# Patient Record
Sex: FEMALE | Race: WHITE | Employment: OTHER
[De-identification: names, ages, dates, MRNs, and addresses within clinical notes are randomized per-mention and may not be internally consistent; named-entity substitution may affect disease eponyms.]

---

## 2017-01-31 RX ORDER — GABAPENTIN 600 MG/1
600 TABLET ORAL DAILY
Qty: 90 TABLET | Refills: 1 | Status: SHIPPED | OUTPATIENT
Start: 2017-01-31 | End: 2017-07-10 | Stop reason: SDUPTHER

## 2017-01-31 RX ORDER — POTASSIUM CITRATE 10 MEQ/1
10 TABLET, EXTENDED RELEASE ORAL 2 TIMES DAILY
Qty: 180 TABLET | Refills: 1 | Status: SHIPPED | OUTPATIENT
Start: 2017-01-31 | End: 2017-10-02 | Stop reason: SDUPTHER

## 2017-01-31 RX ORDER — VALSARTAN AND HYDROCHLOROTHIAZIDE 160; 12.5 MG/1; MG/1
1 TABLET, FILM COATED ORAL 2 TIMES DAILY
Qty: 180 TABLET | Refills: 1 | Status: SHIPPED | OUTPATIENT
Start: 2017-01-31 | End: 2017-07-10 | Stop reason: SDUPTHER

## 2017-02-17 ENCOUNTER — OFFICE VISIT (OUTPATIENT)
Dept: FAMILY MEDICINE CLINIC | Facility: CLINIC | Age: 72
End: 2017-02-17

## 2017-02-17 ENCOUNTER — HOSPITAL ENCOUNTER (OUTPATIENT)
Age: 72
Setting detail: SPECIMEN
Discharge: HOME | End: 2017-02-17
Payer: MEDICARE

## 2017-02-17 VITALS
HEART RATE: 88 BPM | BODY MASS INDEX: 43.9 KG/M2 | DIASTOLIC BLOOD PRESSURE: 84 MMHG | OXYGEN SATURATION: 98 % | SYSTOLIC BLOOD PRESSURE: 130 MMHG | WEIGHT: 272 LBS

## 2017-02-17 DIAGNOSIS — K57.32 DIVERTICULITIS OF LARGE INTESTINE WITHOUT PERFORATION OR ABSCESS WITHOUT BLEEDING: ICD-10-CM

## 2017-02-17 DIAGNOSIS — R10.32 LEFT LOWER QUADRANT PAIN: ICD-10-CM

## 2017-02-17 DIAGNOSIS — K57.32 DIVERTICULITIS OF LARGE INTESTINE WITHOUT PERFORATION OR ABSCESS WITHOUT BLEEDING: Primary | ICD-10-CM

## 2017-02-17 LAB
ABSOLUTE EOS #: 0.3 K/UL (ref 0–0.4)
ABSOLUTE LYMPH #: 2 K/UL (ref 1–4.8)
ABSOLUTE MONO #: 0.5 K/UL (ref 0.1–1.2)
ALBUMIN SERPL-MCNC: 3.8 G/DL (ref 3.5–5.2)
ALBUMIN/GLOBULIN RATIO: 1.2 (ref 1–2.5)
ALP BLD-CCNC: 61 U/L (ref 35–104)
ALT SERPL-CCNC: 15 U/L (ref 5–33)
ANION GAP SERPL CALCULATED.3IONS-SCNC: 17 MMOL/L (ref 9–17)
AST SERPL-CCNC: 15 U/L
BASOPHILS # BLD: 0 % (ref 0–2)
BASOPHILS ABSOLUTE: 0 K/UL (ref 0–0.2)
BILIRUB SERPL-MCNC: 0.25 MG/DL (ref 0.3–1.2)
BILIRUBIN, POC: NORMAL
BLOOD URINE, POC: NORMAL
BUN BLDV-MCNC: 18 MG/DL (ref 8–23)
BUN/CREAT BLD: ABNORMAL (ref 9–20)
CALCIUM SERPL-MCNC: 9.6 MG/DL (ref 8.6–10.4)
CHLORIDE BLD-SCNC: 105 MMOL/L (ref 98–107)
CLARITY, POC: CLEAR
CO2: 22 MMOL/L (ref 20–31)
COLOR, POC: YELLOW
CREAT SERPL-MCNC: 1.71 MG/DL (ref 0.5–0.9)
DIFFERENTIAL TYPE: ABNORMAL
EOSINOPHILS RELATIVE PERCENT: 4 % (ref 1–4)
GFR AFRICAN AMERICAN: 36 ML/MIN
GFR NON-AFRICAN AMERICAN: 29 ML/MIN
GFR SERPL CREATININE-BSD FRML MDRD: ABNORMAL ML/MIN/{1.73_M2}
GFR SERPL CREATININE-BSD FRML MDRD: ABNORMAL ML/MIN/{1.73_M2}
GLUCOSE BLD-MCNC: 135 MG/DL (ref 70–99)
GLUCOSE URINE, POC: NORMAL
HCT VFR BLD CALC: 30.8 % (ref 36–46)
HEMOGLOBIN: 10.4 G/DL (ref 12–16)
KETONES, POC: NORMAL
LEUKOCYTE EST, POC: NORMAL
LYMPHOCYTES # BLD: 24 % (ref 24–44)
MCH RBC QN AUTO: 29 PG (ref 26–34)
MCHC RBC AUTO-ENTMCNC: 33.6 G/DL (ref 31–37)
MCV RBC AUTO: 86.1 FL (ref 80–100)
MONOCYTES # BLD: 6 % (ref 2–11)
NITRITE, POC: NORMAL
PDW BLD-RTO: 14.9 % (ref 12.5–15.4)
PH, POC: 6
PLATELET # BLD: 278 K/UL (ref 140–450)
PLATELET ESTIMATE: ABNORMAL
PMV BLD AUTO: 8.4 FL (ref 6–12)
POTASSIUM SERPL-SCNC: 4.5 MMOL/L (ref 3.7–5.3)
PROTEIN, POC: NORMAL
RBC # BLD: 3.58 M/UL (ref 4–5.2)
RBC # BLD: ABNORMAL 10*6/UL
SEG NEUTROPHILS: 66 % (ref 36–66)
SEGMENTED NEUTROPHILS ABSOLUTE COUNT: 5.3 K/UL (ref 1.8–7.7)
SODIUM BLD-SCNC: 144 MMOL/L (ref 135–144)
SPECIFIC GRAVITY, POC: 1.01
TOTAL PROTEIN: 7 G/DL (ref 6.4–8.3)
UROBILINOGEN, POC: 0.2
WBC # BLD: 8.1 K/UL (ref 3.5–11)
WBC # BLD: ABNORMAL 10*3/UL

## 2017-02-17 PROCEDURE — 1090F PRES/ABSN URINE INCON ASSESS: CPT | Performed by: FAMILY MEDICINE

## 2017-02-17 PROCEDURE — 1123F ACP DISCUSS/DSCN MKR DOCD: CPT | Performed by: FAMILY MEDICINE

## 2017-02-17 PROCEDURE — 3014F SCREEN MAMMO DOC REV: CPT | Performed by: FAMILY MEDICINE

## 2017-02-17 PROCEDURE — 99214 OFFICE O/P EST MOD 30 MIN: CPT | Performed by: FAMILY MEDICINE

## 2017-02-17 PROCEDURE — 3017F COLORECTAL CA SCREEN DOC REV: CPT | Performed by: FAMILY MEDICINE

## 2017-02-17 PROCEDURE — 1036F TOBACCO NON-USER: CPT | Performed by: FAMILY MEDICINE

## 2017-02-17 PROCEDURE — 4040F PNEUMOC VAC/ADMIN/RCVD: CPT | Performed by: FAMILY MEDICINE

## 2017-02-17 PROCEDURE — G8400 PT W/DXA NO RESULTS DOC: HCPCS | Performed by: FAMILY MEDICINE

## 2017-02-17 PROCEDURE — 81003 URINALYSIS AUTO W/O SCOPE: CPT | Performed by: FAMILY MEDICINE

## 2017-02-17 PROCEDURE — G8417 CALC BMI ABV UP PARAM F/U: HCPCS | Performed by: FAMILY MEDICINE

## 2017-02-17 PROCEDURE — G8484 FLU IMMUNIZE NO ADMIN: HCPCS | Performed by: FAMILY MEDICINE

## 2017-02-17 PROCEDURE — G8427 DOCREV CUR MEDS BY ELIG CLIN: HCPCS | Performed by: FAMILY MEDICINE

## 2017-02-17 RX ORDER — METRONIDAZOLE 500 MG/1
500 TABLET ORAL 3 TIMES DAILY
Qty: 30 TABLET | Refills: 0 | Status: SHIPPED | OUTPATIENT
Start: 2017-02-17 | End: 2017-02-27

## 2017-02-17 RX ORDER — CIPROFLOXACIN 500 MG/1
500 TABLET, FILM COATED ORAL 2 TIMES DAILY
Qty: 20 TABLET | Refills: 0 | Status: SHIPPED | OUTPATIENT
Start: 2017-02-17 | End: 2017-04-13 | Stop reason: ALTCHOICE

## 2017-02-20 DIAGNOSIS — F32.A DEPRESSION, UNSPECIFIED DEPRESSION TYPE: ICD-10-CM

## 2017-02-24 RX ORDER — FLUCONAZOLE 150 MG/1
150 TABLET ORAL ONCE
Qty: 1 TABLET | Refills: 1 | Status: SHIPPED | OUTPATIENT
Start: 2017-02-24 | End: 2017-02-24

## 2017-02-27 ENCOUNTER — HOSPITAL ENCOUNTER (OUTPATIENT)
Dept: CT IMAGING | Age: 72
Discharge: HOME | End: 2017-02-27
Payer: MEDICARE

## 2017-02-27 DIAGNOSIS — K57.32 DIVERTICULITIS OF LARGE INTESTINE WITHOUT PERFORATION OR ABSCESS WITHOUT BLEEDING: ICD-10-CM

## 2017-02-27 DIAGNOSIS — N28.89 RENAL MASS: Primary | ICD-10-CM

## 2017-02-27 PROCEDURE — 74176 CT ABD & PELVIS W/O CONTRAST: CPT | Performed by: RADIOLOGY

## 2017-02-27 PROCEDURE — 74176 CT ABD & PELVIS W/O CONTRAST: CPT

## 2017-03-13 ENCOUNTER — HOSPITAL ENCOUNTER (OUTPATIENT)
Dept: CT IMAGING | Age: 72
Discharge: HOME | End: 2017-03-13
Payer: MEDICARE

## 2017-03-13 ENCOUNTER — HOSPITAL ENCOUNTER (OUTPATIENT)
Dept: ULTRASOUND IMAGING | Age: 72
Discharge: HOME | End: 2017-03-13
Payer: MEDICARE

## 2017-03-13 DIAGNOSIS — N28.89 RENAL MASS: ICD-10-CM

## 2017-03-13 LAB
CREAT SERPL-MCNC: 1.87 MG/DL (ref 0.5–0.9)
GFR AFRICAN AMERICAN: 32 ML/MIN
GFR NON-AFRICAN AMERICAN: 27 ML/MIN
GFR SERPL CREATININE-BSD FRML MDRD: ABNORMAL ML/MIN/{1.73_M2}
GFR SERPL CREATININE-BSD FRML MDRD: ABNORMAL ML/MIN/{1.73_M2}

## 2017-03-13 PROCEDURE — 76775 US EXAM ABDO BACK WALL LIM: CPT

## 2017-03-13 PROCEDURE — 82565 ASSAY OF CREATININE: CPT

## 2017-03-15 RX ORDER — ATORVASTATIN CALCIUM 40 MG/1
TABLET, FILM COATED ORAL
Qty: 30 TABLET | Refills: 4 | Status: SHIPPED | OUTPATIENT
Start: 2017-03-15 | End: 2017-10-12 | Stop reason: SDUPTHER

## 2017-04-13 ENCOUNTER — OFFICE VISIT (OUTPATIENT)
Dept: PULMONOLOGY | Age: 72
End: 2017-04-13
Payer: MEDICARE

## 2017-04-13 VITALS
HEART RATE: 72 BPM | TEMPERATURE: 97.4 F | BODY MASS INDEX: 43 KG/M2 | OXYGEN SATURATION: 96 % | DIASTOLIC BLOOD PRESSURE: 76 MMHG | WEIGHT: 274 LBS | RESPIRATION RATE: 20 BRPM | SYSTOLIC BLOOD PRESSURE: 141 MMHG | HEIGHT: 67 IN

## 2017-04-13 DIAGNOSIS — N18.30 CHRONIC KIDNEY DISEASE (CKD), STAGE III (MODERATE) (HCC): ICD-10-CM

## 2017-04-13 DIAGNOSIS — E66.01 MORBID OBESITY DUE TO EXCESS CALORIES (HCC): ICD-10-CM

## 2017-04-13 DIAGNOSIS — M79.7 FIBROMYALGIA: ICD-10-CM

## 2017-04-13 DIAGNOSIS — G47.33 OSA ON CPAP: Primary | ICD-10-CM

## 2017-04-13 DIAGNOSIS — Z99.89 OSA ON CPAP: Primary | ICD-10-CM

## 2017-04-13 PROCEDURE — G8427 DOCREV CUR MEDS BY ELIG CLIN: HCPCS | Performed by: INTERNAL MEDICINE

## 2017-04-13 PROCEDURE — 3017F COLORECTAL CA SCREEN DOC REV: CPT | Performed by: INTERNAL MEDICINE

## 2017-04-13 PROCEDURE — 3014F SCREEN MAMMO DOC REV: CPT | Performed by: INTERNAL MEDICINE

## 2017-04-13 PROCEDURE — 4040F PNEUMOC VAC/ADMIN/RCVD: CPT | Performed by: INTERNAL MEDICINE

## 2017-04-13 PROCEDURE — 1036F TOBACCO NON-USER: CPT | Performed by: INTERNAL MEDICINE

## 2017-04-13 PROCEDURE — G8400 PT W/DXA NO RESULTS DOC: HCPCS | Performed by: INTERNAL MEDICINE

## 2017-04-13 PROCEDURE — 1090F PRES/ABSN URINE INCON ASSESS: CPT | Performed by: INTERNAL MEDICINE

## 2017-04-13 PROCEDURE — G8417 CALC BMI ABV UP PARAM F/U: HCPCS | Performed by: INTERNAL MEDICINE

## 2017-04-13 PROCEDURE — 99214 OFFICE O/P EST MOD 30 MIN: CPT | Performed by: INTERNAL MEDICINE

## 2017-04-13 PROCEDURE — 1123F ACP DISCUSS/DSCN MKR DOCD: CPT | Performed by: INTERNAL MEDICINE

## 2017-04-18 ENCOUNTER — OFFICE VISIT (OUTPATIENT)
Dept: FAMILY MEDICINE CLINIC | Age: 72
End: 2017-04-18
Payer: MEDICARE

## 2017-04-18 VITALS
HEART RATE: 80 BPM | WEIGHT: 275 LBS | BODY MASS INDEX: 43.07 KG/M2 | SYSTOLIC BLOOD PRESSURE: 142 MMHG | DIASTOLIC BLOOD PRESSURE: 82 MMHG

## 2017-04-18 DIAGNOSIS — I10 ESSENTIAL HYPERTENSION: ICD-10-CM

## 2017-04-18 DIAGNOSIS — M79.7 FIBROMYALGIA: ICD-10-CM

## 2017-04-18 DIAGNOSIS — K57.32 DIVERTICULITIS OF LARGE INTESTINE WITHOUT PERFORATION OR ABSCESS WITHOUT BLEEDING: Primary | ICD-10-CM

## 2017-04-18 DIAGNOSIS — N20.0 KIDNEY STONE: ICD-10-CM

## 2017-04-18 LAB
BILIRUBIN, POC: ABNORMAL
BLOOD URINE, POC: ABNORMAL
CLARITY, POC: CLEAR
COLOR, POC: ABNORMAL
GLUCOSE URINE, POC: ABNORMAL
KETONES, POC: ABNORMAL
LEUKOCYTE EST, POC: ABNORMAL
NITRITE, POC: ABNORMAL
PH, POC: 7.5
PROTEIN, POC: ABNORMAL
SPECIFIC GRAVITY, POC: 1.01
UROBILINOGEN, POC: 0.2

## 2017-04-18 PROCEDURE — 99213 OFFICE O/P EST LOW 20 MIN: CPT | Performed by: FAMILY MEDICINE

## 2017-04-18 PROCEDURE — G8417 CALC BMI ABV UP PARAM F/U: HCPCS | Performed by: FAMILY MEDICINE

## 2017-04-18 PROCEDURE — 3014F SCREEN MAMMO DOC REV: CPT | Performed by: FAMILY MEDICINE

## 2017-04-18 PROCEDURE — 4040F PNEUMOC VAC/ADMIN/RCVD: CPT | Performed by: FAMILY MEDICINE

## 2017-04-18 PROCEDURE — 3017F COLORECTAL CA SCREEN DOC REV: CPT | Performed by: FAMILY MEDICINE

## 2017-04-18 PROCEDURE — 81003 URINALYSIS AUTO W/O SCOPE: CPT | Performed by: FAMILY MEDICINE

## 2017-04-18 PROCEDURE — 1036F TOBACCO NON-USER: CPT | Performed by: FAMILY MEDICINE

## 2017-04-18 PROCEDURE — G8400 PT W/DXA NO RESULTS DOC: HCPCS | Performed by: FAMILY MEDICINE

## 2017-04-18 PROCEDURE — G8427 DOCREV CUR MEDS BY ELIG CLIN: HCPCS | Performed by: FAMILY MEDICINE

## 2017-04-18 PROCEDURE — 1090F PRES/ABSN URINE INCON ASSESS: CPT | Performed by: FAMILY MEDICINE

## 2017-04-18 PROCEDURE — 1123F ACP DISCUSS/DSCN MKR DOCD: CPT | Performed by: FAMILY MEDICINE

## 2017-04-18 RX ORDER — HYDROCODONE BITARTRATE AND ACETAMINOPHEN 5; 325 MG/1; MG/1
1 TABLET ORAL EVERY 6 HOURS PRN
Qty: 40 TABLET | Refills: 0 | Status: SHIPPED | OUTPATIENT
Start: 2017-04-18 | End: 2017-08-23 | Stop reason: SDUPTHER

## 2017-04-18 RX ORDER — MOXIFLOXACIN HYDROCHLORIDE 400 MG/1
400 TABLET ORAL DAILY
Qty: 10 TABLET | Refills: 0 | Status: SHIPPED | OUTPATIENT
Start: 2017-04-18 | End: 2017-04-28

## 2017-04-18 ASSESSMENT — ENCOUNTER SYMPTOMS
COUGH: 0
ABDOMINAL PAIN: 1
CONSTIPATION: 0
DIARRHEA: 0
VOMITING: 0
NAUSEA: 0
BACK PAIN: 1
SHORTNESS OF BREATH: 0
BLOOD IN STOOL: 0
SORE THROAT: 0
SINUS PRESSURE: 0
RECTAL PAIN: 0
ABDOMINAL DISTENTION: 1

## 2017-04-18 ASSESSMENT — PATIENT HEALTH QUESTIONNAIRE - PHQ9
1. LITTLE INTEREST OR PLEASURE IN DOING THINGS: 2
SUM OF ALL RESPONSES TO PHQ QUESTIONS 1-9: 5
SUM OF ALL RESPONSES TO PHQ9 QUESTIONS 1 & 2: 5
2. FEELING DOWN, DEPRESSED OR HOPELESS: 3

## 2017-05-01 RX ORDER — PIOGLITAZONE HCL AND METFORMIN HCL 500; 15 MG/1; MG/1
1 TABLET ORAL 2 TIMES DAILY WITH MEALS
Qty: 180 TABLET | Refills: 2 | Status: SHIPPED | OUTPATIENT
Start: 2017-05-01 | End: 2018-01-08 | Stop reason: SDUPTHER

## 2017-05-01 RX ORDER — PANTOPRAZOLE SODIUM 40 MG/1
40 TABLET, DELAYED RELEASE ORAL DAILY
Qty: 90 TABLET | Refills: 2 | Status: SHIPPED | OUTPATIENT
Start: 2017-05-01 | End: 2018-01-08 | Stop reason: SDUPTHER

## 2017-05-24 ENCOUNTER — OFFICE VISIT (OUTPATIENT)
Dept: FAMILY MEDICINE CLINIC | Age: 72
End: 2017-05-24
Payer: MEDICARE

## 2017-05-24 VITALS
HEART RATE: 67 BPM | SYSTOLIC BLOOD PRESSURE: 130 MMHG | DIASTOLIC BLOOD PRESSURE: 74 MMHG | WEIGHT: 275 LBS | BODY MASS INDEX: 43.07 KG/M2

## 2017-05-24 DIAGNOSIS — F32.A DEPRESSION, UNSPECIFIED DEPRESSION TYPE: ICD-10-CM

## 2017-05-24 DIAGNOSIS — K52.9 COLITIS: Primary | ICD-10-CM

## 2017-05-24 PROCEDURE — G8427 DOCREV CUR MEDS BY ELIG CLIN: HCPCS | Performed by: FAMILY MEDICINE

## 2017-05-24 PROCEDURE — 4040F PNEUMOC VAC/ADMIN/RCVD: CPT | Performed by: FAMILY MEDICINE

## 2017-05-24 PROCEDURE — 1036F TOBACCO NON-USER: CPT | Performed by: FAMILY MEDICINE

## 2017-05-24 PROCEDURE — 3017F COLORECTAL CA SCREEN DOC REV: CPT | Performed by: FAMILY MEDICINE

## 2017-05-24 PROCEDURE — 1090F PRES/ABSN URINE INCON ASSESS: CPT | Performed by: FAMILY MEDICINE

## 2017-05-24 PROCEDURE — G8400 PT W/DXA NO RESULTS DOC: HCPCS | Performed by: FAMILY MEDICINE

## 2017-05-24 PROCEDURE — 3014F SCREEN MAMMO DOC REV: CPT | Performed by: FAMILY MEDICINE

## 2017-05-24 PROCEDURE — G8417 CALC BMI ABV UP PARAM F/U: HCPCS | Performed by: FAMILY MEDICINE

## 2017-05-24 PROCEDURE — 99213 OFFICE O/P EST LOW 20 MIN: CPT | Performed by: FAMILY MEDICINE

## 2017-05-24 PROCEDURE — 1123F ACP DISCUSS/DSCN MKR DOCD: CPT | Performed by: FAMILY MEDICINE

## 2017-05-24 RX ORDER — CIPROFLOXACIN 500 MG/1
500 TABLET, FILM COATED ORAL 2 TIMES DAILY
Qty: 20 TABLET | Refills: 0 | Status: SHIPPED | OUTPATIENT
Start: 2017-05-24 | End: 2017-06-03

## 2017-05-24 ASSESSMENT — ENCOUNTER SYMPTOMS
DIARRHEA: 1
BACK PAIN: 1
ABDOMINAL PAIN: 1
ABDOMINAL DISTENTION: 1
NAUSEA: 0
CONSTIPATION: 0
SORE THROAT: 0
SINUS PRESSURE: 0
VOMITING: 0
COUGH: 0
SHORTNESS OF BREATH: 0

## 2017-05-30 RX ORDER — ZOLPIDEM TARTRATE 10 MG/1
TABLET ORAL
Qty: 90 TABLET | Refills: 0 | Status: SHIPPED | OUTPATIENT
Start: 2017-05-30 | End: 2017-08-23 | Stop reason: SDUPTHER

## 2017-06-22 RX ORDER — CYCLOBENZAPRINE HCL 10 MG
TABLET ORAL
Qty: 30 TABLET | Refills: 4 | Status: SHIPPED | OUTPATIENT
Start: 2017-06-22 | End: 2018-07-27 | Stop reason: SDUPTHER

## 2017-07-10 RX ORDER — VALSARTAN AND HYDROCHLOROTHIAZIDE 160; 12.5 MG/1; MG/1
1 TABLET, FILM COATED ORAL 2 TIMES DAILY
Qty: 180 TABLET | Refills: 3 | Status: SHIPPED | OUTPATIENT
Start: 2017-07-10 | End: 2018-10-09 | Stop reason: ALTCHOICE

## 2017-07-10 RX ORDER — GABAPENTIN 600 MG/1
600 TABLET ORAL DAILY
Qty: 90 TABLET | Refills: 3 | Status: SHIPPED | OUTPATIENT
Start: 2017-07-10 | End: 2018-11-14 | Stop reason: SDUPTHER

## 2017-09-05 ENCOUNTER — OFFICE VISIT (OUTPATIENT)
Dept: FAMILY MEDICINE CLINIC | Age: 72
End: 2017-09-05
Payer: MEDICARE

## 2017-09-05 VITALS
BODY MASS INDEX: 42.76 KG/M2 | WEIGHT: 273 LBS | DIASTOLIC BLOOD PRESSURE: 80 MMHG | SYSTOLIC BLOOD PRESSURE: 130 MMHG | HEART RATE: 63 BPM

## 2017-09-05 DIAGNOSIS — N18.30 TYPE 2 DIABETES MELLITUS WITH STAGE 3 CHRONIC KIDNEY DISEASE, WITHOUT LONG-TERM CURRENT USE OF INSULIN (HCC): ICD-10-CM

## 2017-09-05 DIAGNOSIS — I10 ESSENTIAL HYPERTENSION: ICD-10-CM

## 2017-09-05 DIAGNOSIS — E11.22 TYPE 2 DIABETES MELLITUS WITH STAGE 3 CHRONIC KIDNEY DISEASE, WITHOUT LONG-TERM CURRENT USE OF INSULIN (HCC): ICD-10-CM

## 2017-09-05 DIAGNOSIS — N18.30 CRI (CHRONIC RENAL INSUFFICIENCY), STAGE 3 (MODERATE) (HCC): ICD-10-CM

## 2017-09-05 DIAGNOSIS — Z23 IMMUNIZATION DUE: ICD-10-CM

## 2017-09-05 DIAGNOSIS — E78.5 DYSLIPIDEMIA: ICD-10-CM

## 2017-09-05 DIAGNOSIS — M54.6 RIGHT-SIDED THORACIC BACK PAIN, UNSPECIFIED CHRONICITY: Primary | ICD-10-CM

## 2017-09-05 DIAGNOSIS — F32.A DEPRESSION, UNSPECIFIED DEPRESSION TYPE: ICD-10-CM

## 2017-09-05 PROCEDURE — 1090F PRES/ABSN URINE INCON ASSESS: CPT | Performed by: FAMILY MEDICINE

## 2017-09-05 PROCEDURE — 1036F TOBACCO NON-USER: CPT | Performed by: FAMILY MEDICINE

## 2017-09-05 PROCEDURE — 99213 OFFICE O/P EST LOW 20 MIN: CPT | Performed by: FAMILY MEDICINE

## 2017-09-05 PROCEDURE — 4040F PNEUMOC VAC/ADMIN/RCVD: CPT | Performed by: FAMILY MEDICINE

## 2017-09-05 PROCEDURE — 3014F SCREEN MAMMO DOC REV: CPT | Performed by: FAMILY MEDICINE

## 2017-09-05 PROCEDURE — G8400 PT W/DXA NO RESULTS DOC: HCPCS | Performed by: FAMILY MEDICINE

## 2017-09-05 PROCEDURE — 90662 IIV NO PRSV INCREASED AG IM: CPT | Performed by: FAMILY MEDICINE

## 2017-09-05 PROCEDURE — 3046F HEMOGLOBIN A1C LEVEL >9.0%: CPT | Performed by: FAMILY MEDICINE

## 2017-09-05 PROCEDURE — G8417 CALC BMI ABV UP PARAM F/U: HCPCS | Performed by: FAMILY MEDICINE

## 2017-09-05 PROCEDURE — 3017F COLORECTAL CA SCREEN DOC REV: CPT | Performed by: FAMILY MEDICINE

## 2017-09-05 PROCEDURE — 1123F ACP DISCUSS/DSCN MKR DOCD: CPT | Performed by: FAMILY MEDICINE

## 2017-09-05 PROCEDURE — G0008 ADMIN INFLUENZA VIRUS VAC: HCPCS | Performed by: FAMILY MEDICINE

## 2017-09-05 PROCEDURE — G8427 DOCREV CUR MEDS BY ELIG CLIN: HCPCS | Performed by: FAMILY MEDICINE

## 2017-09-05 RX ORDER — SERTRALINE HYDROCHLORIDE 100 MG/1
100 TABLET, FILM COATED ORAL DAILY
Qty: 90 TABLET | Refills: 3 | Status: SHIPPED | OUTPATIENT
Start: 2017-09-05 | End: 2018-12-29

## 2017-09-05 ASSESSMENT — ENCOUNTER SYMPTOMS
VOMITING: 0
NAUSEA: 0
COUGH: 0
BACK PAIN: 1
SHORTNESS OF BREATH: 0
SINUS PRESSURE: 0
SORE THROAT: 0
DIARRHEA: 0

## 2017-09-07 ENCOUNTER — HOSPITAL ENCOUNTER (OUTPATIENT)
Age: 72
Setting detail: SPECIMEN
Discharge: HOME | End: 2017-09-07
Payer: MEDICARE

## 2017-09-07 DIAGNOSIS — I10 ESSENTIAL HYPERTENSION: ICD-10-CM

## 2017-09-07 DIAGNOSIS — N18.30 TYPE 2 DIABETES MELLITUS WITH STAGE 3 CHRONIC KIDNEY DISEASE, WITHOUT LONG-TERM CURRENT USE OF INSULIN (HCC): ICD-10-CM

## 2017-09-07 DIAGNOSIS — N18.30 CRI (CHRONIC RENAL INSUFFICIENCY), STAGE 3 (MODERATE) (HCC): ICD-10-CM

## 2017-09-07 DIAGNOSIS — E11.22 TYPE 2 DIABETES MELLITUS WITH STAGE 3 CHRONIC KIDNEY DISEASE, WITHOUT LONG-TERM CURRENT USE OF INSULIN (HCC): ICD-10-CM

## 2017-09-07 DIAGNOSIS — E78.5 DYSLIPIDEMIA: ICD-10-CM

## 2017-09-07 LAB
ABSOLUTE EOS #: 0.4 K/UL (ref 0–0.4)
ABSOLUTE LYMPH #: 2.6 K/UL (ref 1–4.8)
ABSOLUTE MONO #: 0.6 K/UL (ref 0.1–1.2)
ALBUMIN SERPL-MCNC: 3.8 G/DL (ref 3.5–5.2)
ALBUMIN/GLOBULIN RATIO: 1.3 (ref 1–2.5)
ALP BLD-CCNC: 60 U/L (ref 35–104)
ALT SERPL-CCNC: 11 U/L (ref 5–33)
ANION GAP SERPL CALCULATED.3IONS-SCNC: 16 MMOL/L (ref 9–17)
AST SERPL-CCNC: 12 U/L
BASOPHILS # BLD: 1 %
BASOPHILS ABSOLUTE: 0.1 K/UL (ref 0–0.2)
BILIRUB SERPL-MCNC: 0.26 MG/DL (ref 0.3–1.2)
BUN BLDV-MCNC: 28 MG/DL (ref 8–23)
BUN/CREAT BLD: ABNORMAL (ref 9–20)
CALCIUM SERPL-MCNC: 9 MG/DL (ref 8.6–10.4)
CHLORIDE BLD-SCNC: 102 MMOL/L (ref 98–107)
CHOLESTEROL/HDL RATIO: 6.3
CHOLESTEROL: 183 MG/DL
CO2: 23 MMOL/L (ref 20–31)
CREAT SERPL-MCNC: 1.82 MG/DL (ref 0.5–0.9)
DIFFERENTIAL TYPE: ABNORMAL
EOSINOPHILS RELATIVE PERCENT: 5 %
ESTIMATED AVERAGE GLUCOSE: 117 MG/DL
GFR AFRICAN AMERICAN: 33 ML/MIN
GFR NON-AFRICAN AMERICAN: 27 ML/MIN
GFR SERPL CREATININE-BSD FRML MDRD: ABNORMAL ML/MIN/{1.73_M2}
GFR SERPL CREATININE-BSD FRML MDRD: ABNORMAL ML/MIN/{1.73_M2}
GLUCOSE BLD-MCNC: 120 MG/DL (ref 70–99)
HBA1C MFR BLD: 5.7 % (ref 4–6)
HCT VFR BLD CALC: 30.4 % (ref 36–46)
HDLC SERPL-MCNC: 29 MG/DL
HEMOGLOBIN: 9.7 G/DL (ref 12–16)
LDL CHOLESTEROL: 89 MG/DL (ref 0–130)
LYMPHOCYTES # BLD: 34 %
MCH RBC QN AUTO: 28.9 PG (ref 26–34)
MCHC RBC AUTO-ENTMCNC: 32 G/DL (ref 31–37)
MCV RBC AUTO: 90.3 FL (ref 80–100)
MONOCYTES # BLD: 8 %
PDW BLD-RTO: 16.6 % (ref 12.5–15.4)
PLATELET # BLD: 319 K/UL (ref 140–450)
PLATELET ESTIMATE: ABNORMAL
PMV BLD AUTO: 8.3 FL (ref 6–12)
POTASSIUM SERPL-SCNC: 4.6 MMOL/L (ref 3.7–5.3)
RBC # BLD: 3.36 M/UL (ref 4–5.2)
RBC # BLD: ABNORMAL 10*6/UL
SEDIMENTATION RATE, ERYTHROCYTE: 84 MM (ref 0–20)
SEG NEUTROPHILS: 52 %
SEGMENTED NEUTROPHILS ABSOLUTE COUNT: 4 K/UL (ref 1.8–7.7)
SODIUM BLD-SCNC: 141 MMOL/L (ref 135–144)
TOTAL PROTEIN: 6.7 G/DL (ref 6.4–8.3)
TRIGL SERPL-MCNC: 327 MG/DL
VLDLC SERPL CALC-MCNC: ABNORMAL MG/DL (ref 1–30)
WBC # BLD: 7.7 K/UL (ref 3.5–11)
WBC # BLD: ABNORMAL 10*3/UL

## 2017-10-02 RX ORDER — POTASSIUM CITRATE 10 MEQ/1
10 TABLET, EXTENDED RELEASE ORAL 2 TIMES DAILY
Qty: 180 TABLET | Refills: 3 | Status: SHIPPED | OUTPATIENT
Start: 2017-10-02 | End: 2018-11-12 | Stop reason: SDUPTHER

## 2017-10-02 NOTE — TELEPHONE ENCOUNTER
From: Mamadou Limon  To: Naima Mccord MD  Sent: 10/1/2017 5:32 PM EDT  Subject: Medication Renewal Request    Original authorizing provider: MD Baltazar Russ. Zaid Nguyen would like a refill of the following medications:  potassium citrate (UROCIT-K) 10 MEQ (1080 MG) extended release tablet Niama Mccord MD]    Preferred pharmacy: [address] [place] [address] , [address] [facility] [phone] - F [phone]    Comment:   I thought that I had ordered this last week. I haven't received anything so I thought I would try again.
regurgitation - Mild to moderate     Chronic kidney disease (CKD), stage III (moderate)     Gout     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 disease (HCC)     Essential hypertension     Osteopenia

## 2017-10-04 ENCOUNTER — HOSPITAL ENCOUNTER (OUTPATIENT)
Age: 72
Setting detail: SPECIMEN
Discharge: HOME | End: 2017-10-04
Payer: MEDICARE

## 2017-10-04 DIAGNOSIS — E11.22 TYPE 2 DIABETES MELLITUS WITH STAGE 3 CHRONIC KIDNEY DISEASE, UNSPECIFIED LONG TERM INSULIN USE STATUS: ICD-10-CM

## 2017-10-04 DIAGNOSIS — N18.3 TYPE 2 DIABETES MELLITUS WITH STAGE 3 CHRONIC KIDNEY DISEASE, UNSPECIFIED LONG TERM INSULIN USE STATUS: ICD-10-CM

## 2017-10-04 LAB
ABSOLUTE EOS #: 0.2 K/UL (ref 0–0.4)
ABSOLUTE LYMPH #: 2.6 K/UL (ref 1–4.8)
ABSOLUTE MONO #: 0.6 K/UL (ref 0.1–1.2)
ANION GAP SERPL CALCULATED.3IONS-SCNC: 16 MMOL/L (ref 9–17)
BASOPHILS # BLD: 1 %
BASOPHILS ABSOLUTE: 0.1 K/UL (ref 0–0.2)
BUN BLDV-MCNC: 30 MG/DL (ref 8–23)
BUN/CREAT BLD: ABNORMAL (ref 9–20)
CALCIUM SERPL-MCNC: 9.2 MG/DL (ref 8.6–10.4)
CHLORIDE BLD-SCNC: 105 MMOL/L (ref 98–107)
CO2: 21 MMOL/L (ref 20–31)
CREAT SERPL-MCNC: 1.65 MG/DL (ref 0.5–0.9)
CREATININE URINE: 74.3 MG/DL (ref 28–217)
DIFFERENTIAL TYPE: ABNORMAL
EOSINOPHILS RELATIVE PERCENT: 3 %
GFR AFRICAN AMERICAN: 37 ML/MIN
GFR NON-AFRICAN AMERICAN: 31 ML/MIN
GFR SERPL CREATININE-BSD FRML MDRD: ABNORMAL ML/MIN/{1.73_M2}
GFR SERPL CREATININE-BSD FRML MDRD: ABNORMAL ML/MIN/{1.73_M2}
GLUCOSE BLD-MCNC: 119 MG/DL (ref 70–99)
HCT VFR BLD CALC: 27.2 % (ref 36–46)
HEMOGLOBIN: 8.9 G/DL (ref 12–16)
LYMPHOCYTES # BLD: 31 %
MAGNESIUM: 2 MG/DL (ref 1.6–2.6)
MCH RBC QN AUTO: 29.2 PG (ref 26–34)
MCHC RBC AUTO-ENTMCNC: 32.7 G/DL (ref 31–37)
MCV RBC AUTO: 89.3 FL (ref 80–100)
MONOCYTES # BLD: 8 %
PDW BLD-RTO: 15.3 % (ref 12.5–15.4)
PHOSPHORUS: 3.5 MG/DL (ref 2.6–4.5)
PLATELET # BLD: 298 K/UL (ref 140–450)
PLATELET ESTIMATE: ABNORMAL
PMV BLD AUTO: 8 FL (ref 6–12)
POTASSIUM SERPL-SCNC: 4.5 MMOL/L (ref 3.7–5.3)
PTH INTACT: 137.2 PG/ML (ref 15–65)
RBC # BLD: 3.04 M/UL (ref 4–5.2)
RBC # BLD: ABNORMAL 10*6/UL
SEG NEUTROPHILS: 57 %
SEGMENTED NEUTROPHILS ABSOLUTE COUNT: 4.7 K/UL (ref 1.8–7.7)
SODIUM BLD-SCNC: 142 MMOL/L (ref 135–144)
TOTAL PROTEIN, URINE: 5 MG/DL
VITAMIN D 25-HYDROXY: 30.7 NG/ML (ref 30–100)
WBC # BLD: 8.2 K/UL (ref 3.5–11)
WBC # BLD: ABNORMAL 10*3/UL

## 2017-10-11 ENCOUNTER — OFFICE VISIT (OUTPATIENT)
Dept: OBGYN CLINIC | Age: 72
End: 2017-10-11
Payer: MEDICARE

## 2017-10-11 VITALS
SYSTOLIC BLOOD PRESSURE: 132 MMHG | HEIGHT: 67 IN | DIASTOLIC BLOOD PRESSURE: 60 MMHG | WEIGHT: 270.4 LBS | BODY MASS INDEX: 42.44 KG/M2

## 2017-10-11 DIAGNOSIS — F43.21 SITUATIONAL DEPRESSION: ICD-10-CM

## 2017-10-11 DIAGNOSIS — R10.32 LEFT LOWER QUADRANT PAIN: Primary | ICD-10-CM

## 2017-10-11 PROCEDURE — 99213 OFFICE O/P EST LOW 20 MIN: CPT | Performed by: OBSTETRICS & GYNECOLOGY

## 2017-10-11 RX ORDER — DIPHENHYDRAMINE HYDROCHLORIDE 25 MG/1
TABLET ORAL
COMMUNITY
End: 2018-12-29

## 2017-10-11 NOTE — PROGRESS NOTES
Patient is a 71 y.o. [id number]  seen with total face to face time of 15 minutes. More than 50% of this visit was on counseling and education regarding her   1. Left lower quadrant pain  US Non OB Transvaginal    and her options. She was also counseled on her preventative health maintenance recommendations and follow-up. Blood pressure 132/60, height 5' 7\" (1.702 m), weight 270 lb 6.4 oz (122.7 kg), not currently breastfeeding.   Recent Results (from the past 8736 hour(s))   CBC Auto Differential    Collection Time: 10/21/16  9:58 AM   Result Value Ref Range    WBC 8.8 3.5 - 11.0 k/uL    RBC 3.40 (L) 4.0 - 5.2 m/uL    Hemoglobin 9.7 (L) 12.0 - 16.0 g/dL    Hematocrit 29.4 (L) 36 - 46 %    MCV 86.6 80 - 100 fL    MCH 28.6 26 - 34 pg    MCHC 33.1 31 - 37 g/dL    RDW 16.8 (H) 12.5 - 15.4 %    Platelets 215 839 - 998 k/uL    MPV 8.2 6.0 - 12.0 fL    Differential Type NOT REPORTED     Seg Neutrophils 54 36 - 66 %    Lymphocytes 33 24 - 44 %    Monocytes 8 2 - 11 %    Eosinophils % 4 1 - 4 %    Basophils 1 0 - 2 %    Segs Absolute 4.80 1.8 - 7.7 k/uL    Absolute Lymph # 2.90 1.0 - 4.8 k/uL    Absolute Mono # 0.70 0.1 - 1.2 k/uL    Absolute Eos # 0.30 0.0 - 0.4 k/uL    Basophils # 0.10 0.0 - 0.2 k/uL    WBC Morphology NOT REPORTED     RBC Morphology ANISOCYTOSIS PRESENT     Platelet Estimate NOT REPORTED    Comprehensive Metabolic Panel    Collection Time: 10/21/16  9:58 AM   Result Value Ref Range    Glucose 148 (H) 70 - 99 mg/dL    BUN 27 (H) 8 - 23 mg/dL    CREATININE 1.79 (H) 0.50 - 0.90 mg/dL    Bun/Cre Ratio NOT REPORTED 9 - 20    Calcium 9.4 8.6 - 10.4 mg/dL    Sodium 142 135 - 144 mmol/L    Potassium 4.4 3.7 - 5.3 mmol/L    Chloride 104 98 - 107 mmol/L    CO2 23 20 - 31 mmol/L    Anion Gap 15 9 - 17 mmol/L    Alkaline Phosphatase 59 35 - 104 U/L    ALT 13 5 - 33 U/L    AST 15 <32 U/L    Total Bilirubin 0.29 (L) 0.3 - 1.2 mg/dL    Total Protein 6.7 6.4 - 8.3 g/dL    Alb 3.7 3.5 - 5.2 g/dL    Albumin/Globulin Ratio 1.2 1.0 - 2.5    GFR Non-African American 28 (L) >60 mL/min    GFR  34 (L) >60 mL/min    GFR Comment          GFR Staging NOT REPORTED    Lipid Panel    Collection Time: 10/21/16  9:58 AM   Result Value Ref Range    Cholesterol 235 (H) <200 mg/dL    HDL 35 (L) >40 mg/dL    LDL Cholesterol      0 - 130 mg/dL    Chol/HDL Ratio 6.7 (H) <5    Triglycerides 451 (H) <150 mg/dL    VLDL NOT REPORTED 1 - 30 mg/dL   Hemoglobin A1C    Collection Time: 10/21/16  9:58 AM   Result Value Ref Range    Hemoglobin A1C 6.1 (H) 4.0 - 6.0 %    Estimated Avg Glucose 128 mg/dL   LDL Cholesterol, Direct    Collection Time: 10/21/16  9:58 AM   Result Value Ref Range    LDL Direct 123 (H) <100 mg/dL   Vitamin D 25 Hydroxy    Collection Time: 11/01/16 10:27 AM   Result Value Ref Range    Vit D, 25-Hydroxy 33.3 30.0 - 100.0 ng/mL   CBC Auto Differential    Collection Time: 11/01/16 10:27 AM   Result Value Ref Range    WBC 9.1 3.5 - 11.0 k/uL    RBC 3.26 (L) 4.0 - 5.2 m/uL    Hemoglobin 9.6 (L) 12.0 - 16.0 g/dL    Hematocrit 28.2 (L) 36 - 46 %    MCV 86.4 80 - 100 fL    MCH 29.3 26 - 34 pg    MCHC 33.9 31 - 37 g/dL    RDW 17.4 (H) 12.5 - 15.4 %    Platelets 250 183 - 235 k/uL    MPV 8.1 6.0 - 12.0 fL    Differential Type NOT REPORTED     Seg Neutrophils 55 36 - 66 %    Lymphocytes 31 24 - 44 %    Monocytes 8 2 - 11 %    Eosinophils % 5 (H) 1 - 4 %    Basophils 1 0 - 2 %    Segs Absolute 5.00 1.8 - 7.7 k/uL    Absolute Lymph # 2.80 1.0 - 4.8 k/uL    Absolute Mono # 0.70 0.1 - 1.2 k/uL    Absolute Eos # 0.50 (H) 0.0 - 0.4 k/uL    Basophils # 0.10 0.0 - 0.2 k/uL    WBC Morphology NOT REPORTED     RBC Morphology ANISOCYTOSIS PRESENT     Platelet Estimate NOT REPORTED    Magnesium    Collection Time: 11/01/16 10:27 AM   Result Value Ref Range    Magnesium 2.1 1.6 - 2.6 mg/dL   Basic Metabolic Panel    Collection Time: 11/01/16 10:27 AM   Result Value Ref Range    Glucose 133 (H) 70 - 99 mg/dL    BUN 27 (H) 8 - 23 mg/dL    CREATININE 1.95 (H) 0.50 - 0.90 mg/dL    Bun/Cre Ratio NOT REPORTED 9 - 20    Calcium 9.5 8.6 - 10.4 mg/dL    Sodium 139 135 - 144 mmol/L    Potassium 4.1 3.7 - 5.3 mmol/L    Chloride 102 98 - 107 mmol/L    CO2 22 20 - 31 mmol/L    Anion Gap 15 9 - 17 mmol/L    GFR Non-African American 25 (L) >60 mL/min    GFR  31 (L) >60 mL/min    GFR Comment          GFR Staging NOT REPORTED    Phosphorus    Collection Time: 11/01/16 10:27 AM   Result Value Ref Range    Phosphorus 3.8 2.6 - 4.5 mg/dL   PTH, Intact    Collection Time: 11/01/16 10:27 AM   Result Value Ref Range    Pth Intact 87.88 (H) 15.0 - 65.0 pg/mL   POCT Urinalysis No Micro (Auto)    Collection Time: 02/17/17 12:00 AM   Result Value Ref Range    Color, UA yellow     Clarity, UA clear     Glucose, UA POC neg. Bilirubin, UA neg. Ketones, UA neg. Spec Grav, UA 1.010     Blood, UA POC neg. pH, UA 6.0     Protein, UA POC neg. Urobilinogen, UA 0.2     Leukocytes, UA neg. Nitrite, UA neg.     CBC Auto Differential    Collection Time: 02/17/17  4:28 PM   Result Value Ref Range    WBC 8.1 3.5 - 11.0 k/uL    RBC 3.58 (L) 4.0 - 5.2 m/uL    Hemoglobin 10.4 (L) 12.0 - 16.0 g/dL    Hematocrit 30.8 (L) 36 - 46 %    MCV 86.1 80 - 100 fL    MCH 29.0 26 - 34 pg    MCHC 33.6 31 - 37 g/dL    RDW 14.9 12.5 - 15.4 %    Platelets 954 542 - 025 k/uL    MPV 8.4 6.0 - 12.0 fL    Differential Type NOT REPORTED     Seg Neutrophils 66 36 - 66 %    Lymphocytes 24 24 - 44 %    Monocytes 6 2 - 11 %    Eosinophils % 4 1 - 4 %    Basophils 0 0 - 2 %    Segs Absolute 5.30 1.8 - 7.7 k/uL    Absolute Lymph # 2.00 1.0 - 4.8 k/uL    Absolute Mono # 0.50 0.1 - 1.2 k/uL    Absolute Eos # 0.30 0.0 - 0.4 k/uL    Basophils # 0.00 0.0 - 0.2 k/uL    WBC Morphology NOT REPORTED     RBC Morphology NOT REPORTED     Platelet Estimate NOT REPORTED    Comprehensive Metabolic Panel    Collection Time: 02/17/17  4:28 PM   Result Value Ref Range    Glucose 135 (H) 70 - 99 mg/dL    BUN 18 34 pg    MCHC 32.7 31 - 37 g/dL    RDW 15.3 12.5 - 15.4 %    Platelets 319 386 - 981 k/uL    MPV 8.0 6.0 - 12.0 fL    Differential Type NOT REPORTED     Seg Neutrophils 57 %    Lymphocytes 31 %    Monocytes 8 %    Eosinophils % 3 %    Basophils 1 %    Segs Absolute 4.70 1.8 - 7.7 k/uL    Absolute Lymph # 2.60 1.0 - 4.8 k/uL    Absolute Mono # 0.60 0.1 - 1.2 k/uL    Absolute Eos # 0.20 0.0 - 0.4 k/uL    Basophils # 0.10 0.0 - 0.2 k/uL    WBC Morphology NOT REPORTED     RBC Morphology NOT REPORTED     Platelet Estimate NOT REPORTED    Magnesium    Collection Time: 10/04/17  9:50 AM   Result Value Ref Range    Magnesium 2.0 1.6 - 2.6 mg/dL   Basic Metabolic Panel    Collection Time: 10/04/17  9:50 AM   Result Value Ref Range    Glucose 119 (H) 70 - 99 mg/dL    BUN 30 (H) 8 - 23 mg/dL    CREATININE 1.65 (H) 0.50 - 0.90 mg/dL    Bun/Cre Ratio NOT REPORTED 9 - 20    Calcium 9.2 8.6 - 10.4 mg/dL    Sodium 142 135 - 144 mmol/L    Potassium 4.5 3.7 - 5.3 mmol/L    Chloride 105 98 - 107 mmol/L    CO2 21 20 - 31 mmol/L    Anion Gap 16 9 - 17 mmol/L    GFR Non-African American 31 (L) >60 mL/min    GFR  37 (L) >60 mL/min    GFR Comment          GFR Staging NOT REPORTED    Phosphorus    Collection Time: 10/04/17  9:50 AM   Result Value Ref Range    Phosphorus 3.5 2.6 - 4.5 mg/dL   Creatinine, Random Urine    Collection Time: 10/04/17  9:50 AM   Result Value Ref Range    Creatinine, Ur 74.3 28.0 - 217.0 mg/dL   Protein, urine, random    Collection Time: 10/04/17  9:50 AM   Result Value Ref Range    Total Protein, Urine 5 mg/dL       The patient has had left lower quadrant abdominal pain and comes here to be sure to set a GYN origin. However, the pain goes across to the right side and she also has pain in the right upper quadrant. It is crampy when it hits and lasts for several days at a time, but happens about every 3-1/2 months.   Her PCP believes that she has diverticulosis and the urologist does not believe either that is a GYN origin. Based on her description of the pain, I agree, but I suggest that it would rule out ovarian problems to do an ultrasound and she is in favor  Pelvic exam is without masses    IMP:   Encounter Diagnosis   Name Primary?     Left lower quadrant pain Yes         PLAN: Pelvic U/S to see ovaries, but unless it is abnormal, I would suggest F/U with GI

## 2017-10-12 RX ORDER — ATORVASTATIN CALCIUM 40 MG/1
TABLET, FILM COATED ORAL
Qty: 90 TABLET | Refills: 3 | Status: SHIPPED | OUTPATIENT
Start: 2017-10-12 | End: 2018-11-24 | Stop reason: SDUPTHER

## 2017-10-12 NOTE — TELEPHONE ENCOUNTER
Health Maintenance   Topic Date Due    Hepatitis C screen  1945    Diabetic foot exam  02/02/1956    DTaP/Tdap/Td vaccine (1 - Tdap) 02/02/1965    Diabetic retinal exam  09/28/2016    Breast cancer screen  12/07/2017    Diabetic hemoglobin A1C test  09/07/2018    Lipid screen  09/07/2018    Colon cancer screen colonoscopy  10/21/2023    Zostavax vaccine  Addressed    DEXA (modify frequency per FRAX score)  Completed    Flu vaccine  Completed    Pneumococcal low/med risk  Completed       Hemoglobin A1C (%)   Date Value   09/07/2017 5.7   10/21/2016 6.1 (H)   05/11/2016 6.2 (H)             ( goal A1C is < 7)   No results found for: LABMICR  LDL Cholesterol (mg/dL)   Date Value   09/07/2017 89       (goal LDL is <100)   AST (U/L)   Date Value   09/07/2017 12     ALT (U/L)   Date Value   09/07/2017 11     BUN (mg/dL)   Date Value   10/04/2017 30 (H)     BP Readings from Last 3 Encounters:   10/11/17 132/60   10/10/17 130/80   09/05/17 130/80          (goal 120/80)    All Future Testing planned in [facility]  Lab Frequency Next Occurrence   Vitamin D 25 Hydroxy Once 09/10/2018   PTH, Intact Once 09/10/2018   CBC Auto Differential Once 09/10/2018   Magnesium Once 09/10/2018   Phosphorus Once 53/54/4617   Basic Metabolic Panel Once 39/76/8486   Creatinine, Random Urine Once 09/10/2018   Protein, urine, random Once 09/10/2018   US Non OB Transvaginal Once 10/11/2017       Next Visit Date:  Future Appointments  Date Time Provider Mateo Peñai   10/19/2017 2:00 PM SCHEDULE, Giulia Graham ULTRASOUND [facility]   4/19/2018 2:30 PM Erin Jerry MD Resp Spec [facility]   10/9/2018 11:10 AM Eunice Aguirre MD [place]  None            Patient Active Problem List:     HTN (hypertension)     Dyslipidemia     DIONY on CPAP     Fibromyalgia     CRI (chronic renal insufficiency)     OA (osteoarthritis)     DM (diabetes mellitus) ([address]     Kidney stone     Depression     Seasonal allergies

## 2017-11-17 ENCOUNTER — TELEPHONE (OUTPATIENT)
Dept: PULMONOLOGY | Age: 72
End: 2017-11-17

## 2017-11-17 DIAGNOSIS — G47.33 OSA ON CPAP: Primary | ICD-10-CM

## 2017-11-17 DIAGNOSIS — Z99.89 OSA ON CPAP: Primary | ICD-10-CM

## 2017-11-27 RX ORDER — ALPRAZOLAM 0.5 MG/1
TABLET ORAL
Qty: 60 TABLET | Refills: 3 | Status: SHIPPED | OUTPATIENT
Start: 2017-11-27 | End: 2018-07-30 | Stop reason: SDUPTHER

## 2017-11-27 NOTE — TELEPHONE ENCOUNTER
Last refill 4-9-2017 needs [address] Maintenance   Topic Date Due    Hepatitis C screen  1945    Diabetic foot exam  02/02/1956    DTaP/Tdap/Td vaccine (1 - Tdap) 02/02/1965    Diabetic retinal exam  09/28/2016    Breast cancer screen  12/07/2017    Diabetic hemoglobin A1C test  09/07/2018    Lipid screen  09/07/2018    Colon cancer screen colonoscopy  10/21/2023    Zostavax vaccine  Addressed    DEXA (modify frequency per FRAX score)  Completed    Flu vaccine  Completed    Pneumococcal low/med risk  Completed       Hemoglobin A1C (%)   Date Value   09/07/2017 5.7   10/21/2016 6.1 (H)   05/11/2016 6.2 (H)             ( goal A1C is < 7)   No results found for: LABMICR  LDL Cholesterol (mg/dL)   Date Value   09/07/2017 89       (goal LDL is <100)   AST (U/L)   Date Value   09/07/2017 12     ALT (U/L)   Date Value   09/07/2017 11     BUN (mg/dL)   Date Value   10/04/2017 30 (H)     BP Readings from Last 3 Encounters:   10/11/17 132/60   10/10/17 130/80   09/05/17 130/80          (goal 120/80)    All Future Testing planned in [facility]  Lab Frequency Next Occurrence   Vitamin D 25 Hydroxy Once 09/10/2018   PTH, Intact Once 09/10/2018   CBC Auto Differential Once 09/10/2018   Magnesium Once 09/10/2018   Phosphorus Once 06/60/1787   Basic Metabolic Panel Once 16/17/5421   Creatinine, Random Urine Once 09/10/2018   Protein, urine, random Once 09/10/2018   US Non OB Transvaginal Once 11/26/2017       Next Visit Date:  Future Appointments  Date Time Provider Mateo Peñai   4/19/2018 2:30 PM Richar Ordonez MD Resp Spec [facility]   10/9/2018 11:10 AM Chong Romberg, MD Florencio Givens None            Patient Active Problem List:     HTN (hypertension)     Dyslipidemia     DIONY on CPAP     Fibromyalgia     CRI (chronic renal insufficiency)     OA (osteoarthritis)     DM (diabetes mellitus) ([address]     Kidney stone     Depression     Seasonal allergies     Diverticulosis     Erythropenia     Low hemoglobin

## 2017-12-08 RX ORDER — AZELASTINE 1 MG/ML
SPRAY, METERED NASAL
Qty: 1 BOTTLE | Refills: 11 | Status: SHIPPED | OUTPATIENT
Start: 2017-12-08 | End: 2018-01-08 | Stop reason: SDUPTHER

## 2018-01-08 RX ORDER — AZELASTINE 1 MG/ML
SPRAY, METERED NASAL
Qty: 1 BOTTLE | Refills: 11 | Status: SHIPPED | OUTPATIENT
Start: 2018-01-08 | End: 2019-01-07 | Stop reason: SDUPTHER

## 2018-01-09 RX ORDER — PIOGLITAZONE HCL AND METFORMIN HCL 500; 15 MG/1; MG/1
1 TABLET ORAL 2 TIMES DAILY WITH MEALS
Qty: 180 TABLET | Refills: 2 | Status: ON HOLD | OUTPATIENT
Start: 2018-01-09 | End: 2018-12-28 | Stop reason: HOSPADM

## 2018-01-09 RX ORDER — PANTOPRAZOLE SODIUM 40 MG/1
40 TABLET, DELAYED RELEASE ORAL DAILY
Qty: 90 TABLET | Refills: 2 | Status: SHIPPED | OUTPATIENT
Start: 2018-01-09 | End: 2018-09-07 | Stop reason: SDUPTHER

## 2018-01-31 RX ORDER — ZOLPIDEM TARTRATE 10 MG/1
TABLET ORAL
Qty: 90 TABLET | Refills: 1 | Status: SHIPPED | OUTPATIENT
Start: 2018-01-31 | End: 2018-07-23 | Stop reason: SDUPTHER

## 2018-01-31 NOTE — TELEPHONE ENCOUNTER
Last refill 11- needs [address] Maintenance   Topic Date Due    Hepatitis C screen  1945    Diabetic foot exam  02/02/1956    DTaP/Tdap/Td vaccine (1 - Tdap) 02/02/1965    Diabetic retinal exam  09/28/2016    Breast cancer screen  12/07/2017    A1C test (Diabetic or Prediabetic)  09/07/2018    Lipid screen  09/07/2018    Potassium monitoring  10/04/2018    Creatinine monitoring  10/04/2018    Colon cancer screen colonoscopy  10/21/2023    Zostavax vaccine  Addressed    DEXA (modify frequency per FRAX score)  Completed    Flu vaccine  Completed    Pneumococcal low/med risk  Completed       Hemoglobin A1C (%)   Date Value   09/07/2017 5.7   10/21/2016 6.1 (H)   05/11/2016 6.2 (H)             ( goal A1C is < 7)   No results found for: LABMICR  LDL Cholesterol (mg/dL)   Date Value   09/07/2017 89       (goal LDL is <100)   AST (U/L)   Date Value   09/07/2017 12     ALT (U/L)   Date Value   09/07/2017 11     BUN (mg/dL)   Date Value   10/04/2017 30 (H)     BP Readings from Last 3 Encounters:   10/11/17 132/60   10/10/17 130/80   09/05/17 130/80          (goal 120/80)    All Future Testing planned in [facility]  Lab Frequency Next Occurrence   Vitamin D 25 Hydroxy Once 09/10/2018   PTH, Intact Once 09/10/2018   CBC Auto Differential Once 09/10/2018   Magnesium Once 09/10/2018   Phosphorus Once 17/85/9230   Basic Metabolic Panel Once 07/64/7705   Creatinine, Random Urine Once 09/10/2018   Protein, urine, random Once 09/10/2018   US Non OB Transvaginal Once 01/11/2018       Next Visit Date:  Future Appointments  Date Time Provider [facility]   4/19/2018 2:30 PM Mechelle Tijerina MD Resp Spec [address] [facility]   10/9/2018 11:10 AM MD Ilana Tovar None            Patient Active Problem List:     HTN (hypertension)     Dyslipidemia     DIONY on CPAP     Fibromyalgia     CRI (chronic renal insufficiency)     OA (osteoarthritis)     DM (diabetes mellitus) ([address]     Kidney stone

## 2018-03-19 DIAGNOSIS — M79.7 FIBROMYALGIA: ICD-10-CM

## 2018-03-19 NOTE — TELEPHONE ENCOUNTER
From: Madan Wright  Sent: 3/19/2018 2:11 AM EDT  Subject: Medication Renewal Request    Bambimaddie Randi. Jess Mathewso would like a refill of the following medications:     HYDROcodone-acetaminophen ([address] [facility] [address]) 5-325 MG per tablet Denisse Pereira MD]    Preferred pharmacy: [address], [address] [facility] [phone] - F [phone]    Comment:   I have not had any vicodin in a couple of months. My fibromyalgia is really acting up and it is getting very hard for me to walk up a few steps because of my knees and legs.

## 2018-03-20 RX ORDER — HYDROCODONE BITARTRATE AND ACETAMINOPHEN 5; 325 MG/1; MG/1
1 TABLET ORAL EVERY 6 HOURS PRN
Qty: 40 TABLET | Refills: 0 | Status: SHIPPED | OUTPATIENT
Start: 2018-03-20 | End: 2018-05-23 | Stop reason: SDUPTHER

## 2018-04-19 ENCOUNTER — OFFICE VISIT (OUTPATIENT)
Dept: PULMONOLOGY | Age: 73
End: 2018-04-19
Payer: MEDICARE

## 2018-04-19 VITALS
DIASTOLIC BLOOD PRESSURE: 78 MMHG | HEIGHT: 66 IN | SYSTOLIC BLOOD PRESSURE: 145 MMHG | BODY MASS INDEX: 44.03 KG/M2 | WEIGHT: 274 LBS | RESPIRATION RATE: 14 BRPM | OXYGEN SATURATION: 96 % | HEART RATE: 65 BPM

## 2018-04-19 DIAGNOSIS — E66.01 MORBID OBESITY DUE TO EXCESS CALORIES (HCC): ICD-10-CM

## 2018-04-19 DIAGNOSIS — Z99.89 OSA ON CPAP: Primary | ICD-10-CM

## 2018-04-19 DIAGNOSIS — G47.33 OSA ON CPAP: Primary | ICD-10-CM

## 2018-04-19 DIAGNOSIS — M79.7 FIBROMYALGIA: ICD-10-CM

## 2018-04-19 PROCEDURE — 1123F ACP DISCUSS/DSCN MKR DOCD: CPT | Performed by: INTERNAL MEDICINE

## 2018-04-19 PROCEDURE — G8417 CALC BMI ABV UP PARAM F/U: HCPCS | Performed by: INTERNAL MEDICINE

## 2018-04-19 PROCEDURE — G8400 PT W/DXA NO RESULTS DOC: HCPCS | Performed by: INTERNAL MEDICINE

## 2018-04-19 PROCEDURE — G8427 DOCREV CUR MEDS BY ELIG CLIN: HCPCS | Performed by: INTERNAL MEDICINE

## 2018-04-19 PROCEDURE — 1090F PRES/ABSN URINE INCON ASSESS: CPT | Performed by: INTERNAL MEDICINE

## 2018-04-19 PROCEDURE — 3017F COLORECTAL CA SCREEN DOC REV: CPT | Performed by: INTERNAL MEDICINE

## 2018-04-19 PROCEDURE — 99214 OFFICE O/P EST MOD 30 MIN: CPT | Performed by: INTERNAL MEDICINE

## 2018-04-19 PROCEDURE — 1036F TOBACCO NON-USER: CPT | Performed by: INTERNAL MEDICINE

## 2018-04-19 PROCEDURE — 4040F PNEUMOC VAC/ADMIN/RCVD: CPT | Performed by: INTERNAL MEDICINE

## 2018-04-19 PROCEDURE — 3014F SCREEN MAMMO DOC REV: CPT | Performed by: INTERNAL MEDICINE

## 2018-05-23 DIAGNOSIS — M79.7 FIBROMYALGIA: ICD-10-CM

## 2018-05-23 RX ORDER — HYDROCODONE BITARTRATE AND ACETAMINOPHEN 5; 325 MG/1; MG/1
1 TABLET ORAL EVERY 6 HOURS PRN
Qty: 40 TABLET | Refills: 0 | Status: SHIPPED | OUTPATIENT
Start: 2018-05-23 | End: 2018-08-14 | Stop reason: SDUPTHER

## 2018-05-28 ENCOUNTER — HOSPITAL ENCOUNTER (OUTPATIENT)
Dept: SLEEP CENTER | Age: 73
Discharge: HOME | End: 2018-05-28
Payer: MEDICARE

## 2018-05-29 ENCOUNTER — HOSPITAL ENCOUNTER (OUTPATIENT)
Dept: SLEEP CENTER | Age: 73
Discharge: HOME | End: 2018-05-31
Payer: MEDICARE

## 2018-05-29 DIAGNOSIS — G47.33 OSA ON CPAP: ICD-10-CM

## 2018-05-29 DIAGNOSIS — Z99.89 OSA ON CPAP: ICD-10-CM

## 2018-05-29 PROCEDURE — 95811 POLYSOM 6/>YRS CPAP 4/> PARM: CPT

## 2018-05-29 ASSESSMENT — SLEEP AND FATIGUE QUESTIONNAIRES
HOW LIKELY ARE YOU TO NOD OFF OR FALL ASLEEP WHILE LYING DOWN TO REST IN THE AFTERNOON WHEN CIRCUMSTANCES PERMIT: 3
HOW LIKELY ARE YOU TO NOD OFF OR FALL ASLEEP IN A CAR, WHILE STOPPED FOR A FEW MINUTES IN TRAFFIC: 0
HOW LIKELY ARE YOU TO NOD OFF OR FALL ASLEEP WHILE SITTING INACTIVE IN A PUBLIC PLACE: 0
HOW LIKELY ARE YOU TO NOD OFF OR FALL ASLEEP WHILE SITTING AND READING: 0
HOW LIKELY ARE YOU TO NOD OFF OR FALL ASLEEP WHILE SITTING AND TALKING TO SOMEONE: 0
ESS TOTAL SCORE: 5
HOW LIKELY ARE YOU TO NOD OFF OR FALL ASLEEP WHILE SITTING QUIETLY AFTER LUNCH WITHOUT ALCOHOL: 0
HOW LIKELY ARE YOU TO NOD OFF OR FALL ASLEEP WHILE WATCHING TV: 1
HOW LIKELY ARE YOU TO NOD OFF OR FALL ASLEEP WHEN YOU ARE A PASSENGER IN A CAR FOR AN HOUR WITHOUT A BREAK: 1

## 2018-05-30 VITALS — BODY MASS INDEX: 44.03 KG/M2 | RESPIRATION RATE: 16 BRPM | WEIGHT: 274 LBS | HEART RATE: 64 BPM | HEIGHT: 66 IN

## 2018-06-26 LAB — STATUS: NORMAL

## 2018-07-19 ENCOUNTER — OFFICE VISIT (OUTPATIENT)
Dept: PULMONOLOGY | Age: 73
End: 2018-07-19
Payer: MEDICARE

## 2018-07-19 VITALS
RESPIRATION RATE: 12 BRPM | OXYGEN SATURATION: 97 % | DIASTOLIC BLOOD PRESSURE: 84 MMHG | HEIGHT: 66 IN | WEIGHT: 272 LBS | SYSTOLIC BLOOD PRESSURE: 147 MMHG | BODY MASS INDEX: 43.71 KG/M2 | HEART RATE: 64 BPM | TEMPERATURE: 97.2 F

## 2018-07-19 DIAGNOSIS — E66.01 MORBID OBESITY DUE TO EXCESS CALORIES (HCC): ICD-10-CM

## 2018-07-19 DIAGNOSIS — G47.33 OSA ON CPAP: Primary | ICD-10-CM

## 2018-07-19 DIAGNOSIS — Z99.89 OSA ON CPAP: Primary | ICD-10-CM

## 2018-07-19 DIAGNOSIS — M79.7 FIBROMYALGIA: ICD-10-CM

## 2018-07-19 PROCEDURE — 1090F PRES/ABSN URINE INCON ASSESS: CPT | Performed by: INTERNAL MEDICINE

## 2018-07-19 PROCEDURE — 99214 OFFICE O/P EST MOD 30 MIN: CPT | Performed by: INTERNAL MEDICINE

## 2018-07-19 PROCEDURE — 1036F TOBACCO NON-USER: CPT | Performed by: INTERNAL MEDICINE

## 2018-07-19 PROCEDURE — G8427 DOCREV CUR MEDS BY ELIG CLIN: HCPCS | Performed by: INTERNAL MEDICINE

## 2018-07-19 PROCEDURE — G8400 PT W/DXA NO RESULTS DOC: HCPCS | Performed by: INTERNAL MEDICINE

## 2018-07-19 PROCEDURE — 3017F COLORECTAL CA SCREEN DOC REV: CPT | Performed by: INTERNAL MEDICINE

## 2018-07-19 PROCEDURE — G8417 CALC BMI ABV UP PARAM F/U: HCPCS | Performed by: INTERNAL MEDICINE

## 2018-07-19 PROCEDURE — 4040F PNEUMOC VAC/ADMIN/RCVD: CPT | Performed by: INTERNAL MEDICINE

## 2018-07-19 PROCEDURE — 1101F PT FALLS ASSESS-DOCD LE1/YR: CPT | Performed by: INTERNAL MEDICINE

## 2018-07-19 PROCEDURE — 1123F ACP DISCUSS/DSCN MKR DOCD: CPT | Performed by: INTERNAL MEDICINE

## 2018-07-20 NOTE — PROGRESS NOTES
PULMONARY OP  PROGRESS NOTE      Patient:  Tammie Gould  YOB: 1945    MRN: [id number]     Acct:        Pt seen and Chart reviewed. Ms. Tammie Gould is here in followup for    Diagnosis Orders   1. DIONY on CPAP     2. Morbid obesity due to excess calories ([address]     3. Fibromyalgia         Patient has been doing well since last visit. Pt has not been hospitalized or been to er since last visit. Has been using meds as recommended. Has been compliant with treatment for sleep apnea. She had a BiPAP titration study. The pressure has been changed to 13/8 cm of water from 18/5 cm some water. Patient has been using the BiPAP machine in the new settings. Her cat had been obese and because of caring for the Cat, her sleep is fragmented. This is causing her to have some daytime sleepiness and fatigue and tiredness. This is also causing her compliance to be less than optimal for usage of more than 4 hours  Denied any symptoms related to fibromyalgia. No orthopnea or PND. No chest pain or pressure. No epistaxis, sore throat.     Subjective:   Review of Systems -   General ROS: Completed and except as mentioned above were negative   Psychological ROS:  Completed and except as mentioned above were negative  Ophthalmic ROS:  Completed and except as mentioned above were negative  ENT ROS:  Completed and except as mentioned above were negative  Allergy and Immunology ROS:  Completed and except as mentioned above were negative  Hematological and Lymphatic ROS:  Completed and except as mentioned above were negative  Endocrine ROS: Completed and except as mentioned above were negative  Breast ROS:  Completed and except as mentioned above were negative  Respiratory ROS:  Completed and except as mentioned above were negative  Cardiovascular ROS:  Completed and except as mentioned above were negative  Gastrointestinal ROS: Completed and except as mentioned above were negative  Genito-Urinary ROS:  Completed and except as mentioned above were negative  Musculoskeletal ROS:  Completed and except as mentioned above were negative  Neurological ROS:  Completed and except as mentioned above were negative  Dermatological ROS:  Completed and except as mentioned above were negative      Allergies:   Allergies   Allergen Reactions    Cephalexin Other (See Comments)     Tongue cracks and peels    Erythromycin Other (See Comments)     Epigastric pain and bloating    Ketorolac Tromethamine Other (See Comments)     Severe stomach cramps    Pcn [Penicillins]      Unknown reaction    Percocet [Oxycodone-Acetaminophen] Itching    Percocet [Oxycodone-Acetaminophen] Itching and Other (See Comments)     Esophagus hurt    Sulfa Antibiotics     Ultracet [Tramadol-Acetaminophen] Itching    Ultram [Tramadol] Itching       Medications:    Current Outpatient Prescriptions:     pantoprazole (PROTONIX) 40 MG tablet, Take 1 tablet by mouth daily, Disp: 90 tablet, Rfl: 2    pioglitazone-metformin (ACTOPLUS MET)  MG per tablet, Take 1 tablet by mouth 2 times daily (with meals), Disp: 180 tablet, Rfl: 2    azelastine (ASTELIN) 0.1 % nasal spray, PLACE 1 SPRAY IN EACH NOSTRIL ONCE DAILY AS DIRECTED, Disp: 1 Bottle, Rfl: 11    ALPRAZolam (XANAX) 0.5 MG tablet, TAKE 1 TABLET BY MOUTH 3 TIMES DAILY AS NEEDED, Disp: 60 tablet, Rfl: 3    atorvastatin (LIPITOR) 40 MG tablet, TAKE 1 TABLET BY MOUTH ONE TIME A DAY, Disp: 90 tablet, Rfl: 3    Biotin (BIOTIN 5000) 5 MG CAPS, Take by mouth, Disp: , Rfl:     vitamin D (CHOLECALCIFEROL) 1000 UNIT TABS tablet, Take 1,000 Units by mouth daily, Disp: , Rfl:     potassium citrate (UROCIT-K) 10 MEQ (1080 MG) extended release tablet, Take 1 tablet by mouth 2 times daily, Disp: 180 tablet, Rfl: 3    sertraline (ZOLOFT) 100 MG tablet, Take 1 tablet by mouth daily, Disp: 90 tablet, Rfl: 3    valsartan-hydrochlorothiazide (DIOVAN-HCT) 160-12.5 MG per tablet, Take 1 tablet by mouth 2 times daily, Disp: 180 tablet, Rfl: 3    metoprolol tartrate (LOPRESSOR) 25 MG tablet, Take 1 tablet by mouth 2 times daily, Disp: 180 tablet, Rfl: 3    gabapentin (NEURONTIN) 600 MG tablet, Take 1 tablet by mouth daily, Disp: 90 tablet, Rfl: 3    cyclobenzaprine (FLEXERIL) 10 MG tablet, TAKE 1 TABLET BY MOUTH EVERY 8 HOURS AS NEEDED FOR MUSCLE SPASMS, Disp: 30 tablet, Rfl: 4    COLCRYS 0.6 MG tablet, as needed , Disp: , Rfl:     Magnesium Oxide 400 MG CAPS, Take by mouth nightly , Disp: , Rfl:     VOLTAREN 1 % GEL, as needed Pt uses on bilateral knees, Disp: , Rfl:       Objective:    Physical Exam:  Vitals: BP (!) 147/84 (Site: Left Arm)   Pulse 64   Temp 97.2 °F (36.2 °C)   Resp 12   Ht 5' 6\" (1.676 m)   Wt 272 lb (123.4 kg)   SpO2 97% Comment: Room air at rest  BMI 43.90 kg/m²   Last 3 weights: Wt Readings from Last 3 Encounters:   07/19/18 272 lb (123.4 kg)   05/29/18 274 lb (124.3 kg)   04/19/18 274 lb (124.3 kg)     Body mass index is 43.9 kg/m². Physical Examination:   Constitutional: Appears well, no distress, morbidly obese  EENT: PERRLA,  sclera clear, anicteric, oropharynx clear, no lesions, neck supple with midline trachea. Neck: Supple, symmetrical, trachea midline, no adenopathy, no goiter, no jvd skin normal  Respiratory: clear to auscultation, no wheezes or rales and unlabored breathing. No intercostal tenderness  Cardiovascular: regular rate and rhythm, normal S1, S2, no murmur noted  Abdomen: soft, nontender, nondistended, no masses or organomegaly  Extremities:  no pedal edema, no clubbing or cyanosis    Labs:  Compliance data was reviewed and it could improve. As mentioned previously, this is partly related to her pets            Assessment:     Diagnosis Orders   1. DIONY on CPAP     2. Morbid obesity due to excess calories ([address]     3.  Fibromyalgia          PLAN:    Reviewed BiPAP titration study  Refills were provided -none  Educated and clarified the medication use. Recommend flu vaccination in the fall annually. Recommendations given regarding pneumococcal vaccinations. Patient is up-to-date with vaccinations from pulmonary perspective. Maintain an active lifestyle. Questions  Patient had were answered to her satisfaction. BiPAP 13/8 cm of water to be used for at least 4 hours every night. Use humidifier if needed. Weight loss was recommended and discussed. Recommended following good sleep hygiene instructions. Explained importance of compliance with treatment of sleep apnea. As informed her that Medicare may take the machine back if her compliance is less than optimal  Pt is not to drive if sleepy  We'll see the patient back in April 2019  Thank you for having us involved in the care of your patient. Please call us if you have any questions or concerns.       Luis Guillory MD             7/20/2018, 5:08 PM

## 2018-07-23 DIAGNOSIS — G47.00 INSOMNIA, UNSPECIFIED TYPE: Primary | ICD-10-CM

## 2018-07-23 RX ORDER — ZOLPIDEM TARTRATE 10 MG/1
TABLET ORAL
Qty: 90 TABLET | Refills: 1 | Status: SHIPPED | OUTPATIENT
Start: 2018-07-23 | End: 2019-01-14 | Stop reason: SDUPTHER

## 2018-07-23 NOTE — TELEPHONE ENCOUNTER
Needs OARRS   Health Maintenance   Topic Date Due    Hepatitis C screen  1945    Diabetic foot exam  02/02/1956    DTaP/Tdap/Td vaccine (1 - Tdap) 02/02/1965    Shingles Vaccine (1 of 2 - 2 Dose Series) 09/27/2011    Diabetic retinal exam  09/28/2016    Breast cancer screen  12/07/2017    Flu vaccine (1) 09/01/2018    A1C test (Diabetic or Prediabetic)  09/07/2018    Lipid screen  09/07/2018    Potassium monitoring  10/04/2018    Creatinine monitoring  10/04/2018    Colon cancer screen colonoscopy  10/21/2023    DEXA (modify frequency per FRAX score)  Completed    Pneumococcal low/med risk  Completed       Hemoglobin A1C (%)   Date Value   09/07/2017 5.7   10/21/2016 6.1 (H)   05/11/2016 6.2 (H)             ( goal A1C is < 7)   No results found for: LABMICR  LDL Cholesterol (mg/dL)   Date Value   09/07/2017 89       (goal LDL is <100)   AST (U/L)   Date Value   09/07/2017 12     ALT (U/L)   Date Value   09/07/2017 11     BUN (mg/dL)   Date Value   10/04/2017 30 (H)     BP Readings from Last 3 Encounters:   07/19/18 (!) 147/84   04/19/18 (!) 145/78   10/11/17 132/60          (goal 120/80)    All Future Testing planned in [facility]  Lab Frequency Next Occurrence   Vitamin D 25 Hydroxy Once 09/10/2018   PTH, Intact Once 09/10/2018   CBC Auto Differential Once 09/10/2018   Magnesium Once 09/10/2018   Phosphorus Once 79/36/3183   Basic Metabolic Panel Once 31/31/4692   Creatinine, Random Urine Once 09/10/2018   Protein, urine, random Once 09/10/2018   US Non OB Transvaginal Once 01/11/2018       Next Visit Date:  Future Appointments  Date Time Provider Mateo Weaver   10/9/2018 11:10 AM Ramila Bird MD [facility] None   4/25/2019 2:00 PM Thania Mcnair MD Resp Spec [facility]            Patient Active Problem List:     HTN (hypertension)     Dyslipidemia     DIONY on CPAP     Fibromyalgia     CRI (chronic renal insufficiency)     OA (osteoarthritis)     DM (diabetes mellitus) ([facility] [address]     Kidney

## 2018-07-27 ENCOUNTER — OFFICE VISIT (OUTPATIENT)
Dept: FAMILY MEDICINE CLINIC | Age: 73
End: 2018-07-27
Payer: MEDICARE

## 2018-07-27 VITALS
WEIGHT: 272 LBS | HEART RATE: 67 BPM | SYSTOLIC BLOOD PRESSURE: 130 MMHG | DIASTOLIC BLOOD PRESSURE: 70 MMHG | BODY MASS INDEX: 43.9 KG/M2

## 2018-07-27 DIAGNOSIS — E11.22 TYPE 2 DIABETES MELLITUS WITH STAGE 3 CHRONIC KIDNEY DISEASE, WITHOUT LONG-TERM CURRENT USE OF INSULIN (HCC): ICD-10-CM

## 2018-07-27 DIAGNOSIS — E11.22 TYPE 2 DIABETES MELLITUS WITH STAGE 3 CHRONIC KIDNEY DISEASE, UNSPECIFIED LONG TERM INSULIN USE STATUS: Primary | ICD-10-CM

## 2018-07-27 DIAGNOSIS — I10 ESSENTIAL HYPERTENSION: ICD-10-CM

## 2018-07-27 DIAGNOSIS — N18.30 CHRONIC KIDNEY DISEASE (CKD), STAGE III (MODERATE) (HCC): ICD-10-CM

## 2018-07-27 DIAGNOSIS — N18.30 TYPE 2 DIABETES MELLITUS WITH STAGE 3 CHRONIC KIDNEY DISEASE, WITHOUT LONG-TERM CURRENT USE OF INSULIN (HCC): ICD-10-CM

## 2018-07-27 DIAGNOSIS — F32.A DEPRESSION, UNSPECIFIED DEPRESSION TYPE: ICD-10-CM

## 2018-07-27 DIAGNOSIS — E78.5 DYSLIPIDEMIA: ICD-10-CM

## 2018-07-27 DIAGNOSIS — N18.3 TYPE 2 DIABETES MELLITUS WITH STAGE 3 CHRONIC KIDNEY DISEASE, UNSPECIFIED LONG TERM INSULIN USE STATUS: Primary | ICD-10-CM

## 2018-07-27 DIAGNOSIS — E66.01 MORBID OBESITY DUE TO EXCESS CALORIES (HCC): ICD-10-CM

## 2018-07-27 PROCEDURE — 1123F ACP DISCUSS/DSCN MKR DOCD: CPT | Performed by: FAMILY MEDICINE

## 2018-07-27 PROCEDURE — 4040F PNEUMOC VAC/ADMIN/RCVD: CPT | Performed by: FAMILY MEDICINE

## 2018-07-27 PROCEDURE — G8400 PT W/DXA NO RESULTS DOC: HCPCS | Performed by: FAMILY MEDICINE

## 2018-07-27 PROCEDURE — 99213 OFFICE O/P EST LOW 20 MIN: CPT | Performed by: FAMILY MEDICINE

## 2018-07-27 PROCEDURE — 1036F TOBACCO NON-USER: CPT | Performed by: FAMILY MEDICINE

## 2018-07-27 PROCEDURE — 3046F HEMOGLOBIN A1C LEVEL >9.0%: CPT | Performed by: FAMILY MEDICINE

## 2018-07-27 PROCEDURE — G8427 DOCREV CUR MEDS BY ELIG CLIN: HCPCS | Performed by: FAMILY MEDICINE

## 2018-07-27 PROCEDURE — G8417 CALC BMI ABV UP PARAM F/U: HCPCS | Performed by: FAMILY MEDICINE

## 2018-07-27 PROCEDURE — 1101F PT FALLS ASSESS-DOCD LE1/YR: CPT | Performed by: FAMILY MEDICINE

## 2018-07-27 PROCEDURE — 1090F PRES/ABSN URINE INCON ASSESS: CPT | Performed by: FAMILY MEDICINE

## 2018-07-27 PROCEDURE — 3017F COLORECTAL CA SCREEN DOC REV: CPT | Performed by: FAMILY MEDICINE

## 2018-07-27 PROCEDURE — 2022F DILAT RTA XM EVC RTNOPTHY: CPT | Performed by: FAMILY MEDICINE

## 2018-07-27 RX ORDER — VENLAFAXINE HYDROCHLORIDE 75 MG/1
75 CAPSULE, EXTENDED RELEASE ORAL DAILY
Qty: 30 CAPSULE | Refills: 3 | Status: SHIPPED | OUTPATIENT
Start: 2018-07-27 | End: 2018-10-01 | Stop reason: SDUPTHER

## 2018-07-27 RX ORDER — COLCHICINE 0.6 MG/1
0.6 TABLET, FILM COATED ORAL 2 TIMES DAILY PRN
Qty: 30 TABLET | Refills: 2 | Status: SHIPPED | OUTPATIENT
Start: 2018-07-27 | End: 2018-08-13 | Stop reason: SDUPTHER

## 2018-07-27 RX ORDER — CYCLOBENZAPRINE HCL 10 MG
TABLET ORAL
Qty: 30 TABLET | Refills: 4 | Status: SHIPPED | OUTPATIENT
Start: 2018-07-27 | End: 2018-07-30 | Stop reason: SDUPTHER

## 2018-07-27 RX ORDER — VENLAFAXINE HYDROCHLORIDE 37.5 MG/1
37.5 CAPSULE, EXTENDED RELEASE ORAL DAILY
Qty: 7 CAPSULE | Refills: 0 | Status: SHIPPED | OUTPATIENT
Start: 2018-07-27 | End: 2018-12-29

## 2018-07-27 ASSESSMENT — ENCOUNTER SYMPTOMS
SINUS PRESSURE: 0
SORE THROAT: 0
VOMITING: 0
DIARRHEA: 0
SHORTNESS OF BREATH: 1
BACK PAIN: 0
NAUSEA: 0
COUGH: 1

## 2018-07-27 NOTE — PROGRESS NOTES
Marcia Barry is a 68 y.o. female who presents today for her medical conditions/complaints as noted below. Marcia Barry is c/o of Foot Swelling and Health Maintenance ([facility])  Routine follow up several issues. She is depressed. She is also having increase in swelling of her ankles and feet. HPI:     Visit Information    Have you changed or started any medications since your last visit including any over-the-counter medicines, vitamins, or herbal medicines? no   Are you having any side effects from any of your medications? -  no  Have you stopped taking any of your medications? Is so, why? -  no    Have you seen any other physician or provider since your last visit? No  Have you had any other diagnostic tests since your last visit? No  Have you been seen in the emergency room and/or had an admission to a hospital since we last saw you? No  Have you had your routine dental cleaning in the past 6 months? no    Have you activated your [employer] account? If not, what are your barriers?  Yes     Patient Care Team:  Floyd Agustin MD as PCP - General  Floyd Agustin MD as PCP - [facility] Attributed Provider  Huang Motley MD as Consulting Physician  Jaimee Nelson MD as Consulting Physician (Rheumatology)  Alondra Bustillo MD as Consulting Physician (Pulmonology)  Ifeoma Chau MD as Surgeon (Orthopedic Surgery)  Sally Santillan MD (Endocrinology)  Brock Fox as Consulting Physician (Podiatry)    Medical History Review  Past Medical, Family, and Social History reviewed and  contribute to the patient presenting condition    Health Maintenance   Topic Date Due    Hepatitis C screen  1945    Diabetic foot exam  02/02/1956    DTaP/Tdap/Td vaccine (1 - Tdap) 02/02/1965    Shingles Vaccine (1 of 2 - 2 Dose Series) 09/27/2011    Diabetic retinal exam  09/28/2016    Breast cancer screen  12/07/2017    Flu vaccine (1) 09/01/2018    A1C test (Diabetic or Prediabetic)  09/07/2018  Lipid screen  09/07/2018    Potassium monitoring  10/04/2018    Creatinine monitoring  10/04/2018    Colon cancer screen colonoscopy  10/21/2023    DEXA (modify frequency per FRAX score)  Completed    Pneumococcal low/med risk  Completed       Past Medical History:   Diagnosis Date    Abnormal stress test     Arthritis     Depression     Diverticulosis     DM [facility]di[facility] mellitus) ([facility] [address]     Erythropenia     Fibromyalgia     HTN (hypertension)     Hyperlipidemia     Kidney stone     Osteopenia 03/03/14    Seasonal allergies     Sleep apnea     uses bipap prn      Past Surgical History:   Procedure Laterality Date    ARM SURGERY  2011    right arm broken    CARDIAC CATHETERIZATION  [id number] dr Jaylin Christensen  5-16-16    COLONOSCOPY  2003    COLONOSCOPY  2007    TOTAL KNEE ARTHROPLASTY Bilateral     left may 2013 and right july 2013    TUBAL LIGATION       Family History   Problem Relation Age of Onset    Diabetes Mother     Heart Disease Mother     Osteoporosis Mother     Kidney Disease Father     Prostate Cancer Brother      Social History   Substance Use Topics    Smoking status: Never Smoker    Smokeless tobacco: Never Used      Comment: quit 1960's    Alcohol use No      Current Outpatient Prescriptions   Medication Sig Dispense Refill    Alendronate Sodium (FOSAMAX PO) Take by mouth      COLCRYS 0.6 MG tablet Take 1 tablet by mouth 2 times daily as needed for Pain 30 tablet 2    cyclobenzaprine (FLEXERIL) 10 MG tablet TAKE 1 TABLET BY MOUTH EVERY 8 HOURS AS NEEDED FOR MUSCLE SPASMS 30 tablet 4    venlafaxine (EFFEXOR XR) 37.5 MG extended release capsule Take 1 capsule by mouth daily 7 capsule 0    venlafaxine (EFFEXOR XR) 75 MG extended release capsule Take 1 capsule by mouth daily 30 capsule 3    zolpidem (AMBIEN) 10 MG tablet TAKE 1 TABLET BY MOUTH NIGHTLY AS NEEDED FOR SLEEP 90 tablet 1    pantoprazole (PROTONIX) 40 MG tablet Take 1 tablet by mouth daily 90 tablet 2    pioglitazone-metformin (ACTOPLUS MET)  MG per tablet Take 1 tablet by mouth 2 times daily (with meals) 180 tablet 2    azelastine (ASTELIN) 0.1 % nasal spray PLACE 1 SPRAY IN EACH NOSTRIL ONCE DAILY AS DIRECTED 1 Bottle 11    ALPRAZolam (XANAX) 0.5 MG tablet TAKE 1 TABLET BY MOUTH 3 TIMES DAILY AS NEEDED 60 tablet 3    atorvastatin (LIPITOR) 40 MG tablet TAKE 1 TABLET BY MOUTH ONE TIME A DAY 90 tablet 3    Biotin (BIOTIN 5000) 5 MG CAPS Take by mouth      potassium citrate (UROCIT-K) 10 MEQ (1080 MG) extended release tablet Take 1 tablet by mouth 2 times daily 180 tablet 3    sertraline (ZOLOFT) 100 MG tablet Take 1 tablet by mouth daily 90 tablet 3    valsartan-hydrochlorothiazide (DIOVAN-HCT) 160-12.5 MG per tablet Take 1 tablet by mouth 2 times daily 180 tablet 3    metoprolol tartrate (LOPRESSOR) 25 MG tablet Take 1 tablet by mouth 2 times daily 180 tablet 3    gabapentin (NEURONTIN) 600 MG tablet Take 1 tablet by mouth daily 90 tablet 3    Magnesium Oxide 400 MG CAPS Take by mouth nightly       VOLTAREN 1 % GEL as needed Pt uses on bilateral knees       No current facility-administered medications for this visit. Allergies   Allergen Reactions    Cephalexin Other (See Comments)     Tongue cracks and peels    Erythromycin Other (See Comments)     Epigastric pain and bloating    Ketorolac Tromethamine Other (See Comments)     Severe stomach cramps    Pcn [Penicillins]      Unknown reaction    Percocet [Oxycodone-Acetaminophen] Itching and Other (See Comments)     Esophagus hurt    Sulfa Antibiotics     Ultracet [Tramadol-Acetaminophen] Itching         Subjective:   Review of Systems   Constitutional: Positive for fatigue. Negative for chills, diaphoresis and fever. HENT: Negative for congestion, sinus pressure and sore throat. Eyes: Negative for visual disturbance. Respiratory: Positive for cough and shortness of breath.     Cardiovascular: Positive

## 2018-07-30 DIAGNOSIS — F41.9 ANXIETY: Primary | ICD-10-CM

## 2018-07-30 RX ORDER — CYCLOBENZAPRINE HCL 10 MG
TABLET ORAL
Qty: 30 TABLET | Refills: 4 | Status: SHIPPED | OUTPATIENT
Start: 2018-07-30 | End: 2019-08-30 | Stop reason: SDUPTHER

## 2018-07-30 RX ORDER — ALPRAZOLAM 0.5 MG/1
0.5 TABLET ORAL 3 TIMES DAILY PRN
Qty: 60 TABLET | Refills: 3 | Status: SHIPPED | OUTPATIENT
Start: 2018-07-30 | End: 2019-10-14 | Stop reason: SDUPTHER

## 2018-07-30 NOTE — TELEPHONE ENCOUNTER
From: Ricki Dorantes  Sent: 7/28/2018 6:16 AM EDT  Subject: Medication Renewal Request    Adri John.  Sophia Lopez would like a refill of the following medications:     ALPRAZolam (XANAX) 0.5 MG tablet [Naveen Quesada MD]     cyclobenzaprine (FLEXERIL) 10 MG tablet Jose Enrique Flores MD]    Preferred pharmacy: [address] [phone] [facility] [phone]
Depression     Seasonal allergies     Diverticulosis     Erythropenia     Low hemoglobin     Mitral regurgitation - Mild to moderate     Chronic kidney disease (CKD), stage III (moderate)     Gout     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 disease ([address]     Essential hypertension     Osteopenia     Morbid obesity due to excess calories ([address]

## 2018-08-13 RX ORDER — COLCHICINE 0.6 MG/1
0.6 TABLET, FILM COATED ORAL 2 TIMES DAILY PRN
Qty: 30 TABLET | Refills: 2 | Status: ON HOLD | OUTPATIENT
Start: 2018-08-13 | End: 2018-12-28 | Stop reason: HOSPADM

## 2018-08-14 DIAGNOSIS — M79.7 FIBROMYALGIA: ICD-10-CM

## 2018-08-14 RX ORDER — HYDROCODONE BITARTRATE AND ACETAMINOPHEN 5; 325 MG/1; MG/1
1 TABLET ORAL EVERY 6 HOURS PRN
Qty: 40 TABLET | Refills: 0 | Status: SHIPPED | OUTPATIENT
Start: 2018-08-14 | End: 2018-11-06 | Stop reason: SDUPTHER

## 2018-09-07 RX ORDER — PANTOPRAZOLE SODIUM 40 MG/1
40 TABLET, DELAYED RELEASE ORAL DAILY
Qty: 90 TABLET | Refills: 3 | Status: SHIPPED | OUTPATIENT
Start: 2018-09-07 | End: 2018-11-12 | Stop reason: SDUPTHER

## 2018-09-07 NOTE — TELEPHONE ENCOUNTER
Health Maintenance   Topic Date Due    Hepatitis C screen  1945    Diabetic foot exam  02/02/1956    DTaP/Tdap/Td vaccine (1 - Tdap) 02/02/1965    Shingles Vaccine (1 of 2 - 2 Dose Series) 09/27/2011    Diabetic retinal exam  09/28/2016    Breast cancer screen  12/07/2017    Flu vaccine (1) 09/01/2018    A1C test (Diabetic or Prediabetic)  09/07/2018    Lipid screen  09/07/2018    Potassium monitoring  10/04/2018    Creatinine monitoring  10/04/2018    Colon cancer screen colonoscopy  10/21/2023    DEXA (modify frequency per FRAX score)  Completed    Pneumococcal low/med risk  Completed       Hemoglobin A1C (%)   Date Value   09/07/2017 5.7   10/21/2016 6.1 (H)   05/11/2016 6.2 (H)             ( goal A1C is < 7)   No results found for: LABMICR  LDL Cholesterol (mg/dL)   Date Value   09/07/2017 89       (goal LDL is <100)   AST (U/L)   Date Value   09/07/2017 12     ALT (U/L)   Date Value   09/07/2017 11     BUN (mg/dL)   Date Value   10/04/2017 30 (H)     BP Readings from Last 3 Encounters:   07/27/18 130/70   07/19/18 (!) 147/84   04/19/18 (!) 145/78          (goal 120/80)    All Future Testing planned in [facility]  Lab Frequency Next Occurrence   Vitamin D 25 Hydroxy Once 09/10/2018   PTH, Intact Once 09/10/2018   CBC Auto Differential Once 09/10/2018   Magnesium Once 09/10/2018   Phosphorus Once 71/27/7850   Basic Metabolic Panel Once 24/52/2326   Creatinine, Random Urine Once 09/10/2018   Protein, urine, random Once 09/10/2018   US Non OB Transvaginal Once 01/11/2018       Next Visit Date:  Future Appointments  Date Time Provider Mateo Weaver   10/9/2018 11:10 AM Luis Antonio Cooper MD [facility] [place] None   4/25/2019 2:00 PM De[facility] De La [facility]            Patient Active Problem List:     HTN (hypertension)     Dyslipidemia     DIONY on CPAP     Fibromyalgia     CRI (chronic renal insufficiency)     OA (osteoarthritis)     DM (diabetes mellitus) ([facility] [address]     Kidney stone

## 2018-09-18 ENCOUNTER — HOSPITAL ENCOUNTER (OUTPATIENT)
Age: 73
Setting detail: SPECIMEN
Discharge: HOME | End: 2018-09-18
Payer: MEDICARE

## 2018-09-18 DIAGNOSIS — E11.22 TYPE 2 DIABETES MELLITUS WITH STAGE 3 CHRONIC KIDNEY DISEASE (HCC): ICD-10-CM

## 2018-09-18 DIAGNOSIS — N18.30 TYPE 2 DIABETES MELLITUS WITH STAGE 3 CHRONIC KIDNEY DISEASE (HCC): ICD-10-CM

## 2018-09-18 LAB
ABSOLUTE EOS #: 0.21 K/UL (ref 0–0.44)
ABSOLUTE IMMATURE GRANULOCYTE: 0.03 K/UL (ref 0–0.3)
ABSOLUTE LYMPH #: 2.4 K/UL (ref 1.1–3.7)
ABSOLUTE MONO #: 0.56 K/UL (ref 0.1–1.2)
ALT SERPL-CCNC: 14 U/L (ref 5–33)
ANION GAP SERPL CALCULATED.3IONS-SCNC: 14 MMOL/L (ref 9–17)
ANION GAP SERPL CALCULATED.3IONS-SCNC: 14 MMOL/L (ref 9–17)
AST SERPL-CCNC: 15 U/L
BASOPHILS # BLD: 1 % (ref 0–2)
BASOPHILS ABSOLUTE: 0.05 K/UL (ref 0–0.2)
BUN BLDV-MCNC: 34 MG/DL (ref 8–23)
BUN BLDV-MCNC: 34 MG/DL (ref 8–23)
BUN/CREAT BLD: ABNORMAL (ref 9–20)
BUN/CREAT BLD: ABNORMAL (ref 9–20)
CALCIUM SERPL-MCNC: 9.4 MG/DL (ref 8.6–10.4)
CALCIUM SERPL-MCNC: 9.4 MG/DL (ref 8.6–10.4)
CHLORIDE BLD-SCNC: 101 MMOL/L (ref 98–107)
CHLORIDE BLD-SCNC: 101 MMOL/L (ref 98–107)
CHOLESTEROL/HDL RATIO: 4.6
CHOLESTEROL: 161 MG/DL
CO2: 27 MMOL/L (ref 20–31)
CO2: 27 MMOL/L (ref 20–31)
CREAT SERPL-MCNC: 2.59 MG/DL (ref 0.5–0.9)
CREAT SERPL-MCNC: 2.59 MG/DL (ref 0.5–0.9)
DIFFERENTIAL TYPE: ABNORMAL
EOSINOPHILS RELATIVE PERCENT: 3 % (ref 1–4)
GFR AFRICAN AMERICAN: 22 ML/MIN
GFR AFRICAN AMERICAN: 22 ML/MIN
GFR NON-AFRICAN AMERICAN: 18 ML/MIN
GFR NON-AFRICAN AMERICAN: 18 ML/MIN
GFR SERPL CREATININE-BSD FRML MDRD: ABNORMAL ML/MIN/{1.73_M2}
GLUCOSE BLD-MCNC: 123 MG/DL (ref 70–99)
GLUCOSE BLD-MCNC: 123 MG/DL (ref 70–99)
HCT VFR BLD CALC: 30.7 % (ref 36.3–47.1)
HDLC SERPL-MCNC: 35 MG/DL
HEMOGLOBIN: 9.1 G/DL (ref 11.9–15.1)
IMMATURE GRANULOCYTES: 0 %
LDL CHOLESTEROL: 75 MG/DL (ref 0–130)
LYMPHOCYTES # BLD: 32 % (ref 24–43)
MAGNESIUM: 1.9 MG/DL (ref 1.6–2.6)
MCH RBC QN AUTO: 29.2 PG (ref 25.2–33.5)
MCHC RBC AUTO-ENTMCNC: 29.6 G/DL (ref 28.4–34.8)
MCV RBC AUTO: 98.4 FL (ref 82.6–102.9)
MONOCYTES # BLD: 7 % (ref 3–12)
NRBC AUTOMATED: 0 PER 100 WBC
PDW BLD-RTO: 14.8 % (ref 11.8–14.4)
PHOSPHORUS: 3.3 MG/DL (ref 2.6–4.5)
PLATELET # BLD: 276 K/UL (ref 138–453)
PLATELET ESTIMATE: ABNORMAL
PMV BLD AUTO: 10.2 FL (ref 8.1–13.5)
POTASSIUM SERPL-SCNC: 4.5 MMOL/L (ref 3.7–5.3)
POTASSIUM SERPL-SCNC: 4.5 MMOL/L (ref 3.7–5.3)
PTH INTACT: 201.2 PG/ML (ref 15–65)
RBC # BLD: 3.12 M/UL (ref 3.95–5.11)
RBC # BLD: ABNORMAL 10*6/UL
SEG NEUTROPHILS: 57 % (ref 36–65)
SEGMENTED NEUTROPHILS ABSOLUTE COUNT: 4.35 K/UL (ref 1.5–8.1)
SODIUM BLD-SCNC: 142 MMOL/L (ref 135–144)
SODIUM BLD-SCNC: 142 MMOL/L (ref 135–144)
T3 FREE: 2.43 PG/ML (ref 2.02–4.43)
THYROXINE, FREE: 1.07 NG/DL (ref 0.93–1.7)
TOTAL CK: 63 U/L (ref 26–192)
TRIGL SERPL-MCNC: 256 MG/DL
TSH SERPL DL<=0.05 MIU/L-ACNC: 1.65 MIU/L (ref 0.3–5)
VLDLC SERPL CALC-MCNC: ABNORMAL MG/DL (ref 1–30)
WBC # BLD: 7.6 K/UL (ref 3.5–11.3)
WBC # BLD: ABNORMAL 10*3/UL

## 2018-09-19 LAB — VITAMIN D 25-HYDROXY: 35.8 NG/ML (ref 30–100)

## 2018-10-01 DIAGNOSIS — F32.A DEPRESSION, UNSPECIFIED DEPRESSION TYPE: ICD-10-CM

## 2018-10-01 RX ORDER — VENLAFAXINE HYDROCHLORIDE 75 MG/1
75 CAPSULE, EXTENDED RELEASE ORAL DAILY
Qty: 90 CAPSULE | Refills: 3 | Status: SHIPPED | OUTPATIENT
Start: 2018-10-01 | End: 2019-01-03 | Stop reason: SDUPTHER

## 2018-10-01 NOTE — TELEPHONE ENCOUNTER
kidney disease (CKD), stage III (moderate) (HCC)     Gout     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 disease ([address]     Essential hypertension     Osteopenia     Morbid obesity due to excess calories ([address]     Anxiety

## 2018-10-02 ENCOUNTER — HOSPITAL ENCOUNTER (OUTPATIENT)
Age: 73
Setting detail: SPECIMEN
Discharge: HOME | End: 2018-10-02
Payer: MEDICARE

## 2018-10-02 DIAGNOSIS — N18.30 TYPE 2 DIABETES MELLITUS WITH STAGE 3 CHRONIC KIDNEY DISEASE (HCC): ICD-10-CM

## 2018-10-02 DIAGNOSIS — N17.9 AKI (ACUTE KIDNEY INJURY) (HCC): ICD-10-CM

## 2018-10-02 DIAGNOSIS — E11.22 TYPE 2 DIABETES MELLITUS WITH STAGE 3 CHRONIC KIDNEY DISEASE (HCC): ICD-10-CM

## 2018-10-02 LAB
ANION GAP SERPL CALCULATED.3IONS-SCNC: 17 MMOL/L (ref 9–17)
BUN BLDV-MCNC: 37 MG/DL (ref 8–23)
BUN/CREAT BLD: ABNORMAL (ref 9–20)
CALCIUM SERPL-MCNC: 9.4 MG/DL (ref 8.6–10.4)
CHLORIDE BLD-SCNC: 100 MMOL/L (ref 98–107)
CO2: 24 MMOL/L (ref 20–31)
CREAT SERPL-MCNC: 2.44 MG/DL (ref 0.5–0.9)
CREATININE URINE: 83.8 MG/DL (ref 28–217)
GFR AFRICAN AMERICAN: 24 ML/MIN
GFR NON-AFRICAN AMERICAN: 19 ML/MIN
GFR SERPL CREATININE-BSD FRML MDRD: ABNORMAL ML/MIN/{1.73_M2}
GFR SERPL CREATININE-BSD FRML MDRD: ABNORMAL ML/MIN/{1.73_M2}
GLUCOSE BLD-MCNC: 125 MG/DL (ref 70–99)
POTASSIUM SERPL-SCNC: 4.2 MMOL/L (ref 3.7–5.3)
SODIUM BLD-SCNC: 141 MMOL/L (ref 135–144)
TOTAL PROTEIN, URINE: 6 MG/DL

## 2018-11-06 DIAGNOSIS — M79.7 FIBROMYALGIA: ICD-10-CM

## 2018-11-06 RX ORDER — HYDROCODONE BITARTRATE AND ACETAMINOPHEN 5; 325 MG/1; MG/1
1 TABLET ORAL EVERY 6 HOURS PRN
Qty: 40 TABLET | Refills: 0 | Status: SHIPPED | OUTPATIENT
Start: 2018-11-06 | End: 2018-12-06

## 2018-11-12 RX ORDER — PANTOPRAZOLE SODIUM 40 MG/1
40 TABLET, DELAYED RELEASE ORAL DAILY
Qty: 90 TABLET | Refills: 3 | Status: SHIPPED | OUTPATIENT
Start: 2018-11-12 | End: 2019-12-05 | Stop reason: SDUPTHER

## 2018-11-12 RX ORDER — POTASSIUM CITRATE 10 MEQ/1
10 TABLET, EXTENDED RELEASE ORAL 2 TIMES DAILY
Qty: 180 TABLET | Refills: 3 | Status: SHIPPED | OUTPATIENT
Start: 2018-11-12 | End: 2019-01-03 | Stop reason: SDUPTHER

## 2018-11-12 NOTE — TELEPHONE ENCOUNTER
From: Marylin Lewis  Sent: 11/12/2018 10:29 AM EST  Subject: Medication Renewal Request    Hilaria Colon.  Kavon Annie would like a refill of the following medications:     potassium citrate (UROCIT-K) 10 MEQ (1080 MG) extended release tablet [Naveen Quesada MD]     pantoprazole (PROTONIX) 40 MG tablet Andrea Doll MD]    Preferred pharmacy: [address] [place] [address], [address] [facility] [phone] - F [phone]        Medication renewals requested in this message routed separately:     metoprolol tartrate (LOPRESSOR) 25 MG tablet Hill Mahajan MD]     gabapentin (NEURONTIN) 600 MG tablet Hill Mahajan MD]

## 2018-11-12 NOTE — TELEPHONE ENCOUNTER
Health Maintenance   Topic Date Due    Hepatitis C screen  1945    Diabetic foot exam  02/02/1956    DTaP/Tdap/Td vaccine (1 - Tdap) 02/02/1965    Shingles Vaccine (1 of 2 - 2 Dose Series) 09/27/2011    Diabetic retinal exam  09/28/2016    Breast cancer screen  12/07/2017    A1C test (Diabetic or Prediabetic)  09/07/2018    Lipid screen  09/18/2019    Potassium monitoring  10/02/2019    Creatinine monitoring  10/02/2019    Colon cancer screen colonoscopy  10/21/2023    DEXA (modify frequency per FRAX score)  Completed    Flu vaccine  Completed    Pneumococcal low/med risk  Completed       Hemoglobin A1C (%)   Date Value   09/07/2017 5.7   10/21/2016 6.1 (H)   05/11/2016 6.2 (H)             ( goal A1C is < 7)   No results found for: LABMICR  LDL Cholesterol (mg/dL)   Date Value   09/18/2018 75       (goal LDL is <100)   AST (U/L)   Date Value   09/18/2018 15     ALT (U/L)   Date Value   09/18/2018 14     BUN (mg/dL)   Date Value   10/02/2018 37 (H)     BP Readings from Last 3 Encounters:   10/09/18 112/60   07/27/18 130/70   07/19/18 (!) 147/84          (goal 120/80)    All Future Testing planned in [facility]  Lab Frequency Next Occurrence   Vitamin D 25 Hydroxy Once 01/09/2019   PTH, Intact Once 01/09/2019   CBC Auto Differential Once 01/09/2019   Magnesium Once 74/64/0202   Basic Metabolic Panel Once 19/04/6311   Phosphorus Once 01/09/2019   Creatinine, Random Urine Once 01/09/2019   Protein, urine, random Once 01/09/2019       Next Visit Date:  Future Appointments  Date Time Provider Mateo Weaver   1/8/2019 11:10 AM Golden Guerrero MD [facility] None   4/25/2019 2:00 PM Deepthi Gonzalez MD Resp Spec Nancy Fuse            Patient Active Problem List:     HTN (hypertension)     Dyslipidemia     DIONY on CPAP     Fibromyalgia     CRI (chronic renal insufficiency)     OA (osteoarthritis)     DM (diabetes mellitus) ([facility] [address]     Kidney stone     Depression     Seasonal allergies

## 2018-11-14 RX ORDER — GABAPENTIN 600 MG/1
600 TABLET ORAL DAILY
Qty: 90 TABLET | Refills: 3 | Status: SHIPPED | OUTPATIENT
Start: 2018-11-14 | End: 2019-12-18 | Stop reason: SDUPTHER

## 2018-11-14 NOTE — TELEPHONE ENCOUNTER
Depression     Seasonal allergies     Diverticulosis     Erythropenia     Low hemoglobin     Mitral regurgitation - Mild to moderate     Chronic kidney disease (CKD), stage III (moderate) (HCC)     Gout     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 disease ([address]     Essential hypertension     Osteopenia     Morbid obesity due to excess calories ([address]     Anxiety

## 2018-11-26 RX ORDER — ATORVASTATIN CALCIUM 40 MG/1
TABLET, FILM COATED ORAL
Qty: 90 TABLET | Refills: 2 | Status: SHIPPED | OUTPATIENT
Start: 2018-11-26 | End: 2019-09-22 | Stop reason: SDUPTHER

## 2018-11-26 NOTE — TELEPHONE ENCOUNTER
allergies     Diverticulosis     Erythropenia     Low hemoglobin     Mitral regurgitation - Mild to moderate     Chronic kidney disease (CKD), stage III (moderate) (HCC)     Gout     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 disease ([address]     Essential hypertension     Osteopenia     Morbid obesity due to excess calories ([address]     Anxiety

## 2018-12-18 ENCOUNTER — HOSPITAL ENCOUNTER (OUTPATIENT)
Age: 73
Setting detail: SPECIMEN
Discharge: HOME | End: 2018-12-18
Payer: MEDICARE

## 2018-12-18 DIAGNOSIS — N18.3 TYPE 2 DIABETES MELLITUS WITH STAGE 3 CHRONIC KIDNEY DISEASE, UNSPECIFIED WHETHER LONG TERM INSULIN USE: ICD-10-CM

## 2018-12-18 DIAGNOSIS — E11.22 TYPE 2 DIABETES MELLITUS WITH STAGE 3 CHRONIC KIDNEY DISEASE, UNSPECIFIED WHETHER LONG TERM INSULIN USE: ICD-10-CM

## 2018-12-18 DIAGNOSIS — N17.9 AKI (ACUTE KIDNEY INJURY) (HCC): ICD-10-CM

## 2018-12-18 LAB
ABSOLUTE EOS #: 0.43 K/UL (ref 0–0.44)
ABSOLUTE IMMATURE GRANULOCYTE: <0.03 K/UL (ref 0–0.3)
ABSOLUTE LYMPH #: 2.51 K/UL (ref 1.1–3.7)
ABSOLUTE MONO #: 0.84 K/UL (ref 0.1–1.2)
ANION GAP SERPL CALCULATED.3IONS-SCNC: 14 MMOL/L (ref 9–17)
BASOPHILS # BLD: 1 % (ref 0–2)
BASOPHILS ABSOLUTE: 0.06 K/UL (ref 0–0.2)
BUN BLDV-MCNC: 27 MG/DL (ref 8–23)
BUN/CREAT BLD: ABNORMAL (ref 9–20)
CALCIUM SERPL-MCNC: 9.3 MG/DL (ref 8.6–10.4)
CHLORIDE BLD-SCNC: 104 MMOL/L (ref 98–107)
CO2: 26 MMOL/L (ref 20–31)
CREAT SERPL-MCNC: 2.12 MG/DL (ref 0.5–0.9)
CREATININE URINE: 76.6 MG/DL (ref 28–217)
DIFFERENTIAL TYPE: ABNORMAL
EOSINOPHILS RELATIVE PERCENT: 5 % (ref 1–4)
GFR AFRICAN AMERICAN: 28 ML/MIN
GFR NON-AFRICAN AMERICAN: 23 ML/MIN
GFR SERPL CREATININE-BSD FRML MDRD: ABNORMAL ML/MIN/{1.73_M2}
GFR SERPL CREATININE-BSD FRML MDRD: ABNORMAL ML/MIN/{1.73_M2}
GLUCOSE BLD-MCNC: 121 MG/DL (ref 70–99)
HCT VFR BLD CALC: 32.7 % (ref 36.3–47.1)
HEMOGLOBIN: 9.8 G/DL (ref 11.9–15.1)
IMMATURE GRANULOCYTES: 0 %
LYMPHOCYTES # BLD: 31 % (ref 24–43)
MAGNESIUM: 1.5 MG/DL (ref 1.6–2.6)
MCH RBC QN AUTO: 29 PG (ref 25.2–33.5)
MCHC RBC AUTO-ENTMCNC: 30 G/DL (ref 28.4–34.8)
MCV RBC AUTO: 96.7 FL (ref 82.6–102.9)
MONOCYTES # BLD: 10 % (ref 3–12)
NRBC AUTOMATED: 0 PER 100 WBC
PDW BLD-RTO: 13.8 % (ref 11.8–14.4)
PHOSPHORUS: 4.2 MG/DL (ref 2.6–4.5)
PLATELET # BLD: 264 K/UL (ref 138–453)
PLATELET ESTIMATE: ABNORMAL
PMV BLD AUTO: 9.9 FL (ref 8.1–13.5)
POTASSIUM SERPL-SCNC: 4.9 MMOL/L (ref 3.7–5.3)
PTH INTACT: 169.5 PG/ML (ref 15–65)
RBC # BLD: 3.38 M/UL (ref 3.95–5.11)
RBC # BLD: ABNORMAL 10*6/UL
SEG NEUTROPHILS: 53 % (ref 36–65)
SEGMENTED NEUTROPHILS ABSOLUTE COUNT: 4.18 K/UL (ref 1.5–8.1)
SODIUM BLD-SCNC: 144 MMOL/L (ref 135–144)
TOTAL PROTEIN, URINE: 7 MG/DL
VITAMIN D 25-HYDROXY: 35.8 NG/ML (ref 30–100)
WBC # BLD: 8 K/UL (ref 3.5–11.3)
WBC # BLD: ABNORMAL 10*3/UL

## 2018-12-27 ENCOUNTER — APPOINTMENT (OUTPATIENT)
Dept: GENERAL RADIOLOGY | Age: 73
DRG: 684 | End: 2018-12-27
Payer: MEDICARE

## 2018-12-27 ENCOUNTER — HOSPITAL ENCOUNTER (INPATIENT)
Age: 73
LOS: 1 days | Discharge: HOME | DRG: 684 | End: 2018-12-28
Attending: EMERGENCY MEDICINE | Admitting: INTERNAL MEDICINE
Payer: MEDICARE

## 2018-12-27 DIAGNOSIS — R65.10 SIRS (SYSTEMIC INFLAMMATORY RESPONSE SYNDROME) (HCC): Primary | ICD-10-CM

## 2018-12-27 DIAGNOSIS — R11.2 NON-INTRACTABLE VOMITING WITH NAUSEA, UNSPECIFIED VOMITING TYPE: ICD-10-CM

## 2018-12-27 PROBLEM — H65.02 ACUTE SEROUS OTITIS MEDIA OF LEFT EAR: Status: ACTIVE | Noted: 2018-12-27

## 2018-12-27 PROBLEM — R50.9 FEBRILE ILLNESS: Status: ACTIVE | Noted: 2018-12-27

## 2018-12-27 PROBLEM — N17.9 AKI (ACUTE KIDNEY INJURY) (HCC): Status: ACTIVE | Noted: 2018-12-27

## 2018-12-27 LAB
ABSOLUTE EOS #: 0.1 K/UL (ref 0–0.44)
ABSOLUTE IMMATURE GRANULOCYTE: 0.05 K/UL (ref 0–0.3)
ABSOLUTE LYMPH #: 1.31 K/UL (ref 1.1–3.7)
ABSOLUTE MONO #: 1.21 K/UL (ref 0.1–1.2)
ALBUMIN SERPL-MCNC: 3.5 G/DL (ref 3.5–5.2)
ALBUMIN/GLOBULIN RATIO: 0.9 (ref 1–2.5)
ALP BLD-CCNC: 68 U/L (ref 35–104)
ALT SERPL-CCNC: 9 U/L (ref 5–33)
ANION GAP SERPL CALCULATED.3IONS-SCNC: 22 MMOL/L (ref 9–17)
AST SERPL-CCNC: 12 U/L
BASOPHILS # BLD: 0 % (ref 0–2)
BASOPHILS ABSOLUTE: 0.04 K/UL (ref 0–0.2)
BILIRUB SERPL-MCNC: 0.51 MG/DL (ref 0.3–1.2)
BILIRUBIN URINE: NEGATIVE
BUN BLDV-MCNC: 32 MG/DL (ref 8–23)
BUN/CREAT BLD: ABNORMAL (ref 9–20)
CALCIUM SERPL-MCNC: 9.6 MG/DL (ref 8.6–10.4)
CHLORIDE BLD-SCNC: 102 MMOL/L (ref 98–107)
CO2: 19 MMOL/L (ref 20–31)
COLOR: YELLOW
COMMENT UA: NORMAL
CREAT SERPL-MCNC: 2.56 MG/DL (ref 0.5–0.9)
DIFFERENTIAL TYPE: ABNORMAL
DIRECT EXAM: NORMAL
EOSINOPHILS RELATIVE PERCENT: 1 % (ref 1–4)
GFR AFRICAN AMERICAN: 22 ML/MIN
GFR NON-AFRICAN AMERICAN: 18 ML/MIN
GFR SERPL CREATININE-BSD FRML MDRD: ABNORMAL ML/MIN/{1.73_M2}
GFR SERPL CREATININE-BSD FRML MDRD: ABNORMAL ML/MIN/{1.73_M2}
GLUCOSE BLD-MCNC: 271 MG/DL (ref 70–99)
GLUCOSE URINE: NEGATIVE
HCT VFR BLD CALC: 28.8 % (ref 36.3–47.1)
HEMOGLOBIN: 9.3 G/DL (ref 11.9–15.1)
IMMATURE GRANULOCYTES: 0 %
INR BLD: 1
KETONES, URINE: NEGATIVE
LACTIC ACID, WHOLE BLOOD: 1.7 MMOL/L (ref 0.7–2.1)
LACTIC ACID: NORMAL MMOL/L
LEUKOCYTE ESTERASE, URINE: NEGATIVE
LIPASE: 24 U/L (ref 13–60)
LYMPHOCYTES # BLD: 10 % (ref 24–43)
Lab: NORMAL
MCH RBC QN AUTO: 29.7 PG (ref 25.2–33.5)
MCHC RBC AUTO-ENTMCNC: 32.3 G/DL (ref 28.4–34.8)
MCV RBC AUTO: 92 FL (ref 82.6–102.9)
MONOCYTES # BLD: 9 % (ref 3–12)
NITRITE, URINE: NEGATIVE
NRBC AUTOMATED: 0 PER 100 WBC
PARTIAL THROMBOPLASTIN TIME: 27 SEC (ref 20.5–30.5)
PDW BLD-RTO: 13.6 % (ref 11.8–14.4)
PH UA: 5 (ref 5–8)
PLATELET # BLD: 283 K/UL (ref 138–453)
PLATELET ESTIMATE: ABNORMAL
PMV BLD AUTO: 10 FL (ref 8.1–13.5)
POTASSIUM SERPL-SCNC: 3.8 MMOL/L (ref 3.7–5.3)
PROTEIN UA: NEGATIVE
PROTHROMBIN TIME: 10.8 SEC (ref 9–12)
RBC # BLD: 3.13 M/UL (ref 3.95–5.11)
RBC # BLD: ABNORMAL 10*6/UL
SEG NEUTROPHILS: 79 % (ref 36–65)
SEGMENTED NEUTROPHILS ABSOLUTE COUNT: 10.16 K/UL (ref 1.5–8.1)
SODIUM BLD-SCNC: 143 MMOL/L (ref 135–144)
SPECIFIC GRAVITY UA: 1.01 (ref 1–1.03)
SPECIMEN DESCRIPTION: NORMAL
STATUS: NORMAL
TOTAL PROTEIN: 7.5 G/DL (ref 6.4–8.3)
TROPONIN INTERP: ABNORMAL
TROPONIN INTERP: ABNORMAL
TROPONIN T: ABNORMAL NG/ML
TROPONIN T: ABNORMAL NG/ML
TROPONIN, HIGH SENSITIVITY: 24 NG/L (ref 0–14)
TROPONIN, HIGH SENSITIVITY: 27 NG/L (ref 0–14)
TURBIDITY: CLEAR
URINE HGB: NEGATIVE
UROBILINOGEN, URINE: NORMAL
WBC # BLD: 12.9 K/UL (ref 3.5–11.3)
WBC # BLD: ABNORMAL 10*3/UL

## 2018-12-27 PROCEDURE — 71046 X-RAY EXAM CHEST 2 VIEWS: CPT

## 2018-12-27 PROCEDURE — 87804 INFLUENZA ASSAY W/OPTIC: CPT

## 2018-12-27 PROCEDURE — 1200000000 HC SEMI PRIVATE

## 2018-12-27 PROCEDURE — 99222 1ST HOSP IP/OBS MODERATE 55: CPT | Performed by: INTERNAL MEDICINE

## 2018-12-27 PROCEDURE — 93005 ELECTROCARDIOGRAM TRACING: CPT

## 2018-12-27 PROCEDURE — 81003 URINALYSIS AUTO W/O SCOPE: CPT

## 2018-12-27 PROCEDURE — 99285 EMERGENCY DEPT VISIT HI MDM: CPT

## 2018-12-27 PROCEDURE — 2580000003 HC RX 258: Performed by: INTERNAL MEDICINE

## 2018-12-27 PROCEDURE — 85025 COMPLETE CBC W/AUTO DIFF WBC: CPT

## 2018-12-27 PROCEDURE — 80053 COMPREHEN METABOLIC PANEL: CPT

## 2018-12-27 PROCEDURE — 84484 ASSAY OF TROPONIN QUANT: CPT

## 2018-12-27 PROCEDURE — 85610 PROTHROMBIN TIME: CPT

## 2018-12-27 PROCEDURE — 83690 ASSAY OF LIPASE: CPT

## 2018-12-27 PROCEDURE — 87086 URINE CULTURE/COLONY COUNT: CPT

## 2018-12-27 PROCEDURE — 87040 BLOOD CULTURE FOR BACTERIA: CPT

## 2018-12-27 PROCEDURE — 83605 ASSAY OF LACTIC ACID: CPT

## 2018-12-27 PROCEDURE — 2580000003 HC RX 258: Performed by: EMERGENCY MEDICINE

## 2018-12-27 PROCEDURE — 85730 THROMBOPLASTIN TIME PARTIAL: CPT

## 2018-12-27 RX ORDER — ONDANSETRON 2 MG/ML
4 INJECTION INTRAMUSCULAR; INTRAVENOUS EVERY 6 HOURS PRN
Status: DISCONTINUED | OUTPATIENT
Start: 2018-12-27 | End: 2018-12-28 | Stop reason: HOSPADM

## 2018-12-27 RX ORDER — ATORVASTATIN CALCIUM 40 MG/1
40 TABLET, FILM COATED ORAL DAILY
Status: DISCONTINUED | OUTPATIENT
Start: 2018-12-28 | End: 2018-12-28 | Stop reason: HOSPADM

## 2018-12-27 RX ORDER — SODIUM CHLORIDE 0.9 % (FLUSH) 0.9 %
10 SYRINGE (ML) INJECTION EVERY 12 HOURS SCHEDULED
Status: DISCONTINUED | OUTPATIENT
Start: 2018-12-27 | End: 2018-12-28 | Stop reason: HOSPADM

## 2018-12-27 RX ORDER — VENLAFAXINE HYDROCHLORIDE 37.5 MG/1
37.5 CAPSULE, EXTENDED RELEASE ORAL DAILY
Status: DISCONTINUED | OUTPATIENT
Start: 2018-12-28 | End: 2018-12-27

## 2018-12-27 RX ORDER — SODIUM CHLORIDE 0.9 % (FLUSH) 0.9 %
10 SYRINGE (ML) INJECTION PRN
Status: DISCONTINUED | OUTPATIENT
Start: 2018-12-27 | End: 2018-12-28 | Stop reason: HOSPADM

## 2018-12-27 RX ORDER — CYCLOBENZAPRINE HCL 10 MG
10 TABLET ORAL 3 TIMES DAILY PRN
Status: DISCONTINUED | OUTPATIENT
Start: 2018-12-27 | End: 2018-12-28 | Stop reason: HOSPADM

## 2018-12-27 RX ORDER — 0.9 % SODIUM CHLORIDE 0.9 %
30 INTRAVENOUS SOLUTION INTRAVENOUS ONCE
Status: DISCONTINUED | OUTPATIENT
Start: 2018-12-27 | End: 2018-12-27

## 2018-12-27 RX ORDER — PANTOPRAZOLE SODIUM 40 MG/1
40 TABLET, DELAYED RELEASE ORAL DAILY
Status: DISCONTINUED | OUTPATIENT
Start: 2018-12-28 | End: 2018-12-28 | Stop reason: HOSPADM

## 2018-12-27 RX ORDER — LEVOFLOXACIN 500 MG/1
500 TABLET, FILM COATED ORAL ONCE
Status: COMPLETED | OUTPATIENT
Start: 2018-12-27 | End: 2018-12-28

## 2018-12-27 RX ORDER — ZOLPIDEM TARTRATE 10 MG/1
10 TABLET ORAL NIGHTLY PRN
COMMUNITY
End: 2019-12-19

## 2018-12-27 RX ORDER — 0.9 % SODIUM CHLORIDE 0.9 %
1000 INTRAVENOUS SOLUTION INTRAVENOUS ONCE
Status: COMPLETED | OUTPATIENT
Start: 2018-12-27 | End: 2018-12-27

## 2018-12-27 RX ORDER — ZOLPIDEM TARTRATE 5 MG/1
10 TABLET ORAL NIGHTLY PRN
Status: DISCONTINUED | OUTPATIENT
Start: 2018-12-27 | End: 2018-12-28 | Stop reason: HOSPADM

## 2018-12-27 RX ORDER — SODIUM CHLORIDE 9 MG/ML
INJECTION, SOLUTION INTRAVENOUS CONTINUOUS
Status: DISCONTINUED | OUTPATIENT
Start: 2018-12-27 | End: 2018-12-28 | Stop reason: HOSPADM

## 2018-12-27 RX ORDER — ZOLPIDEM TARTRATE 5 MG/1
10 TABLET ORAL NIGHTLY PRN
Status: DISCONTINUED | OUTPATIENT
Start: 2018-12-28 | End: 2018-12-27

## 2018-12-27 RX ORDER — VENLAFAXINE HYDROCHLORIDE 75 MG/1
75 CAPSULE, EXTENDED RELEASE ORAL DAILY
Status: DISCONTINUED | OUTPATIENT
Start: 2018-12-28 | End: 2018-12-28 | Stop reason: HOSPADM

## 2018-12-27 RX ORDER — AZELASTINE 1 MG/ML
1 SPRAY, METERED NASAL 2 TIMES DAILY
Status: DISCONTINUED | OUTPATIENT
Start: 2018-12-27 | End: 2018-12-28 | Stop reason: HOSPADM

## 2018-12-27 RX ADMIN — SODIUM CHLORIDE: 9 INJECTION, SOLUTION INTRAVENOUS at 23:16

## 2018-12-27 RX ADMIN — SODIUM CHLORIDE 1000 ML: 9 INJECTION, SOLUTION INTRAVENOUS at 15:46

## 2018-12-27 ASSESSMENT — ENCOUNTER SYMPTOMS
RHINORRHEA: 0
SHORTNESS OF BREATH: 0
DIARRHEA: 0
COUGH: 0
ABDOMINAL PAIN: 0
EYE PAIN: 0
CONSTIPATION: 0
NAUSEA: 1
VOMITING: 1

## 2018-12-28 VITALS
TEMPERATURE: 97.2 F | OXYGEN SATURATION: 100 % | RESPIRATION RATE: 20 BRPM | BODY MASS INDEX: 42.02 KG/M2 | WEIGHT: 261.5 LBS | DIASTOLIC BLOOD PRESSURE: 52 MMHG | HEIGHT: 66 IN | SYSTOLIC BLOOD PRESSURE: 116 MMHG | HEART RATE: 74 BPM

## 2018-12-28 LAB
ANION GAP SERPL CALCULATED.3IONS-SCNC: 13 MMOL/L (ref 9–17)
BUN BLDV-MCNC: 32 MG/DL (ref 8–23)
BUN/CREAT BLD: ABNORMAL (ref 9–20)
CALCIUM SERPL-MCNC: 8.9 MG/DL (ref 8.6–10.4)
CHLORIDE BLD-SCNC: 103 MMOL/L (ref 98–107)
CO2: 21 MMOL/L (ref 20–31)
CREAT SERPL-MCNC: 2.35 MG/DL (ref 0.5–0.9)
EKG ATRIAL RATE: 82 BPM
EKG ATRIAL RATE: 96 BPM
EKG P AXIS: 43 DEGREES
EKG P AXIS: 87 DEGREES
EKG P-R INTERVAL: 138 MS
EKG P-R INTERVAL: 176 MS
EKG Q-T INTERVAL: 366 MS
EKG Q-T INTERVAL: 374 MS
EKG QRS DURATION: 88 MS
EKG QRS DURATION: 90 MS
EKG QTC CALCULATION (BAZETT): 436 MS
EKG QTC CALCULATION (BAZETT): 462 MS
EKG R AXIS: 48 DEGREES
EKG T AXIS: 26 DEGREES
EKG T AXIS: 88 DEGREES
EKG VENTRICULAR RATE: 82 BPM
EKG VENTRICULAR RATE: 96 BPM
GFR AFRICAN AMERICAN: 25 ML/MIN
GFR NON-AFRICAN AMERICAN: 20 ML/MIN
GFR SERPL CREATININE-BSD FRML MDRD: ABNORMAL ML/MIN/{1.73_M2}
GFR SERPL CREATININE-BSD FRML MDRD: ABNORMAL ML/MIN/{1.73_M2}
GLUCOSE BLD-MCNC: 140 MG/DL (ref 70–99)
HCT VFR BLD CALC: 25 % (ref 36.3–47.1)
HEMOGLOBIN: 7.8 G/DL (ref 11.9–15.1)
MAGNESIUM: 1.7 MG/DL (ref 1.6–2.6)
MCH RBC QN AUTO: 29.3 PG (ref 25.2–33.5)
MCHC RBC AUTO-ENTMCNC: 31.2 G/DL (ref 28.4–34.8)
MCV RBC AUTO: 94 FL (ref 82.6–102.9)
NRBC AUTOMATED: 0 PER 100 WBC
PDW BLD-RTO: 13.6 % (ref 11.8–14.4)
PLATELET # BLD: 233 K/UL (ref 138–453)
PMV BLD AUTO: 9.7 FL (ref 8.1–13.5)
POTASSIUM SERPL-SCNC: 3.5 MMOL/L (ref 3.7–5.3)
RBC # BLD: 2.66 M/UL (ref 3.95–5.11)
SODIUM BLD-SCNC: 137 MMOL/L (ref 135–144)
WBC # BLD: 9.6 K/UL (ref 3.5–11.3)

## 2018-12-28 PROCEDURE — 99232 SBSQ HOSP IP/OBS MODERATE 35: CPT | Performed by: INTERNAL MEDICINE

## 2018-12-28 PROCEDURE — 6360000002 HC RX W HCPCS: Performed by: INTERNAL MEDICINE

## 2018-12-28 PROCEDURE — 6370000000 HC RX 637 (ALT 250 FOR IP): Performed by: INTERNAL MEDICINE

## 2018-12-28 PROCEDURE — 6370000000 HC RX 637 (ALT 250 FOR IP): Performed by: NURSE PRACTITIONER

## 2018-12-28 PROCEDURE — 83735 ASSAY OF MAGNESIUM: CPT

## 2018-12-28 PROCEDURE — 36415 COLL VENOUS BLD VENIPUNCTURE: CPT

## 2018-12-28 PROCEDURE — 80048 BASIC METABOLIC PNL TOTAL CA: CPT

## 2018-12-28 PROCEDURE — 85027 COMPLETE CBC AUTOMATED: CPT

## 2018-12-28 RX ORDER — FUROSEMIDE 40 MG/1
40 TABLET ORAL DAILY
Qty: 60 TABLET | Refills: 3 | Status: SHIPPED | OUTPATIENT
Start: 2018-12-28 | End: 2019-01-07

## 2018-12-28 RX ORDER — LEVOFLOXACIN 250 MG/1
250 TABLET ORAL DAILY
Qty: 5 TABLET | Refills: 0 | Status: SHIPPED | OUTPATIENT
Start: 2018-12-28 | End: 2019-01-02

## 2018-12-28 RX ADMIN — METOPROLOL TARTRATE 25 MG: 25 TABLET, FILM COATED ORAL at 00:22

## 2018-12-28 RX ADMIN — VENLAFAXINE HYDROCHLORIDE 75 MG: 75 CAPSULE, EXTENDED RELEASE ORAL at 09:46

## 2018-12-28 RX ADMIN — METOPROLOL TARTRATE 25 MG: 25 TABLET, FILM COATED ORAL at 09:47

## 2018-12-28 RX ADMIN — PANTOPRAZOLE SODIUM 40 MG: 40 TABLET, DELAYED RELEASE ORAL at 09:46

## 2018-12-28 RX ADMIN — ZOLPIDEM TARTRATE 10 MG: 5 TABLET ORAL at 00:22

## 2018-12-28 RX ADMIN — LEVOFLOXACIN 500 MG: 500 TABLET, FILM COATED ORAL at 00:22

## 2018-12-28 RX ADMIN — ENOXAPARIN SODIUM 30 MG: 30 INJECTION SUBCUTANEOUS at 09:46

## 2018-12-29 ENCOUNTER — CARE COORDINATION (OUTPATIENT)
Dept: CASE MANAGEMENT | Age: 73
End: 2018-12-29

## 2018-12-29 DIAGNOSIS — N17.9 AKI (ACUTE KIDNEY INJURY) (HCC): ICD-10-CM

## 2018-12-29 DIAGNOSIS — R50.9 FEBRILE ILLNESS: Primary | ICD-10-CM

## 2018-12-29 LAB
CULTURE: NORMAL
Lab: NORMAL
SPECIMEN DESCRIPTION: NORMAL
STATUS: NORMAL

## 2018-12-29 PROCEDURE — 1111F DSCHRG MED/CURRENT MED MERGE: CPT | Performed by: FAMILY MEDICINE

## 2019-01-02 LAB
CULTURE: NORMAL
CULTURE: NORMAL
Lab: NORMAL
Lab: NORMAL
SPECIMEN DESCRIPTION: NORMAL
SPECIMEN DESCRIPTION: NORMAL
STATUS: NORMAL
STATUS: NORMAL

## 2019-01-03 ENCOUNTER — CARE COORDINATION (OUTPATIENT)
Dept: CASE MANAGEMENT | Age: 74
End: 2019-01-03

## 2019-01-03 DIAGNOSIS — F32.A DEPRESSION, UNSPECIFIED DEPRESSION TYPE: ICD-10-CM

## 2019-01-03 RX ORDER — POTASSIUM CITRATE 10 MEQ/1
10 TABLET, EXTENDED RELEASE ORAL 2 TIMES DAILY
Qty: 180 TABLET | Refills: 3 | Status: SHIPPED | OUTPATIENT
Start: 2019-01-03 | End: 2019-01-08 | Stop reason: ALTCHOICE

## 2019-01-03 RX ORDER — VENLAFAXINE HYDROCHLORIDE 75 MG/1
75 CAPSULE, EXTENDED RELEASE ORAL DAILY
Qty: 90 CAPSULE | Refills: 3 | Status: SHIPPED | OUTPATIENT
Start: 2019-01-03 | End: 2019-03-20 | Stop reason: DRUGHIGH

## 2019-01-07 ENCOUNTER — HOSPITAL ENCOUNTER (OUTPATIENT)
Age: 74
Setting detail: SPECIMEN
Discharge: HOME | End: 2019-01-07
Payer: MEDICARE

## 2019-01-07 ENCOUNTER — OFFICE VISIT (OUTPATIENT)
Dept: FAMILY MEDICINE CLINIC | Age: 74
End: 2019-01-07
Payer: MEDICARE

## 2019-01-07 VITALS
BODY MASS INDEX: 41.48 KG/M2 | DIASTOLIC BLOOD PRESSURE: 70 MMHG | SYSTOLIC BLOOD PRESSURE: 140 MMHG | WEIGHT: 257 LBS | HEART RATE: 80 BPM

## 2019-01-07 DIAGNOSIS — I10 ESSENTIAL HYPERTENSION: ICD-10-CM

## 2019-01-07 DIAGNOSIS — N17.9 AKI (ACUTE KIDNEY INJURY) (HCC): ICD-10-CM

## 2019-01-07 DIAGNOSIS — N18.30 TYPE 2 DIABETES MELLITUS WITH STAGE 3 CHRONIC KIDNEY DISEASE, WITHOUT LONG-TERM CURRENT USE OF INSULIN (HCC): ICD-10-CM

## 2019-01-07 DIAGNOSIS — E66.01 MORBID OBESITY WITH BMI OF 40.0-44.9, ADULT (HCC): ICD-10-CM

## 2019-01-07 DIAGNOSIS — E11.22 TYPE 2 DIABETES MELLITUS WITH STAGE 3 CHRONIC KIDNEY DISEASE, WITHOUT LONG-TERM CURRENT USE OF INSULIN (HCC): ICD-10-CM

## 2019-01-07 DIAGNOSIS — E11.22 TYPE 2 DIABETES MELLITUS WITH STAGE 4 CHRONIC KIDNEY DISEASE, UNSPECIFIED WHETHER LONG TERM INSULIN USE (HCC): ICD-10-CM

## 2019-01-07 DIAGNOSIS — E11.22 TYPE 2 DIABETES MELLITUS WITH STAGE 3 CHRONIC KIDNEY DISEASE, UNSPECIFIED WHETHER LONG TERM INSULIN USE: ICD-10-CM

## 2019-01-07 DIAGNOSIS — E66.9 OBESITY WITHOUT SERIOUS COMORBIDITY, UNSPECIFIED CLASSIFICATION, UNSPECIFIED OBESITY TYPE: ICD-10-CM

## 2019-01-07 DIAGNOSIS — N17.9 AKI (ACUTE KIDNEY INJURY) (HCC): Primary | ICD-10-CM

## 2019-01-07 DIAGNOSIS — N18.4 TYPE 2 DIABETES MELLITUS WITH STAGE 4 CHRONIC KIDNEY DISEASE, UNSPECIFIED WHETHER LONG TERM INSULIN USE (HCC): ICD-10-CM

## 2019-01-07 DIAGNOSIS — F32.A DEPRESSION, UNSPECIFIED DEPRESSION TYPE: ICD-10-CM

## 2019-01-07 DIAGNOSIS — N18.4 CHRONIC KIDNEY DISEASE, STAGE IV (SEVERE) (HCC): ICD-10-CM

## 2019-01-07 DIAGNOSIS — N18.3 TYPE 2 DIABETES MELLITUS WITH STAGE 3 CHRONIC KIDNEY DISEASE, UNSPECIFIED WHETHER LONG TERM INSULIN USE: ICD-10-CM

## 2019-01-07 LAB
ABSOLUTE EOS #: 0.5 K/UL (ref 0–0.44)
ABSOLUTE IMMATURE GRANULOCYTE: 0.19 K/UL (ref 0–0.3)
ABSOLUTE LYMPH #: 2.34 K/UL (ref 1.1–3.7)
ABSOLUTE MONO #: 0.68 K/UL (ref 0.1–1.2)
ALBUMIN SERPL-MCNC: 3.3 G/DL (ref 3.5–5.2)
ALBUMIN/GLOBULIN RATIO: 0.9 (ref 1–2.5)
ALP BLD-CCNC: 62 U/L (ref 35–104)
ALT SERPL-CCNC: 9 U/L (ref 5–33)
ANION GAP SERPL CALCULATED.3IONS-SCNC: 13 MMOL/L (ref 9–17)
AST SERPL-CCNC: 12 U/L
BASOPHILS # BLD: 1 % (ref 0–2)
BASOPHILS ABSOLUTE: 0.08 K/UL (ref 0–0.2)
BILIRUB SERPL-MCNC: 0.22 MG/DL (ref 0.3–1.2)
BUN BLDV-MCNC: 41 MG/DL (ref 8–23)
BUN/CREAT BLD: ABNORMAL (ref 9–20)
CALCIUM SERPL-MCNC: 9.5 MG/DL (ref 8.6–10.4)
CHLORIDE BLD-SCNC: 102 MMOL/L (ref 98–107)
CO2: 22 MMOL/L (ref 20–31)
CREAT SERPL-MCNC: 2.41 MG/DL (ref 0.5–0.9)
DIFFERENTIAL TYPE: ABNORMAL
EOSINOPHILS RELATIVE PERCENT: 5 % (ref 1–4)
ESTIMATED AVERAGE GLUCOSE: 123 MG/DL
GFR AFRICAN AMERICAN: 24 ML/MIN
GFR NON-AFRICAN AMERICAN: 20 ML/MIN
GFR SERPL CREATININE-BSD FRML MDRD: ABNORMAL ML/MIN/{1.73_M2}
GFR SERPL CREATININE-BSD FRML MDRD: ABNORMAL ML/MIN/{1.73_M2}
GLUCOSE BLD-MCNC: 118 MG/DL (ref 70–99)
HBA1C MFR BLD: 5.9 % (ref 4–6)
HCT VFR BLD CALC: 33.7 % (ref 36.3–47.1)
HEMOGLOBIN: 10 G/DL (ref 11.9–15.1)
IMMATURE GRANULOCYTES: 2 %
LYMPHOCYTES # BLD: 22 % (ref 24–43)
MAGNESIUM: 2 MG/DL (ref 1.6–2.6)
MCH RBC QN AUTO: 28.7 PG (ref 25.2–33.5)
MCHC RBC AUTO-ENTMCNC: 29.7 G/DL (ref 28.4–34.8)
MCV RBC AUTO: 96.8 FL (ref 82.6–102.9)
MONOCYTES # BLD: 6 % (ref 3–12)
NRBC AUTOMATED: 0 PER 100 WBC
PDW BLD-RTO: 14.2 % (ref 11.8–14.4)
PLATELET # BLD: 520 K/UL (ref 138–453)
PLATELET ESTIMATE: ABNORMAL
PMV BLD AUTO: 9.3 FL (ref 8.1–13.5)
POTASSIUM SERPL-SCNC: 5.9 MMOL/L (ref 3.7–5.3)
RBC # BLD: 3.48 M/UL (ref 3.95–5.11)
RBC # BLD: ABNORMAL 10*6/UL
SEG NEUTROPHILS: 64 % (ref 36–65)
SEGMENTED NEUTROPHILS ABSOLUTE COUNT: 6.85 K/UL (ref 1.5–8.1)
SODIUM BLD-SCNC: 137 MMOL/L (ref 135–144)
TOTAL PROTEIN: 7 G/DL (ref 6.4–8.3)
WBC # BLD: 10.6 K/UL (ref 3.5–11.3)
WBC # BLD: ABNORMAL 10*3/UL

## 2019-01-07 PROCEDURE — 99495 TRANSJ CARE MGMT MOD F2F 14D: CPT | Performed by: FAMILY MEDICINE

## 2019-01-07 PROCEDURE — 1111F DSCHRG MED/CURRENT MED MERGE: CPT | Performed by: FAMILY MEDICINE

## 2019-01-07 RX ORDER — AZELASTINE 1 MG/ML
SPRAY, METERED NASAL
Qty: 3 BOTTLE | Refills: 1 | Status: SHIPPED | OUTPATIENT
Start: 2019-01-07 | End: 2020-01-09

## 2019-01-07 RX ORDER — LOSARTAN POTASSIUM 25 MG/1
25 TABLET ORAL DAILY
COMMUNITY
End: 2019-03-04 | Stop reason: SDUPTHER

## 2019-01-07 RX ORDER — HYDROCHLOROTHIAZIDE 25 MG/1
25 TABLET ORAL DAILY
COMMUNITY
End: 2020-01-09

## 2019-01-09 ENCOUNTER — CARE COORDINATION (OUTPATIENT)
Dept: CASE MANAGEMENT | Age: 74
End: 2019-01-09

## 2019-01-14 ENCOUNTER — HOSPITAL ENCOUNTER (OUTPATIENT)
Age: 74
Setting detail: SPECIMEN
Discharge: HOME | End: 2019-01-14
Payer: MEDICARE

## 2019-01-14 DIAGNOSIS — G47.00 INSOMNIA, UNSPECIFIED TYPE: ICD-10-CM

## 2019-01-14 LAB
ANION GAP SERPL CALCULATED.3IONS-SCNC: 16 MMOL/L (ref 9–17)
BUN BLDV-MCNC: 33 MG/DL (ref 8–23)
BUN/CREAT BLD: ABNORMAL (ref 9–20)
CALCIUM SERPL-MCNC: 9.4 MG/DL (ref 8.6–10.4)
CHLORIDE BLD-SCNC: 100 MMOL/L (ref 98–107)
CO2: 22 MMOL/L (ref 20–31)
CREAT SERPL-MCNC: 2.39 MG/DL (ref 0.5–0.9)
GFR AFRICAN AMERICAN: 24 ML/MIN
GFR NON-AFRICAN AMERICAN: 20 ML/MIN
GFR SERPL CREATININE-BSD FRML MDRD: ABNORMAL ML/MIN/{1.73_M2}
GFR SERPL CREATININE-BSD FRML MDRD: ABNORMAL ML/MIN/{1.73_M2}
GLUCOSE BLD-MCNC: 119 MG/DL (ref 70–99)
POTASSIUM SERPL-SCNC: 4.3 MMOL/L (ref 3.7–5.3)
SODIUM BLD-SCNC: 138 MMOL/L (ref 135–144)

## 2019-01-14 RX ORDER — ZOLPIDEM TARTRATE 10 MG/1
TABLET ORAL
Qty: 90 TABLET | Refills: 1 | Status: SHIPPED | OUTPATIENT
Start: 2019-01-14 | End: 2019-07-02 | Stop reason: SDUPTHER

## 2019-01-22 RX ORDER — PIOGLITAZONE HCL AND METFORMIN HCL 500; 15 MG/1; MG/1
1 TABLET ORAL 2 TIMES DAILY WITH MEALS
Qty: 180 TABLET | Refills: 3 | Status: SHIPPED | OUTPATIENT
Start: 2019-01-22 | End: 2019-12-26

## 2019-02-11 ENCOUNTER — TELEPHONE (OUTPATIENT)
Dept: PULMONOLOGY | Age: 74
End: 2019-02-11

## 2019-02-11 DIAGNOSIS — Z99.89 OSA ON CPAP: Primary | ICD-10-CM

## 2019-02-11 DIAGNOSIS — G47.33 OSA ON CPAP: Primary | ICD-10-CM

## 2019-02-19 ENCOUNTER — TELEPHONE (OUTPATIENT)
Dept: PULMONOLOGY | Age: 74
End: 2019-02-19

## 2019-02-19 DIAGNOSIS — G47.33 OSA (OBSTRUCTIVE SLEEP APNEA): Primary | ICD-10-CM

## 2019-03-20 ENCOUNTER — OFFICE VISIT (OUTPATIENT)
Dept: FAMILY MEDICINE CLINIC | Age: 74
End: 2019-03-20
Payer: MEDICARE

## 2019-03-20 VITALS
HEART RATE: 75 BPM | SYSTOLIC BLOOD PRESSURE: 138 MMHG | WEIGHT: 262 LBS | OXYGEN SATURATION: 96 % | BODY MASS INDEX: 42.29 KG/M2 | DIASTOLIC BLOOD PRESSURE: 80 MMHG

## 2019-03-20 DIAGNOSIS — Z12.39 SCREENING FOR BREAST CANCER: ICD-10-CM

## 2019-03-20 DIAGNOSIS — N18.4 TYPE 2 DIABETES MELLITUS WITH STAGE 4 CHRONIC KIDNEY DISEASE, UNSPECIFIED WHETHER LONG TERM INSULIN USE (HCC): Primary | ICD-10-CM

## 2019-03-20 DIAGNOSIS — G47.33 OSA ON CPAP: ICD-10-CM

## 2019-03-20 DIAGNOSIS — Z99.89 OSA ON CPAP: ICD-10-CM

## 2019-03-20 DIAGNOSIS — E11.22 TYPE 2 DIABETES MELLITUS WITH STAGE 4 CHRONIC KIDNEY DISEASE, UNSPECIFIED WHETHER LONG TERM INSULIN USE (HCC): Primary | ICD-10-CM

## 2019-03-20 DIAGNOSIS — N18.30 CHRONIC RENAL IMPAIRMENT, STAGE 3 (MODERATE) (HCC): ICD-10-CM

## 2019-03-20 DIAGNOSIS — E78.5 DYSLIPIDEMIA: ICD-10-CM

## 2019-03-20 DIAGNOSIS — I10 ESSENTIAL HYPERTENSION: ICD-10-CM

## 2019-03-20 DIAGNOSIS — F32.A DEPRESSION, UNSPECIFIED DEPRESSION TYPE: ICD-10-CM

## 2019-03-20 PROCEDURE — 1036F TOBACCO NON-USER: CPT | Performed by: FAMILY MEDICINE

## 2019-03-20 PROCEDURE — G8400 PT W/DXA NO RESULTS DOC: HCPCS | Performed by: FAMILY MEDICINE

## 2019-03-20 PROCEDURE — G8417 CALC BMI ABV UP PARAM F/U: HCPCS | Performed by: FAMILY MEDICINE

## 2019-03-20 PROCEDURE — 3017F COLORECTAL CA SCREEN DOC REV: CPT | Performed by: FAMILY MEDICINE

## 2019-03-20 PROCEDURE — 3044F HG A1C LEVEL LT 7.0%: CPT | Performed by: FAMILY MEDICINE

## 2019-03-20 PROCEDURE — G8427 DOCREV CUR MEDS BY ELIG CLIN: HCPCS | Performed by: FAMILY MEDICINE

## 2019-03-20 PROCEDURE — 99214 OFFICE O/P EST MOD 30 MIN: CPT | Performed by: FAMILY MEDICINE

## 2019-03-20 PROCEDURE — 2022F DILAT RTA XM EVC RTNOPTHY: CPT | Performed by: FAMILY MEDICINE

## 2019-03-20 PROCEDURE — G8482 FLU IMMUNIZE ORDER/ADMIN: HCPCS | Performed by: FAMILY MEDICINE

## 2019-03-20 PROCEDURE — 1090F PRES/ABSN URINE INCON ASSESS: CPT | Performed by: FAMILY MEDICINE

## 2019-03-20 PROCEDURE — 1101F PT FALLS ASSESS-DOCD LE1/YR: CPT | Performed by: FAMILY MEDICINE

## 2019-03-20 PROCEDURE — 1123F ACP DISCUSS/DSCN MKR DOCD: CPT | Performed by: FAMILY MEDICINE

## 2019-03-20 PROCEDURE — 4040F PNEUMOC VAC/ADMIN/RCVD: CPT | Performed by: FAMILY MEDICINE

## 2019-03-20 RX ORDER — VENLAFAXINE HYDROCHLORIDE 150 MG/1
150 CAPSULE, EXTENDED RELEASE ORAL DAILY
Qty: 90 CAPSULE | Refills: 3 | Status: SHIPPED | OUTPATIENT
Start: 2019-03-20 | End: 2020-02-20

## 2019-03-20 ASSESSMENT — ENCOUNTER SYMPTOMS
NAUSEA: 0
VOMITING: 0
BACK PAIN: 1
SHORTNESS OF BREATH: 0
SORE THROAT: 0
SINUS PRESSURE: 0
COUGH: 0
DIARRHEA: 0

## 2019-03-26 ENCOUNTER — HOSPITAL ENCOUNTER (OUTPATIENT)
Age: 74
Setting detail: SPECIMEN
Discharge: HOME | End: 2019-03-26
Payer: MEDICARE

## 2019-03-26 DIAGNOSIS — N18.4 TYPE 2 DIABETES MELLITUS WITH STAGE 4 CHRONIC KIDNEY DISEASE, UNSPECIFIED WHETHER LONG TERM INSULIN USE (HCC): ICD-10-CM

## 2019-03-26 DIAGNOSIS — N17.9 AKI (ACUTE KIDNEY INJURY) (HCC): ICD-10-CM

## 2019-03-26 DIAGNOSIS — E11.22 TYPE 2 DIABETES MELLITUS WITH STAGE 4 CHRONIC KIDNEY DISEASE, UNSPECIFIED WHETHER LONG TERM INSULIN USE (HCC): ICD-10-CM

## 2019-03-26 LAB
ABSOLUTE EOS #: 0.29 K/UL (ref 0–0.44)
ABSOLUTE IMMATURE GRANULOCYTE: <0.03 K/UL (ref 0–0.3)
ABSOLUTE LYMPH #: 2.76 K/UL (ref 1.1–3.7)
ABSOLUTE MONO #: 0.7 K/UL (ref 0.1–1.2)
ANION GAP SERPL CALCULATED.3IONS-SCNC: 12 MMOL/L (ref 9–17)
BASOPHILS # BLD: 1 % (ref 0–2)
BASOPHILS ABSOLUTE: 0.05 K/UL (ref 0–0.2)
BUN BLDV-MCNC: 31 MG/DL (ref 8–23)
BUN/CREAT BLD: ABNORMAL (ref 9–20)
CALCIUM SERPL-MCNC: 9.3 MG/DL (ref 8.6–10.4)
CHLORIDE BLD-SCNC: 104 MMOL/L (ref 98–107)
CO2: 26 MMOL/L (ref 20–31)
CREAT SERPL-MCNC: 1.94 MG/DL (ref 0.5–0.9)
CREATININE URINE: 78.9 MG/DL (ref 28–217)
DIFFERENTIAL TYPE: ABNORMAL
EOSINOPHILS RELATIVE PERCENT: 4 % (ref 1–4)
GFR AFRICAN AMERICAN: 31 ML/MIN
GFR NON-AFRICAN AMERICAN: 25 ML/MIN
GFR SERPL CREATININE-BSD FRML MDRD: ABNORMAL ML/MIN/{1.73_M2}
GFR SERPL CREATININE-BSD FRML MDRD: ABNORMAL ML/MIN/{1.73_M2}
GLUCOSE BLD-MCNC: 53 MG/DL (ref 70–99)
HCT VFR BLD CALC: 31.2 % (ref 36.3–47.1)
HEMOGLOBIN: 9.2 G/DL (ref 11.9–15.1)
IMMATURE GRANULOCYTES: 0 %
LYMPHOCYTES # BLD: 35 % (ref 24–43)
MAGNESIUM: 2.1 MG/DL (ref 1.6–2.6)
MCH RBC QN AUTO: 29 PG (ref 25.2–33.5)
MCHC RBC AUTO-ENTMCNC: 29.5 G/DL (ref 28.4–34.8)
MCV RBC AUTO: 98.4 FL (ref 82.6–102.9)
MONOCYTES # BLD: 9 % (ref 3–12)
NRBC AUTOMATED: 0 PER 100 WBC
PDW BLD-RTO: 14.6 % (ref 11.8–14.4)
PHOSPHORUS: 3.8 MG/DL (ref 2.6–4.5)
PLATELET # BLD: 295 K/UL (ref 138–453)
PLATELET ESTIMATE: ABNORMAL
PMV BLD AUTO: 10.5 FL (ref 8.1–13.5)
POTASSIUM SERPL-SCNC: 4.7 MMOL/L (ref 3.7–5.3)
PTH INTACT: 186.1 PG/ML (ref 15–65)
RBC # BLD: 3.17 M/UL (ref 3.95–5.11)
RBC # BLD: ABNORMAL 10*6/UL
SEG NEUTROPHILS: 51 % (ref 36–65)
SEGMENTED NEUTROPHILS ABSOLUTE COUNT: 4.13 K/UL (ref 1.5–8.1)
SODIUM BLD-SCNC: 142 MMOL/L (ref 135–144)
TOTAL PROTEIN, URINE: 7 MG/DL
VITAMIN D 25-HYDROXY: 30.3 NG/ML (ref 30–100)
WBC # BLD: 8 K/UL (ref 3.5–11.3)
WBC # BLD: ABNORMAL 10*3/UL

## 2019-03-27 ENCOUNTER — HOSPITAL ENCOUNTER (OUTPATIENT)
Dept: MAMMOGRAPHY | Age: 74
Discharge: HOME | End: 2019-03-29
Payer: MEDICARE

## 2019-03-27 DIAGNOSIS — Z12.39 SCREENING FOR BREAST CANCER: ICD-10-CM

## 2019-03-27 PROCEDURE — 77063 BREAST TOMOSYNTHESIS BI: CPT

## 2019-04-25 ENCOUNTER — OFFICE VISIT (OUTPATIENT)
Dept: PULMONOLOGY | Age: 74
End: 2019-04-25
Payer: MEDICARE

## 2019-04-25 VITALS
WEIGHT: 260 LBS | SYSTOLIC BLOOD PRESSURE: 140 MMHG | RESPIRATION RATE: 16 BRPM | HEIGHT: 66 IN | OXYGEN SATURATION: 99 % | BODY MASS INDEX: 41.78 KG/M2 | DIASTOLIC BLOOD PRESSURE: 80 MMHG | HEART RATE: 68 BPM

## 2019-04-25 DIAGNOSIS — G47.33 OSA (OBSTRUCTIVE SLEEP APNEA): Primary | ICD-10-CM

## 2019-04-25 DIAGNOSIS — E66.01 MORBID OBESITY DUE TO EXCESS CALORIES (HCC): ICD-10-CM

## 2019-04-25 DIAGNOSIS — M79.7 FIBROMYALGIA: ICD-10-CM

## 2019-04-25 PROCEDURE — G8417 CALC BMI ABV UP PARAM F/U: HCPCS | Performed by: INTERNAL MEDICINE

## 2019-04-25 PROCEDURE — 1090F PRES/ABSN URINE INCON ASSESS: CPT | Performed by: INTERNAL MEDICINE

## 2019-04-25 PROCEDURE — 99213 OFFICE O/P EST LOW 20 MIN: CPT | Performed by: INTERNAL MEDICINE

## 2019-04-25 PROCEDURE — G8427 DOCREV CUR MEDS BY ELIG CLIN: HCPCS | Performed by: INTERNAL MEDICINE

## 2019-04-25 PROCEDURE — 3017F COLORECTAL CA SCREEN DOC REV: CPT | Performed by: INTERNAL MEDICINE

## 2019-04-25 PROCEDURE — 1123F ACP DISCUSS/DSCN MKR DOCD: CPT | Performed by: INTERNAL MEDICINE

## 2019-04-25 PROCEDURE — 4040F PNEUMOC VAC/ADMIN/RCVD: CPT | Performed by: INTERNAL MEDICINE

## 2019-04-25 PROCEDURE — G8400 PT W/DXA NO RESULTS DOC: HCPCS | Performed by: INTERNAL MEDICINE

## 2019-04-25 PROCEDURE — 1036F TOBACCO NON-USER: CPT | Performed by: INTERNAL MEDICINE

## 2019-04-27 NOTE — PROGRESS NOTES
PULMONARY OP  PROGRESS NOTE      Patient:  Terry Torres  YOB: 1945    MRN: [id number]     Acct:        Pt seen and Chart reviewed. Ms. Terry Torres is here in followup for    Diagnosis Orders   1. DIONY (obstructive sleep apnea)     2. Morbid obesity due to excess calories ([facility] [address]     3. Fibromyalgia         Pt has not been hospitalized or been to er since last visit. Has been using meds as recommended. Has been compliant with treatment for sleep apnea. She had a BiPAP titration study The pressure has been changed to 13/8 cm of water from 18/5 cm some water. Patient has been using the BiPAP machine in the new settings. Her compliance is decreased by her cat interfering with her sleep and fibromyalgia  Her cat does bother her during sleep. This is causing her to have some daytime sleepiness and fatigue and tiredness. For fibromyalgia, also has been bothering her. No orthopnea or PND. No chest pain or pressure. No epistaxis, sore throat. No motor vehicle accidents.   She does have nasal discharge and uses antihistamine with improvement in symptomatology    Subjective:   Review of Systems -   General ROS: Completed and except as mentioned above were negative   Psychological ROS:  Completed and except as mentioned above were negative  Ophthalmic ROS:  Completed and except as mentioned above were negative  ENT ROS:  Completed and except as mentioned above were negative  Allergy and Immunology ROS:  Completed and except as mentioned above were negative  Hematological and Lymphatic ROS:  Completed and except as mentioned above were negative  Endocrine ROS: Completed and except as mentioned above were negative  Breast ROS:  Completed and except as mentioned above were negative  Respiratory ROS:  Completed and except as mentioned above were negative  Cardiovascular ROS:  Completed and except as mentioned above were negative  Gastrointestinal ROS: Completed and except as mentioned above were negative  Genito-Urinary ROS:  Completed and except as mentioned above were negative  Musculoskeletal ROS:  Completed and except as mentioned above were negative  Neurological ROS:  Completed and except as mentioned above were negative  Dermatological ROS:  Completed and except as mentioned above were negative      Allergies: Allergies   Allergen Reactions    Cephalexin Other (See Comments)     Tongue cracks and peels    Erythromycin Other (See Comments)     Epigastric pain and bloating    Ketorolac Tromethamine Other (See Comments)     Severe stomach cramps    Pcn [Penicillins]      Unknown reaction    Percocet [Oxycodone-Acetaminophen] Itching and Other (See Comments)     Esophagus hurt    Sulfa Antibiotics     Ultracet [Tramadol-Acetaminophen] Itching       Medications:    Current Outpatient Medications:     venlafaxine (EFFEXOR XR) 150 MG extended release capsule, Take 1 capsule by mouth daily, Disp: 90 capsule, Rfl: 3    losartan (COZAAR) 25 MG tablet, Take 1 tablet by mouth daily, Disp: 90 tablet, Rfl: 3    pioglitazone-metformin (ACTOPLUS MET)  MG per tablet, Take 1 tablet by mouth 2 times daily (with meals), Disp: 180 tablet, Rfl: 3    hydrochlorothiazide (HYDRODIURIL) 25 MG tablet, Take 25 mg by mouth daily, Disp: , Rfl:     azelastine (ASTELIN) 0.1 % nasal spray, PLACE 1 SPRAY IN EACH NOSTRIL ONCE DAILY AS DIRECTED, Disp: 3 Bottle, Rfl: 1    zolpidem (AMBIEN) 10 MG tablet, Take 10 mg by mouth nightly as needed for Sleep. ., Disp: , Rfl:     atorvastatin (LIPITOR) 40 MG tablet, TAKE 1 TABLET BY MOUTH ONE TIME A DAY , Disp: 90 tablet, Rfl: 2    metoprolol tartrate (LOPRESSOR) 25 MG tablet, Take 1 tablet by mouth 2 times daily, Disp: 180 tablet, Rfl: 3    pantoprazole (PROTONIX) 40 MG tablet, Take 1 tablet by mouth daily, Disp: 90 tablet, Rfl: 3    cyclobenzaprine (FLEXERIL) 10 MG tablet, TAKE 1 TABLET BY MOUTH EVERY 8 HOURS AS NEEDED FOR MUSCLE SPASMS, Disp: 30 tablet, Rfl: 4    Magnesium Oxide 400 MG CAPS, Take by mouth 2 times daily , Disp: , Rfl:     gabapentin (NEURONTIN) 600 MG tablet, Take 1 tablet by mouth daily for 90 days. ., Disp: 90 tablet, Rfl: 3      Objective:    Physical Exam:  Vitals: BP (!) 140/80 (Site: Right Upper Arm)   Pulse 68   Resp 16   Ht 5' 6\" (1.676 m)   Wt 260 lb (117.9 kg)   SpO2 99% Comment: Room air at rest  BMI 41.97 kg/m²   Last 3 weights: Wt Readings from Last 3 Encounters:   04/25/19 260 lb (117.9 kg)   04/09/19 266 lb (120.7 kg)   03/20/19 262 lb (118.8 kg)     Body mass index is 41.97 kg/m². Physical Examination:   Constitutional: Appears well, no distress, morbidly obese  EENT: PERRLA,  sclera clear, anicteric, oropharynx clear, no lesions, neck supple with midline trachea. Neck: Supple, symmetrical, trachea midline, no adenopathy, no goiter, no jvd skin normal  Respiratory: clear to auscultation, no wheezes or rales and unlabored breathing. No intercostal tenderness  Cardiovascular: regular rate and rhythm, normal S1, S2, no murmur noted  Abdomen: soft, nontender, nondistended, no masses or organomegaly  Extremities:  no pedal edema, no clubbing or cyanosis    Labs:  Compliance data was reviewed and it could improve. As mentioned previously, this is partly related to her pets            Assessment:     Diagnosis Orders   1. DIONY (obstructive sleep apnea)     2. Morbid obesity due to excess calories ([address]     3. Fibromyalgia          PLAN:    Refills were provided -none  Educated and clarified the medication use. Recommend flu vaccination in the fall annually. Recommendations given regarding pneumococcal vaccinations. Patient is up-to-date with vaccinations from pulmonary perspective. Maintain an active lifestyle. Questions  Patient had were answered to her satisfaction. BiPAP 13/8 cm of water to be used for at least 4 hours every night.   Use humidifier if needed. Weight loss was recommended and discussed. Recommended following good sleep hygiene instructions. Explained importance of compliance with treatment of sleep apnea. I informed her that Medicare may take the machine back if her compliance is less than optimal  Pt is not to drive if sleepy  We'll see the patient back in 1 year. She will call us if she needs to be seen sooner  Thank you for having us involved in the care of your patient. Please call us if you have any questions or concerns.       Yayo Corey MD             4/27/2019, 6:45 AM

## 2019-05-31 ENCOUNTER — OFFICE VISIT (OUTPATIENT)
Dept: FAMILY MEDICINE CLINIC | Age: 74
End: 2019-05-31
Payer: MEDICARE

## 2019-05-31 VITALS
RESPIRATION RATE: 16 BRPM | BODY MASS INDEX: 41.98 KG/M2 | OXYGEN SATURATION: 96 % | TEMPERATURE: 98.3 F | SYSTOLIC BLOOD PRESSURE: 138 MMHG | HEIGHT: 66 IN | DIASTOLIC BLOOD PRESSURE: 90 MMHG | HEART RATE: 83 BPM | WEIGHT: 261.2 LBS

## 2019-05-31 DIAGNOSIS — N18.30 CHRONIC KIDNEY DISEASE (CKD), STAGE III (MODERATE) (HCC): ICD-10-CM

## 2019-05-31 DIAGNOSIS — J06.9 VIRAL URI: Primary | ICD-10-CM

## 2019-05-31 PROCEDURE — 3017F COLORECTAL CA SCREEN DOC REV: CPT | Performed by: NURSE PRACTITIONER

## 2019-05-31 PROCEDURE — G8427 DOCREV CUR MEDS BY ELIG CLIN: HCPCS | Performed by: NURSE PRACTITIONER

## 2019-05-31 PROCEDURE — 99213 OFFICE O/P EST LOW 20 MIN: CPT | Performed by: NURSE PRACTITIONER

## 2019-05-31 PROCEDURE — 1036F TOBACCO NON-USER: CPT | Performed by: NURSE PRACTITIONER

## 2019-05-31 PROCEDURE — G8400 PT W/DXA NO RESULTS DOC: HCPCS | Performed by: NURSE PRACTITIONER

## 2019-05-31 PROCEDURE — 1123F ACP DISCUSS/DSCN MKR DOCD: CPT | Performed by: NURSE PRACTITIONER

## 2019-05-31 PROCEDURE — 1090F PRES/ABSN URINE INCON ASSESS: CPT | Performed by: NURSE PRACTITIONER

## 2019-05-31 PROCEDURE — 4040F PNEUMOC VAC/ADMIN/RCVD: CPT | Performed by: NURSE PRACTITIONER

## 2019-05-31 PROCEDURE — G8417 CALC BMI ABV UP PARAM F/U: HCPCS | Performed by: NURSE PRACTITIONER

## 2019-05-31 ASSESSMENT — ENCOUNTER SYMPTOMS
SORE THROAT: 0
COUGH: 1
GASTROINTESTINAL NEGATIVE: 1
CHOKING: 0
NAUSEA: 0
STRIDOR: 0
ALLERGIC/IMMUNOLOGIC NEGATIVE: 1
DIARRHEA: 0
EYES NEGATIVE: 1
ABDOMINAL PAIN: 0
CHEST TIGHTNESS: 1
SINUS PAIN: 0
SHORTNESS OF BREATH: 0
VOMITING: 0
FACIAL SWELLING: 0
RHINORRHEA: 1
COLOR CHANGE: 0
WHEEZING: 0

## 2019-05-31 NOTE — PROGRESS NOTES
Visit Information    Have you changed or started any medications since your last visit including any over-the-counter medicines, vitamins, or herbal medicines? no   Are you having any side effects from any of your medications? -  no  Have you stopped taking any of your medications? Is so, why? -  no    Have you seen any other physician or provider since your last visit? No  Have you had any other diagnostic tests since your last visit? No  Have you been seen in the emergency room and/or had an admission to a hospital since we last saw you? No  Have you had your routine dental cleaning in the past 6 months? no    Have you activated your [employer] account? If not, what are your barriers?  Yes     Patient Care Team:  Krista Mohan MD as PCP - Cally Lopez MD as PCP - [facility]  Kym Muller MD as Consulting Physician  Gina Rosales MD as Consulting Physician (Rheumatology)  Adriana Alba MD as Consulting Physician (Pulmonology)  Carol Cardoso MD as Surgeon (Orthopedic Surgery)  Benjamin Mckinley MD (Endocrinology)  Farrah Rich as Consulting Physician (Podiatry)    Medical History Review  Past Medical, Family, and Social History reviewed and does contribute to the patient presenting condition    Health Maintenance   Topic Date Due    Hepatitis C screen  1945    Hepatitis B Vaccine (1 of 3 - Risk 3-dose series) 02/02/1965    DTaP/Tdap/Td vaccine (1 - Tdap) 02/02/1965    Shingles Vaccine (1 of 2) 02/02/1996    Diabetic retinal exam  09/28/2016    Lipid screen  09/18/2019    A1C test (Diabetic or Prediabetic)  01/07/2020    Diabetic foot exam  03/20/2020    Potassium monitoring  03/26/2020    Creatinine monitoring  03/26/2020    Breast cancer screen  03/27/2020    Colon cancer screen colonoscopy  10/21/2023    DEXA (modify frequency per FRAX score)  Completed    Flu vaccine  Completed    Pneumococcal 65+ years Vaccine  Completed    HPV vaccine DIRECTED 3 Bottle 1    zolpidem (AMBIEN) 10 MG tablet Take 10 mg by mouth nightly as needed for Sleep. Samara Juárez atorvastatin (LIPITOR) 40 MG tablet TAKE 1 TABLET BY MOUTH ONE TIME A DAY  90 tablet 2    metoprolol tartrate (LOPRESSOR) 25 MG tablet Take 1 tablet by mouth 2 times daily 180 tablet 3    pantoprazole (PROTONIX) 40 MG tablet Take 1 tablet by mouth daily 90 tablet 3    cyclobenzaprine (FLEXERIL) 10 MG tablet TAKE 1 TABLET BY MOUTH EVERY 8 HOURS AS NEEDED FOR MUSCLE SPASMS 30 tablet 4    Magnesium Oxide 400 MG CAPS Take by mouth 2 times daily       gabapentin (NEURONTIN) 600 MG tablet Take 1 tablet by mouth daily for 90 days. . 90 tablet 3     No current facility-administered medications for this visit. Allergies   Allergen Reactions    Cephalexin Other (See Comments)     Tongue cracks and peels    Erythromycin Other (See Comments)     Epigastric pain and bloating    Ketorolac Tromethamine Other (See Comments)     Severe stomach cramps    Pcn [Penicillins]      Unknown reaction    Percocet [Oxycodone-Acetaminophen] Itching and Other (See Comments)     Esophagus hurt    Sulfa Antibiotics     Ultracet [Tramadol-Acetaminophen] Itching         HPI:     URI    This is a new problem. The current episode started in the past 7 days (started Weds (2 days ago)). The problem has been unchanged. There has been no fever. Associated symptoms include congestion (clear discharge), coughing (dry), headaches, joint pain (chronic fibromyalgia), rhinorrhea and sneezing. Pertinent negatives include no abdominal pain, diarrhea, dysuria, ear pain, nausea, plugged ear sensation, rash, sinus pain, sore throat, vomiting or wheezing. Treatments tried: Tussin DM , NyQuil.  is sick as well     Health Maintenance:      Subjective:     Review of Systems   Constitutional: Positive for appetite change (dec.). Negative for chills and fever. HENT: Positive for congestion (clear discharge), rhinorrhea and sneezing. Negative for ear pain, facial swelling, postnasal drip, sinus pain and sore throat. Eyes: Negative. Respiratory: Positive for cough (dry) and chest tightness. Negative for choking, shortness of breath, wheezing and stridor. Cardiovascular: Negative. Gastrointestinal: Negative. Negative for abdominal pain, diarrhea, nausea and vomiting. Endocrine: Negative. Genitourinary: Negative. Negative for dysuria. Musculoskeletal: Positive for joint pain (chronic fibromyalgia). Skin: Negative. Negative for color change, pallor, rash and wound. Allergic/Immunologic: Negative. Neurological: Positive for headaches. Hematological: Negative. Objective:     Vitals:    05/31/19 1559   BP: (!) 138/90   Pulse: 83   Resp: 16   Temp: 98.3 °F (36.8 °C)   SpO2: 96%       Body mass index is 42.16 kg/m². Physical Exam   Constitutional: She is oriented to person, place, and time. She appears well-developed and well-nourished. Non-toxic appearance. She does not have a sickly appearance. No distress. HENT:   Head: Normocephalic and atraumatic. Right Ear: External ear and ear canal normal.   Left Ear: External ear and ear canal normal.   Nose: Nose normal.   Mouth/Throat: Oropharynx is clear and moist. No uvula swelling. No oropharyngeal exudate, posterior oropharyngeal edema, posterior oropharyngeal erythema or tonsillar abscesses. No tonsillar exudate. Cerumen bilaterally    Eyes: Pupils are equal, round, and reactive to light. Conjunctivae are normal. Right eye exhibits no discharge. Left eye exhibits no discharge. No scleral icterus. Neck: Normal range of motion. Neck supple. No tracheal deviation present. Cardiovascular: Normal rate, regular rhythm, normal heart sounds and intact distal pulses. Exam reveals no gallop and no friction rub. No murmur heard. Pulmonary/Chest: Effort normal and breath sounds normal. No accessory muscle usage or stridor. No tachypnea. No respiratory distress. She has no decreased breath sounds. She has no wheezes. She has no rhonchi. She has no rales. Abdominal: Soft. Musculoskeletal: Normal range of motion. She exhibits no edema, tenderness or deformity. Lymphadenopathy:     She has cervical adenopathy. Neurological: She is alert and oriented to person, place, and time. She has normal strength. She displays no atrophy and no tremor. She displays no seizure activity. Gait normal. GCS eye subscore is 4. GCS verbal subscore is 5. GCS motor subscore is 6. Skin: Skin is warm, dry and intact. No rash noted. She is not diaphoretic. No erythema. No pallor. Psychiatric: She has a normal mood and affect. Her speech is normal and behavior is normal. Judgment and thought content normal. Cognition and memory are normal.         Assessment:         1. Viral URI    2. Chronic kidney disease (CKD), stage III (moderate) ([facility])        Plan:     1. Viral URI    SX treatment  We discussed renal dosing of OTC medications  Tylenol for pain, warm fluids, cough suppressants  Call if worsened or additional questions/concerns     2. Chronic kidney disease (CKD), stage III (moderate) ([facility])    Discussed home medications  Avoid all NSAID's   Advised to clarify DM2 meds w/ PCP due to renal function     Discussed use, benefit, and side effects of prescribed medications. All patient questions answered. Pt voiced understanding. Reviewed health maintenance. Instructed to continue current medications, diet and exercise. Patient agreedwith treatment plan. Follow up as directed.      Electronically signed by BARBIE Thomas CNP on 5/31/2019

## 2019-05-31 NOTE — PATIENT INSTRUCTIONS
Patient Education        Viral Respiratory Infection: Care Instructions  Your Care Instructions    Viruses are very small organisms. They grow in number after they enter your body. There are many types that cause different illnesses, such as colds and the mumps. The symptoms of a viral respiratory infection often start quickly. They include a fever, sore throat, and runny nose. You may also just not feel well. Or you may not want to eat much. Most viral respiratory infections are not serious. They usually get better with time and self-care. Antibiotics are not used to treat a viral infection. That's because antibiotics will not help cure a viral illness. In some cases, antiviral medicine can help your body fight a serious viral infection. Follow-up care is a key part of your treatment and safety. Be sure to make and go to all appointments, and call your doctor if you are having problems. It's also a good idea to know your test results and keep a list of the medicines you take. How can you care for yourself at home? · Rest as much as possible until you feel better. · Be safe with medicines. Take your medicine exactly as prescribed. Call your doctor if you think you are having a problem with your medicine. You will get more details on the specific medicine your doctor prescribes. · Take an over-the-counter pain medicine, such as acetaminophen (Tylenol), ibuprofen (Advil, Motrin), or naproxen (Aleve), as needed for pain and fever. Read and follow all instructions on the label. Do not give aspirin to anyone younger than 20. It has been linked to Reye syndrome, a serious illness. · Drink plenty of fluids, enough so that your urine is light yellow or clear like water. Hot fluids, such as tea or soup, may help relieve congestion in your nose and throat. If you have kidney, heart, or liver disease and have to limit fluids, talk with your doctor before you increase the amount of fluids you drink.   · Try to clear mucus from your lungs by breathing deeply and coughing. · Gargle with warm salt water once an hour. This can help reduce swelling and throat pain. Use 1 teaspoon of salt mixed in 1 cup of warm water. · Do not smoke or allow others to smoke around you. If you need help quitting, talk to your doctor about stop-smoking programs and medicines. These can increase your chances of quitting for good. To avoid spreading the virus  · Cough or sneeze into a tissue. Then throw the tissue away. · If you don't have a tissue, use your hand to cover your cough or sneeze. Then clean your hand. You can also cough into your sleeve. · Wash your hands often. Use soap and warm water. Wash for 15 to 20 seconds each time. · If you don't have soap and water near you, you can clean your hands with alcohol wipes or gel. When should you call for help? Call your doctor now or seek immediate medical care if:    · You have a new or higher fever.     · Your fever lasts more than 48 hours.     · You have trouble breathing.     · You have a fever with a stiff neck or a severe headache.     · You are sensitive to light.     · You feel very sleepy or confused.    Watch closely for changes in your health, and be sure to contact your doctor if:    · You do not get better as expected. Where can you learn more? Go to https://[employer]. org and sign in to your [employer] account. Enter Y139 in the [facility] box to learn more about \"Viral Respiratory Infection: Care Instructions. \"     If you do not have an account, please click on the \"Sign Up Now\" link. Current as of: September 5, 2018  Content Version: 12.0  © 8996-4578 [employer]. Care instructions adapted under license by [facility] ([facility]). If you have questions about a medical condition or this instruction, always ask your healthcare professional. [address] any warranty or liability for your use of this information.

## 2019-07-02 DIAGNOSIS — G47.00 INSOMNIA, UNSPECIFIED TYPE: ICD-10-CM

## 2019-07-02 RX ORDER — ZOLPIDEM TARTRATE 10 MG/1
TABLET ORAL
Qty: 90 TABLET | Refills: 1 | Status: SHIPPED | OUTPATIENT
Start: 2019-07-02 | End: 2019-08-29

## 2019-07-03 ENCOUNTER — OFFICE VISIT (OUTPATIENT)
Dept: FAMILY MEDICINE CLINIC | Age: 74
End: 2019-07-03
Payer: MEDICARE

## 2019-07-03 ENCOUNTER — HOSPITAL ENCOUNTER (OUTPATIENT)
Age: 74
Setting detail: SPECIMEN
Discharge: HOME | End: 2019-07-03
Payer: MEDICARE

## 2019-07-03 VITALS
HEART RATE: 68 BPM | BODY MASS INDEX: 42.74 KG/M2 | OXYGEN SATURATION: 95 % | SYSTOLIC BLOOD PRESSURE: 136 MMHG | WEIGHT: 264.8 LBS | DIASTOLIC BLOOD PRESSURE: 82 MMHG

## 2019-07-03 DIAGNOSIS — M25.572 ARTHRALGIA OF LEFT FOOT: ICD-10-CM

## 2019-07-03 DIAGNOSIS — M25.572 ARTHRALGIA OF LEFT FOOT: Primary | ICD-10-CM

## 2019-07-03 DIAGNOSIS — E66.01 MORBID OBESITY WITH BMI OF 40.0-44.9, ADULT (HCC): ICD-10-CM

## 2019-07-03 DIAGNOSIS — M10.9 ACUTE GOUT INVOLVING TOE OF LEFT FOOT, UNSPECIFIED CAUSE: ICD-10-CM

## 2019-07-03 LAB
ABSOLUTE EOS #: 0.26 K/UL (ref 0–0.44)
ABSOLUTE IMMATURE GRANULOCYTE: <0.03 K/UL (ref 0–0.3)
ABSOLUTE LYMPH #: 2.5 K/UL (ref 1.1–3.7)
ABSOLUTE MONO #: 1.06 K/UL (ref 0.1–1.2)
BASOPHILS # BLD: 1 % (ref 0–2)
BASOPHILS ABSOLUTE: 0.05 K/UL (ref 0–0.2)
DIFFERENTIAL TYPE: ABNORMAL
EOSINOPHILS RELATIVE PERCENT: 3 % (ref 1–4)
HCT VFR BLD CALC: 33.9 % (ref 36.3–47.1)
HEMOGLOBIN: 9.7 G/DL (ref 11.9–15.1)
IMMATURE GRANULOCYTES: 0 %
LYMPHOCYTES # BLD: 24 % (ref 24–43)
MCH RBC QN AUTO: 28.6 PG (ref 25.2–33.5)
MCHC RBC AUTO-ENTMCNC: 28.6 G/DL (ref 28.4–34.8)
MCV RBC AUTO: 100 FL (ref 82.6–102.9)
MONOCYTES # BLD: 10 % (ref 3–12)
NRBC AUTOMATED: 0 PER 100 WBC
PDW BLD-RTO: 14.8 % (ref 11.8–14.4)
PLATELET # BLD: 324 K/UL (ref 138–453)
PLATELET ESTIMATE: ABNORMAL
PMV BLD AUTO: 9.9 FL (ref 8.1–13.5)
RBC # BLD: 3.39 M/UL (ref 3.95–5.11)
RBC # BLD: ABNORMAL 10*6/UL
SEDIMENTATION RATE, ERYTHROCYTE: 98 MM (ref 0–20)
SEG NEUTROPHILS: 62 % (ref 36–65)
SEGMENTED NEUTROPHILS ABSOLUTE COUNT: 6.47 K/UL (ref 1.5–8.1)
URIC ACID: 7.9 MG/DL (ref 2.4–5.7)
WBC # BLD: 10.4 K/UL (ref 3.5–11.3)
WBC # BLD: ABNORMAL 10*3/UL

## 2019-07-03 PROCEDURE — G8427 DOCREV CUR MEDS BY ELIG CLIN: HCPCS | Performed by: FAMILY MEDICINE

## 2019-07-03 PROCEDURE — 3017F COLORECTAL CA SCREEN DOC REV: CPT | Performed by: FAMILY MEDICINE

## 2019-07-03 PROCEDURE — G8400 PT W/DXA NO RESULTS DOC: HCPCS | Performed by: FAMILY MEDICINE

## 2019-07-03 PROCEDURE — 1123F ACP DISCUSS/DSCN MKR DOCD: CPT | Performed by: FAMILY MEDICINE

## 2019-07-03 PROCEDURE — 4040F PNEUMOC VAC/ADMIN/RCVD: CPT | Performed by: FAMILY MEDICINE

## 2019-07-03 PROCEDURE — G8417 CALC BMI ABV UP PARAM F/U: HCPCS | Performed by: FAMILY MEDICINE

## 2019-07-03 PROCEDURE — 1036F TOBACCO NON-USER: CPT | Performed by: FAMILY MEDICINE

## 2019-07-03 PROCEDURE — 99213 OFFICE O/P EST LOW 20 MIN: CPT | Performed by: FAMILY MEDICINE

## 2019-07-03 PROCEDURE — 1090F PRES/ABSN URINE INCON ASSESS: CPT | Performed by: FAMILY MEDICINE

## 2019-07-03 RX ORDER — PREDNISONE 20 MG/1
TABLET ORAL
Qty: 20 TABLET | Refills: 0 | Status: SHIPPED | OUTPATIENT
Start: 2019-07-03 | End: 2019-08-30

## 2019-07-03 RX ORDER — HYDROCODONE BITARTRATE AND ACETAMINOPHEN 5; 325 MG/1; MG/1
1 TABLET ORAL EVERY 4 HOURS PRN
Qty: 42 TABLET | Refills: 0 | Status: SHIPPED | OUTPATIENT
Start: 2019-07-03 | End: 2019-07-10

## 2019-07-03 ASSESSMENT — ENCOUNTER SYMPTOMS
SINUS PRESSURE: 0
COUGH: 0
VOMITING: 0
DIARRHEA: 0
NAUSEA: 0
SHORTNESS OF BREATH: 0
SORE THROAT: 0
BACK PAIN: 0

## 2019-07-15 ENCOUNTER — HOSPITAL ENCOUNTER (OUTPATIENT)
Age: 74
Setting detail: SPECIMEN
Discharge: HOME | End: 2019-07-15
Payer: MEDICARE

## 2019-07-15 DIAGNOSIS — E11.22 TYPE 2 DIABETES MELLITUS WITH STAGE 3 CHRONIC KIDNEY DISEASE, UNSPECIFIED WHETHER LONG TERM INSULIN USE: ICD-10-CM

## 2019-07-15 DIAGNOSIS — N18.3 TYPE 2 DIABETES MELLITUS WITH STAGE 3 CHRONIC KIDNEY DISEASE, UNSPECIFIED WHETHER LONG TERM INSULIN USE: ICD-10-CM

## 2019-07-15 LAB
ABSOLUTE EOS #: 0.09 K/UL (ref 0–0.44)
ABSOLUTE IMMATURE GRANULOCYTE: 0.09 K/UL (ref 0–0.3)
ABSOLUTE LYMPH #: 3.09 K/UL (ref 1.1–3.7)
ABSOLUTE MONO #: 0.79 K/UL (ref 0.1–1.2)
ANION GAP SERPL CALCULATED.3IONS-SCNC: 14 MMOL/L (ref 9–17)
BASOPHILS # BLD: 0 % (ref 0–2)
BASOPHILS ABSOLUTE: 0.04 K/UL (ref 0–0.2)
BUN BLDV-MCNC: 33 MG/DL (ref 8–23)
BUN/CREAT BLD: ABNORMAL (ref 9–20)
CALCIUM SERPL-MCNC: 9.1 MG/DL (ref 8.6–10.4)
CHLORIDE BLD-SCNC: 104 MMOL/L (ref 98–107)
CO2: 22 MMOL/L (ref 20–31)
CREAT SERPL-MCNC: 1.82 MG/DL (ref 0.5–0.9)
CREATININE URINE: 38.1 MG/DL (ref 28–217)
DIFFERENTIAL TYPE: ABNORMAL
EOSINOPHILS RELATIVE PERCENT: 1 % (ref 1–4)
GFR AFRICAN AMERICAN: 33 ML/MIN
GFR NON-AFRICAN AMERICAN: 27 ML/MIN
GFR SERPL CREATININE-BSD FRML MDRD: ABNORMAL ML/MIN/{1.73_M2}
GFR SERPL CREATININE-BSD FRML MDRD: ABNORMAL ML/MIN/{1.73_M2}
GLUCOSE BLD-MCNC: 106 MG/DL (ref 70–99)
HCT VFR BLD CALC: 31.8 % (ref 36.3–47.1)
HEMOGLOBIN: 9.2 G/DL (ref 11.9–15.1)
IMMATURE GRANULOCYTES: 1 %
LYMPHOCYTES # BLD: 28 % (ref 24–43)
MAGNESIUM: 2.4 MG/DL (ref 1.6–2.6)
MCH RBC QN AUTO: 29 PG (ref 25.2–33.5)
MCHC RBC AUTO-ENTMCNC: 28.9 G/DL (ref 28.4–34.8)
MCV RBC AUTO: 100.3 FL (ref 82.6–102.9)
MONOCYTES # BLD: 7 % (ref 3–12)
NRBC AUTOMATED: 0 PER 100 WBC
PDW BLD-RTO: 15.1 % (ref 11.8–14.4)
PHOSPHORUS: 3.3 MG/DL (ref 2.6–4.5)
PLATELET # BLD: 310 K/UL (ref 138–453)
PLATELET ESTIMATE: ABNORMAL
PMV BLD AUTO: 9.8 FL (ref 8.1–13.5)
POTASSIUM SERPL-SCNC: 4.9 MMOL/L (ref 3.7–5.3)
PTH INTACT: 252.7 PG/ML (ref 15–65)
RBC # BLD: 3.17 M/UL (ref 3.95–5.11)
RBC # BLD: ABNORMAL 10*6/UL
SEG NEUTROPHILS: 63 % (ref 36–65)
SEGMENTED NEUTROPHILS ABSOLUTE COUNT: 7.1 K/UL (ref 1.5–8.1)
SODIUM BLD-SCNC: 140 MMOL/L (ref 135–144)
TOTAL PROTEIN, URINE: 7 MG/DL
VITAMIN D 25-HYDROXY: 22.8 NG/ML (ref 30–100)
WBC # BLD: 11.2 K/UL (ref 3.5–11.3)
WBC # BLD: ABNORMAL 10*3/UL

## 2019-07-17 DIAGNOSIS — M10.9 GOUT, UNSPECIFIED CAUSE, UNSPECIFIED CHRONICITY, UNSPECIFIED SITE: Primary | ICD-10-CM

## 2019-07-17 RX ORDER — ALLOPURINOL 100 MG/1
100 TABLET ORAL DAILY
Qty: 30 TABLET | Refills: 5 | Status: SHIPPED | OUTPATIENT
Start: 2019-07-17 | End: 2019-10-21 | Stop reason: ALTCHOICE

## 2019-07-24 ENCOUNTER — PATIENT MESSAGE (OUTPATIENT)
Dept: FAMILY MEDICINE CLINIC | Age: 74
End: 2019-07-24

## 2019-07-26 RX ORDER — PREDNISONE 20 MG/1
TABLET ORAL
Qty: 20 TABLET | Refills: 0 | Status: SHIPPED | OUTPATIENT
Start: 2019-07-26 | End: 2019-08-30

## 2019-08-30 ENCOUNTER — OFFICE VISIT (OUTPATIENT)
Dept: FAMILY MEDICINE CLINIC | Age: 74
End: 2019-08-30
Payer: MEDICARE

## 2019-08-30 VITALS
OXYGEN SATURATION: 96 % | DIASTOLIC BLOOD PRESSURE: 86 MMHG | RESPIRATION RATE: 16 BRPM | TEMPERATURE: 98.5 F | SYSTOLIC BLOOD PRESSURE: 138 MMHG | BODY MASS INDEX: 43.35 KG/M2 | WEIGHT: 268.6 LBS | HEART RATE: 75 BPM

## 2019-08-30 DIAGNOSIS — Z23 IMMUNIZATION DUE: ICD-10-CM

## 2019-08-30 DIAGNOSIS — M54.50 ACUTE BILATERAL LOW BACK PAIN WITHOUT SCIATICA: Primary | ICD-10-CM

## 2019-08-30 PROCEDURE — 1123F ACP DISCUSS/DSCN MKR DOCD: CPT | Performed by: NURSE PRACTITIONER

## 2019-08-30 PROCEDURE — 4040F PNEUMOC VAC/ADMIN/RCVD: CPT | Performed by: NURSE PRACTITIONER

## 2019-08-30 PROCEDURE — 90653 IIV ADJUVANT VACCINE IM: CPT | Performed by: NURSE PRACTITIONER

## 2019-08-30 PROCEDURE — G0008 ADMIN INFLUENZA VIRUS VAC: HCPCS | Performed by: NURSE PRACTITIONER

## 2019-08-30 PROCEDURE — G8417 CALC BMI ABV UP PARAM F/U: HCPCS | Performed by: NURSE PRACTITIONER

## 2019-08-30 PROCEDURE — 1090F PRES/ABSN URINE INCON ASSESS: CPT | Performed by: NURSE PRACTITIONER

## 2019-08-30 PROCEDURE — 1036F TOBACCO NON-USER: CPT | Performed by: NURSE PRACTITIONER

## 2019-08-30 PROCEDURE — 99213 OFFICE O/P EST LOW 20 MIN: CPT | Performed by: NURSE PRACTITIONER

## 2019-08-30 PROCEDURE — G8427 DOCREV CUR MEDS BY ELIG CLIN: HCPCS | Performed by: NURSE PRACTITIONER

## 2019-08-30 PROCEDURE — G8510 SCR DEP NEG, NO PLAN REQD: HCPCS | Performed by: NURSE PRACTITIONER

## 2019-08-30 PROCEDURE — 3017F COLORECTAL CA SCREEN DOC REV: CPT | Performed by: NURSE PRACTITIONER

## 2019-08-30 PROCEDURE — G8400 PT W/DXA NO RESULTS DOC: HCPCS | Performed by: NURSE PRACTITIONER

## 2019-08-30 RX ORDER — GABAPENTIN 600 MG/1
TABLET ORAL
COMMUNITY
End: 2019-08-30

## 2019-08-30 RX ORDER — AZELASTINE HYDROCHLORIDE, FLUTICASONE PROPIONATE 137; 50 UG/1; UG/1
SPRAY, METERED NASAL
COMMUNITY
End: 2020-01-09

## 2019-08-30 RX ORDER — CYCLOBENZAPRINE HCL 10 MG
TABLET ORAL
Qty: 30 TABLET | Refills: 0 | Status: SHIPPED | OUTPATIENT
Start: 2019-08-30 | End: 2019-10-11 | Stop reason: SDUPTHER

## 2019-08-30 RX ORDER — HYDROCODONE BITARTRATE AND ACETAMINOPHEN 5; 325 MG/1; MG/1
1 TABLET ORAL EVERY 6 HOURS PRN
Qty: 28 TABLET | Refills: 0 | Status: SHIPPED | OUTPATIENT
Start: 2019-08-30 | End: 2019-09-06

## 2019-08-30 ASSESSMENT — ENCOUNTER SYMPTOMS
ALLERGIC/IMMUNOLOGIC NEGATIVE: 1
BACK PAIN: 1
NAUSEA: 0
EYES NEGATIVE: 1
BOWEL INCONTINENCE: 0
GASTROINTESTINAL NEGATIVE: 1
COLOR CHANGE: 0
ABDOMINAL PAIN: 0
VOMITING: 0
RESPIRATORY NEGATIVE: 1
DIARRHEA: 0

## 2019-08-30 NOTE — PROGRESS NOTES
tablet Take 3 tabs daily for 3 days, then 2 a day for 3 days, then 1 a day for 3 days, then 1/2 a day for 3 days. 20 tablet 0    venlafaxine (EFFEXOR XR) 150 MG extended release capsule Take 1 capsule by mouth daily 90 capsule 3    losartan (COZAAR) 25 MG tablet Take 1 tablet by mouth daily 90 tablet 3    pioglitazone-metformin (ACTOPLUS MET)  MG per tablet Take 1 tablet by mouth 2 times daily (with meals) 180 tablet 3    hydrochlorothiazide (HYDRODIURIL) 25 MG tablet Take 25 mg by mouth daily      azelastine (ASTELIN) 0.1 % nasal spray PLACE 1 SPRAY IN EACH NOSTRIL ONCE DAILY AS DIRECTED 3 Bottle 1    zolpidem (AMBIEN) 10 MG tablet Take 10 mg by mouth nightly as needed for Sleep. [place] atorvastatin (LIPITOR) 40 MG tablet TAKE 1 TABLET BY MOUTH ONE TIME A DAY  90 tablet 2    metoprolol tartrate (LOPRESSOR) 25 MG tablet Take 1 tablet by mouth 2 times daily 180 tablet 3    gabapentin (NEURONTIN) 600 MG tablet Take 1 tablet by mouth daily for 90 days. . 90 tablet 3    pantoprazole (PROTONIX) 40 MG tablet Take 1 tablet by mouth daily 90 tablet 3    cyclobenzaprine (FLEXERIL) 10 MG tablet TAKE 1 TABLET BY MOUTH EVERY 8 HOURS AS NEEDED FOR MUSCLE SPASMS 30 tablet 4    Magnesium Oxide 400 MG CAPS Take by mouth 2 times daily        No current facility-administered medications for this visit. Allergies   Allergen Reactions    Cephalexin Other (See Comments)     Tongue cracks and peels    Erythromycin Other (See Comments)     Epigastric pain and bloating    Ketorolac Tromethamine Other (See Comments)     Severe stomach cramps    Pcn [Penicillins]      Unknown reaction    Percocet [Oxycodone-Acetaminophen] Itching and Other (See Comments)     Esophagus hurt    Sulfa Antibiotics     Ultracet [Tramadol-Acetaminophen] Itching         HPI:     Back Pain   This is a new problem. The current episode started in the past 7 days (last Sunday, no trauma or obvious injury). The problem occurs constantly.  The Pupils are equal, round, and reactive to light. Conjunctivae are normal. Right eye exhibits no discharge. Left eye exhibits no discharge. No scleral icterus. Neck: Normal range of motion. Neck supple. No tracheal deviation present. Cardiovascular: Normal rate, regular rhythm, normal heart sounds and intact distal pulses. Exam reveals no gallop and no friction rub. No murmur heard. Pulmonary/Chest: Effort normal and breath sounds normal. No accessory muscle usage or stridor. No tachypnea. No respiratory distress. She has no decreased breath sounds. She has no wheezes. She has no rhonchi. She has no rales. Abdominal: Soft. Musculoskeletal: She exhibits no edema or deformity. Lumbar back: She exhibits decreased range of motion, tenderness (R paraspinal mild tenderness), pain and spasm. She exhibits no bony tenderness, no swelling, no edema, no deformity and no laceration. Back:    Exam limited due to patient's inability to get on examination table due to pain    Neurological: She is alert and oriented to person, place, and time. She has normal strength. She displays no seizure activity. Gait (antalgic gait noted due to pain) abnormal. GCS eye subscore is 4. GCS verbal subscore is 5. GCS motor subscore is 6. Strength equal bilateral lower extremities    Skin: Skin is warm, dry and intact. No rash noted. She is not diaphoretic. No erythema. No pallor. Psychiatric: She has a normal mood and affect. Her speech is normal and behavior is normal. Judgment and thought content normal. Cognition and memory are normal.         Assessment:         1. Acute bilateral low back pain without sciatica    2. Immunization due        Plan:     1. Acute bilateral low back pain without sciatica    - HYDROcodone-acetaminophen (NORCO) 5-325 MG per tablet; Take 1 tablet by mouth every 6 hours as needed for Pain for up to 7 days. Intended supply: 7 days.  Take lowest dose possible to manage pain  Dispense: 28 tablet;

## 2019-09-23 RX ORDER — ATORVASTATIN CALCIUM 40 MG/1
TABLET, FILM COATED ORAL
Qty: 90 TABLET | Refills: 1 | Status: SHIPPED | OUTPATIENT
Start: 2019-09-23 | End: 2020-04-06

## 2019-10-11 DIAGNOSIS — M54.50 ACUTE BILATERAL LOW BACK PAIN WITHOUT SCIATICA: ICD-10-CM

## 2019-10-14 DIAGNOSIS — F41.9 ANXIETY: ICD-10-CM

## 2019-10-14 RX ORDER — CYCLOBENZAPRINE HCL 10 MG
TABLET ORAL
Qty: 30 TABLET | Refills: 0 | Status: SHIPPED | OUTPATIENT
Start: 2019-10-14 | End: 2020-02-04 | Stop reason: SDUPTHER

## 2019-10-14 RX ORDER — ALPRAZOLAM 0.5 MG/1
0.5 TABLET ORAL 3 TIMES DAILY PRN
Qty: 60 TABLET | Refills: 3 | Status: SHIPPED | OUTPATIENT
Start: 2019-10-14 | End: 2020-01-09

## 2019-12-05 RX ORDER — PANTOPRAZOLE SODIUM 40 MG/1
TABLET, DELAYED RELEASE ORAL
Qty: 90 TABLET | Refills: 4 | Status: SHIPPED | OUTPATIENT
Start: 2019-12-05 | End: 2021-03-03 | Stop reason: SDUPTHER

## 2019-12-19 DIAGNOSIS — G47.00 INSOMNIA, UNSPECIFIED TYPE: Primary | ICD-10-CM

## 2019-12-19 RX ORDER — ZOLPIDEM TARTRATE 10 MG/1
TABLET ORAL
Qty: 90 TABLET | Refills: 1 | Status: SHIPPED | OUTPATIENT
Start: 2019-12-19 | End: 2020-05-29

## 2019-12-19 RX ORDER — GABAPENTIN 600 MG/1
600 TABLET ORAL DAILY
Qty: 90 TABLET | Refills: 3 | Status: SHIPPED | OUTPATIENT
Start: 2019-12-19 | End: 2020-04-12 | Stop reason: SDUPTHER

## 2020-01-09 ENCOUNTER — OFFICE VISIT (OUTPATIENT)
Dept: FAMILY MEDICINE CLINIC | Age: 75
End: 2020-01-09
Payer: MEDICARE

## 2020-01-09 VITALS
HEART RATE: 62 BPM | SYSTOLIC BLOOD PRESSURE: 134 MMHG | BODY MASS INDEX: 41 KG/M2 | OXYGEN SATURATION: 100 % | WEIGHT: 254 LBS | TEMPERATURE: 97.2 F | DIASTOLIC BLOOD PRESSURE: 84 MMHG

## 2020-01-09 PROCEDURE — 4040F PNEUMOC VAC/ADMIN/RCVD: CPT | Performed by: NURSE PRACTITIONER

## 2020-01-09 PROCEDURE — 3017F COLORECTAL CA SCREEN DOC REV: CPT | Performed by: NURSE PRACTITIONER

## 2020-01-09 PROCEDURE — 3046F HEMOGLOBIN A1C LEVEL >9.0%: CPT | Performed by: NURSE PRACTITIONER

## 2020-01-09 PROCEDURE — G8417 CALC BMI ABV UP PARAM F/U: HCPCS | Performed by: NURSE PRACTITIONER

## 2020-01-09 PROCEDURE — 1123F ACP DISCUSS/DSCN MKR DOCD: CPT | Performed by: NURSE PRACTITIONER

## 2020-01-09 PROCEDURE — G8482 FLU IMMUNIZE ORDER/ADMIN: HCPCS | Performed by: NURSE PRACTITIONER

## 2020-01-09 PROCEDURE — 2022F DILAT RTA XM EVC RTNOPTHY: CPT | Performed by: NURSE PRACTITIONER

## 2020-01-09 PROCEDURE — G8427 DOCREV CUR MEDS BY ELIG CLIN: HCPCS | Performed by: NURSE PRACTITIONER

## 2020-01-09 PROCEDURE — 99214 OFFICE O/P EST MOD 30 MIN: CPT | Performed by: NURSE PRACTITIONER

## 2020-01-09 PROCEDURE — G8400 PT W/DXA NO RESULTS DOC: HCPCS | Performed by: NURSE PRACTITIONER

## 2020-01-09 PROCEDURE — 1090F PRES/ABSN URINE INCON ASSESS: CPT | Performed by: NURSE PRACTITIONER

## 2020-01-09 PROCEDURE — 1036F TOBACCO NON-USER: CPT | Performed by: NURSE PRACTITIONER

## 2020-01-09 RX ORDER — NYSTATIN 100000 U/G
CREAM TOPICAL
Qty: 1 TUBE | Refills: 0 | Status: SHIPPED | OUTPATIENT
Start: 2020-01-09 | End: 2020-04-14 | Stop reason: ALTCHOICE

## 2020-01-09 RX ORDER — MELATONIN
2000 DAILY
Qty: 180 TABLET | Refills: 1 | Status: SHIPPED | OUTPATIENT
Start: 2020-01-09

## 2020-01-09 NOTE — PROGRESS NOTES
 Smoking status: Former Smoker     Packs/day: 0.25     Years: 1.00     Pack years: 0.25     Last attempt to quit: 1960     Years since quittin.0    Smokeless tobacco: Never Used    Tobacco comment: quit    Substance Use Topics    Alcohol use: No      Allergies   Allergen Reactions    Cephalexin Other (See Comments)     Tongue cracks and peels    Erythromycin Other (See Comments)     Epigastric pain and bloating    Ketorolac Tromethamine Other (See Comments)     Severe stomach cramps    Pcn [Penicillins]      Unknown reaction    Percocet [Oxycodone-Acetaminophen] Itching and Other (See Comments)     Esophagus hurt    Sulfa Antibiotics     Ultracet [Tramadol-Acetaminophen] Itching       Subjective:      Review of Systems   Constitutional: Negative for activity change, appetite change, chills, fatigue and fever. HENT: Negative for congestion, ear pain, rhinorrhea and sinus pressure. Eyes: Negative for discharge and visual disturbance. Respiratory: Negative for cough, chest tightness and shortness of breath. Cardiovascular: Negative for chest pain, palpitations and leg swelling. Gastrointestinal: Negative for abdominal pain, blood in stool, constipation and diarrhea. Endocrine: Negative for cold intolerance and heat intolerance. Genitourinary: Negative for difficulty urinating and hematuria. Musculoskeletal: Negative for arthralgias and myalgias. Skin: Negative for rash. Neurological: Negative for dizziness, light-headedness and headaches. Psychiatric/Behavioral: Negative for dysphoric mood and self-injury. Other pertinent ROS in HPI  Objective:     /84 (Site: Left Upper Arm, Position: Sitting, Cuff Size: Large Adult)   Pulse 62   Temp 97.2 °F (36.2 °C) (Oral)   Wt 254 lb (115.2 kg)   SpO2 100%   BMI 41.00 kg/m²    Physical Exam  Constitutional:       General: She is not in acute distress. Appearance: She is well-developed. She is not diaphoretic. HENT:      Head: Normocephalic and atraumatic. Right Ear: External ear normal.      Left Ear: External ear normal.      Nose: Nose normal.   Eyes:      Conjunctiva/sclera: Conjunctivae normal.      Pupils: Pupils are equal, round, and reactive to light. Neck:      Musculoskeletal: Full passive range of motion without pain and normal range of motion. Thyroid: No thyroid mass. Trachea: Trachea normal.   Cardiovascular:      Rate and Rhythm: Normal rate and regular rhythm. Heart sounds: Normal heart sounds, S1 normal and S2 normal. Heart sounds not distant. No friction rub. Pulmonary:      Effort: Pulmonary effort is normal. No accessory muscle usage or respiratory distress. Breath sounds: Normal breath sounds. Abdominal:      General: Bowel sounds are normal. There is no distension. Palpations: Abdomen is soft. There is no mass. Musculoskeletal: Normal range of motion. Comments: Pain free ROM     Lymphadenopathy:      Cervical: No cervical adenopathy. Skin:     General: Skin is warm and dry. Findings: No rash. Neurological:      Mental Status: She is oriented to person, place, and time. Comments: Gait is normal.   Psychiatric:         Behavior: Behavior normal.         Thought Content: Thought content normal.         Judgment: Judgment normal.       Assessment/PLAN   1. Type 2 diabetes mellitus with stage 3 chronic kidney disease, without long-term current use of insulin (HCC)  stable will recheck a1c  Denies refills     - Hemoglobin A1C; Future    2. Screening for hyperlipidemia    - Lipid, Fasting; Future    3. Need for Td vaccine      4. Fatigue, unspecified type  Will start vitamin d  Discussed appropriate dose.   - TSH; Future    5. At high risk for falls  Safety tips discussed. 6. Morbid obesity due to excess calories ([address]      7.  Chronic kidney disease (CKD), stage III (moderate) ([address]  Continue f/u with nephrology      RTO if symptoms worsen or fail to improve  Pt agreeable with plan      Patient given educational materials - see patientinstructions. Discussed use, benefit, and side effects of prescribed medications. All patient questions answered. Pt voiced understanding. Reviewed health maintenance. Instructed to continue current medications, diet andexercise. 1.  Asya received counseling on the following healthy behaviors: nutrition, exercise and medication adherence  2. Patient given educationalmaterials when available - see patient instructions when applicable  3. Discussed use, benefit, and side effects of prescribed medications. Barriersto medication compliance addressed. All patient questions answered. Pt voiced understanding. 4.  Reviewed prior labs and health maintenance when applicable. 5.  Continue current medications, diet and exercise. CompletedRefills   Requested Prescriptions     Signed Prescriptions Disp Refills    Cholecalciferol (VITAMIN D3) 25 MCG (1000 UT) TABS 180 tablet 1     Sig: Take 2 tablets by mouth daily    nystatin (MYCOSTATIN) [id number] UNIT/GM cream 1 Tube 0     Sig: Apply topically 2 times daily. Electronically signed by Bib Tam CNP on 1/19/2020 at 11:47 AM                 On the basis of positive falls risk screening, assessment and plan is as follows: home safety tips provided.

## 2020-01-19 ASSESSMENT — ENCOUNTER SYMPTOMS
EYE DISCHARGE: 0
SINUS PRESSURE: 0
RHINORRHEA: 0
BLOOD IN STOOL: 0
DIARRHEA: 0
COUGH: 0
SHORTNESS OF BREATH: 0
CONSTIPATION: 0
ABDOMINAL PAIN: 0
CHEST TIGHTNESS: 0

## 2020-01-27 RX ORDER — ALLOPURINOL 100 MG/1
100 TABLET ORAL DAILY
Qty: 30 TABLET | Refills: 5 | Status: SHIPPED | OUTPATIENT
Start: 2020-01-27 | End: 2020-10-14

## 2020-01-30 ENCOUNTER — OFFICE VISIT (OUTPATIENT)
Dept: OBGYN CLINIC | Age: 75
End: 2020-01-30
Payer: MEDICARE

## 2020-01-30 VITALS
WEIGHT: 269 LBS | SYSTOLIC BLOOD PRESSURE: 140 MMHG | HEIGHT: 66 IN | DIASTOLIC BLOOD PRESSURE: 78 MMHG | BODY MASS INDEX: 43.23 KG/M2

## 2020-01-30 PROCEDURE — G0101 CA SCREEN;PELVIC/BREAST EXAM: HCPCS | Performed by: OBSTETRICS & GYNECOLOGY

## 2020-01-30 PROCEDURE — G8482 FLU IMMUNIZE ORDER/ADMIN: HCPCS | Performed by: OBSTETRICS & GYNECOLOGY

## 2020-01-30 RX ORDER — CALCITRIOL 0.25 UG/1
CAPSULE, LIQUID FILLED ORAL
COMMUNITY
Start: 2020-01-12 | End: 2020-04-14 | Stop reason: SDUPTHER

## 2020-01-30 ASSESSMENT — ENCOUNTER SYMPTOMS
SHORTNESS OF BREATH: 0
BACK PAIN: 0
ABDOMINAL DISTENTION: 0
DIARRHEA: 0
ABDOMINAL PAIN: 0
CONSTIPATION: 0
COUGH: 0

## 2020-01-30 NOTE — PROGRESS NOTES
Subjective:      Patient ID: Gala Carter is a 69 y.o. female. HPI   Gala Carter is a 69 y.o. [id number], here for her annual exam.  The patient was seen and examined. The patients past medical, surgical, social and family history were reviewed. Current medications and allergies were reviewed, and documented in the chart. She has no GYN complaints, but ever since she has had her knee surgery she does have trouble with her balance, and she reported a couple falls. Fortunately, her  and son live with her, but they are not always around either. Menopausal history: No bleeding, no hot flashes    Blood pressure (!) 140/78, height 5' 6\" (1.676 m), weight 269 lb (122 kg), not currently breastfeeding.   Wt Readings from Last 3 Encounters:   01/30/20 269 lb (122 kg)   01/09/20 254 lb (115.2 kg)   10/21/19 252 lb (114.3 kg)     Recent Results (from the past 8736 hour(s))   Protein, urine, random    Collection Time: 03/26/19  3:53 PM   Result Value Ref Range    Total Protein, Urine 7 mg/dL   Creatinine, Random Urine    Collection Time: 03/26/19  3:53 PM   Result Value Ref Range    Creatinine, Ur 78.9 28.0 - 217.0 mg/dL   Phosphorus    Collection Time: 03/26/19  3:54 PM   Result Value Ref Range    Phosphorus 3.8 2.6 - 4.5 mg/dL   Magnesium    Collection Time: 03/26/19  3:54 PM   Result Value Ref Range    Magnesium 2.1 1.6 - 2.6 mg/dL   Basic Metabolic Panel    Collection Time: 03/26/19  3:54 PM   Result Value Ref Range    Glucose 53 (L) 70 - 99 mg/dL    BUN 31 (H) 8 - 23 mg/dL    CREATININE 1.94 (H) 0.50 - 0.90 mg/dL    Bun/Cre Ratio NOT REPORTED 9 - 20    Calcium 9.3 8.6 - 10.4 mg/dL    Sodium 142 135 - 144 mmol/L    Potassium 4.7 3.7 - 5.3 mmol/L    Chloride 104 98 - 107 mmol/L    CO2 26 20 - 31 mmol/L    Anion Gap 12 9 - 17 mmol/L    GFR Non-African American 25 (L) >60 mL/min    GFR  31 (L) >60 mL/min    GFR Comment          GFR Staging NOT REPORTED    CBC Auto Differential    Collection Time: 03/26/19  3:54 PM   Result Value Ref Range    WBC 8.0 3.5 - 11.3 k/uL    RBC 3.17 (L) 3.95 - 5.11 m/uL    Hemoglobin 9.2 (L) 11.9 - 15.1 g/dL    Hematocrit 31.2 (L) 36.3 - 47.1 %    MCV 98.4 82.6 - 102.9 fL    MCH 29.0 25.2 - 33.5 pg    MCHC 29.5 28.4 - 34.8 g/dL    RDW 14.6 (H) 11.8 - 14.4 %    Platelets 384 005 - 270 k/uL    MPV 10.5 8.1 - 13.5 fL    NRBC Automated 0.0 0.0 per 100 WBC    Differential Type NOT REPORTED     Seg Neutrophils 51 36 - 65 %    Lymphocytes 35 24 - 43 %    Monocytes 9 3 - 12 %    Eosinophils % 4 1 - 4 %    Basophils 1 0 - 2 %    Immature Granulocytes 0 0 %    Segs Absolute 4.13 1.50 - 8.10 k/uL    Absolute Lymph # 2.76 1.10 - 3.70 k/uL    Absolute Mono # 0.70 0.10 - 1.20 k/uL    Absolute Eos # 0.29 0.00 - 0.44 k/uL    Basophils Absolute 0.05 0.00 - 0.20 k/uL    Absolute Immature Granulocyte <0.03 0.00 - 0.30 k/uL    WBC Morphology NOT REPORTED     RBC Morphology ANISOCYTOSIS PRESENT     Platelet Estimate NOT REPORTED    PTH, Intact    Collection Time: 03/26/19  3:54 PM   Result Value Ref Range    Pth Intact 186.1 (H) 15.0 - 65.0 pg/mL   Vitamin D 25 Hydroxy    Collection Time: 03/26/19  3:54 PM   Result Value Ref Range    Vit D, 25-Hydroxy 30.3 30.0 - 100.0 ng/mL   Uric Acid    Collection Time: 07/03/19  2:50 PM   Result Value Ref Range    Uric Acid 7.9 (H) 2.4 - 5.7 mg/dL   Sedimentation rate, automated    Collection Time: 07/03/19  2:50 PM   Result Value Ref Range    Sed Rate 98 (H) 0 - 20 mm   CBC Auto Differential    Collection Time: 07/03/19  2:50 PM   Result Value Ref Range    WBC 10.4 3.5 - 11.3 k/uL    RBC 3.39 (L) 3.95 - 5.11 m/uL    Hemoglobin 9.7 (L) 11.9 - 15.1 g/dL    Hematocrit 33.9 (L) 36.3 - 47.1 %    .0 82.6 - 102.9 fL    MCH 28.6 25.2 - 33.5 pg    MCHC 28.6 28.4 - 34.8 g/dL    RDW 14.8 (H) 11.8 - 14.4 %    Platelets 201 832 - 763 k/uL    MPV 9.9 8.1 - 13.5 fL    NRBC Automated 0.0 0.0 per 100 WBC    Differential Type NOT REPORTED     Seg Neutrophils 62 36 - 65 %    Lymphocytes 24 24 - 43 %    Monocytes 10 3 - 12 %    Eosinophils % 3 1 - 4 %    Basophils 1 0 - 2 %    Immature Granulocytes 0 0 %    Segs Absolute 6.47 1.50 - 8.10 k/uL    Absolute Lymph # 2.50 1.10 - 3.70 k/uL    Absolute Mono # 1.06 0.10 - 1.20 k/uL    Absolute Eos # 0.26 0.00 - 0.44 k/uL    Basophils Absolute 0.05 0.00 - 0.20 k/uL    Absolute Immature Granulocyte <0.03 0.00 - 0.30 k/uL    WBC Morphology NOT REPORTED     RBC Morphology ANISOCYTOSIS PRESENT     Platelet Estimate NOT REPORTED    Phosphorus    Collection Time: 07/15/19  9:45 AM   Result Value Ref Range    Phosphorus 3.3 2.6 - 4.5 mg/dL   Magnesium    Collection Time: 07/15/19  9:45 AM   Result Value Ref Range    Magnesium 2.4 1.6 - 2.6 mg/dL   Basic Metabolic Panel    Collection Time: 07/15/19  9:45 AM   Result Value Ref Range    Glucose 106 (H) 70 - 99 mg/dL    BUN 33 (H) 8 - 23 mg/dL    CREATININE 1.82 (H) 0.50 - 0.90 mg/dL    Bun/Cre Ratio NOT REPORTED 9 - 20    Calcium 9.1 8.6 - 10.4 mg/dL    Sodium 140 135 - 144 mmol/L    Potassium 4.9 3.7 - 5.3 mmol/L    Chloride 104 98 - 107 mmol/L    CO2 22 20 - 31 mmol/L    Anion Gap 14 9 - 17 mmol/L    GFR Non-African American 27 (L) >60 mL/min    GFR  33 (L) >60 mL/min    GFR Comment          GFR Staging NOT REPORTED    CBC Auto Differential    Collection Time: 07/15/19  9:45 AM   Result Value Ref Range    WBC 11.2 3.5 - 11.3 k/uL    RBC 3.17 (L) 3.95 - 5.11 m/uL    Hemoglobin 9.2 (L) 11.9 - 15.1 g/dL    Hematocrit 31.8 (L) 36.3 - 47.1 %    .3 82.6 - 102.9 fL    MCH 29.0 25.2 - 33.5 pg    MCHC 28.9 28.4 - 34.8 g/dL    RDW 15.1 (H) 11.8 - 14.4 %    Platelets 876 614 - 005 k/uL    MPV 9.8 8.1 - 13.5 fL    NRBC Automated 0.0 0.0 per 100 WBC    Differential Type NOT REPORTED     Seg Neutrophils 63 36 - 65 %    Lymphocytes 28 24 - 43 %    Monocytes 7 3 - 12 %    Eosinophils % 1 1 - 4 %    Basophils 0 0 - 2 %    Immature Granulocytes 1 (H) 0 %    Segs Absolute 7.10 1.50 - 8.10 ALLERGIES:  Cephalexin; Erythromycin; Ketorolac tromethamine; Pcn [penicillins]; Percocet [oxycodone-acetaminophen]; Sulfa antibiotics; and Ultracet [tramadol-acetaminophen]      Review of Systems   Constitutional: Negative for appetite change and fatigue. HENT: Negative for congestion and hearing loss. Eyes: Negative for visual disturbance. Respiratory: Negative for cough and shortness of breath. Cardiovascular: Negative for chest pain and palpitations. Gastrointestinal: Negative for abdominal distention, abdominal pain, constipation and diarrhea. Genitourinary: Negative for flank pain, frequency, menstrual problem, pelvic pain and vaginal discharge. Musculoskeletal: Negative for back pain. Neurological: Negative for syncope and headaches. Psychiatric/Behavioral: Negative for behavioral problems. Objective:   Physical Exam  Constitutional:       Appearance: She is well-developed. Eyes:      Pupils: Pupils are equal, round, and reactive to light. Neck:      Thyroid: No thyromegaly. Trachea: No tracheal deviation. Cardiovascular:      Rate and Rhythm: Normal rate and regular rhythm. Heart sounds: Normal heart sounds. Pulmonary:      Effort: Pulmonary effort is normal. No respiratory distress. Breath sounds: Normal breath sounds. Chest:      Breasts: Breasts are symmetrical.         Right: No inverted nipple. Left: No inverted nipple, mass, nipple discharge, skin change or tenderness. Abdominal:      General: Bowel sounds are normal. There is no distension. Palpations: Abdomen is soft. There is no mass. Tenderness: There is no abdominal tenderness. Hernia: There is no hernia in the right inguinal area or left inguinal area. Genitourinary:     Labia:         Right: No rash or lesion. Left: No rash or lesion. Vagina: No vaginal discharge or tenderness. Cervix: No cervical motion tenderness, discharge or friability.

## 2020-02-04 ENCOUNTER — OFFICE VISIT (OUTPATIENT)
Dept: FAMILY MEDICINE CLINIC | Age: 75
End: 2020-02-04
Payer: MEDICARE

## 2020-02-04 VITALS
DIASTOLIC BLOOD PRESSURE: 84 MMHG | BODY MASS INDEX: 42.61 KG/M2 | HEART RATE: 63 BPM | OXYGEN SATURATION: 98 % | SYSTOLIC BLOOD PRESSURE: 138 MMHG | WEIGHT: 264 LBS

## 2020-02-04 PROCEDURE — 4040F PNEUMOC VAC/ADMIN/RCVD: CPT | Performed by: FAMILY MEDICINE

## 2020-02-04 PROCEDURE — G8427 DOCREV CUR MEDS BY ELIG CLIN: HCPCS | Performed by: FAMILY MEDICINE

## 2020-02-04 PROCEDURE — G8482 FLU IMMUNIZE ORDER/ADMIN: HCPCS | Performed by: FAMILY MEDICINE

## 2020-02-04 PROCEDURE — 1123F ACP DISCUSS/DSCN MKR DOCD: CPT | Performed by: FAMILY MEDICINE

## 2020-02-04 PROCEDURE — G8417 CALC BMI ABV UP PARAM F/U: HCPCS | Performed by: FAMILY MEDICINE

## 2020-02-04 PROCEDURE — 1090F PRES/ABSN URINE INCON ASSESS: CPT | Performed by: FAMILY MEDICINE

## 2020-02-04 PROCEDURE — 99213 OFFICE O/P EST LOW 20 MIN: CPT | Performed by: FAMILY MEDICINE

## 2020-02-04 PROCEDURE — G8400 PT W/DXA NO RESULTS DOC: HCPCS | Performed by: FAMILY MEDICINE

## 2020-02-04 PROCEDURE — 1036F TOBACCO NON-USER: CPT | Performed by: FAMILY MEDICINE

## 2020-02-04 PROCEDURE — 3017F COLORECTAL CA SCREEN DOC REV: CPT | Performed by: FAMILY MEDICINE

## 2020-02-04 RX ORDER — CYCLOBENZAPRINE HCL 10 MG
TABLET ORAL
Qty: 30 TABLET | Refills: 1 | Status: SHIPPED | OUTPATIENT
Start: 2020-02-04 | End: 2020-06-16 | Stop reason: SDUPTHER

## 2020-02-04 RX ORDER — PIOGLITAZONE HCL AND METFORMIN HCL 500; 15 MG/1; MG/1
TABLET ORAL
Qty: 180 TABLET | Refills: 3 | Status: SHIPPED | OUTPATIENT
Start: 2020-02-04 | End: 2021-03-22 | Stop reason: SDUPTHER

## 2020-02-04 ASSESSMENT — ENCOUNTER SYMPTOMS
BACK PAIN: 1
NAUSEA: 0
DIARRHEA: 0
SHORTNESS OF BREATH: 0
VOMITING: 0
SORE THROAT: 0
SINUS PRESSURE: 0
COUGH: 0

## 2020-03-03 ENCOUNTER — HOSPITAL ENCOUNTER (OUTPATIENT)
Age: 75
Setting detail: SPECIMEN
Discharge: HOME | End: 2020-03-03
Payer: MEDICARE

## 2020-03-03 LAB
ALT SERPL-CCNC: 9 U/L (ref 5–33)
AST SERPL-CCNC: 15 U/L
CHOLESTEROL/HDL RATIO: 5.1
CHOLESTEROL: 183 MG/DL
HDLC SERPL-MCNC: 36 MG/DL
LDL CHOLESTEROL: 96 MG/DL (ref 0–130)
TRIGL SERPL-MCNC: 256 MG/DL
VLDLC SERPL CALC-MCNC: ABNORMAL MG/DL (ref 1–30)

## 2020-04-13 RX ORDER — GABAPENTIN 600 MG/1
600 TABLET ORAL DAILY
Qty: 90 TABLET | Refills: 3 | Status: SHIPPED | OUTPATIENT
Start: 2020-04-13 | End: 2020-06-21 | Stop reason: SDUPTHER

## 2020-05-21 RX ORDER — VENLAFAXINE HYDROCHLORIDE 150 MG/1
CAPSULE, EXTENDED RELEASE ORAL
Qty: 90 CAPSULE | Refills: 3 | Status: SHIPPED | OUTPATIENT
Start: 2020-05-21 | End: 2021-05-18

## 2020-05-21 NOTE — TELEPHONE ENCOUNTER
Next Visit Date:  Future Appointments   Date Time Provider Mateo Weaver   7/15/2020 10:45 AM Malika Hawkins MD Resp Spec [facility]   10/13/2020  1:30 PM Justen Mena MD [facility] None       Health Maintenance   Topic Date Due    Hepatitis C screen  1945    DTaP/Tdap/Td vaccine (1 - Tdap) 02/02/1965    Diabetic retinal exam  09/28/2016    Annual Wellness Visit (AWV)  05/29/2019    A1C test (Diabetic or Prediabetic)  01/07/2020    Diabetic foot exam  03/20/2020    Shingles Vaccine (1 of 2) 01/09/2021 (Originally 2/2/1996)    Potassium monitoring  07/15/2020    Creatinine monitoring  07/15/2020    Lipid screen  03/03/2021    Breast cancer screen  03/27/2021    Colon cancer screen colonoscopy  10/21/2023    DEXA (modify frequency per FRAX score)  Completed    Flu vaccine  Completed    Pneumococcal 65+ yrs at Risk Vaccine  Completed    Hepatitis A vaccine  Aged Out    Hib vaccine  Aged Out    Meningococcal (ACWY) vaccine  Aged Out       Hemoglobin A1C (%)   Date Value   01/07/2019 5.9   09/07/2017 5.7   10/21/2016 6.1 (H)             ( goal A1C is < 7)   No results found for: LABMICR  LDL Cholesterol (mg/dL)   Date Value   03/03/2020 96   09/18/2018 75       (goal LDL is <100)   AST (U/L)   Date Value   03/03/2020 15     ALT (U/L)   Date Value   03/03/2020 9     BUN (mg/dL)   Date Value   07/15/2019 33 (H)     BP Readings from Last 3 Encounters:   02/04/20 138/84   01/30/20 (!) 140/78   01/09/20 134/84          (goal 120/80)    All Future Testing planned in [facility]  Lab Frequency Next Occurrence   Uric Acid Once 91/74/6667   Basic Metabolic Panel Once 74/82/3678   Vitamin D 25 Hydroxy Once 01/19/2020   PTH, Intact Once 01/19/2020   CBC Auto Differential Once 01/19/2020   Magnesium Once 37/40/2479   Basic Metabolic Panel Once 17/98/5753   Phosphorus Once 01/19/2020   Creatinine, Random Urine Once 01/19/2020   Protein, urine, random Once 01/19/2020   RIZWANA DIGITAL SCREEN W OR WO

## 2020-05-25 ENCOUNTER — HOSPITAL ENCOUNTER (EMERGENCY)
Age: 75
Discharge: HOME | End: 2020-05-25
Attending: EMERGENCY MEDICINE
Payer: MEDICARE

## 2020-05-25 VITALS
BODY MASS INDEX: 41.78 KG/M2 | HEIGHT: 66 IN | RESPIRATION RATE: 20 BRPM | DIASTOLIC BLOOD PRESSURE: 85 MMHG | WEIGHT: 260 LBS | OXYGEN SATURATION: 96 % | TEMPERATURE: 98.5 F | HEART RATE: 88 BPM | SYSTOLIC BLOOD PRESSURE: 148 MMHG

## 2020-05-25 LAB
ABSOLUTE EOS #: 0.27 K/UL (ref 0–0.44)
ABSOLUTE IMMATURE GRANULOCYTE: 0.04 K/UL (ref 0–0.3)
ABSOLUTE LYMPH #: 2.06 K/UL (ref 1.1–3.7)
ABSOLUTE MONO #: 0.77 K/UL (ref 0.1–1.2)
ANION GAP SERPL CALCULATED.3IONS-SCNC: 16 MMOL/L (ref 9–17)
BASOPHILS # BLD: 1 % (ref 0–2)
BASOPHILS ABSOLUTE: 0.07 K/UL (ref 0–0.2)
BILIRUBIN URINE: NEGATIVE
BUN BLDV-MCNC: 26 MG/DL (ref 8–23)
BUN/CREAT BLD: 12 (ref 9–20)
CALCIUM SERPL-MCNC: 9.9 MG/DL (ref 8.6–10.4)
CHLORIDE BLD-SCNC: 104 MMOL/L (ref 98–107)
CO2: 20 MMOL/L (ref 20–31)
COLOR: YELLOW
COMMENT UA: NORMAL
CREAT SERPL-MCNC: 2.11 MG/DL (ref 0.5–0.9)
DIFFERENTIAL TYPE: ABNORMAL
EOSINOPHILS RELATIVE PERCENT: 3 % (ref 1–4)
GFR AFRICAN AMERICAN: 28 ML/MIN
GFR NON-AFRICAN AMERICAN: 23 ML/MIN
GFR SERPL CREATININE-BSD FRML MDRD: ABNORMAL ML/MIN/{1.73_M2}
GFR SERPL CREATININE-BSD FRML MDRD: ABNORMAL ML/MIN/{1.73_M2}
GLUCOSE BLD-MCNC: 156 MG/DL (ref 70–99)
GLUCOSE URINE: NEGATIVE
HCT VFR BLD CALC: 30.8 % (ref 36.3–47.1)
HEMOGLOBIN: 9.5 G/DL (ref 11.9–15.1)
IMMATURE GRANULOCYTES: 0 %
KETONES, URINE: NEGATIVE
LEUKOCYTE ESTERASE, URINE: NEGATIVE
LYMPHOCYTES # BLD: 22 % (ref 24–43)
MCH RBC QN AUTO: 30.4 PG (ref 25.2–33.5)
MCHC RBC AUTO-ENTMCNC: 30.8 G/DL (ref 28.4–34.8)
MCV RBC AUTO: 98.7 FL (ref 82.6–102.9)
MONOCYTES # BLD: 8 % (ref 3–12)
NITRITE, URINE: NEGATIVE
NRBC AUTOMATED: 0 PER 100 WBC
PDW BLD-RTO: 14.7 % (ref 11.8–14.4)
PH UA: 5.5 (ref 5–8)
PLATELET # BLD: 322 K/UL (ref 138–453)
PLATELET ESTIMATE: ABNORMAL
PMV BLD AUTO: 9.1 FL (ref 8.1–13.5)
POTASSIUM SERPL-SCNC: 4 MMOL/L (ref 3.7–5.3)
PROTEIN UA: NEGATIVE
RBC # BLD: 3.12 M/UL (ref 3.95–5.11)
RBC # BLD: ABNORMAL 10*6/UL
SEG NEUTROPHILS: 66 % (ref 36–65)
SEGMENTED NEUTROPHILS ABSOLUTE COUNT: 6.15 K/UL (ref 1.5–8.1)
SODIUM BLD-SCNC: 140 MMOL/L (ref 135–144)
SPECIFIC GRAVITY UA: 1.01 (ref 1–1.03)
TURBIDITY: CLEAR
URINE HGB: NEGATIVE
UROBILINOGEN, URINE: NORMAL
WBC # BLD: 9.4 K/UL (ref 3.5–11.3)
WBC # BLD: ABNORMAL 10*3/UL

## 2020-05-25 PROCEDURE — 6370000000 HC RX 637 (ALT 250 FOR IP): Performed by: NURSE PRACTITIONER

## 2020-05-25 PROCEDURE — 36415 COLL VENOUS BLD VENIPUNCTURE: CPT

## 2020-05-25 PROCEDURE — 99283 EMERGENCY DEPT VISIT LOW MDM: CPT

## 2020-05-25 PROCEDURE — 85025 COMPLETE CBC W/AUTO DIFF WBC: CPT

## 2020-05-25 PROCEDURE — 96372 THER/PROPH/DIAG INJ SC/IM: CPT

## 2020-05-25 PROCEDURE — 80048 BASIC METABOLIC PNL TOTAL CA: CPT

## 2020-05-25 PROCEDURE — 6360000002 HC RX W HCPCS: Performed by: NURSE PRACTITIONER

## 2020-05-25 PROCEDURE — 81003 URINALYSIS AUTO W/O SCOPE: CPT

## 2020-05-25 RX ORDER — HYDROCODONE BITARTRATE AND ACETAMINOPHEN 5; 325 MG/1; MG/1
1 TABLET ORAL EVERY 8 HOURS PRN
Qty: 20 TABLET | Refills: 0 | Status: SHIPPED | OUTPATIENT
Start: 2020-05-25 | End: 2020-06-01

## 2020-05-25 RX ORDER — ORPHENADRINE CITRATE 30 MG/ML
60 INJECTION INTRAMUSCULAR; INTRAVENOUS ONCE
Status: COMPLETED | OUTPATIENT
Start: 2020-05-25 | End: 2020-05-25

## 2020-05-25 RX ORDER — HYDROCODONE BITARTRATE AND ACETAMINOPHEN 5; 325 MG/1; MG/1
1 TABLET ORAL ONCE
Status: COMPLETED | OUTPATIENT
Start: 2020-05-25 | End: 2020-05-25

## 2020-05-25 RX ADMIN — HYDROCODONE BITARTRATE AND ACETAMINOPHEN 1 TABLET: 5; 325 TABLET ORAL at 10:58

## 2020-05-25 RX ADMIN — ORPHENADRINE CITRATE 60 MG: 30 INJECTION INTRAMUSCULAR; INTRAVENOUS at 10:58

## 2020-05-25 ASSESSMENT — PAIN SCALES - GENERAL
PAINLEVEL_OUTOF10: 6
PAINLEVEL_OUTOF10: 6

## 2020-05-25 ASSESSMENT — ENCOUNTER SYMPTOMS
DIARRHEA: 0
ABDOMINAL PAIN: 0
SHORTNESS OF BREATH: 0
COLOR CHANGE: 0
NAUSEA: 0
COUGH: 0
VOMITING: 0
BACK PAIN: 1

## 2020-05-25 NOTE — ED PROVIDER NOTES
[address] [place] [address] ED  EMERGENCY DEPARTMENT ENCOUNTER   ATTENDING ATTESTATION     Pt Name: Danii Gruber  MRN: [id number]  Hemagfjessica 1945  Date of evaluation: 5/25/20       Danii Gruber is a 77 y.o. female who presents with Back Pain (R side)      MDM:     76-year-old female presents with complaints of right-sided low back pain that began yesterday. No trauma falls or injury, her laboratory studies are unremarkable, patient does have a history of kidney stones but states this feels much different. Her urinalysis was unremarkable. Plan is pain control and outpatient follow-up. Vitals:   Vitals:    05/25/20 0949   BP: (!) 148/85   Pulse: 88   Resp: 20   Temp: 98.5 °F (36.9 °C)   TempSrc: Oral   SpO2: 96%   Weight: 260 lb (117.9 kg)   Height: 5' 6\" (1.676 m)         I personally evaluated and examined the patient in conjunction with the Midlevel provider and agree with the assessment, treatment plan, and disposition of the patient as recorded by the midlevel. I performed a history and physical examination of the patient and discussed management with the midlevel. I reviewed the midlevels note and agree with the documented findings and plan of care. Any areas of disagreement are noted on the chart. I was personally present for the key portions of any procedures. I have documented in the chart those procedures where I was not present during the key portions. I have personally reviewed all images and agree with the midlevel's interpretation. I have reviewed the emergency nurses triage note. I agree with the chief complaint, past medical history, past surgical history, allergies, medications, social and family history as documented unless otherwise noted.     Lokesh Vivas MD  Attending Emergency  Physician                  Shima Ruano MD  05/25/20 0586

## 2020-05-25 NOTE — ED PROVIDER NOTES
2 tablets by mouth daily    MAGNESIUM OXIDE 400 MG CAPS    Take by mouth 2 times daily     METOPROLOL TARTRATE (LOPRESSOR) 25 MG TABLET    TAKE 1 TABLET TWICE A DAY    PANTOPRAZOLE (PROTONIX) 40 MG TABLET    TAKE 1 TABLET DAILY    PIOGLITAZONE-METFORMIN (ACTOPLUS MET)  MG PER TABLET    TAKE 1 TABLET TWICE A DAY WITH MEALS    POTASSIUM CITRATE (UROCIT-K) 10 MEQ (1080 MG) EXTENDED RELEASE TABLET    Take 10 mEq by mouth daily     VENLAFAXINE (EFFEXOR XR) 150 MG EXTENDED RELEASE CAPSULE    TAKE 1 CAPSULE DAILY    ZOLPIDEM (AMBIEN) 10 MG TABLET    TAKE 1 TABLET BY MOUTH NIGHTLY AS NEEDED FOR SLEEP       PAST MEDICAL HISTORY         Diagnosis Date    Abnormal stress test     Arthritis     Depression     Diverticulosis     DM (diabetes mellitus) (HCC)     Erythropenia     Fibromyalgia     HTN (hypertension)     Hyperlipidemia     Kidney stone     Morbid obesity due to excess calories (HCC)     DIONY on CPAP     Osteopenia 14    Seasonal allergies     Sleep apnea     uses bipap prn       SURGICAL HISTORY           Procedure Laterality Date    ARM SURGERY      right arm broken    CARDIAC CATHETERIZATION  [id number] dr [place] Duty  16    COLONOSCOPY      COLONOSCOPY      TOTAL KNEE ARTHROPLASTY Bilateral     left may 2013 and right 2013    TUBAL LIGATION           FAMILY HISTORY           Problem Relation Age of Onset    Diabetes Mother     Heart Disease Mother     Osteoporosis Mother     Kidney Disease Father     Prostate Cancer Brother      Family Status   Relation Name Status    Mother      Father      Brother  [address]      reports that she quit smoking about 60 years ago. She has a 0.25 pack-year smoking history. She has never used smokeless tobacco. She reports that she does not drink alcohol or use drugs.     REVIEW OF SYSTEMS    (2-9 systems for level 4, 10 or more for level 5)     Review of Systems Constitutional: Negative for chills, diaphoresis, fatigue and fever. Respiratory: Negative for cough and shortness of breath. Cardiovascular: Negative for chest pain. Gastrointestinal: Negative for abdominal pain, diarrhea, nausea and vomiting. Genitourinary: Negative for dysuria, hematuria and urgency. Musculoskeletal: Positive for back pain. Negative for arthralgias, gait problem, myalgias and neck pain. Skin: Negative for color change, rash and wound. Neurological: Negative for weakness and numbness. Except as noted above the remainder of the review of systems was reviewed and negative. PHYSICAL EXAM    (up to 7 for level 4, 8 or more for level 5)     ED Triage Vitals [05/25/20 0949]   BP Temp Temp Source Pulse Resp SpO2 Height Weight   (!) 148/85 98.5 °F (36.9 °C) Oral 88 20 96 % 5' 6\" (1.676 m) 260 lb (117.9 kg)     Physical Exam  Vitals signs reviewed. Constitutional:       General: She is not in acute distress. Appearance: She is well-developed. She is not diaphoretic. Eyes:      General: No scleral icterus. Conjunctiva/sclera: Conjunctivae normal.   Cardiovascular:      Rate and Rhythm: Normal rate. Pulmonary:      Effort: Pulmonary effort is normal. No respiratory distress. Breath sounds: No wheezing. Musculoskeletal:      Thoracic back: She exhibits no tenderness, no bony tenderness and no pain. Lumbar back: She exhibits tenderness (right supraspinal), pain and spasm. She exhibits no bony tenderness, no swelling and no deformity. Skin:     General: Skin is warm and dry. Capillary Refill: Capillary refill takes less than 2 seconds. Findings: No rash. Neurological:      Mental Status: She is alert and oriented to person, place, and time. Motor: Motor function is intact. Coordination: Coordination is intact. Gait: Gait is intact.    Psychiatric:         Behavior: Behavior normal.         DIAGNOSTIC RESULTS     LABS:  Labs Reviewed CBC WITH AUTO DIFFERENTIAL - Abnormal; Notable for the following components:       Result Value    RBC 3.12 (*)     Hemoglobin 9.5 (*)     Hematocrit 30.8 (*)     RDW 14.7 (*)     Seg Neutrophils 66 (*)     Lymphocytes 22 (*)     All other components within normal limits   BASIC METABOLIC PANEL - Abnormal; Notable for the following components:    Glucose 156 (*)     BUN 26 (*)     CREATININE 2.11 (*)     GFR Non- 23 (*)     GFR  28 (*)     All other components within normal limits   URINALYSIS       All other labs were within normal range or not returned as of this dictation. EMERGENCY DEPARTMENT COURSE and DIFFERENTIAL DIAGNOSIS/MDM:   Vitals:    Vitals:    05/25/20 0949   BP: (!) 148/85   Pulse: 88   Resp: 20   Temp: 98.5 °F (36.9 °C)   TempSrc: Oral   SpO2: 96%   Weight: 260 lb (117.9 kg)   Height: 5' 6\" (1.676 m)         MEDICATIONS GIVEN IN THE ED:  Medications   orphenadrine (NORFLEX) injection 60 mg (60 mg Intramuscular Given 5/25/20 1058)   HYDROcodone-acetaminophen (NORCO) 5-325 MG per tablet 1 tablet (1 tablet Oral Given 5/25/20 1058)       CLINICAL DECISION MAKING:  The patient presented alert with a nontoxic appearance and was seen in conjunction with Dr. Fernando Carlisle. Laboratory studies were unremarkable. She requested to be discharged as soon as possible. Her pain is likely musculoskeletal. OARRS was reviewed. Prescription was written for norco. The patient was advised to not drink alcohol, drive, or operate heavy machinery while taking the medication. Follow up with pcp, return to ED if condition worsens. FINAL IMPRESSION      1.  Acute right-sided low back pain without sciatica            Problem List  Patient Active Problem List   Diagnosis Code    HTN (hypertension) I10    Dyslipidemia E78.5    DIONY on CPAP G47.33, Z99.89    Fibromyalgia M79.7    CRI (chronic renal insufficiency) N18.9    OA (osteoarthritis) M19.90    DM [facility]diabetes mellitus) ([facility] E11.9

## 2020-05-29 RX ORDER — ZOLPIDEM TARTRATE 10 MG/1
TABLET ORAL
Qty: 90 TABLET | Refills: 1 | Status: SHIPPED | OUTPATIENT
Start: 2020-05-29 | End: 2020-11-25

## 2020-06-02 ENCOUNTER — NURSE TRIAGE (OUTPATIENT)
Dept: OTHER | Facility: CLINIC | Age: 75
End: 2020-06-02

## 2020-06-03 ENCOUNTER — OFFICE VISIT (OUTPATIENT)
Dept: FAMILY MEDICINE CLINIC | Age: 75
End: 2020-06-03
Payer: MEDICARE

## 2020-06-03 VITALS
WEIGHT: 269.2 LBS | OXYGEN SATURATION: 95 % | TEMPERATURE: 97.2 F | BODY MASS INDEX: 43.26 KG/M2 | HEART RATE: 73 BPM | HEIGHT: 66 IN | SYSTOLIC BLOOD PRESSURE: 120 MMHG | DIASTOLIC BLOOD PRESSURE: 60 MMHG

## 2020-06-03 PROCEDURE — G8417 CALC BMI ABV UP PARAM F/U: HCPCS | Performed by: FAMILY MEDICINE

## 2020-06-03 PROCEDURE — 3017F COLORECTAL CA SCREEN DOC REV: CPT | Performed by: FAMILY MEDICINE

## 2020-06-03 PROCEDURE — 1123F ACP DISCUSS/DSCN MKR DOCD: CPT | Performed by: FAMILY MEDICINE

## 2020-06-03 PROCEDURE — 3046F HEMOGLOBIN A1C LEVEL >9.0%: CPT | Performed by: FAMILY MEDICINE

## 2020-06-03 PROCEDURE — G8427 DOCREV CUR MEDS BY ELIG CLIN: HCPCS | Performed by: FAMILY MEDICINE

## 2020-06-03 PROCEDURE — 2022F DILAT RTA XM EVC RTNOPTHY: CPT | Performed by: FAMILY MEDICINE

## 2020-06-03 PROCEDURE — 99214 OFFICE O/P EST MOD 30 MIN: CPT | Performed by: FAMILY MEDICINE

## 2020-06-03 PROCEDURE — 1090F PRES/ABSN URINE INCON ASSESS: CPT | Performed by: FAMILY MEDICINE

## 2020-06-03 PROCEDURE — G8400 PT W/DXA NO RESULTS DOC: HCPCS | Performed by: FAMILY MEDICINE

## 2020-06-03 PROCEDURE — 4040F PNEUMOC VAC/ADMIN/RCVD: CPT | Performed by: FAMILY MEDICINE

## 2020-06-03 PROCEDURE — 1036F TOBACCO NON-USER: CPT | Performed by: FAMILY MEDICINE

## 2020-06-03 RX ORDER — METHYLPREDNISOLONE ACETATE 80 MG/ML
80 INJECTION, SUSPENSION INTRA-ARTICULAR; INTRALESIONAL; INTRAMUSCULAR; SOFT TISSUE ONCE
Status: DISCONTINUED | OUTPATIENT
Start: 2020-06-03 | End: 2020-12-03

## 2020-06-03 SDOH — ECONOMIC STABILITY: TRANSPORTATION INSECURITY
IN THE PAST 12 MONTHS, HAS LACK OF TRANSPORTATION KEPT YOU FROM MEETINGS, WORK, OR FROM GETTING THINGS NEEDED FOR DAILY LIVING?: NO

## 2020-06-03 SDOH — ECONOMIC STABILITY: FOOD INSECURITY: WITHIN THE PAST 12 MONTHS, YOU WORRIED THAT YOUR FOOD WOULD RUN OUT BEFORE YOU GOT MONEY TO BUY MORE.: NEVER TRUE

## 2020-06-03 SDOH — ECONOMIC STABILITY: TRANSPORTATION INSECURITY
IN THE PAST 12 MONTHS, HAS THE LACK OF TRANSPORTATION KEPT YOU FROM MEDICAL APPOINTMENTS OR FROM GETTING MEDICATIONS?: NO

## 2020-06-03 SDOH — ECONOMIC STABILITY: FOOD INSECURITY: WITHIN THE PAST 12 MONTHS, THE FOOD YOU BOUGHT JUST DIDN'T LAST AND YOU DIDN'T HAVE MONEY TO GET MORE.: NEVER TRUE

## 2020-06-03 ASSESSMENT — ENCOUNTER SYMPTOMS
DIARRHEA: 0
SORE THROAT: 0
NAUSEA: 0
BACK PAIN: 0
SINUS PRESSURE: 0
VOMITING: 0
COUGH: 0
SHORTNESS OF BREATH: 0

## 2020-06-03 NOTE — PROGRESS NOTES
Shannon Vargas is a 77 y.o. female who presents todayfor her medical conditions/complaints as noted below. Shannon Vargas is here today c/oED Follow-up (fatigue) and Back Pain      :     HPI    Follow up on ER visit extreme fatigue and right hip pain and back pain. Dyspnea with exertion. No hx of trauma. Her Rx for pain not filled at this time. She is having a lot of marrital issues with [place] which is making her depressed despite her effexor use.       Past Medical History:   Diagnosis Date    Abnormal stress test     Arthritis     Depression     Diverticulosis     DM (diabetes mellitus) ([address]     Erythropenia     Fibromyalgia     HTN (hypertension)     Hyperlipidemia     Kidney stone     Morbid obesity due to excess calories ([address]     DIONY on CPAP     Osteopenia 14    Seasonal allergies     Sleep apnea     uses bipap prn      Past Surgical History:   Procedure Laterality Date    ARM SURGERY  2011    right arm broken    CARDIAC CATHETERIZATION  [id number] dr Haywood Oz  5-16-16    COLONOSCOPY      COLONOSCOPY      TOTAL KNEE ARTHROPLASTY Bilateral     left may 2013 and right 2013    TUBAL LIGATION       Family History   Problem Relation Age of Onset    Diabetes Mother     Heart Disease Mother     Osteoporosis Mother     Kidney Disease Father     Prostate Cancer Brother      Social History     Tobacco Use    Smoking status: Former Smoker     Packs/day: 0.25     Years: 1.00     Pack years: 0.25     Last attempt to quit: 1960     Years since quittin.4    Smokeless tobacco: Never Used    Tobacco comment: quit    Substance Use Topics    Alcohol use: No      Current Outpatient Medications   Medication Sig Dispense Refill    zolpidem (AMBIEN) 10 MG tablet TAKE 1 TABLET BY MOUTH NIGHTLY AS NEEDED FOR SLEEP 90 tablet 1    venlafaxine (EFFEXOR XR) 150 MG extended release capsule TAKE 1 CAPSULE DAILY 90 capsule 3    losartan Subjective:   Review of Systems   Constitutional: Negative for chills, diaphoresis and fever. HENT: Negative for congestion, sinus pressure and sore throat. Eyes: Negative for visual disturbance. Respiratory: Negative for cough and shortness of breath. Cardiovascular: Negative for chest pain and leg swelling. Gastrointestinal: Negative for diarrhea, nausea and vomiting. Genitourinary: Negative for dysuria, menstrual problem, urgency and vaginal discharge. Musculoskeletal: Positive for arthralgias and gait problem. Negative for back pain and myalgias. Skin: Negative for rash. Neurological: Negative for weakness, numbness and headaches. Psychiatric/Behavioral: Negative for sleep disturbance.       :   /60   Pulse 73   Temp 97.2 °F (36.2 °C)   Ht 5' 6\" (1.676 m)   Wt 269 lb 3.2 oz (122.1 kg)   SpO2 95%   BMI 43.45 kg/m²     Physical Exam  Constitutional:       General: She is not in acute distress. Appearance: She is well-developed. She is obese. She is not ill-appearing, toxic-appearing or diaphoretic. HENT:      Head: Normocephalic and atraumatic. Mouth/Throat:      Pharynx: No oropharyngeal exudate. Eyes:      General: No scleral icterus. Neck:      Musculoskeletal: Neck supple. Vascular: No carotid bruit. Cardiovascular:      Heart sounds: No murmur. No friction rub. No gallop. Pulmonary:      Effort: No respiratory distress. Breath sounds: No wheezing or rales. Chest:      Chest wall: No tenderness. Musculoskeletal:         General: Tenderness (over left trochanter bursae injected 80 mg depo medrol and 1 cc of 1% lidocaine.) present. Right lower leg: Edema present. Left lower leg: Edema present. Lymphadenopathy:      Cervical: No cervical adenopathy. Skin:     Coloration: Skin is not jaundiced. Findings: No rash. Neurological:      Mental Status: She is alert and oriented to person, place, and time.       Cranial Nerves: No cranial nerve deficit. Gait: Gait abnormal.   Psychiatric:         Behavior: Behavior normal.         Thought Content: Thought content normal.         Judgment: Judgment normal.         Assessment:       Diagnosis Orders   1. Trochanteric bursitis of left hip     2. Essential hypertension     3. Type 2 diabetes mellitus with stage 3 chronic kidney disease, without long-term current use of insulin ([facility]     4. Chronic kidney disease (CKD), stage III (moderate) ([facility])     5. Anemia, unspecified type  SHIN - Tad Torres MD, Hematology/Oncology, [place]   6. Depression, unspecified depression type     7. Fatigue, unspecified type           Plan:      Return in about 3 months (around 9/3/2020), or if symptoms worsen or fail to improve. Orders Placed This Encounter   Procedures   Domenica Glass MD, Hematology/Oncology, [place]     Referral Priority:   Routine     Referral Type:   Eval and Treat     Referral Reason:   Specialty Services Required     Referred to Provider:   Iesha Acuña MD     Requested Specialty:   Hematology and Oncology     Number of Visits Requested:   1     Orders Placed This Encounter   Medications    methylPREDNISolone acetate (DEPO-MEDROL) injection 80 mg     We will start with HO consult to [facility] clinic (previously followed by Dr Felicity Mike) to see if errythropoietin injections may be helpful. No change in antidepressant for now. Counciling offered and discussed. Her BP seems under good control so no changes in medications. Continue to monitor. See me in three months but may need sooner follow up depending on how things progress at home. I am hopeful this shot will allow her to be more physically active and if her hgb can be benefitted overall she will feel better and become more physically able to burn calories and improve mood.

## 2020-06-16 RX ORDER — CYCLOBENZAPRINE HCL 10 MG
TABLET ORAL
Qty: 30 TABLET | Refills: 1 | Status: SHIPPED | OUTPATIENT
Start: 2020-06-16 | End: 2021-04-13 | Stop reason: ALTCHOICE

## 2020-06-21 ENCOUNTER — PATIENT MESSAGE (OUTPATIENT)
Dept: FAMILY MEDICINE CLINIC | Age: 75
End: 2020-06-21

## 2020-06-22 ENCOUNTER — TELEPHONE (OUTPATIENT)
Dept: FAMILY MEDICINE CLINIC | Age: 75
End: 2020-06-22

## 2020-06-22 RX ORDER — POTASSIUM CITRATE 10 MEQ/1
10 TABLET, EXTENDED RELEASE ORAL DAILY
Qty: 90 TABLET | Refills: 3 | Status: SHIPPED | OUTPATIENT
Start: 2020-06-22 | End: 2020-06-22 | Stop reason: SDUPTHER

## 2020-06-22 RX ORDER — GABAPENTIN 600 MG/1
600 TABLET ORAL DAILY
Qty: 90 TABLET | Refills: 3 | Status: SHIPPED | OUTPATIENT
Start: 2020-06-22 | End: 2021-06-01 | Stop reason: SDUPTHER

## 2020-06-22 RX ORDER — POTASSIUM CITRATE 10 MEQ/1
10 TABLET, EXTENDED RELEASE ORAL DAILY
Qty: 90 TABLET | Refills: 3 | Status: SHIPPED | OUTPATIENT
Start: 2020-06-22 | End: 2022-02-16 | Stop reason: SDUPTHER

## 2020-06-22 NOTE — TELEPHONE ENCOUNTER
\"}    Next Visit Date:  Future Appointments   Date Time Provider Mateo Weaver   7/15/2020 10:45 AM Deniz Alvarado MD [facility]   9/3/2020  2:00 PM MD MAGDIEL Farah [facility]   10/13/2020  1:30 PM Justen Howard MD [facility] None       Health Maintenance   Topic Date Due    Hepatitis C screen  1945    DTaP/Tdap/Td vaccine (1 - Tdap) 02/02/1965    Diabetic retinal exam  09/28/2016    Annual Wellness Visit (AWV)  05/29/2019    A1C test (Diabetic or Prediabetic)  01/07/2020    Diabetic foot exam  03/20/2020    Shingles Vaccine (1 of 2) 01/09/2021 (Originally 2/2/1996)    Lipid screen  03/03/2021    Breast cancer screen  03/27/2021    Potassium monitoring  05/25/2021    Creatinine monitoring  05/25/2021    Colon cancer screen colonoscopy  10/21/2023    DEXA (modify frequency per FRAX score)  Completed    Flu vaccine  Completed    Pneumococcal 65+ yrs at Risk Vaccine  Completed    Hepatitis A vaccine  Aged Out    Hib vaccine  Aged Out    Meningococcal (ACWY) vaccine  Aged Out       Hemoglobin A1C (%)   Date Value   01/07/2019 5.9   09/07/2017 5.7   10/21/2016 6.1 (H)             ( goal A1C is < 7)   No results found for: LABMICR  LDL Cholesterol (mg/dL)   Date Value   03/03/2020 96   09/18/2018 75       (goal LDL is <100)   AST (U/L)   Date Value   03/03/2020 15     ALT (U/L)   Date Value   03/03/2020 9     BUN (mg/dL)   Date Value   05/25/2020 26 (H)     BP Readings from Last 3 Encounters:   06/03/20 120/60   05/25/20 (!) 148/85   02/04/20 138/84          (goal 120/80)    All Future Testing planned in [facility]  Lab Frequency Next Occurrence   Uric Acid Once 06/19/1613   Basic Metabolic Panel Once 07/08/9710   Vitamin D 25 Hydroxy Once 01/19/2020   PTH, Intact Once 01/19/2020   CBC Auto Differential Once 01/19/2020   Magnesium Once 59/77/0253   Basic Metabolic Panel Once 05/65/8861   Phosphorus Once 01/19/2020   Creatinine, Random Urine Once 01/19/2020   Protein, urine, random Once 01/19/2020   RIZWANA DIGITAL SCREEN W OR WO CAD BILATERAL Once 32/10/0436   Basic Metabolic Panel Once 50/52/6595   Magnesium Once 07/14/2020   CBC Auto Differential Once 07/14/2020   PTH, Intact Once 07/14/2020   Vitamin D 25 Hydroxy Once 07/14/2020   Phosphorus Once 07/14/2020   Creatinine, Random Urine Once 07/14/2020   Protein, urine, random Once 07/14/2020               Patient Active Problem List:     HTN (hypertension)     Dyslipidemia     DIONY on CPAP     Fibromyalgia     CRI (chronic renal insufficiency)     OA (osteoarthritis)     DM (diabetes mellitus) ([address]     Kidney stone     Depression     Seasonal allergies     Diverticulosis     Erythropenia     Low hemoglobin     Mitral regurgitation - Mild to moderate     Chronic kidney disease (CKD), stage III (moderate) (HCC)     Gout     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 disease ([address]     Essential hypertension     Osteopenia     Morbid obesity due to excess calories ([address]     Anxiety     JOSE ARMANDO (acute kidney injury) ([address]     Febrile illness     Acute serous otitis media of left ear

## 2020-06-22 NOTE — TELEPHONE ENCOUNTER
Jose Turner was contacted to set up an annual wellness visit    Spoke with: Patient    Patient educated on purpose of AWV    Patient was not agreeable to schedule AWV    Reason for declining AWV Appointment: Other - pt states that she is not interested at this time in scheduling awv     Gina Ziegler

## 2020-06-22 NOTE — TELEPHONE ENCOUNTER
urine, random Once 01/19/2020   RIZWANA DIGITAL SCREEN W OR WO CAD BILATERAL Once 11/30/8559   Basic Metabolic Panel Once 45/63/6979   Magnesium Once 07/14/2020   CBC Auto Differential Once 07/14/2020   PTH, Intact Once 07/14/2020   Vitamin D 25 Hydroxy Once 07/14/2020   Phosphorus Once 07/14/2020   Creatinine, Random Urine Once 07/14/2020   Protein, urine, random Once 07/14/2020               Patient Active Problem List:     HTN (hypertension)     Dyslipidemia     DIONY on CPAP     Fibromyalgia     CRI (chronic renal insufficiency)     OA (osteoarthritis)     DM (diabetes mellitus) ([address]     Kidney stone     Depression     Seasonal allergies     Diverticulosis     Erythropenia     Low hemoglobin     Mitral regurgitation - Mild to moderate     Chronic kidney disease (CKD), stage III (moderate) (HCC)     Gout     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 disease ([address]     Essential hypertension     Osteopenia     Morbid obesity due to excess calories ([address]     Anxiety     JOSE ARMANDO (acute kidney injury) ([address]     Febrile illness     Acute serous otitis media of left ear

## 2020-09-08 ENCOUNTER — PATIENT MESSAGE (OUTPATIENT)
Dept: PULMONOLOGY | Age: 75
End: 2020-09-08

## 2020-09-08 NOTE — TELEPHONE ENCOUNTER
From: Collin Cooks  To: Ho Santillan MD  Sent: 9/8/2020 5:10 AM EDT  Subject: Non-Urgent Medical Question    Dr. Moshe Nelson,   I need a refill on my Astelin Nasal Spray that I use at night to   clear up my congestion. Can you please okay a refill for 3   through [facility]???? Thank You.

## 2020-09-09 ENCOUNTER — TELEPHONE (OUTPATIENT)
Dept: PULMONOLOGY | Age: 75
End: 2020-09-09

## 2020-09-09 NOTE — TELEPHONE ENCOUNTER
RECEIVED A NOTICE THAT AZELASTIN-FLUTIC IS NOT ON FORMULARY BUT I AM PROCESSING FOR A PA BECAUSE I DO NOT KNOW THE ALTERATIVE. THIS IS BEING PROCESSED WITH [facility].

## 2020-09-10 NOTE — TELEPHONE ENCOUNTER
Insurance had denied the script for Azelastine-Fluticasone 137-50 mcg spray/pump. They are stating that Fluticasone Propionate and Azelastine HCL are on formulary for this patient's insurance plan unless you want to dictate a letter of appeal as to why the Azelastine- Fluticasone is medical necessary. Please place a new script if you do not want to do the letter. Thank you.

## 2020-09-11 RX ORDER — AZELASTINE 1 MG/ML
1 SPRAY, METERED NASAL 2 TIMES DAILY
Qty: 1 BOTTLE | Refills: 5 | Status: SHIPPED | OUTPATIENT
Start: 2020-09-11 | End: 2021-10-13 | Stop reason: SDUPTHER

## 2020-09-11 RX ORDER — FLUTICASONE PROPIONATE 50 MCG
2 SPRAY, SUSPENSION (ML) NASAL DAILY
Qty: 1 BOTTLE | Refills: 5 | Status: SHIPPED | OUTPATIENT
Start: 2020-09-11 | End: 2020-10-13 | Stop reason: ALTCHOICE

## 2020-10-07 ENCOUNTER — HOSPITAL ENCOUNTER (OUTPATIENT)
Age: 75
Setting detail: SPECIMEN
Discharge: HOME | End: 2020-10-07
Payer: MEDICARE

## 2020-10-07 LAB
ABSOLUTE EOS #: 0.41 K/UL (ref 0–0.44)
ABSOLUTE IMMATURE GRANULOCYTE: 0.03 K/UL (ref 0–0.3)
ABSOLUTE LYMPH #: 2.51 K/UL (ref 1.1–3.7)
ABSOLUTE MONO #: 0.58 K/UL (ref 0.1–1.2)
ANION GAP SERPL CALCULATED.3IONS-SCNC: 17 MMOL/L (ref 9–17)
BASOPHILS # BLD: 1 % (ref 0–2)
BASOPHILS ABSOLUTE: 0.05 K/UL (ref 0–0.2)
BUN BLDV-MCNC: 36 MG/DL (ref 8–23)
BUN/CREAT BLD: ABNORMAL (ref 9–20)
CALCIUM SERPL-MCNC: 9.4 MG/DL (ref 8.6–10.4)
CHLORIDE BLD-SCNC: 105 MMOL/L (ref 98–107)
CO2: 20 MMOL/L (ref 20–31)
CREAT SERPL-MCNC: 2.75 MG/DL (ref 0.5–0.9)
CREATININE URINE: 73.2 MG/DL (ref 28–217)
DIFFERENTIAL TYPE: ABNORMAL
EOSINOPHILS RELATIVE PERCENT: 5 % (ref 1–4)
GFR AFRICAN AMERICAN: 20 ML/MIN
GFR NON-AFRICAN AMERICAN: 17 ML/MIN
GFR SERPL CREATININE-BSD FRML MDRD: ABNORMAL ML/MIN/{1.73_M2}
GFR SERPL CREATININE-BSD FRML MDRD: ABNORMAL ML/MIN/{1.73_M2}
GLUCOSE BLD-MCNC: 145 MG/DL (ref 70–99)
HCT VFR BLD CALC: 30.1 % (ref 36.3–47.1)
HEMOGLOBIN: 8.7 G/DL (ref 11.9–15.1)
IMMATURE GRANULOCYTES: 0 %
LYMPHOCYTES # BLD: 31 % (ref 24–43)
MAGNESIUM: 1.8 MG/DL (ref 1.6–2.6)
MCH RBC QN AUTO: 30.2 PG (ref 25.2–33.5)
MCHC RBC AUTO-ENTMCNC: 28.9 G/DL (ref 28.4–34.8)
MCV RBC AUTO: 104.5 FL (ref 82.6–102.9)
MONOCYTES # BLD: 7 % (ref 3–12)
NRBC AUTOMATED: 0 PER 100 WBC
PDW BLD-RTO: 14.2 % (ref 11.8–14.4)
PHOSPHORUS: 3.6 MG/DL (ref 2.6–4.5)
PLATELET # BLD: 315 K/UL (ref 138–453)
PLATELET ESTIMATE: ABNORMAL
PMV BLD AUTO: 9.8 FL (ref 8.1–13.5)
POTASSIUM SERPL-SCNC: 4.5 MMOL/L (ref 3.7–5.3)
PTH INTACT: 141.8 PG/ML (ref 15–65)
RBC # BLD: 2.88 M/UL (ref 3.95–5.11)
RBC # BLD: ABNORMAL 10*6/UL
SEG NEUTROPHILS: 56 % (ref 36–65)
SEGMENTED NEUTROPHILS ABSOLUTE COUNT: 4.56 K/UL (ref 1.5–8.1)
SODIUM BLD-SCNC: 142 MMOL/L (ref 135–144)
TOTAL PROTEIN, URINE: 11 MG/DL
VITAMIN D 25-HYDROXY: 35.1 NG/ML (ref 30–100)
WBC # BLD: 8.1 K/UL (ref 3.5–11.3)
WBC # BLD: ABNORMAL 10*3/UL

## 2020-10-14 RX ORDER — ALLOPURINOL 100 MG/1
100 TABLET ORAL DAILY
Qty: 30 TABLET | Refills: 5 | Status: SHIPPED | OUTPATIENT
Start: 2020-10-14 | End: 2022-02-16 | Stop reason: SDUPTHER

## 2020-10-14 NOTE — TELEPHONE ENCOUNTER
Montserrat Armstrong is calling to request a refill on the following medication(s):    Medication Request:  Requested Prescriptions     Pending Prescriptions Disp Refills    allopurinol (ZYLOPRIM) 100 MG tablet [Pharmacy Med Name: ALLOPURINOL 100 MG  100MG TABS] 30 tablet 5     Sig: TAKE 1 TABLET BY MOUTH DAILY       Last Visit Date (If Applicable):  1/2/6236    Next Visit Date:    Visit date not found

## 2020-11-28 ENCOUNTER — HOSPITAL ENCOUNTER (OUTPATIENT)
Dept: MAMMOGRAPHY | Age: 75
Discharge: HOME | End: 2020-11-30
Payer: MEDICARE

## 2020-11-28 PROCEDURE — 77063 BREAST TOMOSYNTHESIS BI: CPT

## 2020-11-30 ENCOUNTER — TELEPHONE (OUTPATIENT)
Dept: FAMILY MEDICINE CLINIC | Age: 75
End: 2020-11-30

## 2020-11-30 NOTE — TELEPHONE ENCOUNTER
Patient has NTP appointment scheduled with you 01/28/2020. Patient has been taking Ambien to sleep but she is almost out and would like to know what to take until her appointment.  Please see previous med refill encounter (11/25/2020)

## 2020-12-03 ENCOUNTER — OFFICE VISIT (OUTPATIENT)
Dept: FAMILY MEDICINE CLINIC | Age: 75
End: 2020-12-03
Payer: MEDICARE

## 2020-12-03 VITALS
BODY MASS INDEX: 42.29 KG/M2 | DIASTOLIC BLOOD PRESSURE: 84 MMHG | SYSTOLIC BLOOD PRESSURE: 132 MMHG | HEART RATE: 73 BPM | WEIGHT: 262 LBS | TEMPERATURE: 96.9 F | OXYGEN SATURATION: 98 %

## 2020-12-03 PROCEDURE — G8400 PT W/DXA NO RESULTS DOC: HCPCS | Performed by: FAMILY MEDICINE

## 2020-12-03 PROCEDURE — G8482 FLU IMMUNIZE ORDER/ADMIN: HCPCS | Performed by: FAMILY MEDICINE

## 2020-12-03 PROCEDURE — G8417 CALC BMI ABV UP PARAM F/U: HCPCS | Performed by: FAMILY MEDICINE

## 2020-12-03 PROCEDURE — 2022F DILAT RTA XM EVC RTNOPTHY: CPT | Performed by: FAMILY MEDICINE

## 2020-12-03 PROCEDURE — 3046F HEMOGLOBIN A1C LEVEL >9.0%: CPT | Performed by: FAMILY MEDICINE

## 2020-12-03 PROCEDURE — 1090F PRES/ABSN URINE INCON ASSESS: CPT | Performed by: FAMILY MEDICINE

## 2020-12-03 PROCEDURE — 4040F PNEUMOC VAC/ADMIN/RCVD: CPT | Performed by: FAMILY MEDICINE

## 2020-12-03 PROCEDURE — 3017F COLORECTAL CA SCREEN DOC REV: CPT | Performed by: FAMILY MEDICINE

## 2020-12-03 PROCEDURE — 1123F ACP DISCUSS/DSCN MKR DOCD: CPT | Performed by: FAMILY MEDICINE

## 2020-12-03 PROCEDURE — G8427 DOCREV CUR MEDS BY ELIG CLIN: HCPCS | Performed by: FAMILY MEDICINE

## 2020-12-03 PROCEDURE — 99214 OFFICE O/P EST MOD 30 MIN: CPT | Performed by: FAMILY MEDICINE

## 2020-12-03 PROCEDURE — 1036F TOBACCO NON-USER: CPT | Performed by: FAMILY MEDICINE

## 2020-12-03 RX ORDER — ZOLPIDEM TARTRATE 5 MG/1
5 TABLET ORAL NIGHTLY PRN
Qty: 90 TABLET | Refills: 0 | Status: SHIPPED | OUTPATIENT
Start: 2020-12-03 | End: 2021-03-03

## 2020-12-03 ASSESSMENT — PATIENT HEALTH QUESTIONNAIRE - PHQ9
SUM OF ALL RESPONSES TO PHQ9 QUESTIONS 1 & 2: 2
1. LITTLE INTEREST OR PLEASURE IN DOING THINGS: 1
SUM OF ALL RESPONSES TO PHQ QUESTIONS 1-9: 2
SUM OF ALL RESPONSES TO PHQ QUESTIONS 1-9: 2
2. FEELING DOWN, DEPRESSED OR HOPELESS: 1
SUM OF ALL RESPONSES TO PHQ QUESTIONS 1-9: 2

## 2020-12-03 NOTE — PATIENT INSTRUCTIONS
active, even a little bit every day. Making small changes over time can add up to a lot. Make a plan for change. Many people have found that naming their reasons for change and staying focused on their plan can make a big difference. Work with your doctor to create a plan that is right for you. · Ask yourself: Jaci Bernal are my personal, most powerful reasons for wanting this change? What will my life look like when I've made the change? \"  · Set your long-term goal. Make it specific, such as \"I will lose x pounds. \"  · Break your long-term goal into smaller, short-term goals. Make these small steps specific and within your reach, things you know you can do. These steps are what keep you going from day to day. Talk with your doctor about other weight-loss options. If you have a BMI in a certain range and have not been able to lose weight with diet and exercise, medicine or surgery may be an option for you. Before your doctor will prescribe medicines or surgery, he or she will probably want you to be more active and follow your healthy eating plan for a period of time. These habits are key lifelong changes for managing your weight, with or without other medical treatment. And these changes can help you avoid weight-related health problems. How can you stay on your plan for change? Be ready. Choose to start during a time when there are few events that might trigger slip-ups, like holidays, social events, and high-stress periods. Decide on your first few steps. Most people have more success when they make small changes, one step at a time. For example, you might switch a daily candy bar to a piece of fruit, walk 10 minutes more, or add more vegetables to a meal.  Line up your support people. Make sure you're not going to be alone as you make this change. Connect with people who understand how important it is to you. Ask family members and friends for help in keeping with your plan.  And think about who could make it harder for you, and how to handle them. Try tracking. People who keep track of what they eat, feel, and do are better at losing weight. Try writing down things like:  · What and how much you eat. · How you feel before and after each meal.  · Details about each meal (like eating out or at home, eating alone, or with friends or family). · What you do to be active. Look and plan. As you track, look for patterns that you may want to change. Take note of:  · When you eat and whether you skip meals. · How often you eat out. · How many fruits and vegetables you eat. · When you eat beyond feeling full. · When and why you eat for reasons other than being hungry. When you stray from your plan, don't get upset. Figure out what made you slip up and how you can fix it. Can you take medicines or have surgery to lose weight? If you have a BMI in a certain range and have not been able to lose weight with diet and exercise, medicine or surgery may be an option for you. If you have a BMI of at least 30.0 (or a BMI of at least 27.0 and another health problem related to your weight), ask your doctor about weight-loss medicines. They work by making you feel less hungry, making you feel full more quickly, or changing how you digest fat. Medicines are used along with diet changes and more physical activity to help you make lasting changes. If you have a BMI of 40.0 or more (or a BMI of 35.0 or more and another health problem related to your weight), your doctor may talk with you about surgery. Weight-loss surgery has risks, and you will need to work with your doctor to compare the risk of having obesity with the risks of surgery. With any option you choose, you will still need to eat a healthy diet and get regular exercise. Follow-up care is a key part of your treatment and safety. Be sure to make and go to all appointments, and call your doctor if you are having problems.  It's also a good idea to know your test results and keep a list of the medicines you take. Where can you learn more? Go to [email or web address] and sign in to your [employer] account. Enter N111 in the [employer] box to learn more about \"Learning About Obesity. \"     If you do not have an account, please click on the \"Sign Up Now\" link. Current as of: December 11, 2019               Content Version: 12.6  © 8869-8160 [employer]. Care instructions adapted under license by [facility] ([facility]). If you have questions about a medical condition or this instruction, always ask your healthcare professional. [address] any warranty or liability for your use of this information.

## 2020-12-03 NOTE — PROGRESS NOTES
Progress Note    Rashi Teran is a 77 y.o.  female who presents today alone for evaluation of   Chief Complaint   Patient presents with    [facility]           HPI:   Patient is here to [facility] care today. Patient PMH DM type 2, CKD III, gout, HTN, dyslipidemia, obesity, primary insomnia, DIONY on CPAP, and CAD. Patient [address] [facility], right arm surgery, b/l TKA, and tubal ligation. Patient fhx mom DM/HD/osteoporosis, dad CKD, and brother prostate cancer. Patient is a former smoker. Patient denies regular EtOH consumption. Patient denies illicits. Patient denies SIG E CAPS. Patient denies SI/HI. Patient denies anxiety. Patient denies specific diet. Patient denies regular aerobic activity. Patient last mammogram 11/2020 and had abnormality in left breast. She follows with GYN. US was ordered by GYN. Patient last colonoscopy 2013 and was recommended to have a repeat in 10 yrs. Patient follows with nephrology, urology, oncology/hematology, GYN, and cardiology regularly. Patient last HbA1c 1/2019 5.9. Patient does not check her FBG. Patient denies polyuria/polyphagia/polydipsia. Patient denies exertional calf cramping. Patient is due for ANTWON. Patient denies n/t in her feet. Patient will see hematology this afternoon for EYAD and iron infusion. Patient needs a refill of her [place] today. PHQ-9 Total Score: 2 (12/3/2020 11:39 AM)      Health Maintenance Due   Topic Date Due    Hepatitis C screen  1945    DTaP/Tdap/Td vaccine (1 - Tdap) 02/02/1965    Diabetic retinal exam  09/28/2016    Annual Wellness Visit (AWV)  05/29/2019    A1C test (Diabetic or Prediabetic)  01/07/2020    Diabetic foot exam  03/20/2020        Current Medications:     Current Outpatient Medications   Medication Sig Dispense Refill    zolpidem (AMBIEN) 5 MG tablet Take 1 tablet by mouth nightly as needed for Sleep for up to 90 days.  90 tablet 0    allopurinol (ZYLOPRIM) 100 MG tablet TAKE 1 TABLET BY MOUTH DAILY 30 tablet 5  losartan (COZAAR) 25 MG tablet Take 1 tablet by mouth daily 90 tablet 3    calcitRIOL (ROCALTROL) 0.25 MCG capsule Take 1 capsule by mouth daily 30 capsule 3    hydroCHLOROthiazide (HYDRODIURIL) 25 MG tablet Take 1 tablet by mouth daily 90 tablet 3    azelastine (ASTELIN) 0.1 % nasal spray 1 spray by Nasal route 2 times daily Use in each nostril as directed 1 Bottle 5    potassium citrate (UROCIT-K) 10 MEQ (1080 MG) extended release tablet Take 1 tablet by mouth daily 90 tablet 3    cyclobenzaprine (FLEXERIL) 10 MG tablet One pill three times daily as needed for low back pain. 30 tablet 1    venlafaxine (EFFEXOR XR) 150 MG extended release capsule TAKE 1 CAPSULE DAILY 90 capsule 3    atorvastatin (LIPITOR) 40 MG tablet TAKE 1 TABLET BY MOUTH ONE TIME A DAY  90 tablet 3    pioglitazone-metformin (ACTOPLUS MET)  MG per tablet TAKE 1 TABLET TWICE A DAY WITH MEALS 180 tablet 3    Cholecalciferol (VITAMIN D3) 25 MCG (1000 UT) TABS Take 2 tablets by mouth daily 180 tablet 1    pantoprazole (PROTONIX) 40 MG tablet TAKE 1 TABLET DAILY 90 tablet 4    metoprolol tartrate (LOPRESSOR) 25 MG tablet TAKE 1 TABLET TWICE A  tablet 4    HYDROcodone-acetaminophen (NORCO) 5-325 MG per tablet Take 1 tablet by mouth every 6 hours as needed for Pain.  Magnesium Oxide 400 MG CAPS Take by mouth 2 times daily       gabapentin (NEURONTIN) 600 MG tablet Take 1 tablet by mouth daily for 90 days. 90 tablet 3     No current facility-administered medications for this visit. Allergies:      Allergies   Allergen Reactions    Cephalexin Other (See Comments)     Tongue cracks and peels    Erythromycin Other (See Comments)     Epigastric pain and bloating    Ketorolac Tromethamine Other (See Comments)     Severe stomach cramps    Pcn [Penicillins]      Unknown reaction    Percocet [Oxycodone-Acetaminophen] Itching and Other (See Comments)     Esophagus hurt    Sulfa Antibiotics     Ultracet [Tramadol-Acetaminophen] Itching        Medical History:     Past Medical History:   Diagnosis Date    Abnormal stress test     Anemia     Arthritis     Depression     Diverticulosis     DM (diabetes mellitus) (HCC)     Erythropenia     Fibromyalgia     HTN (hypertension)     Hyperlipidemia     Kidney stone     Morbid obesity due to excess calories (HCC)     DIONY on CPAP     Osteopenia 14    Seasonal allergies     Sleep apnea     uses bipap prn       Past Surgical History:   Procedure Laterality Date    ARM SURGERY      right arm broken    CARDIAC CATHETERIZATION  [id number] dr Nati Connell  16    COLONOSCOPY      COLONOSCOPY      TOTAL KNEE ARTHROPLASTY Bilateral     left may 2013 and right 2013    TUBAL LIGATION         Family History   Problem Relation Age of Onset    Diabetes Mother     Heart Disease Mother     Osteoporosis Mother     Kidney Disease Father     Prostate Cancer Brother         Social History:     Social History     Socioeconomic History    Marital status:      Spouse name: Not on file    Number of children: Not on file    Years of education: Not on file    Highest education level: Not on file   Occupational History    Not on file   Social Needs    Financial resource strain: Not on file    Food insecurity     Worry: Never true     Inability: Never true    Transportation needs     Medical: No     Non-medical: No   Tobacco Use    Smoking status: Former Smoker     Packs/day: 0.25     Years: 1.00     Pack years: 0.25     Last attempt to quit: 1960     Years since quittin.9    Smokeless tobacco: Never Used    Tobacco comment: quit    Substance and Sexual Activity    Alcohol use: No    Drug use: No    Sexual activity: Never   Lifestyle    Physical activity     Days per week: Not on file     Minutes per session: Not on file    Stress: Not on file   Relationships    Social connections Talks on phone: Not on file     Gets together: Not on file     Attends [employer] service: Not on file     Active member of club or organization: Not on file     Attends meetings of clubs or organizations: Not on file     Relationship status: Not on file    Intimate partner violence     Fear of current or ex partner: Not on file     Emotionally abused: Not on file     Physically abused: Not on file     Forced sexual activity: Not on file   Other Topics Concern    Not on file   Social History Narrative    Not on file        ROS:     Constitutional: No fevers, chills, fatigue. ENT: No nasal congestion or sore throat  Respiratory: No difficulty in breathing or cough. Cardiovascular: No chest pain, palpitations or shortness of breath  Gastrointestinal: No abdominal pain or change in bowel movements. Genitourinary: No change in urinary frequency or dysuria. Skin: No rashes or skin lesions. Neurological: No weakness. No headaches. Last Filed Vitals:  /84   Pulse 73   Temp 96.9 °F (36.1 °C) (Temporal)   Wt 262 lb (118.8 kg)   SpO2 98%   BMI 42.29 kg/m²      Physical Examination:     GENERAL APPEARANCE: in no acute distress, well developed, well nourished. HEAD: normocephalic, atraumatic. EYES: extraocular movement intact (EOMI), pupils equal, round, reactive to light and accommodation. EARS: normal, tympanic membrane intact, clear, auditory canal clear. NOSE: nares patent, no erythema, sinuses nontender bilaterally, no rhinorrhea. ORAL CAVITY: mucosa moist, no lesions. THROAT: clear, no mass, no exudate. NECK/THYROID: neck supple, full range of motion, no thyromegaly. HEART: no murmurs, regular rate and rhythm, S1, S2 normal.   LUNGS: clear to auscultation bilaterally, no wheezes, rales, rhonchi.    ABDOMEN: normal, bowel sounds present, soft, nontender, nondistended, no rebound guarding or rigidity    Recent Labs/ In Office Testing/ Radiograph review:     Hospital Outpatient Visit on 10/07/2020   Component Date Value Ref Range Status    Total Protein, Urine 10/07/2020 11  mg/dL Final    No normal range established.  Creatinine, Ur 10/07/2020 73.2  28.0 - 217.0 mg/dL Final    Phosphorus 10/07/2020 3.6  2.6 - 4.5 mg/dL Final    Glucose 10/07/2020 145* 70 - 99 mg/dL Final    BUN 10/07/2020 36* 8 - 23 mg/dL Final    CREATININE 10/07/2020 2.75* 0.50 - 0.90 mg/dL Final    Bun/Cre Ratio 10/07/2020 NOT REPORTED  9 - 20 Final    Calcium 10/07/2020 9.4  8.6 - 10.4 mg/dL Final    Sodium 10/07/2020 142  135 - 144 mmol/L Final    Potassium 10/07/2020 4.5  3.7 - 5.3 mmol/L Final    Chloride 10/07/2020 105  98 - 107 mmol/L Final    CO2 10/07/2020 20  20 - 31 mmol/L Final    Anion Gap 10/07/2020 17  9 - 17 mmol/L Final    GFR Non- 10/07/2020 17* >60 mL/min Final    GFR  10/07/2020 20* >60 mL/min Final    GFR Comment 10/07/2020        Final    Comment: Average GFR for 79or more years old:   76 mL/min/1.73sq m  Chronic Kidney Disease:   <60 mL/min/1.73sq m  Kidney failure:   <15 mL/min/1.73sq m              eGFR calculated using average adult body mass.  Additional eGFR calculator available at:        [email or web address]            GFR Staging 10/07/2020 NOT REPORTED   Final    Magnesium 10/07/2020 1.8  1.6 - 2.6 mg/dL Final    WBC 10/07/2020 8.1  3.5 - 11.3 k/uL Final    RBC 10/07/2020 2.88* 3.95 - 5.11 m/uL Final    Hemoglobin 10/07/2020 8.7* 11.9 - 15.1 g/dL Final    Hematocrit 10/07/2020 30.1* 36.3 - 47.1 % Final    MCV 10/07/2020 104.5* 82.6 - 102.9 fL Final    MCH 10/07/2020 30.2  25.2 - 33.5 pg Final    MCHC 10/07/2020 28.9  28.4 - 34.8 g/dL Final    RDW 10/07/2020 14.2  11.8 - 14.4 % Final    Platelets 90/70/8482 315  138 - 453 k/uL Final    MPV 10/07/2020 9.8  8.1 - 13.5 fL Final    NRBC Automated 10/07/2020 0.0  0.0 per 100 WBC Final    Differential Type 10/07/2020 NOT REPORTED   Final    Seg Neutrophils 10/07/2020 56  36 - 65 % Final    Lymphocytes 10/07/2020 31  24 - 43 % Final    Monocytes 10/07/2020 7  3 - 12 % Final    Eosinophils % 10/07/2020 5* 1 - 4 % Final    Basophils 10/07/2020 1  0 - 2 % Final    Immature Granulocytes 10/07/2020 0  0 % Final    Segs Absolute 10/07/2020 4.56  1.50 - 8.10 k/uL Final    Absolute Lymph # 10/07/2020 2.51  1.10 - 3.70 k/uL Final    Absolute Mono # 10/07/2020 0.58  0.10 - 1.20 k/uL Final    Absolute Eos # 10/07/2020 0.41  0.00 - 0.44 k/uL Final    Basophils Absolute 10/07/2020 0.05  0.00 - 0.20 k/uL Final    Absolute Immature Granulocyte 10/07/2020 0.03  0.00 - 0.30 k/uL Final    WBC Morphology 10/07/2020 NOT REPORTED   Final    RBC Morphology 10/07/2020 MACROCYTOSIS PRESENT   Final    Platelet Estimate 61/67/1224 NOT REPORTED   Final    Pth Intact 10/07/2020 141.8* 15.0 - 65.0 pg/mL Final    Comment: SAMPLES FROM PATIENTS ROUTINELY RECEIVING HIGH DOSE BIOTIN THERAPY MAY SHOW FALSELY   DEPRESSED RESULTS. ADDITIONAL INFORMATION MAY BE REQUIRED FOR DIAGNOSIS.  Vit D, 25-Hydroxy 10/07/2020 35.1  30.0 - 100.0 ng/mL Final    Comment:    Reference Range:  Vitamin D status         Range   Deficiency              <20 ng/mL   Mild Deficiency       20-30 ng/mL   Sufficiency           ng/mL   Toxicity               >100 ng/mL         No results found for this visit on 12/03/20. Assessment/Plan:     Kiah Tello was seen today for establish care. Diagnoses and all orders for this visit:    Establishing care with new doctor, encounter for    BMI 40.0-44.9, adult ([facility] [address]    Dyslipidemia  -     ALT; Future  -     AST; Future  -     CBC Auto Differential; Future  -     Lipid Panel; Future  -     TSH with Reflex; Future    HTN, goal below 130/80  -     Magnesium; Future  -     Renal Function Panel;  Future    Type 2 diabetes mellitus with hyperglycemia, without long-term current use of insulin (HCC)  -     Hemoglobin A1C; Future  -     Microalbumin, Ur; Future    Idiopathic chronic gout of multiple sites without tophus  -     Uric Acid; Future    Multiphasic screening  -     Hepatitis C Antibody; Future  -     HIV Screen; Future    Primary insomnia  -     Urine Drug Screen; Future  -     zolpidem (AMBIEN) 5 MG tablet; Take 1 tablet by mouth nightly as needed for Sleep for up to 90 days. Dietary counseling  -      BMI ABOVE NORMAL F/U    Follow up on labs. Encouraged well balanced diet. Encouraged 150 mins of aerobic activity weekly. CSA signed. OARRS reviewed. UDS ordered. Encouraged regular follow up with her specialists. Discussed that Ambien is on the [facility] list and the risk of continued use at the 10 mg dose. She agrees. We reduced her to 5 mg qhs. All questions answered and addressed to patient satisfaction. Patient understands and agrees to the plan. The patient was evaluated and treated today based on the osteopathic principle that each person is a unit of body, mind, and spirit, the body is capable of self-regulation, self-healing, and health maintenance and that structure and function are reciprocally interrelated. Follow-up:   Return for keep scheduled appt. Alyssa Vanessa D.O.    BMI was elevated today, and weight loss plan recommended is : conventional weight loss.

## 2020-12-07 ENCOUNTER — HOSPITAL ENCOUNTER (OUTPATIENT)
Dept: ULTRASOUND IMAGING | Age: 75
Discharge: HOME | End: 2020-12-09
Payer: MEDICARE

## 2020-12-07 PROCEDURE — 76642 ULTRASOUND BREAST LIMITED: CPT

## 2021-01-11 ENCOUNTER — HOSPITAL ENCOUNTER (OUTPATIENT)
Age: 76
Setting detail: SPECIMEN
Discharge: HOME | End: 2021-01-11
Payer: MEDICARE

## 2021-01-11 DIAGNOSIS — Z13.89 MULTIPHASIC SCREENING: ICD-10-CM

## 2021-01-11 DIAGNOSIS — M1A.09X0 IDIOPATHIC CHRONIC GOUT OF MULTIPLE SITES WITHOUT TOPHUS: ICD-10-CM

## 2021-01-11 DIAGNOSIS — E78.5 DYSLIPIDEMIA: ICD-10-CM

## 2021-01-11 DIAGNOSIS — E11.22 TYPE 2 DIABETES MELLITUS WITH STAGE 3 CHRONIC KIDNEY DISEASE (HCC): ICD-10-CM

## 2021-01-11 DIAGNOSIS — N18.30 TYPE 2 DIABETES MELLITUS WITH STAGE 3 CHRONIC KIDNEY DISEASE (HCC): ICD-10-CM

## 2021-01-11 DIAGNOSIS — F51.01 PRIMARY INSOMNIA: ICD-10-CM

## 2021-01-11 DIAGNOSIS — E11.65 TYPE 2 DIABETES MELLITUS WITH HYPERGLYCEMIA, WITHOUT LONG-TERM CURRENT USE OF INSULIN (HCC): ICD-10-CM

## 2021-01-11 DIAGNOSIS — I10 HTN, GOAL BELOW 130/80: ICD-10-CM

## 2021-01-11 LAB
ABSOLUTE EOS #: 0.3 K/UL (ref 0–0.44)
ABSOLUTE EOS #: 0.3 K/UL (ref 0–0.44)
ABSOLUTE IMMATURE GRANULOCYTE: 0.05 K/UL (ref 0–0.3)
ABSOLUTE IMMATURE GRANULOCYTE: 0.05 K/UL (ref 0–0.3)
ABSOLUTE LYMPH #: 2.94 K/UL (ref 1.1–3.7)
ABSOLUTE LYMPH #: 2.94 K/UL (ref 1.1–3.7)
ABSOLUTE MONO #: 0.82 K/UL (ref 0.1–1.2)
ABSOLUTE MONO #: 0.82 K/UL (ref 0.1–1.2)
AMPHETAMINE SCREEN URINE: NEGATIVE
ANION GAP SERPL CALCULATED.3IONS-SCNC: 19 MMOL/L (ref 9–17)
BARBITURATE SCREEN URINE: NEGATIVE
BASOPHILS # BLD: 1 % (ref 0–2)
BASOPHILS # BLD: 1 % (ref 0–2)
BASOPHILS ABSOLUTE: 0.08 K/UL (ref 0–0.2)
BASOPHILS ABSOLUTE: 0.08 K/UL (ref 0–0.2)
BENZODIAZEPINE SCREEN, URINE: NEGATIVE
BUN BLDV-MCNC: 28 MG/DL (ref 8–23)
BUN/CREAT BLD: ABNORMAL (ref 9–20)
BUPRENORPHINE URINE: NORMAL
CALCIUM SERPL-MCNC: 9.8 MG/DL (ref 8.6–10.4)
CANNABINOID SCREEN URINE: NEGATIVE
CHLORIDE BLD-SCNC: 104 MMOL/L (ref 98–107)
CHOLESTEROL/HDL RATIO: 4.8
CHOLESTEROL: 178 MG/DL
CO2: 21 MMOL/L (ref 20–31)
COCAINE METABOLITE, URINE: NEGATIVE
CREAT SERPL-MCNC: 2.88 MG/DL (ref 0.5–0.9)
CREATININE URINE: 30.4 MG/DL (ref 28–217)
CREATININE URINE: 30.8 MG/DL (ref 28–217)
DIFFERENTIAL TYPE: ABNORMAL
DIFFERENTIAL TYPE: ABNORMAL
EOSINOPHILS RELATIVE PERCENT: 3 % (ref 1–4)
EOSINOPHILS RELATIVE PERCENT: 3 % (ref 1–4)
ESTIMATED AVERAGE GLUCOSE: 117 MG/DL
GFR AFRICAN AMERICAN: 19 ML/MIN
GFR NON-AFRICAN AMERICAN: 16 ML/MIN
GFR SERPL CREATININE-BSD FRML MDRD: ABNORMAL ML/MIN/{1.73_M2}
GFR SERPL CREATININE-BSD FRML MDRD: ABNORMAL ML/MIN/{1.73_M2}
GLUCOSE BLD-MCNC: 142 MG/DL (ref 70–99)
HBA1C MFR BLD: 5.7 % (ref 4–6)
HCT VFR BLD CALC: 31.8 % (ref 36.3–47.1)
HCT VFR BLD CALC: 31.8 % (ref 36.3–47.1)
HDLC SERPL-MCNC: 37 MG/DL
HEMOGLOBIN: 9.5 G/DL (ref 11.9–15.1)
HEMOGLOBIN: 9.5 G/DL (ref 11.9–15.1)
HEPATITIS C ANTIBODY: NONREACTIVE
HIV AG/AB: NONREACTIVE
IMMATURE GRANULOCYTES: 1 %
IMMATURE GRANULOCYTES: 1 %
LDL CHOLESTEROL: 85 MG/DL (ref 0–130)
LYMPHOCYTES # BLD: 32 % (ref 24–43)
LYMPHOCYTES # BLD: 32 % (ref 24–43)
MAGNESIUM: 2.4 MG/DL (ref 1.6–2.6)
MAGNESIUM: 2.4 MG/DL (ref 1.6–2.6)
MCH RBC QN AUTO: 30.4 PG (ref 25.2–33.5)
MCH RBC QN AUTO: 30.4 PG (ref 25.2–33.5)
MCHC RBC AUTO-ENTMCNC: 29.9 G/DL (ref 28.4–34.8)
MCHC RBC AUTO-ENTMCNC: 29.9 G/DL (ref 28.4–34.8)
MCV RBC AUTO: 101.6 FL (ref 82.6–102.9)
MCV RBC AUTO: 101.6 FL (ref 82.6–102.9)
MDMA URINE: NORMAL
METHADONE SCREEN, URINE: NEGATIVE
METHAMPHETAMINE, URINE: NORMAL
MICROALBUMIN/CREAT 24H UR: <12 MG/L
MICROALBUMIN/CREAT UR-RTO: NORMAL MCG/MG CREAT
MONOCYTES # BLD: 9 % (ref 3–12)
MONOCYTES # BLD: 9 % (ref 3–12)
NRBC AUTOMATED: 0 PER 100 WBC
NRBC AUTOMATED: 0 PER 100 WBC
OPIATES, URINE: NEGATIVE
OXYCODONE SCREEN URINE: NEGATIVE
PDW BLD-RTO: 15.5 % (ref 11.8–14.4)
PDW BLD-RTO: 15.5 % (ref 11.8–14.4)
PHENCYCLIDINE, URINE: NEGATIVE
PHOSPHORUS: 2.8 MG/DL (ref 2.6–4.5)
PLATELET # BLD: 436 K/UL (ref 138–453)
PLATELET # BLD: 436 K/UL (ref 138–453)
PLATELET ESTIMATE: ABNORMAL
PLATELET ESTIMATE: ABNORMAL
PMV BLD AUTO: 9.5 FL (ref 8.1–13.5)
PMV BLD AUTO: 9.5 FL (ref 8.1–13.5)
POTASSIUM SERPL-SCNC: 4.3 MMOL/L (ref 3.7–5.3)
PROPOXYPHENE, URINE: NORMAL
PTH INTACT: 214.8 PG/ML (ref 15–65)
RBC # BLD: 3.13 M/UL (ref 3.95–5.11)
RBC # BLD: 3.13 M/UL (ref 3.95–5.11)
RBC # BLD: ABNORMAL 10*6/UL
RBC # BLD: ABNORMAL 10*6/UL
SEG NEUTROPHILS: 54 % (ref 36–65)
SEG NEUTROPHILS: 54 % (ref 36–65)
SEGMENTED NEUTROPHILS ABSOLUTE COUNT: 5.05 K/UL (ref 1.5–8.1)
SEGMENTED NEUTROPHILS ABSOLUTE COUNT: 5.05 K/UL (ref 1.5–8.1)
SODIUM BLD-SCNC: 144 MMOL/L (ref 135–144)
TEST INFORMATION: NORMAL
TOTAL PROTEIN, URINE: 8 MG/DL
TRICYCLIC ANTIDEPRESSANTS, UR: NORMAL
TRIGL SERPL-MCNC: 281 MG/DL
TSH SERPL DL<=0.05 MIU/L-ACNC: 2.14 MIU/L (ref 0.3–5)
URIC ACID: 7.3 MG/DL (ref 2.4–5.7)
VITAMIN D 25-HYDROXY: 47.8 NG/ML (ref 30–100)
VLDLC SERPL CALC-MCNC: ABNORMAL MG/DL (ref 1–30)
WBC # BLD: 9.2 K/UL (ref 3.5–11.3)
WBC # BLD: 9.2 K/UL (ref 3.5–11.3)
WBC # BLD: ABNORMAL 10*3/UL
WBC # BLD: ABNORMAL 10*3/UL

## 2021-01-12 LAB
ALBUMIN SERPL-MCNC: 3.7 G/DL (ref 3.5–5.2)
ALT SERPL-CCNC: 9 U/L (ref 5–33)
ANION GAP SERPL CALCULATED.3IONS-SCNC: 19 MMOL/L (ref 9–17)
AST SERPL-CCNC: 13 U/L
BUN BLDV-MCNC: 28 MG/DL (ref 8–23)
BUN/CREAT BLD: ABNORMAL (ref 9–20)
CALCIUM SERPL-MCNC: 9.9 MG/DL (ref 8.6–10.4)
CHLORIDE BLD-SCNC: 103 MMOL/L (ref 98–107)
CO2: 21 MMOL/L (ref 20–31)
CREAT SERPL-MCNC: 2.77 MG/DL (ref 0.5–0.9)
GFR AFRICAN AMERICAN: 20 ML/MIN
GFR NON-AFRICAN AMERICAN: 17 ML/MIN
GFR SERPL CREATININE-BSD FRML MDRD: ABNORMAL ML/MIN/{1.73_M2}
GFR SERPL CREATININE-BSD FRML MDRD: ABNORMAL ML/MIN/{1.73_M2}
GLUCOSE BLD-MCNC: 141 MG/DL (ref 70–99)
PHOSPHORUS: 2.9 MG/DL (ref 2.6–4.5)
POTASSIUM SERPL-SCNC: 4.1 MMOL/L (ref 3.7–5.3)
SODIUM BLD-SCNC: 143 MMOL/L (ref 135–144)

## 2021-02-12 ENCOUNTER — PATIENT MESSAGE (OUTPATIENT)
Dept: PULMONOLOGY | Age: 76
End: 2021-02-12

## 2021-02-12 NOTE — TELEPHONE ENCOUNTER
Upon further investigation, pt hasn't been seen in over a year. Last seen 4/2019   Also received request for 2 placards for her . Denied the request for him and advised him to call office to schedule an appt. Gabe Lyn will also need seen in order to supply her with any orders.

## 2021-03-02 ENCOUNTER — OFFICE VISIT (OUTPATIENT)
Dept: FAMILY MEDICINE CLINIC | Age: 76
End: 2021-03-02
Payer: MEDICARE

## 2021-03-02 VITALS
HEART RATE: 63 BPM | BODY MASS INDEX: 42.4 KG/M2 | WEIGHT: 263.8 LBS | DIASTOLIC BLOOD PRESSURE: 88 MMHG | SYSTOLIC BLOOD PRESSURE: 128 MMHG | TEMPERATURE: 97 F | OXYGEN SATURATION: 97 % | HEIGHT: 66 IN

## 2021-03-02 DIAGNOSIS — R10.9 FLANK PAIN: ICD-10-CM

## 2021-03-02 DIAGNOSIS — M54.50 CHRONIC BILATERAL LOW BACK PAIN WITHOUT SCIATICA: Primary | ICD-10-CM

## 2021-03-02 DIAGNOSIS — G89.29 CHRONIC BILATERAL LOW BACK PAIN WITHOUT SCIATICA: Primary | ICD-10-CM

## 2021-03-02 PROCEDURE — 1090F PRES/ABSN URINE INCON ASSESS: CPT | Performed by: FAMILY MEDICINE

## 2021-03-02 PROCEDURE — 4040F PNEUMOC VAC/ADMIN/RCVD: CPT | Performed by: FAMILY MEDICINE

## 2021-03-02 PROCEDURE — G8400 PT W/DXA NO RESULTS DOC: HCPCS | Performed by: FAMILY MEDICINE

## 2021-03-02 PROCEDURE — G8482 FLU IMMUNIZE ORDER/ADMIN: HCPCS | Performed by: FAMILY MEDICINE

## 2021-03-02 PROCEDURE — G8417 CALC BMI ABV UP PARAM F/U: HCPCS | Performed by: FAMILY MEDICINE

## 2021-03-02 PROCEDURE — 96372 THER/PROPH/DIAG INJ SC/IM: CPT | Performed by: FAMILY MEDICINE

## 2021-03-02 PROCEDURE — 1123F ACP DISCUSS/DSCN MKR DOCD: CPT | Performed by: FAMILY MEDICINE

## 2021-03-02 PROCEDURE — 1036F TOBACCO NON-USER: CPT | Performed by: FAMILY MEDICINE

## 2021-03-02 PROCEDURE — 3017F COLORECTAL CA SCREEN DOC REV: CPT | Performed by: FAMILY MEDICINE

## 2021-03-02 PROCEDURE — G8427 DOCREV CUR MEDS BY ELIG CLIN: HCPCS | Performed by: FAMILY MEDICINE

## 2021-03-02 PROCEDURE — 99214 OFFICE O/P EST MOD 30 MIN: CPT | Performed by: FAMILY MEDICINE

## 2021-03-02 RX ORDER — CYCLOBENZAPRINE HCL 10 MG
10 TABLET ORAL 3 TIMES DAILY PRN
Qty: 30 TABLET | Refills: 0 | Status: SHIPPED | OUTPATIENT
Start: 2021-03-02 | End: 2021-03-12

## 2021-03-02 RX ORDER — PREDNISONE 20 MG/1
20 TABLET ORAL 2 TIMES DAILY
Qty: 10 TABLET | Refills: 0 | Status: SHIPPED | OUTPATIENT
Start: 2021-03-02 | End: 2021-03-07

## 2021-03-02 RX ORDER — METHYLPREDNISOLONE ACETATE 80 MG/ML
40 INJECTION, SUSPENSION INTRA-ARTICULAR; INTRALESIONAL; INTRAMUSCULAR; SOFT TISSUE ONCE
Status: COMPLETED | OUTPATIENT
Start: 2021-03-02 | End: 2021-03-02

## 2021-03-02 RX ADMIN — METHYLPREDNISOLONE ACETATE 40 MG: 80 INJECTION, SUSPENSION INTRA-ARTICULAR; INTRALESIONAL; INTRAMUSCULAR; SOFT TISSUE at 16:33

## 2021-03-02 NOTE — PROGRESS NOTES
Progress Note    Harjinder Cannon is a 77 y.o.  female who presents today alone for evaluation of   Chief Complaint   Patient presents with    Lower Back Pain     sxs: 5 weeks           HPI:   Patient is here for same day visit today. Patient reports LBP for the past 5 weeks. Patient states she awoke from sleep one morning and had severe LBP. Patient denies n/t/weakness. Patient locates the pain to her lumbar/sacral region. Patient states it does radiate bilaterally. Patient denies fall or injury. Patient denies saddle anesthesia, urinary retention, or stool incontinence. Patient denies rash. Health Maintenance Due   Topic Date Due    COVID-19 Vaccine (1 of 2) Never done    DTaP/Tdap/Td vaccine (1 - Tdap) Never done    Shingles Vaccine (1 of 2) Never done    Diabetic retinal exam  09/28/2016    Annual Wellness Visit (AWV)  Never done    Diabetic foot exam  03/20/2020        Current Medications:     Current Outpatient Medications   Medication Sig Dispense Refill    predniSONE (DELTASONE) 20 MG tablet Take 1 tablet by mouth 2 times daily for 5 days 10 tablet 0    cyclobenzaprine (FLEXERIL) 10 MG tablet Take 1 tablet by mouth 3 times daily as needed for Muscle spasms 30 tablet 0    calcitRIOL (ROCALTROL) 0.25 MCG capsule Take 2 capsules by mouth daily 30 capsule 3    zolpidem (AMBIEN) 5 MG tablet Take 1 tablet by mouth nightly as needed for Sleep for up to 90 days. 90 tablet 0    allopurinol (ZYLOPRIM) 100 MG tablet TAKE 1 TABLET BY MOUTH DAILY 30 tablet 5    losartan (COZAAR) 25 MG tablet Take 1 tablet by mouth daily 90 tablet 3    hydroCHLOROthiazide (HYDRODIURIL) 25 MG tablet Take 1 tablet by mouth daily 90 tablet 3    azelastine (ASTELIN) 0.1 % nasal spray 1 spray by Nasal route 2 times daily Use in each nostril as directed 1 Bottle 5    gabapentin (NEURONTIN) 600 MG tablet Take 1 tablet by mouth daily for 90 days.  90 tablet 3    potassium citrate (UROCIT-K) 10 MEQ (1080 MG) extended release tablet Take 1 tablet by mouth daily 90 tablet 3    cyclobenzaprine (FLEXERIL) 10 MG tablet One pill three times daily as needed for low back pain. 30 tablet 1    venlafaxine (EFFEXOR XR) 150 MG extended release capsule TAKE 1 CAPSULE DAILY 90 capsule 3    atorvastatin (LIPITOR) 40 MG tablet TAKE 1 TABLET BY MOUTH ONE TIME A DAY  90 tablet 3    pioglitazone-metformin (ACTOPLUS MET)  MG per tablet TAKE 1 TABLET TWICE A DAY WITH MEALS 180 tablet 3    Cholecalciferol (VITAMIN D3) 25 MCG (1000 UT) TABS Take 2 tablets by mouth daily 180 tablet 1    pantoprazole (PROTONIX) 40 MG tablet TAKE 1 TABLET DAILY 90 tablet 4    metoprolol tartrate (LOPRESSOR) 25 MG tablet TAKE 1 TABLET TWICE A  tablet 4    Magnesium Oxide 400 MG CAPS Take by mouth 2 times daily       HYDROcodone-acetaminophen (NORCO) 5-325 MG per tablet Take 1 tablet by mouth every 6 hours as needed for Pain. Current Facility-Administered Medications   Medication Dose Route Frequency Provider Last Rate Last Admin    methylPREDNISolone acetate (DEPO-MEDROL) injection 40 mg  40 mg Intramuscular Once Michelle Saavedra DO           Allergies:      Allergies   Allergen Reactions    Cephalexin Other (See Comments)     Tongue cracks and peels    Erythromycin Other (See Comments)     Epigastric pain and bloating    Ketorolac Tromethamine Other (See Comments)     Severe stomach cramps    Pcn [Penicillins]      Unknown reaction    Percocet [Oxycodone-Acetaminophen] Itching and Other (See Comments)     Esophagus hurt    Sulfa Antibiotics     Ultracet [Tramadol-Acetaminophen] Itching        Medical History:     Past Medical History:   Diagnosis Date    Abnormal stress test     Anemia     Arthritis     Depression     Diverticulosis     DM (diabetes mellitus) (HCC)     Erythropenia     Fibromyalgia     HTN (hypertension)     Hyperlipidemia     Kidney stone     Morbid obesity due to excess calories (HCC)     DIONY on CPAP     Radiograph review:     Hospital Outpatient Visit on 01/11/2021   Component Date Value Ref Range Status    ALT 01/11/2021 9  5 - 33 U/L Final    AST 01/11/2021 13  <32 U/L Final    WBC 01/11/2021 9.2  3.5 - 11.3 k/uL Final    RBC 01/11/2021 3.13* 3.95 - 5.11 m/uL Final    Hemoglobin 01/11/2021 9.5* 11.9 - 15.1 g/dL Final    Hematocrit 01/11/2021 31.8* 36.3 - 47.1 % Final    MCV 01/11/2021 101.6  82.6 - 102.9 fL Final    MCH 01/11/2021 30.4  25.2 - 33.5 pg Final    MCHC 01/11/2021 29.9  28.4 - 34.8 g/dL Final    RDW 01/11/2021 15.5* 11.8 - 14.4 % Final    Platelets 18/09/9656 436  138 - 453 k/uL Final    MPV 01/11/2021 9.5  8.1 - 13.5 fL Final    NRBC Automated 01/11/2021 0.0  0.0 per 100 WBC Final    Differential Type 01/11/2021 NOT REPORTED   Final    Seg Neutrophils 01/11/2021 54  36 - 65 % Final    Lymphocytes 01/11/2021 32  24 - 43 % Final    Monocytes 01/11/2021 9  3 - 12 % Final    Eosinophils % 01/11/2021 3  1 - 4 % Final    Basophils 01/11/2021 1  0 - 2 % Final    Immature Granulocytes 01/11/2021 1* 0 % Final    Segs Absolute 01/11/2021 5.05  1.50 - 8.10 k/uL Final    Absolute Lymph # 01/11/2021 2.94  1.10 - 3.70 k/uL Final    Absolute Mono # 01/11/2021 0.82  0.10 - 1.20 k/uL Final    Absolute Eos # 01/11/2021 0.30  0.00 - 0.44 k/uL Final    Basophils Absolute 01/11/2021 0.08  0.00 - 0.20 k/uL Final    Absolute Immature Granulocyte 01/11/2021 0.05  0.00 - 0.30 k/uL Final    WBC Morphology 01/11/2021 NOT REPORTED   Final    RBC Morphology 01/11/2021 ANISOCYTOSIS PRESENT   Final    Platelet Estimate 98/50/0966 NOT REPORTED   Final    Hemoglobin A1C 01/11/2021 5.7  4.0 - 6.0 % Final    Estimated Avg Glucose 01/11/2021 117  mg/dL Final    Comment: The ADA and AACC recommend providing the estimated average glucose result to permit better   patient understanding of their HBA1c result.       Hepatitis C Ab 01/11/2021 NONREACTIVE  NONREACTIVE Final    Comment:       The hepatitis C procedure used in our laboratory is a Chemiluminescent test specific for   three recombinant HCV antigens. A negative anti-HCV result indicates that the antibodies to   hepatitis C virus are not present at this time. Individuals with reactive anti-HCV should be considered infected and infectious until proven   otherwise. Confirmation of all equivocal or reactive results is recommended by ordering   HCV RNA by PCR.  HIV Ag/Ab 01/11/2021 NONREACTIVE  NONREACTIVE Final    Comment: No laboratory evidence of HIV infection. If acute HIV infection is suspected, consider   testing for HIV-1 RNA.  Cholesterol 01/11/2021 178  <200 mg/dL Final    Comment:    Cholesterol Guidelines:      <200  Desirable   200-240  Borderline      >240  Undesirable         HDL 01/11/2021 37* >40 mg/dL Final    Comment:    HDL Guidelines:    <40     Undesirable   40-59    Borderline    >59     Desirable         LDL Cholesterol 01/11/2021 85  0 - 130 mg/dL Final    Comment:    LDL Guidelines:     <100    Desirable   100-129   Near to/above Desirable   130-159   Borderline      >159   Undesirable     Direct (measured) LDL and calculated LDL are not interchangeable tests.  Chol/HDL Ratio 01/11/2021 4.8  <5 Final            Triglycerides 01/11/2021 281* <150 mg/dL Final    Comment:    Triglyceride Guidelines:     <150   Desirable   150-199  Borderline   200-499  High     >499   Very high   Based on AHA Guidelines for fasting triglyceride, October 2012.  VLDL 01/11/2021 NOT REPORTED* 1 - 30 mg/dL Final    Magnesium 01/11/2021 2.4  1.6 - 2.6 mg/dL Final    Microalb, Ur 01/11/2021 <12  <21 mg/L Final    Creatinine, Ur 01/11/2021 30.8  28.0 - 217.0 mg/dL Final    Microalb/Crt.  Ratio 01/11/2021 CANNOT BE CALCULATED  <25 mcg/mg creat Final    Glucose 01/11/2021 141* 70 - 99 mg/dL Final    BUN 01/11/2021 28* 8 - 23 mg/dL Final    CREATININE 01/11/2021 2.77* 0.50 - 0.90 mg/dL Final    Bun/Cre Ratio 01/11/2021 NOT NOT REPORTED  NEGATIVE Final    Cannabinoid Scrn, Ur 01/11/2021 NEGATIVE  NEGATIVE Final    Comment:       (Positive cutoff 50 ng/mL)                  Oxycodone Screen, Ur 01/11/2021 NEGATIVE  NEGATIVE Final    Comment:       (Positive cutoff 100 ng/mL)                  Methamphetamine, Urine 01/11/2021 NOT REPORTED  NEGATIVE Final    Tricyclic Antidepressants, Urine 01/11/2021 NOT REPORTED  NEGATIVE Final    MDMA, Urine 01/11/2021 NOT REPORTED  NEGATIVE Final    Buprenorphine Urine 01/11/2021 NOT REPORTED  NEGATIVE Final    Test Information 01/11/2021 Assay provides medical screening only. The absence of expected drug(s) and/or metabolite(s) may indicate diluted or adulterated urine, limitations of testing or timing of collection. Final    Comment: Testing for legal purposes should be confirmed by another method. To request confirmation   of test result, please call the lab within 7 days of sample submission. No results found for this visit on 03/02/21. Assessment/Plan:     Дмитрий Juárez was seen today for lower back pain. Diagnoses and all orders for this visit:    Chronic bilateral low back pain without sciatica  -     methylPREDNISolone acetate (DEPO-MEDROL) injection 40 mg  -     predniSONE (DELTASONE) 20 MG tablet; Take 1 tablet by mouth 2 times daily for 5 days  -     cyclobenzaprine (FLEXERIL) 10 MG tablet; Take 1 tablet by mouth 3 times daily as needed for Muscle spasms  -     XR HIP BILATERAL W AP PELVIS (2 VIEWS); Future  -     XR LUMBAR SPINE (2-3 VIEWS); Future    Flank pain  -     Urinalysis with Microscopic; Future  -     Culture, Urine; Future    Follow up on labs and imaging. Rx as above. RTC precautions provided. All questions answered and addressed to patient satisfaction. Patient understands and agrees to the plan.      The patient was evaluated and treated today based on the osteopathic principle that each person is a unit of body, mind, and spirit, the body is capable

## 2021-03-22 RX ORDER — PIOGLITAZONE HCL AND METFORMIN HCL 500; 15 MG/1; MG/1
TABLET ORAL
Qty: 180 TABLET | Refills: 1 | Status: SHIPPED | OUTPATIENT
Start: 2021-03-22 | End: 2021-11-16

## 2021-03-25 ENCOUNTER — TELEPHONE (OUTPATIENT)
Dept: FAMILY MEDICINE CLINIC | Age: 76
End: 2021-03-25

## 2021-04-05 ENCOUNTER — HOSPITAL ENCOUNTER (OUTPATIENT)
Age: 76
Setting detail: SPECIMEN
Discharge: HOME | End: 2021-04-05
Payer: MEDICARE

## 2021-04-05 DIAGNOSIS — E11.22 TYPE 2 DIABETES MELLITUS WITH STAGE 4 CHRONIC KIDNEY DISEASE, UNSPECIFIED WHETHER LONG TERM INSULIN USE (HCC): ICD-10-CM

## 2021-04-05 DIAGNOSIS — N18.4 TYPE 2 DIABETES MELLITUS WITH STAGE 4 CHRONIC KIDNEY DISEASE, UNSPECIFIED WHETHER LONG TERM INSULIN USE (HCC): ICD-10-CM

## 2021-04-05 DIAGNOSIS — N18.4 CKD (CHRONIC KIDNEY DISEASE), STAGE IV (HCC): ICD-10-CM

## 2021-04-05 LAB
ABSOLUTE EOS #: 0.3 K/UL (ref 0–0.44)
ABSOLUTE IMMATURE GRANULOCYTE: <0.03 K/UL (ref 0–0.3)
ABSOLUTE LYMPH #: 2.72 K/UL (ref 1.1–3.7)
ABSOLUTE MONO #: 0.67 K/UL (ref 0.1–1.2)
ANION GAP SERPL CALCULATED.3IONS-SCNC: 13 MMOL/L (ref 9–17)
BASOPHILS # BLD: 1 % (ref 0–2)
BASOPHILS ABSOLUTE: 0.05 K/UL (ref 0–0.2)
BUN BLDV-MCNC: 38 MG/DL (ref 8–23)
BUN/CREAT BLD: ABNORMAL (ref 9–20)
CALCIUM SERPL-MCNC: 9.8 MG/DL (ref 8.6–10.4)
CHLORIDE BLD-SCNC: 102 MMOL/L (ref 98–107)
CO2: 24 MMOL/L (ref 20–31)
CREAT SERPL-MCNC: 2.59 MG/DL (ref 0.5–0.9)
DIFFERENTIAL TYPE: ABNORMAL
EOSINOPHILS RELATIVE PERCENT: 3 % (ref 1–4)
GFR AFRICAN AMERICAN: 22 ML/MIN
GFR NON-AFRICAN AMERICAN: 18 ML/MIN
GFR SERPL CREATININE-BSD FRML MDRD: ABNORMAL ML/MIN/{1.73_M2}
GFR SERPL CREATININE-BSD FRML MDRD: ABNORMAL ML/MIN/{1.73_M2}
GLUCOSE BLD-MCNC: 104 MG/DL (ref 70–99)
HCT VFR BLD CALC: 33.6 % (ref 36.3–47.1)
HEMOGLOBIN: 9.8 G/DL (ref 11.9–15.1)
IMMATURE GRANULOCYTES: 0 %
LYMPHOCYTES # BLD: 31 % (ref 24–43)
MAGNESIUM: 2 MG/DL (ref 1.6–2.6)
MCH RBC QN AUTO: 30.3 PG (ref 25.2–33.5)
MCHC RBC AUTO-ENTMCNC: 29.2 G/DL (ref 28.4–34.8)
MCV RBC AUTO: 104 FL (ref 82.6–102.9)
MONOCYTES # BLD: 8 % (ref 3–12)
NRBC AUTOMATED: 0 PER 100 WBC
PDW BLD-RTO: 14.1 % (ref 11.8–14.4)
PHOSPHORUS: 3.8 MG/DL (ref 2.6–4.5)
PLATELET # BLD: 291 K/UL (ref 138–453)
PLATELET ESTIMATE: ABNORMAL
PMV BLD AUTO: 9.9 FL (ref 8.1–13.5)
POTASSIUM SERPL-SCNC: 4.2 MMOL/L (ref 3.7–5.3)
PTH INTACT: 146.8 PG/ML (ref 15–65)
RBC # BLD: 3.23 M/UL (ref 3.95–5.11)
RBC # BLD: ABNORMAL 10*6/UL
SEG NEUTROPHILS: 57 % (ref 36–65)
SEGMENTED NEUTROPHILS ABSOLUTE COUNT: 4.99 K/UL (ref 1.5–8.1)
SODIUM BLD-SCNC: 139 MMOL/L (ref 135–144)
VITAMIN D 25-HYDROXY: 57.5 NG/ML (ref 30–100)
WBC # BLD: 8.8 K/UL (ref 3.5–11.3)
WBC # BLD: ABNORMAL 10*3/UL

## 2021-05-18 RX ORDER — VENLAFAXINE HYDROCHLORIDE 150 MG/1
CAPSULE, EXTENDED RELEASE ORAL
Qty: 90 CAPSULE | Refills: 1 | Status: SHIPPED | OUTPATIENT
Start: 2021-05-18 | End: 2021-07-01

## 2021-05-18 NOTE — TELEPHONE ENCOUNTER
Leslye Jefferson is calling to request a refill on the following medication(s):    Medication Request:  Requested Prescriptions     Pending Prescriptions Disp Refills    venlafaxine (EFFEXOR XR) 150 MG extended release capsule [Pharmacy Med Name: VENLAFAXINE HCL ER CAPS 150MG] 90 capsule 3     Sig: TAKE 1 CAPSULE DAILY       Last Visit Date (If Applicable):  Visit date not found    Next Visit Date:    Visit date not found

## 2021-06-01 ENCOUNTER — PATIENT MESSAGE (OUTPATIENT)
Dept: FAMILY MEDICINE CLINIC | Age: 76
End: 2021-06-01

## 2021-06-01 DIAGNOSIS — E11.65 TYPE 2 DIABETES MELLITUS WITH HYPERGLYCEMIA, WITHOUT LONG-TERM CURRENT USE OF INSULIN (HCC): Primary | ICD-10-CM

## 2021-06-01 RX ORDER — GABAPENTIN 600 MG/1
600 TABLET ORAL DAILY
Qty: 90 TABLET | Refills: 1 | Status: ON HOLD | OUTPATIENT
Start: 2021-06-01 | End: 2022-04-07 | Stop reason: HOSPADM

## 2021-06-01 NOTE — TELEPHONE ENCOUNTER
From: Elza Najera  To: Erwin Duenas DO  Sent: 6/1/2021 12:29 PM EDT  Subject: Prescription Question    I NEED A REFILL ON MY GABEPENTIN 800 MG. I TRIED FILLING IT ON THE PRESCRIPTION   PAGE BUT IT SAYS IT CAN'T BE FILLED AT THIS TIME. I TAKE THIS AT NIGHT FOR MY LEGS   AND PAIN AT NIGHT. THIS SHOULD E FILLED THROUGH [facility] FOR A 90 DAY   REFILLL. THANK YOU SO MUCH.

## 2021-07-01 ENCOUNTER — OFFICE VISIT (OUTPATIENT)
Dept: FAMILY MEDICINE CLINIC | Age: 76
End: 2021-07-01
Payer: MEDICARE

## 2021-07-01 VITALS
DIASTOLIC BLOOD PRESSURE: 80 MMHG | HEIGHT: 66 IN | BODY MASS INDEX: 42.27 KG/M2 | HEART RATE: 89 BPM | TEMPERATURE: 97 F | WEIGHT: 263 LBS | SYSTOLIC BLOOD PRESSURE: 136 MMHG | OXYGEN SATURATION: 97 %

## 2021-07-01 DIAGNOSIS — R26.89 POOR BALANCE: ICD-10-CM

## 2021-07-01 DIAGNOSIS — D53.9 MACROCYTIC ANEMIA: ICD-10-CM

## 2021-07-01 DIAGNOSIS — I10 HTN, GOAL BELOW 130/80: ICD-10-CM

## 2021-07-01 DIAGNOSIS — M1A.09X0 IDIOPATHIC CHRONIC GOUT OF MULTIPLE SITES WITHOUT TOPHUS: ICD-10-CM

## 2021-07-01 DIAGNOSIS — Z00.00 ROUTINE GENERAL MEDICAL EXAMINATION AT A HEALTH CARE FACILITY: Primary | ICD-10-CM

## 2021-07-01 DIAGNOSIS — Z71.3 DIETARY COUNSELING: ICD-10-CM

## 2021-07-01 DIAGNOSIS — E11.65 TYPE 2 DIABETES MELLITUS WITH HYPERGLYCEMIA, WITHOUT LONG-TERM CURRENT USE OF INSULIN (HCC): ICD-10-CM

## 2021-07-01 DIAGNOSIS — R53.81 PHYSICAL DEBILITY: ICD-10-CM

## 2021-07-01 DIAGNOSIS — Z71.82 EXERCISE COUNSELING: ICD-10-CM

## 2021-07-01 DIAGNOSIS — E78.5 DYSLIPIDEMIA: ICD-10-CM

## 2021-07-01 PROCEDURE — 4040F PNEUMOC VAC/ADMIN/RCVD: CPT | Performed by: FAMILY MEDICINE

## 2021-07-01 PROCEDURE — 3017F COLORECTAL CA SCREEN DOC REV: CPT | Performed by: FAMILY MEDICINE

## 2021-07-01 PROCEDURE — G8417 CALC BMI ABV UP PARAM F/U: HCPCS | Performed by: FAMILY MEDICINE

## 2021-07-01 PROCEDURE — 1123F ACP DISCUSS/DSCN MKR DOCD: CPT | Performed by: FAMILY MEDICINE

## 2021-07-01 PROCEDURE — G0438 PPPS, INITIAL VISIT: HCPCS | Performed by: FAMILY MEDICINE

## 2021-07-01 PROCEDURE — 3044F HG A1C LEVEL LT 7.0%: CPT | Performed by: FAMILY MEDICINE

## 2021-07-01 SDOH — ECONOMIC STABILITY: FOOD INSECURITY: WITHIN THE PAST 12 MONTHS, YOU WORRIED THAT YOUR FOOD WOULD RUN OUT BEFORE YOU GOT MONEY TO BUY MORE.: NEVER TRUE

## 2021-07-01 SDOH — ECONOMIC STABILITY: FOOD INSECURITY: WITHIN THE PAST 12 MONTHS, THE FOOD YOU BOUGHT JUST DIDN'T LAST AND YOU DIDN'T HAVE MONEY TO GET MORE.: NEVER TRUE

## 2021-07-01 ASSESSMENT — LIFESTYLE VARIABLES: HOW OFTEN DO YOU HAVE A DRINK CONTAINING ALCOHOL: 0

## 2021-07-01 ASSESSMENT — SOCIAL DETERMINANTS OF HEALTH (SDOH): HOW HARD IS IT FOR YOU TO PAY FOR THE VERY BASICS LIKE FOOD, HOUSING, MEDICAL CARE, AND HEATING?: NOT HARD AT ALL

## 2021-07-01 ASSESSMENT — PATIENT HEALTH QUESTIONNAIRE - PHQ9
2. FEELING DOWN, DEPRESSED OR HOPELESS: 2
SUM OF ALL RESPONSES TO PHQ QUESTIONS 1-9: 2
1. LITTLE INTEREST OR PLEASURE IN DOING THINGS: 0
SUM OF ALL RESPONSES TO PHQ9 QUESTIONS 1 & 2: 2

## 2021-07-01 NOTE — PROGRESS NOTES
Medicare Annual Wellness Visit  Name: Christa Barrera Date: 2021   MRN: [id number] Sex: Female   Age: 77 y.o. Ethnicity: Non-/Non    : 1945 Race: Celestina Toledo is here for Medicare AWV    Screenings for behavioral, psychosocial and functional/safety risks, and cognitive dysfunction are all negative except as indicated below. These results, as well as other patient data from the [address] form, are documented in Flowsheets linked to this Encounter. Patient is here for AMV today. Patient PMH DM type 2, CKD III, gout, HTN, dyslipidemia, obesity, primary insomnia, DIONY on CPAP, and CAD. Patient [address] [facility], right arm surgery, b/l TKA, and tubal ligation. Patient fhx mom DM/HD/osteoporosis, dad CKD, and brother prostate cancer. Patient is a former smoker. Patient denies regular EtOH consumption. Patient denies illicits. Patient denies SIG E CAPS. Patient denies SI/HI. Patient denies anxiety. Patient denies specific diet. Patient denies regular aerobic activity. Patient last mammogram 2020 and had abnormality in left breast. She follows with GYN. US was ordered by GYN. Patient last colonoscopy  and was recommended to have a repeat in 10 yrs. Patient follows with nephrology, urology, oncology/hematology, GYN, and cardiology regularly. Patient last HbA1c 2021 5.7. Patient does not check her FBG. Patient denies polyuria/polyphagia/polydipsia. Patient denies exertional calf cramping. Patient is due for ANTWON. Patient denies n/t in her feet. PHQ-9 Total Score: 2 (2021  3:14 PM)    Patient would like to complete physical therapy for gait and balance training.      Allergies   Allergen Reactions    Cephalexin Other (See Comments)     Tongue cracks and peels    Erythromycin Other (See Comments)     Epigastric pain and bloating    Ketorolac Tromethamine Other (See Comments)     Severe stomach cramps    Pcn [Penicillins]      Unknown reaction    Percocet allergies     Sleep apnea     uses bipap prn       Past Surgical History:   Procedure Laterality Date    ARM SURGERY  2011    right arm broken    CARDIAC CATHETERIZATION  [id number] dr Lupillo Easley  5-16-16    COLONOSCOPY  2003    COLONOSCOPY  2007    TOTAL KNEE ARTHROPLASTY Bilateral     left may 2013 and right july 2013    TUBAL LIGATION           Family History   Problem Relation Age of Onset    Diabetes Mother     Heart Disease Mother     Osteoporosis Mother     Kidney Disease Father     Prostate Cancer Brother        CareTeam (Including outside providers/suppliers regularly involved in providing care):   Patient Care Team:  Nory Slaughter DO as PCP - General (Family Medicine)  Nory Slaughter DO as PCP - [facility]  Mandi Victoria MD as Consulting Physician  Vlad Guerrero MD as Consulting Physician (Rheumatology)  Jacques Canchola MD as Consulting Physician (Pulmonology)  Lachelle Hernandez MD as Surgeon (Orthopedic Surgery)  Luan Chin MD (Endocrinology)  [place] as Consulting Physician (Podiatry)  Darby Lange MD as Consulting Physician (Urology)  Jhonny Cheatham MD as Consulting Physician (Nephrology)    Wt Readings from Last 3 Encounters:   07/01/21 263 lb (119.3 kg)   04/13/21 254 lb (115.2 kg)   03/02/21 263 lb 12.8 oz (119.7 kg)     Vitals:    07/01/21 1517 07/01/21 1526   BP: (!) 170/84 136/80   Pulse: 89    Temp: 97 °F (36.1 °C)    TempSrc: Temporal    SpO2: 97%    Weight: 263 lb (119.3 kg)    Height: 5' 6\" (1.676 m)      Body mass index is 42.45 kg/m². Based upon direct observation of the patient, evaluation of cognition reveals recent and remote memory intact. ROS:    Constitutional: No fevers, chills, fatigue. ENT: No nasal congestion or sore throat  Respiratory: No difficulty in breathing or cough.    Cardiovascular: No chest pain, palpitations or shortness of breath  Gastrointestinal: No abdominal pain or change in bowel movements. Genitourinary: No change in urinary frequency or dysuria. Skin: No rashes or skin lesions. Neurological: No weakness. No headaches. MSK: +poor balance    PE:    GENERAL APPEARANCE: in no acute distress, well developed, well nourished. HEAD: normocephalic, atraumatic. EYES: extraocular movement intact (EOMI), pupils equal, round, reactive to light and accommodation. EARS: normal, tympanic membrane intact, clear, auditory canal clear. NOSE: nares patent, no erythema, sinuses nontender bilaterally, no rhinorrhea. ORAL CAVITY: mucosa moist, no lesions. THROAT: clear, no mass, no exudate. NECK/THYROID: neck supple, full range of motion, no thyromegaly. HEART: no murmurs, regular rate and rhythm, S1, S2 normal.   LUNGS: clear to auscultation bilaterally, no wheezes, rales, rhonchi. ABDOMEN: normal, bowel sounds present, soft, nontender, nondistended, no rebound guarding or rigidity        Patient's complete Health Risk Assessment and screening values have been reviewed and are found in Flowsheets. The following problems were reviewed today and where indicated follow up appointments were made and/or referrals ordered. Positive Risk Factor Screenings with Interventions:            General Health and ACP:  General  In general, how would you say your health is?: Fair  In the past 7 days, have you experienced any of the following?  New or Increased Pain, New or Increased Fatigue, Loneliness, Social Isolation, Stress or Anger?: (!) New or Increased Fatigue  Do you get the social and emotional support that you need?: (!) No  Do you have a Living Will?: Yes  Advance Directives     Power of  Living Will ACP-Advance Directive ACP-Power of     Not on File Not on File Not on File Not on File      General Health Risk Interventions:  · No Living Will: patient admits to having living will    Health Habits/Nutrition:  Health Habits/Nutrition  Do you exercise for at least 20 minutes 2-3 times per week?: (!) No  Have you lost any weight without trying in the past 3 months?: No  Do you eat only one meal per day?: (!) Yes (Sometimes one meal, sometime not at all)  Have you seen the dentist within the past year?: Yes  Body mass index: (!) 42.44  Health Habits/Nutrition Interventions:  · Inadequate physical activity:  patient is not ready to increase his/her physical activity level at this time      ADL:  ADLs  In the past 7 days, did you need help from others to perform any of the following everyday activities? Eating, dressing, grooming, bathing, toileting, or walking/balance?: None  In the past 7 days, did you need help from others to take care of any of the following? Laundry, housekeeping, banking/finances, shopping, telephone use, food preparation, transportation, or taking medications?: [employer]    Daughter completes patients laundry.     Personalized Preventive Plan   Current Health Maintenance Status  Immunization History   Administered Date(s) Administered    COVID-19, Pfizer, PF, 30mcg/0.3mL 04/01/2021, 04/27/2021    Influenza 10/04/2012, 09/24/2013    Influenza Nasal 08/30/2011    Influenza Virus Vaccine 10/15/2014, 10/14/2015    Influenza, High Dose (Fluzone 65 yrs and older) 09/05/2017, 10/02/2018, 09/29/2020    Influenza, Lydia Finner, IM, (6 mo and older Fluzone, Flulaval, Fluarix and 3 yrs and older Afluria) 09/20/2016    Influenza, Triv, inactivated, subunit, adjuvanted, IM (Fluad 65 yrs and older) 08/30/2019    Pneumococcal Conjugate 13-valent ([id number]) 10/14/2015    Pneumococcal Conjugate 7-valent (Prevnar7) 08/30/2011    Pneumococcal Polysaccharide ([id number]) 08/30/2011        Health Maintenance   Topic Date Due    DTaP/Tdap/Td vaccine (1 - Tdap) Never done    Shingles Vaccine (1 of 2) Never done    Diabetic retinal exam  09/28/2016    Annual Wellness Visit (AWV)  Never done    Diabetic foot exam  03/20/2020    Flu vaccine (1) 09/01/2021    A1C test (Diabetic or Prediabetic)  01/11/2022    Lipid screen  01/11/2022    Potassium monitoring  04/05/2022    Creatinine monitoring  04/05/2022    Colon cancer screen colonoscopy  10/21/2023    DEXA (modify frequency per FRAX score)  Completed    Pneumococcal 65+ yrs at Risk Vaccine  Completed    COVID-19 Vaccine  Completed    Hepatitis C screen  Completed    Hepatitis A vaccine  Aged Out    Hib vaccine  Aged Out    Meningococcal (ACWY) vaccine  Aged Out     Recommendations for Viraloid Due: see orders and patient instructions/AVS.  . Recommended screening schedule for the next 5-10 years is provided to the patient in written form: see Patient Instructions/AVS.    Terry Mcclelland was seen today for medicare awv. Diagnoses and all orders for this visit:    Routine general medical examination at a health care facility    BMI 40.0-44.9, adult [facility])    Exercise counseling    Dietary counseling  -      BMI ABOVE NORMAL F/U    Type 2 diabetes mellitus with hyperglycemia, without long-term current use of insulin (HCC)  -     Hemoglobin A1C; Future  -     Microalbumin, Ur; Future    Dyslipidemia  -     ALT; Future  -     AST; Future  -     Lipid Panel; Future    HTN, goal below 130/80  -     Microalbumin, Ur; Future    Idiopathic chronic gout of multiple sites without tophus  -     Uric Acid; Future    Macrocytic anemia  -     Ferritin; Future  -     Iron and TIBC; Future  -     Transferrin; Future  -     Vitamin B12 & Folate; Future    Physical debility  -     [facility]    Poor balance  -     [facility]             Follow up on labs. Encouraged well balanced diet. Encouraged 150 mins of aerobic activity weekly. Encouraged regular follow up with her specialists. Referral for PT per patient request.      BMI was elevated today, and weight loss plan recommended is : conventional weight loss.     Kaushik Close, DO     Return in about 6 months (around 1/1/2022) for dm/htn/chol; 20 min appt.

## 2021-07-01 NOTE — PATIENT INSTRUCTIONS
Personalized Preventive Plan for Aurelio Moya - 7/1/2021  Medicare offers a range of preventive health benefits. Some of the tests and screenings are paid in full while other may be subject to a deductible, co-insurance, and/or copay. Some of these benefits include a comprehensive review of your medical history including lifestyle, illnesses that may run in your family, and various assessments and screenings as appropriate. After reviewing your medical record and screening and assessments performed today your provider may have ordered immunizations, labs, imaging, and/or referrals for you. A list of these orders (if applicable) as well as your Preventive Care list are included within your After Visit Summary for your review. Other Preventive Recommendations:    · A preventive eye exam performed by an eye specialist is recommended every 1-2 years to screen for glaucoma; cataracts, macular degeneration, and other eye disorders. · A preventive dental visit is recommended every 6 months. · Try to get at least 150 minutes of exercise per week or 10,000 steps per day on a pedometer . · Order or download the FREE \"Exercise & Physical Activity: Your Everyday Guide\" from [employer]. Call 9-873.987.5840 or search [employer] online. · You need 6762-8401 mg of calcium and 7194-9315 IU of vitamin D per day. It is possible to meet your calcium requirement with diet alone, but a vitamin D supplement is usually necessary to meet this goal.  · When exposed to the sun, use a sunscreen that protects against both UVA and UVB radiation with an SPF of 30 or greater. Reapply every 2 to 3 hours or after sweating, drying off with a towel, or swimming. · Always wear a seat belt when traveling in a car. Always wear a helmet when riding a bicycle or motorcycle. Learning About Obesity  What is obesity? Obesity means having an unhealthy amount of body fat.  This puts your health in danger. It can lead to other health problems, such as type 2 diabetes and high blood pressure. How do you know if your weight is in the obesity range? To know if your weight is in the obesity range, your doctor looks at your body mass index (BMI) and waist size. BMI is a number that is calculated from your weight and your height. To figure out your BMI for yourself, you can use an online tool, such as http://www.KaraokeSmart.co.Booster.ly/ on the [employer]. If your BMI is 30.0 or higher, it falls within the obesity range. Keep in mind that BMI and waist size are only guides. They are not tools to determine your ideal body weight. What causes obesity? When you take in more calories than you burn off, you gain weight. How you eat, how active you are, and other things affect how your body uses calories and whether you gain weight. If you have family members who have too much body fat, you may have inherited a tendency to gain weight. And your family also helps form your eating and lifestyle habits, which can lead to obesity. Also, our busy lives make it harder to plan and cook healthy meals. For many of us, it's easier to reach for prepared foods, go out to eat, or go to the drive-through. But these foods are often high in saturated fat and calories. Portions are often too large. What can you do to reach a healthy weight? Focus on health, not diets. Diets are hard to stay on and don't work in the long run. It is very hard to stay with a diet that includes lots of big changes in your eating habits. Instead of a diet, focus on lifestyle changes that will improve your health and achieve the right balance of energy and calories. To lose weight, you need to burn more calories than you take in. You can do it by eating healthy foods in reasonable amounts and becoming more active, even a little bit every day.  Making small changes over time can add up to a lot. Make a plan for change. Many people have found that naming their reasons for change and staying focused on their plan can make a big difference. Work with your doctor to create a plan that is right for you. Ask yourself: Himanshu Becerril are my personal, most powerful reasons for wanting this change? What will my life look like when I've made the change? \"  Set your long-term goal. Make it specific, such as \"I will lose x pounds. \"  Break your long-term goal into smaller, short-term goals. Make these small steps specific and within your reach, things you know you can do. These steps are what keep you going from day to day. Talk with your doctor about other weight-loss options. If you have a BMI in a certain range and have not been able to lose weight with diet and exercise, medicine or surgery may be an option for you. Before your doctor will prescribe medicines or surgery, he or she will probably want you to be more active and follow your healthy eating plan for a period of time. These habits are key lifelong changes for managing your weight, with or without other medical treatment. And these changes can help you avoid weight-related health problems. How can you stay on your plan for change? Be ready. Choose to start during a time when there are few events like holidays, social events, and high-stress periods. These events might trigger slip-ups. Decide on your first few steps. Most people have more success when they make small changes, one step at a time. For example, you might switch a daily candy bar to a piece of fruit, walk 10 minutes more, or add more vegetables to a meal.  Line up your support people. Make sure you're not going to be alone as you make this change. Connect with people who understand how important it is to you. Ask family members and friends for help in keeping with your plan. And think about who could make it harder for you, and how to handle them. Try tracking.  People who keep track of what they eat, feel, and do are better at losing weight. Try writing down things like:  What and how much you eat. How you feel before and after each meal.  Details about each meal (like eating out or at home, eating alone, or with friends or family). What you do to be active. Look and plan. As you track, look for patterns that you may want to change. Take note of: When you eat and whether you skip meals. How often you eat out. How many fruits and vegetables you eat. When you eat beyond feeling full. When and why you eat for reasons other than being hungry. When you stray from your plan, don't get upset. Figure out what made you slip up and how you can fix it. Can you take medicines or have surgery to lose weight? If you have a BMI in a certain range and have not been able to lose weight with diet and exercise, medicine or surgery may be an option for you. If you have a BMI of at least 30.0 (or a BMI of at least 27.0 and another health problem related to your weight), ask your doctor about weight-loss medicines. They work by making you feel less hungry, making you feel full more quickly, or changing how you digest fat. Medicines are used along with diet changes and more physical activity to help you make lasting changes. If you have a BMI of 40.0 or more (or a BMI of 35.0 or more and another health problem related to your weight), your doctor may talk with you about surgery. Weight-loss surgery has risks, and you will need to work with your doctor to compare the risk of having obesity with the risks of surgery. With any option you choose, you will still need to eat a healthy diet and get regular exercise. Follow-up care is a key part of your treatment and safety. Be sure to make and go to all appointments, and call your doctor if you are having problems. It's also a good idea to know your test results and keep a list of the medicines you take. Where can you learn more?   Go to

## 2021-07-05 NOTE — PROGRESS NOTES
All Soto is a 77 y.o. female who presents todayfor her medical conditions/complaints as noted below. All Soto is here today c/oFall (patient fallen twice this week )      :     HPI    Has fallen three times in past six weeks in her home doing simple household tasks. Once she falls she is not able to get up from the floor. Past Medical History:   Diagnosis Date    Abnormal stress test     Arthritis     Depression     Diverticulosis     DM (diabetes mellitus) ([address]     Erythropenia     Fibromyalgia     HTN (hypertension)     Hyperlipidemia     Kidney stone     Morbid obesity due to excess calories ([address]     DIONY on CPAP     Osteopenia 14    Seasonal allergies     Sleep apnea     uses bipap prn      Past Surgical History:   Procedure Laterality Date    ARM SURGERY      right arm broken    CARDIAC CATHETERIZATION  [id number] dr Fern Tom  16    COLONOSCOPY  2003    COLONOSCOPY  2007    TOTAL KNEE ARTHROPLASTY Bilateral     left may 2013 and right 2013    TUBAL LIGATION       Family History   Problem Relation Age of Onset    Diabetes Mother     Heart Disease Mother     Osteoporosis Mother     Kidney Disease Father     Prostate Cancer Brother      Social History     Tobacco Use    Smoking status: Former Smoker     Packs/day: 0.25     Years: 1.00     Pack years: 0.25     Last attempt to quit: 1960     Years since quittin.1    Smokeless tobacco: Never Used    Tobacco comment: quit    Substance Use Topics    Alcohol use: No      Current Outpatient Medications   Medication Sig Dispense Refill    pioglitazone-metformin (ACTOPLUS MET)  MG per tablet TAKE 1 TABLET TWICE A DAY WITH MEALS 180 tablet 3    cyclobenzaprine (FLEXERIL) 10 MG tablet One pill three times daily as needed for low back pain.  30 tablet 1    calcitRIOL (ROCALTROL) 0.25 MCG capsule       allopurinol (ZYLOPRIM) 100 MG tablet TAKE 1 TABLET BY MOUTH DAILY 30 tablet 5    Cholecalciferol (VITAMIN D3) 25 MCG (1000 UT) TABS Take 2 tablets by mouth daily 180 tablet 1    nystatin (MYCOSTATIN) 533163 UNIT/GM cream Apply topically 2 times daily. 1 Tube 0    gabapentin (NEURONTIN) 600 MG tablet Take 1 tablet by mouth daily for 90 days. 90 tablet 3    zolpidem (AMBIEN) 10 MG tablet TAKE 1 TABLET BY MOUTH NIGHTLY AS NEEDED FOR SLEEP 90 tablet 1    pantoprazole (PROTONIX) 40 MG tablet TAKE 1 TABLET DAILY 90 tablet 4    metoprolol tartrate (LOPRESSOR) 25 MG tablet TAKE 1 TABLET TWICE A  tablet 4    hydrochlorothiazide (HYDRODIURIL) 25 MG tablet TAKE 1 TABLET BY MOUTH DAILY 90 tablet 3    potassium citrate (UROCIT-K) 10 MEQ (1080 MG) extended release tablet Take 10 mEq by mouth daily       HYDROcodone-acetaminophen (NORCO) 5-325 MG per tablet Take 1 tablet by mouth every 6 hours as needed for Pain.  atorvastatin (LIPITOR) 40 MG tablet TAKE 1 TABLET BY MOUTH ONE TIME A DAY  90 tablet 1    venlafaxine (EFFEXOR XR) 150 MG extended release capsule Take 1 capsule by mouth daily 90 capsule 3    losartan (COZAAR) 25 MG tablet Take 1 tablet by mouth daily (Patient taking differently: Take 50 mg by mouth daily ) 90 tablet 3    Magnesium Oxide 400 MG CAPS Take by mouth 2 times daily        No current facility-administered medications for this visit. Allergies   Allergen Reactions    Cephalexin Other (See Comments)     Tongue cracks and peels    Erythromycin Other (See Comments)     Epigastric pain and bloating    Ketorolac Tromethamine Other (See Comments)     Severe stomach cramps    Pcn [Penicillins]      Unknown reaction    Percocet [Oxycodone-Acetaminophen] Itching and Other (See Comments)     Esophagus hurt    Sulfa Antibiotics     Ultracet [Tramadol-Acetaminophen] Itching         Subjective:   Review of Systems   Constitutional: Negative for chills, diaphoresis and fever.    HENT: Negative for congestion, sinus pressure and sore throat. Eyes: Negative for visual disturbance. Respiratory: Negative for cough and shortness of breath. Cardiovascular: Negative for chest pain and leg swelling. Gastrointestinal: Negative for diarrhea, nausea and vomiting. Genitourinary: Negative for dysuria, menstrual problem, urgency and vaginal discharge. Musculoskeletal: Positive for arthralgias, back pain, gait problem and myalgias. Skin: Negative for rash. Neurological: Positive for weakness. Negative for numbness and headaches. Psychiatric/Behavioral: Negative for sleep disturbance.       :   /84   Pulse 63   Wt 264 lb (119.7 kg)   SpO2 98%   BMI 42.61 kg/m²     Physical Exam  Constitutional:       General: She is not in acute distress. Appearance: She is well-developed. She is not diaphoretic. HENT:      Head: Normocephalic and atraumatic. Mouth/Throat:      Pharynx: No oropharyngeal exudate. Eyes:      General: No scleral icterus. Neck:      Musculoskeletal: Neck supple. Vascular: No carotid bruit. Cardiovascular:      Heart sounds: No murmur. No friction rub. No gallop. Pulmonary:      Effort: No respiratory distress. Breath sounds: No wheezing or rales. Chest:      Chest wall: No tenderness. Musculoskeletal:      Right lower leg: No edema. Left lower leg: No edema. Lymphadenopathy:      Cervical: No cervical adenopathy. Skin:     Findings: No rash. Neurological:      Mental Status: She is alert and oriented to person, place, and time. Cranial Nerves: No cranial nerve deficit. Psychiatric:         Behavior: Behavior normal.         Thought Content: Thought content normal.         Judgment: Judgment normal.         Assessment:       Diagnosis Orders   1. At high risk for injury related to fall  External Referral To Physical Therapy   2.  Acute bilateral low back pain without sciatica  cyclobenzaprine (FLEXERIL) 10 MG tablet         Plan:      Return if symptoms worsen or fail to improve. Orders Placed This Encounter   Procedures    External Referral To Physical Therapy     Referral Priority:   Routine     Referral Type:   Eval and Treat     Referral Reason:   Specialty Services Required     Requested Specialty:   Physical Therapy     Number of Visits Requested:   1     Orders Placed This Encounter   Medications    pioglitazone-metformin (ACTOPLUS MET)  MG per tablet     Sig: TAKE 1 TABLET TWICE A DAY WITH MEALS     Dispense:  180 tablet     Refill:  3    cyclobenzaprine (FLEXERIL) 10 MG tablet     Sig: One pill three times daily as needed for low back pain. Dispense:  30 tablet     Refill:  1     Handicap letter of support. Pain management  PT BAM to hopefully lessen her risk of falling. Discharged

## 2021-07-12 ENCOUNTER — HOSPITAL ENCOUNTER (OUTPATIENT)
Dept: PHYSICAL THERAPY | Age: 76
Setting detail: THERAPIES SERIES
Discharge: HOME | End: 2021-07-12
Payer: MEDICARE

## 2021-07-12 PROCEDURE — 97162 PT EVAL MOD COMPLEX 30 MIN: CPT

## 2021-07-12 PROCEDURE — 97530 THERAPEUTIC ACTIVITIES: CPT

## 2021-07-12 NOTE — CONSULTS
leg up. Able to sleep on right arm, just sometimes cannot lift it up all the way. Putting rollers in her hair is very difficult. Right side weaker than the left. Sleeping in bed - going to bathroom often and her cats keep her up; has sleep apnea but mask does not fit (knows she needs to make an appt). HPI: (7/1/21) May 2013 left TKA; July 2013 right TKA. PT after TKA on left caused stitch for patella to come loose- had to have surgery to open her up to fix it. Right humerus fx Aug 2011- fell off porch. Had to have plate placed. PMHx: [] Unremarkable [x] Diabetes [x] HTN  [] Pacemaker   [] MI/Heart Problems [] Cancer [x] Arthritis [] Other:              [x] Refer to full medical chart  In EPIC     Comorbidities:   [x] Obesity [] Dialysis  [] N/A   [] Asthma/COPD [] Dementia [] Other:   [] Stroke [] Sleep apnea [] Other:   [] Vascular disease [] Rheumatic disease [] Other:     Tests: [] X-Ray: [] MRI:  [] Other: n/a    Medications: [x] Refer to full medical record [] None [] Other:  Allergies:      [x] Refer to full medical record [] None [] Other:    Function:  Hand Dominance  [x] Right  [] Left  Patient lives with:    In what type of home []  One story   [] Two story   [] Split level - bed/bath main level   Number of stairs to enter Back door, 3 steps + 1 landing - hold on to cane and house   With handrail on the []  Right to enter   [] Left to enter   Odilon Paz has a []  Tub only  [] Tub/shower combo   [] Walk in shower    []  Grab bars   Washing machine is on []  Main level   [] Second level   [x] Basement - daughter does   Employer    Job Status []  Normal duty   [] Light duty   [] Off due to condition    [x]  Retired   [] Not employed   [] Disability  [] Other:  []  Return to work:    Work activities/duties      ADL/IADL [x] Previously independent with all [] Currently independent with all Who currently assists the patient with task    [] Previously independent with all except: [x] Currently independent with all except:    Bathing  [] Assist [] Assist    Dress/grooming [] Assist [] Assist    Transfer/mobility [] Assist [] Assist    Feeding [] Assist [] Assist    Toileting [] Assist [] Assist    Driving [] Assist [] Assist    Housekeeping [] Assist [x] Assist    Grocery shop/meal prep [] Assist [] Assist      Gait Prior level of function Current level of function    [x] Independent  [] Assist [x] Independent  [] Assist   Device: [] Independent [] Independent    [x] Straight Cane [] Quad cane [x] Straight Cane [] Quad cane    [] Standard walker [] Rolling walker   [] 4 wheeled walker [] Standard walker [] Rolling walker   [] 4 wheeled walker    [] Wheelchair [] Wheelchair     Pain:  [x] Yes  [] No Location: Right arm, bilateral legs   Pain Rating: (0-10 scale) dull ache in right arm; right leg dull ache; left leg dull ache/10  Pain altered Tx:  [] Yes  [x] No  Action:    Symptoms:  [] Improving [] Worsening [x] Same  Better:  [] AM    [] PM    [] Sit    [] Rise/Sit    []Stand    [] Walk    [] Lying    [x] Other: rest  Worse: [] AM    [] PM    [] Sit    [] Rise/Sit    []Stand    [] Walk    [] Lying    [] Bend                      [] Valsalva    [x] Other: standing at times  Sleep: [] OK    [x] Disturbed    Objective:      ROM  ° A/P STRENGTH    Left Right Left Right   Shoulder Flex sit/supine 132/50 110/144 4- 4-   Abd sit/supine 114/144 80/148 4- 4-   IR 90 78     ER 90 90     Hip Flex 105 112 4- 4-   Ext Lack 8 0 4- 4-   Abd   4- 4-   Knee Flex   4- 4-   Ext   4- 4-   Ankle DF 5 9 4- 4-   PF       Quad lag right SLR, left quad lag and unable to lift as high as right  Hamstring flexibility right lack 28, left lack 20  OBSERVATION No Deficit Deficit Not Tested Comments   Posture       Forward Head [x] [] []    Rounded Shoulders [] [x] []    Kyphosis [x] [] []    Lordosis [x] [] []    Lateral Shift [] [] [x]    Scoliosis [] [] [x]    Iliac Crest [x] [] []    PSIS [x] [] []    ASIS [x] [] []    Genu Valgus [x] [] []    Genu Varus [x] [] []    Genu Recurvatum [x] [] []    Pronation [x] [] []    Supination [x] [] []    Leg Length Discrp [x] [] []    Slumped Sitting [] [x] []    Palpation [] [x] [] Right ASIS tender   Sensation [x] [] []    Edema [] [x] [] Bilateral feet- reports she uses salt in her diet; states edema comes and goes   Neurological [x] [] []      Functional Test: OPTIMAL Score: 22% functionally impaired     Comments:    Assessment:  Patient would benefit from skilled physical therapy services in order to: improve left knee extension, improve balance, gait, decrease risk of falls, improve strength overall, improve right shoulder strength, improve UE function. Problems:    [x] ? Pain:  [x] ? ROM:  [x] ? Strength:  [x] ? Function:  [x] Other:  TUG 17.53 seconds. Gait: shuffle- cane in right hand. STG: (to be met in 9 treatments)  1. ? soreness: patient to report overall soreness in legs to improve  2. ? ROM: Seated right shoulder ROM improve to: 125 degrees flexion and 95 degrees abduction   3. ? Strength: Gross right UE strength improve to 4/5; gross B LE strength improve to 4/5  4. ? Function: Patient to report no falls x 4 weeks or greater  5. Patient to be independent with home exercise program as demonstrated by performance with correct form without cues. 6. Demonstrate Knowledge of fall prevention  LTG: (to be met in 18 treatments)  1. Left knee extension improve to 0 degrees  2. Right hamstring flexibility improve to lacking 20 degrees or less  3. Right shoulder IR improve to 85 degrees or greater  4. Left ankle DF improve to 10 degrees actively  5. OPTIMAL score improve to 40% functionally impaired or less  6. Patient to report improved ability to  items with right arm  7. Patient to report improved ability to walk through grocery stores without need for motorized cart  8. Patient able to get left leg in to [facility] without having to use arms to lift leg. Patient goals:  \"To be able to walk and climb stairs and lift with my right arm\"    Rehab Potential:  [x] Good  [x] Fair  [] Poor   Suggested Professional Referral:  [x] No  [] Yes:  Barriers to Goal Achievement:  [x] No  [] Yes:  Domestic Concerns:  [x] No  [] Yes:    Pt. Education:  [x] Plans/Goals, Risks/Benefits discussed  [] Home exercise program  Method of Education: [x] Verbal  [] Demo  [] Written -- re: POC  Comprehension of Education:  [x] Verbalizes understanding. [] Demonstrates understanding. [] Needs Review. [] Demonstrates/verbalizes understanding of HEP/Ed previously given. Treatment Plan:  [x] Therapeutic Exercise   56268  [] Iontophoresis: 4 mg/mL Dexamethasone Sodium Phosphate  mAmin  83169   [x] Therapeutic Activity  19687 [x] Vasopneumatic cold with compression  14978    [x] Gait Training   91759 [] Ultrasound   28447   [x] Neuromuscular Re-education  58782 [x] Electrical Stimulation Unattended  50611   [x] Manual Therapy  95552 [] Electrical Stimulation Attended  96296   [x] Instruction in HEP  [] Lumbar/Cervical Traction  48312   [] Aquatic Therapy   74476 [x] Cold/hotpack    [] Massage   95387      [] Dry Needling, 1 or 2 muscles  54729   [] Biofeedback, first 15 minutes   09803  [] Biofeedback, additional 15 minutes   74099 [] Dry Needling, 3 or more muscles  95425     []  Medication allergies reviewed for use of    Dexamethasone Sodium Phosphate 4mg/ml     with iontophoresis treatments. Pt is not allergic. Frequency:  3 x/week for 18 visits    Todays Treatment:  Modalities:   Precautions:   Exercises:  Exercise Reps/ Time Weight/ Level Comments                                 Other: no HEP given due to extensive evaluation of multiple body parts. Generally deconditioned. Discussed lacking left knee extension effecting gait; generally weak causing impaired gait and balance; tender throughout right UE and lacking full strength (has better motion than strength); risk of continued falls.     Specific Instructions for next treatment: SCIFIT, balance exercise; but will need to start with mat exercises to increase overall strength in R UE and B LE. Evaluation Complexity:  History (Personal factors, comorbidities) [] 0 [] 1-2 [x] 3+   Exam (limitations, restrictions) [] 1-2 [] 3 [x] 4+   Clinical presentation (progression) [] Stable [x] Evolving  [] Unstable   Decision Making [] Low [x] Moderate [] High    [] Low Complexity [x] Moderate Complexity [] High Complexity       Treatment Charges: Mins Units   [x] Evaluation       []  Low       [x]  Moderate       []  High 40 1   []  Modalities     []  Ther Exercise     []  Manual Therapy     [x]  Ther Activities 10 1   []  Aquatics     []  Vasocompression     []  Other       TOTAL TREATMENT TIME: 50      Time in:1510     Time out:1600    Electronically signed by: Blayne Marquez PT        Physician Signature:________________________________Date:__________________  By signing above or cosigning this note, I have reviewed this plan of care and certify a need for medically necessary rehabilitation services.      *PLEASE SIGN ABOVE AND FAX BACK ALL PAGES*

## 2021-07-13 NOTE — FLOWSHEET NOTE
[facility] Fall Risk Assessment    Patient Name:  Britney Wilson  : 1945        Risk Factor Scale  Score   History of Falls [x] Yes  [] No 25  0 25   Secondary Diagnosis [] Yes  [x] No 15  0 0   Ambulatory Aid [] Furniture  [x] Crutches/cane/walker  [] None/bedrest/wheelchair/nurse 65  82  6 05   IV/Heparin Lock [] Yes  [x] No 20  0 0   Gait/Transferring [] Impaired  [x] Weak  [] Normal/bedrest/immobile 20  10  0 10   Mental Status [] Forgets limitations  [x] Oriented to own ability 15  0 0      Total: 50     Based on the Assessment score: check the appropriate box.     []  No intervention needed   Low =   Score of 0-24    []  Use standard prevention interventions Moderate =  Score of 24-44   [] Give patient handout and discuss fall prevention strategies   [] Establish goal of education for patient/family RE: fall prevention strategies    [x]  Use high risk prevention interventions High = Score of 45 and higher   [x] Give patient handout and discuss fall prevention strategies   [x] Establish goal of education for patient/family Re: fall prevention strategies   [x] Discuss lifeline / other resources    Electronically signed by:   Martina Bates PT  Date: 2021

## 2021-07-15 ENCOUNTER — HOSPITAL ENCOUNTER (OUTPATIENT)
Dept: PHYSICAL THERAPY | Age: 76
Setting detail: THERAPIES SERIES
Discharge: HOME | End: 2021-07-15
Payer: MEDICARE

## 2021-07-15 NOTE — FLOWSHEET NOTE
[x] [facility]) [facility] &  Therapy  [address]    [phone]  F: [phone]   [] [address]  [facility] [address]   [address]  P: [phone]  F: [phone]  [] [place]  [address]  P: [phone]  F: [phone] [] [address]  P: [phone]  F: [phone]  [] [address]  [address] [facility]   [address]   [place]: [phone]  F: [phone]   [] [place] [address] [facility] [address]  [place]: [phone]   F: [phone]     Physical Therapy Cancel/No Show note    Date: 7/15/2021  Patient: Montserrat Armstrong  : 1945  MRN: [id number]    Cancels/No Shows to date:     For today's appointment patient:    [x]  Cancelled    [] Rescheduled appointment    [] No-show     Reason given by patient:    []  Patient ill    []  Conflicting appointment    [] No transportation      [] Conflict with work    [] No reason given     [] Weather related    [] [id number]    [x] Other:      Comments:  Unexpected company coming into town, next appointment confirmed.        [x] Next appointment was confirmed    Electronically signed by: Geovany Crump PTA

## 2021-07-19 ENCOUNTER — HOSPITAL ENCOUNTER (OUTPATIENT)
Dept: PHYSICAL THERAPY | Age: 76
Setting detail: THERAPIES SERIES
Discharge: HOME | End: 2021-07-19
Payer: MEDICARE

## 2021-07-19 NOTE — FLOWSHEET NOTE
[x] [address]  [address]    [phone]  F: [phone]   [] [address] [facility] 36   [address]  P: [phone]  F: [phone]  [] [address]  [address] [facility]  P: [phone]  F: [phone] [] [address]: [phone]  F: [phone]  [] [address]  [facility]   [address]   [place]: [phone]  F: [phone]   [] [facility] & Therapy  [address] [facility] [address]  [place]: [phone]   F: [phone]     Physical Therapy Cancel/No Show note    Date: 2021  Patient: Scarlet Marr  : 1945  MRN: [id number]    Cancels/No Shows to date:     For today's appointment patient:    [x]  Cancelled    [] Rescheduled appointment    [] No-show     Reason given by patient:    []  Patient ill    []  Conflicting appointment    [] No transportation      [] Conflict with work    [] No reason given     [] Weather related    [] COVID-19    [x] Other:      Comments:  Pt called to cancel, stating she was unable to move. Appt rescheduled for 21.        [x] Next appointment was confirmed    Electronically signed by: Anny Mays PTA

## 2021-07-21 ENCOUNTER — HOSPITAL ENCOUNTER (OUTPATIENT)
Dept: PHYSICAL THERAPY | Age: 76
Setting detail: THERAPIES SERIES
Discharge: HOME | End: 2021-07-21
Payer: MEDICARE

## 2021-07-21 NOTE — FLOWSHEET NOTE
[x] [address]  [address]    [phone]  F: [phone]   [] [address] [facility] 36   [address]  P: [phone]  F: [phone]  [] [address]  [address] [facility]  P: [phone]  F: [phone] [] [address]: [phone]  F: [phone]  [] [address]  [facility]   [address]   [place]: [phone]  F: [phone]   [] [address]  [place]: [phone]   F: [phone]     Physical Therapy Cancel/No Show note    Date: 2021  Patient: Angela Moyer  : 1945  MRN: [id number]    Cancels/No Shows to date: 3/0    For today's appointment patient:    [x]  Cancelled    [] Rescheduled appointment    [] No-show     Reason given by patient:    []  Patient ill    []  Conflicting appointment    [] No transportation      [] Conflict with work    [] No reason given     [] Weather related    [] COVID-19    [x] Other:      Comments:  Pt called to cancel, stating she was unable to move. Still noting she is very weak. Is going to call PCP to get a COVID test done.         [x] Next appointment was confirmed 8/3/21    Electronically signed by: Stacey Rod PTA

## 2021-08-03 ENCOUNTER — HOSPITAL ENCOUNTER (OUTPATIENT)
Dept: PHYSICAL THERAPY | Age: 76
Setting detail: THERAPIES SERIES
Discharge: HOME | End: 2021-08-03
Payer: MEDICARE

## 2021-08-03 PROCEDURE — 97110 THERAPEUTIC EXERCISES: CPT

## 2021-08-03 NOTE — FLOWSHEET NOTE
[x] [facility]) [facility]  [address]  [phone]  F: [phone] [] [address]  [place] [address]   [address]  P: [phone]  F: [phone] [] [place]  [address] [facility] [address]  P: [phone]  F: [phone] [] [address]  P: [phone]  F: [phone] [] [address]  [facility]   [address]   [place]: [phone]  F: [phone]      Physical Therapy Daily Treatment Note    Date:  8/3/2021  Patient Name:  Roxanne Guzman    :  1945  MRN: [id number]  Physician: Dr. Misti Keller,                            Insurance: Medicare ([address] [place] [address]); BCBS (60 visit limit, calendar year, no copay, combined, no preauth)  Medical Diagnosis: Physicial debility, poor balance                          Rehab Codes: M 25.561, M 25.562, M 62.81, R 26.2, Z 91.81, M 79.601  Onset Date: 21                                    Next 's appt: 6 months  Visit# / total visits: ; Progress note for Medicare patient due at visit 10     Cancels/No Shows: 3/0    Subjective:    Pain:  [x] Yes  [] No Location: right arm, leg   Pain Rating: (0-10 scale) 7/10  Pain altered Tx:  [x] No  [] Yes  Action:  Comments: Reports right shoulder pain is worse today. Cancelled last 3 visits - educated on attendance policy. Suspect increased right shoulder pain due to more time laying around due to not feeling well.     Objective:  Modalities:   Precautions:  Exercises:  Exercise Reps/ Time Weight/ Level Comments   SCIFIT 5 min L 1.0          Stand      Heel raise 10 x     Hip abd 10 x ea     Hip ext 10 x ea     Hamstring curl 10 x ea     March 10 x ea     Squat 10 x      Calf stretch 1 x 30 sec    Step up and over 10 x ea 2\"                Seated Hamstring curl 10 x ea Lime    Hip abd 10 x Lime    Biceps curl 10 x 1 lb    Rows 10 x Lime    Retro shoulder rolls 10 x AROM                Supine      PROM 5 min  Right shoulder, all planes   Cane flexion 10 x 1 lb    Cane ER 10 x 1 lb    Cane chest press 10 x 1 lb    SAQ 10 x ea AROM    Bridge 10 x                                               Other:      Treatment Charges: Mins Units   []  Modalities     [x]  Ther Exercise 50 3   []  Manual Therapy     []  Ther Activities     []  Aquatics     []  Vasocompression     []  Other     Total Treatment time 50        Assessment: [x] Progressing toward goals. Fatigued and c/o right hip pain during and after Rx. No c/o right shoulder pain with PROM or exercises, but did report right shoulder pain with standing exs (holding on to bars with right arm). Asked for pain medication - educated we do not provide here in therapy. Overall did well. Continue to progress balance and strength. [] No change. [] Other:  [x] Patient would continue to benefit from skilled physical therapy services in order to: improve left knee extension, improve balance, gait, decrease risk of falls, improve strength overall, improve right shoulder strength, improve UE function. STG: (to be met in 9 treatments)  1. ? soreness: patient to report overall soreness in legs to improve  2. ? ROM: Seated right shoulder ROM improve to: 125 degrees flexion and 95 degrees abduction   3. ? Strength: Gross right UE strength improve to 4/5; gross B LE strength improve to 4/5  4. ? Function: Patient to report no falls x 4 weeks or greater  5. Patient to be independent with home exercise program as demonstrated by performance with correct form without cues. 6. Demonstrate Knowledge of fall prevention  LTG: (to be met in 18 treatments)  1. Left knee extension improve to 0 degrees  2. Right hamstring flexibility improve to lacking 20 degrees or less  3.  Right shoulder IR improve to 85 degrees or greater  4. Left ankle DF improve to 10 degrees actively  5. OPTIMAL score improve to 40% functionally impaired or less  6. Patient to report improved ability to  items with right arm  7. Patient to report improved ability to walk through grocery stores without need for motorized cart  8. Patient able to get left leg in to [facility] without having to use arms to lift leg.     Patient goals: \"To be able to walk and climb stairs and lift with my right arm\"    Pt. Education:  [x] Yes  [] No  [] Reviewed Prior HEP/Ed  Method of Education: [x] Verbal  [x] Demo  [] Written  Comprehension of Education:  [x] Verbalizes understanding. [] Demonstrates understanding. [x] Needs review. [] Demonstrates/verbalizes HEP/Ed previously given. Plan: [x] Continue current frequency toward long and short term goals. [x] Specific Instructions for subsequent treatments: balance exercise; but will need to start with mat exercises to increase overall strength in R UE and B LE.   Please issue fall prevention interventions.         Time In:1500            Time Out: 1600    Electronically signed by:  Nancy Bernstein, PT

## 2021-08-05 ENCOUNTER — HOSPITAL ENCOUNTER (OUTPATIENT)
Dept: PHYSICAL THERAPY | Age: 76
Setting detail: THERAPIES SERIES
Discharge: HOME | End: 2021-08-05
Payer: MEDICARE

## 2021-08-05 PROCEDURE — 97110 THERAPEUTIC EXERCISES: CPT

## 2021-08-05 NOTE — FLOWSHEET NOTE
[x] [address]  - [facility] &  Therapy  [address]  [phone]  F: [phone] [] [address]  [employer] [employer]   [address]  P: [phone]  F: [phone] [] [place]  [address] [facility] [address]  P: [phone]  F: [phone] [] [address]  P: [phone]  F: [phone] [] [address]  [facility]   [address]   [place]: [phone]  F: [phone]      Physical Therapy Daily Treatment Note    Date:  2021  Patient Name:  Becca Lorenzo    :  1945  MRN: [id number]  Physician: Dr. Carrion Ar, DO                           Insurance: Medicare ([address] [place] [address]); BCBS (60 visit limit, calendar year, no copay, combined, no preauth)  Medical Diagnosis: Physicial debility, poor balance                          Rehab Codes: M 25.561, M 25.562, M 62.81, R 26.2, Z 91.81, M 79.601  Onset Date: 21                                    Next 's appt: 6 months  Visit# / total visits: 3/18; Progress note for Medicare patient due at visit 10     Cancels/No Shows: 3/0    Subjective:    Pain:  [x] Yes  [] No Location: right arm, leg   Pain Rating: (0-10 scale) 0/10  Pain altered Tx:  [x] No  [] Yes  Action:  Comments: Reports pain in low back as she pulled a muscle on mat last session of PT. Pain for the rest of the day. Arrives with no complaints of pain.      Objective:  Modalities:   Precautions:  Exercises:  Exercise Reps/ Time Weight/ Level Comments   SCIFIT 5 min L 1.0          Stand      Heel raise  10 x     Hip abd  10 x ea     Hip ext   10 x ea     Hamstring curl   10 x ea     March   10 x ea     Squat   10 x      Calf stretch 1 x 30 sec    Step up and over 10 x ea 2\"    Tandem stance 1x 30 sec added   SLS 1x 30 sec added         Seated Hamstring curl    10 x ea Lime    Hip abd    10 x Lime    Biceps curl    10 x 1 lb    Rows   10 x Lime    Retro shoulder rolls 10 x AROM    LAQs 10x  added         Supine      PROM 5 min  Right shoulder, all planes   Cane flexion    10 x 1 lb    Cane ER    10 x 1 lb    Cane chest press   10 x 1 lb    SAQ 10 x ea AROM    Bridge 10 x                                               Other:      Treatment Charges: Mins Units   []  Modalities     [x]  Ther Exercise 49 3   []  Manual Therapy     []  Ther Activities     []  Aquatics     []  Vasocompression     []  Other     Total Treatment time 49 mins        Assessment: [x] Progressing toward goals. Added balance exercises SLS and tandem stance to increase balance and stability. Also added LAQs to increase quad strength. Reviewed exercises previously given to patient as they are still new to patient. Fatigue noted with standing exercises, no rest breaks needed. Also noted right shoulder pain holding onto bars for standing exercises. [] No change. [] Other:  [x] Patient would continue to benefit from skilled physical therapy services in order to: improve left knee extension, improve balance, gait, decrease risk of falls, improve strength overall, improve right shoulder strength, improve UE function. STG: (to be met in 9 treatments)  1. ? soreness: patient to report overall soreness in legs to improve  2. ? ROM: Seated right shoulder ROM improve to: 125 degrees flexion and 95 degrees abduction   3. ? Strength: Gross right UE strength improve to 4/5; gross B LE strength improve to 4/5  4. ? Function: Patient to report no falls x 4 weeks or greater  5. Patient to be independent with home exercise program as demonstrated by performance with correct form without cues. 6. Demonstrate Knowledge of fall prevention  LTG: (to be met in 18 treatments)  1. Left knee extension improve to 0 degrees  2.   Right hamstring flexibility improve to lacking 20 degrees or less  3. Right shoulder IR improve to 85 degrees or greater  4. Left ankle DF improve to 10 degrees actively  5. OPTIMAL score improve to 40% functionally impaired or less  6. Patient to report improved ability to  items with right arm  7. Patient to report improved ability to walk through grocery stores without need for motorized cart  8. Patient able to get left leg in to [facility] without having to use arms to lift leg.     Patient goals: \"To be able to walk and climb stairs and lift with my right arm\"    Pt. Education:  [x] Yes  [] No  [] Reviewed Prior HEP/Ed  Method of Education: [x] Verbal  [x] Demo  [] Written  Comprehension of Education:  [x] Verbalizes understanding. [] Demonstrates understanding. [x] Needs review. [] Demonstrates/verbalizes HEP/Ed previously given. Plan: [x] Continue current frequency toward long and short term goals. [x] Specific Instructions for subsequent treatments: balance exercise; but will need to start with mat exercises to increase overall strength in R UE and B LE.   Please issue fall prevention interventions.      Time In: 1436             Time Out: 1530    Electronically signed by:  Ning Dai PTA

## 2021-08-11 ENCOUNTER — HOSPITAL ENCOUNTER (OUTPATIENT)
Dept: PHYSICAL THERAPY | Age: 76
Setting detail: THERAPIES SERIES
Discharge: HOME | End: 2021-08-11
Payer: MEDICARE

## 2021-08-11 PROCEDURE — 97110 THERAPEUTIC EXERCISES: CPT

## 2021-08-11 NOTE — FLOWSHEET NOTE
[x] [facility]) [facility]  [facility]  [address]  [phone]  F: [phone] [] [address]  [place] [address]   [address]  P: [phone]  F: [phone] [] [place]  [address]  P: [phone]  F: [phone] [] [address]  P: [phone]  F: [phone] [] [address]  [facility]   [address]   [place]: [phone]  F: [phone]      Physical Therapy Daily Treatment Note    Date:  2021  Patient Name:  Alfred Verdugo    :  1945  MRN: [id number]  Physician: Dr. Bobo Fernandes,                            Insurance: Medicare ([address] [place] [address]); BCBS (60 visit limit, calendar year, no copay, combined, no preauth)  Medical Diagnosis: Physicial debility, poor balance                          Rehab Codes: M 25.561, M 25.562, M 62.81, R 26.2, Z 91.81, M 79.601  Onset Date: 21                                    Next 's appt: 6 months  Visit# / total visits: ; Progress note for Medicare patient due at visit 10     Cancels/No Shows: 3/0    Subjective:    Pain:  [x] Yes  [] No Location: right arm, leg   Pain Rating: (0-10 scale) 0/10  Pain altered Tx:  [x] No  [] Yes  Action:  Comments: Patient arrives noting \"just the usual pain in her knees\". Did not rate numerically but notes nothing new to report.      Objective:  Modalities:   Precautions:  Exercises:  Exercise Reps/ Time Weight/ Level Comments   SCIFIT 5 min L 1.0          Stand      Heel raise  10 x     Hip abd  10 x ea     Hip ext   10 x ea     Hamstring curl   10 x ea     March   10 x ea     Squat   10 x      Calf stretch 1 x 30 sec    Step up and over 10 x ea 2\"    Tandem stance 1x 30 sec    SLS 1x 30 sec          Seated      Hamstring curl    10 x ea Lime    Hip abd    10 x Lime    Biceps curl    15 x 1 lb    Rows   15 x Lime    Retro shoulder rolls 15 x AROM    LAQs 10x           Supine      PROM 5 min  Right shoulder, all planes   Cane flexion    15 x 1 lb    Cane ER    10 x 1 lb    Cane chest press   10 x 1 lb    SAQ 15 x ea AROM    Bridge 10 x                                               Other:      Treatment Charges: Mins Units   []  Modalities     [x]  Ther Exercise 55 4   []  Manual Therapy     []  Ther Activities     []  Aquatics     []  Vasocompression     []  Other     Total Treatment time 55 mins 4       Assessment: [x] Progressing toward goals. Rep progressions made this date for various exercises with overall good tolerance. Patient noted some fatigue throughout, but required no rest breaks for entirety of treatment. Reviewed newly introduced exercises this date with fair recall. Patient notes overall fatigue and exhaustion at conclusion of treatment, but no exacerbation of or onset of pain/symptoms. [] No change. [] Other:  [x] Patient would continue to benefit from skilled physical therapy services in order to: improve left knee extension, improve balance, gait, decrease risk of falls, improve strength overall, improve right shoulder strength, improve UE function. STG: (to be met in 9 treatments)  1. ? soreness: patient to report overall soreness in legs to improve  2. ? ROM: Seated right shoulder ROM improve to: 125 degrees flexion and 95 degrees abduction   3. ? Strength: Gross right UE strength improve to 4/5; gross B LE strength improve to 4/5  4. ? Function: Patient to report no falls x 4 weeks or greater  5. Patient to be independent with home exercise program as demonstrated by performance with correct form without cues. 6. Demonstrate Knowledge of fall prevention  LTG: (to be met in 18 treatments)  1. Left knee extension improve to 0 degrees  2. Right hamstring flexibility improve to lacking 20 degrees or less  3.  Right shoulder IR improve to 85 degrees or greater  4. Left ankle DF improve to 10 degrees actively  5. OPTIMAL score improve to 40% functionally impaired or less  6. Patient to report improved ability to  items with right arm  7. Patient to report improved ability to walk through grocery stores without need for motorized cart  8. Patient able to get left leg in to [facility] without having to use arms to lift leg.     Patient goals: \"To be able to walk and climb stairs and lift with my right arm\"    Pt. Education:  [x] Yes  [] No  [] Reviewed Prior HEP/Ed  Method of Education: [x] Verbal  [x] Demo  [] Written  Comprehension of Education:  [x] Verbalizes understanding. [] Demonstrates understanding. [x] Needs review. [] Demonstrates/verbalizes HEP/Ed previously given. Plan: [x] Continue current frequency toward long and short term goals. [x] Specific Instructions for subsequent treatments: balance exercise; but will need to start with mat exercises to increase overall strength in R UE and B LE.   Please issue fall prevention interventions.      Time In: 300 pm             Time Out: 400 pm    Electronically signed by:  Armand Denson PTA

## 2021-08-13 ENCOUNTER — HOSPITAL ENCOUNTER (OUTPATIENT)
Dept: PHYSICAL THERAPY | Age: 76
Setting detail: THERAPIES SERIES
Discharge: HOME | End: 2021-08-13
Payer: MEDICARE

## 2021-08-13 NOTE — FLOWSHEET NOTE
[x] [address]  [address]    [phone]  F: [phone]   [] [address] [facility] 36   [address]  P: [phone]  F: [phone]  [] [address]  [address] [facility]  P: [phone]  F: [phone] [] [address]: [phone]  F: [phone]  [] [address] [facility]   [address]   [place]: [phone]  F: [phone]   [] [facility]) [facility] & Therapy  [address] [facility] [address]  [place]: [phone]   F: [phone]     Physical Therapy Cancel/No Show note    Date: 2021  Patient: Poli Coley  : 1945  MRN: [id number]    Cancels/No Shows to date:     For today's appointment patient:    [x]  Cancelled    [] Rescheduled appointment    [] No-show     Reason given by patient:    []  Patient ill    []  Conflicting appointment    [] No transportation      [] Conflict with work    [] No reason given     [] Weather related    [] COVID-19    [x] Other:      Comments:  Pt called stating she fell this morning and is in too much pain for therapy today.         [x] Next appointment was confirmed     Electronically signed by: Queenie Garcia PTA

## 2021-08-17 ENCOUNTER — HOSPITAL ENCOUNTER (OUTPATIENT)
Dept: PHYSICAL THERAPY | Age: 76
Setting detail: THERAPIES SERIES
Discharge: HOME | End: 2021-08-17
Payer: MEDICARE

## 2021-08-17 PROCEDURE — 97110 THERAPEUTIC EXERCISES: CPT

## 2021-08-17 NOTE — FLOWSHEET NOTE
[x] [facility]) [facility] TWELVESTEP [facility] &  Therapy  [address]  [phone]  F: [phone] [] [address]  [facility]   [address]  P: [phone]  F: [phone] [] [place]  [address]  P: [phone]  F: [phone] [] [address]  P: [phone]  F: [phone] [] [address]  [facility]   [address]   [place]: [phone]  F: [phone]      Physical Therapy Daily Treatment Note    Date:  2021  Patient Name:  Shoshana Santiago    :  1945  MRN: [id number]  Physician: Dr. Wendy Moreno,                            Insurance: Medicare ([address] [place] [address]); BCBS (60 visit limit, calendar year, no copay, combined, no preauth)  Medical Diagnosis: Physicial debility, poor balance                          Rehab Codes: M 25.561, M 25.562, M 62.81, R 26.2, Z 91.81, M 79.601  Onset Date: 21                                    Next 's appt: 6 months  Visit# / total visits: ; Progress note for Medicare patient due at visit 10     Cancels/No Shows: 4/0    Subjective:    Pain:  [x] Yes  [] No Location: right arm, leg   Pain Rating: (0-10 scale) 0/10  Pain altered Tx:  [x] No  [] Yes  Action:  Comments: Patient has no complaints of pain. Notes she is sore and fatigued after PT sessions for 1.5 days. Also tripped on her threshold and fell forward as her was carrying 2 cases of pop.       Objective:  Modalities:   Precautions:  Exercises:  Exercise Reps/ Time Weight/ Level Comments   SCIFIT 5 min L 1.0          Stand      Heel raise    10 x     Hip abd    10 x ea     Hip ext     10 x ea     Hip flex    10x ea     Hamstring curl        March     10 x ea     Squat        Calf stretch  30 sec    Step up and over  2\"    Tandem stance 30 sec    SLS  30 sec          Seated      Hamstring curl    10 x ea Lime    Hip abd     12 x Lime    Biceps curl     15 x 1 lb    Rows   15 x Lime    Retro shoulder rolls   15 x AROM    LAQs    10x           Supine      PROM 5 min  Right shoulder, all planes   Cane flexion    15 x 1 lb    Cane ER    10 x 1 lb    Cane chest press    10 x 1 lb    SAQ 15 x ea AROM    Bridge 10 x           Other:      Treatment Charges: Mins Units   []  Modalities     [x]  Ther Exercise 38 3   []  Manual Therapy     []  Ther Activities     []  Aquatics     []  Vasocompression     []  Other     Total Treatment time 38 mins        Assessment: [x] Progressing toward goals. Completed exercises per patient tolerance this date. Patient fatigued after 5 standing exercises and needed rest break seated. Patient deferred remaining standing exercises. Completed seated and supine exercises with verbal cues for exercise techniques and progressions with fair carryover and tolerance. Patient fatigues quickly. Fall prevention given and explained this date. [] No change. [] Other:  [x] Patient would continue to benefit from skilled physical therapy services in order to: improve left knee extension, improve balance, gait, decrease risk of falls, improve strength overall, improve right shoulder strength, improve UE function. STG: (to be met in 9 treatments)  1. ? soreness: patient to report overall soreness in legs to improve  2. ? ROM: Seated right shoulder ROM improve to: 125 degrees flexion and 95 degrees abduction   3. ? Strength: Gross right UE strength improve to 4/5; gross B LE strength improve to 4/5  4. ? Function: Patient to report no falls x 4 weeks or greater  5. Patient to be independent with home exercise program as demonstrated by performance with correct form without cues. 6. Demonstrate Knowledge of fall prevention--MET, given 8/17/21  LTG: (to be met in 18 treatments)  1. Left knee extension improve to 0 degrees  2.   Right hamstring flexibility improve to lacking 20 degrees or less  3. Right shoulder IR improve to 85 degrees or greater  4. Left ankle DF improve to 10 degrees actively  5. OPTIMAL score improve to 40% functionally impaired or less  6. Patient to report improved ability to  items with right arm  7. Patient to report improved ability to walk through grocery stores without need for motorized cart  8. Patient able to get left leg in to [employer] without having to use arms to lift leg.     Patient goals: \"To be able to walk and climb stairs and lift with my right arm\"    Pt. Education:  [x] Yes  [] No  [] Reviewed Prior HEP/Ed  Method of Education: [x] Verbal  [x] Demo  [x] Written--fall prevention  Comprehension of Education:  [x] Verbalizes understanding. [] Demonstrates understanding. [x] Needs review. [] Demonstrates/verbalizes HEP/Ed previously given. Plan: [x] Continue current frequency toward long and short term goals.     [x] Specific Instructions for subsequent treatments: balance exercise; but will need to start with mat exercises to increase overall strength in R UE and B LE.       Time In: 2:53 pm             Time Out: 3:36 pm    Electronically signed by:  Bety Bey PTA

## 2021-08-18 ENCOUNTER — HOSPITAL ENCOUNTER (OUTPATIENT)
Dept: PHYSICAL THERAPY | Age: 76
Setting detail: THERAPIES SERIES
Discharge: HOME | End: 2021-08-18
Payer: MEDICARE

## 2021-08-20 ENCOUNTER — HOSPITAL ENCOUNTER (OUTPATIENT)
Dept: PHYSICAL THERAPY | Age: 76
Setting detail: THERAPIES SERIES
Discharge: HOME | End: 2021-08-20
Payer: MEDICARE

## 2021-08-20 NOTE — FLOWSHEET NOTE
[x] [facility] [facility]  [address]    [phone]  F: [phone]   [] [address]  [address]   [address]  P: [phone]  F: [phone]  [] [place]  [address]  P: [phone]  F: [phone] [] [address]  P: [phone]  F: [phone]  [] [address]  [facility]   [address]   [place]: [phone]  F: [phone]   [] [address] [facility] [address]  [place]: [phone]   F: [phone]     Physical Therapy Cancel/No Show note    Date: 2021  Patient: Roxanne Guzman  : 1945  MRN: [id number]    Cancels/No Shows to date:     For today's appointment patient:    [x]  Cancelled    [] Rescheduled appointment    [] No-show     Reason given by patient:    []  Patient ill    []  Conflicting appointment    [] No transportation      [] Conflict with work    [] No reason given     [] Weather related    [] COVID-19    [x] Other:      Comments: Patient called cancelling all appts at this time stating she feels that she needs to be done. Patient verbalized she will get a new order if she feels like she needs to come back. Will proceed with discharge at this time.         [] Next appointment was confirmed     Electronically signed by: Ashley Huerta PTA

## 2021-08-23 NOTE — DISCHARGE SUMMARY
[x] [facility]) - [facility] TWELVESTEP [facility] &  Therapy  [address]  [phone]  F: [phone] [] [address]  [facility]   [address]  P: [phone]  F: [phone] [] [address] &  Therapy  [address]  P: [phone]  F: [phone] [] [address]  P: [phone]  F: [phone] [] [address]  [facility]   [address]   [place]: [phone]  F: [phone]      Physical Therapy Discharge Note    Date: 2021      Patient: Becca Lorenzo  : 1945  MRN: [id number]    Physician: Dr. Carrion Ar, KG                           Insurance: Medicare (BMN); BCBS (60 visit limit, calendar year, no copay, combined, no preauth)  Medical Diagnosis: Physicial debility, poor balance                          Rehab Codes: M 25.561, M 25.562, M 62.81, R 26.2, Z 91.81, M 79.601  Onset Date: 21                                    Next 's appt: 6 months  Visit# / total visits: ; Progress note for Medicare patient due at visit 10                                    Cancels/No Shows: 5  Date of initial visit: 21                Date of final visit: 21    Subjective:    Pain:  [x]? Yes  []? No   Location: right arm, leg             Pain Rating: (0-10 scale) 0/10  Pain altered Tx:  [x]? No  []? Yes  Action:  Comments: Patient has no complaints of pain. Notes she is sore and fatigued after PT sessions for 1.5 days. Also tripped on her threshold and fell forward as her was carrying 2 cases of pop.       Objective:  Refer to initial eval.    Assessment:  STG: (to be met in 9 treatments)  1. ? soreness: patient to report overall soreness in legs to improve  2. ? ROM: Seated right shoulder ROM improve to: 125 degrees flexion and 95 degrees abduction   3. ? Strength: Gross right UE strength improve to 4/5; gross B LE strength improve to 4/5  4. ? Function: Patient to report no falls x 4 weeks or greater  5. Patient to be independent with home exercise program as demonstrated by performance with correct form without cues. 6. Demonstrate Knowledge of fall prevention--MET, given 8/17/21  LTG: (to be met in 18 treatments)  1. Left knee extension improve to 0 degrees  2.  Right hamstring flexibility improve to lacking 20 degrees or less  3. Right shoulder IR improve to 85 degrees or greater  4. Left ankle DF improve to 10 degrees actively  5. OPTIMAL score improve to 40% functionally impaired or less  6. Patient to report improved ability to  items with right arm  7. Patient to report improved ability to walk through grocery stores without need for motorized cart  8. Patient able to get left leg in to [facility] without having to use arms to lift leg.     Patient goals: \"To be able to walk and climb stairs and lift with my right arm\"      Treatment to Date:  [x] Therapeutic Exercise    [] Modalities:  [] Therapeutic Activity    [] Ultrasound  [] Electrical Stimulation  [] Gait Training     [] Massage       [] Lumbar/Cervical Traction  [] Neuromuscular Re-education [] Cold/hotpack [] Iontophoresis: 4 mg/mL  [x] Instruction in Home Exercise Program                     Dexamethasone Sodium  [] Manual Therapy             Phosphate 40-80 mAmin  [] Aquatic Therapy                   [] Vasocompression/    [] Other:             Game Ready    Discharge Status:     [] Pt recovered from conditions. Treatment goals were met. [] Pt received maximum benefit. No further therapy indicated at this time. [] Pt to continue exercise/home instructions independently. [] Therapy interrupted due to:    [x] Pt has 2 or more no shows/cancels, is discontinued per our policy. [] Pt has completed prescribed number of treatment sessions.     [x] Other: Patient called cancelling all appts at this time stating she feels that she needs to be done. Patient verbalized she will get a new order if she feels like she needs to come back. Will proceed with discharge at this time. Electronically signed by Yvette Duenas PT on 8/22/2021 at 10:30 PM      If you have any questions or concerns, please don't hesitate to call.   Thank you for your referral.

## 2021-10-12 ENCOUNTER — PATIENT MESSAGE (OUTPATIENT)
Dept: FAMILY MEDICINE CLINIC | Age: 76
End: 2021-10-12

## 2021-10-13 RX ORDER — AZELASTINE 1 MG/ML
1 SPRAY, METERED NASAL 2 TIMES DAILY
Qty: 1 EACH | Refills: 5 | Status: SHIPPED | OUTPATIENT
Start: 2021-10-13

## 2021-10-13 NOTE — TELEPHONE ENCOUNTER
From: Lisa Yee  To: Maryann Buchanan DO  Sent: 10/12/2021 7:59 PM EDT  Subject: Prescription Question    I WOULD LIKE TO GET MY METROPOLOL 25 MG. PRESCRIPTION REFILLED THROUGH [facility]. I USUALLY GET A   90 DAY REFILL. ALSO I NEED MY ASTELIN NASAL SPRAY REFILLED ALSO A 3 MONTH SUPPLY. IT CAN BE THROUGH [facility]  OR [facility].

## 2021-11-12 RX ORDER — VENLAFAXINE HYDROCHLORIDE 150 MG/1
CAPSULE, EXTENDED RELEASE ORAL
Qty: 90 CAPSULE | Refills: 3 | OUTPATIENT
Start: 2021-11-12

## 2021-11-12 NOTE — TELEPHONE ENCOUNTER
Torin Martinez is calling to request a refill on the following medication(s):    Medication Request:  Requested Prescriptions     Pending Prescriptions Disp Refills    venlafaxine (EFFEXOR XR) 150 MG extended release capsule [Pharmacy Med Name: VENLAFAXINE HCL ER CAPS 150MG] 90 capsule 3     Sig: TAKE 1 CAPSULE DAILY       Last Visit Date (If Applicable):  Visit date not found    Next Visit Date:    Visit date not found              Patient has a new patient appointment with another PCP on 11/16/21

## 2021-11-16 ENCOUNTER — OFFICE VISIT (OUTPATIENT)
Dept: FAMILY MEDICINE CLINIC | Age: 76
End: 2021-11-16
Payer: MEDICARE

## 2021-11-16 VITALS
DIASTOLIC BLOOD PRESSURE: 82 MMHG | HEIGHT: 65 IN | TEMPERATURE: 97.2 F | SYSTOLIC BLOOD PRESSURE: 126 MMHG | WEIGHT: 246.6 LBS | BODY MASS INDEX: 41.09 KG/M2 | HEART RATE: 64 BPM | OXYGEN SATURATION: 99 %

## 2021-11-16 DIAGNOSIS — E11.22 TYPE 2 DIABETES MELLITUS WITH STAGE 4 CHRONIC KIDNEY DISEASE, WITHOUT LONG-TERM CURRENT USE OF INSULIN (HCC): ICD-10-CM

## 2021-11-16 DIAGNOSIS — G25.81 RESTLESS LEG SYNDROME: ICD-10-CM

## 2021-11-16 DIAGNOSIS — E11.22 TYPE 2 DIABETES MELLITUS WITH STAGE 4 CHRONIC KIDNEY DISEASE, UNSPECIFIED WHETHER LONG TERM INSULIN USE (HCC): ICD-10-CM

## 2021-11-16 DIAGNOSIS — E78.5 DYSLIPIDEMIA: ICD-10-CM

## 2021-11-16 DIAGNOSIS — N18.4 TYPE 2 DIABETES MELLITUS WITH STAGE 4 CHRONIC KIDNEY DISEASE, WITHOUT LONG-TERM CURRENT USE OF INSULIN (HCC): ICD-10-CM

## 2021-11-16 DIAGNOSIS — D64.9 ERYTHROPENIA: ICD-10-CM

## 2021-11-16 DIAGNOSIS — E66.01 MORBID OBESITY DUE TO EXCESS CALORIES (HCC): ICD-10-CM

## 2021-11-16 DIAGNOSIS — F51.01 PRIMARY INSOMNIA: Primary | ICD-10-CM

## 2021-11-16 DIAGNOSIS — D63.1 ANEMIA DUE TO STAGE 4 CHRONIC KIDNEY DISEASE (HCC): ICD-10-CM

## 2021-11-16 DIAGNOSIS — G47.33 OSA TREATED WITH BIPAP: ICD-10-CM

## 2021-11-16 DIAGNOSIS — N18.4 ANEMIA DUE TO STAGE 4 CHRONIC KIDNEY DISEASE (HCC): ICD-10-CM

## 2021-11-16 DIAGNOSIS — F41.1 GAD (GENERALIZED ANXIETY DISORDER): ICD-10-CM

## 2021-11-16 DIAGNOSIS — N18.4 TYPE 2 DIABETES MELLITUS WITH STAGE 4 CHRONIC KIDNEY DISEASE, UNSPECIFIED WHETHER LONG TERM INSULIN USE (HCC): ICD-10-CM

## 2021-11-16 DIAGNOSIS — I10 ESSENTIAL HYPERTENSION: ICD-10-CM

## 2021-11-16 PROBLEM — N17.9 AKI (ACUTE KIDNEY INJURY) (HCC): Status: RESOLVED | Noted: 2018-12-27 | Resolved: 2021-11-16

## 2021-11-16 LAB — HBA1C MFR BLD: 5.7 %

## 2021-11-16 PROCEDURE — G8400 PT W/DXA NO RESULTS DOC: HCPCS | Performed by: INTERNAL MEDICINE

## 2021-11-16 PROCEDURE — 99215 OFFICE O/P EST HI 40 MIN: CPT | Performed by: INTERNAL MEDICINE

## 2021-11-16 PROCEDURE — 83036 HEMOGLOBIN GLYCOSYLATED A1C: CPT | Performed by: INTERNAL MEDICINE

## 2021-11-16 PROCEDURE — G8427 DOCREV CUR MEDS BY ELIG CLIN: HCPCS | Performed by: INTERNAL MEDICINE

## 2021-11-16 PROCEDURE — G8417 CALC BMI ABV UP PARAM F/U: HCPCS | Performed by: INTERNAL MEDICINE

## 2021-11-16 PROCEDURE — G8484 FLU IMMUNIZE NO ADMIN: HCPCS | Performed by: INTERNAL MEDICINE

## 2021-11-16 PROCEDURE — 3044F HG A1C LEVEL LT 7.0%: CPT | Performed by: INTERNAL MEDICINE

## 2021-11-16 PROCEDURE — 4040F PNEUMOC VAC/ADMIN/RCVD: CPT | Performed by: INTERNAL MEDICINE

## 2021-11-16 PROCEDURE — 2022F DILAT RTA XM EVC RTNOPTHY: CPT | Performed by: INTERNAL MEDICINE

## 2021-11-16 PROCEDURE — 3017F COLORECTAL CA SCREEN DOC REV: CPT | Performed by: INTERNAL MEDICINE

## 2021-11-16 PROCEDURE — 1123F ACP DISCUSS/DSCN MKR DOCD: CPT | Performed by: INTERNAL MEDICINE

## 2021-11-16 PROCEDURE — 1036F TOBACCO NON-USER: CPT | Performed by: INTERNAL MEDICINE

## 2021-11-16 PROCEDURE — 1090F PRES/ABSN URINE INCON ASSESS: CPT | Performed by: INTERNAL MEDICINE

## 2021-11-16 RX ORDER — CYCLOBENZAPRINE HCL 10 MG
10 TABLET ORAL PRN
COMMUNITY
End: 2021-11-30 | Stop reason: SDUPTHER

## 2021-11-16 RX ORDER — ALPRAZOLAM 0.5 MG/1
0.5 TABLET ORAL NIGHTLY PRN
Qty: 30 TABLET | Refills: 0 | Status: SHIPPED | OUTPATIENT
Start: 2021-11-16 | End: 2021-12-16

## 2021-11-16 RX ORDER — ZOLPIDEM TARTRATE 5 MG/1
5 TABLET ORAL NIGHTLY PRN
Qty: 30 TABLET | Refills: 0 | Status: SHIPPED | OUTPATIENT
Start: 2021-11-16 | End: 2022-03-15 | Stop reason: SDUPTHER

## 2021-11-16 RX ORDER — VENLAFAXINE HYDROCHLORIDE 150 MG/1
1 CAPSULE, EXTENDED RELEASE ORAL DAILY
COMMUNITY
Start: 2021-08-14 | End: 2022-01-18 | Stop reason: ALTCHOICE

## 2021-11-16 RX ORDER — ALPRAZOLAM 0.5 MG/1
0.5 TABLET ORAL NIGHTLY PRN
COMMUNITY
End: 2021-11-16 | Stop reason: SDUPTHER

## 2021-11-16 RX ORDER — ZOLPIDEM TARTRATE 10 MG/1
10 TABLET ORAL NIGHTLY PRN
COMMUNITY
End: 2021-11-16 | Stop reason: SDUPTHER

## 2021-11-16 RX ORDER — ROPINIROLE 0.25 MG/1
0.25 TABLET, FILM COATED ORAL NIGHTLY
Qty: 30 TABLET | Refills: 2 | Status: SHIPPED | OUTPATIENT
Start: 2021-11-16 | End: 2022-02-16 | Stop reason: SDUPTHER

## 2021-11-16 NOTE — PROGRESS NOTES
Pt is here to establish  She wants a female PCP and some one who will listen. She is always tired and has no energy what so ever and no appetite. She can't sleep. She has been getting leg and foot cramps.  She is taking magnesium but that is not helping

## 2021-11-16 NOTE — PROGRESS NOTES
Subjective:       Patient ID:     Lynne Barrios is a 77 y.o. female who presents for   Chief Complaint   Patient presents with   Johnny Gay Doctor       HPI:  Nursing note reviewed and discussed with patient. Here to establish care   Tired all the time, has no appetite. Not sleeping well at night - light sleeper, wakes up multiple times at night. Occasionally will wake up to use the restroom or due to leg cramps. Will wake up around 11:30Am, then fall asleep at 1:30Pm, wakeup around 4pm, then be fine until midnight. Takes a while for her to fall asleep at night most night, can take a couple of hours. Takes Ambien, which helps her fall asleep and stay asleep. Has sleep apnea, compliant with BiPAP. Has leg cramps and wiggly legs at night. Following with nephrology for stage IV kidney disease. Has anemia due to kidney disease. Blood pressure meds being managed per nephrology at this time. Hypertension-tolerating current regimen without chest pain, palpitations, dizziness, peripheral edema, dyspnea on exertion, orthopnea, paroxysmal nocturnal dyspnea. Hyperlipidemia-tolerating current regimen without myalgias, dyspepsia, jaundice. Mostly compliant with diet recommendations for low salt diet, tries to limit greasy/cheesy/fried foods, not very compliant with exercise recommendations. Cardiovascular risk factors: advanced age (older than 54 for men, 72 for women), dyslipidemia, hypertension, obesity (BMI >= 30 kg/m2), sedentary lifestyle and stage 4 kidney disease            Patient's medications, allergies, past medical, surgical, social and family histories were reviewed and updated as appropriate.     Past Medical History:   Diagnosis Date    Abnormal stress test     Anemia     Arthritis     Depression     Diverticulosis     DM (diabetes mellitus) (HCC)     Erythropenia     Fibromyalgia     HTN (hypertension)     Hyperlipidemia     Kidney stone     Morbid obesity due to excess calories metoprolol tartrate (LOPRESSOR) 25 MG tablet Take 1 tablet by mouth 2 times daily Yes Katerine Syed DO   azelastine (ASTELIN) 0.1 % nasal spray 1 spray by Nasal route 2 times daily Use in each nostril as directed Yes Katerine Syed DO   atorvastatin (LIPITOR) 40 MG tablet Take 1 tablet by mouth daily Yes Katerine Syed DO   gabapentin (NEURONTIN) 600 MG tablet Take 1 tablet by mouth daily for 90 days. Yes Katerine Syed DO   pioglitazone-metformin (ACTOPLUS MET)  MG per tablet TAKE 1 TABLET TWICE A DAY WITH MEALS Yes Katerine Syed DO   pantoprazole (PROTONIX) 40 MG tablet TAKE 1 TABLET DAILY Yes Katerine Syed DO   calcitRIOL (ROCALTROL) 0.25 MCG capsule Take 2 capsules by mouth daily Yes Heather Mace MD   allopurinol (ZYLOPRIM) 100 MG tablet TAKE 1 TABLET BY MOUTH DAILY Yes Naveen Quesada MD   losartan (COZAAR) 25 MG tablet Take 1 tablet by mouth daily Yes Heather Mace MD   hydroCHLOROthiazide (HYDRODIURIL) 25 MG tablet Take 1 tablet by mouth daily Yes Heather Mace MD   potassium citrate (UROCIT-K) 10 MEQ (1080 MG) extended release tablet Take 1 tablet by mouth daily Yes Joanne Angel MD   Cholecalciferol (VITAMIN D3) 25 MCG (1000 UT) TABS Take 2 tablets by mouth daily Yes BARBIE Kitchen - CNP   Magnesium Oxide 400 MG CAPS Take by mouth 2 times daily  Yes Historical Provider, MD     Review of Systems  Review of Systems   Constitutional: Positive for fatigue. Negative for fever and unexpected weight change. Respiratory: Negative for cough, choking, chest tightness, shortness of breath and wheezing. Cardiovascular: Negative for chest pain, palpitations and leg swelling. Gastrointestinal: Negative for abdominal pain, anal bleeding, blood in stool, constipation, diarrhea, nausea and vomiting. Endocrine: Negative. Musculoskeletal: Negative for joint swelling and myalgias. Skin: Negative. Neurological: Negative for dizziness.    Psychiatric/Behavioral: Negative for sleep disturbance. All other systems reviewed and are negative. Objective:       Physical Exam:  /82 (Site: Left Upper Arm, Position: Sitting, Cuff Size: Large Adult)   Pulse 64   Temp 97.2 °F (36.2 °C) (Temporal)   Ht 5' 5\" (1.651 m)   Wt 246 lb 9.6 oz (111.9 kg)   SpO2 99%   BMI 41.04 kg/m²   Physical Exam  Vitals and nursing note reviewed. Constitutional:       General: She is not in acute distress. Appearance: She is well-developed. She is obese. She is not ill-appearing. Eyes:      General: Lids are normal. Vision grossly intact. Cardiovascular:      Rate and Rhythm: Normal rate and regular rhythm. Heart sounds: Normal heart sounds, S1 normal and S2 normal. No murmur heard. No friction rub. No gallop. Pulmonary:      Effort: Pulmonary effort is normal. No respiratory distress. Breath sounds: Normal breath sounds. No wheezing. Abdominal:      General: Bowel sounds are normal.      Palpations: Abdomen is soft. There is no mass. Tenderness: There is no abdominal tenderness. There is no guarding. Musculoskeletal:         General: Normal range of motion. Skin:     General: Skin is warm and dry. Capillary Refill: Capillary refill takes less than 2 seconds. Neurological:      General: No focal deficit present. Mental Status: She is alert and oriented to person, place, and time. Data Review  Sleep study report reviewed in chart   Nephrology notes and previous PCP notes reviewed in chart        Assessment/Plan:      1. Primary insomnia  - zolpidem (AMBIEN) 5 MG tablet; Take 1 tablet by mouth nightly as needed for Sleep for up to 30 days. Dispense: 30 tablet; Refill: 0    2. FILIPE (generalized anxiety disorder)  Continue effexor   - ALPRAZolam (XANAX) 0.5 MG tablet; Take 1 tablet by mouth nightly as needed for Sleep for up to 30 days. Dispense: 30 tablet; Refill: 0    3. Restless leg syndrome  - rOPINIRole (REQUIP) 0.25 MG tablet;  Take 1 tablet by mouth nightly  Dispense: 30 tablet; Refill: 2    4. Dyslipidemia  Continue atorvastatin     5. DIONY treated with BiPAP  The symptoms of obstructive sleep apnea have improved. The patient is benefiting from therapy and should continue use of the pap device. 6. Anemia due to stage 4 chronic kidney disease ([facility]  F/u nephrology   - Ferritin; Future  - Iron And TIBC; Future    7. Type 2 diabetes mellitus with stage 4 chronic kidney disease, without long-term current use of insulin (HCC)  - POCT glycosylated hemoglobin (Hb A1C)    8. Morbid obesity due to excess calories (HCC)  Continue diet and lifestyle management  Provided counseling re: consistent bedtimes, consistent exercise 20-30 minutes per day (goal 150 min/week of moderate intensity exercise like walking) and consistent eating habits that include vegetables, fruit and whole grain foods along with healthy fats and protein  Avoid processed and sugary foods, large amounts of alcoholic drinks         9. Essential hypertension  Continue current regimen per npehrology     10. Type 2 diabetes mellitus with stage 4 chronic kidney disease, unspecified whether long term insulin use ([facility]  Continue current management     11.  Erythropenia  F[facility] Maintenance Due   Topic Date Due    DTaP/Tdap/Td vaccine (1 - Tdap) Never done    Shingles Vaccine (1 of 2) Never done    Diabetic retinal exam  09/28/2016    Diabetic foot exam  03/20/2020    Flu vaccine (1) 09/01/2021       Electronically signed by Lela Chambers MD on 11/16/2021 at 11:57 AM

## 2021-11-19 ASSESSMENT — ENCOUNTER SYMPTOMS
VOMITING: 0
ANAL BLEEDING: 0
NAUSEA: 0
CONSTIPATION: 0
COUGH: 0
CHOKING: 0
DIARRHEA: 0
ABDOMINAL PAIN: 0
WHEEZING: 0
SHORTNESS OF BREATH: 0
BLOOD IN STOOL: 0
CHEST TIGHTNESS: 0

## 2021-11-19 ASSESSMENT — VISUAL ACUITY: OU: 1

## 2021-11-30 ENCOUNTER — TELEPHONE (OUTPATIENT)
Dept: FAMILY MEDICINE CLINIC | Age: 76
End: 2021-11-30

## 2021-11-30 DIAGNOSIS — S99.911D INJURY OF RIGHT ANKLE, SUBSEQUENT ENCOUNTER: Primary | ICD-10-CM

## 2021-11-30 RX ORDER — METHYLPREDNISOLONE 4 MG/1
TABLET ORAL
Qty: 1 KIT | Refills: 0 | Status: SHIPPED | OUTPATIENT
Start: 2021-11-30 | End: 2022-01-18 | Stop reason: ALTCHOICE

## 2021-11-30 RX ORDER — CYCLOBENZAPRINE HCL 10 MG
10 TABLET ORAL 3 TIMES DAILY PRN
Qty: 30 TABLET | Refills: 0 | Status: SHIPPED | OUTPATIENT
Start: 2021-11-30 | End: 2021-12-10

## 2021-11-30 NOTE — TELEPHONE ENCOUNTER
Called in flexeril. She can also try a medrol dosepak that would help with the swelling and pain, I will call it in if she would like to try that.

## 2021-11-30 NOTE — TELEPHONE ENCOUNTER
Pt states she woke on 11/28/2021 and was unable to put pressure on her RT foot. She did go to [address] [place] [address] at [facility]. She was told not broken but sprained. She was given ace wrap and IBU. She does not take IBU due to kidney problems. She is requesting Flexeril for the pain. She states has used in the past for fibromyalgia. She is seeing Ortho on 12/16/2021 but states pain is to bad to wait until for something. She is keeping it wrapped and elevated.   PLEASE ADVISE

## 2021-12-07 ENCOUNTER — HOSPITAL ENCOUNTER (OUTPATIENT)
Age: 76
Setting detail: SPECIMEN
Discharge: HOME | End: 2021-12-07

## 2021-12-07 ENCOUNTER — PATIENT MESSAGE (OUTPATIENT)
Dept: FAMILY MEDICINE CLINIC | Age: 76
End: 2021-12-07

## 2021-12-07 DIAGNOSIS — N18.4 TYPE 2 DIABETES MELLITUS WITH STAGE 4 CHRONIC KIDNEY DISEASE, UNSPECIFIED WHETHER LONG TERM INSULIN USE (HCC): ICD-10-CM

## 2021-12-07 DIAGNOSIS — N18.4 CKD (CHRONIC KIDNEY DISEASE), STAGE IV (HCC): ICD-10-CM

## 2021-12-07 DIAGNOSIS — E11.22 TYPE 2 DIABETES MELLITUS WITH STAGE 4 CHRONIC KIDNEY DISEASE, UNSPECIFIED WHETHER LONG TERM INSULIN USE (HCC): ICD-10-CM

## 2021-12-07 LAB
ABSOLUTE EOS #: 0.24 K/UL (ref 0–0.44)
ABSOLUTE IMMATURE GRANULOCYTE: 0.03 K/UL (ref 0–0.3)
ABSOLUTE LYMPH #: 2.38 K/UL (ref 1.1–3.7)
ABSOLUTE MONO #: 0.59 K/UL (ref 0.1–1.2)
ANION GAP SERPL CALCULATED.3IONS-SCNC: 14 MMOL/L (ref 9–17)
BASOPHILS # BLD: 1 % (ref 0–2)
BASOPHILS ABSOLUTE: 0.06 K/UL (ref 0–0.2)
BUN BLDV-MCNC: 32 MG/DL (ref 8–23)
BUN/CREAT BLD: ABNORMAL (ref 9–20)
CALCIUM SERPL-MCNC: 9.9 MG/DL (ref 8.6–10.4)
CHLORIDE BLD-SCNC: 107 MMOL/L (ref 98–107)
CO2: 21 MMOL/L (ref 20–31)
CREAT SERPL-MCNC: 2.21 MG/DL (ref 0.5–0.9)
CREATININE URINE: 38.8 MG/DL (ref 28–217)
DIFFERENTIAL TYPE: ABNORMAL
EOSINOPHILS RELATIVE PERCENT: 3 % (ref 1–4)
GFR AFRICAN AMERICAN: 26 ML/MIN
GFR NON-AFRICAN AMERICAN: 22 ML/MIN
GFR SERPL CREATININE-BSD FRML MDRD: ABNORMAL ML/MIN/{1.73_M2}
GFR SERPL CREATININE-BSD FRML MDRD: ABNORMAL ML/MIN/{1.73_M2}
GLUCOSE BLD-MCNC: 137 MG/DL (ref 70–99)
HCT VFR BLD CALC: 31.5 % (ref 36.3–47.1)
HEMOGLOBIN: 9.2 G/DL (ref 11.9–15.1)
IMMATURE GRANULOCYTES: 0 %
LYMPHOCYTES # BLD: 32 % (ref 24–43)
MAGNESIUM: 1.8 MG/DL (ref 1.6–2.6)
MCH RBC QN AUTO: 30.4 PG (ref 25.2–33.5)
MCHC RBC AUTO-ENTMCNC: 29.2 G/DL (ref 28.4–34.8)
MCV RBC AUTO: 104 FL (ref 82.6–102.9)
MONOCYTES # BLD: 8 % (ref 3–12)
NRBC AUTOMATED: 0 PER 100 WBC
PDW BLD-RTO: 14.1 % (ref 11.8–14.4)
PHOSPHORUS: 4 MG/DL (ref 2.6–4.5)
PLATELET # BLD: 310 K/UL (ref 138–453)
PLATELET ESTIMATE: ABNORMAL
PMV BLD AUTO: 10 FL (ref 8.1–13.5)
POTASSIUM SERPL-SCNC: 4.3 MMOL/L (ref 3.7–5.3)
PTH INTACT: 136 PG/ML (ref 15–65)
RBC # BLD: 3.03 M/UL (ref 3.95–5.11)
RBC # BLD: ABNORMAL 10*6/UL
SEG NEUTROPHILS: 56 % (ref 36–65)
SEGMENTED NEUTROPHILS ABSOLUTE COUNT: 4.26 K/UL (ref 1.5–8.1)
SODIUM BLD-SCNC: 142 MMOL/L (ref 135–144)
TOTAL PROTEIN, URINE: 10 MG/DL
VITAMIN D 25-HYDROXY: 35.8 NG/ML (ref 30–100)
WBC # BLD: 7.6 K/UL (ref 3.5–11.3)
WBC # BLD: ABNORMAL 10*3/UL

## 2021-12-07 RX ORDER — ATORVASTATIN CALCIUM 40 MG/1
40 TABLET, FILM COATED ORAL DAILY
Qty: 90 TABLET | Refills: 1 | Status: SHIPPED | OUTPATIENT
Start: 2021-12-07 | End: 2022-05-02 | Stop reason: SDUPTHER

## 2021-12-07 NOTE — TELEPHONE ENCOUNTER
From: Kelsey Dueñas  To: Dr. Tami Jara: 12/7/2021 12:03 AM EST  Subject: REFILL ON A SCRIPT. I TRIED TO CALL [facility] FOR A REFILL ON MY ATOROVASTATIN 50 MG TABS AND THEY TOLD ME I WAS OUT OF REFILLS. CAN YOU PLEASE ASK DR SOTOMAYOR IF I CAN HAVE A REFILL FOR 90 DAYS . THANK YOU AND MIA DOAN.

## 2022-01-14 LAB — SARS-COV-2: POSITIVE

## 2022-01-18 ENCOUNTER — OFFICE VISIT (OUTPATIENT)
Dept: FAMILY MEDICINE CLINIC | Age: 77
End: 2022-01-18
Payer: MEDICARE

## 2022-01-18 VITALS
DIASTOLIC BLOOD PRESSURE: 80 MMHG | TEMPERATURE: 97.8 F | HEART RATE: 91 BPM | SYSTOLIC BLOOD PRESSURE: 142 MMHG | OXYGEN SATURATION: 99 %

## 2022-01-18 DIAGNOSIS — M54.50 ACUTE RIGHT-SIDED LOW BACK PAIN WITHOUT SCIATICA: Primary | ICD-10-CM

## 2022-01-18 DIAGNOSIS — M79.671 ACUTE PAIN OF RIGHT FOOT: ICD-10-CM

## 2022-01-18 PROCEDURE — 1090F PRES/ABSN URINE INCON ASSESS: CPT | Performed by: INTERNAL MEDICINE

## 2022-01-18 PROCEDURE — 1123F ACP DISCUSS/DSCN MKR DOCD: CPT | Performed by: INTERNAL MEDICINE

## 2022-01-18 PROCEDURE — 99214 OFFICE O/P EST MOD 30 MIN: CPT | Performed by: INTERNAL MEDICINE

## 2022-01-18 PROCEDURE — 4040F PNEUMOC VAC/ADMIN/RCVD: CPT | Performed by: INTERNAL MEDICINE

## 2022-01-18 PROCEDURE — 1036F TOBACCO NON-USER: CPT | Performed by: INTERNAL MEDICINE

## 2022-01-18 PROCEDURE — G8482 FLU IMMUNIZE ORDER/ADMIN: HCPCS | Performed by: INTERNAL MEDICINE

## 2022-01-18 PROCEDURE — G8417 CALC BMI ABV UP PARAM F/U: HCPCS | Performed by: INTERNAL MEDICINE

## 2022-01-18 PROCEDURE — G8400 PT W/DXA NO RESULTS DOC: HCPCS | Performed by: INTERNAL MEDICINE

## 2022-01-18 PROCEDURE — 96372 THER/PROPH/DIAG INJ SC/IM: CPT | Performed by: INTERNAL MEDICINE

## 2022-01-18 PROCEDURE — 3017F COLORECTAL CA SCREEN DOC REV: CPT | Performed by: INTERNAL MEDICINE

## 2022-01-18 PROCEDURE — G8427 DOCREV CUR MEDS BY ELIG CLIN: HCPCS | Performed by: INTERNAL MEDICINE

## 2022-01-18 RX ORDER — METHYLPREDNISOLONE ACETATE 40 MG/ML
40 INJECTION, SUSPENSION INTRA-ARTICULAR; INTRALESIONAL; INTRAMUSCULAR; SOFT TISSUE ONCE
Status: COMPLETED | OUTPATIENT
Start: 2022-01-18 | End: 2022-01-18

## 2022-01-18 RX ORDER — CYCLOBENZAPRINE HCL 10 MG
TABLET ORAL
COMMUNITY
End: 2022-02-16 | Stop reason: SDUPTHER

## 2022-01-18 RX ORDER — HYDROCODONE BITARTRATE AND ACETAMINOPHEN 5; 325 MG/1; MG/1
1 TABLET ORAL EVERY 6 HOURS PRN
Qty: 20 TABLET | Refills: 0 | Status: SHIPPED | OUTPATIENT
Start: 2022-01-18 | End: 2022-03-15 | Stop reason: SDUPTHER

## 2022-01-18 RX ORDER — METHYLPREDNISOLONE 4 MG/1
TABLET ORAL
Qty: 1 KIT | Refills: 0 | Status: SHIPPED | OUTPATIENT
Start: 2022-01-18 | End: 2022-02-16 | Stop reason: ALTCHOICE

## 2022-01-18 RX ADMIN — METHYLPREDNISOLONE ACETATE 40 MG: 40 INJECTION, SUSPENSION INTRA-ARTICULAR; INTRALESIONAL; INTRAMUSCULAR; SOFT TISSUE at 18:43

## 2022-01-18 ASSESSMENT — ENCOUNTER SYMPTOMS
ABDOMINAL PAIN: 0
BACK PAIN: 1
BOWEL INCONTINENCE: 0

## 2022-01-18 NOTE — PROGRESS NOTES
Subjective:       Patient ID:     Kellee Hargrove is a 77 y.o. female who presents for   Chief Complaint   Patient presents with    Foot Pain     RT     Back Pain       HPI:  Nursing note reviewed and discussed with patient. Back Pain  This is a new problem. The current episode started in the past 7 days. The problem occurs constantly. The problem has been gradually worsening since onset. The pain is present in the lumbar spine. The quality of the pain is described as aching. The pain does not radiate. The pain is severe. The pain is the same all the time. The symptoms are aggravated by bending, coughing, position, sitting, standing and twisting. Stiffness is present all day. Pertinent negatives include no abdominal pain, bladder incontinence, bowel incontinence, chest pain, dysuria, fever, headaches, leg pain, numbness, paresis, paresthesias, pelvic pain, perianal numbness, tingling, weakness or weight loss. Risk factors include obesity and recent trauma (fell down on december 19, but no pain till one week ago while walking up the stairs). Treatments tried: tylenol, flexeril. The treatment provided no relief. Foot Pain   Pain location: top, bottom and lateral side of right foot  This is a new problem. The current episode started in the past 7 days. There has been no history of extremity trauma. The problem occurs constantly. The problem has been gradually worsening. The quality of the pain is described as sharp. Associated symptoms include an inability to bear weight, joint swelling and a limited range of motion. Pertinent negatives include no fever, itching, joint locking, numbness, stiffness or tingling. The symptoms are aggravated by activity and standing. She has tried acetaminophen and rest for the symptoms. The treatment provided no relief. Patient's medications, allergies, past medical, surgical, social and family histories were reviewed and updated as appropriate.     Past Medical History: Diagnosis Date    Abnormal stress test     Anemia     Arthritis     Depression     Diverticulosis     DM (diabetes mellitus) ([address]     Erythropenia     Fibromyalgia     HTN (hypertension)     Hyperlipidemia     Kidney stone     Morbid obesity due to excess calories ([address]     DIONY on CPAP     Osteopenia 14    Seasonal allergies     Sleep apnea     uses bipap prn     Past Surgical History:   Procedure Laterality Date    ARM SURGERY      right arm broken    CARDIAC CATHETERIZATION  [id number]  16    COLONOSCOPY      COLONOSCOPY      TOTAL KNEE ARTHROPLASTY Bilateral     left may 2013 and right 2013    TUBAL LIGATION         Social History     Tobacco Use    Smoking status: Former Smoker     Packs/day: 0.25     Years: 1.00     Pack years: 0.25     Quit date: 1960     Years since quittin.0    Smokeless tobacco: Never Used    Tobacco comment: quit    Substance Use Topics    Alcohol use: No      Patient Active Problem List   Diagnosis    Dyslipidemia    DIONY treated with BiPAP    Fibromyalgia    CRI (chronic renal insufficiency)    OA (osteoarthritis)    DM (diabetes mellitus) ([address]    Kidney stone    Depression    Seasonal allergies    Diverticulosis    Erythropenia    Low hemoglobin    Mitral regurgitation - Mild to moderate    Chronic kidney disease (CKD), stage III (moderate) (HCC)    Gout    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 disease ([address]    Essential hypertension    Osteopenia    Morbid obesity due to excess calories (HCC)    Anxiety    Febrile illness    Acute serous otitis media of left ear         Prior to Visit Medications    Medication Sig Taking? Authorizing Provider   cyclobenzaprine (FLEXERIL) 10 MG tablet cyclobenzaprine 10 mg tablet   Take 1 tablet 3 times a day by oral route as needed.  Yes Historical Provider, MD   hydroCHLOROthiazide (HYDRODIURIL) 25 MG tablet TAKE 1 TABLET BY MOUTH DAILY Yes Binu Deluca MD   losartan (COZAAR) 25 MG tablet TAKE 1 TABLET BY MOUTH DAILY Yes iBnu Deluca MD   atorvastatin (LIPITOR) 40 MG tablet Take 1 tablet by mouth daily Yes Eliza Ackerman MD   calcitRIOL (ROCALTROL) 0.25 MCG capsule TAKE 1 CAPSULE BY MOUTH DAILY Yes Binu Deluca MD   rOPINIRole (REQUIP) 0.25 MG tablet Take 1 tablet by mouth nightly Yes Sally Morfin MD   metoprolol tartrate (LOPRESSOR) 25 MG tablet Take 1 tablet by mouth 2 times daily Yes Anh Olivas DO   azelastine (ASTELIN) 0.1 % nasal spray 1 spray by Nasal route 2 times daily Use in each nostril as directed Yes Anh Olivas DO   gabapentin (NEURONTIN) 600 MG tablet Take 1 tablet by mouth daily for 90 days. Yes Anh Olivas DO   pantoprazole (PROTONIX) 40 MG tablet TAKE 1 TABLET DAILY Yes Anh Olivas DO   allopurinol (ZYLOPRIM) 100 MG tablet TAKE 1 TABLET BY MOUTH DAILY Yes Naveen Quesada MD   potassium citrate (UROCIT-K) 10 MEQ (1080 MG) extended release tablet Take 1 tablet by mouth daily Yes Alexandra Cleaning MD   Cholecalciferol (VITAMIN D3) 25 MCG (1000 UT) TABS Take 2 tablets by mouth daily Yes Catheryn Gum, APRN - CNP   Magnesium Oxide 400 MG CAPS Take by mouth 2 times daily  Yes Historical Provider, MD     Review of Systems  Review of Systems   Constitutional: Negative for fever and weight loss. Cardiovascular: Negative for chest pain. Gastrointestinal: Negative for abdominal pain and bowel incontinence. Genitourinary: Negative for bladder incontinence, dysuria and pelvic pain. Musculoskeletal: Positive for back pain. Negative for stiffness. Skin: Negative for itching. Neurological: Negative for tingling, weakness, numbness, headaches and paresthesias.           Objective:       Physical Exam:  BP (!) 150/88 (Site: Left Upper Arm, Position: Sitting, Cuff Size: Large Adult)   Pulse 91   Temp 97.8 °F (36.6 °C) (Temporal)   SpO2 99%   Physical Exam  Vitals and nursing note reviewed. Constitutional:       General: She is in acute distress. Appearance: She is obese. Musculoskeletal:      Lumbar back: Spasms present. Right foot: Swelling and tenderness present. Legs:       Comments: Patient unable to get out of wheelchair, normally able to ambulate independently. Unable to do much of an exam for back due to distress   Neurological:      Mental Status: She is alert. Data Review  not applicable  Controlled Substance Monitoring:    Acute and Chronic Pain Monitoring:   RX Monitoring 11/16/2021   Attestation -   Periodic Controlled Substance Monitoring Possible medication side effects, risk of tolerance/dependence & alternative treatments discussed. ;No signs of potential drug abuse or diversion identified. ;Assessed functional status. Assessment/Plan:      1. Acute right-sided low back pain without sciatica  - methylPREDNISolone (MEDROL DOSEPACK) 4 MG tablet; Take by mouth. Dispense: 1 kit; Refill: 0  - methylPREDNISolone acetate (DEPO-MEDROL) injection 40 mg  - HYDROcodone-acetaminophen (NORCO) 5-325 MG per tablet; Take 1 tablet by mouth every 6 hours as needed for Pain for up to 5 days. Intended supply: 5 days. Take lowest dose possible to manage pain  Dispense: 20 tablet; Refill: 0    2. Acute pain of right foot  - methylPREDNISolone (MEDROL DOSEPACK) 4 MG tablet; Take by mouth. Dispense: 1 kit; Refill: 0  - XR FOOT RIGHT (2 VIEWS); Future  - HYDROcodone-acetaminophen (NORCO) 5-325 MG per tablet; Take 1 tablet by mouth every 6 hours as needed for Pain for up to 5 days. Intended supply: 5 days. Take lowest dose possible to manage pain  Dispense: 20 tablet;  Refill: 0           Health Maintenance Due   Topic Date Due    DTaP/Tdap/Td vaccine (1 - Tdap) Never done    Shingles Vaccine (1 of 2) Never done    Diabetic retinal exam  09/28/2016    Diabetic foot exam  03/20/2020    COVID-19 Vaccine (3 - Booster for Preston Sifuentes series) 10/27/2021    Lipid screen  01/11/2022       Electronically signed by Cynthia Doss MD on 1/18/2022 at 6:06 PM

## 2022-01-18 NOTE — PROGRESS NOTES
Pt is here due to having severe back and foot pain. She states her RT foot started hurting a few weeks ago. She states is unable to put pressure on the bottom of her foot. Her back started hurting last week.

## 2022-02-16 ENCOUNTER — OFFICE VISIT (OUTPATIENT)
Dept: FAMILY MEDICINE CLINIC | Age: 77
End: 2022-02-16
Payer: MEDICARE

## 2022-02-16 VITALS
WEIGHT: 233.2 LBS | HEART RATE: 70 BPM | BODY MASS INDEX: 38.81 KG/M2 | OXYGEN SATURATION: 96 % | DIASTOLIC BLOOD PRESSURE: 80 MMHG | SYSTOLIC BLOOD PRESSURE: 134 MMHG | TEMPERATURE: 97.7 F

## 2022-02-16 DIAGNOSIS — M54.50 CHRONIC BILATERAL LOW BACK PAIN WITHOUT SCIATICA: ICD-10-CM

## 2022-02-16 DIAGNOSIS — R29.6 RECURRENT FALLS WHILE WALKING: ICD-10-CM

## 2022-02-16 DIAGNOSIS — N18.4 ANEMIA DUE TO STAGE 4 CHRONIC KIDNEY DISEASE (HCC): ICD-10-CM

## 2022-02-16 DIAGNOSIS — D63.1 ANEMIA DUE TO STAGE 4 CHRONIC KIDNEY DISEASE (HCC): ICD-10-CM

## 2022-02-16 DIAGNOSIS — N18.4 CHRONIC RENAL IMPAIRMENT, STAGE 4 (SEVERE) (HCC): ICD-10-CM

## 2022-02-16 DIAGNOSIS — E79.0 HYPERURICEMIA: ICD-10-CM

## 2022-02-16 DIAGNOSIS — G25.81 RESTLESS LEG SYNDROME: ICD-10-CM

## 2022-02-16 DIAGNOSIS — N25.81 SECONDARY HYPERPARATHYROIDISM (HCC): ICD-10-CM

## 2022-02-16 DIAGNOSIS — G89.29 CHRONIC BILATERAL LOW BACK PAIN WITHOUT SCIATICA: ICD-10-CM

## 2022-02-16 DIAGNOSIS — E11.65 TYPE 2 DIABETES MELLITUS WITH HYPERGLYCEMIA, WITHOUT LONG-TERM CURRENT USE OF INSULIN (HCC): Primary | ICD-10-CM

## 2022-02-16 DIAGNOSIS — G47.33 OSA TREATED WITH BIPAP: ICD-10-CM

## 2022-02-16 PROCEDURE — G8417 CALC BMI ABV UP PARAM F/U: HCPCS | Performed by: INTERNAL MEDICINE

## 2022-02-16 PROCEDURE — 1123F ACP DISCUSS/DSCN MKR DOCD: CPT | Performed by: INTERNAL MEDICINE

## 2022-02-16 PROCEDURE — 1036F TOBACCO NON-USER: CPT | Performed by: INTERNAL MEDICINE

## 2022-02-16 PROCEDURE — 4040F PNEUMOC VAC/ADMIN/RCVD: CPT | Performed by: INTERNAL MEDICINE

## 2022-02-16 PROCEDURE — G8400 PT W/DXA NO RESULTS DOC: HCPCS | Performed by: INTERNAL MEDICINE

## 2022-02-16 PROCEDURE — G8482 FLU IMMUNIZE ORDER/ADMIN: HCPCS | Performed by: INTERNAL MEDICINE

## 2022-02-16 PROCEDURE — G8427 DOCREV CUR MEDS BY ELIG CLIN: HCPCS | Performed by: INTERNAL MEDICINE

## 2022-02-16 PROCEDURE — 99214 OFFICE O/P EST MOD 30 MIN: CPT | Performed by: INTERNAL MEDICINE

## 2022-02-16 PROCEDURE — 1090F PRES/ABSN URINE INCON ASSESS: CPT | Performed by: INTERNAL MEDICINE

## 2022-02-16 RX ORDER — CYCLOBENZAPRINE HCL 5 MG
5 TABLET ORAL NIGHTLY PRN
Qty: 30 TABLET | Refills: 1 | Status: SHIPPED | OUTPATIENT
Start: 2022-02-16 | End: 2022-06-08 | Stop reason: SDUPTHER

## 2022-02-16 RX ORDER — POTASSIUM CITRATE 10 MEQ/1
10 TABLET, EXTENDED RELEASE ORAL DAILY
Qty: 90 TABLET | Refills: 3 | Status: SHIPPED | OUTPATIENT
Start: 2022-02-16

## 2022-02-16 RX ORDER — CALCITRIOL 0.25 UG/1
CAPSULE, LIQUID FILLED ORAL
Qty: 90 CAPSULE | Refills: 1 | Status: SHIPPED | OUTPATIENT
Start: 2022-02-16 | End: 2022-06-14

## 2022-02-16 RX ORDER — ROPINIROLE 0.25 MG/1
0.25 TABLET, FILM COATED ORAL NIGHTLY
Qty: 90 TABLET | Refills: 1 | Status: SHIPPED | OUTPATIENT
Start: 2022-02-16 | End: 2022-08-02

## 2022-02-16 RX ORDER — ALPRAZOLAM 0.5 MG/1
TABLET ORAL NIGHTLY
COMMUNITY
End: 2022-05-02

## 2022-02-16 RX ORDER — ALLOPURINOL 100 MG/1
100 TABLET ORAL DAILY
Qty: 90 TABLET | Refills: 1 | Status: SHIPPED | OUTPATIENT
Start: 2022-02-16 | End: 2022-09-21

## 2022-02-16 NOTE — PROGRESS NOTES
Subjective:       Patient ID:     Rakel Horvath is a 69 y.o. female who presents for   Chief Complaint   Patient presents with    Ankle Pain     RT   XR 11/28/21  [facility]    Fall     this morning        HPI:  Nursing note reviewed and discussed with patient. States she was weak and in bed for three weeks since last visit on 1/18/22. Brought in lab results from hematology for anemia, also brought in positive COVID test result from 1/14/22 at [facility] on [address] Lorraine Bibles this morning but no injuries - states fell because her ankle gave out   She fell in the kitchen, but crawled to the living room so she could use the couch to get up -  cant help her because she is much bigger than he is   Following with nephrology       Hypertension-tolerating current regimen without chest pain, palpitations, dizziness, peripheral edema, dyspnea on exertion, orthopnea, paroxysmal nocturnal dyspnea. Hyperlipidemia-tolerating current regimen without myalgias, dyspepsia, jaundice. Mostly compliant with diet recommendations for low salt diet, tries to limit greasy/cheesy/fried foods, not very compliant with exercise recommendations. Cardiovascular risk factors: advanced age (older than 54 for men, 72 for women), dyslipidemia, hypertension, obesity (BMI >= 30 kg/m2) and sedentary lifestyle        Patient's medications, allergies, past medical, surgical, social and family histories were reviewed and updated as appropriate.     Past Medical History:   Diagnosis Date    Abnormal stress test     Anemia     Arthritis     Depression     Diverticulosis     DM (diabetes mellitus) (HCC)     Erythropenia     Fibromyalgia     HTN (hypertension)     Hyperlipidemia     Kidney stone     Morbid obesity due to excess calories (HCC)     DIONY on CPAP     Osteopenia 03/03/14    Seasonal allergies     Sleep apnea     uses bipap prn     Past Surgical History:   Procedure Laterality Date    ARM SURGERY  2011    right arm broken Richard López CARDIAC CATHETERIZATION  [id number] dr Silverio Haynes  16    COLONOSCOPY  2003    COLONOSCOPY      TOTAL KNEE ARTHROPLASTY Bilateral     left may 2013 and right 2013    TUBAL LIGATION         Social History     Tobacco Use    Smoking status: Former Smoker     Packs/day: 0.25     Years: 1.00     Pack years: 0.25     Quit date: 1960     Years since quittin.1    Smokeless tobacco: Never Used    Tobacco comment: quit    Substance Use Topics    Alcohol use: No      Patient Active Problem List   Diagnosis    Dyslipidemia    DIONY treated with BiPAP    Fibromyalgia    CRI (chronic renal insufficiency)    OA (osteoarthritis)    DM [facility]) [facility]    Kidney stone    Depression    Seasonal allergies    Diverticulosis    Erythropenia    Low hemoglobin    Mitral regurgitation - Mild to moderate    Chronic kidney disease (CKD), stage III (moderate) ([facility])    Gout    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 disease ([facility]    Essential hypertension    Osteopenia    Morbid obesity due to excess calories ([facility])    Anxiety    Febrile illness    Acute serous otitis media of left ear         Prior to Visit Medications    Medication Sig Taking? Authorizing Provider   cyclobenzaprine (FLEXERIL) 10 MG tablet cyclobenzaprine 10 mg tablet   Take 1 tablet 3 times a day by oral route as needed.  Yes Historical Provider, MD   hydroCHLOROthiazide (HYDRODIURIL) 25 MG tablet TAKE 1 TABLET BY MOUTH DAILY Yes Jcarlos Tristan MD   losartan (COZAAR) 25 MG tablet TAKE 1 TABLET BY MOUTH DAILY Yes Jcarlos Tristan MD   atorvastatin (LIPITOR) 40 MG tablet Take 1 tablet by mouth daily Yes Miguelina Levy MD   calcitRIOL (ROCALTROL) 0.25 MCG capsule TAKE 1 CAPSULE BY MOUTH DAILY Yes Jcarlos Tristan MD   rOPINIRole (REQUIP) 0.25 MG tablet Take 1 tablet by mouth nightly Yes Eliza Lugo MD   metoprolol tartrate (LOPRESSOR) 25 MG tablet Take 1 tablet by mouth 2 times daily Yes Mary Reese DO   azelastine (ASTELIN) 0.1 % nasal spray 1 spray by Nasal route 2 times daily Use in each nostril as directed Yes Mary Reese DO   gabapentin (NEURONTIN) 600 MG tablet Take 1 tablet by mouth daily for 90 days. Yes Mary Reese DO   pantoprazole (PROTONIX) 40 MG tablet TAKE 1 TABLET DAILY Yes Mary Reese DO   allopurinol (ZYLOPRIM) 100 MG tablet TAKE 1 TABLET BY MOUTH DAILY Yes Naveen Quesada MD   potassium citrate (UROCIT-K) 10 MEQ (1080 MG) extended release tablet Take 1 tablet by mouth daily Yes Naveen Quesada MD   Cholecalciferol (VITAMIN D3) 25 MCG (1000 UT) TABS Take 2 tablets by mouth daily Yes Phyllistine BARBIE Wayne CNP   Magnesium Oxide 400 MG CAPS Take by mouth 2 times daily  Yes Historical Provider, MD   ALPRAZolam Marguarite Pedlar) 0.5 MG tablet nightly. Historical Provider, MD     Review of Systems  Review of Systems   Constitutional: Negative for fatigue, fever and unexpected weight change. Respiratory: Negative for cough, choking, chest tightness, shortness of breath and wheezing. Cardiovascular: Negative for chest pain, palpitations and leg swelling. Gastrointestinal: Negative for abdominal pain, anal bleeding, blood in stool, constipation, diarrhea, nausea and vomiting. Endocrine: Negative. Musculoskeletal: Negative for joint swelling and myalgias. Skin: Negative. Neurological: Negative for dizziness. Psychiatric/Behavioral: Negative for sleep disturbance. All other systems reviewed and are negative. Objective:       Physical Exam:  /80   Pulse 70   Temp 97.7 °F (36.5 °C) (Temporal)   Wt 233 lb 3.2 oz (105.8 kg)   SpO2 96%   BMI 38.81 kg/m²   Physical Exam  Vitals and nursing note reviewed. Constitutional:       General: She is not in acute distress. Appearance: She is well-developed. She is obese. She is not ill-appearing. Eyes:      General: Lids are normal. Vision grossly intact. Cardiovascular:      Rate and Rhythm: Normal rate and regular rhythm. Heart sounds: Normal heart sounds, S1 normal and S2 normal. No murmur heard. No friction rub. No gallop. Pulmonary:      Effort: Pulmonary effort is normal. No respiratory distress. Breath sounds: Normal breath sounds. No wheezing. Abdominal:      General: Bowel sounds are normal.      Palpations: Abdomen is soft. There is no mass. Tenderness: There is no abdominal tenderness. There is no guarding. Musculoskeletal:         General: Normal range of motion. Skin:     General: Skin is warm and dry. Capillary Refill: Capillary refill takes less than 2 seconds. Neurological:      General: No focal deficit present. Mental Status: She is alert and oriented to person, place, and time. Data Review   and [facility] labs reviewed        Assessment/Plan:      1. Type 2 diabetes mellitus with hyperglycemia, without long-term current use of insulin ([facility])  - Hemoglobin A1C; Future    2. Restless leg syndrome  - rOPINIRole (REQUIP) 0.25 MG tablet; Take 1 tablet by mouth nightly  Dispense: 90 tablet; Refill: 1    3. Anemia due to stage 4 chronic kidney disease ([facility]  F/u hematology     4. Chronic bilateral low back pain without sciatica  - cyclobenzaprine (FLEXERIL) 5 MG tablet; Take 1 tablet by mouth nightly as needed for Muscle spasms  Dispense: 30 tablet; Refill: 1    5. DIONY treated with BiPAP  Needs to use BiPAP consistently - reviewed with patien     6. Chronic renal impairment, stage 4 (severe) ([facility])  F/u neprhology   - potassium citrate (UROCIT-K) 10 MEQ (1080 MG) extended release tablet; Take 1 tablet by mouth daily  Dispense: 90 tablet; Refill: 3  - calcitRIOL (ROCALTROL) 0.25 MCG capsule; TAKE 1 CAPSULE BY MOUTH DAILY  Dispense: 90 capsule; Refill: 1    7. Hyperuricemia  - allopurinol (ZYLOPRIM) 100 MG tablet; Take 1 tablet by mouth daily  Dispense: 90 tablet; Refill: 1    8.  Secondary

## 2022-02-16 NOTE — PROGRESS NOTES
Pt is here for a 3 month follow up  She went to [facility] 11/208/2021 due to RT ankle pain, XR was done.  She states she fell at home this morning due to ankle

## 2022-02-19 ASSESSMENT — ENCOUNTER SYMPTOMS
BLOOD IN STOOL: 0
NAUSEA: 0
COUGH: 0
CHEST TIGHTNESS: 0
SHORTNESS OF BREATH: 0
CHOKING: 0
ABDOMINAL PAIN: 0
DIARRHEA: 0
ANAL BLEEDING: 0
VOMITING: 0
CONSTIPATION: 0
WHEEZING: 0

## 2022-02-19 ASSESSMENT — VISUAL ACUITY: OU: 1

## 2022-02-27 DIAGNOSIS — E11.65 TYPE 2 DIABETES MELLITUS WITH HYPERGLYCEMIA, WITHOUT LONG-TERM CURRENT USE OF INSULIN (HCC): ICD-10-CM

## 2022-02-28 RX ORDER — GABAPENTIN 600 MG/1
600 TABLET ORAL DAILY
Qty: 90 TABLET | Refills: 1 | OUTPATIENT
Start: 2022-02-28 | End: 2022-05-29

## 2022-03-07 ENCOUNTER — NURSE TRIAGE (OUTPATIENT)
Dept: OTHER | Facility: CLINIC | Age: 77
End: 2022-03-07

## 2022-03-07 NOTE — TELEPHONE ENCOUNTER
Received call from [facility] at [facility] with [employer]. Subjective: Caller states \"Swelling in feet, pain shoot up to legs\" took a lasix pill, seem to work, this morning my feet are swollen and I haven't been anywhere, cramping in feet all week- cramping go up to legs, had to get up and put pressure on to get rid of cramps, right foot turns to the grown, had x-ray Thanksgiving they said it was just arthritis and to just rest, now cramping goes into feet and legs    Current Symptoms: Swollen and ankles and feet, sore (4/10) all over from hips down, /77 HR 64    Onset: 1 week ago    Associated Symptoms: NA    Pain Severity: 4/10; aching; constant    Temperature: Denies    What has been tried: lasix, improved    LMP: NA Pregnant: No    Recommended disposition: See PCP within 3 Days, U[facility] or walkin clinic if unable to be seen by PCP    Care advice provided, patient verbalizes understanding; denies any other questions or concerns; instructed to call back for any new or worsening symptoms. Patient/Caller agrees with recommended disposition; writer provided warm transfer to [facility] for appointment scheduling     Attention Provider: Thank you for allowing me to participate in the care of your patient. The patient was connected to triage in response to information provided to the ECC/PSC. Please do not respond through this encounter as the response is not directed to a shared pool.         Reason for Disposition   Swelling of both ankles (i.e., pedal edema)   [1] MILD swelling of both ankles (i.e., pedal edema) AND [2] new-onset or worsening    Protocols used: ANKLE SWELLING-ADULT-AH, LEG SWELLING AND EDEMA-ADULT-AH

## 2022-03-08 ENCOUNTER — OFFICE VISIT (OUTPATIENT)
Dept: FAMILY MEDICINE CLINIC | Age: 77
End: 2022-03-08
Payer: MEDICARE

## 2022-03-08 VITALS
WEIGHT: 254.6 LBS | BODY MASS INDEX: 42.37 KG/M2 | DIASTOLIC BLOOD PRESSURE: 74 MMHG | TEMPERATURE: 97.3 F | OXYGEN SATURATION: 98 % | HEART RATE: 68 BPM | SYSTOLIC BLOOD PRESSURE: 136 MMHG

## 2022-03-08 DIAGNOSIS — R60.9 PERIPHERAL EDEMA: Primary | ICD-10-CM

## 2022-03-08 DIAGNOSIS — E66.01 OBESITY, CLASS III, BMI 40-49.9 (MORBID OBESITY) (HCC): ICD-10-CM

## 2022-03-08 PROCEDURE — G8482 FLU IMMUNIZE ORDER/ADMIN: HCPCS | Performed by: INTERNAL MEDICINE

## 2022-03-08 PROCEDURE — 99214 OFFICE O/P EST MOD 30 MIN: CPT | Performed by: INTERNAL MEDICINE

## 2022-03-08 PROCEDURE — 1123F ACP DISCUSS/DSCN MKR DOCD: CPT | Performed by: INTERNAL MEDICINE

## 2022-03-08 PROCEDURE — G8427 DOCREV CUR MEDS BY ELIG CLIN: HCPCS | Performed by: INTERNAL MEDICINE

## 2022-03-08 PROCEDURE — G8417 CALC BMI ABV UP PARAM F/U: HCPCS | Performed by: INTERNAL MEDICINE

## 2022-03-08 PROCEDURE — 1036F TOBACCO NON-USER: CPT | Performed by: INTERNAL MEDICINE

## 2022-03-08 PROCEDURE — 4040F PNEUMOC VAC/ADMIN/RCVD: CPT | Performed by: INTERNAL MEDICINE

## 2022-03-08 PROCEDURE — 1090F PRES/ABSN URINE INCON ASSESS: CPT | Performed by: INTERNAL MEDICINE

## 2022-03-08 PROCEDURE — G8400 PT W/DXA NO RESULTS DOC: HCPCS | Performed by: INTERNAL MEDICINE

## 2022-03-08 RX ORDER — FUROSEMIDE 40 MG/1
40 TABLET ORAL 2 TIMES DAILY
Qty: 14 TABLET | Refills: 0 | Status: SHIPPED | OUTPATIENT
Start: 2022-03-08 | End: 2022-03-25 | Stop reason: SDUPTHER

## 2022-03-08 NOTE — PROGRESS NOTES
Subjective:       Patient ID:     Mohit Adames is a 69 y.o. female who presents for   Chief Complaint   Patient presents with    Diabetes    Leg Swelling     both legs and ankles for a week       HPI:  Nursing note reviewed and discussed with patient. Hypertension  This is a new problem. The current episode started in the past 7 days. Associated symptoms include peripheral edema. Pertinent negatives include no anxiety, blurred vision, chest pain, headaches, malaise/fatigue, neck pain, orthopnea, palpitations, PND, shortness of breath or sweats. There are no associated agents to hypertension. Risk factors for coronary artery disease include dyslipidemia, obesity, post-menopausal state and diabetes mellitus. her legs have been swollen for the past week, getting worse. She has not tried elevating them however she tried taking her 's Lasix once a day for the past 3 days and it has not helped at all. She denies orthopnea, paroxysmal nocturnal dyspnea. Patient's medications, allergies, past medical, surgical, social and family histories were reviewed and updated as appropriate.     Past Medical History:   Diagnosis Date    Abnormal stress test     Anemia     Arthritis     Depression     Diverticulosis     DM (diabetes mellitus) ([address]     Erythropenia     Fibromyalgia     HTN (hypertension)     Hyperlipidemia     Kidney stone     Morbid obesity due to excess calories ([address]     DIONY on CPAP     Osteopenia 03/03/14    Seasonal allergies     Sleep apnea     uses bipap prn     Past Surgical History:   Procedure Laterality Date    ARM SURGERY  2011    right arm broken    CARDIAC CATHETERIZATION  [id number] dr Mortimer Plough  5-16-16    COLONOSCOPY  2003    COLONOSCOPY  2007    TOTAL KNEE ARTHROPLASTY Bilateral     left may 2013 and right july 2013    TUBAL LIGATION         Social History     Tobacco Use    Smoking status: Former Smoker     Packs/day: 0.25 Years: 1.00     Pack years: 0.25     Quit date: 1960     Years since quittin.2    Smokeless tobacco: Never Used    Tobacco comment: quit    Substance Use Topics    Alcohol use: No      Patient Active Problem List   Diagnosis    Dyslipidemia    DIONY treated with BiPAP    Fibromyalgia    CRI (chronic renal insufficiency)    OA (osteoarthritis)    DM [facility]di[facility] mellitus) [facility]    Kidney stone    Depression    Seasonal allergies    Diverticulosis    Erythropenia    Low hemoglobin    Mitral regurgitation - Mild to moderate    Chronic kidney disease (CKD), stage III (moderate) ([facility])    Gout    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 disease ([facility]    Essential hypertension    Osteopenia    Morbid obesity due to excess calories ([facility])    Anxiety    Febrile illness    Acute serous otitis media of left ear    Secondary hyperparathyroidism ([facility]         Prior to Visit Medications    Medication Sig Taking? Authorizing Provider   ALPRAZolam Catherine Ray) 0.5 MG tablet nightly.  Yes Historical Provider, MD   potassium citrate (UROCIT-K) 10 MEQ (1080 MG) extended release tablet Take 1 tablet by mouth daily Yes Ravindra Ibrahim MD   allopurinol (ZYLOPRIM) 100 MG tablet Take 1 tablet by mouth daily Yes Eliza Aguirre MD   rOPINIRole (REQUIP) 0.25 MG tablet Take 1 tablet by mouth nightly Yes Ravindra Ibrahim MD   calcitRIOL (ROCALTROL) 0.25 MCG capsule TAKE 1 CAPSULE BY MOUTH DAILY Yes Eliza Aguirre MD   cyclobenzaprine (FLEXERIL) 5 MG tablet Take 1 tablet by mouth nightly as needed for Muscle spasms Yes Eliza Aguirre MD   hydroCHLOROthiazide (HYDRODIURIL) 25 MG tablet TAKE 1 TABLET BY MOUTH DAILY Yes Vj Agarwal MD   losartan (COZAAR) 25 MG tablet TAKE 1 TABLET BY MOUTH DAILY Yes Vj Agarwal MD   atorvastatin (LIPITOR) 40 MG tablet Take 1 tablet by mouth daily Yes Eliza Aguirre MD   metoprolol tartrate (LOPRESSOR) 25 MG tablet Take 1 tablet by mouth 2 times daily sounds: Normal heart sounds, S1 normal and S2 normal. No murmur heard. No friction rub. No gallop. Pulmonary:      Effort: Pulmonary effort is normal. No respiratory distress. Breath sounds: Normal breath sounds. No wheezing. Abdominal:      General: Bowel sounds are normal.      Palpations: Abdomen is soft. There is no mass. Tenderness: There is no abdominal tenderness. There is no guarding. Musculoskeletal:         General: Normal range of motion. Skin:     General: Skin is warm and dry. Capillary Refill: Capillary refill takes less than 2 seconds. Neurological:      General: No focal deficit present. Mental Status: She is alert and oriented to person, place, and time. Data Review         Assessment/Plan:      1. Peripheral edema  - Basic Metabolic Panel; Future  - CBC with Auto Differential; Future  - furosemide (LASIX) 40 MG tablet; Take 1 tablet by mouth 2 times daily for 7 days  Dispense: 14 tablet; Refill: 0  Advised patient not to take her 's medication and call here if she has problems or she can contact her nephrologist if the leg swelling recurs. Elevation advised. Patient to come in tomorrow morning for labs prior to starting the Lasix. 2. Obesity, Class III, BMI 40-49.9 (morbid obesity) (HCC)  Large amount of weight gain since last visit, likely due to fluid retention. Will reassess once diuresis has been achieved.            Health Maintenance Due   Topic Date Due    Lipid screen  01/11/2022       Electronically signed by Malu Sánchez MD on 3/8/2022 at 6:10 PM

## 2022-03-08 NOTE — PROGRESS NOTES
Pt is here due to both legs and ankles for a week. She did take some of her husbands Laxis 20 mg for the past 3 days.

## 2022-03-09 ENCOUNTER — HOSPITAL ENCOUNTER (OUTPATIENT)
Age: 77
Setting detail: SPECIMEN
Discharge: HOME | End: 2022-03-09

## 2022-03-09 DIAGNOSIS — E11.65 TYPE 2 DIABETES MELLITUS WITH HYPERGLYCEMIA, WITHOUT LONG-TERM CURRENT USE OF INSULIN (HCC): ICD-10-CM

## 2022-03-09 DIAGNOSIS — R60.9 PERIPHERAL EDEMA: ICD-10-CM

## 2022-03-09 LAB
ABSOLUTE EOS #: 0.34 K/UL (ref 0–0.44)
ABSOLUTE IMMATURE GRANULOCYTE: 0.04 K/UL (ref 0–0.3)
ABSOLUTE LYMPH #: 2.43 K/UL (ref 1.1–3.7)
ABSOLUTE MONO #: 0.5 K/UL (ref 0.1–1.2)
ANION GAP SERPL CALCULATED.3IONS-SCNC: 15 MMOL/L (ref 9–17)
BASOPHILS # BLD: 1 % (ref 0–2)
BASOPHILS ABSOLUTE: 0.06 K/UL (ref 0–0.2)
BUN BLDV-MCNC: 45 MG/DL (ref 8–23)
CALCIUM SERPL-MCNC: 9.3 MG/DL (ref 8.6–10.4)
CHLORIDE BLD-SCNC: 106 MMOL/L (ref 98–107)
CO2: 23 MMOL/L (ref 20–31)
CREAT SERPL-MCNC: 2.68 MG/DL (ref 0.5–0.9)
EOSINOPHILS RELATIVE PERCENT: 5 % (ref 1–4)
ESTIMATED AVERAGE GLUCOSE: 111 MG/DL
GFR AFRICAN AMERICAN: 21 ML/MIN
GFR NON-AFRICAN AMERICAN: 17 ML/MIN
GFR SERPL CREATININE-BSD FRML MDRD: ABNORMAL ML/MIN/{1.73_M2}
GLUCOSE BLD-MCNC: 96 MG/DL (ref 70–99)
HBA1C MFR BLD: 5.5 % (ref 4–6)
HCT VFR BLD CALC: 30.4 % (ref 36.3–47.1)
HEMOGLOBIN: 8.8 G/DL (ref 11.9–15.1)
IMMATURE GRANULOCYTES: 1 %
LYMPHOCYTES # BLD: 33 % (ref 24–43)
MCH RBC QN AUTO: 29.8 PG (ref 25.2–33.5)
MCHC RBC AUTO-ENTMCNC: 28.9 G/DL (ref 28.4–34.8)
MCV RBC AUTO: 103.1 FL (ref 82.6–102.9)
MONOCYTES # BLD: 7 % (ref 3–12)
NRBC AUTOMATED: 0 PER 100 WBC
PDW BLD-RTO: 16 % (ref 11.8–14.4)
PLATELET # BLD: 335 K/UL (ref 138–453)
PMV BLD AUTO: 9.6 FL (ref 8.1–13.5)
POTASSIUM SERPL-SCNC: 4.6 MMOL/L (ref 3.7–5.3)
RBC # BLD: 2.95 M/UL (ref 3.95–5.11)
RBC # BLD: ABNORMAL 10*6/UL
SEG NEUTROPHILS: 53 % (ref 36–65)
SEGMENTED NEUTROPHILS ABSOLUTE COUNT: 4.04 K/UL (ref 1.5–8.1)
SODIUM BLD-SCNC: 144 MMOL/L (ref 135–144)
WBC # BLD: 7.4 K/UL (ref 3.5–11.3)

## 2022-03-13 ASSESSMENT — ENCOUNTER SYMPTOMS
ANAL BLEEDING: 0
VOMITING: 0
CHEST TIGHTNESS: 0
DIARRHEA: 0
BLURRED VISION: 0
WHEEZING: 0
BLOOD IN STOOL: 0
ABDOMINAL PAIN: 0
SHORTNESS OF BREATH: 0
NAUSEA: 0
CHOKING: 0
CONSTIPATION: 0
ORTHOPNEA: 0
COUGH: 0

## 2022-03-13 ASSESSMENT — VISUAL ACUITY: OU: 1

## 2022-03-15 ENCOUNTER — OFFICE VISIT (OUTPATIENT)
Dept: FAMILY MEDICINE CLINIC | Age: 77
End: 2022-03-15
Payer: MEDICARE

## 2022-03-15 VITALS
SYSTOLIC BLOOD PRESSURE: 138 MMHG | HEART RATE: 66 BPM | BODY MASS INDEX: 41.07 KG/M2 | TEMPERATURE: 97.6 F | WEIGHT: 246.8 LBS | DIASTOLIC BLOOD PRESSURE: 84 MMHG | OXYGEN SATURATION: 100 %

## 2022-03-15 DIAGNOSIS — G89.29 CHRONIC BILATERAL LOW BACK PAIN WITHOUT SCIATICA: ICD-10-CM

## 2022-03-15 DIAGNOSIS — R60.9 PERIPHERAL EDEMA: Primary | ICD-10-CM

## 2022-03-15 DIAGNOSIS — I10 ESSENTIAL HYPERTENSION: ICD-10-CM

## 2022-03-15 DIAGNOSIS — M54.50 CHRONIC BILATERAL LOW BACK PAIN WITHOUT SCIATICA: ICD-10-CM

## 2022-03-15 DIAGNOSIS — F51.01 PRIMARY INSOMNIA: ICD-10-CM

## 2022-03-15 DIAGNOSIS — N18.4 CHRONIC RENAL IMPAIRMENT, STAGE 4 (SEVERE) (HCC): ICD-10-CM

## 2022-03-15 PROCEDURE — 1090F PRES/ABSN URINE INCON ASSESS: CPT | Performed by: INTERNAL MEDICINE

## 2022-03-15 PROCEDURE — G8400 PT W/DXA NO RESULTS DOC: HCPCS | Performed by: INTERNAL MEDICINE

## 2022-03-15 PROCEDURE — G8482 FLU IMMUNIZE ORDER/ADMIN: HCPCS | Performed by: INTERNAL MEDICINE

## 2022-03-15 PROCEDURE — 1036F TOBACCO NON-USER: CPT | Performed by: INTERNAL MEDICINE

## 2022-03-15 PROCEDURE — 4040F PNEUMOC VAC/ADMIN/RCVD: CPT | Performed by: INTERNAL MEDICINE

## 2022-03-15 PROCEDURE — 99214 OFFICE O/P EST MOD 30 MIN: CPT | Performed by: INTERNAL MEDICINE

## 2022-03-15 PROCEDURE — 1123F ACP DISCUSS/DSCN MKR DOCD: CPT | Performed by: INTERNAL MEDICINE

## 2022-03-15 PROCEDURE — G8417 CALC BMI ABV UP PARAM F/U: HCPCS | Performed by: INTERNAL MEDICINE

## 2022-03-15 PROCEDURE — G8427 DOCREV CUR MEDS BY ELIG CLIN: HCPCS | Performed by: INTERNAL MEDICINE

## 2022-03-15 RX ORDER — HYDROCODONE BITARTRATE AND ACETAMINOPHEN 5; 325 MG/1; MG/1
1 TABLET ORAL EVERY 6 HOURS PRN
Qty: 20 TABLET | Refills: 0 | Status: SHIPPED | OUTPATIENT
Start: 2022-03-15 | End: 2022-03-20

## 2022-03-15 RX ORDER — ZOLPIDEM TARTRATE 5 MG/1
5 TABLET ORAL NIGHTLY PRN
Qty: 30 TABLET | Refills: 0 | Status: SHIPPED | OUTPATIENT
Start: 2022-03-15 | End: 2022-04-14

## 2022-03-15 NOTE — PROGRESS NOTES
Pt is here due to having all over body pains.  She states she is having cramps in her toes, legs which radiates to groin sometimes

## 2022-03-15 NOTE — PROGRESS NOTES
Subjective:       Patient ID:     Darren Villareal is a 69 y.o. female who presents for   Chief Complaint   Patient presents with    Generalized Body Aches    Swelling     F/U   ankles       HPI:  Nursing note reviewed and discussed with patient. For follow-up of leg swelling and ankle edema. She completed her Lasix, and has lost 9 pounds of her legs are not as swollen anymore. Feels good overall. Has not called nephrology with this. Denies chest pain, palpitations, shortness of breath with exertion, orthopnea, paroxysmal nocturnal dyspnea. Denies any additional salt intake, denies any changes to her medications. She is using Ambien or Xanax when she needs to sleep-not sure how she picks 1 over the other. States he uses either when she cannot sleep. Reviewed risks of addiction, respiratory depression-especially if taken together. Patient chooses to stay on Ambien, will discontinue Xanax. Continues to have generalized body pains. Not a candidate for NSAIDs due to kidney function, has not been a candidate for opioid therapy due to chronic benzo use. She is agreeable to stopping the Xanax if she can get something for pain as long as she can keep her Ambien to help her sleep. Risks and benefits of opioid use discussed including but not limited to constipation, somnolence, nausea, tolerance, etc.        Patient's medications, allergies, past medical, surgical, social and family histories were reviewed and updated as appropriate.     Past Medical History:   Diagnosis Date    Abnormal stress test     Anemia     Arthritis     Depression     Diverticulosis     DM (diabetes mellitus) (HCC)     Erythropenia     Fibromyalgia     HTN (hypertension)     Hyperlipidemia     Kidney stone     Morbid obesity due to excess calories (HCC)     DIONY on CPAP     Osteopenia 03/03/14    Seasonal allergies     Sleep apnea     uses bipap prn     Past Surgical History:   Procedure Laterality Date    ARM SURGERY     right arm broken    CARDIAC CATHETERIZATION  [id number] dr Marta Rios  -    COLONOSCOPY      COLONOSCOPY  2007    TOTAL KNEE ARTHROPLASTY Bilateral     left may 2013 and right 2013    TUBAL LIGATION         Social History     Tobacco Use    Smoking status: Former Smoker     Packs/day: 0.25     Years: 1.00     Pack years: 0.25     Quit date: 1960     Years since quittin.2    Smokeless tobacco: Never Used    Tobacco comment: quit    Substance Use Topics    Alcohol use: No      Patient Active Problem List   Diagnosis    Dyslipidemia    DIONY treated with BiPAP    Fibromyalgia    CRI (chronic renal insufficiency)    OA (osteoarthritis)    DM [facility] mellitus[facility]    Kidney stone    Depression    Seasonal allergies    Diverticulosis    Erythropenia    Low hemoglobin    Mitral regurgitation - Mild to moderate    Chronic kidney disease (CKD), stage III (moderate) ([facility]    Gout    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 disease ([facility]    Essential hypertension    Osteopenia    Morbid obesity due to excess calories ([facility])    Anxiety    Febrile illness    Acute serous otitis media of left ear    Secondary hyperparathyroidism ([facility]         Prior to Visit Medications    Medication Sig Taking? Authorizing Provider   furosemide (LASIX) 40 MG tablet Take 1 tablet by mouth 2 times daily for 7 days Yes Eliza Carroll MD   ALPRAZolam Javi Paredes) 0.5 MG tablet nightly.  Yes Historical Provider, MD   potassium citrate (UROCIT-K) 10 MEQ (1080 MG) extended release tablet Take 1 tablet by mouth daily Yes Eliza Carroll MD   allopurinol (ZYLOPRIM) 100 MG tablet Take 1 tablet by mouth daily Yes Eliza Carroll MD   rOPINIRole (REQUIP) 0.25 MG tablet Take 1 tablet by mouth nightly Yes Kiah Peraza MD   calcitRIOL (ROCALTROL) 0.25 MCG capsule TAKE 1 CAPSULE BY MOUTH DAILY Yes Kiah Peraza MD   cyclobenzaprine (FLEXERIL) 5 MG tablet Take 1 tablet by mouth nightly as needed for Muscle spasms Yes Eliza Lovell MD   hydroCHLOROthiazide (HYDRODIURIL) 25 MG tablet TAKE 1 TABLET BY MOUTH DAILY Yes Marilee Pierce MD   losartan (COZAAR) 25 MG tablet TAKE 1 TABLET BY MOUTH DAILY Yes Marilee Pierce MD   atorvastatin (LIPITOR) 40 MG tablet Take 1 tablet by mouth daily Yes Eliza Lovell MD   metoprolol tartrate (LOPRESSOR) 25 MG tablet Take 1 tablet by mouth 2 times daily Yes Phillip Sumner, DO   azelastine (ASTELIN) 0.1 % nasal spray 1 spray by Nasal route 2 times daily Use in each nostril as directed Yes [place] Sumner, DO   gabapentin (NEURONTIN) 600 MG tablet Take 1 tablet by mouth daily for 90 days. Yes Phillip Sumner, DO   pantoprazole (PROTONIX) 40 MG tablet TAKE 1 TABLET DAILY Yes Phillip Sumner, DO   Cholecalciferol (VITAMIN D3) 25 MCG (1000 UT) TABS Take 2 tablets by mouth daily Yes BARBIE Maciel CNP   Magnesium Oxide 400 MG CAPS Take by mouth 2 times daily  Yes Historical Provider, MD     Review of Systems  Review of Systems   Constitutional: Negative for fatigue, fever and unexpected weight change. Respiratory: Negative for cough, choking, chest tightness, shortness of breath and wheezing. Cardiovascular: Negative for chest pain, palpitations and leg swelling. Gastrointestinal: Negative for abdominal pain, anal bleeding, blood in stool, constipation, diarrhea, nausea and vomiting. Endocrine: Negative. Musculoskeletal: Negative for joint swelling and myalgias. Skin: Negative. Neurological: Negative for dizziness. Psychiatric/Behavioral: Negative for sleep disturbance. All other systems reviewed and are negative.          Objective:       Physical Exam:  /84   Pulse 66   Temp 97.6 °F (36.4 °C) (Temporal)   Wt 246 lb 12.8 oz (111.9 kg)   SpO2 100%   BMI 41.07 kg/m²   Wt Readings from Last 3 Encounters:   03/15/22 246 lb 12.8 oz (111.9 kg)   03/08/22 254 lb 9.6 oz (115.5 kg) 22 233 lb 3.2 oz (105.8 kg)         Physical Exam  Vitals and nursing note reviewed. Constitutional:       General: She is not in acute distress. Appearance: She is well-developed. She is not ill-appearing. Eyes:      General: Lids are normal. Vision grossly intact. Cardiovascular:      Rate and Rhythm: Normal rate and regular rhythm. Heart sounds: Normal heart sounds, S1 normal and S2 normal. No murmur heard. No friction rub. No gallop. Pulmonary:      Effort: Pulmonary effort is normal. No respiratory distress. Breath sounds: Normal breath sounds. No wheezing. Abdominal:      General: Bowel sounds are normal.      Palpations: Abdomen is soft. There is no mass. Tenderness: There is no abdominal tenderness. There is no guarding. Musculoskeletal:         General: Normal range of motion. Right lower le+ Pitting Edema present. Left lower le+ Pitting Edema present. Skin:     General: Skin is warm and dry. Capillary Refill: Capillary refill takes less than 2 seconds. Neurological:      General: No focal deficit present. Mental Status: She is alert and oriented to person, place, and time.          Data Review  Hospital Outpatient Visit on 2022   Component Date Value    WBC 2022 7.4     RBC 2022 2.95*    Hemoglobin 2022 8.8*    Hematocrit 2022 30.4*    MCV 2022 103.1*    MCH 2022 29.8     MCHC 2022 28.9     RDW 2022 16.0*    Platelets  335     MPV 2022 9.6     NRBC Automated 2022 0.0     Seg Neutrophils 2022 53     Lymphocytes 2022 33     Monocytes 2022 7     Eosinophils % 2022 5*    Basophils 2022 1     Immature Granulocytes 2022 1*    Segs Absolute 2022 4.04     Absolute Lymph # 2022 2.43     Absolute Mono # 2022 0.50     Absolute Eos # 2022 0.34     Basophils Absolute 2022 0.06     Absolute Immature Granul* 03/09/2022 0.04     RBC Morphology 03/09/2022 ANISOCYTOSIS PRESENT     Glucose 03/09/2022 96     BUN 03/09/2022 45*    CREATININE 03/09/2022 2.68*    Calcium 03/09/2022 9.3     Sodium 03/09/2022 144     Potassium 03/09/2022 4.6     Chloride 03/09/2022 106     CO2 03/09/2022 23     Anion Gap 03/09/2022 15     GFR Non- 03/09/2022 17*    GFR  03/09/2022 21*    GFR Comment 03/09/2022          Hemoglobin A1C 03/09/2022 5.5     Estimated Avg Glucose 03/09/2022 111    Orders Only on 01/20/2022   Component Date Value    SARS-CoV-2 01/14/2022 positive      Lab Results   Component Value Date    CHOL 178 01/11/2021    TRIG 281 01/11/2021    HDL 37 01/11/2021    LDLDIRECT 123 10/21/2016          Assessment/Plan:       1. Peripheral edema  - [place] 11    2. Primary insomnia  - zolpidem (AMBIEN) 5 MG tablet; Take 1 tablet by mouth nightly as needed for Sleep for up to 30 days. Dispense: 30 tablet; Refill: 0  D/c Xanax - med agreement updated today     3. Chronic bilateral low back pain without sciatica  D/c xanax   - HYDROcodone-acetaminophen (NORCO) 5-325 MG per tablet; Take 1 tablet by mouth every 6 hours as needed for Pain for up to 5 days. Intended supply: 5 days. Take lowest dose possible to manage pain  Dispense: 20 tablet; Refill: 0    4. Chronic renal impairment, stage 4 (severe) (HCC)  - HYDROcodone-acetaminophen (NORCO) 5-325 MG per tablet; Take 1 tablet by mouth every 6 hours as needed for Pain for up to 5 days. Intended supply: 5 days. Take lowest dose possible to manage pain  Dispense: 20 tablet; Refill: 0    5. Essential hypertension  Continue current regimen    Controlled Substance Monitoring:    Acute and Chronic Pain Monitoring:   RX Monitoring 3/15/2022   Attestation -   Periodic Controlled Substance Monitoring No signs of potential drug abuse or diversion identified. ;Possible medication side effects, risk of

## 2022-03-25 ENCOUNTER — TELEPHONE (OUTPATIENT)
Dept: FAMILY MEDICINE CLINIC | Age: 77
End: 2022-03-25

## 2022-03-25 DIAGNOSIS — R60.9 PERIPHERAL EDEMA: ICD-10-CM

## 2022-03-25 RX ORDER — FUROSEMIDE 40 MG/1
40 TABLET ORAL 2 TIMES DAILY
Qty: 14 TABLET | Refills: 0 | Status: ON HOLD
Start: 2022-03-25 | End: 2022-04-07 | Stop reason: HOSPADM

## 2022-03-25 NOTE — TELEPHONE ENCOUNTER
Pt called stating she was in the office last week for her feet swelling, pt stated her weight is up to 260 she was at 237, both feet are very swollen can barely move her toes, she does have an appt with the lymphedema clinic on 03/28/2022 to have her legs wrapped, pt was prescribed Lasix, but she is out of it     BP is 147/81    Pt states she is not in pain

## 2022-03-25 NOTE — TELEPHONE ENCOUNTER
Lasix refill called in - she needs to talk to her nephrologist about this reoccuring fluid retention.

## 2022-03-27 ASSESSMENT — ENCOUNTER SYMPTOMS
COUGH: 0
VOMITING: 0
ANAL BLEEDING: 0
CONSTIPATION: 0
NAUSEA: 0
CHEST TIGHTNESS: 0
DIARRHEA: 0
BLOOD IN STOOL: 0
SHORTNESS OF BREATH: 0
CHOKING: 0
WHEEZING: 0
ABDOMINAL PAIN: 0

## 2022-03-27 ASSESSMENT — VISUAL ACUITY: OU: 1

## 2022-03-28 ENCOUNTER — HOSPITAL ENCOUNTER (OUTPATIENT)
Dept: OCCUPATIONAL THERAPY | Age: 77
Setting detail: THERAPIES SERIES
Discharge: HOME | End: 2022-03-28
Payer: MEDICARE

## 2022-03-28 PROCEDURE — 97535 SELF CARE MNGMENT TRAINING: CPT

## 2022-03-28 PROCEDURE — 97166 OT EVAL MOD COMPLEX 45 MIN: CPT

## 2022-03-28 NOTE — CONSULTS
TREATMENT LOCATION:   [x] [address]   [facility]: [phone]  F: [phone] [] [address]: [phone]  F: [phone]      Lymphedema Services - Initial Evaluation for Lower Extremity    Date:  3/28/2022  Patient: Kellee Hargrove  : 1945             MRN: [id number]  Referring Physician: Konstantin Bullard MD       Phone: [phone]  Fax: [phone]  Insurance: Carolyn Scottannetta ([id number])/ Medicare (ID: [id number]) -  PT/OT/ST, BMN, no co-pay  Medical Diagnosis: R60.9 - peripheral edema  Rehab Codes: I89.0,    Onset Date: 3/15/22  Visit# / total visits:  scheduled; Progress note for Medicare patient due at visit 10   ( Certification Dates: 3/28/2022 - 22)    Allergies:  [] None       [] Latex       [x] Adhesive tape       [x] Medications    [] Other  Medications: See charted information in Epic  Past Medical History: See charted information in Epic     Restrictions: None  Fall Risk:   [] No    [x] Yes   If yes, intervention: pt utilizes cane. Writer assists with transfers. Overview: Patient is a 69year old female referred to the Lymphedema Clinic with a diagnosis of bilateral Lower Extremity edema. PHYSICIAN NOTES from referring MD 3/28/22: For follow-up of leg swelling and ankle edema. She completed her Lasix, and has lost 9 pounds of her legs are not as swollen anymore. Feels good overall. Has not called nephrology with this. Denies chest pain, palpitations, shortness of breath with exertion, orthopnea, paroxysmal nocturnal dyspnea. Denies any additional salt intake, denies any changes to her medications. SUBJECTIVE:  Pt reports that legs have been swollen on and off for the past couple of months. Pt had COVID in January. Pt has had the pain in her legs for the same amount of time. Prior to this pt reports no issues with swelling.     Dr. Sarah Mao is nephrologist at [facility]. V's 6 months ago. She called them this AM to make Dr. Myla Samayoa aware that PCP put her on 40mg 2xdaily of Lasix. Cramping both legs at time which is relieved by pickle juice. Hx of Complete Decongestive Therapy:[]Yes - Date:        [x]No   Rate of onset:   [x] Slow   [] Rapid     [] Acute   Progression: [] Improving   [x]  Worsening  [] Consistent   Conservative Treatments Utilized in the Past:  [] None  [] Compression Garments   [] Bandaging  [] Elevation   [] Exercise  []Self MLD  [] Other/comments:      Current Lymphedmea Treatments:   [x] None  [] Compression Garments   [] Bandaging  [] Elevation  [] Exercise   []Self MLD  [] Other/comments:          Aggravating factors:  [x] None   [] Activity  [] Stress  [] Sleep Position [] Travel [] Hot Temperatures [] Diet   []  Other/comments:    Relieving factors:   [x] None [] Elevation  [] Activity  [] Compression  [] Rest  [] Cold Temperatures [] Diet   []  Other/comments:    Comments: Elevation does not help.         Pain:  [x] YES    [] NO   Location: BLE     Pain Rating: ( 0-10 scale) : 11/10  Comments:     History of Cancer: []Yes [x]No     Comorbidities:   [x] Obesity [] Dialysis  [x] Other: L TKA May 2013   [] Asthma/COPD [] Dementia [x] Other: R TKA July 2013, L TKA revision July 2013   [] Stroke [x] Sleep apnea [] Other:   [] Vascular disease [] Rheumatic disease [] Other:     Absolute Lymphedema Contraindications - treatement [] NONE     [] CHF (only if patient is un-medicated or edema is solely d/t cardiac failure)    [] DVT- Acute, No MLD to limb       [x] Advanced Renal Disease - Need Physician Clearance   [] Acute Skin Infection ( e.g. Cellulitis, Ersipelas)   [] Thyroid condition (caution with MLD to neck)   [] Cardiac Arrhythmia   [] Hypersensitive Carotid Sinus    Absolute Contraindications Regarding the Deep Abdomen: [x] NONE    [] Pregnancy    [] Abdominal Aortic Aneurism - Current or history of    [] Inflammatory Disease of bowel or intestines   [] Severe Arteriosclerosis    [] Intra-abdominal scar formations following surgery   [] Recent abdominal surgery   [] Pelvic DVT- Current or history of   [] Presence of clot prevention devices ( e.g. - [place] Filter)     Relative Lymphedema Contraindications [] NONE      [] Malignant Lymphedema - Edema is being caused by active cancer. MLD and CDT considered palliative during active cancer treatments. Physician approval required. [x] Age 61+    [] Limb paralysis     Home/Work Environment  Patient lives with: Spouse who may be willing to assist with care   In what type of home [x]  One story   [] Two story   [] Split level  [] Apartment   Number of stairs to enter 3   With handrail on the []  Right to enter   [] Left to enter   Bathroom has a []  Tub only  [] Tub/shower combo   [x] Walk in shower    []  Grab bars   Washing machine is on []  Main level   [] Second level   [x] Basement - daughter   Employer Retired, n/a   Job Status []  Normal duty   [] Light duty   [] Off due to condition    [x]  Retired   [] Not employed   [] Disability  [] Other:  []  Return to work:    Work activities/duties Did office type work, 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[facility] [employer]        ADL/IADL Previous level of function Current level of function Who currently assists the patient with task   Bathing  [x] Independent  [] Assist [x] Independent  [] Assist    Dress/grooming [x] Independent  [] Assist [x] Independent  [] Assist    Transfer/mobility [x] Independent  [] Assist [x] Independent  [] Assist    Feeding [x] Independent  [] Assist [x] Independent  [] Assist    Toileting [x] Independent  [] Assist [x] Independent  [] Assist    Driving [x] Independent  [] Assist [x] Independent  [] Assist    Housekeeping [x] Independent  [] Assist [] Independent  [x] Assist Daughter assists   Grocery shop/meal prep [x] Independent  [] Assist [x] Independent  [] Assist      Gait Prior level of function Current level of function    [x] Independent  [] Assist [x] Independent  [] Assist   Device: [] Independent [x] Independent    [] Straight Cane [] Quad cane [x] Straight Cane [] Quad cane    [] Standard walker [] Rolling walker   [] 4 wheeled walker [] Standard walker [] Rolling walker   [] 4 wheeled walker    [] Wheelchair [] Wheelchair         OBJECTIVE    Physical Status:   Range of motion: Deficits [] Yes [x] No       Comment:   Strength:         Deficits [x] Yes [] No        Comment: generalized weakness, heaviness in legs    Presentation of Affected Areas  Location: bilateral Lower Extremity below knees   Description: Hyperpigmentation  Pitting 1+/minimal  Stemmer Sign positive  Firm/Fibrotic   full/fatty just below knees   Scars B TKA   Wounds None   Sensation   hypersensitive/painful   Additional Comments:        Circumferential Measurements  Measurements taken from nail base of D2 digit. Measurements (cm) Right Left   Dorsum  11  24.5 25.0   Inframalleolar      17 34.3 35.5   Supramalleolar    20 27.2 28.7   Lower Calf 24 30.4 31.7   Mid Calf 30 39.7 40.3   Upper Calf 37 50.2 50.9   Knee 43 57.6 58.9   10 cm above knee     Thigh-widest     Hip-widest     Initial Total 3/28/2022 :  263.9 271.0            ASSESSMENT: Patient would benefit from skilled occupational therapy services in order to: Decrease edema that has accumulated in the extremity, decrease risk of infection,  increase ease and independence with ADL & functional mobility, educate on long term management of condition, and improve overall quality of life. Pt is provided with a folder which included educational hand out on the basics of lymphedema, program details and what to expect for further review at home. Writer educates on an impaired lymphatic system vs. a healthy lymphatic system and how it relates to swelling and skin integrity given her current presenation. Pt is also educated, in more detail, on the purpose of lymphedema treatments and a POC that would be of benefit to them.  This may include:    [x]Bandaging   [x]Self-Bandaging/Caregiver Training   [x]MLD    [x]Self-MLD   [x] Therapeutic Exercise    [x] Education/Instruction in home management program  [x] Education and trial of a Vasopneumatic Pump    [x] Education and transition to long term management devices such as velcro compression garments and/or daytime compression sleeve/stocking(s)   [x]Discharge to Home Program     Pt is supplied and educated on use of size G tubigrips for BLE below the knees. She is instructed to wear in during the daytime only. Pt is encouraged to find larger shoes to accommodate bandages & if she is able to bring spouse to assist to be instructed on bandaging technique it would be beneficial to her progress with therapy. She is educated on long term goal of treatment being independence with self-management once swelling has been reduced. Response to treatment: Pt verbalizes and is agreeable to the instructions/ POC established at today's evaluation. PLAN FOR NEXT VISIT: Initiate CDT, edu bandaging precautions, caregiver training as able      Lymphedema Stage per ISL Guidelines    [] 0: Subclinical with no evidence on physical exam  [] 1:  Early onset, swelling/heaviness, pitting edema, subsides with limb elevation  [x] 2:  More advanced, fibrosis resulting in non-pitting edema, does not respond to elevation, thickening of the tissues  [] 3: Elephantiasis, pitting absent, huge limbs with dry/scaly, papillomatosis, hyperkeratosis, fluid may be leaking with recurrent cellulitis. Acanthosis (deep body folds). Elevation &   diuretics ineffective. Therapy Goals  STG - To be addressed within 5 visits    Pt will demonstrate compliance of maintaining lymphedema precautions to reduce the risks of infection and further exacerbations. Pt will demonstrate independence with decongestive exercise program in order to expedite fluid rerouting.     Caregiver will demo good understanding of bandaging technique and theory in order to continue progression between visits. LTG To be adressed within 10 visits     Pt will demonstrate competence with SELF MLD (Manual lymphatic drainage) in order to reroute lymphatic pathways for decreased swelling. Pt/Caregiver will demonstrate independence with donning/doffing and wearing schedule for compression garments/ devices to maintain decreased size upon discharge. Pt will demonstrate compliance with skin care routine for improved overall skin integrity and decreased risk of wounds and infection. Pt to compliant with CDT in order to reduce edema in the BLE by 15+ cm per limb. Patient's Goal: decrease pain and swelling    Response to Education Provided:  [x] Verbalized understanding   [] Demonstrates/verbalizes HEP/Education previously given      [] Needs continued review            [] No understanding   Learner(s): [x] Patient  [] Spouse [] Family  [] Other:   Method(s): [x] Verbal [] Demonstration [] Handouts [] Exercise booklet   Family available to assist with home program if needed: [] Yes [] No    FREQUENCY: Pt will be seen 2 x/ week for 10 additional visits and will follow up as appropriate. Focus is to remain on short and long term goals listed above.      Rehabilitation Potential/Commitment: [x] Good [] Fair     [] Poor    Functional Outcome Measure(s):  Lymphedema Life Impact Scale (LLIS) Score: 63%     Clearance needed:  [x]Yes    []No     Physicians/specialties giving clearance: faxed to Dr. Morena Aguirre (nephrology)    Referral needed to: [] Physical Therapy   [] Speech Therapy                 Treatment Charges   Minutes   Units   Evaluation                                                             $95.15 / $75.40            Low   (86390)            Moderate   (08155) 40 1          High   (97559)     Manual Therapy (73722):                                      $26.92 / $21.34     Therapeutic activities (95422):                             $37.46/ $26.79 Therapeutic Exercise (53849)                              $29.21/$ 22.84     Self care/home mgmt (43785)                              $32.39 / $24.43 45 3   Vasopneumatic Device (47994)                           $9.21     Total Treatment Time    85 4     Medicare Tracking - $2150 cap Totals   Today 168.44   Previous     Grand Total 168.44       Time In:  1600  Time Out: 1725      Electronically signed by Shravan Karimi OT on 3/28/2022 at 4:06 PM      Physician Signature: _________________________        Date: _______________  By signing above or cosigning this note, I have reviewed this plan of care and certify a need to continue medically necessary rehabilitation services.       *PLEASE SIGN ABOVE AND FAX BACK .[phone] WITH ALL PAGES FOR CONSENT TO CONTINUE LYMPHEDEMA TREATMENT*

## 2022-03-31 ENCOUNTER — HOSPITAL ENCOUNTER (INPATIENT)
Age: 77
LOS: 6 days | Discharge: HOME HEALTH | DRG: 309 | End: 2022-04-07
Attending: EMERGENCY MEDICINE | Admitting: STUDENT IN AN ORGANIZED HEALTH CARE EDUCATION/TRAINING PROGRAM
Payer: MEDICARE

## 2022-03-31 ENCOUNTER — APPOINTMENT (OUTPATIENT)
Dept: GENERAL RADIOLOGY | Age: 77
DRG: 309 | End: 2022-03-31
Payer: MEDICARE

## 2022-03-31 ENCOUNTER — NURSE TRIAGE (OUTPATIENT)
Dept: OTHER | Age: 77
End: 2022-03-31

## 2022-03-31 ENCOUNTER — TELEPHONE (OUTPATIENT)
Dept: FAMILY MEDICINE CLINIC | Age: 77
End: 2022-03-31

## 2022-03-31 DIAGNOSIS — E11.65 TYPE 2 DIABETES MELLITUS WITH HYPERGLYCEMIA, WITHOUT LONG-TERM CURRENT USE OF INSULIN (HCC): ICD-10-CM

## 2022-03-31 DIAGNOSIS — N18.9 ACUTE KIDNEY INJURY SUPERIMPOSED ON CKD (HCC): ICD-10-CM

## 2022-03-31 DIAGNOSIS — N17.9 AKI (ACUTE KIDNEY INJURY) (HCC): ICD-10-CM

## 2022-03-31 DIAGNOSIS — I89.0 LYMPHEDEMA: ICD-10-CM

## 2022-03-31 DIAGNOSIS — L03.116 CELLULITIS OF LEFT LOWER EXTREMITY: Primary | ICD-10-CM

## 2022-03-31 DIAGNOSIS — L03.115 CELLULITIS OF RIGHT LOWER EXTREMITY: ICD-10-CM

## 2022-03-31 DIAGNOSIS — N17.9 ACUTE KIDNEY INJURY SUPERIMPOSED ON CKD (HCC): ICD-10-CM

## 2022-03-31 LAB
-: NORMAL
ABSOLUTE EOS #: 0.19 K/UL (ref 0–0.44)
ABSOLUTE IMMATURE GRANULOCYTE: 0.05 K/UL (ref 0–0.3)
ABSOLUTE LYMPH #: 1.45 K/UL (ref 1.1–3.7)
ABSOLUTE MONO #: 1.08 K/UL (ref 0.1–1.2)
ALBUMIN SERPL-MCNC: 3.9 G/DL (ref 3.5–5.2)
ALBUMIN/GLOBULIN RATIO: 1.2 (ref 1–2.5)
ALP BLD-CCNC: 69 U/L (ref 35–104)
ALT SERPL-CCNC: 7 U/L (ref 5–33)
ANION GAP SERPL CALCULATED.3IONS-SCNC: 14 MMOL/L (ref 9–17)
AST SERPL-CCNC: 10 U/L
BASOPHILS # BLD: 0 % (ref 0–2)
BASOPHILS ABSOLUTE: 0.03 K/UL (ref 0–0.2)
BILIRUB SERPL-MCNC: 0.42 MG/DL (ref 0.3–1.2)
BILIRUBIN URINE: NEGATIVE
BUN BLDV-MCNC: 43 MG/DL (ref 8–23)
CALCIUM SERPL-MCNC: 9.6 MG/DL (ref 8.6–10.4)
CASTS UA: NORMAL /LPF (ref 0–8)
CHLORIDE BLD-SCNC: 100 MMOL/L (ref 98–107)
CO2: 23 MMOL/L (ref 20–31)
COLOR: YELLOW
CREAT SERPL-MCNC: 3.15 MG/DL (ref 0.5–0.9)
EOSINOPHILS RELATIVE PERCENT: 2 % (ref 1–4)
EPITHELIAL CELLS UA: NORMAL /HPF (ref 0–5)
GFR AFRICAN AMERICAN: 17 ML/MIN
GFR NON-AFRICAN AMERICAN: 14 ML/MIN
GFR SERPL CREATININE-BSD FRML MDRD: ABNORMAL ML/MIN/{1.73_M2}
GLUCOSE BLD-MCNC: 120 MG/DL (ref 70–99)
GLUCOSE URINE: NEGATIVE
HCT VFR BLD CALC: 27.1 % (ref 36.3–47.1)
HEMOGLOBIN: 8.2 G/DL (ref 11.9–15.1)
IMMATURE GRANULOCYTES: 1 %
KETONES, URINE: NEGATIVE
LEUKOCYTE ESTERASE, URINE: NEGATIVE
LYMPHOCYTES # BLD: 14 % (ref 24–43)
MAGNESIUM: 1.5 MG/DL (ref 1.6–2.6)
MCH RBC QN AUTO: 29.5 PG (ref 25.2–33.5)
MCHC RBC AUTO-ENTMCNC: 30.3 G/DL (ref 28.4–34.8)
MCV RBC AUTO: 97.5 FL (ref 82.6–102.9)
MONOCYTES # BLD: 11 % (ref 3–12)
NITRITE, URINE: NEGATIVE
NRBC AUTOMATED: 0 PER 100 WBC
PDW BLD-RTO: 15.3 % (ref 11.8–14.4)
PH UA: 5.5 (ref 5–8)
PLATELET # BLD: 317 K/UL (ref 138–453)
PMV BLD AUTO: 9.8 FL (ref 8.1–13.5)
POTASSIUM SERPL-SCNC: 3.3 MMOL/L (ref 3.7–5.3)
PRO-BNP: 4927 PG/ML
PROTEIN UA: NEGATIVE
RBC # BLD: 2.78 M/UL (ref 3.95–5.11)
RBC # BLD: ABNORMAL 10*6/UL
RBC UA: NORMAL /HPF (ref 0–4)
SEG NEUTROPHILS: 72 % (ref 36–65)
SEGMENTED NEUTROPHILS ABSOLUTE COUNT: 7.53 K/UL (ref 1.5–8.1)
SODIUM BLD-SCNC: 137 MMOL/L (ref 135–144)
SPECIFIC GRAVITY UA: 1.01 (ref 1–1.03)
TOTAL PROTEIN: 7.1 G/DL (ref 6.4–8.3)
TROPONIN, HIGH SENSITIVITY: 31 NG/L (ref 0–14)
TURBIDITY: CLEAR
URINE HGB: NEGATIVE
UROBILINOGEN, URINE: NORMAL
WBC # BLD: 10.3 K/UL (ref 3.5–11.3)
WBC UA: NORMAL /HPF (ref 0–5)

## 2022-03-31 PROCEDURE — 81001 URINALYSIS AUTO W/SCOPE: CPT

## 2022-03-31 PROCEDURE — 80053 COMPREHEN METABOLIC PANEL: CPT

## 2022-03-31 PROCEDURE — 93005 ELECTROCARDIOGRAM TRACING: CPT | Performed by: INTERNAL MEDICINE

## 2022-03-31 PROCEDURE — 83880 ASSAY OF NATRIURETIC PEPTIDE: CPT

## 2022-03-31 PROCEDURE — 96372 THER/PROPH/DIAG INJ SC/IM: CPT

## 2022-03-31 PROCEDURE — 99284 EMERGENCY DEPT VISIT MOD MDM: CPT

## 2022-03-31 PROCEDURE — 85025 COMPLETE CBC W/AUTO DIFF WBC: CPT

## 2022-03-31 PROCEDURE — 84484 ASSAY OF TROPONIN QUANT: CPT

## 2022-03-31 PROCEDURE — 71045 X-RAY EXAM CHEST 1 VIEW: CPT

## 2022-03-31 PROCEDURE — 83735 ASSAY OF MAGNESIUM: CPT

## 2022-03-31 RX ORDER — ORPHENADRINE CITRATE 30 MG/ML
60 INJECTION INTRAMUSCULAR; INTRAVENOUS ONCE
Status: COMPLETED | OUTPATIENT
Start: 2022-04-01 | End: 2022-04-01

## 2022-03-31 ASSESSMENT — PAIN SCALES - GENERAL: PAINLEVEL_OUTOF10: 10

## 2022-03-31 NOTE — TELEPHONE ENCOUNTER
Reason for Disposition   [1] Red area or streak [2] large (> 2 in. or 5 cm)    Answer Assessment - Initial Assessment Questions  1. ONSET: \"When did the swelling start? \" (e.g., minutes, hours, days)      Five days ago  2. LOCATION: \"What part of the leg is swollen? \"  \"Are both legs swollen or just one leg? \"      Bilat feet toe area  3. SEVERITY: \"How bad is the swelling? \" (e.g., localized; mild, moderate, severe)   - Localized - small area of swelling localized to one leg   - MILD pedal edema - swelling limited to foot and ankle, pitting edema < 1/4 inch (6 mm) deep, rest and elevation eliminate most or all swelling   - MODERATE edema - swelling of lower leg to knee, pitting edema > 1/4 inch (6 mm) deep, rest and elevation only partially reduce swelling   - SEVERE edema - swelling extends above knee, facial or hand swelling present       Swelling at top of feet. Was told she has lymphedema   4. REDNESS: \"Does the swelling look red or infected? \"      Red and swollen skin tight and shinny   5. PAIN: \"Is the swelling painful to touch? \" If Yes, ask: \"How painful is it? \"   (Scale 1-10; mild, moderate or severe)      Pain 10. Can not walk because of pain   6. FEVER: \"Do you have a fever? \" If Yes, ask: \"What is it, how was it measured, and when did it start? \"       Denies  7. CAUSE: \"What do you think is causing the leg swelling?\"       8. MEDICAL HISTORY: \"Do you have a history of heart failure, kidney disease, liver failure, or cancer? \"     Kidney disease  9. RECURRENT SYMPTOM: \"Have you had leg swelling before? \" If Yes, ask: \"When was the last time? \" \"What happened that time? \"     Yes. Lasix was ordered . 2nd round of Lasix at 80 mg  10. OTHER SYMPTOMS: \"Do you have any other symptoms? \" (e.g., chest pain, difficulty breathing)        Denies  11. PREGNANCY: \"Is there any chance you are pregnant? \" \"When was your last menstrual period? \"    Protocols used: LEG SWELLING AND EDEMA-ADULT-

## 2022-03-31 NOTE — TELEPHONE ENCOUNTER
Patient contacted office to inform that she went to Lymphedema clinic and contacted her nephrologist. Patient stated her legs feel like they are on fire (knee down to toes).  She stated the pain medication prescribed is not helping      [facility]

## 2022-04-01 PROBLEM — I50.9 NEW ONSET OF CONGESTIVE HEART FAILURE (HCC): Status: ACTIVE | Noted: 2022-04-01

## 2022-04-01 PROBLEM — K21.9 GERD (GASTROESOPHAGEAL REFLUX DISEASE): Status: ACTIVE | Noted: 2022-04-01

## 2022-04-01 PROBLEM — N18.9 ACUTE KIDNEY INJURY SUPERIMPOSED ON CKD (HCC): Status: ACTIVE | Noted: 2022-04-01

## 2022-04-01 PROBLEM — I89.0 LYMPHEDEMA: Status: ACTIVE | Noted: 2022-04-01

## 2022-04-01 PROBLEM — I48.91 ATRIAL FIBRILLATION (HCC): Status: ACTIVE | Noted: 2022-04-01

## 2022-04-01 PROBLEM — G25.81 RESTLESS LEG SYNDROME: Status: ACTIVE | Noted: 2022-04-01

## 2022-04-01 PROBLEM — N17.9 ACUTE KIDNEY INJURY SUPERIMPOSED ON CKD (HCC): Status: ACTIVE | Noted: 2022-04-01

## 2022-04-01 LAB
-: ABNORMAL
ABSOLUTE EOS #: 0.09 K/UL (ref 0–0.44)
ABSOLUTE IMMATURE GRANULOCYTE: 0.04 K/UL (ref 0–0.3)
ABSOLUTE LYMPH #: 1.46 K/UL (ref 1.1–3.7)
ABSOLUTE MONO #: 1.38 K/UL (ref 0.1–1.2)
ALBUMIN SERPL-MCNC: 3.7 G/DL (ref 3.5–5.2)
ALBUMIN SERPL-MCNC: 3.8 G/DL (ref 3.5–5.2)
ALBUMIN/GLOBULIN RATIO: 1.2 (ref 1–2.5)
ALP BLD-CCNC: 68 U/L (ref 35–104)
ALT SERPL-CCNC: 7 U/L (ref 5–33)
ANION GAP SERPL CALCULATED.3IONS-SCNC: 15 MMOL/L (ref 9–17)
ANION GAP SERPL CALCULATED.3IONS-SCNC: 15 MMOL/L (ref 9–17)
AST SERPL-CCNC: 11 U/L
BASOPHILS # BLD: 1 % (ref 0–2)
BASOPHILS ABSOLUTE: 0.05 K/UL (ref 0–0.2)
BILIRUB SERPL-MCNC: 0.46 MG/DL (ref 0.3–1.2)
BILIRUBIN URINE: NEGATIVE
BUN BLDV-MCNC: 40 MG/DL (ref 8–23)
BUN BLDV-MCNC: 40 MG/DL (ref 8–23)
CALCIUM SERPL-MCNC: 9.6 MG/DL (ref 8.6–10.4)
CALCIUM SERPL-MCNC: 9.8 MG/DL (ref 8.6–10.4)
CASTS UA: ABNORMAL /LPF (ref 0–8)
CHLORIDE BLD-SCNC: 100 MMOL/L (ref 98–107)
CHLORIDE BLD-SCNC: 102 MMOL/L (ref 98–107)
CO2: 21 MMOL/L (ref 20–31)
CO2: 24 MMOL/L (ref 20–31)
COLOR: YELLOW
COMPLEMENT C3: 166 MG/DL (ref 90–180)
COMPLEMENT C3: 170 MG/DL (ref 90–180)
COMPLEMENT C4: 46 MG/DL (ref 10–40)
COMPLEMENT C4: 47 MG/DL (ref 10–40)
CREAT SERPL-MCNC: 2.88 MG/DL (ref 0.5–0.9)
CREAT SERPL-MCNC: 2.98 MG/DL (ref 0.5–0.9)
EOSINOPHILS RELATIVE PERCENT: 1 % (ref 1–4)
EPITHELIAL CELLS UA: ABNORMAL /HPF (ref 0–5)
GFR AFRICAN AMERICAN: 19 ML/MIN
GFR AFRICAN AMERICAN: 19 ML/MIN
GFR NON-AFRICAN AMERICAN: 15 ML/MIN
GFR NON-AFRICAN AMERICAN: 16 ML/MIN
GFR SERPL CREATININE-BSD FRML MDRD: ABNORMAL ML/MIN/{1.73_M2}
GFR SERPL CREATININE-BSD FRML MDRD: ABNORMAL ML/MIN/{1.73_M2}
GLUCOSE BLD-MCNC: 134 MG/DL (ref 70–99)
GLUCOSE BLD-MCNC: 149 MG/DL (ref 70–99)
GLUCOSE URINE: NEGATIVE
HCT VFR BLD CALC: 26.8 % (ref 36.3–47.1)
HEMOGLOBIN: 8.2 G/DL (ref 11.9–15.1)
IMMATURE GRANULOCYTES: 0 %
INR BLD: 1.1
KETONES, URINE: NEGATIVE
LEUKOCYTE ESTERASE, URINE: NEGATIVE
LYMPHOCYTES # BLD: 13 % (ref 24–43)
MAGNESIUM: 1.5 MG/DL (ref 1.6–2.6)
MAGNESIUM: 1.6 MG/DL (ref 1.6–2.6)
MCH RBC QN AUTO: 29.9 PG (ref 25.2–33.5)
MCHC RBC AUTO-ENTMCNC: 30.6 G/DL (ref 28.4–34.8)
MCV RBC AUTO: 97.8 FL (ref 82.6–102.9)
MONOCYTES # BLD: 13 % (ref 3–12)
NITRITE, URINE: NEGATIVE
NRBC AUTOMATED: 0 PER 100 WBC
PARTIAL THROMBOPLASTIN TIME: 18.6 SEC (ref 20.5–30.5)
PARTIAL THROMBOPLASTIN TIME: 26.2 SEC (ref 20.5–30.5)
PARTIAL THROMBOPLASTIN TIME: 26.4 SEC (ref 20.5–30.5)
PDW BLD-RTO: 15.5 % (ref 11.8–14.4)
PH UA: 6.5 (ref 5–8)
PHOSPHORUS: 3.4 MG/DL (ref 2.6–4.5)
PLATELET # BLD: 322 K/UL (ref 138–453)
PMV BLD AUTO: 9.5 FL (ref 8.1–13.5)
POTASSIUM SERPL-SCNC: 3.6 MMOL/L (ref 3.7–5.3)
POTASSIUM SERPL-SCNC: 3.7 MMOL/L (ref 3.7–5.3)
PROTEIN UA: ABNORMAL
PROTHROMBIN TIME: 11.5 SEC (ref 9.1–12.3)
RBC # BLD: 2.74 M/UL (ref 3.95–5.11)
RBC # BLD: ABNORMAL 10*6/UL
RBC UA: ABNORMAL /HPF (ref 0–4)
SEG NEUTROPHILS: 72 % (ref 36–65)
SEGMENTED NEUTROPHILS ABSOLUTE COUNT: 8.02 K/UL (ref 1.5–8.1)
SODIUM BLD-SCNC: 138 MMOL/L (ref 135–144)
SODIUM BLD-SCNC: 139 MMOL/L (ref 135–144)
SPECIFIC GRAVITY UA: 1.01 (ref 1–1.03)
TOTAL PROTEIN: 7.1 G/DL (ref 6.4–8.3)
TROPONIN, HIGH SENSITIVITY: 29 NG/L (ref 0–14)
TSH SERPL DL<=0.05 MIU/L-ACNC: 1.6 UIU/ML (ref 0.3–5)
TURBIDITY: CLEAR
URINE HGB: NEGATIVE
UROBILINOGEN, URINE: NORMAL
WBC # BLD: 11 K/UL (ref 3.5–11.3)
WBC UA: ABNORMAL /HPF (ref 0–5)

## 2022-04-01 PROCEDURE — 93005 ELECTROCARDIOGRAM TRACING: CPT | Performed by: NURSE PRACTITIONER

## 2022-04-01 PROCEDURE — 84443 ASSAY THYROID STIM HORMONE: CPT

## 2022-04-01 PROCEDURE — 6360000002 HC RX W HCPCS: Performed by: INTERNAL MEDICINE

## 2022-04-01 PROCEDURE — 6360000002 HC RX W HCPCS: Performed by: STUDENT IN AN ORGANIZED HEALTH CARE EDUCATION/TRAINING PROGRAM

## 2022-04-01 PROCEDURE — 2580000003 HC RX 258: Performed by: NURSE PRACTITIONER

## 2022-04-01 PROCEDURE — 83735 ASSAY OF MAGNESIUM: CPT

## 2022-04-01 PROCEDURE — 76937 US GUIDE VASCULAR ACCESS: CPT

## 2022-04-01 PROCEDURE — 36415 COLL VENOUS BLD VENIPUNCTURE: CPT

## 2022-04-01 PROCEDURE — 6370000000 HC RX 637 (ALT 250 FOR IP): Performed by: INTERNAL MEDICINE

## 2022-04-01 PROCEDURE — 2580000003 HC RX 258: Performed by: STUDENT IN AN ORGANIZED HEALTH CARE EDUCATION/TRAINING PROGRAM

## 2022-04-01 PROCEDURE — 99222 1ST HOSP IP/OBS MODERATE 55: CPT | Performed by: INTERNAL MEDICINE

## 2022-04-01 PROCEDURE — 93005 ELECTROCARDIOGRAM TRACING: CPT | Performed by: STUDENT IN AN ORGANIZED HEALTH CARE EDUCATION/TRAINING PROGRAM

## 2022-04-01 PROCEDURE — 6360000002 HC RX W HCPCS: Performed by: NURSE PRACTITIONER

## 2022-04-01 PROCEDURE — 86160 COMPLEMENT ANTIGEN: CPT

## 2022-04-01 PROCEDURE — 85610 PROTHROMBIN TIME: CPT

## 2022-04-01 PROCEDURE — 81001 URINALYSIS AUTO W/SCOPE: CPT

## 2022-04-01 PROCEDURE — 2500000003 HC RX 250 WO HCPCS: Performed by: STUDENT IN AN ORGANIZED HEALTH CARE EDUCATION/TRAINING PROGRAM

## 2022-04-01 PROCEDURE — 99223 1ST HOSP IP/OBS HIGH 75: CPT | Performed by: INTERNAL MEDICINE

## 2022-04-01 PROCEDURE — 80069 RENAL FUNCTION PANEL: CPT

## 2022-04-01 PROCEDURE — 2500000003 HC RX 250 WO HCPCS: Performed by: NURSE PRACTITIONER

## 2022-04-01 PROCEDURE — 93005 ELECTROCARDIOGRAM TRACING: CPT | Performed by: INTERNAL MEDICINE

## 2022-04-01 PROCEDURE — 80053 COMPREHEN METABOLIC PANEL: CPT

## 2022-04-01 PROCEDURE — 2580000003 HC RX 258: Performed by: INTERNAL MEDICINE

## 2022-04-01 PROCEDURE — 2060000000 HC ICU INTERMEDIATE R&B

## 2022-04-01 PROCEDURE — 6370000000 HC RX 637 (ALT 250 FOR IP): Performed by: NURSE PRACTITIONER

## 2022-04-01 PROCEDURE — 85025 COMPLETE CBC W/AUTO DIFF WBC: CPT

## 2022-04-01 PROCEDURE — 85730 THROMBOPLASTIN TIME PARTIAL: CPT

## 2022-04-01 RX ORDER — SODIUM CHLORIDE 9 MG/ML
INJECTION, SOLUTION INTRAVENOUS PRN
Status: DISCONTINUED | OUTPATIENT
Start: 2022-04-01 | End: 2022-04-07 | Stop reason: HOSPADM

## 2022-04-01 RX ORDER — HEPARIN SODIUM 1000 [USP'U]/ML
4000 INJECTION, SOLUTION INTRAVENOUS; SUBCUTANEOUS PRN
Status: DISCONTINUED | OUTPATIENT
Start: 2022-04-01 | End: 2022-04-03

## 2022-04-01 RX ORDER — ATORVASTATIN CALCIUM 80 MG/1
40 TABLET, FILM COATED ORAL DAILY
Status: DISCONTINUED | OUTPATIENT
Start: 2022-04-01 | End: 2022-04-07 | Stop reason: HOSPADM

## 2022-04-01 RX ORDER — METOPROLOL TARTRATE 50 MG/1
50 TABLET, FILM COATED ORAL 2 TIMES DAILY
Status: DISCONTINUED | OUTPATIENT
Start: 2022-04-01 | End: 2022-04-05

## 2022-04-01 RX ORDER — POTASSIUM CHLORIDE 7.45 MG/ML
10 INJECTION INTRAVENOUS PRN
Status: DISCONTINUED | OUTPATIENT
Start: 2022-04-01 | End: 2022-04-07 | Stop reason: HOSPADM

## 2022-04-01 RX ORDER — METOPROLOL TARTRATE 50 MG/1
25 TABLET, FILM COATED ORAL 2 TIMES DAILY
Status: DISCONTINUED | OUTPATIENT
Start: 2022-04-01 | End: 2022-04-01

## 2022-04-01 RX ORDER — MAGNESIUM SULFATE 1 G/100ML
1000 INJECTION INTRAVENOUS PRN
Status: DISCONTINUED | OUTPATIENT
Start: 2022-04-01 | End: 2022-04-07 | Stop reason: HOSPADM

## 2022-04-01 RX ORDER — ACETAMINOPHEN 650 MG/1
650 SUPPOSITORY RECTAL EVERY 6 HOURS PRN
Status: DISCONTINUED | OUTPATIENT
Start: 2022-04-01 | End: 2022-04-07 | Stop reason: HOSPADM

## 2022-04-01 RX ORDER — FUROSEMIDE 10 MG/ML
60 INJECTION INTRAMUSCULAR; INTRAVENOUS 2 TIMES DAILY
Status: DISCONTINUED | OUTPATIENT
Start: 2022-04-01 | End: 2022-04-02

## 2022-04-01 RX ORDER — HYDROCHLOROTHIAZIDE 25 MG/1
25 TABLET ORAL DAILY
Status: DISCONTINUED | OUTPATIENT
Start: 2022-04-01 | End: 2022-04-01

## 2022-04-01 RX ORDER — PANTOPRAZOLE SODIUM 40 MG/1
40 TABLET, DELAYED RELEASE ORAL DAILY
Status: DISCONTINUED | OUTPATIENT
Start: 2022-04-01 | End: 2022-04-07 | Stop reason: HOSPADM

## 2022-04-01 RX ORDER — ROPINIROLE 0.25 MG/1
0.25 TABLET, FILM COATED ORAL NIGHTLY
Status: DISCONTINUED | OUTPATIENT
Start: 2022-04-01 | End: 2022-04-07 | Stop reason: HOSPADM

## 2022-04-01 RX ORDER — ALPRAZOLAM 0.5 MG/1
0.5 TABLET ORAL NIGHTLY PRN
Status: DISCONTINUED | OUTPATIENT
Start: 2022-04-01 | End: 2022-04-07 | Stop reason: HOSPADM

## 2022-04-01 RX ORDER — FENTANYL CITRATE 50 UG/ML
25 INJECTION, SOLUTION INTRAMUSCULAR; INTRAVENOUS ONCE
Status: COMPLETED | OUTPATIENT
Start: 2022-04-01 | End: 2022-04-01

## 2022-04-01 RX ORDER — HEPARIN SODIUM 1000 [USP'U]/ML
4000 INJECTION, SOLUTION INTRAVENOUS; SUBCUTANEOUS ONCE
Status: COMPLETED | OUTPATIENT
Start: 2022-04-01 | End: 2022-04-01

## 2022-04-01 RX ORDER — MORPHINE SULFATE 2 MG/ML
2 INJECTION, SOLUTION INTRAMUSCULAR; INTRAVENOUS EVERY 4 HOURS PRN
Status: DISCONTINUED | OUTPATIENT
Start: 2022-04-01 | End: 2022-04-07 | Stop reason: HOSPADM

## 2022-04-01 RX ORDER — FUROSEMIDE 10 MG/ML
40 INJECTION INTRAMUSCULAR; INTRAVENOUS ONCE
Status: COMPLETED | OUTPATIENT
Start: 2022-04-01 | End: 2022-04-01

## 2022-04-01 RX ORDER — METOPROLOL TARTRATE 5 MG/5ML
2.5 INJECTION INTRAVENOUS ONCE
Status: COMPLETED | OUTPATIENT
Start: 2022-04-01 | End: 2022-04-01

## 2022-04-01 RX ORDER — SODIUM CHLORIDE 0.9 % (FLUSH) 0.9 %
5-40 SYRINGE (ML) INJECTION EVERY 12 HOURS SCHEDULED
Status: DISCONTINUED | OUTPATIENT
Start: 2022-04-01 | End: 2022-04-07 | Stop reason: HOSPADM

## 2022-04-01 RX ORDER — OXYCODONE HYDROCHLORIDE 5 MG/1
5 TABLET ORAL EVERY 4 HOURS PRN
Status: DISCONTINUED | OUTPATIENT
Start: 2022-04-01 | End: 2022-04-04

## 2022-04-01 RX ORDER — LANOLIN ALCOHOL/MO/W.PET/CERES
325 CREAM (GRAM) TOPICAL
Status: DISCONTINUED | OUTPATIENT
Start: 2022-04-02 | End: 2022-04-07 | Stop reason: HOSPADM

## 2022-04-01 RX ORDER — ACETAMINOPHEN 325 MG/1
650 TABLET ORAL EVERY 6 HOURS PRN
Status: DISCONTINUED | OUTPATIENT
Start: 2022-04-01 | End: 2022-04-07 | Stop reason: HOSPADM

## 2022-04-01 RX ORDER — MAGNESIUM SULFATE IN WATER 40 MG/ML
2000 INJECTION, SOLUTION INTRAVENOUS ONCE
Status: COMPLETED | OUTPATIENT
Start: 2022-04-01 | End: 2022-04-01

## 2022-04-01 RX ORDER — ONDANSETRON 4 MG/1
4 TABLET, ORALLY DISINTEGRATING ORAL EVERY 8 HOURS PRN
Status: DISCONTINUED | OUTPATIENT
Start: 2022-04-01 | End: 2022-04-07 | Stop reason: HOSPADM

## 2022-04-01 RX ORDER — HEPARIN SODIUM 1000 [USP'U]/ML
2000 INJECTION, SOLUTION INTRAVENOUS; SUBCUTANEOUS PRN
Status: DISCONTINUED | OUTPATIENT
Start: 2022-04-01 | End: 2022-04-03

## 2022-04-01 RX ORDER — METOPROLOL TARTRATE 5 MG/5ML
5 INJECTION INTRAVENOUS
Status: DISPENSED | OUTPATIENT
Start: 2022-04-01 | End: 2022-04-01

## 2022-04-01 RX ORDER — SODIUM CHLORIDE 0.9 % (FLUSH) 0.9 %
5-40 SYRINGE (ML) INJECTION PRN
Status: DISCONTINUED | OUTPATIENT
Start: 2022-04-01 | End: 2022-04-07 | Stop reason: HOSPADM

## 2022-04-01 RX ORDER — ALLOPURINOL 100 MG/1
100 TABLET ORAL DAILY
Status: DISCONTINUED | OUTPATIENT
Start: 2022-04-01 | End: 2022-04-07 | Stop reason: HOSPADM

## 2022-04-01 RX ORDER — POTASSIUM CHLORIDE 20 MEQ/1
40 TABLET, EXTENDED RELEASE ORAL PRN
Status: DISCONTINUED | OUTPATIENT
Start: 2022-04-01 | End: 2022-04-07 | Stop reason: HOSPADM

## 2022-04-01 RX ORDER — ONDANSETRON 2 MG/ML
4 INJECTION INTRAMUSCULAR; INTRAVENOUS EVERY 6 HOURS PRN
Status: DISCONTINUED | OUTPATIENT
Start: 2022-04-01 | End: 2022-04-07 | Stop reason: HOSPADM

## 2022-04-01 RX ADMIN — MORPHINE SULFATE 2 MG: 2 INJECTION, SOLUTION INTRAMUSCULAR; INTRAVENOUS at 18:03

## 2022-04-01 RX ADMIN — ROPINIROLE HYDROCHLORIDE 0.25 MG: 0.25 TABLET, FILM COATED ORAL at 20:45

## 2022-04-01 RX ADMIN — OXYCODONE 5 MG: 5 TABLET ORAL at 15:09

## 2022-04-01 RX ADMIN — AMIODARONE HYDROCHLORIDE 0.5 MG/MIN: 50 INJECTION, SOLUTION INTRAVENOUS at 23:07

## 2022-04-01 RX ADMIN — ENOXAPARIN SODIUM 30 MG: 100 INJECTION SUBCUTANEOUS at 09:29

## 2022-04-01 RX ADMIN — FUROSEMIDE 40 MG: 10 INJECTION, SOLUTION INTRAMUSCULAR; INTRAVENOUS at 04:15

## 2022-04-01 RX ADMIN — PANTOPRAZOLE SODIUM 40 MG: 40 TABLET, DELAYED RELEASE ORAL at 11:11

## 2022-04-01 RX ADMIN — SODIUM CHLORIDE, PRESERVATIVE FREE 10 ML: 5 INJECTION INTRAVENOUS at 20:45

## 2022-04-01 RX ADMIN — Medication 8.4 UNITS/KG/HR: at 12:00

## 2022-04-01 RX ADMIN — METOPROLOL TARTRATE 2.5 MG: 1 INJECTION, SOLUTION INTRAVENOUS at 06:37

## 2022-04-01 RX ADMIN — MAGNESIUM SULFATE 2000 MG: 2 INJECTION INTRAVENOUS at 11:25

## 2022-04-01 RX ADMIN — METOPROLOL TARTRATE 25 MG: 50 TABLET, FILM COATED ORAL at 09:30

## 2022-04-01 RX ADMIN — HEPARIN SODIUM 4000 UNITS: 1000 INJECTION INTRAVENOUS; SUBCUTANEOUS at 23:19

## 2022-04-01 RX ADMIN — MORPHINE SULFATE 2 MG: 2 INJECTION, SOLUTION INTRAMUSCULAR; INTRAVENOUS at 02:19

## 2022-04-01 RX ADMIN — HEPARIN SODIUM 4000 UNITS: 1000 INJECTION, SOLUTION INTRAVENOUS; SUBCUTANEOUS at 11:59

## 2022-04-01 RX ADMIN — METOPROLOL TARTRATE 50 MG: 50 TABLET, FILM COATED ORAL at 11:11

## 2022-04-01 RX ADMIN — FENTANYL CITRATE 25 MCG: 50 INJECTION, SOLUTION INTRAMUSCULAR; INTRAVENOUS at 06:02

## 2022-04-01 RX ADMIN — FUROSEMIDE 60 MG: 10 INJECTION, SOLUTION INTRAMUSCULAR; INTRAVENOUS at 13:30

## 2022-04-01 RX ADMIN — SODIUM CHLORIDE, PRESERVATIVE FREE 10 ML: 5 INJECTION INTRAVENOUS at 09:30

## 2022-04-01 RX ADMIN — ATORVASTATIN CALCIUM 40 MG: 80 TABLET, FILM COATED ORAL at 20:45

## 2022-04-01 RX ADMIN — ALLOPURINOL 100 MG: 100 TABLET ORAL at 09:30

## 2022-04-01 RX ADMIN — OXYCODONE 5 MG: 5 TABLET ORAL at 11:11

## 2022-04-01 RX ADMIN — AMIODARONE HYDROCHLORIDE 1 MG/MIN: 50 INJECTION, SOLUTION INTRAVENOUS at 15:47

## 2022-04-01 RX ADMIN — DOXYCYCLINE 100 MG: 100 INJECTION, POWDER, LYOPHILIZED, FOR SOLUTION INTRAVENOUS at 23:14

## 2022-04-01 RX ADMIN — ORPHENADRINE CITRATE 60 MG: 30 INJECTION INTRAMUSCULAR; INTRAVENOUS at 00:05

## 2022-04-01 RX ADMIN — FUROSEMIDE 60 MG: 10 INJECTION, SOLUTION INTRAMUSCULAR; INTRAVENOUS at 20:45

## 2022-04-01 RX ADMIN — DOXYCYCLINE 100 MG: 100 INJECTION, POWDER, LYOPHILIZED, FOR SOLUTION INTRAVENOUS at 13:27

## 2022-04-01 RX ADMIN — AMIODARONE HYDROCHLORIDE 150 MG: 50 INJECTION, SOLUTION INTRAVENOUS at 15:35

## 2022-04-01 RX ADMIN — METOPROLOL TARTRATE 50 MG: 50 TABLET, FILM COATED ORAL at 20:45

## 2022-04-01 ASSESSMENT — PAIN DESCRIPTION - ORIENTATION
ORIENTATION: RIGHT;LEFT
ORIENTATION: RIGHT
ORIENTATION: RIGHT;LEFT
ORIENTATION: RIGHT
ORIENTATION: RIGHT

## 2022-04-01 ASSESSMENT — PAIN DESCRIPTION - PROGRESSION
CLINICAL_PROGRESSION: GRADUALLY IMPROVING
CLINICAL_PROGRESSION: GRADUALLY IMPROVING
CLINICAL_PROGRESSION: NOT CHANGED
CLINICAL_PROGRESSION: NOT CHANGED
CLINICAL_PROGRESSION: GRADUALLY IMPROVING
CLINICAL_PROGRESSION: GRADUALLY IMPROVING
CLINICAL_PROGRESSION: NOT CHANGED
CLINICAL_PROGRESSION: NOT CHANGED
CLINICAL_PROGRESSION: GRADUALLY IMPROVING

## 2022-04-01 ASSESSMENT — PAIN SCALES - GENERAL
PAINLEVEL_OUTOF10: 10
PAINLEVEL_OUTOF10: 10
PAINLEVEL_OUTOF10: 8
PAINLEVEL_OUTOF10: 5
PAINLEVEL_OUTOF10: 8
PAINLEVEL_OUTOF10: 10
PAINLEVEL_OUTOF10: 10
PAINLEVEL_OUTOF10: 0
PAINLEVEL_OUTOF10: 10
PAINLEVEL_OUTOF10: 8
PAINLEVEL_OUTOF10: 7
PAINLEVEL_OUTOF10: 7
PAINLEVEL_OUTOF10: 10
PAINLEVEL_OUTOF10: 10

## 2022-04-01 ASSESSMENT — PAIN DESCRIPTION - LOCATION
LOCATION: LEG
LOCATION: BACK
LOCATION: LEG

## 2022-04-01 ASSESSMENT — PAIN DESCRIPTION - PAIN TYPE
TYPE: ACUTE PAIN
TYPE: ACUTE PAIN
TYPE: CHRONIC PAIN
TYPE: CHRONIC PAIN
TYPE: ACUTE PAIN

## 2022-04-01 ASSESSMENT — PAIN DESCRIPTION - DESCRIPTORS
DESCRIPTORS: BURNING;CONSTANT
DESCRIPTORS: BURNING
DESCRIPTORS: BURNING;CONSTANT
DESCRIPTORS: BURNING;CONSTANT
DESCRIPTORS: BURNING

## 2022-04-01 ASSESSMENT — PAIN DESCRIPTION - ONSET
ONSET: ON-GOING

## 2022-04-01 ASSESSMENT — PAIN DESCRIPTION - FREQUENCY
FREQUENCY: CONTINUOUS

## 2022-04-01 ASSESSMENT — ENCOUNTER SYMPTOMS
COUGH: 0
ABDOMINAL PAIN: 0
NAUSEA: 0
SHORTNESS OF BREATH: 0
VOMITING: 0

## 2022-04-01 NOTE — CONSULTS
Renal Consult Note    Patient :  Alfred Verdugo; 69 y.o. MRN# [id number]  Location:  2015/2015-01  Attending:  Xiao Lloyd DO  Admit Date:  3/31/2022   Hospital Day: 0    Reason for Consult:     Asked by Dr Xiao Lloyd DO to see for Acute Kidney Injury/Elevated Creatinine. History Obtained From:     patient, electronic medical record    History of Present Illness:     Alfred Verdugo; 69 y.o. female with past medical history of chronic bilateral lower extremity edema, DIONY, nonobstructive CAD mild to moderate MR and lymphedema presented to the hospital with a chief complaint of bilateral lower extremity swelling which has been progressively worsening and she has been having difficulty with ambulation. As per the patient she has been compliant with all her medications, no recent medication changes were made. In the hospital she is being treated for possible cellulitis with IV antibiotics. Cardiology has also been consulted for A. fib with RVR. On IV heparin infusion and IV amiodarone for the same. Nephrology has been consulted for acute on chronic volume overload. She was given IV Lasix 60 mg twice daily. 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 Ernesto Espinal outpatient. Her baseline creatinine is 2.2-2.6    On my evaluation patient was sitting comfortably on the side of the bed, she is complaining of significant discomfort in the distal lower foot close to her toes which is also erythematous according to her. .  She does have 3+ pitting edema in her feet. She has been taking Lasix 40 mg daily which seemed to have stopped working so she had to come to the ER. Heart rate previously recorded during her visit to Dr. Darryl Martin office 111 kg, weight on presentation this time was 118 kg. Previous proteinuria has been estimated to be around 0.3 on UPC.   Last ultrasound scan in 2017 showed right kidney to be 10.3 left to be 11.7 indicating renal asymmetry. She denies any excessive fluid intake, no excessive salt intake. No shortness of breath orthopnea. Chest x-ray on presentation did not show any acute vascular congestion or parenchymal disease indicative of fluid overload. No recent 2D echo is available, last 2D echo in 9250 showed diastolic dysfunction and moderate mitral regurgitation. No history of recent contrast exposure, No h/o prolonged NSAIDs use in the past, No h/o nephrolithiasis, No recent skin rashes or arthralgias, No hematuria or pyuria noticed in the recent past. Doesn't report any reduction in the urine output recently. Non report of any obstructive urinary symptoms (urgency, frequency, weak stream, straining while urination). No h/o recurrent UTIs in the past.    Past History/Allergies? Social History:     Past Medical History:   Diagnosis Date    Abnormal stress test     Anemia     Arthritis     Depression     Diverticulosis     [facility]     Erythropenia     Fibromyalgia     HTN (hypertension)     Hyperlipidemia     Kidney stone     Morbid obesity due to excess calories ([facility])     DIONY on CPAP     Osteopenia 03/03/14    Seasonal allergies     Sleep apnea     uses bipap prn       Allergies   Allergen Reactions    Adhesive Tape     Cephalexin Other (See Comments)     Tongue cracks and peels    Erythromycin Other (See Comments)     Epigastric pain and bloating    Ketorolac Tromethamine Other (See Comments)     Severe stomach cramps    Pcn [Penicillins]      Unknown reaction    Percocet [Oxycodone-Acetaminophen] Itching and Other (See Comments)     Esophagus hurt    Sulfa Antibiotics     Ultracet [Tramadol-Acetaminophen] Itching       Social History     Socioeconomic History    Marital status:      Spouse name: Not on file    Number of children: Not on file    Years of education: Not on file    Highest education level: Not on file   Occupational History    Not on file   Tobacco Use  Smoking status: Former Smoker     Packs/day: 0.25     Years: 1.00     Pack years: 0.25     Quit date: 1960     Years since quittin.2    Smokeless tobacco: Never Used    Tobacco comment: quit    Vaping Use    Vaping Use: Never used   Substance and Sexual Activity    Alcohol use: No    Drug use: No    Sexual activity: Never   Other Topics Concern    Not on file   Social History Narrative    Not on file     Social Determinants of Health     Financial Resource Strain: Low Risk     Difficulty of Paying Living Expenses: Not hard at all   Food Insecurity: No Food Insecurity    Worried About Running Out of Food in the Last Year: Never true    [address] in the Last Year: Never true   Transportation Needs:     Lack of Transportation (Medical): Not on file    Lack of Transportation (Non-Medical):  Not on file   Physical Activity:     Days of Exercise per Week: Not on file    Minutes of Exercise per Session: Not on file   Stress:     Feeling of Stress : Not on file   Social Connections:     Frequency of Communication with Friends and Family: Not on file    Frequency of Social Gatherings with Friends and Family: Not on file    Attends Latter day Services: Not on file    Active Member of [address] [place] [address] [employer] or Organizations: Not on file    Attends Club or Organization Meetings: Not on file    Marital Status: Not on file   Intimate Partner Violence:     Fear of Current or Ex-Partner: Not on file    Emotionally Abused: Not on file    Physically Abused: Not on file    Sexually Abused: Not on file   Housing Stability:     Unable to Pay for Housing in the Last Year: Not on file    Number of [place] in the Last Year: Not on file    Unstable Housing in the Last Year: Not on file       Family History:        Family History   Problem Relation Age of Onset    Diabetes Mother     Heart Disease Mother     Osteoporosis Mother     Kidney Disease Father     Prostate Cancer Brother Outpatient Medications:     Medications Prior to Admission: furosemide (LASIX) 40 MG tablet, Take 1 tablet by mouth 2 times daily for 7 days  zolpidem (AMBIEN) 5 MG tablet, Take 1 tablet by mouth nightly as needed for Sleep for up to 30 days. ALPRAZolam (XANAX) 0.5 MG tablet, nightly. potassium citrate (UROCIT-K) 10 MEQ (1080 MG) extended release tablet, Take 1 tablet by mouth daily  allopurinol (ZYLOPRIM) 100 MG tablet, Take 1 tablet by mouth daily  rOPINIRole (REQUIP) 0.25 MG tablet, Take 1 tablet by mouth nightly  calcitRIOL (ROCALTROL) 0.25 MCG capsule, TAKE 1 CAPSULE BY MOUTH DAILY  cyclobenzaprine (FLEXERIL) 5 MG tablet, Take 1 tablet by mouth nightly as needed for Muscle spasms  hydroCHLOROthiazide (HYDRODIURIL) 25 MG tablet, TAKE 1 TABLET BY MOUTH DAILY  losartan (COZAAR) 25 MG tablet, TAKE 1 TABLET BY MOUTH DAILY  atorvastatin (LIPITOR) 40 MG tablet, Take 1 tablet by mouth daily  metoprolol tartrate (LOPRESSOR) 25 MG tablet, Take 1 tablet by mouth 2 times daily  azelastine (ASTELIN) 0.1 % nasal spray, 1 spray by Nasal route 2 times daily Use in each nostril as directed  gabapentin (NEURONTIN) 600 MG tablet, Take 1 tablet by mouth daily for 90 days.   pantoprazole (PROTONIX) 40 MG tablet, TAKE 1 TABLET DAILY  Cholecalciferol (VITAMIN D3) 25 MCG (1000 UT) TABS, Take 2 tablets by mouth daily  Magnesium Oxide 400 MG CAPS, Take by mouth 2 times daily     Current Medications:       Scheduled Meds:    allopurinol  100 mg Oral Daily    atorvastatin  40 mg Oral Daily    pantoprazole  40 mg Oral Daily    sodium chloride flush  5-40 mL IntraVENous 2 times per day    furosemide  60 mg IntraVENous BID    metoprolol tartrate  50 mg Oral BID    rOPINIRole  0.25 mg Oral Nightly    [START ON 4/2/2022] ferrous sulfate  325 mg Oral Daily with breakfast    amiodarone  150 mg IntraVENous Once    doxycycline (VIBRAMYCIN) IV  100 mg IntraVENous Q12H     Continuous Infusions:    sodium chloride      heparin (PORCINE) Infusion 8.4 Units/kg/hr (22 1200)    amiodarone      Followed by   Jaimes amiodarone       PRN Meds:  sodium chloride flush, sodium chloride, ondansetron **OR** ondansetron, acetaminophen **OR** acetaminophen, morphine, heparin (porcine), heparin (porcine), oxyCODONE, ALPRAZolam    Review of Systems:       No fever chills  No cough phlegm hemoptysis  No abdominal pain nausea vomiting  No dysuria or hematuria  Significant weight gain of 7 to 8 kg since last visit in December  No weakness in arms or legs or falls  Significant lower extremity edema with redness and pain distally  No chest pain or orthopnea    Input/Output:       I/O last 3 completed shifts:  In: -   Out: 200 [Urine:200]    Patient Vitals for the past 96 hrs (Last 3 readings):   Weight   22 2122 262 lb (118.8 kg)        Vital Signs:     Temperature:  Temp: 100.1 °F (37.8 °C)  TMax:   Temp (24hrs), Av °F (37.2 °C), Min:98.2 °F (36.8 °C), Max:100.1 °F (37.8 °C)    Respirations:  Resp: 16  Pulse:   Pulse: 72  BP:    BP: (!) 124/58  BP Range: Systolic (01RKP), PAUL:078 , Min:124 , XEJ:111       Diastolic (61REZ), IRW:76, Min:58, Max:106      Physical Examination:       Physical Exam  HENT:      Mouth/Throat:      Mouth: Mucous membranes are moist.   Eyes:      Pupils: Pupils are equal, round, and reactive to light. Cardiovascular:      Rate and Rhythm: Tachycardia present. Pulses: Normal pulses. Pulmonary:      Effort: Pulmonary effort is normal. No respiratory distress. Breath sounds: No wheezing. Abdominal:      Palpations: Abdomen is soft. Musculoskeletal:         General: Swelling and tenderness present. Cervical back: Normal range of motion. Comments: Bilateral lower extremity   Skin:     General: Skin is warm. Neurological:      General: No focal deficit present. Mental Status: She is alert.      Bilateral 2-3+ lower extremity edema    Labs:       Recent Labs     22  2148 22  1102 WBC 10.3 11.0   RBC 2.78* 2.74*   HGB 8.2* 8.2*   HCT 27.1* 26.8*   MCV 97.5 97.8   MCH 29.5 29.9   MCHC 30.3 30.6   RDW 15.3* 15.5*    322   MPV 9.8 9.5      BMP:   Recent Labs     03/31/22 2148 04/01/22  0511 04/01/22  1102    138 139   K 3.3* 3.7 3.6*    102 100   CO2 23 21 24   BUN 43* 40* 40*   CREATININE 3.15* 2.98* 2.88*   GLUCOSE 120* 134* 149*   CALCIUM 9.6 9.6 9.8      Phosphorus:     Recent Labs     04/01/22  1102   PHOS 3.4     Magnesium:    Recent Labs     03/31/22 2148 04/01/22  0511 04/01/22  1102   MG 1.5* 1.6 1.5*     Albumin:    Recent Labs     03/31/22 2148 04/01/22  0511 04/01/22  1102   LABALBU 3.9 3.8 3.7     BNP:    No results found for: BNP  FAHEEM:    No results found for: FAHEEM  SPEP:     Lab Results   Component Value Date    PROT 7.1 04/01/2022     UPEP:   No results found for: LABPE  C3:   No results found for: C3  C4:   No results found for: C4  MPO ANCA:   No results found for: MPO  PR3 ANCA:   No results found for: PR3  Anti-GBM:   No results found for: GBMABIGG  Hep BsAg:        No results found for: HEPBSAG  Hep C AB:          Lab Results   Component Value Date    HEPCAB NONREACTIVE 01/11/2021       Urinalysis/Chemistries:      Lab Results   Component Value Date    NITRU NEGATIVE 04/01/2022    COLORU Yellow 04/01/2022    PHUR 6.5 04/01/2022    WBCUA 0 TO 2 04/01/2022    RBCUA 0 TO 2 04/01/2022    CLARITYU clear 04/18/2017    SPECGRAV 1.010 04/01/2022    LEUKOCYTESUR NEGATIVE 04/01/2022    UROBILINOGEN Normal 04/01/2022    BILIRUBINUR NEGATIVE 04/01/2022    BILIRUBINUR neg 04/18/2017    BLOODU neg 04/18/2017    GLUCOSEU NEGATIVE 04/01/2022    KETUA NEGATIVE 04/01/2022     Urine Sodium:   No results found for: ALIRIO  Urine Potassium:  No results found for: KUR  Urine Chloride:  No results found for: CLUR  Urine Osmolarity: No results found for: OSMOU  Urine Protein:   No results found for: TPU  Urine Creatinine:     Lab Results   Component Value Date    LABCREA 38.8 12/07/2021     UPC:     Urine Eosinophils:  No components found for: UEOS    Radiology:       XR CHEST PORTABLE    Result Date: 3/31/2022  No acute cardiopulmonary process        Assessment:       1. Chronic kidney disease stage IV secondary to diabetic nephrosclerosis baseline creatinine 2.2-2.5 slight elevation up to 2.8 within margin of error. 2. Fluid overload with significant fluid retention lower extremity edema and weight gain of about 7 kg in the setting of diastolic dysfunction and mitral regurgitation although chest x-ray unclear predominantly right heart failure. She does have significant sleep apnea and pulmonary hypertension. Previous evaluations  3. Lower extremity cellulitis in the distal areas of lower foot close to her toes with area of erythema redness and tenderness currently on antibiotics   4. Obstructive sleep apnea compliant with CPAP according to her  5. 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 Lymphedema aggravated by fluid retention and right heart failure, has been going to lymphedema clinic  7. Anemia of chronic disease  8. Secondary hyperparathyroidism on Rocaltrol    Plan:      Continue Lasix 60 mg IV twice a day May need vascular the dose to 3 times a day.  Diuril 500 mg additional IV dose   Fluid restriction of 1.5 L a day    urine protein and creatinine to quantify the proteinuria. Lashay Liner Hold Cozaar for now, add Aldactone 25 mg daily   Avoid nephrotoxic agents, Strict measurements of Input and Output, daily measurement of patient weight.  keep systolic pressure more than 110    Nutrition    Please ensure that patient is on a renal diet/TF.  Avoid nephrotoxic drugs/contrast exposure. Thank you for the consultation. I will discuss the care of this patient with the attending physician. Please do not hesitate to contact us for any further questions/concerns. We will continue to follow this patient along with you.      Ratna Hernández, MD  PGY-3 Internal Medicine Resident  [facility]  4/1/2022 2:36 PM    Known history of chronic kidney disease stage IV baseline creatinine 2.2-2.5 secondary to diabetic nephrosclerosis proteinuria 0.3 g renal asymmetry on previous ultrasound scan, follows up with Dr. Leonardo Godfrey in the office last seen by him in December 2021. Also has known history of obstructive sleep apnea has been on CPAP. Has chronic lower extremity edema from lymphedema and has been to the lymphedema clinic although no Lymphapress therapy has been used. She presents to the hospital with worsening lower extremity edema inability to ambulate tenderness and pain at the distal areas of her lower foot and inability to get any help from the lymphedema clinic. On presentation to the hospital she was found to be in A. fib with rapid medical response and been placed on amiodarone has been seen by cardiology. Additionally she did placed on IV antibiotics for treatment of cellulitis. Her weight when she last saw Dr. Leonardo Godfrey 111 kg this morning weight was up 118 kg weight gain of 7 kg. Hemoglobin was 8.2 as well. Clinically other than lower extremity edema cardiovascular system and lungs were clear. Chest x-ray unremarkable  Impression #1 CKD stage IV from diabetic nephrosclerosis  2. Fluid overload from diuretic resistance progression of underlying kidney disease and noncompliance  3. Type 2 diabetes with nephropathy  4. Obstructive sleep apnea  5. Anemia of chronic disease  Plan  1. Agree with diuresis 60 mg IV Lasix twice daily   #2 1 dose Diuril 500 mg IV  3. Fluid restriction 1.5 days a day  4. Keep systolic pressure more than 110  5. Hold Cozaar  6. Keep output more than intake  7. Avoid nephrotoxins  8.   We will follow

## 2022-04-01 NOTE — CONSULTS
Attending Physician Statement:    I have discussed the care of  Tessa Hope , including pertinent history and exam findings, with the Cardiology fellow/resident. I have seen and examined the patient and the key elements of all parts of the encounter have been performed by me. I agree with the assessment, plan and orders as documented by the fellow/resident, after I modified exam findings and plan of treatments, and the final version is my approved version of the assessment. Additional Comments: Patient presented with bilateral leg swelling. Has chronic lymphedema as well. She is being treated for cellulitis. Cardiology is consulted for afib with RVR. On IV heparin. Will need NOAC upon discharge. Start IV amiodarone bous and drip. Increase metoprolol 50mg po BID. Roxana Lnyn MD           [facility] Cardiology    Consult / H&P               Today's Date: 4/1/2022  Patient Name: Tessa Hope  Date of admission: 3/31/2022  9:20 PM  Patient's age: 69 y.o., 1945  Admission Dx: JOSE ARMANDO (acute kidney injury) ([facility] [address] [N17.9]  Cellulitis of right lower extremity [L03.115]  Cellulitis of left lower extremity [L03.116]  Acute kidney injury superimposed on CKD ([facility] [address] [N17.9, N18.9]    Reason for Consult:  Cardiac evaluation    Requesting Physician: Dina Mckeon DO    CHIEF COMPLAINT: Bilateral foot pain    History Obtained From:  patient, electronic medical record    HISTORY OF PRESENT ILLNESS:      The patient is a 69 y.o.  female who is admitted to the hospital for cellulitis. Past medical history of obstructive sleep apnea, nonobstructive CAD, hypertension, dyslipidemia, mild to moderate mitral regurgitation and lymphedema. Patient said she came to the hospital she was not able to walk and her leg are swollen. She denies any history of rhythm issues. She denies any nausea, vomiting, chest pain, shortness of breath, weight loss, diarrhea, constipation.     While she was here in the hospital it was found to be elevated and on EKG she was found to be in A. fib with RVR. Past Medical History:   has a past medical history of Abnormal stress test, Anemia, Arthritis, Depression, Diverticulosis, DM (diabetes mellitus) ([address] Erythropenia, Fibromyalgia, HTN (hypertension), Hyperlipidemia, Kidney stone, Morbid obesity due to excess calories ([address] DIONY on CPAP, Osteopenia, Seasonal allergies, and Sleep apnea. Past Surgical History:   has a past surgical history that includes Colonoscopy (2003); Colonoscopy (2007); Tubal ligation; Arm Surgery (2011); Total knee arthroplasty (Bilateral); Cardiac catheterization ([id number] dr Foreign Valero); and Cardiac catheterization (5-16-16). Home Medications:    Prior to Admission medications    Medication Sig Start Date End Date Taking? Authorizing Provider   furosemide (LASIX) 40 MG tablet Take 1 tablet by mouth 2 times daily for 7 days 3/25/22 4/1/22  Eliza Rivas MD   zolpidem (AMBIEN) 5 MG tablet Take 1 tablet by mouth nightly as needed for Sleep for up to 30 days. 3/15/22 4/14/22  Eliza Rivas MD   ALPRAZolam Jennet Gram) 0.5 MG tablet nightly.     Historical Provider, MD   potassium citrate (UROCIT-K) 10 MEQ (1080 MG) extended release tablet Take 1 tablet by mouth daily 2/16/22   Eliza Rivas MD   allopurinol (ZYLOPRIM) 100 MG tablet Take 1 tablet by mouth daily 2/16/22   Eliza Rivas MD   rOPINIRole (REQUIP) 0.25 MG tablet Take 1 tablet by mouth nightly 2/16/22   Eliza Rivas MD   calcitRIOL (ROCALTROL) 0.25 MCG capsule TAKE 1 CAPSULE BY MOUTH DAILY 2/16/22   Eliza Rivas MD   cyclobenzaprine (FLEXERIL) 5 MG tablet Take 1 tablet by mouth nightly as needed for Muscle spasms 2/16/22   Eliza Rivas MD   hydroCHLOROthiazide (HYDRODIURIL) 25 MG tablet TAKE 1 TABLET BY MOUTH DAILY 12/21/21   Erwin Dupree MD   losartan (COZAAR) 25 MG tablet TAKE 1 TABLET BY MOUTH DAILY 12/10/21   Justen Altman MD   atorvastatin (LIPITOR) 40 MG tablet Take 1 tablet by mouth daily 12/7/21   Eliza Rivas MD   metoprolol tartrate (LOPRESSOR) 25 MG tablet Take 1 tablet by mouth 2 times daily 10/13/21   Bobo Fernandes DO   azelastine (ASTELIN) 0.1 % nasal spray 1 spray by Nasal route 2 times daily Use in each nostril as directed 10/13/21   Bobo Fernandes DO   gabapentin (NEURONTIN) 600 MG tablet Take 1 tablet by mouth daily for 90 days.  6/1/21 3/15/22  Bobo Fernandes DO   pantoprazole (PROTONIX) 40 MG tablet TAKE 1 TABLET DAILY 3/3/21   Bobo Fernandes DO   Cholecalciferol (VITAMIN D3) 25 MCG (1000 UT) TABS Take 2 tablets by mouth daily 1/9/20   BARBIE Huynh - CNP   Magnesium Oxide 400 MG CAPS Take by mouth 2 times daily     Historical Provider, MD      Current Facility-Administered Medications: allopurinol (ZYLOPRIM) tablet 100 mg, 100 mg, Oral, Daily  atorvastatin (LIPITOR) tablet 40 mg, 40 mg, Oral, Daily  pantoprazole (PROTONIX) tablet 40 mg, 40 mg, Oral, Daily  sodium chloride flush 0.9 % injection 5-40 mL, 5-40 mL, IntraVENous, 2 times per day  sodium chloride flush 0.9 % injection 5-40 mL, 5-40 mL, IntraVENous, PRN  0.9 % sodium chloride infusion, , IntraVENous, PRN  ondansetron (ZOFRAN-ODT) disintegrating tablet 4 mg, 4 mg, Oral, Q8H PRN **OR** ondansetron (ZOFRAN) injection 4 mg, 4 mg, IntraVENous, Q6H PRN  acetaminophen (TYLENOL) tablet 650 mg, 650 mg, Oral, Q6H PRN **OR** acetaminophen (TYLENOL) suppository 650 mg, 650 mg, Rectal, Q6H PRN  morphine injection 2 mg, 2 mg, IntraVENous, Q4H PRN  furosemide (LASIX) injection 60 mg, 60 mg, IntraVENous, BID  heparin (porcine) injection 4,000 Units, 4,000 Units, IntraVENous, Once  heparin (porcine) injection 4,000 Units, 4,000 Units, IntraVENous, PRN  heparin (porcine) injection 2,000 Units, 2,000 Units, IntraVENous, PRN  heparin 25,000 units in 0.9% sodium chloride 250 mL infusion, 5-30 Units/kg/hr, IntraVENous, Continuous  metoprolol (LOPRESSOR) injection 5 mg, 5 mg, IntraVENous, Q5 Min  metoprolol tartrate (LOPRESSOR) tablet 50 mg, 50 mg, Oral, BID  oxyCODONE (ROXICODONE) immediate release tablet 5 mg, 5 mg, Oral, Q4H PRN  magnesium sulfate 2000 mg in 50 mL IVPB premix, 2,000 mg, IntraVENous, Once  doxycycline (VIBRAMYCIN) 100 mg in dextrose 5 % 100 mL IVPB, 100 mg, IntraVENous, Q12H    Allergies:  Adhesive tape, Cephalexin, Erythromycin, Ketorolac tromethamine, Pcn [penicillins], Percocet [oxycodone-acetaminophen], Sulfa antibiotics, and Ultracet [tramadol-acetaminophen]    Social History:   reports that she quit smoking about 62 years ago. She has a 0.25 pack-year smoking history. She has never used smokeless tobacco. She reports that she does not drink alcohol and does not use drugs. Family History: family history includes Diabetes in her mother; Heart Disease in her mother; Kidney Disease in her father; Osteoporosis in her mother; Prostate Cancer in her brother. No h/o sudden cardiac death. No for premature CAD    REVIEW OF SYSTEMS:    · Constitutional: there has been no unanticipated weight loss. There's been No change in energy level, No change in activity level. · Eyes: No visual changes or diplopia. No scleral icterus. · ENT: No Headaches  · Cardiovascular: Mild nonobstructive CAD  · Respiratory: No previous pulmonary problems, No cough  · Gastrointestinal: No abdominal pain. No change in bowel or bladder habits. · Genitourinary: No dysuria, trouble voiding, or hematuria. · Musculoskeletal:  No gait disturbance, No weakness or joint complaints. Bilateral leg pain  · Integumentary: No rash or pruritis. · Neurological: No headache, diplopia, change in muscle strength, numbness or tingling. No change in gait, balance, coordination, mood, affect, memory, mentation, behavior. · Psychiatric: No anxiety, or depression. · Endocrine: No temperature intolerance. No excessive thirst, fluid intake, or urination. No tremor.   · Hematologic/Lymphatic: No abnormal bruising or bleeding, blood clots or swollen lymph nodes. · Allergic/Immunologic: No nasal congestion or hives. PHYSICAL EXAM:      BP (!) 149/88   Pulse 138   Temp 99 °F (37.2 °C) (Oral)   Resp 17   Ht 5' 5\" (1.651 m)   Wt 262 lb (118.8 kg)   SpO2 98%   BMI 43.60 kg/m²    Constitutional and General Appearance: alert, cooperative, no distress and appears stated age  HEENT: PERRL, no cervical lymphadenopathy. No masses palpable. Normal oral mucosa  Respiratory:  · Normal excursion and expansion without use of accessory muscles  · Resp Auscultation: Good respiratory effort. No for increased work of breathing. On auscultation: clear to auscultation bilaterally  Cardiovascular:  · The apical impulse is not displaced  · Heart tones are crisp and normal.  IRRegular  · Jugular venous pulsation Normal  · The carotid upstroke is normal in amplitude and contour without delay or bruit  · Peripheral pulses are symmetrical and full   Abdomen:   · No masses or tenderness  · Bowel sounds present  Extremities:  ·  No Cyanosis or Clubbing  ·  Lower extremity edema: No  ·  Skin: Warm and dry  Neurological:  · Alert and oriented. · Moves all extremities well  · No abnormalities of mood, affect, memory, mentation, or behavior are noted      Labs:     CBC:   Recent Labs     03/31/22 2148   WBC 10.3   HGB 8.2*   HCT 27.1*        BMP:   Recent Labs     03/31/22 2148 04/01/22  0511    138   K 3.3* 3.7   CO2 23 21   BUN 43* 40*   CREATININE 3.15* 2.98*   LABGLOM 14* 15*   GLUCOSE 120* 134*     BNP: No results for input(s): BNP in the last 72 hours. PT/INR:   Recent Labs     04/01/22  0511   PROTIME 11.5   INR 1.1     APTT:No results for input(s): APTT in the last 72 hours. CARDIAC ENZYMES:No results for input(s): CKTOTAL, CKMB, CKMBINDEX, TROPONINI in the last 72 hours.   FASTING LIPID PANEL:  Lab Results   Component Value Date    HDL 37 01/11/2021    LDLDIRECT 123 10/21/2016    TRIG 281 01/11/2021     LIVER PROFILE:  Recent Labs     03/31/22  2148 04/01/22  0511   AST 10 11   ALT 7 7   LABALBU 3.9 3.8       IMPRESSION:    1. Patient Active Problem List   Diagnosis    Dyslipidemia    DIONY treated with BiPAP    Fibromyalgia    CRI (chronic renal insufficiency)    OA (osteoarthritis)    DM (diabetes mellitus) ([address]    Kidney stone    Depression    Seasonal allergies    Diverticulosis    Erythropenia    Low hemoglobin    Mitral regurgitation - Mild to moderate    Chronic kidney disease (CKD), stage III (moderate) [facility]    Gout    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 disease ([address]    Essential hypertension    Osteopenia    Morbid obesity due to excess calories ([facility])    Anxiety    Febrile illness    Acute serous otitis media of left ear    Secondary hyperparathyroidism ([address]    Acute kidney injury superimposed on CKD ([address]    Atrial fibrillation ([address]       RECOMMENDATIONS:  1. New onset A. fib with RVR. Likely from hypERtension and DIONY as she is noncompliant with CPAP. Will need to evaluate structure and function of heart with echocardiogram.  Get TSH. At home she takes 25 mg metoprolol, dose increased by primary to 50 mg. Will give amnio bolus. We will add Cardizem as well. Telemetry. Continue heparin for now and NOAC on discharge likely Eliquis twice daily 5 mg. Hemodynamically stable. 2. Magnesium. Replace magnesium. Keep magnesium above 2 and potassium above 4. [id number] Score: 5  Disclaimer: Risk Score calculation is dependent on accuracy of patient problem list and past encounter diagnosis. Will discuss with rounding attending  for final recommendations.     Celia Lambert MD  Cardiology Service  Internal Medicine Residency Program, PGY-3  [facility]

## 2022-04-01 NOTE — ED NOTES
The following labs labeled with pt sticker and tubed to lab:     [x] Blue     [x] Lavender   [] on ice  [x] Green/yellow  [] Green/black [] on ice  [] Yellow  [] Red  [] Pink      [] COVID-19 swab    [] Rapid  [] PCR  [] Flu swab  [] Peds Viral Panel     [] Urine Sample  [] Pelvic Cultures  [] Blood Cultures            Kiesha Shore RN  03/31/22 3747

## 2022-04-01 NOTE — ED NOTES
Helped pt on and off the bedpan, repositioned her in bed, she is comfortable, call bell in reach     [place], RN  03/31/22 3610

## 2022-04-01 NOTE — TELEPHONE ENCOUNTER
Pt currently admitted to hospital for management of cellulitis which was the likely cause of her burning pain

## 2022-04-01 NOTE — ED NOTES
Pt has lymphedema that has gotten worse over the past three days, denies SOB, denies chest pain, bilateral legs are red, tight and swollen      Carie Mcburney, RN  03/31/22 2127

## 2022-04-01 NOTE — ED NOTES
Updated pt on plan of care, helped her reposition, all questions answered     Deana Merchant, ARLEN  04/01/22 0008

## 2022-04-01 NOTE — ED NOTES
Report given to [facility], pt going to 3B, all questions answered     Jacquelyn Cabello RN  04/01/22 0107

## 2022-04-01 NOTE — PLAN OF CARE
Problem: Falls - Risk of:  Goal: Will remain free from falls  Description: Will remain free from falls  Outcome: Ongoing  Goal: Absence of physical injury  Description: Absence of physical injury  Outcome: Ongoing     Problem: Nutritional:  Goal: Ability to tolerate tube feedings without aspirating will improve  Description: Ability to tolerate tube feedings without aspirating will improve  Outcome: Ongoing  Goal: Consumption of food in small portions  Description: Consumption of food in small portions  Outcome: Ongoing  Goal: Consumption of liquid of appropriate consistency  Description: Consumption of liquid of appropriate consistency  Outcome: Ongoing     Problem: Respiratory:  Goal: Ability to maintain a clear airway will improve  Description: Ability to maintain a clear airway will improve  Outcome: Ongoing  Goal: Will remain free from infection  Description: Will remain free from infection  Outcome: Ongoing  Goal: Absence of aspiration  Description: Absence of aspiration  Outcome: Ongoing     Problem: Safety:  Goal: Ability to chew and swallow food without choking will improve  Description: Ability to chew and swallow food without choking will improve  Outcome: Ongoing  Goal: Ability to demonstrate good, daily oral hygiene techniques will improve  Description: Ability to demonstrate good, daily oral hygiene techniques will improve  Outcome: Ongoing  Goal: Maintenance of upright position during and after feeding  Description: Maintenance of upright position during and after feeding  Outcome: Ongoing     Problem: Pain:  Goal: Pain level will decrease  Description: Pain level will decrease  Outcome: Ongoing  Goal: Control of acute pain  Description: Control of acute pain  Outcome: Ongoing  Goal: Control of chronic pain  Description: Control of chronic pain  Outcome: Ongoing     Problem: Skin Integrity:  Goal: Will show no infection signs and symptoms  Description: Will show no infection signs and symptoms  Outcome: Ongoing  Goal: Absence of new skin breakdown  Description: Absence of new skin breakdown  Outcome: Ongoing

## 2022-04-01 NOTE — CARE COORDINATION
Case Management Initial Discharge Plan  Montserrat Armstrong,             Met with:patient to discuss discharge plans. Information verified: address, contacts, phone number, , insurance Yes  Insurance Provider: Medicare and Denny Malik    Emergency Contact/Next of David Bettencourt name & number: Stephen Salas [phone], Thom Singh [phone]  Who are involved in patient's support system?  and dtr    PCP: Josh Owen MD  Date of last visit: 2 weeks ago      Discharge Planning    Living Arrangements:  Spouse/Significant Other     Home has 1 stories  6 stairs to climb to get into front door, 0stairs to climb to reach second floor  Location of bedroom/bathroom in home main level    Patient able to perform ADL's:Independent    Current Services (outpatient & in home) none  DME equipment: sleep apnea machine, walker  DME provider:      Is patient receiving oral anticoagulation therapy? No    If indicated:   Physician managing anticoagulation treatment:    Where does patient obtain lab work for ATC treatment? Does patient have any issues/concerns obtaining medications? No  If yes, what are patient's concerns? Is there a preferred Pharmacy after hours or on weekends? If yes, which pharmacy? Potential Assistance Needed:  N/A    Patient agreeable to home care: Yes  Freedom of choice provided:  yes    Prior SNF/Rehab Placement and Facility:    Agreeable to SNF/Rehab: No  Brady of choice provided: n/a     Evaluation: no    Expected Discharge date:  22    Patient expects to be discharged to: If home: is the family and/or caregiver wiling & able to provide support at home? yes  Who will be providing this support?   and dtr     Follow Up Appointment: Best Day/ Time: Monday AM    Transportation provider: has a ride  Transportation arrangements needed for discharge: No     Readmission Risk              Risk of Unplanned Readmission:  19             Does patient have a readmission risk score greater than 14?: Yes  If yes, follow-up appointment must be made within 7 days of discharge.      Goals of Care:       Educated patient on transitional options, provided freedom of choice and are agreeable with plan      Discharge Plan: would like home care, would like [facility], has a ride home          Electronically signed by Syeda Scherer RN on 4/1/22 at 5:21 PM EDT

## 2022-04-01 NOTE — H&P
[facility]  Office: [address], DO, Jas [place], DO, Katia Balint, DO, Lars Light Blood, DO, Jani Douglas MD, Marsha Brito MD, Igor Miller MD, Peggy Bhakta MD, Apolonia Garcia MD, Neda Kyle MD, Lindsay Baker MD, Lamonte Cooks, DO, Madisyn Meyer, DO, Destiny Torres MD,  Bertin Gamez, DO, Moshe Harris MD, Aysha Mishra MD, Boston Lora MD, Jeremy Dakins, DO, Dede Carlson MD, Sofy Pat MD, Marcos Dugan [facility], [facility], CNP, Albaro Santos, CNP, Adrien Perez, CNS, Dana Baker, CNP, Rigo Lind, CNP, Natalie Ramirez, CNP, Kings Allen, CNP, Shayy Izquierdo, CNP, Myesha An PA-C, Filiberto Healy, [facility], Angélica Santiago, [facility], Sami Degroot, CNP, Julia Amor, CNP, Mellissa Cole, [facility]    HISTORY AND PHYSICAL EXAMINATION            Date:   4/1/2022  Patient name:  Jose D Berrios  Date of admission:  3/31/2022  9:20 PM  MRN:   [id number]  Account:  [de-identified]  YOB: 1945  PCP:    Thomas Worrell MD  Room:   [address] [place] [address]  Code Status:    Full Code    Chief Complaint:     Chief Complaint   Patient presents with    Leg Swelling       History Obtained From:     patient, electronic medical record    History of Present Illness:     Jose D Berrios is a 69 y.o. female who presents to the hospital with complaints of worsening fatigue, leg pain and swelling for the last 2 weeks. Patient says she feels like she is \" filling up with fluid\". She has also lost her appetite and worsening weakness. Prior to symptom onset, patient says that she was able to complete her ADLs including shopping/walking however since that time has been very restricted in movement. She is complaining of significant bilateral leg pain and swelling worse than her baseline.   She was previously diagnosed with lymphedema and was evaluated lymphedema clinic but says that her swelling is much worse today than it is normally. She denies any shortness of breath or difficulty breathing. She follows up with Dr. [facility] for CKD and  (cardiology). She denies any history of MI or active chest pain. On admission pt noted to be tachycardic, ECG did show atrial fibrillation. Pt denies any previous history of afib. Denies any muscle weakness. She is on lasix at home which she says did help with her swelling but has not had great response to the 40 of lasix. Past Medical History:     Past Medical History:   Diagnosis Date    Abnormal stress test     Anemia     Arthritis     Depression     Diverticulosis     DM (diabetes mellitus) ([address]     Erythropenia     Fibromyalgia     HTN (hypertension)     Hyperlipidemia     Kidney stone     Morbid obesity due to excess calories [facility]     DIONY on CPAP     Osteopenia 03/03/14    Seasonal allergies     Sleep apnea     uses bipap prn        Past Surgical History:     Past Surgical History:   Procedure Laterality Date    ARM SURGERY  2011    right arm broken    CARDIAC CATHETERIZATION  [id number] dr Elizabeth Jimenez  5-16-16    COLONOSCOPY  2003    COLONOSCOPY  2007    TOTAL KNEE ARTHROPLASTY Bilateral     left may 2013 and right july 2013    TUBAL LIGATION          Medications Prior to Admission:     Prior to Admission medications    Medication Sig Start Date End Date Taking? Authorizing Provider   furosemide (LASIX) 40 MG tablet Take 1 tablet by mouth 2 times daily for 7 days 3/25/22 4/1/22  Eliza López MD   zolpidem (AMBIEN) 5 MG tablet Take 1 tablet by mouth nightly as needed for Sleep for up to 30 days. 3/15/22 4/14/22  Eliza López MD   ALPRAZolam Aldon Nones) 0.5 MG tablet nightly.     Historical Provider, MD   potassium citrate (UROCIT-K) 10 MEQ (1080 MG) extended release tablet Take 1 tablet by mouth daily 2/16/22   Eliza López MD   allopurinol (ZYLOPRIM) 100 MG tablet Take 1 tablet by mouth daily 2/16/22 Eliza Shaw MD   rOPINIRole (REQUIP) 0.25 MG tablet Take 1 tablet by mouth nightly 2/16/22   Eliza Shaw MD   calcitRIOL (ROCALTROL) 0.25 MCG capsule TAKE 1 CAPSULE BY MOUTH DAILY 2/16/22   Eliza Shaw MD   cyclobenzaprine (FLEXERIL) 5 MG tablet Take 1 tablet by mouth nightly as needed for Muscle spasms 2/16/22   Eliza Shaw MD   hydroCHLOROthiazide (HYDRODIURIL) 25 MG tablet TAKE 1 TABLET BY MOUTH DAILY 12/21/21   Charles Cantu MD   losartan (COZAAR) 25 MG tablet TAKE 1 TABLET BY MOUTH DAILY 12/10/21   Charles Cantu MD   atorvastatin (LIPITOR) 40 MG tablet Take 1 tablet by mouth daily 12/7/21   Eliza Shaw MD   metoprolol tartrate (LOPRESSOR) 25 MG tablet Take 1 tablet by mouth 2 times daily 10/13/21   Renate Eller DO   azelastine (ASTELIN) 0.1 % nasal spray 1 spray by Nasal route 2 times daily Use in each nostril as directed 10/13/21   Renate Eller DO   gabapentin (NEURONTIN) 600 MG tablet Take 1 tablet by mouth daily for 90 days. 6/1/21 3/15/22  Renate Eller DO   pantoprazole (PROTONIX) 40 MG tablet TAKE 1 TABLET DAILY 3/3/21   Renate Eller DO   Cholecalciferol (VITAMIN D3) 25 MCG (1000 UT) TABS Take 2 tablets by mouth daily 1/9/20   Donivan Kandi, APRN - CNP   Magnesium Oxide 400 MG CAPS Take by mouth 2 times daily     Historical Provider, MD        Allergies:     Adhesive tape, Cephalexin, Erythromycin, Ketorolac tromethamine, Pcn [penicillins], Percocet [oxycodone-acetaminophen], Sulfa antibiotics, and Ultracet [tramadol-acetaminophen]    Social History:     Tobacco:    reports that she quit smoking about 62 years ago. She has a 0.25 pack-year smoking history. She has never used smokeless tobacco.  Alcohol:      reports no history of alcohol use. Drug Use:  reports no history of drug use.     Family History:     Family History   Problem Relation Age of Onset    Diabetes Mother     Heart Disease Mother     Osteoporosis Mother     Kidney Disease Father  Prostate Cancer Brother        Review of Systems:     Positive and Negative as described in HPI. CONSTITUTIONAL:  negative for fevers, chills, sweats, admits to  fatigu and, weight gain  HEENT:  negative for vision, hearing changes, runny nose, throat pain  RESPIRATORY:  negative for shortness of breath, cough, congestion, wheezing  CARDIOVASCULAR:  negative for chest pain, admits to  palpitations  GASTROINTESTINAL:  negative for nausea, vomiting, diarrhea, constipation, change in bowel habits, abdominal pain   GENITOURINARY:  negative for difficulty of urination, burning with urination, frequency  Admits to decreased urine volume   INTEGUMENT:  negative for rash, skin lesions, easy bruising   HEMATOLOGIC/LYMPHATIC: admits to leg  swelling/edema   ALLERGIC/IMMUNOLOGIC:  negative for urticaria , itching  ENDOCRINE:  negative increase in drinking, increase in urination, hot or cold intolerance  MUSCULOSKELETAL:  negative joint pains, muscle aches, swelling of joints admits to pain in legs bilaterally  NEUROLOGICAL:  negative for headaches, dizziness, lightheadedness, numbness, pain, tingling extremities  BEHAVIOR/PSYCH:  negative for depression, anxiety    Physical Exam:   BP (!) 149/88   Pulse 138   Temp 99 °F (37.2 °C) (Oral)   Resp 17   Ht 5' 5\" (1.651 m)   Wt 262 lb (118.8 kg)   SpO2 98%   BMI 43.60 kg/m²   Temp (24hrs), Av.7 °F (37.1 °C), Min:98.2 °F (36.8 °C), Max:99 °F (37.2 °C)    No results for input(s): POCGLU in the last 72 hours. Intake/Output Summary (Last 24 hours) at 2022 1055  Last data filed at 2022 0754  Gross per 24 hour   Intake --   Output 750 ml   Net -750 ml       General Appearance: alert, acutely ill appearing, and in  acute distress.  Obese body habitus  Mental status: oriented to person, place, and time  Head: normocephalic, atraumatic  Eye: no icterus, redness, pupils equal and reactive, extraocular eye movements intact, conjunctiva clear  Ear: normal external ear, no discharge, hearing intact  Nose: no drainage noted  Mouth: mucous membranes moist  Neck: supple, no carotid bruits, thyroid not palpable  Lungs: Bilateral equal air entry, diminished at the bases bilaterally, no wheezing, rales or rhonchi, normal effort  Cardiovascular: tachycardic rate, irregularly irrregular rhythm,+2 lower extremity edema  no murmur, gallop, rub   Abdomen: Soft, nontender, nondistended, normal bowel sounds, no hepatomegaly or splenomegaly  Neurologic: There are no new focal motor or sensory deficits, normal muscle tone and bulk, no abnormal sensation, normal speech, cranial nerves II through XII grossly intact  Skin: No gross lesions, rashes, bruising or bleeding on exposed skin area there is some redness bilateral feet.   Extremities: peripheral pulses palpable, +2 pedal edema no calf pain with palpation  Psych: normal affect    Investigations:      Laboratory Testing:  Recent Results (from the past 24 hour(s))   Brain Natriuretic Peptide    Collection Time: 03/31/22  9:48 PM   Result Value Ref Range    Pro-BNP 4,927 (H) <300 pg/mL   Troponin    Collection Time: 03/31/22  9:48 PM   Result Value Ref Range    Troponin, High Sensitivity 31 (H) 0 - 14 ng/L   CBC with Auto Differential    Collection Time: 03/31/22  9:48 PM   Result Value Ref Range    WBC 10.3 3.5 - 11.3 k/uL    RBC 2.78 (L) 3.95 - 5.11 m/uL    Hemoglobin 8.2 (L) 11.9 - 15.1 g/dL    Hematocrit 27.1 (L) 36.3 - 47.1 %    MCV 97.5 82.6 - 102.9 fL    MCH 29.5 25.2 - 33.5 pg    MCHC 30.3 28.4 - 34.8 g/dL    RDW 15.3 (H) 11.8 - 14.4 %    Platelets 009 927 - 231 k/uL    MPV 9.8 8.1 - 13.5 fL    NRBC Automated 0.0 0.0 per 100 WBC    Seg Neutrophils 72 (H) 36 - 65 %    Lymphocytes 14 (L) 24 - 43 %    Monocytes 11 3 - 12 %    Eosinophils % 2 1 - 4 %    Basophils 0 0 - 2 %    Immature Granulocytes 1 (H) 0 %    Segs Absolute 7.53 1.50 - 8.10 k/uL    Absolute Lymph # 1.45 1.10 - 3.70 k/uL    Absolute Mono # 1.08 0.10 - 1.20 k/uL    Absolute Yellow    Turbidity UA Clear Clear    Glucose, Ur NEGATIVE NEGATIVE    Bilirubin Urine NEGATIVE NEGATIVE    Ketones, Urine NEGATIVE NEGATIVE    Specific Gravity, UA 1.010 1.005 - 1.030    Urine Hgb NEGATIVE NEGATIVE    pH, UA 6.5 5.0 - 8.0    Protein, UA TRACE (A) NEGATIVE    Urobilinogen, Urine Normal Normal    Nitrite, Urine NEGATIVE NEGATIVE    Leukocyte Esterase, Urine NEGATIVE NEGATIVE    -          WBC, UA 0 TO 2 0 - 5 /HPF    RBC, UA 0 TO 2 0 - 4 /HPF    Casts UA  0 - 8 /LPF     0 TO 2 HYALINE Reference range defined for non-centrifuged specimen.     Epithelial Cells UA 2 TO 5 0 - 5 /HPF   Comprehensive Metabolic Panel w/ Reflex to MG    Collection Time: 04/01/22  5:11 AM   Result Value Ref Range    Glucose 134 (H) 70 - 99 mg/dL    BUN 40 (H) 8 - 23 mg/dL    CREATININE 2.98 (H) 0.50 - 0.90 mg/dL    Calcium 9.6 8.6 - 10.4 mg/dL    Sodium 138 135 - 144 mmol/L    Potassium 3.7 3.7 - 5.3 mmol/L    Chloride 102 98 - 107 mmol/L    CO2 21 20 - 31 mmol/L    Anion Gap 15 9 - 17 mmol/L    Alkaline Phosphatase 68 35 - 104 U/L    ALT 7 5 - 33 U/L    AST 11 <32 U/L    Total Bilirubin 0.46 0.3 - 1.2 mg/dL    Total Protein 7.1 6.4 - 8.3 g/dL    Albumin 3.8 3.5 - 5.2 g/dL    Albumin/Globulin Ratio 1.2 1.0 - 2.5    GFR Non-African American 15 (L) >60 mL/min    GFR  19 (L) >60 mL/min    GFR Comment         Magnesium    Collection Time: 04/01/22  5:11 AM   Result Value Ref Range    Magnesium 1.6 1.6 - 2.6 mg/dL   Protime-INR    Collection Time: 04/01/22  5:11 AM   Result Value Ref Range    Protime 11.5 9.1 - 12.3 sec    INR 1.1    EKG 12 Lead    Collection Time: 04/01/22  6:12 AM   Result Value Ref Range    Ventricular Rate 93 BPM    Atrial Rate 93 BPM    P-R Interval 148 ms    QRS Duration 92 ms    Q-T Interval 368 ms    QTc Calculation (Bazett) 457 ms    P Axis 60 degrees    R Axis 51 degrees    T Axis 42 degrees   EKG 12 Lead    Collection Time: 04/01/22  6:15 AM   Result Value Ref Range    Ventricular Rate 89 BPM    Atrial Rate 89 BPM    P-R Interval 152 ms    QRS Duration 96 ms    Q-T Interval 386 ms    QTc Calculation (Bazett) 469 ms    P Axis 59 degrees    R Axis 44 degrees    T Axis 45 degrees   EKG 12 Lead    Collection Time: 04/01/22 10:03 AM   Result Value Ref Range    Ventricular Rate 123 BPM    Atrial Rate 174 BPM    QRS Duration 84 ms    Q-T Interval 322 ms    QTc Calculation (Bazett) 460 ms    R Axis 39 degrees    T Axis 17 degrees       Imaging/Diagnostics:  XR CHEST PORTABLE    Result Date: 3/31/2022  No acute cardiopulmonary process       Assessment :      Hospital Problems           Last Modified POA    * (Principal) New onset of congestive heart failure ([address] 4/1/2022 Yes    Acute kidney injury superimposed on CKD ([address] 4/1/2022 Yes    New onset a-fib ([address] with Rapid ventricular response 4/1/2022 Yes    Lymphedema 4/1/2022 Yes    DIONY treated with BiPAP 4/1/2022 Yes    Normocytic normochromic anemia 4/1/2022 Yes    Essential hypertension 4/1/2022 Yes    Morbid obesity due to excess calories ([address] 4/1/2022 Yes    GERD (gastroesophageal reflux disease) 4/1/2022 Yes    Restless leg syndrome 4/1/2022 Yes          Plan:     Patient status inpatient in the Progressive Unit/Step down    New onset atrial fibrillation with RVR: Denies any previous history of afib. Presents with palpitations, confirmed on ECG. Check TSH. Will start Heparin gtt, IV lopressor. Cardiology consulted, if not improved may need cardioversion. Acute/chronic Volume overload 2/2 new onset CHF+/-CKD. Will Add lasix 60 mg BID, daily weights, strict I/O. Keep magnesium >2 potassium >4. Check echocardiogram for further evaluation. Pt has known MR/TR. JOSE ARMANDO on CKD : aseline Scr ~2.6. Was elevated on admission. Concern for cardiorenal worsened by ACE use. Avoid Nsaids. Monitor Scr. If not improved or worsened may need RRT but at this time she is stable.    Chronic lymphedema- I dont think this pt has bilateral cellulitis: continue leg compression, elevation. Monitor off antibiotics. Primary hypertension: restart selective home medications  GERD: PPI  Anemia 2/2 b12 deficiency, EYAD and CKD: On Retacrit, iron supplementation. Nephrology following. Monitor Hgb, transfuse Prn   Restless leg syndrome: Check iron studies  Morbid obesity BMI 43: recommend weight loss and lifestyle modification  PTOT  Labs, imaging, ECG reviewed  Discussed with nephrology/cardiology  Records care everywhere reviewed and summarized above      Consultations:   IP CONSULT TO HOSPITALIST  IP CONSULT TO NEPHROLOGY  IP CONSULT TO CARDIOLOGY     Patient is admitted as inpatient status because of co-morbidities listed above, severity of signs and symptoms as outlined, requirement for current medical therapies and most importantly because of direct risk to patient if care not provided in a hospital setting. Expected length of stay > 48 hours.     Lakshmi Gilbert DO  4/1/2022  10:55 AM    Copy sent to Dr. Yung Hartmann MD

## 2022-04-01 NOTE — ED PROVIDER NOTES
[facility]   Emergency Department  Emergency Medicine Attending Sign-out     Care of Becca Lorenzo was assumed from previous attending Dr. Asuncion Persaud and is being seen for Leg Swelling  . The patient's initial evaluation and plan have been discussed with the prior provider who initially evaluated the patient. Attestation  I was available and discussed any additional care issues that arose and coordinated the management plans with the resident(s) caring for the patient during my duty period. Any areas of disagreement with resident's documentation of care or procedures are noted on the chart. I was personally present for the key portions of any/all procedures, during my duty period. I have documented in the chart those procedures where I was not present during the key portions. BRIEF PATIENT SUMMARY/MDM COURSE PER INITIAL PROVIDER:   RECENT VITALS:     Temp: 98.8 °F (37.1 °C),  Pulse: 93, Resp: 18, BP: (!) 142/106, SpO2: 97 %    This patient is a 69 y.o. Female with lymphedema of the lower extremities.     DIAGNOSTICS/MEDICATIONS:     MEDICATIONS GIVEN:  ED Medication Orders (From admission, onward)    None          LABS    Labs Reviewed   BRAIN NATRIURETIC PEPTIDE - Abnormal; Notable for the following components:       Result Value    Pro-BNP 4,927 (*)     All other components within normal limits   TROPONIN - Abnormal; Notable for the following components:    Troponin, High Sensitivity 31 (*)     All other components within normal limits   CBC WITH AUTO DIFFERENTIAL - Abnormal; Notable for the following components:    RBC 2.78 (*)     Hemoglobin 8.2 (*)     Hematocrit 27.1 (*)     RDW 15.3 (*)     Seg Neutrophils 72 (*)     Lymphocytes 14 (*)     Immature Granulocytes 1 (*)     All other components within normal limits   COMPREHENSIVE METABOLIC PANEL - Abnormal; Notable for the following components:    Glucose 120 (*)     BUN 43 (*)     CREATININE 3.15 (*)     Potassium 3.3 (*)     GFR Non- 14 (*)     GFR  17 (*)     All other components within normal limits   MAGNESIUM - Abnormal; Notable for the following components:    Magnesium 1.5 (*)     All other components within normal limits   URINALYSIS WITH MICROSCOPIC       RADIOLOGY  XR CHEST PORTABLE    Result Date: 3/31/2022  EXAMINATION: ONE XRAY VIEW OF THE CHEST 3/31/2022 10:19 pm COMPARISON: 12/27/2018 HISTORY: ORDERING SYSTEM PROVIDED HISTORY: worsening swelling with mild jvd TECHNOLOGIST PROVIDED HISTORY: worsening swelling with mild jvd Reason for Exam: upr,leg swelling FINDINGS: The cardiomediastinal silhouette is mildly prominent in size and contour. The lungs are clear. No pleural effusion or pneumothorax is present. No acute cardiopulmonary process       OUTSTANDING TASKS / ADDITIONAL COMMENTS   1.  Admission    Con Rai MD  Emergency Medicine Attending  Yessica Jordan MD  03/31/22 4585

## 2022-04-01 NOTE — PROGRESS NOTES
[facility]  Progress Note                   Date:   4/1/2022  Patient name: Clovis Halsted  Date of admission:  3/31/2022  9:20 PM  MRN:   [id number]  YOB: 1945  PCP: Mamadou Alicia MD    Reason for Admission: JOSE ARMANDO (acute kidney injury) ([facility] [N17.9]  Cellulitis of right lower extremity [L03.115]  Cellulitis of left lower extremity [L03.116]  Acute kidney injury superimposed on CKD ([facility] [N17.9, N18.9]    Subjective:       Clinical Changes /Abnormalities: Seen & examined in room after discussing with Rn. Converted to SR overnight with frequent PACs, asymptomatic. Denies any CP or SOB. Continues to have significant LE edema. Labs, vitals, & tele reiviewed. Review of Systems    Medications:   Scheduled Meds:   allopurinol  100 mg Oral Daily    atorvastatin  40 mg Oral Daily    pantoprazole  40 mg Oral Daily    sodium chloride flush  5-40 mL IntraVENous 2 times per day    furosemide  60 mg IntraVENous BID    metoprolol tartrate  50 mg Oral BID    rOPINIRole  0.25 mg Oral Nightly    [START ON 4/2/2022] ferrous sulfate  325 mg Oral Daily with breakfast    amiodarone  150 mg IntraVENous Once    doxycycline (VIBRAMYCIN) IV  100 mg IntraVENous Q12H     Continuous Infusions:   sodium chloride      heparin (PORCINE) Infusion 8.4 Units/kg/hr (04/01/22 1200)    amiodarone      Followed by    amiodarone       CBC:   Recent Labs     03/31/22 2148 04/01/22  1102   WBC 10.3 11.0   HGB 8.2* 8.2*    322     BMP:    Recent Labs     03/31/22 2148 04/01/22  0511 04/01/22  1102    138 139   K 3.3* 3.7 3.6*    102 100   CO2 23 21 24   BUN 43* 40* 40*   CREATININE 3.15* 2.98* 2.88*   GLUCOSE 120* 134* 149*     Hepatic:  Recent Labs     03/31/22 2148 04/01/22  0511   AST 10 11   ALT 7 7   BILITOT 0.42 0.46   ALKPHOS 69 68     Troponin:   Recent Labs     03/31/22 2148 03/31/22  2347   TROPHS 31* 29*     BNP: No results for input(s): BNP in the last 72 hours.   Lipids: No results for input(s): CHOL, HDL in the last 72 hours. Invalid input(s): LDLCALCU  INR:   Recent Labs     04/01/22  0511   INR 1.1       Objective:   Vitals: BP (!) 124/58   Pulse 72   Temp 100.1 °F (37.8 °C) (Oral)   Resp 16   Ht 5' 5\" (1.651 m)   Wt 262 lb (118.8 kg)   SpO2 97%   BMI 43.60 kg/m²   General appearance: alert and cooperative with exam  HEENT: Head: Normocephalic, no lesions, without obvious abnormality. Neck:no JVD, trachea midline, no adenopathy  Lungs: Clear to auscultation throughout. No distress on RA  Heart: Regular rate and rhythm, s1/s2 auscultated, + murmurs, SR with PACs frequently  Abdomen: soft, non-tender, bowel sounds active, morbidly obese  Extremities: signtificant +3 generalized edema  Neurologic: not done    CATH 5/16/16: Nonobstructive CAD. EF 55%.      STRESS 4/22/16: Ischemia in the anterior area, size is moderate, severity is moderate, artifacts cannot be ruled out. Infarct in the anterior area, size is small, artifacts cannot be ruled out. EF 61%.      ECHO 5/30/14: EF 55%, reduced LV diastolic compliance, mild-moderate MR. Assessment / Acute Cardiac Problems:   1. New onset Afib RVR  2. CHF exacerbation, diastolic  3. Nonobstructive CAD on cath 2016  4. Morbid obesity with BMI >40  5. HTN  6. HLP  7. DIONY on BiPAP  8.  Acute on chronic CKD3    Patient Active Problem List:     Dyslipidemia     DIONY treated with BiPAP     Fibromyalgia     CRI (chronic renal insufficiency)     OA (osteoarthritis)     DM (diabetes mellitus) ([address]     Kidney stone     Depression     Seasonal allergies     Diverticulosis     Erythropenia     Normocytic normochromic anemia     Mitral regurgitation - Mild to moderate     CKD (chronic kidney disease) stage 4, GFR 15-29 ml/min ([facility])     Gout     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 disease ([address]     Essential hypertension     Osteopenia     Morbid obesity due to excess calories ([facility])     Anxiety     Febrile illness Acute serous otitis media of left ear     Secondary hyperparathyroidism ([address]     Acute kidney injury superimposed on CKD ([address]     New onset a-fib ([address] with Rapid ventricular response     New onset of congestive heart failure (HCC)     Lymphedema     GERD (gastroesophageal reflux disease)     Restless leg syndrome      Plan of Treatment:   1. Stable. Converted to SR with frequent PACs. Will switch Amio gtt to oral. Continue BB & Heparin gtt  2. Echo pending  3. Significant generalized edema but no rales on exam. Continue IV Lasix. LE compression stockings. Also has lymphedema. Await echo to assess LVEF and any valvular disease  4. Further ischemic work-up pending echo findings  5. Keep K >4 and Mg >2   6. Creat remains elevated - management per nephrology.  Appreciate assistance with volume management    Electronically signed by BARBIE Galindo CNP on 4/1/2022 at 2:59 PM  [id number] [address]  [phone]

## 2022-04-01 NOTE — ED NOTES
The following labs labeled with pt sticker and tubed to lab:     [] Blue     [] Lavender   [] on ice  [] Green/yellow  [] Green/black [] on ice  [] Yellow  [] Red  [] Pink      [] COVID-19 swab    [] Rapid  [] PCR  [] Flu swab  [] Peds Viral Panel     [x] Urine Sample  [] Pelvic Cultures  [] Blood Cultures            Martha Colon RN  03/31/22 7006

## 2022-04-01 NOTE — ED PROVIDER NOTES
[facility] ED  Emergency Department Encounter  Emergency Medicine Resident     Pt Name: Jose D Berrios  MRN: [id number]  Hemagfjessica 1945  Date of evaluation: 3/31/22  PCP:  Thomas Worrell MD    [address]       Chief Complaint   Patient presents with    Leg Swelling       HISTORY OFPRESENT ILLNESS  (Location/Symptom, Timing/Onset, Context/Setting, Quality, Duration, Modifying Factors,Severity.)      Jose D Berrios is a 69 y. o.yo female who is a history of lymphedema, who presents with worsening lymphedema. Patient reports that he did call her PCP, who recommended for her to be evaluated. Patient states that she is noticing bilateral lower extremity pain, redness and worsening swelling. She denies any new numbness or tingling down her legs. Patient denies any complaints of chest pain or shortness of breath. She did state that she is on Lasix, has been compliant with her medication. Patient denies any nausea, vomiting, fever, chills, abdominal pain. PAST MEDICAL / SURGICAL / SOCIAL / FAMILY HISTORY      has a past medical history of Abnormal stress test, Anemia, Arthritis, Depression, Diverticulosis, DM (diabetes mellitus) ([address] Erythropenia, Fibromyalgia, HTN (hypertension), Hyperlipidemia, Kidney stone, Morbid obesity due to excess calories ([address] DIONY on CPAP, Osteopenia, Seasonal allergies, and Sleep apnea. has a past surgical history that includes Colonoscopy (2003); Colonoscopy (2007); Tubal ligation; Arm Surgery (2011); Total knee arthroplasty (Bilateral); Cardiac catheterization ([id number] [id number]); and Cardiac catheterization (5-16-16).      Social History     Socioeconomic History    Marital status:      Spouse name: Not on file    Number of children: Not on file    Years of education: Not on file    Highest education level: Not on file   Occupational History    Not on file   Tobacco Use    Smoking status: Former Smoker     Packs/day: 0.25 Years: 1.00     Pack years: 0.25     Quit date: 1960     Years since quittin.2    Smokeless tobacco: Never Used    Tobacco comment: quit    Vaping Use    Vaping Use: Never used   Substance and Sexual Activity    Alcohol use: No    Drug use: No    Sexual activity: Never   Other Topics Concern    Not on file   Social History Narrative    Not on file     Social Determinants of Health     Financial Resource Strain: Low Risk     Difficulty of Paying Living Expenses: Not hard at all   Food Insecurity: No Food Insecurity    Worried About Running Out of Food in the Last Year: Never true    Altaf of Food in the Last Year: Never true   Transportation Needs:     Lack of Transportation (Medical): Not on file    Lack of Transportation (Non-Medical):  Not on file   Physical Activity:     Days of Exercise per Week: Not on file    Minutes of Exercise per Session: Not on file   Stress:     Feeling of Stress : Not on file   Social Connections:     Frequency of Communication with Friends and Family: Not on file    Frequency of Social Gatherings with Friends and Family: Not on file    Attends Christian Services: Not on file    Active Member of [address] [place] [address] [employer] or Organizations: Not on file    Attends Club or Organization Meetings: Not on file    Marital Status: Not on file   Intimate Partner Violence:     Fear of Current or Ex-Partner: Not on file    Emotionally Abused: Not on file    Physically Abused: Not on file    Sexually Abused: Not on file   Housing Stability:     Unable to Pay for Housing in the Last Year: Not on file    Number of [place] in the Last Year: Not on file    Unstable Housing in the Last Year: Not on file       Family History   Problem Relation Age of Onset    Diabetes Mother     Heart Disease Mother     Osteoporosis Mother     Kidney Disease Father     Prostate Cancer Brother         Allergies:  Adhesive tape, Cephalexin, Erythromycin, Ketorolac tromethamine, Pcn [penicillins], Percocet [oxycodone-acetaminophen], Sulfa antibiotics, and Ultracet [tramadol-acetaminophen]    Home Medications:  Prior to Admission medications    Medication Sig Start Date End Date Taking? Authorizing Provider   furosemide (LASIX) 40 MG tablet Take 1 tablet by mouth 2 times daily for 7 days 3/25/22 4/1/22  Eliza Birch MD   zolpidem (AMBIEN) 5 MG tablet Take 1 tablet by mouth nightly as needed for Sleep for up to 30 days. 3/15/22 4/14/22  Eliza Birch MD   ALPRAZolam Trenia Fury) 0.5 MG tablet nightly. Historical Provider, MD   potassium citrate (UROCIT-K) 10 MEQ (1080 MG) extended release tablet Take 1 tablet by mouth daily 2/16/22   Eliza Birch MD   allopurinol (ZYLOPRIM) 100 MG tablet Take 1 tablet by mouth daily 2/16/22   Eliza Birch MD   rOPINIRole (REQUIP) 0.25 MG tablet Take 1 tablet by mouth nightly 2/16/22   Eliza Birch MD   calcitRIOL (ROCALTROL) 0.25 MCG capsule TAKE 1 CAPSULE BY MOUTH DAILY 2/16/22   Eliza Birch MD   cyclobenzaprine (FLEXERIL) 5 MG tablet Take 1 tablet by mouth nightly as needed for Muscle spasms 2/16/22   Eliza Birch MD   hydroCHLOROthiazide (HYDRODIURIL) 25 MG tablet TAKE 1 TABLET BY MOUTH DAILY 12/21/21   Nga Mitchell MD   losartan (COZAAR) 25 MG tablet TAKE 1 TABLET BY MOUTH DAILY 12/10/21   Nga Mitchell MD   atorvastatin (LIPITOR) 40 MG tablet Take 1 tablet by mouth daily 12/7/21   Eliza Birch MD   metoprolol tartrate (LOPRESSOR) 25 MG tablet Take 1 tablet by mouth 2 times daily 10/13/21   Danika Gaming DO   azelastine (ASTELIN) 0.1 % nasal spray 1 spray by Nasal route 2 times daily Use in each nostril as directed 10/13/21   Danika Gaming DO   gabapentin (NEURONTIN) 600 MG tablet Take 1 tablet by mouth daily for 90 days.  6/1/21 3/15/22  Danika Gaming DO   pantoprazole (PROTONIX) 40 MG tablet TAKE 1 TABLET DAILY 3/3/21   Danika Gaming DO   Cholecalciferol (VITAMIN D3) 25 MCG (1000 UT) TABS Take 2 tablets by mouth daily 1/9/20   Lupe Hernandez, APRN - CNP   Magnesium Oxide 400 MG CAPS Take by mouth 2 times daily     Historical Provider, MD       REVIEW OFSYSTEMS    (2-9 systems for level 4, 10 or more for level 5)      Review of Systems   Constitutional: Negative for fatigue and fever. Respiratory: Negative for cough and shortness of breath. Cardiovascular: Positive for leg swelling. Gastrointestinal: Negative for abdominal pain, nausea and vomiting. Skin: Negative for rash and wound. Psychiatric/Behavioral: Negative for behavioral problems and confusion. PHYSICAL EXAM   (up to 7 for level 4, 8 or more forlevel 5)      INITIAL VITALS:   ED Triage Vitals [03/31/22 2122]   BP Temp Temp src Pulse Resp SpO2 Height Weight   (!) 142/106 98.8 °F (37.1 °C) -- 93 18 97 % 5' 5\" (1.651 m) 262 lb (118.8 kg)       Physical Exam  Constitutional:       Appearance: Normal appearance. She is obese. HENT:      Head: Normocephalic and atraumatic. Eyes:      Extraocular Movements: Extraocular movements intact. Pupils: Pupils are equal, round, and reactive to light. Cardiovascular:      Rate and Rhythm: Normal rate. Pulmonary:      Effort: Pulmonary effort is normal. No respiratory distress. Abdominal:      General: There is no distension. Tenderness: There is no abdominal tenderness. Musculoskeletal:      Cervical back: No rigidity or tenderness. Right lower leg: Edema present. Left lower leg: Edema present. Comments: Presence of bilateral lower extremity 3+ pitting edema, bilateral foot with erythema, warmth that is concerning for cellulitis. Right foot erythema worse than left foot   Skin:     Capillary Refill: Capillary refill takes less than 2 seconds. Findings: Erythema present. Neurological:      General: No focal deficit present. Mental Status: She is alert.    Psychiatric:         Mood and Affect: Mood normal.         Behavior: Behavior normal. DIFFERENTIAL  DIAGNOSIS     PLAN (LABS / IMAGING / EKG):  Orders Placed This Encounter   Procedures    XR CHEST PORTABLE    Brain Natriuretic Peptide    Troponin    CBC with Auto Differential    Comprehensive Metabolic Panel    Magnesium    Urinalysis with Microscopic    Troponin    Comprehensive Metabolic Panel w/ Reflex to MG    Magnesium    Protime-INR    Urinalysis with microscopic    ADULT DIET;  Regular; 4 carb choices (60 gm/meal); 1500 ml    Vital signs per unit routine    Notify physician    Up with assistance    Daily weights    Intake and output    Telemetry monitoring - continuous duration    Full Code    Inpatient consult to Hospitalist    Initiate Oxygen Therapy Protocol    Pulse Oximetry Spot Check    EKG 12 Lead    ADMIT TO INPATIENT       MEDICATIONS ORDERED:  Orders Placed This Encounter   Medications    doxycycline (VIBRAMYCIN) 100 mg in dextrose 5 % 100 mL IVPB     Order Specific Question:   Antimicrobial Indications     Answer:   Skin and Soft Tissue Infection    orphenadrine (NORFLEX) injection 60 mg    allopurinol (ZYLOPRIM) tablet 100 mg    atorvastatin (LIPITOR) tablet 40 mg    hydroCHLOROthiazide (HYDRODIURIL) tablet 25 mg    metoprolol tartrate (LOPRESSOR) tablet 25 mg    pantoprazole (PROTONIX) tablet 40 mg    sodium chloride flush 0.9 % injection 5-40 mL    sodium chloride flush 0.9 % injection 5-40 mL    0.9 % sodium chloride infusion    enoxaparin (LOVENOX) injection 30 mg    OR Linked Order Group     ondansetron (ZOFRAN-ODT) disintegrating tablet 4 mg     ondansetron (ZOFRAN) injection 4 mg    OR Linked Order Group     acetaminophen (TYLENOL) tablet 650 mg     acetaminophen (TYLENOL) suppository 650 mg    furosemide (LASIX) injection 40 mg    DISCONTD: doxycycline (VIBRAMYCIN) 100 mg in dextrose 5 % 100 mL IVPB     Order Specific Question:   Antimicrobial Indications     Answer:   Skin and Soft Tissue Infection    morphine injection 2 mg         Initial MDM/Plan: 69 y.o. female who presents with worsening bilateral lower extremity swelling in addition to bilateral lower foot pain. Patient no acute distress, afebrile  He does have 3+ pitting edema over her lower extremities, with redness, warmth over her bilateral foot that is concerning for cellulitis. Will get BNP, troponin, EKG, electrolytes to assess for worsening kidney function. On chart review, it was seen that patient kidney function is progressively worsening, most recent creatinine of 2.68. Patient does follow with Dr. Pedrito Saavedra nephrologist.  Patient is currently not on dialysis.   Given cellulitis, will likely start patient on doxycycline  Plan for admission    DIAGNOSTIC RESULTS / EMERGENCYDEPARTMENT COURSE / MDM     LABS:  Labs Reviewed   BRAIN NATRIURETIC PEPTIDE - Abnormal; Notable for the following components:       Result Value    Pro-BNP 4,927 (*)     All other components within normal limits   TROPONIN - Abnormal; Notable for the following components:    Troponin, High Sensitivity 31 (*)     All other components within normal limits   CBC WITH AUTO DIFFERENTIAL - Abnormal; Notable for the following components:    RBC 2.78 (*)     Hemoglobin 8.2 (*)     Hematocrit 27.1 (*)     RDW 15.3 (*)     Seg Neutrophils 72 (*)     Lymphocytes 14 (*)     Immature Granulocytes 1 (*)     All other components within normal limits   COMPREHENSIVE METABOLIC PANEL - Abnormal; Notable for the following components:    Glucose 120 (*)     BUN 43 (*)     CREATININE 3.15 (*)     Potassium 3.3 (*)     GFR Non- 14 (*)     GFR  17 (*)     All other components within normal limits   MAGNESIUM - Abnormal; Notable for the following components:    Magnesium 1.5 (*)     All other components within normal limits   TROPONIN - Abnormal; Notable for the following components:    Troponin, High Sensitivity 29 (*)     All other components within normal limits   URINALYSIS WITH MICROSCOPIC   COMPREHENSIVE METABOLIC PANEL W/ REFLEX TO MG FOR LOW K   MAGNESIUM   PROTIME-INR   URINALYSIS WITH MICROSCOPIC         RADIOLOGY:  XR CHEST PORTABLE    Result Date: 3/31/2022  EXAMINATION: ONE XRAY VIEW OF THE CHEST 3/31/2022 10:19 pm COMPARISON: 12/27/2018 HISTORY: ORDERING SYSTEM PROVIDED HISTORY: worsening swelling with mild jvd TECHNOLOGIST PROVIDED HISTORY: worsening swelling with mild jvd Reason for Exam: upr,leg swelling FINDINGS: The cardiomediastinal silhouette is mildly prominent in size and contour. The lungs are clear. No pleural effusion or pneumothorax is present. No acute cardiopulmonary process         EKG      All EKG's are interpreted by the Emergency Department Physicianwho either signs or Co-signs this chart in the absence of a cardiologist.    EMERGENCY DEPARTMENT COURSE:  ED Course as of 04/01/22 0245   Thu Mar 31, 2022   2359 Patient labs did show worsening kidney function of creatinine of 3.15, plan of 2.5,.  Magnesium he of 1.5, hypokalemia possibly due to Lasix usage. In worsening kidney functions, will not give any Lasix although patient BNP is in the 4000's (will discuss with admitting team). Started on doxycycline for cellulitis of lower extremities. Plan for [facility] admission [AN]      ED Course User Index  [AN] Soila Saab MD          PROCEDURES:  None    CONSULTS:  IP CONSULT TO HOSPITALIST    CRITICAL CARE:      FINAL IMPRESSION      1. Cellulitis of left lower extremity    2. Cellulitis of right lower extremity    3. JOSE ARMANDO (acute kidney injury) ([facility]          DISPOSITION / PLAN     DISPOSITION Admitted 04/01/2022 12:41:42 AM      PATIENT REFERRED TO:  No follow-up provider specified.     DISCHARGE MEDICATIONS:  Current Discharge Medication List          Soila Saab MD  Emergency Medicine Resident    (Please note that portions of this note were completed with a voice recognition program.Efforts were made to edit the dictations but occasionally words are mis-transcribed.)        Johanna Foster MD  Resident  04/01/22 2294

## 2022-04-01 NOTE — ED PROVIDER NOTES
[facility]  Emergency Department  Faculty Attestation     I performed a history and physical examination of the patient and discussed management with the resident. I reviewed the residents note and agree with the documented findings and plan of care. Any areas of disagreement are noted on the chart. I was personally present for the key portions of any procedures. I have documented in the chart those procedures where I was not present during the key portions. I have reviewed the emergency nurses triage note. I agree with the chief complaint, past medical history, past surgical history, allergies, medications, social and family history as documented unless otherwise noted below. For Physician Assistant/ Nurse Practitioner cases/documentation I have personally evaluated this patient and have completed at least one if not all key elements of the E/M (history, physical exam, and MDM). Additional findings are as noted. Primary Care Physician:  Alexandra Brand MD    Screenings:  [unfilled]    CHIEF COMPLAINT       Chief Complaint   Patient presents with    Leg Swelling       RECENT VITALS:   Temp: 98.8 °F (37.1 °C),  Pulse: 93, Resp: 18, BP: (!) 142/106    LABS:  Labs Reviewed - No data to display    Radiology  No orders to display       CRITICAL CARE: There was a high probability of clinically significant/life threatening deterioration in this patient's condition which required my urgent intervention. Total critical care time was none minutes. This excludes any time for separately reportable procedures. EKG:      Attending Physician Additional  Notes    Patient has right greater than left redness and discomfort/pain in the top of both feet from worsening of her chronic lymphedema. She is on Lasix 40 mg twice daily. She had blood work recently documenting her CKD. No difficulty breathing. No chest pain shortness of breath abdominal pain.   She took Vicodin for the pain several hours ago but none recently since she was coming here. No fevers chills or sweats. No trauma. No history of blood clots. On exam she is in moderate distress (14/10, hypertensive, afebrile, no respiratory distress. Lungs are clear. No obvious JVD. Abdomen is benign. There is bilateral symmetrical pitting edema. Both feet have erythema warmth and tenderness over the top of the feet. No true lymphangitis. Pulses are diminished. Impression is peripheral edema, CKD, cellulitis, unlikely CHF or DVT. Pain is laboratory studies, analgesics, antibiotics. Nevaeh Black.  Lissett Quiroga MD, [facility]  Attending Emergency  Physician               Jocelyn Libman, MD  03/31/22 2304

## 2022-04-01 NOTE — PROGRESS NOTES
Pt. To floor, pulled over into bed, Pt. NSR on monitor with apb's. CS as charted. Pt. C/o pain to Rt. Foot.

## 2022-04-01 NOTE — CARE COORDINATION
Transitional planning    Jarod Taveras from [facility] called and they can accept patient. If she is discharged over the weekend, they will not be able to see her. Should be able to see her Monday if needed.

## 2022-04-01 NOTE — PROGRESS NOTES
[facility]  Documentation Note                Admission Dx: JOSE ARMANDO (acute kidney [facility]) ([facility] [N17.9]  Cellulitis of right lower extremity [L03.115]  Cellulitis of left lower extremity [L03.116]  Acute kidney injury superimposed on CKD ([facility] [N17.9, N18.9]    Past Medical History:   has a past medical history of Abnormal stress test, Anemia, Arthritis, Depression, Diverticulosis, DM (diabetes mellitus) ([facility] Erythropenia, Fibromyalgia, HTN (hypertension), Hyperlipidemia, Kidney stone, Morbid obesity due to excess calories ([facility] DIONY on CPAP, Osteopenia, Seasonal allergies, and Sleep apnea. Previous Testing:     CATH 5/16/16: Nonobstructive CAD. EF 55%. STRESS 4/22/16: Ischemia in the anterior area, size is moderate, severity is moderate, artifacts cannot be ruled out. Infarct in the anterior area, size is small, artifacts cannot be ruled out. EF 61%. ECHO 5/30/14: EF 55%, reduced LV diastolic compliance, mild-moderate MR. Previous office/hospital visit:   Dr. Emmanuel Mcclendon 8/10/18:   1. Borderline diabetes. 2. Hypertension. 3. Dyslipidemia. 4. Mild to moderate mitral regurgitation and mild tricuspid regurgitation by echo in May 2013, with reduced diastolic dysfunction. 5. Mild peripheral vascular disease in the lower extremities confirmed by Doppler study in May 2013. 6. Heart cath May 2016 showed minimal CAd, normal EF. 7. Post knee surgery a few weeks back. Plan --   1. Volume overload, most likely due to venous insufficiency. ** Start Lasix 40 QD  ** Blood test to check renal function. 2. Fatigue and tired:  ** Check thyroid profile    3. Obesity: Advised better diet and weight loss.     Micheline Jackson [facility]

## 2022-04-02 PROBLEM — R65.10 SIRS (SYSTEMIC INFLAMMATORY RESPONSE SYNDROME) (HCC): Status: ACTIVE | Noted: 2022-04-02

## 2022-04-02 LAB
ABSOLUTE EOS #: 0 K/UL (ref 0–0.4)
ABSOLUTE IMMATURE GRANULOCYTE: 0.16 K/UL (ref 0–0.3)
ABSOLUTE LYMPH #: 2.61 K/UL (ref 1–4.8)
ABSOLUTE MONO #: 2.28 K/UL (ref 0.1–0.8)
ABSOLUTE RETIC #: 0.05 M/UL (ref 0.03–0.08)
ALBUMIN SERPL-MCNC: 3.5 G/DL (ref 3.5–5.2)
ANION GAP SERPL CALCULATED.3IONS-SCNC: 18 MMOL/L (ref 9–17)
BASOPHILS # BLD: 0 % (ref 0–2)
BASOPHILS ABSOLUTE: 0 K/UL (ref 0–0.2)
BUN BLDV-MCNC: 43 MG/DL (ref 8–23)
C-REACTIVE PROTEIN: 224 MG/L (ref 0–5)
CALCIUM SERPL-MCNC: 9.4 MG/DL (ref 8.6–10.4)
CHLORIDE BLD-SCNC: 96 MMOL/L (ref 98–107)
CO2: 21 MMOL/L (ref 20–31)
CREAT SERPL-MCNC: 3.14 MG/DL (ref 0.5–0.9)
CREATININE URINE: 55.3 MG/DL (ref 28–217)
EKG ATRIAL RATE: 174 BPM
EKG Q-T INTERVAL: 322 MS
EKG QRS DURATION: 84 MS
EKG QTC CALCULATION (BAZETT): 460 MS
EKG R AXIS: 39 DEGREES
EKG T AXIS: 17 DEGREES
EKG VENTRICULAR RATE: 123 BPM
EOSINOPHILS RELATIVE PERCENT: 0 % (ref 1–4)
FERRITIN: 470 NG/ML (ref 13–150)
GFR AFRICAN AMERICAN: 17 ML/MIN
GFR NON-AFRICAN AMERICAN: 14 ML/MIN
GFR SERPL CREATININE-BSD FRML MDRD: ABNORMAL ML/MIN/{1.73_M2}
GLUCOSE BLD-MCNC: 134 MG/DL (ref 70–99)
HCT VFR BLD CALC: 24.9 % (ref 36.3–47.1)
HEMOGLOBIN: 7.6 G/DL (ref 11.9–15.1)
IMMATURE GRANULOCYTES: 1 %
IMMATURE RETIC FRACT: 34 % (ref 2.7–18.3)
IRON SATURATION: 5 % (ref 20–55)
IRON: 10 UG/DL (ref 37–145)
LV EF: 55 %
LVEF MODALITY: NORMAL
LYMPHOCYTES # BLD: 16 % (ref 24–44)
MAGNESIUM: 1.7 MG/DL (ref 1.6–2.6)
MCH RBC QN AUTO: 29.8 PG (ref 25.2–33.5)
MCHC RBC AUTO-ENTMCNC: 30.5 G/DL (ref 28.4–34.8)
MCV RBC AUTO: 97.6 FL (ref 82.6–102.9)
MONOCYTES # BLD: 14 % (ref 1–7)
MORPHOLOGY: ABNORMAL
NRBC AUTOMATED: 0 PER 100 WBC
PARTIAL THROMBOPLASTIN TIME: 32.8 SEC (ref 20.5–30.5)
PARTIAL THROMBOPLASTIN TIME: 40.1 SEC (ref 20.5–30.5)
PDW BLD-RTO: 15.5 % (ref 11.8–14.4)
PHOSPHORUS: 3.7 MG/DL (ref 2.6–4.5)
PHOSPHORUS: 4 MG/DL (ref 2.6–4.5)
PLATELET # BLD: ABNORMAL K/UL (ref 138–453)
PLATELET, FLUORESCENCE: NORMAL K/UL (ref 138–453)
POTASSIUM SERPL-SCNC: 3.3 MMOL/L (ref 3.7–5.3)
PROCALCITONIN: 0.91 NG/ML
RBC # BLD: 2.55 M/UL (ref 3.95–5.11)
REASON FOR REJECTION: NORMAL
RETIC %: 1.9 % (ref 0.5–1.9)
RETIC HEMOGLOBIN: 29.7 PG (ref 28.2–35.7)
SEG NEUTROPHILS: 69 % (ref 36–66)
SEGMENTED NEUTROPHILS ABSOLUTE COUNT: 11.25 K/UL (ref 1.8–7.7)
SODIUM BLD-SCNC: 135 MMOL/L (ref 135–144)
TOTAL IRON BINDING CAPACITY: 199 UG/DL (ref 250–450)
TOTAL PROTEIN, URINE: 24 MG/DL
UNSATURATED IRON BINDING CAPACITY: 189 UG/DL (ref 112–347)
URINE TOTAL PROTEIN CREATININE RATIO: 0.43 (ref 0–0.2)
WBC # BLD: 16.3 K/UL (ref 3.5–11.3)
ZZ NTE CLEAN UP: ORDERED TEST: NORMAL
ZZ NTE WITH NAME CLEAN UP: SPECIMEN SOURCE: NORMAL

## 2022-04-02 PROCEDURE — 86335 IMMUNFIX E-PHORSIS/URINE/CSF: CPT

## 2022-04-02 PROCEDURE — 93010 ELECTROCARDIOGRAM REPORT: CPT | Performed by: INTERNAL MEDICINE

## 2022-04-02 PROCEDURE — 6360000002 HC RX W HCPCS: Performed by: NURSE PRACTITIONER

## 2022-04-02 PROCEDURE — 99232 SBSQ HOSP IP/OBS MODERATE 35: CPT | Performed by: INTERNAL MEDICINE

## 2022-04-02 PROCEDURE — 87040 BLOOD CULTURE FOR BACTERIA: CPT

## 2022-04-02 PROCEDURE — 97162 PT EVAL MOD COMPLEX 30 MIN: CPT

## 2022-04-02 PROCEDURE — 83550 IRON BINDING TEST: CPT

## 2022-04-02 PROCEDURE — 93306 TTE W/DOPPLER COMPLETE: CPT

## 2022-04-02 PROCEDURE — 83540 ASSAY OF IRON: CPT

## 2022-04-02 PROCEDURE — 83735 ASSAY OF MAGNESIUM: CPT

## 2022-04-02 PROCEDURE — 36415 COLL VENOUS BLD VENIPUNCTURE: CPT

## 2022-04-02 PROCEDURE — 86140 C-REACTIVE PROTEIN: CPT

## 2022-04-02 PROCEDURE — 80069 RENAL FUNCTION PANEL: CPT

## 2022-04-02 PROCEDURE — 6370000000 HC RX 637 (ALT 250 FOR IP): Performed by: INTERNAL MEDICINE

## 2022-04-02 PROCEDURE — 84100 ASSAY OF PHOSPHORUS: CPT

## 2022-04-02 PROCEDURE — 2500000003 HC RX 250 WO HCPCS: Performed by: STUDENT IN AN ORGANIZED HEALTH CARE EDUCATION/TRAINING PROGRAM

## 2022-04-02 PROCEDURE — 85730 THROMBOPLASTIN TIME PARTIAL: CPT

## 2022-04-02 PROCEDURE — 2500000003 HC RX 250 WO HCPCS: Performed by: INTERNAL MEDICINE

## 2022-04-02 PROCEDURE — 82570 ASSAY OF URINE CREATININE: CPT

## 2022-04-02 PROCEDURE — 6370000000 HC RX 637 (ALT 250 FOR IP): Performed by: NURSE PRACTITIONER

## 2022-04-02 PROCEDURE — 84145 PROCALCITONIN (PCT): CPT

## 2022-04-02 PROCEDURE — 85055 RETICULATED PLATELET ASSAY: CPT

## 2022-04-02 PROCEDURE — 2580000003 HC RX 258: Performed by: STUDENT IN AN ORGANIZED HEALTH CARE EDUCATION/TRAINING PROGRAM

## 2022-04-02 PROCEDURE — 84166 PROTEIN E-PHORESIS/URINE/CSF: CPT

## 2022-04-02 PROCEDURE — 6360000002 HC RX W HCPCS: Performed by: INTERNAL MEDICINE

## 2022-04-02 PROCEDURE — 2580000003 HC RX 258: Performed by: INTERNAL MEDICINE

## 2022-04-02 PROCEDURE — 85025 COMPLETE CBC W/AUTO DIFF WBC: CPT

## 2022-04-02 PROCEDURE — 84156 ASSAY OF PROTEIN URINE: CPT

## 2022-04-02 PROCEDURE — 97530 THERAPEUTIC ACTIVITIES: CPT

## 2022-04-02 PROCEDURE — 2060000000 HC ICU INTERMEDIATE R&B

## 2022-04-02 PROCEDURE — 85045 AUTOMATED RETICULOCYTE COUNT: CPT

## 2022-04-02 PROCEDURE — 82728 ASSAY OF FERRITIN: CPT

## 2022-04-02 PROCEDURE — 2580000003 HC RX 258: Performed by: NURSE PRACTITIONER

## 2022-04-02 RX ORDER — CLINDAMYCIN PHOSPHATE 300 MG/50ML
300 INJECTION INTRAVENOUS EVERY 8 HOURS
Status: DISCONTINUED | OUTPATIENT
Start: 2022-04-02 | End: 2022-04-02

## 2022-04-02 RX ORDER — FUROSEMIDE 10 MG/ML
40 INJECTION INTRAMUSCULAR; INTRAVENOUS 2 TIMES DAILY
Status: DISCONTINUED | OUTPATIENT
Start: 2022-04-02 | End: 2022-04-03

## 2022-04-02 RX ORDER — AMIODARONE HYDROCHLORIDE 200 MG/1
200 TABLET ORAL DAILY
Status: DISCONTINUED | OUTPATIENT
Start: 2022-04-02 | End: 2022-04-07 | Stop reason: HOSPADM

## 2022-04-02 RX ORDER — MAGNESIUM SULFATE IN WATER 40 MG/ML
2000 INJECTION, SOLUTION INTRAVENOUS ONCE
Status: COMPLETED | OUTPATIENT
Start: 2022-04-02 | End: 2022-04-02

## 2022-04-02 RX ORDER — CYCLOBENZAPRINE HCL 10 MG
10 TABLET ORAL 3 TIMES DAILY PRN
Status: DISCONTINUED | OUTPATIENT
Start: 2022-04-02 | End: 2022-04-07 | Stop reason: HOSPADM

## 2022-04-02 RX ADMIN — ALLOPURINOL 100 MG: 100 TABLET ORAL at 12:19

## 2022-04-02 RX ADMIN — FUROSEMIDE 40 MG: 10 INJECTION, SOLUTION INTRAMUSCULAR; INTRAVENOUS at 17:37

## 2022-04-02 RX ADMIN — ACETAMINOPHEN 650 MG: 325 TABLET ORAL at 03:47

## 2022-04-02 RX ADMIN — PANTOPRAZOLE SODIUM 40 MG: 40 TABLET, DELAYED RELEASE ORAL at 09:19

## 2022-04-02 RX ADMIN — METOPROLOL TARTRATE 50 MG: 50 TABLET, FILM COATED ORAL at 20:08

## 2022-04-02 RX ADMIN — OXYCODONE 5 MG: 5 TABLET ORAL at 16:23

## 2022-04-02 RX ADMIN — FERROUS SULFATE TAB EC 325 MG (65 MG FE EQUIVALENT) 325 MG: 325 (65 FE) TABLET DELAYED RESPONSE at 09:19

## 2022-04-02 RX ADMIN — Medication 12.4 UNITS/KG/HR: at 07:39

## 2022-04-02 RX ADMIN — CYCLOBENZAPRINE 10 MG: 10 TABLET, FILM COATED ORAL at 16:23

## 2022-04-02 RX ADMIN — DOXYCYCLINE 100 MG: 100 INJECTION, POWDER, LYOPHILIZED, FOR SOLUTION INTRAVENOUS at 12:21

## 2022-04-02 RX ADMIN — AMIODARONE HYDROCHLORIDE 200 MG: 200 TABLET ORAL at 10:28

## 2022-04-02 RX ADMIN — AMIODARONE HYDROCHLORIDE 0.5 MG/MIN: 50 INJECTION, SOLUTION INTRAVENOUS at 03:46

## 2022-04-02 RX ADMIN — CEFEPIME HYDROCHLORIDE 2000 MG: 2 INJECTION, POWDER, FOR SOLUTION INTRAVENOUS at 16:30

## 2022-04-02 RX ADMIN — ATORVASTATIN CALCIUM 40 MG: 80 TABLET, FILM COATED ORAL at 20:08

## 2022-04-02 RX ADMIN — OXYCODONE 5 MG: 5 TABLET ORAL at 03:22

## 2022-04-02 RX ADMIN — HEPARIN SODIUM 2000 UNITS: 1000 INJECTION, SOLUTION INTRAVENOUS; SUBCUTANEOUS at 12:22

## 2022-04-02 RX ADMIN — SODIUM CHLORIDE, PRESERVATIVE FREE 10 ML: 5 INJECTION INTRAVENOUS at 20:08

## 2022-04-02 RX ADMIN — FUROSEMIDE 60 MG: 10 INJECTION, SOLUTION INTRAMUSCULAR; INTRAVENOUS at 09:19

## 2022-04-02 RX ADMIN — METOPROLOL TARTRATE 50 MG: 50 TABLET, FILM COATED ORAL at 09:19

## 2022-04-02 RX ADMIN — ROPINIROLE HYDROCHLORIDE 0.25 MG: 0.25 TABLET, FILM COATED ORAL at 21:12

## 2022-04-02 RX ADMIN — HEPARIN SODIUM 2000 UNITS: 1000 INJECTION, SOLUTION INTRAVENOUS; SUBCUTANEOUS at 20:12

## 2022-04-02 RX ADMIN — OXYCODONE 5 MG: 5 TABLET ORAL at 10:31

## 2022-04-02 RX ADMIN — MAGNESIUM SULFATE 2000 MG: 2 INJECTION INTRAVENOUS at 09:22

## 2022-04-02 RX ADMIN — POTASSIUM CHLORIDE 40 MEQ: 20 TABLET, EXTENDED RELEASE ORAL at 13:32

## 2022-04-02 ASSESSMENT — PAIN SCALES - GENERAL
PAINLEVEL_OUTOF10: 10
PAINLEVEL_OUTOF10: 8
PAINLEVEL_OUTOF10: 8
PAINLEVEL_OUTOF10: 10

## 2022-04-02 ASSESSMENT — PAIN DESCRIPTION - PROGRESSION

## 2022-04-02 NOTE — PROGRESS NOTES
Pharmacy Note     Renal Dose Adjustment    Deana Humphreis is a 69 y.o. female. Pharmacist assessment of renally cleared medications. Recent Labs     04/01/22  1102 04/02/22  0650   BUN 40* 43*       Recent Labs     04/01/22  1102 04/02/22  0650   CREATININE 2.88* 3.14*       Estimated Creatinine Clearance: 20 mL/min (A) (based on SCr of 3.14 mg/dL (H)). Estimated CrCl using Ideal Body Weight: 13 mL/min (based on IBW 57 kg)    Height:   Ht Readings from Last 1 Encounters:   03/31/22 5' 5\" (1.651 m)     Weight:  Wt Readings from Last 1 Encounters:   03/31/22 262 lb (118.8 kg)       The following medication dose has been adjusted based upon renal function per P&T Guidelines:             Cefepime dose reduced to 1 g every 12 hours following initial dose of 2 g. Will continue to monitor renal function and adjust as necessary. Please call pharmacy with any questions.     Thank you,  Harjinder Dela Cruz, PharmD  4/2/2022 3:37 PM

## 2022-04-02 NOTE — PROGRESS NOTES
Physical Therapy    Facility/Department: [facility] CAR 2  Initial Assessment    NAME: Darylene Morris  : 1945  MRN: [id number]    Date of Service: 2022    Discharge Recommendations:  Continue to assess pending progress,Subacute/Skilled Nursing Danial Sher would benefit from continued therapy after discharge      Assessment   Assessment: Pt is 68 y/o female with general weakness and fatigue. Pt requires mod A of 2 for functional mobility. Unable to transfer sit to stand due to pain and weakness in bilateral LE's. Will benefit from PT. Treatment Diagnosis: LE pain, difficulty walking  Prognosis: Fair  Decision Making: Medium Complexity  Patient Education: PT eval and POC  REQUIRES PT FOLLOW UP: Yes  Activity Tolerance  Activity Tolerance: Patient limited by fatigue;Patient limited by pain       Patient Diagnosis(es): The primary encounter diagnosis was Cellulitis of left lower extremity. Diagnoses of Cellulitis of right lower extremity and JOSE ARMANDO (acute kidney injury) ([address] were also pertinent to this visit. has a past medical history of Abnormal stress test, Anemia, Arthritis, Depression, Diverticulosis, DM (diabetes mellitus) ([address] Erythropenia, Fibromyalgia, HTN (hypertension), Hyperlipidemia, Kidney stone, Morbid obesity due to excess calories ([address] DIONY on CPAP, Osteopenia, Seasonal allergies, and Sleep apnea. has a past surgical history that includes Colonoscopy (); Colonoscopy (); Tubal ligation; Arm Surgery (); Total knee arthroplasty (Bilateral); Cardiac catheterization ([id number] dr Db Funes); and Cardiac catheterization (16).     Restrictions  Restrictions/Precautions  Restrictions/Precautions: General Precautions,Fall Risk     Vision/Hearing  Vision: Within Functional Limits  Hearing: Within functional limits       Subjective  General  Chart Reviewed: Yes  Response To Previous Treatment: Not applicable  Family / Caregiver Present: No  Follows Commands: Within Functional Limits  Subjective  Subjective: Agrees to PT eval; legs are feeling better. Orientation  Orientation  Overall Orientation Status: Within Functional Limits     Social/Functional History  Social/Functional History  Lives With: Spouse  Type of Home: House  Home Layout: One level  Home Access: Stairs to enter without rails  Entrance Stairs - Number of Steps: 6  Home Equipment: Rolling walker  ADL Assistance: Independent  Homemaking Assistance: Independent  Ambulation Assistance: Independent  Transfer Assistance: Independent     Cognition   Cognition  Overall Cognitive Status: WFL    Objective  AROM RLE (degrees)  RLE AROM: WFL  AROM LLE (degrees)  LLE AROM : WFL     Strength RLE  Comment: grossly 2/5  Strength LLE  Comment: grossly 2/5        Bed mobility  Supine to Sit: Moderate assistance;2 Person assistance  Sit to Supine: Moderate assistance;2 Person assistance  Scooting: Moderate assistance;2 Person assistance     Transfers  Comment: Pt was unable stand despite mod to max A +2     Ambulation  Ambulation?: No     Balance  Sitting - Static: Good  Sitting - Dynamic: Good;Fair  Standing - Static:  (unable to stand)  Comments: -        Plan   Plan  Times per week: 5-6 x week  Times per day: Daily  Current Treatment Recommendations: Strengthening,Neuromuscular Re-education,Home Exercise Program,Safety Education & Training,Balance Training,Endurance Training,Patient/Caregiver Education & Training,Functional Mobility Training,Transfer Training,Gait Training,Stair training  Safety Devices  Type of devices:  All fall risk precautions in place           AM-PAC Score  AM-PAC Inpatient Mobility without Stair Climbing Raw Score : 7 (04/02/22 1337)  AM-PAC Inpatient without Stair Climbing T-Scale Score : 28.66 (04/02/22 1337)  Mobility Inpatient CMS 0-100% Score: 86.29 (04/02/22 1337)  Mobility Inpatient without Stair CMS G-Code Modifier : CM (04/02/22 1337)       Goals  Short term goals  Time Frame for Short term goals: 14 days  Short term goal 1: Pt to perform bed mob with min A. Short term goal 2: Pt to tolerate 20-30 mins ther ex/act for improved strength and endurance. Short term goal 3: Pt to transfer sit to stand with mod A +1.   Short term goal 4: Pt to ambulate 10ft with RW and no LOB>  Patient Goals   Patient goals : home following discharge       Therapy Time   Individual Concurrent Group Co-treatment   Time In 1100         Time Out 1139         Minutes [address], PT , DPT, CMPT

## 2022-04-02 NOTE — PROGRESS NOTES
Nephrology Progress Note    Patient:  Tsering Jimenes; 69 y.o. MRN# [id number]  Location:    Attending:  Monica Fleming DO  Admit Date:  3/31/2022   Hospital Day: 1    Subjective   Patient initially admitted on  for CHF/new onset A. fib with RVR. We were consulted for acute on chronic volume overload/CKD stage III. Patient follows with Dr. Dereck Chatman as an outpatient with a baseline CRT between 2.2 -2.6, admitted at 3.15    Patient seen and evaluated in room sitting in bed complaining of left foot pain that is making a conversation unbearable at this time  Vital signs stable overnight  UOP documented at 2.7 L over the last 24-hour with a -2.5 L fluid deficit since admission on 60 mg Lasix IV twice daily  CRT increasing slightly today, 3.14 (2.88), her baseline creatinine is close to 2.6 mg/dL. Past 24 hours patient has decent urine output and total so far she is 3.1 L net negative. There is no recent echocardiogram available.   Echocardiogram was ordered but waiting for the results    Objective   VS: BP (!) 106/50   Pulse 72   Temp 98.4 °F (36.9 °C) (Oral)   Resp 21   Ht 5' 5\" (1.651 m)   Wt 262 lb (118.8 kg)   SpO2 97%   BMI 43.60 kg/m²   MAXIMUM TEMPERATURE OVER 24 HRS:  Temp (24hrs), Av.3 °F (37.4 °C), Min:98.3 °F (36.8 °C), Max:100.8 °F (38.2 °C)    24 HR BLOOD PRESSURE RANGE:  Systolic (44WNT), QWV:427 , Min:98 , OQN:995   ; Diastolic (71ISO), VSW:96, Min:47, Max:88    24 HR INTAKE/OUTPUT:    Intake/Output Summary (Last 24 hours) at 2022 0853  Last data filed at 2022 0600  Gross per 24 hour   Intake 350 ml   Output 2175 ml   Net -1825 ml     WEIGHT:   Patient Vitals for the past 96 hrs (Last 3 readings):   Weight   22 2122 262 lb (118.8 kg)       Current Medications    Scheduled Meds:    amiodarone  200 mg Oral Daily    magnesium sulfate  2,000 mg IntraVENous Once    clindamycin (CLEOCIN) IV  300 mg IntraVENous Q8H    allopurinol  100 mg Oral Daily    atorvastatin 40 mg Oral Daily    pantoprazole  40 mg Oral Daily    sodium chloride flush  5-40 mL IntraVENous 2 times per day    furosemide  60 mg IntraVENous BID    metoprolol tartrate  50 mg Oral BID    rOPINIRole  0.25 mg Oral Nightly    ferrous sulfate  325 mg Oral Daily with breakfast    doxycycline (VIBRAMYCIN) IV  100 mg IntraVENous Q12H     Continuous Infusions:    sodium chloride      heparin (PORCINE) Infusion 12.4 Units/kg/hr (04/02/22 0739)       Physical Examination     General:  Alert and oriented x3, getting tired after 2 or 3 sentences. Chest:   Bilateral air entry and clear  Cardiac:  S1 S2 RR, no murmurs, gallops or rubs, JVP not raised. Abdomen: Obese, soft, non-tender, non distended, BS audible. SKIN:  No rashes, good skin turgor. Extremities:  Edema was present involving lower extremities up to upper thigh  neuro:  AAO x 3, No FND.      Labs      Recent Labs     03/31/22  2148 04/01/22  1102 04/02/22  0650   WBC 10.3 11.0 16.3*   RBC 2.78* 2.74* 2.55*   HGB 8.2* 8.2* 7.6*   HCT 27.1* 26.8* 24.9*   MCV 97.5 97.8 97.6   MCH 29.5 29.9 29.8   MCHC 30.3 30.6 30.5   RDW 15.3* 15.5* 15.5*    322 See Reflexed IPF Result   MPV 9.8 9.5  --       BMP:   Recent Labs     04/01/22  0511 04/01/22  1102 04/02/22  0650    139 135   K 3.7 3.6* 3.3*    100 96*   CO2 21 24 21   BUN 40* 40* 43*   CREATININE 2.98* 2.88* 3.14*   GLUCOSE 134* 149* 134*   CALCIUM 9.6 9.8 9.4      Phosphorus:     Recent Labs     04/01/22  1102 04/02/22  0650   PHOS 3.4 4.0     Magnesium:    Recent Labs     04/01/22  0511 04/01/22  1102 04/02/22  0650   MG 1.6 1.5* 1.7     Albumin:    Recent Labs     04/01/22  0511 04/01/22  1102 04/02/22  0650   LABALBU 3.8 3.7 3.5     PTH:     Lab Results   Component Value Date    IPTH 136.0 12/07/2021         Urinalysis/Chemistries      Lab Results   Component Value Date    NITRU NEGATIVE 04/01/2022    COLORU Yellow 04/01/2022    PHUR 6.5 04/01/2022    WBCUA 0 TO 2 04/01/2022    RBCUA 0 TO 2 04/01/2022    CLARITYU clear 04/18/2017    SPECGRAV 1.010 04/01/2022    LEUKOCYTESUR NEGATIVE 04/01/2022    UROBILINOGEN Normal 04/01/2022    BILIRUBINUR NEGATIVE 04/01/2022    BILIRUBINUR neg 04/18/2017    BLOODU neg 04/18/2017    GLUCOSEU NEGATIVE 04/01/2022    [address] NEGATIVE 04/01/2022       Radiology    XR CHEST PORTABLE    Result Date: 3/31/2022  No acute cardiopulmonary process        Assessment      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 Darren Fountain as an outpatient with a baseline CRT between 2.2 - 2.6. Her creatinine is now up to 3.1 mg/dL. I have a concern patient probably have cardiorenal syndrome with the involvement of right ventricle. We will follow her echocardiogram.  History of proteinuria estimated to be around 0.3 on UPC previously   ACD  Secondary hyperparathyroidism  CHF exacerbation-previous dry weight has been documented at 111, weight on admission 118 kg. We will continue diuresis at this point  New onset A. fib with RVR  Morbid obesity with DIONY  Chronic lymphedema  Lower extremity cellulitis on current antibiotic therapy       Plan   Decrease Lasix to 40 mg twice a day fluid restriction at 1500 mL daily  BMP in a.m. Aim for systolic blood pressure greater than or equal to 110  Continue to hold Cozaar  Renal dosing of antibiotic  We will continue to follow    Nutrition   Renal Diet/TF      Thank you. Please call with any questions. BARBIE Vo - NP     [facility]. Attending Physician Statement  I have discussed the care of Kali Chávez, including pertinent history and exam findings with the NP I have reviewed the key elements of all parts of the encounter with the np. Note was updated and recorded changes were made I have seen and examined the patient with the np. I agree with the assessment and plan and status of the problem list as documented.   Addiitionally I recommend we will follow echocardiogram results.     Karla Yusuf MD

## 2022-04-02 NOTE — PROGRESS NOTES
[facility]  Office: [address], DO, Ginna Jarvisus, DO, Damián Merrill, DO, Ramsey Catching Blood, DO, Clementine Muñoz MD, Bruce Shore MD, Ronald Frost MD, Matt Patel MD, Johnnie Montanez MD, Jayson Vazquez MD, Gissel Suero MD, Alexander Avalos, DO, Zoe Velázquez, DO, Zuhair Lamas MD,  Roxane Douglass, DO, Trevon Soto MD, Kristopher Owusu MD, Carmen Cueto MD, Gayla Lujan DO, Claudia Freed MD, Lelo Awan MD, Magi Mas, [facility], [facility], CNP, Migdalia Garcia, CNP, Ja Reveles, CNS, Logan Buckner, CNP, Natalie Lynn, CNP, Familia Hughes, CNP, Neeraj Barragan, CNP, Catheryn Leventhal, PA-C, Ying Reed [facility], Sam Cordero, CNP, Sherri Johnson, CNP, Harrison Ruvalcaba, [facility], Shanice Duenas, CNP, [facility], [facility], [facility], [address] [place] [address]    Progress Note    4/2/2022    2:02 PM    Name:   Deana Humphries  MRN:     [id number]     Acct:      [de-identified]   Room:   2015/2015-01   Day:  1  Admit Date:  3/31/2022  9:20 PM    PCP:   Kelly Morgan MD  Code Status:  Full Code    Subjective:     C/C:   Chief Complaint   Patient presents with    Leg Swelling     Interval History Status: unchanged. Patient seen and examined this morning. Writhing in pain secondary to leg cramps. Reports some shortness of breath, but is not hypoxic. Denies fever chills, but did have a temperature of 100.8 °F overnight. No issues with urination such as burning or frequency. No nausea or vomiting. Vital signs are currently stable. Heart rate is controlled. Over 3 L of urine output since admission. Brief History:     Deana Humphries is a 69 y.o. female who presents to the hospital with complaints of worsening fatigue, leg pain and swelling for the last 2 weeks. Patient says she feels like she is \" filling up with fluid\". She has also lost her appetite and worsening weakness.   Prior to symptom onset, patient says that she was able to complete her ADLs including shopping/walking however since that time has been very restricted in movement. She is complaining of significant bilateral leg pain and swelling worse than her baseline. She was previously diagnosed with lymphedema and was evaluated lymphedema clinic but says that her swelling is much worse today than it is normally. She denies any shortness of breath or difficulty breathing. She follows up with Dr. Abram Adan [facility] for CKD and  (cardiology). She denies any history of MI or active chest pain. On admission pt noted to be tachycardic, ECG did show atrial fibrillation. Pt denies any previous history of afib. Denies any muscle weakness. She is on lasix at home which she says did help with her swelling but has not had great response to the 40 of lasix. Review of Systems:     Constitutional: negative for chills, fevers, sweats  Respiratory: negative for cough, dyspnea on exertion, shortness of breath, wheezing  Cardiovascular:  negative for chest pain, chest pressure/discomfort, lower extremity edema, palpitations  Gastrointestinal:  negative for abdominal pain, constipation, diarrhea, nausea, vomiting  Neurological:  negative for dizziness, headache  MSK: Reports leg cramping    Medications: Allergies:     Allergies   Allergen Reactions    Adhesive Tape     Cephalexin Other (See Comments)     Tongue cracks and peels    Erythromycin Other (See Comments)     Epigastric pain and bloating    Ketorolac Tromethamine Other (See Comments)     Severe stomach cramps    Pcn [Penicillins]      Unknown reaction    Percocet [Oxycodone-Acetaminophen] Itching and Other (See Comments)     Esophagus hurt    Sulfa Antibiotics     Ultracet [Tramadol-Acetaminophen] Itching       Current Meds:   Scheduled Meds:    amiodarone  200 mg Oral Daily    furosemide  40 mg IntraVENous BID    allopurinol  100 mg Oral Daily    atorvastatin  40 mg Oral Daily    pantoprazole 40 mg Oral Daily    sodium chloride flush  5-40 mL IntraVENous 2 times per day    metoprolol tartrate  50 mg Oral BID    rOPINIRole  0.25 mg Oral Nightly    ferrous sulfate  325 mg Oral Daily with breakfast    doxycycline (VIBRAMYCIN) IV  100 mg IntraVENous Q12H     Continuous Infusions:    sodium chloride      heparin (PORCINE) Infusion 14.4 Units/kg/hr (22 1226)     PRN Meds: cyclobenzaprine, sodium chloride flush, sodium chloride, ondansetron **OR** ondansetron, acetaminophen **OR** acetaminophen, morphine, heparin (porcine), heparin (porcine), oxyCODONE, ALPRAZolam, magnesium sulfate, potassium chloride **OR** potassium alternative oral replacement **OR** potassium chloride    Data:     Past Medical History:   has a past medical history of Abnormal stress test, Anemia, Arthritis, Depression, Diverticulosis, DM (diabetes mellitus) ([facility] Erythropenia, Fibromyalgia, HTN (hypertension), Hyperlipidemia, Kidney stone, Morbid obesity due to excess calories ([facility] DIONY on CPAP, Osteopenia, Seasonal allergies, and Sleep apnea. Social History:   reports that she quit smoking about 62 years ago. She has a 0.25 pack-year smoking history. She has never used smokeless tobacco. She reports that she does not drink alcohol and does not use drugs. Family History:   Family History   Problem Relation Age of Onset    Diabetes Mother     Heart Disease Mother     Osteoporosis Mother     Kidney Disease Father     Prostate Cancer Brother        Vitals:  BP (!) 120/52   Pulse 72   Temp 97.7 °F (36.5 °C) (Oral)   Resp 21   Ht 5' 5\" (1.651 m)   Wt 262 lb (118.8 kg)   SpO2 98%   BMI 43.60 kg/m²   Temp (24hrs), Av.9 °F (37.2 °C), Min:97.7 °F (36.5 °C), Max:100.8 °F (38.2 °C)    No results for input(s): POCGLU in the last 72 hours. I/O (24Hr):     Intake/Output Summary (Last 24 hours) at 2022 1402  Last data filed at 2022 1233  Gross per 24 hour   Intake 750 ml   Output 2600 ml   Net -1850 ml Physical Examination:        General appearance:  alert, cooperative and no distress, obese  female, elderly  Mental Status:  oriented to person, place and time and normal affect  Lungs:  clear to auscultation bilaterally, no wheezing, normal effort  Heart:  regular rate and rhythm, no murmur  Abdomen:  soft, nontender, nondistended, normal bowel sounds, protuberant  Extremities:  3+ edema in the bilateral lower extremities, no evidence of redness or tenderness to palpation  Skin:  no gross lesions, rashes, induration    Assessment:        Hospital Problems           Last Modified POA    * (Principal) New onset of congestive heart failure ([address] 4/1/2022 Yes    Acute kidney injury superimposed on CKD ([address] 4/2/2022 Yes    New onset a-fib ([address] with Rapid ventricular response 4/2/2022 Yes    SIRS (systemic inflammatory response syndrome) ([address] 4/2/2022 No    Normocytic normochromic anemia 4/2/2022 Yes    Essential hypertension 4/2/2022 Yes    Morbid obesity due to excess calories ([address] 4/2/2022 Yes    Lymphedema 4/1/2022 Yes    GERD (gastroesophageal reflux disease) 4/2/2022 Yes    DIONY treated with BiPAP 4/2/2022 Yes    Restless leg syndrome 4/2/2022 Yes          Plan:        New onset atrial fibrillation with RVR: Denies any previous history of afib. Continue oral amiodarone and lopressor. Continue heparin infusion. Cardiology following. Transition to Eliquis when able. Acute/chronic Volume overload 2/2 new onset CHF+/-CKD. Continue lasix 60 mg and oral fluid restriction. Daily weights. Strict I/O. Keep magnesium >2 potassium >4. Echocardiogram performed but not read. Await read. Pt has known MR/TR. JOSE ARMANDO on CKD : Baseline Scr ~2.6. Was elevated on admission. Up to 3.14 today. Nephrology following. May need RRT  Chronic lymphedema - needs compression stockings and referral to lymphedema clinic. Leukocytosis and fever overnight with elevated procal: Legs not concerning for cellulitis.  Checking blood cultures. Change clincamycin to cefepime alone. Primary hypertension: currently normotensive   GERD: PPI  Anemia 2/2 b12 deficiency, EYAD and CKD: On Retacrit, iron supplementation. Nephrology following. Monitor Hgb, transfuse Prn. Check iron studies.   Restless leg syndrome: Check iron studies, continue requip  Morbid obesity BMI 43: recommend weight loss and lifestyle modification  PT/OT  Check d-dimer      BROCK AC DO  4/2/2022  2:02 PM

## 2022-04-02 NOTE — PLAN OF CARE
Problem: Falls - Risk of:  Goal: Will remain free from falls  Description: Will remain free from falls  Outcome: Ongoing  Goal: Absence of physical injury  Description: Absence of physical injury  Outcome: Ongoing     Problem: Nutritional:  Goal: Ability to tolerate tube feedings without aspirating will improve  Description: Ability to tolerate tube feedings without aspirating will improve  Outcome: Ongoing  Goal: Consumption of food in small portions  Description: Consumption of food in small portions  Outcome: Ongoing  Goal: Consumption of liquid of appropriate consistency  Description: Consumption of liquid of appropriate consistency  Outcome: Ongoing     Problem: Respiratory:  Goal: Ability to maintain a clear airway will improve  Description: Ability to maintain a clear airway will improve  Outcome: Ongoing  Goal: Will remain free from infection  Description: Will remain free from infection  Outcome: Ongoing  Goal: Absence of aspiration  Description: Absence of aspiration  Outcome: Ongoing     Problem: Safety:  Goal: Ability to chew and swallow food without choking will improve  Description: Ability to chew and swallow food without choking will improve  Outcome: Ongoing  Goal: Ability to demonstrate good, daily oral hygiene techniques will improve  Description: Ability to demonstrate good, daily oral hygiene techniques will improve  Outcome: Ongoing  Goal: Maintenance of upright position during and after feeding  Description: Maintenance of upright position during and after feeding  Outcome: Ongoing     Problem: Pain:  Goal: Pain level will decrease  Description: Pain level will decrease  Outcome: Ongoing  Goal: Control of acute pain  Description: Control of acute pain  Outcome: Ongoing  Goal: Control of chronic pain  Description: Control of chronic pain  Outcome: Ongoing     Problem: Skin Integrity:  Goal: Will show no infection signs and symptoms  Description: Will show no infection signs and symptoms  Outcome: Ongoing  Goal: Absence of new skin breakdown  Description: Absence of new skin breakdown  Outcome: Ongoing     Problem: Musculor/Skeletal Functional Status  Goal: Highest potential functional level  Outcome: Ongoing  Goal: Absence of falls  Outcome: Ongoing

## 2022-04-03 ENCOUNTER — APPOINTMENT (OUTPATIENT)
Dept: GENERAL RADIOLOGY | Age: 77
DRG: 309 | End: 2022-04-03
Payer: MEDICARE

## 2022-04-03 LAB
ABSOLUTE EOS #: 0 K/UL (ref 0–0.4)
ABSOLUTE IMMATURE GRANULOCYTE: 0.17 K/UL (ref 0–0.3)
ABSOLUTE LYMPH #: 2.04 K/UL (ref 1–4.8)
ABSOLUTE MONO #: 2.21 K/UL (ref 0.1–0.8)
ANION GAP SERPL CALCULATED.3IONS-SCNC: 13 MMOL/L (ref 9–17)
BASOPHILS # BLD: 1 % (ref 0–2)
BASOPHILS ABSOLUTE: 0.17 K/UL (ref 0–0.2)
BUN BLDV-MCNC: 48 MG/DL (ref 8–23)
CALCIUM SERPL-MCNC: 8.9 MG/DL (ref 8.6–10.4)
CHLORIDE BLD-SCNC: 94 MMOL/L (ref 98–107)
CO2: 22 MMOL/L (ref 20–31)
CREAT SERPL-MCNC: 3.24 MG/DL (ref 0.5–0.9)
CULTURE: NORMAL
D-DIMER QUANTITATIVE: 1.19 MG/L FEU
EKG ATRIAL RATE: 88 BPM
EKG ATRIAL RATE: 89 BPM
EKG ATRIAL RATE: 93 BPM
EKG P AXIS: 59 DEGREES
EKG P AXIS: 60 DEGREES
EKG P AXIS: 92 DEGREES
EKG P-R INTERVAL: 148 MS
EKG P-R INTERVAL: 152 MS
EKG P-R INTERVAL: 152 MS
EKG Q-T INTERVAL: 368 MS
EKG Q-T INTERVAL: 386 MS
EKG Q-T INTERVAL: 400 MS
EKG QRS DURATION: 90 MS
EKG QRS DURATION: 92 MS
EKG QRS DURATION: 96 MS
EKG QTC CALCULATION (BAZETT): 457 MS
EKG QTC CALCULATION (BAZETT): 469 MS
EKG QTC CALCULATION (BAZETT): 484 MS
EKG R AXIS: 44 DEGREES
EKG R AXIS: 51 DEGREES
EKG R AXIS: 70 DEGREES
EKG T AXIS: 42 DEGREES
EKG T AXIS: 45 DEGREES
EKG T AXIS: 71 DEGREES
EKG VENTRICULAR RATE: 88 BPM
EKG VENTRICULAR RATE: 89 BPM
EKG VENTRICULAR RATE: 93 BPM
EOSINOPHILS RELATIVE PERCENT: 0 % (ref 1–4)
GFR AFRICAN AMERICAN: 17 ML/MIN
GFR NON-AFRICAN AMERICAN: 14 ML/MIN
GFR SERPL CREATININE-BSD FRML MDRD: ABNORMAL ML/MIN/{1.73_M2}
GLUCOSE BLD-MCNC: 141 MG/DL (ref 70–99)
HCT VFR BLD CALC: 23.5 % (ref 36.3–47.1)
HEMOGLOBIN: 7.5 G/DL (ref 11.9–15.1)
IMMATURE GRANULOCYTES: 1 %
LYMPHOCYTES # BLD: 12 % (ref 24–44)
MCH RBC QN AUTO: 30.1 PG (ref 25.2–33.5)
MCHC RBC AUTO-ENTMCNC: 31.9 G/DL (ref 28.4–34.8)
MCV RBC AUTO: 94.4 FL (ref 82.6–102.9)
MONOCYTES # BLD: 13 % (ref 1–7)
MORPHOLOGY: ABNORMAL
NRBC AUTOMATED: 0 PER 100 WBC
PARTIAL THROMBOPLASTIN TIME: 53.6 SEC (ref 20.5–30.5)
PARTIAL THROMBOPLASTIN TIME: 69.4 SEC (ref 20.5–30.5)
PDW BLD-RTO: 15.4 % (ref 11.8–14.4)
PLATELET # BLD: 345 K/UL (ref 138–453)
PMV BLD AUTO: 9.6 FL (ref 8.1–13.5)
POTASSIUM SERPL-SCNC: 3.8 MMOL/L (ref 3.7–5.3)
RBC # BLD: 2.49 M/UL (ref 3.95–5.11)
SEG NEUTROPHILS: 73 % (ref 36–66)
SEGMENTED NEUTROPHILS ABSOLUTE COUNT: 12.41 K/UL (ref 1.8–7.7)
SODIUM BLD-SCNC: 129 MMOL/L (ref 135–144)
SPECIMEN DESCRIPTION: NORMAL
WBC # BLD: 17 K/UL (ref 3.5–11.3)

## 2022-04-03 PROCEDURE — 85730 THROMBOPLASTIN TIME PARTIAL: CPT

## 2022-04-03 PROCEDURE — 93010 ELECTROCARDIOGRAM REPORT: CPT | Performed by: INTERNAL MEDICINE

## 2022-04-03 PROCEDURE — 6360000002 HC RX W HCPCS: Performed by: INTERNAL MEDICINE

## 2022-04-03 PROCEDURE — 36415 COLL VENOUS BLD VENIPUNCTURE: CPT

## 2022-04-03 PROCEDURE — 2580000003 HC RX 258: Performed by: INTERNAL MEDICINE

## 2022-04-03 PROCEDURE — 2580000003 HC RX 258: Performed by: NURSE PRACTITIONER

## 2022-04-03 PROCEDURE — 85379 FIBRIN DEGRADATION QUANT: CPT

## 2022-04-03 PROCEDURE — 73630 X-RAY EXAM OF FOOT: CPT

## 2022-04-03 PROCEDURE — 6360000002 HC RX W HCPCS: Performed by: NURSE PRACTITIONER

## 2022-04-03 PROCEDURE — 2060000000 HC ICU INTERMEDIATE R&B

## 2022-04-03 PROCEDURE — 80048 BASIC METABOLIC PNL TOTAL CA: CPT

## 2022-04-03 PROCEDURE — 99233 SBSQ HOSP IP/OBS HIGH 50: CPT | Performed by: INTERNAL MEDICINE

## 2022-04-03 PROCEDURE — 6370000000 HC RX 637 (ALT 250 FOR IP): Performed by: INTERNAL MEDICINE

## 2022-04-03 PROCEDURE — 2500000003 HC RX 250 WO HCPCS: Performed by: INTERNAL MEDICINE

## 2022-04-03 PROCEDURE — 6370000000 HC RX 637 (ALT 250 FOR IP): Performed by: NURSE PRACTITIONER

## 2022-04-03 PROCEDURE — 99232 SBSQ HOSP IP/OBS MODERATE 35: CPT | Performed by: INTERNAL MEDICINE

## 2022-04-03 PROCEDURE — 85025 COMPLETE CBC W/AUTO DIFF WBC: CPT

## 2022-04-03 PROCEDURE — 94761 N-INVAS EAR/PLS OXIMETRY MLT: CPT

## 2022-04-03 RX ORDER — PREDNISONE 20 MG/1
40 TABLET ORAL DAILY
Status: COMPLETED | OUTPATIENT
Start: 2022-04-03 | End: 2022-04-07

## 2022-04-03 RX ORDER — FUROSEMIDE 10 MG/ML
40 INJECTION INTRAMUSCULAR; INTRAVENOUS DAILY
Status: DISCONTINUED | OUTPATIENT
Start: 2022-04-04 | End: 2022-04-06

## 2022-04-03 RX ORDER — GABAPENTIN 100 MG/1
100 CAPSULE ORAL DAILY
Status: DISCONTINUED | OUTPATIENT
Start: 2022-04-03 | End: 2022-04-07 | Stop reason: HOSPADM

## 2022-04-03 RX ADMIN — PREDNISONE 40 MG: 20 TABLET ORAL at 16:52

## 2022-04-03 RX ADMIN — Medication 16.4 UNITS/KG/HR: at 00:13

## 2022-04-03 RX ADMIN — METOPROLOL TARTRATE 50 MG: 50 TABLET, FILM COATED ORAL at 07:48

## 2022-04-03 RX ADMIN — FUROSEMIDE 40 MG: 10 INJECTION, SOLUTION INTRAMUSCULAR; INTRAVENOUS at 07:48

## 2022-04-03 RX ADMIN — APIXABAN 5 MG: 5 TABLET, FILM COATED ORAL at 16:51

## 2022-04-03 RX ADMIN — CEFEPIME HYDROCHLORIDE 1000 MG: 1 INJECTION, POWDER, FOR SOLUTION INTRAMUSCULAR; INTRAVENOUS at 06:22

## 2022-04-03 RX ADMIN — GABAPENTIN 100 MG: 100 CAPSULE ORAL at 16:51

## 2022-04-03 RX ADMIN — SODIUM CHLORIDE, PRESERVATIVE FREE 10 ML: 5 INJECTION INTRAVENOUS at 07:49

## 2022-04-03 RX ADMIN — FERROUS SULFATE TAB EC 325 MG (65 MG FE EQUIVALENT) 325 MG: 325 (65 FE) TABLET DELAYED RESPONSE at 07:48

## 2022-04-03 RX ADMIN — OXYCODONE 5 MG: 5 TABLET ORAL at 16:51

## 2022-04-03 RX ADMIN — PANTOPRAZOLE SODIUM 40 MG: 40 TABLET, DELAYED RELEASE ORAL at 07:48

## 2022-04-03 RX ADMIN — AMIODARONE HYDROCHLORIDE 200 MG: 200 TABLET ORAL at 07:48

## 2022-04-03 RX ADMIN — ATORVASTATIN CALCIUM 40 MG: 80 TABLET, FILM COATED ORAL at 20:28

## 2022-04-03 RX ADMIN — ROPINIROLE HYDROCHLORIDE 0.25 MG: 0.25 TABLET, FILM COATED ORAL at 20:27

## 2022-04-03 RX ADMIN — METOPROLOL TARTRATE 50 MG: 50 TABLET, FILM COATED ORAL at 20:27

## 2022-04-03 RX ADMIN — CEFEPIME HYDROCHLORIDE 1000 MG: 1 INJECTION, POWDER, FOR SOLUTION INTRAMUSCULAR; INTRAVENOUS at 18:42

## 2022-04-03 RX ADMIN — SODIUM CHLORIDE, PRESERVATIVE FREE 10 ML: 5 INJECTION INTRAVENOUS at 20:28

## 2022-04-03 RX ADMIN — ALLOPURINOL 100 MG: 100 TABLET ORAL at 07:48

## 2022-04-03 ASSESSMENT — PAIN DESCRIPTION - PROGRESSION
CLINICAL_PROGRESSION: GRADUALLY IMPROVING

## 2022-04-03 ASSESSMENT — PAIN DESCRIPTION - PAIN TYPE: TYPE: CHRONIC PAIN

## 2022-04-03 ASSESSMENT — PAIN SCALES - GENERAL
PAINLEVEL_OUTOF10: 9
PAINLEVEL_OUTOF10: 4
PAINLEVEL_OUTOF10: 6

## 2022-04-03 NOTE — PROGRESS NOTES
[facility]  Office: [address], DO, Dede Javon, DO, Josr Merida, DO, Mami Peralta, DO, Kavon Pereira MD, Navya Man MD, Leonela Rodriguez MD, Romy Floyd MD, Rakel Weller MD, Martha Shreshta MD, Anjali Melchor MD, Monroe Naqvi, DO, Natasha Fortune, DO, Steve Pitts MD,  Rossi Serra DO, Lalita Sotomayor MD, Naren Love MD, Veronica Luna MD, Adrianne Cortés DO, Joli Angelucci, MD, Eren Banks MD, Yasir Harper, [facility], [facility], CNP, Isaias Jules, CNP, Francois Price, [facility], Girish Corona, CNP, Deana Ocasio, CNP, Jcaqueline Bellamy, CNP, Riana Lara, CNP, Rosie Junior PA-C, Gisel Isabel, [facility], Shira Perdomo, CNP, Pradeep Spring, CNP, Rocío Nails, [facility], Tra Beeer, CNP, Cristian Proper, CNP, Karlie Serum, [address] [place] [address]    Progress Note    4/3/2022    2:55 PM    Name:   Clovis Halsted  MRN:     [id number]     Acct:      [de-identified]   Room:   2015/2015-01   Day:  2  Admit Date:  3/31/2022  9:20 PM    PCP:   Mamadou Alicia MD  Code Status:  Full Code    Subjective:     C/C:   Chief Complaint   Patient presents with    Leg Swelling     Interval History Status: unchanged. Patient seen and examined this afternoon. Today, the patient has multiple complaints. She is very frustrated. She reports that her right foot is hurting her. She is tired of frequent lab draws. She believes that she is being mistreated. No further fevers. On room air. Vitals are otherwise stable. 2L of urine output. Brief History:     Clovis Halsted is a 69 y.o. female who presents to the hospital with complaints of worsening fatigue, leg pain and swelling for the last 2 weeks. Patient says she feels like she is \" filling up with fluid\". She has also lost her appetite and worsening weakness.   Prior to symptom onset, patient says that she was able to complete her ADLs including shopping/walking however since that time has been very restricted in movement. She is complaining of significant bilateral leg pain and swelling worse than her baseline. She was previously diagnosed with lymphedema and was evaluated lymphedema clinic but says that her swelling is much worse today than it is normally. She denies any shortness of breath or difficulty breathing. She follows up with Dr. Husam Romero [facility]ph[facility] for CKD and  (cardiology). She denies any history of MI or active chest pain. On admission pt noted to be tachycardic, ECG did show atrial fibrillation. Pt denies any previous history of afib. Denies any muscle weakness. She is on lasix at home which she says did help with her swelling but has not had great response to the 40 of lasix. Review of Systems:     Constitutional: negative for chills, fevers, sweats  Respiratory: negative for cough, dyspnea on exertion, shortness of breath, wheezing  Cardiovascular:  negative for chest pain, chest pressure/discomfort, lower extremity edema, palpitations  Gastrointestinal:  negative for abdominal pain, constipation, diarrhea, nausea, vomiting  Neurological:  negative for dizziness, headache  MSK: Reports right-sided foot pain    Medications: Allergies:     Allergies   Allergen Reactions    Adhesive Tape     Cephalexin Other (See Comments)     Tongue cracks and peels    Erythromycin Other (See Comments)     Epigastric pain and bloating    Ketorolac Tromethamine Other (See Comments)     Severe stomach cramps    Pcn [Penicillins]      Unknown reaction    Percocet [Oxycodone-Acetaminophen] Itching and Other (See Comments)     Esophagus hurt    Sulfa Antibiotics     Ultracet [Tramadol-Acetaminophen] Itching       Current Meds:   Scheduled Meds:    [START ON 4/4/2022] furosemide  40 mg IntraVENous Daily    amiodarone  200 mg Oral Daily    cefepime  1,000 mg IntraVENous Q12H    allopurinol  100 mg Oral Daily    atorvastatin  40 mg Oral Daily    pantoprazole  40 mg Oral Daily    sodium chloride flush  5-40 mL IntraVENous 2 times per day    metoprolol tartrate  50 mg Oral BID    rOPINIRole  0.25 mg Oral Nightly    ferrous sulfate  325 mg Oral Daily with breakfast     Continuous Infusions:    sodium chloride      heparin (PORCINE) Infusion 16.4 Units/kg/hr (22 0013)     PRN Meds: cyclobenzaprine, sodium chloride flush, sodium chloride, ondansetron **OR** ondansetron, acetaminophen **OR** acetaminophen, morphine, heparin (porcine), heparin (porcine), oxyCODONE, ALPRAZolam, magnesium sulfate, potassium chloride **OR** potassium alternative oral replacement **OR** potassium chloride    Data:     Past Medical History:   has a past medical history of Abnormal stress test, Anemia, Arthritis, Depression, Diverticulosis, DM (diabetes mellitus) ([facility] Erythropenia, Fibromyalgia, HTN (hypertension), Hyperlipidemia, Kidney stone, Morbid obesity due to excess calories ([facility] DIONY on CPAP, Osteopenia, Seasonal allergies, and Sleep apnea. Social History:   reports that she quit smoking about 62 years ago. She has a 0.25 pack-year smoking history. She has never used smokeless tobacco. She reports that she does not drink alcohol and does not use drugs. Family History:   Family History   Problem Relation Age of Onset    Diabetes Mother     Heart Disease Mother     Osteoporosis Mother     Kidney Disease Father     Prostate Cancer Brother        Vitals:  BP (!) 127/59   Pulse 72   Temp 98.8 °F (37.1 °C) (Oral)   Resp 21   Ht 5' 5\" (1.651 m)   Wt 262 lb (118.8 kg)   SpO2 97%   BMI 43.60 kg/m²   Temp (24hrs), Av.2 °F (37.3 °C), Min:98.8 °F (37.1 °C), Max:99.9 °F (37.7 °C)    No results for input(s): POCGLU in the last 72 hours. I/O (24Hr):     Intake/Output Summary (Last 24 hours) at 4/3/2022 1455  Last data filed at 4/3/2022 0754  Gross per 24 hour   Intake 560 ml   Output 1100 ml   Net -540 ml       Labs:  Hematology:  Recent Labs     22  3586 68  --   --    BILITOT 0.42  --  0.46  --   --     < > = values in this interval not displayed. ABG:No results found for: POCPH, PHART, PH, POCPCO2, KTP9HZH, PCO2, POCPO2, PO2ART, PO2, POCHCO3, XXV7EQF, HCO3, NBEA, PBEA, BEART, BE, THGBART, THB, GWZ8PWW, SJNP5MOW, G5JUCWTS, O2SAT, FIO2  Lab Results   Component Value Date/Time    SPECIAL 5 ML RIGHT [facility] [place] 04/02/2022 09:09 AM     Lab Results   Component Value Date/Time    CULTURE NO GROWTH 1 DAY 04/02/2022 09:09 AM       Radiology:  XR CHEST PORTABLE    Result Date: 3/31/2022  No acute cardiopulmonary process       Physical Examination:        General appearance:  alert, cooperative and no distress, obese  female, elderly, sitting up in bed  Mental Status:  oriented to person, place and time and normal affect  Lungs:  clear to auscultation bilaterally, no wheezing, normal effort  Heart:  regular rate and rhythm, no murmur  Abdomen:  soft, nontender, nondistended, normal bowel sounds, protuberant  Extremities:  3+ edema in the bilateral lower extremities, wrapped with ACE wraps bilaterally, no evidence of redness or tenderness to palpation  Skin:  no gross lesions, rashes, induration    Assessment:        Hospital Problems           Last Modified POA    * (Principal) New onset of congestive heart failure ([address] 4/1/2022 Yes    Acute kidney injury superimposed on CKD ([address] 4/2/2022 Yes    New onset a-fib ([address] with Rapid ventricular response 4/2/2022 Yes    SIRS (systemic inflammatory response syndrome) ([address] 4/2/2022 No    Normocytic normochromic anemia 4/2/2022 Yes    Essential hypertension 4/2/2022 Yes    Morbid obesity due to excess calories ([address] 4/2/2022 Yes    Lymphedema 4/1/2022 Yes    GERD (gastroesophageal reflux disease) 4/2/2022 Yes    DIONY treated with BiPAP 4/2/2022 Yes    Restless leg syndrome 4/2/2022 Yes          Plan:        New onset atrial fibrillation with RVR: Denies any previous history of afib. Continue oral amiodarone and lopressor. Start eliquis to avoid freq lab draws. Cardiology following. Acute/chronic Volume overload 2/2 new onset CHF+/-CKD. Discussed with nephro. Decrease lasix dose today to 40 mg daily. Creatinine continues to uptrend. Echocardiogram reviewed. Preserved EF. Prior MR/TR appears to have improved. JOSE ARMANDO on CKD : Baseline Scr ~2.6. Was elevated on admission. Up to 3.24 today. Nephrology following. Decreasing diuretic dose. Acute right foot pain: may be an element of gout due to diuresis. Restart gabapentin. Check radiograph of right foot. Patient deferring podiatry eval.  D-dimer elevated: Unable to CT imaging to r/o PE. Being placed on eliquis for a-fib. Should cover her in the event of PE. No further workup at present. Chronic lymphedema - needs compression stockings. ACE bandages in place. Recently established care with lymphedema clinic at [facility] [address]. Leukocytosis and fever overnight with elevated procal: Legs not concerning for cellulitis. Blood cultures with no growth. Continue cefepime alone. Primary hypertension: currently normotensive   GERD: PPI  Anemia 2/2 b12 deficiency, EYAD and CKD: On Retacrit, iron supplementation. Nephrology following. Monitor Hgb, transfuse Prn. Needs iron replacement and age-related cancer screenings  Restless leg syndrome: Continue requip  History of gout: check right foot radiograph. Start prednisone x 5 days for treatment. Continue allopurinol. Morbid obesity BMI 43: recommend weight loss and lifestyle modification  Restart gabapentin at lower dose - discussed with nephro  PT/OT    Patient is frustrated with her care. She believes that she is not getting better. Lashing out at staff and myself. 30 minutes were spent discussing treatment with patient at present. Plan will be for improvement of renal function, analgesia for right foot pain, remainder of cardiology workup/afib treatment, and then d/c.       33 Chioma Cardenas DO  4/3/2022  2:55 PM

## 2022-04-03 NOTE — PROGRESS NOTES
Nephrology Progress Note    Patient:  Shoshana Santiago; 69 y.o. MRN# [id number]  Location:    Attending:  Joleen Spann DO  Admit Date:  3/31/2022   Hospital Day: 2    Subjective   Patient initially admitted on  for CHF/new onset A. fib with RVR. We were consulted for acute on chronic volume overload/CKD stage III. Patient follows with Dr. Leonardo Godfrey as an outpatient with a baseline CRT between 2.2 -2.6, admitted at 3.15    Patient evaluated in room continues to endorse bilateral lower extremity pain pain by description her pain is neuropathic pain. Patient used to be on gabapentin at home which was not restarted during this hospitalization.   Vital signs stable overnight, hypotensive this morning  T-max overnight 100.8  UOP documented at 2 L over the last 24 hours with a -3.8 L fluid deficit since admission, diuretics changed from 60 mg of Lasix twice daily to 40 mg of Lasix twice daily  CRT showing small increase, currently at 3.24 (3.14)  Waiting on echo read    Objective   VS: BP (!) 155/64   Pulse 70   Temp 98.9 °F (37.2 °C) (Axillary)   Resp 21   Ht 5' 5\" (1.651 m)   Wt 262 lb (118.8 kg)   SpO2 96%   BMI 43.60 kg/m²   MAXIMUM TEMPERATURE OVER 24 HRS:  Temp (24hrs), Av.9 °F (37.2 °C), Min:97.7 °F (36.5 °C), Max:99.9 °F (37.7 °C)    24 HR BLOOD PRESSURE RANGE:  Systolic (36EEN), OGM:650 , Min:120 , EGM:473   ; Diastolic (61XIY), XQQ:76, Min:52, Max:81    24 HR INTAKE/OUTPUT:    Intake/Output Summary (Last 24 hours) at 4/3/2022 0853  Last data filed at 4/3/2022 0754  Gross per 24 hour   Intake 960 ml   Output 2050 ml   Net -1090 ml     WEIGHT:   Patient Vitals for the past 96 hrs (Last 3 readings):   Weight   22 2122 262 lb (118.8 kg)       Current Medications    Scheduled Meds:    amiodarone  200 mg Oral Daily    furosemide  40 mg IntraVENous BID    cefepime  1,000 mg IntraVENous Q12H    allopurinol  100 mg Oral Daily    atorvastatin  40 mg Oral Daily    pantoprazole  40 mg Oral Daily    sodium chloride flush  5-40 mL IntraVENous 2 times per day    metoprolol tartrate  50 mg Oral BID    rOPINIRole  0.25 mg Oral Nightly    ferrous sulfate  325 mg Oral Daily with breakfast     Continuous Infusions:    sodium chloride      heparin (PORCINE) Infusion 16.4 Units/kg/hr (04/03/22 0013)       Physical Examination     General:  AAO x 3, speaking in full sentences, no accessory muscle use. Chest:   Bilateral vesicular breath sounds, no rales or wheezes. Cardiac:  S1 S2 RR, no murmurs, gallops or rubs, JVP not raised. Abdomen: Obese, Soft, non-tender, non distended, BS audible. SKIN:  No rashes, good skin turgor  Extremities:  Generalized lymphedema in all extremities, pulses present and palpable, no clubbing, No cyanosis  Neuro:  AAO x 3, No FND. Labs      Recent Labs     03/31/22  2148 03/31/22  2148 04/01/22  1102 04/02/22  0650 04/03/22  0111   WBC 10.3   < > 11.0 16.3* 17.0*   RBC 2.78*   < > 2.74* 2.55* 2.49*   HGB 8.2*   < > 8.2* 7.6* 7.5*   HCT 27.1*   < > 26.8* 24.9* 23.5*   MCV 97.5   < > 97.8 97.6 94.4   MCH 29.5   < > 29.9 29.8 30.1   MCHC 30.3   < > 30.6 30.5 31.9   RDW 15.3*   < > 15.5* 15.5* 15.4*      < > 322 See Reflexed IPF Result 345   MPV 9.8  --  9.5  --  9.6    < > = values in this interval not displayed.       BMP:   Recent Labs     04/01/22  1102 04/02/22  0650 04/03/22  0111    135 129*   K 3.6* 3.3* 3.8    96* 94*   CO2 24 21 22   BUN 40* 43* 48*   CREATININE 2.88* 3.14* 3.24*   GLUCOSE 149* 134* 141*   CALCIUM 9.8 9.4 8.9      Phosphorus:     Recent Labs     04/01/22  1102 04/02/22  0650 04/02/22  1850   PHOS 3.4 4.0 3.7     Magnesium:    Recent Labs     04/01/22  0511 04/01/22  1102 04/02/22  0650   MG 1.6 1.5* 1.7     Albumin:    Recent Labs     04/01/22  0511 04/01/22  1102 04/02/22  0650   LABALBU 3.8 3.7 3.5     PTH:     Lab Results   Component Value Date    IPTH 136.0 12/07/2021         Urinalysis/Chemistries      Lab Results Component Value Date    NITRU NEGATIVE 04/01/2022    COLORU Yellow 04/01/2022    PHUR 6.5 04/01/2022    WBCUA 0 TO 2 04/01/2022    RBCUA 0 TO 2 04/01/2022    CLARITYU clear 04/18/2017    SPECGRAV 1.010 04/01/2022    LEUKOCYTESUR NEGATIVE 04/01/2022    UROBILINOGEN Normal 04/01/2022    BILIRUBINUR NEGATIVE 04/01/2022    BILIRUBINUR neg 04/18/2017    BLOODU neg 04/18/2017    GLUCOSEU NEGATIVE 04/01/2022    [address] NEGATIVE 04/01/2022       Radiology    XR CHEST PORTABLE    Result Date: 3/31/2022  No acute cardiopulmonary process        Assessment    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 Hannah Davis as an outpatient with a baseline CRT between 2.2 - 2.6. Echocardiogram was reviewed. 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 Patient was experiencing more in lower extremity edema than congestive cardiac failure. New onset A. fib with RVR, patient is on heparin drip  Morbid obesity with DIONY  Chronic lymphedema  Lower extremity cellulitis on current antibiotic therapy-> transition to monotherapy cefepime      Plan   Hold Lasix for next 24 hours fluid restriction 1500 mL  BMP and CBC in a.m. BMP in a.m. Aim to keep systolic blood pressure greater than 110  Continue to hold Cozaar  We will continue to follow    Nutrition   Renal Diet/TF      Thank you. Please call with any questions. BARBIE Becker - NP   Attending Physician Statement  I have discussed the care of [facility], including pertinent history and exam findings with the NP I have reviewed the key elements of all parts of the encounter with the np.   Note was updated and recorded changes were made I have seen and examined the patient with the np. I agree with the assessment and plan and status of the problem list as documented. Velia Jordan MD    [facility].

## 2022-04-03 NOTE — CARE COORDINATION
Met with pt to discuss transitional planning. She is returning home with home care. Referral previously sent to [facility]. Spoke to [place]. They are able to accept.  Pt denies other needs

## 2022-04-03 NOTE — PLAN OF CARE
Problem: Falls - Risk of:  Goal: Will remain free from falls  Description: Will remain free from falls  4/3/2022 0448 by Kelly López RN  Outcome: Ongoing  4/2/2022 1649 by Sulaiman Hobbs RN  Outcome: Ongoing  Goal: Absence of physical injury  Description: Absence of physical injury  4/3/2022 0448 by Kelly López RN  Outcome: Ongoing  4/2/2022 1649 by Sulaiman Hobbs RN  Outcome: Ongoing     Problem: Nutritional:  Goal: Ability to tolerate tube feedings without aspirating will improve  Description: Ability to tolerate tube feedings without aspirating will improve  4/3/2022 0448 by Kelly López RN  Outcome: Ongoing  4/2/2022 1649 by Sulaiman Hobbs RN  Outcome: Ongoing  Goal: Consumption of food in small portions  Description: Consumption of food in small portions  4/3/2022 0448 by Kelly López RN  Outcome: Ongoing  4/2/2022 1649 by Sulaiman Hobbs RN  Outcome: Ongoing  Goal: Consumption of liquid of appropriate consistency  Description: Consumption of liquid of appropriate consistency  4/3/2022 0448 by Kelly López RN  Outcome: Ongoing  4/2/2022 1649 by Sulaiman Hobbs RN  Outcome: Ongoing     Problem: Respiratory:  Goal: Ability to maintain a clear airway will improve  Description: Ability to maintain a clear airway will improve  4/3/2022 0448 by Kelly López RN  Outcome: Ongoing  4/2/2022 1649 by Sulaiman Hobbs RN  Outcome: Ongoing  Goal: Will remain free from infection  Description: Will remain free from infection  4/3/2022 0448 by Kelly López RN  Outcome: Ongoing  4/2/2022 1649 by Sulaiman Hobbs RN  Outcome: Ongoing  Goal: Absence of aspiration  Description: Absence of aspiration  4/3/2022 0448 by Kelly López RN  Outcome: Ongoing  4/2/2022 1649 by Sulaiman Hobbs RN  Outcome: Ongoing     Problem: Safety:  Goal: Ability to chew and swallow food without choking will improve  Description: Ability to chew and swallow food without choking will improve  4/3/2022 0448 by Kelly López RN  Outcome: Ongoing  4/2/2022 1649 by Dena Oliveros RN  Outcome: Ongoing  Goal: Ability to demonstrate good, daily oral hygiene techniques will improve  Description: Ability to demonstrate good, daily oral hygiene techniques will improve  4/3/2022 0448 by Ankur Abreu RN  Outcome: Ongoing  4/2/2022 1649 by Dena Oliveros RN  Outcome: Ongoing  Goal: Maintenance of upright position during and after feeding  Description: Maintenance of upright position during and after feeding  4/3/2022 0448 by Ankur Abreu RN  Outcome: Ongoing  4/2/2022 1649 by Dena Oliveros RN  Outcome: Ongoing     Problem: Pain:  Goal: Pain level will decrease  Description: Pain level will decrease  4/3/2022 0448 by Ankur Abreu RN  Outcome: Ongoing  4/2/2022 1649 by Dena Oliveros RN  Outcome: Ongoing  Goal: Control of acute pain  Description: Control of acute pain  4/3/2022 0448 by Ankur Abreu RN  Outcome: Ongoing  4/2/2022 1649 by Dena Oliveros RN  Outcome: Ongoing  Goal: Control of chronic pain  Description: Control of chronic pain  4/3/2022 0448 by Ankur Abreu RN  Outcome: Ongoing  4/2/2022 1649 by Dena Oliveros RN  Outcome: Ongoing     Problem: Skin Integrity:  Goal: Will show no infection signs and symptoms  Description: Will show no infection signs and symptoms  4/3/2022 0448 by Ankur Abreu RN  Outcome: Ongoing  4/2/2022 1649 by Dena Oliveros RN  Outcome: Ongoing  Goal: Absence of new skin breakdown  Description: Absence of new skin breakdown  4/3/2022 0448 by Ankur Abreu RN  Outcome: Ongoing  4/2/2022 1649 by Dena Oliveros RN  Outcome: Ongoing     Problem: Musculor/Skeletal Functional Status  Goal: Highest potential functional level  4/3/2022 0448 by Ankur Abreu RN  Outcome: Ongoing  4/2/2022 1649 by Dena Oliveros RN  Outcome: Ongoing  Goal: Absence of falls  4/3/2022 0448 by Ankur Abreu RN  Outcome: Ongoing  4/2/2022 1649 by Dena Oliveros RN  Outcome: Ongoing

## 2022-04-03 NOTE — DISCHARGE INSTR - COC
Continuity of Care Form    Patient Name: Malcom Lao   :  1945  MRN:  [id number]    Admit date:  3/31/2022  Discharge date:  ***    Code Status Order: Full Code   Advance Directives:      Admitting Physician:  Mack Solitario DO  PCP: Kady Allen MD    Discharging Nurse: [facility] Unit/Room#:   Discharging Unit Phone Number: ***    Emergency Contact:   Extended Emergency Contact Information  Primary Emergency Contact: Mac Sharp  Address: [place] [address], [address]  Home Phone: [phone]  Relation: Spouse  Secondary Emergency Contact: Natalia Sharp  Address: [address] [place] [address] Road           [facility], [address] [place] [address] Phone: [phone]  Work Phone: [phone]  Mobile Phone: [phone]  Relation: Child    Past Surgical History:  Past Surgical History:   Procedure Laterality Date    ARM SURGERY      right arm broken    CARDIAC CATHETERIZATION  [address] Tre[address]ño  17    COLONOSCOPY  2003    COLONOSCOPY      TOTAL KNEE ARTHROPLASTY Bilateral     left may 2013 and right 2013    TUBAL LIGATION         Immunization History:   Immunization History   Administered Date(s) Administered    COVID-19, Pfizer Purple top, DILUTE for use, 12+ yrs, 30mcg/0.3mL dose 2021, 2021    Influenza 10/04/2012, 2013    Influenza Nasal 2011    Influenza Virus Vaccine 10/15/2014, 10/14/2015    Influenza, High Dose (Fluzone 65 yrs and older) 2017, 10/02/2018, 2020, 10/18/2021    Influenza, Majestic Light, IM, (6 mo and older Fluzone, Flulaval, Fluarix and 3 yrs and older Afluria) 2016    Influenza, Triv, inactivated, subunit, adjuvanted, IM (Fluad 65 yrs and older) 2019    Pneumococcal Conjugate 13-valent ([id number]) 10/14/2015    Pneumococcal Conjugate 7-valent (Prevnar7) 2011    Pneumococcal Polysaccharide ([id number]) 2011, 10/28/2020       Active Problems:  Patient Active Problem List   Diagnosis Code    Dyslipidemia E78.5    DIONY treated with BiPAP G47.33    Fibromyalgia M79.7    CRI (chronic renal insufficiency) N18.9    OA (osteoarthritis) M19.90    DM (diabetes mellitus) ([facility] E11.9    Kidney stone N20.0    Depression F32. A    Seasonal allergies J30.2    Diverticulosis K57.90    Erythropenia D64.9    Normocytic normochromic anemia D64.9    Mitral regurgitation - Mild to moderate I34.0    CKD (chronic kidney disease) stage 4, GFR 15-29 ml/min ([facility]) N18.4    Gout M10.9    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 disease ([facility]) E11.22    Essential hypertension I10    Osteopenia M85.80    Morbid obesity due to excess calories ([facility]) E66.01    Anxiety F41.9    Febrile illness R50.9    Acute serous otitis media of left ear H65.02    Secondary hyperparathyroidism ([facility]) N25.81    Acute kidney injury superimposed on CKD ([facility] N17.9, N18.9    New onset a-fib ([facility] with Rapid ventricular response I48.91    New onset of congestive heart failure ([facility]) I50.9    Lymphedema I89.0    GERD (gastroesophageal reflux disease) K21.9    Restless leg syndrome G25.81    SIRS (systemic inflammatory response syndrome) ([facility]) R65.10       Isolation/Infection:   Isolation            No Isolation          Patient Infection Status       None to display            Nurse Assessment:  Last Vital Signs: BP (!) 127/59   Pulse 72   Temp 98.8 °F (37.1 °C) (Oral)   Resp 21   Ht 5' 5\" (1.651 m)   Wt 262 lb (118.8 kg)   SpO2 97%   BMI 43.60 kg/m²     Last documented pain score (0-10 scale): Pain Level: 6  Last Weight:   Wt Readings from Last 1 Encounters:   03/31/22 262 lb (118.8 kg)     Mental Status:  oriented, alert, coherent, and able to concentrate and follow conversation    IV Access:  - None    Nursing Mobility/ADLs:  Walking   Independent  Transfer  Independent  Bathing  Independent  Dressing  Independent  [address] Independent  Med Delivery   whole    Wound Care Documentation and Therapy:        Elimination:  Continence: Bowel: Yes  Bladder: Yes  Urinary Catheter: None   Colostomy/Ileostomy/Ileal Conduit: No       Date of Last BM: 4/7/2022    Intake/Output Summary (Last 24 hours) at 4/3/2022 1535  Last data filed at 4/3/2022 0754  Gross per 24 hour   Intake 560 ml   Output 1100 ml   Net -540 ml     I/O last 3 completed shifts: In: 760 [P.O.:760]  Out: 3700 [Urine:3700]    Safety Concerns:     None    Impairments/Disabilities:      Vision    Nutrition Therapy:  Current Nutrition Therapy:   - Oral Diet:  Carb Control 3 carbs/meal (1500kcals/day)    Routes of Feeding: Oral  Liquids: No Restrictions  Daily Fluid Restriction: no  Last Modified Barium Swallow with Video (Video Swallowing Test): not done    Treatments at the Time of Hospital Discharge:   Respiratory Treatments: N/A  Oxygen Therapy:  is not on home oxygen therapy.   Ventilator:    - No ventilator support    Rehab Therapies: Physical Therapy  Weight Bearing Status/Restrictions: No weight bearing restrictions  Other Medical Equipment (for information only, NOT a DME order):  cane and walker  Other Treatments: ***    Patient's personal belongings (please select all that are sent with patient):  Kali    RN SIGNATURE:  Electronically signed by Miguel Franco RN on 4/7/22 at 2:31 PM EDT    CASE MANAGEMENT/SOCIAL WORK SECTION    Inpatient Status Date: ***    Readmission Risk Assessment Score:  Readmission Risk              Risk of Unplanned Readmission:  19           Discharging to Facility/ Agency   Name: [address] [place] [address]  Address:   [place] [address] [address] [place] [address]         Phone: [phone]        Phone:  Fax:    Dialysis Facility (if applicable)   Name:  Address:  Dialysis Schedule:  Phone:  Fax:    / signature: Electronically signed by Milye Johnson RN on 4/7/22 at 2:28 PM EDT    PHYSICIAN SECTION    Prognosis: Good    Condition at Discharge: Stable    Rehab Potential (if transferring to Rehab): Fair    Recommended Labs or Other Treatments After Discharge: PT with wheeled walker, OT and home health aide. Patient would benefit from lymphedema clinic. Please coordinate with your primary care provider    Physician Certification: I certify the above information and transfer of Clovis Halsted  is necessary for the continuing treatment of the diagnosis listed and that she requires Home Care for less 30 days.      Update Admission H&P: Changes in H&P as follows - see discharge summary    PHYSICIAN SIGNATURE:  Electronically signed by Steve Pitts MD on 4/7/22 at 2:27 PM EDT

## 2022-04-03 NOTE — PROGRESS NOTES
04/01/22  0511   INR 1.1       Objective:   Vitals: BP (!) 127/59   Pulse 72   Temp 98.8 °F (37.1 °C) (Oral)   Resp 21   Ht 5' 5\" (1.651 m)   Wt 262 lb (118.8 kg)   SpO2 97%   BMI 43.60 kg/m²   General appearance: alert and cooperative with exam  HEENT: Head: Normocephalic, no lesions, without obvious abnormality. Neck:no JVD, trachea midline, no adenopathy  Lungs: Clear to auscultation throughout. No distress on RA  Heart: Regular rate and rhythm, s1/s2 auscultated, + murmurs, SR with PACs frequently  Abdomen: soft, non-tender, bowel sounds active, morbidly obese  Extremities: signtificant +3 generalized edema  Neurologic: not done    CATH 5/16/16: Nonobstructive CAD. EF 55%.      STRESS 4/22/16: Ischemia in the anterior area, size is moderate, severity is moderate, artifacts cannot be ruled out. Infarct in the anterior area, size is small, artifacts cannot be ruled out. EF 61%.      ECHO 5/30/14: EF 55%, reduced LV diastolic compliance, mild-moderate MR. Echo 4/2/22  Summary  Normal LV size and wall thickness. No obvious wall motion abnormality seen. Normal LV systolic function with LVEF >55%. Normal RV size and function. RV systolic pressure 28 mmHg  LA appears moderately dilated and RA appears mildly dilated. No significant valvular stenosis or regurgitation noted. Normal aortic root dimension. No significant pericardial effusion noted. No obvious intra-cardiac mass or shunt noted. IVC appears dilated and impaired inspiratory variation indicating elevated  RA filling pressure. Assessment / Acute Cardiac Problems:   1. New onset Afib RVR  2. CHF exacerbation, diastolic  3. Nonobstructive CAD on cath 2016  4. Morbid obesity with BMI >40  5. HTN  6. HLP  7. DIONY on BiPAP  8.  Acute on chronic CKD3    Patient Active Problem List:     Dyslipidemia     DIONY treated with BiPAP     Fibromyalgia     CRI (chronic renal insufficiency)     OA (osteoarthritis)     DM (diabetes mellitus) ([facility] Kidney stone     Depression     Seasonal allergies     Diverticulosis     Erythropenia     Normocytic normochromic anemia     Mitral regurgitation - Mild to moderate     CKD (chronic kidney disease) stage 4, GFR 15-29 ml/min ([facility])     Gout     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 disease (HCC)     Essential hypertension     Osteopenia     Morbid obesity due to excess calories ([facility])     Anxiety     Febrile illness     Acute serous otitis media of left ear     Secondary hyperparathyroidism ([address]     Acute kidney injury superimposed on CKD ([address]     New onset a-fib ([address] with Rapid ventricular response     New onset of congestive heart failure ([facility])     Lymphedema     GERD (gastroesophageal reflux disease)     Restless leg syndrome      Plan of Treatment:   1. Stable. Converted to SR with frequent PACs. Continue PO BB, Amio. 2. Echo reviewed. No plans for further ischemic work-up at this time. Heparin gtt already switched to Eliquis, agree to continue this  3. Diuresis per nephrology recommendations. Appreciate assistance. On hold d/t hyponatremia. Continue to hold Cozaar  4. LE compression stockings. Also has lymphedema.    5. Keep K >4 and Mg >2     Electronically signed by BARBIE Carlos - CNP on 4/3/2022 at 3:45 PM  [address]  [phone]

## 2022-04-04 ENCOUNTER — TELEPHONE (OUTPATIENT)
Dept: OCCUPATIONAL THERAPY | Age: 77
End: 2022-04-04

## 2022-04-04 ENCOUNTER — APPOINTMENT (OUTPATIENT)
Dept: CT IMAGING | Age: 77
DRG: 309 | End: 2022-04-04
Payer: MEDICARE

## 2022-04-04 LAB
ABSOLUTE EOS #: 0.12 K/UL (ref 0–0.4)
ABSOLUTE IMMATURE GRANULOCYTE: 0 K/UL (ref 0–0.3)
ABSOLUTE LYMPH #: 0.85 K/UL (ref 1–4.8)
ABSOLUTE MONO #: 0.24 K/UL (ref 0.1–0.8)
ANION GAP SERPL CALCULATED.3IONS-SCNC: 14 MMOL/L (ref 9–17)
BASOPHILS # BLD: 0 % (ref 0–2)
BASOPHILS ABSOLUTE: 0 K/UL (ref 0–0.2)
BUN BLDV-MCNC: 55 MG/DL (ref 8–23)
CALCIUM SERPL-MCNC: 9.2 MG/DL (ref 8.6–10.4)
CHLORIDE BLD-SCNC: 98 MMOL/L (ref 98–107)
CO2: 21 MMOL/L (ref 20–31)
CREAT SERPL-MCNC: 3.13 MG/DL (ref 0.5–0.9)
EOSINOPHILS RELATIVE PERCENT: 1 % (ref 1–4)
GFR AFRICAN AMERICAN: 18 ML/MIN
GFR NON-AFRICAN AMERICAN: 14 ML/MIN
GFR SERPL CREATININE-BSD FRML MDRD: ABNORMAL ML/MIN/{1.73_M2}
GLUCOSE BLD-MCNC: 162 MG/DL (ref 70–99)
HCT VFR BLD CALC: 25.3 % (ref 36.3–47.1)
HEMOGLOBIN: 7.7 G/DL (ref 11.9–15.1)
IMMATURE GRANULOCYTES: 0 %
LYMPHOCYTES # BLD: 7 % (ref 24–44)
MAGNESIUM: 2.5 MG/DL (ref 1.6–2.6)
MCH RBC QN AUTO: 30 PG (ref 25.2–33.5)
MCHC RBC AUTO-ENTMCNC: 30.4 G/DL (ref 28.4–34.8)
MCV RBC AUTO: 98.4 FL (ref 82.6–102.9)
MONOCYTES # BLD: 2 % (ref 1–7)
MORPHOLOGY: ABNORMAL
NRBC AUTOMATED: 0 PER 100 WBC
PATHOLOGIST REVIEW: NORMAL
PDW BLD-RTO: 15.3 % (ref 11.8–14.4)
PLATELET # BLD: 345 K/UL (ref 138–453)
PMV BLD AUTO: 10.7 FL (ref 8.1–13.5)
POTASSIUM SERPL-SCNC: 4.4 MMOL/L (ref 3.7–5.3)
RBC # BLD: 2.57 M/UL (ref 3.95–5.11)
SEG NEUTROPHILS: 90 % (ref 36–66)
SEGMENTED NEUTROPHILS ABSOLUTE COUNT: 10.89 K/UL (ref 1.8–7.7)
SODIUM BLD-SCNC: 133 MMOL/L (ref 135–144)
SURGICAL PATHOLOGY REPORT: NORMAL
WBC # BLD: 12.1 K/UL (ref 3.5–11.3)

## 2022-04-04 PROCEDURE — 6370000000 HC RX 637 (ALT 250 FOR IP): Performed by: INTERNAL MEDICINE

## 2022-04-04 PROCEDURE — 36415 COLL VENOUS BLD VENIPUNCTURE: CPT

## 2022-04-04 PROCEDURE — 94660 CPAP INITIATION&MGMT: CPT

## 2022-04-04 PROCEDURE — 2500000003 HC RX 250 WO HCPCS: Performed by: INTERNAL MEDICINE

## 2022-04-04 PROCEDURE — 6360000002 HC RX W HCPCS: Performed by: INTERNAL MEDICINE

## 2022-04-04 PROCEDURE — 85025 COMPLETE CBC W/AUTO DIFF WBC: CPT

## 2022-04-04 PROCEDURE — 6370000000 HC RX 637 (ALT 250 FOR IP): Performed by: NURSE PRACTITIONER

## 2022-04-04 PROCEDURE — 2580000003 HC RX 258: Performed by: NURSE PRACTITIONER

## 2022-04-04 PROCEDURE — 97110 THERAPEUTIC EXERCISES: CPT

## 2022-04-04 PROCEDURE — 99232 SBSQ HOSP IP/OBS MODERATE 35: CPT | Performed by: INTERNAL MEDICINE

## 2022-04-04 PROCEDURE — 99233 SBSQ HOSP IP/OBS HIGH 50: CPT | Performed by: INTERNAL MEDICINE

## 2022-04-04 PROCEDURE — 83735 ASSAY OF MAGNESIUM: CPT

## 2022-04-04 PROCEDURE — 97530 THERAPEUTIC ACTIVITIES: CPT

## 2022-04-04 PROCEDURE — 2060000000 HC ICU INTERMEDIATE R&B

## 2022-04-04 PROCEDURE — 80048 BASIC METABOLIC PNL TOTAL CA: CPT

## 2022-04-04 PROCEDURE — 73700 CT LOWER EXTREMITY W/O DYE: CPT

## 2022-04-04 PROCEDURE — 2580000003 HC RX 258: Performed by: INTERNAL MEDICINE

## 2022-04-04 RX ORDER — BUTALBITAL, ACETAMINOPHEN AND CAFFEINE 50; 325; 40 MG/1; MG/1; MG/1
1 TABLET ORAL EVERY 4 HOURS PRN
Status: DISCONTINUED | OUTPATIENT
Start: 2022-04-04 | End: 2022-04-07 | Stop reason: HOSPADM

## 2022-04-04 RX ORDER — HYDROCODONE BITARTRATE AND ACETAMINOPHEN 5; 325 MG/1; MG/1
2 TABLET ORAL EVERY 4 HOURS PRN
Status: DISCONTINUED | OUTPATIENT
Start: 2022-04-04 | End: 2022-04-07 | Stop reason: HOSPADM

## 2022-04-04 RX ORDER — TORSEMIDE 20 MG/1
10 TABLET ORAL DAILY
Status: DISCONTINUED | OUTPATIENT
Start: 2022-04-04 | End: 2022-04-06

## 2022-04-04 RX ORDER — HYDROCODONE BITARTRATE AND ACETAMINOPHEN 5; 325 MG/1; MG/1
1 TABLET ORAL EVERY 4 HOURS PRN
Status: DISCONTINUED | OUTPATIENT
Start: 2022-04-04 | End: 2022-04-07 | Stop reason: HOSPADM

## 2022-04-04 RX ORDER — DIPHENHYDRAMINE HYDROCHLORIDE 50 MG/ML
25 INJECTION INTRAMUSCULAR; INTRAVENOUS EVERY 6 HOURS PRN
Status: DISCONTINUED | OUTPATIENT
Start: 2022-04-04 | End: 2022-04-05

## 2022-04-04 RX ADMIN — CEFEPIME HYDROCHLORIDE 1000 MG: 1 INJECTION, POWDER, FOR SOLUTION INTRAMUSCULAR; INTRAVENOUS at 20:22

## 2022-04-04 RX ADMIN — DIPHENHYDRAMINE HYDROCHLORIDE 25 MG: 50 INJECTION, SOLUTION INTRAMUSCULAR; INTRAVENOUS at 20:23

## 2022-04-04 RX ADMIN — ATORVASTATIN CALCIUM 40 MG: 80 TABLET, FILM COATED ORAL at 20:22

## 2022-04-04 RX ADMIN — PANTOPRAZOLE SODIUM 40 MG: 40 TABLET, DELAYED RELEASE ORAL at 08:08

## 2022-04-04 RX ADMIN — AMIODARONE HYDROCHLORIDE 200 MG: 200 TABLET ORAL at 08:08

## 2022-04-04 RX ADMIN — SODIUM CHLORIDE, PRESERVATIVE FREE 10 ML: 5 INJECTION INTRAVENOUS at 20:23

## 2022-04-04 RX ADMIN — METOPROLOL TARTRATE 50 MG: 50 TABLET, FILM COATED ORAL at 20:22

## 2022-04-04 RX ADMIN — SODIUM CHLORIDE, PRESERVATIVE FREE 10 ML: 5 INJECTION INTRAVENOUS at 08:08

## 2022-04-04 RX ADMIN — PREDNISONE 40 MG: 20 TABLET ORAL at 08:08

## 2022-04-04 RX ADMIN — APIXABAN 5 MG: 5 TABLET, FILM COATED ORAL at 08:08

## 2022-04-04 RX ADMIN — DIPHENHYDRAMINE HYDROCHLORIDE 25 MG: 50 INJECTION, SOLUTION INTRAMUSCULAR; INTRAVENOUS at 13:51

## 2022-04-04 RX ADMIN — HYDROCODONE BITARTRATE AND ACETAMINOPHEN 2 TABLET: 5; 325 TABLET ORAL at 15:30

## 2022-04-04 RX ADMIN — ALLOPURINOL 100 MG: 100 TABLET ORAL at 08:08

## 2022-04-04 RX ADMIN — ROPINIROLE HYDROCHLORIDE 0.25 MG: 0.25 TABLET, FILM COATED ORAL at 20:22

## 2022-04-04 RX ADMIN — GABAPENTIN 100 MG: 100 CAPSULE ORAL at 08:08

## 2022-04-04 RX ADMIN — METOPROLOL TARTRATE 50 MG: 50 TABLET, FILM COATED ORAL at 08:08

## 2022-04-04 RX ADMIN — CEFEPIME HYDROCHLORIDE 1000 MG: 1 INJECTION, POWDER, FOR SOLUTION INTRAMUSCULAR; INTRAVENOUS at 08:05

## 2022-04-04 RX ADMIN — CYCLOBENZAPRINE 10 MG: 10 TABLET, FILM COATED ORAL at 14:02

## 2022-04-04 RX ADMIN — APIXABAN 5 MG: 5 TABLET, FILM COATED ORAL at 20:22

## 2022-04-04 RX ADMIN — BUTALBITAL, ACETAMINOPHEN AND CAFFEINE 1 TABLET: 50; 325; 40 TABLET ORAL at 10:19

## 2022-04-04 RX ADMIN — FERROUS SULFATE TAB EC 325 MG (65 MG FE EQUIVALENT) 325 MG: 325 (65 FE) TABLET DELAYED RESPONSE at 08:08

## 2022-04-04 RX ADMIN — TORSEMIDE 10 MG: 20 TABLET ORAL at 15:42

## 2022-04-04 RX ADMIN — HYDROCODONE BITARTRATE AND ACETAMINOPHEN 1 TABLET: 5; 325 TABLET ORAL at 23:20

## 2022-04-04 ASSESSMENT — PAIN DESCRIPTION - FREQUENCY: FREQUENCY: CONTINUOUS

## 2022-04-04 ASSESSMENT — PAIN DESCRIPTION - PROGRESSION
CLINICAL_PROGRESSION: GRADUALLY IMPROVING
CLINICAL_PROGRESSION: NOT CHANGED
CLINICAL_PROGRESSION: GRADUALLY IMPROVING

## 2022-04-04 ASSESSMENT — PAIN SCALES - GENERAL
PAINLEVEL_OUTOF10: 4
PAINLEVEL_OUTOF10: 3
PAINLEVEL_OUTOF10: 5
PAINLEVEL_OUTOF10: 5
PAINLEVEL_OUTOF10: 8

## 2022-04-04 ASSESSMENT — PAIN DESCRIPTION - PAIN TYPE
TYPE: CHRONIC PAIN
TYPE: CHRONIC PAIN

## 2022-04-04 ASSESSMENT — PAIN DESCRIPTION - ORIENTATION
ORIENTATION: RIGHT;LEFT
ORIENTATION: RIGHT;LEFT

## 2022-04-04 ASSESSMENT — PAIN - FUNCTIONAL ASSESSMENT
PAIN_FUNCTIONAL_ASSESSMENT: PREVENTS OR INTERFERES SOME ACTIVE ACTIVITIES AND ADLS
PAIN_FUNCTIONAL_ASSESSMENT: PREVENTS OR INTERFERES SOME ACTIVE ACTIVITIES AND ADLS

## 2022-04-04 ASSESSMENT — PAIN DESCRIPTION - DESCRIPTORS
DESCRIPTORS: SORE
DESCRIPTORS: CONSTANT

## 2022-04-04 ASSESSMENT — PAIN DESCRIPTION - LOCATION
LOCATION: FOOT
LOCATION: ARM

## 2022-04-04 ASSESSMENT — PAIN DESCRIPTION - ONSET
ONSET: PROGRESSIVE
ONSET: ON-GOING

## 2022-04-04 NOTE — PROGRESS NOTES
[facility]  Progress Note                   Date:   4/4/2022  Patient name: Ciro Steven  Date of admission:  3/31/2022  9:20 PM  MRN:   [id number]  YOB: 1945  PCP: Keri Amezcua MD    Reason for Admission: JOSE ARMANDO (acute kidney injury) ([facility] [N17.9]  Cellulitis of right lower extremity [L03.115]  Cellulitis of left lower extremity [L03.116]  Acute kidney injury superimposed on CKD ([facility] [N17.9, N18.9]    Subjective:       Clinical Changes /Abnormalities:   Patient seen and examined sitting up in chair in room with daughter present. Denies chest pain and SOB. A fib converted to SR. SR on tele HR 67 with PACs noted. Review of Systems    Medications:   Scheduled Meds:   [Held by provider] furosemide  40 mg IntraVENous Daily    apixaban  5 mg Oral BID    predniSONE  40 mg Oral Daily    gabapentin  100 mg Oral Daily    amiodarone  200 mg Oral Daily    cefepime  1,000 mg IntraVENous Q12H    allopurinol  100 mg Oral Daily    atorvastatin  40 mg Oral Daily    pantoprazole  40 mg Oral Daily    sodium chloride flush  5-40 mL IntraVENous 2 times per day    metoprolol tartrate  50 mg Oral BID    rOPINIRole  0.25 mg Oral Nightly    ferrous sulfate  325 mg Oral Daily with breakfast     Continuous Infusions:   sodium chloride       CBC:   Recent Labs     04/02/22  0650 04/03/22  0111 04/04/22  0535   WBC 16.3* 17.0* 12.1*   HGB 7.6* 7.5* 7.7*   PLT See Reflexed IPF Result [id number]     BMP:    Recent Labs     04/02/22  0650 04/03/22  0111 04/04/22  0535    129* 133*   K 3.3* 3.8 4.4   CL 96* 94* 98   CO2 21 22 21   BUN 43* 48* 55*   CREATININE 3.14* 3.24* 3.13*   GLUCOSE 134* 141* 162*     Hepatic:  No results for input(s): AST, ALT, ALB, BILITOT, ALKPHOS in the last 72 hours. Troponin:   No results for input(s): TROPHS in the last 72 hours. BNP: No results for input(s): BNP in the last 72 hours.   Lipids: No results for input(s): CHOL, HDL in the last 72 hours.    Invalid input(s): LDLCALCU  INR:   No results for input(s): INR in the last 72 hours. DIAGNOSTIC DATA  ECHO  4/2/2022  Normal LV size and wall thickness. No obvious wall motion abnormality seen. Normal LV systolic function with LVEF >55%. Normal RV size and function. RV systolic pressure 28 mmHg  LA appears moderately dilated and RA appears mildly dilated. No significant valvular stenosis or regurgitation noted. Normal aortic root dimension. No significant pericardial effusion noted. No obvious intra-cardiac mass or shunt noted. IVC appears dilated and impaired inspiratory variation indicating elevated  RA filling pressure. CATH 5/16/16: Nonobstructive CAD. EF 55%.      STRESS 4/22/16: Ischemia in the anterior area, size is moderate, severity is moderate, artifacts cannot be ruled out. Infarct in the anterior area, size is small, artifacts cannot be ruled out. EF 61%.      ECHO 5/30/14: EF 55%, reduced LV diastolic compliance, mild-moderate MR. Echo 4/2/22  Summary  Normal LV size and wall thickness. No obvious wall motion abnormality seen. Normal LV systolic function with LVEF >55%. Normal RV size and function. RV systolic pressure 28 mmHg  LA appears moderately dilated and RA appears mildly dilated. No significant valvular stenosis or regurgitation noted. Normal aortic root dimension. No significant pericardial effusion noted. No obvious intra-cardiac mass or shunt noted. IVC appears dilated and impaired inspiratory variation indicating elevated  RA filling pressure. Objective:   Vitals: /86   Pulse 67   Temp 97.7 °F (36.5 °C) (Oral)   Resp 22   Ht 5' 5\" (1.651 m)   Wt 262 lb (118.8 kg)   SpO2 96%   BMI 43.60 kg/m²   General appearance: alert and cooperative with exam  HEENT: Head: Normocephalic, no lesions, without obvious abnormality. Neck:no JVD, trachea midline, no adenopathy  Lungs: Clear to auscultation throughout.  No distress on RA  Heart: Regular rate and rhythm, s1/s2 auscultated, + murmurs, SR with PACs frequently  Abdomen: soft, non-tender, bowel sounds active, morbidly obese  Extremities: signtificant +3 generalized edema chronic Lymph Edema   Neurologic: not done      Assessment / Acute Cardiac Problems:   1. New onset Afib RVR  2. CHF exacerbation, diastolic  3. Nonobstructive CAD on cath 2016  4. Morbid obesity with BMI >40  5. HTN  6. HLP  7. DIONY on BiPAP  8. Acute on chronic CKD3    Patient Active Problem List:     Dyslipidemia     DIONY treated with BiPAP     Fibromyalgia     CRI (chronic renal insufficiency)     OA (osteoarthritis)     DM (diabetes mellitus) ([address]     Kidney stone     Depression     Seasonal allergies     Diverticulosis     Erythropenia     Normocytic normochromic anemia     Mitral regurgitation - Mild to moderate     CKD (chronic kidney disease) stage 4, GFR 15-29 ml/min ([facility])     Gout     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 disease ([address]     Essential hypertension     Osteopenia     Morbid obesity due to excess calories ([facility])     Anxiety     Febrile illness     Acute serous otitis media of left ear     Secondary hyperparathyroidism ([address]     Acute kidney injury superimposed on CKD ([address]     New onset a-fib ([address] with Rapid ventricular response     New onset of congestive heart failure (HCC)     Lymphedema     GERD (gastroesophageal reflux disease)     Restless leg syndrome      Plan of Treatment:   1. A FIB:  Converted to SR with PACs. Continue PO BB, Amiodarone, Eliquis  2. CHF:  Diuresis per nephrology recommendations. Appreciate assistance. On hold d/t hyponatremia. Continue to hold Cozaar  3. LE compression stockings. Also has lymphedema. Continue to follow up with Lymph edema clinic   4. Keep K >4 and Mg >2  K 4.4 today. Will order [address] lab. 5. No further in patient work up planned. Follow up with [facility] after discharge as scheduled.       Electronically signed by BARBIE Tijerina NP on 4/4/2022 at 11:38 [id number] [facility].  [phone]

## 2022-04-04 NOTE — PROGRESS NOTES
[facility]  Office: [address], DO, Janeen Story, DO, Viridiana Ascencio, DO, Alesia Dyson Blood, DO, Erum Johnson MD, Lenard Hickman MD, Delores Call MD, Faina Marsh MD, Arleen Aleman MD, Abdoulaye Pacheco MD, Bishop Fuentes MD, Laurel Darling, DO, Maribell Garsia, DO, Silverio Pryor MD,  Ruby Meyers, DO, Lev Ceballos MD, oH Mcmillan MD, Tay Strange MD, Sharmila Conley, DO, Layla Torres MD, Annie Sargent MD, Jake Paiz, [facility], [facility], [facility], Chetan Villegas, CNP, Cait Begum, CNS, Diamante Fonseca, CNP, Barbara Mendez, CNP, Avinash Collier, CNP, Austyn Serrano, CNP, ROYER PowellC, Ras Ramirez, DNP, Brunilda Montelongo, CNP, Davin Gonzalez, CNP, Niki Newton, [facility], Jerson Kern, CNP, Lina Zurita, CNP, Kayy Paz, CNP         [facility] [address]    Progress Note    4/4/2022    11:54 AM    Name:   Caleb Lopez  MRN:     [id number]     Acct:      [de-identified]   Room:   2015/2015-01   Day:  3  Admit Date:  3/31/2022  9:20 PM    PCP:   Aren Amor MD  Code Status:  Full Code    Subjective:     C/C:   Chief Complaint   Patient presents with    Leg Swelling     Interval History Status: somewhat improved    Patient seen and examined this morning. Patient continues to have multiple complaints about the care that she is receiving. She reports that overnight, there is a significant delay and nursing availability to give her a bath. States that she was itching all night secondary to Roxicodone use. Still complaining of right foot pain. States that she cannot walk. She is grateful that she is no longer getting labs drawn every 6 hours. Remains on room air. Brief History:     Caleb Lopez is a 69 y.o. female who presents to the hospital with complaints of worsening fatigue, leg pain and swelling for the last 2 weeks. Patient says she feels like she is \" filling up with fluid\".   She has also lost her appetite and worsening weakness. Prior to symptom onset, patient says that she was able to complete her ADLs including shopping/walking however since that time has been very restricted in movement. She is complaining of significant bilateral leg pain and swelling worse than her baseline. She was previously diagnosed with lymphedema and was evaluated lymphedema clinic but says that her swelling is much worse today than it is normally. She denies any shortness of breath or difficulty breathing. She follows up with Dr. Zabrina Lopes Nephrology for CKD and  (cardiology). She denies any history of MI or active chest pain. On admission pt noted to be tachycardic, ECG did show atrial fibrillation. Pt denies any previous history of afib. Denies any muscle weakness. She is on lasix at home which she says did help with her swelling but has not had great response to the 40 of lasix.    -Initially here with JUANI. liam with RVR which is new  -Worsening creatinine on CKD stage IV  -Amiodarone/Eliquis  -Checking CT of the foot to rule out abscess    Review of Systems:     Constitutional: negative for chills, fevers, sweats  Respiratory: negative for cough, dyspnea on exertion, shortness of breath, wheezing  Cardiovascular:  negative for chest pain, chest pressure/discomfort, lower extremity edema, palpitations  Gastrointestinal:  negative for abdominal pain, constipation, diarrhea, nausea, vomiting  Neurological: reports headache today; negative for dizziness  MSK: Reports right-sided foot pain    Medications: Allergies:     Allergies   Allergen Reactions    Adhesive Tape     Cephalexin Other (See Comments)     Tongue cracks and peels    Erythromycin Other (See Comments)     Epigastric pain and bloating    Ketorolac Tromethamine Other (See Comments)     Severe stomach cramps    Pcn [Penicillins]      Unknown reaction    Percocet [Oxycodone-Acetaminophen] Itching and Other (See Comments)     Esophagus hurt    Sulfa Antibiotics     Ultracet [Tramadol-Acetaminophen] Itching       Current Meds:   Scheduled Meds:    [Held by provider] furosemide  40 mg IntraVENous Daily    apixaban  5 mg Oral BID    predniSONE  40 mg Oral Daily    gabapentin  100 mg Oral Daily    amiodarone  200 mg Oral Daily    cefepime  1,000 mg IntraVENous Q12H    allopurinol  100 mg Oral Daily    atorvastatin  40 mg Oral Daily    pantoprazole  40 mg Oral Daily    sodium chloride flush  5-40 mL IntraVENous 2 times per day    metoprolol tartrate  50 mg Oral BID    rOPINIRole  0.25 mg Oral Nightly    ferrous sulfate  325 mg Oral Daily with breakfast     Continuous Infusions:    sodium chloride       PRN Meds: HYDROcodone 5 mg - acetaminophen **OR** HYDROcodone 5 mg - acetaminophen, butalbital-acetaminophen-caffeine, diphenhydrAMINE, cyclobenzaprine, sodium chloride flush, sodium chloride, ondansetron **OR** ondansetron, acetaminophen **OR** acetaminophen, morphine, ALPRAZolam, magnesium sulfate, potassium chloride **OR** potassium alternative oral replacement **OR** potassium chloride    Data:     Past Medical History:   has a past medical history of Abnormal stress test, Anemia, Arthritis, Depression, Diverticulosis, DM (diabetes mellitus) ([address] Erythropenia, Fibromyalgia, HTN (hypertension), Hyperlipidemia, Kidney stone, Morbid obesity due to excess calories ([address] DIONY on CPAP, Osteopenia, Seasonal allergies, and Sleep apnea. Social History:   reports that she quit smoking about 62 years ago. She has a 0.25 pack-year smoking history. She has never used smokeless tobacco. She reports that she does not drink alcohol and does not use drugs.      Family History:   Family History   Problem Relation Age of Onset    Diabetes Mother     Heart Disease Mother     Osteoporosis Mother     Kidney Disease Father     Prostate Cancer Brother        Vitals:  /86   Pulse 67   Temp 97.7 °F (36.5 °C) (Oral)   Resp 22   Ht 5' 5\" (1.651 m)   Wt 262 lb (118.8 kg)   SpO2 96%   BMI 43.60 kg/m²   Temp (24hrs), Av.4 °F (36.9 °C), Min:97.7 °F (36.5 °C), Max:99.5 °F (37.5 °C)    No results for input(s): POCGLU in the last 72 hours. I/O (24Hr): Intake/Output Summary (Last 24 hours) at 2022 1154  Last data filed at 2022 0126  Gross per 24 hour   Intake 800 ml   Output 1200 ml   Net -400 ml       Labs:  Hematology:  Recent Labs     22  0650 22  18522  01122  0535   WBC 16.3*  --  17.0* 12.1*   RBC 2.55*  --  2.49* 2.57*   HGB 7.6*  --  7.5* 7.7*   HCT 24.9*  --  23.5* 25.3*   MCV 97.6  --  94.4 98.4   MCH 29.8  --  30.1 30.0   MCHC 30.5  --  31.9 30.4   RDW 15.5*  --  15.4* 15.3*   PLT See Reflexed IPF Result  --  345 345   MPV  --   --  9.6 10.7   CRP  --  224.0*  --   --    DDIMER  --   --  1.19  --      Chemistry:  Recent Labs     22  0650 22  18522  01122  0535     --  129* 133*   K 3.3*  --  3.8 4.4   CL 96*  --  94* 98   CO2   --  22 21   GLUCOSE 134*  --  141* 162*   BUN 43*  --  48* 55*   CREATININE 3.14*  --  3.24* 3.13*   MG 1.7  --   --   --    ANIONGAP 18*  --  13 14   LABGLOM 14*  --  14* 14*   GFRAA 17*  --  17* 18*   CALCIUM 9.4  --  8.9 9.2   PHOS 4.0 3.7  --   --      Recent Labs     22  0650   LABALBU 3.5     ABG:No results found for: POCPH, PHART, PH, POCPCO2, RJW7TEZ, PCO2, POCPO2, PO2ART, PO2, POCHCO3, AWK8AYY, HCO3, NBEA, PBEA, BEART, BE, THGBART, THB, FSJ8STL, AWAO0REL, Z1VMNEGB, O2SAT, FIO2  Lab Results   Component Value Date/Time    [facility]CIAL [facility] 2022 09:09 AM     Lab Results   Component Value Date/Time    CULTURE Unable to perform testing: No specimen received.  2022 10:46 PM       Radiology:  XR CHEST PORTABLE    Result Date: 3/31/2022  No acute cardiopulmonary process       Physical Examination:        General appearance:  alert, cooperative and no distress, obese  female, elderly, sitting up in bed  Mental Status:  oriented to person, place and time and normal affect  Lungs:  clear to auscultation bilaterally, no wheezing, normal effort  Heart:  regular rate and rhythm, no murmur  Abdomen:  soft, nontender, nondistended, normal bowel sounds, protuberant  Extremities:  1+ edema in the bilateral lower extremities, bilaterally. Skin: There is an area of fluctuance noted on the dorsal aspect of the right foot approximately 4 cm x 3 cm in diameter    Assessment:        Hospital Problems           Last Modified POA    * (Principal) New onset of congestive heart failure ([address] 4/1/2022 Yes    Acute kidney injury superimposed on CKD ([address] 4/2/2022 Yes    New onset a-fib ([address] with Rapid ventricular response 4/2/2022 Yes    SIRS (systemic inflammatory response syndrome) ([address] 4/2/2022 No    Normocytic normochromic anemia 4/2/2022 Yes    Essential hypertension 4/2/2022 Yes    Morbid obesity due to excess calories ([address] 4/2/2022 Yes    Lymphedema 4/1/2022 Yes    GERD (gastroesophageal reflux disease) 4/2/2022 Yes    DIONY treated with BiPAP 4/2/2022 Yes    Restless leg syndrome 4/2/2022 Yes          Plan:        New onset atrial fibrillation with RVR: Continue oral amiodarone and lopressor. Eliquis for [facility]. Cardiology following. Acute/chronic Volume overload 2/2 new onset CHF+/-CKD - lasix on hold today for 24 hours with a 1500 cc fluid restriction. Creatinine has plateaued, it appears. Echocardiogram reviewed. Preserved EF. Prior MR/TR appears to have improved. JOSE ARMANDO on CKD : Baseline Scr ~2.6. Was elevated on admission. Up to 3.13 today. Nephrology following. Holding diuretic today  Acute right foot pain: may be an element of gout due to diuresis. Continue gabapentin. Roxicodone changed to norco at patients request. Benadryl for pruritis. Radiograph of the right foot reviewed. Due to fluctuance, check CT of the right foot to r/o abscess. Pt deferring podiatry eval at present. D-dimer elevated: Unable to CT imaging to r/o PE. Continue eliquis. No further workup at present. Chronic lymphedema - needs compression stockings. ACE bandages in place. Recently established care with lymphedema clinic at [phone]. Leukocytosis and fever overnight with elevated procal: More prominent area of fluctuance on the dorsal aspect of the right foot. Checking CT today. Continue cefepime, will likely transition to clinda once CT is done. Primary hypertension: currently normotensive   GERD: PPI  Anemia 2/2 b12 deficiency, EYAD and CKD: On Retacrit, iron supplementation. Nephrology following. Monitor Hgb, transfuse Prn. Needs iron replacement and age-related cancer screenings  Restless leg syndrome: Continue requip  History of gout: Right foot radiograph reviewed Checking CT of the right foot today. Prednisone x 4 more days for treatment. Continue allopurinol.    Morbid obesity BMI 43: recommend weight loss and lifestyle modification  PT/OT    BROCK AC DO  4/4/2022  11:54 AM

## 2022-04-04 NOTE — PLAN OF CARE
Problem: RESPIRATORY  Intervention: Respiratory assessment  Note: PATIENT REFUSES TO WEAR BIPAP     [x] Risks and benefits explained to patient   [x] Patient refuses to wear Bipap stating take it off, I can't stand that thing  [x] Patient verbalizes understanding of information presented.

## 2022-04-04 NOTE — TELEPHONE ENCOUNTER
[x] [facility] ([facility]) - [facility] &  Therapy  [address]  [phone]  F: [phone] [] [address]   [address]  P: [phone]  F: [phone] [] [place]  [address] [place] [address]  P: [phone]  F: [phone] [] [address]  [facility]   [address]   [place]: [phone]  F: [phone]           [facility]   1945   [id number]    4/4/2022    Pt calls to cx all upcoming appointments as she went into afib this past weekend & wants to figure out what is going on with that. She is currently hospitalized. Pt will need nephrology clearance before she begins therapy.      Electronically signed by Keila Mata OT on 4/4/2022 at 4:12 PM

## 2022-04-04 NOTE — PROGRESS NOTES
Physical Therapy  Facility/Department: [facility] CAR 2  Daily Treatment Note  NAME: Britney Wilson  : 1945  MRN: [id number]    Date of Service: 2022    Discharge Recommendations:  Patient would benefit from continued therapy after discharge   PT Equipment Recommendations  Equipment Needed: No  Other: CTA, pt reports she owns rollator, currently requires significant physical assistance for mobility    Assessment   Body structures, Functions, Activity limitations: Decreased functional mobility ; Decreased balance;Decreased strength;Decreased endurance  Assessment: Pt able to completed bed mobility with CGA, required ModA for STS with use of maddy stedy. Pt declined attempt to ambulate secondary to B LE \"weakness\" and c/o pain in B feet with WBing. Pt motivated to improve and cooperative t/o treatment but limited by high anxiety re: mobility. Would benefit from continued PT to address B LE weakness and increase independence to progress toward PLOF  Prognosis: Fair  PT Education: Goals; General Safety;PT Role;Functional Mobility Training;Home Exercise Program;Transfer Training  REQUIRES PT FOLLOW UP: Yes  Activity Tolerance  Activity Tolerance: Patient limited by fatigue;Patient limited by endurance; Other (pt very anxious to attempt mobility requiring max encouragment and education t/o)     Patient Diagnosis(es): The primary encounter diagnosis was Cellulitis of left lower extremity. Diagnoses of Cellulitis of right lower extremity and JOSE ARMANDO (acute kidney injury) ([address] were also pertinent to this visit. has a past medical history of Abnormal stress test, Anemia, Arthritis, Depression, Diverticulosis, DM (diabetes mellitus) ([address] Erythropenia, Fibromyalgia, HTN (hypertension), Hyperlipidemia, Kidney stone, Morbid obesity due to excess calories ([address] DIONY on CPAP, Osteopenia, Seasonal allergies, and Sleep apnea. has a past surgical history that includes Colonoscopy (); Colonoscopy (); Tubal ligation;  Arm Surgery (2011); Total knee arthroplasty (Bilateral); Cardiac catheterization ([id number] dr Daniella Santana); and Cardiac catheterization (5-16-16). Restrictions  Restrictions/Precautions  Restrictions/Precautions: General Precautions,Fall Risk  Subjective   General  Response To Previous Treatment: Patient with no complaints from previous session. Family / Caregiver Present: No  Subjective  Subjective: Pt and RN agreeable to PT. Pt alert in bed upon arrival, pleasant and cooperative t/o. Expresses anxiety re: transfer to recliner but willing to attempt. General Comment  Comments: Pt left in recliner with call light within reach, alarm activated  Pain Screening  Patient Currently in Pain: Yes  Pain Assessment  Pain Assessment: 0-10  Pain Level: 5  Pain Type: Chronic pain  Pain Location: Foot  Pain Orientation: Right;Left  Pain Descriptors: Sore  Pain Onset: On-going  Clinical Progression: Not changed  Functional Pain Assessment: Prevents or interferes some active activities and ADLs  Non-Pharmaceutical Pain Intervention(s): Ambulation/Increased Activity; Distraction;Elevation;Repositioned; Therapeutic presence  Response to Pain Intervention: Patient Satisfied  Vital Signs  Patient Currently in Pain: Yes       Orientation  Orientation  Overall Orientation Status: Within Functional Limits  Cognition      Objective   Bed mobility  Supine to Sit: Contact guard assistance  Sit to Supine:  (pt left seated in recliner)  Scooting: Minimal assistance  Comment: HOB elevated, increased time to complete but pt able to get to EOB without assistance, high reliance on bedrails noted  Transfers  Sit to Stand: Moderate Assistance (in [address])  Stand to sit: Minimal Assistance  Bed to Chair: Dependent/Total (with maddy hernandez)  Comment: pt with high anxiety t/o attempt to stand.  required max encouragement and very increased time to complete  Ambulation  Ambulation?: No (pt declined attempt to amb secondary to B LE \"weakness\" and pain in B feet)     Balance  Posture: Good  Sitting - Static: Good  Sitting - Dynamic: Good  Standing - Static: Fair  Standing - Dynamic: Fair  Comments: standing balance assessed in maddy sabinady  Exercises  Straight Leg Raise: 10x bilat supine  Quad Sets: 10x bilat supine  Gluteal Sets: 10x bilat supine  Hip Flexion: 10x bilat seated in recliner  Hip Abduction: 10x bilat seated in recliner  Knee Long Arc Quad: 10x bilat seated in recliner  Ankle Pumps: 10x bilat supine  Core Strengthening: ~11 mins EOB prior to mobility with close SBA  Pt fatigued quickly requiring multiple rest breaks to complete ther exs     AM-PAC Score  AM-PAC Inpatient Mobility Raw Score : 12 (04/04/22 1232)  AM-PAC Inpatient T-Scale Score : 35.33 (04/04/22 1232)  Mobility Inpatient CMS 0-100% Score: 68.66 (04/04/22 1232)  Mobility Inpatient CMS G-Code Modifier : CL (04/04/22 1232)    Goals  Short term goals  Time Frame for Short term goals: 14 days  Short term goal 1: Pt to perform bed mob with min A. Short term goal 2: Pt to tolerate 20-30 mins ther ex/act for improved strength and endurance. Short term goal 3: Pt to transfer sit to stand with mod A +1. Short term goal 4: Pt to ambulate 10ft with RW and no LOB>  Patient Goals   Patient goals : home following discharge    Plan    Plan  Times per week: 5-6 x week  Times per day: Daily  Current Treatment Recommendations: Strengthening,Neuromuscular Re-education,Home Exercise Program,Safety Education & Training,Balance Training,Endurance Training,Patient/Caregiver Education & Training,Functional Mobility Training,Transfer Training,Gait Training,Stair training  Safety Devices  Type of devices:  All fall risk precautions in place,Call light within reach,Chair alarm in place,Gait belt,Left in chair,Nurse notified  Restraints  Initially in place: No     Therapy Time   Individual Concurrent Group Co-treatment   Time In 1026         Time Out 1104         Minutes 38         Timed Code Treatment Minutes: 38 Minutes       Pascale Sinclair, [place]

## 2022-04-04 NOTE — PROGRESS NOTES
Nephrology Progress Note    Patient:  Tsering Jimenes; 69 y.o. MRN# [id number]  Location:    Attending:  Monica Flmeing DO  Admit Date:  3/31/2022   Hospital Day: 3    Subjective   Patient initially admitted on  for CHF/new onset A. fib with RVR. We were consulted for acute on chronic volume overload/CKD stage III. Patient follows with Dr. Dereck Chatman as an outpatient with a baseline CRT between 2.2 -2.6, admitted at 3.15    Patient continues to complain of  bilateral lower extremity pain. Vitals stable. In normal sinus rhythm  Creatinine slightly trending down 3.24-> 3.14, BUN 55   Lasix was held yesterday.     Heparin drip was switched to Eliquis yesterday    Intake/Output Summary (Last 24 hours) at 2022 0904  Last data filed at 2022 0126  Gross per 24 hour   Intake 800 ml   Output 1200 ml   Net -400 ml       Objective   VS: BP (!) 154/56   Pulse 64   Temp 97.9 °F (36.6 °C) (Oral)   Resp 14   Ht 5' 5\" (1.651 m)   Wt 262 lb (118.8 kg)   SpO2 97%   BMI 43.60 kg/m²   MAXIMUM TEMPERATURE OVER 24 HRS:  Temp (24hrs), Av.6 °F (37 °C), Min:97.9 °F (36.6 °C), Max:99.5 °F (37.5 °C)    24 HR BLOOD PRESSURE RANGE:  Systolic (71ILM), KZE:079 , Min:118 , BON:171   ; Diastolic (09IKN), BIP:05, Min:50, Max:67    24 HR INTAKE/OUTPUT:      Intake/Output Summary (Last 24 hours) at 2022 0900  Last data filed at 2022 0126  Gross per 24 hour   Intake 800 ml   Output 1200 ml   Net -400 ml     WEIGHT:   Patient Vitals for the past 96 hrs (Last 3 readings):   Weight   22 2122 262 lb (118.8 kg)       Current Medications    Scheduled Meds:    [Held by provider] furosemide  40 mg IntraVENous Daily    apixaban  5 mg Oral BID    predniSONE  40 mg Oral Daily    gabapentin  100 mg Oral Daily    amiodarone  200 mg Oral Daily    cefepime  1,000 mg IntraVENous Q12H    allopurinol  100 mg Oral Daily    atorvastatin  40 mg Oral Daily    pantoprazole  40 mg Oral Daily    sodium chloride flush 5-40 mL IntraVENous 2 times per day    metoprolol tartrate  50 mg Oral BID    rOPINIRole  0.25 mg Oral Nightly    ferrous sulfate  325 mg Oral Daily with breakfast     Continuous Infusions:    sodium chloride         Physical Examination     General:  AAO x 3, speaking in full sentences, no accessory muscle use. Chest:   Bilateral vesicular breath sounds, no rales or wheezes. Cardiac:  S1 S2 RR, no murmurs, gallops or rubs, JVP not raised. Abdomen: Obese, Soft, non-tender, non distended, BS audible. SKIN:  No rashes, good skin turgor  Extremities:  Generalized lymphedema in all extremities, pulses present and palpable, no clubbing, No cyanosis  Neuro:  AAO x 3, No FND. Labs      Recent Labs     04/01/22  1102 04/01/22  1102 04/02/22  0650 04/03/22  0111 04/04/22  0535   WBC 11.0   < > 16.3* 17.0* 12.1*   RBC 2.74*   < > 2.55* 2.49* 2.57*   HGB 8.2*   < > 7.6* 7.5* 7.7*   HCT 26.8*   < > 24.9* 23.5* 25.3*   MCV 97.8   < > 97.6 94.4 98.4   MCH 29.9   < > 29.8 30.1 30.0   MCHC 30.6   < > 30.5 31.9 30.4   RDW 15.5*   < > 15.5* 15.4* 15.3*      < > See Reflexed IPF Result 345 345   MPV 9.5  --   --  9.6 10.7    < > = values in this interval not displayed.       BMP:   Recent Labs     04/02/22  0650 04/03/22  0111 04/04/22  0535    129* 133*   K 3.3* 3.8 4.4   CL 96* 94* 98   CO2 21 22 21   BUN 43* 48* 55*   CREATININE 3.14* 3.24* 3.13*   GLUCOSE 134* 141* 162*   CALCIUM 9.4 8.9 9.2      Phosphorus:     Recent Labs     04/01/22  1102 04/02/22  0650 04/02/22  1850   PHOS 3.4 4.0 3.7     Magnesium:    Recent Labs     04/01/22  1102 04/02/22  0650   MG 1.5* 1.7     Albumin:    Recent Labs     04/01/22  1102 04/02/22  0650   LABALBU 3.7 3.5     PTH:     Lab Results   Component Value Date    IPTH 136.0 12/07/2021         Urinalysis/Chemistries      Lab Results   Component Value Date    NITRU NEGATIVE 04/01/2022    COLORU Yellow 04/01/2022    PHUR 6.5 04/01/2022    WBCUA 0 TO 2 04/01/2022    RBCUA 0 TO 2 04/01/2022    CLARITYU clear 04/18/2017    SPECGRAV 1.010 04/01/2022    LEUKOCYTESUR NEGATIVE 04/01/2022    UROBILINOGEN Normal 04/01/2022    BILIRUBINUR NEGATIVE 04/01/2022    BILIRUBINUR neg 04/18/2017    BLOODU neg 04/18/2017    GLUCOSEU NEGATIVE 04/01/2022    [address] NEGATIVE 04/01/2022       Radiology    XR CHEST PORTABLE    Result Date: 3/31/2022  No acute cardiopulmonary process        Assessment    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 Viral Kiran as an outpatient with a baseline CRT between 2.2 - 2.6. Echocardiogram was reviewed. 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 Patient was experiencing more in lower extremity edema than congestive cardiac failure. New onset A. fib with RVR, resolved. Patient is off heparin drip and on Eliquis  Morbid obesity with DIONY  Chronic lymphedema  Lower extremity cellulitis on current antibiotic therapy-> transition to monotherapy cefepime  Iron deficiency anemia, on iron tablets      Plan   PO Torsemide  Aim to keep systolic blood pressure greater than 110  Continue to hold Cozaar  We will continue to follow    Nutrition   Renal Diet/TF      Thank you. Please call with any questions. Micky Schneider MD  PGY-3 Internal Medicine Resident  [address] [place] [address]  4/4/2022 9:03 AM    Attending Physician Statement  I have discussed the care of Lori Puente, including pertinent history and exam findings,  with the Fellow/Residentt. I have reviewed the key elements of all parts of the encounter with the Fellow/ Resident. I agree with the assessment, plan and orders as documented by the resident.     Fernanda Berrios MD MD, MRCP Mercedez Rios, FACP 4/4/2022 1:47 PM    Nephrology [address] [place] [address] Drive

## 2022-04-04 NOTE — PLAN OF CARE
Problem: Falls - Risk of:  Goal: Will remain free from falls  Description: Will remain free from falls  Outcome: Ongoing  Goal: Absence of physical injury  Description: Absence of physical injury  Outcome: Ongoing     Problem: Nutritional:  Goal: Ability to tolerate tube feedings without aspirating will improve  Description: Ability to tolerate tube feedings without aspirating will improve  Outcome: Ongoing  Goal: Consumption of food in small portions  Description: Consumption of food in small portions  Outcome: Ongoing     Problem: Safety:  Goal: Ability to chew and swallow food without choking will improve  Description: Ability to chew and swallow food without choking will improve  Outcome: Ongoing  Goal: Ability to demonstrate good, daily oral hygiene techniques will improve  Description: Ability to demonstrate good, daily oral hygiene techniques will improve  Outcome: Ongoing  Goal: Maintenance of upright position during and after feeding  Description: Maintenance of upright position during and after feeding  Outcome: Ongoing     Problem: Safety:  Goal: Ability to demonstrate good, daily oral hygiene techniques will improve  Description: Ability to demonstrate good, daily oral hygiene techniques will improve  Outcome: Ongoing

## 2022-04-05 LAB
ABSOLUTE EOS #: <0.03 K/UL (ref 0–0.44)
ABSOLUTE IMMATURE GRANULOCYTE: 0.06 K/UL (ref 0–0.3)
ABSOLUTE LYMPH #: 1.07 K/UL (ref 1.1–3.7)
ABSOLUTE MONO #: 1.06 K/UL (ref 0.1–1.2)
ANION GAP SERPL CALCULATED.3IONS-SCNC: 16 MMOL/L (ref 9–17)
BASOPHILS # BLD: 0 % (ref 0–2)
BASOPHILS ABSOLUTE: <0.03 K/UL (ref 0–0.2)
BUN BLDV-MCNC: 73 MG/DL (ref 8–23)
CALCIUM SERPL-MCNC: 9.2 MG/DL (ref 8.6–10.4)
CHLORIDE BLD-SCNC: 98 MMOL/L (ref 98–107)
CO2: 22 MMOL/L (ref 20–31)
CREAT SERPL-MCNC: 3.51 MG/DL (ref 0.5–0.9)
EOSINOPHILS RELATIVE PERCENT: 0 % (ref 1–4)
GFR AFRICAN AMERICAN: 15 ML/MIN
GFR NON-AFRICAN AMERICAN: 13 ML/MIN
GFR SERPL CREATININE-BSD FRML MDRD: ABNORMAL ML/MIN/{1.73_M2}
GLUCOSE BLD-MCNC: 144 MG/DL (ref 70–99)
HCT VFR BLD CALC: 24.2 % (ref 36.3–47.1)
HEMOGLOBIN: 7.4 G/DL (ref 11.9–15.1)
IMMATURE GRANULOCYTES: 0 %
LYMPHOCYTES # BLD: 8 % (ref 24–43)
MAGNESIUM: 2.5 MG/DL (ref 1.6–2.6)
MCH RBC QN AUTO: 29.4 PG (ref 25.2–33.5)
MCHC RBC AUTO-ENTMCNC: 30.6 G/DL (ref 28.4–34.8)
MCV RBC AUTO: 96 FL (ref 82.6–102.9)
MONOCYTES # BLD: 8 % (ref 3–12)
NRBC AUTOMATED: 0 PER 100 WBC
PDW BLD-RTO: 14.8 % (ref 11.8–14.4)
PLATELET # BLD: 406 K/UL (ref 138–453)
PMV BLD AUTO: 10.2 FL (ref 8.1–13.5)
POTASSIUM SERPL-SCNC: 4.1 MMOL/L (ref 3.7–5.3)
RBC # BLD: 2.52 M/UL (ref 3.95–5.11)
RBC # BLD: ABNORMAL 10*6/UL
SEG NEUTROPHILS: 84 % (ref 36–65)
SEGMENTED NEUTROPHILS ABSOLUTE COUNT: 11.39 K/UL (ref 1.5–8.1)
SODIUM BLD-SCNC: 136 MMOL/L (ref 135–144)
WBC # BLD: 13.6 K/UL (ref 3.5–11.3)

## 2022-04-05 PROCEDURE — 97166 OT EVAL MOD COMPLEX 45 MIN: CPT

## 2022-04-05 PROCEDURE — 6370000000 HC RX 637 (ALT 250 FOR IP): Performed by: NURSE PRACTITIONER

## 2022-04-05 PROCEDURE — 83735 ASSAY OF MAGNESIUM: CPT

## 2022-04-05 PROCEDURE — 85025 COMPLETE CBC W/AUTO DIFF WBC: CPT

## 2022-04-05 PROCEDURE — 99232 SBSQ HOSP IP/OBS MODERATE 35: CPT | Performed by: INTERNAL MEDICINE

## 2022-04-05 PROCEDURE — 2580000003 HC RX 258: Performed by: NURSE PRACTITIONER

## 2022-04-05 PROCEDURE — 6370000000 HC RX 637 (ALT 250 FOR IP): Performed by: INTERNAL MEDICINE

## 2022-04-05 PROCEDURE — 36415 COLL VENOUS BLD VENIPUNCTURE: CPT

## 2022-04-05 PROCEDURE — APPSS45 APP SPLIT SHARED TIME 31-45 MINUTES: Performed by: NURSE PRACTITIONER

## 2022-04-05 PROCEDURE — 6360000002 HC RX W HCPCS: Performed by: INTERNAL MEDICINE

## 2022-04-05 PROCEDURE — 80048 BASIC METABOLIC PNL TOTAL CA: CPT

## 2022-04-05 PROCEDURE — 97116 GAIT TRAINING THERAPY: CPT

## 2022-04-05 PROCEDURE — 2580000003 HC RX 258: Performed by: INTERNAL MEDICINE

## 2022-04-05 PROCEDURE — 99232 SBSQ HOSP IP/OBS MODERATE 35: CPT | Performed by: STUDENT IN AN ORGANIZED HEALTH CARE EDUCATION/TRAINING PROGRAM

## 2022-04-05 PROCEDURE — 2060000000 HC ICU INTERMEDIATE R&B

## 2022-04-05 PROCEDURE — 97535 SELF CARE MNGMENT TRAINING: CPT

## 2022-04-05 RX ORDER — CLINDAMYCIN HYDROCHLORIDE 150 MG/1
150 CAPSULE ORAL 3 TIMES DAILY
Status: DISCONTINUED | OUTPATIENT
Start: 2022-04-05 | End: 2022-04-07 | Stop reason: HOSPADM

## 2022-04-05 RX ORDER — ZOLPIDEM TARTRATE 5 MG/1
5 TABLET ORAL NIGHTLY PRN
Status: DISCONTINUED | OUTPATIENT
Start: 2022-04-05 | End: 2022-04-07 | Stop reason: HOSPADM

## 2022-04-05 RX ORDER — HYDROXYZINE HYDROCHLORIDE 10 MG/1
10 TABLET, FILM COATED ORAL 3 TIMES DAILY PRN
Status: DISCONTINUED | OUTPATIENT
Start: 2022-04-05 | End: 2022-04-07 | Stop reason: HOSPADM

## 2022-04-05 RX ADMIN — ZOLPIDEM TARTRATE 5 MG: 5 TABLET ORAL at 22:05

## 2022-04-05 RX ADMIN — HYDROCODONE BITARTRATE AND ACETAMINOPHEN 1 TABLET: 5; 325 TABLET ORAL at 20:59

## 2022-04-05 RX ADMIN — TORSEMIDE 10 MG: 20 TABLET ORAL at 07:55

## 2022-04-05 RX ADMIN — HYDROXYZINE HYDROCHLORIDE 10 MG: 10 TABLET ORAL at 16:14

## 2022-04-05 RX ADMIN — HYDROCODONE BITARTRATE AND ACETAMINOPHEN 1 TABLET: 5; 325 TABLET ORAL at 09:50

## 2022-04-05 RX ADMIN — ALLOPURINOL 100 MG: 100 TABLET ORAL at 07:55

## 2022-04-05 RX ADMIN — APIXABAN 5 MG: 5 TABLET, FILM COATED ORAL at 20:44

## 2022-04-05 RX ADMIN — SODIUM CHLORIDE, PRESERVATIVE FREE 10 ML: 5 INJECTION INTRAVENOUS at 20:44

## 2022-04-05 RX ADMIN — ROPINIROLE HYDROCHLORIDE 0.25 MG: 0.25 TABLET, FILM COATED ORAL at 20:45

## 2022-04-05 RX ADMIN — CLINDAMYCIN HYDROCHLORIDE 150 MG: 150 CAPSULE ORAL at 16:14

## 2022-04-05 RX ADMIN — CLINDAMYCIN HYDROCHLORIDE 150 MG: 150 CAPSULE ORAL at 20:44

## 2022-04-05 RX ADMIN — AMIODARONE HYDROCHLORIDE 200 MG: 200 TABLET ORAL at 07:55

## 2022-04-05 RX ADMIN — METOPROLOL TARTRATE 25 MG: 50 TABLET, FILM COATED ORAL at 20:44

## 2022-04-05 RX ADMIN — PANTOPRAZOLE SODIUM 40 MG: 40 TABLET, DELAYED RELEASE ORAL at 07:55

## 2022-04-05 RX ADMIN — GABAPENTIN 100 MG: 100 CAPSULE ORAL at 07:55

## 2022-04-05 RX ADMIN — CYCLOBENZAPRINE 10 MG: 10 TABLET, FILM COATED ORAL at 09:50

## 2022-04-05 RX ADMIN — DIPHENHYDRAMINE HYDROCHLORIDE 25 MG: 50 INJECTION, SOLUTION INTRAMUSCULAR; INTRAVENOUS at 09:50

## 2022-04-05 RX ADMIN — SODIUM CHLORIDE, PRESERVATIVE FREE 10 ML: 5 INJECTION INTRAVENOUS at 07:56

## 2022-04-05 RX ADMIN — ATORVASTATIN CALCIUM 40 MG: 80 TABLET, FILM COATED ORAL at 20:44

## 2022-04-05 RX ADMIN — CEFEPIME HYDROCHLORIDE 1000 MG: 1 INJECTION, POWDER, FOR SOLUTION INTRAMUSCULAR; INTRAVENOUS at 07:52

## 2022-04-05 RX ADMIN — METOPROLOL TARTRATE 50 MG: 50 TABLET, FILM COATED ORAL at 07:55

## 2022-04-05 RX ADMIN — APIXABAN 5 MG: 5 TABLET, FILM COATED ORAL at 07:55

## 2022-04-05 RX ADMIN — PREDNISONE 40 MG: 20 TABLET ORAL at 07:55

## 2022-04-05 ASSESSMENT — PAIN DESCRIPTION - LOCATION
LOCATION: FOOT
LOCATION: FOOT

## 2022-04-05 ASSESSMENT — PAIN DESCRIPTION - ORIENTATION
ORIENTATION: RIGHT;LEFT
ORIENTATION: RIGHT;LEFT

## 2022-04-05 ASSESSMENT — PAIN DESCRIPTION - ONSET: ONSET: ON-GOING

## 2022-04-05 ASSESSMENT — PAIN SCALES - GENERAL
PAINLEVEL_OUTOF10: 6
PAINLEVEL_OUTOF10: 4
PAINLEVEL_OUTOF10: 4
PAINLEVEL_OUTOF10: 3

## 2022-04-05 ASSESSMENT — PAIN DESCRIPTION - DESCRIPTORS
DESCRIPTORS: ACHING;SORE
DESCRIPTORS: SORE

## 2022-04-05 ASSESSMENT — PAIN DESCRIPTION - PAIN TYPE
TYPE: CHRONIC PAIN
TYPE: CHRONIC PAIN

## 2022-04-05 ASSESSMENT — PAIN DESCRIPTION - FREQUENCY: FREQUENCY: CONTINUOUS

## 2022-04-05 ASSESSMENT — PAIN DESCRIPTION - PROGRESSION: CLINICAL_PROGRESSION: GRADUALLY IMPROVING

## 2022-04-05 NOTE — PROGRESS NOTES
[facility]  Office: [address], DO, Jazmine Galindoland, DO, Jack Beatty, DO, Irma Springfield Blood, DO, Tanvi Brantley MD, Shirley Owen MD, Mack Shi MD, Marcos Mccormick MD, Terri Tucker MD, Sam Rodriguez MD, Kendell Smith MD, Arsalan Moyer, DO, Tyrell Judge, DO, Rivera Wagner MD,  Saintclair Lank, DO, Nohemy Lake MD, Tank Shaw MD, Yue Camacho MD, Jose Angel Montes, DO, Jenny Rosa MD, Evgeny Shanks MD, Daryle Springer, [facility], [facility] Deep, CNP, Timothy Andino, CNP, Susanna Hunt, CNP, Eduar Tellez, CNP, Jake Napoles, CNP, Michele Awan, CNP, ROYER BillingsC, Trent Lamas, DNP, Reena Amaro, CNP, Indiana Solares, CNP, Melissa Tavares, CNP, Renzo Mann, CNS, Sd Ndiaye, DNP, Bryant Odonnell, CNP, Ronnie Escamilla, [facility], Harjinder Valle, [address] [place] [address]    Progress Note    4/5/2022    9:04 AM    Name:   [facility]  MRN:     [id number]     Laylalyside:      [de-identified]   Room:   2015/2015-01  IP Day:  4  Admit Date:  3/31/2022  9:20 PM    PCP:   Bess Chavarria MD  Code Status:  Full Code    Subjective:     C/C:   Chief Complaint   Patient presents with    Leg Swelling     Interval History Status: improved. Patient seen and evaluated in room, legs dangling off the bedside getting ready to get up and try to use the bathroom. States bilateral lower extremity pain is present but improving states limited mobility secondary to pain not necessarily structure or function states muscle strength is there but she feels very fatigued today. Renal function worse today    Brief History: This is a 16-year-old female who presents to the emergency department for evaluation of worsening bilateral lower extremity edema. Initially thought to be CHF exacerbation, ruled out. patient was found to be in A. fib with RVR which is new for her. Transitioned from heparin drip to Eliquis with good results and po amio. acetaminophen **OR** HYDROcodone 5 mg - acetaminophen, butalbital-acetaminophen-caffeine, diphenhydrAMINE, cyclobenzaprine, sodium chloride flush, sodium chloride, ondansetron **OR** ondansetron, acetaminophen **OR** acetaminophen, morphine, ALPRAZolam, magnesium sulfate, potassium chloride **OR** potassium alternative oral replacement **OR** potassium chloride    Data:     Past Medical History:   has a past medical history of Abnormal stress test, Anemia, Arthritis, Depression, Diverticulosis, DM (diabetes mellitus) ([facility] Erythropenia, Fibromyalgia, HTN (hypertension), Hyperlipidemia, Kidney stone, Morbid obesity due to excess calories ([facility] [facility] DIONY on CPAP, Osteopenia, Seasonal allergies, and Sleep apnea. Social History:   reports that she quit smoking about 62 years ago. She has a 0.25 pack-year smoking history. She has never used smokeless tobacco. She reports that she does not drink alcohol and does not use drugs. Family History:   Family History   Problem Relation Age of Onset    Diabetes Mother     Heart Disease Mother     Osteoporosis Mother     Kidney Disease Father     Prostate Cancer Brother        Vitals:  /61   Pulse 68   Temp 97.5 °F (36.4 °C) (Oral)   Resp 19   Ht 5' 5\" (1.651 m)   Wt 251 lb 8.7 oz (114.1 kg)   SpO2 96%   BMI 41.86 kg/m²   Temp (24hrs), Av.7 °F (36.5 °C), Min:97.5 °F (36.4 °C), Max:97.9 °F (36.6 °C)    No results for input(s): POCGLU in the last 72 hours. I/O (24Hr):     Intake/Output Summary (Last 24 hours) at 2022 0904  Last data filed at 2022 2349  Gross per 24 hour   Intake --   Output 4000 ml   Net -4000 ml       Labs:  Hematology:  Recent Labs     22  1850 22  0111 22  0535 22  0546   WBC  --  17.0* 12.1* 13.6*   RBC  --  2.49* 2.57* 2.52*   HGB  --  7.5* 7.7* 7.4*   HCT  --  23.5* 25.3* 24.2*   MCV  --  94.4 98.4 96.0   MCH  --  30.1 30.0 29.4   MCHC  --  31.9 30.4 30.6   RDW  --  15.4* 15.3* 14.8*   PLT  --  345 345 406   MPV  --  9.6 10.7 10.2   .0*  --   --   --    DDIMER  --  1.19  --   --      Chemistry:  Recent Labs     04/02/22  1850 04/03/22  0111 04/04/22  0535 04/05/22  0546   NA  --  129* 133* 136   K  --  3.8 4.4 4.1   CL  --  94* 98 98   CO2  --  22 21 22   GLUCOSE  --  141* 162* 144*   BUN  --  48* 55* 73*   CREATININE  --  3.24* 3.13* 3.51*   MG  --   --  2.5 2.5   ANIONGAP  --  13 14 16   LABGLOM  --  14* 14* 13*   GFRAA  --  17* 18* 15*   CALCIUM  --  8.9 9.2 9.2   PHOS 3.7  --   --   --      Lab Results   Component Value Date/Time    SPECIAL 5 ML RIGHT [facility] 04/02/2022 09:09 AM     Lab Results   Component Value Date/Time    CULTURE Unable to perform testing: No specimen received. 04/02/2022 10:46 PM       Radiology:  XR FOOT RIGHT (MIN 3 VIEWS)    Result Date: 4/3/2022  Osteopenia degenerative changes and significant soft tissue swelling are noted without acute fracture. RECOMMENDATION: Follow-up studies as clinically indicated. XR CHEST PORTABLE    Result Date: 3/31/2022  No acute cardiopulmonary process     CT FOOT RIGHT WO CONTRAST    Result Date: 4/4/2022  1. Subcutaneous fat stranding of the ankle and foot compatible with bland edema or cellulitis. No abscess or soft tissue gas. 2. No evidence of osteomyelitis or other acute osseous abnormality. Physical Examination:        General appearance:  alert, cantankerous in no acute distress  Mental Status:  oriented to person, place and time and normal affect  Lungs:  clear to auscultation bilaterally, normal effort  Heart:  regular rate and rhythm, no murmur  Abdomen:  soft, nontender, nondistended, normal bowel sounds, no masses, hepatomegaly, splenomegaly  Extremities: Bilateral upper and lower extremity lymphedema, without redness, tenderness in the calves.   Right   Skin:  no gross lesions, rashes, induration    Assessment:        Hospital Problems           Last Modified POA    * (Principal) New onset of congestive heart failure ([facility] [address] 4/1/2022 Yes    SIRS (systemic inflammatory response syndrome) ([facility] [address] 4/2/2022 No    Lymphedema 4/1/2022 Yes    DIONY treated with BiPAP 4/2/2022 Yes    Normocytic normochromic anemia 4/2/2022 Yes    Essential hypertension 4/2/2022 Yes    Morbid obesity due to excess calories ([facility] [address] 4/2/2022 Yes    [facility] superimposed on CKD [facility] 4/2/2022 Yes    New onset a-fib ([facility] with Rapid ventricular response 4/2/2022 Yes    GERD (gastroesophageal reflux disease) 4/2/2022 Yes    Restless leg syndrome 4/2/2022 Yes          Plan:        New onset A. fib with RVR:   - Heart rate stable. - Patient continues on p.o. amio and Lopressor, heparin transitioned to Eliquis. - Left atrial dimension 4.4 cm.    - Metoprolol decreased from 50 mg twice daily to 25 mg twice daily given bradycardia. - Cardiology signed off    Right-sided heart failure:   - Diuretics on hold, fluid restriction continues. - Echo reviewed showing an ejection fraction of 55% with RVSP 28 mmHg. Chronic lymphedema/bilateral lower extremity pain/RLS/possible gouty flare:   - Encouraged to follow-up at lymphedema clinic.    - Multimodal pain management including gabapentin, Requip.    - Uric acid within normal limits. - Patient working with physical and occupational therapy today, use bariatric rolling walker and ambulated 5 feet the first time 12 feet the second time  - pt declined podiatry consult    JOSE ARMANDO on CKD stage IIIb:   - Worsening, nephrology following.    - Appreciate recommendations. - Diuretics currently on hold    Right lower extremity cellulitis:   - Questionable on admission.    - Patient initiated on cefepime, Discontinue. PO clinda    - CT reviewed negative for osteo or abscess. - Continue steroids  - BCX2 NGTD    Anxiety: Continue Xanax.     Iron deficiency anemia:   - Continue ferrous sulfate.   - hemoglobin low, but still greater than 7    Fasting hyperglycemia:   - Secondary to steroid admin.    - Hemoglobin A1c from 3/9/2022 at 5.5    Morbid obesity with underlying DIONY:   - Continue CPAP nightly. - Lifestyle modification and    GI/DVT prophylaxis Protonix, Eliquis    Dispo: Likely will need skilled nursing facility once renal function improves      On this date 04/05/22 I have spent 24 mins  in direct patient care, reviewing notes, test results and diagnosis and plan of care discussions with the patient, as well as coordination of care.   Plan of care made with MD Neeraj Gann APRN - NP  4/5/2022  9:04 AM

## 2022-04-05 NOTE — PROGRESS NOTES
Nephrology Progress Note    Patient:  Becca Lorenzo; 69 y.o. MRN# [id number]  Location:    Attending:  Elmer Gross MD  Admit Date:  3/31/2022   Hospital Day: 4    Subjective   Patient initially admitted on  for CHF/new onset A. fib with RVR. We were consulted for acute on chronic volume overload/CKD stage III. Patient follows with Dr. Lyly Dave as an outpatient with a baseline CRT between 2.2 -2.6, admitted at 3.15    Vitals stable. In normal sinus rhythm. Creatinine slightly trending up 3.13-> 3.51. Was started on Demadex yesterday. Also received dose today  No acute issues as per RN overnight  Getting IV antibiotics for possible cellulitis.     Intake/Output Summary (Last 24 hours) at 2022 0942  Last data filed at 2022 2349  Gross per 24 hour   Intake --   Output 4000 ml   Net -4000 ml       Objective   VS: /61   Pulse 68   Temp 97.5 °F (36.4 °C) (Oral)   Resp 19   Ht 5' 5\" (1.651 m)   Wt 251 lb 8.7 oz (114.1 kg)   SpO2 96%   BMI 41.86 kg/m²   MAXIMUM TEMPERATURE OVER 24 HRS:  Temp (24hrs), Av.7 °F (36.5 °C), Min:97.5 °F (36.4 °C), Max:97.9 °F (36.6 °C)    24 HR BLOOD PRESSURE RANGE:  Systolic (51FDD), SAF:325 , Min:105 , UKU:253   ; Diastolic (76SOD), THR:78, Min:53, Max:86    24 HR INTAKE/OUTPUT:      Intake/Output Summary (Last 24 hours) at 2022 0624  Last data filed at 2022 2349  Gross per 24 hour   Intake --   Output 4000 ml   Net -4000 ml     WEIGHT:   Patient Vitals for the past 96 hrs (Last 3 readings):   Weight   22 0551 251 lb 8.7 oz (114.1 kg)       Current Medications    Scheduled Meds:    [Held by provider] torsemide  10 mg Oral Daily    [Held by provider] furosemide  40 mg IntraVENous Daily    apixaban  5 mg Oral BID    predniSONE  40 mg Oral Daily    gabapentin  100 mg Oral Daily    amiodarone  200 mg Oral Daily    cefepime  1,000 mg IntraVENous Q12H    allopurinol  100 mg Oral Daily    atorvastatin  40 mg Oral Daily    pantoprazole  40 mg Oral Daily    sodium chloride flush  5-40 mL IntraVENous 2 times per day    metoprolol tartrate  50 mg Oral BID    rOPINIRole  0.25 mg Oral Nightly    ferrous sulfate  325 mg Oral Daily with breakfast     Continuous Infusions:    sodium chloride         Physical Examination     General:  AAO x 3, speaking in full sentences, no accessory muscle use. Chest:   Bilateral vesicular breath sounds, no rales or wheezes. Cardiac:  S1 S2 RR, no murmurs, gallops or rubs, JVP not raised. Abdomen: Obese, Soft, non-tender, non distended, BS audible. SKIN:  No rashes, good skin turgor  Extremities:  Generalized lymphedema in all extremities, pulses present and palpable, no clubbing, No cyanosis  Neuro:  AAO x 3, No FND. Labs      Recent Labs     04/03/22  0111 04/04/22  0535 04/05/22  0546   WBC 17.0* 12.1* 13.6*   RBC 2.49* 2.57* 2.52*   HGB 7.5* 7.7* 7.4*   HCT 23.5* 25.3* 24.2*   MCV 94.4 98.4 96.0   MCH 30.1 30.0 29.4   MCHC 31.9 30.4 30.6   RDW 15.4* 15.3* 14.8*    345 406   MPV 9.6 10.7 10.2      BMP:   Recent Labs     04/03/22  0111 04/04/22  0535 04/05/22  0546   * 133* 136   K 3.8 4.4 4.1   CL 94* 98 98   CO2 22 21 22   BUN 48* 55* 73*   CREATININE 3.24* 3.13* 3.51*   GLUCOSE 141* 162* 144*   CALCIUM 8.9 9.2 9.2      Phosphorus:     Recent Labs     04/02/22  1850   PHOS 3.7     Magnesium:    Recent Labs     04/04/22  0535 04/05/22  0546   MG 2.5 2.5     Albumin:    No results for input(s): LABALBU in the last 72 hours.   PTH:     Lab Results   Component Value Date    IPTH 136.0 12/07/2021         Urinalysis/Chemistries      Lab Results   Component Value Date    NITRU NEGATIVE 04/01/2022    COLORU Yellow 04/01/2022    PHUR 6.5 04/01/2022    WBCUA 0 TO 2 04/01/2022    RBCUA 0 TO 2 04/01/2022    CLARITYU clear 04/18/2017    SPECGRAV 1.010 04/01/2022    LEUKOCYTESUR NEGATIVE 04/01/2022    UROBILINOGEN Normal 04/01/2022    BILIRUBINUR NEGATIVE 04/01/2022    BILIRUBINUR neg 04/18/2017    BLOODU neg 04/18/2017    GLUCOSEU NEGATIVE 04/01/2022    [address] NEGATIVE 04/01/2022       Radiology    XR CHEST PORTABLE    Result Date: 3/31/2022  No acute cardiopulmonary process        Assessment    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 Kunal Miramontes as an outpatient with a baseline CRT between 2.2 - 2.6. Echocardiogram was reviewed. 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 Patient was experiencing more in lower extremity edema than congestive cardiac failure. New onset A. fib with RVR, resolved. Patient is off heparin drip and on Eliquis  Morbid obesity with DIONY  Chronic lymphedema  Lower extremity cellulitis on current antibiotic therapy-> transition to monotherapy cefepime  Iron deficiency anemia, on iron tablets      Plan   We can hold p.o. torsemide  repeat BMP in AM.  Received 2 doses of torsemide yesterday and today  Aim to keep systolic blood pressure greater than 110  Continue to hold Cozaar  We will continue to follow    Nutrition   Renal Diet/TF  Thank you. Please call with any questions. Ernesto Narvaez MD  PGY-3 Internal Medicine Resident  [address] [place] [address]  4/5/2022 9:42 AM    Attending Physician Statement  I have discussed the care of Harle Moritz, including pertinent history and exam findings,  with the Fellow/Residentt. I have reviewed the key elements of all parts of the encounter with the Fellow/ Resident. I agree with the assessment, plan and orders as documented by the resident.     Rodney yLnn MD MD, MRCP Jessica Easley, FACP   4/5/2022 2:15 PM    Nephrology [address] [place] [address] Drive

## 2022-04-05 NOTE — PROGRESS NOTES
Physical Therapy  Facility/Department: [facility] CAR 2  Daily Treatment Note  NAME: Isi Sexton  : 1945  MRN: [id number]    Date of Service: 2022    Discharge Recommendations:  Patient would benefit from continued therapy after discharge   PT Equipment Recommendations  Equipment Needed: No  Other: CTA, pt reports she owns rollator, currently requires significant physical assistance    Assessment   Body structures, Functions, Activity limitations: Decreased functional mobility ; Decreased balance;Decreased strength;Decreased endurance  Assessment: Pt  required Whit x2  for STS with use of bariatric RW. Pt ambulated 5ft x1 then additional 12ft x1 with Whit x2 and close chair follow. Pt fatigued quickly with amb and c/o increased pain in lateral R foot when WBing. Pt motivated to improve and cooperative t/o treatment but limited by anxiety re: mobility. Would benefit from continued PT to address B LE weakness and increase independence to progress toward PLOF  Treatment Diagnosis: LE pain, difficulty walking  Prognosis: Fair  PT Education: Goals; General Safety;PT Role;Functional Mobility Training;Home Exercise Program;Transfer Training  REQUIRES PT FOLLOW UP: Yes  Activity Tolerance  Activity Tolerance: Patient limited by fatigue;Patient limited by endurance     Patient Diagnosis(es): The primary encounter diagnosis was Cellulitis of left lower extremity. Diagnoses of Cellulitis of right lower extremity and JOSE ARMANDO (acute kidney injury) ([address] were also pertinent to this visit. has a past medical history of Abnormal stress test, Anemia, Arthritis, Depression, Diverticulosis, DM (diabetes mellitus) ([address] Erythropenia, Fibromyalgia, HTN (hypertension), Hyperlipidemia, Kidney stone, Morbid obesity due to excess calories ([address] DIONY on CPAP, Osteopenia, Seasonal allergies, and Sleep apnea. has a past surgical history that includes Colonoscopy (); Colonoscopy (); Tubal ligation; Arm Surgery ();  Total knee arthroplasty (Bilateral); Cardiac catheterization ([id number] dr Houston Garcia); and Cardiac catheterization (5-16-16). Restrictions  Restrictions/Precautions  Restrictions/Precautions: Fall Risk  Required Braces or Orthoses?: No  Position Activity Restriction  Other position/activity restrictions: Up with assistance  Subjective   General  Response To Previous Treatment: Patient with no complaints from previous session. Family / Caregiver Present: Yes (OT present for Eval)  Subjective  Subjective: Pt and Rn agreeable to PT. Pt seated in recliner with OT present upon arrival, pleasant and cooperative t/o treatment. Pt remains anxious re: mobility but much improved from previous date this writer  General Comment  Comments: Pt left in recliner with call light within reach, alarm activated  Pain Screening  Patient Currently in Pain: Yes  Pain Assessment  Pain Assessment: 0-10  Pain Level: 4  Pain Type: Chronic pain  Pain Location: Foot  Pain Orientation: Right;Left (R>L)  Pain Descriptors: Sore  Pain Frequency: Continuous  Pain Onset: On-going  Clinical Progression: Gradually improving  Functional Pain Assessment: Prevents or interferes some active activities and ADLs  Non-Pharmaceutical Pain Intervention(s): Ambulation/Increased Activity; Distraction;Repositioned; Therapeutic presence  Response to Pain Intervention: Patient Satisfied  Vital Signs  Patient Currently in Pain: Yes       Orientation  Orientation  Overall Orientation Status: Within Functional Limits  Cognition      Objective   Bed mobility  Comment: not assessed, pt in reclienr upon arrivl and left in chair after amb  Transfers  Sit to Stand: Minimal Assistance;2 Person Assistance  Stand to sit: Minimal Assistance;2 Person Assistance  Comment: STS transfer complete x2 trials with bariatric RW, pt had stood multiple times prior to PTA arrival in [facility] with OT  Ambulation  Ambulation?: Yes  Ambulation 1  Surface: level tile  Device: Rolling Walker  Other Apparatus: Wheelchair follow  Assistance: Minimal assistance;2 Person assistance  Quality of Gait: waddling gait, decreased foot clearance bilat  Gait Deviations: Slow Julianne; Shuffles; Increased MERE; Decreased step length  Distance: 5ft x1, 12ft x1  Comments: apparent effort noted, pt grossly steady with bariatric RW but only able to walk shortdistance before needing to sit down  Stairs/Curb  Stairs?: No     Balance  Posture: Good  Sitting - Static: Good  Sitting - Dynamic: Good  Standing - Static: Fair  Standing - Dynamic: Fair  Comments: standing balance assessed with bariatric  Exercises  Comments: ther exs deferred this date per pt request to allow her to bath with RN and aide     AM-PAC Score  AM-PAC Inpatient Mobility Raw Score : 15 (04/05/22 1312)  AM-PAC Inpatient T-Scale Score : 39.45 (04/05/22 1312)  Mobility Inpatient CMS 0-100% Score: 57.7 (04/05/22 1312)  Mobility Inpatient CMS G-Code Modifier : CK (04/05/22 1312)    Goals  Short term goals  Time Frame for Short term goals: 14 days  Short term goal 1: Pt to perform bed mob with min A. Short term goal 2: Pt to tolerate 20-30 mins ther ex/act for improved strength and endurance. Short term goal 3: Pt to transfer sit to stand with mod A +1. Short term goal 4: Pt to ambulate 10ft with RW and no LOB>  Patient Goals   Patient goals : home following discharge    Plan    Plan  Times per week: 5-6 x week  Times per day: Daily  Current Treatment Recommendations: Strengthening,Neuromuscular Re-education,Home Exercise Program,Safety Education & Training,Balance Training,Endurance Training,Patient/Caregiver Education & Training,Functional Mobility Training,Transfer Training,Gait Training,Stair training  Safety Devices  Type of devices:  All fall risk precautions in place,Call light within reach,Chair alarm in place,Gait belt,Left in chair,Nurse notified  Restraints  Initially in place: No     Therapy Time   Individual Concurrent Group Co-treatment   Time In 1012 Time Out 1030         Minutes 18         Timed Code Treatment Minutes: [address] [place] [address]

## 2022-04-05 NOTE — PROGRESS NOTES
Occupational Therapy   Occupational Therapy Initial Assessment  Date: 2022   Patient Name: Surendra Villegas  MRN: [id number]     : 1945    Date of Service: 2022       Chief Complaint   Patient presents with    Leg Swelling       Discharge Recommendations:  Patient would benefit from continued therapy after discharge      Assessment   Performance deficits / Impairments: Decreased functional mobility ; Decreased ADL status; Decreased safe awareness;Decreased high-level IADLs;Decreased balance;Decreased endurance  Assessment: pt currently limited in performing ADL tasks and functional transfers/functional mobility due to above noted deficits, most significantly decreased activity tolerance. Pt to benefit from continued therapy services while hospitalized and at discharge to maximize pt's safety and independence in performing functional tasks. Pt to require 24hr assistance, including physical assistance, and continued skilled therapy services at discharge. Prognosis: Good  Decision Making: Medium Complexity  OT Education: OT Role;Plan of Care;ADL Adaptive Strategies;Transfer Training;Precautions; Equipment (Activity Promotion, Safety with Transfers/Use of RW/Use of Lucie Mungo, Bed Mobility Techniques, Pursed Lip Breathing Techniques; good return)  REQUIRES OT FOLLOW UP: Yes  Activity Tolerance  Activity Tolerance: Patient limited by fatigue;Patient limited by pain  Safety Devices  Safety Devices in place: Yes  Type of devices: Call light within reach;Nurse notified; Left in chair  Restraints  Initially in place: No           Patient Diagnosis(es): The primary encounter diagnosis was Cellulitis of left lower extremity. Diagnoses of Cellulitis of right lower extremity and JOSE ARMANDO (acute kidney injury) ([facility] [address] were also pertinent to this visit.      has a past medical history of Abnormal stress test, Anemia, Arthritis, Depression, Diverticulosis, DM (diabetes mellitus) ([address] Erythropenia, Fibromyalgia, HTN (hypertension), Hyperlipidemia, Kidney stone, Morbid obesity due to excess calories ([address] DIONY on CPAP, Osteopenia, Seasonal allergies, and Sleep apnea. has a past surgical history that includes Colonoscopy (2003); Colonoscopy (2007); Tubal ligation; Arm Surgery (2011); Total knee arthroplasty (Bilateral); Cardiac catheterization ([id number] dr Jonas Zheng); and Cardiac catheterization (5-16-16). Restrictions  Restrictions/Precautions  Restrictions/Precautions: Fall Risk  Required Braces or Orthoses?: No  Position Activity Restriction  Other position/activity restrictions: Up with assistance    Subjective   General  Patient assessed for rehabilitation services?: Yes  Family / Caregiver Present: No  General Comment  Comments: RN ok'd for therapy this AM. Pt agreeable to participate in session and pleasant/cooperative throughout.   Patient Currently in Pain: Yes (7/10 pain to the bottom of bilateral feet)    Social/Functional History  Social/Functional History  Lives With: Spouse  Type of Home: House  Home Layout: One level  Home Access: Stairs to enter without rails  Entrance Stairs - Number of Steps: 3  Bathroom Shower/Tub: Walk-in shower  Bathroom Toilet: Handicap height  Bathroom Equipment:  (shower bench)  Bathroom Accessibility: Accessible  Home Equipment: Cane,Rolling walker,4 wheeled walker  Receives Help From: Family (Pt reports supportive  and children)  ADL Assistance: Independent  Homemaking Assistance: Independent  Homemaking Responsibilities: Yes  Meal Prep Responsibility: Primary (pt reports only very simple meal prep tasks, pt states mostly ordering meals to be delivered or going out to eat)  Laundry Responsibility: Primary  Cleaning Responsibility: Primary (children assist PRN)  Shopping Responsibility: Primary  Health Care Management: Primary  Ambulation Assistance: Independent (pt reports use of cane or RW within the home for household distances, pt states use of cane when ascending/descending the stairs, pt reports use of 4WW for community distances)  Transfer Assistance: Independent  Active : Yes  Mode of Transportation: Car  Occupation: Retired  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, , pharmacy tech  Leisure & Hobbies: Likes to go for drives in the car and go to [employer]       Objective   Vision: Impaired  Vision Exceptions: Wears glasses for reading  Hearing: Within functional limits    Orientation  Overall Orientation Status: Within Functional Limits        Balance  Sitting Balance: Stand by assistance (seated EOB ~12 minutes, seated on toilet ~5 minutes, seated at edge of recliner chair ~20 minutes)  Standing Balance: Minimal assistance (x2)  Standing Balance  Time: ~2-4 minute bouts, ~6 bouts total  Activity: static standing in maddy stedy during brief mngt and pericare/bottom hygiene; static standing and functional mobility with use of RW  Comment: CGA in maddy stedy; min A x2 using RW. Increased time/effort to perform as pt with slow pace and report of fatigue following minimal exertion, prolonged seated rest breaks required between bouts of standing/mobility. Pt required VCs for breathing techniques throughout. Pt mildly unsteady however no true LOB.     Toilet Transfers  Toilet - Technique:  (maddy stedy)  Equipment Used: Standard toilet (using maddy stedy bar for support)  Toilet Transfer: Contact guard assistance     ADL  Feeding: Independent  Grooming: Increased time to complete;Contact guard assistance  UE Bathing: Increased time to complete;Minimal assistance  LE Bathing: Increased time to complete;Minimal assistance  UE Dressing: Increased time to complete;Minimal assistance  LE Dressing: Increased time to complete;Minimal assistance (seated EOB to don socks)  Toileting: Increased time to complete;Minimal assistance (for brief mngt, pericare/bottom hygiene, and toilet transfer; use of grab bars/maddy stedy, increased time/effort to perform, VCs for sequencing/safety awareness)        Bed mobility  Supine to Sit: Contact guard assistance (HOB raised ~15* and use of bed rail)  Sit to Supine:  (pt retired up to chair at end of session)  Scooting: Contact guard assistance  Comment: Increased time/effort to perform     Transfers  Sit to stand: Minimal assistance;2 Person assistance  Stand to sit: Minimal assistance;2 Person assistance  Transfer Comments: Min VCs for proper hand placement/sequencing/safety awareness with use of maddy stedy and use of RW. Pt performed 3x sit <> stand transfers at [facility] with use of maddy stedy from EOB, toilet, and chair; pt dependently transferred from various surfaces using maddy stedy. Pt performed 2x sit <> stand transfers with min A x2 using RW. Pt required increased time/effort to perform all functional transfers this date. Pt particular regarding equipment setup and techniques to perform.         Cognition  Overall Cognitive Status: Exceptions  Attention Span: Attends with cues to redirect  Safety Judgement: Decreased awareness of need for assistance  Problem Solving: Assistance required to generate solutions;Assistance required to correct errors made;Decreased awareness of errors  Insights: Decreased awareness of deficits  Initiation: Requires cues for some  Sequencing: Requires cues for some        LUE AROM (degrees)  LUE AROM : WFL  Left Hand AROM (degrees)  Left Hand AROM: WFL  RUE AROM (degrees)  RUE AROM : WFL  Right Hand AROM (degrees)  Right Hand AROM: WFL  LUE Strength  Gross LUE Strength: WFL  RUE Strength  Gross RUE Strength: WFL           Plan   Plan  Times per week: 3-5x/wk  Current Treatment Recommendations: Balance Training,Functional Mobility Training,Endurance Training,Safety Education & Training,Self-Care / ADL,Equipment Evaluation, Education, & procurement,Patient/Caregiver Education & Training,Home Management Training      AM-PAC Score   AM-PAC Inpatient Daily Activity Raw Score: 16 (04/05/22 1559)  AM-PAC Inpatient ADL T-Scale Score : 35.96 (04/05/22 1559)  ADL Inpatient CMS 0-100% Score: 53.32 (04/05/22 1559)  ADL Inpatient CMS G-Code Modifier : CK (04/05/22 1559)    Goals  Short term goals  Time Frame for Short term goals: Pt will, by discharge:  Short term goal 1: Perform ADL tasks with mod IND using AE/DME PRN  Short term goal 2: Peform functional transfers/functional mobility with mod IND using least restrictive AD  Short term goal 3:  Independently demo good safety awareness during engagement in all ADLs and functional transfers/functional mobility  Short term goal 4: Demo 8+ minutes standing tolerance with use of least restrictive AD for increased participation in ADL/IADL tasks       Therapy Time   Individual Concurrent Group Co-treatment   Time In 0938         Time Out 1036         Minutes 58         Timed Code Treatment Minutes: 3 Route LEANNA Squires/L

## 2022-04-05 NOTE — PLAN OF CARE
Problem: Falls - Risk of:  Goal: Will remain free from falls  Description: Will remain free from falls  Outcome: Ongoing     Problem: Nutritional:  Goal: Consumption of liquid of appropriate consistency  Description: Consumption of liquid of appropriate consistency  Outcome: Ongoing     Problem: Respiratory:  Goal: Will remain free from infection  Description: Will remain free from infection  Outcome: Ongoing     Problem: Pain:  Goal: Control of acute pain  Description: Control of acute pain  Outcome: Ongoing     Problem: Skin Integrity:  Goal: Absence of new skin breakdown  Description: Absence of new skin breakdown  Outcome: Ongoing     Problem: Musculor/Skeletal Functional Status  Goal: Highest potential functional level  Outcome: Ongoing

## 2022-04-05 NOTE — PROGRESS NOTES
Pt informed writer of Ambien use at home for insomnia, Ambien noted on home medication list, NP made aware.

## 2022-04-06 LAB
ABSOLUTE EOS #: <0.03 K/UL (ref 0–0.44)
ABSOLUTE IMMATURE GRANULOCYTE: 0.13 K/UL (ref 0–0.3)
ABSOLUTE LYMPH #: 1.46 K/UL (ref 1.1–3.7)
ABSOLUTE MONO #: 1.07 K/UL (ref 0.1–1.2)
ANION GAP SERPL CALCULATED.3IONS-SCNC: 15 MMOL/L (ref 9–17)
ANION GAP SERPL CALCULATED.3IONS-SCNC: 17 MMOL/L (ref 9–17)
BASOPHILS # BLD: 0 % (ref 0–2)
BASOPHILS ABSOLUTE: <0.03 K/UL (ref 0–0.2)
BUN BLDV-MCNC: 85 MG/DL (ref 8–23)
BUN BLDV-MCNC: 88 MG/DL (ref 8–23)
CALCIUM SERPL-MCNC: 9.2 MG/DL (ref 8.6–10.4)
CALCIUM SERPL-MCNC: 9.3 MG/DL (ref 8.6–10.4)
CHLORIDE BLD-SCNC: 103 MMOL/L (ref 98–107)
CHLORIDE BLD-SCNC: 98 MMOL/L (ref 98–107)
CO2: 19 MMOL/L (ref 20–31)
CO2: 21 MMOL/L (ref 20–31)
CREAT SERPL-MCNC: 3.74 MG/DL (ref 0.5–0.9)
CREAT SERPL-MCNC: 3.93 MG/DL (ref 0.5–0.9)
EOSINOPHILS RELATIVE PERCENT: 0 % (ref 1–4)
GFR AFRICAN AMERICAN: 13 ML/MIN
GFR AFRICAN AMERICAN: 14 ML/MIN
GFR NON-AFRICAN AMERICAN: 11 ML/MIN
GFR NON-AFRICAN AMERICAN: 12 ML/MIN
GFR SERPL CREATININE-BSD FRML MDRD: ABNORMAL ML/MIN/{1.73_M2}
GFR SERPL CREATININE-BSD FRML MDRD: ABNORMAL ML/MIN/{1.73_M2}
GLUCOSE BLD-MCNC: 156 MG/DL (ref 70–99)
GLUCOSE BLD-MCNC: 280 MG/DL (ref 70–99)
HCT VFR BLD CALC: 23.9 % (ref 36.3–47.1)
HEMOGLOBIN: 7.2 G/DL (ref 11.9–15.1)
IMMATURE GRANULOCYTES: 1 %
LYMPHOCYTES # BLD: 13 % (ref 24–43)
MAGNESIUM: 2.5 MG/DL (ref 1.6–2.6)
MCH RBC QN AUTO: 29.1 PG (ref 25.2–33.5)
MCHC RBC AUTO-ENTMCNC: 30.1 G/DL (ref 28.4–34.8)
MCV RBC AUTO: 96.8 FL (ref 82.6–102.9)
MONOCYTES # BLD: 9 % (ref 3–12)
NRBC AUTOMATED: 0 PER 100 WBC
P E INTERPRETATION, U: NORMAL
PATHOLOGIST: NORMAL
PDW BLD-RTO: 14.8 % (ref 11.8–14.4)
PLATELET # BLD: 394 K/UL (ref 138–453)
PMV BLD AUTO: 10.3 FL (ref 8.1–13.5)
POTASSIUM SERPL-SCNC: 4.3 MMOL/L (ref 3.7–5.3)
POTASSIUM SERPL-SCNC: 4.6 MMOL/L (ref 3.7–5.3)
RBC # BLD: 2.47 M/UL (ref 3.95–5.11)
RBC # BLD: ABNORMAL 10*6/UL
SEG NEUTROPHILS: 77 % (ref 36–65)
SEGMENTED NEUTROPHILS ABSOLUTE COUNT: 8.92 K/UL (ref 1.5–8.1)
SODIUM BLD-SCNC: 134 MMOL/L (ref 135–144)
SODIUM BLD-SCNC: 139 MMOL/L (ref 135–144)
SPECIMEN TYPE: NORMAL
URINE IFX INTERP: NORMAL
URINE IFX SPECIMEN: NORMAL
URINE TOTAL PROTEIN: 24 MG/DL
URINE TOTAL PROTEIN: 24 MG/DL
WBC # BLD: 11.6 K/UL (ref 3.5–11.3)

## 2022-04-06 PROCEDURE — 99232 SBSQ HOSP IP/OBS MODERATE 35: CPT | Performed by: INTERNAL MEDICINE

## 2022-04-06 PROCEDURE — 2580000003 HC RX 258: Performed by: STUDENT IN AN ORGANIZED HEALTH CARE EDUCATION/TRAINING PROGRAM

## 2022-04-06 PROCEDURE — 83735 ASSAY OF MAGNESIUM: CPT

## 2022-04-06 PROCEDURE — 6370000000 HC RX 637 (ALT 250 FOR IP): Performed by: INTERNAL MEDICINE

## 2022-04-06 PROCEDURE — 85025 COMPLETE CBC W/AUTO DIFF WBC: CPT

## 2022-04-06 PROCEDURE — 36415 COLL VENOUS BLD VENIPUNCTURE: CPT

## 2022-04-06 PROCEDURE — 6370000000 HC RX 637 (ALT 250 FOR IP): Performed by: NURSE PRACTITIONER

## 2022-04-06 PROCEDURE — 97116 GAIT TRAINING THERAPY: CPT

## 2022-04-06 PROCEDURE — 2060000000 HC ICU INTERMEDIATE R&B

## 2022-04-06 PROCEDURE — 2580000003 HC RX 258: Performed by: NURSE PRACTITIONER

## 2022-04-06 PROCEDURE — 99232 SBSQ HOSP IP/OBS MODERATE 35: CPT | Performed by: NURSE PRACTITIONER

## 2022-04-06 PROCEDURE — 80048 BASIC METABOLIC PNL TOTAL CA: CPT

## 2022-04-06 PROCEDURE — 97110 THERAPEUTIC EXERCISES: CPT

## 2022-04-06 PROCEDURE — 2580000003 HC RX 258: Performed by: INTERNAL MEDICINE

## 2022-04-06 RX ORDER — SENNA PLUS 8.6 MG/1
1 TABLET ORAL NIGHTLY
Status: DISCONTINUED | OUTPATIENT
Start: 2022-04-06 | End: 2022-04-07 | Stop reason: HOSPADM

## 2022-04-06 RX ORDER — SODIUM CHLORIDE 9 MG/ML
INJECTION, SOLUTION INTRAVENOUS CONTINUOUS
Status: ACTIVE | OUTPATIENT
Start: 2022-04-06 | End: 2022-04-06

## 2022-04-06 RX ADMIN — GABAPENTIN 100 MG: 100 CAPSULE ORAL at 08:33

## 2022-04-06 RX ADMIN — ZOLPIDEM TARTRATE 5 MG: 5 TABLET ORAL at 22:30

## 2022-04-06 RX ADMIN — SODIUM CHLORIDE: 9 INJECTION, SOLUTION INTRAVENOUS at 08:48

## 2022-04-06 RX ADMIN — METOPROLOL TARTRATE 12.5 MG: 25 TABLET ORAL at 21:04

## 2022-04-06 RX ADMIN — APIXABAN 5 MG: 5 TABLET, FILM COATED ORAL at 08:31

## 2022-04-06 RX ADMIN — SODIUM CHLORIDE: 9 INJECTION, SOLUTION INTRAVENOUS at 15:27

## 2022-04-06 RX ADMIN — APIXABAN 5 MG: 5 TABLET, FILM COATED ORAL at 21:04

## 2022-04-06 RX ADMIN — CLINDAMYCIN HYDROCHLORIDE 150 MG: 150 CAPSULE ORAL at 15:33

## 2022-04-06 RX ADMIN — ATORVASTATIN CALCIUM 40 MG: 80 TABLET, FILM COATED ORAL at 21:04

## 2022-04-06 RX ADMIN — ROPINIROLE HYDROCHLORIDE 0.25 MG: 0.25 TABLET, FILM COATED ORAL at 21:04

## 2022-04-06 RX ADMIN — PANTOPRAZOLE SODIUM 40 MG: 40 TABLET, DELAYED RELEASE ORAL at 08:32

## 2022-04-06 RX ADMIN — HYDROCODONE BITARTRATE AND ACETAMINOPHEN 1 TABLET: 5; 325 TABLET ORAL at 11:23

## 2022-04-06 RX ADMIN — SODIUM CHLORIDE, PRESERVATIVE FREE 10 ML: 5 INJECTION INTRAVENOUS at 08:34

## 2022-04-06 RX ADMIN — ALLOPURINOL 100 MG: 100 TABLET ORAL at 08:31

## 2022-04-06 RX ADMIN — SENNOSIDES 8.6 MG: 8.6 TABLET, COATED ORAL at 15:33

## 2022-04-06 RX ADMIN — CYCLOBENZAPRINE 10 MG: 10 TABLET, FILM COATED ORAL at 15:33

## 2022-04-06 RX ADMIN — CLINDAMYCIN HYDROCHLORIDE 150 MG: 150 CAPSULE ORAL at 08:31

## 2022-04-06 RX ADMIN — PREDNISONE 40 MG: 20 TABLET ORAL at 08:33

## 2022-04-06 RX ADMIN — FERROUS SULFATE TAB EC 325 MG (65 MG FE EQUIVALENT) 325 MG: 325 (65 FE) TABLET DELAYED RESPONSE at 08:33

## 2022-04-06 RX ADMIN — CLINDAMYCIN HYDROCHLORIDE 150 MG: 150 CAPSULE ORAL at 21:04

## 2022-04-06 RX ADMIN — SODIUM CHLORIDE, PRESERVATIVE FREE 10 ML: 5 INJECTION INTRAVENOUS at 21:03

## 2022-04-06 ASSESSMENT — PAIN SCALES - GENERAL
PAINLEVEL_OUTOF10: 0
PAINLEVEL_OUTOF10: 4

## 2022-04-06 NOTE — PROGRESS NOTES
[facility]  Office: [address], DO, Lizeth Storey, DO, Chris Morales, DO, Alex Peralta, DO, Carrie Muñiz MD, Lloyd Hyde MD, Kiesha Olivas MD, Andrew Villanueva MD, Jerome Fleming MD, Radha Mcgrath MD, Yajaira Garcia MD, Thad Pritchard, DO, Artemio Hale, DO, Jimmy Sifuentes MD,  Lisa Mcduffie, DO, Jhon Rodriguez MD, Jaguar Adams MD, Magi Lujan MD, Harpreet Viveros DO, Lisa Resendiz MD, Faviola Petit MD, Fernanda Del Rio, [facility], [facility] Robineelam, CNP, Sirena Valdivia, CNP, Muna Martell, CNP, Brittany Pagan, CNP, Yohan Berkowitz, [facility], Diego Conley, CNP, Marine Oliveira PARobertoC, Veronica Interiano, DNP, Ras Farmer, CNP, Jacklyn Frankel, CNP, Subha Valdes, [facility], Nohemy Jensen, [facility], Hugo Phoenix, [facility], Hunter Almanzar, CNP, Mouna West, CNP, Preston Reis, [facility]    Progress Note    4/6/2022    8:29 AM    Name:   Kali Chávez  MRN:     [id number]     Acct:      [de-identified]   Room:   2015/2015-01  IP Day:  5  Admit Date:  3/31/2022  9:20 PM    PCP:   Lee Judge MD  Code Status:  Full Code    Subjective:     C/C:   Chief Complaint   Patient presents with    Leg Swelling     Interval History Status: improved. Patient evaluated in room, diuretics continue to be on hold. Renal function worsening slightly today, trial initiation of IV fluids started, follow-up BMP ordered  Heart rate continues to be low, adjusted beta-blocker yesterday, will continue to adjust    Brief History: This is a 52-year-old female who presents to the emergency department for evaluation of worsening bilateral lower extremity edema. Initially thought to be CHF exacerbation, ruled out. patient was found to be in A. fib with RVR which is new for her. Transitioned from heparin drip to Eliquis with good results and po amio.   As for the bilateral lower extremity swelling and pain, multiple images were completed, negative for acute process. Follow-up CT of her right foot resulting today showed no osteo or abscess. Course of stay complicated by acute kidney injury on chronic kidney disease. Review of Systems:     Constitutional:  negative for chills, fevers, sweats, + fatigue, + improving bilateral lower extremity  Respiratory:  negative for cough, dyspnea on exertion, shortness of breath, wheezing  Cardiovascular:  negative for chest pain, chest pressure/discomfort, + BL/UE extremity chronic lymphedema, no palpitations  Gastrointestinal:  negative for abdominal pain, constipation, diarrhea, nausea, vomiting  Neurological:  negative for dizziness, headache    Medications: Allergies:     Allergies   Allergen Reactions    Adhesive Tape     Cephalexin Other (See Comments)     Tongue cracks and peels    Erythromycin Other (See Comments)     Epigastric pain and bloating    Ketorolac Tromethamine Other (See Comments)     Severe stomach cramps    Pcn [Penicillins]      Unknown reaction    Percocet [Oxycodone-Acetaminophen] Itching and Other (See Comments)     Esophagus hurt    Sulfa Antibiotics     Ultracet [Tramadol-Acetaminophen] Itching       Current Meds:   Scheduled Meds:    metoprolol tartrate  25 mg Oral BID    clindamycin  150 mg Oral TID    apixaban  5 mg Oral BID    predniSONE  40 mg Oral Daily    gabapentin  100 mg Oral Daily    amiodarone  200 mg Oral Daily    allopurinol  100 mg Oral Daily    atorvastatin  40 mg Oral Daily    pantoprazole  40 mg Oral Daily    sodium chloride flush  5-40 mL IntraVENous 2 times per day    rOPINIRole  0.25 mg Oral Nightly    ferrous sulfate  325 mg Oral Daily with breakfast     Continuous Infusions:    sodium chloride      sodium chloride       PRN Meds: hydrOXYzine, zolpidem, HYDROcodone 5 mg - acetaminophen **OR** HYDROcodone 5 mg - acetaminophen, butalbital-acetaminophen-caffeine, cyclobenzaprine, sodium chloride flush, sodium chloride, ondansetron **OR** ondansetron, acetaminophen **OR** acetaminophen, morphine, ALPRAZolam, magnesium sulfate, potassium chloride **OR** potassium alternative oral replacement **OR** potassium chloride    Data:     Past Medical History:   has a past medical history of Abnormal stress test, Anemia, Arthritis, Depression, Diverticulosis, DM (diabetes mellitus) ([facility] [address] Erythropenia, Fibromyalgia, HTN (hypertension), Hyperlipidemia, Kidney stone, Morbid obesity due to excess calories ([facility] [address] DIONY on CPAP, Osteopenia, Seasonal allergies, and Sleep apnea. Social History:   reports that she quit smoking about 62 years ago. She has a 0.25 pack-year smoking history. She has never used smokeless tobacco. She reports that she does not drink alcohol and does not use drugs. Family History:   Family History   Problem Relation Age of Onset    Diabetes Mother     Heart Disease Mother     Osteoporosis Mother     Kidney Disease Father     Prostate Cancer Brother        Vitals:  /62   Pulse 53   Temp 97.9 °F (36.6 °C) (Oral)   Resp 23   Ht 5' 5\" (1.651 m)   Wt 254 lb 10.1 oz (115.5 kg)   SpO2 96%   BMI 42.37 kg/m²   Temp (24hrs), Av.7 °F (36.5 °C), Min:97.6 °F (36.4 °C), Max:97.9 °F (36.6 °C)    No results for input(s): POCGLU in the last 72 hours. I/O (24Hr):     Intake/Output Summary (Last 24 hours) at 2022 0829  Last data filed at 2022 2045  Gross per 24 hour   Intake 845 ml   Output --   Net 845 ml       Labs:  Hematology:  Recent Labs     22  0535 22  0546 22  0605   WBC 12.1* 13.6* 11.6*   RBC 2.57* 2.52* 2.47*   HGB 7.7* 7.4* 7.2*   HCT 25.3* 24.2* 23.9*   MCV 98.4 96.0 96.8   MCH 30.0 29.4 29.1   MCHC 30.4 30.6 30.1   RDW 15.3* 14.8* 14.8*    406 394   MPV 10.7 10.2 10.3     Chemistry:  Recent Labs     22  0535 22  0546 22  0605   * 136 134*   K 4.4 4.1 4.3   CL 98 98 98   CO2 21 22 21   GLUCOSE 162* 144* 156*   BUN 55* 73* 85*   CREATININE 3.13* 3.51* 3.74*   MG 2.5 2.5 2.5 ANIONGAP 14 16 15   LABGLOM 14* 13* 12*   GFRAA 18* 15* 14*   CALCIUM 9.2 9.2 9.3     Lab Results   Component Value Date/Time    SPECIAL 5 ML RIGHT TAMIRTAR [facility] 04/02/2022 09:09 AM     Lab Results   Component Value Date/Time    CULTURE Unable to perform testing: No specimen received. 04/02/2022 10:46 PM       Radiology:  XR FOOT RIGHT (MIN 3 VIEWS)    Result Date: 4/3/2022  Osteopenia degenerative changes and significant soft tissue swelling are noted without acute fracture. RECOMMENDATION: Follow-up studies as clinically indicated. XR CHEST PORTABLE    Result Date: 3/31/2022  No acute cardiopulmonary process     CT FOOT RIGHT WO CONTRAST    Result Date: 4/4/2022  1. Subcutaneous fat stranding of the ankle and foot compatible with bland edema or cellulitis. No abscess or soft tissue gas. 2. No evidence of osteomyelitis or other acute osseous abnormality. Physical Examination:        General appearance:  Alert, in no acute distress  Mental Status:  oriented to person, place and time and normal affect  Lungs:  clear to auscultation bilaterally, normal effort  Heart: Bradycardic, no murmur  Abdomen:  soft, nontender, nondistended, normal bowel sounds, no masses, hepatomegaly, splenomegaly  Extremities: Bilateral upper and lower extremity lymphedema, without redness, tenderness in the calves.   Right   Skin:  no gross lesions, rashes, induration    Assessment:        Hospital Problems           Last Modified POA    * (Principal) New onset of congestive heart failure ([address] 4/1/2022 Yes    SIRS (systemic inflammatory response syndrome) ([address] 4/2/2022 No    Lymphedema 4/1/2022 Yes    DIONY treated with BiPAP 4/2/2022 Yes    Normocytic normochromic anemia 4/2/2022 Yes    Essential hypertension 4/2/2022 Yes    Morbid obesity due to excess calories ([address] 4/2/2022 Yes    Acute kidney injury superimposed on CKD ([address] 4/2/2022 Yes    New onset a-fib ([address] with Rapid ventricular response 4/2/2022 Yes    GERD (gastroesophageal reflux disease) 4/2/2022 Yes    Restless leg syndrome 4/2/2022 Yes    Cellulitis of left lower extremity 4/5/2022 Yes          Plan:        New onset A. fib with RVR:   - Heart rate stable. - Patient continues on p.o. amio and Lopressor, heparin transitioned to Eliquis. - Left atrial dimension 4.4 cm.    - Metoprolol decreased from 50 mg twice daily to 25 mg twice daily given bradycardia-> further changed to 12.5 mg twice daily with holding parameters  - Cardiology signed off 4/4    Right-sided heart failure:   - Diuretics on hold, fluid restriction continues. - Echo reviewed showing an ejection fraction of 55% with RVSP 28 mmHg. Chronic lymphedema/bilateral lower extremity pain/RLS/possible gouty flare:   - Encouraged to follow-up at lymphedema clinic.    - Multimodal pain management including gabapentin, Requip.    - Uric acid within normal limits. - Patient working with physical and occupational therapy today, use bariatric rolling walker and ambulated 5 feet the first time 12 feet the second time  - pt declined podiatry consult    JOSE ARMANDO on CKD stage IIIb:   - Worsening, nephrology following.    - Appreciate recommendations. - Diuretics currently on hold    Right lower extremity cellulitis:   - Questionable on admission.    - Patient initiated on cefepime, Discontinue. PO clinda    - CT reviewed negative for osteo or abscess. - Continue steroids  - BCX2 NGTD    Anxiety: Continue Xanax. Iron deficiency anemia:   - Continue ferrous sulfate.   - hemoglobin low, but still greater than 7    Fasting hyperglycemia:   - Secondary to steroid admin.    - Hemoglobin A1c from 3/9/2022 at 5.5    Morbid obesity with underlying DIONY:   - Continue CPAP nightly.     - Lifestyle modification and    GI/DVT prophylaxis Protonix, Eliquis    Dispo: Likely will need skilled nursing facility once renal function improves      On this date 04/06/22 I have spent 25 mins  in direct patient care, reviewing notes, test results and diagnosis and plan of care discussions with the patient, as well as coordination of care.   Plan of care made with MD Shruthi Lazaro, APRN - NP  4/6/2022  8:29 AM

## 2022-04-06 NOTE — PLAN OF CARE
Problem: Falls - Risk of:  Goal: Absence of physical injury  Description: Absence of physical injury  Outcome: Ongoing     Problem: Nutritional:  Goal: Consumption of liquid of appropriate consistency  Description: Consumption of liquid of appropriate consistency  Outcome: Ongoing     Problem: Pain:  Goal: Control of acute pain  Description: Control of acute pain  Outcome: Ongoing     Problem: Skin Integrity:  Goal: Absence of new skin breakdown  Description: Absence of new skin breakdown  Outcome: Ongoing

## 2022-04-06 NOTE — PROGRESS NOTES
PATIENT REFUSES TO WEAR BIPAP     [x] Risks and benefits explained to patient   [x] Patient refuses to wear Bipap stating \"No.\"  [x] Patient verbalizes understanding of information presented.

## 2022-04-06 NOTE — CARE COORDINATION
Met with pt to discuss transitional planning. She is returning home with [facility].  She denies needs

## 2022-04-06 NOTE — PROGRESS NOTES
Nephrology Progress Note    Patient:  Rashi Teran; 69 y.o. MRN# [id number]  Location:    Attending:  Dinora Hogan MD  Admit Date:  3/31/2022   Hospital Day: 5    Subjective   Patient initially admitted on  for CHF/new onset A. fib with RVR. We were consulted for acute on chronic volume overload/CKD stage III. Patient follows with Dr. Marylen Gambles as an ou with a baseline CRT between 2.2 -2.6, admitted at 3.15    Patient was seen and examined. No new issues reported today. Does not report any nausea vomiting diarrhea, no shortness of breath or chest pain reported. Vitals stable. In normal sinus rhythm. Creatinine slightly trending up 3.13-> 3.51->3.74. Continue to hold Demadex. Urine output documented as 1.4 in the last 24 hours. No acute issues as per RN overnight  Getting IV antibiotics for possible cellulitis.   Urine output has not been recorded, however as per RN she has been having a decent urine output  Will repeat BMP in the afternoon    Objective   VS: /81   Pulse 54   Temp 97.9 °F (36.6 °C) (Oral)   Resp 22   Ht 5' 5\" (1.651 m)   Wt 254 lb 10.1 oz (115.5 kg)   SpO2 100%   BMI 42.37 kg/m²   MAXIMUM TEMPERATURE OVER 24 HRS:  Temp (24hrs), Av.8 °F (36.6 °C), Min:97.6 °F (36.4 °C), Max:97.9 °F (36.6 °C)    24 HR BLOOD PRESSURE RANGE:  Systolic (66LTG), BGZ:929 , Min:106 , QUL:233   ; Diastolic (73QUG), NDZ:30, Min:53, Max:81    24 HR INTAKE/OUTPUT:      Intake/Output Summary (Last 24 hours) at 2022 1045  Last data filed at 2022 2045  Gross per 24 hour   Intake 845 ml   Output --   Net 845 ml     WEIGHT:   Patient Vitals for the past 96 hrs (Last 3 readings):   Weight   22 0530 254 lb 10.1 oz (115.5 kg)   22 0551 251 lb 8.7 oz (114.1 kg)       Current Medications    Scheduled Meds:    metoprolol tartrate  25 mg Oral BID    clindamycin  150 mg Oral TID    apixaban  5 mg Oral BID    predniSONE  40 mg Oral Daily    gabapentin  100 mg Oral Daily    amiodarone  200 mg Oral Daily    allopurinol  100 mg Oral Daily    atorvastatin  40 mg Oral Daily    pantoprazole  40 mg Oral Daily    sodium chloride flush  5-40 mL IntraVENous 2 times per day    rOPINIRole  0.25 mg Oral Nightly    ferrous sulfate  325 mg Oral Daily with breakfast     Continuous Infusions:    sodium chloride 100 mL/hr at 04/06/22 0848    sodium chloride         Physical Examination     General:  AAO x 3, speaking in full sentences, no accessory muscle use. Chest:   Bilateral vesicular breath sounds, no rales or wheezes. Cardiac:  S1 S2 RR, no murmurs, gallops or rubs, JVP not raised. Abdomen: Obese, Soft, non-tender, non distended, BS audible. SKIN:  No rashes, good skin turgor  Extremities:  Generalized lymphedema in all extremities, pulses present and palpable, no clubbing, No cyanosis  Neuro:  AAO x 3, No FND.      Labs      Recent Labs     04/04/22 0535 04/05/22  0546 04/06/22  0605   WBC 12.1* 13.6* 11.6*   RBC 2.57* 2.52* 2.47*   HGB 7.7* 7.4* 7.2*   HCT 25.3* 24.2* 23.9*   MCV 98.4 96.0 96.8   MCH 30.0 29.4 29.1   MCHC 30.4 30.6 30.1   RDW 15.3* 14.8* 14.8*    406 394   MPV 10.7 10.2 10.3      BMP:   Recent Labs     04/04/22  0535 04/05/22  0546 04/06/22  0605   * 136 134*   K 4.4 4.1 4.3   CL 98 98 98   CO2 21 22 21   BUN 55* 73* 85*   CREATININE 3.13* 3.51* 3.74*   GLUCOSE 162* 144* 156*   CALCIUM 9.2 9.2 9.3     Magnesium:    Recent Labs     04/04/22  0535 04/05/22  0546 04/06/22  0605   MG 2.5 2.5 2.5     PTH:     Lab Results   Component Value Date    IPTH 136.0 12/07/2021         Urinalysis/Chemistries      Lab Results   Component Value Date    NITRU NEGATIVE 04/01/2022    COLORU Yellow 04/01/2022    PHUR 6.5 04/01/2022    WBCUA 0 TO 2 04/01/2022    RBCUA 0 TO 2 04/01/2022    CLARITYU clear 04/18/2017    SPECGRAV 1.010 04/01/2022    LEUKOCYTESUR NEGATIVE 04/01/2022    UROBILINOGEN Normal 04/01/2022    BILIRUBINUR NEGATIVE 04/01/2022    BILIRUBINUR neg 04/18/2017 the Resident/CNP. Nikita Patel MD   Nephrology [address] [place] [address] Drive    This note is created with the assistance of a speech-recognition program. While intending to generate a document that actually reflects the content of the visit, no guarantees can be provided that every mistake has been identified and corrected by editing.

## 2022-04-06 NOTE — PROGRESS NOTES
Physical Therapy  Facility/Department: [facility] [place] CAR 2  Daily Treatment Note  NAME: Jose D Berrios  : 1945  MRN: [id number]    Date of Service: 2022    Discharge Recommendations:  Patient would benefit from continued therapy after discharge   PT Equipment Recommendations  Equipment Needed: No    Assessment   Body structures, Functions, Activity limitations: Decreased functional mobility ; Decreased balance;Decreased strength;Decreased endurance  Assessment: Pt required CGA for bed mobility and was able to stand and ambulate 15ft x2 with RW and CGA throughout. Pt steady throughout ambulation, limited by decreased endurance and would benefit from continued PT to address deficits and improve overall functional independence. Prognosis: Fair  PT Education: Goals; General Safety;PT Role;Functional Mobility Training;Home Exercise Program;Transfer Training  REQUIRES PT FOLLOW UP: Yes  Activity Tolerance  Activity Tolerance: Patient limited by fatigue;Patient limited by endurance     Patient Diagnosis(es): The primary encounter diagnosis was Cellulitis of left lower extremity. Diagnoses of Cellulitis of right lower extremity and JOSE ARMANDO (acute kidney injury) ([facility] [address] were also pertinent to this visit. has a past medical history of Abnormal stress test, Anemia, Arthritis, Depression, Diverticulosis, DM (diabetes mellitus) ([address] Erythropenia, Fibromyalgia, HTN (hypertension), Hyperlipidemia, Kidney stone, Morbid obesity due to excess calories ([address] DIONY on CPAP, Osteopenia, Seasonal allergies, and Sleep apnea. has a past surgical history that includes Colonoscopy (); Colonoscopy (); Tubal ligation; Arm Surgery (); Total knee arthroplasty (Bilateral); Cardiac catheterization ([id number] dr Amber Cavanaugh); and Cardiac catheterization (16).     Restrictions  Restrictions/Precautions  Restrictions/Precautions: Fall Risk  Required Braces or Orthoses?: No  Position Activity Restriction  Other position/activity restrictions: Up with assistance  Subjective   General  Chart Reviewed: Yes  Response To Previous Treatment: Patient with no complaints from previous session. Family / Caregiver Present: No  Subjective  Subjective: Pt and RN agreeable to PT this afternoon. Pt supine in bed upon arrival with no c/o pain. Pt pleasant and cooperative throughout session. General Comment  Comments: Pt returned to supine in bed at end of session with call light in reach. Pain Screening  Patient Currently in Pain: Denies  Vital Signs  Patient Currently in Pain: Denies       Orientation  Orientation  Overall Orientation Status: Within Functional Limits  Cognition   Cognition  Overall Cognitive Status: WFL  Objective   Bed mobility  Supine to Sit: Contact guard assistance  Sit to Supine: Contact guard assistance  Scooting: Contact guard assistance  Comment: HOb elevated throughout, utilized bed rails, increased time/effort. Transfers  Sit to Stand: Contact guard assistance  Stand to sit: Contact guard assistance  Comment: STS transfer performed x1 from bed in lowest position and x1 from toilet. bariatric RW used. Ambulation  Ambulation?: Yes  Ambulation 1  Surface: level tile  Device: Rolling Walker (bariatric)  Assistance: Contact guard assistance  Quality of Gait: waddling gait, decreased foot clearance bilat  Gait Deviations: Slow Julianne; Shuffles; Increased MERE; Decreased step length  Distance: 15ft x2  Comments: seated rest break taken on toilet. Stairs/Curb  Stairs?: No     Balance  Posture: Good  Sitting - Static: Good  Sitting - Dynamic: Good  Standing - Static: Fair  Standing - Dynamic: Fair  Comments: standing balance assessed with bariatric RW, pt stood ~8 minutes total throughout session with CGA.   Exercises  Heelslides: x5 BLE  Hip Abduction: x5 BLE  Ankle Pumps: x5 BLE     AM-PAC Score  AM-PAC Inpatient Mobility Raw Score : 20 (04/06/22 1615)  AM-PAC Inpatient T-Scale Score : 47.67 (04/06/22 1615)  Mobility Inpatient CMS 0-100% Score: 35.83 (04/06/22 1615)  Mobility Inpatient CMS G-Code Modifier : CJ (04/06/22 1615)          Goals  Short term goals  Time Frame for Short term goals: 14 days  Short term goal 1: Pt to perform bed mob with min A. Short term goal 2: Pt to tolerate 20-30 mins ther ex/act for improved strength and endurance. Short term goal 3: Pt to transfer sit to stand with mod A +1. Short term goal 4: Pt to ambulate 10ft with RW and no LOB>  Patient Goals   Patient goals : home following discharge    Plan    Plan  Times per week: 5-6 x week  Times per day: Daily  Current Treatment Recommendations: Strengthening,Neuromuscular Re-education,Home Exercise Program,Safety Education & Training,Balance Training,Endurance Training,Patient/Caregiver Education & Training,Functional Mobility Training,Transfer Training,Gait Training,Stair training  Safety Devices  Type of devices:  All fall risk precautions in place,Call light within reach,Chair alarm in place,Gait belt,Nurse notified,Left in bed  Restraints  Initially in place: No     Therapy Time   Individual Concurrent Group Co-treatment   Time In 1527         Time Out 1559         Minutes 32         Timed Code Treatment Minutes: 0163 [facility]

## 2022-04-07 VITALS
DIASTOLIC BLOOD PRESSURE: 73 MMHG | HEART RATE: 65 BPM | RESPIRATION RATE: 15 BRPM | TEMPERATURE: 97.7 F | OXYGEN SATURATION: 100 % | SYSTOLIC BLOOD PRESSURE: 161 MMHG | WEIGHT: 247.58 LBS | HEIGHT: 65 IN | BODY MASS INDEX: 41.25 KG/M2

## 2022-04-07 PROBLEM — R65.10 SIRS (SYSTEMIC INFLAMMATORY RESPONSE SYNDROME) (HCC): Status: RESOLVED | Noted: 2022-04-02 | Resolved: 2022-04-07

## 2022-04-07 LAB
ABSOLUTE EOS #: <0.03 K/UL (ref 0–0.44)
ABSOLUTE IMMATURE GRANULOCYTE: 0.22 K/UL (ref 0–0.3)
ABSOLUTE LYMPH #: 1.78 K/UL (ref 1.1–3.7)
ABSOLUTE MONO #: 1.48 K/UL (ref 0.1–1.2)
ANION GAP SERPL CALCULATED.3IONS-SCNC: 13 MMOL/L (ref 9–17)
ANION GAP SERPL CALCULATED.3IONS-SCNC: 13 MMOL/L (ref 9–17)
BASOPHILS # BLD: 0 % (ref 0–2)
BASOPHILS ABSOLUTE: <0.03 K/UL (ref 0–0.2)
BUN BLDV-MCNC: 86 MG/DL (ref 8–23)
BUN BLDV-MCNC: 86 MG/DL (ref 8–23)
CALCIUM SERPL-MCNC: 9.8 MG/DL (ref 8.6–10.4)
CALCIUM SERPL-MCNC: 9.8 MG/DL (ref 8.6–10.4)
CHLORIDE BLD-SCNC: 102 MMOL/L (ref 98–107)
CHLORIDE BLD-SCNC: 102 MMOL/L (ref 98–107)
CO2: 20 MMOL/L (ref 20–31)
CO2: 21 MMOL/L (ref 20–31)
CREAT SERPL-MCNC: 3.15 MG/DL (ref 0.5–0.9)
CREAT SERPL-MCNC: 3.43 MG/DL (ref 0.5–0.9)
CULTURE: NORMAL
EOSINOPHILS RELATIVE PERCENT: 0 % (ref 1–4)
GFR AFRICAN AMERICAN: 16 ML/MIN
GFR AFRICAN AMERICAN: 17 ML/MIN
GFR NON-AFRICAN AMERICAN: 13 ML/MIN
GFR NON-AFRICAN AMERICAN: 14 ML/MIN
GFR SERPL CREATININE-BSD FRML MDRD: ABNORMAL ML/MIN/{1.73_M2}
GFR SERPL CREATININE-BSD FRML MDRD: ABNORMAL ML/MIN/{1.73_M2}
GLUCOSE BLD-MCNC: 130 MG/DL (ref 70–99)
GLUCOSE BLD-MCNC: 228 MG/DL (ref 70–99)
HCT VFR BLD CALC: 24.5 % (ref 36.3–47.1)
HEMOGLOBIN: 7.4 G/DL (ref 11.9–15.1)
IMMATURE GRANULOCYTES: 2 %
LYMPHOCYTES # BLD: 14 % (ref 24–43)
Lab: NORMAL
MAGNESIUM: 2.3 MG/DL (ref 1.6–2.6)
MCH RBC QN AUTO: 29.2 PG (ref 25.2–33.5)
MCHC RBC AUTO-ENTMCNC: 30.2 G/DL (ref 28.4–34.8)
MCV RBC AUTO: 96.8 FL (ref 82.6–102.9)
MONOCYTES # BLD: 11 % (ref 3–12)
NRBC AUTOMATED: 0 PER 100 WBC
PDW BLD-RTO: 14.9 % (ref 11.8–14.4)
PLATELET # BLD: 404 K/UL (ref 138–453)
PMV BLD AUTO: 10 FL (ref 8.1–13.5)
POTASSIUM SERPL-SCNC: 4.4 MMOL/L (ref 3.7–5.3)
POTASSIUM SERPL-SCNC: 4.7 MMOL/L (ref 3.7–5.3)
RBC # BLD: 2.53 M/UL (ref 3.95–5.11)
RBC # BLD: ABNORMAL 10*6/UL
SEG NEUTROPHILS: 73 % (ref 36–65)
SEGMENTED NEUTROPHILS ABSOLUTE COUNT: 9.55 K/UL (ref 1.5–8.1)
SODIUM BLD-SCNC: 135 MMOL/L (ref 135–144)
SODIUM BLD-SCNC: 136 MMOL/L (ref 135–144)
SPECIMEN DESCRIPTION: NORMAL
WBC # BLD: 13.1 K/UL (ref 3.5–11.3)

## 2022-04-07 PROCEDURE — 99232 SBSQ HOSP IP/OBS MODERATE 35: CPT | Performed by: NURSE PRACTITIONER

## 2022-04-07 PROCEDURE — 2580000003 HC RX 258: Performed by: NURSE PRACTITIONER

## 2022-04-07 PROCEDURE — 6370000000 HC RX 637 (ALT 250 FOR IP): Performed by: NURSE PRACTITIONER

## 2022-04-07 PROCEDURE — 83735 ASSAY OF MAGNESIUM: CPT

## 2022-04-07 PROCEDURE — 99232 SBSQ HOSP IP/OBS MODERATE 35: CPT | Performed by: INTERNAL MEDICINE

## 2022-04-07 PROCEDURE — 85025 COMPLETE CBC W/AUTO DIFF WBC: CPT

## 2022-04-07 PROCEDURE — 6370000000 HC RX 637 (ALT 250 FOR IP): Performed by: INTERNAL MEDICINE

## 2022-04-07 PROCEDURE — 80048 BASIC METABOLIC PNL TOTAL CA: CPT

## 2022-04-07 PROCEDURE — 76937 US GUIDE VASCULAR ACCESS: CPT

## 2022-04-07 PROCEDURE — 36415 COLL VENOUS BLD VENIPUNCTURE: CPT

## 2022-04-07 PROCEDURE — 97110 THERAPEUTIC EXERCISES: CPT

## 2022-04-07 PROCEDURE — 97535 SELF CARE MNGMENT TRAINING: CPT

## 2022-04-07 PROCEDURE — 97116 GAIT TRAINING THERAPY: CPT

## 2022-04-07 PROCEDURE — 97530 THERAPEUTIC ACTIVITIES: CPT

## 2022-04-07 RX ORDER — FUROSEMIDE 20 MG/1
20 TABLET ORAL DAILY
Qty: 60 TABLET | Refills: 0 | Status: SHIPPED | OUTPATIENT
Start: 2022-04-09 | End: 2022-07-12 | Stop reason: DRUGHIGH

## 2022-04-07 RX ORDER — LANOLIN ALCOHOL/MO/W.PET/CERES
325 CREAM (GRAM) TOPICAL
Qty: 90 TABLET | Refills: 0 | Status: SHIPPED | OUTPATIENT
Start: 2022-04-08 | End: 2022-05-10 | Stop reason: SDUPTHER

## 2022-04-07 RX ORDER — HYDROCODONE BITARTRATE AND ACETAMINOPHEN 5; 325 MG/1; MG/1
1 TABLET ORAL EVERY 6 HOURS PRN
Qty: 12 TABLET | Refills: 0 | Status: SHIPPED | OUTPATIENT
Start: 2022-04-07 | End: 2022-04-14 | Stop reason: SDUPTHER

## 2022-04-07 RX ORDER — SODIUM CHLORIDE 9 MG/ML
INJECTION, SOLUTION INTRAVENOUS CONTINUOUS
Status: DISCONTINUED | OUTPATIENT
Start: 2022-04-07 | End: 2022-04-07 | Stop reason: HOSPADM

## 2022-04-07 RX ORDER — 0.9 % SODIUM CHLORIDE 0.9 %
250 INTRAVENOUS SOLUTION INTRAVENOUS ONCE
Status: COMPLETED | OUTPATIENT
Start: 2022-04-07 | End: 2022-04-07

## 2022-04-07 RX ORDER — GABAPENTIN 100 MG/1
100 CAPSULE ORAL DAILY
Qty: 30 CAPSULE | Refills: 0 | Status: SHIPPED | OUTPATIENT
Start: 2022-04-08 | End: 2022-04-14 | Stop reason: SDUPTHER

## 2022-04-07 RX ORDER — AMIODARONE HYDROCHLORIDE 200 MG/1
200 TABLET ORAL DAILY
Qty: 30 TABLET | Refills: 0 | Status: SHIPPED | OUTPATIENT
Start: 2022-04-08 | End: 2022-04-14 | Stop reason: SDUPTHER

## 2022-04-07 RX ADMIN — CYCLOBENZAPRINE 10 MG: 10 TABLET, FILM COATED ORAL at 07:56

## 2022-04-07 RX ADMIN — HYDROXYZINE HYDROCHLORIDE 10 MG: 10 TABLET ORAL at 01:00

## 2022-04-07 RX ADMIN — METOPROLOL TARTRATE 12.5 MG: 25 TABLET ORAL at 07:46

## 2022-04-07 RX ADMIN — CLINDAMYCIN HYDROCHLORIDE 150 MG: 150 CAPSULE ORAL at 07:47

## 2022-04-07 RX ADMIN — PANTOPRAZOLE SODIUM 40 MG: 40 TABLET, DELAYED RELEASE ORAL at 07:46

## 2022-04-07 RX ADMIN — APIXABAN 5 MG: 5 TABLET, FILM COATED ORAL at 08:45

## 2022-04-07 RX ADMIN — FERROUS SULFATE TAB EC 325 MG (65 MG FE EQUIVALENT) 325 MG: 325 (65 FE) TABLET DELAYED RESPONSE at 07:46

## 2022-04-07 RX ADMIN — SODIUM CHLORIDE 250 ML: 9 INJECTION, SOLUTION INTRAVENOUS at 11:26

## 2022-04-07 RX ADMIN — CLINDAMYCIN HYDROCHLORIDE 150 MG: 150 CAPSULE ORAL at 15:30

## 2022-04-07 RX ADMIN — SODIUM CHLORIDE, PRESERVATIVE FREE 10 ML: 5 INJECTION INTRAVENOUS at 07:52

## 2022-04-07 RX ADMIN — SODIUM CHLORIDE: 9 INJECTION, SOLUTION INTRAVENOUS at 08:47

## 2022-04-07 RX ADMIN — HYDROCODONE BITARTRATE AND ACETAMINOPHEN 2 TABLET: 5; 325 TABLET ORAL at 12:49

## 2022-04-07 RX ADMIN — GABAPENTIN 100 MG: 100 CAPSULE ORAL at 07:47

## 2022-04-07 RX ADMIN — PREDNISONE 40 MG: 20 TABLET ORAL at 07:47

## 2022-04-07 RX ADMIN — AMIODARONE HYDROCHLORIDE 200 MG: 200 TABLET ORAL at 07:46

## 2022-04-07 RX ADMIN — ALLOPURINOL 100 MG: 100 TABLET ORAL at 07:45

## 2022-04-07 ASSESSMENT — PAIN SCALES - GENERAL
PAINLEVEL_OUTOF10: 0
PAINLEVEL_OUTOF10: 0
PAINLEVEL_OUTOF10: 4

## 2022-04-07 ASSESSMENT — PAIN SCALES - WONG BAKER: WONGBAKER_NUMERICALRESPONSE: 4

## 2022-04-07 ASSESSMENT — PAIN DESCRIPTION - PAIN TYPE: TYPE: CHRONIC PAIN

## 2022-04-07 ASSESSMENT — PAIN DESCRIPTION - DESCRIPTORS: DESCRIPTORS: ACHING

## 2022-04-07 ASSESSMENT — PAIN DESCRIPTION - LOCATION: LOCATION: GENERALIZED

## 2022-04-07 NOTE — PROGRESS NOTES
[facility]  Office: [address], DO, Yanet Rodriguez, DO, Kobi Patee, DO, Yohana Rivero Blood, DO, Gary Prieto MD, Debbie Gavin MD, Brian Garcia MD, Keyur Parada MD, Frederick Munguia MD, Denise Mock MD, Richard Block MD, Gege Dykes, DO, Yadi Scarce, DO, Luc Romero MD,  Kelly Rota, DO, Mikayla Sorensen MD, Tarik Allen MD, Rob Blanc MD, Ricci Lundborg, DO, Cristina Smith MD, Mena Manley MD, Alvaro Man, CNP, [facility] Deep, CNP, Mary Lou Crowell, CNP, Janeen Valle, CNP, Diana Yeh, CNP, Mayra Golden, CNP, Tracie Flores, CNP, Yulia Goetz, PA-C, Neeraj Del Real, DNP, Zeke Pagan, CNP, Vern Herrera, CNP, Corinne Bradley, CNP, Elizabeth Echols, CNS, Natalie Kaminski, DNP, Catrachita Lam, CNP, Loree Yepez, CNP, Muna Sauceda, [address] [facility]    Progress Note    4/7/2022    8:17 AM    Name:   Angela Moyer  MRN:     [id number]     Acct:      [de-identified]   Room:   2015/2015-01  IP Day:  6  Admit Date:  3/31/2022  9:20 PM    PCP:   Yoselin Sethi MD  Code Status:  Full Code    Subjective:     C/C:   Chief Complaint   Patient presents with    Leg Swelling     Interval History Status: improved. Patient evaluated in room sitting in chair in no distress. Diuretics continue to be on hold. Patient's renal function responding well to IV fluids. Discussed with nephrology additional fluid bolus ordered for this morning. Patient states bilateral lower leg pain is present but improving, and she is able to ambulate    Brief History: This is a 50-year-old female who presents to the emergency department for evaluation of worsening bilateral lower extremity edema. Initially thought to be CHF exacerbation, ruled out. patient was found to be in A. fib with RVR which is new for her. Transitioned from heparin drip to Eliquis with good results and po amio.   As for the bilateral lower extremity swelling and pain, multiple images were completed, negative for acute process. Follow-up CT of her right foot resulting today showed no osteo or abscess. Course of stay complicated by acute kidney injury on chronic kidney disease. Review of Systems:     Constitutional:  negative for chills, fevers, sweats, + fatigue, + improving bilateral lower extremity pain  Respiratory:  negative for cough, dyspnea on exertion, shortness of breath, wheezing  Cardiovascular:  negative for chest pain, chest pressure/discomfort, + BL/UE extremity chronic lymphedema, no palpitations  Gastrointestinal:  negative for abdominal pain, constipation, diarrhea, nausea, vomiting  Neurological:  negative for dizziness, headache    Medications: Allergies:     Allergies   Allergen Reactions    Adhesive Tape     Cephalexin Other (See Comments)     Tongue cracks and peels    Erythromycin Other (See Comments)     Epigastric pain and bloating    Ketorolac Tromethamine Other (See Comments)     Severe stomach cramps    Pcn [Penicillins]      Unknown reaction    Percocet [Oxycodone-Acetaminophen] Itching and Other (See Comments)     Esophagus hurt    Sulfa Antibiotics     Ultracet [Tramadol-Acetaminophen] Itching       Current Meds:   Scheduled Meds:    senna  1 tablet Oral Nightly    metoprolol tartrate  12.5 mg Oral BID    clindamycin  150 mg Oral TID    apixaban  5 mg Oral BID    gabapentin  100 mg Oral Daily    amiodarone  200 mg Oral Daily    allopurinol  100 mg Oral Daily    atorvastatin  40 mg Oral Daily    pantoprazole  40 mg Oral Daily    sodium chloride flush  5-40 mL IntraVENous 2 times per day    rOPINIRole  0.25 mg Oral Nightly    ferrous sulfate  325 mg Oral Daily with breakfast     Continuous Infusions:    sodium chloride      sodium chloride       PRN Meds: hydrOXYzine, zolpidem, HYDROcodone 5 mg - acetaminophen **OR** HYDROcodone 5 mg - acetaminophen, butalbital-acetaminophen-caffeine, cyclobenzaprine, sodium chloride flush, sodium chloride, ondansetron **OR** ondansetron, acetaminophen **OR** acetaminophen, morphine, ALPRAZolam, magnesium sulfate, potassium chloride **OR** potassium alternative oral replacement **OR** potassium chloride    Data:     Past Medical History:   has a past medical history of Abnormal stress test, Anemia, Arthritis, Depression, Diverticulosis, DM (diabetes mellitus) ([facility] [address] Erythropenia, Fibromyalgia, HTN (hypertension), Hyperlipidemia, Kidney stone, Morbid obesity due to excess calories ([facility] [address] DIONY on CPAP, Osteopenia, Seasonal allergies, and Sleep apnea. Social History:   reports that she quit smoking about 62 years ago. She has a 0.25 pack-year smoking history. She has never used smokeless tobacco. She reports that she does not drink alcohol and does not use drugs. Family History:   Family History   Problem Relation Age of Onset    Diabetes Mother     Heart Disease Mother     Osteoporosis Mother     Kidney Disease Father     Prostate Cancer Brother        Vitals:  BP (!) 139/121   Pulse 69   Temp 97.6 °F (36.4 °C) (Oral)   Resp 17   Ht 5' 5\" (1.651 m)   Wt 247 lb 9.2 oz (112.3 kg)   SpO2 100%   BMI 41.20 kg/m²   Temp (24hrs), Av.9 °F (36.6 °C), Min:97.6 °F (36.4 °C), Max:98.3 °F (36.8 °C)    No results for input(s): POCGLU in the last 72 hours. I/O (24Hr):     Intake/Output Summary (Last 24 hours) at 2022 0817  Last data filed at 2022 2300  Gross per 24 hour   Intake --   Output 250 ml   Net -250 ml       Labs:  Hematology:  Recent Labs     22  0546 22  0605 22  0436   WBC 13.6* 11.6* 13.1*   RBC 2.52* 2.47* 2.53*   HGB 7.4* 7.2* 7.4*   HCT 24.2* 23.9* 24.5*   MCV 96.0 96.8 96.8   MCH 29.4 29.1 29.2   MCHC 30.6 30.1 30.2   RDW 14.8* 14.8* 14.9*    394 404   MPV 10.2 10.3 10.0     Chemistry:  Recent Labs     22  0546 22  0546 22  0605 22  1412 22  0436      < > 134* 139 136   K 4.1   < > 4.3 4.6 4.4   CL 98   < > 98 103 102   CO2 22   < > 21 19* 21   GLUCOSE 144*   < > 156* 280* 130*   BUN 73*   < > 85* 88* 86*   CREATININE 3.51*   < > 3.74* 3.93* 3.43*   MG 2.5  --  2.5  --  2.3   ANIONGAP 16   < > 15 17 13   LABGLOM 13*   < > 12* 11* 13*   GFRAA 15*   < > 14* 13* 16*   CALCIUM 9.2   < > 9.3 9.2 9.8    < > = values in this interval not displayed. Lab Results   Component Value Date/Time    [facility] 04/02/2022 09:09 AM       Radiology:  XR FOOT RIGHT (MIN 3 VIEWS)    Result Date: 4/3/2022  Osteopenia degenerative changes and significant soft tissue swelling are noted without acute fracture. RECOMMENDATION: Follow-up studies as clinically indicated. XR CHEST PORTABLE    Result Date: 3/31/2022  No acute cardiopulmonary process     CT FOOT RIGHT WO CONTRAST    Result Date: 4/4/2022  1. Subcutaneous fat stranding of the ankle and foot compatible with bland edema or cellulitis. No abscess or soft tissue gas. 2. No evidence of osteomyelitis or other acute osseous abnormality. Physical Examination:        General appearance:  Alert, in no acute distress  Mental Status:  oriented to person, place and time and normal affect  Lungs:  clear to auscultation bilaterally, normal effort  Heart: Bradycardic, no murmur  Abdomen:  soft, nontender, nondistended, normal bowel sounds, no masses, hepatomegaly, splenomegaly  Extremities: Bilateral upper and lower extremity lymphedema, without redness, tenderness in the calves.   Right   Skin:  no gross lesions, rashes, induration    Assessment:        Hospital Problems           Last Modified POA    * (Principal) New onset of congestive heart failure ([address] 4/1/2022 Yes    SIRS (systemic inflammatory response syndrome) ([address] 4/2/2022 No    Lymphedema 4/1/2022 Yes    DIONY treated with BiPAP 4/2/2022 Yes    Normocytic normochromic anemia 4/2/2022 Yes    Essential hypertension 4/2/2022 Yes    Morbid obesity due to excess calories ([address] 4/2/2022 home today if renal function continues to imorove, repeat BMP ordered for 1600 today    On this date 04/07/22 I have spent 25 mins  in direct patient care, reviewing notes, test results and diagnosis and plan of care discussions with the patient, as well as coordination of care.   Plan of care made with MD Romeo Thurston, BARBIE - NP  4/7/2022  8:17 AM

## 2022-04-07 NOTE — PROGRESS NOTES
Physical Therapy  Facility/Department: [facility] CAR 2  Daily Treatment Note  NAME: Rashi Teran  : 1945  MRN: [id number]    Date of Service: 2022    Discharge Recommendations:  Patient would benefit from continued therapy after discharge   PT Equipment Recommendations  Equipment Needed: No  Other: CTA, pt reports she owns rollator, currently requires significant physical assistance with stairs    Assessment   Body structures, Functions, Activity limitations: Decreased functional mobility ; Decreased balance;Decreased strength;Decreased endurance  Assessment: Pt  SBA for bed mobility, set up assist only and was able to stand and ambulate 15ft x2 and 5 ft x2 with RW and CGA/SBA  throughout. Pt steady throughout ambulation, retired to recliner chair  limited by decreased endurance and would benefit from continued PT to address deficits and improve overall functional independence. Treatment Diagnosis: LE pain, difficulty walking  Prognosis: Fair  PT Education: Goals; General Safety;PT Role;Functional Mobility Training;Home Exercise Program;Transfer Training  Patient Education: stair negotation  REQUIRES PT FOLLOW UP: Yes  Activity Tolerance  Activity Tolerance: Patient limited by fatigue;Patient limited by endurance  Activity Tolerance: no SOB     Patient Diagnosis(es): The primary encounter diagnosis was Cellulitis of left lower extremity. Diagnoses of Cellulitis of right lower extremity and JOSE ARMANDO (acute kidney injury) ([address] were also pertinent to this visit. has a past medical history of Abnormal stress test, Anemia, Arthritis, Depression, Diverticulosis, DM (diabetes mellitus) ([address] Erythropenia, Fibromyalgia, HTN (hypertension), Hyperlipidemia, Kidney stone, Morbid obesity due to excess calories ([address] DIONY on CPAP, Osteopenia, Seasonal allergies, and Sleep apnea. has a past surgical history that includes Colonoscopy (); Colonoscopy (); Tubal ligation; Arm Surgery ();  Total knee arthroplasty Contact guard assistance;Stand by assistance  Bed to Chair: Stand by assistance;Contact guard assistance  Comment: Bariatric walker utalized, good hand postioning cga/ than SBA  Ambulation  Ambulation?: Yes  More Ambulation?: No  Ambulation 1  Surface: level tile  Device: Rolling Walker (baraiatric)  Assistance: Contact guard assistance  Quality of Gait: waddling gait, decreased foot clearance bilat  Gait Deviations: Slow Julianne; Shuffles; Increased MERE; Decreased step length  Distance: 15ft x2, and 5 ft x2  Comments: Pt states she has to stand to get her grounding, DR malhotra, pt a,mbulated 15 ft x2 without any LOB  Stairs/Curb  Stairs?: Yes  Stairs  # Steps : 1  Stairs Height: 6\"  Rails: None     Balance  Posture: Good  Sitting - Static: Good  Sitting - Dynamic: Good  Standing - Static: Fair  Standing - Dynamic: Fair  Comments: standing balance assessed with bariatric RW, pt stood ~6 minutes total throughout session with CGA. Exercises  Core Strengthening: ~10 mins EOB prior to mobility with close SBA  Comments: Pt c/o of pain when seated , exercises not performed, therapist educated on, pt stated she was familiar  Other exercises  Other exercises?: No                        G-Code     OutComes Score                                                     AM-PAC Score             Goals  Short term goals  Time Frame for Short term goals: 14 days  Short term goal 1: Pt to perform bed mob with min A. Short term goal 2: Pt to tolerate 20-30 mins ther ex/act for improved strength and endurance. Short term goal 3: Pt to transfer sit to stand with mod A +1.   Short term goal 4: Pt to ambulate 10ft with RW and no LOB>  Patient Goals   Patient goals : home following discharge    Plan    Plan  Times per week: 5-6 x week  Times per day: Daily  Current Treatment Recommendations: Strengthening,Neuromuscular Re-education,Home Exercise Program,Safety Education & Training,Balance Training,Endurance Training,Patient/Caregiver Education & Training,Functional Mobility Training,Transfer Training,Gait Training,Stair training  Safety Devices  Type of devices:  All fall risk precautions in place,Call light within reach,Chair alarm in place,Gait belt,Nurse notified,Left in bed  Restraints  Initially in place: No     Therapy Time   Individual Concurrent Group Co-treatment   Time In 1010         Time Out 1055         Minutes 45         Timed Code Treatment Minutes: 65 Minutes   10:10 -10:55= 45  11:26-11:46= 20= 65 total mins    Alize Seymour, PTA

## 2022-04-07 NOTE — PROGRESS NOTES
Nephrology Progress Note    Patient:  Harle Moritz; 69 y.o. MRN# [id number]  Location:    Attending:  Adenike Gregory MD  Admit Date:  3/31/2022   Hospital Day: 6    Subjective   Patient initially admitted on  for CHF/new onset A. fib with RVR. We were consulted for acute on chronic volume overload/CKD stage III. Patient follows with Dr. Kunal Miramontes as an outptient with a baseline CRT between 2.2 -2.6, admitted at 3.15    No new issues overnight. Vitals stable, in normal sinus rhythm. Patient was started on IV NS infusion at 100 mL/h. Renal function slightly better than yesterday. Creatinine slightly trending up 3.13-> 3.51->3.74-> 3.93-> 3.43. Urine output is not being recorded, as per RN she has had good urine output.     Objective   VS: /65   Pulse 62   Temp 98.3 °F (36.8 °C) (Oral)   Resp 18   Ht 5' 5\" (1.651 m)   Wt 247 lb 9.2 oz (112.3 kg)   SpO2 100%   BMI 41.20 kg/m²   MAXIMUM TEMPERATURE OVER 24 HRS:  Temp (24hrs), Av °F (36.7 °C), Min:97.6 °F (36.4 °C), Max:98.3 °F (36.8 °C)    24 HR BLOOD PRESSURE RANGE:  Systolic (33ASS), DVI:328 , Min:120 , TIC:404   ; Diastolic (24OMG), OZM:43, Min:60, Max:121    24 HR INTAKE/OUTPUT:      Intake/Output Summary (Last 24 hours) at 2022 1114  Last data filed at 2022 2300  Gross per 24 hour   Intake --   Output 250 ml   Net -250 ml     WEIGHT:   Patient Vitals for the past 96 hrs (Last 3 readings):   Weight   22 1924 247 lb 9.2 oz (112.3 kg)   22 0530 254 lb 10.1 oz (115.5 kg)   22 0551 251 lb 8.7 oz (114.1 kg)       Current Medications    Scheduled Meds:    sodium chloride  250 mL IntraVENous Once    senna  1 tablet Oral Nightly    metoprolol tartrate  12.5 mg Oral BID    clindamycin  150 mg Oral TID    apixaban  5 mg Oral BID    gabapentin  100 mg Oral Daily    amiodarone  200 mg Oral Daily    allopurinol  100 mg Oral Daily    atorvastatin  40 mg Oral Daily    pantoprazole  40 mg Oral Daily    sodium chloride flush  5-40 mL IntraVENous 2 times per day    rOPINIRole  0.25 mg Oral Nightly    ferrous sulfate  325 mg Oral Daily with breakfast     Continuous Infusions:    sodium chloride 100 mL/hr at 04/07/22 0847    sodium chloride         Physical Examination     General:  AAO x 3, speaking in full sentences, no accessory muscle use. Chest:   Bilateral vesicular breath sounds, no rales or wheezes. Cardiac:  S1 S2 RR, no murmurs, gallops or rubs, JVP not raised. Abdomen: Obese, Soft, non-tender, non distended, BS audible. SKIN:  No rashes, good skin turgor  Extremities:  Generalized lymphedema in all extremities, pulses present and palpable, no clubbing, No cyanosis  Neuro:  AAO x 3, No FND.      Labs      Recent Labs     04/05/22  0546 04/06/22  0605 04/07/22  0436   WBC 13.6* 11.6* 13.1*   RBC 2.52* 2.47* 2.53*   HGB 7.4* 7.2* 7.4*   HCT 24.2* 23.9* 24.5*   MCV 96.0 96.8 96.8   MCH 29.4 29.1 29.2   MCHC 30.6 30.1 30.2   RDW 14.8* 14.8* 14.9*    394 404   MPV 10.2 10.3 10.0      BMP:   Recent Labs     04/06/22  0605 04/06/22  1412 04/07/22  0436   * 139 136   K 4.3 4.6 4.4   CL 98 103 102   CO2 21 19* 21   BUN 85* 88* 86*   CREATININE 3.74* 3.93* 3.43*   GLUCOSE 156* 280* 130*   CALCIUM 9.3 9.2 9.8     Magnesium:    Recent Labs     04/05/22  0546 04/06/22  0605 04/07/22  0436   MG 2.5 2.5 2.3     PTH:     Lab Results   Component Value Date    IPTH 136.0 12/07/2021         Urinalysis/Chemistries      Lab Results   Component Value Date    NITRU NEGATIVE 04/01/2022    COLORU Yellow 04/01/2022    PHUR 6.5 04/01/2022    WBCUA 0 TO 2 04/01/2022    RBCUA 0 TO 2 04/01/2022    CLARITYU clear 04/18/2017    SPECGRAV 1.010 04/01/2022    LEUKOCYTESUR NEGATIVE 04/01/2022    UROBILINOGEN Normal 04/01/2022    BILIRUBINUR NEGATIVE 04/01/2022    BILIRUBINUR neg 04/18/2017    BLOODU neg 04/18/2017    GLUCOSEU NEGATIVE 04/01/2022    KETUA NEGATIVE 04/01/2022       Radiology    XR CHEST PORTABLE    Result Date: 3/31/2022  No acute cardiopulmonary process        Assessment      Acute kidney injury nonoliguric secondary to prerenal injury from hypoperfusion related to dysrhythmia/cardiorenal syndrome type I  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 Darren Fountain as an outpatient with a baseline CRT between 2.2 - 2.6. Echocardiogram was reviewed. 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 Patient was experiencing more in lower extremity edema than congestive cardiac failure. New onset A. fib with RVR, resolved. Patient is off heparin drip and on Eliquis  Morbid obesity with DIONY  Chronic lymphedema  Lower extremity cellulitis on current antibiotic therapy-> transition to monotherapy clindamycin iron deficiency anemia, on iron tablets    Plan   Continue to hold torsemide. Continue IV NS infusion at 100 mL/h, renal function is slightly better this morning  Aim to keep systolic blood pressure greater than 110  Continue to hold Cozaar  On clindamycin for her cellulitis  BMP in AM.  6.   Will follow. Nutrition   Renal Diet/TF  Thank you. Please call with any questions.     Solitario Garcia MD  PGY-3 Internal Medicine Resident  [address] [place] [address]  4/7/2022 11:14 AM      See my note dictated on 4/7/2022    Coretta Natarajan

## 2022-04-07 NOTE — PROGRESS NOTES
Nephrology Progress Note    Patient:  Osman Linares; 69 y.o. MRN# [id number]  Location:    Attending:  Mendy Carvalho MD  Admit Date:  3/31/2022   Hospital Day: 6    Subjective   Patient initially admitted on  for CHF/new onset A. fib with RVR. We were consulted for acute on chronic volume overload/CKD stage III. Patient follows with Dr. Paula Harding as an ou with a baseline CRT between 2.2 -2.6, admitted at 3.15    Patient was seen and examined. Patient received close to a liter of fluid in last 24 hours. Her creatinine is now trending down. Her most recent creatinine is 3.4 mg/dL while her baseline creatinine is somewhere around 2.2 to 2.4 mg/dL. Patient was not on any diuretics at home. Although her echocardiogram which does not show any right heart dysfunction but clinically patient does have features of right heart compromise.   With physical therapy her mobility is increasing      Objective   VS: /65   Pulse 62   Temp 98.3 °F (36.8 °C) (Oral)   Resp 18   Ht 5' 5\" (1.651 m)   Wt 247 lb 9.2 oz (112.3 kg)   SpO2 100%   BMI 41.20 kg/m²   MAXIMUM TEMPERATURE OVER 24 HRS:  Temp (24hrs), Av °F (36.7 °C), Min:97.6 °F (36.4 °C), Max:98.3 °F (36.8 °C)    24 HR BLOOD PRESSURE RANGE:  Systolic (93ZQM), YDZ:077 , Min:120 , XVV:033   ; Diastolic (02SCY), MOC:33, Min:60, Max:121    24 HR INTAKE/OUTPUT:      Intake/Output Summary (Last 24 hours) at 2022 1128  Last data filed at 2022 2300  Gross per 24 hour   Intake --   Output 250 ml   Net -250 ml     WEIGHT:   Patient Vitals for the past 96 hrs (Last 3 readings):   Weight   22 1924 247 lb 9.2 oz (112.3 kg)   22 0530 254 lb 10.1 oz (115.5 kg)   22 0551 251 lb 8.7 oz (114.1 kg)       Current Medications    Scheduled Meds:    sodium chloride  250 mL IntraVENous Once    senna  1 tablet Oral Nightly    metoprolol tartrate  12.5 mg Oral BID    clindamycin  150 mg Oral TID    apixaban  5 mg Oral BID    gabapentin 100 mg Oral Daily    amiodarone  200 mg Oral Daily    allopurinol  100 mg Oral Daily    atorvastatin  40 mg Oral Daily    pantoprazole  40 mg Oral Daily    sodium chloride flush  5-40 mL IntraVENous 2 times per day    rOPINIRole  0.25 mg Oral Nightly    ferrous sulfate  325 mg Oral Daily with breakfast     Continuous Infusions:    sodium chloride 100 mL/hr at 04/07/22 0847    sodium chloride         Physical Examination     General:  Alert and awake x3  Chest:   Lateral air entry and clear. Cardiac:  1 and S2 audible no S3. Abdomen: Soft and nontender , non distended, BS audible. SKIN:  No rashes, good skin turgor  Extremities:  Generalized lymphedema in all extremities, pulses present and palpable, no clubbing, No cyanosis  Neuro:  AAO x 3, No FND.      Labs      Recent Labs     04/05/22  0546 04/06/22  0605 04/07/22  0436   WBC 13.6* 11.6* 13.1*   RBC 2.52* 2.47* 2.53*   HGB 7.4* 7.2* 7.4*   HCT 24.2* 23.9* 24.5*   MCV 96.0 96.8 96.8   MCH 29.4 29.1 29.2   MCHC 30.6 30.1 30.2   RDW 14.8* 14.8* 14.9*    394 404   MPV 10.2 10.3 10.0      BMP:   Recent Labs     04/06/22  0605 04/06/22  1412 04/07/22  0436   * 139 136   K 4.3 4.6 4.4   CL 98 103 102   CO2 21 19* 21   BUN 85* 88* 86*   CREATININE 3.74* 3.93* 3.43*   GLUCOSE 156* 280* 130*   CALCIUM 9.3 9.2 9.8     Magnesium:    Recent Labs     04/05/22  0546 04/06/22  0605 04/07/22  0436   MG 2.5 2.5 2.3     PTH:     Lab Results   Component Value Date    IPTH 136.0 12/07/2021         Urinalysis/Chemistries      Lab Results   Component Value Date    NITRU NEGATIVE 04/01/2022    COLORU Yellow 04/01/2022    PHUR 6.5 04/01/2022    WBCUA 0 TO 2 04/01/2022    RBCUA 0 TO 2 04/01/2022    CLARITYU clear 04/18/2017    SPECGRAV 1.010 04/01/2022    LEUKOCYTESUR NEGATIVE 04/01/2022    UROBILINOGEN Normal 04/01/2022    BILIRUBINUR NEGATIVE 04/01/2022    BILIRUBINUR neg 04/18/2017    BLOODU neg 04/18/2017    GLUCOSEU NEGATIVE 04/01/2022    KETUA NEGATIVE 04/01/2022       Radiology    XR CHEST PORTABLE    Result Date: 3/31/2022  No acute cardiopulmonary process        Assessment      Acute kidney injury due to prerenal azotemia. Patient responded well with fluid replacement  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 Nicole Martinez as an outpatient with a baseline CRT between 2.2 - 2.6. Echocardiogram was reviewed. 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 New onset A. fib with RVR, resolved. Patient is off heparin drip and on Eliquis  Morbid obesity with DIONY  Chronic lymphedema      Plan   Give her another bolus of 250 mL of normal saline and repeat if needed  2. Repeat her BMP. If her creatinine is 3.4 or less she can be potentially discharged  3. Discharge home on 20 mg of Lasix once a day starting from Saturday  4. BMP on on Monday 11 April. And fax results to our office  5. Follow-up with Dr. Amanda Hartman in 4 to 6 weeks next  Nutrition   Renal Diet/TF  Thank you. Please call with any questions. Jailyn Cheatham MD  Nephrology [address] [place] [address] Drive    This note is created with the assistance of a speech-recognition program. While intending to generate a document that actually reflects the content of the visit, no guarantees can be provided that every mistake has been identified and corrected by editing.

## 2022-04-07 NOTE — PLAN OF CARE
Nena Crain RN  Outcome: Ongoing  4/6/2022 1831 by Abelino Azevedo RN  Outcome: Ongoing  Goal: Ability to demonstrate good, daily oral hygiene techniques will improve  Description: Ability to demonstrate good, daily oral hygiene techniques will improve  4/7/2022 0403 by Nena Crain RN  Outcome: Ongoing  4/6/2022 1831 by Abelino Azevedo RN  Outcome: Ongoing  Goal: Maintenance of upright position during and after feeding  Description: Maintenance of upright position during and after feeding  4/7/2022 0403 by Nena Crain RN  Outcome: Ongoing  4/6/2022 1831 by Abelino Azevedo RN  Outcome: Ongoing     Problem: Pain:  Goal: Pain level will decrease  Description: Pain level will decrease  4/7/2022 0403 by Nena Crain RN  Outcome: Ongoing  4/6/2022 1831 by Abelino Azevedo RN  Outcome: Ongoing  Goal: Control of acute pain  Description: Control of acute pain  4/7/2022 0403 by Nena Crain RN  Outcome: Ongoing  4/6/2022 1831 by Abelino Azevedo RN  Outcome: Ongoing  Goal: Control of chronic pain  Description: Control of chronic pain  4/7/2022 0403 by Nena Crain RN  Outcome: Ongoing  4/6/2022 1831 by Abelino Azevedo RN  Outcome: Ongoing     Problem: Skin Integrity:  Goal: Will show no infection signs and symptoms  Description: Will show no infection signs and symptoms  4/7/2022 0403 by Nena Crain RN  Outcome: Ongoing  4/6/2022 1831 by Abelino Azevedo RN  Outcome: Ongoing  Goal: Absence of new skin breakdown  Description: Absence of new skin breakdown  4/7/2022 0403 by Nena Crain RN  Outcome: Ongoing  4/6/2022 1831 by Abelino Azevedo RN  Outcome: Ongoing     Problem: Musculor/Skeletal Functional Status  Goal: Highest potential functional level  4/7/2022 0403 by Nena Crain RN  Outcome: Ongoing  4/6/2022 1831 by Abelino Azevedo RN  Outcome: Ongoing  Goal: Absence of falls  4/7/2022 0403 by Nena Crain RN  Outcome: Ongoing  4/6/2022 1831 by Abelino Azevedo RN  Outcome: Ongoing

## 2022-04-07 NOTE — DISCHARGE SUMMARY
[facility]  Office: [address], DO, Karlie Copeland, DO, Muna Alfaro, DO, Nicolás Winchester Blood, DO, Aaron Baxter MD, Criselda Cottrell MD, Queen Carlos MD, Soren Calvillo MD, Tanika Zarco MD, Livier Machado MD, Walter Sahu MD, Rachna Stinson, DO, Skyler Mao, DO, Mike Fisher MD,  Faisal Almanzar DO, Basil Hernandez MD, Zeny Cobb MD, Mireya Whitfield MD, Wei Spann DO, Mary Decker MD, Karlo Brown MD, Carmelo Mcclain, [facility], [facility] Dina, CNP, Alyssafaraz Aguero, CNP, Phillip Angulo, CNP, Garrett Renee, CNP, Korey Pina, CNP, Derek Jackson, CNP, Beni Mahajan PA-C, Quintin Cleveland, [facility], Joleen Jones, [facility], Jessica Slater, CNP, Dilma Guillen, CNP, Judd Aly, CNS, Joselin Butterfield, [facility], Doreen Andino, CNP, Felicity Yeung, CNP, Corby Khan, [address]    Discharge Summary     Patient ID: Kiki Lara  :  1945   MRN: [id number]     ACCOUNT:  [de-identified]   Patient's PCP: Lucia Saunders MD  Admit Date: 3/31/2022   Discharge Date: 2022    Length of Stay: 6  Code Status:  Full Code  Admitting Physician: Mike Fisher MD  Discharge Physician: Quintin Cleveland, APRN - NP     Active Discharge Diagnoses:     Hospital Problem Lists:  Principal Problem:    New onset of congestive heart failure ([facility] [address]  Active Problems:    Lymphedema    DIONY treated with BiPAP    Normocytic normochromic anemia    Essential hypertension    Morbid obesity due to excess calories ([address]    Acute kidney injury superimposed on CKD ([address]    New onset a-fib ([address] with Rapid ventricular response    GERD (gastroesophageal reflux disease)    Restless leg syndrome  Resolved Problems:    SIRS (systemic inflammatory response syndrome) ([address]    Cellulitis of left lower extremity      Admission Condition:  fair     Discharged Condition: stable    Hospital Stay:     Hospital Course:   Kiki Lara is a 69 y.o. female who was admitted for the management of   New onset of congestive heart failure ([facility] [address] , presented to ER with Leg Swelling    This is a 81-year-old female who presents to the emergency department for evaluation of worsening bilateral lower extremity edema. Initially thought to be CHF exacerbation, ruled out. patient was found to be in A. fib with RVR which is new for her. Transitioned from heparin drip to Eliquis with good results and po amio. As for the bilateral lower extremity swelling and pain, multiple images were completed, negative for acute process. Follow-up CT of her right foot resulting today showed no osteo or abscess. Course of stay complicated by acute kidney injury on chronic kidney disease. Significant therapeutic interventions:       New onset A. fib with RVR:   - Heart rate stable. - Patient continues on p.o. amio and Lopressor, heparin transitioned to Eliquis. - Left atrial dimension 4.4 cm.    - Metoprolol decreased from 50 mg twice daily to 25 mg twice daily given bradycardia-> further changed to 12.5 mg twice daily with holding parameters  - Cardiology signed off 4/4     Right-sided heart failure:   - Diuretics on hold  - Echo reviewed showing an ejection fraction of 55% with RVSP 28 mmHg.     Chronic lymphedema/bilateral lower extremity pain/RLS/possible gouty flare:   - Encouraged to follow-up at lymphedema clinic.    - Multimodal pain management including gabapentin, Requip, norco   - Uric acid within normal limits. - Patient working with physical and occupational therapy today, use bariatric rolling walker and ambulated 5 feet the first time 12 feet the second time  - pt declined podiatry consult     JOSE ARMANDO on CKD stage IIIb:   - Improving, nephrology following.    - Appreciate recommendations. - Diuretics currently on hold  - additional IVF today      Right lower extremity cellulitis:   - Questionable on admission.    - Patient initiated on cefepime, Discontinue.  PO clinda continues   - CT reviewed negative for osteo or abscess. - Continue steroids  - BCX2 NGTD     Anxiety: Continue Xanax.     Iron deficiency anemia:   - Continue ferrous sulfate.   - hemoglobin low, but still greater than 7     Fasting hyperglycemia:   - Secondary to steroid admin.    - Hemoglobin A1c from 3/9/2022 at 5.5     Morbid obesity with underlying DIONY:   - Continue CPAP nightly. - Lifestyle modification and     GI/DVT prophylaxis Protonix, Eliquis     Dispo: home today if renal function continues to improve and crt is less than 3.4 on repeat BMP ordered for 1600 today.  Discussed with nephro    Significant Diagnostic Studies:   Labs / Micro:  CBC:   Lab Results   Component Value Date    WBC 13.1 04/07/2022    RBC 2.53 04/07/2022    RBC 3.32 03/02/2012    HGB 7.4 04/07/2022    HCT 24.5 04/07/2022    MCV 96.8 04/07/2022    MCH 29.2 04/07/2022    MCHC 30.2 04/07/2022    RDW 14.9 04/07/2022     04/07/2022     03/02/2012     BMP:    Lab Results   Component Value Date    GLUCOSE 130 04/07/2022    GLUCOSE 113 03/02/2012     04/07/2022    K 4.4 04/07/2022     04/07/2022    CO2 21 04/07/2022    ANIONGAP 13 04/07/2022    BUN 86 04/07/2022    CREATININE 3.43 04/07/2022    BUNCRER NOT REPORTED 12/07/2021    CALCIUM 9.8 04/07/2022    LABGLOM 13 04/07/2022    GFRAA 16 04/07/2022    GFR      04/07/2022     HFP:    Lab Results   Component Value Date    PROT 7.1 04/01/2022     CMP:    Lab Results   Component Value Date    GLUCOSE 130 04/07/2022    GLUCOSE 113 03/02/2012     04/07/2022    K 4.4 04/07/2022     04/07/2022    CO2 21 04/07/2022    BUN 86 04/07/2022    CREATININE 3.43 04/07/2022    ANIONGAP 13 04/07/2022    ALKPHOS 68 04/01/2022    ALT 7 04/01/2022    AST 11 04/01/2022    BILITOT 0.46 04/01/2022    LABALBU 3.5 04/02/2022    LABALBU 4.2 03/02/2012    ALBUMIN 1.2 04/01/2022    LABGLOM 13 04/07/2022    GFRAA 16 04/07/2022    GFR      04/07/2022    PROT 7.1 04/01/2022 CALCIUM 9.8 04/07/2022     PT/INR:    Lab Results   Component Value Date    PROTIME 11.5 04/01/2022    INR 1.1 04/01/2022     PTT:   Lab Results   Component Value Date    APTT 69.4 04/03/2022     FLP:    Lab Results   Component Value Date    CHOL 178 01/11/2021    TRIG 281 01/11/2021    HDL 37 01/11/2021     U/A:    Lab Results   Component Value Date    COLORU Yellow 04/01/2022    TURBIDITY Clear 04/01/2022    SPECGRAV 1.010 04/01/2022    HGBUR NEGATIVE 04/01/2022    PHUR 6.5 04/01/2022    PROTEINU TRACE 04/01/2022    GLUCOSEU NEGATIVE 04/01/2022    KETUA NEGATIVE 04/01/2022    BILIRUBINUR NEGATIVE 04/01/2022    BILIRUBINUR neg 04/18/2017    UROBILINOGEN Normal 04/01/2022    NITRU NEGATIVE 04/01/2022    LEUKOCYTESUR NEGATIVE 04/01/2022     TSH:    Lab Results   Component Value Date    TSH 1.60 04/01/2022        Radiology:  XR FOOT RIGHT (MIN 3 VIEWS)    Result Date: 4/3/2022  Osteopenia degenerative changes and significant soft tissue swelling are noted without acute fracture. RECOMMENDATION: Follow-up studies as clinically indicated. XR CHEST PORTABLE    Result Date: 3/31/2022  No acute cardiopulmonary process     CT FOOT RIGHT WO CONTRAST    Result Date: 4/4/2022  1. Subcutaneous fat stranding of the ankle and foot compatible with bland edema or cellulitis. No abscess or soft tissue gas. 2. No evidence of osteomyelitis or other acute osseous abnormality. Consultations:    Consults:     Final Specialist Recommendations/Findings:   IP CONSULT TO NEPHROLOGY  IP CONSULT TO CARDIOLOGY  IP CONSULT TO CASE MANAGEMENT  IP CONSULT TO IV TEAM  IP CONSULT TO IV TEAM      The patient was seen and examined on day of discharge and this discharge summary is in conjunction with any daily progress note from day of discharge. Discharge plan:     Disposition: Home    Physician Follow Up:      BARBIE Seaman - NP  [address] [place] [address]  [phone]    On 4/20/2022  at 1:30 pm [facility]  follow [facility] and CHF. MD Aren Pretty [address]  [facility] [address]  [address]  [phone]    On 5/27/2022  10:15 am Follow up [facility]. Niko Allen, [address] [place] [address] [address] [facility]     In 1 week  for follow up after hospitalization    MD [facility], [address]  [phone]    Call in 4 weeks  for follow up after hospitalization       Requiring Further Evaluation/Follow Up POST HOSPITALIZATION/Incidental Findings: You have multiple follow-ups including follow-up with cardiology, your primary care provider and nephrology    Diet: regular diet and cardiac diet    Activity: As tolerated    Instructions to Patient: Your Neurontin dose has been decreased significantly from 600 mg to 100 mg  Your hydrochlorothiazide has been  Your losartan has been  Your metoprolol dose has been decreased from 25 mg to 12.5 mg  You have been started on new medications including iron supplementation, Eliquis and amiodarone  Follow-up lab work on 4/8/2020  Prompt follow-up with your primary care provider within 1 week    Discharge Medications:      Medication List      START taking these medications    amiodarone 200 MG tablet  Commonly known as: CORDARONE  Take 1 tablet by mouth daily  Start taking on: April 8, 2022     apixaban 5 MG Tabs tablet  Commonly known as: ELIQUIS  Take 1 tablet by mouth 2 times daily     ferrous sulfate 325 (65 Fe) MG EC tablet  Commonly known as: FE TABS 325  Take 1 tablet by mouth daily (with breakfast)  Start taking on: April 8, 2022     gabapentin 100 MG capsule  Commonly known as: NEURONTIN  Take 1 capsule by mouth daily for 30 days. Start taking on: April 8, 2022  Replaces: gabapentin 600 MG tablet     HYDROcodone-acetaminophen 5-325 MG per tablet  Commonly known as: NORCO  Take 1 tablet by mouth every 6 hours as needed for Pain for up to 3 days.         CHANGE how you take these medications    furosemide 20 MG tablet  Commonly known as: Lasix  Take 1 tablet by mouth daily  Start taking on: April 9, 2022  What changed:   medication strength  how much to take  when to take this  These instructions start on April 9, 2022. If you are unsure what to do until then, ask your doctor or other care provider. metoprolol tartrate 25 MG tablet  Commonly known as: LOPRESSOR  Take 0.5 tablets by mouth 2 times daily  What changed: how much to take        CONTINUE taking these medications    allopurinol 100 MG tablet  Commonly known as: ZYLOPRIM  Take 1 tablet by mouth daily     ALPRAZolam 0.5 MG tablet  Commonly known as: XANAX     atorvastatin 40 MG tablet  Commonly known as: LIPITOR  Take 1 tablet by mouth daily     azelastine 0.1 % nasal spray  Commonly known as: ASTELIN  1 spray by Nasal route 2 times daily Use in each nostril as directed     calcitRIOL 0.25 MCG capsule  Commonly known as: ROCALTROL  TAKE 1 CAPSULE BY MOUTH DAILY     cyclobenzaprine 5 MG tablet  Commonly known as: FLEXERIL  Take 1 tablet by mouth nightly as needed for Muscle spasms     Magnesium Oxide 400 MG Caps     pantoprazole 40 MG tablet  Commonly known as: PROTONIX  TAKE 1 TABLET DAILY     potassium citrate 10 MEQ (1080 MG) extended release tablet  Commonly known as: UROCIT-K  Take 1 tablet by mouth daily     rOPINIRole 0.25 MG tablet  Commonly known as: Requip  Take 1 tablet by mouth nightly     vitamin D3 25 MCG (1000 UT) Tabs tablet  Commonly known as: CHOLECALCIFEROL  Take 2 tablets by mouth daily     zolpidem 5 MG tablet  Commonly known as: AMBIEN  Take 1 tablet by mouth nightly as needed for Sleep for up to 30 days.         STOP taking these medications    gabapentin 600 MG tablet  Commonly known as: NEURONTIN  Replaced by: gabapentin 100 MG capsule     hydroCHLOROthiazide 25 MG tablet  Commonly known as: HYDRODIURIL     losartan 25 MG tablet  Commonly known as: COZAAR           Where to Get Your Medications These medications were sent to [facility] [address] [facility], [address] [facility]  [address], [address] [address]    Phone: [phone]   amiodarone 200 MG tablet  apixaban 5 MG Tabs tablet  ferrous sulfate 325 (65 Fe) MG EC tablet  furosemide 20 MG tablet  gabapentin 100 MG capsule  HYDROcodone-acetaminophen 5-325 MG per tablet  metoprolol tartrate 25 MG tablet         Discharge Procedure Orders   Basic Metabolic Panel   Standing Status: Future Standing Exp. Date: 04/07/23   Order Comments: Send results to Dr. Viral Kiran and Yung Hartmann MD       Time Spent on discharge is  40 mins in patient examination, evaluation, counseling as well as medication reconciliation, prescriptions for required medications, discharge plan and follow up. Discussed with attending   Electronically signed by   BARBIE Trevizo NP  4/7/2022  2:05 PM      Thank you Dr. Yung Hartmann MD for the opportunity to be involved in this patient's care.

## 2022-04-07 NOTE — PROGRESS NOTES
CLINICAL PHARMACY NOTE: MEDS TO BEDS    Total # of Prescriptions Filled: 6   The following medications were delivered to the patient:  · Furosemide 20mg  · Iron 325mg  · Gabapentin 100mg  · Amiodarone 200mg  · Eliquis 5mg  · Norco 5-325mg    Additional Documentation: delivered to patient in room 2015 4/7 at 5:28pm. Co-pay paid with credit on Lat49.

## 2022-04-07 NOTE — PROGRESS NOTES
Occupational Therapy  Facility/Department: [facility] CAR 2  Daily Treatment Note  NAME: Roxanne Guzman  : 1945  MRN: [id number]    Date of Service: 2022    Discharge Recommendations: Pt. Would benefit from further skilled OT services to enhance functional outcomes. Patient would benefit from continued therapy after discharge  OT Equipment Recommendations  Equipment Needed: Yes  Other: Shower chair    Assessment   Performance deficits / Impairments: Decreased functional mobility ; Decreased ADL status; Decreased safe awareness;Decreased high-level IADLs;Decreased balance;Decreased endurance  Prognosis: Good  Decision Making: Medium Complexity  OT Education: OT Role;Plan of Care;ADL Adaptive Strategies;Transfer Training;Precautions; Equipment  REQUIRES OT FOLLOW UP: Yes  Activity Tolerance  Activity Tolerance: Patient Tolerated treatment well  Safety Devices  Type of devices: Call light within reach;Nurse notified; Left in chair;Chair alarm in place;Gait belt         Patient Diagnosis(es): The primary encounter diagnosis was Cellulitis of left lower extremity. Diagnoses of Cellulitis of right lower extremity, JOSE ARMANDO (acute kidney injury) ([facility] [address] Type 2 diabetes mellitus with hyperglycemia, without long-term current use of insulin ([facility] [address] Lymphedema, and Acute kidney injury superimposed on CKD [facility]) were also pertinent to this visit. has a past medical history of Abnormal stress test, Anemia, Arthritis, Depression, Diverticulosis, DM (diabetes mellitus) ([address] Erythropenia, Fibromyalgia, HTN (hypertension), Hyperlipidemia, Kidney stone, Morbid obesity due to excess calories ([address] DIONY on CPAP, Osteopenia, Seasonal allergies, and Sleep apnea. has a past surgical history that includes Colonoscopy (); Colonoscopy (); Tubal ligation; Arm Surgery (); Total knee arthroplasty (Bilateral);  Cardiac catheterization ([id number] dr Hossein Mercer); and Cardiac catheterization (5-16-16). Restrictions  Restrictions/Precautions  Restrictions/Precautions: Fall Risk  Required Braces or Orthoses?: No  Position Activity Restriction  Other position/activity restrictions: Up with assistance  Subjective   General  Patient assessed for rehabilitation services?: Yes  Family / Caregiver Present: No  General Comment  Comments: RN ok'd for therapy this PM. Pt agreeable to participate in session and pleasant/cooperative throughout. Vital Signs  Patient Currently in Pain: Denies   Orientation  Orientation  Overall Orientation Status: Within Functional Limits  Objective    ADL  Grooming: Increased time to complete;Stand by assistance (Standing at sink for grooming, SBA to ensure stability. Pt. using B elbow support on countertop for stability while engaged in activity, (wash B hands/brush teeth). )  Toileting: Increased time to complete;Stand by assistance (Toilet transfer-SBA + grab bar, BM hyg - (Sitting)-I, SBA for underpants management using support of RW.)        Balance  Sitting Balance: Independent (I, static/dynamic, toilet/EOB/recliner)  Standing Balance: Stand by assistance  Standing Balance  Time: ~10 minutes with 1 sitting rest break. Activity: Static/dynamic/mobility  Comment: RW, flexed at neck- per pt. this is baseline. SBA to ensure stability. Functional Mobility  Functional - Mobility Device: Rolling Walker  Activity: To/From therapy gym  Assist Level: Stand by assistance  Functional Mobility Comments: No LOB, cues to keep upright posture, pt. demo F carryover. Toilet Transfers  Toilet - Technique: Ambulating  Equipment Used: Standard toilet (grab bar)  Toilet Transfer: Stand by assistance  Bed mobility  Supine to Sit: Contact guard assistance  Sit to Supine: Contact guard assistance  Scooting: Contact guard assistance  Comment: HOB elevated, bed rail, increased time and effort.   Transfers  Sit to stand: Stand by assistance  Stand to sit: Stand by assistance  Transfer Comments: EOB->toilet->stand->stood at sink->Recliner chair. Cognition  Overall Cognitive Status: WFL  Attention Span: Attends with cues to redirect  Safety Judgement: Decreased awareness of need for assistance  Problem Solving: Assistance required to generate solutions;Assistance required to correct errors made;Decreased awareness of errors  Insights: Fully aware of deficits  Initiation: Does not require cues  Sequencing: Does not require cues  Cognition Comment: Pt. fixated on getting home ASAP, pt. stating, \"The steps are my only barrier\", once Im in I will be fine. \" Per. pt. \"My  and Son will help me get up the steps, like they usually do\". Plan   Plan  Times per week: 3-5x/wk  Current Treatment Recommendations: Balance Training,Functional Mobility Training,Endurance Training,Safety Education & Training,Self-Care / ADL,Equipment Evaluation, Education, & procurement,Patient/Caregiver Education & Training,Home Management Training                                   AM-PAC Score        AM-PAC Inpatient Daily Activity Raw Score: 21 (04/07/22 1523)  AM-PAC Inpatient ADL T-Scale Score : 44.27 (04/07/22 1523)  ADL Inpatient CMS 0-100% Score: 32.79 (04/07/22 1523)  ADL Inpatient CMS G-Code Modifier : Philipp Donis (04/07/22 1523)    Goals  Short term goals  Time Frame for Short term goals: Pt will, by discharge:  Short term goal 1: Perform ADL tasks with mod IND using AE/DME PRN  Short term goal 2: Peform functional transfers/functional mobility with mod IND using least restrictive AD  Short term goal 3:  Independently demo good safety awareness during engagement in all ADLs and functional transfers/functional mobility  Short term goal 4: Demo 8+ minutes standing tolerance with use of least restrictive AD for increased participation in ADL/IADL tasks       Therapy Time   Individual Concurrent Group Co-treatment   Time In 1440         Time Out 1518         Minutes 38 Timed Code Treatment Minutes: [address] [place], LE/L

## 2022-04-07 NOTE — CONSULTS
ENDURANCE CATHETER:  Ultrasound preassessment done. Target vessel is left lower cephalic vein, measures 3.01KC. Patient obese. Left FA Cephalic vein 5.72UW depth. 8cm catheter utilized for peripheral access. 20G  8cm ASTcatheter placed using sterile fashion. Visualization of vascular needle entry, 1 attempt using AST technique. Easy advancement of catheter to terminate below level of axilla, length 8cm with catalina at 0cm for a total of 0cm out. Easy aspiration of dark non-pulsating blood. Easily flushed with 10ml of 0.9 NS. CHG tegaderm dressing placed. Injection cap and swab cap applied. No bleeding or hematoma noted. E.B.L., none. Instructions given on insertion and care. Patient was able to verbalize understanding. Line ready for use. LOT NUMBER - [id number]   -  2023/10/31     - C.  [address] Team  Time out - 1110  Start time - 1115  End time -8596

## 2022-04-07 NOTE — PROGRESS NOTES
PATIENT REFUSES TO WEAR BIPAP     [x] Risks and benefits explained to patient   [x] Patient refuses to wear Bipap stating \"I can not sleep with it on. \"  [x] Patient verbalizes understanding of information presented.

## 2022-04-07 NOTE — CARE COORDINATION
Discharge 1 [facility] Case Management Department  Written by: Marisol Diaz RN    Patient Name: Tessa Hope  Attending Provider: Gavin Collier MD  Admit Date: 3/31/2022  9:20 PM  MRN: [id number]  Account: [de-identified]                     : 1945  Discharge Date: 22      Disposition: home with [facility]. Transportation to be provided by son & . Patient denies further needs. Call placed to [facility] with [address] @ [phone] to notify that patient is discharging tonight.      Marisol Diaz RN

## 2022-04-07 NOTE — PROGRESS NOTES
Congestive Heart Failure Education completed and charted. CHF booklet given. Patient was receptive to education. Discussed the  importance of medication compliance. Discussed the importance of a heart healthy diet. Discussed 2000 mg sodium-restricted daily diet. Patient instructed to limit fluid intake to  1.5 to 2 liters per day. Patient instructed to weigh self at the same time of each day each morning, reinforced teaching to monitor for 3-5 lb weight increase over 1-2 days notify physician if change noted. Signs and symptoms of CHF discussed with patient, such as feeling more tired than normal, feeling short of breath, coughing that increases when lying down, sudden weight gain, swelling of the feet, legs or belly. Patient verbalized understanding to notify physician office if these symptoms occur.     EF > 55%

## 2022-04-08 ENCOUNTER — CARE COORDINATION (OUTPATIENT)
Dept: CASE MANAGEMENT | Age: 77
End: 2022-04-08

## 2022-04-08 DIAGNOSIS — I89.0 LYMPHEDEMA: Primary | ICD-10-CM

## 2022-04-08 PROCEDURE — 1111F DSCHRG MED/CURRENT MED MERGE: CPT | Performed by: INTERNAL MEDICINE

## 2022-04-08 NOTE — CARE COORDINATION
Tammie 45 Transitions Initial Follow Up Call    Call within 2 business days of discharge: Yes    Patient: Kiki Lara Patient : 1945   MRN: [id number]  Reason for Admission: Leg swelling, A. Fib with RVR  Discharge Date: 22 RARS: Readmission Risk Score: 22 ( )      Last Discharge [facility]       Complaint Diagnosis Description Type Department Provider    3/31/22 Leg Swelling Cellulitis of left lower extremity . .. ED to Hosp-Admission (Discharged) (ADMITTED) VZ CAR 2 Mike Fisher MD;  Friendly A Ru. .. Spoke with: Patient    Facility: [facility]    Non-face-to-face services provided:  Scheduled appointment with PCP-   Obtained and reviewed discharge summary and/or continuity of care documents     Spoke with patient who is setting out her medications today. Discharge instructions reviewed and medications reviewed and reconciled. Patient is aware of med changes and has all medications at home. Today, she said she is just frustrated, trying to sort medications for both her and her . She has her follow up with Cardiology but is in [place] and she is going to move this to another location. She will see PCP next Thursday. She has a BiPAP at home that she does not use because she does not have a mask that fits properly. She has no upcoming appointments with her pulmonologist and we discussed if we can get this back on board it will probably help with some of the issues she has been dealing with such as the new A. Fib. She denies any chest pains, palpitations, shortness of breath today. She does have edema to bilateral LE but said this has improved since she was admitted and they are better, she has chronic lymphedema. She denies any needs or concerns at this time. TC to Sue Olivares, they plan on doing [facility] tomorrow. Transitions of Care Initial Call    Was this an external facility discharge?  No Discharge Facility: [facility]    Challenges to be reviewed by the provider Additional needs identified to be addressed with provider: No  none             Method of communication with provider : none      Advance Care Planning:   Does patient have an Advance Directive: not on file; education provided. Was this a readmission? No  Patient stated reason for admission: leg swelling worsening    Patients top risk factors for readmission: medical condition-A. [facility]    Care Transition Nurse (CTN) contacted the patient by telephone to perform post hospital discharge assessment. Verified name and  with patient as identifiers. Provided introduction to self, and explanation of the CTN role. CTN reviewed discharge instructions, medical action plan and red flags with patient who verbalized understanding. Patient given an opportunity to ask questions and does not have any further questions or concerns at this time. Were discharge instructions available to patient? Yes. Reviewed appropriate site of care based on symptoms and resources available to patient including: PCP  Specialist. The patient agrees to contact the PCP office for questions related to their healthcare. Medication reconciliation was performed with patient, who verbalizes understanding of administration of home medications. Advised obtaining a 90-day supply of all daily and as-needed medications. Covid Risk Education     Educated patient about risk for severe COVID-19 due to risk factors according to CDC guidelines. CTN reviewed discharge instructions, medical action plan and red flag symptoms with the patient who verbalized understanding. Discussed COVID vaccination status: Yes. Education provided on COVID-19 vaccination as appropriate. Discussed exposure protocols and quarantine with CDC Guidelines. Patient was given an opportunity to verbalize any questions and concerns and agrees to contact CTN or health care provider for questions related to their healthcare.     Reviewed and educated patient on any new and changed medications related to discharge diagnosis. Was patient discharged with a pulse oximeter? No       CTN provided contact information. Plan for follow-up call in 3-5 days based on severity of symptoms and risk factors.   Plan for next call: check about cardiology appointment being changed, patient needs to see pulmonary, check on edema          Care Transitions 24 Hour Call    Schedule Follow Up Appointment with PCP: Completed  Do you have any ongoing symptoms?: Yes  Patient-reported symptoms: Other (Comment: swelling to bilateral legs)  Do you have a copy of your discharge instructions?: Yes  Do you have all of your prescriptions and are they filled?: Yes  Have you been contacted by a [address]?: No  Have you scheduled your follow up appointment?: Yes  How are you going to get to your appointment?: Car - family or friend to transport  Were you discharged with any Home Care or Post Acute Services: Yes  Post Acute Services: Home Health (Comment: [facility])  Do you feel like you have everything you need to keep you well at home?: Yes  Care Transitions Interventions         Follow Up  Future Appointments   Date Time Provider Mateo Weaver   4/14/2022  3:30 PM Eliza Lee MD [facility]   6/7/2022  2:15 PM STA DIAG MAMMO RM 3 STAZ MAMMO STA Radiolog   6/14/2022  1:10 PM Shawn Dodd MD [facility] None   6/20/2022  4:00 PM Eliza Lee MD [facility] Margaret Maldonado   7/5/2022  3:30 PM Eliza Lee, [address] [place] [address] ,[address], RN

## 2022-04-09 ENCOUNTER — HOSPITAL ENCOUNTER (OUTPATIENT)
Age: 77
Setting detail: SPECIMEN
Discharge: HOME | End: 2022-04-09
Payer: MEDICARE

## 2022-04-09 LAB
ANION GAP SERPL CALCULATED.3IONS-SCNC: 10 MMOL/L (ref 9–17)
BUN BLDV-MCNC: 79 MG/DL (ref 8–23)
CALCIUM SERPL-MCNC: 10.1 MG/DL (ref 8.6–10.4)
CHLORIDE BLD-SCNC: 104 MMOL/L (ref 98–107)
CO2: 22 MMOL/L (ref 20–31)
CREAT SERPL-MCNC: 2.77 MG/DL (ref 0.5–0.9)
GFR AFRICAN AMERICAN: 20 ML/MIN
GFR NON-AFRICAN AMERICAN: 17 ML/MIN
GFR SERPL CREATININE-BSD FRML MDRD: ABNORMAL ML/MIN/{1.73_M2}
GLUCOSE BLD-MCNC: 111 MG/DL (ref 70–99)
POTASSIUM SERPL-SCNC: 4.6 MMOL/L (ref 3.7–5.3)
SODIUM BLD-SCNC: 136 MMOL/L (ref 135–144)

## 2022-04-09 PROCEDURE — 80048 BASIC METABOLIC PNL TOTAL CA: CPT

## 2022-04-09 PROCEDURE — 36415 COLL VENOUS BLD VENIPUNCTURE: CPT

## 2022-04-12 ENCOUNTER — CARE COORDINATION (OUTPATIENT)
Dept: CASE MANAGEMENT | Age: 77
End: 2022-04-12

## 2022-04-12 NOTE — PROGRESS NOTES
Physician Progress Note      Merry Britton  [facility] #:                  [id number]  :                       1945  ADMIT DATE:       3/31/2022 9:20 PM  [address] DATE:        2022 7:26 PM  RESPONDING  PROVIDER #:        Sana Mejia NP          QUERY TEXT:    Patient admitted with bilateral LE edema. Noted documentation of CHF and also   CHF ruled out. If possible, please document if you are evaluating and /or   treating any of the following: The medical record reflects the following:  Risk Factors: New A fib, CHF  Clinical Indicators: DC Summary states: New onset CHF, and: Initially thought   to be CHF exacerbation, ruled out. patient was found to be in A. fib with RVR   which is new for her and: Right sided heart failure. Per IM progress notes:   New onset A fib RVR. Acute on chronic volume overload 2/2 new onset CHF. Cardiology consult note: New onset A. fib with RVR. Likely from hypertension   and DIONY as she is noncompliant with CPAP. Cardiology progress notes: New onset   Afib RVR, CHF exacerbation, diastolic. Echo 22 showing Normal LV systolic   function with LVEF >55%. IM progress note  Treatment: IV Lasix 60 x 3 doses and 40 mg IV x3 doses    Thank-you,  Amy Castañeda RN, [email or web address]  Options provided:  -- A fib confirmed and CHF ruled out  -- CHF and A fib w/ RVR confirmed  -- Other - I will add my own diagnosis  -- Disagree - Not applicable / Not valid  -- Disagree - Clinically unable to determine / Unknown  -- Refer to Clinical Documentation Reviewer    PROVIDER RESPONSE TEXT:    After study, A fib confirmed and CHF ruled out.     Query created by: Bonnie John on 2022 6:53 AM      Electronically signed by:  Adithya Mejia NP 2022 7:01 AM

## 2022-04-12 NOTE — CARE COORDINATION
Tammie 45 Transitions Follow Up Call    2022    Patient: Darylene Morris  Patient : 1945   MRN: [id number]  Reason for Admission: Cellulitis of LLL, new onset CHF  Discharge Date: 22 RARS: Readmission Risk Score: 22 ( )         Spoke with: Patient    Spoke with patient. She states she is doing ok today other than having pain to the top of her feet today that causes some difficulty with walking. She has chronic lymphedema, states she unwrapped her legs today and has not rewrapped, nurse coming out today to see patient and she will re-wrap after nurse gets to see her legs. She has been consistent with doing daily weights, has had no weight gains. She has been keeping track of her blood pressures too, she states her blood pressures in the am have been ranging 160/70's in the am.  She took blood pressure while talking today after her medications and she was roughly about the same. HR has been in the mid 60's. She was advised that when she has her follow up with PCP, they will address if she needs more blood pressure medications. She denies any chest pains, shortness of breath but is fatigued. Reviewed CHF zone tool sheet and discussed what to monitor for. Denies any new needs or concerns. As afterthought, patient did mention that her dentures were lost while in the hospital.  She has the number of someone at the hospital that she called that was checking into this for her and is awaiting return call. Care Transitions Follow Up Call    Needs to be reviewed by the provider   Additional needs identified to be addressed with provider: No  none             Method of communication with provider : none      Care Transition Nurse (CTN) contacted the patient by telephone to follow up after admission on 3/31. Verified name and  with patient as identifiers. Addressed changes since last contact: none  Discussed follow-up appointments.  Is follow up appointment scheduled within 7 days of discharge? Yes. CTN reviewed discharge instructions, medical action plan and red flags with patient and discussed any barriers to care and/or understanding of plan of care after discharge. Discussed appropriate site of care based on symptoms and resources available to patient including: PCP  Specialist. The patient agrees to contact the PCP office for questions related to their healthcare. Patients top risk factors for readmission: medical condition-CHF  Interventions to address risk factors: Reviewed and followed up on pending diagnostic tests and treatments-Reviewed latest lab work with patient, BUN/Cr had some improvement      Non-[facility] follow up appointment(s): n/a    CTN provided contact information for future needs. Plan for follow-up call in 3-5 days based on severity of symptoms and risk factors. Plan for next call: Check swelling, blood pressures, review PCP visit. \  Care Transitions Subsequent and Final Call    Schedule Follow Up Appointment with PCP: Completed  Subsequent and Final Calls  Do you have any ongoing symptoms?: Yes  Onset of Patient-reported symptoms: In the past 7 days  Have your medications changed?: No  Do you have any questions related to your medications?: No  Do you currently have any active services?: No  Are you currently active with any services?: Home Health  Do you have any needs or concerns that I can assist you with?: No  Identified Barriers: Lack of Education  Care Transitions Interventions  Other Interventions:            Follow Up  Future Appointments   Date Time Provider Mateo Weaver   4/14/2022  3:30 PM [facility]   6/7/2022  2:15 PM STA DIAG MAMMO RM 3 STAZ MAMMO STA Radiolog   6/14/2022  1:10 PM Rito Mark MD [facility] None   6/20/2022  4:00 PM [facility]   7/5/2022  3:30 PM Eliza Celestin MD [facility] Razia Garland RN

## 2022-04-14 ENCOUNTER — OFFICE VISIT (OUTPATIENT)
Dept: FAMILY MEDICINE CLINIC | Age: 77
End: 2022-04-14
Payer: MEDICARE

## 2022-04-14 VITALS
OXYGEN SATURATION: 100 % | SYSTOLIC BLOOD PRESSURE: 140 MMHG | DIASTOLIC BLOOD PRESSURE: 80 MMHG | HEART RATE: 68 BPM | TEMPERATURE: 97.4 F

## 2022-04-14 DIAGNOSIS — G89.29 CHRONIC BILATERAL LOW BACK PAIN WITHOUT SCIATICA: Primary | ICD-10-CM

## 2022-04-14 DIAGNOSIS — I50.31 ACUTE DIASTOLIC CONGESTIVE HEART FAILURE (HCC): ICD-10-CM

## 2022-04-14 DIAGNOSIS — N25.81 SECONDARY HYPERPARATHYROIDISM (HCC): ICD-10-CM

## 2022-04-14 DIAGNOSIS — G25.81 RESTLESS LEG SYNDROME: ICD-10-CM

## 2022-04-14 DIAGNOSIS — N18.9 ACUTE KIDNEY INJURY SUPERIMPOSED ON CKD (HCC): ICD-10-CM

## 2022-04-14 DIAGNOSIS — N17.9 ACUTE KIDNEY INJURY SUPERIMPOSED ON CKD (HCC): ICD-10-CM

## 2022-04-14 DIAGNOSIS — I48.91 NEW ONSET A-FIB (HCC): ICD-10-CM

## 2022-04-14 DIAGNOSIS — I89.0 LYMPHEDEMA: ICD-10-CM

## 2022-04-14 DIAGNOSIS — I50.9 NEW ONSET OF CONGESTIVE HEART FAILURE (HCC): ICD-10-CM

## 2022-04-14 DIAGNOSIS — M54.50 CHRONIC BILATERAL LOW BACK PAIN WITHOUT SCIATICA: Primary | ICD-10-CM

## 2022-04-14 PROCEDURE — 1111F DSCHRG MED/CURRENT MED MERGE: CPT | Performed by: INTERNAL MEDICINE

## 2022-04-14 PROCEDURE — 99496 TRANSJ CARE MGMT HIGH F2F 7D: CPT | Performed by: INTERNAL MEDICINE

## 2022-04-14 RX ORDER — HYDROCODONE BITARTRATE AND ACETAMINOPHEN 5; 325 MG/1; MG/1
1 TABLET ORAL EVERY 6 HOURS PRN
Qty: 20 TABLET | Refills: 0 | Status: SHIPPED | OUTPATIENT
Start: 2022-04-14 | End: 2022-04-28 | Stop reason: SDUPTHER

## 2022-04-14 RX ORDER — AMIODARONE HYDROCHLORIDE 200 MG/1
200 TABLET ORAL DAILY
Qty: 90 TABLET | Refills: 0 | Status: SHIPPED | OUTPATIENT
Start: 2022-04-14 | End: 2022-05-10 | Stop reason: SDUPTHER

## 2022-04-14 RX ORDER — GABAPENTIN 100 MG/1
100 CAPSULE ORAL DAILY
Qty: 90 CAPSULE | Refills: 0 | Status: SHIPPED | OUTPATIENT
Start: 2022-04-14 | End: 2022-05-09 | Stop reason: DRUGHIGH

## 2022-04-14 ASSESSMENT — ENCOUNTER SYMPTOMS
CHOKING: 0
CHEST TIGHTNESS: 0
DIARRHEA: 0
NAUSEA: 0
ANAL BLEEDING: 0
COUGH: 0
VOMITING: 0
ABDOMINAL PAIN: 0
SHORTNESS OF BREATH: 1
CONSTIPATION: 0
BLOOD IN STOOL: 0
WHEEZING: 0

## 2022-04-14 ASSESSMENT — VISUAL ACUITY: OU: 1

## 2022-04-14 NOTE — PROGRESS NOTES
Pt is here today for a [facility] f/u   Pt went in the hospital on 04/01/2022 and was discharged on 04/07/2022   Pt was at [facility] V's for her feet being swollen, painful and red     Pt states that on her way to the hospital she went into a-fib  Pt was taken in by ambulance   Pt states that when she went in she could not walk the pain in her feet was so bad   Pt states while she was in the hospital she was given an antibiotic but was not sent home with one.   Pt states that the swelling has went but only a little   Pt states her feet up to mid shin is still very painful   Pt has tried keeping them elevated home when she can      Pt asked if she could a prescription for some Norco to help with the pain, pt has been taking OTC Tylenol but it does not help very much     Pt states the Ambien does not seem to help with sleeping

## 2022-04-14 NOTE — PROGRESS NOTES
Post-Discharge Transitional Care Follow Up      Aleida Robb   YOB: 1945    Date of Office Visit:  4/14/2022  Date of Hospital Admission: 3/31/22  Date of Hospital Discharge: 4/7/22  Readmission Risk Score (high >=14%. Medium >=10%):Readmission Risk Score: 22 ( )      Care management risk score Rising risk (score 2-5) and Complex Care (Scores >=6): 4     Non face to face  following discharge, date last encounter closed (first attempt may have been earlier): 4/8/2022  2:06 PM     Call initiated 2 business days of discharge: Yes     Chronic bilateral low back pain without sciatica  -     gabapentin (NEURONTIN) 100 MG capsule; Take 1 capsule by mouth daily for 90 days. , Disp-90 capsule, R-0Normal  Restless leg syndrome  -     gabapentin (NEURONTIN) 100 MG capsule; Take 1 capsule by mouth daily for 90 days. , Disp-90 capsule, R-0Normal  Secondary hyperparathyroidism ([address]  New onset of congestive heart failure (HCC)  -     metoprolol tartrate (LOPRESSOR) 25 MG tablet; Take 0.5 tablets by mouth 2 times daily, Disp-90 tablet, R-1Normal  -     amiodarone (CORDARONE) 200 MG tablet; Take 1 tablet by mouth daily, Disp-90 tablet, R-0Normal  -     TX DISCHARGE MEDS RECONCILED W/ CURRENT OUTPATIENT MED LIST  New onset a-fib (HCC) with Rapid ventricular response  -     apixaban (ELIQUIS) 5 MG TABS tablet; Take 1 tablet by mouth 2 times daily, Disp-180 tablet, R-1Normal  -     Basic Metabolic Panel; Future  -     TX DISCHARGE MEDS RECONCILED W/ CURRENT OUTPATIENT MED LIST  Acute kidney injury superimposed on CKD (HCC)  -     TX DISCHARGE MEDS RECONCILED W/ CURRENT OUTPATIENT MED LIST  Acute diastolic congestive heart failure [facility])  Lymphedema      Medical Decision Making: high complexity  No follow-ups on file.     On this date 4/14/2022 I have spent 20 minutes reviewing previous notes, test results and face to face with the patient discussing the diagnosis and importance of compliance with the treatment plan as well as documenting on the day of the visit. Subjective:   HPI    Inpatient course: Discharge summary reviewed- see chart. Interval history/Current status: Pt states that when she went in she could not walk the pain in her feet was so bad, treated for cellulitis. Pt states that the swelling has a little bit, but continues to be persistent.   She is not using compression stockings, she is using wraps but not sure how to wrap them since her feet still stay swollen-wrapping from ankles to knees  Pt states her feet up to mid shin is still very painful   Pt has tried keeping them elevated home when she can       Pt asked if she could a prescription for some Norco to help with the pain, pt has been taking OTC Tylenol but it does not help very much - she does get #20 pills a month      Pt states the Ambien does not seem to help with sleeping    Patient Active Problem List   Diagnosis    Dyslipidemia    DIONY treated with BiPAP    Fibromyalgia    CRI (chronic renal insufficiency)    OA (osteoarthritis)    DM (diabetes mellitus) ([address]    Kidney stone    Depression    Seasonal allergies    Diverticulosis    Erythropenia    Normocytic normochromic anemia    Mitral regurgitation - Mild to moderate    CKD (chronic kidney disease) stage 4, GFR 15-29 ml/min ([facility])    Gout    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 disease ([address]    Essential hypertension    Osteopenia    Morbid obesity due to excess calories ([address]    Anxiety    Febrile illness    Acute serous otitis media of left ear    Secondary hyperparathyroidism ([address]    Acute kidney injury superimposed on CKD ([address]    New onset a-fib ([address] with Rapid ventricular response    New onset of congestive heart failure ([address]    Lymphedema    GERD (gastroesophageal reflux disease)    Restless leg syndrome       Medications listed as ordered at the time of discharge from hospital     Medication List          Accurate as of April 14, 2022 3:44 PM. If you have any questions, ask your nurse or doctor. CONTINUE taking these medications    allopurinol 100 MG tablet  Commonly known as: ZYLOPRIM  Take 1 tablet by mouth daily     ALPRAZolam 0.5 MG tablet  Commonly known as: XANAX     amiodarone 200 MG tablet  Commonly known as: CORDARONE  Take 1 tablet by mouth daily     apixaban 5 MG Tabs tablet  Commonly known as: ELIQUIS  Take 1 tablet by mouth 2 times daily     atorvastatin 40 MG tablet  Commonly known as: LIPITOR  Take 1 tablet by mouth daily     azelastine 0.1 % nasal spray  Commonly known as: ASTELIN  1 spray by Nasal route 2 times daily Use in each nostril as directed     calcitRIOL 0.25 MCG capsule  Commonly known as: ROCALTROL  TAKE 1 CAPSULE BY MOUTH DAILY     cyclobenzaprine 5 MG tablet  Commonly known as: FLEXERIL  Take 1 tablet by mouth nightly as needed for Muscle spasms     ferrous sulfate 325 (65 Fe) MG EC tablet  Commonly known as: FE TABS 325  Take 1 tablet by mouth daily (with breakfast)     furosemide 20 MG tablet  Commonly known as: Lasix  Take 1 tablet by mouth daily     gabapentin 100 MG capsule  Commonly known as: NEURONTIN  Take 1 capsule by mouth daily for 30 days. Magnesium Oxide 400 MG Caps     metoprolol tartrate 25 MG tablet  Commonly known as: LOPRESSOR  Take 0.5 tablets by mouth 2 times daily     pantoprazole 40 MG tablet  Commonly known as: PROTONIX  TAKE 1 TABLET DAILY     potassium citrate 10 MEQ (1080 MG) extended release tablet  Commonly known as: UROCIT-K  Take 1 tablet by mouth daily     rOPINIRole 0.25 MG tablet  Commonly known as: Requip  Take 1 tablet by mouth nightly     vitamin D3 25 MCG (1000 UT) Tabs tablet  Commonly known as: CHOLECALCIFEROL  Take 2 tablets by mouth daily     zolpidem 5 MG tablet  Commonly known as: AMBIEN  Take 1 tablet by mouth nightly as needed for Sleep for up to 30 days.              Medications marked \"taking\" at this time  Outpatient Medications Marked as Taking for the 4/14/22 encounter (Office Visit) with Margarita Ventura MD   Medication Sig Dispense Refill    amiodarone (CORDARONE) 200 MG tablet Take 1 tablet by mouth daily 30 tablet 0    apixaban (ELIQUIS) 5 MG TABS tablet Take 1 tablet by mouth 2 times daily 60 tablet 0    gabapentin (NEURONTIN) 100 MG capsule Take 1 capsule by mouth daily for 30 days. 30 capsule 0    metoprolol tartrate (LOPRESSOR) 25 MG tablet Take 0.5 tablets by mouth 2 times daily 60 tablet 0    furosemide (LASIX) 20 MG tablet Take 1 tablet by mouth daily 60 tablet 0    zolpidem (AMBIEN) 5 MG tablet Take 1 tablet by mouth nightly as needed for Sleep for up to 30 days. 30 tablet 0    ALPRAZolam (XANAX) 0.5 MG tablet nightly.  potassium citrate (UROCIT-K) 10 MEQ (1080 MG) extended release tablet Take 1 tablet by mouth daily 90 tablet 3    allopurinol (ZYLOPRIM) 100 MG tablet Take 1 tablet by mouth daily 90 tablet 1    rOPINIRole (REQUIP) 0.25 MG tablet Take 1 tablet by mouth nightly 90 tablet 1    calcitRIOL (ROCALTROL) 0.25 MCG capsule TAKE 1 CAPSULE BY MOUTH DAILY 90 capsule 1    cyclobenzaprine (FLEXERIL) 5 MG tablet Take 1 tablet by mouth nightly as needed for Muscle spasms 30 tablet 1    atorvastatin (LIPITOR) 40 MG tablet Take 1 tablet by mouth daily 90 tablet 1    azelastine (ASTELIN) 0.1 % nasal spray 1 spray by Nasal route 2 times daily Use in each nostril as directed 1 each 5    pantoprazole (PROTONIX) 40 MG tablet TAKE 1 TABLET DAILY 90 tablet 1    Cholecalciferol (VITAMIN D3) 25 MCG (1000 UT) TABS Take 2 tablets by mouth daily 180 tablet 1    Magnesium Oxide 400 MG CAPS Take by mouth 2 times daily           Medications patient taking as of now reconciled against medications ordered at time of hospital discharge: Yes    Review of Systems   Constitutional: Positive for fatigue. Negative for fever and unexpected weight change. Respiratory: Positive for shortness of breath.  Negative for cough, choking, chest tightness and wheezing. Cardiovascular: Positive for leg swelling. Negative for chest pain and palpitations. Gastrointestinal: Negative for abdominal pain, anal bleeding, blood in stool, constipation, diarrhea, nausea and vomiting. Endocrine: Negative. Musculoskeletal: Negative for joint swelling and myalgias. Skin: Negative. Neurological: Negative for dizziness. Psychiatric/Behavioral: Negative for sleep disturbance. All other systems reviewed and are negative. Objective:    BP (!) 148/82 (Site: Left Upper Arm, Position: Sitting, Cuff Size: Large Adult)   Pulse 74   Temp 97.4 °F (36.3 °C)   LMP  (LMP Unknown)   SpO2 100%   Physical Exam  Vitals and nursing note reviewed. Constitutional:       General: She is not in acute distress. Appearance: She is well-developed. She is not ill-appearing. Eyes:      General: Lids are normal. Vision grossly intact. Cardiovascular:      Rate and Rhythm: Normal rate and regular rhythm. Heart sounds: Normal heart sounds, S1 normal and S2 normal. No murmur heard. No friction rub. No gallop. Pulmonary:      Effort: Pulmonary effort is normal. No respiratory distress. Breath sounds: Normal breath sounds. No wheezing. Abdominal:      General: Bowel sounds are normal.      Palpations: Abdomen is soft. There is no mass. Tenderness: There is no abdominal tenderness. There is no guarding. Musculoskeletal:         General: Normal range of motion. Right lower le+ Pitting Edema present. Left lower le+ Pitting Edema present. Skin:     General: Skin is warm and dry. Capillary Refill: Capillary refill takes less than 2 seconds. Neurological:      General: No focal deficit present. Mental Status: She is alert and oriented to person, place, and time. An electronic signature was used to authenticate this note.   --Oneida Ospina MD

## 2022-04-18 ENCOUNTER — CARE COORDINATION (OUTPATIENT)
Dept: CASE MANAGEMENT | Age: 77
End: 2022-04-18

## 2022-04-18 NOTE — CARE COORDINATION
Tammie 45 Transitions Follow Up Call    2022    Patient: Caleb Lopez  Patient : 1945   MRN: [id number]  Reason for Admission: CHF  Discharge Date: 22 RARS: Readmission Risk Score: 22 ( )         Spoke with: Patient    Spoke with patient who said she is feeling fair today. She has swelling to her bilateral LE, has chronic lymphedema, has pain to the balls of her feet and her heels and to the top of her toes. She keeps her legs wrapped and was was set up at the lymphedema clinic but they have her on hold until cleared by nephrology. Discussed with her the JOSE ARMANDO and follow up on that before lymphedema therapy can begin. She was seen by her PCP last week, lab work ordered, home care nurse will be out tomorrow to draw her blood. She states her legs are no longer red, just painful when she tries to walk. She states her weights have been the same every day and has had no weight gains. She lost her dentures while in the hospital.  She said she never heard back from the unit manager about this. TC to [facility], spoke with unit manager [facility] who said she was out several days last week but will be calling patient today or tomorrow. She states they did not find the dentures, patient had told her she placed them in an orange juice cup so more than likely were thrown away. She said per hospital policy, the patient was A&O and is responsible for her belongings. Patient denies any new needs or concerns. Home care nurse will be out tomorrow to draw her blood. She is waiting to hear back from nephrology office who she called today to see when she can start her lymphedema therapy. Care Transitions Follow Up Call    Needs to be reviewed by the provider   Additional needs identified to be addressed with provider: No  none             Method of communication with provider : none      Care Transition Nurse (CTN) contacted the patient by telephone to follow up after admission on 3/31.  Verified name and  with patient as identifiers. Addressed changes since last contact: none  Discussed follow-up appointments. If no appointment was previously scheduled, appointment scheduling offered: No.   Is follow up appointment scheduled within 7 days of discharge? No.    Advance Care Planning:   Does patient have an Advance Directive: not on file; education provided. CTN reviewed discharge instructions, medical action plan and red flags with patient and discussed any barriers to care and/or understanding of plan of care after discharge. Discussed appropriate site of care based on symptoms and resources available to patient including: PCP  Specialist. The patient agrees to contact the PCP office for questions related to their healthcare. Patients top risk factors for readmission: medical condition-CHF  Interventions to address risk factors: Obtained and reviewed discharge summary and/or continuity of care documents      Non-[facility] follow up appointment(s): n/a    CTN provided contact information for future needs. Plan for follow-up call in 3-5 days based on severity of symptoms and risk factors. Plan for next call: follow up on edema, any new breathing changes, weight changes            Care Transitions Subsequent and Final Call    Schedule Follow Up Appointment with PCP: Completed  Subsequent and Final Calls  Do you have any ongoing symptoms?: Yes  Onset of Patient-reported symptoms: In the past 7 days  Patient-reported symptoms: Other, Pain  Have your medications changed?: No  Do you have any questions related to your medications?: No  Do you currently have any active services?: Yes  Are you currently active with any services?: Home Health  Do you have any needs or concerns that I can assist you with?: No  Identified Barriers: Lack of Education  Care Transitions Interventions  Other Interventions:            Follow Up  Future Appointments   Date Time Provider Mateo Weaver   2022  2:15 PM SONIA PAINTER  3 DIMITRIS PAINTER STA Radiolog   6/14/2022  1:10 PM Paco Carrera MD [facility] None   6/20/2022  4:00 PM Eliza Gennaro Mayes MD [facility] FP [facility]   7/5/2022  3:30 PM Eliza Gennaro Mayes MD [facility] FP Abel Bloom RN

## 2022-04-19 ENCOUNTER — TELEPHONE (OUTPATIENT)
Dept: FAMILY MEDICINE CLINIC | Age: 77
End: 2022-04-19

## 2022-04-19 NOTE — TELEPHONE ENCOUNTER
Can they upload a picture of her feet to [facility]? Her feet did not look red or this tender when she was seen 4/14/22. Any fevers or other symptoms?   Increase the gabapentin to 100mg twice daily for now - if it works will send new rx to pharmacy

## 2022-04-19 NOTE — TELEPHONE ENCOUNTER
Lyubov Joiner from [facility] just left from seeing the pt,  Pt is still having the swelling in her feet     pts feet are swollen and red, pt is worried she may have cellulitis in both feet, from right behind her toes up to her ankles     Has so much pain that it hurts when the blanket touches the feet , Norco not touching the pain     Pt gabapentin was decreased from 600 to 100 MG     Pt started on REQUIP but has not noticed a change since starting the REQUIP     Pt told Lyubov Joiner that her order for the lymphedemia clinic was cancelled from her nephrologists and she is not sure why     Lyubov Joiner from [facility]     [phone]

## 2022-04-20 NOTE — TELEPHONE ENCOUNTER
Noted. She can also try topical lidocaine or Aspercreme with lidocaine to see if it helps with the burning and pain

## 2022-04-20 NOTE — TELEPHONE ENCOUNTER
I spoke with Laniey Euceda from [facility], they will go out and get pictures of Saya's legs and send through My Chart. I informed Luke and [address] that the Gabapentin 100 mg can be increased to 2 a day. [address] will call the office to let us know if it is helping with the pain.  She states pain was bad last night and didn't get any sleep

## 2022-04-21 ENCOUNTER — CARE COORDINATION (OUTPATIENT)
Dept: CASE MANAGEMENT | Age: 77
End: 2022-04-21

## 2022-04-21 NOTE — CARE COORDINATION
Tammie 45 Transitions Follow Up Call    2022    Patient: Osman Linares  Patient : 1945   MRN: [id number]  Reason for Admission: CHF  Discharge Date: 22 RARS: Readmission Risk Score: 22 ( )         Spoke with: Patient    Spoke with patient who states she is feeling a little better today, she said her pain to her feet has went down and is more tolerable. She claims to have no redness to her legs but said her nurse was out the other day and thought they looked more red. Nurse notified MD office and had her Gabapentin increased. Elsa Sands said the pain has really went down, much more tolerable but she feels weak and fatigued. She has had no further weight gains, legs are wrapped for compression with ACE wraps. She has been keeping them elevated throughout the day. She denies chest pains, shortness of breath, cough or other cardiac related symptoms. She has been trying to get back into the lymphedema clinic however she was told it was put on hold and they need clearance from her nephrologist.  She called Dr. Te Quintero office who told her he did not put that on hold and has nothing to do with her lymphedema. She is calling them back again tomorrow to see if this has been straightened out. I expressed if she has difficulty with getting back to the clinic or getting orders resumed to call me. She denies any other needs or concerns at this time. Care Transitions Follow Up Call    Needs to be reviewed by the provider   Additional needs identified to be addressed with provider: No  none             Method of communication with provider : none      Care Transition Nurse (CTN) contacted the patient by telephone to follow up after admission on 3/31. Verified name and  with patient as identifiers. Addressed changes since last contact: none  Discussed follow-up appointments.      CTN reviewed discharge instructions, medical action plan and red flags with patient and discussed any barriers to care and/or understanding of plan of care after discharge. Discussed appropriate site of care based on symptoms and resources available to patient including: PCP  Specialist. The patient agrees to contact the PCP office for questions related to their healthcare. Patients top risk factors for readmission: medical condition-CHF  Interventions to address risk factors: Obtained and reviewed discharge summary and/or continuity of care documents and Education of patient/family/caregiver/guardian to support self-management-Reviewed CHF Zone tool sheet again      Non-[facility] follow up appointment(s): n/a    CTN provided contact information for future needs. Plan for follow-up call in 5-7 days based on severity of symptoms and risk factors. Plan for next call: Check on swelling, fatigue          Care Transitions Subsequent and Final Call    Schedule Follow Up Appointment with PCP: Completed  Subsequent and Final Calls  Do you have any ongoing symptoms?: Yes  Onset of Patient-reported symptoms: In the past 7 days  Patient-reported symptoms: Pain, Other  Have your medications changed?: No  Do you have any questions related to your medications?: No  Do you currently have any active services?: Yes  Are you currently active with any services?: Home Health  Do you have any needs or concerns that I can assist you with?: No  Identified Barriers: Lack of Education  Care Transitions Interventions  Other Interventions:            Follow Up  Future Appointments   Date Time Provider Mateo Weaver   6/7/2022  2:15 PM STA DIAG MAMMO RM 3 STAZ MAMMO STA Radiolog   6/14/2022  1:10 PM Jhonny Cheatham MD [facility] None   6/20/2022  4:00 PM Eliza Lewis MD [facility] FP M[facility]   7/5/2022  3:30 PM Eliza Lewis MD [facility] FP Ignacia Perez RN

## 2022-04-25 ENCOUNTER — TELEPHONE (OUTPATIENT)
Dept: FAMILY MEDICINE CLINIC | Age: 77
End: 2022-04-25

## 2022-04-25 NOTE — TELEPHONE ENCOUNTER
Received call from Octavio carolina at [facility]. She wanted to make sure our office is aware that they are awaiting approval from nephrology to continue treatment. Octavio carolina has faxed form to their office a few times and has not received a response. I left a message for Dr. Ross Aguilera office to contact our office. We need to verify if they received form and if they faxed back. Please fax if not (form is in visit from 3/28/22).  They need this in order to continue treatment and insurance purposes

## 2022-04-25 NOTE — TELEPHONE ENCOUNTER
Spoke to Dr Gissell Estrada office. She is not sure if the form was received. Asked for fax number and re faxed form.

## 2022-04-27 ENCOUNTER — CARE COORDINATION (OUTPATIENT)
Dept: CASE MANAGEMENT | Age: 77
End: 2022-04-27

## 2022-04-27 DIAGNOSIS — I89.0 LYMPHEDEMA: ICD-10-CM

## 2022-04-27 LAB
BUN BLDV-MCNC: 27 MG/DL
CALCIUM SERPL-MCNC: 9.4 MG/DL
CHLORIDE BLD-SCNC: 108 MMOL/L
CO2: 24 MMOL/L
CREAT SERPL-MCNC: 2.8 MG/DL
GFR CALCULATED: 16
GLUCOSE BLD-MCNC: 128 MG/DL
POTASSIUM SERPL-SCNC: 4.5 MMOL/L
SODIUM BLD-SCNC: 143 MMOL/L

## 2022-04-27 NOTE — TELEPHONE ENCOUNTER
She is not due to get refill yet - she had 12 pills on 4/7 and 20 on 4/14 - she is also supposed to be taking gabapentin BID. Med agreement is for 20/month. How often is she taking the norco? I'm not opposed to one time early refill since she was in the hospital this month, but need to verify how much she is currently using.

## 2022-04-27 NOTE — TELEPHONE ENCOUNTER
Last visit: 04/14/2022  Last Med refill: 04/14/2022  Does patient have enough medication for 72 hours: No:     Pt states she is out, but can not get into the lymphedema clinic yet    Next Visit Date:  Future Appointments   Date Time Provider Mateo Weaver   6/7/2022  2:15 PM STA DIAG MAMMO RM 3 STAZ MAMMO STA Radiolog   6/14/2022  1:10 PM Kaye Maya MD [facility] None   6/20/2022  4:00 PM Eliza Brito MD [facility] FP [facility]OLPP   7/5/2022  3:30 PM Eliza Brito  [address] Maintenance   Topic Date Due    Lipids  01/11/2022    COVID-19 Vaccine (3 - Booster for Johnston Peter series) 02/16/2023 (Originally 9/27/2021)    DTaP/Tdap/Td vaccine (1 - Tdap) 03/08/2023 (Originally 2/2/1964)    Shingles Vaccine (1 of 2) 03/08/2023 (Originally 2/2/1996)    Depression Monitoring  07/01/2022    Annual Wellness Visit (AWV)  07/02/2022    TSH  04/01/2023    Potassium  04/09/2023    Creatinine  04/09/2023    DEXA (modify frequency per FRAX score)  Completed    Flu vaccine  Completed    Pneumococcal 65+ years Vaccine  Completed    Hepatitis C screen  Completed    Hepatitis A vaccine  Aged Out    Hib vaccine  Aged Out    Meningococcal (ACWY) vaccine  Aged Out       Hemoglobin A1C (%)   Date Value   03/09/2022 5.5   11/16/2021 5.7   01/11/2021 5.7             ( goal A1C is < 7)   Microalb/Crt.  Ratio (mcg/mg creat)   Date Value   01/11/2021 CANNOT BE CALCULATED     LDL Cholesterol (mg/dL)   Date Value   01/11/2021 85   03/03/2020 96       (goal LDL is <100)   AST (U/L)   Date Value   04/01/2022 11     ALT (U/L)   Date Value   04/01/2022 7     BUN (mg/dL)   Date Value   04/09/2022 79 (H)     BP Readings from Last 3 Encounters:   04/14/22 (!) 140/80   04/07/22 (!) 161/73   03/15/22 138/84          (goal 120/80)    All Future Testing planned in [facility]  Lab Frequency Next Occurrence   ALT Once 08/01/2021   AST Once 08/01/2021   Hemoglobin A1C Once 08/01/2021   Lipid Panel Once 08/01/2021 Microalbumin, Ur Once 08/01/2021   Uric Acid Once 08/01/2021   Ferritin Once 08/01/2021   Iron and TIBC Once 08/01/2021   Transferrin Once 08/01/2021   Vitamin B12 & Folate Once 08/01/2021   Ferritin Once 12/24/2021   Iron And TIBC Once 12/24/2021   Vitamin D 25 Hydroxy Once 05/14/2022   PTH, Intact Once 05/14/2022   CBC Auto Differential Once 05/14/2022   Magnesium Once 45/91/2124   Basic Metabolic Panel Once 96/90/1499   Phosphorus Once 05/14/2022   Creatinine, Random Urine Once 05/14/2022   Protein, urine, random Once 46/58/3657   Basic Metabolic Panel Once 86/44/9912   Basic Metabolic Panel Once 13/05/9567   Basic Metabolic Panel Once 40/47/5353               Patient Active Problem List:     Dyslipidemia     DIONY treated with BiPAP     Fibromyalgia     CRI (chronic renal insufficiency)     OA (osteoarthritis)     DM (diabetes mellitus) ([address]     Kidney stone     Depression     Seasonal allergies     Diverticulosis     Erythropenia     Normocytic normochromic anemia     Mitral regurgitation - Mild to moderate     CKD (chronic kidney disease) stage 4, GFR 15-29 ml/min ([facility])     Gout     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 disease ([address]     Essential hypertension     Osteopenia     Morbid obesity due to excess calories ([facility])     Anxiety     Febrile illness     Acute serous otitis media of left ear     Secondary hyperparathyroidism ([address]     Acute kidney injury superimposed on CKD ([address]     New onset a-fib ([address] with Rapid ventricular response     New onset of congestive heart failure (HCC)     Lymphedema     GERD (gastroesophageal reflux disease)     Restless leg syndrome     Acute diastolic congestive heart failure (HCC)     Chronic bilateral low back pain without sciatica

## 2022-04-27 NOTE — CARE COORDINATION
Tammie 45 Transitions Follow Up Call    2022    Patient: Aurelio Moya  Patient : 1945   MRN: [id number]  Reason for Admission: New onset CHF  Discharge Date: 22 RARS: Readmission Risk Score: 22 ( )         Spoke with: Patient    Spoke with patient who said she is doing \"fair\" today. She c/o pain to her abdominal muscles between her waist line and to just under her breasts today. She is unsure if she slept wrong but said she feels sore today. She also has pain to her feet and legs however this pain has improved some, today seems a little worse than other days. She said her legs look \"great\" with no new swelling, states she is able to see her feet. She also reports that she has been using her cane more in the house as opposed to her walker. She has been wrapping her legs daily with ace wraps and keeping them elevated, she is noticing a big difference in the amount of edema she has to her LE. She is still awaiting to hear from the lymphedema clinic. Per notes in chart they were still awaiting documentation from nephrology that she can do this. I did call over to the clinic to inquire but had to leave message as the girl that is working on this was with another patient. Patient denies any new needs. I explained if I can find out what the hold up is with the lymphedema clinic, I would see what we could do to assist this along. Patient denies any other needs or concerns. Weights have held steady with no weight gains this week. Denies chest pains, cough or shortness of breath today. Care Transitions Follow Up Call    Needs to be reviewed by the provider   Additional needs identified to be addressed with provider: No  none             Method of communication with provider : none      Care Transition Nurse (CTN) contacted the patient by telephone to follow up after admission on 3/31. Verified name and  with patient as identifiers.     Addressed changes since last contact: none  Discussed follow-up appointments. CTN reviewed discharge instructions, medical action plan and red flags with patient and discussed any barriers to care and/or understanding of plan of care after discharge. Discussed appropriate site of care based on symptoms and resources available to patient including: PCP  Specialist. The patient agrees to contact the PCP office for questions related to their healthcare. Patients top risk factors for readmission: medical condition-CHF  Interventions to address risk factors: Obtained and reviewed discharge summary and/or continuity of care documents      Non-[facility] follow up appointment(s): n/a    CTN provided contact information for future needs. Plan for follow-up call in 5-7 days based on severity of symptoms and risk factors. Plan for next call: Check on lymphedema clinic, check if any additional needs. Care Transitions Subsequent and Final Call    Schedule Follow Up Appointment with PCP: Completed  Subsequent and Final Calls  Do you have any ongoing symptoms?: Yes  Onset of Patient-reported symptoms: In the past 7 days  Patient-reported symptoms: Fatigue, Weakness  Have your medications changed?: No  Do you have any questions related to your medications?: No  Do you currently have any active services?: Yes  Are you currently active with any services?: Home Health  Do you have any needs or concerns that I can assist you with?: No  Identified Barriers: Lack of Education, Lack of Support  Care Transitions Interventions  Other Interventions:            Follow Up  Future Appointments   Date Time Provider Mateo Weaver   6/7/2022  2:15 PM STA DIAG MAMMO RM 3 STAZ MAMMO STA Radiolog   6/14/2022  1:10 PM Raz Younger MD [facility] None   6/20/2022  4:00 PM Eliza Martinez MD [facility] FP [facility]   7/5/2022  3:30 PM Eliza Martinez MD [facility] FP Lisa Peterson RN

## 2022-04-28 ENCOUNTER — CARE COORDINATION (OUTPATIENT)
Dept: CASE MANAGEMENT | Age: 77
End: 2022-04-28

## 2022-04-28 DIAGNOSIS — I48.91 NEW ONSET A-FIB (HCC): ICD-10-CM

## 2022-04-28 RX ORDER — HYDROCODONE BITARTRATE AND ACETAMINOPHEN 5; 325 MG/1; MG/1
1 TABLET ORAL EVERY 6 HOURS PRN
Qty: 20 TABLET | Refills: 0 | Status: SHIPPED | OUTPATIENT
Start: 2022-04-28 | End: 2022-06-30

## 2022-04-28 NOTE — TELEPHONE ENCOUNTER
Spoke with pt, she states that she usually does it every 8 hrs, but there has been some days that she needs it a little more often, but states that today there is some improvement, she feels as if she is walking a little better

## 2022-04-28 NOTE — CARE COORDINATION
Tammie 45 Transitions Follow Up Call    2022    Patient: Kiki Lara  Patient : 1945   MRN: [id number]  Reason for Admission: CHF  Discharge Date: 22 RARS: Readmission Risk Score: 25 ( )         Spoke with: Patient    Spoke with patient to let her know that the lymphedema clinic called and left message on my vm that they finally got clearance from nephrology for her to go to the clinic for therapy. She states she talked with Claudeen Chard and has it set up for . She reports she has no swelling today, said her feet and legs look good today. She keeps them wrapped during the day but says although the swelling has went down she has pain to her feet making in difficult for her to walk. She has been taking Vicodin with some relief but has been trying to use sparingly. She still c/o burning type pain to her feet. She is on Gabapentin at bedtime. She states she had asked her PCP for refills but per notes was too soon for a fill. We discussed her pain and how often she is taking medication. She said she feels she could take one now but is trying to hold off. We discussed her pain and management of it. She has taken the Vicodin at most once a day. I advised if she is not getting pain relief with the medication that she needs to discuss with PCP about her pain and better management of it. She denies any other needs or concerns at this time. Care Transitions Follow Up Call    Needs to be reviewed by the provider   Additional needs identified to be addressed with provider: No  none             Method of communication with provider : none      Care Transition Nurse (CTN) contacted the patient by telephone to follow up after admission on 3/31. Verified name and  with patient as identifiers. Addressed changes since last contact: none  Discussed follow-up appointments.      CTN reviewed discharge instructions, medical action plan and red flags with patient and discussed any barriers to care and/or understanding of plan of care after discharge. Discussed appropriate site of care based on symptoms and resources available to patient including: PCP  Specialist. The patient agrees to contact the PCP office for questions related to their healthcare. Patients top risk factors for readmission: medical condition-CHF  Interventions to address risk factors: Education of patient/family/caregiver/guardian to support self-management-Discussed lymphedema, causes, treatment, discussed pain management      Non-[facility] follow up appointment(s): n/a    CTN provided contact information for future needs. Plan for follow-up call in 5-7 days based on severity of symptoms and risk factors. Plan for next call: check on pain levels, edema, weight gains            Care Transitions Subsequent and Final Call    Schedule Follow Up Appointment with PCP: Completed  Subsequent and Final Calls  Do you have any ongoing symptoms?: Yes  Patient-reported symptoms: Fatigue, Weakness, Pain  Have your medications changed?: No  Do you have any questions related to your medications?: No  Do you currently have any active services?: No  Are you currently active with any services?: Home Health  Do you have any needs or concerns that I can assist you with?: No  Identified Barriers: Lack of Education  Care Transitions Interventions  Other Interventions:            Follow Up  Future Appointments   Date Time Provider Mateo Weaver   5/9/2022  3:30 PM Karli Daughters, OT [facility]   5/11/2022 10:00 AM Karli Daughters, OT [facility]   5/16/2022 11:30 AM Karli Daughters, OT [facility]   5/18/2022  3:00 PM Karli Daughters, OT [facility]   5/23/2022  3:30 PM Karli Daughters, OT [facility]   5/25/2022  3:30 PM Karli Daughters, OT [facility]   6/1/2022  3:30 PM Karli Daughters, OT [facility]   6/7/2022  2:15 PM STA DIAG MAMMO RM 3 STAZ MAMMO STA Radiolog   6/14/2022  1:10 PM Justen WADE

## 2022-05-02 ENCOUNTER — APPOINTMENT (OUTPATIENT)
Dept: OCCUPATIONAL THERAPY | Age: 77
End: 2022-05-02
Payer: MEDICARE

## 2022-05-02 DIAGNOSIS — F51.01 PRIMARY INSOMNIA: ICD-10-CM

## 2022-05-02 RX ORDER — ATORVASTATIN CALCIUM 40 MG/1
40 TABLET, FILM COATED ORAL DAILY
Qty: 90 TABLET | Refills: 1 | Status: SHIPPED | OUTPATIENT
Start: 2022-05-02

## 2022-05-02 RX ORDER — ZOLPIDEM TARTRATE 5 MG/1
5 TABLET ORAL NIGHTLY PRN
Qty: 30 TABLET | Refills: 1 | OUTPATIENT
Start: 2022-05-02 | End: 2022-06-01

## 2022-05-02 RX ORDER — ALPRAZOLAM 0.5 MG/1
TABLET ORAL
Qty: 30 TABLET | Refills: 1 | Status: SHIPPED | OUTPATIENT
Start: 2022-05-02 | End: 2022-08-09

## 2022-05-02 NOTE — TELEPHONE ENCOUNTER
Last visit: 04/14/2022  Last Med refill: 11/16/21 and 3/15/22  Does patient have enough medication for 72 hours: Yes and No    Next Visit Date:  Future Appointments   Date Time Provider Mateo Meg   5/9/2022  3:30 PM Colletta Levy, OT [facility]   5/11/2022 10:00 AM Colletta Levy, OT [facility]   5/16/2022 11:30 AM Colletta Levy, OT [facility]   5/18/2022  3:00 PM Colletta Levy, OT [facility]   5/23/2022  3:30 PM Colletta Levy, OT [facility]   5/25/2022  3:30 PM Colletta Levy, OT [facility]   6/1/2022  3:30 PM Colletta Levy, OT [facility]   6/7/2022  2:15 PM STA DIAG MAMMO RM 3 STAZ MAMMO [facility] Radiolog   6/14/2022  1:10 PM Conception MD Sarah [facility] None   6/20/2022  4:00 PM Eliza Luis MD [facility]   7/5/2022  3:30 PM Eliza Luis  [address] Maintenance   Topic Date Due    Lipids  01/11/2022    COVID-19 Vaccine (3 - Booster for [place] Peter series) 02/16/2023 (Originally 9/27/2021)    DTaP/Tdap/Td vaccine (1 - Tdap) 03/08/2023 (Originally 2/2/1965)    Shingles vaccine (1 of 2) 03/08/2023 (Originally 2/2/1996)    Depression Monitoring  07/01/2022    Annual Wellness Visit (AWV)  07/02/2022    TSH  04/01/2023    Potassium  04/27/2023    Creatinine  04/27/2023    DEXA (modify frequency per FRAX score)  Completed    Flu vaccine  Completed    Pneumococcal 65+ years Vaccine  Completed    Hepatitis C screen  Completed    Hepatitis A vaccine  Aged Out    Hib vaccine  Aged Out    Meningococcal (ACWY) vaccine  Aged Out       Hemoglobin A1C (%)   Date Value   03/09/2022 5.5   11/16/2021 5.7   01/11/2021 5.7             ( goal A1C is < 7)   Microalb/Crt.  Ratio (mcg/mg creat)   Date Value   01/11/2021 CANNOT BE CALCULATED     LDL Cholesterol (mg/dL)   Date Value   01/11/2021 85   03/03/2020 96       (goal LDL is <100)   AST (U/L)   Date Value   04/01/2022 11     ALT (U/L)   Date Value 04/01/2022 7     BUN (mg/dL)   Date Value   04/27/2022 27     BP Readings from Last 3 Encounters:   04/14/22 (!) 140/80   04/07/22 (!) 161/73   03/15/22 138/84          (goal 120/80)    All Future Testing planned in [facility]  Lab Frequency Next Occurrence   ALT Once 08/01/2021   AST Once 08/01/2021   Hemoglobin A1C Once 08/01/2021   Lipid Panel Once 08/01/2021   Microalbumin, Ur Once 08/01/2021   Uric Acid Once 08/01/2021   Ferritin Once 08/01/2021   Iron and TIBC Once 08/01/2021   Transferrin Once 08/01/2021   Vitamin B12 & Folate Once 08/01/2021   Ferritin Once 12/24/2021   Iron And TIBC Once 12/24/2021   Vitamin D 25 Hydroxy Once 05/14/2022   PTH, Intact Once 05/14/2022   CBC Auto Differential Once 05/14/2022   Magnesium Once 95/12/6643   Basic Metabolic Panel Once 85/07/3896   Phosphorus Once 05/14/2022   Creatinine, Random Urine Once 05/14/2022   Protein, urine, random Once 12/69/4465   Basic Metabolic Panel Once 27/38/0358   Basic Metabolic Panel Once 48/32/3868               Patient Active Problem List:     Dyslipidemia     DIONY treated with BiPAP     Fibromyalgia     CRI (chronic renal insufficiency)     OA (osteoarthritis)     DM (diabetes mellitus) ([address]     Kidney stone     Depression     Seasonal allergies     Diverticulosis     Erythropenia     Normocytic normochromic anemia     Mitral regurgitation - Mild to moderate     CKD (chronic kidney disease) stage 4, GFR 15-29 ml/min ([facility])     Gout     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 disease ([address]     Essential hypertension     Osteopenia     Morbid obesity due to excess calories ([facility])     Anxiety     Febrile illness     Acute serous otitis media of left ear     Secondary hyperparathyroidism ([address]     Acute kidney injury superimposed on CKD ([address]     New onset a-fib ([address] with Rapid ventricular response     New onset of congestive heart failure (HCC)     Lymphedema     GERD (gastroesophageal reflux disease)     Restless leg syndrome Acute diastolic congestive heart failure (HCC)     Chronic bilateral low back pain without sciatica

## 2022-05-04 ENCOUNTER — CARE COORDINATION (OUTPATIENT)
Dept: CASE MANAGEMENT | Age: 77
End: 2022-05-04

## 2022-05-04 ENCOUNTER — APPOINTMENT (OUTPATIENT)
Dept: OCCUPATIONAL THERAPY | Age: 77
End: 2022-05-04
Payer: MEDICARE

## 2022-05-04 NOTE — CARE COORDINATION
[facility] Transitions Follow Up Call    2022    Patient: Roxanne Guzman  Patient : 1945   MRN: [id number]  Reason for Admission: CHF  Discharge Date: 22 RARS: Readmission Risk Score: 22 ( )         Attempted to reach patient for subsequent transitional call. VM left to return call to [phone]. 1st attempt.       Follow Up  Future Appointments   Date Time Provider Mateo Weaver   2022  3:30 PM Emre Most, OT [facility]   2022 10:00 AM Haskell Most, OT [facility]   2022 11:30 AM Emre Most, OT [facility]   2022  3:00 PM Emre Most, OT [facility]   2022  3:30 PM Haskell Most, OT [facility]   2022  3:30 PM Emre Most, OT [facility]   2022  3:30 PM Emre Most, OT [facility]   2022  2:15 PM STA DIAG MAMMO RM 3 STAZ MAMMO STA Radiolog   2022  1:10 PM Alana Pritchard MD [facility] None   2022  4:00 PM Eliza Rockwell MD [facility]   2022  3:30 PM Eliza Rockwell MD [facility] FP Randi Wallace RN

## 2022-05-05 ENCOUNTER — CARE COORDINATION (OUTPATIENT)
Dept: CASE MANAGEMENT | Age: 77
End: 2022-05-05

## 2022-05-05 DIAGNOSIS — E11.65 TYPE 2 DIABETES MELLITUS WITH HYPERGLYCEMIA, WITHOUT LONG-TERM CURRENT USE OF INSULIN (HCC): ICD-10-CM

## 2022-05-05 NOTE — CARE COORDINATION
[facility] Transitions Follow Up Call    2022    Patient: Darylene Morris  Patient : 1945   MRN: [id number]  Reason for Admission: New onset CHF  Discharge Date: 22 RARS: Readmission Risk Score: 22 ( )         Spoke with: Patient    Spoke with patient who states she is feeling ok today. She denies any new shortness of breath or cough. Weight is down to 240 lbs. Home care nurse was out today, vitals were good. She has some edema to her left leg but has chronic issues with lymphedema. She will start going to the lymphedema clinic on Monday. She has been complaining of foot pain that has been worsening that makes it difficult to walk She used to take Neurontin 600 mg but now after this past discharge it was decreased to 200 mg. Ever since the change her pain to her feet has worsened. I had advised her to discuss this with her PCP. Reviewed CHF zone tool sheet, she has had no new symptoms of worsening CHF. She denies any other concerns or needs at this time. Care Transitions Follow Up Call    Needs to be reviewed by the provider   Additional needs identified to be addressed with provider: No  none             Method of communication with provider : none      Care Transition Nurse (CTN) contacted the patient by telephone to follow up after admission on 3/30. Verified name and  with patient as identifiers. Addressed changes since last contact: none  Discussed follow-up appointments. CTN reviewed discharge instructions, medical action plan and red flags with patient and discussed any barriers to care and/or understanding of plan of care after discharge. Discussed appropriate site of care based on symptoms and resources available to patient including: PCP  Specialist. The patient agrees to contact the PCP office for questions related to their healthcare.      Patients top risk factors for readmission: medical condition-CHF  Interventions to address risk factors: Education of patient/family/caregiver/guardian to support self-management-Review of CHF zone tool sheet      Non-[facility] follow up appointment(s): n/a    CTN provided contact information for future needs. No further follow-up call indicated based on severity of symptoms and risk factors. Plan for next call: Final outreach            Care Transitions Subsequent and Final Call    Schedule Follow Up Appointment with PCP: Completed  Subsequent and Final Calls  Do you have any ongoing symptoms?: Yes  Onset of Patient-reported symptoms: In the past 7 days  Patient-reported symptoms: Pain, Other  Have your medications changed?: No  Do you have any questions related to your medications?: No  Do you currently have any active services?: Yes  Are you currently active with any services?: Home Health  Do you have any needs or concerns that I can assist you with?: No  Identified Barriers: Lack of Education  Care Transitions Interventions  Other Interventions:            Follow Up  Future Appointments   Date Time Provider Mateo Weaver   5/9/2022  3:30 PM Colletta Romney, OT [facility]   5/11/2022 10:00 AM Colletta Romney, OT [facility]   5/16/2022 11:30 AM Colletta Romney, OT [facility]   5/18/2022  3:00 PM Colletta Romney, OT [facility]   5/23/2022  3:30 PM Colletta Romney, OT [facility]   5/25/2022  3:30 PM Colletta Romney, OT [facility]   6/1/2022  3:30 PM Colletta Romney, OT [facility]   6/7/2022  2:15 PM STA DIAG MAMMO RM 3 STAZ MAMMO STA Radiolog   6/14/2022  1:10 PM Sushil Smith MD [facility] None   6/20/2022  4:00 PM Eliza Luis MD [facility]   7/5/2022  3:30 PM Eliza Luis MD [facility] FP Maye Mon RN

## 2022-05-09 ENCOUNTER — HOSPITAL ENCOUNTER (OUTPATIENT)
Dept: OCCUPATIONAL THERAPY | Age: 77
Setting detail: THERAPIES SERIES
Discharge: HOME | End: 2022-05-09
Payer: MEDICARE

## 2022-05-09 ENCOUNTER — APPOINTMENT (OUTPATIENT)
Dept: OCCUPATIONAL THERAPY | Age: 77
End: 2022-05-09
Payer: MEDICARE

## 2022-05-09 PROCEDURE — 97140 MANUAL THERAPY 1/> REGIONS: CPT

## 2022-05-09 PROCEDURE — 97535 SELF CARE MNGMENT TRAINING: CPT

## 2022-05-09 RX ORDER — GABAPENTIN 600 MG/1
600 TABLET ORAL DAILY
Qty: 90 TABLET | Refills: 0 | Status: SHIPPED | OUTPATIENT
Start: 2022-05-09 | End: 2022-07-18

## 2022-05-09 NOTE — FLOWSHEET NOTE
TREATMENT LOCATION:   [] [address]   [facility] [address]: [phone]  F: [phone] [] [address]: [phone]  F: [phone]        Lymphedema Services - Treatment Note of the Lower Extremity    Date:  2022  Patient: Manuel Valdez  : 1945             MRN: [id number]  Referring Physician: Emeterio Serra MD       Phone: [phone]  Fax: [phone]  Insurance: [employer] ELIZABETHdak[employer]arichard [employer] ([id number])/ Medicare (ID: [id number]) -  PT/OT/ST, BMN, no co-pay  Medical Diagnosis: R60.9 - peripheral edema  Rehab Codes: I89.0,    Onset Date: 3/15/22  Visit# / total visits:  scheduled; Progress note for Medicare patient due at visit 10   ( Certification Dates: 3/28/2022 - 22)     Allergies:    [x]? Adhesive tape       [x]? Medications    Medical clearance received - see media entry from 22 for copy. Contraindications/Precautions:             [x]? Age 60+       [x]? Advanced Renal Disease - medical clearance received. Subjective: \"I don't want to be here. \"     Pain: [x] YES    [] NO     Location: BLE, L>R     Pain Rating: ( 0-10 scale) : 7/10  Comments:     Plan for next session:  Follow up bandaging, caregiver training to daughter      Objective:   [x] Measurement [x] Bandaging [] MLD [] Skin care   [x] Education [] Self-bandaging [] Self-MLD [] Wound Care   [] Exercise [] Caregiver training [] Kinesiotaping [] Other:    [] Nutrition [] Garment fitting/training [] Vasopneumatic Pump           Circumferential Measurements   Measurements taken from nail base of D2 digit.   Measurements (cm) Right Left   Dorsum  11  24.3 25.3   Inframalleolar      17 32.8 35.5   Supramalleolar    20 25.5 28.0   Lower Calf 24 26.7 30.8   Mid Calf 30 35.4 38.6   Upper Calf 37 45.8 46.6   Knee 43 50.3 53.4   10 cm above knee       Thigh-widest       Hip-widest       Current total: 22    Initial Total 3/28/2022 :  240.8    263.9 258.2    271.0                 Assessment:  [x] Progressing toward goals. Pt arrives with spouse. Measurements decreased since last visit. Pt has worn tubigrips daily per report. Pt to come to next visit with daughter who will learn bandaging technique. Pt becomes tearful during session due to medical bills and frustration with insurance. Therapeutic use of self to redirect. Edu provided to pt and caregiver on home compression protocol and precautions. Handout provided below. Home Compression Protocol During Treatment    Leave bandages on 24/7. BUT over the weekend, do not leave the bandages on until Monday. This is TOO long. If they are still snug & not slipping down, they can stay on until Saturday or Sunday at the latest. If bandages are sagging, they are not doing their job and you are at risk for skin irritation. When bandages are removed, feel free to wash up and shower. If someone is trained on bandaging technique they may rebandage you when bandages come off OR put on your Tubigrip stockings that were cut for you (daytime ONLY!)       Reasons to remove bandages:      They become soaked/soiled - wash them in the washer or hand wash then re-apply when dry      You are having worse than normal pain in your legS     Shortness of breath, chest heaviness, lightheadedness, feeling ill in general - if these symptoms do not resolve after removal seek medical attention immediately!!       Bring back all bandages for each visit! Call Earma Barges with any questions at [phone]. Skin care provided via washing and applying Eucerine lotion to the BLE below knee. Stockinette is applied over top of the legs as a protective barrier. Rosidal foam is placed on the legs from the lakes of the feet to the base of the knees. Comprilan short stretch bandage is then placed from the lakes of the feet to the base of the knees, with approx 50% pull.  Herringbone technique is used to progress up the legs and assist with venous return, and lymphatic flow production/ stimulating a suctioning effect. [] No change. [] Other:  [x] Patient would continue to benefit from skilled occupational therapy services in order to reduce swelling and focus on goals below. Therapy Goals:   STG - To be addressed within 5 visits     Pt will demonstrate compliance of maintaining lymphedema precautions to reduce the risks of infection and further exacerbations. Progressing 5/9/22     Pt will demonstrate independence with decongestive exercise program in order to expedite fluid rerouting.     Caregiver will demo good understanding of bandaging technique and theory in order to continue progression between visits.         LTG To be adressed within 10 visits      Pt will demonstrate competence with SELF MLD (Manual lymphatic drainage) in order to reroute lymphatic pathways for decreased swelling.     Pt/Caregiver will demonstrate independence with donning/doffing and wearing schedule for compression garments/ devices to maintain decreased size upon discharge.     Pt will demonstrate compliance with skin care routine for improved overall skin integrity and decreased risk of wounds and infection.       Pt to compliant with CDT in order to reduce edema in the BLE by 15+ cm per limb. Progressing 5/9/22       Pt. Education:  [x] Yes  [] No  [x] Reviewed Prior HEP/Ed  Method of Education: [x] Verbal  [x] Demo  [x] Written  Comprehension of Education:  [x] Verbalizes understanding. [x] Demonstrates understanding. [x] Needs review. [] No understanding  Knowledge of home program:  [x] Good [] Fair [] Poor [] With assist from family/caregiver        Plan:   [x] Continue current frequency toward long and short term goals.           Treatment Charges Minutes   Units   Re-evaluation (41230):$   64.01/$49.52                                           Manual Therapy (19828): $26.92 / $21.34 30 2   Therapeutic activities (98548):   $37.46/ $26.79     Therapeutic Exercise (18393):   $29.21/$ 22.84     Self care/home mgmt (26797):    $32.39 / $24.43 30 2   Vasopneumatic Device (57297):   $9.21     Total Treatment Time    60 4       Medicare Tracking - $2150 cap Totals   Today 99.5   Previous  168.44    Grand Total 267.94          Time In:  9368  Time Out: 1630      Electronically signed by Colletta Kingfisher, OT on 5/9/2022 at 3:31 PM

## 2022-05-10 ENCOUNTER — CARE COORDINATION (OUTPATIENT)
Dept: CARE COORDINATION | Age: 77
End: 2022-05-10

## 2022-05-10 DIAGNOSIS — I50.9 NEW ONSET OF CONGESTIVE HEART FAILURE (HCC): ICD-10-CM

## 2022-05-10 DIAGNOSIS — I48.91 NEW ONSET A-FIB (HCC): ICD-10-CM

## 2022-05-10 RX ORDER — LANOLIN ALCOHOL/MO/W.PET/CERES
325 CREAM (GRAM) TOPICAL
Qty: 90 TABLET | Refills: 0 | Status: SHIPPED | OUTPATIENT
Start: 2022-05-10

## 2022-05-10 RX ORDER — AMIODARONE HYDROCHLORIDE 200 MG/1
200 TABLET ORAL DAILY
Qty: 90 TABLET | Refills: 0 | Status: SHIPPED | OUTPATIENT
Start: 2022-05-10 | End: 2022-07-06 | Stop reason: SDUPTHER

## 2022-05-10 SDOH — HEALTH STABILITY: PHYSICAL HEALTH: ON AVERAGE, HOW MANY MINUTES DO YOU ENGAGE IN EXERCISE AT THIS LEVEL?: 20 MIN

## 2022-05-10 SDOH — ECONOMIC STABILITY: HOUSING INSECURITY: IN THE LAST 12 MONTHS, HOW MANY PLACES HAVE YOU LIVED?: 1

## 2022-05-10 SDOH — ECONOMIC STABILITY: HOUSING INSECURITY
IN THE LAST 12 MONTHS, WAS THERE A TIME WHEN YOU DID NOT HAVE A STEADY PLACE TO SLEEP OR SLEPT IN A SHELTER (INCLUDING NOW)?: NO

## 2022-05-10 SDOH — HEALTH STABILITY: PHYSICAL HEALTH: ON AVERAGE, HOW MANY DAYS PER WEEK DO YOU ENGAGE IN MODERATE TO STRENUOUS EXERCISE (LIKE A BRISK WALK)?: 2 DAYS

## 2022-05-10 SDOH — ECONOMIC STABILITY: INCOME INSECURITY: IN THE LAST 12 MONTHS, WAS THERE A TIME WHEN YOU WERE NOT ABLE TO PAY THE MORTGAGE OR RENT ON TIME?: NO

## 2022-05-10 ASSESSMENT — SOCIAL DETERMINANTS OF HEALTH (SDOH)
DO YOU BELONG TO ANY CLUBS OR ORGANIZATIONS SUCH AS CHURCH GROUPS UNIONS, FRATERNAL OR ATHLETIC GROUPS, OR SCHOOL GROUPS?: NO
IN A TYPICAL WEEK, HOW MANY TIMES DO YOU TALK ON THE PHONE WITH FAMILY, FRIENDS, OR NEIGHBORS?: THREE TIMES A WEEK
HOW OFTEN DO YOU GET TOGETHER WITH FRIENDS OR RELATIVES?: TWICE A WEEK
HOW OFTEN DO YOU ATTENT MEETINGS OF THE CLUB OR ORGANIZATION YOU BELONG TO?: NEVER
HOW OFTEN DO YOU ATTEND CHURCH OR RELIGIOUS SERVICES?: NEVER

## 2022-05-10 ASSESSMENT — LIFESTYLE VARIABLES
HOW OFTEN DO YOU HAVE A DRINK CONTAINING ALCOHOL: MONTHLY OR LESS
HOW MANY STANDARD DRINKS CONTAINING ALCOHOL DO YOU HAVE ON A TYPICAL DAY: 1 OR 2

## 2022-05-10 NOTE — CARE COORDINATION
Ambulatory Care Coordination Note  5/10/2022  CM Risk Score: 4  Charlson 10 Year Mortality Risk Score: 100%     ACC: Sugey Nair, RN    Summary Note: Enrolled in care management. Admission for cellulitis, LLE, new onset CHF, new atrial fib. Has [facility] nurse visits. Started lymphedema clinic visits this week, will go twice a week. A lot of pain in legs and feet, left worse than right. No open skin areas. Legs with wraps from clinic. Not able to get any shoes on yet, may be able to wear 's large tennis shoes. DM- last A1c 5.5, no longer on any meds, doesn't check blood sugars. CHF- has appt with Dr. Carmine Bro 6/1. Taking the Lasix, weighs herself daily and has been minimally fluctuating 240-243 lbs. Breathing is baseline for her. Was told she has atrial fib but she doesn't believe it. On Eliquis, amiodarone, metoprolol. No chest pain or palpitations, doesn't feel any different. DIONY- hasn't seen Dr. Kenny Mohan in a few years, doesn't wear bipap often, needs to have settings adjusted and needs a different kind of mask. Stated will call to schedule appt after lymphedema better and able to go places. Anemia- follows with Dr. Sheila Larson, gets regular lab draws, will get Retacrit injection if hgb <10. Unable to absorb iron but was ordered it in the hospital, she won't take it. CKD- follows with Dr. Husam Romero. Last creat 2.77. On Lasix 20 mg daily. OA- Dr. Navi Truong. History kidney stones, used to follow with Dr. Pamella Escoto. No issues lately. All medications reviewed, she knows them. No SDOH needs identified but she is worried about cost for wraps at lymphedema clinic, was informed her insurance won't cover them. She uses a walker, usually very active, feels down because can't go outside to do things since can't get shoes on. She drives. Her  and daughter help her when she needs things. CC Plan:   -Follow up next week to check on therapy, review ACP, continue education.   Diabetes Assessment    Meal Planning: Avoidance of concentrated sweets   How often do you test your blood sugar?: No Testing   Do you have barriers with adherence to non-pharmacologic self-management interventions? (Nutrition/Exercise/Self-Monitoring): Yes   Have you ever had to go to the ED for symptoms of low blood sugar?: No       Do you have hyperglycemia symptoms?: No   Do you have hypoglycemia symptoms?: No   Blood Sugar Monitoring Regimen: Not Testing      ,   Congestive Heart Failure Assessment    Are you currently restricting fluids?: No Restriction  Do you understand a low sodium diet?: Yes  Do you understand how to read food labels?: Yes  How many restaurant meals do you eat per week?: 0  Do you salt your food before tasting it?: No         Symptoms:  CHF associated leg swelling: Pos      Symptom course: stable  Patient-reported weight (lb): 240  Weight trend: stable      and   General Assessment    Do you have any symptoms that are causing concern?: Yes  Progression since Onset: Unchanged  Reported Symptoms: Pain (Comment: legs and feet bilat)               Ambulatory Care Coordination Assessment    Care Coordination Protocol  Referral from Primary Care Provider: No  Week 1 - Initial Assessment     Do you have all of your prescriptions and are they filled?: Yes (Comment: she manages herself)  Barriers to medication adherence: None  Are you able to afford your medications?: Yes  How often do you have trouble taking your medications the way you have been told to take them?: I always take them as prescribed. Do you have Home O2 Therapy?: No      Ability to seek help/take action for Emergent Urgent situations i.e. fire, crime, inclement weather or health crisis. : Independent  Ability to ambulate to restroom: Independent  Ability handle personal hygeine needs (bathing/dressing/grooming): Independent  Ability to manage Medications:  Independent  Ability to prepare Food Preparation: Independent  Ability to maintain home (clean home, laundry): Independent  Ability to drive and/or has transportation: Independent  Ability to do shopping: Independent  Ability to manage finances: Independent  Is patient able to live independently?: Yes     Current Housing: Private Residence        Per the Fall Risk Screening, did the patient have 2 or more falls or 1 fall with injury in the past year?: No     Frequent urination at night?: No  Do you use rails/bars?: Yes  Do you have a non-slip tub mat?: No     Are you experiencing loss of meaning?: No  Are you experiencing loss of hope and peace?: No     Thinking about your patient's physical health needs, are there any symptoms or problems (risk indicators) you are unsure about that require further investigation?: No identified areas of uncertainly or problems already being investigated   Are the patients physical health problems impacting on their mental well-being?: Mild impact on mental well-being e.g. \"\"feeling fed-up\"\", \"\"reduced enjoyment\"\"   Are there any problems with your patients lifestyle behaviors (alcohol, drugs, diet, exercise) that are impacting on physical or mental well-being?: Some mild concern of potential negative impact on well-being   Do you have any other concerns about your patients mental well-being?  How would you rate their severity and impact on the patient?: No identified areas of concern   How would you rate their home environment in terms of safety and stability (including domestic violence, insecure housing, neighbor harassment)?: Consistently safe, supportive, stable, no identified problems   How do daily activities impact on the patient's well-being? (include current or anticipated unemployment, work, caregiving, access to transportation or other): No identified problems or perceived positive benefits   How would you rate their social network (family, work, friends)?: Good participation with social networks   How would you rate their financial resources (including ability to afford all required medical care)?: Financially secure, some resource challenges   How wells does the patient now understand their health and well-being (symptoms, signs or risk factors) and what they need to do to manage their health?: Reasonable to good understanding and already engages in managing health or is willing to undertake better management   Do other services need to be involved to help this patient?: Other care/services in place and adequate   Suggested Interventions and [address]: Completed (Comment: Luke)   Medi Set or Pill Pack: Declined   Other Therapy Services: Completed   Zone Management Tools: In Process                  Prior to Admission medications    Medication Sig Start Date End Date Taking? Authorizing Provider   gabapentin (NEURONTIN) 600 MG tablet Take 1 tablet by mouth daily for 90 days. 5/9/22 8/7/22 Yes Eliza Luis MD   ALPRAZolam Macho Allenman) 0.5 MG tablet TAKE 1 TABLET BY MOUTH NIGHTLY AS NEEDED FOR SLEEP FOR UP TO 30 DAYS. 5/2/22 7/1/22 Yes Eliza Luis MD   atorvastatin (LIPITOR) 40 MG tablet Take 1 tablet by mouth daily 5/2/22  Yes Lola Hung MD   HYDROcodone-acetaminophen (NORCO) 5-325 MG per tablet Take 1 tablet by mouth every 6 hours as needed for Pain for up to 30 days.  4/28/22 5/28/22 Yes Eliza Luis MD   metoprolol tartrate (LOPRESSOR) 25 MG tablet Take 0.5 tablets by mouth 2 times daily 4/14/22  Yes Eliza Luis MD   apixaban (ELIQUIS) 5 MG TABS tablet Take 1 tablet by mouth 2 times daily 4/14/22  Yes Eliza Luis MD   amiodarone (CORDARONE) 200 MG tablet Take 1 tablet by mouth daily 4/14/22  Yes Eliza Luis MD   furosemide (LASIX) 20 MG tablet Take 1 tablet by mouth daily 4/9/22  Yes BARBIE Kwan NP   potassium citrate (UROCIT-K) 10 MEQ (1080 MG) extended release tablet Take 1 tablet by mouth daily 2/16/22  Yes Eliza Luis MD   allopurinol (ZYLOPRIM) 100 MG tablet Take 1 tablet by mouth daily 2/16/22  Yes Eliza Rivas MD   rOPINIRole (REQUIP) 0.25 MG tablet Take 1 tablet by mouth nightly 2/16/22  Yes Eliza Rivas MD   calcitRIOL (ROCALTROL) 0.25 MCG capsule TAKE 1 CAPSULE BY MOUTH DAILY 2/16/22  Yes Eliza Rivas MD   cyclobenzaprine (FLEXERIL) 5 MG tablet Take 1 tablet by mouth nightly as needed for Muscle spasms 2/16/22  Yes Eliza Rivas MD   pantoprazole (PROTONIX) 40 MG tablet TAKE 1 TABLET DAILY 3/3/21  Yes Bobo Fernandes DO   Cholecalciferol (VITAMIN D3) 25 MCG (1000 UT) TABS Take 2 tablets by mouth daily 1/9/20  Yes BARBIE Huynh CNP   Magnesium Oxide 400 MG CAPS Take by mouth 2 times daily Takes once daily at HS   Yes Historical Provider, MD   ferrous sulfate (FE TABS 325) 325 (65 Fe) MG EC tablet Take 1 tablet by mouth daily (with breakfast)  Patient not taking: Reported on 4/14/2022 4/8/22   BARBIE Martell NP   azelastine (ASTELIN) 0.1 % nasal spray 1 spray by Nasal route 2 times daily Use in each nostril as directed 10/13/21   Bobo Fernandes DO       Future Appointments   Date Time Provider Mateo Weaver   5/11/2022 10:00 AM Cher Buzzard, OT [facility]   5/16/2022 11:30 AM Cher Buzzard, OT [facility]   5/18/2022  3:00 PM Cher Buzzard, OT [facility]   5/23/2022  3:30 PM Cher Buzzard, OT [facility]   5/25/2022  3:30 PM Cher Buzzard, OT [facility]   6/1/2022  3:30 PM Cher Buzzard, OT [facility]   6/7/2022  2:15 PM ST[facility] DIAG MAMMO RM 3 ST[facility] MAMMO [facility]   6/14/2022  1:10 PM Justen Altman MD [facility] None   6/20/2022  4:00 PM Eliza Rivas MD [facility] [address] [facility]

## 2022-05-10 NOTE — PROGRESS NOTES
Pt called states these medications were given to her when she was discharged from the hospital, she does not know if she is still suppose to be taking them, but if she is she needs refills     Would like them sent to [facility] pharmacy

## 2022-05-10 NOTE — TELEPHONE ENCOUNTER
Pt called stated these medication were prescribed to her when she was discharged from the hospital, states she does not know if she is still suppose to be taking them, but if she is she needs refills sent to [facility]

## 2022-05-11 ENCOUNTER — APPOINTMENT (OUTPATIENT)
Dept: OCCUPATIONAL THERAPY | Age: 77
End: 2022-05-11
Payer: MEDICARE

## 2022-05-11 ENCOUNTER — HOSPITAL ENCOUNTER (OUTPATIENT)
Dept: OCCUPATIONAL THERAPY | Age: 77
Setting detail: THERAPIES SERIES
Discharge: HOME | End: 2022-05-11
Payer: MEDICARE

## 2022-05-11 ENCOUNTER — CARE COORDINATION (OUTPATIENT)
Dept: CARE COORDINATION | Age: 77
End: 2022-05-11

## 2022-05-11 PROCEDURE — 97140 MANUAL THERAPY 1/> REGIONS: CPT

## 2022-05-11 PROCEDURE — 97535 SELF CARE MNGMENT TRAINING: CPT

## 2022-05-11 NOTE — FLOWSHEET NOTE
TREATMENT LOCATION:   [x] [address]   [facility]: [phone]  F: [phone] [] [address]: [phone]  F: [phone]        Lymphedema Services - Treatment Note of the Lower Extremity    Date:  2022  Patient: Jose D Berrios  : 1945             MRN: [id number]  Referring Physician: Gus Wells MD       Phone: [phone]  Fax: [phone]  Insurance: Laverne Apgar ([id number])/ Medicare (ID: [id number]) -  PT/OT/ST, BMN, no co-pay  Medical Diagnosis: R60.9 - peripheral edema  Rehab Codes: I89.0,    Onset Date: 3/15/22  Visit# / total visits:  scheduled; Progress note for Medicare patient due at visit 10   ( Certification Dates: 3/28/2022 - 22)     Allergies:    [x]? Adhesive tape       [x]? Medications    Contraindications/Precautions:             [x]? Age 60+       [x]? Advanced Renal Disease - medical clearance received. Subjective: Pt states bandaging went well other than some mild tenderness to shins and some pain along medial L foot/ankle. Pain: [x] YES    [] NO     Location: BLE, L>R     Pain Rating: ( 0-10 scale) : 5/10  Comments:     Plan for next session:  Decongestive exercises, add gray foam to ankles PRN    Objective:   [x] Measurement [x] Bandaging [] MLD [] Skin care   [x] Education [] Self-bandaging [] Self-MLD [] Wound Care   [] Exercise [x] Caregiver training [] Kinesiotaping [] Other:    [] Nutrition [] Garment fitting/training [] Vasopneumatic Pump           Circumferential Measurements   Measurements taken from nail base of D2 digit.   Measurements (cm) Right Left   Dorsum  11  23.0 23.3   Inframalleolar      17 32.3 33.5   Supramalleolar    20 23.4 25.2   Lower Calf 24 24.4 26.3   Mid Calf 30 33.4 34.7   Upper Calf 37 42.8 44.6   Knee 43 48.4 52.6   10 cm above knee       Thigh-widest       Hip-widest       Current total: 22    Initial Total 3/28/2022 :  227.7    240.8    263.9 240.2    258.2    271.0                 Assessment:  [x] Progressing toward goals. Pt arrives with daughter & in much better spirits today. Bandages are donned from previous visit. Bandages are removed and measurements have decreased since last visit in response to treatment. Some redness over distal end of L foot into digits 1&2. Pt is instructed to continue monitoring this area and to follow up with MD if symptoms worsen due to concern for infection as she has hx of cellulitis. Caregiver training provided to daughter on bandaging theory & technique. Demonstration provided on bandaging technique. Utilizing samples available in the clinic, pt and daughter are educated on velcro long term management garments of which she will transition to from bandages. Pt decides that she would like [employer] Compreflex and Compreboot. Will measure and assist to order next week. Skin care provided via washing and applying Eucerine lotion to the BLE below knee. Stockinette is applied over top of the legs as a protective barrier. Rosidal foam is placed on the legs from the lakes of the feet to the base of the knees. Comprilan short stretch bandage is then placed from the lakes of the feet to the base of the knees, with approx 50% pull. Herringbone technique is used to progress up the legs and assist with venous return, and lymphatic flow production/ stimulating a suctioning effect. [] No change. [] Other:  [x] Patient would continue to benefit from skilled occupational therapy services in order to reduce swelling and focus on goals below. Therapy Goals:   STG - To be addressed within 5 visits     Pt will demonstrate compliance of maintaining lymphedema precautions to reduce the risks of infection and further exacerbations.  Progressing 5/9/22     Pt will demonstrate independence with decongestive exercise program in order to expedite fluid rerouting.     Caregiver will demo good understanding of bandaging technique and theory in order to continue progression between visits. progressing 5/11/22        LTG To be adressed within 10 visits      Pt will demonstrate competence with SELF MLD (Manual lymphatic drainage) in order to reroute lymphatic pathways for decreased swelling.     Pt/Caregiver will demonstrate independence with donning/doffing and wearing schedule for compression garments/ devices to maintain decreased size upon discharge. Progressing 5/11/22      Pt will demonstrate compliance with skin care routine for improved overall skin integrity and decreased risk of wounds and infection. Ongoing 5/11/22       Pt to compliant with CDT in order to reduce edema in the BLE by 15+ cm per limb. Progressing 5/11/22       Pt. Education:  [x] Yes  [] No  [x] Reviewed Prior HEP/Ed  Method of Education: [x] Verbal  [x] Demo  [x] Written  Comprehension of Education:  [x] Verbalizes understanding. [x] Demonstrates understanding. [x] Needs review. [] No understanding  Knowledge of home program:  [x] Good [] Fair [] Poor [] With assist from family/caregiver        Plan:   [x] Continue current frequency toward long and short term goals.           Treatment Charges Minutes   Units   Re-evaluation (41810):$   64.01/$49.52                                           Manual Therapy (36071):             $26.92 / $21.34 30 2   Therapeutic activities (17848):   $37.46/ $26.79     Therapeutic Exercise (94069):   $29.21/$ 22.84     Self care/home mgmt (86233):    $32.39 / $24.43 40 3   Vasopneumatic Device (20590):   $9.21     Total Treatment Time    70 5       Medicare Tracking - $2150 cap Totals   Today 123.93   Previous  267.94   Grand Total 391.87          Time In:  1000  Time Out: 1110      Electronically signed by Celio Guevara OT on 5/11/2022 at 10:01 AM

## 2022-05-16 ENCOUNTER — HOSPITAL ENCOUNTER (OUTPATIENT)
Dept: OCCUPATIONAL THERAPY | Age: 77
Setting detail: THERAPIES SERIES
Discharge: HOME | End: 2022-05-16
Payer: MEDICARE

## 2022-05-16 PROCEDURE — 97140 MANUAL THERAPY 1/> REGIONS: CPT

## 2022-05-16 PROCEDURE — 97535 SELF CARE MNGMENT TRAINING: CPT

## 2022-05-16 NOTE — FLOWSHEET NOTE
TREATMENT LOCATION:   [x] [address]   [facility]: [phone]  F: [phone] [] [address]: [phone]  F: [phone]        Lymphedema Services - Treatment Note of the Lower Extremity    Date:  2022  Patient: Gila Manning  : 1945             MRN: [id number]  Referring Physician: Karol Rea MD       Phone: [phone]  Fax: [phone]  Insurance: [email or web address] ([id number])/ Medicare (ID: [id number]) -  PT/OT/ST, BMN, no co-pay  Medical Diagnosis: R60.9 - peripheral edema  Rehab Codes: I89.0,    Onset Date: 3/15/22  Visit# / total visits: 3/9 scheduled; Progress note for Medicare patient due at visit 10   ( Certification Dates: 3/28/2022 - 22)     Allergies:    [x]? Adhesive tape       [x]? Medications    Contraindications/Precautions:             [x]? Age 60+       [x]? Advanced Renal Disease - medical clearance received. Subjective:    Pain: [x] YES    [] NO     Location: BLE, L>R     Pain Rating: ( 0-10 scale) : 5/10  Comments:     Plan for next session:  Decongestive exercises, risk reduction handout    Objective:   [x] Measurement [x] Bandaging [] MLD [] Skin care   [x] Education [] Self-bandaging [] Self-MLD [] Wound Care   [] Exercise [] Caregiver training [] Kinesiotaping [] Other:    [] Nutrition [] Garment fitting/training [] Vasopneumatic Pump           Circumferential Measurements   Measurements taken from nail base of D2 digit.   Measurements (cm) Right Left   Dorsum  11  23.5 23.0   Inframalleolar      17 31.8 33.0   Supramalleolar    20 24.1 23.6   Lower Calf 24 23.6 25.0   Mid Calf 30 31.7 33.5   Upper Calf 37 42.1 45.0   Knee 43 48.1 51.5   10 cm above knee       Thigh-widest       Hip-widest       Current total: 22    Initial Total 3/28/2022 :  227.9    227.7    240.8    263.9 234.6    240.2    258.2    271.0                 Assessment:  [x] Progressing toward goals. Pt arrives today with bandages donned from daughter application over the weekend with good technique noted. Bandages are removed and measurements are updated with continued reduction noted. Although, top of R foot remains firm. Edu to pt regarding addition of gray foam to soften area. L toes/distal foot no longer red. Only very slight pinkness remains. Pt reports she did not follow up with MD on this matter. Education provided to pt and spouse regarding compression options as she inquires if knee high stockings are an option. Pros/cons of stockings versus velcro alternative bandaging devices for long term management are provided to patient with pt ultimately staying with choice of Compreflex velcro garments for lower leg. At this time, she is opting not to utilize Compreboot for foot compression and will use compressive liners that come with Compreflex. Writer will continue to monitor and will advise whether not to add Compreboot. Adding Compreboot for better foot/ankle containment is best recommendation, but pt would like to save money and try just compressive liner at this time. Pt is measured and assisted to order via [facility]. Skin care provided via washing and applying Eucerine lotion to the BLE below knee. Stockinette is applied over top of the legs as a protective barrier. Rosidal foam is placed on the legs from the lakes of the feet to the base of the knees. Comprilan short stretch bandage is then placed from the lakes of the feet to the base of the knees, with approx 50% pull. Herringbone technique is used to progress up the legs and assist with venous return, and lymphatic flow production/ stimulating a suctioning effect. [] No change. [] Other:  [x] Patient would continue to benefit from skilled occupational therapy services in order to reduce swelling and focus on goals below. Therapy Goals:   STG - To be addressed within 5 visits     Pt will demonstrate compliance of maintaining lymphedema precautions to reduce the risks of infection and further exacerbations. Progressing 5/17/22     Pt will demonstrate independence with decongestive exercise program in order to expedite fluid rerouting.     Caregiver will demo good understanding of bandaging technique and theory in order to continue progression between visits. Met 5/17        LTG To be adressed within 10 visits      Pt will demonstrate competence with SELF MLD (Manual lymphatic drainage) in order to reroute lymphatic pathways for decreased swelling.     Pt/Caregiver will demonstrate independence with donning/doffing and wearing schedule for compression garments/ devices to maintain decreased size upon discharge. Progressing 5/17/22      Pt will demonstrate compliance with skin care routine for improved overall skin integrity and decreased risk of wounds and infection. Ongoing 5/17/22       Pt to compliant with CDT in order to reduce edema in the BLE by 15+ cm per limb. Progressing 5/17/22       Pt. Education:  [x] Yes  [] No  [x] Reviewed Prior HEP/Ed  Method of Education: [x] Verbal  [x] Demo  [x] Written  Comprehension of Education:  [x] Verbalizes understanding. [x] Demonstrates understanding. [x] Needs review. [] No understanding  Knowledge of home program:  [x] Good [] Fair [] Poor [] With assist from family/caregiver        Plan:   [x] Continue current frequency toward long and short term goals.           Treatment Charges Minutes   Units   Re-evaluation (21713):$   64.01/$49.52                                           Manual Therapy (76920):             $26.92 / $21.34 30 2   Therapeutic activities (28566):   $37.46/ $26.79     Therapeutic Exercise (72284):   $29.21/$ 22.84     Self care/home mgmt (08911):    $32.39 / $24.43 40 3   Vasopneumatic Device (75068):   $9.21     Total Treatment Time    70 5       Medicare Tracking - $2150 cap Totals   Today 123.93   Previous  391.87   Grand Total 515.8          Time In:  1115  Time Out: 8165      Electronically signed by Jenny Johnson OT on 5/16/2022 at 11:14 AM

## 2022-05-17 ENCOUNTER — TELEPHONE (OUTPATIENT)
Dept: FAMILY MEDICINE CLINIC | Age: 77
End: 2022-05-17

## 2022-05-17 DIAGNOSIS — M25.562 ACUTE PAIN OF LEFT KNEE: Primary | ICD-10-CM

## 2022-05-17 NOTE — TELEPHONE ENCOUNTER
Patient called stating she is having pain in her left knee since Sunday. States it is a stabbing pain. State she has trouble walking, can't lift leg. States she has been using ointment on her leg. Please advise.

## 2022-05-17 NOTE — TELEPHONE ENCOUNTER
Any injuries to the leg? Did anything happen before the pain started? When that she noticed the pain-does it occur only when she is walking or does it occur even when she is just sitting or laying in bed? What ointment is she putting on it?

## 2022-05-17 NOTE — TELEPHONE ENCOUNTER
Diclofenac gel called in, should help reduce inflammation in the knee. We will try steroids if the diclofenac does not help.

## 2022-05-17 NOTE — TELEPHONE ENCOUNTER
Patient states she has had no injuries and nothing happened before the pain started. States she did have surgery on this knee in 2013. States the pain is constant; hurts when walking, standing, laying down. States she has been using Horse liniment-states she gets this from a vet clinic, and has been using a hemp ointment with no relief.

## 2022-05-18 ENCOUNTER — CARE COORDINATION (OUTPATIENT)
Dept: CARE COORDINATION | Age: 77
End: 2022-05-18

## 2022-05-18 ENCOUNTER — HOSPITAL ENCOUNTER (OUTPATIENT)
Dept: OCCUPATIONAL THERAPY | Age: 77
Setting detail: THERAPIES SERIES
Discharge: HOME | End: 2022-05-18
Payer: MEDICARE

## 2022-05-18 PROCEDURE — 97535 SELF CARE MNGMENT TRAINING: CPT

## 2022-05-18 PROCEDURE — 97140 MANUAL THERAPY 1/> REGIONS: CPT

## 2022-05-18 NOTE — FLOWSHEET NOTE
TREATMENT LOCATION:   [x] [address]   [facility] [address]: [phone]  F: [phone] [] [address]: [phone]  F: [phone]        Lymphedema Services - Treatment Note of the Lower Extremity    Date:  2022  Patient: Tsering Jimenes  : 1945             MRN: [id number]  Referring Physician: Diane Riedel, MD       Phone: [phone]  Fax: [phone]  Insurance: payworks ([id number])/ Medicare (ID: [id number]) -  PT/OT/ST, BMN, no co-pay  Medical Diagnosis: R60.9 - peripheral edema  Rehab Codes: I89.0,    Onset Date: 3/15/22  Visit# / total visits:  scheduled; Progress note for Medicare patient due at visit 10   ( Certification Dates: 3/28/2022 - 22)     Allergies:    [x]? Adhesive tape       [x]? Medications    Contraindications/Precautions:   [x]? Age 60+   [x]? Advanced Renal Disease - medical clearance received. Subjective: Pt has questions about weekend compression plan. Pain: [x] YES    [] NO     Location: R shin     Pain Rating: ( 0-10 scale) : 4/10  Comments: also ongoing L knee pain of which pt has followed up with MD about    Plan for next session:  Self-MLD, risk reduction handout  Objective:   [x] Measurement [x] Bandaging [] MLD [] Skin care   [x] Education [] Self-bandaging [] Self-MLD [] Wound Care   [] Exercise [] Caregiver training [] Kinesiotaping [] Other:    [] Nutrition [] Garment fitting/training [] Vasopneumatic Pump           Circumferential Measurements   Measurements taken from nail base of D2 digit.   Measurements (cm) Right Left   Dorsum  11  22.5 22.8   Inframalleolar      17 31.7 33.3   Supramalleolar    20 23.2 23.5   Lower Calf 24 24.5 26.0   Mid Calf 30 32.3 35.4   Upper Calf 37 41.9 46.3   Knee 43 47.9 52.8   10 cm above knee       Thigh-widest       Hip-widest       Current total: 22    Initial Total 3/28/2022 : 224.0    227.9    227.7    240.8    263.9 239.7    234.6    240.2    258.2    271.0                  Assessment:  [x] Progressing toward goals. Pt arrives with bandages donned from last visit. Bandages removed and measurements are updated. Kristina Ye foam successful for R foot with reduction noted, but L foot up in size again. Swapped gray foam to L foot during bandaging today. Pt continues to report shin tenderness. Edu on addition of foam for for padding/comfort. Pt agreeable. Pt questions compression protocol for over the weekend. Writer strongly encouraging bandages be re-applied shortly after being taken off, but pt reports that her daughter will not be immediately available on Saturday when they are likely to come off, but Sunday instead. Writer instructs pt to wear tubigrip during the day following removal, no tubigrip to bed. Pt agreeable. Pt is educated on decongestive exercises to help drain fluid from the tissue. Patient demonstrates good understanding of exercises with demo provided. See below for more details/ hand out that was provided to patient for home follow through. Exercise Program Guidelines for Patients with Lymphedema    - Perform your exercises while wearing compression garments. - Try to exercise 2x daily for 5-10+ reps and increase the length of the exercise session SLOWLY over a comfortable period of time. - Perform each exercise in a slow controlled manner.   - It is recommended that you rest at least 15-20 minutes with your limb elevated after you complete your exercise. ** Do NOT perform any movements that induce pain. ** Is it important for these exercises to be performed in the order they are listed in order to best aid in decongestion of the limb(s). Neck Exercises:  1. Head Turn and Stretches: Turn head and look right; return to normal position; repeat to left side   2. Chin Up and Down: Bend chin down toward chest, then look up toward ceiling  3.  Ear to shoulder: Try to touch ear to shoulder, return to normal position, repeat to left side    Trunk Exercise:   1. Torso Twist: Turn shoulders as far as you can to the right, return to start, complete to the left  2. Bend forward at waist, return to starting position, bend slightly backwards. 3. Bend sideways to the Right, bend sideways to the left. Arm Exercises:   1. Shoulder Shrugs: Shrug both shoulders and inhale, Lower both shoulders and exhale. 2. Shoulder [place]: Rotate shoulders forward (like rowing a boat), then rotate shoulders backwards  3. Climb to ashlee: Hold arms above head, grasp imaginary ladder, alternate using both arms  4. Jan Mad: Lift both arms out to the side and over your head then lower. Leg Exercises:   1. Marches: Bring right knee to chest, then lower. Bring Left knee to chest, then lower. 2. Hip Abduction: Slide R leg out to the side - toes point to ceiling and knee straight, return to start. Then complete on left side. 3. Foot Flexion: Pump ankles up and down  4. Foot circles: Rotate foot making a [place] inward and then outward. Skin care provided via washing and applying Eucerine lotion to the BLE below knee. Stockinette is applied over top of the legs as a protective barrier. Gray foam placed on L foot today. Rosidal foam is placed on the legs from the lakes of the feet to the base of the knees. Comprilan short stretch bandage is then placed from the lakes of the feet to the base of the knees, with approx 50% pull. Herringbone technique is used to progress up the legs and assist with venous return, and lymphatic flow production/ stimulating a suctioning effect. [] No change. [] Other:  [x] Patient would continue to benefit from skilled occupational therapy services in order to reduce swelling and focus on goals below.                   Therapy Goals:   STG - To be addressed within 5 visits     Pt will demonstrate compliance of maintaining lymphedema precautions to reduce the risks of infection and further exacerbations. Progressing 5/16/22     Pt will demonstrate independence with decongestive exercise program in order to expedite fluid rerouting. progressing 5/18/22     Caregiver will demo good understanding of bandaging technique and theory in order to continue progression between visits. Met 5/17        LTG To be adressed within 10 visits      Pt will demonstrate competence with SELF MLD (Manual lymphatic drainage) in order to reroute lymphatic pathways for decreased swelling.     Pt/Caregiver will demonstrate independence with donning/doffing and wearing schedule for compression garments/ devices to maintain decreased size upon discharge. Progressing 5/18/22      Pt will demonstrate compliance with skin care routine for improved overall skin integrity and decreased risk of wounds and infection. Ongoing 5/17/22       Pt to compliant with CDT in order to reduce edema in the BLE by 15+ cm per limb. Progressing 5/18/22       Pt. Education:  [x] Yes  [] No  [x] Reviewed Prior HEP/Ed  Method of Education: [x] Verbal  [x] Demo  [x] Written  Comprehension of Education:  [x] Verbalizes understanding. [x] Demonstrates understanding. [x] Needs review. [] No understanding  Knowledge of home program:  [x] Good [] Fair [] Poor [] With assist from family/caregiver        Plan:   [x] Continue current frequency toward long and short term goals.           Treatment Charges Minutes   Units   Re-evaluation (10405):$   64.01/$49.52                                           Manual Therapy (88730):             $26.92 / $21.34 30 2   Therapeutic activities (94150):   $37.46/ $26.79     Therapeutic Exercise (01646):   $29.21/$ 22.84     Self care/home mgmt (72747):    $32.39 / $24.43 30 2   Vasopneumatic Device (21072):   $9.21     Total Treatment Time    60 4       Medicare Tracking - $2150 cap Totals   Today 99.50   Previous  515.80   Grand Total 615.30          Time In:  1500 Time Out: 1600      Electronically signed by Yosi Valladares OT on 5/18/2022 at 3:09 PM

## 2022-05-18 NOTE — CARE COORDINATION
Ambulatory Care Coordination Note  5/18/2022  CM Risk Score: 4  Charlson 10 Year Mortality Risk Score: 100%     ACC: Asim Gomez RN    Summary Note: She is having left knee pain, PCP ordered Voltaren gel, is waiting for pharmacy to deliver to her. Instructed her to call PCP office early next week with update on if helping or not as may also be prescribed an oral steroid for inflammation. She stated has some prednisone and a medrol dose pack at home, won't take unless instructed to by doctor. Hard for her to get out of chair and change positions due to pain. CHF- weight up 2 lbs today but doesn't thinks legs are more swollen than usual. She went to lymphedema clinic yesterday. Heart Failure Education outreach Date/Time: 5/18/2022 1:38 PM  ACM reviewed that a Health Healthy tips for the Summer packet has been sent to [employer]. ACM reviewed CHF zones, daily weights, the importance of low sodium diet and healthy tips packet with the patient. Instructed patient to call their PCP if they have a weight gain of 3 lbs in 2 days or 5 lbs in a week. Patient reminded that there is a physician on call 24 hours a day / 7 days a week should the patient have questions or concerns. The patient verbalized understanding. CC Plan:   -Follow up next week to check left knee pain and weight, continue CHF education review. Diabetes Assessment    Meal Planning: Avoidance of concentrated sweets   How often do you test your blood sugar?: No Testing   Do you have barriers with adherence to non-pharmacologic self-management interventions?  (Nutrition/Exercise/Self-Monitoring): Yes   Have you ever had to go to the ED for symptoms of low blood sugar?: No       No patient-reported symptoms      ,   Congestive Heart Failure Assessment    Are you currently restricting fluids?: No Restriction  Do you understand a low sodium diet?: Yes  Do you understand how to read food labels?: Yes  How many restaurant meals do you eat per week?: 0  Do you salt your food before tasting it?: No         Symptoms:  CHF associated leg swelling: Pos      Symptom course: stable  Patient-reported weight (lb): 245  Weight trend: fluctuating minimally  Salt intake watch compared to last visit: stable      and   General Assessment    Do you have any symptoms that are causing concern?: Yes  Reported Symptoms: Pain (Comment: left knee)                   Care Coordination Interventions    Referral from Primary Care Provider: No  Suggested Interventions and [address]: Completed (Comment: Luke)  Medi Set or Pill Pack: Declined  Other Therapy Services: Completed (Comment: Lymphedema clinic)  Zone Management Tools: In Process (Comment: CHF)         Goals Addressed                 This Visit's Progress     Conditions and Symptoms   On track     I will schedule office visits, as directed by my provider. I will keep my appointment or reschedule if I have to cancel. I will notify my provider of any barriers to my plan of care. I will follow my Zone Management tool to seek urgent or emergent care. I will notify my provider of any symptoms that indicate a worsening of my condition. CHF, lymphedema    Barriers: lack of education  Plan for overcoming my barriers: ACM calls, education, home health visits, lymphedema clinic  Confidence: 9/10  Anticipated Goal Completion Date: 8/10/22              Prior to Admission medications    Medication Sig Start Date End Date Taking?  Authorizing Provider   diclofenac sodium (VOLTAREN) 1 % GEL Apply 4 g topically 4 times daily 5/17/22   Eliza Louis MD   amiodarone (CORDARONE) 200 MG tablet Take 1 tablet by mouth daily 5/10/22   Eliza Louis MD   ferrous sulfate (FE TABS 325) 325 (65 Fe) MG EC tablet Take 1 tablet by mouth daily (with breakfast) 5/10/22   Eliza Louis MD   apixaban (ELIQUIS) 5 MG TABS tablet Take 1 tablet by mouth 2 times daily 5/10/22   Eliza Louis MD   gabapentin (NEURONTIN) 600 MG tablet Take 1 tablet by mouth daily for 90 days. 5/9/22 8/7/22  Eliza Brown MD   ALPRAZolam Madeleine Morgan) 0.5 MG tablet TAKE 1 TABLET BY MOUTH NIGHTLY AS NEEDED FOR SLEEP FOR UP TO 30 DAYS. 5/2/22 7/1/22  Eliza Brown MD   atorvastatin (LIPITOR) 40 MG tablet Take 1 tablet by mouth daily 5/2/22   Eliza Brown MD   HYDROcodone-acetaminophen (NORCO) 5-325 MG per tablet Take 1 tablet by mouth every 6 hours as needed for Pain for up to 30 days.  4/28/22 5/28/22  Eliza Brown MD   metoprolol tartrate (LOPRESSOR) 25 MG tablet Take 0.5 tablets by mouth 2 times daily 4/14/22   Eliza Brown MD   furosemide (LASIX) 20 MG tablet Take 1 tablet by mouth daily 4/9/22   BARBIE Johnson NP   potassium citrate (UROCIT-K) 10 MEQ (1080 MG) extended release tablet Take 1 tablet by mouth daily 2/16/22   Eliza Brown MD   allopurinol (ZYLOPRIM) 100 MG tablet Take 1 tablet by mouth daily 2/16/22   Eliza Brown MD   rOPINIRole (REQUIP) 0.25 MG tablet Take 1 tablet by mouth nightly 2/16/22   Eliza Brown MD   calcitRIOL (ROCALTROL) 0.25 MCG capsule TAKE 1 CAPSULE BY MOUTH DAILY 2/16/22   Eliza Brown MD   cyclobenzaprine (FLEXERIL) 5 MG tablet Take 1 tablet by mouth nightly as needed for Muscle spasms 2/16/22   Eliza Brown MD   azelastine (ASTELIN) 0.1 % nasal spray 1 spray by Nasal route 2 times daily Use in each nostril as directed 10/13/21   Christopher Rain, DO   pantoprazole (PROTONIX) 40 MG tablet TAKE 1 TABLET DAILY 3/3/21   Skylerven Briseyda, DO   Cholecalciferol (VITAMIN D3) 25 MCG (1000 UT) TABS Take 2 tablets by mouth daily 1/9/20   BARBIE Delgado - CNP   Magnesium Oxide 400 MG CAPS Take by mouth 2 times daily Takes [facility] daily at [facility]    Historical Provider, MD       Future Appointments   Date Time Provider Mateo Weaver   5/18/2022  3:00 PM SOLITARIO Downey OT [facility]   5/23/2022  3:30 PM SOLITARIO Downey OT [facility]   5/25/2022  3:30 PM Carlos Enrique Castelan Nallely Poole, OT [facility]   6/1/2022  3:30 PM Jenny Johnson, OT [facility]   6/7/2022  2:15 PM SONIA PAINTER RM 3 DIMITRIS PAINTER STA [facility]   6/14/2022  1:10 PM Marie Mckenzie MD [facility] [place] None   6/20/2022  4:00 PM Eliza Higgins MD [facility] ANTHONY Lopez

## 2022-05-23 ENCOUNTER — HOSPITAL ENCOUNTER (OUTPATIENT)
Dept: OCCUPATIONAL THERAPY | Age: 77
Setting detail: THERAPIES SERIES
Discharge: HOME | End: 2022-05-23
Payer: MEDICARE

## 2022-05-23 PROCEDURE — 97535 SELF CARE MNGMENT TRAINING: CPT

## 2022-05-23 PROCEDURE — 97140 MANUAL THERAPY 1/> REGIONS: CPT

## 2022-05-23 NOTE — FLOWSHEET NOTE
TREATMENT LOCATION:   [x] [address]   [facility] [place] [address]: [phone]  F: [phone] [] [address]: [phone]  F: [phone]        Lymphedema Services - Treatment Note of the Lower Extremity    Date:  2022  Patient: Isi Sexton  : 1945             MRN: [id number]  Referring Physician: Asuncion Gonzalez MD       Phone: [phone]  Fax: [phone]  Insurance: [employer] JOHNSONdak[employer]arichard [employer] ([id number])/ Medicare (ID: [id number]) -  PT/OT/ST, BMN, no co-pay  Medical Diagnosis: R60.9 - peripheral edema  Rehab Codes: I89.0,    Onset Date: 3/15/22  Visit# / total visits:  scheduled; Progress note for Medicare patient due at visit 10   ( Certification Dates: 3/28/2022 - 22)     Allergies:    [x]? Adhesive tape       [x]? Medications    Contraindications/Precautions:   [x]? Age 60+   [x]? Advanced Renal Disease - medical clearance received. Subjective: Pt reports that additional foam padding did not help shin tenderness much. States she has been completing decongestive exercises. Pain: [x] YES    [] NO     Location: R shin     Pain Rating: ( 0-10 scale) : 4/10  Comments: also ongoing L knee pain of which pt has followed up with MD about    Plan for next session:  Review/progress self-MLD, risk reduction handout     Objective:   [x] Measurement [x] Bandaging [] MLD [] Skin care   [x] Education [] Self-bandaging [x] Self-MLD [] Wound Care   [] Exercise [] Caregiver training [] Kinesiotaping [] Other:    [] Nutrition [] Garment fitting/training [] Vasopneumatic Pump           Circumferential Measurements   Measurements taken from nail base of D2 digit.   Measurements (cm) Right Left   Dorsum  11  23.0 23.6   Inframalleolar      17 31.7 33.0   Supramalleolar    20 23.1 24.0   Lower Calf 24 23.9 26.2   Mid Calf 30 31.9 33.4   Upper Calf 37 42.6 44.5   Knee 43 51.7 55.1   10 cm above knee       Thigh-widest     Hip-widest       Current total: 22    Initial Total 3/28/2022 :  227.9    224.0    227.9    227.7    240.8    263.9 239.8    239.7    234.6    240.2    258.2    271.0                  Assessment:  [x] Progressing toward goals. Pt arrives with bandages donned from daughter application over the weekend. Bandages slid down proximally with increase in swelling noted over proximal calf/shin. Pt is educated on self-MLD to be performed twice daily at home. Pt demo good understanding and teachback of technique. Self-MLD Education     What is MLD?: MLD (or Manual Lymph Drainage) engages healthy lymph nodes & vessels in the body to create a suctioning effect to draw fluid away from full/affected areas. Additionally, we are teaching your lymphatic system to reroute fluid to healthy lymph vessels, which then drain into the venous system. It is recommended that you complete your self-MLD 2x per day. You are welcome to complete more if you'd like. Skin on skin contact is best to achieve best stretch. It may be easiest for you to complete self-MLD while you are in bed or reclined. Therefore, you may want to complete in the AM after you wake up and in the PM before you go to bed. Tips:   Self-MLD should be completed SLOWLY. Please do not rush or MLD is not effective. Remember to stretch and let go to allow the skin to recoil. Pressure applied = the amount of pressure you should apply when stroking a s head. Stretches should be large enough to stretch the skin, but NOT slide over the skin. No rubbing or petting the skin. Self-MLD Pre-Treatment for Bilateral Lower Extremity Lymphedema      1. Deep breathing x5 - belly expands as you breathe in and deflates as you breathe out. Breathe in through the nose, out through the mouth.      2. Half [place] stretches at the right & left armpits x10.    3. Half [place] stretches at the right & left groin x10.    4. Stretches from left groin up to left armpit x5, with 3 extra stretches at [place], on your way up. Repeat x5 on your right side. Skin care provided via washing and applying Eucerine lotion to the BLE below knee. Stockinette is applied over top of the legs as a protective barrier. Gray foam placed on dorsum of bilateral feet today. Rosidal foam is placed on the legs from the lakes of the feet to the base of the knees. Comprilan short stretch bandage is then placed from the lakes of the feet to the base of the knees, with approx 50% pull. Herringbone technique is used to progress up the legs and assist with venous return, and lymphatic flow production/ stimulating a suctioning effect. Special technique used at top of calf to assist with staying. [] No change. [] Other:  [x] Patient would continue to benefit from skilled occupational therapy services in order to reduce swelling and focus on goals below. Therapy Goals:   STG - To be addressed within 5 visits     Pt will demonstrate compliance of maintaining lymphedema precautions to reduce the risks of infection and further exacerbations. Met 5/23/22     Pt will demonstrate independence with decongestive exercise program in order to expedite fluid rerouting. Met 5/23/22     Caregiver will demo good understanding of bandaging technique and theory in order to continue progression between visits. Met 5/17        LTG To be adressed within 10 visits      Pt will demonstrate competence with SELF MLD (Manual lymphatic drainage) in order to reroute lymphatic pathways for decreased swelling. progressing 5/23/22     Pt/Caregiver will demonstrate independence with donning/doffing and wearing schedule for compression garments/ devices to maintain decreased size upon discharge.  Progressing 5/18/22      Pt will demonstrate compliance with skin care routine for improved overall skin integrity and decreased risk of wounds and infection. Ongoing 5/23/22     Pt to compliant with CDT in order to reduce edema in the BLE by 15+ cm per limb. Progressing 5/23/22       Pt. Education:  [x] Yes  [] No  [x] Reviewed Prior HEP/Ed  Method of Education: [x] Verbal  [x] Demo  [x] Written  Comprehension of Education:  [x] Verbalizes understanding. [x] Demonstrates understanding. [x] Needs review. [] No understanding  Knowledge of home program:  [x] Good [] Fair [] Poor [x] With assist from family/caregiver        Plan:   [x] Continue current frequency toward long and short term goals.           Treatment Charges Minutes   Units   Re-evaluation (72838):$   64.01/$49.52                                           Manual Therapy (03837):             $26.92 / $21.34 30 2   Therapeutic activities (60135):   $37.46/ $26.79     Therapeutic Exercise (78169):   $29.21/$ 22.84     Self care/home mgmt (03824):    $32.39 / $24.43 30 2   Vasopneumatic Device (73714):   $9.21     Total Treatment Time    60 4       Medicare Tracking - $2150 cap Totals   Today 99.50   Previous  615.30   Grand Total 714.80          Time In:  1500 Time Out: 1600      Electronically signed by Celio Guevara OT on 5/23/2022 at 3:33 PM

## 2022-05-24 ENCOUNTER — TELEPHONE (OUTPATIENT)
Dept: FAMILY MEDICINE CLINIC | Age: 77
End: 2022-05-24

## 2022-05-24 NOTE — TELEPHONE ENCOUNTER
Meghan Disla from [facility] home health called to inform us  New to CHF   Weight was around 244, today she 255 pt is taking 20 MG of Lasix daily   No swelling   Does have lymphedema wraps on  No shortness of breath   Pt states to Meghan Disla that she feel okay,     /66   HR 70

## 2022-05-24 NOTE — TELEPHONE ENCOUNTER
Pt weighs herself every morning   Pt states no has suggested a CHF clinic to her   States she has only seen her 2 nurses that come to her house 1 is for PT and other is from [facility]   Has an appt tomorrow with the lymphedema clinic     Pt asked should she increase her Lasix to 40 mg?    Pt having a hard time sleeping at night

## 2022-05-25 ENCOUNTER — HOSPITAL ENCOUNTER (OUTPATIENT)
Dept: OCCUPATIONAL THERAPY | Age: 77
Setting detail: THERAPIES SERIES
Discharge: HOME | End: 2022-05-25
Payer: MEDICARE

## 2022-05-25 PROCEDURE — 97535 SELF CARE MNGMENT TRAINING: CPT

## 2022-05-25 NOTE — TELEPHONE ENCOUNTER
If agreeable, please place referral to CHF clinic for patient. Continue following with the lymphedema clinic. Do not increase the Lasix-because of concerns for her renal function at this time.

## 2022-05-26 ENCOUNTER — CARE COORDINATION (OUTPATIENT)
Dept: CARE COORDINATION | Age: 77
End: 2022-05-26

## 2022-05-26 NOTE — CARE COORDINATION
Left VM message asking patient to call me back at [phone] for care management follow up. Will call again next week.

## 2022-05-26 NOTE — TELEPHONE ENCOUNTER
Pt is seeing cardio on 06/01/2022 and will discuss about CHF clinic. She is seeing Nephro in a few weeks and will discuss lasix and things will then also.  She was advised not to increase Lasix and stated understanding

## 2022-05-31 ENCOUNTER — TELEPHONE (OUTPATIENT)
Dept: FAMILY MEDICINE CLINIC | Age: 77
End: 2022-05-31

## 2022-05-31 NOTE — TELEPHONE ENCOUNTER
Pt states was in the hospital from 40/1/22--40/7/22 due to A-fib and cellulitis. She states was told if she gains weight she is to notify PCP.  She states by scale she has gained 7-8 lbs  SHE STATES IS GOING TO LYMPHEDEMA  CLINIC AND HAS WRAPS    PLEASE ADVISE

## 2022-05-31 NOTE — TELEPHONE ENCOUNTER
Over what period has she noticed this weight gain? Over a week or two weeks? Will need labs and diuresis.

## 2022-06-01 ENCOUNTER — HOSPITAL ENCOUNTER (OUTPATIENT)
Dept: OCCUPATIONAL THERAPY | Age: 77
Setting detail: THERAPIES SERIES
Discharge: HOME | End: 2022-06-01

## 2022-06-01 NOTE — SIGNIFICANT EVENT
[x] [address] Occupational Therapy       [address]       Phone: [phone]       Fax: [phone] [] [facility] at [address] [place] [address]. [place], [place]       Phone: [phone]       Fax: [phone]          Occupational Therapy Cancel/No Show note    Date: 2022  Patient: Isi Sexton  : 1945  MRN: [id number]    Cancels/No Shows to date: 1    For today's appointment patient:    [x]  Cancelled    [] Rescheduled appointment    [] No-show     Reason given by patient:    []  Patient ill    []  Conflicting appointment    [] No transportation      [] Conflict with work    [] No reason given    [] Weather related    [] COVID-19    [x] Other:      Comments: Pt reports that she feels she does not need today's appointment as things are going \"as well as they can be\" with new wraps. Pt to be on 60 day treatment hold and will be d/c after 60 days if no needs reported. She may return after that time with new order.        [] Next appointment was confirmed       Electronically signed by: Leesa Vick OT

## 2022-06-03 ENCOUNTER — CARE COORDINATION (OUTPATIENT)
Dept: CARE COORDINATION | Age: 77
End: 2022-06-03

## 2022-06-03 NOTE — CARE COORDINATION
Ambulatory Care Coordination Note  6/3/2022  CM Risk Score: 4  Charlson 10 Year Mortality Risk Score: 100%     ACC: Eren Villegas RN    Summary Note: She is depressed, tearful. Pain lower legs and burning of feet not getting better, swelling the same, still hard to walk. Wearing the compression sleeves from lymphedema clinic, putting on herself, doesn't know why going to clinic since they're not doing anything else. Doesn't feel anything is getting better, has been 2 months since diagnosed. No open areas of skin. Weight is up, taking Lasix 20 mg twice a day for 3 days. Didn't take last night since she had to get 2 units blood transfused per hematologist and was given Lasix IV afterwards. Didn't weigh self this morning but weight 259 right now so still up. Is to get BMP next week, will get Monday when gets labs for nephrologist.  CC Plan:   -Had office schedule sooner appt to address pain, depression. Will follow up in a week. Diabetes Assessment    Meal Planning: Avoidance of concentrated sweets   How often do you test your blood sugar?: No Testing   Do you have barriers with adherence to non-pharmacologic self-management interventions?  (Nutrition/Exercise/Self-Monitoring): Yes   Have you ever had to go to the ED for symptoms of low blood sugar?: No       No patient-reported symptoms      ,   Congestive Heart Failure Assessment    Are you currently restricting fluids?: No Restriction  Do you understand a low sodium diet?: Yes  Do you understand how to read food labels?: Yes  How many restaurant meals do you eat per week?: 0  Do you salt your food before tasting it?: No         Symptoms:  CHF associated dyspnea on exertion: Pos, CHF associated leg swelling: Pos      Symptom course: stable  Patient-reported weight (lb): 259  Weight trend: increasing steadily  Salt intake watch compared to last visit: stable      and   General Assessment    Do you have any symptoms that are causing concern?: Yes  Progression since Onset: Unchanged  Reported Symptoms: Pain (Comment: pain lower legs, burning in feet)           Lab Results     None          Care Coordination Interventions    Referral from Primary Care Provider: No  Suggested Interventions and [address]: Completed (Comment: Luke)  Medi Set or Pill Pack: Declined  Other Therapy Services: Completed (Comment: Lymphedema clinic)  Zone Management Tools: In Process (Comment: CHF)         Goals Addressed                 This Visit's Progress     Conditions and Symptoms   On track     I will schedule office visits, as directed by my provider. I will keep my appointment or reschedule if I have to cancel. I will notify my provider of any barriers to my plan of care. I will follow my Zone Management tool to seek urgent or emergent care. I will notify my provider of any symptoms that indicate a worsening of my condition. CHF, lymphedema    Barriers: lack of education  Plan for overcoming my barriers: ACM calls, education, home health visits, lymphedema clinic  Confidence: 9/10  Anticipated Goal Completion Date: 8/10/22              Prior to Admission medications    Medication Sig Start Date End Date Taking? Authorizing Provider   diclofenac sodium (VOLTAREN) 1 % GEL Apply 4 g topically 4 times daily 5/17/22   Eliza Lozano MD   amiodarone (CORDARONE) 200 MG tablet Take 1 tablet by mouth daily 5/10/22   Eliza Lozano MD   ferrous sulfate (FE TABS 325) 325 (65 Fe) MG EC tablet Take 1 tablet by mouth daily (with breakfast) 5/10/22   Eliza Lozano MD   apixaban (ELIQUIS) 5 MG TABS tablet Take 1 tablet by mouth 2 times daily 5/10/22   Eliza Lozano MD   gabapentin (NEURONTIN) 600 MG tablet Take 1 tablet by mouth daily for 90 days.  5/9/22 8/7/22  Eliza Lozano MD   ALPRAZolam Nora Danielle) 0.5 MG tablet TAKE 1 TABLET BY MOUTH NIGHTLY AS NEEDED FOR SLEEP FOR UP TO 30 DAYS. 5/2/22 7/1/22  Eliza Lozano MD   atorvastatin (LIPITOR) 40 MG tablet Take 1 tablet by mouth daily 5/2/22   Eliza Dawn MD   metoprolol tartrate (LOPRESSOR) 25 MG tablet Take 0.5 tablets by mouth 2 times daily 4/14/22   Eliza Dawn MD   furosemide (LASIX) 20 MG tablet Take 1 tablet by mouth daily 4/9/22   Daisey Meigs, APRN - NP   potassium citrate (UROCIT-K) 10 MEQ (1080 MG) extended release tablet Take 1 tablet by mouth daily 2/16/22   Eliza Dawn MD   allopurinol (ZYLOPRIM) 100 MG tablet Take 1 tablet by mouth daily 2/16/22   Eliza Dawn MD   rOPINIRole (REQUIP) 0.25 MG tablet Take 1 tablet by mouth nightly 2/16/22   Eliza Dawn MD   calcitRIOL (ROCALTROL) 0.25 MCG capsule TAKE 1 CAPSULE BY MOUTH DAILY 2/16/22   Eliza Dawn MD   cyclobenzaprine (FLEXERIL) 5 MG tablet Take 1 tablet by mouth nightly as needed for Muscle spasms 2/16/22   Eliza Dawn MD   azelastine (ASTELIN) 0.1 % nasal spray 1 spray by Nasal route 2 times daily Use in each nostril as directed 10/13/21   Abigail Antonio, DO   pantoprazole (PROTONIX) 40 MG tablet TAKE 1 TABLET DAILY 3/3/21   Abigail Antonio, DO   Cholecalciferol (VITAMIN D3) 25 MCG (1000 UT) TABS Take 2 tablets by mouth daily 1/9/20   BARBIE Ram CNP   Magnesium Oxide 400 MG CAPS Take by mouth 2 times daily Takes [facility] daily at [facility]    Historical Provider, MD       Future Appointments   Date Time Provider Mateo Weaver   6/7/2022  2:15 PM STA DIAG MAMMO RM 3 STAZ MAMMO STA Radiolog   6/8/2022  3:30 PM Eliza Dawn MD [facility] FP [id number]   6/14/2022  1:10 PM Kaitlin Tran MD [facility] None   6/20/2022  4:00 PM Eliza Dawn MD [facility] ANTHONY Lee

## 2022-06-07 ENCOUNTER — HOSPITAL ENCOUNTER (OUTPATIENT)
Dept: MAMMOGRAPHY | Age: 77
Discharge: HOME | End: 2022-06-09
Payer: MEDICARE

## 2022-06-07 DIAGNOSIS — Z12.31 VISIT FOR SCREENING MAMMOGRAM: ICD-10-CM

## 2022-06-07 PROCEDURE — 77063 BREAST TOMOSYNTHESIS BI: CPT

## 2022-06-08 ENCOUNTER — OFFICE VISIT (OUTPATIENT)
Dept: FAMILY MEDICINE CLINIC | Age: 77
End: 2022-06-08
Payer: MEDICARE

## 2022-06-08 VITALS
OXYGEN SATURATION: 99 % | WEIGHT: 255.4 LBS | TEMPERATURE: 96.4 F | BODY MASS INDEX: 42.5 KG/M2 | DIASTOLIC BLOOD PRESSURE: 82 MMHG | HEART RATE: 61 BPM | SYSTOLIC BLOOD PRESSURE: 146 MMHG

## 2022-06-08 DIAGNOSIS — R25.2 LEG CRAMPS: ICD-10-CM

## 2022-06-08 DIAGNOSIS — I48.91 NEW ONSET A-FIB (HCC): Primary | ICD-10-CM

## 2022-06-08 DIAGNOSIS — D50.8 OTHER IRON DEFICIENCY ANEMIA: ICD-10-CM

## 2022-06-08 DIAGNOSIS — F51.01 PRIMARY INSOMNIA: ICD-10-CM

## 2022-06-08 DIAGNOSIS — G89.29 CHRONIC BILATERAL LOW BACK PAIN WITHOUT SCIATICA: ICD-10-CM

## 2022-06-08 DIAGNOSIS — M54.50 CHRONIC BILATERAL LOW BACK PAIN WITHOUT SCIATICA: ICD-10-CM

## 2022-06-08 PROCEDURE — G8417 CALC BMI ABV UP PARAM F/U: HCPCS | Performed by: INTERNAL MEDICINE

## 2022-06-08 PROCEDURE — 1123F ACP DISCUSS/DSCN MKR DOCD: CPT | Performed by: INTERNAL MEDICINE

## 2022-06-08 PROCEDURE — G8400 PT W/DXA NO RESULTS DOC: HCPCS | Performed by: INTERNAL MEDICINE

## 2022-06-08 PROCEDURE — 99214 OFFICE O/P EST MOD 30 MIN: CPT | Performed by: INTERNAL MEDICINE

## 2022-06-08 PROCEDURE — G8427 DOCREV CUR MEDS BY ELIG CLIN: HCPCS | Performed by: INTERNAL MEDICINE

## 2022-06-08 PROCEDURE — 1090F PRES/ABSN URINE INCON ASSESS: CPT | Performed by: INTERNAL MEDICINE

## 2022-06-08 PROCEDURE — 1036F TOBACCO NON-USER: CPT | Performed by: INTERNAL MEDICINE

## 2022-06-08 RX ORDER — CYCLOBENZAPRINE HCL 5 MG
5 TABLET ORAL NIGHTLY PRN
Qty: 30 TABLET | Refills: 1 | Status: SHIPPED
Start: 2022-06-08 | End: 2022-07-06 | Stop reason: SDUPTHER

## 2022-06-08 RX ORDER — CYCLOBENZAPRINE HCL 5 MG
5 TABLET ORAL NIGHTLY PRN
Qty: 30 TABLET | Refills: 1 | Status: SHIPPED | OUTPATIENT
Start: 2022-06-08 | End: 2022-10-03

## 2022-06-08 ASSESSMENT — PATIENT HEALTH QUESTIONNAIRE - PHQ9
6. FEELING BAD ABOUT YOURSELF - OR THAT YOU ARE A FAILURE OR HAVE LET YOURSELF OR YOUR FAMILY DOWN: 2
SUM OF ALL RESPONSES TO PHQ QUESTIONS 1-9: 19
4. FEELING TIRED OR HAVING LITTLE ENERGY: 3
SUM OF ALL RESPONSES TO PHQ QUESTIONS 1-9: 19
9. THOUGHTS THAT YOU WOULD BE BETTER OFF DEAD, OR OF HURTING YOURSELF: 0
SUM OF ALL RESPONSES TO PHQ9 QUESTIONS 1 & 2: 6
8. MOVING OR SPEAKING SO SLOWLY THAT OTHER PEOPLE COULD HAVE NOTICED. OR THE OPPOSITE, BEING SO FIGETY OR RESTLESS THAT YOU HAVE BEEN MOVING AROUND A LOT MORE THAN USUAL: 0
2. FEELING DOWN, DEPRESSED OR HOPELESS: 3
3. TROUBLE FALLING OR STAYING ASLEEP: 3
10. IF YOU CHECKED OFF ANY PROBLEMS, HOW DIFFICULT HAVE THESE PROBLEMS MADE IT FOR YOU TO DO YOUR WORK, TAKE CARE OF THINGS AT HOME, OR GET ALONG WITH OTHER PEOPLE: 0
7. TROUBLE CONCENTRATING ON THINGS, SUCH AS READING THE NEWSPAPER OR WATCHING TELEVISION: 2
1. LITTLE INTEREST OR PLEASURE IN DOING THINGS: 3
5. POOR APPETITE OR OVEREATING: 3

## 2022-06-08 NOTE — PROGRESS NOTES
Subjective:       Patient ID:     Osman Linares is a 69 y.o. female who presents for   Chief Complaint   Patient presents with    Chronic Pain     f/u    Depression     f/u    Follow-Up from Hospital       HPI:  Nursing note reviewed and discussed with patient. Had a blood transfusion 2U PRBCS on 22 per Dr Nat Sauer   Getting labs done tomorrow for nephrology - not sure if she has orders to repeat labs for their office   She is very unhappy with her care at [facility] - dentures were lost, she did not like the way she was treated, wanted a bath but couldn't get one. When her home health nurses were coming home BP was 150s/70s. Not checking her BP at home. Patient's medications, allergies, past medical, surgical, social and family histories were reviewed and updated as appropriate.     Past Medical History:   Diagnosis Date    Abnormal stress test     Anemia     Arthritis     Depression     Diverticulosis     DM (diabetes mellitus) ([address]     Erythropenia     Fibromyalgia     HTN (hypertension)     Hyperlipidemia     Kidney stone     Morbid obesity due to excess calories ([address]     DIONY on CPAP     Osteopenia 14    Seasonal allergies     Sleep apnea     uses bipap prn     Past Surgical History:   Procedure Laterality Date    ARM SURGERY      right arm broken    CARDIAC CATHETERIZATION  [id number] dr Evelina Patton  16    COLONOSCOPY      COLONOSCOPY      TOTAL KNEE ARTHROPLASTY Bilateral     left may 2013 and right 2013    TUBAL LIGATION         Social History     Tobacco Use    Smoking status: Former Smoker     Packs/day: 0.25     Years: 1.00     Pack years: 0.25     Quit date: 1960     Years since quittin.4    Smokeless tobacco: Never Used    Tobacco comment: quit    Substance Use Topics    Alcohol use: No      Patient Active Problem List   Diagnosis    Dyslipidemia    DIONY treated with BiPAP    Fibromyalgia  CRI (chronic renal insufficiency)    OA (osteoarthritis)    DM (diabetes mellitus) ([facility])    Kidney stone    Depression    Seasonal allergies    Diverticulosis    Erythropenia    Normocytic normochromic anemia    Mitral regurgitation - Mild to moderate    CKD (chronic kidney disease) stage 4, GFR 15-29 ml/min ([facility])    Gout    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 disease ([facility]    Essential hypertension    Osteopenia    Morbid obesity due to excess calories ([facility])    Anxiety    Febrile illness    Acute serous otitis media of left ear    Secondary hyperparathyroidism ([facility]    Acute kidney injury superimposed on CKD ([facility]    [facility]fib [facility] with Rapid ventricular response    New onset of congestive heart failure ([facility])    Lymphedema    GERD (gastroesophageal reflux disease)    Restless leg syndrome    Acute diastolic congestive heart failure ([facility])    Chronic bilateral low back pain without sciatica         Prior to Visit Medications    Medication Sig Taking? Authorizing Provider   diclofenac sodium (VOLTAREN) 1 % GEL Apply 4 g topically 4 times daily Yes Eliza Renteria MD   amiodarone (CORDARONE) 200 MG tablet Take 1 tablet by mouth daily Yes Danii Dyer MD   apixaban (ELIQUIS) 5 MG TABS tablet Take 1 tablet by mouth 2 times daily Yes Danii Dyer MD   gabapentin (NEURONTIN) 600 MG tablet Take 1 tablet by mouth daily for 90 days. Yes Eliza Renteria MD   ALPRAZolam (XANAX) 0.5 MG tablet TAKE 1 TABLET BY MOUTH NIGHTLY AS NEEDED FOR SLEEP FOR UP TO 30 DAYS.  Yes Eliza Renteria MD   atorvastatin (LIPITOR) 40 MG tablet Take 1 tablet by mouth daily Yes Eliza Renteria MD   metoprolol tartrate (LOPRESSOR) 25 MG tablet Take 0.5 tablets by mouth 2 times daily Yes Eliza Renteria MD   furosemide (LASIX) 20 MG tablet Take 1 tablet by mouth daily Yes BARBIE Bedoya NP   potassium citrate (UROCIT-K) 10 MEQ (1080 MG) extended release tablet Take 1 tablet by mouth daily Yes Eliza Giron MD   allopurinol (ZYLOPRIM) 100 MG tablet Take 1 tablet by mouth daily Yes Eliza Giron MD   rOPINIRole (REQUIP) 0.25 MG tablet Take 1 tablet by mouth nightly Yes Pascual Dutta MD   calcitRIOL (ROCALTROL) 0.25 MCG capsule TAKE 1 CAPSULE BY MOUTH DAILY Yes Eliza Giron MD   cyclobenzaprine (FLEXERIL) 5 MG tablet Take 1 tablet by mouth nightly as needed for Muscle spasms Yes Eliza Giron MD   azelastine (ASTELIN) 0.1 % nasal spray 1 spray by Nasal route 2 times daily Use in each nostril as directed Yes Lacretia Backer, DO   pantoprazole (PROTONIX) 40 MG tablet TAKE 1 TABLET DAILY Yes Lacretia Backer, DO   Cholecalciferol (VITAMIN D3) 25 MCG (1000 UT) TABS Take 2 tablets by mouth daily Yes Londell Mini, APRN - CNP   Magnesium Oxide 400 MG CAPS Take by mouth 2 times daily Takes once daily at [facility] Yes Historical Provider, MD   ferrous sulfate (FE TABS 325) 325 (65 Fe) MG EC tablet Take 1 tablet by mouth daily (with breakfast)  Patient not taking: Reported on 6/8/2022  Eliza Giron MD     Review of Systems  Review of Systems   Constitutional: Negative for fatigue, fever and unexpected weight change. Respiratory: Negative for cough, choking, chest tightness, shortness of breath and wheezing. Cardiovascular: Negative for chest pain, palpitations and leg swelling. Gastrointestinal: Negative for abdominal pain, anal bleeding, blood in stool, constipation, diarrhea, nausea and vomiting. Endocrine: Negative. Musculoskeletal: Negative for joint swelling and myalgias. Skin: Negative. Neurological: Negative for dizziness. Psychiatric/Behavioral: Negative for sleep disturbance. All other systems reviewed and are negative.          Objective:       Physical Exam:  BP (!) 170/84 (Site: Left Upper Arm, Position: Sitting, Cuff Size: Large Adult)   Pulse 61   Temp (!) 96.4 °F (35.8 °C)   Wt 255 lb 6.4 oz (115.8 kg)   LMP  (LMP Unknown)   SpO2 99% BMI 42.50 kg/m²   Wt Readings from Last 3 Encounters:   22 255 lb 6.4 oz (115.8 kg)   22 247 lb 9.2 oz (112.3 kg)   03/15/22 246 lb 12.8 oz (111.9 kg)         Physical Exam  Vitals and nursing note reviewed. Constitutional:       General: She is not in acute distress. Appearance: She is well-developed. She is obese. She is not ill-appearing. Eyes:      General: Lids are normal. Vision grossly intact. Cardiovascular:      Rate and Rhythm: Normal rate and regular rhythm. Heart sounds: Normal heart sounds, S1 normal and S2 normal. No murmur heard. No friction rub. No gallop. Pulmonary:      Effort: Pulmonary effort is normal. No respiratory distress. Breath sounds: Normal breath sounds. No wheezing. Abdominal:      General: Bowel sounds are normal.      Palpations: Abdomen is soft. There is no mass. Tenderness: There is no abdominal tenderness. There is no guarding. Musculoskeletal:         General: Normal range of motion. Right lower le+ Pitting Edema present. Left lower le+ Pitting Edema present. Skin:     General: Skin is warm and dry. Capillary Refill: Capillary refill takes less than 2 seconds. Neurological:      General: No focal deficit present. Mental Status: She is alert and oriented to person, place, and time.          Data Review  Orders Only on 2022   Component Date Value    Sodium 2022 143     Chloride 2022 108     Potassium 2022 4.5     BUN 2022 27     CREATININE 2022 2.80     Glucose 2022 128     CO2 2022 24     Calcium 2022 9.4     Gfr Calculated 2022 [facility] Outpatient Visit on 2022   Component Date Value    Glucose 2022 111*    BUN 2022 79*    CREATININE 2022 2.77*    Calcium 2022 10.1     Sodium 2022 136     Potassium 2022 4.6     Chloride 2022 104     CO2 2022 22     Anion Gap 2022 10     GFR Non- 04/09/2022 17*    GFR  04/09/2022 20*    GFR Comment 04/09/2022           Lab Results   Component Value Date    CHOL 178 01/11/2021    TRIG 281 01/11/2021    HDL 37 01/11/2021    LDLDIRECT 123 10/21/2016          Assessment/Plan:      1. Chronic bilateral low back pain without sciatica  - cyclobenzaprine (FLEXERIL) 5 MG tablet; Take 1 tablet by mouth nightly as needed for Muscle spasms  Dispense: 30 tablet; Refill: 1    2. New onset a-fib ([facility]  Rate controlled and anticoagulated     3. Other iron deficiency anemia  - CBC with Auto Differential; Future  - Iron and TIBC; Future  - Ferritin; Future  - Vitamin B12 & Folate; Future    4. Primary insomnia  Continue current management     5. Leg cramps  - cyclobenzaprine (FLEXERIL) 5 MG tablet; Take 1 tablet by mouth nightly as needed for Muscle spasms  Dispense: 30 tablet;  Refill: 1           Health Maintenance Due   Topic Date Due    Lipids  01/11/2022    Depression Monitoring  07/01/2022    Annual Wellness Visit (AWV)  07/02/2022       Electronically signed by Peggy Live MD on 6/8/2022 at 4:12 PM

## 2022-06-08 NOTE — PROGRESS NOTES
Pt is here today for a hospital f/u, pt was in [place]. V's on 04/01/2022 to 04/07/2022 for feet pain, both feet were swollen, pt could not walk, when pt was hooked to the machines in the back of the truck, she was told she was in A-fib, pt was diagnosed with cellulitis when in the hospital     Pt is having a hard time sleeping, states she has only 5 Xanax left     Pt has been going to the lymphedema clinic  Pt states she had to go for a blood transfusion on 06/02/2022, had to get 2 units, her hemoglobin was 7.9

## 2022-06-09 ENCOUNTER — HOSPITAL ENCOUNTER (OUTPATIENT)
Age: 77
Setting detail: SPECIMEN
Discharge: HOME | End: 2022-06-09

## 2022-06-09 DIAGNOSIS — N25.81 SECONDARY HYPERPARATHYROIDISM (HCC): ICD-10-CM

## 2022-06-09 DIAGNOSIS — G47.33 OSA ON CPAP: ICD-10-CM

## 2022-06-09 DIAGNOSIS — D50.8 OTHER IRON DEFICIENCY ANEMIA: ICD-10-CM

## 2022-06-09 DIAGNOSIS — N18.4 TYPE 2 DIABETES MELLITUS WITH STAGE 4 CHRONIC KIDNEY DISEASE, UNSPECIFIED WHETHER LONG TERM INSULIN USE (HCC): ICD-10-CM

## 2022-06-09 DIAGNOSIS — Z99.89 OSA ON CPAP: ICD-10-CM

## 2022-06-09 DIAGNOSIS — I10 ESSENTIAL HYPERTENSION: ICD-10-CM

## 2022-06-09 DIAGNOSIS — N18.32 STAGE 3B CHRONIC KIDNEY DISEASE (HCC): ICD-10-CM

## 2022-06-09 DIAGNOSIS — E11.22 TYPE 2 DIABETES MELLITUS WITH STAGE 4 CHRONIC KIDNEY DISEASE, UNSPECIFIED WHETHER LONG TERM INSULIN USE (HCC): ICD-10-CM

## 2022-06-09 LAB
ABSOLUTE EOS #: 0.39 K/UL (ref 0–0.44)
ABSOLUTE EOS #: 0.39 K/UL (ref 0–0.44)
ABSOLUTE IMMATURE GRANULOCYTE: 0.03 K/UL (ref 0–0.3)
ABSOLUTE IMMATURE GRANULOCYTE: 0.03 K/UL (ref 0–0.3)
ABSOLUTE LYMPH #: 1.96 K/UL (ref 1.1–3.7)
ABSOLUTE LYMPH #: 2.05 K/UL (ref 1.1–3.7)
ABSOLUTE MONO #: 0.68 K/UL (ref 0.1–1.2)
ABSOLUTE MONO #: 0.69 K/UL (ref 0.1–1.2)
ANION GAP SERPL CALCULATED.3IONS-SCNC: 22 MMOL/L (ref 9–17)
BASOPHILS # BLD: 1 % (ref 0–2)
BASOPHILS # BLD: 1 % (ref 0–2)
BASOPHILS ABSOLUTE: 0.07 K/UL (ref 0–0.2)
BASOPHILS ABSOLUTE: 0.09 K/UL (ref 0–0.2)
BUN BLDV-MCNC: 38 MG/DL (ref 8–23)
CALCIUM SERPL-MCNC: 10.1 MG/DL (ref 8.6–10.4)
CHLORIDE BLD-SCNC: 108 MMOL/L (ref 98–107)
CO2: 17 MMOL/L (ref 20–31)
CREAT SERPL-MCNC: 3.09 MG/DL (ref 0.5–0.9)
CREATININE URINE: 38.9 MG/DL (ref 28–217)
EOSINOPHILS RELATIVE PERCENT: 4 % (ref 1–4)
EOSINOPHILS RELATIVE PERCENT: 4 % (ref 1–4)
FERRITIN: 537 NG/ML (ref 13–150)
FOLATE: 6.5 NG/ML
GFR AFRICAN AMERICAN: 18 ML/MIN
GFR NON-AFRICAN AMERICAN: 15 ML/MIN
GFR SERPL CREATININE-BSD FRML MDRD: ABNORMAL ML/MIN/{1.73_M2}
GLUCOSE BLD-MCNC: 134 MG/DL (ref 70–99)
HCT VFR BLD CALC: 32.6 % (ref 36.3–47.1)
HCT VFR BLD CALC: 32.9 % (ref 36.3–47.1)
HEMOGLOBIN: 10 G/DL (ref 11.9–15.1)
HEMOGLOBIN: 10.1 G/DL (ref 11.9–15.1)
IMMATURE GRANULOCYTES: 0 %
IMMATURE GRANULOCYTES: 0 %
IRON SATURATION: 29 % (ref 20–55)
IRON: 79 UG/DL (ref 37–145)
LYMPHOCYTES # BLD: 21 % (ref 24–43)
LYMPHOCYTES # BLD: 22 % (ref 24–43)
MAGNESIUM: 1.8 MG/DL (ref 1.6–2.6)
MCH RBC QN AUTO: 29.4 PG (ref 25.2–33.5)
MCH RBC QN AUTO: 29.4 PG (ref 25.2–33.5)
MCHC RBC AUTO-ENTMCNC: 30.7 G/DL (ref 28.4–34.8)
MCHC RBC AUTO-ENTMCNC: 30.7 G/DL (ref 28.4–34.8)
MCV RBC AUTO: 95.6 FL (ref 82.6–102.9)
MCV RBC AUTO: 95.9 FL (ref 82.6–102.9)
MONOCYTES # BLD: 7 % (ref 3–12)
MONOCYTES # BLD: 7 % (ref 3–12)
NRBC AUTOMATED: 0 PER 100 WBC
NRBC AUTOMATED: 0 PER 100 WBC
PDW BLD-RTO: 15.7 % (ref 11.8–14.4)
PDW BLD-RTO: 15.8 % (ref 11.8–14.4)
PHOSPHORUS: 4.5 MG/DL (ref 2.6–4.5)
PLATELET # BLD: 293 K/UL (ref 138–453)
PLATELET # BLD: 294 K/UL (ref 138–453)
PMV BLD AUTO: 9.6 FL (ref 8.1–13.5)
PMV BLD AUTO: 9.7 FL (ref 8.1–13.5)
POTASSIUM SERPL-SCNC: 5 MMOL/L (ref 3.7–5.3)
PTH INTACT: 200 PG/ML (ref 15–65)
RBC # BLD: 3.4 M/UL (ref 3.95–5.11)
RBC # BLD: 3.44 M/UL (ref 3.95–5.11)
RBC # BLD: ABNORMAL 10*6/UL
RBC # BLD: ABNORMAL 10*6/UL
SEG NEUTROPHILS: 66 % (ref 36–65)
SEG NEUTROPHILS: 67 % (ref 36–65)
SEGMENTED NEUTROPHILS ABSOLUTE COUNT: 6.17 K/UL (ref 1.5–8.1)
SEGMENTED NEUTROPHILS ABSOLUTE COUNT: 6.2 K/UL (ref 1.5–8.1)
SODIUM BLD-SCNC: 147 MMOL/L (ref 135–144)
TOTAL IRON BINDING CAPACITY: 275 UG/DL (ref 250–450)
TOTAL PROTEIN, URINE: 16 MG/DL
UNSATURATED IRON BINDING CAPACITY: 196 UG/DL (ref 112–347)
VITAMIN B-12: 273 PG/ML (ref 232–1245)
VITAMIN D 25-HYDROXY: 37.2 NG/ML
WBC # BLD: 9.3 K/UL (ref 3.5–11.3)
WBC # BLD: 9.4 K/UL (ref 3.5–11.3)

## 2022-06-15 ENCOUNTER — CARE COORDINATION (OUTPATIENT)
Dept: CARE COORDINATION | Age: 77
End: 2022-06-15

## 2022-06-15 ENCOUNTER — TELEPHONE (OUTPATIENT)
Dept: FAMILY MEDICINE CLINIC | Age: 77
End: 2022-06-15

## 2022-06-15 DIAGNOSIS — D64.9 ANEMIA, UNSPECIFIED TYPE: Primary | ICD-10-CM

## 2022-06-15 NOTE — TELEPHONE ENCOUNTER
Pt states she did go to see Dr Paula Harding. She was told can go back to the Metoprolol Tartrate 25 mg twice a day. Patient states she has enough medication at this time and does not need a refill.

## 2022-06-15 NOTE — CARE COORDINATION
Left VM message asking patient to call me back at [phone] for care management update. Will call again next week.     Future Appointments   Date Time Provider Mateo Meg   9/28/2022  2:15 PM Eliza Garibay MD [facility]   10/11/2022  1:50 PM Melba Almanzar MD [facility] None

## 2022-06-16 ENCOUNTER — CARE COORDINATION (OUTPATIENT)
Dept: CARE COORDINATION | Age: 77
End: 2022-06-16

## 2022-06-16 NOTE — CARE COORDINATION
Ambulatory Care Coordination Note  6/16/2022  CM Risk Score: 4  Charlson 10 Year Mortality Risk Score: 100%     ACC: [place] Lr RN    Summary Note: She saw nephrology, metoprolol dose increased back to 25 mg twice a day. She has dyspnea but he admonished her for not wearing her CPAP. Hasn't seen pulmonology in a long time because she didn't want to do virtual visits, now they want to consider her as a new patient if she makes an appt. She will start wearing her CPAP again with mask that she has. She's not interested in repeating a sleep study at this time. Was referred to new hematologist. Has only seen Dr. Db Castillo once since 2018, goes there every week for blood draws then he orders injections or transfusions if she needs them but she has not seen him or any other practitioner there. Dr. Braulio Wells at [address] [place] [address] is her choice per friend recommendation. Last hgb 10 after receiving transfusion 2 units the prior week. She thinks may have long term COVID, memory is not the same, sometimes has trouble finding words and her spouse and children tell her that also. Not going to lymphedema clinic any more, wearing the compression garments ordered for her, nothing really new or improved in leg swelling or pain in legs. CC Plan:   -Follow up in a month to review treatments, appts. Diabetes Assessment    Meal Planning: Avoidance of concentrated sweets   How often do you test your blood sugar?: No Testing   Do you have barriers with adherence to non-pharmacologic self-management interventions?  (Nutrition/Exercise/Self-Monitoring): Yes   Have you ever had to go to the ED for symptoms of low blood sugar?: No       No patient-reported symptoms       and   Congestive Heart Failure Assessment    Are you currently restricting fluids?: No Restriction  Do you understand a low sodium diet?: Yes  Do you understand how to read food labels?: Yes  How many restaurant meals do you eat per week?: 0  Do you salt your food before tasting it?: No         Symptoms:  CHF associated dyspnea on exertion: Pos, CHF associated leg swelling: Pos      Symptom course: stable  Salt intake watch compared to last visit: stable             Lab Results     None          Care Coordination Interventions    Referral from Primary Care Provider: No  Suggested Interventions and [address] [place] [address] [address]wy: Completed (Comment: Luke)  Medi Set or Pill Pack: Declined  Other Therapy Services: Completed (Comment: Lymphedema clinic)  Zone Management Tools: In Process (Comment: CHF)         Goals Addressed                 This Visit's Progress     Conditions and Symptoms   On track     I will schedule office visits, as directed by my provider. I will keep my appointment or reschedule if I have to cancel. I will notify my provider of any barriers to my plan of care. I will follow my Zone Management tool to seek urgent or emergent care. I will notify my provider of any symptoms that indicate a worsening of my condition. CHF, lymphedema    Barriers: lack of education  Plan for overcoming my barriers: ACM calls, education, home health visits, lymphedema clinic  Confidence: 9/10  Anticipated Goal Completion Date: 8/10/22              Prior to Admission medications    Medication Sig Start Date End Date Taking?  Authorizing Provider   calcitRIOL (ROCALTROL) 0.5 MCG capsule TAKE 1 CAPSULE BY MOUTH DAILY 6/14/22   Paco Carrera MD   cyclobenzaprine (FLEXERIL) 5 MG tablet Take 1 tablet by mouth nightly as needed for Muscle spasms 6/8/22   Eliza Mayes MD   cyclobenzaprine (FLEXERIL) 5 MG tablet Take 1 tablet by mouth nightly as needed for Muscle spasms 6/8/22   Eliza Mayes MD   diclofenac sodium (VOLTAREN) 1 % GEL Apply 4 g topically 4 times daily  Patient not taking: Reported on 6/14/2022 5/17/22   Eliza Mayes MD   amiodarone (CORDARONE) 200 MG tablet Take 1 tablet by mouth daily 5/10/22   Eliza Mayes MD   ferrous sulfate (FE TABS 325) 325 (65 Fe) MG EC tablet Take 1 tablet by mouth daily (with breakfast)  Patient not taking: Reported on 6/8/2022 5/10/22   Eliza Melton MD   apixaban (ELIQUIS) 5 MG TABS tablet Take 1 tablet by mouth 2 times daily 5/10/22   Eliza Melton MD   gabapentin (NEURONTIN) 600 MG tablet Take 1 tablet by mouth daily for 90 days.  5/9/22 8/7/22  Eliza Melton MD   ALPRAZolam Rhoderick Mullet) 0.5 MG tablet TAKE 1 TABLET BY MOUTH NIGHTLY AS NEEDED FOR SLEEP FOR UP TO 30 DAYS. 5/2/22 7/1/22  Eliza Melton MD   atorvastatin (LIPITOR) 40 MG tablet Take 1 tablet by mouth daily 5/2/22   Eliza Melton MD   metoprolol tartrate (LOPRESSOR) 25 MG tablet Take 0.5 tablets by mouth 2 times daily 4/14/22   Eliza Melton MD   furosemide (LASIX) 20 MG tablet Take 1 tablet by mouth daily 4/9/22   BARBIE Mock NP   potassium citrate (UROCIT-K) 10 MEQ (1080 MG) extended release tablet Take 1 tablet by mouth daily 2/16/22   Eliza Melton MD   allopurinol (ZYLOPRIM) 100 MG tablet Take 1 tablet by mouth daily 2/16/22   Eliza Melton MD   rOPINIRole (REQUIP) 0.25 MG tablet Take 1 tablet by mouth nightly 2/16/22   Eliza Melton MD   azelastine (ASTELIN) 0.1 % nasal spray 1 spray by Nasal route 2 times daily Use in each nostril as directed 10/13/21   Gissel Netters, DO   pantoprazole (PROTONIX) 40 MG tablet TAKE 1 TABLET DAILY 3/3/21   Gissel Netters, DO   Cholecalciferol (VITAMIN D3) 25 MCG (1000 UT) TABS Take 2 tablets by mouth daily 1/9/20   BARBIE Sunshine CNP   Magnesium Oxide 400 MG CAPS Take by mouth 2 times daily Takes [facility] at [facility]    Historical Provider, MD       Future Appointments   Date Time Provider Mateo Weaver   9/28/2022  2:15 PM Eliza Melton, [address] [place] [address]   10/11/2022  1:50 PM Syeda Crowder MD [facility] None

## 2022-06-18 ASSESSMENT — ENCOUNTER SYMPTOMS
ANAL BLEEDING: 0
SHORTNESS OF BREATH: 0
WHEEZING: 0
DIARRHEA: 0
CONSTIPATION: 0
VOMITING: 0
CHOKING: 0
ABDOMINAL PAIN: 0
CHEST TIGHTNESS: 0
COUGH: 0
NAUSEA: 0
BLOOD IN STOOL: 0

## 2022-06-18 ASSESSMENT — VISUAL ACUITY: OU: 1

## 2022-06-27 ENCOUNTER — CARE COORDINATION (OUTPATIENT)
Dept: CARE COORDINATION | Age: 77
End: 2022-06-27

## 2022-06-27 NOTE — CARE COORDINATION
Heart Failure Education outreach Date/Time: 2022 2:11 PM    Ambulatory Care Manager (ACM) contacted the patient by telephone to perform Ambulatory Care Coordination. Verified name and  with patient as identifiers. Provided introduction to self, and explanation of the Ambulatory Care Manager's role. ACM reviewed that a Health Healthy tips for the Summer packet has been sent to [employer]. ACM reviewed CHF zones, daily weights, fluid restriction, the importance of low sodium diet and healthy tips packet with the patient. Instructed patient to call their PCP if they have a weight gain of 3 lbs in 2 days or 5 lbs in a week. Patient reminded that there is a physician on call 24 hours a day / 7 days a week should the patient have questions or concerns. The patient verbalized understanding.

## 2022-06-30 DIAGNOSIS — I89.0 LYMPHEDEMA: ICD-10-CM

## 2022-06-30 RX ORDER — HYDROCODONE BITARTRATE AND ACETAMINOPHEN 5; 325 MG/1; MG/1
1 TABLET ORAL EVERY 6 HOURS PRN
Qty: 20 TABLET | Refills: 0 | Status: SHIPPED | OUTPATIENT
Start: 2022-06-30 | End: 2022-09-16 | Stop reason: SDUPTHER

## 2022-06-30 NOTE — TELEPHONE ENCOUNTER
Last visit: 6/8/22  Last Med refill: 4/28/22  Does patient have enough medication for 72 hours: No:     Next Visit Date:  Future Appointments   Date Time Provider Mateo Peñai   9/28/2022  2:15 PM Eliza Dawn MD [facility]   10/11/2022  1:50 PM Justen Ayala MD [facility] None       Health Maintenance   Topic Date Due    Lipids  01/11/2022    Annual Wellness Visit (AWV)  07/02/2022    COVID-19 Vaccine (3 - Booster for Pfizer series) 02/16/2023 (Originally 9/27/2021)    DTaP/Tdap/Td vaccine (1 - Tdap) 03/08/2023 (Originally 2/2/1965)    Shingles vaccine (1 of 2) 03/08/2023 (Originally 2/2/1996)    Depression Monitoring  06/08/2023    DEXA (modify frequency per FRAX score)  Completed    Flu vaccine  Completed    Pneumococcal 65+ years Vaccine  Completed    Hepatitis C screen  Completed    Hepatitis A vaccine  Aged Out    Hib vaccine  Aged Out    Meningococcal (ACWY) vaccine  Aged Out       Hemoglobin A1C (%)   Date Value   03/09/2022 5.5   11/16/2021 5.7   01/11/2021 5.7             ( goal A1C is < 7)   Microalb/Crt.  Ratio (mcg/mg creat)   Date Value   01/11/2021 CANNOT BE CALCULATED     LDL Cholesterol (mg/dL)   Date Value   01/11/2021 85   03/03/2020 96       (goal LDL is <100)   AST (U/L)   Date Value   04/01/2022 11     ALT (U/L)   Date Value   04/01/2022 7     BUN (mg/dL)   Date Value   06/09/2022 38 (H)     BP Readings from Last 3 Encounters:   06/14/22 (!) 152/92   06/08/22 (!) 146/82   04/14/22 (!) 140/80          (goal 120/80)    All Future Testing planned in [facility]  Lab Frequency Next Occurrence   ALT Once 08/01/2021   AST Once 08/01/2021   Hemoglobin A1C Once 08/01/2021   Lipid Panel Once 08/01/2021   Microalbumin, Ur Once 08/01/2021   Uric Acid Once 08/01/2021   Ferritin Once 08/01/2021   Iron and TIBC Once 08/01/2021   Transferrin Once 08/01/2021   Vitamin B12 & Folate Once 08/01/2021   Ferritin Once 12/24/2021   Iron And TIBC Once 93/35/7546   Basic Metabolic Panel Once 04/08/2022   Vitamin D 25 Hydroxy Once 08/14/2022   Phosphorus Once 09/76/5936   Basic Metabolic Panel Once 26/92/3585   Magnesium Once 08/14/2022   CBC with Auto Differential Once 08/14/2022   PTH, Intact Once 08/14/2022   Creatinine, Random Urine Once 08/14/2022   Protein, urine, random Once 08/14/2022               Patient Active Problem List:     Dyslipidemia     DIONY treated with BiPAP     Fibromyalgia     CRI (chronic renal insufficiency)     OA (osteoarthritis)     DM (diabetes mellitus) ([address]     Kidney stone     Depression     Seasonal allergies     Diverticulosis     Erythropenia     Normocytic normochromic anemia     Mitral regurgitation - Mild to moderate     CKD (chronic kidney disease) stage 4, GFR 15-29 ml/min ([facility])     Gout     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 disease ([address]     Essential hypertension     Osteopenia     Morbid obesity due to excess calories ([facility])     Anxiety     Febrile illness     Acute serous otitis media of left ear     Secondary hyperparathyroidism ([address]     Acute kidney injury superimposed on CKD ([address]     New onset a-fib ([address] with Rapid ventricular response     New onset of congestive heart failure (HCC)     Lymphedema     GERD (gastroesophageal reflux disease)     Restless leg syndrome     Acute diastolic congestive heart failure (HCC)     Chronic bilateral low back pain without sciatica

## 2022-07-06 ENCOUNTER — CARE COORDINATION (OUTPATIENT)
Dept: CARE COORDINATION | Age: 77
End: 2022-07-06

## 2022-07-06 DIAGNOSIS — I50.9 NEW ONSET OF CONGESTIVE HEART FAILURE (HCC): ICD-10-CM

## 2022-07-06 RX ORDER — AMIODARONE HYDROCHLORIDE 200 MG/1
200 TABLET ORAL DAILY
Qty: 90 TABLET | Refills: 1 | Status: SHIPPED | OUTPATIENT
Start: 2022-07-06

## 2022-07-06 NOTE — TELEPHONE ENCOUNTER
Last visit: 06/08/2022  Last Med refill: not sure   Script was [facility] to [facility]  Does patient have enough medication for 72 hours: No:     Next Visit Date:  Future Appointments   Date Time Provider Mateo Weaver   9/28/2022  2:15 PM Eliza Olivares MD [facility]   10/11/2022  1:50 PM Justen Liang MD [facility] None       Health Maintenance   Topic Date Due    Lipids  01/11/2022    Annual Wellness Visit (AWV)  07/02/2022    COVID-19 Vaccine (3 - Booster for [place] Peter series) 02/16/2023 (Originally 9/27/2021)    DTaP/Tdap/Td vaccine (1 - Tdap) 03/08/2023 (Originally 2/2/1965)    Shingles vaccine (1 of 2) 03/08/2023 (Originally 2/2/1996)    Flu vaccine (1) 09/01/2022    Depression Monitoring  06/08/2023    DEXA (modify frequency per FRAX score)  Completed    Pneumococcal 65+ years Vaccine  Completed    Hepatitis C screen  Completed    Hepatitis A vaccine  Aged Out    Hib vaccine  Aged Out    Meningococcal (ACWY) vaccine  Aged Out       Hemoglobin A1C (%)   Date Value   03/09/2022 5.5   11/16/2021 5.7   01/11/2021 5.7             ( goal A1C is < 7)   Microalb/Crt.  Ratio (mcg/mg creat)   Date Value   01/11/2021 CANNOT BE CALCULATED     LDL Cholesterol (mg/dL)   Date Value   01/11/2021 85   03/03/2020 96       (goal LDL is <100)   AST (U/L)   Date Value   04/01/2022 11     ALT (U/L)   Date Value   04/01/2022 7     BUN (mg/dL)   Date Value   06/09/2022 38 (H)     BP Readings from Last 3 Encounters:   06/14/22 (!) 152/92   06/08/22 (!) 146/82   04/14/22 (!) 140/80          (goal 120/80)    All Future Testing planned in [facility]  Lab Frequency Next Occurrence   Ferritin Once 12/24/2021   Iron And TIBC Once 41/68/2718   Basic Metabolic Panel Once 13/76/0438   Vitamin D 25 Hydroxy Once 08/14/2022   Phosphorus Once 91/40/5975   Basic Metabolic Panel Once 76/60/4957   Magnesium Once 08/14/2022   CBC with Auto Differential Once 08/14/2022   PTH, Intact Once 08/14/2022 Creatinine, Random Urine Once 08/14/2022   Protein, urine, random Once 08/14/2022               Patient Active Problem List:     Dyslipidemia     DIONY treated with BiPAP     Fibromyalgia     CRI (chronic renal insufficiency)     OA (osteoarthritis)     DM (diabetes mellitus) ([address]     Kidney stone     Depression     Seasonal allergies     Diverticulosis     Erythropenia     Normocytic normochromic anemia     Mitral regurgitation - Mild to moderate     CKD (chronic kidney disease) stage 4, GFR 15-29 ml/min ([facility])     Gout     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 disease ([address]     Essential hypertension     Osteopenia     Morbid obesity due to excess calories ([facility])     Anxiety     Febrile illness     Acute serous otitis media of left ear     Secondary hyperparathyroidism ([address]     Acute kidney injury superimposed on CKD ([address]     New onset a-fib ([address] with Rapid ventricular response     New onset of congestive heart failure (HCC)     Lymphedema     GERD (gastroesophageal reflux disease)     Restless leg syndrome     Acute diastolic congestive heart failure (HCC)     Chronic bilateral low back pain without sciatica

## 2022-07-06 NOTE — CARE COORDINATION
Left VM message asking patient to call me back at [phone] for care management follow up. Will call again next week.     Future Appointments   Date Time Provider Mateo Weaver   9/28/2022  2:15 PM MD Jorg[facility] Rosas   10/11/2022  1:50 PM Lissa Greco MD [facility] None

## 2022-07-08 ENCOUNTER — CARE COORDINATION (OUTPATIENT)
Dept: CARE COORDINATION | Age: 77
End: 2022-07-08

## 2022-07-08 NOTE — CARE COORDINATION
Ambulatory Care Coordination Note  7/8/2022  CM Risk Score: 4  Charlson 10 Year Mortality Risk Score: 100%     ACC: Barbi Rosas RN    Summary Note: She called. Ankle swelling worse, her weight is up to 254. Can't bend ankles and are very sore, can't get shoes on. Complaintive that has had leg swelling since admission in March, has never gone down. She's getting more depressed about it. Reminded her probably because of decreased Lasix dose of 20 mg daily, was on 40 mg twice daily prior to March admission but decreased due to worsening creat. She saw nephrologist a few weeks ago. Creat higher at 3.09 from 2.8.  BP still high, 158/76 today, was 179/81 yesterday and 186/92 the previous day. Pulse ranging 50-62. CHF- She is fatigued, not able to do much but breathing is not more labored [facility], last visit 6/1/22, no other visits scheduled. Requested progress note be faxed to PCP office. She is taking metoprolol 25 mg twice daily per nephrology order. She hasn't been wearing compression sleeve to legs because very thick wool-like material that's too hot. Encouraged her to wear at least part of the day, she stated will try. She will also call lymphedema clinic to discuss with occupational therapist a different type of sleeve. CC Plan:   -Follow up next week to check weight, symptoms, review cardiology note.   Congestive Heart Failure Assessment    Are you currently restricting fluids?: No Restriction  Do you understand a low sodium diet?: Yes  Do you understand how to read food labels?: Yes  How many restaurant meals do you eat per week?: 0  Do you salt your food before tasting it?: No         Symptoms:  CHF associated leg swelling: Pos      Patient-reported weight (lb): 254  Weight trend: increasing steadily  Salt intake watch compared to last visit: stable      and   General Assessment    Do you have any symptoms that are causing concern?: Yes  Progression since Onset: Gradually Worsening  Reported Symptoms: Other (Comment: depression, leg swelling)           Lab Results     None          Care Coordination Interventions    Referral from Primary Care Provider: No  Suggested Interventions and [address]: Completed (Comment: Luke)  Medi Set or Pill Pack: Declined  Other Therapy Services: Completed (Comment: Lymphedema clinic)  Zone Management Tools: In Process (Comment: CHF)         Goals Addressed                 This Visit's Progress     Conditions and Symptoms   On track     I will schedule office visits, as directed by my provider. I will keep my appointment or reschedule if I have to cancel. I will notify my provider of any barriers to my plan of care. I will follow my Zone Management tool to seek urgent or emergent care. I will notify my provider of any symptoms that indicate a worsening of my condition. CHF, lymphedema    Barriers: lack of education  Plan for overcoming my barriers: ACM calls, education, home health visits, lymphedema clinic  Confidence: 9/10  Anticipated Goal Completion Date: 8/10/22              Prior to Admission medications    Medication Sig Start Date End Date Taking? Authorizing Provider   amiodarone (CORDARONE) 200 MG tablet Take 1 tablet by mouth daily 7/6/22   BARBIE Geller - NP   HYDROcodone-acetaminophen (NORCO) 5-325 MG per tablet TAKE 1 TABLET BY MOUTH EVERY 6 HOURS AS NEEDED FOR PAIN FOR UP TO 30 DAYS.  6/30/22 7/30/22  BARBIE Owens CNP   calcitRIOL (ROCALTROL) 0.5 MCG capsule TAKE 1 CAPSULE BY MOUTH DAILY 6/14/22   Shara Burroughs MD   cyclobenzaprine (FLEXERIL) 5 MG tablet Take 1 tablet by mouth nightly as needed for Muscle spasms 6/8/22   Eliza Velasco MD   diclofenac sodium (VOLTAREN) 1 % GEL Apply 4 g topically 4 times daily  Patient not taking: Reported on 6/14/2022 5/17/22   Eliza Velasco MD   ferrous sulfate (FE TABS 325) 325 (65 Fe) MG EC tablet Take 1 tablet by mouth daily (with breakfast)  Patient not taking: Reported on 6/8/2022 5/10/22   Eliza Bradley MD   apixaban (ELIQUIS) 5 MG TABS tablet Take 1 tablet by mouth 2 times daily 5/10/22   Eliza Bradley MD   gabapentin (NEURONTIN) 600 MG tablet Take 1 tablet by mouth daily for 90 days.  5/9/22 8/7/22  Eliza Bradley MD   atorvastatin (LIPITOR) 40 MG tablet Take 1 tablet by mouth daily 5/2/22   Eliza Bradley MD   metoprolol tartrate (LOPRESSOR) 25 MG tablet Take 0.5 tablets by mouth 2 times daily  Patient taking differently: Take 25 mg by mouth 2 times daily  4/14/22   Eliza Bradley MD   furosemide (LASIX) 20 MG tablet Take 1 tablet by mouth daily 4/9/22   BARBIE Alegria - NP   potassium citrate (UROCIT-K) 10 MEQ (1080 MG) extended release tablet Take 1 tablet by mouth daily 2/16/22   Eliza Bradley MD   allopurinol (ZYLOPRIM) 100 MG tablet Take 1 tablet by mouth daily 2/16/22   Eliza Bradley MD   rOPINIRole (REQUIP) 0.25 MG tablet Take 1 tablet by mouth nightly 2/16/22   Eliza Bradley MD   azelastine (ASTELIN) 0.1 % nasal spray 1 spray by Nasal route 2 times daily Use in each nostril as directed 10/13/21   Timbo Alyssia, DO   pantoprazole (PROTONIX) 40 MG tablet TAKE 1 TABLET DAILY 3/3/21   Timbo Alyssia, DO   Cholecalciferol (VITAMIN D3) 25 MCG (1000 UT) TABS Take 2 tablets by mouth daily 1/9/20   Unknown BARBIE uQinones - CNP   Magnesium Oxide 400 MG CAPS Take by mouth 2 times daily Takes once daily at [facility]    Historical Provider, MD       Future Appointments   Date Time Provider Mateo Weaver   9/28/2022  2:15 PM Eliza Bradley, [address] [place] [address]   10/11/2022  1:50 PM Omar Dow MD [facility] None

## 2022-07-14 ENCOUNTER — TELEPHONE (OUTPATIENT)
Dept: FAMILY MEDICINE CLINIC | Age: 77
End: 2022-07-14

## 2022-07-14 ENCOUNTER — CARE COORDINATION (OUTPATIENT)
Dept: CARE COORDINATION | Age: 77
End: 2022-07-14

## 2022-07-14 RX ORDER — ENOXAPARIN SODIUM 100 MG/ML
80 INJECTION SUBCUTANEOUS DAILY
Qty: 3 ML | Refills: 0 | Status: SHIPPED | OUTPATIENT
Start: 2022-07-24 | End: 2022-07-27

## 2022-07-14 NOTE — TELEPHONE ENCOUNTER
Spoke with patient and explained instructions for anticoagulation management. Patient verbalized understanding and had no further questions. Also sent patient Xhale message with instructions for reference.

## 2022-07-14 NOTE — TELEPHONE ENCOUNTER
Ca-op anticoagulation management  Procedure date: 7/26/22  Long term anticoagulant: Eliquis  Indication for anticoagulation: atrial fibrillation    Last dose of oral anticoagulant (Eliquis): 7/23/22  Start date for lovenox: 7/24/22 (1mg/kg BID or 100mg BID)  Hold lovenox on procedure date 7/26/22 prior to procedure, take after coming home  Resume oral anticoagulant on 7/27/22 if OK per dentist  Last date of lovenox 7/26/22      Lovenox prescription sent to pharmacy. Please notify patient.

## 2022-07-14 NOTE — TELEPHONE ENCOUNTER
Patient states she is having a dental extraction on 7/26 and they are needing to know medication management due to being on Eliquis.   Patient is seeing  [phone]

## 2022-07-17 DIAGNOSIS — E11.65 TYPE 2 DIABETES MELLITUS WITH HYPERGLYCEMIA, WITHOUT LONG-TERM CURRENT USE OF INSULIN (HCC): ICD-10-CM

## 2022-07-18 RX ORDER — GABAPENTIN 600 MG/1
600 TABLET ORAL DAILY
Qty: 90 TABLET | Refills: 0 | Status: SHIPPED | OUTPATIENT
Start: 2022-07-18 | End: 2022-10-16

## 2022-07-18 NOTE — TELEPHONE ENCOUNTER
Last visit: 6/8/22  Last Med refill: 5/9/22  Does patient have enough medication for 72 hours: Yes    Next Visit Date:  Future Appointments   Date Time Provider Mateo Peñai   9/28/2022  2:15 PM Eliza Dwyer MD [facility]   10/11/2022  1:50 PM Justen Amador MD [facility] None       Health Maintenance   Topic Date Due    Lipids  01/11/2022    Annual Wellness Visit (AWV)  07/02/2022    COVID-19 Vaccine (3 - Booster for Pfizer series) 02/16/2023 (Originally 9/27/2021)    DTaP/Tdap/Td vaccine (1 - Tdap) 03/08/2023 (Originally 2/2/1965)    Shingles vaccine (1 of 2) 03/08/2023 (Originally 2/2/1965)    Flu vaccine (1) 09/01/2022    Depression Monitoring  06/08/2023    DEXA (modify frequency per FRAX score)  Completed    Pneumococcal 65+ years Vaccine  Completed    Hepatitis C screen  Completed    Hepatitis A vaccine  Aged Out    Hib vaccine  Aged Out    Meningococcal (ACWY) vaccine  Aged Out       Hemoglobin A1C (%)   Date Value   03/09/2022 5.5   11/16/2021 5.7   01/11/2021 5.7             ( goal A1C is < 7)   Microalb/Crt.  Ratio (mcg/mg creat)   Date Value   01/11/2021 CANNOT BE CALCULATED     LDL Cholesterol (mg/dL)   Date Value   01/11/2021 85   03/03/2020 96       (goal LDL is <100)   AST (U/L)   Date Value   04/01/2022 11     ALT (U/L)   Date Value   04/01/2022 7     BUN (mg/dL)   Date Value   06/09/2022 38 (H)     BP Readings from Last 3 Encounters:   07/12/22 (!) 152/72   06/14/22 (!) 152/92   06/08/22 (!) 146/82          (goal 120/80)    All Future Testing planned in [facility]  Lab Frequency Next Occurrence   Ferritin Once 12/24/2021   Iron And TIBC Once 73/71/3603   Basic Metabolic Panel Once 83/96/4845   Vitamin D 25 Hydroxy Once 08/14/2022   Phosphorus Once 53/72/0266   Basic Metabolic Panel Once 62/78/4042   Magnesium Once 08/14/2022   CBC with Auto Differential Once 08/14/2022   PTH, Intact Once 08/14/2022   Creatinine, Random Urine Once 08/14/2022   Protein, urine, random Once 08/14/2022               Patient Active Problem List:     Dyslipidemia     DIONY treated with BiPAP     Fibromyalgia     CRI (chronic renal insufficiency)     OA (osteoarthritis)     DM (diabetes mellitus) ([address]     Kidney stone     Depression     Seasonal allergies     Diverticulosis     Erythropenia     Normocytic normochromic anemia     Mitral regurgitation - Mild to moderate     CKD (chronic kidney disease) stage 4, GFR 15-29 ml/min ([facility])     Gout     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 disease ([address]     Essential hypertension     Osteopenia     Morbid obesity due to excess calories ([facility])     Anxiety     Febrile illness     Acute serous otitis media of left ear     Secondary hyperparathyroidism ([address]     Acute kidney injury superimposed on CKD ([address]     New onset a-fib ([address] with Rapid ventricular response     New onset of congestive heart failure (HCC)     Lymphedema     GERD (gastroesophageal reflux disease)     Restless leg syndrome     Acute diastolic congestive heart failure ([facility])     Chronic bilateral low back pain without sciatica

## 2022-07-21 ENCOUNTER — CARE COORDINATION (OUTPATIENT)
Dept: CARE COORDINATION | Age: 77
End: 2022-07-21

## 2022-07-21 NOTE — CARE COORDINATION
Left VM message asking patient to call me back at [phone] to check on leg swelling, symptoms. Will attempt to contact again next week.     Future Appointments   Date Time Provider Mateo Weaver   9/28/2022  2:15 PM Eliza Montano MD [facility]   10/11/2022  1:50 PM Kylie Taylor MD [facility] None

## 2022-07-25 DIAGNOSIS — E11.65 TYPE 2 DIABETES MELLITUS WITH HYPERGLYCEMIA, WITHOUT LONG-TERM CURRENT USE OF INSULIN (HCC): ICD-10-CM

## 2022-07-25 RX ORDER — ZOLPIDEM TARTRATE 5 MG/1
TABLET ORAL
Qty: 30 TABLET | Refills: 0 | OUTPATIENT
Start: 2022-07-25

## 2022-07-25 RX ORDER — GABAPENTIN 600 MG/1
600 TABLET ORAL DAILY
Qty: 90 TABLET | Refills: 0 | OUTPATIENT
Start: 2022-07-25 | End: 2022-10-23

## 2022-07-29 ENCOUNTER — CARE COORDINATION (OUTPATIENT)
Dept: CARE COORDINATION | Age: 77
End: 2022-07-29

## 2022-07-29 NOTE — CARE COORDINATION
Declined  Other Therapy Services: Completed (Comment: Lymphedema clinic)  Zone Management Tools: In Process (Comment: CHF)          Goals Addressed    None         Prior to Admission medications    Medication Sig Start Date End Date Taking? Authorizing Provider   gabapentin (NEURONTIN) 600 MG tablet TAKE 1 TABLET BY MOUTH DAILY FOR 90 DAYS. 7/18/22 10/16/22  Eliza Olivares MD   zolpidem (AMBIEN) 5 MG tablet Take 5 mg by mouth nightly as needed for Sleep. Historical Provider, MD   furosemide (LASIX) 40 MG tablet Take 1 tablet by mouth daily 7/12/22   Shana Pineda MD   hydrALAZINE (APRESOLINE) 25 MG tablet Take 4 tablets by mouth 3 times daily 7/12/22   Shana Pineda MD   amiodarone (CORDARONE) 200 MG tablet Take 1 tablet by mouth daily 7/6/22   BARBIE Moseley NP   HYDROcodone-acetaminophen (NORCO) 5-325 MG per tablet TAKE 1 TABLET BY MOUTH EVERY 6 HOURS AS NEEDED FOR PAIN FOR UP TO 30 DAYS.  6/30/22 7/30/22  BARBIE Shaikh CNP   calcitRIOL (ROCALTROL) 0.5 MCG capsule TAKE 1 CAPSULE BY MOUTH DAILY 6/14/22   Shana Pineda MD   cyclobenzaprine (FLEXERIL) 5 MG tablet Take 1 tablet by mouth nightly as needed for Muscle spasms 6/8/22   Eliza Olivares MD   diclofenac sodium (VOLTAREN) 1 % GEL Apply 4 g topically 4 times daily  Patient not taking: Reported on 6/14/2022 5/17/22   Eliza Olivares MD   ferrous sulfate (FE TABS 325) 325 (65 Fe) MG EC tablet Take 1 tablet by mouth daily (with breakfast)  Patient not taking: Reported on 6/8/2022 5/10/22   Eliza Olivares MD   apixaban (ELIQUIS) 5 MG TABS tablet Take 1 tablet by mouth 2 times daily 5/10/22   Eliza Olivares MD   atorvastatin (LIPITOR) 40 MG tablet Take 1 tablet by mouth daily 5/2/22   Eliza Olivares MD   metoprolol tartrate (LOPRESSOR) 25 MG tablet Take 0.5 tablets by mouth 2 times daily  Patient taking differently: Take 25 mg by mouth 2 times daily  4/14/22   Eliza Olivares MD   potassium citrate (UROCIT-K) 10 MEQ (1080 MG) extended release tablet Take 1 tablet by mouth daily 2/16/22   Eliza Irizarry MD   allopurinol (ZYLOPRIM) 100 MG tablet Take 1 tablet by mouth daily 2/16/22   Eliza Irizarry MD   rOPINIRole (REQUIP) 0.25 MG tablet Take 1 tablet by mouth nightly 2/16/22   Eliza Irizarry MD   azelastine (ASTELIN) 0.1 % nasal spray 1 spray by Nasal route 2 times daily Use in each nostril as directed 10/13/21   Mahad Garay DO   pantoprazole (PROTONIX) 40 MG tablet TAKE 1 TABLET DAILY 3/3/21   Mahad Garay DO   Cholecalciferol (VITAMIN D3) 25 MCG (1000 UT) TABS Take 2 tablets by mouth daily 1/9/20   BARBIE Templeton - CNP   Magnesium Oxide 400 MG CAPS Take by mouth 2 times daily Takes [facility] daily at [facility]    Historical Provider, MD       Future Appointments   Date Time Provider Mateo Weaver   9/28/2022  2:15 PM Eliza Irizarry MD [facility]   10/11/2022  1:50 PM Rodri Torres MD [facility] [place] None

## 2022-08-01 DIAGNOSIS — G25.81 RESTLESS LEG SYNDROME: ICD-10-CM

## 2022-08-01 NOTE — TELEPHONE ENCOUNTER
08/14/2022               Patient Active Problem List:     Dyslipidemia     DIONY treated with BiPAP     Fibromyalgia     CRI (chronic renal insufficiency)     OA (osteoarthritis)     DM (diabetes mellitus) ([address]     Kidney stone     Depression     Seasonal allergies     Diverticulosis     Erythropenia     Normocytic normochromic anemia     Mitral regurgitation - Mild to moderate     CKD (chronic kidney disease) stage 4, GFR 15-29 ml/min ([facility])     Gout     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 disease ([address]     Essential hypertension     Osteopenia     Morbid obesity due to excess calories ([facility])     Anxiety     Febrile illness     Acute serous otitis media of left ear     Secondary hyperparathyroidism ([address]     Acute kidney injury superimposed on CKD ([address]     New onset a-fib ([address] with Rapid ventricular response     New onset of congestive heart failure (HCC)     Lymphedema     GERD (gastroesophageal reflux disease)     Restless leg syndrome     Acute diastolic congestive heart failure ([facility])     Chronic bilateral low back pain without sciatica

## 2022-08-02 RX ORDER — ROPINIROLE 0.25 MG/1
TABLET, FILM COATED ORAL
Qty: 90 TABLET | Refills: 1 | Status: SHIPPED | OUTPATIENT
Start: 2022-08-02

## 2022-08-03 ENCOUNTER — CLINICAL DOCUMENTATION (OUTPATIENT)
Dept: OCCUPATIONAL THERAPY | Age: 77
End: 2022-08-03

## 2022-08-03 NOTE — DISCHARGE SUMMARY
TREATMENT LOCATION:   [x] [facility] [address] [place] [address] [place]: [phone]  F: [phone] [] [address]: [phone]  F: [phone]     Lymphedema Therapy Discharge Note    Date: 8/3/2022      Patient: Nita Cordero  : 1945  MRN: [id number]    Referring Physician: Rona Cordero MD       Phone: [phone]               Fax: [phone]  Insurance: Arleen Palomares ([id number])/ Medicare (ID: [id number]) -  PT/OT/JOHNNY PADILLA, no co-pay  Medical Diagnosis: R60.9 - peripheral edema  Rehab Codes: I89.0,          Total visits attended: 7        Cancels/No shows: 1  Date of initial visit: 3/28/22                Date of final visit: 22      Subjective:  Refer to initial evaluation/ treatment notes. Objective:  Refer to initial evaluation/treatment notes. Assessment:  Refer to initial evaluation/ treatment notes. Treatment to Date:  [] Therapeutic Exercise           [x] Instruction in Home Program                       [x] Manual Therapy  [x] Self-Care/Home Management  [x] Vasopneumatic Pump             [] Other:    Discharge Status:   [] Treatment goals were met. [x] Pt received maximum benefit. No further therapy indicated at this time. [x] Pt to continue exercise/home instructions independently  [x] Pt has not called or returned to clinic in over 90 days with additional concerns. Comments:        Lymphedema Life Impact Scale:  [] [place] 63% functionally impaired  [x] D/C  - unable to collect                      Electronically signed by Michaell Epley, OT on 8/3/2022 at 12:49 PM    The patient is being discharged due to the status listed above. If patient would like to return to the clinic for additional therapy a new referral will be necessary. Thank you again for your referral and allowing us to assist in the care of this patient!       For any questions or concerns, please don't hesitate to call us at

## 2022-08-09 DIAGNOSIS — F51.01 PRIMARY INSOMNIA: ICD-10-CM

## 2022-08-09 RX ORDER — ALPRAZOLAM 0.5 MG/1
TABLET ORAL
Qty: 30 TABLET | Refills: 1 | Status: SHIPPED | OUTPATIENT
Start: 2022-08-09 | End: 2022-10-08

## 2022-08-09 NOTE — TELEPHONE ENCOUNTER
Last visit: 6/8/22  Last Med refill: 5/2/22  Does patient have enough medication for 72 hours: No:     Next Visit Date:  Future Appointments   Date Time Provider Mateo Meg   9/28/2022  2:15 PM Arti Phylliss Dancer, MD [id number]   10/11/2022  1:50 PM Justen Dykes MD [facility] None       Health Maintenance   Topic Date Due    Lipids  01/11/2022    Annual Wellness Visit (AWV)  07/02/2022    COVID-19 Vaccine (3 - Booster for Pfizer series) 02/16/2023 (Originally 9/27/2021)    DTaP/Tdap/Td vaccine (1 - Tdap) 03/08/2023 (Originally [birth year]2/1964)    Shingles vaccine (1 of 2) 03/08/2023 (Originally 2/2/1965)    Flu vaccine (1) 09/01/2022    Depression Monitoring  06/08/2023    DEXA (modify frequency per FRAX score)  Completed    Pneumococcal 65+ years Vaccine  Completed    Hepatitis C screen  Completed    Hepatitis A vaccine  Aged Out    Hib vaccine  Aged Out    Meningococcal (ACWY) vaccine  Aged Out       Hemoglobin A1C (%)   Date Value   03/09/2022 5.5   11/16/2021 5.7   01/11/2021 5.7             ( goal A1C is < 7)   Microalb/Crt.  Ratio (mcg/mg creat)   Date Value   01/11/2021 CANNOT BE CALCULATED     LDL Cholesterol (mg/dL)   Date Value   01/11/2021 85   03/03/2020 96       (goal LDL is <100)   AST (U/L)   Date Value   04/01/2022 11     ALT (U/L)   Date Value   04/01/2022 7     BUN (mg/dL)   Date Value   06/09/2022 38 (H)     BP Readings from Last 3 Encounters:   07/12/22 (!) 152/72   06/14/22 (!) 152/92   06/08/22 (!) 146/82          (goal 120/80)    All Future Testing planned in [facility]  Lab Frequency Next Occurrence   Ferritin Once 12/24/2021   Iron And TIBC Once 73/16/9307   Basic Metabolic Panel Once 48/99/2618   Vitamin D 25 Hydroxy Once 08/14/2022   Phosphorus Once 55/56/9627   Basic Metabolic Panel Once 52/83/0224   Magnesium Once 08/14/2022   CBC with Auto Differential Once 08/14/2022   PTH, Intact Once 08/14/2022   Creatinine, Random Urine Once 08/14/2022   Protein, urine, random Once 08/14/2022               Patient Active Problem List:     Dyslipidemia     DIONY treated with BiPAP     Fibromyalgia     CRI (chronic renal insufficiency)     OA (osteoarthritis)     DM (diabetes mellitus) ([address]     Kidney stone     Depression     Seasonal allergies     Diverticulosis     Erythropenia     Normocytic normochromic anemia     Mitral regurgitation - Mild to moderate     CKD (chronic kidney disease) stage 4, GFR 15-29 ml/min ([facility])     Gout     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 disease ([address]     Essential hypertension     Osteopenia     Morbid obesity due to excess calories ([facility])     Anxiety     Febrile illness     Acute serous otitis media of left ear     Secondary hyperparathyroidism ([address]     Acute kidney injury superimposed on CKD ([address]     New onset a-fib ([address] with Rapid ventricular response     New onset of congestive heart failure (HCC)     Lymphedema     GERD (gastroesophageal reflux disease)     Restless leg syndrome     Acute diastolic congestive heart failure ([facility])     Chronic bilateral low back pain without sciatica

## 2022-08-29 ENCOUNTER — CARE COORDINATION (OUTPATIENT)
Dept: CARE COORDINATION | Age: 77
End: 2022-08-29

## 2022-08-29 NOTE — CARE COORDINATION
Ambulatory Care Coordination Note  8/29/2022    ACC: Tanvi Canada RN    Summary Note: She is wearing her compression stockings beneath her sleeves. Swelling the same, doesn't seem to change. No blisters or open areas or signs of infection. CHF- weight stable at 253. Dyspnea with exertion, no worsening. Still with fatigue. She is wearing bipap sometimes. Did not make appt with pulmonologist  Dr. Nalini Shafer. Saw ortho for knee pain, no new treatments prescribed. Has new patient appt [facility] hematologist at [facility] 9/7 for anemia. Is eating ok since had some teeth extracted a month ago. Reviewed upcoming appts: PCP next month and nephrologist in Oct.  Denied any needs or any new symptoms. Graduated from care management, encouraged her to call me for future concerns.   Congestive Heart Failure Assessment    Are you currently restricting fluids?: No Restriction  Do you understand a low sodium diet?: Yes  Do you understand how to read food labels?: Yes  How many restaurant meals do you eat per week?: 0  Do you salt your food before tasting it?: No         Symptoms:  CHF associated dyspnea on exertion: Pos, CHF associated leg swelling: Pos      Symptom course: no change  Patient-reported weight (lb): 253  Weight trend: stable  Salt intake watch compared to last visit: stable      and   General Assessment    Do you have any symptoms that are causing concern?: Yes  Progression since Onset: Intermittent - Waxing/Waning  Reported Symptoms: Fatigue           Lab Results       None            Care Coordination Interventions    Referral from Primary Care Provider: No  Suggested Interventions and [address]: Completed (Comment: [place])  Medi Set or Pill Pack: Declined  Other Therapy Services: Completed (Comment: Lymphedema clinic)  Zone Management Tools: Completed (Comment: CHF)          Goals Addressed                   This Visit's Progress     Conditions and Symptoms   On track     I will schedule office visits, as directed by my provider. I will keep my appointment or reschedule if I have to cancel. I will notify my provider of any barriers to my plan of care. I will follow my Zone Management tool to seek urgent or emergent care. I will notify my provider of any symptoms that indicate a worsening of my condition. CHF, lymphedema    Barriers: lack of education  Plan for overcoming my barriers: ACM calls, education, home health visits, lymphedema clinic  Confidence: 9/10  Anticipated Goal Completion Date: 8/10/22                Prior to Admission medications    Medication Sig Start Date End Date Taking? Authorizing Provider   ALPRAZolam Refugio Ear) 0.5 MG tablet TAKE 1 TABLET BY MOUTH NIGHTLY AS NEEDED FOR SLEEP FOR UP TO 30 DAYS. 8/9/22 10/8/22  Eliza Butelr MD   rOPINIRole (REQUIP) 0.25 MG tablet TAKE 1 TABLET NIGHTLY 8/2/22   Eliza Butler MD   gabapentin (NEURONTIN) 600 MG tablet TAKE 1 TABLET BY MOUTH DAILY FOR 90 DAYS. 7/18/22 10/16/22  Eliza Butler MD   zolpidem (AMBIEN) 5 MG tablet Take 5 mg by mouth nightly as needed for Sleep.     Historical Provider, MD   furosemide (LASIX) 40 MG tablet Take 1 tablet by mouth daily 7/12/22   Antonella Torres MD   hydrALAZINE (APRESOLINE) 25 MG tablet Take 4 tablets by mouth 3 times daily 7/12/22   Antonella Torres MD   amiodarone (CORDARONE) 200 MG tablet Take 1 tablet by mouth daily 7/6/22   BARBIE Lazcano NP   calcitRIOL (ROCALTROL) 0.5 MCG capsule TAKE 1 CAPSULE BY MOUTH DAILY 6/14/22   Antonella Torres MD   cyclobenzaprine (FLEXERIL) 5 MG tablet Take 1 tablet by mouth nightly as needed for Muscle spasms 6/8/22   Eliza Butler MD   diclofenac sodium (VOLTAREN) 1 % GEL Apply 4 g topically 4 times daily  Patient not taking: Reported on 6/14/2022 5/17/22   Eliza Butler MD   ferrous sulfate (FE TABS 325) 325 (65 Fe) MG EC tablet Take 1 tablet by mouth daily (with breakfast)  Patient not taking: Reported on 6/8/2022 5/10/22   Eliza Caitlin Torres MD   apixaban (ELIQUIS) 5 MG TABS tablet Take 1 tablet by mouth 2 times daily 5/10/22   Eliza Torres MD   atorvastatin (LIPITOR) 40 MG tablet Take 1 tablet by mouth daily 5/2/22   Eliza Torres MD   metoprolol tartrate (LOPRESSOR) 25 MG tablet Take 0.5 tablets by mouth 2 times daily  Patient taking differently: Take 25 mg by mouth 2 times daily  4/14/22   Eliza Torres MD   potassium citrate (UROCIT-K) 10 MEQ (1080 MG) extended release tablet Take 1 tablet by mouth daily 2/16/22   Eliza Torres MD   allopurinol (ZYLOPRIM) 100 MG tablet Take 1 tablet by mouth daily 2/16/22   Eliza Torres MD   azelastine (ASTELIN) 0.1 % nasal spray 1 spray by Nasal route 2 times daily Use in each nostril as directed 10/13/21   Chip Case, DO   pantoprazole (PROTONIX) 40 MG tablet TAKE 1 TABLET DAILY 3/3/21   Chip Case, DO   Cholecalciferol (VITAMIN D3) 25 MCG (1000 UT) TABS Take 2 tablets by mouth daily 1/9/20   BARBIE Cody - CNP   Magnesium Oxide 400 MG CAPS Take by mouth 2 times daily Takes [facility] daily at [facility]    Historical Provider, MD       Future Appointments   Date Time Provider Mateo eWaver   9/28/2022  2:15 PM Eliza Torres, [address] [place] [address]   10/11/2022  1:50 PM Ana Doe MD [facility] None

## 2022-09-14 ENCOUNTER — OFFICE VISIT (OUTPATIENT)
Dept: FAMILY MEDICINE CLINIC | Age: 77
End: 2022-09-14
Payer: MEDICARE

## 2022-09-14 VITALS
RESPIRATION RATE: 20 BRPM | WEIGHT: 265 LBS | HEART RATE: 58 BPM | BODY MASS INDEX: 44.15 KG/M2 | DIASTOLIC BLOOD PRESSURE: 76 MMHG | OXYGEN SATURATION: 97 % | HEIGHT: 65 IN | SYSTOLIC BLOOD PRESSURE: 140 MMHG

## 2022-09-14 DIAGNOSIS — M25.552 CHRONIC PAIN OF BOTH HIPS: ICD-10-CM

## 2022-09-14 DIAGNOSIS — R20.2 PARESTHESIA OF BOTH LEGS: ICD-10-CM

## 2022-09-14 DIAGNOSIS — M25.551 CHRONIC PAIN OF BOTH HIPS: ICD-10-CM

## 2022-09-14 DIAGNOSIS — G89.29 CHRONIC PAIN OF BOTH HIPS: ICD-10-CM

## 2022-09-14 DIAGNOSIS — M54.50 CHRONIC BILATERAL LOW BACK PAIN WITHOUT SCIATICA: Primary | ICD-10-CM

## 2022-09-14 DIAGNOSIS — I89.0 LYMPHEDEMA: ICD-10-CM

## 2022-09-14 DIAGNOSIS — G89.29 CHRONIC BILATERAL LOW BACK PAIN WITHOUT SCIATICA: Primary | ICD-10-CM

## 2022-09-14 PROCEDURE — 99214 OFFICE O/P EST MOD 30 MIN: CPT | Performed by: NURSE PRACTITIONER

## 2022-09-14 PROCEDURE — 1036F TOBACCO NON-USER: CPT | Performed by: NURSE PRACTITIONER

## 2022-09-14 PROCEDURE — G8427 DOCREV CUR MEDS BY ELIG CLIN: HCPCS | Performed by: NURSE PRACTITIONER

## 2022-09-14 PROCEDURE — 1090F PRES/ABSN URINE INCON ASSESS: CPT | Performed by: NURSE PRACTITIONER

## 2022-09-14 PROCEDURE — G8400 PT W/DXA NO RESULTS DOC: HCPCS | Performed by: NURSE PRACTITIONER

## 2022-09-14 PROCEDURE — G8417 CALC BMI ABV UP PARAM F/U: HCPCS | Performed by: NURSE PRACTITIONER

## 2022-09-14 PROCEDURE — 1123F ACP DISCUSS/DSCN MKR DOCD: CPT | Performed by: NURSE PRACTITIONER

## 2022-09-14 SDOH — ECONOMIC STABILITY: FOOD INSECURITY: WITHIN THE PAST 12 MONTHS, YOU WORRIED THAT YOUR FOOD WOULD RUN OUT BEFORE YOU GOT MONEY TO BUY MORE.: NEVER TRUE

## 2022-09-14 SDOH — ECONOMIC STABILITY: FOOD INSECURITY: WITHIN THE PAST 12 MONTHS, THE FOOD YOU BOUGHT JUST DIDN'T LAST AND YOU DIDN'T HAVE MONEY TO GET MORE.: NEVER TRUE

## 2022-09-14 ASSESSMENT — SOCIAL DETERMINANTS OF HEALTH (SDOH): HOW HARD IS IT FOR YOU TO PAY FOR THE VERY BASICS LIKE FOOD, HOUSING, MEDICAL CARE, AND HEATING?: NOT HARD AT ALL

## 2022-09-14 NOTE — PROGRESS NOTES
Felicia Muniz (:  1945) is a 69 y.o. female,Established patient, here for evaluation of the following chief complaint(s):  Generalized Body Aches (Pt states \"the pain in my body\", been going on since April. Pt states it starts as a dull ache and goes all the way to high. ), Numbness (Bilateral toe numbness. /), and Health Maintenance (2022-AWV scheduled. /POA/Living will-has both-will bring in to next appointment. )         ASSESSMENT/PLAN:  1. Chronic bilateral low back pain without sciatica  Comments: Follow up pending results. Orders:  -     XR LUMBAR SPINE (2-3 VIEWS); Future  2. Chronic pain of both hips  Comments: Follow up pending results. Orders:  -     XR HIP BILATERAL W AP PELVIS (2 VIEWS); Future  3. Paresthesia of both legs  Comments: Follow up pending results. Orders:  -     EMG; Future  4. Lymphedema  Comments:  PDMP reviewed. Orders:  -     HYDROcodone-acetaminophen (NORCO) 5-325 MG per tablet; Take 1 tablet by mouth every 6 hours as needed for Pain for up to 30 days. , Disp-20 tablet, R-0Normal    Return for As scheduled 22. Subjective   SUBJECTIVE/OBJECTIVE:  Presents for acute visit with several concerns    Not wearing lymphedema stockings - both feet are swollen   Reports scale at home was 262lbs     Reports she wakes up eery morning feeling pretty good  Pain typically starts mid thighs on both legs and radiates just down to knee   Did follow up with ortho and she did bring trying THC gummies for pain relief   Chronic pain in lower back that radiates into right hip   Reports numbness in bilateral feet, worse in right foot on digits 3-4. Reports she noticed it after recent hospitalization for cellulitis. Requesting Rx for norco today - current medication contract for 20 tabs a month.    PDMP shows fill for 20 tabs on 22 for norco 7.5-325 prescribed by DDS   Interested in getting medical marijuana card     Did have xrays ordered of hips and lower back in 2019 through previous PCP - did not get completed     Left sided chest pain on Sunday, felt like a bruise when she touched it  Did not go to the ER   Declines further work up of this at this time    Denies any other problems/concerns at this time         Review of Systems   Constitutional:  Positive for activity change (due to pain). Respiratory:  Negative for chest tightness, shortness of breath and wheezing. Cardiovascular:  Positive for leg swelling. Negative for palpitations. Musculoskeletal:  Positive for back pain. Negative for neck stiffness. Objective   Physical Exam  Vitals and nursing note reviewed. Constitutional:       Appearance: Normal appearance. She is obese. HENT:      Head: Normocephalic. Right Ear: Hearing normal.      Left Ear: Hearing normal.      Nose: Nose normal.      Mouth/Throat:      Mouth: Mucous membranes are moist.      Pharynx: Oropharynx is clear. Eyes:      Extraocular Movements: Extraocular movements intact. Conjunctiva/sclera: Conjunctivae normal.      Pupils: Pupils are equal, round, and reactive to light. Cardiovascular:      Rate and Rhythm: Normal rate and regular rhythm. Pulses: Normal pulses. Heart sounds: Normal heart sounds. Pulmonary:      Effort: Pulmonary effort is normal.      Breath sounds: Normal breath sounds. Abdominal:      General: Abdomen is flat. Bowel sounds are normal.      Palpations: Abdomen is soft. Musculoskeletal:         General: Normal range of motion. Cervical back: Normal range of motion. Right lower leg: 3+ Edema present. Left lower leg: 3+ Edema present. Comments: Significant lymphedema noted to bilateral lower extremities    Gait slow, required assistance of wall to stand up from chair    Skin:     General: Skin is warm and dry. Capillary Refill: Capillary refill takes less than 2 seconds. Neurological:      General: No focal deficit present.       Mental Status: She is alert and oriented to person, place, and time. Mental status is at baseline. Psychiatric:         Mood and Affect: Mood normal.         Behavior: Behavior normal.         Thought Content: Thought content normal.         Judgment: Judgment normal.              Wt Readings from Last 3 Encounters:   09/14/22 265 lb (120.2 kg)   07/12/22 252 lb (114.3 kg)   06/14/22 250 lb (113.4 kg)     Temp Readings from Last 3 Encounters:   06/08/22 (!) 96.4 °F (35.8 °C)   04/14/22 97.4 °F (36.3 °C)   04/07/22 97.7 °F (36.5 °C) (Oral)     BP Readings from Last 3 Encounters:   09/14/22 (!) 140/76   07/12/22 (!) 152/72   06/14/22 (!) 152/92     Pulse Readings from Last 3 Encounters:   09/14/22 58   07/12/22 62   06/14/22 60           An electronic signature was used to authenticate this note.     --Boom Choudhary, BARBIE - NP

## 2022-09-16 ENCOUNTER — CARE COORDINATION (OUTPATIENT)
Dept: CARE COORDINATION | Age: 77
End: 2022-09-16

## 2022-09-16 ENCOUNTER — TELEPHONE (OUTPATIENT)
Dept: FAMILY MEDICINE CLINIC | Age: 77
End: 2022-09-16

## 2022-09-16 RX ORDER — HYDROCODONE BITARTRATE AND ACETAMINOPHEN 5; 325 MG/1; MG/1
1 TABLET ORAL EVERY 6 HOURS PRN
Qty: 20 TABLET | Refills: 0 | Status: SHIPPED | OUTPATIENT
Start: 2022-09-16 | End: 2022-10-16

## 2022-09-16 ASSESSMENT — ENCOUNTER SYMPTOMS
CHEST TIGHTNESS: 0
BACK PAIN: 1
SHORTNESS OF BREATH: 0
WHEEZING: 0

## 2022-09-16 NOTE — TELEPHONE ENCOUNTER
----- Message from BARBIE Moseley NP sent at 9/16/2022  9:01 AM EDT -----  I had time to finish reviewing her chart  Looks like med contract is for 20 norco, I will go ahead and send that in. Needs to schedule 3 month follow up for controlled meds.

## 2022-09-19 NOTE — CARE COORDINATION
Called to discuss [facility]. She is declining, stated doesn't want any calls will just call providers on her own or call writer if has any needs/concerns. Next appt with Dr. Josue Muller is October. Weight stable, was 261 today. Main concern still the pain in her legs, will get EMG. Sees podiatrist this week. Continues to wear the compression stockings beneath lymphedema sleeve.     Future Appointments   Date Time Provider Mateo Weaver   10/11/2022  1:50 PM Nilsa Delgado MD [facility] None   12/7/2022  3:30 PM [facility] [place] [id number]

## 2022-09-21 DIAGNOSIS — E79.0 HYPERURICEMIA: ICD-10-CM

## 2022-09-21 RX ORDER — ALLOPURINOL 100 MG/1
TABLET ORAL
Qty: 90 TABLET | Refills: 1 | Status: SHIPPED | OUTPATIENT
Start: 2022-09-21

## 2022-09-21 NOTE — TELEPHONE ENCOUNTER
XR LUMBAR SPINE (2-3 VIEWS) Once 09/14/2022   XR HIP BILATERAL W AP PELVIS (2 VIEWS) Once 09/14/2022   EMG Once 09/14/2022               Patient Active Problem List:     Dyslipidemia     DIONY treated with BiPAP     Fibromyalgia     CRI (chronic renal insufficiency)     OA (osteoarthritis)     DM (diabetes mellitus) ([address]     Kidney stone     Depression     Seasonal allergies     Diverticulosis     Erythropenia     Normocytic normochromic anemia     Mitral regurgitation - Mild to moderate     CKD (chronic kidney disease) stage 4, GFR 15-29 ml/min ([facility])     Gout     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 disease ([address]     Essential hypertension     Osteopenia     Morbid obesity due to excess calories ([facility])     Anxiety     Febrile illness     Acute serous otitis media of left ear     Secondary hyperparathyroidism ([address]     Acute kidney injury superimposed on CKD ([address]     New onset a-fib ([address] with Rapid ventricular response     New onset of congestive heart failure (HCC)     Lymphedema     GERD (gastroesophageal reflux disease)     Restless leg syndrome     Acute diastolic congestive heart failure (HCC)     Chronic bilateral low back pain without sciatica

## 2022-10-02 DIAGNOSIS — R25.2 LEG CRAMPS: ICD-10-CM

## 2022-10-03 RX ORDER — CYCLOBENZAPRINE HCL 5 MG
5 TABLET ORAL NIGHTLY PRN
Qty: 30 TABLET | Refills: 1 | Status: SHIPPED | OUTPATIENT
Start: 2022-10-03

## 2022-10-03 NOTE — TELEPHONE ENCOUNTER
Last visit: 9/14/22  Last Med refill: 6/8/22  Does patient have enough medication for 72 hours: No:     Next Visit Date:  Future Appointments   Date Time Provider Mateo Weaver   10/11/2022  1:50 PM Viv Sheldon MD [facility] None   12/7/2022  3:30 PM [facility] EMG  [facility]   12/7/2022  3:30 PM Haley Paredes MD [facility] Maintenance   Topic Date Due    Lipids  01/11/2022    Annual Wellness Visit (AWV)  07/02/2022    Flu vaccine (1) 08/01/2022    COVID-19 Vaccine (3 - Booster for Pfizer series) 02/16/2023 (Originally 9/27/2021)    DTaP/Tdap/Td vaccine (1 - Tdap) 03/08/2023 (Originally 2/2/1965)    Shingles vaccine (1 of 2) 03/08/2023 (Originally 2/2/1965)    Depression Monitoring  06/08/2023    DEXA (modify frequency per FRAX score)  Completed    Pneumococcal 65+ years Vaccine  Completed    Hepatitis C screen  Completed    Hepatitis A vaccine  Aged Out    Hib vaccine  Aged Out    Meningococcal (ACWY) vaccine  Aged Out       Hemoglobin A1C (%)   Date Value   03/09/2022 5.5   11/16/2021 5.7   01/11/2021 5.7             ( goal A1C is < 7)   Microalb/Crt.  Ratio (mcg/mg creat)   Date Value   01/11/2021 CANNOT BE CALCULATED     LDL Cholesterol (mg/dL)   Date Value   01/11/2021 85   03/03/2020 96       (goal LDL is <100)   AST (U/L)   Date Value   04/01/2022 11     ALT (U/L)   Date Value   04/01/2022 7     BUN (mg/dL)   Date Value   06/09/2022 38 (H)     BP Readings from Last 3 Encounters:   09/14/22 (!) 140/76   07/12/22 (!) 152/72   06/14/22 (!) 152/92          (goal 120/80)    All Future Testing planned in [facility]  Lab Frequency Next Occurrence   Ferritin Once 12/24/2021   Iron And TIBC Once 06/38/2749   Basic Metabolic Panel Once 37/84/0505   Vitamin D 25 Hydroxy Once 08/14/2022   Phosphorus Once 24/09/6211   Basic Metabolic Panel Once 37/93/1850   Magnesium Once 08/14/2022   CBC with Auto Differential Once 08/14/2022   PTH, Intact Once 08/14/2022   Creatinine, Random Urine Once 08/14/2022   Protein, urine, random Once 08/14/2022   XR LUMBAR SPINE (2-3 VIEWS) Once 09/14/2022   XR HIP BILATERAL W AP PELVIS (2 VIEWS) Once 09/14/2022   EMG Once 09/14/2022               Patient Active Problem List:     Dyslipidemia     DIONY treated with BiPAP     Fibromyalgia     CRI (chronic renal insufficiency)     OA (osteoarthritis)     DM (diabetes mellitus) ([address]     Kidney stone     Depression     Seasonal allergies     Diverticulosis     Erythropenia     Normocytic normochromic anemia     Mitral regurgitation - Mild to moderate     CKD (chronic kidney disease) stage 4, GFR 15-29 ml/min ([facility])     Gout     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 disease ([address]     Essential hypertension     Osteopenia     Morbid obesity due to excess calories ([facility])     Anxiety     Febrile illness     Acute serous otitis media of left ear     Secondary hyperparathyroidism ([address]     Acute kidney injury superimposed on CKD ([address]     New onset a-fib ([address] with Rapid ventricular response     New onset of congestive heart failure (HCC)     Lymphedema     GERD (gastroesophageal reflux disease)     Restless leg syndrome     Acute diastolic congestive heart failure (HCC)     Chronic bilateral low back pain without sciatica

## 2022-10-04 ENCOUNTER — HOSPITAL ENCOUNTER (OUTPATIENT)
Dept: GENERAL RADIOLOGY | Facility: CLINIC | Age: 77
Discharge: HOME | End: 2022-10-06
Payer: MEDICARE

## 2022-10-04 ENCOUNTER — HOSPITAL ENCOUNTER (OUTPATIENT)
Facility: CLINIC | Age: 77
Discharge: HOME | End: 2022-10-06
Payer: MEDICARE

## 2022-10-04 ENCOUNTER — HOSPITAL ENCOUNTER (OUTPATIENT)
Age: 77
Setting detail: SPECIMEN
Discharge: HOME | End: 2022-10-04

## 2022-10-04 DIAGNOSIS — E11.22 TYPE 2 DIABETES MELLITUS WITH STAGE 4 CHRONIC KIDNEY DISEASE, UNSPECIFIED WHETHER LONG TERM INSULIN USE (HCC): ICD-10-CM

## 2022-10-04 DIAGNOSIS — M25.552 CHRONIC PAIN OF BOTH HIPS: ICD-10-CM

## 2022-10-04 DIAGNOSIS — G89.29 CHRONIC BILATERAL LOW BACK PAIN WITHOUT SCIATICA: ICD-10-CM

## 2022-10-04 DIAGNOSIS — G89.29 CHRONIC PAIN OF BOTH HIPS: ICD-10-CM

## 2022-10-04 DIAGNOSIS — E78.5 DYSLIPIDEMIA: ICD-10-CM

## 2022-10-04 DIAGNOSIS — M25.551 CHRONIC PAIN OF BOTH HIPS: ICD-10-CM

## 2022-10-04 DIAGNOSIS — N18.4 TYPE 2 DIABETES MELLITUS WITH STAGE 4 CHRONIC KIDNEY DISEASE, UNSPECIFIED WHETHER LONG TERM INSULIN USE (HCC): ICD-10-CM

## 2022-10-04 DIAGNOSIS — I10 ESSENTIAL HYPERTENSION: ICD-10-CM

## 2022-10-04 DIAGNOSIS — M54.50 CHRONIC BILATERAL LOW BACK PAIN WITHOUT SCIATICA: ICD-10-CM

## 2022-10-04 DIAGNOSIS — N25.81 SECONDARY HYPERPARATHYROIDISM (HCC): ICD-10-CM

## 2022-10-04 LAB
ABSOLUTE EOS #: 0.38 K/UL (ref 0–0.44)
ABSOLUTE IMMATURE GRANULOCYTE: 0.08 K/UL (ref 0–0.3)
ABSOLUTE LYMPH #: 2.28 K/UL (ref 1.1–3.7)
ABSOLUTE MONO #: 0.76 K/UL (ref 0.1–1.2)
ANION GAP SERPL CALCULATED.3IONS-SCNC: 15 MMOL/L (ref 9–17)
BASOPHILS # BLD: 1 % (ref 0–2)
BASOPHILS ABSOLUTE: 0.07 K/UL (ref 0–0.2)
BUN BLDV-MCNC: 37 MG/DL (ref 8–23)
CALCIUM SERPL-MCNC: 10.1 MG/DL (ref 8.6–10.4)
CHLORIDE BLD-SCNC: 108 MMOL/L (ref 98–107)
CO2: 21 MMOL/L (ref 20–31)
CREAT SERPL-MCNC: 3.85 MG/DL (ref 0.5–0.9)
CREATININE URINE: 74.3 MG/DL (ref 28–217)
EOSINOPHILS RELATIVE PERCENT: 4 % (ref 1–4)
GFR SERPL CREATININE-BSD FRML MDRD: 12 ML/MIN/1.73M2
GLUCOSE BLD-MCNC: 135 MG/DL (ref 70–99)
HCT VFR BLD CALC: 32 % (ref 36.3–47.1)
HEMOGLOBIN: 9.6 G/DL (ref 11.9–15.1)
IMMATURE GRANULOCYTES: 1 %
LYMPHOCYTES # BLD: 21 % (ref 24–43)
MAGNESIUM: 1.9 MG/DL (ref 1.6–2.6)
MCH RBC QN AUTO: 29.8 PG (ref 25.2–33.5)
MCHC RBC AUTO-ENTMCNC: 30 G/DL (ref 28.4–34.8)
MCV RBC AUTO: 99.4 FL (ref 82.6–102.9)
MONOCYTES # BLD: 7 % (ref 3–12)
NRBC AUTOMATED: 0 PER 100 WBC
PDW BLD-RTO: 16.7 % (ref 11.8–14.4)
PHOSPHORUS: 4.1 MG/DL (ref 2.6–4.5)
PLATELET # BLD: 311 K/UL (ref 138–453)
PMV BLD AUTO: 9.9 FL (ref 8.1–13.5)
POTASSIUM SERPL-SCNC: 4.5 MMOL/L (ref 3.7–5.3)
PTH INTACT: 105.5 PG/ML (ref 14–72)
RBC # BLD: 3.22 M/UL (ref 3.95–5.11)
RBC # BLD: ABNORMAL 10*6/UL
SEG NEUTROPHILS: 66 % (ref 36–65)
SEGMENTED NEUTROPHILS ABSOLUTE COUNT: 7.3 K/UL (ref 1.5–8.1)
SODIUM BLD-SCNC: 144 MMOL/L (ref 135–144)
TOTAL PROTEIN, URINE: 47 MG/DL
VITAMIN D 25-HYDROXY: 42.1 NG/ML
WBC # BLD: 10.9 K/UL (ref 3.5–11.3)

## 2022-10-04 PROCEDURE — 72100 X-RAY EXAM L-S SPINE 2/3 VWS: CPT

## 2022-10-04 PROCEDURE — 73521 X-RAY EXAM HIPS BI 2 VIEWS: CPT

## 2022-10-06 ENCOUNTER — TELEPHONE (OUTPATIENT)
Dept: FAMILY MEDICINE CLINIC | Age: 77
End: 2022-10-06

## 2022-10-06 NOTE — TELEPHONE ENCOUNTER
Patient is requesting a script for Meloxicam due to having severe back and hip pain. She states she had imaging done and due to laying on back and hip she can barley walk because of the pain.  She has Flexeril but it is not helping      PATIENT IS REQUESTING A CALL BACK    [phone]

## 2022-10-06 NOTE — TELEPHONE ENCOUNTER
Meloxicam is not a good idea for her due to her kidney function. She can try the topical Voltaren gel or she can try a Medrol Dosepak instead.

## 2022-10-07 DIAGNOSIS — I70.0 CALCIFICATION OF ABDOMINAL AORTA (HCC): Primary | ICD-10-CM

## 2022-10-07 DIAGNOSIS — M54.50 LOW BACK PAIN, UNSPECIFIED BACK PAIN LATERALITY, UNSPECIFIED CHRONICITY, UNSPECIFIED WHETHER SCIATICA PRESENT: ICD-10-CM

## 2022-10-07 RX ORDER — METHYLPREDNISOLONE 4 MG/1
TABLET ORAL
Qty: 1 KIT | Refills: 0 | Status: SHIPPED | OUTPATIENT
Start: 2022-10-07

## 2022-10-07 NOTE — TELEPHONE ENCOUNTER
Patient states she would like to try the Medrol Dosepak/  States Voltaren does not work for her.     Pharmacy is [facility]

## 2022-10-11 DIAGNOSIS — I48.91 NEW ONSET A-FIB (HCC): ICD-10-CM

## 2022-10-11 RX ORDER — APIXABAN 5 MG/1
TABLET, FILM COATED ORAL
Qty: 180 TABLET | Refills: 1 | Status: SHIPPED | OUTPATIENT
Start: 2022-10-11

## 2022-10-11 NOTE — TELEPHONE ENCOUNTER
Last visit: 9/14/22  Last Med refill: 5/10/22  Does patient have enough medication for 72 hours: Yes    Next Visit Date:  Future Appointments   Date Time Provider Mateo Weaver   12/7/2022  3:30  [facility] [place] [address]   12/7/2022  3:30 PM Lynn Hernandez MD [facility] Maintenance   Topic Date Due    Lipids  01/11/2022    Annual Wellness Visit (AWV)  07/02/2022    Flu vaccine (1) 08/01/2022    COVID-19 Vaccine (3 - Booster for Pfizer series) 02/16/2023 (Originally 9/27/2021)    DTaP/Tdap/Td vaccine (1 - Tdap) 03/08/2023 (Originally 2/2/1964)    Shingles vaccine (1 of 2) 03/08/2023 (Originally [birth year]/2/1964)    Depression Monitoring  06/08/2023    DEXA (modify frequency per FRAX score)  Completed    Pneumococcal 65+ years Vaccine  Completed    Hepatitis C screen  Completed    Hepatitis A vaccine  Aged Out    Hib vaccine  Aged Out    Meningococcal (ACWY) vaccine  Aged Out       Hemoglobin A1C (%)   Date Value   03/09/2022 5.5   11/16/2021 5.7   01/11/2021 5.7             ( goal A1C is < 7)   Microalb/Crt.  Ratio (mcg/mg creat)   Date Value   01/11/2021 CANNOT BE CALCULATED     LDL Cholesterol (mg/dL)   Date Value   01/11/2021 85   03/03/2020 96       (goal LDL is <100)   AST (U/L)   Date Value   04/01/2022 11     ALT (U/L)   Date Value   04/01/2022 7     BUN (mg/dL)   Date Value   10/04/2022 37 (H)     BP Readings from Last 3 Encounters:   09/14/22 (!) 140/76   07/12/22 (!) 152/72   06/14/22 (!) 152/92          (goal 120/80)    All Future Testing planned in [facility]  Lab Frequency Next Occurrence   Ferritin Once 12/24/2021   Iron And TIBC Once 01/58/4918   Basic Metabolic Panel Once 07/39/7322   EMG Once 09/14/2022               Patient Active Problem List:     Dyslipidemia     DIONY treated with BiPAP     Fibromyalgia     CRI (chronic renal insufficiency)     OA (osteoarthritis)     DM (diabetes mellitus) ([facility]     Kidney stone     Depression     Seasonal allergies Diverticulosis     Erythropenia     Normocytic normochromic anemia     Mitral regurgitation - Mild to moderate     CKD (chronic kidney disease) stage 4, GFR 15-29 ml/min ([facility])     Gout     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 disease ([address]     Essential hypertension     Osteopenia     Morbid obesity due to excess calories ([facility])     Anxiety     Febrile illness     Acute serous otitis media of left ear     Secondary hyperparathyroidism ([address]     Acute kidney injury superimposed on CKD ([address]     New onset a-fib ([address] with Rapid ventricular response     New onset of congestive heart failure (HCC)     Lymphedema     GERD (gastroesophageal reflux disease)     Restless leg syndrome     Acute diastolic congestive heart failure ([facility])     Chronic bilateral low back pain without sciatica

## 2022-10-19 RX ORDER — PANTOPRAZOLE SODIUM 40 MG/1
TABLET, DELAYED RELEASE ORAL
Qty: 90 TABLET | Refills: 1 | Status: SHIPPED | OUTPATIENT
Start: 2022-10-19

## 2022-10-19 NOTE — TELEPHONE ENCOUNTER
Last visit: 9/14/22  Last Med refill: 3/3/21  Does patient have enough medication for 72 hours: No:     Next Visit Date:  Future Appointments   Date Time Provider Mateo Weaver   12/7/2022  3:30 PM [facility] [place] [id number]   12/7/2022  3:30 PM Papa Verma MD [facility] Maintenance   Topic Date Due    Lipids  01/11/2022    Annual Wellness Visit (AWV)  07/02/2022    Flu vaccine (1) 08/01/2022    COVID-19 Vaccine (3 - Booster for Pfizer series) 02/16/2023 (Originally 9/27/2021)    DTaP/Tdap/Td vaccine (1 - Tdap) 03/08/2023 (Originally 2/2/1964)    Shingles vaccine (1 of 2) 03/08/2023 (Originally 2/2/1964)    Depression Monitoring  06/08/2023    DEXA (modify frequency per FRAX score)  Completed    Pneumococcal 65+ years Vaccine  Completed    Hepatitis C screen  Completed    Hepatitis A vaccine  Aged Out    Hib vaccine  Aged Out    Meningococcal (ACWY) vaccine  Aged Out       Hemoglobin A1C (%)   Date Value   03/09/2022 5.5   11/16/2021 5.7   01/11/2021 5.7             ( goal A1C is < 7)   Microalb/Crt.  Ratio (mcg/mg creat)   Date Value   01/11/2021 CANNOT BE CALCULATED     LDL Cholesterol (mg/dL)   Date Value   01/11/2021 85   03/03/2020 96       (goal LDL is <100)   AST (U/L)   Date Value   04/01/2022 11     ALT (U/L)   Date Value   04/01/2022 7     BUN (mg/dL)   Date Value   10/04/2022 37 (H)     BP Readings from Last 3 Encounters:   09/14/22 (!) 140/76   07/12/22 (!) 152/72   06/14/22 (!) 152/92          (goal 120/80)    All Future Testing planned in [facility]  Lab Frequency Next Occurrence   Ferritin Once 12/24/2021   Iron And TIBC Once 04/56/8586   Basic Metabolic Panel Once 41/82/6661   EMG Once 09/14/2022               Patient Active Problem List:     Dyslipidemia     DIONY treated with BiPAP     Fibromyalgia     CRI (chronic renal insufficiency)     OA (osteoarthritis)     DM (diabetes mellitus) ([facility]     Kidney stone     Depression     Seasonal allergies Diverticulosis     Erythropenia     Normocytic normochromic anemia     Mitral regurgitation - Mild to moderate     CKD (chronic kidney disease) stage 4, GFR 15-29 ml/min ([facility])     Gout     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 disease ([address]     Essential hypertension     Osteopenia     Morbid obesity due to excess calories ([facility])     Anxiety     Febrile illness     Acute serous otitis media of left ear     Secondary hyperparathyroidism ([address]     Acute kidney injury superimposed on CKD ([address]     New onset a-fib ([address] with Rapid ventricular response     New onset of congestive heart failure (HCC)     Lymphedema     GERD (gastroesophageal reflux disease)     Restless leg syndrome     Acute diastolic congestive heart failure ([facility])     Chronic bilateral low back pain without sciatica

## 2022-10-31 DIAGNOSIS — I50.9 NEW ONSET OF CONGESTIVE HEART FAILURE (HCC): ICD-10-CM

## 2022-10-31 NOTE — TELEPHONE ENCOUNTER
Last visit: 09/14/2022  Last Med refill: 08/03/2022  Does patient have enough medication for 72 hours: No:     Next Visit Date:  Future Appointments   Date Time Provider Mateo Weaver   12/7/2022  3:30  [facility] [place] [id number]   12/7/2022  3:30 PM Breann Smith MD [facility]   12/8/2022  3:00 PM BARBIE Zamora - CHICHO Kelseykristal Ruiz [id number] [address] [place] Maintenance   Topic Date Due    Lipids  01/11/2022    Annual Wellness Visit (AWV)  07/02/2022    COVID-19 Vaccine (3 - Booster for [place] Peter series) 02/16/2023 (Originally 6/22/2021)    DTaP/Tdap/Td vaccine (1 - Tdap) 03/08/2023 (Originally 2/2/1964)    Shingles vaccine (1 of 2) 03/08/2023 (Originally 2/2/1964)    Depression Monitoring  06/08/2023    DEXA (modify frequency per FRAX score)  Completed    Flu vaccine  Completed    Pneumococcal 65+ years Vaccine  Completed    Hepatitis C screen  Completed    Hepatitis A vaccine  Aged Out    Hib vaccine  Aged Out    Meningococcal (ACWY) vaccine  Aged Out       Hemoglobin A1C (%)   Date Value   03/09/2022 5.5   11/16/2021 5.7   01/11/2021 5.7             ( goal A1C is < 7)   Microalb/Crt.  Ratio (mcg/mg creat)   Date Value   01/11/2021 CANNOT BE CALCULATED     LDL Cholesterol (mg/dL)   Date Value   01/11/2021 85   03/03/2020 96       (goal LDL is <100)   AST (U/L)   Date Value   04/01/2022 11     ALT (U/L)   Date Value   04/01/2022 7     BUN (mg/dL)   Date Value   10/04/2022 37 (H)     BP Readings from Last 3 Encounters:   09/14/22 (!) 140/76   07/12/22 (!) 152/72   06/14/22 (!) 152/92          (goal 120/80)    All Future Testing planned in [facility]  Lab Frequency Next Occurrence   Ferritin Once 12/24/2021   Iron And TIBC Once 01/00/6555   Basic Metabolic Panel Once 88/72/2321   EMG Once 09/14/2022               Patient Active Problem List:     Dyslipidemia     DIONY treated with BiPAP     Fibromyalgia     CRI (chronic renal insufficiency)     OA (osteoarthritis)     DM (diabetes mellitus) ([facility] Kidney stone     Depression     Seasonal allergies     Diverticulosis     Erythropenia     Normocytic normochromic anemia     Mitral regurgitation - Mild to moderate     CKD (chronic kidney disease) stage 4, GFR 15-29 ml/min ([facility])     Gout     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 disease ([address]     Essential hypertension     Osteopenia     Morbid obesity due to excess calories ([facility])     Anxiety     Febrile illness     Acute serous otitis media of left ear     Secondary hyperparathyroidism ([address]     Acute kidney injury superimposed on CKD ([address]     New onset a-fib ([address] with Rapid ventricular response     New onset of congestive heart failure ([facility])     Lymphedema     GERD (gastroesophageal reflux disease)     Restless leg syndrome     Acute diastolic congestive heart failure ([facility])     Chronic bilateral low back pain without sciatica

## 2022-11-05 DIAGNOSIS — E11.65 TYPE 2 DIABETES MELLITUS WITH HYPERGLYCEMIA, WITHOUT LONG-TERM CURRENT USE OF INSULIN (HCC): ICD-10-CM

## 2022-11-05 DIAGNOSIS — I50.9 NEW ONSET OF CONGESTIVE HEART FAILURE (HCC): ICD-10-CM

## 2022-11-07 RX ORDER — AMIODARONE HYDROCHLORIDE 200 MG/1
200 TABLET ORAL DAILY
Qty: 90 TABLET | Refills: 1 | OUTPATIENT
Start: 2022-11-07

## 2022-11-07 RX ORDER — GABAPENTIN 600 MG/1
600 TABLET ORAL DAILY
Qty: 90 TABLET | Refills: 0 | Status: SHIPPED | OUTPATIENT
Start: 2022-11-07 | End: 2022-11-13 | Stop reason: SDUPTHER

## 2022-11-07 RX ORDER — ATORVASTATIN CALCIUM 40 MG/1
40 TABLET, FILM COATED ORAL DAILY
Qty: 90 TABLET | Refills: 1 | Status: SHIPPED | OUTPATIENT
Start: 2022-11-07

## 2022-11-07 NOTE — TELEPHONE ENCOUNTER
Last visit: 9/14/22  Last Med refill: 7/18/22, 5/2/22  Does patient have enough medication for 72 hours: No    Next Visit Date:  Future Appointments   Date Time Provider Mateo Weaver   12/7/2022  3:30  [facility] [place] [id number]   12/7/2022  3:30 PM Jerry Camara MD [facility]   12/8/2022  3:00 PM BARBIE Barcenas - CHICHO Kenney [id number] [facility] Maintenance   Topic Date Due    Lipids  01/11/2022    Annual Wellness Visit (AWV)  07/02/2022    COVID-19 Vaccine (3 - Booster for Pfizer series) 02/16/2023 (Originally 6/22/2021)    DTaP/Tdap/Td vaccine (1 - Tdap) 03/08/2023 (Originally 2/2/1964)    Shingles vaccine (1 of 2) 03/08/2023 (Originally 2/2/1964)    Depression Monitoring  06/08/2023    DEXA (modify frequency per FRAX score)  Completed    Flu vaccine  Completed    Pneumococcal 65+ years Vaccine  Completed    Hepatitis C screen  Completed    Hepatitis A vaccine  Aged Out    Hib vaccine  Aged Out    Meningococcal (ACWY) vaccine  Aged Out       Hemoglobin A1C (%)   Date Value   03/09/2022 5.5   11/16/2021 5.7   01/11/2021 5.7             ( goal A1C is < 7)   Microalb/Crt.  Ratio (mcg/mg creat)   Date Value   01/11/2021 CANNOT BE CALCULATED     LDL Cholesterol (mg/dL)   Date Value   01/11/2021 85   03/03/2020 96       (goal LDL is <100)   AST (U/L)   Date Value   04/01/2022 11     ALT (U/L)   Date Value   04/01/2022 7     BUN (mg/dL)   Date Value   10/04/2022 37 (H)     BP Readings from Last 3 Encounters:   09/14/22 (!) 140/76   07/12/22 (!) 152/72   06/14/22 (!) 152/92          (goal 120/80)    All Future Testing planned in [facility]  Lab Frequency Next Occurrence   Ferritin Once 12/24/2021   Iron And TIBC Once 12/13/5202   Basic Metabolic Panel Once 16/14/7178   EMG Once 09/14/2022               Patient Active Problem List:     Dyslipidemia     DIONY treated with BiPAP     Fibromyalgia     CRI (chronic renal insufficiency)     OA (osteoarthritis)     DM (diabetes mellitus) ([facility] Kidney stone     Depression     Seasonal allergies     Diverticulosis     Erythropenia     Normocytic normochromic anemia     Mitral regurgitation - Mild to moderate     CKD (chronic kidney disease) stage 4, GFR 15-29 ml/min ([facility]     Gout     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 disease ([address]     Essential hypertension     Osteopenia     Morbid obesity due to excess calories ([facility])     Anxiety     Febrile illness     Acute serous otitis media of left ear     Secondary hyperparathyroidism ([address]     Acute kidney injury superimposed on CKD ([address]     New onset a-fib ([address] with Rapid ventricular response     New onset of congestive heart failure ([facility])     Lymphedema     GERD (gastroesophageal reflux disease)     Restless leg syndrome     Acute diastolic congestive heart failure ([facility])     Chronic bilateral low back pain without sciatica

## 2022-11-09 ENCOUNTER — OFFICE VISIT (OUTPATIENT)
Dept: FAMILY MEDICINE CLINIC | Age: 77
End: 2022-11-09
Payer: MEDICARE

## 2022-11-09 VITALS
WEIGHT: 271 LBS | HEART RATE: 39 BPM | TEMPERATURE: 97 F | OXYGEN SATURATION: 97 % | BODY MASS INDEX: 45.1 KG/M2 | DIASTOLIC BLOOD PRESSURE: 72 MMHG | SYSTOLIC BLOOD PRESSURE: 118 MMHG

## 2022-11-09 DIAGNOSIS — G47.33 OSA TREATED WITH BIPAP: ICD-10-CM

## 2022-11-09 DIAGNOSIS — G89.29 CHRONIC BILATERAL LOW BACK PAIN WITHOUT SCIATICA: ICD-10-CM

## 2022-11-09 DIAGNOSIS — F51.01 PRIMARY INSOMNIA: ICD-10-CM

## 2022-11-09 DIAGNOSIS — I89.0 LYMPHEDEMA: ICD-10-CM

## 2022-11-09 DIAGNOSIS — R00.1 BRADYCARDIA: ICD-10-CM

## 2022-11-09 DIAGNOSIS — N18.4 CKD (CHRONIC KIDNEY DISEASE) STAGE 4, GFR 15-29 ML/MIN (HCC): ICD-10-CM

## 2022-11-09 DIAGNOSIS — M54.50 ACUTE BILATERAL LOW BACK PAIN WITHOUT SCIATICA: ICD-10-CM

## 2022-11-09 DIAGNOSIS — E11.65 TYPE 2 DIABETES MELLITUS WITH HYPERGLYCEMIA, WITHOUT LONG-TERM CURRENT USE OF INSULIN (HCC): Primary | ICD-10-CM

## 2022-11-09 DIAGNOSIS — E78.5 DYSLIPIDEMIA: ICD-10-CM

## 2022-11-09 DIAGNOSIS — M54.50 CHRONIC BILATERAL LOW BACK PAIN WITHOUT SCIATICA: ICD-10-CM

## 2022-11-09 DIAGNOSIS — M62.81 GENERALIZED MUSCLE WEAKNESS: ICD-10-CM

## 2022-11-09 PROCEDURE — 1090F PRES/ABSN URINE INCON ASSESS: CPT | Performed by: INTERNAL MEDICINE

## 2022-11-09 PROCEDURE — 99214 OFFICE O/P EST MOD 30 MIN: CPT | Performed by: INTERNAL MEDICINE

## 2022-11-09 PROCEDURE — G8427 DOCREV CUR MEDS BY ELIG CLIN: HCPCS | Performed by: INTERNAL MEDICINE

## 2022-11-09 PROCEDURE — G8400 PT W/DXA NO RESULTS DOC: HCPCS | Performed by: INTERNAL MEDICINE

## 2022-11-09 PROCEDURE — G8417 CALC BMI ABV UP PARAM F/U: HCPCS | Performed by: INTERNAL MEDICINE

## 2022-11-09 PROCEDURE — 3078F DIAST BP <80 MM HG: CPT | Performed by: INTERNAL MEDICINE

## 2022-11-09 PROCEDURE — G8484 FLU IMMUNIZE NO ADMIN: HCPCS | Performed by: INTERNAL MEDICINE

## 2022-11-09 PROCEDURE — 1123F ACP DISCUSS/DSCN MKR DOCD: CPT | Performed by: INTERNAL MEDICINE

## 2022-11-09 PROCEDURE — 3044F HG A1C LEVEL LT 7.0%: CPT | Performed by: INTERNAL MEDICINE

## 2022-11-09 PROCEDURE — 1036F TOBACCO NON-USER: CPT | Performed by: INTERNAL MEDICINE

## 2022-11-09 PROCEDURE — 3074F SYST BP LT 130 MM HG: CPT | Performed by: INTERNAL MEDICINE

## 2022-11-09 RX ORDER — LANCETS 30 GAUGE
1 EACH MISCELLANEOUS DAILY
Qty: 100 EACH | Refills: 3 | Status: SHIPPED | OUTPATIENT
Start: 2022-11-09

## 2022-11-09 RX ORDER — GLUCOSAMINE HCL/CHONDROITIN SU 500-400 MG
CAPSULE ORAL
Qty: 50 STRIP | Refills: 6 | Status: SHIPPED | OUTPATIENT
Start: 2022-11-09

## 2022-11-09 RX ORDER — ZOLPIDEM TARTRATE 5 MG/1
5 TABLET ORAL NIGHTLY PRN
Qty: 30 TABLET | Refills: 1 | Status: SHIPPED | OUTPATIENT
Start: 2022-11-09 | End: 2023-01-08

## 2022-11-09 RX ORDER — TRAZODONE HYDROCHLORIDE 50 MG/1
TABLET ORAL
COMMUNITY
Start: 2022-10-20

## 2022-11-09 RX ORDER — COLCHICINE 0.6 MG/1
TABLET ORAL
COMMUNITY
Start: 2022-09-27

## 2022-11-09 RX ORDER — CHOLECALCIFEROL (VITAMIN D3) 25 MCG
TABLET,CHEWABLE ORAL
COMMUNITY
Start: 2022-09-06

## 2022-11-09 RX ORDER — GLUCOSAMINE HCL/CHONDROITIN SU 500-400 MG
1 CAPSULE ORAL DAILY
Qty: 100 EACH | Refills: 3 | Status: SHIPPED | OUTPATIENT
Start: 2022-11-09

## 2022-11-09 RX ORDER — METHYLPREDNISOLONE 4 MG/1
TABLET ORAL
Qty: 1 KIT | Refills: 0 | Status: SHIPPED | OUTPATIENT
Start: 2022-11-09

## 2022-11-09 RX ORDER — HYDROCODONE BITARTRATE AND ACETAMINOPHEN 5; 325 MG/1; MG/1
1 TABLET ORAL EVERY 6 HOURS PRN
Qty: 20 TABLET | Refills: 0 | Status: SHIPPED | OUTPATIENT
Start: 2022-11-09 | End: 2022-12-09

## 2022-11-09 ASSESSMENT — ENCOUNTER SYMPTOMS
CHOKING: 0
ABDOMINAL PAIN: 0
CHEST TIGHTNESS: 0
BOWEL INCONTINENCE: 0
WHEEZING: 0
VISUAL CHANGE: 0
BLOOD IN STOOL: 0
VOMITING: 0
NAUSEA: 0
ANAL BLEEDING: 0
COUGH: 0
DIARRHEA: 0
CONSTIPATION: 0
SHORTNESS OF BREATH: 0

## 2022-11-09 ASSESSMENT — VISUAL ACUITY: OU: 1

## 2022-11-09 NOTE — PROGRESS NOTES
Pt is here due to neck, back and leg pain. She states had a fall at home 10/28/2022  She states is gaining weight.  She is unable to move around a lot  She is  having SOB

## 2022-11-09 NOTE — PROGRESS NOTES
[facility]  [facility] [address]  [address] [facility] [address]  Dept: [phone]  Dept Fax: [phone]      Rony Lazcano is a 69 y.o. female who presents today for hermedical conditions/complaints as noted below. Rony Lazcano is c/o of Fall (10/28/2022) and Neck Pain        Assessment/Plan:     1. Type 2 diabetes mellitus with hyperglycemia, without long-term current use of insulin ([facility])  -     blood glucose monitor kit and supplies; use check blood sugar as directed, Disp-1 kit, R-0, Normal  -     blood glucose monitor strips; Check blood sugars daily as directed., Disp-50 strip, R-6, Normal  -     Lancets MISC; DAILY Starting Wed 11/9/2022, Disp-100 each, R-3, Normal  -     Alcohol Swabs 70 % PADS; DAILY Starting Wed 11/9/2022, Disp-100 each, R-3, Normal  2. DIONY treated with BiPAP  -     [facility] Order for BiPAP as OP  3. Dyslipidemia  -     Lipid Panel; Future  4. Acute bilateral low back pain without sciatica  -     methylPREDNISolone (MEDROL DOSEPACK) 4 MG tablet; Take by mouth., Disp-1 kit, R-0Normal  -     External Referral To Home Health  5. Chronic bilateral low back pain without sciatica  -     External Referral To Home Health  6. Generalized muscle weakness  -     External Referral To Home Health  7. CKD (chronic kidney disease) stage 4, GFR 15-29 ml/min ([facility])  8. Bradycardia  -     External Referral To Home Health  9. Lymphedema  Comments:  PDMP reviewed. Orders:  -     HYDROcodone-acetaminophen (NORCO) 5-325 MG per tablet; Take 1 tablet by mouth every 6 hours as needed for Pain for up to 30 days. , Disp-20 tablet, R-0Normal  10. Primary insomnia  -     zolpidem (AMBIEN) 5 MG tablet; Take 1 tablet by mouth nightly as needed for Sleep for up to 60 days. , Disp-30 tablet, R-1Normal          No follow-ups on file. HPI     Fall  The accident occurred More than 1 week ago. The fall occurred while standing. She landed on [facility]. There was no blood loss.  The point of impact was the buttocks. The pain is present in the buttocks. The pain is moderate. The symptoms are aggravated by standing and flexion. Pertinent negatives include no abdominal pain, bowel incontinence, fever, headaches, hearing loss, hematuria, loss of consciousness, nausea, numbness, tingling, visual change or vomiting. She has tried ice, rest and acetaminophen for the symptoms.      BP Readings from Last 3 Encounters:   22 118/72   22 (!) 140/76   22 (!) 152/72              Past Medical History:   Diagnosis Date    Abnormal stress test     Anemia     Arthritis     Depression     Diverticulosis     DM (diabetes mellitus) ([address]     Erythropenia     Fibromyalgia     HTN (hypertension)     Hyperlipidemia     Kidney stone     Morbid obesity due to excess calories (HCC)     DIONY on CPAP     Osteopenia 14    Seasonal allergies     Sleep apnea     uses bipap prn      Past Surgical History:   Procedure Laterality Date    ARM SURGERY      right arm broken    CARDIAC CATHETERIZATION  [id number] dr Cameron Patricia  17    COLONOSCOPY      COLONOSCOPY      TOTAL KNEE ARTHROPLASTY Bilateral     left may 2013 and right 2013    TUBAL LIGATION         Family History   Problem Relation Age of Onset    Diabetes Mother     Heart Disease Mother     Osteoporosis Mother     Kidney Disease Father     Prostate Cancer Brother        Social History     Tobacco Use    Smoking status: Former     Packs/day: 0.25     Years: 1.00     Pack years: 0.25     Types: Cigarettes     Quit date: 1960     Years since quittin.8    Smokeless tobacco: Never    Tobacco comments:     quit    Substance Use Topics    Alcohol use: No        Allergies   Allergen Reactions    Adhesive Tape     Cephalexin Other (See Comments)     Tongue cracks and peels    Erythromycin Other (See Comments)     Epigastric pain and bloating    Ketorolac Tromethamine Other (See Comments)     Severe stomach cramps    Pcn [Penicillins]      Unknown reaction    Percocet [Oxycodone-Acetaminophen] Itching and Other (See Comments)     Esophagus hurt    Sulfa Antibiotics     Ultracet [Tramadol-Acetaminophen] Itching     Prior to Visit Medications    Medication Sig Taking? Authorizing Provider   traZODone (DESYREL) 50 MG tablet  Yes Candice Bah MD   colchicine (COLCRYS) 0.6 MG tablet  Yes Matilda Burrows DPM   Cyanocobalamin (B-12) 1000 MCG LOZG DISSOLVE ONE tablet UNDER TONGUE ONCE DAILY Yes Oscar Toscano MD   atorvastatin (LIPITOR) 40 MG tablet Take 1 tablet by mouth daily Yes Tra Estrella MD   gabapentin (NEURONTIN) 600 MG tablet Take 1 tablet by mouth daily for 90 days. Yes Eliza Duenas MD   metoprolol tartrate (LOPRESSOR) 25 MG tablet TAKE ONE-HALF (1/2) TABLET TWICE A DAY Yes Eliza Duenas MD   pantoprazole (PROTONIX) 40 MG tablet TAKE 1 TABLET DAILY Yes Eliza Duenas MD   ELIQUIS 5 MG TABS tablet TAKE 1 TABLET TWICE A DAY Yes Eliza Duenas MD   cyclobenzaprine (FLEXERIL) 5 MG tablet TAKE 1 TABLET BY MOUTH NIGHTLY AS NEEDED FOR MUSCLE SPASMS Yes Eliza Duenas MD   allopurinol (ZYLOPRIM) 100 MG tablet TAKE 1 TABLET DAILY Yes BARBIE Ruiz CNP   furosemide (LASIX) 40 MG tablet Take 1 tablet by mouth daily Yes Fernando Yusuf MD   rOPINIRole (REQUIP) 0.25 MG tablet TAKE 1 TABLET NIGHTLY Yes Eliza Duenas MD   zolpidem (AMBIEN) 5 MG tablet Take 5 mg by mouth nightly as needed for Sleep.  Yes Historical Provider, MD   hydrALAZINE (APRESOLINE) 25 MG tablet Take 4 tablets by mouth 3 times daily Yes Fernando Yusuf MD   amiodarone (CORDARONE) 200 MG tablet Take 1 tablet by mouth daily Yes BARBIE Boucher NP   calcitRIOL (ROCALTROL) 0.5 MCG capsule TAKE 1 CAPSULE BY MOUTH DAILY Yes Fernando Yusuf MD   potassium citrate (UROCIT-K) 10 MEQ (1080 MG) extended release tablet Take 1 tablet by mouth daily Yes Eliza Duenas MD   azelastine (ASTELIN) 0.1 % nasal spray 1 spray by Nasal route 2 times daily Use in each nostril as directed Yes Kayley Rosales DO   Cholecalciferol (VITAMIN D3) 25 MCG (1000 UT) TABS Take 2 tablets by mouth daily Yes JimBARBIE Sorenson CNP   Magnesium Oxide 400 MG CAPS Take by mouth 2 times daily Takes once daily at HS Yes Historical Provider, MD   ferrous sulfate (FE TABS 325) 325 (65 Fe) MG EC tablet Take 1 tablet by mouth daily (with breakfast)  Patient not taking: Reported on 11/9/2022  Cris Fitzgerald MD       Review of Systems     Review of Systems   Constitutional:  Negative for fatigue, fever and unexpected weight change. Respiratory:  Negative for cough, choking, chest tightness, shortness of breath and wheezing. Cardiovascular:  Negative for chest pain, palpitations and leg swelling. Gastrointestinal:  Negative for abdominal pain, anal bleeding, blood in stool, bowel incontinence, constipation, diarrhea, nausea and vomiting. Endocrine: Negative. Genitourinary:  Negative for hematuria. Musculoskeletal:  Negative for joint swelling and myalgias. Skin: Negative. Neurological:  Negative for dizziness, tingling, loss of consciousness, numbness and headaches. Psychiatric/Behavioral:  Negative for sleep disturbance. All other systems reviewed and are negative. Objective     /72   Pulse (!) 39   Temp 97 °F (36.1 °C) (Temporal)   Wt 271 lb (122.9 kg)   LMP  (LMP Unknown)   SpO2 97%   BMI 45.10 kg/m²   Physical Exam  Vitals and nursing note reviewed. Constitutional:       General: She is not in acute distress. Appearance: She is well-developed. She is not ill-appearing. Eyes:      General: Lids are normal. Vision grossly intact. Cardiovascular:      Rate and Rhythm: Normal rate and regular rhythm. Heart sounds: Normal heart sounds, S1 normal and S2 normal. No murmur heard. No friction rub. No gallop. Pulmonary:      Effort: Pulmonary effort is normal. No respiratory distress. Breath sounds: Normal breath sounds. No wheezing. Abdominal:      General: Bowel sounds are normal.      Palpations: Abdomen is soft. There is no mass. Tenderness: There is no abdominal tenderness. There is no guarding. Musculoskeletal:         General: Normal range of motion. Skin:     General: Skin is warm and dry. Capillary Refill: Capillary refill takes less than 2 seconds. Neurological:      General: No focal deficit present. Mental Status: She is alert and oriented to person, place, and time. Data Review   Controlled Substance Monitoring:    Acute and Chronic Pain Monitoring:   RX Monitoring 11/9/2022   Attestation -   Periodic Controlled Substance Monitoring No signs of potential drug abuse or diversion identified. ;Possible medication side effects, risk of tolerance/dependence & alternative treatments discussed. ;Assessed functional status. Health Maintenance Due   Topic Date Due    Lipids  01/11/2022    Annual Wellness Visit (AWV)  07/02/2022           Patient given educational materials- see patient instructions. Discussed use, benefit, and side effects of prescribedmedications. All patient questions answered. Pt voiced understanding. Reviewedhealth maintenance. Instructed to continue current medications, diet and exercise. Patient agreed with treatment plan. Follow up as directed.      Electronically signedby Fela Nunez MD on 11/9/2022

## 2022-11-13 DIAGNOSIS — I50.9 NEW ONSET OF CONGESTIVE HEART FAILURE (HCC): ICD-10-CM

## 2022-11-13 DIAGNOSIS — G25.81 RESTLESS LEG SYNDROME: ICD-10-CM

## 2022-11-13 DIAGNOSIS — E11.65 TYPE 2 DIABETES MELLITUS WITH HYPERGLYCEMIA, WITHOUT LONG-TERM CURRENT USE OF INSULIN (HCC): ICD-10-CM

## 2022-11-14 RX ORDER — GABAPENTIN 600 MG/1
600 TABLET ORAL DAILY
Qty: 90 TABLET | Refills: 0 | Status: SHIPPED | OUTPATIENT
Start: 2022-11-14 | End: 2023-02-12

## 2022-11-14 RX ORDER — AMIODARONE HYDROCHLORIDE 200 MG/1
200 TABLET ORAL DAILY
Qty: 90 TABLET | Refills: 1 | OUTPATIENT
Start: 2022-11-14

## 2022-11-14 RX ORDER — ROPINIROLE 0.25 MG/1
0.25 TABLET, FILM COATED ORAL NIGHTLY
Qty: 90 TABLET | Refills: 1 | Status: SHIPPED | OUTPATIENT
Start: 2022-11-14

## 2022-11-14 NOTE — TELEPHONE ENCOUNTER
Chloe Reynolds is calling to request a refill on the following medication(s):    Medication Request:  Requested Prescriptions     Pending Prescriptions Disp Refills    rOPINIRole (REQUIP) 0.25 MG tablet 90 tablet 1     Sig: Take 1 tablet by mouth nightly    gabapentin (NEURONTIN) 600 MG tablet 90 tablet 0     Sig: Take 1 tablet by mouth daily for 90 days.        Last Visit Date (If Applicable):  05/4/1643    Next Visit Date:    Visit date not found

## 2022-12-05 ENCOUNTER — APPOINTMENT (OUTPATIENT)
Dept: GENERAL RADIOLOGY | Age: 77
DRG: 493 | End: 2022-12-05
Payer: MEDICARE

## 2022-12-05 ENCOUNTER — HOSPITAL ENCOUNTER (INPATIENT)
Age: 77
LOS: 7 days | Discharge: NURSING, REHAB OR LONG-TERM CARE | DRG: 493 | End: 2022-12-12
Attending: EMERGENCY MEDICINE | Admitting: SURGERY
Payer: MEDICARE

## 2022-12-05 ENCOUNTER — APPOINTMENT (OUTPATIENT)
Dept: CT IMAGING | Age: 77
DRG: 493 | End: 2022-12-05
Payer: MEDICARE

## 2022-12-05 DIAGNOSIS — S82.851C TYPE III OPEN TRIMALLEOLAR FRACTURE OF RIGHT ANKLE, INITIAL ENCOUNTER: Primary | ICD-10-CM
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
## 2022-12-05 DIAGNOSIS — F51.01 PRIMARY INSOMNIA: ICD-10-CM

## 2022-12-05 DIAGNOSIS — I89.0 LYMPHEDEMA: ICD-10-CM

## 2022-12-05 PROBLEM — S82.201S: Status: ACTIVE | Noted: 2022-12-05

## 2022-12-05 PROBLEM — S82.401S: Status: ACTIVE | Noted: 2022-12-05

## 2022-12-05 LAB
ABO/RH: NORMAL
ANION GAP SERPL CALCULATED.3IONS-SCNC: 12 MMOL/L (ref 9–17)
ANTIBODY SCREEN: NEGATIVE
ARM BAND NUMBER: NORMAL
BLOOD BANK SPECIMEN: ABNORMAL
BUN BLDV-MCNC: 60 MG/DL (ref 8–23)
CARBOXYHEMOGLOBIN: 3.1 % (ref 0–5)
CHLORIDE BLD-SCNC: 100 MMOL/L (ref 98–107)
CO2: 25 MMOL/L (ref 20–31)
CREAT SERPL-MCNC: 3.87 MG/DL (ref 0.5–0.9)
ETHANOL PERCENT: <0.01 %
ETHANOL: <10 MG/DL
EXPIRATION DATE: NORMAL
FIO2: ABNORMAL
GFR SERPL CREATININE-BSD FRML MDRD: 12 ML/MIN/1.73M2
GLUCOSE BLD-MCNC: 128 MG/DL (ref 70–99)
HCG QUALITATIVE: NEGATIVE
HCO3 VENOUS: 25.6 MMOL/L (ref 24–30)
HCT VFR BLD CALC: 35.5 % (ref 36.3–47.1)
HEMOGLOBIN: 10.6 G/DL (ref 11.9–15.1)
INR BLD: 1.1
MCH RBC QN AUTO: 29.9 PG (ref 25.2–33.5)
MCHC RBC AUTO-ENTMCNC: 29.9 G/DL (ref 28.4–34.8)
MCV RBC AUTO: 100.3 FL (ref 82.6–102.9)
NEGATIVE BASE EXCESS, VEN: 1.1 MMOL/L (ref 0–2)
NRBC AUTOMATED: 0 PER 100 WBC
O2 SAT, VEN: 83.1 % (ref 60–85)
PARTIAL THROMBOPLASTIN TIME: 23.6 SEC (ref 20.5–30.5)
PATIENT TEMP: 37
PCO2, VEN: 55.5 MM HG (ref 39–55)
PDW BLD-RTO: 15.7 % (ref 11.8–14.4)
PH VENOUS: 7.29 (ref 7.32–7.42)
PLATELET # BLD: 319 K/UL (ref 138–453)
PMV BLD AUTO: 9.9 FL (ref 8.1–13.5)
PO2, VEN: 53.5 MM HG (ref 30–50)
POTASSIUM SERPL-SCNC: 4.8 MMOL/L (ref 3.7–5.3)
PROTHROMBIN TIME: 11.3 SEC (ref 9.1–12.3)
RBC # BLD: 3.54 M/UL (ref 3.95–5.11)
SODIUM BLD-SCNC: 137 MMOL/L (ref 135–144)
VITAMIN D 25-HYDROXY: 44 NG/ML
WBC # BLD: 12.8 K/UL (ref 3.5–11.3)

## 2022-12-05 PROCEDURE — 72170 X-RAY EXAM OF PELVIS: CPT

## 2022-12-05 PROCEDURE — 86900 BLOOD TYPING SEROLOGIC ABO: CPT

## 2022-12-05 PROCEDURE — 76376 3D RENDER W/INTRP POSTPROCES: CPT

## 2022-12-05 PROCEDURE — 1200000000 HC SEMI PRIVATE

## 2022-12-05 PROCEDURE — 99285 EMERGENCY DEPT VISIT HI MDM: CPT

## 2022-12-05 PROCEDURE — 82805 BLOOD GASES W/O2 SATURATION: CPT

## 2022-12-05 PROCEDURE — 96374 THER/PROPH/DIAG INJ IV PUSH: CPT

## 2022-12-05 PROCEDURE — 86901 BLOOD TYPING SEROLOGIC RH(D): CPT

## 2022-12-05 PROCEDURE — 73590 X-RAY EXAM OF LOWER LEG: CPT

## 2022-12-05 PROCEDURE — 90715 TDAP VACCINE 7 YRS/> IM: CPT | Performed by: STUDENT IN AN ORGANIZED HEALTH CARE EDUCATION/TRAINING PROGRAM

## 2022-12-05 PROCEDURE — 6370000000 HC RX 637 (ALT 250 FOR IP)

## 2022-12-05 PROCEDURE — 80051 ELECTROLYTE PANEL: CPT

## 2022-12-05 PROCEDURE — 72192 CT PELVIS W/O DYE: CPT

## 2022-12-05 PROCEDURE — 82565 ASSAY OF CREATININE: CPT

## 2022-12-05 PROCEDURE — 74176 CT ABD & PELVIS W/O CONTRAST: CPT

## 2022-12-05 PROCEDURE — 85610 PROTHROMBIN TIME: CPT

## 2022-12-05 PROCEDURE — 6370000000 HC RX 637 (ALT 250 FOR IP): Performed by: STUDENT IN AN ORGANIZED HEALTH CARE EDUCATION/TRAINING PROGRAM

## 2022-12-05 PROCEDURE — 73562 X-RAY EXAM OF KNEE 3: CPT

## 2022-12-05 PROCEDURE — 84703 CHORIONIC GONADOTROPIN ASSAY: CPT

## 2022-12-05 PROCEDURE — 73600 X-RAY EXAM OF ANKLE: CPT

## 2022-12-05 PROCEDURE — 2580000003 HC RX 258

## 2022-12-05 PROCEDURE — 6360000002 HC RX W HCPCS: Performed by: STUDENT IN AN ORGANIZED HEALTH CARE EDUCATION/TRAINING PROGRAM

## 2022-12-05 PROCEDURE — 73610 X-RAY EXAM OF ANKLE: CPT

## 2022-12-05 PROCEDURE — 82306 VITAMIN D 25 HYDROXY: CPT

## 2022-12-05 PROCEDURE — 86850 RBC ANTIBODY SCREEN: CPT

## 2022-12-05 PROCEDURE — 73700 CT LOWER EXTREMITY W/O DYE: CPT

## 2022-12-05 PROCEDURE — 71045 X-RAY EXAM CHEST 1 VIEW: CPT

## 2022-12-05 PROCEDURE — 99221 1ST HOSP IP/OBS SF/LOW 40: CPT | Performed by: STUDENT IN AN ORGANIZED HEALTH CARE EDUCATION/TRAINING PROGRAM

## 2022-12-05 PROCEDURE — 2500000003 HC RX 250 WO HCPCS: Performed by: STUDENT IN AN ORGANIZED HEALTH CARE EDUCATION/TRAINING PROGRAM

## 2022-12-05 PROCEDURE — 93005 ELECTROCARDIOGRAM TRACING: CPT | Performed by: STUDENT IN AN ORGANIZED HEALTH CARE EDUCATION/TRAINING PROGRAM

## 2022-12-05 PROCEDURE — 85730 THROMBOPLASTIN TIME PARTIAL: CPT

## 2022-12-05 PROCEDURE — 90471 IMMUNIZATION ADMIN: CPT | Performed by: STUDENT IN AN ORGANIZED HEALTH CARE EDUCATION/TRAINING PROGRAM

## 2022-12-05 PROCEDURE — 84520 ASSAY OF UREA NITROGEN: CPT

## 2022-12-05 PROCEDURE — 85027 COMPLETE CBC AUTOMATED: CPT

## 2022-12-05 PROCEDURE — 82947 ASSAY GLUCOSE BLOOD QUANT: CPT

## 2022-12-05 PROCEDURE — 2580000003 HC RX 258: Performed by: STUDENT IN AN ORGANIZED HEALTH CARE EDUCATION/TRAINING PROGRAM

## 2022-12-05 PROCEDURE — G0480 DRUG TEST DEF 1-7 CLASSES: HCPCS

## 2022-12-05 RX ORDER — ROPINIROLE 0.25 MG/1
0.25 TABLET, FILM COATED ORAL NIGHTLY
Status: DISCONTINUED | OUTPATIENT
Start: 2022-12-05 | End: 2022-12-12 | Stop reason: HOSPADM

## 2022-12-05 RX ORDER — ONDANSETRON 2 MG/ML
4 INJECTION INTRAMUSCULAR; INTRAVENOUS EVERY 6 HOURS PRN
Status: DISCONTINUED | OUTPATIENT
Start: 2022-12-05 | End: 2022-12-12 | Stop reason: HOSPADM

## 2022-12-05 RX ORDER — ONDANSETRON 4 MG/1
4 TABLET, ORALLY DISINTEGRATING ORAL EVERY 8 HOURS PRN
Status: DISCONTINUED | OUTPATIENT
Start: 2022-12-05 | End: 2022-12-12 | Stop reason: HOSPADM

## 2022-12-05 RX ORDER — AMIODARONE HYDROCHLORIDE 200 MG/1
200 TABLET ORAL DAILY
Status: DISCONTINUED | OUTPATIENT
Start: 2022-12-05 | End: 2022-12-12 | Stop reason: HOSPADM

## 2022-12-05 RX ORDER — SODIUM CHLORIDE 0.9 % (FLUSH) 0.9 %
5-40 SYRINGE (ML) INJECTION PRN
Status: DISCONTINUED | OUTPATIENT
Start: 2022-12-05 | End: 2022-12-12 | Stop reason: HOSPADM

## 2022-12-05 RX ORDER — HYDRALAZINE HYDROCHLORIDE 50 MG/1
100 TABLET, FILM COATED ORAL 3 TIMES DAILY
Status: DISCONTINUED | OUTPATIENT
Start: 2022-12-05 | End: 2022-12-12 | Stop reason: HOSPADM

## 2022-12-05 RX ORDER — CLINDAMYCIN PHOSPHATE 900 MG/50ML
900 INJECTION INTRAVENOUS ONCE
Status: COMPLETED | OUTPATIENT
Start: 2022-12-05 | End: 2022-12-05

## 2022-12-05 RX ORDER — SODIUM CHLORIDE 9 MG/ML
1000 INJECTION, SOLUTION INTRAVENOUS CONTINUOUS
Status: ACTIVE | OUTPATIENT
Start: 2022-12-06 | End: 2022-12-06

## 2022-12-05 RX ORDER — PANTOPRAZOLE SODIUM 40 MG/1
40 TABLET, DELAYED RELEASE ORAL
Status: DISCONTINUED | OUTPATIENT
Start: 2022-12-06 | End: 2022-12-12 | Stop reason: HOSPADM

## 2022-12-05 RX ORDER — METHOCARBAMOL 750 MG/1
750 TABLET, FILM COATED ORAL EVERY 8 HOURS
Status: DISCONTINUED | OUTPATIENT
Start: 2022-12-05 | End: 2022-12-12 | Stop reason: HOSPADM

## 2022-12-05 RX ORDER — FENTANYL CITRATE 50 UG/ML
100 INJECTION, SOLUTION INTRAMUSCULAR; INTRAVENOUS ONCE
Status: COMPLETED | OUTPATIENT
Start: 2022-12-05 | End: 2022-12-05

## 2022-12-05 RX ORDER — MORPHINE SULFATE 4 MG/ML
4 INJECTION, SOLUTION INTRAMUSCULAR; INTRAVENOUS ONCE
Status: COMPLETED | OUTPATIENT
Start: 2022-12-05 | End: 2022-12-05

## 2022-12-05 RX ORDER — FUROSEMIDE 40 MG/1
40 TABLET ORAL DAILY
Status: DISCONTINUED | OUTPATIENT
Start: 2022-12-05 | End: 2022-12-07

## 2022-12-05 RX ORDER — CYCLOBENZAPRINE HCL 10 MG
5 TABLET ORAL NIGHTLY PRN
Status: DISCONTINUED | OUTPATIENT
Start: 2022-12-05 | End: 2022-12-05

## 2022-12-05 RX ORDER — CLINDAMYCIN PHOSPHATE 900 MG/50ML
900 INJECTION INTRAVENOUS
Status: DISCONTINUED | OUTPATIENT
Start: 2022-12-06 | End: 2022-12-06

## 2022-12-05 RX ORDER — POLYETHYLENE GLYCOL 3350 17 G/17G
17 POWDER, FOR SOLUTION ORAL DAILY
Status: DISCONTINUED | OUTPATIENT
Start: 2022-12-05 | End: 2022-12-12 | Stop reason: HOSPADM

## 2022-12-05 RX ORDER — SODIUM CHLORIDE 0.9 % (FLUSH) 0.9 %
5-40 SYRINGE (ML) INJECTION EVERY 12 HOURS SCHEDULED
Status: DISCONTINUED | OUTPATIENT
Start: 2022-12-05 | End: 2022-12-12 | Stop reason: HOSPADM

## 2022-12-05 RX ORDER — HYDROCODONE BITARTRATE AND ACETAMINOPHEN 5; 325 MG/1; MG/1
1 TABLET ORAL EVERY 6 HOURS PRN
Status: DISCONTINUED | OUTPATIENT
Start: 2022-12-05 | End: 2022-12-09

## 2022-12-05 RX ORDER — LIDOCAINE HYDROCHLORIDE 10 MG/ML
20 INJECTION, SOLUTION INFILTRATION; PERINEURAL ONCE
Status: COMPLETED | OUTPATIENT
Start: 2022-12-05 | End: 2022-12-06

## 2022-12-05 RX ORDER — SENNA PLUS 8.6 MG/1
1 TABLET ORAL DAILY PRN
Status: DISCONTINUED | OUTPATIENT
Start: 2022-12-05 | End: 2022-12-12 | Stop reason: HOSPADM

## 2022-12-05 RX ORDER — MIDAZOLAM HYDROCHLORIDE 2 MG/2ML
2 INJECTION, SOLUTION INTRAMUSCULAR; INTRAVENOUS ONCE
Status: COMPLETED | OUTPATIENT
Start: 2022-12-05 | End: 2022-12-05

## 2022-12-05 RX ORDER — GABAPENTIN 600 MG/1
600 TABLET ORAL DAILY
Status: DISCONTINUED | OUTPATIENT
Start: 2022-12-05 | End: 2022-12-12 | Stop reason: HOSPADM

## 2022-12-05 RX ORDER — SODIUM CHLORIDE 9 MG/ML
INJECTION, SOLUTION INTRAVENOUS PRN
Status: DISCONTINUED | OUTPATIENT
Start: 2022-12-05 | End: 2022-12-12 | Stop reason: HOSPADM

## 2022-12-05 RX ORDER — FENTANYL CITRATE 50 UG/ML
50 INJECTION, SOLUTION INTRAMUSCULAR; INTRAVENOUS ONCE
Status: COMPLETED | OUTPATIENT
Start: 2022-12-05 | End: 2022-12-05

## 2022-12-05 RX ORDER — ATORVASTATIN CALCIUM 40 MG/1
40 TABLET, FILM COATED ORAL DAILY
Status: DISCONTINUED | OUTPATIENT
Start: 2022-12-05 | End: 2022-12-12 | Stop reason: HOSPADM

## 2022-12-05 RX ADMIN — SENNOSIDES 8.6 MG: 8.6 TABLET, COATED ORAL at 21:24

## 2022-12-05 RX ADMIN — HYDRALAZINE HYDROCHLORIDE 100 MG: 50 TABLET, FILM COATED ORAL at 21:24

## 2022-12-05 RX ADMIN — METOPROLOL TARTRATE 25 MG: 25 TABLET ORAL at 23:52

## 2022-12-05 RX ADMIN — FENTANYL CITRATE 50 MCG: 50 INJECTION, SOLUTION INTRAMUSCULAR; INTRAVENOUS at 20:45

## 2022-12-05 RX ADMIN — GENTAMICIN SULFATE 163.6 MG: 40 INJECTION, SOLUTION INTRAMUSCULAR; INTRAVENOUS at 20:06

## 2022-12-05 RX ADMIN — HYDROCODONE BITARTRATE AND ACETAMINOPHEN 1 TABLET: 5; 325 TABLET ORAL at 21:53

## 2022-12-05 RX ADMIN — ROPINIROLE HYDROCHLORIDE 0.25 MG: 0.25 TABLET, FILM COATED ORAL at 21:19

## 2022-12-05 RX ADMIN — MORPHINE SULFATE 4 MG: 4 INJECTION INTRAVENOUS at 18:49

## 2022-12-05 RX ADMIN — SODIUM CHLORIDE, PRESERVATIVE FREE 10 ML: 5 INJECTION INTRAVENOUS at 23:53

## 2022-12-05 RX ADMIN — CLINDAMYCIN PHOSPHATE 900 MG: 900 INJECTION, SOLUTION INTRAVENOUS at 21:18

## 2022-12-05 RX ADMIN — MIDAZOLAM HYDROCHLORIDE 2 MG: 1 INJECTION, SOLUTION INTRAMUSCULAR; INTRAVENOUS at 20:18

## 2022-12-05 RX ADMIN — METHOCARBAMOL TABLETS 750 MG: 750 TABLET, COATED ORAL at 21:24

## 2022-12-05 RX ADMIN — TETANUS TOXOID, REDUCED DIPHTHERIA TOXOID AND ACELLULAR PERTUSSIS VACCINE, ADSORBED 0.5 ML: 5; 2.5; 8; 8; 2.5 SUSPENSION INTRAMUSCULAR at 18:50

## 2022-12-05 RX ADMIN — FENTANYL CITRATE 100 MCG: 50 INJECTION, SOLUTION INTRAMUSCULAR; INTRAVENOUS at 20:18

## 2022-12-05 RX ADMIN — SODIUM CHLORIDE 1000 ML: 9 INJECTION, SOLUTION INTRAVENOUS at 23:50

## 2022-12-05 RX ADMIN — GABAPENTIN 600 MG: 600 TABLET ORAL at 21:19

## 2022-12-05 ASSESSMENT — ENCOUNTER SYMPTOMS
PHOTOPHOBIA: 0
SINUS PAIN: 0
ABDOMINAL PAIN: 0
NAUSEA: 0
VOMITING: 0
CHEST TIGHTNESS: 0
BACK PAIN: 1
WHEEZING: 0
SHORTNESS OF BREATH: 0
SINUS PRESSURE: 0

## 2022-12-05 ASSESSMENT — PAIN DESCRIPTION - LOCATION
LOCATION: ANKLE

## 2022-12-05 ASSESSMENT — PAIN SCALES - GENERAL
PAINLEVEL_OUTOF10: 9
PAINLEVEL_OUTOF10: 8
PAINLEVEL_OUTOF10: 7
PAINLEVEL_OUTOF10: 9
PAINLEVEL_OUTOF10: 9
PAINLEVEL_OUTOF10: 7
PAINLEVEL_OUTOF10: 8
PAINLEVEL_OUTOF10: 10
PAINLEVEL_OUTOF10: 7
PAINLEVEL_OUTOF10: 7

## 2022-12-05 ASSESSMENT — PAIN DESCRIPTION - DESCRIPTORS
DESCRIPTORS: ACHING
DESCRIPTORS: ACHING;THROBBING;STABBING
DESCRIPTORS: ACHING;SPASM;THROBBING
DESCRIPTORS: ACHING;SPASM;THROBBING
DESCRIPTORS: THROBBING;STABBING
DESCRIPTORS: SQUEEZING;ACHING

## 2022-12-05 ASSESSMENT — PAIN DESCRIPTION - ORIENTATION
ORIENTATION: RIGHT

## 2022-12-05 ASSESSMENT — PAIN - FUNCTIONAL ASSESSMENT
PAIN_FUNCTIONAL_ASSESSMENT: INTOLERABLE, UNABLE TO DO ANY ACTIVE OR PASSIVE ACTIVITIES
PAIN_FUNCTIONAL_ASSESSMENT: ACTIVITIES ARE NOT PREVENTED
PAIN_FUNCTIONAL_ASSESSMENT: PREVENTS OR INTERFERES WITH ALL ACTIVE AND SOME PASSIVE ACTIVITIES
PAIN_FUNCTIONAL_ASSESSMENT: PREVENTS OR INTERFERES WITH ALL ACTIVE AND SOME PASSIVE ACTIVITIES
PAIN_FUNCTIONAL_ASSESSMENT: 0-10
PAIN_FUNCTIONAL_ASSESSMENT: PREVENTS OR INTERFERES WITH ALL ACTIVE AND SOME PASSIVE ACTIVITIES
PAIN_FUNCTIONAL_ASSESSMENT: NONE - DENIES PAIN

## 2022-12-05 ASSESSMENT — PAIN DESCRIPTION - PAIN TYPE
TYPE: ACUTE PAIN

## 2022-12-05 ASSESSMENT — PAIN DESCRIPTION - FREQUENCY: FREQUENCY: INTERMITTENT

## 2022-12-05 NOTE — ED PROVIDER NOTES
[facility] ED     Emergency Department     Faculty Attestation    I performed a history and physical examination of the patient and discussed management with the resident. I reviewed the residents note and agree with the documented findings and plan of care. Any areas of disagreement are noted on the chart. I was personally present for the key portions of any procedures. I have documented in the chart those procedures where I was not present during the key portions. I have reviewed the emergency nurses triage note. I agree with the chief complaint, past medical history, past surgical history, allergies, medications, social and family history as documented unless otherwise noted below. For Physician Assistant/ Nurse Practitioner cases/documentation I have personally evaluated this patient and have completed at least one if not all key elements of the E/M (history, physical exam, and MDM). Additional findings are as noted. Patient with history of A. fib for which she takes Eliquis presents with what appears to be an open right ankle fracture after a fall. She says she fell off a curb. She denies hitting her head or having any loss of consciousness. She denies any other pain. On exam, patient is lying in the bed and appears uncomfortable. There is a deformity of the right ankle with bleeding from the medial ankle. Distal sensation is intact. Lungs are clear to auscultation bilaterally. There is no tenderness palpation of the chest or the abdomen. There is no cervical midline tenderness. We will get x-rays of the right lower extremity. We will consult trauma surgery and orthopedic surgery.       Amparo Grider MD  Attending Emergency  Physician            Consuelo Ayon MD  12/05/22 [id number]

## 2022-12-05 NOTE — ED TRIAGE NOTES
Pt was brought in by LS 1, Pt was at [facility], and stepped wrong getting of of her car.  Pt usually uses a rolling walker   Denies LOC, or hitting her head   LS 1 gave her 50 mcg of Fentanyl @ 1747  Vitals reported /130, Hr 60, 98 % RA  Reported to be on Eliquis BID   18 g PIV access in placed, Pt A/O x 4 talking in full sentences, neuro intact PPP, and able to feel on right ankle

## 2022-12-05 NOTE — ED NOTES
The following labs were labeled with appropriate pt sticker and tubed to lab:     [x] Blue     [x] Lavender   [] on ice  [x] Green/yellow  [x] Green/black [x] on ice  [] Scherrie Flattery  [] on ice  [] Yellow  [] Red  [x] Type/ Screen  [] ABG  [] VBG    [] COVID-19 swab    [] Rapid  [] PCR  [] Flu swab  [] Peds Viral Panel     [] Urine Sample  [] Fecal Sample  [] Pelvic Cultures  [] Blood Cultures  [] X 2  [] STREP Cultures         Vanessa Roman RN  12/05/22 [id number]

## 2022-12-06 ENCOUNTER — APPOINTMENT (OUTPATIENT)
Dept: GENERAL RADIOLOGY | Age: 77
DRG: 493 | End: 2022-12-06
Payer: MEDICARE

## 2022-12-06 ENCOUNTER — ANESTHESIA EVENT (OUTPATIENT)
Dept: OPERATING ROOM | Age: 77
End: 2022-12-06
Payer: MEDICARE

## 2022-12-06 ENCOUNTER — ANESTHESIA (OUTPATIENT)
Dept: OPERATING ROOM | Age: 77
End: 2022-12-06
Payer: MEDICARE

## 2022-12-06 LAB
ABSOLUTE EOS #: 0 K/UL (ref 0–0.4)
ABSOLUTE IMMATURE GRANULOCYTE: 0 K/UL (ref 0–0.3)
ABSOLUTE LYMPH #: 0.46 K/UL (ref 1–4.8)
ABSOLUTE MONO #: 0.31 K/UL (ref 0.1–0.8)
ALBUMIN SERPL-MCNC: 3.6 G/DL (ref 3.5–5.2)
ANION GAP SERPL CALCULATED.3IONS-SCNC: 12 MMOL/L (ref 9–17)
ANION GAP SERPL CALCULATED.3IONS-SCNC: 12 MMOL/L (ref 9–17)
BASOPHILS # BLD: 0 % (ref 0–2)
BASOPHILS ABSOLUTE: 0 K/UL (ref 0–0.2)
BUN BLDV-MCNC: 57 MG/DL (ref 8–23)
BUN BLDV-MCNC: 57 MG/DL (ref 8–23)
CALCIUM SERPL-MCNC: 8.6 MG/DL (ref 8.6–10.4)
CALCIUM SERPL-MCNC: 9.3 MG/DL (ref 8.6–10.4)
CHLORIDE BLD-SCNC: 102 MMOL/L (ref 98–107)
CHLORIDE BLD-SCNC: 104 MMOL/L (ref 98–107)
CO2: 18 MMOL/L (ref 20–31)
CO2: 22 MMOL/L (ref 20–31)
CREAT SERPL-MCNC: 3.52 MG/DL (ref 0.5–0.9)
CREAT SERPL-MCNC: 3.57 MG/DL (ref 0.5–0.9)
CREATININE URINE: 55.1 MG/DL (ref 28–217)
EKG ATRIAL RATE: 66 BPM
EKG P AXIS: 101 DEGREES
EKG P-R INTERVAL: 182 MS
EKG Q-T INTERVAL: 422 MS
EKG QRS DURATION: 98 MS
EKG QTC CALCULATION (BAZETT): 442 MS
EKG R AXIS: 14 DEGREES
EKG T AXIS: 49 DEGREES
EKG VENTRICULAR RATE: 66 BPM
EOSINOPHILS RELATIVE PERCENT: 0 % (ref 1–4)
ESTIMATED AVERAGE GLUCOSE: 120 MG/DL
FOLATE: 5.4 NG/ML
GFR SERPL CREATININE-BSD FRML MDRD: 13 ML/MIN/1.73M2
GFR SERPL CREATININE-BSD FRML MDRD: 13 ML/MIN/1.73M2
GLUCOSE BLD-MCNC: 113 MG/DL (ref 65–105)
GLUCOSE BLD-MCNC: 116 MG/DL (ref 65–105)
GLUCOSE BLD-MCNC: 129 MG/DL (ref 70–99)
GLUCOSE BLD-MCNC: 202 MG/DL (ref 65–105)
GLUCOSE BLD-MCNC: 228 MG/DL (ref 65–105)
GLUCOSE BLD-MCNC: 239 MG/DL (ref 70–99)
HBA1C MFR BLD: 5.8 % (ref 4–6)
HCT VFR BLD CALC: 30.2 % (ref 36.3–47.1)
HCT VFR BLD CALC: 31.9 % (ref 36.3–47.1)
HEMOGLOBIN: 9.3 G/DL (ref 11.9–15.1)
HEMOGLOBIN: 9.4 G/DL (ref 11.9–15.1)
IMMATURE GRANULOCYTES: 0 %
LYMPHOCYTES # BLD: 3 % (ref 24–44)
MCH RBC QN AUTO: 30.2 PG (ref 25.2–33.5)
MCH RBC QN AUTO: 31 PG (ref 25.2–33.5)
MCHC RBC AUTO-ENTMCNC: 29.5 G/DL (ref 28.4–34.8)
MCHC RBC AUTO-ENTMCNC: 30.8 G/DL (ref 28.4–34.8)
MCV RBC AUTO: 100.7 FL (ref 82.6–102.9)
MCV RBC AUTO: 102.6 FL (ref 82.6–102.9)
MONOCYTES # BLD: 2 % (ref 1–7)
MORPHOLOGY: ABNORMAL
NRBC AUTOMATED: 0 PER 100 WBC
NRBC AUTOMATED: 0 PER 100 WBC
PDW BLD-RTO: 16 % (ref 11.8–14.4)
PDW BLD-RTO: 16.4 % (ref 11.8–14.4)
PLATELET # BLD: 366 K/UL (ref 138–453)
PLATELET # BLD: 370 K/UL (ref 138–453)
PMV BLD AUTO: 10.7 FL (ref 8.1–13.5)
PMV BLD AUTO: 10.8 FL (ref 8.1–13.5)
POTASSIUM SERPL-SCNC: 5.5 MMOL/L (ref 3.7–5.3)
POTASSIUM SERPL-SCNC: 5.7 MMOL/L (ref 3.7–5.3)
POTASSIUM SERPL-SCNC: 5.9 MMOL/L (ref 3.7–5.3)
RBC # BLD: 3 M/UL (ref 3.95–5.11)
RBC # BLD: 3.11 M/UL (ref 3.95–5.11)
SEG NEUTROPHILS: 95 % (ref 36–66)
SEGMENTED NEUTROPHILS ABSOLUTE COUNT: 14.63 K/UL (ref 1.8–7.7)
SODIUM BLD-SCNC: 132 MMOL/L (ref 135–144)
SODIUM BLD-SCNC: 138 MMOL/L (ref 135–144)
THYROXINE, FREE: 1.26 NG/DL (ref 0.93–1.7)
TSH SERPL DL<=0.05 MIU/L-ACNC: 5.06 UIU/ML (ref 0.3–5)
UREA NITROGEN, UR: 483 MG/DL
VITAMIN B-12: 592 PG/ML (ref 232–1245)
WBC # BLD: 13.3 K/UL (ref 3.5–11.3)
WBC # BLD: 15.4 K/UL (ref 3.5–11.3)

## 2022-12-06 PROCEDURE — C1776 JOINT DEVICE (IMPLANTABLE): HCPCS | Performed by: STUDENT IN AN ORGANIZED HEALTH CARE EDUCATION/TRAINING PROGRAM

## 2022-12-06 PROCEDURE — 3700000001 HC ADD 15 MINUTES (ANESTHESIA): Performed by: STUDENT IN AN ORGANIZED HEALTH CARE EDUCATION/TRAINING PROGRAM

## 2022-12-06 PROCEDURE — 6360000002 HC RX W HCPCS

## 2022-12-06 PROCEDURE — 6370000000 HC RX 637 (ALT 250 FOR IP)

## 2022-12-06 PROCEDURE — 27823 TREATMENT OF ANKLE FRACTURE: CPT | Performed by: STUDENT IN AN ORGANIZED HEALTH CARE EDUCATION/TRAINING PROGRAM

## 2022-12-06 PROCEDURE — 82607 VITAMIN B-12: CPT

## 2022-12-06 PROCEDURE — 2500000003 HC RX 250 WO HCPCS

## 2022-12-06 PROCEDURE — 2500000003 HC RX 250 WO HCPCS: Performed by: STUDENT IN AN ORGANIZED HEALTH CARE EDUCATION/TRAINING PROGRAM

## 2022-12-06 PROCEDURE — 36415 COLL VENOUS BLD VENIPUNCTURE: CPT

## 2022-12-06 PROCEDURE — 80048 BASIC METABOLIC PNL TOTAL CA: CPT

## 2022-12-06 PROCEDURE — 6370000000 HC RX 637 (ALT 250 FOR IP): Performed by: STUDENT IN AN ORGANIZED HEALTH CARE EDUCATION/TRAINING PROGRAM

## 2022-12-06 PROCEDURE — 3700000000 HC ANESTHESIA ATTENDED CARE: Performed by: STUDENT IN AN ORGANIZED HEALTH CARE EDUCATION/TRAINING PROGRAM

## 2022-12-06 PROCEDURE — 13122 CMPLX RPR S/A/L ADDL 5 CM/>: CPT | Performed by: STUDENT IN AN ORGANIZED HEALTH CARE EDUCATION/TRAINING PROGRAM

## 2022-12-06 PROCEDURE — 82570 ASSAY OF URINE CREATININE: CPT

## 2022-12-06 PROCEDURE — 11012 DEB SKIN BONE AT FX SITE: CPT | Performed by: STUDENT IN AN ORGANIZED HEALTH CARE EDUCATION/TRAINING PROGRAM

## 2022-12-06 PROCEDURE — 85025 COMPLETE CBC W/AUTO DIFF WBC: CPT

## 2022-12-06 PROCEDURE — 2500000003 HC RX 250 WO HCPCS: Performed by: NURSE ANESTHETIST, CERTIFIED REGISTERED

## 2022-12-06 PROCEDURE — 3209999900 FLUORO FOR SURGICAL PROCEDURES

## 2022-12-06 PROCEDURE — 99222 1ST HOSP IP/OBS MODERATE 55: CPT | Performed by: INTERNAL MEDICINE

## 2022-12-06 PROCEDURE — 2580000003 HC RX 258: Performed by: STUDENT IN AN ORGANIZED HEALTH CARE EDUCATION/TRAINING PROGRAM

## 2022-12-06 PROCEDURE — 6360000002 HC RX W HCPCS: Performed by: ANESTHESIOLOGY

## 2022-12-06 PROCEDURE — 7100000000 HC PACU RECOVERY - FIRST 15 MIN: Performed by: STUDENT IN AN ORGANIZED HEALTH CARE EDUCATION/TRAINING PROGRAM

## 2022-12-06 PROCEDURE — 3600000014 HC SURGERY LEVEL 4 ADDTL 15MIN: Performed by: STUDENT IN AN ORGANIZED HEALTH CARE EDUCATION/TRAINING PROGRAM

## 2022-12-06 PROCEDURE — 6360000002 HC RX W HCPCS: Performed by: NURSE ANESTHETIST, CERTIFIED REGISTERED

## 2022-12-06 PROCEDURE — 73610 X-RAY EXAM OF ANKLE: CPT

## 2022-12-06 PROCEDURE — 1200000000 HC SEMI PRIVATE

## 2022-12-06 PROCEDURE — 28445 OPTX TALUS FRACTURE: CPT | Performed by: STUDENT IN AN ORGANIZED HEALTH CARE EDUCATION/TRAINING PROGRAM

## 2022-12-06 PROCEDURE — 84439 ASSAY OF FREE THYROXINE: CPT

## 2022-12-06 PROCEDURE — 77071 MNL APPL STRS JT RADIOGRAPHY: CPT | Performed by: STUDENT IN AN ORGANIZED HEALTH CARE EDUCATION/TRAINING PROGRAM

## 2022-12-06 PROCEDURE — 84443 ASSAY THYROID STIM HORMONE: CPT

## 2022-12-06 PROCEDURE — 2580000003 HC RX 258

## 2022-12-06 PROCEDURE — 2580000003 HC RX 258: Performed by: NURSE ANESTHETIST, CERTIFIED REGISTERED

## 2022-12-06 PROCEDURE — 93010 ELECTROCARDIOGRAM REPORT: CPT | Performed by: INTERNAL MEDICINE

## 2022-12-06 PROCEDURE — 82040 ASSAY OF SERUM ALBUMIN: CPT

## 2022-12-06 PROCEDURE — 13121 CMPLX RPR S/A/L 2.6-7.5 CM: CPT | Performed by: STUDENT IN AN ORGANIZED HEALTH CARE EDUCATION/TRAINING PROGRAM

## 2022-12-06 PROCEDURE — 3600000004 HC SURGERY LEVEL 4 BASE: Performed by: STUDENT IN AN ORGANIZED HEALTH CARE EDUCATION/TRAINING PROGRAM

## 2022-12-06 PROCEDURE — 7100000001 HC PACU RECOVERY - ADDTL 15 MIN: Performed by: STUDENT IN AN ORGANIZED HEALTH CARE EDUCATION/TRAINING PROGRAM

## 2022-12-06 PROCEDURE — 93308 TTE F-UP OR LMTD: CPT

## 2022-12-06 PROCEDURE — 85027 COMPLETE CBC AUTOMATED: CPT

## 2022-12-06 PROCEDURE — 84132 ASSAY OF SERUM POTASSIUM: CPT

## 2022-12-06 PROCEDURE — 0QSJ04Z REPOSITION RIGHT FIBULA WITH INTERNAL FIXATION DEVICE, OPEN APPROACH: ICD-10-PCS | Performed by: STUDENT IN AN ORGANIZED HEALTH CARE EDUCATION/TRAINING PROGRAM

## 2022-12-06 PROCEDURE — 84540 ASSAY OF URINE/UREA-N: CPT

## 2022-12-06 PROCEDURE — 83036 HEMOGLOBIN GLYCOSYLATED A1C: CPT

## 2022-12-06 PROCEDURE — 94761 N-INVAS EAR/PLS OXIMETRY MLT: CPT

## 2022-12-06 PROCEDURE — 0QSG04Z REPOSITION RIGHT TIBIA WITH INTERNAL FIXATION DEVICE, OPEN APPROACH: ICD-10-PCS | Performed by: STUDENT IN AN ORGANIZED HEALTH CARE EDUCATION/TRAINING PROGRAM

## 2022-12-06 PROCEDURE — 82746 ASSAY OF FOLIC ACID SERUM: CPT

## 2022-12-06 PROCEDURE — 6360000002 HC RX W HCPCS: Performed by: STUDENT IN AN ORGANIZED HEALTH CARE EDUCATION/TRAINING PROGRAM

## 2022-12-06 PROCEDURE — 2709999900 HC NON-CHARGEABLE SUPPLY: Performed by: STUDENT IN AN ORGANIZED HEALTH CARE EDUCATION/TRAINING PROGRAM

## 2022-12-06 PROCEDURE — 2720000010 HC SURG SUPPLY STERILE: Performed by: STUDENT IN AN ORGANIZED HEALTH CARE EDUCATION/TRAINING PROGRAM

## 2022-12-06 PROCEDURE — C1713 ANCHOR/SCREW BN/BN,TIS/BN: HCPCS | Performed by: STUDENT IN AN ORGANIZED HEALTH CARE EDUCATION/TRAINING PROGRAM

## 2022-12-06 PROCEDURE — 82947 ASSAY GLUCOSE BLOOD QUANT: CPT

## 2022-12-06 DEVICE — EVOS 3.5MM X 10MM CORTEX SCREW SELF-TAPPING
Type: IMPLANTABLE DEVICE | Site: ANKLE | Status: FUNCTIONAL
Brand: EVOS

## 2022-12-06 DEVICE — EVOS 2.7MM/3.5MM POSTEROLATERAL                                    DISTAL FIBULA PLATE 7 HOLE RIGHT 115MM
Type: IMPLANTABLE DEVICE | Site: ANKLE | Status: FUNCTIONAL
Brand: EVOS

## 2022-12-06 DEVICE — EVOS 2.7MM X 30MM LOCKING SCREW T8 SELF-TAPPING
Type: IMPLANTABLE DEVICE | Site: ANKLE | Status: FUNCTIONAL
Brand: EVOS

## 2022-12-06 DEVICE — EVOS 2.7MM X 28MM CORTEX SCREW T8 SELF-TAPPING
Type: IMPLANTABLE DEVICE | Site: ANKLE | Status: FUNCTIONAL
Brand: EVOS

## 2022-12-06 DEVICE — 1.6MM DRILL TIP WIRE 150MM
Type: IMPLANTABLE DEVICE | Site: ANKLE | Status: FUNCTIONAL
Brand: VLP FOOT

## 2022-12-06 DEVICE — EVOS 2.7MM X 10MM LOCKING SCREW T8 SELF-TAPPING
Type: IMPLANTABLE DEVICE | Site: ANKLE | Status: FUNCTIONAL
Brand: EVOS

## 2022-12-06 DEVICE — EVOS 2.7MM X 20MM LOCKING SCREW T8 SELF-TAPPING
Type: IMPLANTABLE DEVICE | Site: ANKLE | Status: FUNCTIONAL
Brand: EVOS

## 2022-12-06 DEVICE — EVOS 2.7MM X 14MM LOCKING SCREW T8 SELF-TAPPING
Type: IMPLANTABLE DEVICE | Site: ANKLE | Status: FUNCTIONAL
Brand: EVOS

## 2022-12-06 DEVICE — EVOS 3.5MM X 34MM LOCKING SCREW SELF-TAPPING
Type: IMPLANTABLE DEVICE | Site: ANKLE | Status: FUNCTIONAL
Brand: EVOS

## 2022-12-06 DEVICE — EVOS 2.7MM STRAIGHT TINE PLATE 7 HOLE
Type: IMPLANTABLE DEVICE | Site: ANKLE | Status: FUNCTIONAL
Brand: EVOS

## 2022-12-06 DEVICE — EVOS 3.5MM X 36MM LOCKING SCREW SELF-TAPPING
Type: IMPLANTABLE DEVICE | Site: ANKLE | Status: FUNCTIONAL
Brand: EVOS

## 2022-12-06 DEVICE — EVOS 2.7MM X 7MM LOCKING SCREW T8 SELF-TAPPING
Type: IMPLANTABLE DEVICE | Site: ANKLE | Status: FUNCTIONAL
Brand: EVOS

## 2022-12-06 DEVICE — EVOS 4.7MM X 26MM OSTEOPENIA SCREW                                    FULLY THREADED
Type: IMPLANTABLE DEVICE | Site: ANKLE | Status: FUNCTIONAL
Brand: EVOS

## 2022-12-06 DEVICE — EVOS 2.7MM X 12MM LOCKING SCREW T8 SELF-TAPPING
Type: IMPLANTABLE DEVICE | Site: ANKLE | Status: FUNCTIONAL
Brand: EVOS

## 2022-12-06 DEVICE — EVOS 2.7MM X 22MM LOCKING SCREW T8 SELF-TAPPING
Type: IMPLANTABLE DEVICE | Site: ANKLE | Status: FUNCTIONAL
Brand: EVOS

## 2022-12-06 DEVICE — EVOS 3.5MM X 26MM CORTEX SCREW SELF-TAPPING
Type: IMPLANTABLE DEVICE | Site: ANKLE | Status: FUNCTIONAL
Brand: EVOS

## 2022-12-06 DEVICE — EVOS 3.5MM PARTIAL ARTICULAR                                    POSTERIOR DISTAL TIBIA PLATE 3 HOLE                                    RIGHT 63MM
Type: IMPLANTABLE DEVICE | Site: ANKLE | Status: FUNCTIONAL
Brand: EVOS

## 2022-12-06 DEVICE — EVOS 2.7MM X 18MM LOCKING SCREW T8 SELF-TAPPING
Type: IMPLANTABLE DEVICE | Site: ANKLE | Status: FUNCTIONAL
Brand: EVOS

## 2022-12-06 DEVICE — EVOS 3.5MM X 10MM LOCKING SCREW SELF-TAPPING
Type: IMPLANTABLE DEVICE | Site: ANKLE | Status: FUNCTIONAL
Brand: EVOS

## 2022-12-06 DEVICE — EVOS 2.7MM X 60MM LOCKING SCREW T8 SELF-TAPPING
Type: IMPLANTABLE DEVICE | Site: ANKLE | Status: FUNCTIONAL
Brand: EVOS

## 2022-12-06 DEVICE — EVOS 2.7MM X 34MM CORTEX SCREW T8 SELF-TAPPING
Type: IMPLANTABLE DEVICE | Site: ANKLE | Status: FUNCTIONAL
Brand: EVOS

## 2022-12-06 DEVICE — EVOS 3.5MM X 24MM LOCKING SCREW SELF-TAPPING
Type: IMPLANTABLE DEVICE | Site: ANKLE | Status: FUNCTIONAL
Brand: EVOS

## 2022-12-06 RX ORDER — TRANEXAMIC ACID 10 MG/ML
INJECTION, SOLUTION INTRAVENOUS PRN
Status: DISCONTINUED | OUTPATIENT
Start: 2022-12-06 | End: 2022-12-06 | Stop reason: SDUPTHER

## 2022-12-06 RX ORDER — MIDAZOLAM HYDROCHLORIDE 2 MG/2ML
0.5 INJECTION, SOLUTION INTRAMUSCULAR; INTRAVENOUS 4 TIMES DAILY PRN
Status: DISCONTINUED | OUTPATIENT
Start: 2022-12-06 | End: 2022-12-07

## 2022-12-06 RX ORDER — LIDOCAINE HYDROCHLORIDE 10 MG/ML
INJECTION, SOLUTION EPIDURAL; INFILTRATION; INTRACAUDAL; PERINEURAL PRN
Status: DISCONTINUED | OUTPATIENT
Start: 2022-12-06 | End: 2022-12-06 | Stop reason: SDUPTHER

## 2022-12-06 RX ORDER — TOBRAMYCIN 1.2 G/30ML
INJECTION, POWDER, LYOPHILIZED, FOR SOLUTION INTRAVENOUS PRN
Status: DISCONTINUED | OUTPATIENT
Start: 2022-12-06 | End: 2022-12-06 | Stop reason: ALTCHOICE

## 2022-12-06 RX ORDER — PHENYLEPHRINE HCL IN 0.9% NACL 1 MG/10 ML
SYRINGE (ML) INTRAVENOUS PRN
Status: DISCONTINUED | OUTPATIENT
Start: 2022-12-06 | End: 2022-12-06 | Stop reason: SDUPTHER

## 2022-12-06 RX ORDER — VANCOMYCIN HYDROCHLORIDE 1 G/20ML
INJECTION, POWDER, LYOPHILIZED, FOR SOLUTION INTRAVENOUS PRN
Status: DISCONTINUED | OUTPATIENT
Start: 2022-12-06 | End: 2022-12-06 | Stop reason: ALTCHOICE

## 2022-12-06 RX ORDER — SODIUM CHLORIDE 0.9 % (FLUSH) 0.9 %
5-40 SYRINGE (ML) INJECTION PRN
Status: DISCONTINUED | OUTPATIENT
Start: 2022-12-06 | End: 2022-12-06 | Stop reason: HOSPADM

## 2022-12-06 RX ORDER — ROCURONIUM BROMIDE 10 MG/ML
INJECTION, SOLUTION INTRAVENOUS PRN
Status: DISCONTINUED | OUTPATIENT
Start: 2022-12-06 | End: 2022-12-06 | Stop reason: SDUPTHER

## 2022-12-06 RX ORDER — SODIUM CHLORIDE, SODIUM LACTATE, POTASSIUM CHLORIDE, CALCIUM CHLORIDE 600; 310; 30; 20 MG/100ML; MG/100ML; MG/100ML; MG/100ML
INJECTION, SOLUTION INTRAVENOUS CONTINUOUS PRN
Status: DISCONTINUED | OUTPATIENT
Start: 2022-12-06 | End: 2022-12-06 | Stop reason: SDUPTHER

## 2022-12-06 RX ORDER — EPHEDRINE SULFATE/0.9% NACL/PF 50 MG/5 ML
SYRINGE (ML) INTRAVENOUS PRN
Status: DISCONTINUED | OUTPATIENT
Start: 2022-12-06 | End: 2022-12-06 | Stop reason: SDUPTHER

## 2022-12-06 RX ORDER — ONDANSETRON 2 MG/ML
4 INJECTION INTRAMUSCULAR; INTRAVENOUS
Status: DISCONTINUED | OUTPATIENT
Start: 2022-12-06 | End: 2022-12-06 | Stop reason: HOSPADM

## 2022-12-06 RX ORDER — PROPOFOL 10 MG/ML
INJECTION, EMULSION INTRAVENOUS PRN
Status: DISCONTINUED | OUTPATIENT
Start: 2022-12-06 | End: 2022-12-06 | Stop reason: SDUPTHER

## 2022-12-06 RX ORDER — ONDANSETRON 2 MG/ML
INJECTION INTRAMUSCULAR; INTRAVENOUS PRN
Status: DISCONTINUED | OUTPATIENT
Start: 2022-12-06 | End: 2022-12-06 | Stop reason: SDUPTHER

## 2022-12-06 RX ORDER — DEXAMETHASONE SODIUM PHOSPHATE 10 MG/ML
INJECTION INTRAMUSCULAR; INTRAVENOUS PRN
Status: DISCONTINUED | OUTPATIENT
Start: 2022-12-06 | End: 2022-12-06 | Stop reason: SDUPTHER

## 2022-12-06 RX ORDER — CLINDAMYCIN PHOSPHATE 900 MG/50ML
900 INJECTION INTRAVENOUS EVERY 8 HOURS
Status: DISPENSED | OUTPATIENT
Start: 2022-12-06 | End: 2022-12-07

## 2022-12-06 RX ORDER — HYDRALAZINE HYDROCHLORIDE 20 MG/ML
10 INJECTION INTRAMUSCULAR; INTRAVENOUS ONCE
Status: COMPLETED | OUTPATIENT
Start: 2022-12-06 | End: 2022-12-06

## 2022-12-06 RX ORDER — FENTANYL CITRATE 50 UG/ML
INJECTION, SOLUTION INTRAMUSCULAR; INTRAVENOUS PRN
Status: DISCONTINUED | OUTPATIENT
Start: 2022-12-06 | End: 2022-12-06 | Stop reason: SDUPTHER

## 2022-12-06 RX ORDER — CEFAZOLIN SODIUM 1 G/3ML
INJECTION, POWDER, FOR SOLUTION INTRAMUSCULAR; INTRAVENOUS PRN
Status: DISCONTINUED | OUTPATIENT
Start: 2022-12-06 | End: 2022-12-06 | Stop reason: SDUPTHER

## 2022-12-06 RX ORDER — DEXTROSE MONOHYDRATE 100 MG/ML
INJECTION, SOLUTION INTRAVENOUS CONTINUOUS PRN
Status: DISCONTINUED | OUTPATIENT
Start: 2022-12-06 | End: 2022-12-07

## 2022-12-06 RX ORDER — MAGNESIUM HYDROXIDE 1200 MG/15ML
LIQUID ORAL CONTINUOUS PRN
Status: COMPLETED | OUTPATIENT
Start: 2022-12-06 | End: 2022-12-06

## 2022-12-06 RX ORDER — FENTANYL CITRATE 50 UG/ML
25 INJECTION, SOLUTION INTRAMUSCULAR; INTRAVENOUS ONCE
Status: COMPLETED | OUTPATIENT
Start: 2022-12-06 | End: 2022-12-06

## 2022-12-06 RX ORDER — LABETALOL HYDROCHLORIDE 5 MG/ML
10 INJECTION, SOLUTION INTRAVENOUS ONCE
Status: DISCONTINUED | OUTPATIENT
Start: 2022-12-06 | End: 2022-12-06

## 2022-12-06 RX ORDER — SODIUM CHLORIDE 0.9 % (FLUSH) 0.9 %
5-40 SYRINGE (ML) INJECTION EVERY 12 HOURS SCHEDULED
Status: DISCONTINUED | OUTPATIENT
Start: 2022-12-06 | End: 2022-12-06 | Stop reason: HOSPADM

## 2022-12-06 RX ORDER — HYDRALAZINE HYDROCHLORIDE 20 MG/ML
10 INJECTION INTRAMUSCULAR; INTRAVENOUS
Status: DISCONTINUED | OUTPATIENT
Start: 2022-12-06 | End: 2022-12-06 | Stop reason: HOSPADM

## 2022-12-06 RX ORDER — SODIUM CHLORIDE 9 MG/ML
INJECTION, SOLUTION INTRAVENOUS PRN
Status: DISCONTINUED | OUTPATIENT
Start: 2022-12-06 | End: 2022-12-06 | Stop reason: HOSPADM

## 2022-12-06 RX ADMIN — DESMOPRESSIN ACETATE 40 MG: 0.2 TABLET ORAL at 10:05

## 2022-12-06 RX ADMIN — Medication 200 MCG: at 15:14

## 2022-12-06 RX ADMIN — INSULIN HUMAN 10 UNITS: 100 INJECTION, SOLUTION PARENTERAL at 14:12

## 2022-12-06 RX ADMIN — METHOCARBAMOL TABLETS 750 MG: 750 TABLET, COATED ORAL at 21:57

## 2022-12-06 RX ADMIN — HYDROMORPHONE HYDROCHLORIDE 0.5 MG: 1 INJECTION, SOLUTION INTRAMUSCULAR; INTRAVENOUS; SUBCUTANEOUS at 19:58

## 2022-12-06 RX ADMIN — FENTANYL CITRATE 25 MCG: 50 INJECTION, SOLUTION INTRAMUSCULAR; INTRAVENOUS at 21:25

## 2022-12-06 RX ADMIN — Medication 200 MCG: at 18:54

## 2022-12-06 RX ADMIN — Medication 10 MG: at 17:17

## 2022-12-06 RX ADMIN — HYDROCODONE BITARTRATE AND ACETAMINOPHEN 1 TABLET: 5; 325 TABLET ORAL at 04:08

## 2022-12-06 RX ADMIN — FENTANYL CITRATE 25 MCG: 50 INJECTION, SOLUTION INTRAMUSCULAR; INTRAVENOUS at 17:23

## 2022-12-06 RX ADMIN — HYDRALAZINE HYDROCHLORIDE 10 MG: 20 INJECTION INTRAMUSCULAR; INTRAVENOUS at 20:00

## 2022-12-06 RX ADMIN — GABAPENTIN 600 MG: 600 TABLET ORAL at 10:05

## 2022-12-06 RX ADMIN — PROPOFOL 120 MG: 10 INJECTION, EMULSION INTRAVENOUS at 15:26

## 2022-12-06 RX ADMIN — Medication 100 MCG: at 18:35

## 2022-12-06 RX ADMIN — MIDAZOLAM 2 MG: 1 INJECTION INTRAMUSCULAR; INTRAVENOUS at 15:11

## 2022-12-06 RX ADMIN — ROCURONIUM BROMIDE 10 MG: 10 INJECTION, SOLUTION INTRAVENOUS at 16:50

## 2022-12-06 RX ADMIN — FENTANYL CITRATE 50 MCG: 50 INJECTION, SOLUTION INTRAMUSCULAR; INTRAVENOUS at 17:50

## 2022-12-06 RX ADMIN — ROCURONIUM BROMIDE 50 MG: 10 INJECTION, SOLUTION INTRAVENOUS at 15:26

## 2022-12-06 RX ADMIN — FENTANYL CITRATE 50 MCG: 50 INJECTION, SOLUTION INTRAMUSCULAR; INTRAVENOUS at 15:26

## 2022-12-06 RX ADMIN — SODIUM CHLORIDE, SODIUM LACTATE, POTASSIUM CHLORIDE, CALCIUM CHLORIDE: 600; 310; 30; 20 INJECTION, SOLUTION INTRAVENOUS at 15:39

## 2022-12-06 RX ADMIN — LIDOCAINE HYDROCHLORIDE 50 MG: 10 INJECTION, SOLUTION EPIDURAL; INFILTRATION; INTRACAUDAL; PERINEURAL at 15:26

## 2022-12-06 RX ADMIN — METOPROLOL TARTRATE 25 MG: 25 TABLET ORAL at 20:48

## 2022-12-06 RX ADMIN — SODIUM CHLORIDE, POTASSIUM CHLORIDE, SODIUM LACTATE AND CALCIUM CHLORIDE: 600; 310; 30; 20 INJECTION, SOLUTION INTRAVENOUS at 18:30

## 2022-12-06 RX ADMIN — SUGAMMADEX 200 MG: 100 INJECTION, SOLUTION INTRAVENOUS at 19:28

## 2022-12-06 RX ADMIN — Medication 200 MCG: at 18:44

## 2022-12-06 RX ADMIN — Medication 5 MG: at 16:30

## 2022-12-06 RX ADMIN — ROCURONIUM BROMIDE 10 MG: 10 INJECTION, SOLUTION INTRAVENOUS at 17:57

## 2022-12-06 RX ADMIN — SODIUM CHLORIDE: 9 INJECTION, SOLUTION INTRAVENOUS at 20:57

## 2022-12-06 RX ADMIN — HYDRALAZINE HYDROCHLORIDE 10 MG: 20 INJECTION INTRAMUSCULAR; INTRAVENOUS at 04:07

## 2022-12-06 RX ADMIN — DEXTROSE MONOHYDRATE 250 ML: 100 INJECTION, SOLUTION INTRAVENOUS at 14:01

## 2022-12-06 RX ADMIN — FENTANYL CITRATE 25 MCG: 50 INJECTION, SOLUTION INTRAMUSCULAR; INTRAVENOUS at 15:44

## 2022-12-06 RX ADMIN — FENTANYL CITRATE 25 MCG: 50 INJECTION, SOLUTION INTRAMUSCULAR; INTRAVENOUS at 17:38

## 2022-12-06 RX ADMIN — HYDROCODONE BITARTRATE AND ACETAMINOPHEN 1 TABLET: 5; 325 TABLET ORAL at 09:58

## 2022-12-06 RX ADMIN — CEFAZOLIN 2 G: 1 INJECTION, POWDER, FOR SOLUTION INTRAMUSCULAR; INTRAVENOUS at 15:40

## 2022-12-06 RX ADMIN — Medication 10 MG: at 18:29

## 2022-12-06 RX ADMIN — ONDANSETRON 4 MG: 2 INJECTION INTRAMUSCULAR; INTRAVENOUS at 19:03

## 2022-12-06 RX ADMIN — PANTOPRAZOLE SODIUM 40 MG: 40 TABLET, DELAYED RELEASE ORAL at 06:48

## 2022-12-06 RX ADMIN — HYDROMORPHONE HYDROCHLORIDE 0.5 MG: 1 INJECTION, SOLUTION INTRAMUSCULAR; INTRAVENOUS; SUBCUTANEOUS at 20:08

## 2022-12-06 RX ADMIN — HYDRALAZINE HYDROCHLORIDE 100 MG: 50 TABLET, FILM COATED ORAL at 20:48

## 2022-12-06 RX ADMIN — FENTANYL CITRATE 25 MCG: 50 INJECTION, SOLUTION INTRAMUSCULAR; INTRAVENOUS at 16:11

## 2022-12-06 RX ADMIN — Medication 200 MCG: at 18:41

## 2022-12-06 RX ADMIN — ROPINIROLE HYDROCHLORIDE 0.25 MG: 0.25 TABLET, FILM COATED ORAL at 20:48

## 2022-12-06 RX ADMIN — DEXAMETHASONE SODIUM PHOSPHATE 10 MG: 10 INJECTION INTRAMUSCULAR; INTRAVENOUS at 15:32

## 2022-12-06 RX ADMIN — HYDRALAZINE HYDROCHLORIDE 100 MG: 50 TABLET, FILM COATED ORAL at 10:05

## 2022-12-06 RX ADMIN — Medication 100 MCG: at 18:37

## 2022-12-06 RX ADMIN — METHOCARBAMOL TABLETS 750 MG: 750 TABLET, COATED ORAL at 04:08

## 2022-12-06 RX ADMIN — CLINDAMYCIN PHOSPHATE 900 MG: 900 INJECTION, SOLUTION INTRAVENOUS at 21:01

## 2022-12-06 RX ADMIN — AMIODARONE HYDROCHLORIDE 200 MG: 200 TABLET ORAL at 10:05

## 2022-12-06 RX ADMIN — FUROSEMIDE 40 MG: 40 TABLET ORAL at 10:05

## 2022-12-06 RX ADMIN — LIDOCAINE HYDROCHLORIDE 20 ML: 10 INJECTION, SOLUTION INFILTRATION; PERINEURAL at 00:00

## 2022-12-06 RX ADMIN — Medication 10 MG: at 16:34

## 2022-12-06 RX ADMIN — Medication 200 MCG: at 18:39

## 2022-12-06 RX ADMIN — TRANEXAMIC ACID 1 G: 10 INJECTION, SOLUTION INTRAVENOUS at 18:59

## 2022-12-06 RX ADMIN — SODIUM CHLORIDE, POTASSIUM CHLORIDE, SODIUM LACTATE AND CALCIUM CHLORIDE: 600; 310; 30; 20 INJECTION, SOLUTION INTRAVENOUS at 15:17

## 2022-12-06 RX ADMIN — Medication 5 MG: at 16:27

## 2022-12-06 RX ADMIN — Medication 10 MG: at 18:23

## 2022-12-06 ASSESSMENT — PAIN DESCRIPTION - LOCATION
LOCATION: ANKLE
LOCATION: ANKLE

## 2022-12-06 ASSESSMENT — ENCOUNTER SYMPTOMS
SORE THROAT: 0
RHINORRHEA: 0
COUGH: 0
ABDOMINAL PAIN: 0
DIARRHEA: 0
SHORTNESS OF BREATH: 0
VOMITING: 0
NAUSEA: 0

## 2022-12-06 ASSESSMENT — PAIN SCALES - GENERAL
PAINLEVEL_OUTOF10: 0
PAINLEVEL_OUTOF10: 7
PAINLEVEL_OUTOF10: 7
PAINLEVEL_OUTOF10: 10
PAINLEVEL_OUTOF10: 9
PAINLEVEL_OUTOF10: 10

## 2022-12-06 ASSESSMENT — PAIN DESCRIPTION - DESCRIPTORS
DESCRIPTORS: SHARP;SHOOTING
DESCRIPTORS: BURNING

## 2022-12-06 ASSESSMENT — PAIN DESCRIPTION - ORIENTATION
ORIENTATION: RIGHT
ORIENTATION: RIGHT

## 2022-12-06 ASSESSMENT — PAIN DESCRIPTION - FREQUENCY: FREQUENCY: INTERMITTENT

## 2022-12-06 ASSESSMENT — PATIENT HEALTH QUESTIONNAIRE - PHQ9: SUM OF ALL RESPONSES TO PHQ QUESTIONS 1-9: 2

## 2022-12-06 ASSESSMENT — PAIN DESCRIPTION - PAIN TYPE
TYPE: SURGICAL PAIN
TYPE: SURGICAL PAIN

## 2022-12-06 ASSESSMENT — PAIN - FUNCTIONAL ASSESSMENT
PAIN_FUNCTIONAL_ASSESSMENT: ACTIVITIES ARE NOT PREVENTED
PAIN_FUNCTIONAL_ASSESSMENT: 0-10

## 2022-12-06 ASSESSMENT — LIFESTYLE VARIABLES: SMOKING_STATUS: 0

## 2022-12-06 NOTE — PROGRESS NOTES
Dr. Merlin Sorrow would like potassium checked post-op as it was 5.5 on floor and pt was just given insulin and dextrose. Order entered.

## 2022-12-06 NOTE — PROGRESS NOTES
Patient BP at midnight 220/90, I just rechecked again and she is 188/83. Notified resident via perfect serve. Will continue to monitor.

## 2022-12-06 NOTE — CONSULTS
Attestation signed by      Attending Physician Statement:    I have discussed the care of  Brian Jin , including pertinent history and exam findings, with the Cardiology fellow/resident. I have seen and examined the patient and the key elements of all parts of the encounter have been performed by me. I agree with the assessment, plan and orders as documented by the fellow/resident, after I modified exam findings and plan of treatments, and the final version is my approved version of the assessment. Additional Comments:   Preop risk for foot surgery  Fall - tripped on curb with tibial fracture, no syncope  PAF- in NSR, resume AC when okay with surgery  Non ob CAD on cath in 2016  - needs stat limited echo- if low risk, can proceed with surgery at moderate risk. Balbir Chavez DO, [facility], [address], [address], [place] [address] [facility]  [employer]  [phone]     [facility]    Consult / H&P               Today's Date: 12/6/2022  Patient Name: Brian Jin  Date of admission: 12/5/2022  6:06 PM  Patient's age: 69 y.o., 1945  Admission Dx: Open fracture of right tibia and fibula, sequela [S82.201S, S82.401S]    Reason for Consult:  Cardiac evaluation    Requesting Physician: Starr Leon MD    CHIEF COMPLAINT:  Fall    History Obtained From:  patient, electronic medical record    HISTORY OF PRESENT ILLNESS:      The patient is a 69 y.o.  female who is admitted to the hospital for fall and sustained right ankle fracture. Orthopedics was consulted and plan for OR today. The nature of the fall was mechanical as she tripped on the sidewalk. Cardiology was consulted for cardiac clearance. She has PMH of Paroxysmal atrial fibrillation, on eliquis for AC, HTN, HLD, DIONY, type II DM and CKD. She reports she was physically active prior to the fall and was able to ambulate and perform activities of daily living without any chest pain or shortness of breath. She is able to achieve > 4 METS without difficulty. RCRI 1      Past Medical History:   has a past medical history of Abnormal stress test, Anemia, Arthritis, Depression, Diverticulosis, DM (diabetes mellitus) ([facility] [address] Erythropenia, Fibromyalgia, HTN (hypertension), Hyperlipidemia, Kidney stone, Morbid obesity due to excess calories ([facility] [address] DIONY on CPAP, Osteopenia, Seasonal allergies, and Sleep apnea. Past Surgical History:   has a past surgical history that includes Colonoscopy (2003); Colonoscopy (2007); Tubal ligation; Arm Surgery (2011); Total knee arthroplasty (Bilateral); Cardiac catheterization ([id number] dr Aleida Linares); and Cardiac catheterization (5-16-16). Home Medications:    Prior to Admission medications    Medication Sig Start Date End Date Taking? Authorizing Provider   rOPINIRole (REQUIP) 0.25 MG tablet Take 1 tablet by mouth nightly 11/14/22   Eliza Cheatham MD   gabapentin (NEURONTIN) 600 MG tablet Take 1 tablet by mouth daily for 90 days. 11/14/22 2/12/23  Eliza Cheatham MD   traZODone (DESYREL) 50 MG tablet  10/20/22   Shon Gomes MD   colchicine (COLCRYS) 0.6 MG tablet  9/27/22   Magda Chand DPM   Cyanocobalamin (B-12) 1000 MCG LOZG DISSOLVE ONE tablet UNDER TONGUE ONCE DAILY 9/6/22   Rita Aldrich MD   methylPREDNISolone (MEDROL DOSEPACK) 4 MG tablet Take by mouth. 11/9/22   Eliza Cheatham MD   blood glucose monitor kit and supplies use check blood sugar as directed 11/9/22   Eliza Cheatham MD   blood glucose monitor strips Check blood sugars daily as directed. 11/9/22   Eliza Cheatham MD   Lancets MISC 1 each by Does not apply route daily 11/9/22   Eliza Cheatham MD   Alcohol Swabs 70 % PADS 1 each by Does not apply route daily 11/9/22   Eliza Cheatham MD   HYDROcodone-acetaminophen (NORCO) 5-325 MG per tablet Take 1 tablet by mouth every 6 hours as needed for Pain for up to 30 days.  11/9/22 12/9/22  Eliza Cheatham MD   zolpidem (AMBIEN) 5 MG tablet Take 1 tablet by mouth nightly as needed for Sleep for up to 60 days. 11/9/22 1/8/23  Eliza Horn MD   atorvastatin (LIPITOR) 40 MG tablet Take 1 tablet by mouth daily 11/7/22   Eliza Horn MD   pantoprazole (PROTONIX) 40 MG tablet TAKE 1 TABLET DAILY 10/19/22   Eliza Horn MD   ELIQUIS 5 MG TABS tablet TAKE 1 TABLET TWICE A DAY 10/11/22   Eliza Horn MD   cyclobenzaprine (FLEXERIL) 5 MG tablet TAKE 1 TABLET BY MOUTH NIGHTLY AS NEEDED FOR MUSCLE SPASMS 10/3/22   Eliza Horn MD   allopurinol (ZYLOPRIM) 100 MG tablet TAKE 1 TABLET DAILY 9/21/22   BARBIE Barrera CNP   furosemide (LASIX) 40 MG tablet Take 1 tablet by mouth daily 9/21/22   Zhou Sharma MD   zolpidem (AMBIEN) 5 MG tablet Take 5 mg by mouth nightly as needed for Sleep.     Historical Provider, MD   hydrALAZINE (APRESOLINE) 25 MG tablet Take 4 tablets by mouth 3 times daily 7/12/22   Zhou Sharma MD   amiodarone (CORDARONE) 200 MG tablet Take 1 tablet by mouth daily 7/6/22   BARBIE Mcclendon NP   calcitRIOL (ROCALTROL) 0.5 MCG capsule TAKE 1 CAPSULE BY MOUTH DAILY 6/14/22   Zhou Sharma MD   ferrous sulfate (FE TABS 325) 325 (65 Fe) MG EC tablet Take 1 tablet by mouth daily (with breakfast)  Patient not taking: Reported on 11/9/2022 5/10/22   Eliza Horn MD   potassium citrate (UROCIT-K) 10 MEQ (1080 MG) extended release tablet Take 1 tablet by mouth daily 2/16/22   Eliza Horn MD   azelastine (ASTELIN) 0.1 % nasal spray 1 spray by Nasal route 2 times daily Use in each nostril as directed 10/13/21   [place], DO   Cholecalciferol (VITAMIN D3) 25 MCG (1000 UT) TABS Take 2 tablets by mouth daily 1/9/20   Dewitt [place], APRN - CNP   Magnesium Oxide 400 MG CAPS Take by mouth 2 times daily Takes once daily at [facility]    Historical Provider, MD      Current Facility-Administered Medications: clindamycin (CLEOCIN) 900 mg in dextrose 5 % 50 mL IVPB, 900 mg, IntraVENous, On Call to OR  sodium chloride flush 0.9 % injection 5-40 mL, 5-40 mL, IntraVENous, 2 times per day  sodium chloride flush 0.9 % injection 5-40 mL, 5-40 mL, IntraVENous, PRN  0.9 % sodium chloride infusion, , IntraVENous, PRN  ondansetron (ZOFRAN-ODT) disintegrating tablet 4 mg, 4 mg, Oral, Q8H PRN **OR** ondansetron (ZOFRAN) injection 4 mg, 4 mg, IntraVENous, Q6H PRN  polyethylene glycol (GLYCOLAX) packet 17 g, 17 g, Oral, Daily  senna (SENOKOT) tablet 8.6 mg, 1 tablet, Oral, Daily PRN  amiodarone (CORDARONE) tablet 200 mg, 200 mg, Oral, Daily  atorvastatin (LIPITOR) tablet 40 mg, 40 mg, Oral, Daily  furosemide (LASIX) tablet 40 mg, 40 mg, Oral, Daily  gabapentin (NEURONTIN) tablet 600 mg, 600 mg, Oral, Daily  hydrALAZINE (APRESOLINE) tablet 100 mg, 100 mg, Oral, TID  HYDROcodone-acetaminophen (NORCO) 5-325 MG per tablet 1 tablet, 1 tablet, Oral, Q6H PRN  pantoprazole (PROTONIX) tablet 40 mg, 40 mg, Oral, QAM AC  rOPINIRole (REQUIP) tablet 0.25 mg, 0.25 mg, Oral, Nightly  methocarbamol (ROBAXIN) tablet 750 mg, 750 mg, Oral, Q8H  0.9 % sodium chloride infusion, 1,000 mL, IntraVENous, Continuous  metoprolol tartrate (LOPRESSOR) tablet 25 mg, 25 mg, Oral, BID    Allergies:  Adhesive tape, Cephalexin, Erythromycin, Ketorolac tromethamine, Pcn [penicillins], Percocet [oxycodone-acetaminophen], Sulfa antibiotics, and Ultracet [tramadol-acetaminophen]    Social History:   reports that she quit smoking about 62 years ago. Her smoking use included cigarettes. She has a 0.25 pack-year smoking history. She has never used smokeless tobacco. She reports that she does not drink alcohol and does not use drugs. Family History: family history includes Diabetes in her mother; Heart Disease in her mother; Kidney Disease in her father; Osteoporosis in her mother; Prostate Cancer in her brother. No h/o sudden cardiac death. No for premature CAD    REVIEW OF SYSTEMS:    Constitutional: there has been no unanticipated weight loss.  There's been No change in energy level, No change in activity level. Eyes: No visual changes or diplopia. No scleral icterus. ENT: No Headaches  Cardiovascular: No cardiac history  Respiratory: No previous pulmonary problems, No cough  Gastrointestinal: No abdominal pain. No change in bowel or bladder habits. Genitourinary: No dysuria, trouble voiding, or hematuria. Musculoskeletal:  No gait disturbance, No weakness or joint complaints. Integumentary: No rash or pruritis. Neurological: No headache, diplopia, change in muscle strength, numbness or tingling. No change in gait, balance, coordination, mood, affect, memory, mentation, behavior. Psychiatric: No anxiety, or depression. Endocrine: No temperature intolerance. No excessive thirst, fluid intake, or urination. No tremor. Hematologic/Lymphatic: No abnormal bruising or bleeding, blood clots or swollen lymph nodes. Allergic/Immunologic: No nasal congestion or hives. PHYSICAL EXAM:      BP (!) 175/94   Pulse 56   Temp 97.4 °F (36.3 °C) (Oral)   Resp 16   Ht 5' 5\" (1.651 m)   Wt 262 lb (118.8 kg)   LMP  (LMP Unknown)   SpO2 100%   BMI 43.60 kg/m²    Constitutional and General Appearance: alert, cooperative, no distress and appears stated age  HEENT: PERRL, no cervical lymphadenopathy. No masses palpable. Normal oral mucosa  Respiratory:  Normal excursion and expansion without use of accessory muscles  Resp Auscultation: Good respiratory effort. No for increased work of breathing. On auscultation: clear to auscultation bilaterally  Cardiovascular:   The apical impulse is not displaced  Heart tones are crisp and normal. regular S1 and S2.  Jugular venous pulsation Normal  The carotid upstroke is normal in amplitude and contour without delay or bruit  Peripheral pulses are symmetrical and full   Abdomen:   No masses or tenderness  Bowel sounds present  Extremities:   No Cyanosis or Clubbing   Lower extremity edema: No   Skin: Warm and dry  Neurological:  Alert and oriented. Moves all extremities well  No abnormalities of mood, affect, memory, mentation, or behavior are noted    DATA:    Diagnostics:    EKG: normal EKG, normal sinus rhythm, unchanged from previous tracings    ECHO: 4/2/2022  Summary  Normal LV size and wall thickness. No obvious wall motion abnormality seen. Normal LV systolic function with LVEF >55%. Normal RV size and function. RV systolic pressure 28 mmHg  LA appears moderately dilated and RA appears mildly dilated. No significant valvular stenosis or regurgitation noted. Normal aortic root dimension. No significant pericardial effusion noted. No obvious intra-cardiac mass or shunt noted. IVC appears dilated and impaired inspiratory variation indicating elevated  RA filling pressure. .     Stress Test: not obtained. Cardiac Angiography: 05/16/2016   Conclusions      Peripheral Procedure Description   Normal bilateral renals   Procedure Summary      Minimal non obstructive CAD   Nornal LV function      Recommendations      Medical Treatments       Labs:     CBC:   Recent Labs     12/05/22 1840   WBC 12.8*   HGB 10.6*   HCT 35.5*        BMP:   Recent Labs     12/05/22 1840      K 4.8   CO2 25   BUN 60*   CREATININE 3.87*   LABGLOM 12*   GLUCOSE 128*     BNP: No results for input(s): BNP in the last 72 hours. PT/INR:   Recent Labs     12/05/22 1840   PROTIME 11.3   INR 1.1     APTT:  Recent Labs     12/05/22 1840   APTT 23.6     CARDIAC ENZYMES:No results for input(s): CKTOTAL, CKMB, CKMBINDEX, TROPONINI in the last 72 hours. FASTING LIPID PANEL:  Lab Results   Component Value Date/Time    HDL 37 01/11/2021 11:10 AM    LDLDIRECT 123 10/21/2016 09:58 AM    TRIG 281 01/11/2021 11:10 AM     LIVER PROFILE:No results for input(s): AST, ALT, LABALBU in the last 72 hours.     IMPRESSION:    Patient Active Problem List   Diagnosis    Dyslipidemia    DIONY treated with BiPAP    Fibromyalgia    CRI (chronic renal insufficiency)    OA (osteoarthritis)    DM (diabetes mellitus) ([address]    Kidney stone    Depression    Seasonal allergies    Diverticulosis    Erythropenia    Normocytic normochromic anemia    Mitral regurgitation - Mild to moderate    CKD (chronic kidney disease) stage 4, GFR 15-29 ml/min ([facility])    Gout    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 disease ([address]    Essential hypertension    Osteopenia    Morbid obesity due to excess calories ([facility])    Anxiety    Febrile illness    Acute serous otitis media of left ear    Secondary hyperparathyroidism ([address]    Acute kidney injury superimposed on CKD ([address]    New onset a-fib ([address] with Rapid ventricular response    New onset of congestive heart failure (HCC)    Lymphedema    GERD (gastroesophageal reflux disease)    Restless leg syndrome    Acute diastolic congestive heart failure (HCC)    Chronic bilateral low back pain without sciatica    Acute bilateral low back pain without sciatica    Generalized muscle weakness    Bradycardia    Open fracture of right tibia and fibula, sequela     Paroxysmal atrial fibrillation - currently in NSR, rate controlled  HTN  HLD  Non obstructive cardiac cath in 2016   Pre-op clearance for right ankle fracture - RCRI 1  Mechanical fall   Type II DM  DIONY    RECOMMENDATIONS:  The patient remains in NSR, rate controlled, can be continued on home medications and AC can be resumed when okay per primary. Obtain limited stat 2 D ECHO, if the EF is normal, the patient can proceed for the surgery as a  moderate risk from Cardiology standpoint. Replace electrolytes to keep K>4 and Mg>2. We will continue to follow.      Jesus Cortes MD  Fellow, [address]

## 2022-12-06 NOTE — ED NOTES
Trauma surgery resident at bedside  Daughter in law at bed side brief update given      Doug Dickey RN  12/05/22 2016

## 2022-12-06 NOTE — PROGRESS NOTES
[facility] CARE DEPARTMENT - [address]     Emergency/Trauma Note    PATIENT NAME: Brianna Mcgrath    Shift date: 12/5/22  Shift day: Monday   Shift # 2    Room # 28/28   Name: Brianna Mcgrath            Age: 69 y.o. Gender: female          Religious: [address] [employer]: [employer]    Trauma/Incident type: Adult Trauma Consult  Admit Date & Time: 12/5/2022  6:06 PM  TRAUMA NAME: N/A    ADVANCE DIRECTIVES IN CHART? No    NAME OF DECISION MAKER: N/A    RELATIONSHIP OF DECISION MAKER TO PATIENT: N/A    PATIENT/EVENT DESCRIPTION:  Brianna Mcgrath is a 69 y.o. female who arrived at [facility].  received perfect serve for trauma consult. Pt to be admitted to 28/28. SPIRITUAL ASSESSMENT-INTERVENTION-OUTCOME:  Writer introduced self as . Patient appeared receptive to  and engaged in conversation about the days event's and her health.  was a ministry of presence to patient allowing space for feelings/emotions. Patient had visitor present who she was with when injury happened. Visitor was present providing love and support. Patient stated she is of [address] and her home parish would be [address] [place] [address]. Patient is fine with [facility] visit and [place] presence in room. PATIENT BELONGINGS:   writing did not handle patient belongings    ANY BELONGINGS OF SIGNIFICANT VALUE NOTED:  N/A    REGISTRATION STAFF NOTIFIED? Yes      WHAT IS YOUR SPIRITUAL CARE PLAN FOR THIS PATIENT?:   Chaplains will remain available to offer spiritual and emotional support as needed.     Electronically signed by Montez Bustillo on 12/5/2022 at 7:50 PM.  [facility]  [phone]

## 2022-12-06 NOTE — CONSULTS
Attestation signed by      Attending Physician Statement:    I have discussed the care of  Erika Sims , including pertinent history and exam findings, with the Cardiology fellow/resident. I have seen and examined the patient and the key elements of all parts of the encounter have been performed by me. I agree with the assessment, plan and orders as documented by the fellow/resident, after I modified exam findings and plan of treatments, and the final version is my approved version of the assessment. Additional Comments: [facility]    Consult / H&P               Today's Date: 12/6/2022  Patient Name: Erika Sims  Date of admission: 12/5/2022  6:06 PM  Patient's age: 69 y.o., 1945  Admission Dx: Open fracture of right tibia and fibula, sequela [S82.201S, S82.401S]    Reason for Consult:  Cardiac evaluation    Requesting Physician: Aly Gomez MD    CHIEF COMPLAINT:  broken right ankle    History Obtained From:  patient    HISTORY OF PRESENT ILLNESS:      The patient is a 69 y.o.  female who is admitted to the hospital for fracture of right ankle. Patient has significant cardiac history of CHF, hypertension, and atrial fibrillation. Reports SOB when climbing stairs -- states this is baseline for her. Denies chest pain, palpitations, dizziness, dyspnea, visual changes, numbness, tingling, and muscle weakness. Patient admits to being anxious regarding the scheduled procedure. Past Medical History:   has a past medical history of Abnormal stress test, Anemia, Arthritis, Depression, Diverticulosis, DM (diabetes mellitus) ([address] Erythropenia, Fibromyalgia, HTN (hypertension), Hyperlipidemia, Kidney stone, Morbid obesity due to excess calories ([address] DIONY on CPAP, Osteopenia, Seasonal allergies, and Sleep apnea. Past Surgical History:   has a past surgical history that includes Colonoscopy (2003); Colonoscopy (2007);  Tubal ligation; Arm Surgery (2011); Total knee arthroplasty (Bilateral); Cardiac catheterization ([id number] dr Faisal Adames); and Cardiac catheterization (5-16-16). Home Medications:    Prior to Admission medications    Medication Sig Start Date End Date Taking? Authorizing Provider   rOPINIRole (REQUIP) 0.25 MG tablet Take 1 tablet by mouth nightly 11/14/22   Eliza Omer MD   gabapentin (NEURONTIN) 600 MG tablet Take 1 tablet by mouth daily for 90 days. 11/14/22 2/12/23  Eliza Omer MD   traZODone (DESYREL) 50 MG tablet  10/20/22   Joey Ospina MD   colchicine (COLCRYS) 0.6 MG tablet  9/27/22   Renee De La Paz DPM   Cyanocobalamin (B-12) 1000 MCG LOZG DISSOLVE ONE tablet UNDER TONGUE ONCE DAILY 9/6/22   Daniel Ball MD   methylPREDNISolone (MEDROL DOSEPACK) 4 MG tablet Take by mouth. 11/9/22   Eliza Omer MD   blood glucose monitor kit and supplies use check blood sugar as directed 11/9/22   Eliza Omer MD   blood glucose monitor strips Check blood sugars daily as directed. 11/9/22   Eliza Omer MD   Lancets MISC 1 each by Does not apply route daily 11/9/22   Eliza Omer MD   Alcohol Swabs 70 % PADS 1 each by Does not apply route daily 11/9/22   Eliza Omer MD   HYDROcodone-acetaminophen (NORCO) 5-325 MG per tablet Take 1 tablet by mouth every 6 hours as needed for Pain for up to 30 days. 11/9/22 12/9/22  Eliza Omer MD   zolpidem (AMBIEN) 5 MG tablet Take 1 tablet by mouth nightly as needed for Sleep for up to 60 days.  11/9/22 1/8/23  Eliza Omer MD   atorvastatin (LIPITOR) 40 MG tablet Take 1 tablet by mouth daily 11/7/22   Eliza Omer MD   pantoprazole (PROTONIX) 40 MG tablet TAKE 1 TABLET DAILY 10/19/22   Eliza Omer MD   ELIQUIS 5 MG TABS tablet TAKE 1 TABLET TWICE A DAY 10/11/22   Eliza Omer MD   cyclobenzaprine (FLEXERIL) 5 MG tablet TAKE 1 TABLET BY MOUTH NIGHTLY AS NEEDED FOR MUSCLE SPASMS 10/3/22   Eliza Omer MD   allopurinol (ZYLOPRIM) 100 MG tablet TAKE 1 TABLET DAILY 9/21/22   BARBIE Kelly CNP   furosemide (LASIX) 40 MG tablet Take 1 tablet by mouth daily 9/21/22   Sienna Sims MD   zolpidem (AMBIEN) 5 MG tablet Take 5 mg by mouth nightly as needed for Sleep.     Historical Provider, MD   hydrALAZINE (APRESOLINE) 25 MG tablet Take 4 tablets by mouth 3 times daily 7/12/22   Sienna Sims MD   amiodarone (CORDARONE) 200 MG tablet Take 1 tablet by mouth daily 7/6/22   BARBIE Rahman NP   calcitRIOL (ROCALTROL) 0.5 MCG capsule TAKE 1 CAPSULE BY MOUTH DAILY 6/14/22   Sienna Sims MD   ferrous sulfate (FE TABS 325) 325 (65 Fe) MG EC tablet Take 1 tablet by mouth daily (with breakfast)  Patient not taking: Reported on 11/9/2022 5/10/22   Eliza Dunham MD   potassium citrate (UROCIT-K) 10 MEQ (1080 MG) extended release tablet Take 1 tablet by mouth daily 2/16/22   Eliza Dunham MD   azelastine (ASTELIN) 0.1 % nasal spray 1 spray by Nasal route 2 times daily Use in each nostril as directed 10/13/21   Mary Almendarez DO   Cholecalciferol (VITAMIN D3) 25 MCG (1000 UT) TABS Take 2 tablets by mouth daily 1/9/20   BARBIE Kelly CNP   Magnesium Oxide 400 MG CAPS Take by mouth 2 times daily Takes once daily at [facility]    Historical Provider, MD      Current Facility-Administered Medications: clindamycin (CLEOCIN) 900 mg in dextrose 5 % 50 mL IVPB, 900 mg, IntraVENous, On Call to OR  sodium chloride flush 0.9 % injection 5-40 mL, 5-40 mL, IntraVENous, 2 times per day  sodium chloride flush 0.9 % injection 5-40 mL, 5-40 mL, IntraVENous, PRN  0.9 % sodium chloride infusion, , IntraVENous, PRN  ondansetron (ZOFRAN-ODT) disintegrating tablet 4 mg, 4 mg, Oral, Q8H PRN **OR** ondansetron (ZOFRAN) injection 4 mg, 4 mg, IntraVENous, Q6H PRN  polyethylene glycol (GLYCOLAX) packet 17 g, 17 g, Oral, Daily  senna (SENOKOT) tablet 8.6 mg, 1 tablet, Oral, Daily PRN  amiodarone (CORDARONE) tablet 200 mg, 200 mg, Oral, Daily  atorvastatin (LIPITOR) tablet 40 mg, 40 mg, Oral, Daily  furosemide (LASIX) tablet 40 mg, 40 mg, Oral, Daily  gabapentin (NEURONTIN) tablet 600 mg, 600 mg, Oral, Daily  hydrALAZINE (APRESOLINE) tablet 100 mg, 100 mg, Oral, TID  HYDROcodone-acetaminophen (NORCO) 5-325 MG per tablet 1 tablet, 1 tablet, Oral, Q6H PRN  pantoprazole (PROTONIX) tablet 40 mg, 40 mg, Oral, QAM AC  rOPINIRole (REQUIP) tablet 0.25 mg, 0.25 mg, Oral, Nightly  methocarbamol (ROBAXIN) tablet 750 mg, 750 mg, Oral, Q8H  0.9 % sodium chloride infusion, 1,000 mL, IntraVENous, Continuous  metoprolol tartrate (LOPRESSOR) tablet 25 mg, 25 mg, Oral, BID    Allergies:  Adhesive tape, Cephalexin, Erythromycin, Ketorolac tromethamine, Pcn [penicillins], Percocet [oxycodone-acetaminophen], Sulfa antibiotics, and Ultracet [tramadol-acetaminophen]    Social History:   reports that she quit smoking about 62 years ago. Her smoking use included cigarettes. She has a 0.25 pack-year smoking history. She has never used smokeless tobacco. She reports that she does not drink alcohol and does not use drugs. Family History: family history includes Diabetes in her mother; Heart Disease in her mother; Kidney Disease in her father; Osteoporosis in her mother; Prostate Cancer in her brother. No h/o sudden cardiac death. No for premature CAD    REVIEW OF SYSTEMS:    Constitutional: there has been no unanticipated weight loss. There's been No change in activity level. Reports lack of energy. Eyes: No visual changes or diplopia. No scleral icterus. ENT: No Headaches  Cardiovascular: No chest pain, dyspnea, palpitations  Respiratory: No previous pulmonary problems, No cough; Reports SOB walking up stairs -- states that is baseline  Gastrointestinal: No abdominal pain. No change in bowel or bladder habits. Genitourinary: No dysuria, trouble voiding, or hematuria. Musculoskeletal:  Reports fracture of right ankle.  Denies other joint pain or muscle weakness. Integumentary: No rash or pruritis. Neurological: No headache, diplopia, change in muscle strength, numbness or tingling. No change in gait, balance, coordination, mood, affect, memory, mentation, behavior. Psychiatric: Reports depression. No anxiety. Endocrine: No temperature intolerance. No excessive thirst, fluid intake, or urination. No tremor. Hematologic/Lymphatic: No abnormal bruising or bleeding, blood clots or swollen lymph nodes. Allergic/Immunologic: No nasal congestion or hives. PHYSICAL EXAM:      BP (!) 175/94   Pulse 56   Temp 97.4 °F (36.3 °C) (Oral)   Resp 16   Ht 5' 5\" (1.651 m)   Wt 262 lb (118.8 kg)   LMP  (LMP Unknown)   SpO2 100%   BMI 43.60 kg/m²    Constitutional and General Appearance: alert, cooperative, no distress and appears stated age  HEENT: PERRL, no cervical lymphadenopathy. No masses palpable. Normal oral mucosa  Respiratory:  Normal excursion and expansion without use of accessory muscles  Resp Auscultation: Good respiratory effort. No for increased work of breathing. On auscultation: clear to auscultation bilaterally  Cardiovascular: The apical impulse is not displaced  Heart tones are crisp and normal. regular S1 and S2.  Jugular venous pulsation Normal  The carotid upstroke is normal in amplitude and contour without delay or bruit  Peripheral pulses are symmetrical and full   Abdomen:   No masses or tenderness  Bowel sounds present  Extremities:   No Cyanosis or Clubbing   Lower extremity edema: No   Skin: Warm and dry  Neurological:  Alert and oriented. Moves all extremities well  No abnormalities of mood, affect, memory, mentation, or behavior are noted    DATA:    Diagnostics:    EKG: normal sinus rhythm with sinus arrhythmia, nonspecific ST and T waves changes, unchanged from previous tracings. ECHO: ordered, but not yet obtained. Ejection fraction: {NUMBERS BY 5:29477}%  Stress Test: {obtained/reviewed:84337}.   Cardiac Angiography: {obtained/reviewed:81465}. Labs:     CBC:   Recent Labs     12/05/22 1840   WBC 12.8*   HGB 10.6*   HCT 35.5*        BMP:   Recent Labs     12/05/22 1840      K 4.8   CO2 25   BUN 60*   CREATININE 3.87*   LABGLOM 12*   GLUCOSE 128*     BNP: No results for input(s): BNP in the last 72 hours. PT/INR:   Recent Labs     12/05/22 1840   PROTIME 11.3   INR 1.1     APTT:  Recent Labs     12/05/22 1840   APTT 23.6     CARDIAC ENZYMES:No results for input(s): CKTOTAL, CKMB, CKMBINDEX, TROPONINI in the last 72 hours. FASTING LIPID PANEL:  Lab Results   Component Value Date/Time    HDL 37 01/11/2021 11:10 AM    LDLDIRECT 123 10/21/2016 09:58 AM    TRIG 281 01/11/2021 11:10 AM     LIVER PROFILE:No results for input(s): AST, ALT, LABALBU in the last 72 hours. RADIOLOGY:   CT ABDOMEN PELVIS WO CONTRAST:     Result Date: 12/6/2022  No fractures noted. Evidence of mild to moderate multilevel degenerative disc disease and facet osteoarthritis in the lumbar spine and lumbosacral junction. Remainder of CT impression unremarkable. XR PELVIS (1-2 VIEWS):    Result Date: 12/5/2022  There may be a nondisplaced fracture of the right superior and inferior pubic rami. There is mild degenerative change of the SI joints and hip joints. The surrounding soft tissues are unremarkable. Potential nondisplaced fracture of the right superior and inferior pubic rami    XR KNEE RIGHT (3 VIEWS)    Result Date: 12/5/2022  Total knee arthroplasty without complication. No acute osseous or soft tissue abnormality. XR TIBIA FIBULA RIGHT (2 VIEWS)    Result Date: 12/5/2022  There is a comminuted distal tibial fracture involving the medial malleolus and posterior malleolus. There is a comminuted fracture of the distal fibula. There is diffuse soft tissue swelling. There is a knee arthroplasty without complication. Trimalleolar fracture with diffuse soft tissue swelling.      XR ANKLE RIGHT (MIN 3 VIEWS)    Result Date: 12/5/2022  There is a comminuted fracture of the distal fibula. There is a comminuted fracture of the medial malleolus. There is a comminuted fracture of the posterior malleolus. There is lateral displacement of the ankle mortise. There is diffuse soft tissue swelling. Trimalleolar fracture with associated soft tissue swelling. CT PELVIS WO CONRTAST    Result Date: 12/6/2022  No evidence of fracture in pelvic bones and bilateral hip joints. No evidence of fracture in the superior pubic rami or inferior ischiopubic rami of both sides. No fracture in bilateral acetabulum or in visualized bilateral proximal femurs. Within the pelvic cavity there is no evidence of hemorrhage or abnormal fluid collection or acute process. Mild to moderate sigmoid diverticulosis, without evidence of diverticulitis. CT ANKLE RIGHT WO CONTRAST  Prominent oblique fracture in coronal orientation with large gap at the fracture involving the posterior portion of distal end of right tibia with distal intra-articular extension of the fracture. Comminuted transverse fracture through the base of the medial malleolus of the tibia. Grossly comminuted oblique fracture in the distal shaft of right fibula. Moderate posterior subluxation of the talus bone due to distal posterior tibial fracture. Some small intra-articular fracture fragments in the anterior portion of the tibiotalar and talofibular joint. The distal tibiofibular syndesmosis is grossly intact. No evidence of fracture in the right calcaneus bone, talus bone and distal tarsal bones. Subtalar joint is intact. Evidence of soft tissue emphysema in the lateral portion of subtalar joint. The calcaneal tendon appears to be grossly intact. On the long tendons at the medial, lateral and anterior aspect of right ankle joint are grossly intact. XR CHEST PORTABLE    Result Date: 12/5/2022  There is chronic pulmonary change.   There is no acute consolidation or effusion. There is no pneumothorax. The heart is enlarged. The upper abdomen unremarkable. The extrathoracic soft tissues are unremarkable. Cardiomegaly without acute pulmonary process. IMPRESSION:    Patient Active Problem List   Diagnosis    Dyslipidemia    DIONY treated with BiPAP    Fibromyalgia    CRI (chronic renal insufficiency)    OA (osteoarthritis)    DM (diabetes mellitus) ([address]    Kidney stone    Depression    Seasonal allergies    Diverticulosis    Erythropenia    Normocytic normochromic anemia    Mitral regurgitation - Mild to moderate    CKD (chronic kidney disease) stage 4, GFR 15-29 ml/min ([facility])    Gout    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 disease ([address]    Essential hypertension    Osteopenia    Morbid obesity due to excess calories ([facility])    Anxiety    Febrile illness    Acute serous otitis media of left ear    Secondary hyperparathyroidism ([address]    Acute kidney injury superimposed on CKD ([address]    New onset a-fib ([address] with Rapid ventricular response    New onset of congestive heart failure (HCC)    Lymphedema    GERD (gastroesophageal reflux disease)    Restless leg syndrome    Acute diastolic congestive heart failure (HCC)    Chronic bilateral low back pain without sciatica    Acute bilateral low back pain without sciatica    Generalized muscle weakness    Bradycardia    Open fracture of right tibia and fibula, sequela       RECOMMENDATIONS:  Obtain and review Echocardiogram.   Continue management of blood pressure. Follow-up with cardiology OP. Plan for ORIF of right ankle trimalleolar open fracture dislocation pending Echo results. Nonweightbearing right lower extremity,  Hold DVT prophylaxis until postop day 1 if medically able. Pain control per primary  NPO   Contact cardiology with questions. Discussed with {gen discussed with:[id number]} and Nurse.     Electronically signed by Danuta Le on 12/6/2022 at 7:18 AM    [facility]      [phone]

## 2022-12-06 NOTE — PROGRESS NOTES
PROGRESS NOTE          PATIENT NAME: Karla Blanc Dr RECORD NO. [id number]  DATE: 12/6/2022  SURGEON: Irwin Fonseca   PRIMARY CARE PHYSICIAN: JAJA Garcia MD    HD: # 1    ASSESSMENT    Patient Active Problem List   Diagnosis    Dyslipidemia    DIONY treated with BiPAP    Fibromyalgia    CRI (chronic renal insufficiency)    OA (osteoarthritis)    DM (diabetes mellitus) ([address]    Kidney stone    Depression    Seasonal allergies    Diverticulosis    Erythropenia    Normocytic normochromic anemia    Mitral regurgitation - Mild to moderate    CKD (chronic kidney disease) stage 4, GFR 15-29 ml/min (HCC)    Gout    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 disease ([address]    Essential hypertension    Osteopenia    Morbid obesity due to excess calories (HCC)    Anxiety    Febrile illness    Acute serous otitis media of left ear    Secondary hyperparathyroidism ([address]    Acute kidney injury superimposed on CKD ([address]    New onset a-fib ([address] with Rapid ventricular response    New onset of congestive heart failure (HCC)    Lymphedema    GERD (gastroesophageal reflux disease)    Restless leg syndrome    Acute diastolic congestive heart failure (HCC)    Chronic bilateral low back pain without sciatica    Acute bilateral low back pain without sciatica    Generalized muscle weakness    Bradycardia    Open fracture of right tibia and fibula, sequela       MEDICAL DECISION MAKING AND PLAN    Right Open Trimalleolar Fracture Dislocation  - Plan for OR today with Orthopedic Surgery for ORIF R Ankle  - Finish post operative Abx  - PT/OT evaluation  - Ice and elevation for pain and swelling    Chief Complaint: \"I have right ankle pain\"    Celia Rodriguez is seen and evaluated at bedside with no currently complaints. She states her pain is well controlled to her right lower extremity. Overnight patient had BP elevated into the 891'S systolic. She was asymptomatic at that time.  She was given Hydralazine 10 mg. Currently systolic pressures in 068'S. Plan for OR today with Ortho for Right ankle ORIF. She denies HA, vision changes, nausea or vomiting, numbness, weakness, chest pain, SOB, or dysuria. OBJECTIVE  VITALS: Temp: Temp: 97.4 °F (36.3 °C)Temp  Av.6 °F (36.4 °C)  Min: 97.4 °F (36.3 °C)  Max: 98 °F (07.9 °C) BP Systolic (22QAQ), YNB:308 , Min:97 , HSA:174   Diastolic (05TZW), SHV:700, Min:78, Max:158   Pulse Pulse  Av.5  Min: 56  Max: 68 Resp Resp  Av.9  Min: 8  Max: 18 Pulse ox SpO2  Av.6 %  Min: 95 %  Max: 100 %  GENERAL: alert, no distress  NEURO: GCS 15  HEENT: Normocephalic, atraumatic  : deferred  LUNGS: Regular rate without accessory muscle use. HEART: normal rate  ABDOMEN: soft, non-tender, non-distended, and no guarding or peritoneal signs present  EXTREMITY:   RLE: Short leg splint intact to the right lower extremity. Ice bags covering the elevated lower extremity. Bags removed for exam. Patient able to actively move exposed digits. SILT to exposed digits. Toes are warm with BCR. I/O last 3 completed shifts: In: 1300 [I.V.:1200; IV Piggyback:100]  Out: 600 [Urine:600]    Drain/tube output: In: 1300 [I.V.:1200]  Out: 600 [Urine:600]    LAB:  CBC:   Recent Labs     22  1840   WBC 12.8*   HGB 10.6*   HCT 35.5*   .3        BMP:   Recent Labs     22  1840      K 4.8      CO2 25   BUN 60*   CREATININE 3.87*   GLUCOSE 128*     COAGS:   Recent Labs     220   APTT 23.6   INR 1.1         Lara Muniz MD  22, 7:32 AM        Attending Note    Likely ORIF this afternoon. ECHO being read. I have reviewed the above TECSS note(s) and I either performed the key elements of the medical history and physical exam or was present with the resident when the key elements of the medical history and physical exam were performed.  I have discussed the findings, established the care plan and recommendations with Resident, GILBERT RN, bedside nurse.     Jessica Richard MD  12/6/2022  11:58 AM

## 2022-12-06 NOTE — CARE COORDINATION
SBIRT completed with pt  Pt admitted after fall    Pt with negative alcohol/drug screenings. Pt does reports recent feelings of depression. Stated she is not on any meds but has told her PCP and she stated she did not want to prescribe her anything. She denies any changes in her eating/sleeping/concentrating. She denies any SI. No prior counseling. Pt declined any counseling resources. Pt encouraged to cont to talk with her PCP about how she is feeling. Alcohol Screening and Brief Intervention        Recent Labs     12/05/22  1840   ALC <10       Alcohol Pre-screening     (WOMEN ONLY) How many times in the past year have you had 4 or more drinks in a day?: None    Alcohol Screening Audit       Drug Pre-Screening   How many times in the past year have you used a recreational drug or used a prescription medication for nonmedical reasons?: None    Drug Screening DAST       Mood Pre-Screening (PHQ-2)  During the past two weeks, have you been bothered by little interest or pleasure in doing things?: Yes  During the past two weeks, have you been bothered by feeling down, depressed, or hopeless?: Yes    Mood Pre-Screening (PHQ-9)  Total Score for PHQ-9: 2      I have interviewed Shabana Wright, [phone] regarding  Her alcohol consumption/drug use and risk for excessive use. Screenings were negative. Patient  N/A intervention at this time.    Deferred []    Completed on: 12/6/2022   RANJIT Marie

## 2022-12-06 NOTE — ED NOTES
Report to [facility] with her verbal understanding of the patients needs and concerns   All Personal belongings were taken by daughter      Elisa Lloyd RN  12/05/22 [id number]

## 2022-12-06 NOTE — CONSULTS
Nephrology Consult Note    Reason for Consult: CKD stage III, worsening creatinine. requesting Physician:  Dr. Danton Bence    Chief Complaint: Fall, right ankle fracture    History Obtained From:  patient, electronic medical record    History of Present Illness: This is a 69 y.o. female who presented to the hospital for evaluation of open fracture of the right ankle, possible pubic rami fracture. Presented to the ED with mechanical fall from standing height leading to trauma of the right ankle. It was associated with severe pain on weightbearing and bleeding from the ankle. Denied hitting her head or loss of consciousness. Denied numbness, tingling or weakness. Nephrology is consulted for worsening of creatinine in the setting of CKD stage III. Patient is going to the OR for surgery. PMH of CKD stage III secondary to diabetic nephrosclerosis with baseline creatinine 2.7-3.3 lately, paroxysmal atrial fibrillation, hypertension, hyperlipidemia, nonobstructive CAD, T2DM, DIONY. Patient takes Lasix 40 once daily for her lower extremity edema. Patient has history of CKD stage III likely secondary to underlying diabetic nephrosclerosis. She is diabetic for 12+ years. No retinopathy. Over the past 2-year there is progression of renal disease. Creatinine now is running around 2.8-3.3 range. Previously it was running around 2.2-2.5 range about 2yrs ago or so. She does have a history of nephrolithiasis. Pt denies any hx of heavy or prolonged NSAID use . No history of dysuria or frequency. Pt denies any hx of recent UTI , incontinence or nocturia or recurrent nephrolithiasis. No unusual skin rashes . No hx of recurrent UTI , incontinence or recurrent nephrolithiasis. Medication review shows use of ace/arb. Patient is currently AAO x4, not in apparent distress, with stable vitals. Chest exam shows bilateral equal air entry, no added sounds.   Lab work-up showed sodium 138, potassium 4.5, chloride 104, BUN 57, creatinine 3.57, bicarb 22, anion gap 12, WBC 13.3, Hb 9.3.   EF 50-55      Past Medical History:        Diagnosis Date    Abnormal stress test     Anemia     Arthritis     Depression     Diverticulosis     DM (diabetes mellitus) ([facility] [address]     Erythropenia     Fibromyalgia     HTN (hypertension)     Hyperlipidemia     Kidney stone     Morbid obesity due to excess calories (HCC)     DIONY on CPAP     Osteopenia 03/03/14    Seasonal allergies     Sleep apnea     uses bipap prn       Past Surgical History:        Procedure Laterality Date    ARM SURGERY  2011    right arm broken    CARDIAC CATHETERIZATION  [id number] dr Radha Panchal  5-16-16    COLONOSCOPY  2003    COLONOSCOPY  2007    TOTAL KNEE ARTHROPLASTY Bilateral     left may 2013 and right july 2013    TUBAL LIGATION         Current Medications:    clindamycin (CLEOCIN) 900 mg in dextrose 5 % 50 mL IVPB, On Call to OR  sodium chloride flush 0.9 % injection 5-40 mL, 2 times per day  sodium chloride flush 0.9 % injection 5-40 mL, PRN  0.9 % sodium chloride infusion, PRN  ondansetron (ZOFRAN-ODT) disintegrating tablet 4 mg, Q8H PRN   Or  ondansetron (ZOFRAN) injection 4 mg, Q6H PRN  polyethylene glycol (GLYCOLAX) packet 17 g, Daily  senna (SENOKOT) tablet 8.6 mg, Daily PRN  amiodarone (CORDARONE) tablet 200 mg, Daily  atorvastatin (LIPITOR) tablet 40 mg, Daily  furosemide (LASIX) tablet 40 mg, Daily  gabapentin (NEURONTIN) tablet 600 mg, Daily  hydrALAZINE (APRESOLINE) tablet 100 mg, TID  HYDROcodone-acetaminophen (NORCO) 5-325 MG per tablet 1 tablet, Q6H PRN  pantoprazole (PROTONIX) tablet 40 mg, QAM AC  rOPINIRole (REQUIP) tablet 0.25 mg, Nightly  methocarbamol (ROBAXIN) tablet 750 mg, Q8H  metoprolol tartrate (LOPRESSOR) tablet 25 mg, BID        Allergies:  Adhesive tape, Cephalexin, Erythromycin, Ketorolac tromethamine, Pcn [penicillins], Percocet [oxycodone-acetaminophen], Sulfa antibiotics, and Ultracet [tramadol-acetaminophen]    Social History:   Social History     Socioeconomic History    Marital status:      Spouse name: Not on file    Number of children: Not on file    Years of education: Not on file    Highest education level: Not on file   Occupational History    Not on file   Tobacco Use    Smoking status: Former     Packs/day: 0.25     Years: 1.00     Pack years: 0.25     Types: Cigarettes     Quit date: 1960     Years since quittin.9    Smokeless tobacco: Never    Tobacco comments:     quit    Vaping Use    Vaping Use: Never used   Substance and Sexual Activity    Alcohol use: No    Drug use: No    Sexual activity: Never   Other Topics Concern    Not on file   Social History Narrative    Not on file     Social Determinants of Health     Financial Resource Strain: Low Risk     Difficulty of Paying Living Expenses: Not hard at all   Food Insecurity: No Food Insecurity    Worried About [address] in the Last Year: Never true    [facility] in the Last Year: Never true   Transportation Needs: No Transportation Needs    Lack of Transportation (Medical): No    Lack of Transportation (Non-Medical): No   Physical Activity: Insufficiently Active    Days of Exercise per Week: 2 days    Minutes of Exercise per Session: 20 min   Stress: Stress Concern Present    Feeling of Stress : To some extent   Social Connections: Moderately Isolated    Frequency of Communication with Friends and Family:  Three times a week    Frequency of Social Gatherings with Friends and Family: Twice a week    Attends [employer] Services: Never    Active Member of Clubs or Organizations: No    Attends Club or Organization Meetings: Never    Marital Status:    Intimate Partner Violence: Not on file   Housing Stability: [address] to Pay for Housing in the Last Year: No    Number of [place] in the Last Year: 1    Unstable Housing in the Last Year: No       Family History:   Family History Problem Relation Age of Onset    Diabetes Mother     Heart Disease Mother     Osteoporosis Mother     Kidney Disease Father     Prostate Cancer Brother        Review of Systems:    Constitutional: No fever, no chills, no lethargy, no weakness. HEENT:  No headache, otalgia, itchy eyes, nasal discharge or sore throat. Cardiac:  No chest pain, dyspnea, orthopnea or PND. Chest:              No cough, phlegm or wheezing. Abdomen:  No abdominal pain, nausea or vomiting. Neuro:  No focal weakness, abnormal movements orseizure like activity. Skin:   No rashes, no itching. :   No hematuria, no pyuria, no dysuria, no flank pain. Objective:  CURRENT TEMPERATURE:  Temp: 98 °F (36.7 °C)  MAXIMUM TEMPERATURE OVER 24HRS:  Temp (24hrs), Av.7 °F (36.5 °C), Min:97.4 °F (36.3 °C), Max:98 °F (36.7 °C)    CURRENT RESPIRATORY RATE:  Resp: 19  CURRENT PULSE:  Heart Rate: 66  CURRENT BLOOD PRESSURE:  BP: (!) 167/71  24HR BLOOD PRESSURE RANGE:  Systolic (29KHM), OTQ:044 , Min:97 , QUYEN:822   ; Diastolic (18OYW), EIN:605, Min:71, Max:158    24HR INTAKE/OUTPUT:    Intake/Output Summary (Last 24 hours) at 2022 1319  Last data filed at 2022 8170  Gross per 24 hour   Intake 1300 ml   Output 600 ml   Net 700 ml     Patient Vitals for the past 96 hrs (Last 3 readings):   Weight   22 1815 262 lb (118.8 kg)     Physical Exam:  General appearance:Awake, alert, in no acute distress  Skin: warm and dry, no rash or erythema  Eyes: conjunctivae normal and sclera anicteric  ENT: :no thrush no pharyngeal congestion    Neck: no carotid bruit ,no  JVD,no carotid Lymphadenopathy, noThyromegaly Pulmonary: no wheezing or rhonchi. No rales heard. Cardiovascular: Normal S1 & S2,  No S3 or  S4, no Pericardial Rub no Murmur   Abdomen: soft nontender, bowel sounds present, no organomegaly,  no ascites  Extremities:Right ankle fracture, dressing applied.  No pedal edema noted on left    Labs:   CBC:  Recent Labs     22  1840 12/06/22  1205   WBC 12.8* 13.3*   RBC 3.54* 3.00*   HGB 10.6* 9.3*   HCT 35.5* 30.2*   .3 100.7   MCH 29.9 31.0   MCHC 29.9 30.8   RDW 15.7* 16.0*    370   MPV 9.9 10.8      BMP:   Recent Labs     12/05/22  1840 12/06/22  1205    138   K 4.8 5.5*    104   CO2 25 22   BUN 60* 57*   CREATININE 3.87* 3.57*   GLUCOSE 128* 129*   CALCIUM  --  9.3        Phosphorus:  No results for input(s): PHOS in the last 72 hours. Magnesium: No results for input(s): MG in the last 72 hours. Albumin: No results for input(s): LABALBU in the last 72 hours. IRON:    Lab Results   Component Value Date/Time    IRON 79 06/09/2022 11:35 AM     Iron Saturation:  No components found for: PERCENTFE  TIBC:    Lab Results   Component Value Date/Time    TIBC 275 06/09/2022 11:35 AM     FERRITIN:    Lab Results   Component Value Date/Time    FERRITIN 537 06/09/2022 11:35 AM     SPEP:   Lab Results   Component Value Date/Time    PROT 7.1 04/01/2022 05:11 AM    PATH ELECTRONICALLY SIGNED. Chadd Lehman M.D. 04/02/2022 04:51 PM     UPEP:   Lab Results   Component Value Date/Time    TPU 24 04/02/2022 04:51 PM    TPU 24 04/02/2022 04:51 PM        C3:   Lab Results   Component Value Date    C3 166 04/01/2022     C4:   Lab Results   Component Value Date    C4 46 (H) 04/01/2022     MPO ANCA:  No results found for: MPO .   PR3 ANCA:  No results found for: PR3  Urine Sodium:  No results found for: ALIRIO   Urine Creatinine:    Lab Results   Component Value Date/Time    LABCREA 74.3 10/04/2022 03:33 PM     Urine Eosinophils: No results found for: UREO  Urine Protein:    Lab Results   Component Value Date/Time    TPU 24 04/02/2022 04:51 PM    TPU 24 04/02/2022 04:51 PM     Urine osm : No results found for: OSMOU  Urinalysis:  U/A:   Lab Results   Component Value Date/Time    NITRU NEGATIVE 04/01/2022 05:03 AM    COLORU Yellow 04/01/2022 05:03 AM    PHUR 6.5 04/01/2022 05:03 AM    WBCUA 0 TO 2 04/01/2022 05:03 AM    RBCUA 0 TO 2 04/01/2022 05:03 AM    CLARITYU clear 04/18/2017 10:21 AM    SPECGRAV 1.010 04/01/2022 05:03 AM    LEUKOCYTESUR NEGATIVE 04/01/2022 05:03 AM    UROBILINOGEN Normal 04/01/2022 05:03 AM    BILIRUBINUR NEGATIVE 04/01/2022 05:03 AM    BILIRUBINUR neg 04/18/2017 10:21 AM    BLOODU neg 04/18/2017 10:21 AM    GLUCOSEU NEGATIVE 04/01/2022 05:03 AM    KETUA NEGATIVE 04/01/2022 05:03 AM         Radiology:  Reviewed as available. Assessment:  CKD:Stage III disease secondary to diabetic nephrosclerosis. Creatinine now is running around 2.8-3.3 range. Previously it was running around 2.2-2.5 range about 2yrs ago . Hypertension  Secondary Hyperparathyroidism   Diabetes Mellitus :  Paroxysmal atrial fibrillation on Eliquis at home  Nonobstructive CAD  Hyperlipidemia  Open fracture , right ankle  Hypertensive urgency  Hyperkalemia  Leukocytosis  Anemia    Plan:  Okay to proceed with surgery from renal standpoint  Strict Input and Output, Daily weight  Low Potassium, Low phosphorus and low salt diet. Avoid nephrotoxic drugs  We will follow      Thank you for the consultation. Please do not hesitate to call with questions. This note is created with the assistance of a speech-recognition program. While intending to generate a document that actually reflects the content of the visit, no guarantees can be provided that every mistake has been identified and corrected by editing    Jesus Jacobsen MD.  Internal Medicine PGY-2,  Nephrology services,  [facility],   [facility]  12/6/2022, 2:02 PM    Attending Physician Statement  I have discussed the care of Jani Pyle, including pertinent history and exam findings with the resident/fellow. I have reviewed the key elements of all parts of the encounter with the resident/fellow. I have seen and examined the patient with the resident/fellow. I agree with the assessment and plan and status of the problem list as documented.   Addiitionally I recommend patient is stable for surgery today. The patient had a creatinine of 3.87 yesterday prior to admission and creatinine is down to 3.57 today. Outpatient creatinine has trended up recently to the range of 2.8-3.3. The patient sees my partner, Dr. Destinee Jara, in the office. They have discussed dialysis but no formal dialysis education yet undertaken. No dialysis access in place. The patient will need some dialysis education. The patient is at risk for needing dialysis on this admission. Hopefully, the patient's renal function electrolytes and acid-base status can remain in a stable range postoperatively. Patient did receive intravenous normal saline overnight which appeared to help stabilize the serum creatinine. Follow lab data.     Murray Richmond MD   Nephrology Attending Physician  [facility]  12/6/2022

## 2022-12-06 NOTE — PROGRESS NOTES
Patients /98, I took twice and second time 202/91. Notified resident via perfect serve. Will continue to monitor.

## 2022-12-06 NOTE — DISCHARGE INSTRUCTIONS
Discharge Instructions for Trauma         What to do after you leave the hospital:  General questions or concerns may be called to the trauma nurse line at [phone] and please leave a message. Trauma is a life-threatening condition. Your doctor will want to closely monitor you. Be sure to go to all of your appointments. Please continue to use your Incentive Spirometer as directed. You can practice 10 deep breaths/hour while awake. Using the [address] will promote the health of your lungs by taking slow, deep breaths in. It is also important in preventing pneumonia or a pneumothorax from developing. Orthopedic Instructions:  -Weight bearing status: non-weight bearing right leg  -Keep dressing clean and dry. Maintain splint until follow up, do not get wet, do not remove. -If splint were to fall off or become saturated, do not attempt to put back on or dry out. Return to ED immediately for reapplication.  -Physical Therapy for strengthening and gait training. Occupational therapy for activities of daily living.  -Ice (20 minutes on and off 1 hour) and elevate (above heart) as needed for swelling/pain  -Drink plenty of fluids.  -Call the office or come to Emergency Room if signs of infection appear (hot, swollen, red, draining pus, fever)  -Take medications as prescribed.  -Wean off narcotics (percocet/norco) as soon as possible. Do not take tylenol if still taking narcotics. -No alcoholic beverages or driving/operating machinery while on narcotics  -Follow up with Dr. Eva Brantley 14 days from surgery. Call [phone]  to schedule.

## 2022-12-06 NOTE — ANESTHESIA PRE PROCEDURE
Department of Anesthesiology  Preprocedure Note       Name:  Merrell Dubin   Age:  69 y.o.  :  1945                                          MRN:  [id number]         Date:  2022      Surgeon: Tk Adorno):  Mike Infante DO    Procedure:     Department of Anesthesiology  Pre-Anesthesia Evaluation/Consultation         Name:  Merrell Dubin                                         Age:  69 y.o.   MRN:  [id number]             Medications  Current Facility-Administered Medications   Medication Dose Route Frequency Provider Last Rate Last Admin    clindamycin (CLEOCIN) 900 mg in dextrose 5 % 50 mL IVPB  900 mg IntraVENous On Call to [address], DO        sodium chloride flush 0.9 % injection 5-40 mL  5-40 mL IntraVENous 2 times per day Vanessa Achilles, DO   10 mL at 22    sodium chloride flush 0.9 % injection 5-40 mL  5-40 mL IntraVENous PRN Vanessa Achilles, DO        0.9 % sodium chloride infusion   IntraVENous PRN Vanessa Achilles, DO        ondansetron (ZOFRAN-ODT) disintegrating tablet 4 mg  4 mg Oral Q8H PRN Vanessa Achilles, DO        Or    ondansetron (ZOFRAN) injection 4 mg  4 mg IntraVENous Q6H PRN Vanessa Achilles, DO        polyethylene glycol (GLYCOLAX) packet 17 g  17 g Oral Daily Vanessa Achilles, DO        senna (SENOKOT) tablet 8.6 mg  1 tablet Oral Daily PRN Vanessa Achilles, DO   8.6 mg at 224    amiodarone (CORDARONE) tablet 200 mg  200 mg Oral Daily Vanessa Achilles, DO   200 mg at 22 1005    atorvastatin (LIPITOR) tablet 40 mg  40 mg Oral Daily Vanessa Achilles, DO   40 mg at 22 1005    furosemide (LASIX) tablet 40 mg  40 mg Oral Daily Vanessa Achilles, DO   40 mg at 22 1005    gabapentin (NEURONTIN) tablet 600 mg  600 mg Oral Daily Vanessa Achilles, DO   600 mg at 22 1005    hydrALAZINE (APRESOLINE) tablet 100 mg  100 mg Oral TID Vanessa Achilles, DO   100 mg at 22 1005    HYDROcodone-acetaminophen (NORCO) 5-325 MG per tablet 1 tablet  1 tablet Oral Q6H PRN Fritzi Floras, DO   1 tablet at 12/06/22 0958    pantoprazole (PROTONIX) tablet 40 mg  40 mg Oral QAM AC Fritzi Floras, DO   40 mg at 12/06/22 5806    rOPINIRole (REQUIP) tablet 0.25 mg  0.25 mg Oral Nightly Fritzi Floras, DO   0.25 mg at 12/05/22 2119    methocarbamol (ROBAXIN) tablet 750 mg  750 mg Oral Q8H Ed Selby, DO   750 mg at 12/06/22 0408    metoprolol tartrate (LOPRESSOR) tablet 25 mg  25 mg Oral BID Fritzi Floras, DO   25 mg at 12/05/22 7394       Allergies   Allergen Reactions    Adhesive Tape     Cephalexin Other (See Comments)     Tongue cracks and peels    Erythromycin Other (See Comments)     Epigastric pain and bloating    Ketorolac Tromethamine Other (See Comments)     Severe stomach cramps    Pcn [Penicillins]      Unknown reaction    Percocet [Oxycodone-Acetaminophen] Itching and Other (See Comments)     Esophagus hurt    Sulfa Antibiotics     Ultracet [Tramadol-Acetaminophen] Itching     Patient Active Problem List   Diagnosis    Dyslipidemia    DIONY treated with BiPAP    Fibromyalgia    CRI (chronic renal insufficiency)    OA (osteoarthritis)    [facility]    Kidney stone    Depression    Seasonal allergies    Diverticulosis    Erythropenia    Normocytic normochromic anemia    Mitral regurgitation - Mild to moderate    CKD (chronic kidney disease) stage 4, GFR 15-29 ml/min ([facility])    Gout    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 disease ([address]    Essential hypertension    Osteopenia    Morbid obesity due to excess calories ([facility])    Anxiety    Febrile illness    Acute serous otitis media of left ear    Secondary hyperparathyroidism ([address]    Acute kidney injury superimposed on CKD ([address]    New onset a-fib ([address] with Rapid ventricular response    New onset of congestive heart failure ([facility])    Lymphedema    GERD (gastroesophageal reflux disease)    Restless leg syndrome    Acute diastolic congestive [facility] failure [facility]    Chronic bilateral low back pain without sciatica    Acute bilateral low back pain without sciatica    Generalized muscle weakness    Bradycardia    Open fracture of right tibia and fibula, sequela     Past Medical History:   Diagnosis Date    Abnormal stress test     Anemia     Arthritis     Depression     Diverticulosis     DM [facility]diabetes mellitus) [facility]     Erythropenia     Fibromyalgia     HTN (hypertension)     Hyperlipidemia     Kidney stone     Morbid obesity due to excess calories ([facility]     DIONY on CPAP     Osteopenia 14    Seasonal allergies     Sleep apnea     uses bipap prn     Past Surgical History:   Procedure Laterality Date    ARM SURGERY  2011    right arm broken    CARDIAC CATHETERIZATION  [id number]  16    COLONOSCOPY  2003    COLONOSCOPY  2007    TOTAL KNEE ARTHROPLASTY Bilateral     left may 2013 and right 2013    TUBAL LIGATION       Social History     Tobacco Use    Smoking status: Former     Packs/day: 0.25     Years: 1.00     Pack years: 0.25     Types: Cigarettes     Quit date: 1960     Years since quittin.9    Smokeless tobacco: Never    Tobacco comments:     quit    Vaping Use    Vaping Use: Never used   Substance Use Topics    Alcohol use: No    Drug use: No         Vital Signs (Current)   Vitals:    22 0715   BP: (!) 150/123   Pulse: 59   Resp: 20   Temp: 98 °F (36.7 °C)   SpO2: 98%     Vital Signs Statistics (for past 48 hrs)     Temp  Av.7 °F (36.5 °C)  Min: 97.4 °F (36.3 °C)   Min taken time: 22  Max: 98 °F (36.7 °C)   Max taken time: 22 0715  Pulse  Av.4  Min: 64   Min taken time: 22  Max: 68   Max taken time: 22 1915  Resp  Av.2  Min: 8   Min taken time: 22  Max: 20   Max taken time: 22 0715  BP  Min: 97/82   Min taken time: 22 1930  Max: 224/143   Max taken time: 22  MAP (mmHg)  Avg: 112.9  Min: 46   Min taken time: 22  Max: 167   Max taken time: 22  SpO2  Av.6 %  Min: 95 %   Min taken time: 22  Max: 100 %   Max taken time: 22  BP Readings from Last 3 Encounters:   22 (!) 150/123   11/15/22 120/70   22 118/72       BMI  Body mass index is 43.6 kg/m². CBC   Lab Results   Component Value Date/Time    WBC 12.8 2022 06:40 PM    RBC 3.54 2022 06:40 PM    RBC 3.32 2012 11:15 AM    HGB 10.6 2022 06:40 PM    HCT 35.5 2022 06:40 PM    .3 2022 06:40 PM    RDW 15.7 2022 06:40 PM     2022 06:40 PM     2012 11:15 AM       CMP    Lab Results   Component Value Date/Time     2022 06:40 PM    K 4.8 2022 06:40 PM     2022 06:40 PM    CO2 25 2022 06:40 PM    BUN 60 2022 06:40 PM    CREATININE 3.87 2022 06:40 PM    GFRAA 18 2022 11:35 AM    LABGLOM 12 2022 06:40 PM    GLUCOSE 128 2022 06:40 PM    GLUCOSE 113 2012 11:15 AM    PROT 7.1 2022 05:11 AM    CALCIUM 10.1 10/04/2022 03:33 PM    BILITOT 0.46 2022 05:11 AM    ALKPHOS 68 2022 05:11 AM    AST 11 2022 05:11 AM    ALT 7 2022 05:11 AM       BMP    Lab Results   Component Value Date/Time     2022 06:40 PM    K 4.8 2022 06:40 PM     2022 06:40 PM    CO2 25 2022 06:40 PM    BUN 60 2022 06:40 PM    CREATININE 3.87 2022 06:40 PM    CALCIUM 10.1 10/04/2022 03:33 PM    GFRAA 18 2022 11:35 AM    LABGLOM 12 2022 06:40 PM    GLUCOSE 128 2022 06:40 PM    GLUCOSE 113 2012 11:15 AM       POC Testing  No results for input(s): POCGLU, POCNA, POCK, POCCL, POCBUN, POCHEMO, POCHCT in the last 72 hours.     [facility]    Lab Results   Component Value Date/Time    PROTIME 11.3 2022 06:40 PM    INR 1.1 2022 06:40 PM    APTT 23.6 2022 06:40 PM       HCG (If Applicable) No results found for: PREGTESTUR, PREGSERUM, HCG, HCGQUANT     ABGs No results found for: PHART, PO2ART, DEM1SNP, QOT4GSH, BEART, D1CMFUTB     Type & Screen (If Applicable)  No results found for: LABABO, [address]    Radiology (If Applicable)    Cardiac Testing (If Applicable) EF 16%YSUZ MR    EKG (If Applicable) sinus arrthymia          Medications prior to admission:   Prior to Admission medications    Medication Sig Start Date End Date Taking? Authorizing Provider   rOPINIRole (REQUIP) 0.25 MG tablet Take 1 tablet by mouth nightly 11/14/22   Eliza Sheikh MD   gabapentin (NEURONTIN) 600 MG tablet Take 1 tablet by mouth daily for 90 days. 11/14/22 2/12/23  Eliza Sheikh MD   traZODone (DESYREL) 50 MG tablet  10/20/22   Nazario Bryan MD   colchicine (COLCRYS) 0.6 MG tablet  9/27/22   Clayton Toney DPM   Cyanocobalamin (B-12) 1000 MCG LOZG DISSOLVE ONE tablet UNDER TONGUE ONCE DAILY 9/6/22   Jordon Warren MD   methylPREDNISolone (MEDROL DOSEPACK) 4 MG tablet Take by mouth. 11/9/22   Eliza Sheikh MD   blood glucose monitor kit and supplies use check blood sugar as directed 11/9/22   Eliza Sheikh MD   blood glucose monitor strips Check blood sugars daily as directed. 11/9/22   Eliza Sheikh MD   Lancets MISC 1 each by Does not apply route daily 11/9/22   Eliza Sheikh MD   Alcohol Swabs 70 % PADS 1 each by Does not apply route daily 11/9/22   Eliza Sheikh MD   HYDROcodone-acetaminophen (NORCO) 5-325 MG per tablet Take 1 tablet by mouth every 6 hours as needed for Pain for up to 30 days. 11/9/22 12/9/22  Eliza Sheikh MD   zolpidem (AMBIEN) 5 MG tablet Take 1 tablet by mouth nightly as needed for Sleep for up to 60 days.  11/9/22 1/8/23  Eliza Sheikh MD   atorvastatin (LIPITOR) 40 MG tablet Take 1 tablet by mouth daily 11/7/22   Eliza Sheikh MD   pantoprazole (PROTONIX) 40 MG tablet TAKE 1 TABLET DAILY 10/19/22   Eliza Sheikh MD   ELIQUIS 5 MG TABS tablet TAKE 1 TABLET TWICE A DAY 10/11/22   Eliza Doyle MD   cyclobenzaprine (FLEXERIL) 5 MG tablet TAKE 1 TABLET BY MOUTH NIGHTLY AS NEEDED FOR MUSCLE SPASMS 10/3/22   Eliza Doyle MD   allopurinol (ZYLOPRIM) 100 MG tablet TAKE 1 TABLET DAILY 9/21/22   BARBIE Orellana CNP   furosemide (LASIX) 40 MG tablet Take 1 tablet by mouth daily 9/21/22   Bucky Opitz, MD   zolpidem (AMBIEN) 5 MG tablet Take 5 mg by mouth nightly as needed for Sleep.     Historical Provider, MD   hydrALAZINE (APRESOLINE) 25 MG tablet Take 4 tablets by mouth 3 times daily 7/12/22   Bucky Opitz, MD   amiodarone (CORDARONE) 200 MG tablet Take 1 tablet by mouth daily 7/6/22   Chirinos BARBIE Flores - NP   calcitRIOL (ROCALTROL) 0.5 MCG capsule TAKE 1 CAPSULE BY MOUTH DAILY 6/14/22   Bucky Opitz, MD   ferrous sulfate (FE TABS 325) 325 (65 Fe) MG EC tablet Take 1 tablet by mouth daily (with breakfast)  Patient not taking: Reported on 11/9/2022 5/10/22   Eliza Doyle MD   potassium citrate (UROCIT-K) 10 MEQ (1080 MG) extended release tablet Take 1 tablet by mouth daily 2/16/22   Eliza Doyle MD   azelastine (ASTELIN) 0.1 % nasal spray 1 spray by Nasal route 2 times daily Use in each nostril as directed 10/13/21   Daly Escobedo DO   Cholecalciferol (VITAMIN D3) 25 MCG (1000 UT) TABS Take 2 tablets by mouth daily 1/9/20   BARBIE Orellana CNP   Magnesium Oxide 400 MG CAPS Take by mouth 2 times daily Takes once daily at [facility]    Historical Provider, MD       Current medications:    Current Facility-Administered Medications   Medication Dose Route Frequency Provider Last Rate Last Admin    clindamycin (CLEOCIN) 900 mg in dextrose 5 % 50 mL IVPB  900 mg IntraVENous On Call to [address], DO        sodium chloride flush 0.9 % injection 5-40 mL  5-40 mL IntraVENous 2 times per day Veola Jaylen, DO   10 mL at 12/05/22 5512    sodium chloride flush 0.9 % injection 5-40 mL  5-40 mL IntraVENous PRN stomach cramps    Pcn [Penicillins]      Unknown reaction    Percocet [Oxycodone-Acetaminophen] Itching and Other (See Comments)     Esophagus hurt    Sulfa Antibiotics     Ultracet [Tramadol-Acetaminophen] Itching       Problem List:    Patient Active Problem List   Diagnosis Code    Dyslipidemia E78.5    DIONY treated with BiPAP G47.33    Fibromyalgia M79.7    CRI (chronic renal insufficiency) N18.9    OA (osteoarthritis) M19.90    DM (diabetes mellitus) ([facility]) E11.9    Kidney stone N20.0    Depression F32. A    Seasonal allergies J30.2    Diverticulosis K57.90    Erythropenia D64.9    Normocytic normochromic anemia D64.9    Mitral regurgitation - Mild to moderate I34.0    CKD (chronic kidney disease) stage 4, GFR 15-29 ml/min ([facility]) N18.4    Gout M10.9    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 disease ([facility]) E11.22    Essential hypertension I10    Osteopenia M85.80    Morbid obesity due to excess calories ([facility]) E66.01    Anxiety F41.9    Febrile illness R50.9    Acute serous otitis media of left ear H65.02    Secondary hyperparathyroidism ([facility]) N25.81    Acute kidney injury superimposed on CKD ([facility] N17.9, N18.9    New onset a-fib ([facility] with Rapid ventricular response I48.91    New onset of congestive heart failure ([facility]) I50.9    Lymphedema I89.0    GERD (gastroesophageal reflux disease) K21.9    Restless leg syndrome G25.81    Acute diastolic congestive heart failure ([facility]) I50.31    Chronic bilateral low back pain without sciatica M54.50, G89.29    Acute bilateral low back pain without sciatica M54.50    Generalized muscle weakness M62.81    Bradycardia R00.1    Open fracture of right tibia and fibula, sequela S82.201S, S82.401S       Past Medical History:        Diagnosis Date    Abnormal stress test     Anemia     Arthritis     Depression     Diverticulosis     DM (diabetes mellitus) ([facility])     Erythropenia     Fibromyalgia     HTN (hypertension)     Hyperlipidemia     Kidney stone     Morbid obesity due to excess calories [facility]     DIONY on CPAP     Osteopenia 14    Seasonal allergies     Sleep apnea     uses bipap prn       Past Surgical History:        Procedure Laterality Date    ARM SURGERY  2011    right arm broken    CARDIAC CATHETERIZATION  [id number] dr Lizy Moyer  16    COLONOSCOPY  2003    COLONOSCOPY  2007    TOTAL KNEE ARTHROPLASTY Bilateral     left may 2013 and right 2013    TUBAL LIGATION         Social History:    Social History     Tobacco Use    Smoking status: Former     Packs/day: 0.25     Years: 1.00     Pack years: 0.25     Types: Cigarettes     Quit date: 1960     Years since quittin.9    Smokeless tobacco: Never    Tobacco comments:     quit    Substance Use Topics    Alcohol use: No                                Counseling given: Not Answered  Tobacco comments: quit       Vital Signs (Current):   Vitals:    22 0324 22 0438 22 0515 22 0715   BP: (!) 202/91  (!) 175/94 (!) 150/123   Pulse:    59   Resp:  16  20   Temp:    98 °F (36.7 °C)   TempSrc:    Oral   SpO2:    98%   Weight:       Height:                                                  BP Readings from Last 3 Encounters:   22 (!) 150/123   11/15/22 120/70   22 118/72       NPO Status:  MN                                                                               BMI:   Wt Readings from Last 3 Encounters:   22 262 lb (118.8 kg)   11/15/22 264 lb (119.7 kg)   22 271 lb (122.9 kg)     Body mass index is 43.6 kg/m².     CBC:   Lab Results   Component Value Date/Time    WBC 12.8 2022 06:40 PM    RBC 3.54 2022 06:40 PM    RBC 3.32 2012 11:15 AM    HGB 10.6 2022 06:40 PM    HCT 35.5 2022 06:40 PM    .3 2022 06:40 PM    RDW 15.7 2022 06:40 PM     2022 06:40 PM     2012 11:15 AM       CMP: Lab Results   Component Value Date/Time     12/05/2022 06:40 PM    K 4.8 12/05/2022 06:40 PM     12/05/2022 06:40 PM    CO2 25 12/05/2022 06:40 PM    BUN 60 12/05/2022 06:40 PM    CREATININE 3.87 12/05/2022 06:40 PM    GFRAA 18 06/09/2022 11:35 AM    LABGLOM 12 12/05/2022 06:40 PM    GLUCOSE 128 12/05/2022 06:40 PM    GLUCOSE 113 03/02/2012 11:15 AM    PROT 7.1 04/01/2022 05:11 AM    CALCIUM 10.1 10/04/2022 03:33 PM    BILITOT 0.46 04/01/2022 05:11 AM    ALKPHOS 68 04/01/2022 05:11 AM    AST 11 04/01/2022 05:11 AM    ALT 7 04/01/2022 05:11 AM       POC Tests: No results for input(s): POCGLU, POCNA, POCK, POCCL, POCBUN, POCHEMO, POCHCT in the last 72 hours.     Coags:   Lab Results   Component Value Date/Time    PROTIME 11.3 12/05/2022 06:40 PM    INR 1.1 12/05/2022 06:40 PM    APTT 23.6 12/05/2022 06:40 PM       HCG (If Applicable): No results found for: PREGTESTUR, PREGSERUM, HCG, HCGQUANT     ABGs: No results found for: PHART, PO2ART, NMX3CGQ, KDE4PKD, BEART, A3ROWPCM     Type & Screen (If Applicable):  No results found for: LABABO, LABRH    Drug/Infectious Status (If Applicable):  Lab Results   Component Value Date/Time    HEPCAB NONREACTIVE 01/11/2021 11:10 AM       COVID-19 Screening (If Applicable):   Lab Results   Component Value Date/Time    COVID19 positive 01/14/2022 12:00 AM           Anesthesia Evaluation   no history of anesthetic complications:   Airway: Mallampati: II     Neck ROM: full     Dental:          Pulmonary:   (+) sleep apnea: on CPAP and noncompliant,      (-) recent URI and not a current smoker                           Cardiovascular:  Exercise tolerance: poor (<4 METS),   (+) hypertension:, valvular problems/murmurs: MR, dysrhythmias: atrial fibrillation, CHF:,     (-) past MI             ROS comment: CHF     Neuro/Psych:   (+) neuromuscular disease:, depression/anxiety    (-) seizures and headaches            ROS comment: fibro GI/Hepatic/Renal:   (+) GERD:, renal disease: CRI and kidney stones, morbid obesity          Endo/Other:    (+) DiabetesType II DM, , blood dyscrasia: anemia, arthritis:., .                  ROS comment: Fibro  Gout  RLS Abdominal:             Vascular: Other Findings: 3 bottom teeth          Anesthesia Plan      general     ASA 4       Induction: intravenous.                             Jody Grey MD   12/6/2022

## 2022-12-06 NOTE — ED PROVIDER NOTES
[facility]  ED  Emergency Department Encounter  Emergency Medicine Resident     Pt Name: Gurjit Waggoner  MRN: [id number]  Hemagfjessica 1945  Date of evaluation: 12/6/22  PCP:  Italo Campbell MD    CHIEF COMPLAINT       Chief Complaint   Patient presents with    Ankle Injury     Right ankle fracture        HISTORY OFPRESENT ILLNESS  (Location/Symptom, Timing/Onset, Context/Setting, Quality, Duration, Modifying Geraline Primrose.)      Gurjit Waggoner is a 69 y.o. female who presents with open right ankle fracture after falling from a curb this afternoon. Patient says sometimes she uses a walker to ambulate, but this time she is using a cane and she lost her footing. She did not hit her head or lose consciousness. She is on Eliquis for atrial fibrillation. PAST MEDICAL / SURGICAL / SOCIAL / FAMILY HISTORY      has a past medical history of Abnormal stress test, Anemia, Arthritis, Depression, Diverticulosis, DM (diabetes mellitus) ([address] Erythropenia, Fibromyalgia, HTN (hypertension), Hyperlipidemia, Kidney stone, Morbid obesity due to excess calories ([address] DIONY on CPAP, Osteopenia, Seasonal allergies, and Sleep apnea. has a past surgical history that includes Colonoscopy (2003); Colonoscopy (2007); Tubal ligation; Arm Surgery (2011); Total knee arthroplasty (Bilateral); Cardiac catheterization ([id number] dr Faisal Adames); and Cardiac catheterization (5-16-16). Social:  reports that she quit smoking about 62 years ago. Her smoking use included cigarettes. She has a 0.25 pack-year smoking history. She has never used smokeless tobacco. She reports that she does not drink alcohol and does not use drugs.     Family Hx:   Family History   Problem Relation Age of Onset    Diabetes Mother     Heart Disease Mother     Osteoporosis Mother     Kidney Disease Father     Prostate Cancer Brother         Allergies:  Adhesive tape, Cephalexin, Erythromycin, Ketorolac tromethamine, Pcn [penicillins], Percocet [oxycodone-acetaminophen], Sulfa antibiotics, and Ultracet [tramadol-acetaminophen]    Home Medications:  Prior to Admission medications    Medication Sig Start Date End Date Taking? Authorizing Provider   rOPINIRole (REQUIP) 0.25 MG tablet Take 1 tablet by mouth nightly 11/14/22   Eliza Palacios MD   gabapentin (NEURONTIN) 600 MG tablet Take 1 tablet by mouth daily for 90 days. 11/14/22 2/12/23  Eliza Palacios MD   traZODone (DESYREL) 50 MG tablet  10/20/22   Chay Xie MD   colchicine (COLCRYS) 0.6 MG tablet  9/27/22   Lew Dial DPM   Cyanocobalamin (B-12) 1000 MCG LOZG DISSOLVE ONE tablet UNDER TONGUE ONCE DAILY 9/6/22   Bryant Mcgrath MD   methylPREDNISolone (MEDROL DOSEPACK) 4 MG tablet Take by mouth. 11/9/22   Eliza Palacios MD   blood glucose monitor kit and supplies use check blood sugar as directed 11/9/22   Eliza Palacios MD   blood glucose monitor strips Check blood sugars daily as directed. 11/9/22   Eliza Palacios MD   Lancets MISC 1 each by Does not apply route daily 11/9/22   Eliza Palacios MD   Alcohol Swabs 70 % PADS 1 each by Does not apply route daily 11/9/22   Eliza Palacios MD   HYDROcodone-acetaminophen (NORCO) 5-325 MG per tablet Take 1 tablet by mouth every 6 hours as needed for Pain for up to 30 days. 11/9/22 12/9/22  Eliza Palacios MD   zolpidem (AMBIEN) 5 MG tablet Take 1 tablet by mouth nightly as needed for Sleep for up to 60 days.  11/9/22 1/8/23  Eliza Palacios MD   atorvastatin (LIPITOR) 40 MG tablet Take 1 tablet by mouth daily 11/7/22   Eliza Palacios MD   pantoprazole (PROTONIX) 40 MG tablet TAKE 1 TABLET DAILY 10/19/22   Eliza Palacios MD   ELIQUIS 5 MG TABS tablet TAKE 1 TABLET TWICE A DAY 10/11/22   Eliza Palacios MD   cyclobenzaprine (FLEXERIL) 5 MG tablet TAKE 1 TABLET BY MOUTH NIGHTLY AS NEEDED FOR MUSCLE SPASMS 10/3/22   Eliza Palacios MD   allopurinol (ZYLOPRIM) 100 MG tablet TAKE 1 TABLET DAILY 9/21/22 BARBIE Hubbard CNP   furosemide (LASIX) 40 MG tablet Take 1 tablet by mouth daily 9/21/22   Dorian Franco MD   zolpidem (AMBIEN) 5 MG tablet Take 5 mg by mouth nightly as needed for Sleep. Historical Provider, MD   hydrALAZINE (APRESOLINE) 25 MG tablet Take 4 tablets by mouth 3 times daily 7/12/22   Dorian Franco MD   amiodarone (CORDARONE) 200 MG tablet Take 1 tablet by mouth daily 7/6/22   BARBIE Fenton NP   calcitRIOL (ROCALTROL) 0.5 MCG capsule TAKE 1 CAPSULE BY MOUTH DAILY 6/14/22   Dorian Franco MD   ferrous sulfate (FE TABS 325) 325 (65 Fe) MG EC tablet Take 1 tablet by mouth daily (with breakfast)  Patient not taking: Reported on 11/9/2022 5/10/22   Eliza Craft MD   potassium citrate (UROCIT-K) 10 MEQ (1080 MG) extended release tablet Take 1 tablet by mouth daily 2/16/22   Eliza Craft MD   azelastine (ASTELIN) 0.1 % nasal spray 1 spray by Nasal route 2 times daily Use in each nostril as directed 10/13/21   Jovan Santiago DO   Cholecalciferol (VITAMIN D3) 25 MCG (1000 UT) TABS Take 2 tablets by mouth daily 1/9/20   BARBIE Hubbard CNP   Magnesium Oxide 400 MG CAPS Take by mouth 2 times daily Takes once daily at [facility]    Historical Provider, MD       REVIEW OFSYSTEMS    (2-9 systems for level 4, 10 or more for level 5)      Review of Systems   Constitutional:  Negative for appetite change, chills, fatigue and fever. HENT:  Negative for congestion, rhinorrhea, sneezing and sore throat. Eyes:  Negative for visual disturbance. Respiratory:  Negative for cough and shortness of breath. Cardiovascular:  Negative for chest pain and leg swelling. Gastrointestinal:  Negative for abdominal pain, diarrhea, nausea and vomiting. Genitourinary:  Negative for dysuria. Musculoskeletal:  Negative for myalgias, neck pain and neck stiffness. Skin:  Positive for wound. Negative for rash.    Neurological:  Negative for dizziness, syncope, light-headedness and headaches. Psychiatric/Behavioral:  Negative for dysphoric mood and suicidal ideas. PHYSICAL EXAM   (up to 7 for level 4, 8 or more forlevel 5)      INITIAL VITALS:   Vitals:    12/06/22 0015   BP: (!) 202/90   Pulse:    Resp:    Temp:    SpO2:     BP (!) 202/90   Pulse 56   Temp 97.4 °F (36.3 °C) (Oral)   Resp 16   Ht 5' 5\" (1.651 m)   Wt 262 lb (118.8 kg)   LMP  (LMP Unknown)   SpO2 100%   BMI 43.60 kg/m²       Physical Exam  Vitals and nursing note reviewed. Constitutional:       General: She is not in acute distress. Appearance: Normal appearance. She is not ill-appearing or diaphoretic. HENT:      Head: Normocephalic. Nose: Nose normal.      Mouth/Throat:      Mouth: Mucous membranes are moist.      Pharynx: Oropharynx is clear. Eyes:      Extraocular Movements: Extraocular movements intact. Conjunctiva/sclera: Conjunctivae normal.      Pupils: Pupils are equal, round, and reactive to light. Cardiovascular:      Rate and Rhythm: Normal rate and regular rhythm. Pulses: Normal pulses. Heart sounds: Normal heart sounds. Pulmonary:      Effort: Pulmonary effort is normal. No respiratory distress. Breath sounds: Normal breath sounds. No wheezing or rales. Chest:      Chest wall: No tenderness. Abdominal:      General: There is no distension. Palpations: Abdomen is soft. Tenderness: There is no abdominal tenderness. There is no guarding or rebound. Musculoskeletal:         General: Normal range of motion. Cervical back: Normal range of motion and neck supple. Left ankle: Normal.      Comments: 10cm laceration/open fracture along joint line of anterior aspect of right ankle; NV intact   Skin:     General: Skin is warm. Capillary Refill: Capillary refill takes less than 2 seconds. Neurological:      General: No focal deficit present. Mental Status: She is alert and oriented to person, place, and time.    Psychiatric:         Mood and Affect: Mood normal.         Behavior: Behavior normal.       MEDICAL DECISION MAKING     Initial MDM/Plan: 69 y.o. female who presents with open fracture right ankle after fall from a curb. Did not hit her head or lose consciousness. Is on Eliquis for anticoagulation for atrial fibrillation. Vital signs notable for hypertension, otherwise within normal limits. Physical examination showing a 10 cm laceration along the anterior aspect of the right ankle with exposed, oozing bleeding but no active arterial bleeding. Neurovascularly intact distal to the fracture. Plan to administer tetanus, clindamycin and gentamicin for open fracture, x-ray of ankle. Trauma and Ortho consults with probable admission to trauma surgery. [place] Coma Scale  Eye Opening: Spontaneous  Best Verbal Response: Oriented  Best Motor Response: Obeys commands  [place] Coma Scale Score: 15    DIAGNOSTIC RESULTS / EMERGENCYDEPARTMENT COURSE / MDM     LABS:  Labs Reviewed   TRAUMA PANEL - Abnormal; Notable for the following components:       Result Value    BUN 60 (*)     WBC 12.8 (*)     RBC 3.54 (*)     Hemoglobin 10.6 (*)     Hematocrit 35.5 (*)     RDW 15.7 (*)     Creatinine 3.87 (*)     Est, Glom Filt Rate 12 (*)     Glucose 128 (*)     pH, Cornelius 7.286 (*)     pCO2, Cornelius 55.5 (*)     pO2, Cornelius 53.5 (*)     All other components within normal limits   VITAMIN D 25 HYDROXY   TYPE AND SCREEN         RADIOLOGY:  CT ABDOMEN PELVIS WO CONTRAST Additional Contrast? None    Result Date: 12/6/2022  No evidence of fracture or osseous malalignment in the lumbar spine, pelvic bones and joints including bilateral hip joints. No evidence of fracture in superior pubic rami and inferior ischiopubic rami of both sides or in bilateral pubic bones. No fracture in bilateral acetabula or ischial bones. Evidence of mild to moderate multilevel degenerative disc disease and facet osteoarthritis in the lumbar spine and lumbosacral junction.  Nonspecific small amount of gas scattered in multiple loops of small bowel without significant fluid levels. No distension or dilation of distal small bowel. Normal appendix visualized. Small to moderate amount of stool and gas scattered in colon. Evidence of moderate diverticulosis coli of descending colon and sigmoid colon, without evidence of diverticulitis. No evidence of renal or ureteric calculus or obstructive uropathy. No diagnostic finding in liver, gallbladder, spleen, pancreas and adrenal glands. XR PELVIS (1-2 VIEWS)    Result Date: 12/5/2022  EXAMINATION: ONE XRAY VIEW OF THE PELVIS 12/5/2022 7:33 pm COMPARISON: CT abdomen and pelvis performed 02/27/2017. HISTORY: ORDERING SYSTEM PROVIDED HISTORY: fall TECHNOLOGIST PROVIDED HISTORY: fall FINDINGS: There may be a nondisplaced fracture of the right superior and inferior pubic rami. There is mild degenerative change of the SI joints and hip joints. The surrounding soft tissues are unremarkable. Potential nondisplaced fracture of the right superior and inferior pubic rami. XR KNEE RIGHT (3 VIEWS)    Result Date: 12/5/2022  EXAMINATION: THREE XRAY VIEWS OF THE RIGHT KNEE 12/5/2022 6:56 pm COMPARISON: None. HISTORY: ORDERING SYSTEM PROVIDED HISTORY: hx knee replacement, open fib fx TECHNOLOGIST PROVIDED HISTORY: hx knee replacement, open fib fx FINDINGS: There is a total knee arthroplasty without complication. The surrounding soft tissues are unremarkable. Total knee arthroplasty without complication. No acute osseous or soft tissue abnormality. XR TIBIA FIBULA RIGHT (2 VIEWS)    Result Date: 12/5/2022  EXAMINATION: 4 XRAY VIEWS OF THE RIGHT TIBIA AND FIBULA 12/5/2022 6:56 pm COMPARISON: None. HISTORY: ORDERING SYSTEM PROVIDED HISTORY: open tib fx TECHNOLOGIST PROVIDED HISTORY: open tib fx FINDINGS: There is a comminuted distal tibial fracture involving the medial malleolus and posterior malleolus.   There is a comminuted fracture of the distal fibula. There is diffuse soft tissue swelling. There is a knee arthroplasty without complication. Trimalleolar fracture with diffuse soft tissue swelling. XR ANKLE RIGHT (2 VIEWS)    Result Date: 12/5/2022  EXAMINATION: 1 XRAY VIEWS OF THE RIGHT ANKLE 12/5/2022 6:09 pm COMPARISON: None. HISTORY: ORDERING SYSTEM PROVIDED HISTORY: Post-Reduction TECHNOLOGIST PROVIDED HISTORY: Post-Reduction FINDINGS: An ankle fracture subluxation is again noted. On the provided image, there remains lateral subluxation of the talus in relation to the tibia. Ankle fracture subluxation with persistent mild lateral subluxation of the talus in relation to the tibia. XR ANKLE RIGHT (MIN 3 VIEWS)    Result Date: 12/5/2022  EXAMINATION: THREE XRAY VIEWS OF THE RIGHT ANKLE 12/5/2022 7:14 pm COMPARISON: 12/05/2022. HISTORY: ORDERING SYSTEM PROVIDED HISTORY: Post-Splint TECHNOLOGIST PROVIDED HISTORY: Post-Splint FINDINGS: A trimalleolar ankle fracture subluxation is again noted. A splint is now in place. The ankle mortise is not widened. There remains mild posterior subluxation of the talus in relation to the tibia. Trimalleolar right ankle is fracture subluxation with improved positioning of the talus in relation to the tibia and now with mild posterior subluxation of the talus in relation to the tibia. XR ANKLE RIGHT (MIN 3 VIEWS)    Result Date: 12/5/2022  EXAMINATION: THREE XRAY VIEWS OF THE RIGHT ANKLE 12/5/2022 6:56 pm COMPARISON: None. HISTORY: ORDERING SYSTEM PROVIDED HISTORY: open fx TECHNOLOGIST PROVIDED HISTORY: open fx FINDINGS: There is a comminuted fracture of the distal fibula. There is a comminuted fracture of the medial malleolus. There is a comminuted fracture of the posterior malleolus. There is lateral displacement of the ankle mortise. There is diffuse soft tissue swelling. Trimalleolar fracture with associated soft tissue swelling.      CT PELVIS WO CONTRAST Additional Contrast? None    Result Date: 12/6/2022  IMPRESSIONS: No evidence of fracture in pelvic bones and bilateral hip joints. No evidence of fracture in the superior pubic rami or inferior ischiopubic rami of both sides. No fracture in bilateral acetabulum or in visualized bilateral proximal femurs. Within the pelvic cavity there is no evidence of hemorrhage or abnormal fluid collection or acute process. Mild to moderate sigmoid diverticulosis, without evidence of diverticulitis. CT ANKLE RIGHT WO CONTRAST    Prominent oblique fracture in coronal orientation with large gap at the fracture involving the posterior portion of distal end of right tibia with distal intra-articular extension of the fracture. Comminuted transverse fracture through the base of the medial malleolus of the tibia. Grossly comminuted oblique fracture in the distal shaft of right fibula. Moderate posterior subluxation of the talus bone due to distal posterior tibial fracture. Some small intra-articular fracture fragments in the anterior portion of the tibiotalar and talofibular joint. The distal tibiofibular syndesmosis is grossly intact. No evidence of fracture in the right calcaneus bone, talus bone and distal tarsal bones. Subtalar joint is intact. Evidence of soft tissue emphysema in the lateral portion of subtalar joint. The calcaneal tendon appears to be grossly intact. On the long tendons at the medial, lateral and anterior aspect of right ankle joint are grossly intact. XR CHEST PORTABLE    Result Date: 12/5/2022  EXAMINATION: ONE XRAY VIEW OF THE CHEST 12/5/2022 7:33 pm COMPARISON: Chest radiograph performed 03/31/2022. HISTORY: ORDERING SYSTEM PROVIDED HISTORY: fall TECHNOLOGIST PROVIDED HISTORY: fall FINDINGS: There is chronic pulmonary change. There is no acute consolidation or effusion. There is no pneumothorax. The heart is enlarged. The upper abdomen unremarkable. The extrathoracic soft tissues are unremarkable. Cardiomegaly without acute pulmonary process. EMERGENCY DEPARTMENT COURSE:  Patient underwent reduction by orthopedic surgery via hematoma block, fentanyl and Versed. Patient admitted to trauma surgery with orthopedic surgery consultation planning for OR tomorrow morning. PROCEDURES:  None    CONSULTS:  IP CONSULT TO TRAUMA SURGERY  IP CONSULT TO ORTHOPEDIC SURGERY  IP CONSULT TO CARDIOLOGY      FINAL IMPRESSION      1. Type III open trimalleolar fracture of right ankle, initial encounter    2. Fall, initial encounter        DISPOSITION / [address] Admitted 12/05/2022 07:34:07 PM      PATIENT REFERRED TO:  No follow-up provider specified.     DISCHARGE MEDICATIONS:  Current Discharge Medication List          Mari Hinojosa MD  Emergency Medicine Resident    (Please note that portions of this note were completed with a voice recognition program.Efforts were made to edit the dictations but occasionally words are mis-transcribed.)  Katrin Veliz MD  Resident  12/06/22 [id number]

## 2022-12-06 NOTE — CONSULTS
Orthopedic Surgery Consult  (Dr. Lindsey Holcomb)      CC/Reason for consult: Right ankle pain    HPI:      The patient is a 69 y.o. female who sustained a fall from standing when she tripped on a curb and twisting on the right ankle. Patient has immediate pain and deformity with exposed bone of the right ankle. Patient was not able to ambulate after the fall. Patient denies hitting her head or loss consciousness. Patient has no other complaints at this time. Patient denies numbness, tingling, or weakness. Patient was accompanied by her daughter at bedside. Patient received tetanus and clindamycin due to cephalosporin allergy in ED for open fracture. Chemical AC: Eliquis for A. fib  Community Ambulator  Prior orthopedic injuries/surgeries: Bilateral TKA  Medical history: CHF, CKD, A. fib, type 2 diabetes, hypertension, see remaining history below. Past Medical History  Mica Durán has a past medical history of Abnormal stress test, Anemia, Arthritis, Depression, Diverticulosis, DM (diabetes mellitus) ([facility] [address] Erythropenia, Fibromyalgia, HTN (hypertension), Hyperlipidemia, Kidney stone, Morbid obesity due to excess calories ([address] DIONY on CPAP, Osteopenia, Seasonal allergies, and Sleep apnea. Past Surgical History  Mica Durán has a past surgical history that includes Colonoscopy (2003); Colonoscopy (2007); Tubal ligation; Arm Surgery (2011); Total knee arthroplasty (Bilateral); Cardiac catheterization ([id number] dr Mikal Cage); and Cardiac catheterization (5-16-16). Current Medications  Reviewed. See EMR for details. Allergies  Allergies reviewed: Adhesive tape, Cephalexin, Erythromycin, Ketorolac tromethamine, Pcn [penicillins], Percocet [oxycodone-acetaminophen], Sulfa antibiotics, and Ultracet [tramadol-acetaminophen]    Social History  Patient reports that she quit smoking about 62 years ago. Her smoking use included cigarettes. She has a 0.25 pack-year smoking history.  She has never used smokeless tobacco. She reports that she does not drink alcohol and does not use drugs. Family History  Patient family history includes Diabetes in her mother; Heart Disease in her mother; Kidney Disease in her father; Osteoporosis in her mother; Prostate Cancer in her brother. ROS:   General: Afebrile, no chills. CV: No chest pain. Resp: No SOB. MSK: Right ankle pain and deformity  Neuro: No numbness, tingling, weakness  10 point ROS negative other than stated above    PE:  Blood pressure (!) 194/117, pulse 66, temperature 98 °F (36.7 °C), temperature source Oral, resp. rate 18, height 5' 5\" (1.651 m), weight 262 lb (118.8 kg), SpO2 98 %, not currently breastfeeding. Gen: Alert and oriented, NAD, cooperative. Head: Normocephalic, atraumatic. Cardiovascular: Regular rate. Respiratory: Chest symmetric, no accessory muscle use. Pelvis: Stable to anterior and lateral compression. RLE: Skin with 10 cm laceration on the medial aspect of the ankle with exposed bone and no active bleeding. No ecchymoses or abrasions. Significantly tender to palpation at the ankle, nontender at knee and hip. Crepitus noted with reduction maneuver of ankle. Compartments soft and easily compressible. EHL/FHL/TA/GS complex motor intact. Sural/saphenous/SPN/DPN/plantar nerve distribution SILT. (-) log roll. Knee ligament exam and straight leg raise deferred due to fracture. Nontender palpation along medial or lateral joint lines. DP/PT pulses 2+ with BCR. Labs  Recent Labs     12/05/22  1840   WBC 12.8*   HGB 10.6*   HCT 35.5*      INR 1.1      K 4.8   BUN 60*   CREATININE 3.87*   GLUCOSE 128*        Imaging   3 views of the right ankle demonstrating a trimalleolar comminuted fracture dislocation with air present on x-ray within the soft tissues.      Multiple views of the pelvis had concern of possible superior and inferior pubic rami fractures on the right side with CT scan ordered for further evaluation. Preliminary evaluation of CT pelvis were negative for acute or chronic fractures. Diffuse osteopenic and degenerative changes noted. Assessment/Plan: 69 y.o. female who fell from standing height, being seen for:    -Right ankle trimalleolar open fracture dislocation    -Hematoma block and pain meds for close reduction with placement of short leg splint to right lower extremity  -Plan for OR tomorrow with Dr. Katerine Ragsdale. Clearance appreciated from cardiology and primary team  -WB status: Nonweightbearing right lower extremity  -Please maintain splint to right lower extremity  -Patient received clindamycin and tetanus in ED, clindamycin on-call to the OR  -primary team: Trauma  -consults: Cardiology  -NPO at midnight  -Please all DVT prophylaxis to postop day 1  -consented  -Follow-up VitD  -Pain control per primary  -Ice for pain and swelling  -Please contact ortho with any questions    Dedrick Aguila DO  Orthopedic Surgery Resident  7:32 PM 12/5/2022    Procedure Note:   Hematoma block: Risks, benefits, and alternatives were discussed with the patient, and verbal consent was obtained for hematoma block. 20 cc of 1% plain lidocaine was injected into the right ankle joint. Once adequate pain control had been achieved, reduction maneuver was performed and fragment position was confirmed on X-ray. A short leg splint was applied, and fragment position was again confirmed on X-ray. The patient noticed decrease in pain and denied numbness or tingling in her right ankle.

## 2022-12-06 NOTE — H&P
TRAUMA H&P/CONSULT    PATIENT NAME: Ricki Espinoza  YOB: 1945  MEDICAL RECORD NO. [id number]   DATE: 12/5/2022  PRIMARY CARE PHYSICIAN: JAJA Julien MD  PATIENT EVALUATED AT THE REQUEST OF : Juan Sinclair MD    ACTIVATION   []Trauma Alert     [] Trauma Priority     [x]Trauma Consult. Patient Active Problem List   Diagnosis    Dyslipidemia    DIONY treated with BiPAP    Fibromyalgia    CRI (chronic renal insufficiency)    OA (osteoarthritis)    DM (diabetes mellitus) ([address]    Kidney stone    Depression    Seasonal allergies    Diverticulosis    Erythropenia    Normocytic normochromic anemia    Mitral regurgitation - Mild to moderate    CKD (chronic kidney disease) stage 4, GFR 15-29 ml/min (HCC)    Gout    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 disease ([address]    Essential hypertension    Osteopenia    Morbid obesity due to excess calories (HCC)    Anxiety    Febrile illness    Acute serous otitis media of left ear    Secondary hyperparathyroidism ([address]    Acute kidney injury superimposed on CKD ([address]    New onset a-fib ([address] with Rapid ventricular response    New onset of congestive heart failure (HCC)    Lymphedema    GERD (gastroesophageal reflux disease)    Restless leg syndrome    Acute diastolic congestive heart failure (HCC)    Chronic bilateral low back pain without sciatica    Acute bilateral low back pain without sciatica    Generalized muscle weakness    Bradycardia    Open fracture of right tibia and fibula, sequela       IMPRESSION AND PLAN:   77y/F with past medical history of A. Fib, chronic Back pain, HTN, who presented to Ed with slip and fall from standing height leading to trauma to her right ankle. It was accompanied by pain on weight bearing and bleeding from the ankle. Examination shows open wound of 6 cm on the medial aspect of right ankle.      Diagnosis: open fracture right ankle, possible pubic rami fx   Plan: Ortho consult    F/u CT abd/pelvis  Cardiology clearance for pre-op optimization  Admit in med surg  Hold anti-coagulation  NPO depending on the plan from [address] [place] [address]    [] Neurosurgery     [x] Orthopedic Surgery    [] Cardiothoracic     [] Facial Trauma    [] Plastic Surgery (Burn)    [] Pediatric Surgery     [] Internal Medicine    [] Pulmonary Medicine    [] Geriatrics    [] Other:        HISTORY:     Chief Complaint:  \"Pain and bleeding from Right ankle\"    Injury Summary  Open wound right ankle  Fracture right Tibia and fibula    GENERAL DATA  Patient information was obtained from patient. History/Exam limitations: none. Injury Date: 12/5/2022   Approximate Injury Time: prior to arrival        Transport mode:   [x]Ambulance      [] Helicopter     []Car       [] Other  Referring Hospital: none    SETTING OF TRAUMATIC EVENT   Location (e.g., home, farm, industry, street): street  Specific Details of Location (e.g., bedroom, kitchen, garage, highway): curbside    MECHANISM OF INJURY         [x] Fall    [x]From Standing     []From Height __ Ft     []Down ___steps  []Other___      HISTORY:     Cody Vo is a 77y/F with past medical history of A. Fib, chronic Back pain, HTN, who presented to Ed with slip and fall from standing height leading to trauma to her right ankle. It was accompanied by pain on weight bearing and bleeding from the ankle. Walks with a cane and went to step up on a curb and rolled her ankle. She fell backwards and down and braced herself for the fall. Denies any other pain other than RLE pain. Denies hitting her head, denies LOC. Traumatic loss of Consciousness [x]No   []Yes Duration(min)       [] Unknown     Total Fluids Given Prior To Arrival  mL    MEDICATIONS:   []  None     []  Information not available due to exam limitations documented above    Prior to Admission medications    Medication Sig Start Date End Date Taking?  Authorizing Provider   rOPINIRole (REQUIP) 0.25 MG tablet Take 1 tablet by mouth nightly tablets by mouth 3 times daily 7/12/22   Fernando Yusuf MD   amiodarone (CORDARONE) 200 MG tablet Take 1 tablet by mouth daily 7/6/22   BARBIE Boucher NP   calcitRIOL (ROCALTROL) 0.5 MCG capsule TAKE 1 CAPSULE BY MOUTH DAILY 6/14/22   Fernando Yusuf MD   ferrous sulfate (FE TABS 325) 325 (65 Fe) MG EC tablet Take 1 tablet by mouth daily (with breakfast)  Patient not taking: Reported on 11/9/2022 5/10/22   Eliza Duenas MD   potassium citrate (UROCIT-K) 10 MEQ (1080 MG) extended release tablet Take 1 tablet by mouth daily 2/16/22   Eliza Duenas MD   azelastine (ASTELIN) 0.1 % nasal spray 1 spray by Nasal route 2 times daily Use in each nostril as directed 10/13/21   Camacho Galeas DO   Cholecalciferol (VITAMIN D3) 25 MCG (1000 UT) TABS Take 2 tablets by mouth daily 1/9/20   BARBIE Ruiz - CNP   Magnesium Oxide 400 MG CAPS Take by mouth 2 times daily Takes once daily at [facility]    Historical Provider, MD       ALLERGIES:   []  None    []   Information not available due to exam limitations documented above     Adhesive tape, Cephalexin, Erythromycin, Ketorolac tromethamine, Pcn [penicillins], Percocet [oxycodone-acetaminophen], Sulfa antibiotics, and Ultracet [tramadol-acetaminophen]    PAST MEDICAL/SURGICAL HISTORY: []  None   []   Information not available due to exam limitations documented above      has a past medical history of Abnormal stress test, Anemia, Arthritis, Depression, Diverticulosis, DM (diabetes mellitus) ([address] Erythropenia, Fibromyalgia, HTN (hypertension), Hyperlipidemia, Kidney stone, Morbid obesity due to excess calories ([address] DIONY on CPAP, Osteopenia, Seasonal allergies, and Sleep apnea. has a past surgical history that includes Colonoscopy (2003); Colonoscopy (2007); Tubal ligation; Arm Surgery (2011); Total knee arthroplasty (Bilateral); Cardiac catheterization ([id number] dr Nedra Edgar); and Cardiac catheterization (5-16-16).     FAMILY HISTORY   []   Information not available due to exam limitations documented above    family history includes Diabetes in her mother; Heart Disease in her mother; Kidney Disease in her father; Osteoporosis in her mother; Prostate Cancer in her brother. SOCIAL HISTORY  []   Information not available due to exam limitations documented above     reports that she quit smoking about 62 years ago. Her smoking use included cigarettes. She has a 0.25 pack-year smoking history. She has never used smokeless tobacco.   reports no history of alcohol use. reports no history of drug use. Review of Systems:    Review of Systems   Constitutional:  Negative for activity change, fatigue and fever. HENT:  Negative for sinus pressure and sinus pain. Eyes:  Negative for photophobia and visual disturbance. Respiratory:  Negative for chest tightness, shortness of breath and wheezing. Cardiovascular:  Negative for chest pain and palpitations. Gastrointestinal:  Negative for abdominal pain, nausea and vomiting. Genitourinary:  Negative for hematuria and urgency. Musculoskeletal:  Positive for back pain. Negative for neck pain.        +R ankle pain   Neurological:  Negative for dizziness and light-headedness. Psychiatric/Behavioral:  Negative for agitation and behavioral problems. PHYSICAL EXAMINATION:     VITAL SIGNS:   Vitals:    12/05/22 2240   BP: (!) 220/110   Pulse:    Resp:    Temp:    SpO2:        Physical Exam  Constitutional:       Appearance: Normal appearance. She is obese. HENT:      Head: Normocephalic and atraumatic. Nose: Nose normal.      Mouth/Throat:      Mouth: Mucous membranes are moist.      Pharynx: Oropharynx is clear. Eyes:      Extraocular Movements: Extraocular movements intact. Conjunctiva/sclera: Conjunctivae normal.   Cardiovascular:      Rate and Rhythm: Normal rate and regular rhythm. Pulses: Normal pulses. Pulmonary:      Effort: Pulmonary effort is normal.      Breath sounds:  No wheezing or rhonchi. Abdominal:      General: There is no distension. Palpations: Abdomen is soft. Musculoskeletal:         General: Tenderness, deformity and signs of injury present. Cervical back: Normal range of motion and neck supple. No rigidity or tenderness. Comments: R medial ankle open fracture without hemorrhage   Skin:     General: Skin is warm and dry. Neurological:      General: No focal deficit present. Mental Status: She is alert and oriented to person, place, and time. Psychiatric:         Mood and Affect: Mood normal.         Behavior: Behavior normal.        FOCUSED ABDOMINAL SONOGRAM FOR TRAUMA (FAST): A good  quality examination was performed by Dr. Noland and representative images were obtained. [x] No free fluid in the abdomen   [] Free fluid in RUQ   [] Free fluid in LUQ  [] Free fluid in Pelvis  [] Pericardial fluid  [] Other:        RADIOLOGY  XR ANKLE RIGHT (MIN 3 VIEWS)   Final Result   Trimalleolar right ankle is fracture subluxation with improved positioning of   the talus in relation to the tibia and now with mild posterior subluxation of   the talus in relation to the tibia. XR ANKLE RIGHT (2 VIEWS)   Final Result   Ankle fracture subluxation with persistent mild lateral subluxation of the   talus in relation to the tibia. XR CHEST PORTABLE   Final Result   Cardiomegaly without acute pulmonary process. XR PELVIS (1-2 VIEWS)   Final Result   Potential nondisplaced fracture of the right superior and inferior pubic rami. XR ANKLE RIGHT (MIN 3 VIEWS)   Final Result   Trimalleolar fracture with associated soft tissue swelling. XR TIBIA FIBULA RIGHT (2 VIEWS)   Final Result   Trimalleolar fracture with diffuse soft tissue swelling. XR KNEE RIGHT (3 VIEWS)   Final Result   Total knee arthroplasty without complication. No acute osseous or soft tissue abnormality.          CT ABDOMEN PELVIS WO CONTRAST Additional Contrast? None    (Results Pending)   CT PELVIS WO CONTRAST Additional Contrast? None    (Results Pending)   CT 3D RECONSTRUCTION    (Results Pending)   CT ANKLE RIGHT WO CONTRAST    (Results Pending)   CT 3D RECONSTRUCTION    (Results Pending)         LABS  Labs Reviewed   TRAUMA PANEL - Abnormal; Notable for the following components:       Result Value    BUN 60 (*)     WBC 12.8 (*)     RBC 3.54 (*)     Hemoglobin 10.6 (*)     Hematocrit 35.5 (*)     RDW 15.7 (*)     Creatinine 3.87 (*)     Est, Glom Filt Rate 12 (*)     Glucose 128 (*)     pH, Cornelius 7.286 (*)     pCO2, Cornelius 55.5 (*)     pO2, Cornelius 53.5 (*)     All other components within normal limits   VITAMIN D 25 HYDROXY   TYPE AND SCREEN         Atul Sis, DO  12/5/22, 10:44 PM        Attending Note      I have reviewed the above TECSS note(s) and I either performed the key elements of the medical history and physical exam or was present with the resident when the key elements of the medical history and physical exam were performed. I have discussed the findings, established the care plan and recommendations with Resident Lina West.     Marquis Bon MD  12/6/2022  11:30 AM

## 2022-12-06 NOTE — PROGRESS NOTES
Orthopedic Progress Note    Patient:  Singh Agustin  YOB: 1945     69 y.o. female    Subjective:  Patient seen and examined  Complaining of pain to the right ankle  No issue overnight per patient or nursing  Denies fever, HA, CP, SOB, N/V, dysuria    Vitals reviewed    Objective:   Vitals:    12/06/22 0438   BP:    Pulse:    Resp: 16   Temp:    SpO2:        Gen: NAD, cooperative    Cardiovascular: Regular rate    Respiratory: Chest symmetric, no accessory muscle use, normal respirations, no audible wheezes    MSK: RLE: Splint on. Mild saturation noted on the posterior aspect of the dressing. Small window was made in the lateral dressing. Skin appears to wrinkle over the anterior fibula. Motor intact to exposed does. Baseline dysesthesias to the lateral 3 digits otherwise sensation intact to exposed digits. Exposed digits warm and well perfused. Recent Labs     12/05/22  1840   WBC 12.8*   HGB 10.6*   HCT 35.5*      INR 1.1      K 4.8   BUN 60*   CREATININE 3.87*   GLUCOSE 128*      Antibiotics: Clindamycin OCTOR     See rec for complete list    Impression/plan: 69 y.o. female who fell from standing height, being seen for:     -Right ankle trimalleolar open fracture dislocation     -Plan for OR today, 12/6/22 with Dr. Carmela Ariza for ORIF vs Ex-fix  -Nonweightbearing right lower extremity  -Please maintain splint to right lower extremity. Reinforce as needed  -Patient received clindamycin and tetanus in ED, clindamycin on-call to the OR  -primary team: Trauma  -consults: Cardiology  -NPO  -Please hold all DVT prophylaxis to postop day 1 if medially able  -Consented and correct location marked  -Pain control per primary  -Encouraged to aggressively Ice  and elevate for pain and swelling before surgery.   Currently propped up on multiple blankets.   -Please contact ortho with any questions      Fauzia Hernández,    Orthopedic Surgery Resident PGY-2  [address]

## 2022-12-07 PROBLEM — E87.5 HYPERKALEMIA: Status: ACTIVE | Noted: 2022-12-07

## 2022-12-07 PROBLEM — E87.1 HYPONATREMIA: Status: ACTIVE | Noted: 2022-12-07

## 2022-12-07 PROBLEM — E87.20 METABOLIC ACIDOSIS: Status: ACTIVE | Noted: 2022-12-07

## 2022-12-07 PROBLEM — S82.851C: Status: ACTIVE | Noted: 2022-12-07

## 2022-12-07 LAB
ABSOLUTE EOS #: 0 K/UL (ref 0–0.44)
ABSOLUTE IMMATURE GRANULOCYTE: 0.16 K/UL (ref 0–0.3)
ABSOLUTE LYMPH #: 0.47 K/UL (ref 1.1–3.7)
ABSOLUTE MONO #: 0.94 K/UL (ref 0.1–1.2)
ALBUMIN SERPL-MCNC: 3.3 G/DL (ref 3.5–5.2)
ALBUMIN/GLOBULIN RATIO: 1.4 (ref 1–2.5)
ALP BLD-CCNC: 49 U/L (ref 35–104)
ALT SERPL-CCNC: 6 U/L (ref 5–33)
ANION GAP SERPL CALCULATED.3IONS-SCNC: 13 MMOL/L (ref 9–17)
ANION GAP SERPL CALCULATED.3IONS-SCNC: 13 MMOL/L (ref 9–17)
ANION GAP SERPL CALCULATED.3IONS-SCNC: 14 MMOL/L (ref 9–17)
AST SERPL-CCNC: 10 U/L
BASOPHILS # BLD: 0 % (ref 0–2)
BASOPHILS ABSOLUTE: 0 K/UL (ref 0–0.2)
BILIRUB SERPL-MCNC: 0.2 MG/DL (ref 0.3–1.2)
BUN BLDV-MCNC: 60 MG/DL (ref 8–23)
BUN BLDV-MCNC: 63 MG/DL (ref 8–23)
BUN BLDV-MCNC: 69 MG/DL (ref 8–23)
CALCIUM SERPL-MCNC: 8.8 MG/DL (ref 8.6–10.4)
CALCIUM SERPL-MCNC: 8.9 MG/DL (ref 8.6–10.4)
CALCIUM SERPL-MCNC: 9.2 MG/DL (ref 8.6–10.4)
CHLORIDE BLD-SCNC: 101 MMOL/L (ref 98–107)
CHLORIDE BLD-SCNC: 96 MMOL/L (ref 98–107)
CHLORIDE BLD-SCNC: 99 MMOL/L (ref 98–107)
CO2: 19 MMOL/L (ref 20–31)
CO2: 20 MMOL/L (ref 20–31)
CO2: 21 MMOL/L (ref 20–31)
CREAT SERPL-MCNC: 3.62 MG/DL (ref 0.5–0.9)
CREAT SERPL-MCNC: 4.12 MG/DL (ref 0.5–0.9)
CREAT SERPL-MCNC: 4.2 MG/DL (ref 0.5–0.9)
EOSINOPHILS RELATIVE PERCENT: 0 % (ref 1–4)
GFR SERPL CREATININE-BSD FRML MDRD: 10 ML/MIN/1.73M2
GFR SERPL CREATININE-BSD FRML MDRD: 11 ML/MIN/1.73M2
GFR SERPL CREATININE-BSD FRML MDRD: 12 ML/MIN/1.73M2
GLUCOSE BLD-MCNC: 153 MG/DL (ref 65–105)
GLUCOSE BLD-MCNC: 166 MG/DL (ref 65–105)
GLUCOSE BLD-MCNC: 174 MG/DL (ref 70–99)
GLUCOSE BLD-MCNC: 182 MG/DL (ref 65–105)
GLUCOSE BLD-MCNC: 189 MG/DL (ref 70–99)
GLUCOSE BLD-MCNC: 207 MG/DL (ref 65–105)
GLUCOSE BLD-MCNC: 232 MG/DL (ref 70–99)
HCT VFR BLD CALC: 29.2 % (ref 36.3–47.1)
HEMOGLOBIN: 8.6 G/DL (ref 11.9–15.1)
IMMATURE GRANULOCYTES: 1 %
LYMPHOCYTES # BLD: 3 % (ref 24–43)
MCH RBC QN AUTO: 30.1 PG (ref 25.2–33.5)
MCHC RBC AUTO-ENTMCNC: 29.5 G/DL (ref 28.4–34.8)
MCV RBC AUTO: 102.1 FL (ref 82.6–102.9)
MONOCYTES # BLD: 6 % (ref 3–12)
MORPHOLOGY: ABNORMAL
NRBC AUTOMATED: 0 PER 100 WBC
PDW BLD-RTO: 16.2 % (ref 11.8–14.4)
PHOSPHORUS: 5.1 MG/DL (ref 2.6–4.5)
PLATELET # BLD: 302 K/UL (ref 138–453)
PMV BLD AUTO: 10 FL (ref 8.1–13.5)
POTASSIUM SERPL-SCNC: 5.6 MMOL/L (ref 3.7–5.3)
POTASSIUM SERPL-SCNC: 5.6 MMOL/L (ref 3.7–5.3)
POTASSIUM SERPL-SCNC: 5.7 MMOL/L (ref 3.7–5.3)
RBC # BLD: 2.86 M/UL (ref 3.95–5.11)
SEG NEUTROPHILS: 90 % (ref 36–65)
SEGMENTED NEUTROPHILS ABSOLUTE COUNT: 14.13 K/UL (ref 1.5–8.1)
SODIUM BLD-SCNC: 129 MMOL/L (ref 135–144)
SODIUM BLD-SCNC: 132 MMOL/L (ref 135–144)
SODIUM BLD-SCNC: 135 MMOL/L (ref 135–144)
TOTAL PROTEIN: 5.7 G/DL (ref 6.4–8.3)
WBC # BLD: 15.7 K/UL (ref 3.5–11.3)

## 2022-12-07 PROCEDURE — 6360000002 HC RX W HCPCS

## 2022-12-07 PROCEDURE — 2580000003 HC RX 258: Performed by: INTERNAL MEDICINE

## 2022-12-07 PROCEDURE — 97530 THERAPEUTIC ACTIVITIES: CPT

## 2022-12-07 PROCEDURE — 80048 BASIC METABOLIC PNL TOTAL CA: CPT

## 2022-12-07 PROCEDURE — 99232 SBSQ HOSP IP/OBS MODERATE 35: CPT | Performed by: INTERNAL MEDICINE

## 2022-12-07 PROCEDURE — 36415 COLL VENOUS BLD VENIPUNCTURE: CPT

## 2022-12-07 PROCEDURE — 85025 COMPLETE CBC W/AUTO DIFF WBC: CPT

## 2022-12-07 PROCEDURE — 6370000000 HC RX 637 (ALT 250 FOR IP): Performed by: STUDENT IN AN ORGANIZED HEALTH CARE EDUCATION/TRAINING PROGRAM

## 2022-12-07 PROCEDURE — 97162 PT EVAL MOD COMPLEX 30 MIN: CPT

## 2022-12-07 PROCEDURE — 6370000000 HC RX 637 (ALT 250 FOR IP): Performed by: INTERNAL MEDICINE

## 2022-12-07 PROCEDURE — 1200000000 HC SEMI PRIVATE

## 2022-12-07 PROCEDURE — 2500000003 HC RX 250 WO HCPCS

## 2022-12-07 PROCEDURE — 6370000000 HC RX 637 (ALT 250 FOR IP)

## 2022-12-07 PROCEDURE — 97166 OT EVAL MOD COMPLEX 45 MIN: CPT

## 2022-12-07 PROCEDURE — 80053 COMPREHEN METABOLIC PANEL: CPT

## 2022-12-07 PROCEDURE — 2500000003 HC RX 250 WO HCPCS: Performed by: INTERNAL MEDICINE

## 2022-12-07 PROCEDURE — 2500000003 HC RX 250 WO HCPCS: Performed by: STUDENT IN AN ORGANIZED HEALTH CARE EDUCATION/TRAINING PROGRAM

## 2022-12-07 PROCEDURE — 82947 ASSAY GLUCOSE BLOOD QUANT: CPT

## 2022-12-07 PROCEDURE — 2580000003 HC RX 258

## 2022-12-07 PROCEDURE — 84100 ASSAY OF PHOSPHORUS: CPT

## 2022-12-07 RX ORDER — CALCIUM GLUCONATE 20 MG/ML
1000 INJECTION, SOLUTION INTRAVENOUS ONCE
Status: COMPLETED | OUTPATIENT
Start: 2022-12-07 | End: 2022-12-07

## 2022-12-07 RX ORDER — INSULIN LISPRO 100 [IU]/ML
0-16 INJECTION, SOLUTION INTRAVENOUS; SUBCUTANEOUS
Status: DISCONTINUED | OUTPATIENT
Start: 2022-12-07 | End: 2022-12-12 | Stop reason: HOSPADM

## 2022-12-07 RX ORDER — HEPARIN SODIUM 5000 [USP'U]/ML
5000 INJECTION, SOLUTION INTRAVENOUS; SUBCUTANEOUS EVERY 8 HOURS SCHEDULED
Status: DISCONTINUED | OUTPATIENT
Start: 2022-12-07 | End: 2022-12-11

## 2022-12-07 RX ORDER — DEXTROSE MONOHYDRATE 100 MG/ML
INJECTION, SOLUTION INTRAVENOUS CONTINUOUS PRN
Status: DISCONTINUED | OUTPATIENT
Start: 2022-12-07 | End: 2022-12-12 | Stop reason: HOSPADM

## 2022-12-07 RX ORDER — CALCIUM GLUCONATE 94 MG/ML
1000 INJECTION, SOLUTION INTRAVENOUS ONCE
Status: DISCONTINUED | OUTPATIENT
Start: 2022-12-07 | End: 2022-12-07

## 2022-12-07 RX ORDER — DEXTROSE MONOHYDRATE 100 MG/ML
INJECTION, SOLUTION INTRAVENOUS CONTINUOUS
Status: DISPENSED | OUTPATIENT
Start: 2022-12-07 | End: 2022-12-07

## 2022-12-07 RX ORDER — BUMETANIDE 0.25 MG/ML
2 INJECTION, SOLUTION INTRAMUSCULAR; INTRAVENOUS ONCE
Status: COMPLETED | OUTPATIENT
Start: 2022-12-07 | End: 2022-12-07

## 2022-12-07 RX ORDER — INSULIN LISPRO 100 [IU]/ML
0-4 INJECTION, SOLUTION INTRAVENOUS; SUBCUTANEOUS NIGHTLY
Status: DISCONTINUED | OUTPATIENT
Start: 2022-12-07 | End: 2022-12-12 | Stop reason: HOSPADM

## 2022-12-07 RX ORDER — INSULIN LISPRO 100 [IU]/ML
0-16 INJECTION, SOLUTION INTRAVENOUS; SUBCUTANEOUS
Status: DISCONTINUED | OUTPATIENT
Start: 2022-12-07 | End: 2022-12-07

## 2022-12-07 RX ORDER — FENTANYL CITRATE 50 UG/ML
50 INJECTION, SOLUTION INTRAMUSCULAR; INTRAVENOUS ONCE
Status: COMPLETED | OUTPATIENT
Start: 2022-12-07 | End: 2022-12-07

## 2022-12-07 RX ADMIN — ROPINIROLE HYDROCHLORIDE 0.25 MG: 0.25 TABLET, FILM COATED ORAL at 20:06

## 2022-12-07 RX ADMIN — CALCIUM GLUCONATE 1000 MG: 20 INJECTION, SOLUTION INTRAVENOUS at 03:55

## 2022-12-07 RX ADMIN — FENTANYL CITRATE 50 MCG: 50 INJECTION, SOLUTION INTRAMUSCULAR; INTRAVENOUS at 03:46

## 2022-12-07 RX ADMIN — SODIUM ZIRCONIUM CYCLOSILICATE 5 G: 5 POWDER, FOR SUSPENSION ORAL at 17:33

## 2022-12-07 RX ADMIN — DESMOPRESSIN ACETATE 40 MG: 0.2 TABLET ORAL at 10:12

## 2022-12-07 RX ADMIN — AMIODARONE HYDROCHLORIDE 200 MG: 200 TABLET ORAL at 10:12

## 2022-12-07 RX ADMIN — METHOCARBAMOL TABLETS 750 MG: 750 TABLET, COATED ORAL at 04:02

## 2022-12-07 RX ADMIN — HEPARIN SODIUM 5000 UNITS: 5000 INJECTION INTRAVENOUS; SUBCUTANEOUS at 22:28

## 2022-12-07 RX ADMIN — INSULIN HUMAN 10 UNITS: 100 INJECTION, SOLUTION PARENTERAL at 06:32

## 2022-12-07 RX ADMIN — METOPROLOL TARTRATE 25 MG: 25 TABLET ORAL at 10:12

## 2022-12-07 RX ADMIN — BUMETANIDE 2 MG: 0.25 INJECTION, SOLUTION INTRAMUSCULAR; INTRAVENOUS at 22:27

## 2022-12-07 RX ADMIN — METHOCARBAMOL TABLETS 750 MG: 750 TABLET, COATED ORAL at 20:06

## 2022-12-07 RX ADMIN — PANTOPRAZOLE SODIUM 40 MG: 40 TABLET, DELAYED RELEASE ORAL at 06:38

## 2022-12-07 RX ADMIN — Medication: at 05:13

## 2022-12-07 RX ADMIN — SODIUM ZIRCONIUM CYCLOSILICATE 5 G: 5 POWDER, FOR SUSPENSION ORAL at 20:06

## 2022-12-07 RX ADMIN — METHOCARBAMOL TABLETS 750 MG: 750 TABLET, COATED ORAL at 13:39

## 2022-12-07 RX ADMIN — CLINDAMYCIN PHOSPHATE 900 MG: 900 INJECTION, SOLUTION INTRAVENOUS at 06:36

## 2022-12-07 RX ADMIN — HYDROCODONE BITARTRATE AND ACETAMINOPHEN 1 TABLET: 5; 325 TABLET ORAL at 10:10

## 2022-12-07 RX ADMIN — GABAPENTIN 600 MG: 600 TABLET ORAL at 10:12

## 2022-12-07 RX ADMIN — HEPARIN SODIUM 5000 UNITS: 5000 INJECTION INTRAVENOUS; SUBCUTANEOUS at 17:34

## 2022-12-07 RX ADMIN — HYDRALAZINE HYDROCHLORIDE 100 MG: 50 TABLET, FILM COATED ORAL at 13:40

## 2022-12-07 RX ADMIN — METOPROLOL TARTRATE 25 MG: 25 TABLET ORAL at 20:06

## 2022-12-07 RX ADMIN — BUMETANIDE 2 MG: 0.25 INJECTION, SOLUTION INTRAMUSCULAR; INTRAVENOUS at 10:10

## 2022-12-07 RX ADMIN — SODIUM BICARBONATE: 84 INJECTION, SOLUTION INTRAVENOUS at 17:39

## 2022-12-07 RX ADMIN — HYDROCODONE BITARTRATE AND ACETAMINOPHEN 1 TABLET: 5; 325 TABLET ORAL at 17:32

## 2022-12-07 RX ADMIN — HYDRALAZINE HYDROCHLORIDE 100 MG: 50 TABLET, FILM COATED ORAL at 20:06

## 2022-12-07 ASSESSMENT — PAIN DESCRIPTION - ONSET: ONSET: ON-GOING

## 2022-12-07 ASSESSMENT — PAIN DESCRIPTION - DESCRIPTORS
DESCRIPTORS: ACHING;DISCOMFORT
DESCRIPTORS: THROBBING

## 2022-12-07 ASSESSMENT — PAIN DESCRIPTION - FREQUENCY: FREQUENCY: CONTINUOUS

## 2022-12-07 ASSESSMENT — PAIN SCALES - GENERAL
PAINLEVEL_OUTOF10: 7
PAINLEVEL_OUTOF10: 8
PAINLEVEL_OUTOF10: 10
PAINLEVEL_OUTOF10: 8
PAINLEVEL_OUTOF10: 7
PAINLEVEL_OUTOF10: 8
PAINLEVEL_OUTOF10: 8
PAINLEVEL_OUTOF10: 10
PAINLEVEL_OUTOF10: 10

## 2022-12-07 ASSESSMENT — PAIN DESCRIPTION - LOCATION
LOCATION: ANKLE
LOCATION: ANKLE;BACK

## 2022-12-07 ASSESSMENT — PAIN DESCRIPTION - ORIENTATION
ORIENTATION: RIGHT
ORIENTATION: RIGHT;LOWER

## 2022-12-07 ASSESSMENT — PAIN DESCRIPTION - PAIN TYPE: TYPE: ACUTE PAIN;CHRONIC PAIN

## 2022-12-07 NOTE — PROGRESS NOTES
Nephrology Progress Note      SUBJECTIVE       Pt was seen and examined. Afebrile, saturating on room air  Complaining of right lower extremity pain  Blood pressure well controlled this a.m. Hypertensive overnight  BP: (118-129)/(54-57)    Denies any chest pain, shortness of breath, abdominal pain, nausea, vomiting    Sodium 135, potassium 4.7, chloride 101, bicarb 21, anion gap 13  BUN 60, creatinine 2.62  Leukocytosis 15.7  Hb 10.6> 9.4> 8.6    S/p ORIF, right trimalleolar ankle fracture with posterior malleolar fixation  Open treatment without fixation right talus  Trauma following    Brief note:  60-year-old female presented to the hospital for evaluation of open fracture right ankle after mechanical fall. S/p ORIF, right trimalleolar ankle fracture with posterior malleolar fixation  Open treatment without fixation right talus    Nephrology consulted for JOSE ARMANDO on CKD stage III, hyperkalemia. PMH of CKD stage III secondary to diabetic nephrosclerosis with baseline creatinine 2.7-3.3 lately, paroxysmal atrial fibrillation, hypertension, hyperlipidemia, nonobstructive CAD, T2DM, DIONY.   Patient takes Lasix 40 once daily for her lower extremity edema    OBJECTIVE      CURRENT TEMPERATURE:  Temp: 97.5 °F (36.4 °C)  MAXIMUM TEMPERATURE OVER 24HRS:  Temp (24hrs), Av.4 °F (36.3 °C), Min:96.3 °F (35.7 °C), Max:98.1 °F (36.7 °C)    CURRENT RESPIRATORY RATE:  Resp: 16  CURRENT PULSE:  Heart Rate: 72  CURRENT BLOOD PRESSURE:  BP: (!) 129/57  24HR BLOOD PRESSURE RANGE:  Systolic (52VBD), EXA:481 , Min:118 , KYO:544   ; Diastolic (92PHM), SLI:88, Min:50, Max:93    24HR INTAKE/OUTPUT:    Intake/Output Summary (Last 24 hours) at 2022 1132  Last data filed at 2022 8590  Gross per 24 hour   Intake 2336 ml   Output 500 ml   Net 1836 ml     WEIGHT :Patient Vitals for the past 96 hrs (Last 3 readings):   Weight   22 1434 262 lb (118.8 kg)   22 1815 262 lb (118.8 kg)     PHYSICAL EXAM      GENERAL APPEARANCE:Awake, alert, in no acute distress  SKIN: warm and dry, no rash or erythema  EYES: conjunctivae normal and sclera anicteric  ENT: no thrush no pharyngeal congestion   NECK:   No JVD. No carotid bruits and no carotid lymphadenopathy . PULMONARY: lungs are clear to auscultation. No Wheezing, no ronchi . CADRDIOVASCULAR: S1 and S2 normal NO S3 and NO S4 . No rubs , no murmur. ABDOMEN: soft nontender, bowel sounds present, no organomegaly, no ascites. EXTREMITIES: s/p ORIF right ankle fracture, dressing applied .   No pedal edema on the left    CURRENT MEDICATIONS      insulin lispro (HUMALOG) injection vial 0-4 Units, Nightly  glucose chewable tablet 16 g, PRN  dextrose bolus 10% 125 mL, PRN   Or  dextrose bolus 10% 250 mL, PRN  glucagon (rDNA) injection 1 mg, PRN  dextrose 10 % infusion, Continuous PRN  insulin lispro (HUMALOG) injection vial 0-16 Units, TID WC  clindamycin (CLEOCIN) 900 mg in dextrose 5 % 50 mL IVPB, Q8H  sodium chloride flush 0.9 % injection 5-40 mL, 2 times per day  sodium chloride flush 0.9 % injection 5-40 mL, PRN  0.9 % sodium chloride infusion, PRN  ondansetron (ZOFRAN-ODT) disintegrating tablet 4 mg, Q8H PRN   Or  ondansetron (ZOFRAN) injection 4 mg, Q6H PRN  polyethylene glycol (GLYCOLAX) packet 17 g, Daily  senna (SENOKOT) tablet 8.6 mg, Daily PRN  amiodarone (CORDARONE) tablet 200 mg, Daily  atorvastatin (LIPITOR) tablet 40 mg, Daily  gabapentin (NEURONTIN) tablet 600 mg, Daily  hydrALAZINE (APRESOLINE) tablet 100 mg, TID  HYDROcodone-acetaminophen (NORCO) 5-325 MG per tablet 1 tablet, Q6H PRN  pantoprazole (PROTONIX) tablet 40 mg, QAM AC  rOPINIRole (REQUIP) tablet 0.25 mg, Nightly  methocarbamol (ROBAXIN) tablet 750 mg, Q8H  metoprolol tartrate (LOPRESSOR) tablet 25 mg, BID          LABS      CBC:   Recent Labs     12/06/22  1205 12/06/22  2115 12/07/22  0557   WBC 13.3* 15.4* 15.7*   RBC 3.00* 3.11* 2.86*   HGB 9.3* 9.4* 8.6*   HCT 30.2* 31.9* 29.2*   .7 102.6 102.1   MCH 31.0 30.2 30.1   MCHC 30.8 29.5 29.5   RDW 16.0* 16.4* 16.2*    366 302   MPV 10.8 10.7 10.0      BMP:   Recent Labs     12/06/22  1205 12/06/22  1515 12/06/22  2115 12/07/22  0557     --  132* 135   K 5.5* 5.9* 5.7* 5.7*     --  102 101   CO2 22  --  18* 21   BUN 57*  --  57* 60*   CREATININE 3.57*  --  3.52* 3.62*   GLUCOSE 129*  --  239* 232*   CALCIUM 9.3  --  8.6 9.2        PHOSPHORUS:    Recent Labs     12/07/22  0557   PHOS 5.1*       ALBUMIN:   Recent Labs     12/06/22  1205 12/07/22  0557   LABALBU 3.6 3.3*     IRON:    Lab Results   Component Value Date/Time    IRON 79 06/09/2022 11:35 AM     IRON SATURATION:    Lab Results   Component Value Date/Time    LABIRON 29 06/09/2022 11:35 AM     TIBC:    Lab Results   Component Value Date/Time    TIBC 275 06/09/2022 11:35 AM     FERRITIN:    Lab Results   Component Value Date/Time    FERRITIN 537 06/09/2022 11:35 AM     FAHEEM: No results found for: FAHEEM    SPEP:   Lab Results   Component Value Date/Time    PROT 5.7 12/07/2022 05:57 AM    PATH ELECTRONICALLY SIGNED.  Bijal Raphael M.D. 04/02/2022 04:51 PM     UPEP:   Lab Results   Component Value Date/Time    TPU 24 04/02/2022 04:51 PM    TPU 24 04/02/2022 04:51 PM      HEPBSAG:No results found for: HEPBSAG  HEPCAB:  Lab Results   Component Value Date/Time    HEPCAB NONREACTIVE 01/11/2021 11:10 AM     C3:   Lab Results   Component Value Date    C3 166 04/01/2022     C4:   Lab Results   Component Value Date    C4 46 (H) 04/01/2022     URINE CREATININE:    Lab Results   Component Value Date/Time    LABCREA 55.1 12/06/2022 12:15 PM     URINE PROTEIN:    Lab Results   Component Value Date/Time    TPU 24 04/02/2022 04:51 PM    TPU 24 04/02/2022 04:51 PM     URINALYSIS:  U/A:   Lab Results   Component Value Date/Time    NITRU NEGATIVE 04/01/2022 05:03 AM    COLORU Yellow 04/01/2022 05:03 AM    PHUR 6.5 04/01/2022 05:03 AM    WBCUA 0 TO 2 04/01/2022 05:03 AM    RBCUA 0 TO 2 04/01/2022 05:03 AM    CLARITYU clear 04/18/2017 10:21 AM    SPECGRAV 1.010 04/01/2022 05:03 AM    LEUKOCYTESUR NEGATIVE 04/01/2022 05:03 AM    UROBILINOGEN Normal 04/01/2022 05:03 AM    BILIRUBINUR NEGATIVE 04/01/2022 05:03 AM    BILIRUBINUR neg 04/18/2017 10:21 AM    BLOODU neg 04/18/2017 10:21 AM    GLUCOSEU NEGATIVE 04/01/2022 05:03 AM    KETUA NEGATIVE 04/01/2022 05:03 AM         RADIOLOGY      Reviewed as available. ASSESSMENT       CKD:Stage III disease secondary to diabetic nephrosclerosis. Creatinine now is running around 2.8-3.3 range. Previously it was running around 2.2-2.5 range about 2yrs ago . Hypertension  Secondary Hyperparathyroidism   Diabetes Mellitus :  Paroxysmal atrial fibrillation on Eliquis at home  Nonobstructive CAD  Hyperlipidemia  Open fracture , right ankle  Hypertensive urgency  Hyperkalemia  Leukocytosis  Anemia  Acute kidney injury on chronic kidney disease     PLAN       Bumex IV 2 mg once  Repeat  BMP at 1300 today, we will follow  Strict input/output record, daily weight  Low potassium, low phosphorus, low salt diet  Avoid nephrotoxic drugs  High risk for needing dialysis this admission    Please do not hesitate to call with questions. This note is created with the assistance of a speech-recognition program. While intending to generate a document that actually reflects the content of the visit, no guarantees can be provided that every mistake has been identified and corrected by editing    Yvonne Donis MD.  Internal Medicine PGY-2,  Nephrology services,  [address] [place] [address]  12/7/2022, 12:09 PM    Attending Physician Statement  I have discussed the care of Marley Romero, including pertinent history and exam findings with the resident/fellow. I have reviewed the key elements of all parts of the encounter with the resident/fellow. I have seen and examined the patient with the resident/fellow.   I agree with the assessment and plan and status of the problem list as documented. Addiitionally I recommend with the patient's persistent hyperkalemia and metabolic acidosis we will start bicarbonate containing IV fluids. We will also initiate Lokelma. This afternoon, serum creatinine up to 4.1 and sodium 132 in bicarbonate 19 with potassium 5.6. Patient is at high risk of requiring dialysis on this admission. Patient will require every 6 hours BMP.     Carlos Lowe MD   Nephrology Attending Physician  [facility]  12/7/2022

## 2022-12-07 NOTE — BRIEF OP NOTE
Brief Postoperative Note      Patient: Chloe Reynolds  YOB: 1945  MRN: [id number]    Date of Procedure: 12/6/2022    Pre-Op Diagnosis: Type IIIA Open Right Trimalleolar Ankle Fracture    Post-Op Diagnosis:   Type IIIA Open Right Trimalleolar Ankle Fracture  Right Talus Fracture       Procedure(s):  Open reduction internal fixation right trimalleolar ankle fracture with posterior malleolar fixation  Open treatment without fixation right talus  Irrigation and excisional debridement skin down to and including bone right open ankle fracture  Complex wound closure right ankle measuring 10 cm  Stress examination under anesthesia right ankle with independent interpretation of imaging    Surgeon(s):  Rolf Valdez DO    Assistant:  Resident: Adri Gusman DO    Anesthesia: General    Estimated Blood Loss (mL): 200     TT: 636 min    Complications: None    Specimens:   * No specimens in log *    Implants:  Implant Name Type Inv. Item Serial No.  Lot No. LRB No. Used Action   EVOS 2. [id number] LCK SCREW T8 S-T - [id number]  EVOS 2. [id number] LCK SCREW T8 S-T  [facility]  Right 1 Implanted   EVOS [id number] LCK SCREW T8 S-T - [id number]  EVOS [id number] LCK SCREW T8 S-T  [facility]  Right 1 Implanted   EVOS 2. [id number] LCK SCREW T8 S-T - [id number]  EVOS 2. [id number] LCK SCREW T8 S-T  [facility]  Right 1 Implanted   EVOS 2. [id number] LCK SCREW T8 S-T - [id number]  EVOS 2. [id number] LCK SCREW T8 S-T  [facility]  Right 1 Implanted   EVOS 2.7MM STRAIGHT CARMINE PLATE 7  - [id number]  EVOS 2.7MM STRAIGHT CARMINE PLATE 7 HL  [facility]  Right 1 Implanted   SCREW BONE L28MM THRD DIA2.7MM HD DIA4.5MM COR DIA2MM PITCH - [id number]  SCREW BONE L28MM THRD DIA2.7MM HD DIA4.5MM COR DIA2MM PITCH  [facility]  Right 1 Implanted   SCREW BONE L34MM THRD DIA2.7MM HD DIA4.5MM COR DIA2MM PITCH - [id number]  SCREW BONE L34MM THRD DIA2.7MM HD DIA4.5MM COR DIA2MM PITCH  [facility]  Right 1 Implanted   PLATE BONE [id number] 7 H STRL RT POSTEROLATERAL DSTL FIBULAR S - [id number]  PLATE BONE [id number] 7 H STRL RT POSTEROLATERAL DSTL FIBULAR S  [facility]  Right 1 Implanted   PLATE BONE C72FL 3 H STRL RT POST DSTL TIB S STL PART ART - [id number]  PLATE BONE [id number] 3 H STRL RT POST DSTL TIB S [facility]  Right 1 Implanted   SCREW BONE L10MM DIA3. 5MM STRL BRIELLE S STL ST FOR SM PLATING - [id number]  SCREW BONE L10MM DIA3. 5MM STRL BRIELLE S STL ST FOR SM PLATING  [facility]  Right 1 Implanted   SCREW BONE L26MM DIA3.5MM BRIELLE S STL ST FOR EVOS SM PLATING - [id number]  SCREW BONE L26MM DIA3.5MM BRIELLE S STL ST FOR EVOS SM PLATING  [facility]  Right 1 Implanted   EVOS [id number] OST SCREW F-T - [id number]  EVOS [id number] OST SCREW F-T  [facility]  Right 1 Implanted         Drains: * No LDAs found *    Findings: see op note    Electronically signed by Mike Infante DO on 12/6/2022 at 7:11 PM

## 2022-12-07 NOTE — PROGRESS NOTES
POST OP NOTE    SUBJECTIVE  Pt s/p ORIF right trimalleolar fracture. Patient is awake and alert post op states her leg and back are both sore. Denies paresthesias, weakness, CP, SOB, nausea    OBJECTIVE  VITALS:  BP (!) 189/87   Pulse 64   Temp 97 °F (36.1 °C) (Temporal)   Resp 13   Ht 5' 5\" (1.651 m)   Wt 262 lb (118.8 kg)   LMP  (LMP Unknown)   SpO2 96%   BMI 43.60 kg/m²         GENERAL:  Awake and alert. No acute distress  CARDIOVASCULAR:  Regular Rate and Rhythm   LUNGS:  No increased respiratory effort  ABDOMEN:   Abdomen soft, non-tender, non-distended  INCISION: RLE splinted and wrapped per orthopedics, moves all toes, sensation intact distally    ASSESSMENT  1. POD# 0 s/p ORIF right trimalleolar fracture    PLAN  1. Pain management- MMPT  2.  DVT proph- start POD #1    Elevated glucose  - HDSS added    Hyperkalemia  - insulin/d10 bolus added    Dispo:  PT/OT  Optimization of electrolytes/renal status        Papa Lind DO  Trauma/Surgery Service  12/6/2022 at 11:03 PM

## 2022-12-07 NOTE — PROGRESS NOTES
PROGRESS NOTE          PATIENT NAME: Karla Blnac Dr RECORD NO. [id number]  DATE: 12/7/2022  SURGEON: Christie Wu  PRIMARY CARE PHYSICIAN: JAJA Yousif MD    HD: # 2    ASSESSMENT    Patient Active Problem List   Diagnosis    Dyslipidemia    DIONY treated with BiPAP    Fibromyalgia    CRI (chronic renal insufficiency)    OA (osteoarthritis)    DM (diabetes mellitus) ([address]    Kidney stone    Depression    Seasonal allergies    Diverticulosis    Erythropenia    Normocytic normochromic anemia    Mitral regurgitation - Mild to moderate    CKD (chronic kidney disease) stage 4, GFR 15-29 ml/min (HCC)    Gout    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 disease ([address]    Essential hypertension    Osteopenia    Morbid obesity due to excess calories ([facility])    Anxiety    Febrile illness    Acute serous otitis media of left ear    Secondary hyperparathyroidism ([address]    Acute kidney injury superimposed on CKD ([address]    New onset a-fib ([address] with Rapid ventricular response    New onset of congestive heart failure (HCC)    Lymphedema    GERD (gastroesophageal reflux disease)    Restless leg syndrome    Acute diastolic congestive heart failure (HCC)    Chronic bilateral low back pain without sciatica    Acute bilateral low back pain without sciatica    Generalized muscle weakness    Bradycardia    Open fracture of right tibia and fibula, sequela    Type III open trimalleolar fracture of right ankle       MEDICAL DECISION MAKING AND PLAN    Right open ankle fracture status post ORIF postop day 1   DVT Prophylaxis- 5000 units subcutaneous 3 times daily    Chief Complaint: \"My leg hurts\"    Dhruv Amezcua is has improved since yesterday. Patient states that her pain in her right lower extremity is improved since surgery yesterday. She has no current complaints she states that her pain is well controlled.   Overnight the patient did have an elevated potassium level which required calcium gluconate MD  12/7/22, 1:34 PM     Attestation signed by Mary Cristina MD    I personally evaluated the patient and directed the medical decision making with Resident/MEJIA after the physical/radiologic exam and laboratory values were reviewed and confirmed.       Mary Cristina MD

## 2022-12-07 NOTE — PROGRESS NOTES
Occupational Therapy  Facility/Department: [address] ORTHO/MED SURG  Occupational Therapy Initial Assessment    Name: Dasha Leyva  : 1945  MRN: [id number]  Date of Service: 2022    Discharge Recommendations:  Further therapy recommended at discharge. The patient should be able to tolerate at least 3 hours of therapy per day over 5 days or 15 hours over 7 days. This patient may benefit from a Physical Medicine and Rehab consult. OT Equipment Recommendations  Equipment Needed: Yes  Mobility Devices: ADL Assistive Devices  ADL Assistive Devices: Toileting - Drop Arm Commode, Heavy Duty Drop Arm Commode;Reacher;Long-handled Sponge;Long-handled Shoe Horn;Sock-Aid Hard       Patient Diagnosis(es): The primary encounter diagnosis was Type III open trimalleolar fracture of right ankle, initial encounter. A diagnosis of Fall, initial encounter was also pertinent to this visit. Past Medical History:  has a past medical history of Abnormal stress test, Anemia, Arthritis, Depression, Diverticulosis, DM (diabetes mellitus) [facility] Erythropenia, Fibromyalgia, HTN (hypertension), Hyperlipidemia, Kidney stone, Morbid obesity due to excess calories ([address] DIONY on CPAP, Osteopenia, Seasonal allergies, and Sleep apnea. Past Surgical History:  has a past surgical history that includes Colonoscopy (2003); Colonoscopy (2007); Tubal ligation; Arm Surgery (2011); Total knee arthroplasty (Bilateral); Cardiac catheterization ([id number] dr Moris Moncada); Cardiac catheterization (2016); ORIF ankle fracture (Right, 2022); and Ankle fracture surgery (Right, 2022). Assessment   Performance deficits / Impairments: Decreased functional mobility ; Decreased ADL status; Decreased endurance;Decreased balance;Decreased high-level IADLs  Assessment: Pt completed bed mobility with CGA-Min A, functional sit<>stand transfers with Mod A-Mod A x 2, and static standing balance with Mod A-Mod A x 2.  Use of RW. Good adherence to NWB precaution during transfer/standing, but not able to maintain for long d/t pain. OT facilitated LB dressing to don L sock, required Max A to don d/t pain and decreased functional reach. Pt is expected to require skilled OT services during their acute hospitalization stay to address the above noted deficits through skilled occupational therapy intervention for promotion of increased independence throughout ADLs, IADLs and functional mobility tasks. Prognosis: Good  Decision Making: Medium Complexity  REQUIRES OT FOLLOW-UP: Yes  Activity Tolerance  Activity Tolerance: Patient limited by pain        Plan   Occupational Therapy Plan  Times Per Week: 3-5x/wk  Current Treatment Recommendations: Balance training, Functional mobility training, Endurance training, Safety education & training, Equipment evaluation, education, & procurement, Self-Care / ADL, Home management training, Patient/Caregiver education & training     Restrictions  Restrictions/Precautions  Restrictions/Precautions: Weight Bearing  Required Braces or Orthoses?: No  Lower Extremity Weight Bearing Restrictions  Right Lower Extremity Weight Bearing: Non Weight Bearing  Position Activity Restriction  Other position/activity restrictions: Per chart: Right open trimalleolar fracture ankle fracture dislocation s/p I&D and ORIF, DOS 12/6/2022    Subjective   General  Patient assessed for rehabilitation services?: Yes  Family / Caregiver Present: No  General Comment  Comments: RN ok'd for OT/PT eval this AM. Pt agreeable to session, pleasent/cooperative throughout. Pt reports 12/10 pain to R ankle, medication was just provided per pt report.      Social/Functional History  Social/Functional History  Lives With: Spouse  Type of Home: House  Home Layout: One level  Home Access: Stairs to enter with rails  Entrance Stairs - Number of Steps: 3  Entrance Stairs - Rails: Left  Bathroom Shower/Tub: Walk-in shower  Bathroom Toilet: Handicap height  Bathroom Equipment: Shower chair, Grab bars in shower  Home Equipment: Ravi Gasca, [address], Walker, rolling, [address] (Cane at all times)  [employer] Help From: Family  ADL Assistance: Independent  Homemaking Assistance: Needs assistance  Homemaking Responsibilities: Yes  Meal Prep Responsibility: No (Eats out)  Laundry Responsibility: No (Daughter does laundry)  Cleaning Responsibility: No (Occasionally gets help from daughter but reports it does not get done)  Ambulation Assistance: Independent  Transfer Assistance: Independent  Active : Yes  Mode of Transportation: [email or web address] (Drives only in daytime)  Occupation: Retired       Objective      Safety Devices  Type of Devices: Nurse notified; Left in bed;Call light within reach;Gait belt;Bed alarm in place  Restraints  Restraints Initially in Place: No  Balance  Sitting: Intact (SUP seated EOB for ~17 minutes to engage in Eval questions, ROM/MMT and education)  Standing: With support (Pt completed 30 seconds of static standing balance with Mod A x 1-Mod A x 2 d/t significant pain. Pt was able to maintain NWB precuation, but quickly returned to sitting d/t difficulty maintaining.)  Gait  Overall Level of Assistance:  (Declined to participate in second transer to attempt functional mobility this visit. Limited by significant pain.)     AROM: Within functional limits  Strength: Within functional limits (BUE 4/5)  Coordination: Within functional limits  Tone: Normal  Sensation: Intact  ADL  Feeding: Independent  Grooming: Independent  UE Bathing: Stand by assistance;Setup  LE Bathing: Moderate assistance; Increased time to complete;Setup;Verbal cueing  UE Dressing: Stand by assistance  LE Dressing: Maximum assistance; Increased time to complete;Setup;Verbal cueing  LE Dressing Skilled Clinical Factors: Reqired max  A to don sock on L foot d/t decreased functional reach and pain  Toileting: Maximum assistance;Setup; Increased time to complete     Activity Tolerance  Activity Tolerance: Patient limited by fatigue;Patient limited by endurance  Bed mobility  Supine to Sit: Contact guard assistance (Completed transfer EOB with no physical assistance. Significant time/effort and use of bedrail. HOB ~40.)  Sit to Supine: Minimal assistance (Assist for LE progression)  Scooting: Contact guard assistance (Increased time/effort.)  Transfers  Sit to stand: Moderate assistance;2 Person assistance  Stand to sit: Moderate assistance  Transfer Comments: Pt completed 1x functional sit<>stand transfer after significant encouragement. Pt reports being very fearful. Able to maintain NWB precaution during transfers. Use of RW. Did have BUEs on both walker handles.   Vision  Vision: Impaired  Vision Exceptions: Wears glasses at all times  Hearing  Hearing: Within functional limits  Cognition  Overall Cognitive Status: [facility]                  Education Provided Comments: Pt educated on OT role, OT POC, activity promotion, transfer training, safety awareness, WB precaution, ADL adaptive strategies/equiptment-good return  LUE AROM (degrees)  LUE AROM : WFL  Left Hand AROM (degrees)  Left Hand AROM: WFL  RUE AROM (degrees)  RUE AROM : WFL  Right Hand AROM (degrees)  Right Hand AROM: WFL        Hand Dominance  Hand Dominance: Right       AM-PAC Score        AM-[facility] Inpatient Daily Activity Raw Score: 17 (12/07/22 1715)  AM-PAC Inpatient ADL T-Scale Score : 37.26 (12/07/22 1715)  ADL Inpatient CMS 0-100% Score: 50.11 (12/07/22 1715)  ADL Inpatient CMS G-Code Modifier : CK (12/07/22 1715)    Goals  Short Term Goals  Time Frame for Short Term Goals: By discharge, pt will:  Short Term Goal 1: Demo functional sit<>stand transfers and functional mobility with Min A and LRD PRN  Short Term Goal 2: Demo 3 minutes of standing tolerance with Min A to promote increased engagement in ADLs  Short Term Goal 3: Demo LB ADLs/toileting with Min A, setup and use of DME PRN  Short Term Goal 4: Demo UB ADLS with Mod IND  Short Term Goal 5: Demo good adherence to NWB precuations with 100% accuracy throughout all functional activities each visit       Therapy Time   Individual Concurrent Group Co-treatment   Time In 1356         Time Out 1431         Minutes 35         Timed Code Treatment Minutes: 21 Minutes       Gilmar Wolff, OTR/L

## 2022-12-07 NOTE — PLAN OF CARE

## 2022-12-07 NOTE — PROGRESS NOTES
[facility]  Progress Note                   Date:   12/7/2022  Patient name: Dasha Leyva  Date of admission:  12/5/2022  6:06 PM  MRN:   [id number]  YOB: 1945  PCP: Victor Hugo Mariee MD    Reason for Admission: Open fracture of right tibia and fibula, sequela [S82.201S, S82.401S]    Subjective:       Clinical Changes /Abnormalities:Patient seen and examined. Denies chest pain or shortness of breath. Tele/vitals/labs reviewed . Discussed case with RN. Sinus rhythm on tele     Review of Systems    Medications:   Scheduled Meds:   insulin lispro  0-4 Units SubCUTAneous Nightly    insulin lispro  0-16 Units SubCUTAneous TID WC    bumetanide  2 mg IntraVENous Once    clindamycin (CLEOCIN) IV  900 mg IntraVENous Q8H    sodium chloride flush  5-40 mL IntraVENous 2 times per day    polyethylene glycol  17 g Oral Daily    amiodarone  200 mg Oral Daily    atorvastatin  40 mg Oral Daily    gabapentin  600 mg Oral Daily    hydrALAZINE  100 mg Oral TID    pantoprazole  40 mg Oral QAM AC    rOPINIRole  0.25 mg Oral Nightly    methocarbamol  750 mg Oral Q8H    metoprolol tartrate  25 mg Oral BID     Continuous Infusions:   dextrose      sodium chloride 10 mL/hr at 12/06/22 2057     CBC:   Recent Labs     12/06/22  1205 12/06/22 2115 12/07/22  0557   WBC 13.3* 15.4* 15.7*   HGB 9.3* 9.4* 8.6*    366 302     BMP:    Recent Labs     12/06/22  1205 12/06/22  1515 12/06/22 2115 12/07/22  0557     --  132* 135   K 5.5* 5.9* 5.7* 5.7*     --  102 101   CO2 22  --  18* 21   BUN 57*  --  57* 60*   CREATININE 3.57*  --  3.52* 3.62*   GLUCOSE 129*  --  239* 232*     Hepatic:  Recent Labs     12/07/22  0557   AST 10   ALT 6   BILITOT 0.2*   ALKPHOS 49     Troponin: No results for input(s): TROPHS in the last 72 hours. BNP: No results for input(s): BNP in the last 72 hours. Lipids: No results for input(s): CHOL, HDL in the last 72 hours.     Invalid input(s): LDLCALCU  INR:   Recent Labs     12/05/22  1840   INR 1.1     DATA:    Diagnostics:    EKG: normal EKG, normal sinus rhythm, unchanged from previous tracings     ECHO: 4/2/2022  Summary  Normal LV size and wall thickness. No obvious wall motion abnormality seen. Normal LV systolic function with LVEF >55%. Normal RV size and function. RV systolic pressure 28 mmHg  LA appears moderately dilated and RA appears mildly dilated. No significant valvular stenosis or regurgitation noted. Normal aortic root dimension. No significant pericardial effusion noted. No obvious intra-cardiac mass or shunt noted. IVC appears dilated and impaired inspiratory variation indicating elevated  RA filling pressure. .      Limited Echo  Left ventricle is normal in size. Global left ventricular systolic function  is normal. Estimated ejection fraction is 50-55 %. Left atrial dilatation. Right atrial dilatation. Signature  ----------------------------------------------------------------------------   Electronically signed by Maggie Gonzalez RVT, RDCS(Sonographer) on   12/06/2022 10:37 AM  ----------------------------------------------------------------------------     ----------------------------------------------------------------------------   Electronically signed by Ismael Sidhu(Interpreting physician) on 12/06/2022   11:15 AM  ----------------------------------------------------------------------------  Stress Test: not obtained.   Cardiac Angiography: 05/16/2016   Conclusions      Peripheral Procedure Description   Normal bilateral renals   Procedure Summary      Minimal non obstructive CAD   Nornal LV function      Recommendations      Medical Treatments     Objective:   Vitals: BP (!) 118/54   Pulse 68   Temp 98.1 °F (36.7 °C) (Oral)   Resp 17   Ht 5' 5\" (1.651 m)   Wt 262 lb (118.8 kg)   LMP  (LMP Unknown)   SpO2 100%   BMI 43.60 kg/m²   General appearance: alert and cooperative with exam  HEENT: Head: Normocephalic, no lesions, without obvious abnormality.   Neck:no JVD, trachea midline, no adenopathy  Lungs: Clear to auscultation  Heart: Regular rate and rhythm, s1/s2 auscultated, no murmurs  Abdomen: soft, non-tender, bowel sounds active  Extremities: no edema  Neurologic: not done        Assessment / Acute Cardiac Problems:   Paroxysmal atrial fibrillation - currently in NSR, rate controlled  HTN  HLD  Non obstructive cardiac cath in 2016   Pre-op clearance for right ankle fracture - RCRI 1  Mechanical fall   Type II DM  DIONY  JOSE ARMANDO/CKD    Patient Active Problem List:     Dyslipidemia     DIONY treated with BiPAP     Fibromyalgia     CRI (chronic renal insufficiency)     OA (osteoarthritis)     DM (diabetes mellitus) (HCC)     Kidney stone     Depression     Seasonal allergies     Diverticulosis     Erythropenia     Normocytic normochromic anemia     Mitral regurgitation - Mild to moderate     CKD (chronic kidney disease) stage 4, GFR 15-29 ml/min (HCC)     Gout     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 disease (HCC)     Essential hypertension     Osteopenia     Morbid obesity due to excess calories (HCC)     Anxiety     Febrile illness     Acute serous otitis media of left ear     Secondary hyperparathyroidism ([address]     Acute kidney injury superimposed on CKD ([address]     New onset a-fib ([address] with Rapid ventricular response     New onset of congestive heart failure (HCC)     Lymphedema     GERD (gastroesophageal reflux disease)     Restless leg syndrome     Acute diastolic congestive heart failure (HCC)     Chronic bilateral low back pain without sciatica     Acute bilateral low back pain without sciatica     Generalized muscle weakness     Bradycardia     Open fracture of right tibia and fibula, sequela     Type III open trimalleolar fracture of right ankle      Plan of Treatment:   PAF- in NSR, resume AC when okay with surgery  ECHO reviewed as above   CKD- nephrology following   Cardiology is signing off , follow up with cardiology as outpatient     Electronically signed by BARBIE Phillips NP on 12/7/2022 at [phone]. [phone]

## 2022-12-07 NOTE — DISCHARGE INSTR - COC
Continuity of Care Form    Patient Name: Margarita Rangel   :  1945  MRN:  [id number]    Admit date:  2022  Discharge date:  2022    Code Status Order: Full Code   Advance Directives:   Advance Care Flowsheet Documentation       Date/Time Healthcare Directive Type of Healthcare Directive Copy in [address] Agent's Name Healthcare Agent's Phone Number    [phone] Yes, patient has an advance directive for healthcare treatment Durable power of  for health care;Living will No, copy requested from family -- -- --            Admitting Physician:  No admitting provider for patient encounter.   PCP: Shasta Weinstein MD    [facility]/Room#: 3359/1776-51  Discharging Unit Phone Number: [phone]    Emergency Contact:   Extended Emergency Contact Information  Primary Emergency Contact: Mac Sharp  Address: [facility] [address] [place] [address] Phone: [phone]  Relation: Spouse  Secondary Emergency Contact: Natalia Sharp  Address: [place] [address], [address] Phone: [phone]  Work Phone: [phone]  Mobile Phone: [phone]  Relation: Child    Past Surgical History:  Past Surgical History:   Procedure Laterality Date    ANKLE FRACTURE SURGERY Right 2022    RIGHT ANKLE OPEN REDUCTION INTERNAL FIXATION performed by Marnie Hernandez DO at [facility]  2011    right arm broken    CARDIAC CATHETERIZATION  [id number] dr Pavel Renteria  2016    COLONOSCOPY  2003    COLONOSCOPY  2007    ORIF ANKLE FRACTURE Right 2022    RIGHT ANKLE OPEN REDUCTION INTERNAL FIXATION    TOTAL KNEE ARTHROPLASTY Bilateral     left may 2013 and right 2013    TUBAL LIGATION         Immunization History:   Immunization History   Administered Date(s) Administered    COVID-19, PFIZER PURPLE top, DILUTE for use, (age 12 y+), 30mcg/0.3mL 04/01/2021, 04/27/2021    Influenza 10/04/2012, 09/24/2013    Influenza Nasal 08/30/2011    Influenza Virus Vaccine 10/15/2014, 10/14/2015    Influenza, AFLURIA (age 1 yrs+), FLUZONE, (age 10 mo+), MDV, 0.5mL 09/20/2016    Influenza, FLUAD, (age 72 y+), Adjuvanted, 0.5mL 10/18/2022    Influenza, High Dose (Fluzone 65 yrs and older) 09/05/2017, 10/02/2018, 09/29/2020, 10/18/2021    Influenza, Triv, inactivated, subunit, adjuvanted, IM (Fluad 65 yrs and older) 08/30/2019    Pneumococcal Conjugate 13-valent (Fdhrxcf27) 10/14/2015    Pneumococcal Conjugate 7-valent (Prevnar7) 08/30/2011    Pneumococcal Polysaccharide (Snvjlegwc06) 08/30/2011, 10/28/2020    Tdap (Boostrix, Adacel) 12/05/2022       Active Problems:  Patient Active Problem List   Diagnosis Code    Dyslipidemia E78.5    DIONY treated with BiPAP G47.33    Fibromyalgia M79.7    CRI (chronic renal insufficiency) N18.9    OA (osteoarthritis) M19.90    DM (diabetes mellitus) ([facility] [address] E11.9    Kidney stone N20.0    Depression F32. 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    Essential hypertension I10    Osteopenia M85.80    Morbid obesity due to excess calories ([facility]) E66.01    Anxiety F41.9    Febrile illness R50.9    Acute serous otitis media of left ear H65.02    Secondary hyperparathyroidism (HCC) N25.81    Acute kidney injury superimposed on chronic kidney disease (HCC) N17.9, N18.9    New onset a-fib ([facility] [facility] with Rapid ventricular response I48.91    New onset of congestive heart failure (HCC) I50.9    Lymphedema I89.0    GERD (gastroesophageal reflux disease) K21.9    Restless leg syndrome X32.97    Acute diastolic congestive heart failure (HCC) I50.31    Chronic bilateral low back pain without sciatica M54.50, G89.29    Acute bilateral low back pain without sciatica M54.50    Generalized muscle weakness M62.81    Bradycardia R00.1    Open fracture of right tibia and fibula, sequela S82.201S, S82.401S    Type III open trimalleolar fracture of right ankle S82.851C    Hyperkalemia E87.5    Hyponatremia Y08.7    Metabolic acidosis G66.06    Fall W19. Johnathan Taveras       Isolation/Infection:   Isolation            No Isolation          Patient Infection Status       None to display            Nurse Assessment:  Last Vital Signs: BP (!) 132/58   Pulse 65   Temp 98.3 °F (36.8 °C) (Oral)   Resp 16   Ht 5' 5\" (1.651 m)   Wt 262 lb (118.8 kg)   LMP  (LMP Unknown)   SpO2 96%   BMI 43.60 kg/m²     Last documented pain score (0-10 scale): Pain Level: 4  Last Weight:   Wt Readings from Last 1 Encounters:   12/06/22 262 lb (118.8 kg)     Mental Status:  oriented and alert    IV Access:  - None    Nursing Mobility/ADLs:  Walking   Assisted  Transfer  Assisted  Bathing  Assisted  Dressing  Assisted  Toileting  Assisted  Feeding  Independent  Med Admin  Independent  Med Delivery   whole    Wound Care Documentation and Therapy:  Wound 12/06/22 Tibial Distal;Right;Posterior RIGHT LATERAL ANKLE OPEN WOUND, APPROXIMATED WITH NYLON SUTURE (Active)   Dressing Status Clean;Dry; Intact 12/11/22 2000   Dressing/Treatment Ace wrap;Splint 12/11/22 2000   Wound Assessment Other (Comment) 12/11/22 2000   Drainage Amount None 12/11/22 2000   Odor None 12/11/22 2000   Ca-wound Assessment Other (Comment) 12/11/22 1802   Number of days: 5       Incision 12/06/22 Tibial Distal;Right;Posterior (Active)   Dressing Status Clean;Dry; Intact 12/11/22 2000   Dressing/Treatment Ace wrap;Splint 12/11/22 2000   Closure Other (Comment) 12/11/22 2000   Incision Assessment Other (Comment) 12/11/22 2000   Drainage Amount None 12/11/22 2000   Odor None 12/11/22 2000   Ca-incision Assessment Other (Comment) 12/09/22 0830   Number of days: 5 Elimination:  Continence: Bowel: Yes  Bladder: Yes  Urinary Catheter: None   Colostomy/Ileostomy/Ileal Conduit: No       Date of Last BM: 12/12/2022    Intake/Output Summary (Last 24 hours) at 12/12/2022 1122  Last data filed at 12/12/2022 9274  Gross per 24 hour   Intake 1321.67 ml   Output 2200 ml   Net -878.33 ml     I/O last 3 completed shifts: In: 3323.3 [P.O.:600; I.V.:2001.7; Blood:721.7]  Out: 3200 [Urine:3200]    Safety Concerns:     History of Falls (last 30 days) and At Risk for Falls    Impairments/Disabilities:      None    Nutrition Therapy:  Current Nutrition Therapy:   - Oral Diet:  Carb Control 5 carbs/meal (2000kcals/day)    Routes of Feeding: Oral  Liquids: No Restrictions  Daily Fluid Restriction: no  Last Modified Barium Swallow with Video (Video Swallowing Test): not done    Treatments at the Time of Hospital Discharge:   Respiratory Treatments: see MAR  Oxygen Therapy:  is not on home oxygen therapy.   Ventilator:    - No ventilator support    Rehab Therapies: Physical Therapy and Occupational Therapy  Weight Bearing Status/Restrictions: Non-weight bearing on right leg  Other Medical Equipment (for information only, NOT a DME order):  walker, bath bench, and bedside commode  Other Treatments: none    Patient's personal belongings (please select all that are sent with patient):  None    RN SIGNATURE:  Electronically signed by Vicente Chaidez RN on 12/12/22 at 11:22 AM EST    CASE MANAGEMENT/SOCIAL WORK SECTION    Inpatient Status Date: ***    Readmission Risk Assessment Score:  Readmission Risk              Risk of Unplanned Readmission:  27           Discharging to Facility/ Agency   Name:  [facility] [address]  Phone: [phone]  Fax:    Dialysis Facility (if applicable)   Name:  Address:  Dialysis Schedule:  Phone:  Fax:    / signature: Electronically signed by Garcia RN on 12/12/22 at 11:16 AM EST    PHYSICIAN SECTION    Prognosis: Good    Condition at Discharge: Stable    Rehab Potential (if transferring to Rehab): Good    Recommended Labs or Other Treatments After Discharge: PT OT . Non weight bearing R Leg     Physician Certification: I certify the above information and transfer of Ramonita Enamorado  is necessary for the continuing treatment of the diagnosis listed and that she requires Acute Rehab for less 30 days.      Update Admission H&P: No change in H&P    PHYSICIAN SIGNATURE:  Electronically signed by Barrington Bowling MD on 12/12/22 at 8:03 AM EST

## 2022-12-07 NOTE — PROGRESS NOTES
Physical Therapy  Facility/Department: [address] ORTHO/MED SURG  Physical Therapy Initial Assessment    Name: Mica Durán  : 1945  MRN: [id number]  Date of Service: 2022  Chief Complaint   Patient presents with    Ankle Injury     Right ankle fracture       Discharge Recommendations:  Patient would benefit from continued therapy after discharge   Patient Diagnosis(es): The primary encounter diagnosis was Type III open trimalleolar fracture of right ankle, initial encounter. A diagnosis of Fall, initial encounter was also pertinent to this visit. Past Medical History:  has a past medical history of Abnormal stress test, Anemia, Arthritis, Depression, Diverticulosis, DM (diabetes mellitus) ([address] Erythropenia, Fibromyalgia, HTN (hypertension), Hyperlipidemia, Kidney stone, Morbid obesity due to excess calories ([address] DIONY on CPAP, Osteopenia, Seasonal allergies, and Sleep apnea. Past Surgical History:  has a past surgical history that includes Colonoscopy (2003); Colonoscopy (2007); Tubal ligation; Arm Surgery (2011); Total knee arthroplasty (Bilateral); Cardiac catheterization ([id number] dr Mikal Cage); Cardiac catheterization (2016); and ORIF ankle fracture (Right, 2022). Assessment   Body Structures, Functions, Activity Limitations Requiring Skilled Therapeutic Intervention: Decreased functional mobility ; Decreased strength;Decreased endurance  Assessment: The pt transferred sit to stand with mod assist x  2 with NWB R LE. She was unable to take any steps but did maintain NWB R fairly well.  She could benefit from a continuation of PT for gait and strengthening following her DC  Therapy Prognosis: Good  Decision Making: Medium Complexity  Requires PT Follow-Up: Yes  Activity Tolerance  Activity Tolerance: Patient limited by fatigue;Patient limited by endurance     Plan   Physcial Therapy Plan  General Plan:  (5-6x wk)  Current Treatment Recommendations: Strengthening, Functional mobility training, Transfer training, Endurance training, Stair training, Gait training, Safety education & training  Safety Devices  Type of Devices: Nurse notified, Left in bed, Call light within reach, Gait belt, Bed alarm in place  Restraints  Restraints Initially in Place: No     Restrictions  Restrictions/Precautions  Restrictions/Precautions: Weight Bearing  Lower Extremity Weight Bearing Restrictions  Right Lower Extremity Weight Bearing: Non Weight Bearing     Subjective   General  Patient assessed for rehabilitation services?: Yes  Response To Previous Treatment: Not applicable  Family / Caregiver Present: No  Follows Commands: Within Functional Limits  Subjective  Subjective: Pt reports 12/10 pain in R ankle         Social/Functional History  Social/Functional History  Lives With: Spouse  Type of Home: House  Home Layout: One level  Home Access: Stairs to enter with rails  Entrance Stairs - Number of Steps: 3  Entrance Stairs - Rails: Left  Bathroom Shower/Tub: Walk-in shower  Bathroom Toilet: Handicap height  Bathroom Equipment: Shower chair, Grab bars in [address]: [address] [facility] [address], [address], [place], [address] ([address] [facility] at all times)  [employer] Help From: Family  ADL Assistance: Independent  Homemaking Assistance: Needs assistance  Homemaking Responsibilities: Yes  Meal Prep Responsibility: No (Eats out)  Laundry Responsibility: No (Daughter does laundry)  Cleaning Responsibility: No (Occasionally gets help from daughter but reports it does not get done)  Ambulation Assistance: Independent  Transfer Assistance: Independent  Active : Yes  Mode of Transportation: [facility] (Drives only in daytime)  Occupation: Retired  Vision/Hearing  Vision  Vision: Impaired  Vision Exceptions: Wears glasses at all times  Hearing  Hearing: Within functional limits    Cognition   Cognition  Overall Cognitive Status: WFL     Objective   Heart Rate: 72  Heart Rate Source: Monitor  BP:   BP Location: Left lower arm  BP Method: Automatic  Patient Position: Semi fowlers  MAP (Calculated): 81  Resp: 16  SpO2: 100 %  O2 Device: None (Room air)     AROM RLE (degrees)  RLE General AROM: Splint across ankle  AROM LLE (degrees)  LLE AROM : WFL  AROM RUE (degrees)  RUE AROM : WFL  AROM LUE (degrees)  LUE AROM : WFL  Strength RLE  Comment: N/T  Strength LLE  Strength LLE: WFL  Strength RUE  Strength RUE: WFL  Strength LUE  Strength LUE: WFL           Bed mobility  Supine to Sit: Minimal assistance  Sit to Supine: Minimal assistance  Scooting: Minimal assistance  Transfers  Sit to Stand: Moderate Assistance;2 Person Assistance  Stand to Sit: Moderate Assistance;2 Person Assistance  Ambulation  WB Status: NWB R LE  Ambulation  Surface: Level tile  Device: Rolling Walker  Assistance:  Moderate assistance;2 Person assistance  Distance: sit to stand with mod assist x 2     Balance  Posture: Good  Sitting - Static: Good  Sitting - Dynamic: Fair  Standing - Static: Poor     OutComes Score     AM-PAC Score  AM-PAC Inpatient Mobility Raw Score : 13 (12/07/22 1445)  AM-PAC Inpatient T-Scale Score : 36.74 (12/07/22 1445)  Mobility Inpatient CMS 0-100% Score: 64.91 (12/07/22 1445)  Mobility Inpatient CMS G-Code Modifier : CL (12/07/22 1445)     Tinneti Score     Goals  Short Term Goals  Time Frame for Short Term Goals: 10 visits  Short Term Goal 1: transfers with SBA  Short Term Goal 2: amb 25 ft wiuth a RW x CGA with NWB R LE  Short Term Goal 3: to be independent with bed mobility  Short Term Goal 4: 20 min exercise program x SBA       Education  Patient Education  Education Given To: Patient  Education Provided: Role of Therapy;Plan of Care  Education Method: Verbal  Barriers to Learning: None  Education Outcome: Verbalized understanding      Therapy Time   Individual Concurrent Group Co-treatment   Time In 1350         Time Out 1420         Minutes 30             1 of [facility],

## 2022-12-07 NOTE — CARE COORDINATION
Case Management Assessment  Initial Evaluation    Date/Time of Evaluation: 12/7/2022 11:51 AM  Assessment Completed by: Nori Grissom    If patient is discharged prior to next notation, then this note serves as note for discharge by case management. Patient Name: Marley Romero                   YOB: 1945  Diagnosis: Open fracture of right tibia and fibula, sequela [S82.201S, S82.401S]                   Date / Time: 12/5/2022  6:06 PM    Patient Admission Status: Inpatient   Readmission Risk (Low < 19, Mod (19-27), High > 27): Readmission Risk Score: 24.6    Current PCP: Nakul Simpson MD  PCP verified by CM? Yes (Alycia Lewis MD)    Chart Reviewed: Yes      History Provided by: Patient  Patient Orientation: Alert and Oriented, Person, Place    Patient Cognition: Alert    Hospitalization in the last 30 days (Readmission):  No    If yes, Readmission Assessment in CM Navigator will be completed. Advance Directives:      Code Status: Full Code   Patient's Primary Decision Maker is: Patient Declined (Legal Next of Kin Remains as Decision Maker)      Discharge Planning:    Patient lives with: (P) Spouse/Significant Other Type of Home: (P) House  Primary Care Giver: Self  Patient Support Systems include: Spouse/Significant Other, Children   Current Financial resources: Medicare  Current community resources:  none  Current services prior to admission: (P) Durable Medical Equipment            Current DME: (P) Cane, Shower Chair, Walker, Wheelchair            Type of Home Care services:  (P) None    ADLS  Prior functional level: Independent in ADLs/IADLs  Current functional level: Independent in ADLs/IADLs    PT AM-PAC:   /24  OT AM-PAC:   /24    Family can provide assistance at DC: Other (comment) (Unknown if  will assist)  Would you like Case Management to discuss the discharge plan with any other family members/significant others, and if so, who?  Yes (Daughter)  Plans to Return to following treatment goals of Open fracture of right tibia and fibula, sequela [S82.201S, I98.257D]    IF APPLICABLE: The Patient and/or patient representative Elie Saunders and her family were provided with a choice of provider and agrees with the discharge plan. Freedom of choice list with basic dialogue that supports the patient's individualized plan of care/goals and shares the quality data associated with the providers was provided to: (P) Patient   Patient Representative Name:       The Patient and/or Patient Representative Agree with the Discharge Plan?  (P) Yes    Elmo Collier  Case Management Department  Ph: [phone] Fax: [phone]

## 2022-12-07 NOTE — PROGRESS NOTES
Trauma Tertiary Survey    Admit Date: 12/5/2022  Hospital day 1    [facility]       Past Medical History:   Diagnosis Date    Abnormal stress test     Anemia     Arthritis     Depression     Diverticulosis     DM (diabetes mellitus) ([address]     Erythropenia     Fibromyalgia     HTN (hypertension)     Hyperlipidemia     Kidney stone     Morbid obesity due to excess calories (HCC)     DIONY on CPAP     Osteopenia 03/03/14    Seasonal allergies     Sleep apnea     uses bipap prn       Scheduled Meds:   clindamycin (CLEOCIN) IV  900 mg IntraVENous Q8H    sodium chloride flush  5-40 mL IntraVENous 2 times per day    polyethylene glycol  17 g Oral Daily    amiodarone  200 mg Oral Daily    atorvastatin  40 mg Oral Daily    furosemide  40 mg Oral Daily    gabapentin  600 mg Oral Daily    hydrALAZINE  100 mg Oral TID    pantoprazole  40 mg Oral QAM AC    rOPINIRole  0.25 mg Oral Nightly    methocarbamol  750 mg Oral Q8H    metoprolol tartrate  25 mg Oral BID     Continuous Infusions:   dextrose      sodium chloride 10 mL/hr at 12/06/22 2057     PRN Meds:glucose, dextrose bolus **OR** dextrose bolus, glucagon (rDNA), dextrose, midazolam, sodium chloride flush, sodium chloride, ondansetron **OR** ondansetron, senna, HYDROcodone-acetaminophen    Subjective:     Patient has no complaint of RLE pain and low back pain. .  Pain is moderate, worsens with movement, and some relief by rest.  There is not associated numbness, tingling, weakness.     Objective:   Patient Vitals for the past 8 hrs:   BP Temp Temp src Pulse Resp SpO2   12/06/22 2230 (!) 189/87 -- -- 64 13 96 %   12/06/22 2155 -- -- -- -- 16 --   12/06/22 2030 (!) 195/86 -- -- 68 16 98 %   12/06/22 2010 (!) 157/68 97 °F (36.1 °C) Temporal 66 16 95 %   12/06/22 2006 (!) 161/77 -- -- 66 14 --   12/06/22 2005 (!) 176/75 -- -- 65 12 --   12/06/22 2000 (!) 212/88 -- -- 65 15 95 %   12/06/22 1958 (!) 209/93 -- -- 67 13 98 %   12/06/22 1955 -- -- -- -- -- 97 %   12/06/22 1950 -- -- -- -- -- 95 %   12/06/22 1945 (!) 184/85 -- -- 69 21 94 %   12/06/22 1940 (!) 179/84 97.9 °F (36.6 °C) Temporal 69 18 97 %       I/O last 3 completed shifts: In: 2600 [I.V.:2500; IV Piggyback:100]  Out: 600 [Urine:600]  I/O this shift:  In: 200 [I.V.:200]  Out: 200 [Blood:200]    Radiology:  CT ANKLE RIGHT WO CONTRAST [[id number]]    Collected: 12/05/22 2219    Updated: 12/06/22 0737    Narrative:     EXAMINATION:   CT OF THE RIGHT ANKLE WITHOUT CONTRAST, 12/5/2022 9:19 pm     TECHNIQUE:   CT of the right ankle was performed without the administration of intravenous   contrast.  Multiplanar reformatted images are provided for review. Automated   exposure control, iterative reconstruction, and/or weight based adjustment of   the mA/kV was utilized to reduce the radiation dose to as low as reasonably   achievable. COMPARISON:   Radiographs of right ankle at 2021 hours on 12/05/2022. HISTORY   ORDERING SYSTEM PROVIDED HISTORY:  Pre-op   TECHNOLOGIST PROVIDED HISTORY:   Pre-op     FINDINGS:   Bones: The study shows coronally oriented mildly oblique fracture in the posterior   portion of distal end of right tibia with a prominent gap of 8.6 mm, extended   to the distal articular surface of tibia with posterior displacement of   posterior malleolus of the tibia. The fracture is mildly comminuted fracture   at the upper aspect. Evidence of comminuted transverse fracture through the base of the medial   malleolus, which is displaced mildly laterally. Evidence of a few small intra-articular fracture fragments in the anterior   portion of the tibiotalar joint and talofibular joint. The study shows grossly comminuted oblique, displaced fractures of the distal   shaft. Right fibula with posterolateral displacement of distal fragment. This fracture extends to about 1.5 cm superior to the base of the lateral   malleolus.   The lower extent  of the fracture is inferomedially,and  the   upper extent of the fracture superolaterally. Small fractures or old accessory ossicles at the inferomedial aspect of the   lateral malleolus. There is no significant gap at the distal tibiofibular syndesmosis. On the sagittal images, there is posterior subluxation of the talus bone due   to displaced fracture of posterior portion of distal end of the tibia. There is no definable fracture within the talus bone. There is no definable fracture in the right calcaneus bone. No definable fracture in the tarsal navicular bone or in the cuboid bone. In   the visualized 3 cuneiform bones there is no definable fracture. In the visualized portions of base of the metatarsal bones, there is no   definable fracture. Evidence of mild to moderate osteoarthritic change in the proximal and distal   intertarsal joint. No abnormality of the subtalar joint. Evidence of moderately prominent posterior and inferior calcaneal spurs. The calcaneal tendon is grossly intact. The tendons at the lateral and   medial aspect of the ankle joint are grossly intact. The tendons at the   anterior aspect of ankle joint are grossly intact. Soft Tissue: Mild soft tissue swelling with soft tissue edema surrounding the   right ankle joint and distal right leg. Impression:     Prominent oblique fracture in coronal orientation with large gap at the   fracture involving the posterior portion of distal end of right tibia with   distal intra-articular extension of the fracture. Comminuted transverse   fracture through the base of the medial malleolus of the tibia. Grossly comminuted oblique fracture in the distal shaft of right fibula. Moderate posterior subluxation of the talus bone due to distal posterior   tibial fracture. Some small intra-articular fracture fragments in the anterior portion of the   tibiotalar and talofibular joint. The distal tibiofibular syndesmosis is grossly intact.      No evidence of fracture in the right calcaneus bone, talus bone and distal   tarsal bones. Subtalar joint is intact. Evidence of soft tissue emphysema   in the lateral portion of subtalar joint. The calcaneal tendon appears to be grossly intact. The long tendons at the   medial, lateral and anterior aspect of right ankle joint are grossly intact.         PHYSICAL EXAM:   GCS:  4 - Opens eyes on own   6 - Follows simple motor commands  5 - Alert and oriented    Pupil size:  Left 2 mm Right 2 mm  Pupil reaction: Yes  Wiggles fingers: Left Yes Right Yes  Hand grasp:   Left normal   Right normal  Wiggles toes: Left Yes    Right Yes  Plantar flexion: Left normal  Right limited secondary to splinting      BP (!) 161/72   Pulse 62   Temp 97.2 °F (36.2 °C) (Tympanic)   Resp 16   Ht 5' 5\" (1.651 m)   Wt 262 lb (118.8 kg)   LMP  (LMP Unknown)   SpO2 100%   BMI 43.60 kg/m²   General appearance: alert, appears stated age, and cooperative  Head: Normocephalic, without obvious abnormality, atraumatic  Eyes:  PERRL, EOMI  Lungs: No increased respiratory effort  Heart: regular rate and rhythm  Abdomen:  soft nondistended, nontender  Extremities: RLE wrapped and splinted per orthopedics   Pulses: 2+ and symmetric  Skin: Skin color, texture, turgor normal. No rashes or lesions  Neurologic: Grossly normal    Spine:     Spine Tenderness ROM   Cervical 0 /10 Normal   Thoracic 0 /10 Normal   Lumbar 0 /10 Normal     Musculoskeletal    Joint Tenderness Swelling ROM   Right shoulder absent absent normal   Left shoulder absent absent normal   Right elbow absent absent normal   Left elbow absent absent normal   Right wrist absent absent normal   Left wrist absent absent normal   Right hand grasp absent absent normal   Left hand grasp absent absent normal   Right hip absent absent normal   Left hip absent absent normal   Right knee Wrapped and splinted per orthopedics     Left knee absent absent normal   Right ankle Wrapped and splinted per orthopedics  normal   Left ankle absent absent normal   Right foot Wrapped and splinted per orthopedics     Left foot absent absent normal             CONSULTS: ortho, nephrology    PROCEDURES: ORIF     INJURIES:        Patient Active Problem List   Diagnosis    Dyslipidemia    DIONY treated with BiPAP    Fibromyalgia    CRI (chronic renal insufficiency)    OA (osteoarthritis)    DM (diabetes mellitus) ([address]    Kidney stone    Depression    Seasonal allergies    Diverticulosis    Erythropenia    Normocytic normochromic anemia    Mitral regurgitation - Mild to moderate    CKD (chronic kidney disease) stage 4, GFR 15-29 ml/min ([facility])    Gout    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 disease ([address]    Essential hypertension    Osteopenia    Morbid obesity due to excess calories ([facility])    Anxiety    Febrile illness    Acute serous otitis media of left ear    Secondary hyperparathyroidism ([address]    Acute kidney injury superimposed on CKD ([address]    New onset a-fib ([address] with Rapid ventricular response    New onset of congestive heart failure (HCC)    Lymphedema    GERD (gastroesophageal reflux disease)    Restless leg syndrome    Acute diastolic congestive heart failure (HCC)    Chronic bilateral low back pain without sciatica    Acute bilateral low back pain without sciatica    Generalized muscle weakness    Bradycardia    Open fracture of right tibia and fibula, sequela         Assessment/Plan:     PLAN  1. Pain management- MMPT  2.  DVT proph- start POD #1     Elevated glucose  - HDSS added     Hyperkalemia  - insulin/d10 bolus added     Dispo:  PT/OT  Optimization of electrolytes/renal status      Jeremy Ruffin DO  12/6/22, 11:04 PM

## 2022-12-07 NOTE — PROGRESS NOTES
Orthopedic Progress Note    Patient:  Yimi Perez  YOB: 1945     69 y.o. female    Subjective:  Patient seen and examined  No complaints or concerns  No issue overnight  Pain controlled; hypertensive overnight  Denies fever, HA, CP, SOB    Vitals reviewed, afebrile    Objective:   Vitals:    12/07/22 0416   BP:    Pulse:    Resp: 16   Temp:    SpO2:      Gen: NAD, cooperative     Cardiovascular: regular rate, no dependent edema    Respiratory: No acute respiratory distress    RLE: Splint c/d/I. Angela to actively flex/extend all digits. Digits warm and well perfused with BCR. Sensation intact to light touch to exposed digits. Recent Labs     12/05/22  1840 12/06/22  1205 12/06/22  2115   WBC 12.8*   < > 15.4*   HGB 10.6*   < > 9.4*   HCT 35.5*   < > 31.9*      < > 366   INR 1.1  --   --       < > 132*   K 4.8   < > 5.7*   BUN 60*   < > 57*   CREATININE 3.87*   < > 3.52*   GLUCOSE 128*   < > 239*    < > = values in this interval not displayed.       Meds: Clinda  See rec for complete list    Impression 69 y.o. female being seen for the following    - Right open trimalleolar fracture ankle fracture dislocation s/p I&D and ORIF, DOS 12/6/2022  - Right talus open treatment, DOS 12/6/2022    Plan    - Complete post op abx  - NWB to RLE; maintain splint on   - Encourage IS and mobilization with PT  - No further plans for surgery per ortho  - Medical management per primary  - Follow up in orthopaedic trauma clinic in 2 weeks   - Page ortho with any questions/concerns    Electronically signed by Ras Carmichael DO 5:20 AM 12/7/2022

## 2022-12-07 NOTE — PROGRESS NOTES
15 Variable Trauma-Specific Frailty Index   Comorbidities   Cancer History Yes (1) No (0)   Coronary Heart Disease MI (1) CABG (0.75) Mild (0.25)  No (0)   Dementia Severe (1) Moderate (0.5) Mild (0.25)  No (0)   Daily Activities   Help With Grooming Yes (1) No (0)   Help with Managing Money Yes (1) No (0)   Help doing Housework Yes (1) No (0)   Help with Toileting Yes (1) No (0)   Help Walking Wheelchair (1) Walker (0.75) Cane (0.5) No (0)   Health Attitude   Feel Less Useful Most Time (1) Sometimes (0.5) Never (0)   Feel Sad Most Time (1) Sometimes (0.5) Never (0)   Feel Effort to Do Everything Most Time (1) Sometimes (0.5) Never (0)   Feels Lonely Most Time (1) Sometimes (0.5) Never (0)   Falls Most Time (1) Sometimes (0.5) Never (0)   Function   Sexually Active Yes (0)  No(1)   Nutrition   Albumin < 3g/dL (1)  > 3g/dL   Scoring   Score   FI (Score/15)  6/15 > 0.25 = Frail   *Based on 2-weeks prior to hospital admission   Trauma Specific Fraility Index > or = to 4 (4/15 = 0.26)  Trauma Specific Flako Peeks Index Score:    0.40        Raissa Amato MD  Orthopedic Surgery Resident, PGY-1  [facility], [facility]

## 2022-12-07 NOTE — CONSULTS
[facility] Inpatient Psychotherapy Note  WILLIS Herrera   12/7/2022  3:10 PM  Ameena Wells  1945  [id number]      Time spent with Patient: 35 minutes   10:40 AM to 11:15 AM      Presenting Patient Report:  Patient was assessed at the request of the Trauma Team due to geriatric vulnerability. Patient presents as a 69year old female subsequent to hospital admission from the Emergency Department following a mechanical fall with injury. Patient presents with 52 year long history of marriage problems not stemming from health or age related issues. Patient reports anger at her spouse and anxiety about who will care for her when she goes home. Patient reports her children are not likely to assist in her care. MSE:     Appearance    alert, cooperative, no distress  Appetite normal  Sleep disturbance Not assessed at this time   Fatigue Not assessed at this time   Loss of pleasure No  Impulsive behavior No  Speech    spontaneous, normal rate, normal volume, and well articulated  Mood    Angry  Affect    labile affect  Thought Content    intact  Thought Process    linear, goal directed, and coherent  Associations    logical connections  Insight    Fair  Judgment    Intact  Orientation    oriented to person, place, time, and general circumstances  Memory    recent and remote memory intact  Attention/Concentration    intact  Morbid ideation No  Suicide Assessment    no suicidal ideation    (See C-SSRS in flow sheets if suicidal ideations are present)  (PCL-5, PHQ-9, FILIPE-7 available in flow sheets)    Assessment:  Patient presents with rigid and unhealthy patterns of thinking, behavior, and function including difficulty relating to persons and situations. Screening for personality disorder would be appropriate. Screening Scale Results:  Unable to assess at this time    Diagnoses:  The following Diagnoses are based on currently available information and may change as additional information

## 2022-12-07 NOTE — ANESTHESIA POSTPROCEDURE EVALUATION
Department of Anesthesiology  Postprocedure Note    Patient: Vanda Morales  MRN: [id number]  YOB: 1945  Date of evaluation: 12/7/2022      Procedure Summary     Date: 12/06/22 Room / Location: [address]    Anesthesia Start: 1517 Anesthesia Stop: 1943    Procedure: RIGHT ANKLE OPEN REDUCTION INTERNAL FIXATION (Right: Ankle) Diagnosis:       Type I or II open fracture of right ankle, initial encounter      (Type I or II open fracture of right ankle, initial encounter [S82.891B])    Surgeons: Hollie Shaw DO Responsible Provider: Jackie Langford MD    Anesthesia Type: general ASA Status: 4          Anesthesia Type: No value filed.     Kyra Phase I: Kyra Score: 9    Kyra Phase II:        Anesthesia Post Evaluation    Patient location during evaluation: bedside  Patient participation: complete - patient participated  Level of consciousness: awake  Airway patency: patent  Nausea & Vomiting: no nausea and no vomiting  Complications: no  Cardiovascular status: blood pressure returned to baseline  Respiratory status: acceptable  Hydration status: euvolemic  Comments: BP (!) 161/72   Pulse 62   Temp 97.2 °F (36.2 °C) (Tympanic)   Resp 16   Ht 5' 5\" (1.651 m)   Wt 262 lb (118.8 kg)   LMP  (LMP Unknown)   SpO2 100%   BMI 43.60 kg/m²
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
## 2022-12-08 LAB
ANION GAP SERPL CALCULATED.3IONS-SCNC: 13 MMOL/L (ref 9–17)
ANION GAP SERPL CALCULATED.3IONS-SCNC: 13 MMOL/L (ref 9–17)
ANION GAP SERPL CALCULATED.3IONS-SCNC: 14 MMOL/L (ref 9–17)
ANION GAP SERPL CALCULATED.3IONS-SCNC: 17 MMOL/L (ref 9–17)
BUN BLDV-MCNC: 72 MG/DL (ref 8–23)
BUN BLDV-MCNC: 74 MG/DL (ref 8–23)
BUN BLDV-MCNC: 78 MG/DL (ref 8–23)
BUN BLDV-MCNC: 84 MG/DL (ref 8–23)
CALCIUM SERPL-MCNC: 8.5 MG/DL (ref 8.6–10.4)
CALCIUM SERPL-MCNC: 8.5 MG/DL (ref 8.6–10.4)
CALCIUM SERPL-MCNC: 8.6 MG/DL (ref 8.6–10.4)
CALCIUM SERPL-MCNC: 8.7 MG/DL (ref 8.6–10.4)
CHLORIDE BLD-SCNC: 94 MMOL/L (ref 98–107)
CHLORIDE BLD-SCNC: 94 MMOL/L (ref 98–107)
CHLORIDE BLD-SCNC: 95 MMOL/L (ref 98–107)
CHLORIDE BLD-SCNC: 99 MMOL/L (ref 98–107)
CO2: 20 MMOL/L (ref 20–31)
CO2: 22 MMOL/L (ref 20–31)
CO2: 22 MMOL/L (ref 20–31)
CO2: 23 MMOL/L (ref 20–31)
CREAT SERPL-MCNC: 4.27 MG/DL (ref 0.5–0.9)
CREAT SERPL-MCNC: 4.33 MG/DL (ref 0.5–0.9)
CREAT SERPL-MCNC: 4.37 MG/DL (ref 0.5–0.9)
CREAT SERPL-MCNC: 4.39 MG/DL (ref 0.5–0.9)
GFR SERPL CREATININE-BSD FRML MDRD: 10 ML/MIN/1.73M2
GLUCOSE BLD-MCNC: 152 MG/DL (ref 65–105)
GLUCOSE BLD-MCNC: 153 MG/DL (ref 65–105)
GLUCOSE BLD-MCNC: 159 MG/DL (ref 70–99)
GLUCOSE BLD-MCNC: 168 MG/DL (ref 70–99)
GLUCOSE BLD-MCNC: 169 MG/DL (ref 70–99)
GLUCOSE BLD-MCNC: 173 MG/DL (ref 65–105)
GLUCOSE BLD-MCNC: 190 MG/DL (ref 70–99)
GLUCOSE BLD-MCNC: 191 MG/DL (ref 65–105)
HCT VFR BLD CALC: 24.7 % (ref 36.3–47.1)
HEMOGLOBIN: 7.2 G/DL (ref 11.9–15.1)
MCH RBC QN AUTO: 30 PG (ref 25.2–33.5)
MCHC RBC AUTO-ENTMCNC: 29.1 G/DL (ref 28.4–34.8)
MCV RBC AUTO: 102.9 FL (ref 82.6–102.9)
NRBC AUTOMATED: 0 PER 100 WBC
PDW BLD-RTO: 16.4 % (ref 11.8–14.4)
PLATELET # BLD: 260 K/UL (ref 138–453)
PMV BLD AUTO: 10.2 FL (ref 8.1–13.5)
POTASSIUM SERPL-SCNC: 4.7 MMOL/L (ref 3.7–5.3)
POTASSIUM SERPL-SCNC: 5.3 MMOL/L (ref 3.7–5.3)
POTASSIUM SERPL-SCNC: 5.5 MMOL/L (ref 3.7–5.3)
POTASSIUM SERPL-SCNC: 5.5 MMOL/L (ref 3.7–5.3)
RBC # BLD: 2.4 M/UL (ref 3.95–5.11)
SODIUM BLD-SCNC: 129 MMOL/L (ref 135–144)
SODIUM BLD-SCNC: 132 MMOL/L (ref 135–144)
SODIUM BLD-SCNC: 132 MMOL/L (ref 135–144)
SODIUM BLD-SCNC: 133 MMOL/L (ref 135–144)
WBC # BLD: 14.4 K/UL (ref 3.5–11.3)

## 2022-12-08 PROCEDURE — 1200000000 HC SEMI PRIVATE

## 2022-12-08 PROCEDURE — 6370000000 HC RX 637 (ALT 250 FOR IP): Performed by: STUDENT IN AN ORGANIZED HEALTH CARE EDUCATION/TRAINING PROGRAM

## 2022-12-08 PROCEDURE — 2500000003 HC RX 250 WO HCPCS: Performed by: INTERNAL MEDICINE

## 2022-12-08 PROCEDURE — 97530 THERAPEUTIC ACTIVITIES: CPT

## 2022-12-08 PROCEDURE — 82947 ASSAY GLUCOSE BLOOD QUANT: CPT

## 2022-12-08 PROCEDURE — 80048 BASIC METABOLIC PNL TOTAL CA: CPT

## 2022-12-08 PROCEDURE — 99232 SBSQ HOSP IP/OBS MODERATE 35: CPT | Performed by: INTERNAL MEDICINE

## 2022-12-08 PROCEDURE — 99232 SBSQ HOSP IP/OBS MODERATE 35: CPT | Performed by: PHYSICAL MEDICINE & REHABILITATION

## 2022-12-08 PROCEDURE — 6370000000 HC RX 637 (ALT 250 FOR IP): Performed by: INTERNAL MEDICINE

## 2022-12-08 PROCEDURE — 2580000003 HC RX 258: Performed by: INTERNAL MEDICINE

## 2022-12-08 PROCEDURE — 97110 THERAPEUTIC EXERCISES: CPT

## 2022-12-08 PROCEDURE — 36415 COLL VENOUS BLD VENIPUNCTURE: CPT

## 2022-12-08 PROCEDURE — 85027 COMPLETE CBC AUTOMATED: CPT

## 2022-12-08 PROCEDURE — 6360000002 HC RX W HCPCS

## 2022-12-08 RX ADMIN — PANTOPRAZOLE SODIUM 40 MG: 40 TABLET, DELAYED RELEASE ORAL at 06:31

## 2022-12-08 RX ADMIN — METOPROLOL TARTRATE 25 MG: 25 TABLET ORAL at 20:23

## 2022-12-08 RX ADMIN — HYDROCODONE BITARTRATE AND ACETAMINOPHEN 1 TABLET: 5; 325 TABLET ORAL at 12:53

## 2022-12-08 RX ADMIN — METOPROLOL TARTRATE 25 MG: 25 TABLET ORAL at 09:16

## 2022-12-08 RX ADMIN — HYDRALAZINE HYDROCHLORIDE 100 MG: 50 TABLET, FILM COATED ORAL at 13:38

## 2022-12-08 RX ADMIN — HYDRALAZINE HYDROCHLORIDE 100 MG: 50 TABLET, FILM COATED ORAL at 20:22

## 2022-12-08 RX ADMIN — HYDRALAZINE HYDROCHLORIDE 100 MG: 50 TABLET, FILM COATED ORAL at 09:16

## 2022-12-08 RX ADMIN — HEPARIN SODIUM 5000 UNITS: 5000 INJECTION INTRAVENOUS; SUBCUTANEOUS at 13:38

## 2022-12-08 RX ADMIN — SODIUM ZIRCONIUM CYCLOSILICATE 5 G: 5 POWDER, FOR SUSPENSION ORAL at 11:07

## 2022-12-08 RX ADMIN — AMIODARONE HYDROCHLORIDE 200 MG: 200 TABLET ORAL at 09:16

## 2022-12-08 RX ADMIN — HEPARIN SODIUM 5000 UNITS: 5000 INJECTION INTRAVENOUS; SUBCUTANEOUS at 20:24

## 2022-12-08 RX ADMIN — SODIUM ZIRCONIUM CYCLOSILICATE 5 G: 5 POWDER, FOR SUSPENSION ORAL at 20:31

## 2022-12-08 RX ADMIN — DESMOPRESSIN ACETATE 40 MG: 0.2 TABLET ORAL at 09:16

## 2022-12-08 RX ADMIN — ROPINIROLE HYDROCHLORIDE 0.25 MG: 0.25 TABLET, FILM COATED ORAL at 20:23

## 2022-12-08 RX ADMIN — GABAPENTIN 600 MG: 600 TABLET ORAL at 09:16

## 2022-12-08 RX ADMIN — HYDROCODONE BITARTRATE AND ACETAMINOPHEN 1 TABLET: 5; 325 TABLET ORAL at 00:12

## 2022-12-08 RX ADMIN — HEPARIN SODIUM 5000 UNITS: 5000 INJECTION INTRAVENOUS; SUBCUTANEOUS at 06:31

## 2022-12-08 RX ADMIN — METHOCARBAMOL TABLETS 750 MG: 750 TABLET, COATED ORAL at 20:22

## 2022-12-08 RX ADMIN — SODIUM BICARBONATE: 84 INJECTION, SOLUTION INTRAVENOUS at 15:04

## 2022-12-08 RX ADMIN — METHOCARBAMOL TABLETS 750 MG: 750 TABLET, COATED ORAL at 05:01

## 2022-12-08 RX ADMIN — HYDROCODONE BITARTRATE AND ACETAMINOPHEN 1 TABLET: 5; 325 TABLET ORAL at 19:01

## 2022-12-08 RX ADMIN — METHOCARBAMOL TABLETS 750 MG: 750 TABLET, COATED ORAL at 12:53

## 2022-12-08 RX ADMIN — HYDROCODONE BITARTRATE AND ACETAMINOPHEN 1 TABLET: 5; 325 TABLET ORAL at 06:31

## 2022-12-08 RX ADMIN — SODIUM ZIRCONIUM CYCLOSILICATE 5 G: 5 POWDER, FOR SUSPENSION ORAL at 15:54

## 2022-12-08 ASSESSMENT — PAIN SCALES - GENERAL
PAINLEVEL_OUTOF10: 7
PAINLEVEL_OUTOF10: 9

## 2022-12-08 NOTE — PROGRESS NOTES
Occupational Therapy  Facility/Department: [address] ORTHO/MED SURG  Occupational Therapy Daily Treatment Note    Name: Shabana Wright  : 1945  MRN: [id number]  Date of Service: 2022    Discharge Recommendations:  Patient would benefit from continued therapy after discharge  OT Equipment Recommendations  ADL Assistive Devices: Toileting - Drop Arm Commode, Heavy Duty Drop Arm Commode;Reacher;Long-handled Sponge;Long-handled Shoe Horn;Sock-Aid Hard    Patient Diagnosis(es): The primary encounter diagnosis was Type III open trimalleolar fracture of right ankle, initial encounter. A diagnosis of Fall, initial encounter was also pertinent to this visit. Past Medical History:  has a past medical history of Abnormal stress test, Anemia, Arthritis, Depression, Diverticulosis, DM (diabetes mellitus) ([facility] Erythropenia, Fibromyalgia, HTN (hypertension), Hyperlipidemia, Kidney stone, Morbid obesity due to excess calories ([facility] DIONY on CPAP, Osteopenia, Seasonal allergies, and Sleep apnea. Past Surgical History:  has a past surgical history that includes Colonoscopy (2003); Colonoscopy (2007); Tubal ligation; Arm Surgery (2011); Total knee arthroplasty (Bilateral); Cardiac catheterization ([id number] dr Pushpa Gant); Cardiac catheterization (2016); ORIF ankle fracture (Right, 2022); and Ankle fracture surgery (Right, 2022). Assessment   Performance deficits / Impairments: Decreased functional mobility ; Decreased ADL status; Decreased endurance;Decreased balance;Decreased high-level IADLs  Prognosis: Good  REQUIRES OT FOLLOW-UP: Yes  Activity Tolerance  Activity Tolerance: Patient limited by pain        Plan   Occupational Therapy Plan  Times Per Week: 3-5x/wk  Current Treatment Recommendations: Balance training, Functional mobility training, Endurance training, Safety education & training, Equipment evaluation, education, & procurement, Self-Care / ADL, Home management training, Patient/Caregiver education & training     Restrictions  Restrictions/Precautions  Restrictions/Precautions: Weight Bearing  Required Braces or Orthoses?: No  Lower Extremity Weight Bearing Restrictions  Right Lower Extremity Weight Bearing: Non Weight Bearing  Position Activity Restriction  Other position/activity restrictions: Per chart: Right open trimalleolar fracture ankle fracture dislocation s/p I&D and ORIF, DOS 12/6/2022    Subjective   General  Patient assessed for rehabilitation services?: Yes  Response to previous treatment: Patient with no complaints from previous session  Family / Caregiver Present: No  Subjective  Subjective: Pt did not report any pain at this time. General Comment  Comments: RN ok'd for OT session this AM. Pt agreeable to session, pleasant/cooperative throughout. Pt denied that she was in pain throughout session. Pt then asked about pain medication at end of session. Pt also had many facial grimaces throughout session and pt reported when moving her toes on her R LE or having anything bump into her toes that it hurt a lot. Pt completed transfer from EOB to chair w/ SS. Pt required extra time to complete transfer d/t fear of falling and using SS. Pt completed ADLs in chair and stayed in chair at end of session with call light within reach, and RN notified. Objective   O2 Device: None (Room air)  Safety Devices  Type of Devices: Nurse notified;Call light within reach;Gait belt;Left in chair;Chair alarm in place  Restraints  Restraints Initially in Place: No  Balance  Sitting: Intact (SUP seated EOB for ~5 minutes to before completing transfer w/ SS)  Standing: With support (Pt completed ~3 mins of static standing balance on SS, before sitting on pads of SS. With Mod A x 2 to get pt standing up. Pt was able to maintain NWB precaution.)    ADL  Grooming: Independent;Setup  Grooming Skilled Clinical Factors: Pt completed washing her face and hands in chair.  Declined further ADLs d/t having completed earlier in the day. LE Dressing: Maximum assistance; Increased time to complete;Setup  LE Dressing Skilled Clinical Factors: Reqired max  A to don sock on L foot d/t decreased functional reach and pain     Activity Tolerance  Activity Tolerance: Patient limited by fatigue;Patient limited by endurance  Bed mobility  Supine to Sit: Minimal assistance  Sit to Supine:  (SONY, pt stayed in chair at end of session.)  Scooting: Stand by assistance  Transfers  Sit to stand: Moderate assistance;2 Person assistance  Stand to sit: Moderate assistance;2 Person assistance  Transfer Comments: Pt completed 1x functional sit<>stand transfer after significant encouragement. Pt reports being very fearful. Able to maintain NWB precaution during transfers. Use of SS. Did have BUEs on both SS bar. Mod A X2 d/t R LE WBing precautions.      Cognition  Overall Cognitive Status: WFL  Orientation  Overall Orientation Status: Within Functional Limits  Orientation Level: Oriented X4    Education Given To: Patient  Education Provided Comments: Pt educated on OT role, OT POC, activity promotion, transfer training, safety awareness, WB precaution, adaptive equipment-good return    AM-PAC Score  AM-[facility] Inpatient Daily Activity Raw Score: 17 (12/08/22 1455)  AM-PAC Inpatient ADL T-Scale Score : 37.26 (12/08/22 1455)  ADL Inpatient CMS 0-100% Score: 50.11 (12/08/22 1455)  ADL Inpatient CMS G-Code Modifier : CK (12/08/22 1455)    Goals  Short Term Goals  Time Frame for Short Term Goals: By discharge, pt will:  Short Term Goal 1: Demo functional sit<>stand transfers and functional mobility with Min A and LRD PRN  Short Term Goal 2: Demo 3 minutes of standing tolerance with Min A to promote increased engagement in ADLs  Short Term Goal 3: Demo LB ADLs/toileting with Min A, setup and use of DME PRN  Short Term Goal 4: Demo UB ADLS with Mod IND  Short Term Goal 5: Demo good adherence to NWB precuations with 100% accuracy throughout all functional activities each visit       Therapy Time   Individual Concurrent Group Co-treatment   Time In 1106         Time Out 1150         Minutes 44      19   Timed Code Treatment Minutes: 25 Minutes  Co-treat required d/t pt safety while using SS and NWBing precautions on R LE.        LE Talley/DONTE

## 2022-12-08 NOTE — OP NOTE
[address]                  [place] [id number]                                OPERATIVE REPORT    PATIENT NAME: Gregor Washington                    :        1945  MED REC NO:   [id number]                             ROOM:       [id number]  ACCOUNT NO:   [de-identified]                           ADMIT DATE: 2022  PROVIDER:     Derrell Leblanc    DATE OF PROCEDURE:  2022    PREOPERATIVE DIAGNOSIS:  Type 3A open right trimalleolar ankle fracture. POSTOPERATIVE DIAGNOSES:  1. Type 3A open right trimalleolar ankle fracture. 2.  Right talus fracture. PROCEDURES:  1. Open reduction internal fixation right trimalleolar ankle fracture  with posterior malleolus fixation. 2.  Open treatment without fixation, right talus. 3.  Irrigation and excisional debridement of skin down to and including  the bone of right open ankle fracture. 4.  Complex wound closure right ankle measuring 10 cm.  5.  Stress examination under anesthesia right ankle with independent  interpretation of imaging. SURGEON:  Derrell Rutledge. DO Deana    ASSISTANT:  Knute Epley, DO, PGY-5. ANESTHESIA:  General.    ESTIMATED BLOOD LOSS:  200 mL. TOURNIQUET TIME:  125 minutes. COMPLICATIONS:  None. SPECIMENS:  None. IMPLANTS:  Smith and Nephew 2.7-mm plate, 7.5-EB posterior distal tibia  plate, and Smith and Nephew posterolateral fibular plate. FINDINGS:  Right open trimalleolar ankle fracture with talar dome  fracture and 10-cm laceration medial ankle. INDICATION:  This is a 78-year-old female who presented to [facility] after she twisted her ankle, attempting to go up a  curb, going into a restaurant. She had immediate pain and deformity and  large wound to the medial aspect of her ankle. Imaging from the ED  demonstrated trimalleolar ankle fracture.   Given the fact this was open,  I recommend operative intervention for stability and prevent any future  ankle pain as well as the open nature required irrigation and  debridement. Given the fact that this is an open fracture, the patient  did receive antibiotics in the ED and tetanus was appropriately updated  by the ED staff. Consent was obtained, placed in chart. All questions  were answered appropriately. Surgical risks including, but not limited  to bleeding, blood clots, infection, damage to any tissues, vessels,  nerves, wound healing complications, failed procedure, stiffness, loss  of motion, hardware failure, hardware irritation, malunion, nonunion,  patient's satisfaction, anesthesia risk, loss of limb, and loss of life  were all discussed with the patient. Knowing these risks, the patient  wished to proceed with surgery as indicated. OPERATIVE PROCEDURE:  The patient was taken to operating suite, placed  under general anesthesia without any complications. A 2 grams of Ancef  was given prior to the procedure. At this time, all team members paused  to identify proper patient name, medications, site, and allergies. All  team members were in agreeance. The patient was placed in a prone  position. All bony prominences were well padded. Right lower extremity  was prepped and draped in normal sterile fashion. Using the open wound,  which was 10 cm laceration, it was examined, a large medial malleolus  and posterior malleolus fragment were visualized in the medial wound. The tendon and neurovascular bundles posterior and medially were intact. We thoroughly irrigated and debrided the wound using a curette sharply  of the bone and did debride the skin edges and subcutaneous tissues  sharply with a knife. There was no gross contamination noted. The  talus was visualized. There was a talar dome defect with loss of  cartilage. This was adequately debrided using the curette, but the  fragment was not big enough to adequately have any fixation in the  fragment. Being satisfied with our debridement and irrigation, we  changed our gloves. We began posterior medial and attempted to  adequately reduce our fragment posterior medial.  However, there were  still some fragments caught more laterally. So we performed a posterior  lateral approach to the ankle. We ensured our incision was over 7 cm  from the laceration. Skin was incised. Dissection was carried down  through the skin and subcutaneous tissue exploting the interval between  the peroneal tendons and the FHL musculature. The peroneal artery was  injured within the fracture and had to be cauterized. The posterior  malleolus fracture was also visualized. The skin edges were  appropriately defined. We then were able to reduce this and confirmed  our reduction using intraoperative fluoroscopy. K-wires were placed  provisionally to hold our reduction. It was of note, the patient had  some cartilage loss as there was no subchondral bone attached to the  Non viable fragments. Being satisfied with our reduction  with posterior malleolus, we sized our Smith and Nephew posterior distal  tibial plate. Being satisfied with position, we drilled, measured, and  placed apex screw followed by bicortical screws and locking screws  proximally within the plate as well as distally. Being satisfied with  our fixation, we turned our attention to the lateral malleolus. At this  time, our length was restored by fixing the posterior malleolus as the  syndesmosis was attached to this. We sized our Smith and Nephew  posterior lateral fibular plate and we bridged the comminution after  adequately placing the plate appropriately. We drilled, measured,  placed bicortical screws both proximally and distally as well as locking  screws both proximally and distally. Being satisfied with our fixation,  we turned our attention back medially. There was a large cortical  defect as well noted that required independent fixation.   We placed  K-wires holding this in place as well. We then sized our Smith and  Nephew 2.7 mm tying plate after adequately reducing our medial mal with  manipulation and putting K-wires in provisionally. Once adequately  contouring position of the plate on the bone, we drilled, measured,  placed the bicortical screws and locking screws distally and adequately  fixated our medial malleolus. Being satisfied with fixation, K-wires  were removed. External rotation stress examination was then performed. There was no residual medial clear space widening or talar tilt. We  then once again thoroughly irrigated all wounds with normal saline. Gloves were exchanged. We then placed antibiotic powder in the form of  vancomycin and tobramycin powder. We then closed the deep dermal layers  in a layered fashion medially using 2-0 Monocryl and 2-0 nylon in a  horizontal mattress fashion. Laterally we closed the deep layer using O  PDS, the deep dermal layer using 2-0 Monocryl, and skin using 2-0 nylon  in horizontal mattress fashion. The wounds were then dressed using  Xeroform, fluffs, ABD, Webril, and a well-padded posterior short-leg  splint was applied. The patient was then awoken from general  anesthesia, transferred to PACU in stable condition. I was present for  all aspects of procedure. Meticulous dissection was used and thorough  hemostasis was achieved. The patient will be nonweightbearing to the  right lower extremity. She will follow up in my clinic in 2 weeks at  which time I will remove the sutures.         Gwen Morillo    D: 12/07/2022 11:09:00       T: 12/07/2022 11:14:52     [id number]  Job#: [id number]     Doc#: [id number]    CC:

## 2022-12-08 NOTE — PROGRESS NOTES
Physical Therapy  Facility/Department: [address] ORTHO/MED SURG  Daily Treatment Note     Name: Governor Olivas  : 1945  MRN: [id number]  Date of Service: 2022    Discharge Recommendations:  Patient would benefit from continued therapy after discharge   PT Equipment Recommendations  Equipment Needed: No      Patient Diagnosis(es): The primary encounter diagnosis was Type III open trimalleolar fracture of right ankle, initial encounter. A diagnosis of Fall, initial encounter was also pertinent to this visit. Past Medical History:  has a past medical history of Abnormal stress test, Anemia, Arthritis, Depression, Diverticulosis, DM (diabetes mellitus) ([facility] [address] Erythropenia, Fibromyalgia, HTN (hypertension), Hyperlipidemia, Kidney stone, Morbid obesity due to excess calories ([facility] [address] DIONY on CPAP, Osteopenia, Seasonal allergies, and Sleep apnea. Past Surgical History:  has a past surgical history that includes Colonoscopy (2003); Colonoscopy (2007); Tubal ligation; Arm Surgery (2011); Total knee arthroplasty (Bilateral); Cardiac catheterization ([id number] dr Jessica Quispe); Cardiac catheterization (2016); ORIF ankle fracture (Right, 2022); and Ankle fracture surgery (Right, 2022). Assessment   Body Structures, Functions, Activity Limitations Requiring Skilled Therapeutic Intervention: Decreased functional mobility ; Decreased strength;Decreased endurance  Assessment: Pt required Ina for supine to sit for RLE progression. Pt performed STS transfer with modA x2 using the SS along with repeated VC's for weight bearing restrictions with good return. Pt would benefit from continued PT following discharge to address functional deficits.   Therapy Prognosis: Good  Decision Making: Medium Complexity  Requires PT Follow-Up: Yes  Activity Tolerance  Activity Tolerance: Patient limited by fatigue;Patient limited by endurance     Plan   Physcial Therapy Plan  General Plan:  (5-6x wk)  Current Treatment Recommendations: Strengthening, Functional mobility training, Transfer training, Endurance training, Stair training, Gait training, Safety education & training  Safety Devices  Type of Devices: Nurse notified, Call light within reach, Gait belt, Left in chair, Chair alarm in place  Restraints  Restraints Initially in Place: No     Restrictions  Restrictions/Precautions  Restrictions/Precautions: Weight Bearing  Required Braces or Orthoses?: No  Lower Extremity Weight Bearing Restrictions  Right Lower Extremity Weight Bearing: Non Weight Bearing  Position Activity Restriction  Other position/activity restrictions: Per chart: Right open trimalleolar fracture ankle fracture dislocation s/p I&D and ORIF, DOS 12/6/2022     Subjective   General  Chart Reviewed: Yes  Response To Previous Treatment: Patient with no complaints from previous session. Family / Caregiver Present: No  Follows Commands: Within Functional Limits  General Comment  Comments: Pt supine in bed upon arrival, retired to bedside recliner post PT. Subjective  Subjective: Pt has no c/o pain at this time but grimaces with mobility, unable to rate at this time, RN notified. Cognition   Orientation  Overall Orientation Status: Within Functional Limits  Cognition  Overall Cognitive Status: WFL     Objective   Bed mobility  Supine to Sit: Minimal assistance  Sit to Supine: Stand by assistance  Scooting: Stand by assistance  Transfers  Sit to Stand: Moderate Assistance;2 Person Assistance  Stand to Sit: Moderate Assistance;2 Person Assistance  Comment: Completed with use of SS along with VC's for sequencing/ hand placement and weight bearing restrictions with good return. Balance  Posture: Good  Sitting - Static: Good  Sitting - Dynamic: Fair  Standing - Static: Poor  Standing - Dynamic: Poor  Comments: Assessed with SS.  A/AROM Exercises: AP's x20 LLE, LAQs x20 LLE.         AM-PAC Score  AM-PAC Inpatient Mobility Raw Score : 13 (12/08/22 1315)  AM-PAC Inpatient T-Scale Score : 36.74 (12/08/22 1315)  Mobility Inpatient CMS 0-100% Score: 64.91 (12/08/22 1315)  Mobility Inpatient CMS G-Code Modifier : CL (12/08/22 1315)          Goals  Short Term Goals  Time Frame for Short Term Goals: 10 visits  Short Term Goal 1: transfers with SBA  Short Term Goal 2: amb 25 ft wiuth a RW x CGA with NWB R LE  Short Term Goal 3: to be independent with bed mobility  Short Term Goal 4: 20 min exercise program x SBA       Therapy Time   Individual Concurrent Group Co-treatment   Time In 1110     1110   Time Out 1146     1133   Minutes 36     23   Timed Code Treatment Minutes: Viet 17, PTA

## 2022-12-08 NOTE — PROGRESS NOTES
Orthopedic Coordinator Note  Admission Date:  12/5/2022 Prosper Richardson is a 69 y.o.  female    Admitted for : Open fracture of right tibia and fibula, sequela [S82.201S, S82.401S]  Surgery date: 12/6  Surgical procedure: POD #2 s/p ORIF right ankle    Met with:  Patient  Patient is  alert & oriented, agitated about being bothered so much in hospital.     Dressing to RLE spint and ACE clean, dry, and intact    Progress with physical therapy: slow  Weight bearing status: NWB right leg  Reviewed discharge instructions, dressing instructions patient considering rehab options    Follow up appointment scheduled 12/21 at 1:15 pm with Dr Simone Araujo  Discharge plans: SNF vs ARU  Anticipated discharge date: when stable, hgb 7.2 today    Any questions please contact Philipp Quigley, [address] [place] [address]  [phone].     Electronically signed by: Jacob Medina RN on 12/8/2022 at 1:34 PM

## 2022-12-08 NOTE — PROGRESS NOTES
PROGRESS NOTE          PATIENT NAME: Karla Blanc Dr RECORD NO. [id number]  DATE: 12/8/2022  SURGEON: Emanuel Pearson  PRIMARY CARE PHYSICIAN: JAJA Tai MD    HD: # 3    ASSESSMENT    Patient Active Problem List   Diagnosis    Dyslipidemia    DIONY treated with BiPAP    Fibromyalgia    CRI (chronic renal insufficiency)    OA (osteoarthritis)    DM (diabetes mellitus) ([address]    Kidney stone    Depression    Seasonal allergies    Diverticulosis    Erythropenia    Normocytic normochromic anemia    Mitral regurgitation - Mild to moderate    CKD (chronic kidney disease) stage 4, GFR 15-29 ml/min ([facility])    Gout    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 disease ([address]    Essential hypertension    Osteopenia    Morbid obesity due to excess calories ([facility])    Anxiety    Febrile illness    Acute serous otitis media of left ear    Secondary hyperparathyroidism ([address]    Acute kidney injury superimposed on chronic kidney disease ([address]    New onset a-fib ([address] with Rapid ventricular response    New onset of congestive heart failure ([facility])    Lymphedema    GERD (gastroesophageal reflux disease)    Restless leg syndrome    Acute diastolic congestive heart failure ([facility])    Chronic bilateral low back pain without sciatica    Acute bilateral low back pain without sciatica    Generalized muscle weakness    Bradycardia    Open fracture of right tibia and fibula, sequela    Type III open trimalleolar fracture of right ankle    Hyperkalemia    Hyponatremia    Metabolic acidosis     Open right trimalleolar fracture dislocation  Status post I&D and ORIF, DOS and right talus open treatment (12/6)  CKD 3  JOSE ARMANDO  Paroxysmal A. Fib    MEDICAL DECISION MAKING AND PLAN    -Pain and nausea control as needed  -Nephrology on board. 2 mg Bumex given yesterday. Avoid nephrotoxic drugs. Renal diet. Continues on bicarb drip at 50 cc an hour.  -Strict I&O's  -Cardiology on board. Echo reviewed.   Cardiology signed off.  -Ortho on board.  Maintain splint, nonweightbearing right lower extremity.  -Heparin VTE prophylaxis  -Home amiodarone, hydralazine, metoprolol, Requip ordered.  -Dispo: SNF when medically stable. Chief Complaint: \"My leg hurts\"    SUBJECTIVE    Patient seen and chart reviewed. Postop day 2 from I&D and ORIF, DOS and right talus open treatment. No acute events overnight. Afebrile, normotensive, normal sinus rhythm, and saturating >95% on RA. Voiding with good uop. Pain controlled. Sit to stand with PT with moderate assist yesterday. OBJECTIVE  VITALS: Temp: Temp: 98.8 °F (37.1 °C)Temp  Av.4 °F (36.9 °C)  Min: 97.5 °F (36.4 °C)  Max: 98.9 °F (03.5 °C) BP Systolic (13KHU), IMX:214 , Min:129 , PJQ:456   Diastolic (40OBN), KFR:32, Min:57, Max:78   Pulse Pulse  Av.8  Min: 70  Max: 76 Resp Resp  Av  Min: 11  Max: 20 Pulse ox SpO2  Av.8 %  Min: 96 %  Max: 100 %  GENERAL: alert, no distress  NEURO: GCS 15  HEENT: Normocephalic, atraumatic  : deferred  LUNGS: Regular rate with no accessory muscle use  HEART: normal rate  ABDOMEN: soft, non-tender, non-distended, and no guarding or peritoneal signs present  EXTREMITY: Short leg splint clean dry and intact to the right lower extremity exposed digits sensation intact light touch. Patient able to move exposed digits. Digits with BCR. I/O last 3 completed shifts: In: 0593 [P.O.:360; I.V.:676]  Out: 500 [Urine:300; Blood:200]    Drain/tube output:  No intake/output data recorded.     LAB:  CBC:   Recent Labs     22  1205 22  2115 22  0557   WBC 13.3* 15.4* 15.7*   HGB 9.3* 9.4* 8.6*   HCT 30.2* 31.9* 29.2*   .7 102.6 102.1    366 302     BMP:   Recent Labs     22  1315 22  0258   * 129* 129*   K 5.6* 5.6* 5.3   CL 99 96* 94*   CO2 19* 20 22   BUN 63* 69* 72*   CREATININE 4.12* 4.20* 4.27*   GLUCOSE 174* 189* 169*     COAGS:   Recent Labs     22  1840 22  0557   APTT 23.6  -- PROT  --  5.7*   INR 1.1  --          Inessa Gibbs MD  12/7/22, 8:04 AM  Attestation signed by Madyson Jerome MD    I personally evaluated the patient and directed the medical decision making with Resident/MEJIA after the physical/radiologic exam and laboratory values were reviewed and confirmed. Monitor renal fx.      Madyson Jerome MD

## 2022-12-08 NOTE — PROGRESS NOTES
Nephrology Progress Note      SUBJECTIVE     Pt was seen and examined. Afebrile, saturating on room air  Complaining of right lower extremity pain  BP fluctuating  BP: (141)/(78)    Denies any chest pain, shortness of breath, abdominal pain, nausea, vomiting    Sodium 129, potassium 5.3, chloride 24, bicarb 22, anion gap 13   BUN 72, creatinine 3.62>4.27  Leukocytosis 15.7  Hb 10.6> 9.4> 8.6    S/p ORIF, right trimalleolar ankle fracture with posterior malleolar fixation  Open treatment without fixation right talus   Trauma/Ortho following    Brief note:  60-year-old female presented to the hospital for evaluation of open fracture right ankle after mechanical fall. S/p ORIF, right trimalleolar ankle fracture with posterior malleolar fixation  Open treatment without fixation right talus    Nephrology consulted for JOSE ARMANDO on CKD stage III, hyperkalemia. PMH of CKD stage III secondary to diabetic nephrosclerosis with baseline creatinine 2.7-3.3 lately, paroxysmal atrial fibrillation, hypertension, hyperlipidemia, nonobstructive CAD, T2DM, DIONY. Patient takes Lasix 40 once daily for her lower extremity edema    OBJECTIVE      CURRENT TEMPERATURE:  Temp: 98.2 °F (36.8 °C)  MAXIMUM TEMPERATURE OVER 24HRS:  Temp (24hrs), Av.2 °F (36.8 °C), Min:97.5 °F (36.4 °C), Max:98.9 °F (37.2 °C)    CURRENT RESPIRATORY RATE:  Resp: 16  CURRENT PULSE:  Heart Rate: 74  CURRENT BLOOD PRESSURE:  BP: (!) 141/78  24HR BLOOD PRESSURE RANGE:  Systolic (83SSU), CEX:457 , Min:118 , WAR:790   ; Diastolic (83PVK), HIK:60, Min:54, Max:78    24HR INTAKE/OUTPUT:  No intake or output data in the 24 hours ending 22 0732    WEIGHT :Patient Vitals for the past 96 hrs (Last 3 readings):   Weight   22 1434 262 lb (118.8 kg)   22 1815 262 lb (118.8 kg)       PHYSICAL EXAM      GENERAL APPEARANCE:Awake, alert, in no acute distress  SKIN: warm and dry, no rash or erythema  EYES: conjunctivae normal and sclera anicteric.  PeriOrbital puffiness  ENT: no thrush no pharyngeal congestion   NECK:   No JVD. No carotid bruits and no carotid lymphadenopathy . PULMONARY: lungs are clear to auscultation. No Wheezing, no ronchi . CADRDIOVASCULAR: S1 and S2 normal NO S3 and NO S4 . No rubs , no murmur. ABDOMEN: soft nontender, bowel sounds present, no organomegaly, no ascites. EXTREMITIES: s/p ORIF right ankle fracture, dressing applied .   No pedal edema on the left    CURRENT MEDICATIONS      insulin lispro (HUMALOG) injection vial 0-4 Units, Nightly  glucose chewable tablet 16 g, PRN  dextrose bolus 10% 125 mL, PRN   Or  dextrose bolus 10% 250 mL, PRN  glucagon (rDNA) injection 1 mg, PRN  dextrose 10 % infusion, Continuous PRN  insulin lispro (HUMALOG) injection vial 0-16 Units, TID WC  heparin (porcine) injection 5,000 Units, 3 times per day  sodium zirconium cyclosilicate (LOKELMA) oral suspension 5 g, TID  sodium bicarbonate 150 mEq in sterile water 1,150 mL infusion, Continuous  sodium chloride flush 0.9 % injection 5-40 mL, 2 times per day  sodium chloride flush 0.9 % injection 5-40 mL, PRN  0.9 % sodium chloride infusion, PRN  ondansetron (ZOFRAN-ODT) disintegrating tablet 4 mg, Q8H PRN   Or  ondansetron (ZOFRAN) injection 4 mg, Q6H PRN  polyethylene glycol (GLYCOLAX) packet 17 g, Daily  senna (SENOKOT) tablet 8.6 mg, Daily PRN  amiodarone (CORDARONE) tablet 200 mg, Daily  atorvastatin (LIPITOR) tablet 40 mg, Daily  gabapentin (NEURONTIN) tablet 600 mg, Daily  hydrALAZINE (APRESOLINE) tablet 100 mg, TID  HYDROcodone-acetaminophen (NORCO) 5-325 MG per tablet 1 tablet, Q6H PRN  pantoprazole (PROTONIX) tablet 40 mg, QAM AC  rOPINIRole (REQUIP) tablet 0.25 mg, Nightly  methocarbamol (ROBAXIN) tablet 750 mg, Q8H  metoprolol tartrate (LOPRESSOR) tablet 25 mg, BID        LABS      CBC:   Recent Labs     12/06/22  1205 12/06/22  2115 12/07/22  0557   WBC 13.3* 15.4* 15.7*   RBC 3.00* 3.11* 2.86*   HGB 9.3* 9.4* 8.6*   HCT 30.2* 31.9* 29.2*   MCV 100.7 102.6 102.1   MCH 31.0 30.2 30.1   MCHC 30.8 29.5 29.5   RDW 16.0* 16.4* 16.2*    366 302   MPV 10.8 10.7 10.0        BMP:   Recent Labs     12/07/22  1315 12/07/22 2014 12/08/22  0258   * 129* 129*   K 5.6* 5.6* 5.3   CL 99 96* 94*   CO2 19* 20 22   BUN 63* 69* 72*   CREATININE 4.12* 4.20* 4.27*   GLUCOSE 174* 189* 169*   CALCIUM 8.9 8.8 8.6          PHOSPHORUS:    Recent Labs     12/07/22  0557   PHOS 5.1*         ALBUMIN:   Recent Labs     12/06/22  1205 12/07/22  0557   LABALBU 3.6 3.3*       IRON:    Lab Results   Component Value Date/Time    IRON 79 06/09/2022 11:35 AM     IRON SATURATION:    Lab Results   Component Value Date/Time    LABIRON 29 06/09/2022 11:35 AM     TIBC:    Lab Results   Component Value Date/Time    TIBC 275 06/09/2022 11:35 AM     FERRITIN:    Lab Results   Component Value Date/Time    FERRITIN 537 06/09/2022 11:35 AM     SPEP:   Lab Results   Component Value Date/Time    PROT 5.7 12/07/2022 05:57 AM    PATH ELECTRONICALLY SIGNED.  Mary Mac M.D. 04/02/2022 04:51 PM     UPEP:   Lab Results   Component Value Date/Time    TPU 24 04/02/2022 04:51 PM    TPU 24 04/02/2022 04:51 PM      HEPCAB:  Lab Results   Component Value Date/Time    HEPCAB NONREACTIVE 01/11/2021 11:10 AM     C3:   Lab Results   Component Value Date    C3 166 04/01/2022     C4:   Lab Results   Component Value Date    C4 46 (H) 04/01/2022     URINE CREATININE:    Lab Results   Component Value Date/Time    LABCREA 55.1 12/06/2022 12:15 PM     URINE PROTEIN:    Lab Results   Component Value Date/Time    TPU 24 04/02/2022 04:51 PM    TPU 24 04/02/2022 04:51 PM     URINALYSIS:  U/A:   Lab Results   Component Value Date/Time    NITRU NEGATIVE 04/01/2022 05:03 AM    COLORU Yellow 04/01/2022 05:03 AM    PHUR 6.5 04/01/2022 05:03 AM    WBCUA 0 TO 2 04/01/2022 05:03 AM    RBCUA 0 TO 2 04/01/2022 05:03 AM    CLARITYU clear 04/18/2017 10:21 AM    SPECGRAV 1.010 04/01/2022 05:03 AM    LEUKOCYTESUR NEGATIVE 04/01/2022 05:03 AM    UROBILINOGEN Normal 04/01/2022 05:03 AM    BILIRUBINUR NEGATIVE 04/01/2022 05:03 AM    BILIRUBINUR neg 04/18/2017 10:21 AM    BLOODU neg 04/18/2017 10:21 AM    GLUCOSEU NEGATIVE 04/01/2022 05:03 AM    KETUA NEGATIVE 04/01/2022 05:03 AM     ASSESSMENT     JOSE ARMANDO on CKD:Stage III disease secondary to diabetic nephrosclerosis. Creatinine now is running around 2.8-3.3 range. It is plateauing and most recent creatinine is 4.2 mg/dL. Hypertension  Secondary Hyperparathyroidism   Diabetes Mellitus :  Paroxysmal atrial fibrillation on Eliquis at home  Open fracture , right ankle status post internal fixation  Hyperkalemia: Started on [address]      1. Repeat potassium is 3 PM  Hold Lokelma if potassium is less than 5 mEq/L  3. BMP in a.m.  4 please do not hesitate to call with questions. This note is created with the assistance of a speech-recognition program. While intending to generate a document that actually reflects the content of the visit, no guarantees can be provided that every mistake has been identified and corrected by editing    Yvonne Casiano MD.  Internal Medicine PGY-2,  Nephrology services,  [facility]  12/8/2022, 7:32 AM        Attending Physician Statement  I have discussed the care of Alba Mohan, including pertinent history and exam findings with the resident. I have reviewed the key elements of all parts of the encounter with the resident. Note was updated and recorded changes were made I have seen and examined the patient with the resident. I agree with the assessment and plan and status of the problem list as documented.     Anusha Mckenzie MD

## 2022-12-08 NOTE — PROGRESS NOTES
Orthopedic Progress Note    Patient:  Jani Pyle  YOB: 1945     69 y.o. female    Subjective:  Patient seen and examined this morning. No complaints or concerns. No issues overnight per nursing. Pain is well controlled on current regimen. Denies fever, HA, CP, SOB, N/V, dysuria, new numbness/tingling. No BM, but flatus +. Was able to sit to stand with PT yesterday with moderate assist. Was not able to ambulate yesterday. Vitals reviewed, afebrile    Objective:   Vitals:    12/08/22 0042   BP:    Pulse:    Resp: 16   Temp:    SpO2:      Gen: NAD, cooperative    Cardiovascular: Regular rate   Respiratory: No acute respiratory distress, breathing comfortably    MSK/RLE:  Splint on RLE in good repair, c/d/I. No pinching at proximal or distal ends. Able to actively flex/extend all digits, EHL,FHL complex intact. Digits warm and well perfused with BCR. Sensation intact to light touch to exposed digits. Recent Labs     12/05/22  1840 12/06/22  1205 12/07/22  0557 12/07/22  1315 12/08/22  0258   WBC 12.8*   < > 15.7*  --   --    HGB 10.6*   < > 8.6*  --   --    HCT 35.5*   < > 29.2*  --   --       < > 302  --   --    INR 1.1  --   --   --   --       < > 135   < > 129*   K 4.8   < > 5.7*   < > 5.3   BUN 60*   < > 60*   < > 72*   CREATININE 3.87*   < > 3.62*   < > 4.27*   GLUCOSE 128*   < > 232*   < > 169*    < > = values in this interval not displayed.       Meds:    Abx: None  DVT ppx: Heparin  See rec for complete list    Impression 69 y.o. female who is POD#2 from:    - Right open trimalleolar fracture ankle fracture dislocation s/p I&D and ORIF, DOS 12/6/2022  - Right talus open treatment, DOS 12/6/2022    Plan  - Completed post-op ancef  - Splint on RLE, please maintain and keep clean & dry per nursing  - NWB to the RLE  - Pain control and medical management per primary team.  - DVT ppx:  Heparin   - Ice/Elevate RLE for pain and edema control  - Diet: Adult diet okay from orthopedic perspective  - Encourage Incentive Spirometry use  - Continue working with PT/OT    - Okay to discharge to rehab center per SW    - F/u with Dr. Erik Romero in 10-14 days from surgery   - Please page Ortho on call with any questions      Stephany Valle DO  Orthopedic Surgery Resident, PGY-1  [facility], [facility]          PGY-3 Addendum    Patient seen and examined. Agree with above. 69 y.o. female being seen POD$2 from R trimalleolar ORIF. States she is overall doing ok but is still having pain in the ankle. States she stood with PT but had difficulty with the walker. Denies numbness/tingling in the extremity. RLE: Splint CDI. Sensation intact to exposed digits. Toes warm and well perfused. Maintain splint. Do not remove. NWB RLE. Pain control per primary. OK for discharge from ortho perspective. Please page ortho resident on call with questions.       Antonella Shahid DO, PGY-3  Orthopedic Surgery Resident  [facility]

## 2022-12-08 NOTE — PLAN OF CARE
Problem: Discharge Planning  Goal: Discharge to home or other facility with appropriate resources  12/8/2022 1601 by Berto Kerr RN  Outcome: Progressing     Problem: Pain  Goal: Verbalizes/displays adequate comfort level or baseline comfort level  12/8/2022 1601 by Berto Kerr RN  Outcome: Progressing     Problem: Skin/Tissue Integrity  Goal: Absence of new skin breakdown  Description: 1. Monitor for areas of redness and/or skin breakdown  2. Assess vascular access sites hourly  3. Every 4-6 hours minimum:  Change oxygen saturation probe site  4. Every 4-6 hours:  If on nasal continuous positive airway pressure, respiratory therapy assess nares and determine need for appliance change or resting period.   12/8/2022 1601 by Berto Kerr RN  Outcome: Progressing

## 2022-12-09 LAB
ANION GAP SERPL CALCULATED.3IONS-SCNC: 14 MMOL/L (ref 9–17)
ANION GAP SERPL CALCULATED.3IONS-SCNC: 15 MMOL/L (ref 9–17)
ANION GAP SERPL CALCULATED.3IONS-SCNC: 17 MMOL/L (ref 9–17)
BUN BLDV-MCNC: 82 MG/DL (ref 8–23)
BUN BLDV-MCNC: 84 MG/DL (ref 8–23)
BUN BLDV-MCNC: 84 MG/DL (ref 8–23)
CALCIUM SERPL-MCNC: 8.5 MG/DL (ref 8.6–10.4)
CALCIUM SERPL-MCNC: 8.6 MG/DL (ref 8.6–10.4)
CALCIUM SERPL-MCNC: 8.8 MG/DL (ref 8.6–10.4)
CHLORIDE BLD-SCNC: 92 MMOL/L (ref 98–107)
CHLORIDE BLD-SCNC: 93 MMOL/L (ref 98–107)
CHLORIDE BLD-SCNC: 94 MMOL/L (ref 98–107)
CO2: 22 MMOL/L (ref 20–31)
CO2: 24 MMOL/L (ref 20–31)
CO2: 24 MMOL/L (ref 20–31)
CREAT SERPL-MCNC: 4.33 MG/DL (ref 0.5–0.9)
CREAT SERPL-MCNC: 4.33 MG/DL (ref 0.5–0.9)
CREAT SERPL-MCNC: 4.34 MG/DL (ref 0.5–0.9)
GFR SERPL CREATININE-BSD FRML MDRD: 10 ML/MIN/1.73M2
GLUCOSE BLD-MCNC: 147 MG/DL (ref 70–99)
GLUCOSE BLD-MCNC: 162 MG/DL (ref 65–105)
GLUCOSE BLD-MCNC: 163 MG/DL (ref 70–99)
GLUCOSE BLD-MCNC: 178 MG/DL (ref 65–105)
GLUCOSE BLD-MCNC: 180 MG/DL (ref 65–105)
GLUCOSE BLD-MCNC: 189 MG/DL (ref 70–99)
GLUCOSE BLD-MCNC: 244 MG/DL (ref 65–105)
POTASSIUM SERPL-SCNC: 4.7 MMOL/L (ref 3.7–5.3)
POTASSIUM SERPL-SCNC: 4.8 MMOL/L (ref 3.7–5.3)
POTASSIUM SERPL-SCNC: 4.9 MMOL/L (ref 3.7–5.3)
SODIUM BLD-SCNC: 130 MMOL/L (ref 135–144)
SODIUM BLD-SCNC: 132 MMOL/L (ref 135–144)
SODIUM BLD-SCNC: 133 MMOL/L (ref 135–144)

## 2022-12-09 PROCEDURE — 6370000000 HC RX 637 (ALT 250 FOR IP): Performed by: STUDENT IN AN ORGANIZED HEALTH CARE EDUCATION/TRAINING PROGRAM

## 2022-12-09 PROCEDURE — 2580000003 HC RX 258: Performed by: INTERNAL MEDICINE

## 2022-12-09 PROCEDURE — 36415 COLL VENOUS BLD VENIPUNCTURE: CPT

## 2022-12-09 PROCEDURE — 97530 THERAPEUTIC ACTIVITIES: CPT

## 2022-12-09 PROCEDURE — 6360000002 HC RX W HCPCS

## 2022-12-09 PROCEDURE — 99232 SBSQ HOSP IP/OBS MODERATE 35: CPT | Performed by: PHYSICAL MEDICINE & REHABILITATION

## 2022-12-09 PROCEDURE — 99232 SBSQ HOSP IP/OBS MODERATE 35: CPT | Performed by: INTERNAL MEDICINE

## 2022-12-09 PROCEDURE — 99221 1ST HOSP IP/OBS SF/LOW 40: CPT | Performed by: INTERNAL MEDICINE

## 2022-12-09 PROCEDURE — 6370000000 HC RX 637 (ALT 250 FOR IP): Performed by: INTERNAL MEDICINE

## 2022-12-09 PROCEDURE — 94761 N-INVAS EAR/PLS OXIMETRY MLT: CPT

## 2022-12-09 PROCEDURE — 1200000000 HC SEMI PRIVATE

## 2022-12-09 PROCEDURE — 2580000003 HC RX 258: Performed by: STUDENT IN AN ORGANIZED HEALTH CARE EDUCATION/TRAINING PROGRAM

## 2022-12-09 PROCEDURE — 6370000000 HC RX 637 (ALT 250 FOR IP)

## 2022-12-09 PROCEDURE — 2500000003 HC RX 250 WO HCPCS: Performed by: INTERNAL MEDICINE

## 2022-12-09 PROCEDURE — 97110 THERAPEUTIC EXERCISES: CPT

## 2022-12-09 PROCEDURE — 80048 BASIC METABOLIC PNL TOTAL CA: CPT

## 2022-12-09 PROCEDURE — 82947 ASSAY GLUCOSE BLOOD QUANT: CPT

## 2022-12-09 PROCEDURE — 97535 SELF CARE MNGMENT TRAINING: CPT

## 2022-12-09 RX ORDER — ZOLPIDEM TARTRATE 5 MG/1
5 TABLET ORAL NIGHTLY
Status: DISCONTINUED | OUTPATIENT
Start: 2022-12-09 | End: 2022-12-12 | Stop reason: HOSPADM

## 2022-12-09 RX ORDER — HYDROCODONE BITARTRATE AND ACETAMINOPHEN 5; 325 MG/1; MG/1
1 TABLET ORAL EVERY 4 HOURS PRN
Status: DISCONTINUED | OUTPATIENT
Start: 2022-12-09 | End: 2022-12-12 | Stop reason: HOSPADM

## 2022-12-09 RX ADMIN — INSULIN LISPRO 4 UNITS: 100 INJECTION, SOLUTION INTRAVENOUS; SUBCUTANEOUS at 17:58

## 2022-12-09 RX ADMIN — HYDROCODONE BITARTRATE AND ACETAMINOPHEN 1 TABLET: 5; 325 TABLET ORAL at 18:00

## 2022-12-09 RX ADMIN — HYDROCODONE BITARTRATE AND ACETAMINOPHEN 1 TABLET: 5; 325 TABLET ORAL at 12:02

## 2022-12-09 RX ADMIN — METHOCARBAMOL TABLETS 750 MG: 750 TABLET, COATED ORAL at 04:18

## 2022-12-09 RX ADMIN — SODIUM BICARBONATE: 84 INJECTION, SOLUTION INTRAVENOUS at 14:47

## 2022-12-09 RX ADMIN — HEPARIN SODIUM 5000 UNITS: 5000 INJECTION INTRAVENOUS; SUBCUTANEOUS at 06:58

## 2022-12-09 RX ADMIN — METHOCARBAMOL TABLETS 750 MG: 750 TABLET, COATED ORAL at 12:00

## 2022-12-09 RX ADMIN — GABAPENTIN 600 MG: 600 TABLET ORAL at 08:54

## 2022-12-09 RX ADMIN — METOPROLOL TARTRATE 25 MG: 25 TABLET ORAL at 22:44

## 2022-12-09 RX ADMIN — ROPINIROLE HYDROCHLORIDE 0.25 MG: 0.25 TABLET, FILM COATED ORAL at 22:44

## 2022-12-09 RX ADMIN — METOPROLOL TARTRATE 25 MG: 25 TABLET ORAL at 08:54

## 2022-12-09 RX ADMIN — ZOLPIDEM TARTRATE 5 MG: 5 TABLET ORAL at 22:48

## 2022-12-09 RX ADMIN — HEPARIN SODIUM 5000 UNITS: 5000 INJECTION INTRAVENOUS; SUBCUTANEOUS at 14:46

## 2022-12-09 RX ADMIN — METHOCARBAMOL TABLETS 750 MG: 750 TABLET, COATED ORAL at 19:54

## 2022-12-09 RX ADMIN — DESMOPRESSIN ACETATE 40 MG: 0.2 TABLET ORAL at 08:54

## 2022-12-09 RX ADMIN — AMIODARONE HYDROCHLORIDE 200 MG: 200 TABLET ORAL at 08:54

## 2022-12-09 RX ADMIN — HYDRALAZINE HYDROCHLORIDE 100 MG: 50 TABLET, FILM COATED ORAL at 22:44

## 2022-12-09 RX ADMIN — SODIUM CHLORIDE, PRESERVATIVE FREE 10 ML: 5 INJECTION INTRAVENOUS at 22:55

## 2022-12-09 RX ADMIN — PANTOPRAZOLE SODIUM 40 MG: 40 TABLET, DELAYED RELEASE ORAL at 06:59

## 2022-12-09 RX ADMIN — HEPARIN SODIUM 5000 UNITS: 5000 INJECTION INTRAVENOUS; SUBCUTANEOUS at 22:43

## 2022-12-09 RX ADMIN — HYDRALAZINE HYDROCHLORIDE 100 MG: 50 TABLET, FILM COATED ORAL at 14:46

## 2022-12-09 ASSESSMENT — PAIN DESCRIPTION - FREQUENCY: FREQUENCY: CONTINUOUS

## 2022-12-09 ASSESSMENT — PAIN SCALES - GENERAL
PAINLEVEL_OUTOF10: 7
PAINLEVEL_OUTOF10: 7
PAINLEVEL_OUTOF10: 9
PAINLEVEL_OUTOF10: 9
PAINLEVEL_OUTOF10: 2
PAINLEVEL_OUTOF10: 7

## 2022-12-09 ASSESSMENT — PAIN DESCRIPTION - LOCATION
LOCATION: HIP
LOCATION: ANKLE

## 2022-12-09 ASSESSMENT — PAIN DESCRIPTION - ORIENTATION
ORIENTATION: RIGHT
ORIENTATION: RIGHT

## 2022-12-09 ASSESSMENT — PAIN DESCRIPTION - ONSET: ONSET: PROGRESSIVE

## 2022-12-09 ASSESSMENT — PAIN DESCRIPTION - DIRECTION: RADIATING_TOWARDS: RIGHT HIP

## 2022-12-09 ASSESSMENT — PAIN DESCRIPTION - DESCRIPTORS
DESCRIPTORS: ACHING
DESCRIPTORS: SHARP

## 2022-12-09 NOTE — CONSULTS
[facility]  Office: [address], DO, Genna Ronnell, DO, Fish Escobar, DO, Nora Peralta, DO, Joceline Oshea MD, Shara Negrete MD, Rosalind Plaza MD, Cristian Noriega MD,  Alejandro Soriano MD, Myla Chavez MD, Ja Delgadillo, DO, Otf Acevedo MD,  Karin Escamilla MD, Bhupendra Stinson MD, Kamila Germain DO, Yana Holley MD, Kaylie Ndiaye MD, Marshall Ramos DO, Lashanda Wild MD, Karine Horn MD, Soco Borges MD, Dede Gonzalez MD, Shilpa Mancini DO, Mika Kaminski MD, Shahida Aguilera MD, Humza Thakkar, [facility],  Chyna Parker, CNP, Jessica Soria, CNP, Roya Naylor, CNP,  Kathy Golden, [facility], Jeannie Cross, CNP, Renata Taylor, CNP, Marcell Salvador, CNP, Rowdy Orozco, CNP, Gisel Gilbert, CNP, Margaret Stark PA-C, Catherine Calix, CNS, Joel Carty, CNP, Rachel Grider, [facility]         [address] / HISTORY AND PHYSICAL EXAMINATION            Date:   12/9/2022  Patient name:  Vanda Morales  Date of admission:  12/5/2022  6:06 PM  MRN:   [id number]  Account:  [de-identified]  YOB: 1945  PCP:    Carrie Rosales MD  Room:   6743/5401-99  Code Status:    Full Code    Physician Requesting Consult: Deb Conn MD    Reason for Consult: Medical management    Chief Complaint:     Chief Complaint   Patient presents with    Ankle Injury     Right ankle fracture        History Obtained From:     patient, electronic medical record    History of Present Illness:     Pt is a 69  female with a PMH A.fib on Eliquis, CHF, CKD, DM2, and HTN who presented with a right open ankle fracture after falling over a curb. Pt is s/p I & D and ORIF on 12/6 by Ortho. She has had issues with acute on chronic CKD 3. Her Cr is peaking 4.34, Nephrology is assisting with [facility], IVF and told her that she is high risk for needing dialysis.       Today she is upset that she had to ask for her pain medications, but her pain has gotten ahead of her. She also hasn't been sleeping well, and requests her Ambien. Her daughter is present, and works at [employer]. Lana. Pt is agreeable to going to [facility] rehab. She denies any sore throat, cough, CP or SOB. We were asked to assume her care. Past Medical History:     Past Medical History:   Diagnosis Date    Abnormal stress test     Anemia     Arthritis     Depression     Diverticulosis     DM (diabetes mellitus) ([address]     Erythropenia     Fibromyalgia     HTN (hypertension)     Hyperlipidemia     Kidney stone     Morbid obesity due to excess calories (HCC)     DIONY on CPAP     Osteopenia 03/03/14    Seasonal allergies     Sleep apnea     uses bipap prn        Past Surgical History:     Past Surgical History:   Procedure Laterality Date    ANKLE FRACTURE SURGERY Right 12/6/2022    RIGHT ANKLE OPEN REDUCTION INTERNAL FIXATION performed by Lyubov Vela DO at [facility]  01/01/2011    right arm broken    CARDIAC CATHETERIZATION  [id number] dr Rubia Patton  05/16/2016    COLONOSCOPY  01/01/2003    COLONOSCOPY  01/01/2007    ORIF ANKLE FRACTURE Right 12/06/2022    RIGHT ANKLE OPEN REDUCTION INTERNAL FIXATION    TOTAL KNEE ARTHROPLASTY Bilateral     left may 2013 and right july 2013    TUBAL LIGATION          Medications Prior to Admission:     Prior to Admission medications    Medication Sig Start Date End Date Taking? Authorizing Provider   rOPINIRole (REQUIP) 0.25 MG tablet Take 1 tablet by mouth nightly 11/14/22   Eliza Davidson MD   gabapentin (NEURONTIN) 600 MG tablet Take 1 tablet by mouth daily for 90 days.  11/14/22 2/12/23  Eliza Davidson MD   traZODone (DESYREL) 50 MG tablet  10/20/22   Mandi Rodas MD   colchicine (COLCRYS) 0.6 MG tablet  9/27/22   Domenico Fox DPM   Cyanocobalamin (B-12) 1000 MCG LOZG DISSOLVE ONE tablet UNDER TONGUE ONCE DAILY 9/6/22   Cheryl Neumann MD methylPREDNISolone (MEDROL DOSEPACK) 4 MG tablet Take by mouth. 11/9/22   Eliza Rojo MD   blood glucose monitor kit and supplies use check blood sugar as directed 11/9/22   Eliza Rojo MD   blood glucose monitor strips Check blood sugars daily as directed. 11/9/22   Eliza Rojo MD   Lancets MISC 1 each by Does not apply route daily 11/9/22   Eliza Rojo MD   Alcohol Swabs 70 % PADS 1 each by Does not apply route daily 11/9/22   Eliza Rojo MD   HYDROcodone-acetaminophen (NORCO) 5-325 MG per tablet Take 1 tablet by mouth every 6 hours as needed for Pain for up to 30 days. 11/9/22 12/9/22  Eliza Rojo MD   zolpidem (AMBIEN) 5 MG tablet Take 1 tablet by mouth nightly as needed for Sleep for up to 60 days. 11/9/22 1/8/23  Eliza Rojo MD   atorvastatin (LIPITOR) 40 MG tablet Take 1 tablet by mouth daily 11/7/22   Eliza Rojo MD   pantoprazole (PROTONIX) 40 MG tablet TAKE 1 TABLET DAILY 10/19/22   Eliza Rojo MD   ELIQUIS 5 MG TABS tablet TAKE 1 TABLET TWICE A DAY 10/11/22   Eliza Rojo MD   cyclobenzaprine (FLEXERIL) 5 MG tablet TAKE 1 TABLET BY MOUTH NIGHTLY AS NEEDED FOR MUSCLE SPASMS 10/3/22   Eliza Rojo MD   allopurinol (ZYLOPRIM) 100 MG tablet TAKE 1 TABLET DAILY 9/21/22   BARBIE Tucker CNP   furosemide (LASIX) 40 MG tablet Take 1 tablet by mouth daily 9/21/22   Claudia Bauer MD   zolpidem (AMBIEN) 5 MG tablet Take 5 mg by mouth nightly as needed for Sleep.     Historical Provider, MD   hydrALAZINE (APRESOLINE) 25 MG tablet Take 4 tablets by mouth 3 times daily 7/12/22   Claudia Bauer MD   amiodarone (CORDARONE) 200 MG tablet Take 1 tablet by mouth daily 7/6/22   BARBIE Srivastava NP   calcitRIOL (ROCALTROL) 0.5 MCG capsule TAKE 1 CAPSULE BY MOUTH DAILY 6/14/22   Claudia Bauer MD   ferrous sulfate (FE TABS 325) 325 (65 Fe) MG EC tablet Take 1 tablet by mouth daily (with breakfast)  Patient not taking: Reported on 11/9/2022 5/10/22   Eliza Odom MD   potassium citrate (UROCIT-K) 10 MEQ (1080 MG) extended release tablet Take 1 tablet by mouth daily 2/16/22   Eliza Odom MD   azelastine (ASTELIN) 0.1 % nasal spray 1 spray by Nasal route 2 times daily Use in each nostril as directed 10/13/21   Jeannette Nolasco DO   Cholecalciferol (VITAMIN D3) 25 MCG (1000 UT) TABS Take 2 tablets by mouth daily 1/9/20   BARBIE Reagan - CNP   Magnesium Oxide 400 MG CAPS Take by mouth 2 times daily Takes once daily at [facility]    Historical Provider, MD        Allergies:     Adhesive tape, Cephalexin, Erythromycin, Ketorolac tromethamine, Pcn [penicillins], Percocet [oxycodone-acetaminophen], Sulfa antibiotics, and Ultracet [tramadol-acetaminophen]    Social History:     Tobacco:    reports that she quit smoking about 62 years ago. Her smoking use included cigarettes. She has a 0.25 pack-year smoking history. She has never used smokeless tobacco.  Alcohol:      reports no history of alcohol use. Drug Use:  reports no history of drug use. Family History:     Family History   Problem Relation Age of Onset    Diabetes Mother     Heart Disease Mother     Osteoporosis Mother     Kidney Disease Father     Prostate Cancer Brother        Review of Systems:     Positive and Negative as described in HPI. CONSTITUTIONAL:  negative for fevers, chills, sweats, , weight loss, Positive fatigue  HEENT:  negative for vision, hearing changes, runny nose, throat pain  RESPIRATORY:  negative for shortness of breath, cough, congestion, wheezing. CARDIOVASCULAR:  negative for chest pain, palpitations.   GASTROINTESTINAL:  negative for nausea, vomiting, diarrhea, constipation, change in bowel habits, abdominal pain   GENITOURINARY:  negative for difficulty of urination, burning with urination, frequency   INTEGUMENT:  negative for rash, skin lesions, easy bruising   HEMATOLOGIC/LYMPHATIC:  negative for swelling/edema   ALLERGIC/IMMUNOLOGIC:  negative for urticaria , itching  ENDOCRINE:  negative increase in drinking, increase in urination, hot or cold intolerance  MUSCULOSKELETAL:  Positive joint pains, muscle aches, swelling of joints  NEUROLOGICAL:  negative for headaches, dizziness, lightheadedness, numbness, pain, tingling extremities  BEHAVIOR/PSYCH:  negative for depression, anxiety    Physical Exam:     BP (!) 153/71   Pulse 68   Temp 97.7 °F (36.5 °C) (Oral)   Resp 16   Ht 5' 5\" (1.651 m)   Wt 262 lb (118.8 kg)   LMP  (LMP Unknown)   SpO2 99%   BMI 43.60 kg/m²   Temp (24hrs), Av.9 °F (36.6 °C), Min:97.7 °F (36.5 °C), Max:98.1 °F (36.7 °C)    Recent Labs     22  1539 22  2109 22  0657 22  1102   POCGLU 152* 173* 162* 180*       Intake/Output Summary (Last 24 hours) at 2022 1450  Last data filed at 2022 0719  Gross per 24 hour   Intake 2013.15 ml   Output 900 ml   Net 1113.15 ml       General Appearance:  alert, ill appearing, and in no acute distress  Mental status: oriented to person, place, and time with normal affect  Head:  normocephalic, atraumatic. Eye: no icterus, redness, pupils equal and reactive, extraocular eye movements intact, conjunctiva clear  Ear: normal external ear, no discharge, hearing intact  Nose:  no drainage noted  Mouth: mucous membranes moist  Neck: supple, no carotid bruits, thyroid not palpable  Lungs: Bilateral equal air entry, clear to ausculation, no wheezing, rales or rhonchi, normal effort  Cardiovascular: normal rate, regular rhythm, no murmur, gallop, rub.   Abdomen: Soft, nontender, obese, normal bowel sounds, no hepatomegaly or splenomegaly  Neurologic: There are no new focal motor or sensory deficits, normal muscle tone and bulk, no abnormal sensation, normal speech, cranial nerves II through XII grossly intact  Skin: No gross lesions, rashes, bruising or bleeding on exposed skin area  Extremities:  peripheral pulses palpable,+ cast on right LE to knee  Psych: normal mg/dL    BUN 84 (H) 8 - 23 mg/dL    Creatinine 4.33 (H) 0.50 - 0.90 mg/dL    Est, Glom Filt Rate 10 (L) >60 mL/min/1.73m2    Calcium 8.6 8.6 - 10.4 mg/dL    Sodium 132 (L) 135 - 144 mmol/L    Potassium 4.7 3.7 - 5.3 mmol/L    Chloride 93 (L) 98 - 107 mmol/L    CO2 22 20 - 31 mmol/L    Anion Gap 17 9 - 17 mmol/L   POC Glucose Fingerstick    Collection Time: 12/09/22 11:02 AM   Result Value Ref Range    POC Glucose 180 (H) 65 - 105 mg/dL       Imaging/Diagonstics:  CT ABDOMEN PELVIS WO CONTRAST Additional Contrast? None    Result Date: 12/6/2022  No evidence of fracture or osseous malalignment in the lumbar spine, pelvic bones and joints including bilateral hip joints. No evidence of fracture in the superior pubic rami and inferior ischiopubic rami of both sides or in bilateral pubic bones. No fracture in bilateral acetabula or ischial bones. Evidence of mild to moderate multilevel degenerative disc disease and facet osteoarthritis in the lumbar spine and lumbosacral junction. Nonspecific small amount of gas scattered in multiple loops of small bowel without significant fluid levels. No distension or dilation of distal small bowel. Normal appendix visualized. Small to moderate amount of stool and gas scattered in the colon. Evidence of moderate diverticulosis coli of descending colon and sigmoid colon, without evidence of diverticulitis. No evidence of renal or ureteric calculus or obstructive uropathy. No diagnostic finding in the liver, gallbladder, spleen, pancreas and adrenal glands. XR PELVIS (1-2 VIEWS)    Result Date: 12/5/2022  Potential nondisplaced fracture of the right superior and inferior pubic rami. XR KNEE RIGHT (3 VIEWS)    Result Date: 12/5/2022  Total knee arthroplasty without complication. No acute osseous or soft tissue abnormality. XR TIBIA FIBULA RIGHT (2 VIEWS)    Result Date: 12/5/2022  Trimalleolar fracture with diffuse soft tissue swelling.      XR ANKLE RIGHT (2 VIEWS)    Result Date: 12/5/2022  Ankle fracture subluxation with persistent mild lateral subluxation of the talus in relation to the tibia. XR ANKLE RIGHT (MIN 3 VIEWS)    Result Date: 12/6/2022  Anatomic alignment of comminuted ankle fracture status post ORIF. XR ANKLE RIGHT (MIN 3 VIEWS)    Result Date: 12/5/2022  Trimalleolar right ankle is fracture subluxation with improved positioning of the talus in relation to the tibia and now with mild posterior subluxation of the talus in relation to the tibia. XR ANKLE RIGHT (MIN 3 VIEWS)    Result Date: 12/5/2022  Trimalleolar fracture with associated soft tissue swelling. CT PELVIS WO CONTRAST Additional Contrast? None    Result Date: 12/6/2022  No evidence of fracture in pelvic bones or bilateral hip joints. No evidence of fracture in superior pubic rami or inferior ischiopubic rami of both sides. No fracture in bilateral acetabulum or in visualized bilateral proximal femurs. Within the pelvic cavity, there is no evidence of hemorrhage or abnormal fluid collection or acute process. Mild to moderate sigmoid diverticulosis, without evidence of diverticulitis. CT ANKLE RIGHT WO CONTRAST    Result Date: 12/6/2022  Prominent oblique fracture in coronal orientation with large gap at the fracture involving the posterior portion of distal end of right tibia with distal intra-articular extension of the fracture. Comminuted transverse fracture through the base of the medial malleolus of the tibia. Grossly comminuted oblique fracture in the distal shaft of right fibula. Moderate posterior subluxation of the talus bone due to distal posterior tibial fracture. Some small intra-articular fracture fragments in the anterior portion of the tibiotalar and talofibular joint. The distal tibiofibular syndesmosis is grossly intact. No evidence of fracture in the right calcaneus bone, talus bone and distal tarsal bones. Subtalar joint is intact.   Evidence of soft tissue emphysema in the lateral portion of subtalar joint. The calcaneal tendon appears to be grossly intact. The long tendons at the medial, lateral and anterior aspect of right ankle joint are grossly intact. XR CHEST PORTABLE    Result Date: 12/5/2022  Cardiomegaly without acute pulmonary process. Assessment :      Hospital Problems             Last Modified POA    * (Principal) Open fracture of right tibia and fibula, sequela 12/5/2022 Yes    Type III open trimalleolar fracture of right ankle 12/7/2022 Yes    Hyperkalemia 12/7/2022 Yes    Hyponatremia 29/9/0224 Yes    Metabolic acidosis 12/1/8877 Yes    Fall 12/8/2022 Yes    Stage 4 chronic kidney disease ([facility] [address] 12/8/2022 Yes    Acute kidney injury superimposed on chronic kidney disease ([facility] [address] 12/7/2022 Yes       Plan:     Open right ankle fracture, dislocation s/p ORIF 12/6, Ortho recommends non-weight bearing Rt LE.   Pain control  JOSE ARMANDO/ CKD 3- hopefully Cr has peaked, con't IV bicarb and Lokelma per Nephrology, pt high risk for needing HD  HTN- BP stable  A.fib- on Eliquis  Hyperglycemia- HbA1C: 5.8, SSI  Anemia- from surgery, monitor H/H  Obesity/ DIONY  Insomnia- resume Ambien 5mg qhs  PT/OT    Dw pt and daughter, plans to go to [facility] acute rehab    Consultations:   [address] CONSULT TO CARDIOLOGY  IP CONSULT  [employer] [place]  IP CONSULT TO NEPHROLOGY  IP CONSULT TO IV TEAM  [address] Peralta MD  12/9/2022  2:50 PM    Copy sent to Dr. Scotty Casey MD

## 2022-12-09 NOTE — PROGRESS NOTES
Physical Medicine & Rehabilitation  Progress Note    12/9/2022 3:02 PM     CC: Ambulatory and ADL dysfunction due to open trimalleolar fracture, ankle fracture dislocation status post ORIF, right talus open treatment    Subjective:   No complaints. Feels better today participating with therapy. Little better with nonweightbearing per therapist    ROS:  Denies fevers, chills, sweats. No chest pain, palpitations, lightheadedness. Denies coughing, wheezing or shortness of breath. Denies abdominal pain, nausea, diarrhea or constipation. No new areas of joint pain. Denies new areas of numbness or weakness. Denies new anxiety or depression issues. No new skin problems. Rehabilitation:   PT:  Restrictions/Precautions: Weight Bearing  Other position/activity restrictions: Per chart: Right open trimalleolar fracture ankle fracture dislocation s/p I&D and ORIF, DOS 12/6/2022  Right Lower Extremity Weight Bearing: Non Weight Bearing   Transfers  Sit to Stand: Moderate Assistance, 2 Person Assistance  Stand to Sit: Moderate Assistance, 2 Person Assistance  Comment: Completed with use of SS along with VC's for sequencing/ hand placement and weight bearing restrictions with good return. WB Status: NWB R LE  Ambulation  Surface: Level tile  Device: Rolling Walker  Assistance: Moderate assistance, 2 Person assistance  Distance: sit to stand with mod assist x 2      OT:  ADL  Feeding: Independent  Grooming: Independent, Setup  Grooming Skilled Clinical Factors: Pt completed washing her face and hands in chair. Declined further ADLs d/t having completed earlier in the day. UE Bathing: Stand by assistance, Setup  LE Bathing:  Moderate assistance, Increased time to complete, Setup, Verbal cueing  UE Dressing: Stand by assistance  LE Dressing: Maximum assistance, Increased time to complete, Setup  LE Dressing Skilled Clinical Factors: Reqired max  A to don sock on L foot d/t decreased functional reach and pain  Toileting: Maximum assistance, Setup, Increased time to complete                      Bed mobility  Supine to Sit: Minimal assistance  Sit to Supine:  (SONY, pt stayed in chair at end of session.)  Scooting: Stand by assistance  Transfers  Sit to stand: Moderate assistance, 2 Person assistance  Stand to sit: Moderate assistance, 2 Person assistance  Transfer Comments: Pt completed 1x functional sit<>stand transfer after significant encouragement. Pt reports being very fearful. Able to maintain NWB precaution during transfers. Use of SS. Did have BUEs on both SS bar. Mod A X2 d/t R LE WBing precautions. ST:        Objective:  BP (!) 153/71   Pulse 68   Temp 97.7 °F (36.5 °C) (Oral)   Resp 16   Ht 5' 5\" (1.651 m)   Wt 262 lb (118.8 kg)   LMP  (LMP Unknown)   SpO2 99%   BMI 43.60 kg/m²  I Body mass index is 43.6 kg/m². I   Wt Readings from Last 1 Encounters:   22 262 lb (118.8 kg)      Temp (24hrs), Av.9 °F (36.6 °C), Min:97.7 °F (36.5 °C), Max:98.1 °F (36.7 °C)         GEN: well developed, well nourished, no acute distress  HEENT: Normocephalic atraumatic, EOMI, mucous membranes pink and moist  CV: RRR, no murmurs, rubs or gallops  PULM: CTAB, no rales or rhonchi. Respirations WNL and unlabored  ABD: soft, NT, ND, +BS and equal  NEURO: A&O x3. Sensation intact to light touch. MSK: 4+/5 upper and left lower extremity, right lower extremity with cast  EXTREMITIES: No calf tenderness to palpation bilaterally. No edema BLEs  SKIN: warm dry and intact with good turgor  PSYCH: appropriately interactive. Affect WNL.           Medications   Scheduled Meds:   sodium zirconium cyclosilicate  5 g Oral TID    insulin lispro  0-4 Units SubCUTAneous Nightly    insulin lispro  0-16 Units SubCUTAneous TID WC    heparin (porcine)  5,000 Units SubCUTAneous 3 times per day    sodium chloride flush  5-40 mL IntraVENous 2 times per day    polyethylene glycol  17 g Oral Daily    amiodarone  200 mg Oral Daily atorvastatin  40 mg Oral Daily    gabapentin  600 mg Oral Daily    hydrALAZINE  100 mg Oral TID    pantoprazole  40 mg Oral QAM AC    rOPINIRole  0.25 mg Oral Nightly    methocarbamol  750 mg Oral Q8H    metoprolol tartrate  25 mg Oral BID     Continuous Infusions:   dextrose      sodium bicarbonate infusion 50 mL/hr at 12/09/22 1447    sodium chloride 10 mL/hr at 12/06/22 2057     PRN Meds:.glucose, dextrose bolus **OR** dextrose bolus, glucagon (rDNA), dextrose, sodium chloride flush, sodium chloride, ondansetron **OR** ondansetron, senna, HYDROcodone-acetaminophen     Diagnostics:     CBC:   Recent Labs     12/06/22  2115 12/07/22  0557 12/08/22  0926   WBC 15.4* 15.7* 14.4*   RBC 3.11* 2.86* 2.40*   HGB 9.4* 8.6* 7.2*   HCT 31.9* 29.2* 24.7*   .6 102.1 102.9   RDW 16.4* 16.2* 16.4*    302 260     BMP:   Recent Labs     12/07/22  0557 12/07/22  1315 12/09/22  0253 12/09/22  0741 12/09/22  1419      < > 130* 132* 133*   K 5.7*   < > 4.8 4.7 4.9      < > 92* 93* 94*   CO2 21   < > 24 22 24   PHOS 5.1*  --   --   --   --    BUN 60*   < > 84* 84* 82*   CREATININE 3.62*   < > 4.33* 4.33* 4.34*    < > = values in this interval not displayed. BNP: No results for input(s): BNP in the last 72 hours. PT/INR: No results for input(s): PROTIME, INR in the last 72 hours. APTT: No results for input(s): APTT in the last 72 hours. CARDIAC ENZYMES: No results for input(s): CKMB, CKMBINDEX, TROPONINT in the last 72 hours. Invalid input(s): CKTOTAL;3  FASTING LIPID PANEL:  Lab Results   Component Value Date    CHOL 178 01/11/2021    HDL 37 (L) 01/11/2021    TRIG 281 (H) 01/11/2021     LIVER PROFILE:   Recent Labs     12/07/22  0557   AST 10   ALT 6   BILITOT 0.2*   ALKPHOS 49        I/O (24Hr):     Intake/Output Summary (Last 24 hours) at 12/9/2022 1502  Last data filed at 12/9/2022 0719  Gross per 24 hour   Intake 2013.15 ml   Output 900 ml   Net 1113.15 ml       Glu last 24 hour  Recent Labs 12/08/22  1539 12/08/22  2109 12/09/22  0657 12/09/22  1102   POCGLU 152* 173* 162* 180*       No results for input(s): CLARITYU, COLORU, PHUR, SPECGRAV, PROTEINU, RBCUA, BLOODU, BACTERIA, NITRU, WBCUA, LEUKOCYTESUR, YEAST, GLUCOSEU, BILIRUBINUR in the last 72 hours. Impression: Ms. Barbie Sofia is a 69 y.o. female with a history of Open fracture of right tibia and fibula, sequela     Open trimalleolar fracture, ankle fracture dislocation status post ORIF in 12/6, right talus open treatment-nonweightbearing right lower extremity  A. fib-Eliquis amiodarone  CKD/JOSE ARMANDO hyperkalemia-Per nephrology high risk of needing dialysis-creatinine increased to 4.3  Type2 diabetes with neuropathy  Hypertension/hyperlipidemia Lipitor, hydralazine, Toprol  Anemia hemoglobin down to 7.2 decreased from 10.6-recommend recheck  Fibromyalgia  BMI 43.60  Sleep apnea  Pain-Norco, Neurontin, Robaxin,     Recommendations:  1. Diagnosis: Ankle fracture  2. Therapy: PT and OT needs  3. Medical  Necessity: Above  4. Support: Clarify spouse patient notes spouse healthy unable to assist  5. Rehab recommendation: Would benefit from acute inpatient rehabilitation when medically ready    Noted high risk for needing dialysis this admission-creatinine increasing and hyperkalemia-continues on IV bicarb    Hemoglobin decreased down to 7.2-consider recheck        6. DVT proph: heparin    Discussed with patient and      It was my pleasure to evaluate Barbie Sofia today. Please call with questions. Chung Sanchez. Lourdes Francois MD       This note is created with the assistance of a speech recognition program.  While intending to generate a document that actually reflects the content of the visit, the document can still have some errors including those of syntax and sound a like substitutions which may escape proof reading.   In such instances, actual meaning can be extrapolated by contextual diversion

## 2022-12-09 NOTE — CARE COORDINATION
Patient is on Bicarb Drip at this time, nephrology is following. PM&R has seen the patient and believe patient would benefit form ARU.     1243 Met with patient at bedside to discuss transition plan. She informs me that there is no way she can go back home, She tells me that she was provided a list of SNF choices. I explained that our PM&R doctors believe that you would benefit from ARU. I explained the differences and she tells me that she would be interested in ARU if she is able to do that much therapy. I provided her with the ARU list from [facility]. She tells me she will review it. I informed her that I would be turner-ck. I asked her to let the RN know if she has chosen a facility and I have not returned yet. She agreed.

## 2022-12-09 NOTE — PROGRESS NOTES
PROGRESS NOTE          PATIENT NAME: Karla Blanc Dr RECORD NO. [id number]  DATE: 12/9/2022  SURGEON: Zully Coe  PRIMARY CARE PHYSICIAN: Patria Doss MD    HD: # 4    ASSESSMENT    Patient Active Problem List   Diagnosis    Dyslipidemia    DIONY treated with BiPAP    Fibromyalgia    CRI (chronic renal insufficiency)    OA (osteoarthritis)    DM (diabetes mellitus) ([address]    Kidney stone    Depression    Seasonal allergies    Diverticulosis    Erythropenia    Normocytic normochromic anemia    Mitral regurgitation - Mild to moderate    Stage 4 chronic kidney disease (HCC)    Gout    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 disease ([address]    Essential hypertension    Osteopenia    Morbid obesity due to excess calories (HCC)    Anxiety    Febrile illness    Acute serous otitis media of left ear    Secondary hyperparathyroidism ([address]    Acute kidney injury superimposed on chronic kidney disease ([address]    New onset a-fib ([address] with Rapid ventricular response    New onset of congestive heart failure (HCC)    Lymphedema    GERD (gastroesophageal reflux disease)    Restless leg syndrome    Acute diastolic congestive heart failure (HCC)    Chronic bilateral low back pain without sciatica    Acute bilateral low back pain without sciatica    Generalized muscle weakness    Bradycardia    Open fracture of right tibia and fibula, sequela    Type III open trimalleolar fracture of right ankle    Hyperkalemia    Hyponatremia    Metabolic acidosis    Fall       MEDICAL DECISION MAKING AND PLAN    Right trimalleolar ankle fracture status post ORIF postop day 3   DVT Prophylaxis- Heparin  Monitor renal function -potassium improved to 4.8 (4.7, 5.5) appreciate nephrology recommendations  Nonweightbearing right lower extremity  Continue work with PT/OT encouraging ambulation and balance (NWB RLE)      Chief Complaint: \"I have no pain\"    [place] Sanderson is has slightly improved and is unchanged since yesterday. She states overall she feels much better than the day prior. She is ready to leave. No acute events overnight. She was seen yesterday by PMR which recommended  acute rehabilitation. OBJECTIVE  VITALS: Temp: Temp: 97.7 °F (36.5 °C)Temp  Av.2 °F (36.8 °C)  Min: 97.7 °F (36.5 °C)  Max: 98.8 °F (39.5 °C) BP Systolic (48OIX), ZXS:807 , Min:108 , UDA:782   Diastolic (81QFN), LJF:01, Min:47, Max:69   Pulse Pulse  Av.5  Min: 68  Max: 75 Resp Resp  Av  Min: 16  Max: 18 Pulse ox SpO2  Av.8 %  Min: 96 %  Max: 100 %  GENERAL: alert, no distress  NEURO: GCS 15  HEENT: Normocephalic atraumatic  : deferred  LUNGS: Regular rate no accessory muscle use. HEART: normal rate  ABDOMEN: soft, non-tender, non-distended, and no guarding or peritoneal signs present  EXTREMITY:   Right lower extremity: Short leg splint clean dry and intact to the right lower extremity. Elevated on pillow with ice packs. Patient is able to move exposed digits. Sensation is intact to exposed digits. Exposed digits with BCR. I/O last 3 completed shifts: In: 1614.5 [P.O.:100; I.V.:1514.5]  Out: 900 [Urine:900]    Drain/tube output:  In: 2013.2 [P.O.:100;  I.V.:1913.2]  Out: 900 [Urine:900]    LAB:  CBC:   Recent Labs     22  2115 22  0557 22  0926   WBC 15.4* 15.7* 14.4*   HGB 9.4* 8.6* 7.2*   HCT 31.9* 29.2* 24.7*   .6 102.1 102.9    302 260     BMP:   Recent Labs     22  1504 22  2107 22  0253   * 133* 130*   K 5.5* 4.7 4.8   CL 95* 94* 92*   CO2 23 22 24   BUN 78* 84* 84*   CREATININE 4.37* 4.39* 4.33*   GLUCOSE 168* 190* 189*     COAGS:   Recent Labs     22  0557   PROT 5.7*           Rashida Judge MD  22, 7:46 AM

## 2022-12-09 NOTE — PROGRESS NOTES
endurance     Plan   Physcial Therapy Plan  General Plan:  (5-6x wk)  Current Treatment Recommendations: Strengthening, Functional mobility training, Transfer training, Endurance training, Stair training, Gait training, Safety education & training  Safety Devices  Type of Devices: Nurse notified, Call light within reach, Gait belt, Left in bed  Restraints  Restraints Initially in Place: No     Restrictions  Restrictions/Precautions  Restrictions/Precautions: Weight Bearing  Required Braces or Orthoses?: No  Lower Extremity Weight Bearing Restrictions  Right Lower Extremity Weight Bearing: Non Weight Bearing  Position Activity Restriction  Other position/activity restrictions: Per chart: Right open trimalleolar fracture ankle fracture dislocation s/p I&D and ORIF, DOS 12/6/2022     Subjective   General  Chart Reviewed: Yes  Response To Previous Treatment: Patient with no complaints from previous session. Family / Caregiver Present: No  Follows Commands: Within Functional Limits  General Comment  Comments: Pt supine in bed upon arrival/ exit. Subjective  Subjective: Pt has no c/o pain at this time but grimaces with mobility, unable to rate at this time, RN notified. Cognition   Orientation  Overall Orientation Status: Within Functional Limits  Orientation Level: Oriented X4  Cognition  Overall Cognitive Status: WFL     Objective   Bed mobility  Supine to Sit: Minimal assistance  Sit to Supine: Minimal assistance  Scooting: Minimal assistance  Transfers  Sit to Stand: Moderate Assistance;2 Person Assistance  Stand to Sit: Moderate Assistance;2 Person Assistance        Balance  Posture: Good  Sitting - Static: Good  Sitting - Dynamic: Fair  Standing - Static: Poor  Comments: Assessed with SS.  A/AROM Exercises: AP's x20 LLE, LAQs x20 LLE.         AM-PAC Score  AM-PAC Inpatient Mobility Raw Score : 13 (12/09/22 1554)  AM-PAC Inpatient T-Scale Score : 36.74 (12/09/22 1554)  Mobility Inpatient CMS 0-100% Score: 64.91 (12/09/22 1554)  Mobility Inpatient CMS G-Code Modifier : CL (12/09/22 1554)          Goals  Short Term Goals  Time Frame for Short Term Goals: 10 visits  Short Term Goal 1: transfers with SBA  Short Term Goal 2: amb 25 ft wiuth a RW x CGA with NWB R LE  Short Term Goal 3: to be independent with bed mobility  Short Term Goal 4: 20 min exercise program x SBA         Therapy Time   Individual Concurrent Group Co-treatment   Time In 1405         Time Out 1500         Minutes 55         Timed Code Treatment Minutes: [address] [facility], [place], [facility]

## 2022-12-09 NOTE — PROGRESS NOTES
Occupational Therapy  Facility/Department: [address] ORTHO/MED SURG  Daily Treatment Note      NAME: Shabana Wright  : 1945  MRN: [id number]  Date of Service: 2022    Discharge Recommendations:  Patient would benefit from continued therapy after discharge, 24 hour supervision or assist    OT Equipment Recommendations  ADL Assistive Devices: Toileting - Drop Arm Commode, Heavy Duty Drop Arm Commode;Reacher;Long-handled Sponge;Long-handled Shoe Horn;Sock-Aid Hard    Patient Diagnosis(es): The primary encounter diagnosis was Type III open trimalleolar fracture of right ankle, initial encounter. A diagnosis of Fall, initial encounter was also pertinent to this visit. Assessment    Activity Tolerance: Patient limited by fatigue;Patient limited by endurance  Discharge Recommendations: Patient would benefit from continued therapy after discharge;24 hour supervision or assist        Plan   Occupational Therapy Plan  Times Per Week: 3-5x/wk  Current Treatment Recommendations: Balance training;Functional mobility training; Endurance training; Safety education & training;Equipment evaluation, education, & procurement;Self-Care / ADL; Home management training;Patient/Caregiver education & training       Restrictions  Restrictions/Precautions  Restrictions/Precautions: Weight Bearing  Required Braces or Orthoses?: No  Lower Extremity Weight Bearing Restrictions  Right Lower Extremity Weight Bearing: Non Weight Bearing  Position Activity Restriction  Other position/activity restrictions: Per chart: Right open trimalleolar fracture ankle fracture dislocation s/p I&D and ORIF, DOS 2022      Subjective   Subjective  Subjective: RN approved Pt to be seen for OT treatment session. Orientation  Overall Orientation Status: Within Functional Limits  Orientation Level: Oriented X4  Cognition  Overall Cognitive Status: WFL      Objective    Bed Mobility Training  Overall Level of Assistance: Minimum assistance; Additional time;Adaptive equipment  Interventions: Visual cues; Verbal cues (Mod Cues for safe // accurate technique & integration of Ax pacing)  Supine to Sit: Minimum assistance; Additional time; Adaptive equipment  Sit to Supine: Additional time; Adaptive equipment;Minimum assistance  Scooting: Minimum assistance; Additional time; Adaptive equipment  Balance  Sitting: Without support (Static and dynamic sitting balance (Fair- to Fair, SBA))  Standing: With support (Pt maintained static standing (partial upright standing) x3 mins trial)    ADL  LE Dressing: Maximum assistance; Increased time to complete;Verbal cueing  LE Dressing Skilled Clinical Factors: Sitting EOB, LBD to doff and don Left sock. Safety Devices  Type of Devices: Nurse notified;Call light within reach;Gait belt;Left in bed  Restraints  Restraints Initially in Place: No     Patient Education  Education Given To: Patient;Staff  Education Provided: Role of Therapy;Plan of Care;Precautions; Equipment;ADL Adaptive Strategies;Transfer Training; Fall Prevention Strategies; Energy Conservation  Education Provided Comments: Pt educated on OT role, OT POC, activity promotion, transfer training, safety awareness, WB precaution, adaptive equiptment-good return  Education Method: Demonstration;Verbal  Education Outcome: Verbalized understanding;Continued education needed      Goals  Short Term Goals  Time Frame for Short Term Goals: By discharge, pt will:  Short Term Goal 1: Demo functional sit<>stand transfers and functional mobility with Min A and LRD PRN  Short Term Goal 2: Demo 3 minutes of standing tolerance with Min A to promote increased engagement in ADLs  Short Term Goal 3: Demo LB ADLs/toileting with Min A, setup and use of DME PRN  Short Term Goal 4: Demo UB ADLS with Mod IND  Short Term Goal 5: Demo good adherence to NWB precuations with 100% accuracy throughout all functional activities each visit       Therapy Time   Individual Concurrent Group Co-treatment Time In 1435      1435   Time Out 1500     1500   Minutes 25         Timed Code Treatment Minutes: 25 Minutes (ADL)       JOVANI Everett, OTR/L

## 2022-12-09 NOTE — PROGRESS NOTES
Nephrology Progress Note      SUBJECTIVE     Pt was seen and examined. Afebrile, on room air  Postoperative pain is improved and blood pressure is stable. BP: (132)/(48)    Denies any chest pain, shortness of breath, abdominal pain, nausea, vomiting    Latest lab shows sodium 133 with potassium 4.9 chloride 94 and CO2 24 with BUN 82 and creatinine 4.34 and calcium 8.8. Urine output is reasonable. Renal function is a bit worse than yesterday but overall stable last 24 hours. S/p ORIF, right trimalleolar ankle fracture with posterior malleolar fixation  Open treatment without fixation right talus   Trauma/Ortho following    Brief note:  67-year-old female presented to the hospital for evaluation of open fracture right ankle after mechanical fall. S/p ORIF, right trimalleolar ankle fracture with posterior malleolar fixation  Open treatment without fixation right talus    Nephrology consulted for JOSE ARMANDO on CKD stage III, hyperkalemia. PMH of CKD stage III secondary to diabetic nephrosclerosis with baseline creatinine 2.7-3.3 lately, paroxysmal atrial fibrillation, hypertension, hyperlipidemia, nonobstructive CAD, T2DM, DIONY.   Patient takes Lasix 40 once daily for her lower extremity edema    OBJECTIVE      CURRENT TEMPERATURE:  Temp: 97.7 °F (36.5 °C)  MAXIMUM TEMPERATURE OVER 24HRS:  Temp (24hrs), Av.9 °F (36.6 °C), Min:97.7 °F (36.5 °C), Max:98.1 °F (36.7 °C)    CURRENT RESPIRATORY RATE:  Resp: 16  CURRENT PULSE:  Heart Rate: 68  CURRENT BLOOD PRESSURE:  BP: (!) 132/48  24HR BLOOD PRESSURE RANGE:  Systolic (87AKQ), JZY:755 , Min:108 , RVY:966   ; Diastolic (92CZO), HUS:20, Min:47, Max:56    24HR INTAKE/OUTPUT:    Intake/Output Summary (Last 24 hours) at 2022 1231  Last data filed at 2022 0719  Gross per 24 hour   Intake 2013.15 ml   Output 900 ml   Net 1113.15 ml     WEIGHT :Patient Vitals for the past 96 hrs (Last 3 readings):   Weight   22 1434 262 lb (118.8 kg)   22 1815 262 lb (118.8 kg)     PHYSICAL EXAM      GENERAL APPEARANCE:Awake, alert, in no acute distress  SKIN: warm and dry, no rash or erythema  EYES: conjunctivae normal and sclera anicteric. ENT: no thrush , moist mucus membranes  NECK:   No JVD. Trachea is midline and no accessory muscle use  PULMONARY: lungs are clear to auscultation. No Wheezing, no ronchi . CARDIOVASCULAR: regular rate and rhythm with positive S1 and S2 and no rubs   ABDOMEN: soft nontender, bowel sounds present,  no ascites. EXTREMITIES: s/p ORIF right ankle fracture, dressing applied .   No pedal edema on the left    CURRENT MEDICATIONS      sodium zirconium cyclosilicate (LOKELMA) oral suspension 5 g, TID  insulin lispro (HUMALOG) injection vial 0-4 Units, Nightly  glucose chewable tablet 16 g, PRN  dextrose bolus 10% 125 mL, PRN   Or  dextrose bolus 10% 250 mL, PRN  glucagon (rDNA) injection 1 mg, PRN  dextrose 10 % infusion, Continuous PRN  insulin lispro (HUMALOG) injection vial 0-16 Units, TID WC  heparin (porcine) injection 5,000 Units, 3 times per day  sodium bicarbonate 150 mEq in sterile water 1,150 mL infusion, Continuous  sodium chloride flush 0.9 % injection 5-40 mL, 2 times per day  sodium chloride flush 0.9 % injection 5-40 mL, PRN  0.9 % sodium chloride infusion, PRN  ondansetron (ZOFRAN-ODT) disintegrating tablet 4 mg, Q8H PRN   Or  ondansetron (ZOFRAN) injection 4 mg, Q6H PRN  polyethylene glycol (GLYCOLAX) packet 17 g, Daily  senna (SENOKOT) tablet 8.6 mg, Daily PRN  amiodarone (CORDARONE) tablet 200 mg, Daily  atorvastatin (LIPITOR) tablet 40 mg, Daily  gabapentin (NEURONTIN) tablet 600 mg, Daily  hydrALAZINE (APRESOLINE) tablet 100 mg, TID  HYDROcodone-acetaminophen (NORCO) 5-325 MG per tablet 1 tablet, Q6H PRN  pantoprazole (PROTONIX) tablet 40 mg, QAM AC  rOPINIRole (REQUIP) tablet 0.25 mg, Nightly  methocarbamol (ROBAXIN) tablet 750 mg, Q8H  metoprolol tartrate (LOPRESSOR) tablet 25 mg, BID        LABS      CBC:   Recent Labs     12/06/22 2115 12/07/22  0557 12/08/22  0926   WBC 15.4* 15.7* 14.4*   RBC 3.11* 2.86* 2.40*   HGB 9.4* 8.6* 7.2*   HCT 31.9* 29.2* 24.7*   .6 102.1 102.9   MCH 30.2 30.1 30.0   MCHC 29.5 29.5 29.1   RDW 16.4* 16.2* 16.4*    302 260   MPV 10.7 10.0 10.2      BMP:   Recent Labs     12/08/22 2107 12/09/22  0253 12/09/22 0741   * 130* 132*   K 4.7 4.8 4.7   CL 94* 92* 93*   CO2 22 24 22   BUN 84* 84* 84*   CREATININE 4.39* 4.33* 4.33*   GLUCOSE 190* 189* 163*   CALCIUM 8.7 8.5* 8.6        PHOSPHORUS:    Recent Labs     12/07/22  0557   PHOS 5.1*       ALBUMIN:   Recent Labs     12/07/22  0557   LABALBU 3.3*     IRON:    Lab Results   Component Value Date/Time    IRON 79 06/09/2022 11:35 AM     IRON SATURATION:    Lab Results   Component Value Date/Time    LABIRON 29 06/09/2022 11:35 AM     TIBC:    Lab Results   Component Value Date/Time    TIBC 275 06/09/2022 11:35 AM     FERRITIN:    Lab Results   Component Value Date/Time    FERRITIN 537 06/09/2022 11:35 AM     SPEP:   Lab Results   Component Value Date/Time    PROT 5.7 12/07/2022 05:57 AM    PATH ELECTRONICALLY SIGNED.  Jm Garcia M.D. 04/02/2022 04:51 PM     UPEP:   Lab Results   Component Value Date/Time    TPU 24 04/02/2022 04:51 PM    TPU 24 04/02/2022 04:51 PM      HEPCAB:  Lab Results   Component Value Date/Time    HEPCAB NONREACTIVE 01/11/2021 11:10 AM     C3:   Lab Results   Component Value Date    C3 166 04/01/2022     C4:   Lab Results   Component Value Date    C4 46 (H) 04/01/2022     URINE CREATININE:    Lab Results   Component Value Date/Time    LABCREA 55.1 12/06/2022 12:15 PM     URINE PROTEIN:    Lab Results   Component Value Date/Time    TPU 24 04/02/2022 04:51 PM    TPU 24 04/02/2022 04:51 PM     URINALYSIS:  U/A:   Lab Results   Component Value Date/Time    NITRU NEGATIVE 04/01/2022 05:03 AM    COLORU Yellow 04/01/2022 05:03 AM    PHUR 6.5 04/01/2022 05:03 AM    WBCUA 0 TO 2 04/01/2022 05:03 AM    RBCUA 0 TO 2 04/01/2022 05:03 AM    CLARITYU clear 04/18/2017 10:21 AM    SPECGRAV 1.010 04/01/2022 05:03 AM    LEUKOCYTESUR NEGATIVE 04/01/2022 05:03 AM    UROBILINOGEN Normal 04/01/2022 05:03 AM    BILIRUBINUR NEGATIVE 04/01/2022 05:03 AM    BILIRUBINUR neg 04/18/2017 10:21 AM    BLOODU neg 04/18/2017 10:21 AM    GLUCOSEU NEGATIVE 04/01/2022 05:03 AM    KETUA NEGATIVE 04/01/2022 05:03 AM     ASSESSMENT     JOSE ARMANDO on CKD:Stage III disease secondary to diabetic nephrosclerosis. Creatinine now is running around 2.8-3.3 range. It is plateauing and most recent creatinine is 4.3 mg/dL. Hypertension, essential in nature and well-controlled  Secondary Hyperparathyroidism   Diabetes Mellitus :  Paroxysmal atrial fibrillation on Eliquis at home  Open fracture , right ankle status post internal fixation  Hyperkalemia: Started on Lokelma, potassium is down to 4.9  Mild hyponatremia, improving    PLAN      1. No indications for dialysis at this time, although the patient is still at risk  2. CBC in a.m. 3.  BMP in a.m.  4 continue Lokelma    This note is created with the assistance of a speech-recognition program. While intending to generate a document that actually reflects the content of the visit, no guarantees can be provided that every mistake has been identified and corrected by editing       Dominik Gonzalez M.D.   [facility]  12/9/2022

## 2022-12-10 LAB
ANION GAP SERPL CALCULATED.3IONS-SCNC: 17 MMOL/L (ref 9–17)
BUN BLDV-MCNC: 86 MG/DL (ref 8–23)
CALCIUM SERPL-MCNC: 8.7 MG/DL (ref 8.6–10.4)
CHLORIDE BLD-SCNC: 94 MMOL/L (ref 98–107)
CO2: 23 MMOL/L (ref 20–31)
CREAT SERPL-MCNC: 4.07 MG/DL (ref 0.5–0.9)
GFR SERPL CREATININE-BSD FRML MDRD: 11 ML/MIN/1.73M2
GLUCOSE BLD-MCNC: 165 MG/DL (ref 65–105)
GLUCOSE BLD-MCNC: 180 MG/DL (ref 65–105)
GLUCOSE BLD-MCNC: 181 MG/DL (ref 70–99)
GLUCOSE BLD-MCNC: 185 MG/DL (ref 65–105)
HCT VFR BLD CALC: 21.2 % (ref 36.3–47.1)
HCT VFR BLD CALC: 21.7 % (ref 36.3–47.1)
HEMOGLOBIN: 6.4 G/DL (ref 11.9–15.1)
HEMOGLOBIN: 6.6 G/DL (ref 11.9–15.1)
MCH RBC QN AUTO: 29.5 PG (ref 25.2–33.5)
MCHC RBC AUTO-ENTMCNC: 29.5 G/DL (ref 28.4–34.8)
MCV RBC AUTO: 100 FL (ref 82.6–102.9)
NRBC AUTOMATED: 0 PER 100 WBC
PDW BLD-RTO: 16 % (ref 11.8–14.4)
PLATELET # BLD: 270 K/UL (ref 138–453)
PMV BLD AUTO: 10.6 FL (ref 8.1–13.5)
POTASSIUM SERPL-SCNC: 4.5 MMOL/L (ref 3.7–5.3)
RBC # BLD: 2.17 M/UL (ref 3.95–5.11)
SODIUM BLD-SCNC: 134 MMOL/L (ref 135–144)
WBC # BLD: 11.3 K/UL (ref 3.5–11.3)

## 2022-12-10 PROCEDURE — 85027 COMPLETE CBC AUTOMATED: CPT

## 2022-12-10 PROCEDURE — 82947 ASSAY GLUCOSE BLOOD QUANT: CPT

## 2022-12-10 PROCEDURE — 6370000000 HC RX 637 (ALT 250 FOR IP): Performed by: STUDENT IN AN ORGANIZED HEALTH CARE EDUCATION/TRAINING PROGRAM

## 2022-12-10 PROCEDURE — 2500000003 HC RX 250 WO HCPCS: Performed by: INTERNAL MEDICINE

## 2022-12-10 PROCEDURE — 36415 COLL VENOUS BLD VENIPUNCTURE: CPT

## 2022-12-10 PROCEDURE — 6370000000 HC RX 637 (ALT 250 FOR IP): Performed by: INTERNAL MEDICINE

## 2022-12-10 PROCEDURE — 2580000003 HC RX 258: Performed by: STUDENT IN AN ORGANIZED HEALTH CARE EDUCATION/TRAINING PROGRAM

## 2022-12-10 PROCEDURE — 1200000000 HC SEMI PRIVATE

## 2022-12-10 PROCEDURE — 6360000002 HC RX W HCPCS

## 2022-12-10 PROCEDURE — 99232 SBSQ HOSP IP/OBS MODERATE 35: CPT | Performed by: INTERNAL MEDICINE

## 2022-12-10 PROCEDURE — 6370000000 HC RX 637 (ALT 250 FOR IP): Performed by: FAMILY MEDICINE

## 2022-12-10 PROCEDURE — 80048 BASIC METABOLIC PNL TOTAL CA: CPT

## 2022-12-10 PROCEDURE — 6370000000 HC RX 637 (ALT 250 FOR IP): Performed by: NURSE PRACTITIONER

## 2022-12-10 PROCEDURE — 85014 HEMATOCRIT: CPT

## 2022-12-10 PROCEDURE — 85018 HEMOGLOBIN: CPT

## 2022-12-10 PROCEDURE — 2580000003 HC RX 258: Performed by: INTERNAL MEDICINE

## 2022-12-10 PROCEDURE — 99232 SBSQ HOSP IP/OBS MODERATE 35: CPT | Performed by: FAMILY MEDICINE

## 2022-12-10 RX ORDER — LANOLIN ALCOHOL/MO/W.PET/CERES
325 CREAM (GRAM) TOPICAL 2 TIMES DAILY WITH MEALS
Status: DISCONTINUED | OUTPATIENT
Start: 2022-12-10 | End: 2022-12-12 | Stop reason: HOSPADM

## 2022-12-10 RX ADMIN — AMIODARONE HYDROCHLORIDE 200 MG: 200 TABLET ORAL at 07:56

## 2022-12-10 RX ADMIN — ZOLPIDEM TARTRATE 5 MG: 5 TABLET ORAL at 21:33

## 2022-12-10 RX ADMIN — HYDROCODONE BITARTRATE AND ACETAMINOPHEN 1 TABLET: 5; 325 TABLET ORAL at 01:56

## 2022-12-10 RX ADMIN — PANTOPRAZOLE SODIUM 40 MG: 40 TABLET, DELAYED RELEASE ORAL at 07:56

## 2022-12-10 RX ADMIN — HYDROCODONE BITARTRATE AND ACETAMINOPHEN 1 TABLET: 5; 325 TABLET ORAL at 20:24

## 2022-12-10 RX ADMIN — METHOCARBAMOL TABLETS 750 MG: 750 TABLET, COATED ORAL at 14:35

## 2022-12-10 RX ADMIN — HYDRALAZINE HYDROCHLORIDE 100 MG: 50 TABLET, FILM COATED ORAL at 20:32

## 2022-12-10 RX ADMIN — SODIUM CHLORIDE, PRESERVATIVE FREE 10 ML: 5 INJECTION INTRAVENOUS at 07:57

## 2022-12-10 RX ADMIN — METHOCARBAMOL TABLETS 750 MG: 750 TABLET, COATED ORAL at 20:32

## 2022-12-10 RX ADMIN — HYDRALAZINE HYDROCHLORIDE 100 MG: 50 TABLET, FILM COATED ORAL at 07:56

## 2022-12-10 RX ADMIN — SODIUM BICARBONATE: 84 INJECTION, SOLUTION INTRAVENOUS at 14:35

## 2022-12-10 RX ADMIN — FERROUS SULFATE TAB EC 325 MG (65 MG FE EQUIVALENT) 325 MG: 325 (65 FE) TABLET DELAYED RESPONSE at 10:51

## 2022-12-10 RX ADMIN — METOPROLOL TARTRATE 25 MG: 25 TABLET ORAL at 07:57

## 2022-12-10 RX ADMIN — HYDROCODONE BITARTRATE AND ACETAMINOPHEN 1 TABLET: 5; 325 TABLET ORAL at 06:20

## 2022-12-10 RX ADMIN — DESMOPRESSIN ACETATE 40 MG: 0.2 TABLET ORAL at 07:58

## 2022-12-10 RX ADMIN — HEPARIN SODIUM 5000 UNITS: 5000 INJECTION INTRAVENOUS; SUBCUTANEOUS at 21:33

## 2022-12-10 RX ADMIN — HYDRALAZINE HYDROCHLORIDE 100 MG: 50 TABLET, FILM COATED ORAL at 14:35

## 2022-12-10 RX ADMIN — HEPARIN SODIUM 5000 UNITS: 5000 INJECTION INTRAVENOUS; SUBCUTANEOUS at 12:38

## 2022-12-10 RX ADMIN — METHOCARBAMOL TABLETS 750 MG: 750 TABLET, COATED ORAL at 07:03

## 2022-12-10 RX ADMIN — HEPARIN SODIUM 5000 UNITS: 5000 INJECTION INTRAVENOUS; SUBCUTANEOUS at 06:19

## 2022-12-10 RX ADMIN — GABAPENTIN 600 MG: 600 TABLET ORAL at 09:31

## 2022-12-10 RX ADMIN — ROPINIROLE HYDROCHLORIDE 0.25 MG: 0.25 TABLET, FILM COATED ORAL at 20:32

## 2022-12-10 RX ADMIN — METOPROLOL TARTRATE 25 MG: 25 TABLET ORAL at 20:32

## 2022-12-10 ASSESSMENT — PAIN DESCRIPTION - ORIENTATION
ORIENTATION: LEFT;RIGHT
ORIENTATION: RIGHT

## 2022-12-10 ASSESSMENT — ENCOUNTER SYMPTOMS
VOMITING: 0
COUGH: 0
ABDOMINAL PAIN: 0
CHEST TIGHTNESS: 0
CONSTIPATION: 0
SHORTNESS OF BREATH: 0
DIARRHEA: 0
NAUSEA: 0
RHINORRHEA: 0
BLOOD IN STOOL: 0
WHEEZING: 0

## 2022-12-10 ASSESSMENT — PAIN DESCRIPTION - LOCATION
LOCATION: ANKLE;FOOT
LOCATION: ANKLE;FOOT
LOCATION: LEG
LOCATION: HIP;LEG

## 2022-12-10 ASSESSMENT — PAIN DESCRIPTION - DESCRIPTORS
DESCRIPTORS: ACHING

## 2022-12-10 ASSESSMENT — PAIN DESCRIPTION - PAIN TYPE
TYPE: ACUTE PAIN
TYPE: ACUTE PAIN
TYPE: ACUTE PAIN;SURGICAL PAIN

## 2022-12-10 ASSESSMENT — PAIN - FUNCTIONAL ASSESSMENT
PAIN_FUNCTIONAL_ASSESSMENT: PREVENTS OR INTERFERES SOME ACTIVE ACTIVITIES AND ADLS

## 2022-12-10 ASSESSMENT — PAIN SCALES - GENERAL
PAINLEVEL_OUTOF10: 7
PAINLEVEL_OUTOF10: 2
PAINLEVEL_OUTOF10: 7
PAINLEVEL_OUTOF10: 5
PAINLEVEL_OUTOF10: 10
PAINLEVEL_OUTOF10: 10

## 2022-12-10 ASSESSMENT — PAIN DESCRIPTION - FREQUENCY: FREQUENCY: CONTINUOUS

## 2022-12-10 ASSESSMENT — PAIN DESCRIPTION - ONSET: ONSET: ON-GOING

## 2022-12-10 NOTE — PROGRESS NOTES
[facility]  Office: [address], DO, Antonella Tarzana, DO, Nereida Hardys, DO, Kinza Kingsley Blood, DO, Cristina Monahan MD, Paramjit Sands MD, Johnnie Claude, MD, Sabrina Cornejo MD,  Thad Nichols MD, Truett Burkitt, MD, Clovis Howard, DO, Steven Talamantes MD,  Kaylie Gastelum MD, Chintan Barbosa MD, Anabell Bryant, DO, Gina Stovall MD, Pooja Moran MD, Lynn Mitchell, DO, Rico Bell MD, Ubaldo Barber MD, Neli Salmeron MD, Luz Parry MD, Anibal Garcia, DO, Cas Stephens MD, Betsey Manning MD, Sedrick Kearney, CNP,  Fallon Campa, CNP, Jailyn Leiva, CNP, Rossi Barrera, CNP,  Paula Locke, [facility], Marylen Net, CNP, Favian Marie, CNP, Charlene Cullen, CNP, Mandy Bah, CNP, Anson Almonte, CNP, Teri Espinal PA-C, Janette Espinoza, CNS, Christoph Abdul, CNP, Samanta Mann, CNP       [facility]      Daily Progress Note     Admit Date: 12/5/2022  Bed/Room No.  6175/7119-72  Admitting Physician : Johnnie Claude, MD  Code Status :Full Code  Hospital Day:  LOS: 5 days   Chief Complaint:     Chief Complaint   Patient presents with    Ankle Injury     Right ankle fracture      Principal Problem:    Open fracture of right tibia and fibula, sequela  Active Problems:    Type III open trimalleolar fracture of right ankle    Hyperkalemia    Hyponatremia    Metabolic acidosis    Fall    Stage 4 chronic kidney disease ([facility] [address]    Acute kidney injury superimposed on chronic kidney disease ([facility] [address]  Resolved Problems:    * No resolved hospital problems. *    Subjective : Interval History/Significant events :  12/10/22    Patient complains of right leg pain. She had severe low hemoglobin 6.4 this morning. Patient has fatigue. She had previously refused transfusion to staff however I explained need for transfusion given her fatigue and low hemoglobin, patient reported that she had anemia for her whole life.   She had followed hematologist for a long time and has required multiple transfusions. Vitals - Stable afebrile  Labs -blood sugar 185, BUN 86, creatinine 4.07.    Nursing notes , Consults notes reviewed. Overnight events and updates discussed with Nursing staff . Background History:         Yimi Preez is 69 y.o. female  Who was admitted to the hospital on 12/5/2022 for treatment of Open fracture of right tibia and fibula, sequela. Patient presented to the emergency room with right ankle injury and suffered a right ankle fracture after falling over a curb when she was trying to go to [employer] for [employer]. She has underlying history of A. fib on Eliquis, CHF, diabetes mellitus with CKD3, hypertension, osteoporosis, fibromyalgia, morbid obesity BMI 43, sleep apnea, depression. Patient underwent I&D and ORIF on 12/6/2022. Patient also developed acute kidney injury with hyperkalemia and metabolic acidosis. Nephrology was consulted and patient given [facility] [place]. PMH:  Past Medical History:   Diagnosis Date    Abnormal stress test     Anemia     Arthritis     Depression     Diverticulosis     DM (diabetes mellitus) ([facility] [address]     Erythropenia     Fibromyalgia     HTN (hypertension)     Hyperlipidemia     Kidney stone     Morbid obesity due to excess calories (HCC)     DIONY on CPAP     Osteopenia 03/03/14    Seasonal allergies     Sleep apnea     uses bipap prn      Allergies:    Allergies   Allergen Reactions    Adhesive Tape     Cephalexin Other (See Comments)     Tongue cracks and peels    Erythromycin Other (See Comments)     Epigastric pain and bloating    Ketorolac Tromethamine Other (See Comments)     Severe stomach cramps    Pcn [Penicillins]      Unknown reaction    Percocet [Oxycodone-Acetaminophen] Itching and Other (See Comments)     Esophagus hurt    Sulfa Antibiotics     Ultracet [Tramadol-Acetaminophen] Itching      Medications :  zolpidem, 5 mg, Oral, Nightly  sodium zirconium cyclosilicate, 5 g, Oral, TID  insulin lispro, 0-4 Units, SubCUTAneous, Nightly  insulin lispro, 0-16 Units, SubCUTAneous, TID WC  heparin (porcine), 5,000 Units, SubCUTAneous, 3 times per day  sodium chloride flush, 5-40 mL, IntraVENous, 2 times per day  polyethylene glycol, 17 g, Oral, Daily  amiodarone, 200 mg, Oral, Daily  atorvastatin, 40 mg, Oral, Daily  gabapentin, 600 mg, Oral, Daily  hydrALAZINE, 100 mg, Oral, TID  pantoprazole, 40 mg, Oral, QAM AC  rOPINIRole, 0.25 mg, Oral, Nightly  methocarbamol, 750 mg, Oral, Q8H  metoprolol tartrate, 25 mg, Oral, BID        Review of Systems   Review of Systems   Constitutional:  Positive for fatigue. Negative for appetite change, fever and unexpected weight change. HENT:  Negative for congestion, rhinorrhea and sneezing. Eyes:  Negative for visual disturbance. Respiratory:  Negative for cough, chest tightness, shortness of breath and wheezing. Cardiovascular:  Negative for chest pain and palpitations. Gastrointestinal:  Negative for abdominal pain, blood in stool, constipation, diarrhea, nausea and vomiting. Genitourinary:  Negative for dysuria, enuresis, frequency and hematuria. Musculoskeletal:  Negative for arthralgias and myalgias. Right leg pain. Skin:  Negative for rash. Neurological:  Negative for dizziness, weakness, light-headedness and headaches. Hematological:  Does not bruise/bleed easily. Psychiatric/Behavioral:  Negative for dysphoric mood and sleep disturbance. Objective :      Current Vitals : Temp: 98.3 °F (36.8 °C),  Heart Rate: 68, Resp: 16, BP: (!) 179/69, SpO2: 96 %  Last 24 Hrs Vitals   Patient Vitals for the past 24 hrs:   BP Temp Temp src Pulse Resp SpO2   12/09/22 2000 (!) 179/69 98.3 °F (36.8 °C) Oral 68 16 96 %   12/09/22 1830 -- -- -- -- 17 --   12/09/22 1446 (!) 153/71 -- -- -- -- --   12/09/22 1232 -- -- -- -- 16 --   12/09/22 0743 (!) 132/48 97.7 °F (36.5 °C) Oral 68 16 99 %     Intake / output   12/08 1901 - 12/10 0700  In: 2013.2 [P.O.:100;  I.V.:1913.2]  Out: 1900 [Urine:1900]  Physical Exam:  Physical Exam  Vitals and nursing note reviewed. Constitutional:       General: She is not in acute distress. Appearance: She is morbidly obese. She is not diaphoretic. Interventions: Nasal cannula in place. HENT:      Head: Normocephalic and atraumatic. Nose:      Right Sinus: No maxillary sinus tenderness or frontal sinus tenderness. Left Sinus: No maxillary sinus tenderness or frontal sinus tenderness. Mouth/Throat:      Pharynx: No oropharyngeal exudate. Eyes:      General: No scleral icterus. Conjunctiva/sclera: Conjunctivae normal.      Pupils: Pupils are equal, round, and reactive to light. Neck:      Thyroid: No thyromegaly. Vascular: No JVD. Cardiovascular:      Rate and Rhythm: Normal rate and regular rhythm. Pulses:           Dorsalis pedis pulses are 2+ on the right side and 2+ on the left side. Heart sounds: Normal heart sounds. No murmur heard. Pulmonary:      Effort: Pulmonary effort is normal.      Breath sounds: Normal breath sounds. No wheezing or rales. Abdominal:      Palpations: Abdomen is soft. There is no mass. Tenderness: There is no abdominal tenderness. Musculoskeletal:      Cervical back: Full passive range of motion without pain and neck supple. Comments: Right leg splint in place. Lymphadenopathy:      Head:      Right side of head: No submandibular adenopathy. Left side of head: No submandibular adenopathy. Cervical: No cervical adenopathy. Skin:     General: Skin is warm. Neurological:      Mental Status: She is alert and oriented to person, place, and time. Motor: No tremor. Psychiatric:         Behavior: Behavior is cooperative.          Laboratory findings:    Recent Labs     12/08/22  0926 12/10/22  0637   WBC 14.4* 11.3   HGB 7.2* 6.4*   HCT 24.7* 21.7*    270     Recent Labs     12/09/22  0741 12/09/22  1419 12/10/22  0637   * 133* 134*   K 4.7 4.9 4.5   CL 93* 94* 94*   CO2 22 24 23   GLUCOSE 163* 147* 181*   BUN 84* 82* 86*   CREATININE 4.33* 4.34* 4.07*   CALCIUM 8.6 8.8 8.7     No results for input(s): PROT, LABALBU, LABA1C, U2HRTHE, Y0RQFPF, FT4, TSH, AST, ALT, LDH, GGT, ALKPHOS, BILITOT, BILIDIR, AMMONIA, AMYLASE, LIPASE, LACTATE, CHOL, HDL, LDLCHOLESTEROL, CHOLHDLRATIO, TRIG, VLDL, BNP, TROPONINI, CKTOTAL, CKMB, CKMBINDEX, RF, FAHEEM in the last 72 hours. Specific Gravity, UA   Date Value Ref Range Status   04/01/2022 1.010 1.005 - 1.030 Final     Protein, UA   Date Value Ref Range Status   04/01/2022 TRACE (A) NEGATIVE Final     RBC, UA   Date Value Ref Range Status   04/01/2022 0 TO 2 0 - 4 /HPF Final     Comment:     Reference range defined for non-centrifuged specimen. Blood, UA POC   Date Value Ref Range Status   04/18/2017 neg  Final     Nitrite, Urine   Date Value Ref Range Status   04/01/2022 NEGATIVE NEGATIVE Final     WBC, UA   Date Value Ref Range Status   04/01/2022 0 TO 2 0 - 5 /HPF Final     Leukocyte Esterase, Urine   Date Value Ref Range Status   04/01/2022 NEGATIVE NEGATIVE Final       Imaging / Clinical Data :-   CT ABDOMEN PELVIS WO CONTRAST Additional Contrast? None    Result Date: 12/6/2022  No evidence of fracture or osseous malalignment in the lumbar spine, pelvic bones and joints including bilateral hip joints. No evidence of fracture in the superior pubic rami and inferior ischiopubic rami of both sides or in bilateral pubic bones. No fracture in bilateral acetabula or ischial bones. Evidence of mild to moderate multilevel degenerative disc disease and facet osteoarthritis in the lumbar spine and lumbosacral junction. Nonspecific small amount of gas scattered in multiple loops of small bowel without significant fluid levels. No distension or dilation of distal small bowel. Normal appendix visualized. Small to moderate amount of stool and gas scattered in the colon.   Evidence of moderate diverticulosis coli of descending colon and sigmoid colon, without evidence of diverticulitis. No evidence of renal or ureteric calculus or obstructive uropathy. No diagnostic finding in the liver, gallbladder, spleen, pancreas and adrenal glands. XR PELVIS (1-2 VIEWS)    Result Date: 12/5/2022  Potential nondisplaced fracture of the right superior and inferior pubic rami. XR KNEE RIGHT (3 VIEWS)    Result Date: 12/5/2022  Total knee arthroplasty without complication. No acute osseous or soft tissue abnormality. XR TIBIA FIBULA RIGHT (2 VIEWS)    Result Date: 12/5/2022  Trimalleolar fracture with diffuse soft tissue swelling. XR ANKLE RIGHT (2 VIEWS)    Result Date: 12/5/2022  Ankle fracture subluxation with persistent mild lateral subluxation of the talus in relation to the tibia. XR ANKLE RIGHT (MIN 3 VIEWS)    Result Date: 12/6/2022  Anatomic alignment of comminuted ankle fracture status post ORIF. XR ANKLE RIGHT (MIN 3 VIEWS)    Result Date: 12/5/2022  Trimalleolar right ankle is fracture subluxation with improved positioning of the talus in relation to the tibia and now with mild posterior subluxation of the talus in relation to the tibia. XR ANKLE RIGHT (MIN 3 VIEWS)    Result Date: 12/5/2022  Trimalleolar fracture with associated soft tissue swelling. CT PELVIS WO CONTRAST Additional Contrast? None    Result Date: 12/6/2022  No evidence of fracture in pelvic bones or bilateral hip joints. No evidence of fracture in superior pubic rami or inferior ischiopubic rami of both sides. No fracture in bilateral acetabulum or in visualized bilateral proximal femurs. Within the pelvic cavity, there is no evidence of hemorrhage or abnormal fluid collection or acute process. Mild to moderate sigmoid diverticulosis, without evidence of diverticulitis.      CT ANKLE RIGHT WO CONTRAST    Result Date: 12/6/2022  Prominent oblique fracture in coronal orientation with large gap at the fracture involving the posterior portion of distal end of right tibia with distal intra-articular extension of the fracture. Comminuted transverse fracture through the base of the medial malleolus of the tibia. Grossly comminuted oblique fracture in the distal shaft of right fibula. Moderate posterior subluxation of the talus bone due to distal posterior tibial fracture. Some small intra-articular fracture fragments in the anterior portion of the tibiotalar and talofibular joint. The distal tibiofibular syndesmosis is grossly intact. No evidence of fracture in the right calcaneus bone, talus bone and distal tarsal bones. Subtalar joint is intact. Evidence of soft tissue emphysema in the lateral portion of subtalar joint. The calcaneal tendon appears to be grossly intact. The long tendons at the medial, lateral and anterior aspect of right ankle joint are grossly intact. XR CHEST PORTABLE    Result Date: 12/5/2022  Cardiomegaly without acute pulmonary process. Clinical Course : stable  Assessment and Plan  :        Right ankle fracture secondary to fall with underlying osteoporosis  -orthopedic surgery following. Plan for ORIF. Acute superimposed on chronic kidney disease stage IV  -Baseline creatinine seems to be ~ 3 to 3.2. Nephrology following. No indication for dialysis. Essential hypertension -well-controlled. Paroxysmal atrial fibrillation -hold Eliquis,-continue amiodarone, Lopressor. 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 Plan and updates discussed with patient ,  answers  explained to satisfaction.    Plan discussed with Staff Subha Rodriguez RN     (Please note that portions of this note were completed with a voice recognition program. Efforts were made to edit the dictations but occasionally words are mis-transcribed.)      Rah Rebolledo MD  12/10/2022

## 2022-12-10 NOTE — CONSENT
Informed Consent for Blood Component Transfusion Note    I have discussed with the patient the rationale for blood component transfusion; its benefits in treating or preventing fatigue, organ damage, or death; and its risk which includes mild transfusion reactions, rare risk of blood borne infection, or more serious but rare reactions. I have discussed the alternatives to transfusion, including the risk and consequences of not receiving transfusion. The patient had an opportunity to ask questions and had agreed to proceed with transfusion of blood components.     Electronically signed by Frank Yi MD on 12/10/22 at 10:47 AM EST

## 2022-12-10 NOTE — CARE COORDINATION
Transitional planning:  Met with pt at bedside after review of medical chart. Given ARU choice list.     1700 Met with pt again. She chose [facility] as her first choice followed by [facility]. Referrals placed. Transportation packet started with demographic, medical necessity, transportation forms and [facility] [employer] [facility]. Placed at [facility] desk. 1257 Received call from [facility] at [facility]. States they can accept this pt. Call back is [phone].

## 2022-12-10 NOTE — PROGRESS NOTES
Nephrology Progress Note      SUBJECTIVE    Patient seen and examined in bed. She reports her leg pain been well controlled. She denies increased swelling and shortness of breath. Her hemoglobin is dropped to 6.6, planning to be transfused today  Her creatinine today is 4.07, was 4.34 yesterday. Potassium is 4.5, sodium bicarb in sterile water infusing at 50 MLS per hour  Blood pressure 141/60 today  1 L urine output over the past 24 hours, external catheter in place, net +2.6 L since admission    Brief note:  61-year-old female presented to the hospital for evaluation of open fracture right ankle after mechanical fall. S/p ORIF, right trimalleolar ankle fracture with posterior malleolar fixation  Open treatment without fixation right talus  Nephrology consulted for JOSE ARMANDO on CKD stage III, hyperkalemia. PMH of CKD stage III secondary to diabetic nephrosclerosis with baseline creatinine 2.7-3.3 lately, paroxysmal atrial fibrillation, hypertension, hyperlipidemia, nonobstructive CAD, T2DM, DIONY.   Patient takes Lasix 40 once daily for her lower extremity edema    OBJECTIVE      CURRENT TEMPERATURE:  Temp: 98.4 °F (36.9 °C)  MAXIMUM TEMPERATURE OVER 24HRS:  Temp (24hrs), Av.4 °F (36.9 °C), Min:98.3 °F (36.8 °C), Max:98.4 °F (36.9 °C)    CURRENT RESPIRATORY RATE:  Resp: 18  CURRENT PULSE:  Heart Rate: 68  CURRENT BLOOD PRESSURE:  BP: (!) 141/60 (rn notified)  24HR BLOOD PRESSURE RANGE:  Systolic (57RIB), XXA:718 , Min:141 , AVRIL:626   ; Diastolic (12EXD), EAO:86, Min:60, Max:71    24HR INTAKE/OUTPUT:    Intake/Output Summary (Last 24 hours) at 12/10/2022 1235  Last data filed at 2022 2302  Gross per 24 hour   Intake --   Output 1000 ml   Net -1000 ml       WEIGHT :Patient Vitals for the past 96 hrs (Last 3 readings):   Weight   22 1434 262 lb (118.8 kg)       PHYSICAL EXAM      GENERAL APPEARANCE:Awake, alert, in no acute distress  SKIN: warm and dry, no rash or erythema  EYES: conjunctivae normal and sclera anicteric. ENT: no thrush , moist mucus membranes  NECK:   No JVD. Trachea is midline and no accessory muscle use  PULMONARY: lungs are clear to auscultation. No Wheezing, no ronchi . CARDIOVASCULAR: regular rate and rhythm with positive S1 and S2 and no rubs   ABDOMEN: soft nontender, bowel sounds present,  no ascites. EXTREMITIES: s/p ORIF right ankle fracture, dressing applied .   No pedal edema on the left    CURRENT MEDICATIONS      ferrous sulfate (FE TABS 325) EC tablet 325 mg, BID WC  zolpidem (AMBIEN) tablet 5 mg, Nightly  HYDROcodone-acetaminophen (NORCO) 5-325 MG per tablet 1 tablet, Q4H PRN  insulin lispro (HUMALOG) injection vial 0-4 Units, Nightly  glucose chewable tablet 16 g, PRN  dextrose bolus 10% 125 mL, PRN   Or  dextrose bolus 10% 250 mL, PRN  glucagon (rDNA) injection 1 mg, PRN  dextrose 10 % infusion, Continuous PRN  insulin lispro (HUMALOG) injection vial 0-16 Units, TID WC  heparin (porcine) injection 5,000 Units, 3 times per day  sodium bicarbonate 150 mEq in sterile water 1,150 mL infusion, Continuous  sodium chloride flush 0.9 % injection 5-40 mL, 2 times per day  sodium chloride flush 0.9 % injection 5-40 mL, PRN  0.9 % sodium chloride infusion, PRN  ondansetron (ZOFRAN-ODT) disintegrating tablet 4 mg, Q8H PRN   Or  ondansetron (ZOFRAN) injection 4 mg, Q6H PRN  polyethylene glycol (GLYCOLAX) packet 17 g, Daily  senna (SENOKOT) tablet 8.6 mg, Daily PRN  amiodarone (CORDARONE) tablet 200 mg, Daily  atorvastatin (LIPITOR) tablet 40 mg, Daily  gabapentin (NEURONTIN) tablet 600 mg, Daily  hydrALAZINE (APRESOLINE) tablet 100 mg, TID  pantoprazole (PROTONIX) tablet 40 mg, QAM AC  rOPINIRole (REQUIP) tablet 0.25 mg, Nightly  methocarbamol (ROBAXIN) tablet 750 mg, Q8H  metoprolol tartrate (LOPRESSOR) tablet 25 mg, BID        LABS      CBC:   Recent Labs     12/08/22  0926 12/10/22  0637 12/10/22  0754   WBC 14.4* 11.3  --    RBC 2.40* 2.17*  --    HGB 7.2* 6.4* 6.6*   HCT 24.7* 21.7* 21.2*   .9 100.0  --    MCH 30.0 29.5  --    MCHC 29.1 29.5  --    RDW 16.4* 16.0*  --     270  --    MPV 10.2 10.6  --         BMP:   Recent Labs     12/09/22  0741 12/09/22  1419 12/10/22  0637   * 133* 134*   K 4.7 4.9 4.5   CL 93* 94* 94*   CO2 22 24 23   BUN 84* 82* 86*   CREATININE 4.33* 4.34* 4.07*   GLUCOSE 163* 147* 181*   CALCIUM 8.6 8.8 8.7          PHOSPHORUS:    No results for input(s): PHOS in the last 72 hours. ALBUMIN:   No results for input(s): LABALBU in the last 72 hours. IRON:    Lab Results   Component Value Date/Time    IRON 79 06/09/2022 11:35 AM     IRON SATURATION:    Lab Results   Component Value Date/Time    LABIRON 29 06/09/2022 11:35 AM     TIBC:    Lab Results   Component Value Date/Time    TIBC 275 06/09/2022 11:35 AM     FERRITIN:    Lab Results   Component Value Date/Time    FERRITIN 537 06/09/2022 11:35 AM     SPEP:   Lab Results   Component Value Date/Time    PROT 5.7 12/07/2022 05:57 AM    PATH ELECTRONICALLY SIGNED.  Bushra Foley M.D. 04/02/2022 04:51 PM     UPEP:   Lab Results   Component Value Date/Time    TPU 24 04/02/2022 04:51 PM    TPU 24 04/02/2022 04:51 PM      HEPCAB:  Lab Results   Component Value Date/Time    HEPCAB NONREACTIVE 01/11/2021 11:10 AM     C3:   Lab Results   Component Value Date    C3 166 04/01/2022     C4:   Lab Results   Component Value Date    C4 46 (H) 04/01/2022     URINE CREATININE:    Lab Results   Component Value Date/Time    LABCREA 55.1 12/06/2022 12:15 PM     URINE PROTEIN:    Lab Results   Component Value Date/Time    TPU 24 04/02/2022 04:51 PM    TPU 24 04/02/2022 04:51 PM     URINALYSIS:  U/A:   Lab Results   Component Value Date/Time    NITRU NEGATIVE 04/01/2022 05:03 AM    COLORU Yellow 04/01/2022 05:03 AM    PHUR 6.5 04/01/2022 05:03 AM    WBCUA 0 TO 2 04/01/2022 05:03 AM    RBCUA 0 TO 2 04/01/2022 05:03 AM    CLARITYU clear 04/18/2017 10:21 AM    SPECGRAV 1.010 04/01/2022 05:03 AM    LEUKOCYTESUR NEGATIVE 04/01/2022 05:03 AM    UROBILINOGEN Normal 04/01/2022 05:03 AM    BILIRUBINUR NEGATIVE 04/01/2022 05:03 AM    BILIRUBINUR neg 04/18/2017 10:21 AM    BLOODU neg 04/18/2017 10:21 AM    GLUCOSEU NEGATIVE 04/01/2022 05:03 AM    KETUA NEGATIVE 04/01/2022 05:03 AM     ASSESSMENT     JOSE ARMANDO on CKD:Stage III disease secondary to diabetic nephrosclerosis. Creatinine now is running around 2.8-3.3 range. It is plateauing and most recent creatinine is 4.07mg/dL. Hypertension, essential in nature and well-controlled  Secondary Hyperparathyroidism   Diabetes Mellitus :  Paroxysmal atrial fibrillation on Eliquis at home  Open fracture , right ankle status post internal fixation  Hyperkalemia: Started on Lokelma, potassium is down to 4.9  Mild hyponatremia, improving    PLAN      1. No indications for dialysis at this time, although the patient is still at risk  2. CBC in a.m. 3.  BMP in a.m.  4 Strict I/O Record  5. Will follow      Electronically signed by BARBIE Huang CNP on 12/10/2022 at 12:37 PM     Attending Physician Statement  I have discussed the care of Christie Paniagua, including pertinent history and exam findings with the CNP. I have reviewed the key elements of all parts of the encounter with the CNP. I have seen and examined the patient. I agree with the assessment and plan and status of the problem list as documented. Addiitionally I recommend continue IV fluids with sodium bicarbonate as ordered. Stop Jeannette Hyde with improvement in potassium.     An Hartman MD   Nephrology Attending Physician  [facility]  12/10/2022

## 2022-12-10 NOTE — PLAN OF CARE
Problem: Discharge Planning  Goal: Discharge to home or other facility with appropriate resources  12/10/2022 0141 by Soumya Riddle RN  Outcome: Progressing  12/9/2022 1623 by Hazel Chowdhury RN  Outcome: Progressing     Problem: Pain  Goal: Verbalizes/displays adequate comfort level or baseline comfort level  12/10/2022 0141 by Soumya Riddle RN  Outcome: Progressing  12/9/2022 1623 by Hazel Chowdhury RN  Outcome: Progressing     Problem: Skin/Tissue Integrity  Goal: Absence of new skin breakdown  Description: 1. Monitor for areas of redness and/or skin breakdown  2. Assess vascular access sites hourly  3. Every 4-6 hours minimum:  Change oxygen saturation probe site  4. Every 4-6 hours:  If on nasal continuous positive airway pressure, respiratory therapy assess nares and determine need for appliance change or resting period.   12/10/2022 0141 by Soumya Riddle RN  Outcome: Progressing  12/9/2022 1623 by Hazel Chowdhury RN  Outcome: Progressing     Problem: ABCDS Injury Assessment  Goal: Absence of physical injury  Outcome: Progressing     Problem: Chronic Conditions and Co-morbidities  Goal: Patient's chronic conditions and co-morbidity symptoms are monitored and maintained or improved  Outcome: Progressing     Problem: Safety - Adult  Goal: Free from fall injury  Outcome: Progressing

## 2022-12-11 LAB
ANION GAP SERPL CALCULATED.3IONS-SCNC: 14 MMOL/L (ref 9–17)
BUN BLDV-MCNC: 80 MG/DL (ref 8–23)
CALCIUM SERPL-MCNC: 8.7 MG/DL (ref 8.6–10.4)
CHLORIDE BLD-SCNC: 93 MMOL/L (ref 98–107)
CO2: 27 MMOL/L (ref 20–31)
CREAT SERPL-MCNC: 4.13 MG/DL (ref 0.5–0.9)
GFR SERPL CREATININE-BSD FRML MDRD: 11 ML/MIN/1.73M2
GLUCOSE BLD-MCNC: 140 MG/DL (ref 65–105)
GLUCOSE BLD-MCNC: 140 MG/DL (ref 70–99)
GLUCOSE BLD-MCNC: 187 MG/DL (ref 65–105)
GLUCOSE BLD-MCNC: 213 MG/DL (ref 65–105)
HCT VFR BLD CALC: 23.8 % (ref 36.3–47.1)
HEMOGLOBIN: 7.8 G/DL (ref 11.9–15.1)
POTASSIUM SERPL-SCNC: 4.5 MMOL/L (ref 3.7–5.3)
SODIUM BLD-SCNC: 134 MMOL/L (ref 135–144)

## 2022-12-11 PROCEDURE — 6370000000 HC RX 637 (ALT 250 FOR IP): Performed by: STUDENT IN AN ORGANIZED HEALTH CARE EDUCATION/TRAINING PROGRAM

## 2022-12-11 PROCEDURE — 80048 BASIC METABOLIC PNL TOTAL CA: CPT

## 2022-12-11 PROCEDURE — 6370000000 HC RX 637 (ALT 250 FOR IP): Performed by: NURSE PRACTITIONER

## 2022-12-11 PROCEDURE — 86901 BLOOD TYPING SEROLOGIC RH(D): CPT

## 2022-12-11 PROCEDURE — 99232 SBSQ HOSP IP/OBS MODERATE 35: CPT | Performed by: INTERNAL MEDICINE

## 2022-12-11 PROCEDURE — 85014 HEMATOCRIT: CPT

## 2022-12-11 PROCEDURE — 6370000000 HC RX 637 (ALT 250 FOR IP): Performed by: INTERNAL MEDICINE

## 2022-12-11 PROCEDURE — 86850 RBC ANTIBODY SCREEN: CPT

## 2022-12-11 PROCEDURE — 99232 SBSQ HOSP IP/OBS MODERATE 35: CPT | Performed by: FAMILY MEDICINE

## 2022-12-11 PROCEDURE — 85018 HEMOGLOBIN: CPT

## 2022-12-11 PROCEDURE — 6360000002 HC RX W HCPCS

## 2022-12-11 PROCEDURE — 86900 BLOOD TYPING SEROLOGIC ABO: CPT

## 2022-12-11 PROCEDURE — P9016 RBC LEUKOCYTES REDUCED: HCPCS

## 2022-12-11 PROCEDURE — 2580000003 HC RX 258: Performed by: STUDENT IN AN ORGANIZED HEALTH CARE EDUCATION/TRAINING PROGRAM

## 2022-12-11 PROCEDURE — 82947 ASSAY GLUCOSE BLOOD QUANT: CPT

## 2022-12-11 PROCEDURE — 1200000000 HC SEMI PRIVATE

## 2022-12-11 PROCEDURE — 6370000000 HC RX 637 (ALT 250 FOR IP): Performed by: FAMILY MEDICINE

## 2022-12-11 PROCEDURE — 36430 TRANSFUSION BLD/BLD COMPNT: CPT

## 2022-12-11 PROCEDURE — 36415 COLL VENOUS BLD VENIPUNCTURE: CPT

## 2022-12-11 PROCEDURE — 86920 COMPATIBILITY TEST SPIN: CPT

## 2022-12-11 RX ORDER — CALCITRIOL 0.25 UG/1
0.25 CAPSULE, LIQUID FILLED ORAL DAILY
Status: DISCONTINUED | OUTPATIENT
Start: 2022-12-11 | End: 2022-12-12 | Stop reason: HOSPADM

## 2022-12-11 RX ORDER — LANOLIN ALCOHOL/MO/W.PET/CERES
400 CREAM (GRAM) TOPICAL 2 TIMES DAILY
Status: DISCONTINUED | OUTPATIENT
Start: 2022-12-11 | End: 2022-12-12 | Stop reason: HOSPADM

## 2022-12-11 RX ORDER — ALLOPURINOL 100 MG/1
100 TABLET ORAL DAILY
Status: DISCONTINUED | OUTPATIENT
Start: 2022-12-11 | End: 2022-12-12 | Stop reason: HOSPADM

## 2022-12-11 RX ORDER — SODIUM CHLORIDE 9 MG/ML
INJECTION, SOLUTION INTRAVENOUS PRN
Status: DISCONTINUED | OUTPATIENT
Start: 2022-12-11 | End: 2022-12-12 | Stop reason: HOSPADM

## 2022-12-11 RX ORDER — COLCHICINE 0.6 MG/1
0.6 TABLET ORAL DAILY
Status: DISCONTINUED | OUTPATIENT
Start: 2022-12-11 | End: 2022-12-12 | Stop reason: HOSPADM

## 2022-12-11 RX ORDER — VITAMIN B COMPLEX
2000 TABLET ORAL DAILY
Status: DISCONTINUED | OUTPATIENT
Start: 2022-12-11 | End: 2022-12-12 | Stop reason: HOSPADM

## 2022-12-11 RX ORDER — ZOLPIDEM TARTRATE 5 MG/1
5 TABLET ORAL NIGHTLY PRN
Status: DISCONTINUED | OUTPATIENT
Start: 2022-12-11 | End: 2022-12-12 | Stop reason: HOSPADM

## 2022-12-11 RX ORDER — TRAZODONE HYDROCHLORIDE 100 MG/1
100 TABLET ORAL NIGHTLY
Status: DISCONTINUED | OUTPATIENT
Start: 2022-12-11 | End: 2022-12-12 | Stop reason: HOSPADM

## 2022-12-11 RX ORDER — LANOLIN ALCOHOL/MO/W.PET/CERES
325 CREAM (GRAM) TOPICAL
Status: DISCONTINUED | OUTPATIENT
Start: 2022-12-12 | End: 2022-12-12 | Stop reason: HOSPADM

## 2022-12-11 RX ADMIN — GABAPENTIN 600 MG: 600 TABLET ORAL at 08:08

## 2022-12-11 RX ADMIN — ZOLPIDEM TARTRATE 5 MG: 5 TABLET ORAL at 20:59

## 2022-12-11 RX ADMIN — CALCITRIOL 0.25 MCG: 0.25 CAPSULE ORAL at 16:41

## 2022-12-11 RX ADMIN — AMIODARONE HYDROCHLORIDE 200 MG: 200 TABLET ORAL at 08:07

## 2022-12-11 RX ADMIN — HYDRALAZINE HYDROCHLORIDE 100 MG: 50 TABLET, FILM COATED ORAL at 08:07

## 2022-12-11 RX ADMIN — METHOCARBAMOL TABLETS 750 MG: 750 TABLET, COATED ORAL at 12:14

## 2022-12-11 RX ADMIN — ROPINIROLE HYDROCHLORIDE 0.25 MG: 0.25 TABLET, FILM COATED ORAL at 20:59

## 2022-12-11 RX ADMIN — TRAZODONE HYDROCHLORIDE 100 MG: 100 TABLET ORAL at 20:59

## 2022-12-11 RX ADMIN — HYDROCODONE BITARTRATE AND ACETAMINOPHEN 1 TABLET: 5; 325 TABLET ORAL at 02:10

## 2022-12-11 RX ADMIN — HYDRALAZINE HYDROCHLORIDE 100 MG: 50 TABLET, FILM COATED ORAL at 13:50

## 2022-12-11 RX ADMIN — PANTOPRAZOLE SODIUM 40 MG: 40 TABLET, DELAYED RELEASE ORAL at 08:07

## 2022-12-11 RX ADMIN — HYDROCODONE BITARTRATE AND ACETAMINOPHEN 1 TABLET: 5; 325 TABLET ORAL at 20:59

## 2022-12-11 RX ADMIN — DESMOPRESSIN ACETATE 40 MG: 0.2 TABLET ORAL at 08:07

## 2022-12-11 RX ADMIN — METHOCARBAMOL TABLETS 750 MG: 750 TABLET, COATED ORAL at 20:59

## 2022-12-11 RX ADMIN — HEPARIN SODIUM 5000 UNITS: 5000 INJECTION INTRAVENOUS; SUBCUTANEOUS at 15:01

## 2022-12-11 RX ADMIN — FERROUS SULFATE TAB EC 325 MG (65 MG FE EQUIVALENT) 325 MG: 325 (65 FE) TABLET DELAYED RESPONSE at 08:07

## 2022-12-11 RX ADMIN — METOPROLOL TARTRATE 25 MG: 25 TABLET ORAL at 08:07

## 2022-12-11 RX ADMIN — ALLOPURINOL 100 MG: 100 TABLET ORAL at 16:41

## 2022-12-11 RX ADMIN — APIXABAN 5 MG: 5 TABLET, FILM COATED ORAL at 20:59

## 2022-12-11 RX ADMIN — COLCHICINE 0.6 MG: 0.6 TABLET, FILM COATED ORAL at 16:41

## 2022-12-11 RX ADMIN — METHOCARBAMOL TABLETS 750 MG: 750 TABLET, COATED ORAL at 04:39

## 2022-12-11 RX ADMIN — METOPROLOL TARTRATE 25 MG: 25 TABLET ORAL at 20:59

## 2022-12-11 RX ADMIN — HEPARIN SODIUM 5000 UNITS: 5000 INJECTION INTRAVENOUS; SUBCUTANEOUS at 04:42

## 2022-12-11 RX ADMIN — HYDROCODONE BITARTRATE AND ACETAMINOPHEN 1 TABLET: 5; 325 TABLET ORAL at 15:11

## 2022-12-11 RX ADMIN — Medication 2000 UNITS: at 16:41

## 2022-12-11 RX ADMIN — HYDRALAZINE HYDROCHLORIDE 100 MG: 50 TABLET, FILM COATED ORAL at 20:59

## 2022-12-11 RX ADMIN — MAGNESIUM GLUCONATE 500 MG ORAL TABLET 400 MG: 500 TABLET ORAL at 20:59

## 2022-12-11 RX ADMIN — SODIUM CHLORIDE, PRESERVATIVE FREE 10 ML: 5 INJECTION INTRAVENOUS at 21:01

## 2022-12-11 ASSESSMENT — ENCOUNTER SYMPTOMS
NAUSEA: 0
DIARRHEA: 0
ABDOMINAL PAIN: 0
BLOOD IN STOOL: 0
VOMITING: 0
COUGH: 0
CHEST TIGHTNESS: 0
SHORTNESS OF BREATH: 0
CONSTIPATION: 0
RHINORRHEA: 0
WHEEZING: 0

## 2022-12-11 ASSESSMENT — PAIN DESCRIPTION - LOCATION
LOCATION: LEG
LOCATION: ANKLE
LOCATION: HIP;LEG;ANKLE
LOCATION: HIP

## 2022-12-11 ASSESSMENT — PAIN DESCRIPTION - PAIN TYPE
TYPE: ACUTE PAIN;SURGICAL PAIN
TYPE: SURGICAL PAIN

## 2022-12-11 ASSESSMENT — PAIN SCALES - GENERAL
PAINLEVEL_OUTOF10: 10
PAINLEVEL_OUTOF10: 10
PAINLEVEL_OUTOF10: 5
PAINLEVEL_OUTOF10: 4
PAINLEVEL_OUTOF10: 0
PAINLEVEL_OUTOF10: 8
PAINLEVEL_OUTOF10: 1

## 2022-12-11 ASSESSMENT — PAIN DESCRIPTION - FREQUENCY: FREQUENCY: INTERMITTENT

## 2022-12-11 ASSESSMENT — PAIN DESCRIPTION - DESCRIPTORS
DESCRIPTORS: ACHING

## 2022-12-11 ASSESSMENT — PAIN DESCRIPTION - ORIENTATION
ORIENTATION: RIGHT
ORIENTATION: RIGHT

## 2022-12-11 NOTE — PLAN OF CARE
Problem: Discharge Planning  Goal: Discharge to home or other facility with appropriate resources  Flowsheets (Taken 12/10/2022 2217)  Discharge to home or other facility with appropriate resources:   Identify barriers to discharge with patient and caregiver   Arrange for needed discharge resources and transportation as appropriate   Identify discharge learning needs (meds, wound care, etc)     Problem: Pain  Goal: Verbalizes/displays adequate comfort level or baseline comfort level  Flowsheets (Taken 12/10/2022 2217)  Verbalizes/displays adequate comfort level or baseline comfort level:   Encourage patient to monitor pain and request assistance   Administer analgesics based on type and severity of pain and evaluate response   Assess pain using appropriate pain scale   Implement non-pharmacological measures as appropriate and evaluate response     Problem: Skin/Tissue Integrity  Goal: Absence of new skin breakdown  Description: 1. Monitor for areas of redness and/or skin breakdown  2. Assess vascular access sites hourly  3. Every 4-6 hours minimum:  Change oxygen saturation probe site  4. Every 4-6 hours:  If on nasal continuous positive airway pressure, respiratory therapy assess nares and determine need for appliance change or resting period. Note: Monitor surgical site for drainage,breakdown, and increased pain.      Problem: ABCDS Injury Assessment  Goal: Absence of physical injury  Flowsheets (Taken 12/10/2022 2217)  Absence of Physical Injury: Implement safety measures based on patient assessment     Problem: Chronic Conditions and Co-morbidities  Goal: Patient's chronic conditions and co-morbidity symptoms are monitored and maintained or improved  Flowsheets (Taken 12/10/2022 2217)  Care Plan - Patient's Chronic Conditions and Co-Morbidity Symptoms are Monitored and Maintained or Improved:   Monitor and assess patient's chronic conditions and comorbid symptoms for stability, deterioration, or improvement   Collaborate with multidisciplinary team to address chronic and comorbid conditions and prevent exacerbation or deterioration   Update acute care plan with appropriate goals if chronic or comorbid symptoms are exacerbated and prevent overall improvement and discharge     Problem: Safety - Adult  Goal: Free from fall injury  Flowsheets (Taken 12/10/2022 2217)  Free From Fall Injury:   Instruct family/caregiver on patient safety   Based on caregiver fall risk screen, instruct family/caregiver to ask for assistance with transferring infant if caregiver noted to have fall risk factors

## 2022-12-11 NOTE — PROGRESS NOTES
Nephrology Progress Note      SUBJECTIVE    Patient seen and examined in bed. Afebrile, saturating on room air    BP high overnight  BP: (140-212)/(59-88)      Complaining of pain right lower extremity, up to hip    Denies any chest pain, shortness of breath, abdominal pain, diarrhea, nausea, vomiting    Sodium 134, potassium 4.5, chloride 93, bicarb 27, anion gap 14  BUN 80, creatinine 4.13-plateaued   BS well controlled    Last Hb 6.6    UO 1500 mL/24, +3.5L    Brief note:  80-year-old female presented to the hospital for evaluation of open fracture right ankle after mechanical fall. S/p ORIF, right trimalleolar ankle fracture with posterior malleolar fixation  Open treatment without fixation right talus  Nephrology consulted for JOSE ARMANDO on CKD stage III, hyperkalemia. PMH of CKD stage III secondary to diabetic nephrosclerosis with baseline creatinine 2.7-3.3 lately, paroxysmal atrial fibrillation, hypertension, hyperlipidemia, nonobstructive CAD, T2DM, DIONY. Patient takes Lasix 40 once daily for her lower extremity edema    OBJECTIVE      CURRENT TEMPERATURE:  Temp: 98.2 °F (36.8 °C)  MAXIMUM TEMPERATURE OVER 24HRS:  Temp (24hrs), Av.3 °F (36.8 °C), Min:98.2 °F (36.8 °C), Max:98.4 °F (36.9 °C)    CURRENT RESPIRATORY RATE:  Resp: 18  CURRENT PULSE:  Heart Rate: 66  CURRENT BLOOD PRESSURE:  BP: (!) 156/67  24HR BLOOD PRESSURE RANGE:  Systolic (11WJC), NDY:821 , Min:141 , RIQ:841   ; Diastolic (92UNI), UK, Min:60, Max:80    24HR INTAKE/OUTPUT:    Intake/Output Summary (Last 24 hours) at 2022 0732  Last data filed at 2022 0534  Gross per 24 hour   Intake 2401.66 ml   Output 1500 ml   Net 901.66 ml       WEIGHT :No data found. PHYSICAL EXAM      GENERAL APPEARANCE:Awake, alert, in no acute distress  SKIN: warm and dry, no rash or erythema  EYES: conjunctivae normal and sclera anicteric. ENT: no thrush , moist mucus membranes  NECK:   No JVD.  Trachea is midline and no accessory muscle use  PULMONARY: lungs are clear to auscultation. No Wheezing, no ronchi . CARDIOVASCULAR: regular rate and rhythm with positive S1 and S2 and no rubs   ABDOMEN: soft nontender, bowel sounds present,  no ascites. EXTREMITIES: s/p ORIF right ankle fracture, dressing applied .   Mild pedal edema on the left    CURRENT MEDICATIONS      ferrous sulfate (FE TABS 325) EC tablet 325 mg, BID WC  zolpidem (AMBIEN) tablet 5 mg, Nightly  HYDROcodone-acetaminophen (NORCO) 5-325 MG per tablet 1 tablet, Q4H PRN  insulin lispro (HUMALOG) injection vial 0-4 Units, Nightly  glucose chewable tablet 16 g, PRN  dextrose bolus 10% 125 mL, PRN   Or  dextrose bolus 10% 250 mL, PRN  glucagon (rDNA) injection 1 mg, PRN  dextrose 10 % infusion, Continuous PRN  insulin lispro (HUMALOG) injection vial 0-16 Units, TID WC  heparin (porcine) injection 5,000 Units, 3 times per day  sodium bicarbonate 150 mEq in sterile water 1,150 mL infusion, Continuous  sodium chloride flush 0.9 % injection 5-40 mL, 2 times per day  sodium chloride flush 0.9 % injection 5-40 mL, PRN  0.9 % sodium chloride infusion, PRN  ondansetron (ZOFRAN-ODT) disintegrating tablet 4 mg, Q8H PRN   Or  ondansetron (ZOFRAN) injection 4 mg, Q6H PRN  polyethylene glycol (GLYCOLAX) packet 17 g, Daily  senna (SENOKOT) tablet 8.6 mg, Daily PRN  amiodarone (CORDARONE) tablet 200 mg, Daily  atorvastatin (LIPITOR) tablet 40 mg, Daily  gabapentin (NEURONTIN) tablet 600 mg, Daily  hydrALAZINE (APRESOLINE) tablet 100 mg, TID  pantoprazole (PROTONIX) tablet 40 mg, QAM AC  rOPINIRole (REQUIP) tablet 0.25 mg, Nightly  methocarbamol (ROBAXIN) tablet 750 mg, Q8H  metoprolol tartrate (LOPRESSOR) tablet 25 mg, BID        LABS      CBC:   Recent Labs     12/08/22  0926 12/10/22  0637 12/10/22  0754   WBC 14.4* 11.3  --    RBC 2.40* 2.17*  --    HGB 7.2* 6.4* 6.6*   HCT 24.7* 21.7* 21.2*   .9 100.0  --    MCH 30.0 29.5  --    MCHC 29.1 29.5  --    RDW 16.4* 16.0*  --     270  -- MPV 10.2 10.6  --         BMP:   Recent Labs     12/09/22  1419 12/10/22  0637 12/11/22  0431   * 134* 134*   K 4.9 4.5 4.5   CL 94* 94* 93*   CO2 24 23 27   BUN 82* 86* 80*   CREATININE 4.34* 4.07* 4.13*   GLUCOSE 147* 181* 140*   CALCIUM 8.8 8.7 8.7          PHOSPHORUS:    No results for input(s): PHOS in the last 72 hours. ALBUMIN:   No results for input(s): LABALBU in the last 72 hours. IRON:    Lab Results   Component Value Date/Time    IRON 79 06/09/2022 11:35 AM     IRON SATURATION:    Lab Results   Component Value Date/Time    LABIRON 29 06/09/2022 11:35 AM     TIBC:    Lab Results   Component Value Date/Time    TIBC 275 06/09/2022 11:35 AM     FERRITIN:    Lab Results   Component Value Date/Time    FERRITIN 537 06/09/2022 11:35 AM     SPEP:   Lab Results   Component Value Date/Time    PROT 5.7 12/07/2022 05:57 AM    PATH ELECTRONICALLY SIGNED.  Lorena Infante M.D. 04/02/2022 04:51 PM     UPEP:   Lab Results   Component Value Date/Time    TPU 24 04/02/2022 04:51 PM    TPU 24 04/02/2022 04:51 PM      HEPCAB:  Lab Results   Component Value Date/Time    HEPCAB NONREACTIVE 01/11/2021 11:10 AM     C3:   Lab Results   Component Value Date    C3 166 04/01/2022     C4:   Lab Results   Component Value Date    C4 46 (H) 04/01/2022     URINE CREATININE:    Lab Results   Component Value Date/Time    LABCREA 55.1 12/06/2022 12:15 PM     URINE PROTEIN:    Lab Results   Component Value Date/Time    TPU 24 04/02/2022 04:51 PM    TPU 24 04/02/2022 04:51 PM     URINALYSIS:  U/A:   Lab Results   Component Value Date/Time    NITRU NEGATIVE 04/01/2022 05:03 AM    COLORU Yellow 04/01/2022 05:03 AM    PHUR 6.5 04/01/2022 05:03 AM    WBCUA 0 TO 2 04/01/2022 05:03 AM    RBCUA 0 TO 2 04/01/2022 05:03 AM    CLARITYU clear 04/18/2017 10:21 AM    SPECGRAV 1.010 04/01/2022 05:03 AM    LEUKOCYTESUR NEGATIVE 04/01/2022 05:03 AM    UROBILINOGEN Normal 04/01/2022 05:03 AM    BILIRUBINUR NEGATIVE 04/01/2022 05:03 AM BILIRUBINUR neg 04/18/2017 10:21 AM    BLOODU neg 04/18/2017 10:21 AM    GLUCOSEU NEGATIVE 04/01/2022 05:03 AM    KETUA NEGATIVE 04/01/2022 05:03 AM     ASSESSMENT     JOSE ARMANDO on CKD:Stage III disease secondary to diabetic nephrosclerosis. Creatinine now is running around 2.8-3.3 range. It is plateauing and most recent creatinine is 4.07mg/dL. Hypertension, essential in nature and well-controlled  Secondary Hyperparathyroidism   Diabetes Mellitus :  Paroxysmal atrial fibrillation on Eliquis at home  Open fracture , right ankle status post internal fixation  Hyperkalemia: resolved   Mild hyponatremia, improving    PLAN      1. No indications for dialysis at this time, although the patient is still at risk  2. Follow-up on daily CBC and BMP  3. Avoid nephrotoxic drugs  4. Strict input/output record  5. We will follow      Gurwinder Wolfe MD.  Internal Medicine PGY-2,  Nephrology services,  [address] [place] [address]  12/11/2022, 7:32 AM    Attending Physician Statement  I have discussed the care of Faisal Anand, including pertinent history and exam findings with the resident/fellow. I have reviewed the key elements of all parts of the encounter with the resident/fellow. I have seen and examined the patient with the resident/fellow. I agree with the assessment and plan and status of the problem list as documented. Addiitionally I recommend discontinue bicarbonate containing IV fluids. The patient's hemoglobin was less than 7 yesterday so I agree with transfusion of packed red blood cells. The patient does not need dialysis at this time, but he is at risk of still requiring dialysis on this admission if the patient's renal function does not improve or if it worsens or if the acidosis and hyperkalemia worsen again. I did speak with the patient about dialysis at length just so she is aware that it may be required.     Lyndon López MD   Nephrology Attending Physician  [facility]  12/11/2022

## 2022-12-11 NOTE — PROGRESS NOTES
[facility]  Office: [address], DO, Edmond Patella, DO, Tressa Prey, DO, Asya Lake Blood, DO, Suzette Dumont MD, Elmira Norton MD, Radha Sparrow MD, Karin Taylor MD,  Lauren Cooper MD, Kingsley Rodrigues MD, Hilda Jesus, DO, Maryann Pineda MD,  Crystal Williamson MD, Shefali Flynn MD, Clovis Hanks, DO, Nikolay Anaya MD, Cristobal Ling MD, Mar Flood, DO, Gillian Puente MD, Diana Hauser MD, Rubio Shepherd MD, Luisa Ramirez MD, Mechelle Dewitt, DO, Ivan Marquez MD, Susan Ruby MD, Nevaeh Medel, CNP,  Tay aP, CNP, Radha Morales, CNP, Merwyn Cooks, CNP,  Martha Murray, DNP, Nitza Orr, CNP, Saurabh Puente, CNP, Reji Garcia, CNP, Andres Serrato, CNP, Ortiz Marie, CNP, Ashish Boss PA-C, Daria Fortune, CNS, Emily Peace, CNP, Jovan Chilo, [address] [facility]      Daily Progress Note     Admit Date: 12/5/2022  Bed/Room No.  6489/4902-15  Admitting Physician : Radha Sparrow MD  Code Status :Full Code  Hospital Day:  LOS: 6 days   Chief Complaint:     Chief Complaint   Patient presents with    Ankle Injury     Right ankle fracture      Principal Problem:    Open fracture of right tibia and fibula, sequela  Active Problems:    Type III open trimalleolar fracture of right ankle    Hyperkalemia    Hyponatremia    Metabolic acidosis    Fall    Stage 4 chronic kidney disease ([facility] [address]    Acute kidney injury superimposed on chronic kidney disease ([address]  Resolved Problems:    * No resolved hospital problems. *    Subjective : Interval History/Significant events :  12/11/22    Patient is not happy that she has not received transfusion. She had previously refused transfusion yesterday . She denies any blood in stool. Vitals - Stable afebrile  Labs - creatinine 4.13 . Low HGB     Nursing notes , Consults notes reviewed. Overnight events and updates discussed with Nursing staff .    Background History: Cody Vo is 69 y.o. female  Who was admitted to the hospital on 12/5/2022 for treatment of Open fracture of right tibia and fibula, sequela. Patient presented to the emergency room with right ankle injury and suffered a right ankle fracture after falling over a curb when she was trying to go to [place] for [employer]. She has underlying history of A. fib on Eliquis, CHF, diabetes mellitus with CKD3, hypertension, osteoporosis, fibromyalgia, morbid obesity BMI 43, sleep apnea, depression. Patient underwent I&D and ORIF on 12/6/2022. Patient also developed acute kidney injury with hyperkalemia and metabolic acidosis. Nephrology was consulted and patient given Cynda Stakes. PMH:  Past Medical History:   Diagnosis Date    Abnormal stress test     Anemia     Arthritis     Depression     Diverticulosis     DM (diabetes mellitus) ([address]     Erythropenia     Fibromyalgia     HTN (hypertension)     Hyperlipidemia     Kidney stone     Morbid obesity due to excess calories (HCC)     DIONY on CPAP     Osteopenia 03/03/14    Seasonal allergies     Sleep apnea     uses bipap prn      Allergies:    Allergies   Allergen Reactions    Adhesive Tape     Cephalexin Other (See Comments)     Tongue cracks and peels    Erythromycin Other (See Comments)     Epigastric pain and bloating    Ketorolac Tromethamine Other (See Comments)     Severe stomach cramps    Pcn [Penicillins]      Unknown reaction    Percocet [Oxycodone-Acetaminophen] Itching and Other (See Comments)     Esophagus hurt    Sulfa Antibiotics     Ultracet [Tramadol-Acetaminophen] Itching      Medications :  ferrous sulfate, 325 mg, Oral, BID WC  zolpidem, 5 mg, Oral, Nightly  insulin lispro, 0-4 Units, SubCUTAneous, Nightly  insulin lispro, 0-16 Units, SubCUTAneous, TID WC  heparin (porcine), 5,000 Units, SubCUTAneous, 3 times per day  sodium chloride flush, 5-40 mL, IntraVENous, 2 times per day  polyethylene glycol, 17 g, Oral, Daily  amiodarone, 200 mg, Oral, Daily  atorvastatin, 40 mg, Oral, Daily  gabapentin, 600 mg, Oral, Daily  hydrALAZINE, 100 mg, Oral, TID  pantoprazole, 40 mg, Oral, QAM AC  rOPINIRole, 0.25 mg, Oral, Nightly  methocarbamol, 750 mg, Oral, Q8H  metoprolol tartrate, 25 mg, Oral, BID      Review of Systems   Review of Systems   Constitutional:  Positive for fatigue. Negative for appetite change, fever and unexpected weight change. HENT:  Negative for congestion, rhinorrhea and sneezing. Eyes:  Negative for visual disturbance. Respiratory:  Negative for cough, chest tightness, shortness of breath and wheezing. Cardiovascular:  Negative for chest pain and palpitations. Gastrointestinal:  Negative for abdominal pain, blood in stool, constipation, diarrhea, nausea and vomiting. Genitourinary:  Negative for dysuria, enuresis, frequency and hematuria. Musculoskeletal:  Negative for arthralgias and myalgias. Right leg pain. Skin:  Negative for rash. Neurological:  Negative for dizziness, weakness, light-headedness and headaches. Hematological:  Does not bruise/bleed easily. Psychiatric/Behavioral:  Negative for dysphoric mood and sleep disturbance. Objective :      Current Vitals : Temp: 98.4 °F (36.9 °C),  Heart Rate: 66, Resp: 16, BP: (!) 212/88 (RN Notifed), SpO2: 94 %  Last 24 Hrs Vitals   Patient Vitals for the past 24 hrs:   BP Temp Temp src Pulse Resp SpO2   12/11/22 0801 (!) 212/88 98.4 °F (36.9 °C) Oral 66 16 94 %   12/11/22 0445 -- -- -- 66 -- 94 %   12/10/22 2115 (!) 156/67 -- -- 60 -- --   12/10/22 2054 -- -- -- -- 18 --   12/10/22 2020 (!) 180/80 98.2 °F (36.8 °C) Oral 69 16 96 %       Intake / output   12/09 1901 - 12/11 0700  In: 2401.7 [P.O.:400; I.V.:2001.7]  Out: 2500 [Urine:2500]  Physical Exam:  Physical Exam  Vitals and nursing note reviewed. Constitutional:       General: She is not in acute distress. Appearance: She is morbidly obese. She is not diaphoretic.       Interventions: Nasal cannula in place. HENT:      Head: Normocephalic and atraumatic. Nose:      Right Sinus: No maxillary sinus tenderness or frontal sinus tenderness. Left Sinus: No maxillary sinus tenderness or frontal sinus tenderness. Mouth/Throat:      Pharynx: No oropharyngeal exudate. Eyes:      General: No scleral icterus. Conjunctiva/sclera: Conjunctivae normal.      Pupils: Pupils are equal, round, and reactive to light. Neck:      Thyroid: No thyromegaly. Vascular: No JVD. Cardiovascular:      Rate and Rhythm: Normal rate and regular rhythm. Pulses:           Dorsalis pedis pulses are 2+ on the right side and 2+ on the left side. Heart sounds: Normal heart sounds. No murmur heard. Pulmonary:      Effort: Pulmonary effort is normal.      Breath sounds: Normal breath sounds. No wheezing or rales. Abdominal:      Palpations: Abdomen is soft. There is no mass. Tenderness: There is no abdominal tenderness. Musculoskeletal:      Cervical back: Full passive range of motion without pain and neck supple. Comments: Right leg splint in place. Lymphadenopathy:      Head:      Right side of head: No submandibular adenopathy. Left side of head: No submandibular adenopathy. Cervical: No cervical adenopathy. Skin:     General: Skin is warm. Neurological:      Mental Status: She is alert and oriented to person, place, and time. Motor: No tremor. Psychiatric:         Behavior: Behavior is cooperative.          Laboratory findings:    Recent Labs     12/08/22  0926 12/10/22  0637 12/10/22  0754   WBC 14.4* 11.3  --    HGB 7.2* 6.4* 6.6*   HCT 24.7* 21.7* 21.2*    270  --        Recent Labs     12/09/22  1419 12/10/22  0637 12/11/22  0431   * 134* 134*   K 4.9 4.5 4.5   CL 94* 94* 93*   CO2 24 23 27   GLUCOSE 147* 181* 140*   BUN 82* 86* 80*   CREATININE 4.34* 4.07* 4.13*   CALCIUM 8.8 8.7 8.7       No results for input(s): PROT, LABALBU, LABA1C, W7XGXQE, A7DZONU, FT4, TSH, AST, ALT, LDH, GGT, ALKPHOS, BILITOT, BILIDIR, AMMONIA, AMYLASE, LIPASE, LACTATE, CHOL, HDL, LDLCHOLESTEROL, CHOLHDLRATIO, TRIG, VLDL, BNP, TROPONINI, CKTOTAL, CKMB, CKMBINDEX, RF, FAHEEM in the last 72 hours. Specific Gravity, UA   Date Value Ref Range Status   04/01/2022 1.010 1.005 - 1.030 Final     Protein, UA   Date Value Ref Range Status   04/01/2022 TRACE (A) NEGATIVE Final     RBC, UA   Date Value Ref Range Status   04/01/2022 0 TO 2 0 - 4 /HPF Final     Comment:     Reference range defined for non-centrifuged specimen. Blood, UA POC   Date Value Ref Range Status   04/18/2017 neg  Final     Nitrite, Urine   Date Value Ref Range Status   04/01/2022 NEGATIVE NEGATIVE Final     WBC, UA   Date Value Ref Range Status   04/01/2022 0 TO 2 0 - 5 /HPF Final     Leukocyte Esterase, Urine   Date Value Ref Range Status   04/01/2022 NEGATIVE NEGATIVE Final       Imaging / Clinical Data :-   CT ABDOMEN PELVIS WO CONTRAST Additional Contrast? None    Result Date: 12/6/2022  No evidence of fracture or osseous malalignment in the lumbar spine, pelvic bones and joints including bilateral hip joints. No evidence of fracture in the superior pubic rami and inferior ischiopubic rami of both sides or in bilateral pubic bones. No fracture in bilateral acetabula or ischial bones. Evidence of mild to moderate multilevel degenerative disc disease and facet osteoarthritis in the lumbar spine and lumbosacral junction. Nonspecific small amount of gas scattered in multiple loops of small bowel without significant fluid levels. No distension or dilation of distal small bowel. Normal appendix visualized. Small to moderate amount of stool and gas scattered in the colon. Evidence of moderate diverticulosis coli of descending colon and sigmoid colon, without evidence of diverticulitis. No evidence of renal or ureteric calculus or obstructive uropathy.  No diagnostic finding in the liver, gallbladder, spleen, pancreas and adrenal glands. XR PELVIS (1-2 VIEWS)    Result Date: 12/5/2022  Potential nondisplaced fracture of the right superior and inferior pubic rami. XR KNEE RIGHT (3 VIEWS)    Result Date: 12/5/2022  Total knee arthroplasty without complication. No acute osseous or soft tissue abnormality. XR TIBIA FIBULA RIGHT (2 VIEWS)    Result Date: 12/5/2022  Trimalleolar fracture with diffuse soft tissue swelling. XR ANKLE RIGHT (2 VIEWS)    Result Date: 12/5/2022  Ankle fracture subluxation with persistent mild lateral subluxation of the talus in relation to the tibia. XR ANKLE RIGHT (MIN 3 VIEWS)    Result Date: 12/6/2022  Anatomic alignment of comminuted ankle fracture status post ORIF. XR ANKLE RIGHT (MIN 3 VIEWS)    Result Date: 12/5/2022  Trimalleolar right ankle is fracture subluxation with improved positioning of the talus in relation to the tibia and now with mild posterior subluxation of the talus in relation to the tibia. XR ANKLE RIGHT (MIN 3 VIEWS)    Result Date: 12/5/2022  Trimalleolar fracture with associated soft tissue swelling. CT PELVIS WO CONTRAST Additional Contrast? None    Result Date: 12/6/2022  No evidence of fracture in pelvic bones or bilateral hip joints. No evidence of fracture in superior pubic rami or inferior ischiopubic rami of both sides. No fracture in bilateral acetabulum or in visualized bilateral proximal femurs. Within the pelvic cavity, there is no evidence of hemorrhage or abnormal fluid collection or acute process. Mild to moderate sigmoid diverticulosis, without evidence of diverticulitis. CT ANKLE RIGHT WO CONTRAST    Result Date: 12/6/2022  Prominent oblique fracture in coronal orientation with large gap at the fracture involving the posterior portion of distal end of right tibia with distal intra-articular extension of the fracture. Comminuted transverse fracture through the base of the medial malleolus of the tibia.  Grossly comminuted oblique fracture in the distal shaft of right fibula. Moderate posterior subluxation of the talus bone due to distal posterior tibial fracture. Some small intra-articular fracture fragments in the anterior portion of the tibiotalar and talofibular joint. The distal tibiofibular syndesmosis is grossly intact. No evidence of fracture in the right calcaneus bone, talus bone and distal tarsal bones. Subtalar joint is intact. Evidence of soft tissue emphysema in the lateral portion of subtalar joint. The calcaneal tendon appears to be grossly intact. The long tendons at the medial, lateral and anterior aspect of right ankle joint are grossly intact. XR CHEST PORTABLE    Result Date: 12/5/2022  Cardiomegaly without acute pulmonary process. Clinical Course : stable  Assessment and Plan  :        Right ankle fracture secondary to fall with underlying osteoporosis  -orthopedic surgery following. S/p  ORIF 12/6/22. Acute superimposed on chronic kidney disease stage IV  -Baseline creatinine seems to be ~ 3 to 3.2. Nephrology following. No indication for dialysis. Essential hypertension -well-controlled. Paroxysmal atrial fibrillation - resume  Eliquis,-continue amiodarone, Lopressor. 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    Plan discussed with Staff Shaquille Hendricks RN     (Please note that portions of this note were completed with a voice recognition program. Efforts were made to edit the dictations but occasionally words are mis-transcribed.)      Dilma Truong MD  12/11/2022

## 2022-12-12 ENCOUNTER — HOSPITAL ENCOUNTER (INPATIENT)
Age: 77
LOS: 12 days | Discharge: NURSING, REHAB OR LONG-TERM CARE | End: 2022-12-24
Attending: PHYSICAL MEDICINE & REHABILITATION | Admitting: PHYSICAL MEDICINE & REHABILITATION
Payer: MEDICARE

## 2022-12-12 VITALS
HEART RATE: 65 BPM | SYSTOLIC BLOOD PRESSURE: 132 MMHG | RESPIRATION RATE: 16 BRPM | OXYGEN SATURATION: 96 % | DIASTOLIC BLOOD PRESSURE: 58 MMHG | WEIGHT: 262 LBS | BODY MASS INDEX: 43.65 KG/M2 | HEIGHT: 65 IN | TEMPERATURE: 98.3 F

## 2022-12-12 DIAGNOSIS — S82.851F TYPE III OPEN TRIMALLEOLAR FRACTURE OF RIGHT ANKLE WITH ROUTINE HEALING, SUBSEQUENT ENCOUNTER: Primary | ICD-10-CM

## 2022-12-12 PROBLEM — N17.9 AKI (ACUTE KIDNEY INJURY) (HCC): Status: ACTIVE | Noted: 2022-12-12

## 2022-12-12 LAB
ANION GAP SERPL CALCULATED.3IONS-SCNC: 11 MMOL/L (ref 9–17)
BUN BLDV-MCNC: 75 MG/DL (ref 8–23)
CALCIUM SERPL-MCNC: 9.3 MG/DL (ref 8.6–10.4)
CHLORIDE BLD-SCNC: 91 MMOL/L (ref 98–107)
CO2: 28 MMOL/L (ref 20–31)
CREAT SERPL-MCNC: 3.99 MG/DL (ref 0.5–0.9)
GFR SERPL CREATININE-BSD FRML MDRD: 11 ML/MIN/1.73M2
GLUCOSE BLD-MCNC: 135 MG/DL (ref 70–99)
GLUCOSE BLD-MCNC: 153 MG/DL (ref 65–105)
POTASSIUM SERPL-SCNC: 4.4 MMOL/L (ref 3.7–5.3)
SODIUM BLD-SCNC: 130 MMOL/L (ref 135–144)

## 2022-12-12 PROCEDURE — 80048 BASIC METABOLIC PNL TOTAL CA: CPT

## 2022-12-12 PROCEDURE — 36415 COLL VENOUS BLD VENIPUNCTURE: CPT

## 2022-12-12 PROCEDURE — 6370000000 HC RX 637 (ALT 250 FOR IP): Performed by: STUDENT IN AN ORGANIZED HEALTH CARE EDUCATION/TRAINING PROGRAM

## 2022-12-12 PROCEDURE — 99232 SBSQ HOSP IP/OBS MODERATE 35: CPT | Performed by: INTERNAL MEDICINE

## 2022-12-12 PROCEDURE — 6370000000 HC RX 637 (ALT 250 FOR IP): Performed by: FAMILY MEDICINE

## 2022-12-12 PROCEDURE — 6370000000 HC RX 637 (ALT 250 FOR IP): Performed by: NURSE PRACTITIONER

## 2022-12-12 PROCEDURE — 97530 THERAPEUTIC ACTIVITIES: CPT

## 2022-12-12 PROCEDURE — 82947 ASSAY GLUCOSE BLOOD QUANT: CPT

## 2022-12-12 PROCEDURE — 99232 SBSQ HOSP IP/OBS MODERATE 35: CPT | Performed by: PHYSICAL MEDICINE & REHABILITATION

## 2022-12-12 PROCEDURE — 99239 HOSP IP/OBS DSCHRG MGMT >30: CPT | Performed by: FAMILY MEDICINE

## 2022-12-12 PROCEDURE — 2580000003 HC RX 258: Performed by: STUDENT IN AN ORGANIZED HEALTH CARE EDUCATION/TRAINING PROGRAM

## 2022-12-12 PROCEDURE — 1180000000 HC REHAB R&B

## 2022-12-12 PROCEDURE — 97110 THERAPEUTIC EXERCISES: CPT

## 2022-12-12 RX ORDER — HYDROCODONE BITARTRATE AND ACETAMINOPHEN 5; 325 MG/1; MG/1
1 TABLET ORAL EVERY 6 HOURS PRN
Qty: 12 TABLET | Refills: 0 | Status: ON HOLD | OUTPATIENT
Start: 2022-12-12 | End: 2022-12-15

## 2022-12-12 RX ORDER — METHOCARBAMOL 750 MG/1
750 TABLET, FILM COATED ORAL EVERY 8 HOURS
Qty: 30 TABLET | Refills: 0 | Status: ON HOLD | OUTPATIENT
Start: 2022-12-12 | End: 2022-12-22

## 2022-12-12 RX ORDER — ZOLPIDEM TARTRATE 5 MG/1
5 TABLET ORAL NIGHTLY PRN
Qty: 5 TABLET | Refills: 0 | Status: ON HOLD | OUTPATIENT
Start: 2022-12-12 | End: 2022-12-17

## 2022-12-12 RX ORDER — COLCHICINE 0.6 MG/1
0.6 TABLET ORAL DAILY
Status: DISCONTINUED | OUTPATIENT
Start: 2022-12-13 | End: 2022-12-13

## 2022-12-12 RX ORDER — METHOCARBAMOL 750 MG/1
750 TABLET, FILM COATED ORAL EVERY 8 HOURS
Status: CANCELLED | OUTPATIENT
Start: 2022-12-12

## 2022-12-12 RX ORDER — TRAZODONE HYDROCHLORIDE 100 MG/1
100 TABLET ORAL NIGHTLY
Status: CANCELLED | OUTPATIENT
Start: 2022-12-12

## 2022-12-12 RX ORDER — PANTOPRAZOLE SODIUM 40 MG/1
40 TABLET, DELAYED RELEASE ORAL
Status: DISCONTINUED | OUTPATIENT
Start: 2022-12-13 | End: 2022-12-24 | Stop reason: HOSPADM

## 2022-12-12 RX ORDER — ROPINIROLE 0.25 MG/1
0.25 TABLET, FILM COATED ORAL NIGHTLY
Status: DISCONTINUED | OUTPATIENT
Start: 2022-12-12 | End: 2022-12-24 | Stop reason: HOSPADM

## 2022-12-12 RX ORDER — ATORVASTATIN CALCIUM 40 MG/1
40 TABLET, FILM COATED ORAL DAILY
Status: DISCONTINUED | OUTPATIENT
Start: 2022-12-13 | End: 2022-12-13

## 2022-12-12 RX ORDER — ALLOPURINOL 100 MG/1
100 TABLET ORAL DAILY
Status: DISCONTINUED | OUTPATIENT
Start: 2022-12-13 | End: 2022-12-24 | Stop reason: HOSPADM

## 2022-12-12 RX ORDER — SENNA PLUS 8.6 MG/1
1 TABLET ORAL DAILY PRN
Status: DISCONTINUED | OUTPATIENT
Start: 2022-12-12 | End: 2022-12-12

## 2022-12-12 RX ORDER — VITAMIN B COMPLEX
2000 TABLET ORAL DAILY
Status: CANCELLED | OUTPATIENT
Start: 2022-12-13

## 2022-12-12 RX ORDER — TRAZODONE HYDROCHLORIDE 100 MG/1
100 TABLET ORAL NIGHTLY
Status: DISCONTINUED | OUTPATIENT
Start: 2022-12-12 | End: 2022-12-13

## 2022-12-12 RX ORDER — ALLOPURINOL 100 MG/1
100 TABLET ORAL DAILY
Status: CANCELLED | OUTPATIENT
Start: 2022-12-13

## 2022-12-12 RX ORDER — LANOLIN ALCOHOL/MO/W.PET/CERES
325 CREAM (GRAM) TOPICAL 2 TIMES DAILY WITH MEALS
Status: CANCELLED | OUTPATIENT
Start: 2022-12-12

## 2022-12-12 RX ORDER — ROPINIROLE 0.25 MG/1
0.25 TABLET, FILM COATED ORAL NIGHTLY
Status: CANCELLED | OUTPATIENT
Start: 2022-12-12

## 2022-12-12 RX ORDER — LANOLIN ALCOHOL/MO/W.PET/CERES
400 CREAM (GRAM) TOPICAL 2 TIMES DAILY
Status: DISCONTINUED | OUTPATIENT
Start: 2022-12-12 | End: 2022-12-24 | Stop reason: HOSPADM

## 2022-12-12 RX ORDER — AMIODARONE HYDROCHLORIDE 200 MG/1
200 TABLET ORAL DAILY
Status: DISCONTINUED | OUTPATIENT
Start: 2022-12-13 | End: 2022-12-24 | Stop reason: HOSPADM

## 2022-12-12 RX ORDER — GABAPENTIN 600 MG/1
600 TABLET ORAL DAILY
Status: CANCELLED | OUTPATIENT
Start: 2022-12-13

## 2022-12-12 RX ORDER — PANTOPRAZOLE SODIUM 40 MG/1
40 TABLET, DELAYED RELEASE ORAL
Status: CANCELLED | OUTPATIENT
Start: 2022-12-13

## 2022-12-12 RX ORDER — SENNA PLUS 8.6 MG/1
1 TABLET ORAL DAILY PRN
Qty: 30 TABLET | Refills: 0 | Status: ON HOLD | OUTPATIENT
Start: 2022-12-12 | End: 2023-01-11

## 2022-12-12 RX ORDER — ATORVASTATIN CALCIUM 40 MG/1
40 TABLET, FILM COATED ORAL DAILY
Status: CANCELLED | OUTPATIENT
Start: 2022-12-13

## 2022-12-12 RX ORDER — POLYETHYLENE GLYCOL 3350 17 G/17G
17 POWDER, FOR SOLUTION ORAL DAILY
Status: DISCONTINUED | OUTPATIENT
Start: 2022-12-12 | End: 2022-12-24 | Stop reason: HOSPADM

## 2022-12-12 RX ORDER — AMIODARONE HYDROCHLORIDE 200 MG/1
200 TABLET ORAL DAILY
Status: CANCELLED | OUTPATIENT
Start: 2022-12-13

## 2022-12-12 RX ORDER — ZOLPIDEM TARTRATE 5 MG/1
5 TABLET ORAL NIGHTLY
Status: DISCONTINUED | OUTPATIENT
Start: 2022-12-12 | End: 2022-12-13

## 2022-12-12 RX ORDER — HYDRALAZINE HYDROCHLORIDE 50 MG/1
100 TABLET, FILM COATED ORAL 3 TIMES DAILY
Status: DISCONTINUED | OUTPATIENT
Start: 2022-12-12 | End: 2022-12-24 | Stop reason: HOSPADM

## 2022-12-12 RX ORDER — METHOCARBAMOL 750 MG/1
750 TABLET, FILM COATED ORAL EVERY 8 HOURS
Status: DISCONTINUED | OUTPATIENT
Start: 2022-12-12 | End: 2022-12-13

## 2022-12-12 RX ORDER — ZOLPIDEM TARTRATE 5 MG/1
5 TABLET ORAL NIGHTLY
Status: CANCELLED | OUTPATIENT
Start: 2022-12-12

## 2022-12-12 RX ORDER — POLYETHYLENE GLYCOL 3350 17 G/17G
17 POWDER, FOR SOLUTION ORAL DAILY
Status: CANCELLED | OUTPATIENT
Start: 2022-12-13

## 2022-12-12 RX ORDER — HYDRALAZINE HYDROCHLORIDE 50 MG/1
100 TABLET, FILM COATED ORAL 3 TIMES DAILY
Status: CANCELLED | OUTPATIENT
Start: 2022-12-12

## 2022-12-12 RX ORDER — FERROUS SULFATE 325(65) MG
325 TABLET ORAL 2 TIMES DAILY WITH MEALS
Status: DISCONTINUED | OUTPATIENT
Start: 2022-12-13 | End: 2022-12-13

## 2022-12-12 RX ORDER — LANOLIN ALCOHOL/MO/W.PET/CERES
400 CREAM (GRAM) TOPICAL 2 TIMES DAILY
Status: CANCELLED | OUTPATIENT
Start: 2022-12-12

## 2022-12-12 RX ORDER — CALCITRIOL 0.25 UG/1
0.25 CAPSULE, LIQUID FILLED ORAL DAILY
Status: DISCONTINUED | OUTPATIENT
Start: 2022-12-13 | End: 2022-12-24 | Stop reason: HOSPADM

## 2022-12-12 RX ORDER — COLCHICINE 0.6 MG/1
0.6 TABLET ORAL DAILY
Status: CANCELLED | OUTPATIENT
Start: 2022-12-13

## 2022-12-12 RX ORDER — SENNA PLUS 8.6 MG/1
1 TABLET ORAL DAILY PRN
Status: CANCELLED | OUTPATIENT
Start: 2022-12-12

## 2022-12-12 RX ORDER — POLYETHYLENE GLYCOL 3350 17 G/17G
17 POWDER, FOR SOLUTION ORAL DAILY
Status: DISCONTINUED | OUTPATIENT
Start: 2022-12-13 | End: 2022-12-12

## 2022-12-12 RX ORDER — GABAPENTIN 600 MG/1
600 TABLET ORAL DAILY
Status: DISCONTINUED | OUTPATIENT
Start: 2022-12-13 | End: 2022-12-18

## 2022-12-12 RX ORDER — HYDROCODONE BITARTRATE AND ACETAMINOPHEN 5; 325 MG/1; MG/1
1 TABLET ORAL EVERY 4 HOURS PRN
Status: DISCONTINUED | OUTPATIENT
Start: 2022-12-12 | End: 2022-12-24 | Stop reason: HOSPADM

## 2022-12-12 RX ORDER — CALCITRIOL 0.25 UG/1
0.25 CAPSULE, LIQUID FILLED ORAL DAILY
Status: CANCELLED | OUTPATIENT
Start: 2022-12-13

## 2022-12-12 RX ORDER — HYDROCODONE BITARTRATE AND ACETAMINOPHEN 5; 325 MG/1; MG/1
1 TABLET ORAL EVERY 4 HOURS PRN
Status: CANCELLED | OUTPATIENT
Start: 2022-12-12

## 2022-12-12 RX ORDER — VITAMIN B COMPLEX
2000 TABLET ORAL DAILY
Status: DISCONTINUED | OUTPATIENT
Start: 2022-12-13 | End: 2022-12-24 | Stop reason: HOSPADM

## 2022-12-12 RX ORDER — BISACODYL 10 MG
10 SUPPOSITORY, RECTAL RECTAL DAILY PRN
Status: DISCONTINUED | OUTPATIENT
Start: 2022-12-12 | End: 2022-12-24 | Stop reason: HOSPADM

## 2022-12-12 RX ADMIN — TRAZODONE HYDROCHLORIDE 100 MG: 100 TABLET ORAL at 20:31

## 2022-12-12 RX ADMIN — HYDROCODONE BITARTRATE AND ACETAMINOPHEN 1 TABLET: 5; 325 TABLET ORAL at 10:01

## 2022-12-12 RX ADMIN — HYDRALAZINE HYDROCHLORIDE 100 MG: 50 TABLET, FILM COATED ORAL at 15:31

## 2022-12-12 RX ADMIN — METHOCARBAMOL TABLETS 750 MG: 750 TABLET, COATED ORAL at 15:31

## 2022-12-12 RX ADMIN — MAGNESIUM OXIDE TAB 400 MG (241.3 MG ELEMENTAL MG) 400 MG: 400 (241.3 MG) TAB at 20:32

## 2022-12-12 RX ADMIN — SODIUM CHLORIDE, PRESERVATIVE FREE 10 ML: 5 INJECTION INTRAVENOUS at 10:06

## 2022-12-12 RX ADMIN — HYDRALAZINE HYDROCHLORIDE 100 MG: 50 TABLET, FILM COATED ORAL at 20:31

## 2022-12-12 RX ADMIN — METHOCARBAMOL TABLETS 750 MG: 750 TABLET, COATED ORAL at 05:07

## 2022-12-12 RX ADMIN — PANTOPRAZOLE SODIUM 40 MG: 40 TABLET, DELAYED RELEASE ORAL at 10:02

## 2022-12-12 RX ADMIN — APIXABAN 5 MG: 5 TABLET, FILM COATED ORAL at 20:32

## 2022-12-12 RX ADMIN — CALCITRIOL 0.25 MCG: 0.25 CAPSULE ORAL at 10:02

## 2022-12-12 RX ADMIN — ALLOPURINOL 100 MG: 100 TABLET ORAL at 10:03

## 2022-12-12 RX ADMIN — METOPROLOL TARTRATE 25 MG: 25 TABLET, FILM COATED ORAL at 20:32

## 2022-12-12 RX ADMIN — HYDRALAZINE HYDROCHLORIDE 100 MG: 50 TABLET, FILM COATED ORAL at 10:03

## 2022-12-12 RX ADMIN — FERROUS SULFATE TAB EC 325 MG (65 MG FE EQUIVALENT) 325 MG: 325 (65 FE) TABLET DELAYED RESPONSE at 10:02

## 2022-12-12 RX ADMIN — ZOLPIDEM TARTRATE 5 MG: 5 TABLET ORAL at 20:32

## 2022-12-12 RX ADMIN — HYDROCODONE BITARTRATE AND ACETAMINOPHEN 1 TABLET: 5; 325 TABLET ORAL at 20:31

## 2022-12-12 RX ADMIN — MAGNESIUM GLUCONATE 500 MG ORAL TABLET 400 MG: 500 TABLET ORAL at 10:02

## 2022-12-12 RX ADMIN — HYDROCODONE BITARTRATE AND ACETAMINOPHEN 1 TABLET: 5; 325 TABLET ORAL at 15:31

## 2022-12-12 RX ADMIN — GABAPENTIN 600 MG: 600 TABLET ORAL at 10:01

## 2022-12-12 RX ADMIN — Medication 2000 UNITS: at 10:03

## 2022-12-12 RX ADMIN — HYDROCODONE BITARTRATE AND ACETAMINOPHEN 1 TABLET: 5; 325 TABLET ORAL at 00:56

## 2022-12-12 RX ADMIN — APIXABAN 5 MG: 5 TABLET, FILM COATED ORAL at 10:03

## 2022-12-12 RX ADMIN — COLCHICINE 0.6 MG: 0.6 TABLET, FILM COATED ORAL at 10:02

## 2022-12-12 RX ADMIN — DESMOPRESSIN ACETATE 40 MG: 0.2 TABLET ORAL at 10:03

## 2022-12-12 RX ADMIN — METOPROLOL TARTRATE 25 MG: 25 TABLET ORAL at 10:02

## 2022-12-12 RX ADMIN — AMIODARONE HYDROCHLORIDE 200 MG: 200 TABLET ORAL at 10:03

## 2022-12-12 ASSESSMENT — LIFESTYLE VARIABLES
HOW MANY STANDARD DRINKS CONTAINING ALCOHOL DO YOU HAVE ON A TYPICAL DAY: PATIENT DOES NOT DRINK
HOW OFTEN DO YOU HAVE A DRINK CONTAINING ALCOHOL: NEVER

## 2022-12-12 ASSESSMENT — ENCOUNTER SYMPTOMS
NAUSEA: 0
VOMITING: 0
CHEST TIGHTNESS: 0
DIARRHEA: 0
WHEEZING: 0
SHORTNESS OF BREATH: 0
RHINORRHEA: 0
COUGH: 0
BLOOD IN STOOL: 0
CONSTIPATION: 0
ABDOMINAL PAIN: 0

## 2022-12-12 ASSESSMENT — PAIN DESCRIPTION - ORIENTATION: ORIENTATION: RIGHT

## 2022-12-12 ASSESSMENT — PAIN DESCRIPTION - PAIN TYPE: TYPE: SURGICAL PAIN

## 2022-12-12 ASSESSMENT — PAIN SCALES - GENERAL
PAINLEVEL_OUTOF10: 9
PAINLEVEL_OUTOF10: 4
PAINLEVEL_OUTOF10: 4
PAINLEVEL_OUTOF10: 9
PAINLEVEL_OUTOF10: 0

## 2022-12-12 ASSESSMENT — PAIN DESCRIPTION - DESCRIPTORS: DESCRIPTORS: ACHING

## 2022-12-12 ASSESSMENT — PAIN DESCRIPTION - LOCATION: LOCATION: ANKLE

## 2022-12-12 NOTE — PROGRESS NOTES
Nephrology Progress Note      SUBJECTIVE    Patient seen and examined in bed. Afebrile, saturating on room air  Alert and oriented    /59    Complaining of pain right lower extremity, up to hip    Denies any chest pain, shortness of breath, abdominal pain, diarrhea, nausea, vomiting    Sodium 130, potassium 4.4, chloride 91, bicarb 28, anion gap 11  BUN 75, creatinine 3.99   -213   Hb 7.8, s/p 1 PRBC    UO 2200 mL/24, +2.6 L    Brief note:  71-year-old female presented to the hospital for evaluation of open fracture right ankle after mechanical fall. S/p ORIF, right trimalleolar ankle fracture with posterior malleolar fixation  Open treatment without fixation right talus  Nephrology consulted for JOSE ARMANDO on CKD stage III, hyperkalemia. PMH of CKD stage III secondary to diabetic nephrosclerosis with baseline creatinine 2.7-3.3 lately, paroxysmal atrial fibrillation, hypertension, hyperlipidemia, nonobstructive CAD, T2DM, DIONY. Patient takes Lasix 40 once daily for her lower extremity edema    OBJECTIVE      CURRENT TEMPERATURE:  Temp: 98.1 °F (36.7 °C)  MAXIMUM TEMPERATURE OVER 24HRS:  Temp (24hrs), Av.5 °F (36.9 °C), Min:98.1 °F (36.7 °C), Max:98.6 °F (37 °C)    CURRENT RESPIRATORY RATE:  Resp: 16  CURRENT PULSE:  Heart Rate: 69  CURRENT BLOOD PRESSURE:  BP: (!) 159/56  24HR BLOOD PRESSURE RANGE:  Systolic (42YBM), GQK:963 , Min:127 , AYL:443   ; Diastolic (03WDV), FKN:62, Min:50, Max:104    24HR INTAKE/OUTPUT:    Intake/Output Summary (Last 24 hours) at 2022 0725  Last data filed at 2022 9182  Gross per 24 hour   Intake 1321.67 ml   Output 2200 ml   Net -878.33 ml       WEIGHT :No data found. PHYSICAL EXAM      GENERAL APPEARANCE:Awake, alert, in no acute distress  SKIN: warm and dry, no rash or erythema  EYES: conjunctivae normal and sclera anicteric. ENT: no thrush , moist mucus membranes  NECK:   No JVD.  Trachea is midline and no accessory muscle use  PULMONARY: lungs are clear to auscultation. No Wheezing, no ronchi . CARDIOVASCULAR: regular rate and rhythm with positive S1 and S2 and no rubs   ABDOMEN: soft nontender, bowel sounds present,  no ascites. EXTREMITIES: s/p ORIF right ankle fracture, dressing applied .   Left lower extremity pitting pedal edema    CURRENT MEDICATIONS      0.9 % sodium chloride infusion, PRN  allopurinol (ZYLOPRIM) tablet 100 mg, Daily  calcitRIOL (ROCALTROL) capsule 0.25 mcg, Daily  colchicine (COLCRYS) tablet 0.6 mg, Daily  apixaban (ELIQUIS) tablet 5 mg, BID  ferrous sulfate (FE TABS 325) EC tablet 325 mg, Daily with breakfast  magnesium oxide (MAG-OX) tablet 400 mg, BID  traZODone (DESYREL) tablet 100 mg, Nightly  zolpidem (AMBIEN) tablet 5 mg, Nightly PRN  Vitamin D (CHOLECALCIFEROL) tablet 2,000 Units, Daily  ferrous sulfate (FE TABS 325) EC tablet 325 mg, BID WC  zolpidem (AMBIEN) tablet 5 mg, Nightly  HYDROcodone-acetaminophen (NORCO) 5-325 MG per tablet 1 tablet, Q4H PRN  insulin lispro (HUMALOG) injection vial 0-4 Units, Nightly  glucose chewable tablet 16 g, PRN  dextrose bolus 10% 125 mL, PRN   Or  dextrose bolus 10% 250 mL, PRN  glucagon (rDNA) injection 1 mg, PRN  dextrose 10 % infusion, Continuous PRN  insulin lispro (HUMALOG) injection vial 0-16 Units, TID WC  sodium chloride flush 0.9 % injection 5-40 mL, 2 times per day  sodium chloride flush 0.9 % injection 5-40 mL, PRN  0.9 % sodium chloride infusion, PRN  ondansetron (ZOFRAN-ODT) disintegrating tablet 4 mg, Q8H PRN   Or  ondansetron (ZOFRAN) injection 4 mg, Q6H PRN  polyethylene glycol (GLYCOLAX) packet 17 g, Daily  senna (SENOKOT) tablet 8.6 mg, Daily PRN  amiodarone (CORDARONE) tablet 200 mg, Daily  atorvastatin (LIPITOR) tablet 40 mg, Daily  gabapentin (NEURONTIN) tablet 600 mg, Daily  hydrALAZINE (APRESOLINE) tablet 100 mg, TID  pantoprazole (PROTONIX) tablet 40 mg, QAM AC  rOPINIRole (REQUIP) tablet 0.25 mg, Nightly  methocarbamol (ROBAXIN) tablet 750 mg, Q8H  metoprolol tartrate (LOPRESSOR) tablet 25 mg, BID        LABS      CBC:   Recent Labs     12/10/22  0637 12/10/22  0754 12/11/22  1900   WBC 11.3  --   --    RBC 2.17*  --   --    HGB 6.4* 6.6* 7.8*   HCT 21.7* 21.2* 23.8*   .0  --   --    MCH 29.5  --   --    MCHC 29.5  --   --    RDW 16.0*  --   --      --   --    MPV 10.6  --   --         BMP:   Recent Labs     12/10/22  0637 12/11/22  0431 12/12/22  0602   * 134* 130*   K 4.5 4.5 4.4   CL 94* 93* 91*   CO2 23 27 28   BUN 86* 80* 75*   CREATININE 4.07* 4.13* 3.99*   GLUCOSE 181* 140* 135*   CALCIUM 8.7 8.7 9.3          PHOSPHORUS:    No results for input(s): PHOS in the last 72 hours. ALBUMIN:   No results for input(s): LABALBU in the last 72 hours. IRON:    Lab Results   Component Value Date/Time    IRON 79 06/09/2022 11:35 AM     IRON SATURATION:    Lab Results   Component Value Date/Time    LABIRON 29 06/09/2022 11:35 AM     TIBC:    Lab Results   Component Value Date/Time    TIBC 275 06/09/2022 11:35 AM     FERRITIN:    Lab Results   Component Value Date/Time    FERRITIN 537 06/09/2022 11:35 AM     SPEP:   Lab Results   Component Value Date/Time    PROT 5.7 12/07/2022 05:57 AM    PATH ELECTRONICALLY SIGNED.  Eli Hidalgo M.D. 04/02/2022 04:51 PM     UPEP:   Lab Results   Component Value Date/Time    TPU 24 04/02/2022 04:51 PM    TPU 24 04/02/2022 04:51 PM      HEPCAB:  Lab Results   Component Value Date/Time    HEPCAB NONREACTIVE 01/11/2021 11:10 AM     C3:   Lab Results   Component Value Date    C3 166 04/01/2022     C4:   Lab Results   Component Value Date    C4 46 (H) 04/01/2022     URINE CREATININE:    Lab Results   Component Value Date/Time    LABCREA 55.1 12/06/2022 12:15 PM     URINE PROTEIN:    Lab Results   Component Value Date/Time    TPU 24 04/02/2022 04:51 PM    TPU 24 04/02/2022 04:51 PM     URINALYSIS:  U/A:   Lab Results   Component Value Date/Time    NITRU NEGATIVE 04/01/2022 05:03 AM    COLORU Yellow 04/01/2022 05:03 AM PHUR 6.5 04/01/2022 05:03 AM    WBCUA 0 TO 2 04/01/2022 05:03 AM    RBCUA 0 TO 2 04/01/2022 05:03 AM    CLARITYU clear 04/18/2017 10:21 AM    SPECGRAV 1.010 04/01/2022 05:03 AM    LEUKOCYTESUR NEGATIVE 04/01/2022 05:03 AM    UROBILINOGEN Normal 04/01/2022 05:03 AM    BILIRUBINUR NEGATIVE 04/01/2022 05:03 AM    BILIRUBINUR neg 04/18/2017 10:21 AM    BLOODU neg 04/18/2017 10:21 AM    GLUCOSEU NEGATIVE 04/01/2022 05:03 AM    KETUA NEGATIVE 04/01/2022 05:03 AM     ASSESSMENT     JOSE ARMANDO on CKD:Stage 4 disease secondary to diabetic nephrosclerosis. Creatinine now is running around 2.8-3.3 range. It is plateauing  Hypertension, essential in nature and well-controlled  Secondary Hyperparathyroidism   Diabetes Mellitus :  Paroxysmal atrial fibrillation on Eliquis at home  Open fracture , right ankle status post internal fixation  Hyperkalemia: resolved   Mild hyponatremia, improving    PLAN      1. No indications for dialysis at this time, although the patient is still at risk  2. Follow-up on daily CBC and BMP  3. Avoid nephrotoxic drugs  4. Strict input/output record  5. We will follow      Alycia Simpson MD.  Internal Medicine PGY-2,  Nephrology services,  [facility],   [facility]  12/12/2022, 7:25 AM  Attending Physician Statement  I have discussed the care of Shabana Wright, including pertinent history and exam findings,  with the Fellow/Residentt. I have reviewed the key elements of all parts of the encounter with the Fellow/ Resident. I agree with the assessment, plan and orders as documented by the resident.     Lew Santiago MD MD, MRCP Margarita Suggs, FACP   12/12/2022 12:45 PM    Nephrology [address] [place] [address]

## 2022-12-12 NOTE — CARE COORDINATION
Susan Lopes to inquire about admission. Left voicemail asking for a return call. Received a call from nik at [facility] and they are able to accept the patient today. Notified Dr. Jean Bray about acceptance. Awaiting to see if if patient is ready for discharge. Patient is straight Medicare. No precert needed. Discharge [id number] [facility]  Clinical Case Management Department  Written by: Celaya RN    Patient Name: Ricki Espinoza  Attending Provider: Marcos Poon MD  Admit Date: 2022  6:06 PM  MRN: [id number]  Account: [de-identified]                     : 1945  Discharge Date: 2022       Disposition: [facility]  Patient is A&O she is aware of  time and destination and is agreeable.      Melani Castro, RN

## 2022-12-12 NOTE — PLAN OF CARE
Problem: Discharge Planning  Goal: Discharge to home or other facility with appropriate resources  Outcome: Completed     Problem: Pain  Goal: Verbalizes/displays adequate comfort level or baseline comfort level  Outcome: Completed     Problem: Skin/Tissue Integrity  Goal: Absence of new skin breakdown  Description: 1. Monitor for areas of redness and/or skin breakdown  2. Assess vascular access sites hourly  3. Every 4-6 hours minimum:  Change oxygen saturation probe site  4. Every 4-6 hours:  If on nasal continuous positive airway pressure, respiratory therapy assess nares and determine need for appliance change or resting period.   Outcome: Completed     Problem: ABCDS Injury Assessment  Goal: Absence of physical injury  Outcome: Completed     Problem: Skin/Tissue Integrity - Adult  Goal: Incisions, wounds, or drain sites healing without S/S of infection  Outcome: Completed     Problem: Chronic Conditions and Co-morbidities  Goal: Patient's chronic conditions and co-morbidity symptoms are monitored and maintained or improved  Outcome: Completed     Problem: Safety - Adult  Goal: Free from fall injury  Outcome: Completed

## 2022-12-12 NOTE — PROGRESS NOTES
[address]    Patient Name: Fernando Casas  MRN: [id number]   Date of Admit: 12/12/2022  Time of Arrival: [address]    Patient admitted to the Acute Inpatient Rehabilitation Unit via Stretcher    Patient oriented to room, unit and fall prevention safety measures. Education provided on the rehabilitation routine and therapy schedules. Drug / Medication Regimen Review   Admitting medication orders compared with acute stay medications; home medication list reviewed with patient/family. Medication Issues Identified ? No If Yes, Check All That Apply   []  Allergy to medication   []  Drug interactions (drug/drug, drug/food, drug/disease interactions)   []  Duplicate drug   []  Omission (drug missing from prescribed regimen)   []  Non adherence   []  Adverse reaction   []  Wrong patient, drug, dose, route, time error   []  Ineffective drug therapy       High-Risk Drug Classes: Use and Indication   Check if the patient is taking any medications by pharmacological classification If yes, check if there is an indication noted for all meds in the drug class   Antipsychotic No If yes: indication noted? [] If no indication noted, follow up with provider for order clarification   Anticoagulant Yes If yes: indication noted? [x]    Antibiotic No If yes: indication noted? []    Opioid Yes If yes: indication noted? [x]    Antiplatelet Yes If yes: indication noted? [x]    Hypoglycemic (Including Insulin) Yes If yes: indication noted? [x]      Attending Physical Medicine & Rehabilitation (PM&R) Admitting Order Review   Admission orders reviewed with Acute Inpatient Rehabilitation Attending PM&R Physician: Austin Casillas MD     Skin Assessment   Skin assessment performed by two nurses: all active alterations in skin integrity, wounds, lines, drains and airways assessed, measured, recorded and reconciled. Refer to [facility] avatar and LDA flowsheet for additional information. Nurse 1 Completing Skin Assessment Renee Hanks LPN   Nurse 2 Completing Skin Assessment Johnnie Amor RN     Active pressure injuries or complex wounds identified? No  If yes: contact provider for wound care consult order []       Admission Weight   Patient's Weight Upon Admission  267.2       Bowel and Bladder Functional Assessment   Prior history of bladder problems:  no problems with bladder   Number of pads used per day: Unknown at this time   Frequency of night time voiding: Unknown at this time   Fluid intake volume and pattern: Unknown at this time   Last Bowel Movement 12/11/22   Prior history of bowel problems:  No If Yes, Check All That Apply  []Incontinence   []Frequent Diarrhea  []Constipation   []Hemorrhoids  []Diverticulitis  []Bowel Surgery     Pain Assessment   Over the past 5 days, how much of the time has pain made it hard for you to sleep at night? Rarely or not at all   Over the past 5 days, how often have you limited your participation in rehabilitation therapy sessions due to pain? Rarely or not at all   Over the past 5 days, how often have you limited your day-to-day activities (excluding rehabilitation therapy session)? Rarely or not at all     Special Treatments, Procedures, and Programs   Check all of the following treatments, procedures, and programs that apply on admission.     Cancer Treatments   Chemotherapy No   If Yes, Check All That Apply   []IV Chemotherapy   []Oral Chemotherapy    []Chemotherapy    Radiation No   Respiratory Therapies   Oxygen Therapy No  If Yes, Check All That Apply   []Continuous   []Intermittent   []High-Concentration    Suctioning No  If Yes, Check All That Apply   []Scheduled   []As Needed   Tracheostomy Care No   Invasive Mechanical Ventilator   (Ventilator or Respirator) No  If Yes, Check All That Apply   []Non-invasive Mechanical Ventilator   []BiPap   []CPAP   Other   IV Medications No  If Yes, Check All That Apply   []IV Vasoactive Medications   []IV Antibiotics   []IV Anticoagulation   []Other IV Medications   Transfusions No   Dialysis No  If Yes, Check All That Apply   []Hemodialysis   []Peritoneal Dialysis   IV Access No  If Yes, Check All That Apply   []Peripheral   []Midline   [](PICC, tunneled, port)       Admission folder with the following documents provided to patient/responsible party:   1. \"[facility] Individualized Disclosure Statement\"  2. \"Data Collection Information Summary for Patients in Inpatient Rehabilitation Facilities\"  3. \"Privacy Act Statement - Health Care Records\"    Care plan was created with patient/responsible party input and goals were agreed upon. Please refer to the admission navigator for further information.

## 2022-12-12 NOTE — CARE COORDINATION
ACUTE INPATIENT REHABILITATION  Financial Disclosure Statement: Eligibility and Benefit Verification    Patient Name: Erika Sims MRN: [id number]     Disclosure [address] [facility] at [facility] provides 24 hour individualized service to patients with functional limitations due to, but not limited to stroke, brain injury, spinal cord injury, major multiple trauma, hip fracture, amputation, and neurological disorders. Acute Inpatient Rehabilitation provides rehabilitative nursing, physician coverage, as well as physical therapy, occupational therapy, speech therapy, recreational therapy and other services as deemed necessary by our Board Certified Physical Medicine and [employer] [address] [place], Dr. Atif Salinas and Dr. Charissa Philippe and Dr. Vesta Mcclendon. [facility]) Acute Inpatient Rehabilitation at [facility] is fully accredited by the Commission on Accreditation of Rehabilitation Facilities (CARF) and The Joint Commission (TJC). At a minimum, you will receive 5 days of therapy services totaling at least 15 hours per week. Your treatment program will consist of physical therapy at least 7.5 hours per week; occupational therapy 7.5 hours per week; and speech therapy 1.5 hours per week, as applicable. Your estimated length of stay is currently:  7-10 days. Your insurance coverage has been verified as follows:   Primary Insurance: Medicare   Deductible: $ 1556                       Coverage:  Per Medicare Benefit Period  Days 1-60:  $0 Co-Insurance  Days 61-90:  $389/day Co-Insurance  Days 91 and beyond:  $778/day Co-Insurance      Secondary Insurance: [employer] often cover co-payments amounts. Please contact your insurance company to verify benefits. [facility]: [phone] for all billing questions.     Thank You for choosing [address] [place] [address] at [facility]. Revised 10/04/2022

## 2022-12-12 NOTE — PROGRESS NOTES
[facility]  Office: [address], DO, Lio Sep, DO, William Heart, DO, Cheryl Solders Blood, DO, Arely Segundo MD, James Shahid MD, Wilver Jones MD, Edith Mcclendon MD,  Cristian Avalos MD, Eliel Nation MD, Galina James, DO, Dayday Thorne MD,  Margarita Smith MD, Varun Bernstein MD, Bing Boxer, DO, Vladislav Lazcano MD, Corky Garcia MD, Ludwig Bah, DO, Matty Rosales MD, Tmia Metz MD, Jacob Mcneal MD, Jennifer Slaughterr, MD, Kelsy Lewis DO, Ben Smart MD, Yahir Stevens MD, Moncho López, CNP,  Stalin Thomas, CNP, Kristen Hutchinson, CNP, Sirisha Hightower, CNP,  Bello Muñoz, [facility], Miguelina Moreno, CNP, Lashell Dominguez, CNP, Beryl Rodriguez, CNP, Chito Anderson, CNP, Jong Vivas, CNP, Masha Pachecor, PAFAB, Dharmesh Drummond, [facility], Briseyda Prabhakar, [facility], [facility], [address]      Daily Progress Note     Admit Date: 12/5/2022  Bed/Room No.  7270/7682-34  Admitting Physician : Wilver Jones MD  Code Status :Full Code  Hospital Day:  LOS: 7 days   Chief Complaint:     Chief Complaint   Patient presents with    Ankle Injury     Right ankle fracture      Principal Problem:    Open fracture of right tibia and fibula, sequela  Active Problems:    Type III open trimalleolar fracture of right ankle    Hyperkalemia    Hyponatremia    Metabolic acidosis    Fall    Stage 4 chronic kidney disease ([facility] [address]    Acute kidney injury superimposed on chronic kidney disease ([address]  Resolved Problems:    * No resolved hospital problems. *    Subjective : Interval History/Significant events :  12/12/22    Patient is having difficulty in ambulating. Patient was transferred from bed to chair using [employer] steady. She is nonweightbearing in right leg. Patient has no new complaints. She denies any constipation, abdominal pain, nausea, vomiting. Leg pain is controlled with Norco and she takes every 6 hours.     Vitals - Stable afebrile  Labs -creatinine 3.99, hemoglobin 7.8. Nursing notes , Consults notes reviewed. Overnight events and updates discussed with Nursing staff . Background History:         Chloe Reynolds is 69 y.o. female  Who was admitted to the hospital on 12/5/2022 for treatment of Open fracture of right tibia and fibula, sequela. Patient presented to the emergency room with right ankle injury and suffered a right ankle fracture after falling over a curb when she was trying to go to [facility] for [employer]. She has underlying history of A. fib on Eliquis, CHF, diabetes mellitus with CKD3, hypertension, osteoporosis, fibromyalgia, morbid obesity BMI 43, sleep apnea, depression. Patient underwent I&D and ORIF on 12/6/2022. Patient also developed acute kidney injury with hyperkalemia and metabolic acidosis. Nephrology was consulted and patient given DANIA [facility]. Patient was given 1 unit PRBC transfusion on 12/11/2022 for low hemoglobin of 6.4. PMH:  Past Medical History:   Diagnosis Date    Abnormal stress test     Anemia     Arthritis     Depression     Diverticulosis     DM (diabetes mellitus) ([address]     Erythropenia     Fibromyalgia     HTN (hypertension)     Hyperlipidemia     Kidney stone     Morbid obesity due to excess calories (HCC)     DIONY on CPAP     Osteopenia 03/03/14    Seasonal allergies     Sleep apnea     uses bipap prn      Allergies:    Allergies   Allergen Reactions    Adhesive Tape     Cephalexin Other (See Comments)     Tongue cracks and peels    Erythromycin Other (See Comments)     Epigastric pain and bloating    Ketorolac Tromethamine Other (See Comments)     Severe stomach cramps    Pcn [Penicillins]      Unknown reaction    Percocet [Oxycodone-Acetaminophen] Itching and Other (See Comments)     Esophagus hurt    Sulfa Antibiotics     Ultracet [Tramadol-Acetaminophen] Itching      Medications :  allopurinol, 100 mg, Oral, Daily  calcitRIOL, 0.25 mcg, Oral, Daily  colchicine, 0.6 mg, Oral, Daily  apixaban, 5 mg, Oral, BID  ferrous sulfate, 325 mg, Oral, Daily with breakfast  magnesium oxide, 400 mg, Oral, BID  traZODone, 100 mg, Oral, Nightly  Vitamin D, 2,000 Units, Oral, Daily  ferrous sulfate, 325 mg, Oral, BID WC  zolpidem, 5 mg, Oral, Nightly  insulin lispro, 0-4 Units, SubCUTAneous, Nightly  insulin lispro, 0-16 Units, SubCUTAneous, TID WC  sodium chloride flush, 5-40 mL, IntraVENous, 2 times per day  polyethylene glycol, 17 g, Oral, Daily  amiodarone, 200 mg, Oral, Daily  atorvastatin, 40 mg, Oral, Daily  gabapentin, 600 mg, Oral, Daily  hydrALAZINE, 100 mg, Oral, TID  pantoprazole, 40 mg, Oral, QAM AC  rOPINIRole, 0.25 mg, Oral, Nightly  methocarbamol, 750 mg, Oral, Q8H  metoprolol tartrate, 25 mg, Oral, BID      Review of Systems   Review of Systems   Constitutional:  Positive for fatigue. Negative for appetite change, fever and unexpected weight change. HENT:  Negative for congestion, rhinorrhea and sneezing. Eyes:  Negative for visual disturbance. Respiratory:  Negative for cough, chest tightness, shortness of breath and wheezing. Cardiovascular:  Negative for chest pain and palpitations. Gastrointestinal:  Negative for abdominal pain, blood in stool, constipation, diarrhea, nausea and vomiting. Genitourinary:  Negative for dysuria, enuresis, frequency and hematuria. Musculoskeletal:  Negative for arthralgias and myalgias. Right leg pain. Skin:  Negative for rash. Neurological:  Negative for dizziness, weakness, light-headedness and headaches. Hematological:  Does not bruise/bleed easily. Psychiatric/Behavioral:  Negative for dysphoric mood and sleep disturbance.     Objective :      Current Vitals : Temp: 98 °F (36.7 °C),  Heart Rate: 66, Resp: 16, BP: (!) 139/59, SpO2: 94 %  Last 24 Hrs Vitals   Patient Vitals for the past 24 hrs:   BP Temp Temp src Pulse Resp SpO2   12/12/22 0731 (!) 139/59 98 °F (36.7 °C) Oral 66 16 94 %   12/11/22 2000 (!) 159/56 98.1 °F (36.7 °C) Oral 69 16 98 %   12/11/22 1601 (!) 127/50 98.6 °F (37 °C) Temporal 61 16 100 %   12/11/22 1223 (!) 156/53 98.4 °F (36.9 °C) Oral -- 16 --   12/11/22 1220 (!) 131/104 98.6 °F (37 °C) Oral 63 16 --   12/11/22 1202 (!) 147/53 98.6 °F (37 °C) -- 61 16 94 %   12/11/22 1144 (!) 147/53 98.6 °F (37 °C) Oral -- 16 94 %   12/11/22 0915 (!) 140/59 -- -- -- -- --   12/11/22 0801 (!) 212/88 98.4 °F (36.9 °C) Oral 66 16 94 %       Intake / output   12/10 1901 - 12/12 0700  In: 3323.3 [P.O.:600; I.V.:2001.7]  Out: 3200 [Urine:3200]  Physical Exam:  Physical Exam  Vitals and nursing note reviewed. Constitutional:       General: She is not in acute distress. Appearance: She is morbidly obese. She is not diaphoretic. Interventions: Nasal cannula in place. HENT:      Head: Normocephalic and atraumatic. Nose:      Right Sinus: No maxillary sinus tenderness or frontal sinus tenderness. Left Sinus: No maxillary sinus tenderness or frontal sinus tenderness. Mouth/Throat:      Pharynx: No oropharyngeal exudate. Eyes:      General: No scleral icterus. Conjunctiva/sclera: Conjunctivae normal.      Pupils: Pupils are equal, round, and reactive to light. Neck:      Thyroid: No thyromegaly. Vascular: No JVD. Cardiovascular:      Rate and Rhythm: Normal rate and regular rhythm. Pulses:           Dorsalis pedis pulses are 2+ on the right side and 2+ on the left side. Heart sounds: Normal heart sounds. No murmur heard. Pulmonary:      Effort: Pulmonary effort is normal.      Breath sounds: Normal breath sounds. No wheezing or rales. Abdominal:      Palpations: Abdomen is soft. There is no mass. Tenderness: There is no abdominal tenderness. Musculoskeletal:      Cervical back: Full passive range of motion without pain and neck supple. Comments: Right leg splint in place. Lymphadenopathy:      Head:      Right side of head: No submandibular adenopathy.       Left side of head: No submandibular adenopathy. Cervical: No cervical adenopathy. Skin:     General: Skin is warm. Neurological:      Mental Status: She is alert and oriented to person, place, and time. Motor: No tremor. Psychiatric:         Behavior: Behavior is cooperative. Laboratory findings:    Recent Labs     12/10/22  0637 12/10/22  0754 12/11/22  1900   WBC 11.3  --   --    HGB 6.4* 6.6* 7.8*   HCT 21.7* 21.2* 23.8*     --   --        Recent Labs     12/10/22  0637 12/11/22  0431 12/12/22  0602   * 134* 130*   K 4.5 4.5 4.4   CL 94* 93* 91*   CO2 23 27 28   GLUCOSE 181* 140* 135*   BUN 86* 80* 75*   CREATININE 4.07* 4.13* 3.99*   CALCIUM 8.7 8.7 9.3       No results for input(s): PROT, LABALBU, LABA1C, I3GKNTG, J3VITZQ, FT4, TSH, AST, ALT, LDH, GGT, ALKPHOS, BILITOT, BILIDIR, AMMONIA, AMYLASE, LIPASE, LACTATE, CHOL, HDL, LDLCHOLESTEROL, CHOLHDLRATIO, TRIG, VLDL, BNP, TROPONINI, CKTOTAL, CKMB, CKMBINDEX, RF, FAHEEM in the last 72 hours. Specific Gravity, UA   Date Value Ref Range Status   04/01/2022 1.010 1.005 - 1.030 Final     Protein, UA   Date Value Ref Range Status   04/01/2022 TRACE (A) NEGATIVE Final     RBC, UA   Date Value Ref Range Status   04/01/2022 0 TO 2 0 - 4 /HPF Final     Comment:     Reference range defined for non-centrifuged specimen. Blood, UA POC   Date Value Ref Range Status   04/18/2017 neg  Final     Nitrite, Urine   Date Value Ref Range Status   04/01/2022 NEGATIVE NEGATIVE Final     WBC, UA   Date Value Ref Range Status   04/01/2022 0 TO 2 0 - 5 /HPF Final     Leukocyte Esterase, Urine   Date Value Ref Range Status   04/01/2022 NEGATIVE NEGATIVE Final       Imaging / Clinical Data :-   CT ABDOMEN PELVIS WO CONTRAST Additional Contrast? None    Result Date: 12/6/2022  No evidence of fracture or osseous malalignment in the lumbar spine, pelvic bones and joints including bilateral hip joints.   No evidence of fracture in the superior pubic rami and inferior ischiopubic rami of both sides or in bilateral pubic bones. No fracture in bilateral acetabula or ischial bones. Evidence of mild to moderate multilevel degenerative disc disease and facet osteoarthritis in the lumbar spine and lumbosacral junction. Nonspecific small amount of gas scattered in multiple loops of small bowel without significant fluid levels. No distension or dilation of distal small bowel. Normal appendix visualized. Small to moderate amount of stool and gas scattered in the colon. Evidence of moderate diverticulosis coli of descending colon and sigmoid colon, without evidence of diverticulitis. No evidence of renal or ureteric calculus or obstructive uropathy. No diagnostic finding in the liver, gallbladder, spleen, pancreas and adrenal glands. XR PELVIS (1-2 VIEWS)    Result Date: 12/5/2022  Potential nondisplaced fracture of the right superior and inferior pubic rami. XR KNEE RIGHT (3 VIEWS)    Result Date: 12/5/2022  Total knee arthroplasty without complication. No acute osseous or soft tissue abnormality. XR TIBIA FIBULA RIGHT (2 VIEWS)    Result Date: 12/5/2022  Trimalleolar fracture with diffuse soft tissue swelling. XR ANKLE RIGHT (2 VIEWS)    Result Date: 12/5/2022  Ankle fracture subluxation with persistent mild lateral subluxation of the talus in relation to the tibia. XR ANKLE RIGHT (MIN 3 VIEWS)    Result Date: 12/6/2022  Anatomic alignment of comminuted ankle fracture status post ORIF. XR ANKLE RIGHT (MIN 3 VIEWS)    Result Date: 12/5/2022  Trimalleolar right ankle is fracture subluxation with improved positioning of the talus in relation to the tibia and now with mild posterior subluxation of the talus in relation to the tibia. XR ANKLE RIGHT (MIN 3 VIEWS)    Result Date: 12/5/2022  Trimalleolar fracture with associated soft tissue swelling.      CT PELVIS WO CONTRAST Additional Contrast? None    Result Date: 12/6/2022  No evidence of fracture in pelvic bones or bilateral hip joints. No evidence of fracture in superior pubic rami or inferior ischiopubic rami of both sides. No fracture in bilateral acetabulum or in visualized bilateral proximal femurs. Within the pelvic cavity, there is no evidence of hemorrhage or abnormal fluid collection or acute process. Mild to moderate sigmoid diverticulosis, without evidence of diverticulitis. CT ANKLE RIGHT WO CONTRAST    Result Date: 12/6/2022  Prominent oblique fracture in coronal orientation with large gap at the fracture involving the posterior portion of distal end of right tibia with distal intra-articular extension of the fracture. Comminuted transverse fracture through the base of the medial malleolus of the tibia. Grossly comminuted oblique fracture in the distal shaft of right fibula. Moderate posterior subluxation of the talus bone due to distal posterior tibial fracture. Some small intra-articular fracture fragments in the anterior portion of the tibiotalar and talofibular joint. The distal tibiofibular syndesmosis is grossly intact. No evidence of fracture in the right calcaneus bone, talus bone and distal tarsal bones. Subtalar joint is intact. Evidence of soft tissue emphysema in the lateral portion of subtalar joint. The calcaneal tendon appears to be grossly intact. The long tendons at the medial, lateral and anterior aspect of right ankle joint are grossly intact. XR CHEST PORTABLE    Result Date: 12/5/2022  Cardiomegaly without acute pulmonary process. Clinical Course : stable  Assessment and Plan  :        Right ankle fracture secondary to fall with underlying osteoporosis  - orthopedic surgery following. S/p  ORIF 12/6/22. Acute superimposed on chronic kidney disease stage IV  -Baseline creatinine seems to be ~ 3 to 3.2. Nephrology following. No indication for dialysis. Essential hypertension -well-controlled.   Paroxysmal atrial fibrillation - resume  Eliquis,-continue amiodarone, Lopressor. 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    Plan discussed with Staff Amy MCKINLEY     (Please note that portions of this note were completed with a voice recognition program. Efforts were made to edit the dictations but occasionally words are mis-transcribed.)      Ronn Lang MD  12/12/2022

## 2022-12-12 NOTE — PLAN OF CARE
Problem: Safety - Adult  Goal: Free from fall injury  Outcome: Progressing  Flowsheets (Taken 12/12/2022 1838)  Free From Fall Injury: Instruct family/caregiver on patient safety  Note: Patient remains free of falls and injuries throughout shift. Bed remains in the lowest position, wheels locked, call light and bedside table are within reach. Problem: Pain  Goal: Verbalizes/displays adequate comfort level or baseline comfort level  12/12/2022 1838 by Suzanna Christie RN  Outcome: Progressing     Problem: Skin/Tissue Integrity  Goal: Absence of new skin breakdown  Description: 1. Monitor for areas of redness and/or skin breakdown  2. Assess vascular access sites hourly  3. Every 4-6 hours minimum:  Change oxygen saturation probe site  4. Every 4-6 hours:  If on nasal continuous positive airway pressure, respiratory therapy assess nares and determine need for appliance change or resting period.   Outcome: Progressing     Problem: Discharge Planning  Goal: Discharge to home or other facility with appropriate resources  Outcome: Progressing

## 2022-12-12 NOTE — PLAN OF CARE
Problem: Discharge Planning  Goal: Discharge to home or other facility with appropriate resources  12/11/2022 2150 by Linh Medrano RN  Outcome: Progressing  12/11/2022 1756 by Albert Crooks RN  Outcome: Progressing     Problem: Pain  Goal: Verbalizes/displays adequate comfort level or baseline comfort level  12/11/2022 2150 by Linh Medrano RN  Outcome: Progressing  12/11/2022 1756 by Albert Crooks RN  Outcome: Progressing     Problem: Skin/Tissue Integrity  Goal: Absence of new skin breakdown  Description: 1. Monitor for areas of redness and/or skin breakdown  2. Assess vascular access sites hourly  3. Every 4-6 hours minimum:  Change oxygen saturation probe site  4. Every 4-6 hours:  If on nasal continuous positive airway pressure, respiratory therapy assess nares and determine need for appliance change or resting period.   12/11/2022 2150 by Linh Medrano RN  Outcome: Progressing  12/11/2022 1756 by Albert Crooks RN  Outcome: Progressing     Problem: ABCDS Injury Assessment  Goal: Absence of physical injury  12/11/2022 2150 by Linh Medrano RN  Outcome: Progressing  12/11/2022 1756 by Albert Crooks RN  Outcome: Progressing     Problem: Skin/Tissue Integrity - Adult  Goal: Incisions, wounds, or drain sites healing without S/S of infection  12/11/2022 2150 by Linh Medrano RN  Outcome: Progressing  12/11/2022 1756 by Albert Crooks RN  Outcome: Progressing     Problem: Musculoskeletal - Adult  Goal: Return mobility to safest level of function  12/11/2022 2150 by Linh Medrano RN  Outcome: Progressing  12/11/2022 1756 by Albert Crooks RN  Outcome: Progressing     Problem: Chronic Conditions and Co-morbidities  Goal: Patient's chronic conditions and co-morbidity symptoms are monitored and maintained or improved  12/11/2022 2150 by Linh Medrano RN  Outcome: Progressing  12/11/2022 1756 by Albert Crooks RN  Outcome: Progressing     Problem: Safety - Adult  Goal: Free from fall injury  12/11/2022 2150 by Paradise Whittington RN  Outcome: Progressing  12/11/2022 1756 by Nanci Greer RN  Outcome: Progressing

## 2022-12-12 NOTE — CARE COORDINATION
Pre-Admission Assessment completed and co-signed by PM&R physiatrist Dr. Edward Dill. Preadmission assessment valid for 48 hours after co-signature. Discharging provider responsible for Discharge/Readmit medication reconciliation action. Status: Completed. Orders verified as signed/held, will be released by Acute Inpatient Rehabilitation upon admission    DVT Prophylaxis Plan:  Eliquis to be continued for DVT Prophylaxis     Discharging nurse to call nursing report to Acute Inpatient Rehabilitation nursing team at extension [phone] before patient departure. Transportation Time: 4:00 pm    Updated Alix Jim CM: via voicemail at this time.

## 2022-12-12 NOTE — PROGRESS NOTES
Physical Therapy  Facility/Department: [address] ORTHO/MED SURG  Daily Treatment Note    Name: Prosper Richardson  : 1945  MRN: [id number]  Date of Service: 2022    Discharge Recommendations:  Patient would benefit from continued therapy after discharge   PT Equipment Recommendations  Equipment Needed: No      Patient Diagnosis(es): The primary encounter diagnosis was Type III open trimalleolar fracture of right ankle, initial encounter. Diagnoses of Fall, initial encounter, Lymphedema, and Primary insomnia were also pertinent to this visit. Past Medical History:  has a past medical history of Abnormal stress test, Anemia, Arthritis, Depression, Diverticulosis, DM (diabetes mellitus) ([facility] [address] Erythropenia, Fibromyalgia, HTN (hypertension), Hyperlipidemia, Kidney stone, Morbid obesity due to excess calories ([facility] [address] DIONY on CPAP, Osteopenia, Seasonal allergies, and Sleep apnea. Past Surgical History:  has a past surgical history that includes Colonoscopy (2003); Colonoscopy (2007); Tubal ligation; Arm Surgery (2011); Total knee arthroplasty (Bilateral); Cardiac catheterization ([id number] dr Fela Mcclure); Cardiac catheterization (2016); ORIF ankle fracture (Right, 2022); and Ankle fracture surgery (Right, 2022). Assessment   Body Structures, Functions, Activity Limitations Requiring Skilled Therapeutic Intervention: Decreased functional mobility ; Decreased strength;Decreased endurance  Assessment: Pt performed STS transfer x2 with maxA using the SS along with repeat VC's for weight bearing restrictions with good return. Pt demos a standing tolerance of 2 mins on each attempt before required seated rest break. Pt would benefit from continued PT following discharge to address functional deficits.   Therapy Prognosis: Good  Decision Making: Medium Complexity  Requires PT Follow-Up: Yes  Activity Tolerance  Activity Tolerance: Patient limited by fatigue;Patient limited by endurance Plan   Physcial Therapy Plan  General Plan:  (5-6x wk)  Current Treatment Recommendations: Strengthening, Functional mobility training, Transfer training, Endurance training, Stair training, Gait training, Safety education & training  Safety Devices  Type of Devices: Nurse notified, Gait belt, Left in chair, Call light within reach  Restraints  Restraints Initially in Place: No     Restrictions  Restrictions/Precautions  Restrictions/Precautions: Weight Bearing  Required Braces or Orthoses?: No  Lower Extremity Weight Bearing Restrictions  Right Lower Extremity Weight Bearing: Non Weight Bearing  Position Activity Restriction  Other position/activity restrictions: Per chart: Right open trimalleolar fracture ankle fracture dislocation s/p I&D and ORIF, DOS 12/6/2022     Subjective   General  Chart Reviewed: Yes  Response To Previous Treatment: Patient with no complaints from previous session. Family / Caregiver Present: No  Follows Commands: Within Functional Limits  General Comment  Comments: Pt supine in bed upon arrival/ retired to sitting upright in chair post PT. Subjective  Subjective: Pt has no c/o pain at this time. Cognition   Orientation  Overall Orientation Status: Within Functional Limits  Orientation Level: Oriented X4  Cognition  Overall Cognitive Status: WFL     Objective   Bed mobility  Supine to Sit: Stand by assistance  Sit to Supine: Stand by assistance  Scooting: Stand by assistance  Transfers  Sit to Stand: Maximum Assistance  Stand to Sit: Moderate Assistance        Balance  Posture: Good  Sitting - Static: Good  Sitting - Dynamic: Fair  Standing - Static: Poor  Standing - Dynamic: Poor  Comments: Assessed with SS.  A/AROM Exercises: ankle pumps x20 LLE, LAQs x20 LLE.           AM-PAC Score  AM-PAC Inpatient Mobility Raw Score : 13 (12/12/22 1341)  AM-PAC Inpatient T-Scale Score : 36.74 (12/12/22 1341)  Mobility Inpatient CMS 0-100% Score: 64.91 (12/12/22 1341)  Mobility Inpatient CMS G-Code Modifier : CL (12/12/22 1246)        Goals  Short Term Goals  Time Frame for Short Term Goals: 10 visits  Short Term Goal 1: transfers with SBA  Short Term Goal 2: amb 25 ft wiuth a RW x CGA with NWB R LE  Short Term Goal 3: to be independent with bed mobility  Short Term Goal 4: 20 min exercise program x SBA         Therapy Time   Individual Concurrent Group Co-treatment   Time In 0945         Time Out 1023         Minutes 38         Timed Code Treatment Minutes: [address], [facility]

## 2022-12-12 NOTE — CARE COORDINATION
[address] [place] [address]  PRE-ADMISSION ASSESSMENT    Patient Name: Merrell Dubin        MRN: [id number]    : 1945  (69 y.o.)  Gender: female   Ethnicity: Not , /a or Italian Origin  Race: White    Admitted from:  [facility]     Type of Admission:  New Admission     Date of Onset / Admission to the [address] [place] [address]:  2022    Inpatient Rehabilitation Admitting Diagnosis:  Ankle Fracture    Did patient have surgery/procedures? Yes, As Listed Below   2022: 1. Open reduction internal fixation right trimalleolar ankle fracture  with posterior malleolus fixation. 2.  Open treatment without fixation, right talus. 3.  Irrigation and excisional debridement of skin down to and including  the bone of right open ankle fracture. 4.  Complex wound closure right ankle measuring 10 cm.  5.  Stress examination under anesthesia right ankle with independent  interpretation of imaging. Physicians:   Chantell Stone DO   Physician   Cardiology     Airam Darnell MD    Physician   Charissa Jordan MD   Physician   General Surgery     Jessica Richard MD   Physician   Salima Wright MD   Physician   Nephrology     Bibi Hernandez MD   Physician   Nephrology     Malcolm Rodas MD   Physician   Nephrology     Mike Infante DO   Physician   Orthopedic Surgery     Arik Looney MD   Physician   Trauma     Risk for Clinical Complications:   Moderate     Co-morbidities:    Open trimalleolar fracture, ankle fracture dislocation status post ORIF in , right talus open treatment-nonweightbearing right lower extremity  A. fib-Eliquis amiodarone  CKD/JOSE ARMANDO hyperkalemia-Per nephrology high risk of needing dialysis-creatinine 3.99-will need continued nephrology follow-up  Type2 diabetes with neuropathy  Hypertension/hyperlipidemia Lipitor, hydralazine, Toprol  Anemia hemoglobin down to down to 6.4 now 7.8-iron-status post transfusion  Fibromyalgia  BMI 43.60  Sleep apnea  Pain-Norco, Neurontin, Robaxin,  Paroxysmal A. fib-Eliquis resumed  Hyponatremia sodium 130    Financial Information  Primary insurance: Medicare     Secondary Insurance: Commercial Insurance:BCBS OUT OF STATE      Precautions:   []Cardiac Precautions: No Cardiac Precautions  []Total hip precautions: No Total Hip Precautions  []Weight Bearing status: Yes: NWB R LE   [x]Safety Precautions/Concerns:  Fall Risk, General Precautions  [x]Visually impaired: Yes: Wears glasses at all times    []Hard of Hearing: Within Functional Limits       Communication Aids, Devices or Interpretation Services Required: None    Isolation Precautions:  None: Standard Precautions       Physiatrist: Dr. Anastacia Lyon      Patients Occupation: Retired    Reviewed Lab and Diagnostic reports from Current Admission: Yes    Patients Prior Functional  Level: Prior Function  Receives Help From: Family  ADL Assistance: Independent  Homemaking Assistance: Needs assistance  Ambulation Assistance: Independent  Transfer Assistance: Independent    History of current illness, per PM&R Consult:  Ms. Mica Durán is a 69 y.o. female who was admitted to [facility] on 12/5/2022 with Ankle Injury (Right ankle fracture )     79-year-old female with history of A. fib on Eliquis, bilateral knee arthroplasty, CHF, CKD, type 2 diabetes, and hypertension as well as chronic numbness of her feet status post fall over a curb found to have displaced right trimalleolar ankle fracture with large posterior malleolus and comminuted lateral malleolus fracture.   -. right open trimalleolar ankle fracture dislocation     Neurology-JOSE ARMANDO on CKD stage III secondary diabetic nephrosclerosis-low potassium low phosphorus diet, continue bicarb and Lokelma high risk for needing dialysis this admission     Orthopedics-right open trimalleolar fracture ankle fracture dislocation status post I&D and ORIF on 12/6, right talus open treatment-continue splint right lower extremity, nonweightbearing, on heparin for DVT prophylaxis follow-up with Dr. Bailon Paula    Prognosis: Fair    Current functional status for upper extremity ADLs:  UE Bathing: Stand by assistance, Setup  UE Dressing: Stand by assistance    Current functional status for lower extremity ADLs:  LE Bathing: Moderate assistance, Increased time to complete, Setup, Verbal cueing  LE Dressing: Maximum assistance, Increased time to complete, Verbal cueing    Current functional status for bed, chair, wheelchair transfers:  Transfers  Sit to Stand: Moderate Assistance, 2 Person Assistance  Stand to Sit: Moderate Assistance, 2 Person Assistance  Comment: Completed with use of SS along with VC's for sequencing/ hand placement and weight bearing restrictions with good return. Current functional status for toilet transfers:       Current functional status for locomotion:  Ambulation  WB Status: NWB R LE  Ambulation  Surface: Level tile  Device: Rolling Walker  Assistance: Moderate assistance, 2 Person assistance  Distance: sit to stand with mod assist x 2    Current functional status for comprehension: [facility]    Current functional status for expression: WFL    Current functional status for social interaction: WFL    Current functional status for problem solving: WFL    Current functional status for memory: WFL    Current Deficits R/T Impairment: Impaired Functional Mobility and Decreased ADLs    Required Therapy Services:  Physical Therapy: Yes  Occupational Therapy: Yes  Speech Therapy: Not Indicated At This Time      Additional Services:    [x] /Case Management    [] Recreational Therapy, as appropriate    [x] Nutrition Consult, as appropriate  [x] Dietary Needs/Preferences: Specialized Dietary Needs/Preferences indicated as follows: ADULT DIET; Regular; Low Sodium (2 gm); Low Potassium (Less than 3000 mg/day);  Low Phosphorus (Less than 1000 mg)  [] Dialysis  [x] Cultural/Quaker Needs/Preferences: Cultural/Quaker Needs/Preferences indicated as follows: Roman Catholic  [] Special Equipment Needs  [] Special Medication Needs  [] Other information relevant to patient's care needs:    Preferred Language: English    Does the patient need or want an  to communicate with a doctor or health care staff? No    Rehab Justification:  Needs 3 hrs therapy per day or 15 hours per week:  Yes  Identified Rehab Nursing needs: Yes  Intense Interdisciplinary need:  Yes  Need for 24 hr physician supervision:  Yes  Measurable improved quality of life:  Yes  Willingness to participate:  Yes  Medical Necessity:  Yes  Patient able to tolerate care proposed:  Yes    Expected Discharge Destination/Functional Level:  Home with assist  Expected length of time to achieve that level of improvement: 1-2 weeks  Expected Post Discharge Treatments: Home with possible Home Care    Acute Inpatient Rehabilitation Disclosure Statement will be provided to patient upon admission to ARU with patient's verbalization of understanding. I have reviewed and concur with the findings and results of the pre-admission screening assessment completed by the Inpatient Rehabilitation Admissions Coordinator.

## 2022-12-12 NOTE — PROGRESS NOTES
Physical Medicine & Rehabilitation  Progress Note    12/12/2022 11:53 AM     CC: Ambulatory and ADL dysfunction due to open trimalleolar fracture, ankle fracture dislocation status post ORIF, right talus open treatment    Subjective:   No complaints. Feels better today participating with therapy. Notes having some spasms in right lower extremity    ROS:  Denies fevers, chills, sweats. No chest pain, palpitations, lightheadedness. Denies coughing, wheezing or shortness of breath. Denies abdominal pain, nausea, diarrhea or constipation. No new areas of joint pain. Denies new areas of numbness or weakness. Denies new anxiety or depression issues. No new skin problems. Rehabilitation:   PT:  Restrictions/Precautions: Weight Bearing  Other position/activity restrictions: Per chart: Right open trimalleolar fracture ankle fracture dislocation s/p I&D and ORIF, DOS 12/6/2022  Right Lower Extremity Weight Bearing: Non Weight Bearing   Transfers  Sit to Stand: Moderate Assistance, 2 Person Assistance  Stand to Sit: Moderate Assistance, 2 Person Assistance  Comment: Completed with use of SS along with VC's for sequencing/ hand placement and weight bearing restrictions with good return. WB Status: NWB R LE  Ambulation  Surface: Level tile  Device: Rolling Walker  Assistance: Moderate assistance, 2 Person assistance  Distance: sit to stand with mod assist x 2      OT:  ADL  Feeding: Independent  Grooming: Independent, Setup  Grooming Skilled Clinical Factors: Pt completed washing her face and hands in chair. Declined further ADLs d/t having completed earlier in the day. UE Bathing: Stand by assistance, Setup  LE Bathing: Moderate assistance, Increased time to complete, Setup, Verbal cueing  UE Dressing: Stand by assistance  LE Dressing: Maximum assistance, Increased time to complete, Verbal cueing  LE Dressing Skilled Clinical Factors: Sitting EOB, LBD to doff and don Left sock.   Toileting: Maximum assistance, Setup, Increased time to complete                      Bed mobility  Supine to Sit: Minimal assistance  Sit to Supine: Minimal assistance  Scooting: Minimal assistance  Transfers  Sit to stand: Moderate assistance, 2 Person assistance  Stand to sit: Moderate assistance, 2 Person assistance  Transfer Comments: Pt completed 1x functional sit<>stand transfer after significant encouragement. Pt reports being very fearful. Able to maintain NWB precaution during transfers. Use of SS. Did have BUEs on both SS bar. Mod A X2 d/t R LE WBing precautions. ST:        Objective:  BP (!) 132/58   Pulse 65   Temp 98.3 °F (36.8 °C) (Oral)   Resp 16   Ht 5' 5\" (1.651 m)   Wt 262 lb (118.8 kg)   LMP  (LMP Unknown)   SpO2 96%   BMI 43.60 kg/m²  I Body mass index is 43.6 kg/m². I   Wt Readings from Last 1 Encounters:   22 262 lb (118.8 kg)      Temp (24hrs), Av.4 °F (36.9 °C), Min:98 °F (36.7 °C), Max:98.6 °F (37 °C)         GEN: well developed, well nourished, no acute distress  HEENT: Normocephalic atraumatic, EOMI, mucous membranes pink and moist  CV: RRR, no murmurs, rubs or gallops  PULM: CTAB, no rales or rhonchi. Respirations WNL and unlabored  ABD: soft, NT, ND, +BS and equal  NEURO: A&O x3. Sensation intact to light touch. MSK: 4+/5 upper and left lower extremity, right lower extremity with cast  EXTREMITIES: No calf tenderness to palpation bilaterally. No edema BLEs left lower extremity, right lower extremity and cast neurovascular intact  SKIN: warm dry and intact with good turgor  PSYCH: appropriately interactive.          Medications   Scheduled Meds:   allopurinol  100 mg Oral Daily    calcitRIOL  0.25 mcg Oral Daily    colchicine  0.6 mg Oral Daily    apixaban  5 mg Oral BID    ferrous sulfate  325 mg Oral Daily with breakfast    magnesium oxide  400 mg Oral BID    traZODone  100 mg Oral Nightly    Vitamin D  2,000 Units Oral Daily    ferrous sulfate  325 mg Oral BID WC    zolpidem  5 mg Oral Nightly    insulin lispro  0-4 Units SubCUTAneous Nightly    insulin lispro  0-16 Units SubCUTAneous TID WC    sodium chloride flush  5-40 mL IntraVENous 2 times per day    polyethylene glycol  17 g Oral Daily    amiodarone  200 mg Oral Daily    atorvastatin  40 mg Oral Daily    gabapentin  600 mg Oral Daily    hydrALAZINE  100 mg Oral TID    pantoprazole  40 mg Oral QAM AC    rOPINIRole  0.25 mg Oral Nightly    methocarbamol  750 mg Oral Q8H    metoprolol tartrate  25 mg Oral BID     Continuous Infusions:   sodium chloride      dextrose      sodium chloride 10 mL/hr at 12/06/22 2057     PRN Meds:.sodium chloride, zolpidem, HYDROcodone-acetaminophen, glucose, dextrose bolus **OR** dextrose bolus, glucagon (rDNA), dextrose, sodium chloride flush, sodium chloride, ondansetron **OR** ondansetron, senna     Diagnostics:     CBC:   Recent Labs     12/10/22  0637 12/10/22  0754 12/11/22  1900   WBC 11.3  --   --    RBC 2.17*  --   --    HGB 6.4* 6.6* 7.8*   HCT 21.7* 21.2* 23.8*   .0  --   --    RDW 16.0*  --   --      --   --        BMP:   Recent Labs     12/10/22  0637 12/11/22  0431 12/12/22  0602   * 134* 130*   K 4.5 4.5 4.4   CL 94* 93* 91*   CO2 23 27 28   BUN 86* 80* 75*   CREATININE 4.07* 4.13* 3.99*       BNP: No results for input(s): BNP in the last 72 hours. PT/INR: No results for input(s): PROTIME, INR in the last 72 hours. APTT: No results for input(s): APTT in the last 72 hours. CARDIAC ENZYMES: No results for input(s): CKMB, CKMBINDEX, TROPONINT in the last 72 hours. Invalid input(s): CKTOTAL;3  FASTING LIPID PANEL:  Lab Results   Component Value Date    CHOL 178 01/11/2021    HDL 37 (L) 01/11/2021    TRIG 281 (H) 01/11/2021     LIVER PROFILE:   No results for input(s): AST, ALT, ALB, BILIDIR, BILITOT, ALKPHOS in the last 72 hours. I/O (24Hr):     Intake/Output Summary (Last 24 hours) at 12/12/2022 1153  Last data filed at 12/12/2022 0637  Gross per 24 hour   Intake 996.67 ml   Output 2200 ml   Net -1203.33 ml         Glu last 24 hour  Recent Labs     12/11/22  1128 12/11/22  1609 12/11/22 2013 12/12/22  1052   POCGLU 187* 140* 213* 153*         No results for input(s): CLARITYU, COLORU, PHUR, SPECGRAV, PROTEINU, RBCUA, BLOODU, BACTERIA, NITRU, WBCUA, LEUKOCYTESUR, YEAST, GLUCOSEU, BILIRUBINUR in the last 72 hours. Impression: Ms. Jacob Auguste is a 69 y.o. female with a history of Open fracture of right tibia and fibula, sequela     Open trimalleolar fracture, ankle fracture dislocation status post ORIF in 12/6, right talus open treatment-nonweightbearing right lower extremity  A. fib-Eliquis amiodarone  CKD/JOSE ARMANDO hyperkalemia-Per nephrology high risk of needing dialysis-creatinine 3.99-will need continued nephrology follow-up  Type2 diabetes with neuropathy  Hypertension/hyperlipidemia Lipitor, hydralazine, Toprol  Anemia hemoglobin down to down to 6.4 now 7.8-iron-status post transfusion  Fibromyalgia  BMI 43.60  Sleep apnea  Pain-Norco, Neurontin, Robaxin,  Paroxysmal A. fib-Eliquis resumed  Hyponatremia sodium 130     Recommendations:  1. Diagnosis: Ankle fracture  2. Therapy: PT and OT needs  3. Medical  Necessity: Above  4. Support: Clarify spouse patient notes spouse healthy able to assist  5. Rehab recommendation: Would benefit from acute inpatient rehabilitation when medically ready  6. DVT proph: Eliquis resumed for paroxysmal A. fib    Discussed with patient and      It was my pleasure to evaluate Jacob Auguste today. Please call with questions. Sharlene Cedillo. Karissa Villatoro MD       This note is created with the assistance of a speech recognition program.  While intending to generate a document that actually reflects the content of the visit, the document can still have some errors including those of syntax and sound a like substitutions which may escape proof reading.   In such instances, actual meaning can be extrapolated by contextual diversion

## 2022-12-12 NOTE — PROGRESS NOTES
Nutrition Assessment     Type and Reason for Visit: Initial, RD Nutrition Re-Screen/LOS    Nutrition Recommendations/Plan:   Continue current diet. Will provide Nepro shakes x 1 per day. Encourage/monitor PO intakes as tolerated. Will monitor labs, weights, and plan of care. Malnutrition Assessment:  Malnutrition Status: No malnutrition    Nutrition Assessment:  Chart reviewed/pt seen for length of stay. Admitted with right ankle fracture s/p fall from curb. PMH: DM, HTN, CKD. S/p I&D and ORIF of right open trimalleolar fracture ankle fracture dislocation. Pt reports having a good appetite. Observed lunch tray which pt ate about 90% of her meal including meatloaf and vegetables. Weight fluctuations noted per chart review. Labs reviewed: Na 130 mmol/L, BUN 75 mg/dL, CR 3.99 mg/dL, Glucose 135-153 mg/dL. Meds reviewed. Estimated Daily Nutrient Needs:  Energy (kcal):  1597-8119 kcals/day Weight Used for Energy Requirements: Admission     Protein (g):  85 gm pro/day Weight Used for Protein Requirements: Ideal        Fluid (ml/day):  per MD     Nutrition Related Findings:   Labs/Meds reviewed. No recorded BM since admission. Wound Type: Open Wounds (to right ankle; incision)    Current Nutrition Therapies:    ADULT DIET; Regular; Low Sodium (2 gm); Low Potassium (Less than 3000 mg/day);  Low Phosphorus (Less than 1000 mg)  ADULT ORAL NUTRITION SUPPLEMENT; Lunch, Dinner; Renal Oral Supplement    Anthropometric Measures:  Height: 5' 5\" (165.1 cm)  Current Body Wt: 262 lb (118.8 kg)   BMI: 43.6    Nutrition Diagnosis:   Inadequate oral intake related to acute injury/trauma as evidenced by  (improving PO intakes; need for ONS)    Nutrition Interventions:   Food and/or Nutrient Delivery: Continue Current Diet, Continue Oral Nutrition Supplement  Nutrition Education/Counseling: No recommendation at this time  Coordination of Nutrition Care: Continue to monitor while inpatient    Nutrition Monitoring and Evaluation:   Behavioral-Environmental Outcomes: None Identified  Food/Nutrient Intake Outcomes: Food and Nutrient Intake, Supplement Intake  Physical Signs/Symptoms Outcomes: Biochemical Data, GI Status, Fluid Status or Edema, Nutrition Focused Physical Findings, Skin, Weight    Discharge Planning:    No discharge needs at this time     Sergey Jorgensen RD, LD  Contact: [phone]

## 2022-12-13 LAB
ABO/RH: NORMAL
ABSOLUTE EOS #: 0.22 K/UL (ref 0–0.4)
ABSOLUTE LYMPH #: 1.21 K/UL (ref 1–4.8)
ABSOLUTE MONO #: 1.1 K/UL (ref 0.1–1.3)
ALBUMIN SERPL-MCNC: 2.9 G/DL (ref 3.5–5.2)
ALP BLD-CCNC: 49 U/L (ref 35–104)
ALT SERPL-CCNC: <5 U/L (ref 5–33)
ANION GAP SERPL CALCULATED.3IONS-SCNC: 11 MMOL/L (ref 9–17)
ANTIBODY SCREEN: NEGATIVE
ARM BAND NUMBER: NORMAL
AST SERPL-CCNC: 10 U/L
BASOPHILS # BLD: 0 % (ref 0–2)
BASOPHILS ABSOLUTE: 0 K/UL (ref 0–0.2)
BILIRUB SERPL-MCNC: 0.3 MG/DL (ref 0.3–1.2)
BILIRUBIN DIRECT: 0.1 MG/DL
BILIRUBIN, INDIRECT: 0.2 MG/DL (ref 0–1)
BLD PROD TYP BPU: NORMAL
BLOOD BANK BLOOD PRODUCT EXPIRATION DATE: NORMAL
BLOOD BANK ISBT PRODUCT BLOOD TYPE: 6200
BLOOD BANK PRODUCT CODE: NORMAL
BLOOD BANK UNIT TYPE AND RH: NORMAL
BPU ID: NORMAL
BUN BLDV-MCNC: 77 MG/DL (ref 8–23)
CALCIUM SERPL-MCNC: 9.5 MG/DL (ref 8.6–10.4)
CHLORIDE BLD-SCNC: 97 MMOL/L (ref 98–107)
CHLORIDE, UR: <20 MMOL/L
CO2: 28 MMOL/L (ref 20–31)
CREAT SERPL-MCNC: 4.29 MG/DL (ref 0.5–0.9)
CREATININE URINE: 54.9 MG/DL (ref 28–217)
CROSSMATCH RESULT: NORMAL
DISPENSE STATUS BLOOD BANK: NORMAL
EOSINOPHIL,URINE: NORMAL
EOSINOPHILS RELATIVE PERCENT: 2 % (ref 0–4)
EXPIRATION DATE: NORMAL
GFR SERPL CREATININE-BSD FRML MDRD: 10 ML/MIN/1.73M2
GLUCOSE BLD-MCNC: 149 MG/DL (ref 70–99)
HCT VFR BLD CALC: 23.1 % (ref 36–46)
HEMOGLOBIN: 7.6 G/DL (ref 12–16)
LYMPHOCYTES # BLD: 11 % (ref 24–44)
MCH RBC QN AUTO: 30.1 PG (ref 26–34)
MCHC RBC AUTO-ENTMCNC: 33 G/DL (ref 31–37)
MCV RBC AUTO: 91.3 FL (ref 80–100)
MONOCYTES # BLD: 10 % (ref 1–7)
MORPHOLOGY: ABNORMAL
PDW BLD-RTO: 16.5 % (ref 11.5–14.9)
PLATELET # BLD: 295 K/UL (ref 150–450)
PMV BLD AUTO: 7.8 FL (ref 6–12)
POTASSIUM SERPL-SCNC: 4.5 MMOL/L (ref 3.7–5.3)
RBC # BLD: 2.53 M/UL (ref 4–5.2)
SEG NEUTROPHILS: 77 % (ref 36–66)
SEGMENTED NEUTROPHILS ABSOLUTE COUNT: 8.47 K/UL (ref 1.3–9.1)
SODIUM BLD-SCNC: 136 MMOL/L (ref 135–144)
SODIUM,UR: 47 MMOL/L
TOTAL PROTEIN, URINE: 24 MG/DL
TOTAL PROTEIN: 5.6 G/DL (ref 6.4–8.3)
TRANSFUSION STATUS: NORMAL
UNIT DIVISION: 0
UNIT ISSUE DATE/TIME: NORMAL
URINE TOTAL PROTEIN CREATININE RATIO: 0.44 (ref 0–0.2)
WBC # BLD: 11 K/UL (ref 3.5–11)

## 2022-12-13 PROCEDURE — 36415 COLL VENOUS BLD VENIPUNCTURE: CPT

## 2022-12-13 PROCEDURE — 97530 THERAPEUTIC ACTIVITIES: CPT

## 2022-12-13 PROCEDURE — 99223 1ST HOSP IP/OBS HIGH 75: CPT | Performed by: PHYSICAL MEDICINE & REHABILITATION

## 2022-12-13 PROCEDURE — 84156 ASSAY OF PROTEIN URINE: CPT

## 2022-12-13 PROCEDURE — 97535 SELF CARE MNGMENT TRAINING: CPT

## 2022-12-13 PROCEDURE — 2580000003 HC RX 258: Performed by: INTERNAL MEDICINE

## 2022-12-13 PROCEDURE — 85025 COMPLETE CBC W/AUTO DIFF WBC: CPT

## 2022-12-13 PROCEDURE — 97162 PT EVAL MOD COMPLEX 30 MIN: CPT

## 2022-12-13 PROCEDURE — 82436 ASSAY OF URINE CHLORIDE: CPT

## 2022-12-13 PROCEDURE — 82570 ASSAY OF URINE CREATININE: CPT

## 2022-12-13 PROCEDURE — 6370000000 HC RX 637 (ALT 250 FOR IP): Performed by: PHYSICAL MEDICINE & REHABILITATION

## 2022-12-13 PROCEDURE — 80048 BASIC METABOLIC PNL TOTAL CA: CPT

## 2022-12-13 PROCEDURE — 80076 HEPATIC FUNCTION PANEL: CPT

## 2022-12-13 PROCEDURE — 99222 1ST HOSP IP/OBS MODERATE 55: CPT | Performed by: INTERNAL MEDICINE

## 2022-12-13 PROCEDURE — 82043 UR ALBUMIN QUANTITATIVE: CPT

## 2022-12-13 PROCEDURE — 84300 ASSAY OF URINE SODIUM: CPT

## 2022-12-13 PROCEDURE — 1180000000 HC REHAB R&B

## 2022-12-13 PROCEDURE — 6370000000 HC RX 637 (ALT 250 FOR IP): Performed by: FAMILY MEDICINE

## 2022-12-13 PROCEDURE — 87205 SMEAR GRAM STAIN: CPT

## 2022-12-13 PROCEDURE — 97166 OT EVAL MOD COMPLEX 45 MIN: CPT

## 2022-12-13 RX ORDER — ATORVASTATIN CALCIUM 40 MG/1
40 TABLET, FILM COATED ORAL NIGHTLY
Status: DISCONTINUED | OUTPATIENT
Start: 2022-12-13 | End: 2022-12-24 | Stop reason: HOSPADM

## 2022-12-13 RX ORDER — ACETAMINOPHEN 325 MG/1
650 TABLET ORAL EVERY 6 HOURS PRN
Status: DISCONTINUED | OUTPATIENT
Start: 2022-12-13 | End: 2022-12-24 | Stop reason: HOSPADM

## 2022-12-13 RX ORDER — METHOCARBAMOL 750 MG/1
750 TABLET, FILM COATED ORAL EVERY 8 HOURS SCHEDULED
Status: DISCONTINUED | OUTPATIENT
Start: 2022-12-13 | End: 2022-12-18

## 2022-12-13 RX ORDER — SODIUM CHLORIDE 9 MG/ML
INJECTION, SOLUTION INTRAVENOUS CONTINUOUS
Status: DISCONTINUED | OUTPATIENT
Start: 2022-12-13 | End: 2022-12-16

## 2022-12-13 RX ORDER — ZOLPIDEM TARTRATE 5 MG/1
10 TABLET ORAL NIGHTLY
Status: DISCONTINUED | OUTPATIENT
Start: 2022-12-13 | End: 2022-12-23

## 2022-12-13 RX ADMIN — Medication 2000 UNITS: at 08:18

## 2022-12-13 RX ADMIN — AMIODARONE HYDROCHLORIDE 200 MG: 200 TABLET ORAL at 08:18

## 2022-12-13 RX ADMIN — HYDRALAZINE HYDROCHLORIDE 100 MG: 50 TABLET, FILM COATED ORAL at 08:18

## 2022-12-13 RX ADMIN — ATORVASTATIN CALCIUM 40 MG: 40 TABLET, FILM COATED ORAL at 21:26

## 2022-12-13 RX ADMIN — METHOCARBAMOL 750 MG: 750 TABLET ORAL at 00:18

## 2022-12-13 RX ADMIN — ZOLPIDEM TARTRATE 10 MG: 5 TABLET ORAL at 21:25

## 2022-12-13 RX ADMIN — CALCITRIOL 0.25 MCG: 0.25 CAPSULE ORAL at 08:20

## 2022-12-13 RX ADMIN — METHOCARBAMOL 750 MG: 750 TABLET ORAL at 21:26

## 2022-12-13 RX ADMIN — PANTOPRAZOLE SODIUM 40 MG: 40 TABLET, DELAYED RELEASE ORAL at 05:11

## 2022-12-13 RX ADMIN — COLCHICINE 0.6 MG: 0.6 TABLET ORAL at 08:19

## 2022-12-13 RX ADMIN — HYDRALAZINE HYDROCHLORIDE 100 MG: 50 TABLET, FILM COATED ORAL at 21:26

## 2022-12-13 RX ADMIN — METHOCARBAMOL 750 MG: 750 TABLET ORAL at 12:31

## 2022-12-13 RX ADMIN — ROPINIROLE HYDROCHLORIDE 0.25 MG: 0.25 TABLET, FILM COATED ORAL at 00:17

## 2022-12-13 RX ADMIN — METOPROLOL TARTRATE 25 MG: 25 TABLET, FILM COATED ORAL at 08:18

## 2022-12-13 RX ADMIN — HYDROCODONE BITARTRATE AND ACETAMINOPHEN 1 TABLET: 5; 325 TABLET ORAL at 12:31

## 2022-12-13 RX ADMIN — HYDRALAZINE HYDROCHLORIDE 100 MG: 50 TABLET, FILM COATED ORAL at 12:31

## 2022-12-13 RX ADMIN — MAGNESIUM OXIDE TAB 400 MG (241.3 MG ELEMENTAL MG) 400 MG: 400 (241.3 MG) TAB at 21:26

## 2022-12-13 RX ADMIN — GABAPENTIN 600 MG: 600 TABLET, FILM COATED ORAL at 08:18

## 2022-12-13 RX ADMIN — FERROUS SULFATE TAB 325 MG (65 MG ELEMENTAL FE) 325 MG: 325 (65 FE) TAB at 08:18

## 2022-12-13 RX ADMIN — APIXABAN 5 MG: 5 TABLET, FILM COATED ORAL at 21:26

## 2022-12-13 RX ADMIN — METOPROLOL TARTRATE 25 MG: 25 TABLET, FILM COATED ORAL at 21:26

## 2022-12-13 RX ADMIN — SODIUM CHLORIDE: 9 INJECTION, SOLUTION INTRAVENOUS at 21:50

## 2022-12-13 RX ADMIN — ROPINIROLE HYDROCHLORIDE 0.25 MG: 0.25 TABLET, FILM COATED ORAL at 21:27

## 2022-12-13 RX ADMIN — ALLOPURINOL 100 MG: 100 TABLET ORAL at 08:19

## 2022-12-13 RX ADMIN — POLYETHYLENE GLYCOL 3350 17 G: 17 POWDER, FOR SOLUTION ORAL at 08:18

## 2022-12-13 RX ADMIN — HYDROCODONE BITARTRATE AND ACETAMINOPHEN 1 TABLET: 5; 325 TABLET ORAL at 18:38

## 2022-12-13 RX ADMIN — HYDROCODONE BITARTRATE AND ACETAMINOPHEN 1 TABLET: 5; 325 TABLET ORAL at 05:11

## 2022-12-13 RX ADMIN — MAGNESIUM OXIDE TAB 400 MG (241.3 MG ELEMENTAL MG) 400 MG: 400 (241.3 MG) TAB at 08:18

## 2022-12-13 RX ADMIN — METHOCARBAMOL 750 MG: 750 TABLET ORAL at 06:24

## 2022-12-13 RX ADMIN — APIXABAN 5 MG: 5 TABLET, FILM COATED ORAL at 08:18

## 2022-12-13 ASSESSMENT — 9 HOLE PEG TEST
TEST_RESULT: IMPAIRED
TESTTIME_SECONDS: 42.31
TEST_RESULT: IMPAIRED
TESTTIME_SECONDS: 41.84

## 2022-12-13 ASSESSMENT — PAIN DESCRIPTION - LOCATION: LOCATION: LEG;ANKLE

## 2022-12-13 ASSESSMENT — PAIN DESCRIPTION - ORIENTATION: ORIENTATION: POSTERIOR

## 2022-12-13 ASSESSMENT — PAIN DESCRIPTION - DESCRIPTORS: DESCRIPTORS: ACHING;DISCOMFORT;SORE

## 2022-12-13 ASSESSMENT — PAIN SCALES - GENERAL: PAINLEVEL_OUTOF10: 4

## 2022-12-13 NOTE — PROGRESS NOTES
Spoke with Tanya Justin at [employer]  office and she stated he would be around to see patients after he finishes seeing patients in his office today.

## 2022-12-13 NOTE — DISCHARGE INSTR - COC
Continuity of Care Form    Patient Name: Ricki Espinoza   :  1945  MRN:  [id number]    Admit date:  2022  Discharge date:  2022    Code Status Order: Full Code   Advance Directives:     Admitting Physician:  Humera Wagner MD  PCP: Fela Nunez MD    Discharging Nurse: [facility] Unit/Room#: 8752/9656-77  Discharging Unit Phone Number: [phone]    Emergency Contact:   Extended Emergency Contact Information  Primary Emergency Contact: AronMac  Address: [place] [address], [address] [place] [address]  Home Phone: [phone]  Relation: Spouse  Secondary Emergency Contact: Natalia Sharp  Address: [address] [place] [address] Road           [address] Phone: [phone]  Work Phone: [phone]  Mobile Phone: [phone]  Relation: Child    Past Surgical History:  Past Surgical History:   Procedure Laterality Date    ANKLE FRACTURE SURGERY Right 2022    RIGHT ANKLE OPEN REDUCTION INTERNAL FIXATION performed by Maxi Carlos DO at [facility]  2011    right arm broken    CARDIAC CATHETERIZATION  [id number] dr Lawyer Giles  2016    COLONOSCOPY  2003    COLONOSCOPY  2007    ORIF ANKLE FRACTURE Right 2022    RIGHT ANKLE OPEN REDUCTION INTERNAL FIXATION    TOTAL KNEE ARTHROPLASTY Bilateral     left may 2013 and right 2013    TUBAL LIGATION         Immunization History:   Immunization History   Administered Date(s) Administered    COVID-19, PFIZER PURPLE top, DILUTE for use, (age 15 y+), 30mcg/0.3mL 2021, 2021    Influenza 10/04/2012, 2013    Influenza Nasal 2011    Influenza Virus Vaccine 10/15/2014, 10/14/2015    Influenza, AFLURIA (age 1 yrs+), FLUZONE, (age 10 mo+), MDV, 0.5mL 2016    Influenza, FLUAD, (age 72 y+), Adjuvanted, 0.5mL 10/18/2022    Influenza, High Dose (Fluzone 65 yrs and older) 2017, 10/02/2018, 2020, 10/18/2021 Influenza, Triv, inactivated, subunit, adjuvanted, IM (Fluad 65 yrs and older) 08/30/2019    Pneumococcal Conjugate 13-valent (Kjvxuyo29) 10/14/2015    Pneumococcal Conjugate 7-valent (Prevnar7) 08/30/2011    Pneumococcal Polysaccharide (Hbvuekxtj56) 08/30/2011, 10/28/2020    Tdap (Boostrix, Adacel) 12/05/2022       Active Problems:  Patient Active Problem List   Diagnosis Code    Dyslipidemia E78.5    DIONY treated with BiPAP G47.33    Fibromyalgia M79.7    CRI (chronic renal insufficiency) N18.9    OA (osteoarthritis) M19.90    DM (diabetes mellitus) ([facility] E11.9    Kidney stone N20.0    Depression F32. 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    Essential hypertension I10    Osteopenia M85.80    Morbid obesity due to excess calories ([facility]) E66.01    Anxiety F41.9    Febrile illness R50.9    Acute serous otitis media of left ear H65.02    Secondary hyperparathyroidism (HCC) N25.81    Acute kidney injury superimposed on chronic kidney disease (HCC) N17.9, N18.9    New onset a-fib ([facility] with Rapid ventricular response I48.91    New onset of congestive heart failure (HCC) I50.9    Lymphedema I89.0    GERD (gastroesophageal reflux disease) K21.9    Restless leg syndrome Q09.87    Acute diastolic congestive heart failure (HCC) I50.31    Chronic bilateral low back pain without sciatica M54.50, G89.29    Acute bilateral low back pain without sciatica M54.50    Generalized muscle weakness M62.81    Bradycardia R00.1    Open fracture of right tibia and fibula, sequela S82.201S, S82.401S    Type III open trimalleolar fracture of right ankle S82.851C    Hyperkalemia E87.5    Hyponatremia M54.5    Metabolic acidosis K39.33    Fall W19. XXXA    JOSE ARMANDO (acute kidney injury) ([facility] N17.9       Isolation/Infection:   Isolation            No Isolation          Patient Infection Status       None to display            Nurse Assessment:  Last Vital Signs: BP (!) 145/70   Pulse 66   Temp 98.6 °F (37 °C)   Resp 16   Ht 5' 5\" (1.651 m)   LMP  (LMP Unknown)   SpO2 94%   BMI 43.60 kg/m²     Last documented pain score (0-10 scale): Pain Level: 0  Last Weight:   Wt Readings from Last 1 Encounters:   12/06/22 262 lb (118.8 kg)     Mental Status:  oriented and alert    IV Access:  Dialysis Right Internal jugular catheter placed 12/19/22      Nursing Mobility/ADLs:  Walking   Dependent  Transfer  Dependent  Bathing  Assisted  Dressing  Assisted  Toileting  Dependent  Feeding  Independent  Med Admin  Assisted  Med Delivery   whole    Wound Care Documentation and Therapy:  Wound 12/06/22 Tibial Distal;Right;Posterior RIGHT LATERAL ANKLE OPEN WOUND, APPROXIMATED WITH NYLON SUTURE (Active)   Dressing Status Clean;Dry; Intact 12/12/22 1930   Wound Cleansed Not Cleansed 12/12/22 1930   Dressing/Treatment Ace wrap;Splint 12/12/22 1930   Wound Assessment Other (Comment) 12/11/22 2000   Drainage Amount None 12/11/22 2000   Odor None 12/11/22 2000   Ca-wound Assessment Other (Comment) 12/11/22 1802   Number of days: 6       Incision 12/06/22 Tibial Distal;Right;Posterior (Active)   Dressing Status Clean;Dry; Intact 12/12/22 1930   Incision Cleansed Not Cleansed 12/12/22 1930   Dressing/Treatment Ace wrap;Splint 12/12/22 1930   Closure Other (Comment) 12/11/22 2000   Incision Assessment Other (Comment) 12/11/22 2000   Drainage Amount None 12/11/22 2000   Odor None 12/11/22 2000   Ca-incision Assessment Other (Comment) 12/09/22 0830   Number of days: 6        Elimination:  Continence:    Bowel: Yes  Bladder: Yes  Urinary Catheter: None   Colostomy/Ileostomy/Ileal Conduit: Yes       Date of Last BM: 12/24/22    Intake/Output Summary (Last 24 hours) at 12/13/2022 5226  Last data filed at 12/13/2022 6746  Gross per 24 hour   Intake --   Output 1200 ml   Net -1200 ml     I/O last 3 completed shifts:  In: -   Out: 1200 [Urine:1200]    Safety Concerns:     History of Falls (last 30 days)    Impairments/Disabilities:      None    Nutrition Therapy:  Current Nutrition Therapy:   - Oral Diet:  General    Routes of Feeding: Oral  Liquids: Thin Liquids  Daily Fluid Restriction: no  Last Modified Barium Swallow with Video (Video Swallowing Test): not done    Treatments at the Time of Hospital Discharge:   Respiratory Treatments: none  Oxygen Therapy:  is not on home oxygen therapy. Ventilator:    - No ventilator support    Rehab Therapies: Physical Therapy  Weight Bearing Status/Restrictions: Non-weight bearing on right leg  Other Medical Equipment (for information only, NOT a DME order):  none  Other Treatments: n/a    Patient's personal belongings (please select all that are sent with patient):  Glasses    Must Keep this appointment/ Follow up with Dr. Lenard Markham DO  Wednesday Dec 28, 2022  Specialty: Orthopedic Surgery  Dec 28th 1:00pm    Hematology,  monitor CBC/BMP, continue hemodialysis noted need hepatitis B surface antibody for skilled facility-results as above ,will need outpatient bone marrow biopsy , Retacrit 10,000 units SQ 3 times a week with dialysis discussion with nephrology-okay for discharge to SNF tomorrow, okay for dialysis start on Tuesday- verified with skilled nurse only for dialysis as well.        RN SIGNATURE:  Electronically signed by Bal Zazueta RN on 12/23/22 at 2:03 PM EST    CASE MANAGEMENT/SOCIAL WORK SECTION    Inpatient Status Date: 12/12/22    Readmission Risk Assessment Score:  Readmission Risk              Risk of Unplanned Readmission:  23           Discharging to Facility/ [address] [facility] [address] [address] of [address] [place] [address]  Phone: [phone]  Fax: [phone]       Dialysis Facility (if applicable)   [facility]z [address]  [address] [place] [address] Barbara Lynn [address] [facility]  P: [phone]  Verified Chair time 11:45 AM Tues, TH and Sat~ Spoke with Golden Sergey on 12/23/22  Patient will need Chair Bib/ verified this facility has a chair Christinajovan Shane from [facility] notified of the need for [address]      / signature: Electronically signed by Lindsay Sellers RN on 12/23/22 at 1:11 PM EST    PHYSICIAN SECTION    Prognosis: Fair    Condition at Discharge: Stable    Rehab Potential (if transferring to Rehab): Fair    Recommended Labs or Other Treatments After Discharge: Follow-up with 1. PCP JAJA Weinstein MD 2. Nephrology- [place] 3. Ortho - Dr Francine Hernandez 12/28  1 pm 4. Hematology,      CBC/BMP Monday 12/26,     continue hemodialysis  three times a week  ( nephro Dr Rodriguez Deaner ok with start Tues,  with Tues, Thurs, Sat schedule)      ,will need outpatient bone marrow biopsy ,     Retacrit 10,000 units SQ 3 times a week with dialysis per Nephrology ,     cont PT/OT/nursing ,     Physician Certification: I certify the above information and transfer of Ramonita Enamorado  is necessary for the continuing treatment of the diagnosis listed and that she requires [facility] for less 30 days. Update Admission H&P: No change in H&P    PHYSICIAN SIGNATURE:  Electronically signed by Richar Abernathy. Tobin Ibrahim MD on 12/23/22 at 12:51 PM EST

## 2022-12-13 NOTE — PROGRESS NOTES
Physical Therapy  Facility/Department: [facility] ACUTE REHAB  Rehabilitation Physical Therapy Treatment Note   NAME: Jani Pyle  : 1945 (78 y.o.)  MRN: [id number]  CODE STATUS: Full Code    Date of Service: 22      Additional Pertinent Hx: Ms. Jani Pyle is a 69 y.o. female who was admitted to [facility] on 2022 with Ankle Injury. 60-year-old female with history of A. fib on Eliquis, bilateral knee arthroplasty, CHF, CKD, type 2 diabetes, and hypertension as well as chronic numbness of her feet status post fall over a curb found to have displaced right trimalleolar ankle fracture with large posterior malleolus and comminuted lateral malleolus fracture. Pt S/P  status post I&D and ORIF on 22 by Dr Conny Spatz, right talus open treatment-continue splint right lower extremity, nonweightbearing,  Family / Caregiver Present: No  Referring Practitioner: Dr Cecilia Santos  Diagnosis: Right open trimalleolar fracture ankle fracture dislocation s/p I&D and ORIF,  Right talus fracture. Restrictions:  Restrictions/Precautions: Weight Bearing  Lower Extremity Weight Bearing Restrictions  Right Lower Extremity Weight Bearing: Non Weight Bearing  Position Activity Restriction  Other position/activity restrictions: Per chart: Right open trimalleolar fracture ankle fracture dislocation s/p I&D and ORIF, DOS 2022     SUBJECTIVE  Pain: Pt reports pain 7/10 R ankle lateral    OBJECTIVE  Vision  Vision: Impaired  Vision Exceptions: Wears glasses at all times    Hearing  Hearing: Within functional limits    Cognition  Overall Cognitive Status: WFL       Sensation  Overall Sensation Status: Impaired (Decreased sensation B feet.)    Functional Mobility  Bed mobility  Supine to Sit: Moderate assistance (pt assist right and left LE with UEs)  Sit to Supine: 2 Person assistance; Moderate assistance  Scooting: Minimal assistance (scooting at EOB forward)  Bed Mobility Comments:  (head of bed elevated left handrail)  Transfers  Sit to Stand: Maximum Assistance;2 Person Assistance (EOB elevated right knee extended to maintain and monitor NWB, right UE at RW left at bed , VCs sequencing , pt voices fear of falling)  Stand to Sit: Moderate Assistance;2 Person Assistance  Comment: reassurance pre and during sit <> stand EOB to RW  Balance  Posture: Good  Sitting - Static: Good  Sitting - Dynamic: Fair  Standing - Static: Poor  Standing - Dynamic: Poor;-  Comments: Assessed with rolling walker and maddy hernandez    Environmental Mobility  Ambulation  WB Status: NWB R LE    PT Exercises  Dynamic Sitting Balance Exercises: lateral advancement EOB left (max assist x 2 use of bed junior)  Static Standing Balance Exercises: stand at RW from EOB max assist x 2, 10 second bouts x 2, sit to squat x 2 decreased duration (maximum VCs for hand placement at RW , avoid right elbow weight bearing , and increase trunk extension, fair follow thru)  Lateral advancement seated EOB, max assist x 2 use of bed junior   Activity Tolerance  Activity Tolerance: Patient limited by fatigue;Patient limited by endurance; Other (comment) (assist  to maintain NWB R LE, and anxiety)    Assessment  Performance Deficits/Impairments: Decreased functional mobility ; Decreased ROM; Decreased strength;Decreased safe awareness;Decreased endurance;Decreased balance; Increased pain  Treatment Diagnosis: Impaired function.   Therapy Prognosis: Good  Decision Making: Medium Complexity  Discharge Recommendations: Patient would benefit from continued therapy after discharge  PT Equipment Recommendations  Equipment Needed: No  GOALS  Patient Goals   Patient Goals : Get stronger  Short Term Goals  Time Frame for Short Term Goals: 1 week  Short Term Goal 1: Bed mobility min A without use of bed rail  Short Term Goal 2: Sit<> // bars or rolling walker at mod A, maintaining NWB R LE  Short Term Goal 3: Improve staning tolerance to 1 minutes, NWB R LE with B UE support. Short Term Goal 4: Lateral scoot transfers, mod A X 1, maintaining NWB R LE. Short Term Goal 5: W/C mobility distance fo 50 ft , SBA/CGA level surface. Long Term Goals  Time Frame for Long Term Goals : By DC  Long Term Goal 1: Mod-I bed mobility  Long Term Goal 2: Stand pivot Transfers at min/mod A maintain NWB R LE  Long Term Goal 3: W/C mobility distance fo 50 to 150 ft SBA level surface  Long Term Goal 4: Pt able to take 3 to 5 steps in // bars NWB R LE, min A x 2  Long Term Goal 5: Family training for safe transfers from varied surfaces. PLAN OF CARE  Frequency: 1-2 treatment sessions per day, 5-7 days per week  Physcial Therapy Plan  General Plan:  minutes of therapy at least 5 out of 7 days a week (5-7 daysx wk)  Specific Instructions for Next Treatment: Continue maxi move for transfers, progress with sit<.stnad in // bars or rolling walker in therapy. Current Treatment Recommendations: Strengthening; Functional mobility training;Transfer training; Endurance training;Stair training;Gait training; Safety education & training;Balance training;ROM;Wheelchair mobility training;Patient/Caregiver education & training;Home exercise program;Equipment evaluation, education, & procurement; Therapeutic activities  Additional Comments: Discussed with pt, need of ramp at entrance at this time. Safety Devices  Type of Devices: Gait belt;Call light within reach; Left in bed;Bed alarm in place (rails x 3)  Restraints  Restraints Initially in Place: No    EDUCATION  Education  Education Given To: Patient  Education Provided: Transfer Training  Education Provided Comments: JERROD SHEETS for functional mobility, recommendation of ramp at entrance of home.   Education Method: Demonstration;Verbal  Education Outcome: Continued education needed     12/13/22 1605   PT Individual Minutes   Time In 2031   Time Out 2501   Minutes [address] [place], [place], 12/13/22 at 4:10 PM

## 2022-12-13 NOTE — PROGRESS NOTES
Physical Therapy  Facility/Department: [facility] ACUTE REHAB  Rehabilitation Physical Therapy Initial Assessment    NAME: Shabana Wright  : 1945 (69 y.o.)  MRN: [id number]  CODE STATUS: Full Code    Date of Service: 22      Past Medical History:   Diagnosis Date    Abnormal stress test     Anemia     Arthritis     Depression     Diverticulosis     DM (diabetes mellitus) ([address]     Erythropenia     Fibromyalgia     HTN (hypertension)     Hyperlipidemia     Kidney stone     Morbid obesity due to excess calories (HCC)     DIONY on CPAP     Osteopenia 14    Seasonal allergies     Sleep apnea     uses bipap prn     Past Surgical History:   Procedure Laterality Date    ANKLE FRACTURE SURGERY Right 2022    RIGHT ANKLE OPEN REDUCTION INTERNAL FIXATION performed by Angeline Mathias DO at [facility]  2011    right arm broken    CARDIAC CATHETERIZATION  [id number] dr Salvatore Tobias  2016    COLONOSCOPY  2003    COLONOSCOPY  2007    ORIF ANKLE FRACTURE Right 2022    RIGHT ANKLE OPEN REDUCTION INTERNAL FIXATION    TOTAL KNEE ARTHROPLASTY Bilateral     left may 2013 and right 2013    TUBAL LIGATION         Chart Reviewed: Yes  Patient assessed for rehabilitation services?: Yes  Additional Pertinent Hx: Ms. Shabana Wright is a 69 y.o. female who was admitted to [facility] on 2022 with Ankle Injury. 71-year-old female with history of A. fib on Eliquis, bilateral knee arthroplasty, CHF, CKD, type 2 diabetes, and hypertension as well as chronic numbness of her feet status post fall over a curb found to have displaced right trimalleolar ankle fracture with large posterior malleolus and comminuted lateral malleolus fracture.  Pt S/P  status post I&D and ORIF on 22 by Dr Masha Giron, right talus open treatment-continue splint right lower extremity, nonweightbearing,  Family / Caregiver Present: No  Referring Practitioner: Dr Guilherme Gutierres  Referral Date : 12/12/22  Diagnosis: Right open trimalleolar fracture ankle fracture dislocation s/p I&D and ORIF,  Right talus fracture. Restrictions:  Restrictions/Precautions: Weight Bearing  Lower Extremity Weight Bearing Restrictions  Right Lower Extremity Weight Bearing: Non Weight Bearing  Position Activity Restriction  Other position/activity restrictions: Per chart: Right open trimalleolar fracture ankle fracture dislocation s/p I&D and ORIF, DOS 12/6/2022     SUBJECTIVE  Pain: Pt reports pain 9/10 R ankle. Post Treatment Pain Screening       Prior Level of Function:  Social/Functional History  Lives With: Spouse  Type of Home: House  Home Layout: One level, Laundry in basement  Home Access: Stairs to enter with rails  Entrance Stairs - Number of Steps: 3 from back door.   Entrance Stairs - Rails: Left  Bathroom Shower/Tub: Walk-in shower, Doors, Shower chair without back  Bathroom Toilet: Handicap height  Bathroom Equipment: Grab bars in shower, Shower chair, Hand-held shower  Home Equipment: Jethro [address], [address], Walker, rolling, Wheelchair-manual (Cane at all times, W/c belongs to )  Has the patient had two or more falls in the past year or any fall with injury in the past year?: Yes (Pt reports falling in October before this most recent fall)  Receives Help From: Family  ADL Assistance: Independent  Homemaking Assistance: Needs assistance  Laundry:  (Daughter does laundry in the basement.)  Vacuuming:  (Spouse/daughter)  Shopping:  (Pt does shopping)  Homemaking Responsibilities: Yes  Meal Prep Responsibility: No (Eats out)  Laundry Responsibility: No (Daughter does laundry)  Cleaning Responsibility: No (Daughter occasionally assists)  Ambulation Assistance: Independent (With cane, used 2 wheel walker at night)  Transfer Assistance: Independent  Active : Yes  Mode of Transportation: SUV  Occupation: Retired  Type of Occupation: Pharmacy tech  IADL Comments: Pt reports sleeping in regular flat bed  Additional Comments: Pt reports her daughter assists as able,  is home most of the time unless he is fishing. Daughter works  [employer] at [employer] (medical records). Son works full time but he is on medical leave at this time. OBJECTIVE  Vision  Vision: Impaired  Vision Exceptions: Wears glasses at all times    Hearing  Hearing: Within functional limits    Cognition  Overall Cognitive Status: WFL    ROM  AROM RLE (degrees)  RLE General AROM: Splint across ankle, Knee flexion 80 degrees-AAROM. AROM LLE (degrees)  LLE General AROM: AAROM-WFL- foot swollen  AROM RUE (degrees)  RUE AROM : WFL  AROM LUE (degrees)  LUE AROM : WFL    Strength  Strength RLE  Comment: Hip 2/5, Knee extesnions 2+/5 ankel NT  Strength LLE  Comment: hip flexion <3/5 (unabel to perform SLR), knee flexion/extension 3/5 Ankle 3/5  Strength RUE  Strength RUE: WFL  Strength LUE  Strength LUE: WFL    Quality of Movement       Sensation  Overall Sensation Status: Impaired (Decreased sensation B feet.)    Functional Mobility  Bed mobility  Supine to Sit: Minimal assistance (min A per OT report with use of bed rail and HOB elevated.) P:M session-Mod A   Sit to Supine: 2 Person assistance; Moderate assistance  Scooting: Minimal assistance (scooting at EOB)  Transfers  Sit to Stand: Maximum Assistance;2 Person Assistance (Pt unable to maintain NWB R LE with rolling waslker and/or maddy stedy during transitioning from sit to stand.)  Stand to Sit: Moderate Assistance;2 Person Assistance  Bed to Chair: Dependent/Total  Comment: Once  pt stood up with rolling walker or maddy stedy, pt leans on left side and holds R LE NWB briefly with max cues. Pt stood ~ 15 secs with rolling walker. Recommend maxi move transfers at this time as pt not maintianing NWB precautions on R LE during sit <>stand. Attempted lateral scoot at EOB, pt unable to perform scooting with B UE yet.  Later on , assisted pt to [facility] with use of maxi move transfer lift system 2 person for safety. PCA to assist pt natividad to bed once done using BSC. Balance  Posture: Good  Sitting - Static: Good  Sitting - Dynamic: Fair  Standing - Static: Poor  Standing - Dynamic: Poor;-  Comments: Assessed with rolling walker and maddy stedy    Environmental Mobility  Ambulation  WB Status: NWB R LE         ASSESSMENT  Vitals  BP Location: Right upper arm  O2 Device: None (Room air)    Activity Tolerance  Activity Tolerance: Patient limited by fatigue;Patient limited by endurance; Other (comment) (Pt unable to maintain NWB R LE.)    Assessment  Assessment: Pt dependent for transfers. Pt unable to maintain NWB R LEduring sit<>stnad with maddy stedy or walker. Once pt in standing psotion, will briefly hold her R LE off pedro floor for NWB  precautions. Pt presents with B UE weakness as well as Left LE weakness and NWB R LE limiting her activity. Pt would benefit from continued PT following discharge to address functional deficits. Performance Deficits/Impairments: Decreased functional mobility ; Decreased ROM; Decreased strength;Decreased safe awareness;Decreased endurance;Decreased balance; Increased pain  Treatment Diagnosis: Impaired function. Therapy Prognosis: Good  Decision Making: Medium Complexity  Discharge Recommendations: Patient would benefit from continued therapy after discharge  PT Equipment Recommendations  Equipment Needed: No    CLINICAL IMPRESSION   Pt dependent for transfers. Pt unable to maintain NWB R LEduring sit<>stnad with maddy stedy or walker. Once pt in standing psotion, will briefly hold her R LE off pedro floor for NWB  precautions. Pt presents with B UE weakness as well as Left LE weakness and NWB R LE limiting her activity. Pt would benefit from continued PT following discharge to address functional deficits.     GOALS  Patient Goals   Patient Goals : Get stronger  Short Term Goals  Time Frame for Short Term Goals: 1 week  Short Term Goal 1: Bed mobility min A without use of bed rail  Short Term Goal 2: Sit<> // bars or rolling walker at mod A, maintaining NWB R LE  Short Term Goal 3: Improve staning tolerance to 1 minutes, NWB R LE with B UE support. Short Term Goal 4: Lateral scoot transfers, mod A X 1, maintaining NWB R LE. Short Term Goal 5: W/C mobility distance fo 50 ft , SBA/CGA level surface. Long Term Goals  Time Frame for Long Term Goals : By DC  Long Term Goal 1: Mod-I bed mobility  Long Term Goal 2: Stand pivot Transfers at min/mod A maintain NWB R LE  Long Term Goal 3: W/C mobility distance fo 50 to 150 ft SBA level surface  Long Term Goal 4: Pt able to take 3 to 5 steps in // bars NWB R LE, min A x 2  Long Term Goal 5: Family training for safe transfers from varied surfaces. PLAN OF CARE  Frequency: 1-2 treatment sessions per day, 5-7 days per week  Physcial Therapy Plan  General Plan:  minutes of therapy at least 5 out of 7 days a week (5-7 daysx wk)  Specific Instructions for Next Treatment: Continue maxi move for transfers, progress with sit<.stnad in // bars or rolling walker in therapy. Current Treatment Recommendations: Strengthening; Functional mobility training;Transfer training; Endurance training;Stair training;Gait training; Safety education & training;Balance training;ROM;Wheelchair mobility training;Patient/Caregiver education & training;Home exercise program;Equipment evaluation, education, & procurement; Therapeutic activities  Additional Comments: Discussed with pt, need of ramp at entrance at this time. Safety Devices  Type of Devices: Gait belt;Call light within reach (Pt left on [facility] -maxi move transfer system, PCA to assist pt.)  Restraints  Restraints Initially in Place: No    EDUCATION  Education  Education Given To: Patient  Education Provided: Role of Therapy;Plan of Care;Home Exercise Program;Precautions; Safety;Transfer Training;Equipment;DME/Home Modifications; Fall Prevention Strategies  Education Provided Comments: J[employer] SHE[employer] for functional mobility, recommendation of ramp at entrance of home.   Education Method: Demonstration;Verbal  Education Outcome: Continued education needed      Therapy Time          12/13/22 0947 12/13/22 1156   Time Code Minutes   Timed Code Treatment Minutes 34 Minutes  --    PT Individual Minutes   Time In [id number]   Time Out [id number]   Minutes 47 1316 [address], PT, 12/13/22 at 1:41 PM

## 2022-12-13 NOTE — H&P
Physical Medicine & Rehabilitation History and Physical  [facility]     Primary care provider: Monie Benitez MD     Chief Complaint and Reason for Rehabilitation Admission:   ADL and Mobility deficits secondary to R ankle fracture/dislocation after fall    History of Present Illness:  Latoya Gonzalez  is a 69 y.o. right-handed female admitted to the [address] [place] [address] unit on 12/12/2022. She was originally admitted to [facility] on 12/5/22 for injuries sustained in mechanical fall over a curb. Diagnostic studies confirmed R trimalleolar ankle fracture with dislocation. Dr. Raul Gross performed  ORIF R trimalleolar fracture and open treatment of talus fracture with I&D of open fracture. She is NWB RLE. Nephrology followed for JOSE ARMANDO on CKD. She was managed medically due to high risk for need of hemodialysis. She is currently requiring assistance for self-care activities and mobility prompting this admission. Review of Systems:  CONSTITUTIONAL:  Denies fevers, chills, sweats or fatigue. EYES:  Denies diplopia, blind spots, blurring. HEENT:  Denies hearing loss, trouble chewing or swallowing. RESPIRATORY:  No wheezing, coughing, shortness of breath. CARDIOVASCULAR:  Denies chest pain, palpitations, lightheadedness. GASTROINTESTINAL:  Denies heartburn, nausea, constipation, diarrhea, abdominal pain. GENITOURINARY:  No urgency, frequency, incontinence, dysuria. ENDOCRINE:  Denies hot or cold intolerance. MUSCULOSKELETAL:  Reportss focal joint pain, Denies back pain, neck pain. NEUROLOGICAL:  Denies focal numbness, tingling, balance loss, headache. BEHAVIOR/PSYCH:  Denies depression, anxiety, memory loss, insomnia. SKIN:  No ulcers, rash, bruises.       Premorbid function:  Independent    Current Function:  PT:    Restrictions:  Lower Extremity Weight Bearing Restrictions  Right Lower Extremity Weight Bearing: Non Weight Bearing  Restrictions/Precautions  Restrictions/Precautions: Weight Bearing  Required Braces or Orthoses?: No (Sling for comfort)  Implants present? : Metal implants (IM Nailing)    Bed mobility  Supine to Sit: Moderate assistance (pt assist right and left LE with UEs)  Sit to Supine: 2 Person assistance, Moderate assistance  Scooting: Minimal assistance (scooting at EOB forward)  Bed Mobility Comments:  (head of bed elevated left handrail)     Transfers  Sit to Stand: Maximum Assistance, 2 Person Assistance (EOB elevated right knee extended to maintain and monitor NWB, right UE at RW left at bed , VCs sequencing , pt voices fear of falling)  Stand to Sit: Moderate Assistance, 2 Person Assistance  Bed to Chair: Dependent/Total  Comment: reassurance pre and during sit <> stand EOB to RW    Ambulation  WB Status: NWB R LE      OT:   ADLs-  ADL  Feeding: Independent  Feeding Skilled Clinical Factors: Per pt report  Grooming: Setup  Grooming Skilled Clinical Factors: Setup for oral care/grooming of hair  UE Bathing: Stand by assistance  UE Bathing Skilled Clinical Factors: Seated EOB, pt able to cleanse UB with wash cloths with SBA for safety  LE Bathing: Maximum assistance, Dependent/Total  LE Bathing Skilled Clinical Factors: Seated EOB, pt able to cleanse upper legs, A to cleanse LLE/foot and TA for aelah/buttocks care Max A x2 in maddy stedy  UE Dressing: Minimal assistance  UE Dressing Skilled Clinical Factors: Seated EOB, pt able to don OH shirt with A to pull down  LE Dressing: Dependent/Total  LE Dressing Skilled Clinical Factors: TA to thread brief and pants, TA to mange up over hips with Max A in maddy stedy  Toileting: Dependent/Total  Toileting Skilled Clinical Factors: TA to complete toileting hygiene and manage brief/pants up over hips  Additional Comments: Pt completes UB and LB ADLs while seated EOB d/t decreased strength, activity tolerance, endurance, and NWB to RLE.  Pt tolerated well, frequent rest breaks taken as needed throughout session. Pt Max A/dependent for all LB ADLs d/t NWB RLE and decreased strength.       SPEECH:      Past Medical History:      Diagnosis Date    Abnormal stress test     Anemia     Arthritis     Depression     Diverticulosis     DM (diabetes mellitus) ([address]     Erythropenia     Fibromyalgia     HTN (hypertension)     Hyperlipidemia     Kidney stone     Morbid obesity due to excess calories (HCC)     DIONY on CPAP     Osteopenia 03/03/14    Seasonal allergies     Sleep apnea     uses bipap prn       Past Surgical History:      Procedure Laterality Date    ANKLE FRACTURE SURGERY Right 12/6/2022    RIGHT ANKLE OPEN REDUCTION INTERNAL FIXATION performed by Kennedy Mojica DO at [facility]  01/01/2011    right arm broken    CARDIAC CATHETERIZATION  [id number] dr Otf Terry  05/16/2016    COLONOSCOPY  01/01/2003    COLONOSCOPY  01/01/2007    ORIF ANKLE FRACTURE Right 12/06/2022    RIGHT ANKLE OPEN REDUCTION INTERNAL FIXATION    TOTAL KNEE ARTHROPLASTY Bilateral     left may 2013 and right july 2013    TUBAL LIGATION         Allergies:    Adhesive tape, Cephalexin, Erythromycin, Ketorolac tromethamine, Pcn [penicillins], Percocet [oxycodone-acetaminophen], Sulfa antibiotics, and Ultracet [tramadol-acetaminophen]    Medications   Scheduled Meds:   atorvastatin  40 mg Oral Nightly    methocarbamol  750 mg Oral 3 times per day    zolpidem  10 mg Oral Nightly    allopurinol  100 mg Oral Daily    amiodarone  200 mg Oral Daily    apixaban  5 mg Oral BID    calcitRIOL  0.25 mcg Oral Daily    gabapentin  600 mg Oral Daily    hydrALAZINE  100 mg Oral TID    magnesium oxide  400 mg Oral BID    metoprolol tartrate  25 mg Oral BID    pantoprazole  40 mg Oral QAM AC    rOPINIRole  0.25 mg Oral Nightly    Vitamin D  2,000 Units Oral Daily    polyethylene glycol  17 g Oral Daily     Continuous Infusions:   sodium chloride 50 mL/hr at 12/13/22 2150     PRN Meds:.acetaminophen, HYDROcodone-acetaminophen, bisacodyl     Social History:  Lives With: Spouse  Type of Home: House  Home Layout: One level  Home Access: Stairs to enter with rails  Entrance Stairs - Number of Steps: 3  Entrance Stairs - Rails: Left  Bathroom Shower/Tub: Walk-in shower  Bathroom Toilet: Handicap height  Bathroom Equipment: Shower chair, Grab bars in shower  Home Equipment: [address], [address], [address], [address], [address] ([address] at all times)  [employer] Help From: Family  ADL Assistance: Independent  Homemaking Assistance: Needs assistance  Homemaking Responsibilities: Yes  Meal Prep Responsibility: No (Eats out)  Laundry Responsibility: No (Daughter does laundry)  Cleaning Responsibility: No (Occasionally gets help from daughter but reports it does not get done)  Ambulation Assistance: Independent  Transfer Assistance: Independent  Active : Yes  Mode of Transportation: [employer] (Drives only in daytime)  Occupation: Retired  Social History     Socioeconomic History    Marital status:      Spouse name: None    Number of children: None    Years of education: None    Highest education level: None   Tobacco Use    Smoking status: Former     Packs/day: 0.25     Years: 1.00     Pack years: 0.25     Types: Cigarettes     Quit date: 1960     Years since quittin.9    Smokeless tobacco: Never    Tobacco comments:     quit    Vaping Use    Vaping Use: Never used   Substance and Sexual Activity    Alcohol use: No    Drug use: No    Sexual activity: Never     Social Determinants of Health     Financial Resource Strain: Low Risk     Difficulty of Paying Living Expenses: Not hard at all   Food Insecurity: No Food Insecurity    Worried About Running Out of Food in the Last Year: Never true    [address] in the Last Year: Never true   Transportation Needs: No Transportation Needs    Lack of Transportation (Medical): No    Lack of Transportation (Non-Medical):  No   Physical Activity: Insufficiently Active    Days of Exercise per Week: 2 days    Minutes of Exercise per Session: 20 min   Stress: Stress Concern Present    Feeling of Stress : To some extent   Social Connections: Moderately Isolated    Frequency of Communication with Friends and Family: Three times a week    Frequency of Social Gatherings with Friends and Family: Twice a week    Attends Restorationist Services: Never    Active Member of Clubs or Organizations: No    Attends Club or Organization Meetings: Never    Marital Status:    Housing Stability: Low Risk     Unable to Pay for Housing in the Last Year: No    Number of Places Lived in the Last Year: 1    Unstable Housing in the Last Year: No       Family History:       Problem Relation Age of Onset    Diabetes Mother     Heart Disease Mother     Osteoporosis Mother     Kidney Disease Father     Prostate Cancer Brother        Diagnostics:     CBC:   Recent Labs     12/11/22  1900 12/13/22  0647   WBC  --  11.0   RBC  --  2.53*   HGB 7.8* 7.6*   HCT 23.8* 23.1*   MCV  --  91.3   RDW  --  16.5*   PLT  --  295     BMP:    Recent Labs     12/11/22  0431 12/12/22  0602 12/13/22  0647   GLUCOSE 140* 135* 149*   BUN 80* 75* 77*   CREATININE 4.13* 3.99* 4.29*   CALCIUM 8.7 9.3 9.5   * 130* 136   K 4.5 4.4 4.5   CL 93* 91* 97*   CO2 27 28 28   ANIONGAP 14 11 11   LABGLOM 11* 11* 10*     HbA1c:   Lab Results   Component Value Date    LABA1C 5.8 12/06/2022     BNP: No results for input(s): BNP in the last 72 hours. PT/INR: No results for input(s): PROTIME, INR in the last 72 hours. APTT: No results for input(s): APTT in the last 72 hours. CARDIAC ENZYMES: No results for input(s): CKMB, CKMBINDEX, TROPONINT, TROPHS, TROPII in the last 72 hours.     Invalid input(s): CKTOTAL;3   FASTING LIPID PANEL:  Lab Results   Component Value Date    CHOL 178 01/11/2021    HDL 37 (L) 01/11/2021    TRIG 281 (H) 01/11/2021     LIVER PROFILE:   Recent Labs     12/13/22  0647   AST 10   ALT <5* BILIDIR 0.1   BILITOT 0.3   ALKPHOS 52          Radiology:      CT ABDOMEN PELVIS WO CONTRAST Additional Contrast? None    Result Date: 12/6/2022  EXAMINATION: CT OF THE ABDOMEN AND PELVIS WITHOUT CONTRAST 12/5/2022 9:18 pm TECHNIQUE: CT of the abdomen and pelvis was performed without the administration of intravenous contrast. Multiplanar reformatted images are provided for review. Automated exposure control, iterative reconstruction, and/or weight based adjustment of the mA/kV was utilized to reduce the radiation dose to as low as reasonably achievable. Additional 3-D images of the pelvis including hip joints have been obtained with surface rendering of bones and displayed with rotating frames. COMPARISON: CT scan of the abdomen and pelvis without contrast on 02/27/2017. HISTORY: ORDERING SYSTEM PROVIDED HISTORY: possible pubic rami fx TECHNOLOGIST PROVIDED HISTORY: possible pubic rami fx FINDINGS: Lower Chest: At the bases of the lungs, there is no acute process. No pleural effusion. No hiatal hernia. No pneumoperitoneum. Organs: No demonstrable abnormality in the liver, gallbladder, spleen and bilateral adrenal glands. In the pancreas there is no evidence of mass or acute process. In the bilateral kidneys there is no evidence of stone or hydronephrosis. At the lower anterior aspect of the right kidney there is exophytic small mass mildly hypodense, 1.2 cm in size, most likely to be a cyst.  In the mid lateral cortex of the left kidney there is a small hypodense lesion measuring 1.1 cm probably a cyst but a new finding. The bladder is moderately distended without stone or focal abnormality. Bilateral ureters are of normal size, without evidence of stone or obstruction. GI/Bowel: No demonstrable abnormality in the stomach. Small to moderate amount of gas scattered in some loops of small bowel without any obvious fluid level. Distal loops of small bowel are not distended or dilated.   Normal appearing terminal ileum. There is normal appendix posteroinferior to cecum. Small to moderate amount of stool and small amount of gas are scattered in the right colon and the transverse colon. In the descending colon small to moderate amount of stool and gas are scattered. In the sigmoid colon and rectum a small amount of stool and gas are scattered. Evidence of moderate diverticulosis coli of the sigmoid colon and descending colon, without evidence of diverticulitis. No evidence of colitis. Pelvis: No evidence of abnormal fluid collection or inflammatory process in the pelvis. Normal appearing uterus. No adnexal mass on either side. Peritoneum/Retroperitoneum: Abdominal aorta is of normal size with moderate calcified plaques. No evidence of periaortic or mesenteric pathologic lymphadenopathy. No evidence of ascites. Bones/Soft Tissues: Evidence of moderate multilevel degenerative disc disease in the lumbar spine and lumbosacral junction. Mild-to-moderate multilevel facet osteoarthritis in the medial lower lumbar spine and lumbosacral junction. There is no evidence of acute fracture or compression in the lumbar spine or in the visualized sacrum and coccyx. No evidence of fracture or healing fracture or osseous malalignment in the pelvic bones and joints including bilateral hip joints. There is no evidence of fracture or healing fracture in the superior pubic rami and inferior ischiopubic rami of both sides or in bilateral pubic bones. No evidence of fracture in bilateral acetabula. No evidence of fracture or osseous malalignment in the lumbar spine, pelvic bones and joints including bilateral hip joints. No evidence of fracture in the superior pubic rami and inferior ischiopubic rami of both sides or in bilateral pubic bones. No fracture in bilateral acetabula or ischial bones. Evidence of mild to moderate multilevel degenerative disc disease and facet osteoarthritis in the lumbar spine and lumbosacral junction. Nonspecific small amount of gas scattered in multiple loops of small bowel without significant fluid levels. No distension or dilation of distal small bowel. Normal appendix visualized. Small to moderate amount of stool and gas scattered in the colon. Evidence of moderate diverticulosis coli of descending colon and sigmoid colon, without evidence of diverticulitis. No evidence of renal or ureteric calculus or obstructive uropathy. No diagnostic finding in the liver, gallbladder, spleen, pancreas and adrenal glands. XR PELVIS (1-2 VIEWS)    Result Date: 12/5/2022  EXAMINATION: ONE XRAY VIEW OF THE PELVIS 12/5/2022 7:33 pm COMPARISON: CT abdomen and pelvis performed 02/27/2017. HISTORY: ORDERING SYSTEM PROVIDED HISTORY: fall TECHNOLOGIST PROVIDED HISTORY: fall FINDINGS: There may be a nondisplaced fracture of the right superior and inferior pubic rami. There is mild degenerative change of the SI joints and hip joints. The surrounding soft tissues are unremarkable. Potential nondisplaced fracture of the right superior and inferior pubic rami. XR KNEE RIGHT (3 VIEWS)    Result Date: 12/5/2022  EXAMINATION: THREE XRAY VIEWS OF THE RIGHT KNEE 12/5/2022 6:56 pm COMPARISON: None. HISTORY: ORDERING SYSTEM PROVIDED HISTORY: hx knee replacement, open fib fx TECHNOLOGIST PROVIDED HISTORY: hx knee replacement, open fib fx FINDINGS: There is a total knee arthroplasty without complication. The surrounding soft tissues are unremarkable. Total knee arthroplasty without complication. No acute osseous or soft tissue abnormality. XR TIBIA FIBULA RIGHT (2 VIEWS)    Result Date: 12/5/2022  EXAMINATION: 4 XRAY VIEWS OF THE RIGHT TIBIA AND FIBULA 12/5/2022 6:56 pm COMPARISON: None. HISTORY: ORDERING SYSTEM PROVIDED HISTORY: open tib fx TECHNOLOGIST PROVIDED HISTORY: open tib fx FINDINGS: There is a comminuted distal tibial fracture involving the medial malleolus and posterior malleolus.   There is a comminuted fracture of the distal fibula. There is diffuse soft tissue swelling. There is a knee arthroplasty without complication. Trimalleolar fracture with diffuse soft tissue swelling. XR ANKLE RIGHT (2 VIEWS)    Result Date: 12/5/2022  EXAMINATION: 1 XRAY VIEWS OF THE RIGHT ANKLE 12/5/2022 6:09 pm COMPARISON: None. HISTORY: ORDERING SYSTEM PROVIDED HISTORY: Post-Reduction TECHNOLOGIST PROVIDED HISTORY: Post-Reduction FINDINGS: An ankle fracture subluxation is again noted. On the provided image, there remains lateral subluxation of the talus in relation to the tibia. Ankle fracture subluxation with persistent mild lateral subluxation of the talus in relation to the tibia. XR ANKLE RIGHT (MIN 3 VIEWS)    Result Date: 12/6/2022  EXAMINATION: THREE XRAY VIEWS OF THE RIGHT ANKLE 12/6/2022 8:07 pm COMPARISON: 12/05/2022. HISTORY: ORDERING SYSTEM PROVIDED HISTORY: post op, PACU please TECHNOLOGIST PROVIDED HISTORY: post op, PACU please FINDINGS: Interval ORIF of comminuted ankle fracture with lateral surgical plate and screws spanning the distal fibula and posterior and medial plates spanning the distal tibia with multiple fixation screws. Alignment is grossly anatomic. The mortise is maintained. Evaluation of the soft tissues is limited by overlying cast material.  Posterior plaster cast.     Anatomic alignment of comminuted ankle fracture status post ORIF. XR ANKLE RIGHT (MIN 3 VIEWS)    Result Date: 12/5/2022  EXAMINATION: THREE XRAY VIEWS OF THE RIGHT ANKLE 12/5/2022 7:14 pm COMPARISON: 12/05/2022. HISTORY: ORDERING SYSTEM PROVIDED HISTORY: Post-Splint TECHNOLOGIST PROVIDED HISTORY: Post-Splint FINDINGS: A trimalleolar ankle fracture subluxation is again noted. A splint is now in place. The ankle mortise is not widened. There remains mild posterior subluxation of the talus in relation to the tibia.      Trimalleolar right ankle is fracture subluxation with improved positioning of the talus in relation to the tibia and now with mild posterior subluxation of the talus in relation to the tibia. XR ANKLE RIGHT (MIN 3 VIEWS)    Result Date: 12/5/2022  EXAMINATION: THREE XRAY VIEWS OF THE RIGHT ANKLE 12/5/2022 6:56 pm COMPARISON: None. HISTORY: ORDERING SYSTEM PROVIDED HISTORY: open fx TECHNOLOGIST PROVIDED HISTORY: open fx FINDINGS: There is a comminuted fracture of the distal fibula. There is a comminuted fracture of the medial malleolus. There is a comminuted fracture of the posterior malleolus. There is lateral displacement of the ankle mortise. There is diffuse soft tissue swelling. Trimalleolar fracture with associated soft tissue swelling. CT PELVIS WO CONTRAST Additional Contrast? None    Result Date: 12/6/2022  EXAMINATION: CT OF THE PELVIS WITHOUT CONTRAST 12/5/2022 9:18 pm TECHNIQUE: CT of the pelvis was performed without the administration of intravenous contrast.  Multiplanar reformatted images are provided for review. Adjustment of mA and/or kV according to patient size was utilized. Automated exposure control, iterative reconstruction, and/or weight based adjustment of the mA/kV was utilized to reduce the radiation dose to as low as reasonably achievable. The study includes 3D images of pelvis and hip joints with surface rendering of bones and displayed with rotating frames display. COMPARISON: CT scan of abdomen and pelvis on 2/27/2017. CT scan of abdomen and pelvis on 12/5/2022. HISTORY: ORDERING SYSTEM PROVIDED HISTORY: pre-op TECHNOLOGIST PROVIDED HISTORY: Pre-op FINDINGS: The bladder is moderate to markedly distended without stone or focal abnormality. There is no evidence of abnormal fluid collection or inflammatory process in the pelvis. Normal uterus. No evidence of adnexal mass in the pelvis. Small to moderate amount of stool and gas scattered in sigmoid colon and rectum. Mild-to-moderate diverticulosis coli of sigmoid colon, without evidence of diverticulitis.  Normal appendix posteroinferior to cecum. Small amount of nonspecific gas scattered in some loops of small bowel. No evidence of pelvic abnormal fluid collection or hemorrhage. No evidence of fracture in sacrum or coccyx. Bilateral SI joints are intact. No evidence of fracture in bilateral hip bones including bilateral acetabulum, bilateral femoral heads, bilateral femoral necks and bilateral visualized femoral proximal shafts. No evidence of fracture involving bilateral superior pubic rami, bilateral inferior ischiopubic rami, bilateral pubic bones, or bilateral ischial tuberosity. No evidence of inguinal or femoral hernia. In the visualized soft tissues of pelvic walls and proximal thighs, there is no definable inflammatory change or hematoma. No evidence of fracture in pelvic bones or bilateral hip joints. No evidence of fracture in superior pubic rami or inferior ischiopubic rami of both sides. No fracture in bilateral acetabulum or in visualized bilateral proximal femurs. Within the pelvic cavity, there is no evidence of hemorrhage or abnormal fluid collection or acute process. Mild to moderate sigmoid diverticulosis, without evidence of diverticulitis. CT ANKLE RIGHT WO CONTRAST    Result Date: 12/6/2022  EXAMINATION: CT OF THE RIGHT ANKLE WITHOUT CONTRAST, 12/5/2022 9:19 pm TECHNIQUE: CT of the right ankle was performed without the administration of intravenous contrast.  Multiplanar reformatted images are provided for review. Automated exposure control, iterative reconstruction, and/or weight based adjustment of the mA/kV was utilized to reduce the radiation dose to as low as reasonably achievable. COMPARISON: Radiographs of right ankle at 2021 hours on 12/05/2022. HISTORY ORDERING SYSTEM PROVIDED HISTORY:  Pre-op TECHNOLOGIST PROVIDED HISTORY: Pre-op FINDINGS: Bones:  The study shows coronally oriented mildly oblique fracture in the posterior portion of distal end of right tibia with a prominent gap of 8.6 mm, extended to the distal articular surface of tibia with posterior displacement of posterior malleolus of the tibia. The fracture is mildly comminuted fracture at the upper aspect. Evidence of comminuted transverse fracture through the base of the medial malleolus, which is displaced mildly laterally. Evidence of a few small intra-articular fracture fragments in the anterior portion of the tibiotalar joint and talofibular joint. The study shows grossly comminuted oblique, displaced fractures of the distal shaft. Right fibula with posterolateral displacement of distal fragment. This fracture extends to about 1.5 cm superior to the base of the lateral malleolus. The lower extent  of the fracture is inferomedially,and  the upper extent of the fracture superolaterally. Small fractures or old accessory ossicles at the inferomedial aspect of the lateral malleolus. There is no significant gap at the distal tibiofibular syndesmosis. On the sagittal images, there is posterior subluxation of the talus bone due to displaced fracture of posterior portion of distal end of the tibia. There is no definable fracture within the talus bone. There is no definable fracture in the right calcaneus bone. No definable fracture in the tarsal navicular bone or in the cuboid bone. In the visualized 3 cuneiform bones there is no definable fracture. In the visualized portions of base of the metatarsal bones, there is no definable fracture. Evidence of mild to moderate osteoarthritic change in the proximal and distal intertarsal joint. No abnormality of the subtalar joint. Evidence of moderately prominent posterior and inferior calcaneal spurs. The calcaneal tendon is grossly intact. The tendons at the lateral and medial aspect of the ankle joint are grossly intact. The tendons at the anterior aspect of ankle joint are grossly intact.  Soft Tissue: Mild soft tissue swelling with soft tissue edema surrounding the right ankle joint and distal right leg. Prominent oblique fracture in coronal orientation with large gap at the fracture involving the posterior portion of distal end of right tibia with distal intra-articular extension of the fracture. Comminuted transverse fracture through the base of the medial malleolus of the tibia. Grossly comminuted oblique fracture in the distal shaft of right fibula. Moderate posterior subluxation of the talus bone due to distal posterior tibial fracture. Some small intra-articular fracture fragments in the anterior portion of the tibiotalar and talofibular joint. The distal tibiofibular syndesmosis is grossly intact. No evidence of fracture in the right calcaneus bone, talus bone and distal tarsal bones. Subtalar joint is intact. Evidence of soft tissue emphysema in the lateral portion of subtalar joint. The calcaneal tendon appears to be grossly intact. The long tendons at the medial, lateral and anterior aspect of right ankle joint are grossly intact. XR CHEST PORTABLE    Result Date: 12/5/2022  EXAMINATION: ONE XRAY VIEW OF THE CHEST 12/5/2022 7:33 pm COMPARISON: Chest radiograph performed 03/31/2022. HISTORY: ORDERING SYSTEM PROVIDED HISTORY: fall TECHNOLOGIST PROVIDED HISTORY: fall FINDINGS: There is chronic pulmonary change. There is no acute consolidation or effusion. There is no pneumothorax. The heart is enlarged. The upper abdomen unremarkable. The extrathoracic soft tissues are unremarkable. Cardiomegaly without acute pulmonary process. Physical Exam:  BP (!) 143/65   Pulse 62   Temp 98.4 °F (36.9 °C)   Resp 16   Ht 5' 5\" (1.651 m)   LMP  (LMP Unknown)   SpO2 99%   BMI 43.60 kg/m²     GEN: Well developed, well nourished, in NAD  HEENT:  NCAT. PERRL. EOMI. Mucous membranes pink and moist.   PULM:  Clear to ausculation. No rales or rhonchi. Respirations WNL and unlabored. CV:  Regular rate rhythm. No murmurs or gallops. GI:  Abdomen soft. Nontender. Non-distended. BS + and equal.    NEUROLOGICAL: A&O x3. Sensation intact to light touch. .   MSK:  Functional ROM BUEs, and LLE. Impaired AROM RLE. . Motor testing 4+/5 key muscles BUEs, and LLE. 2/5 R hip flexion and knee extension. R ankle not tested. Disha Miner SKIN: Warm dry and intact. Good turgor. R ankle in posterior splint with ACE wrap  EXTREMITIES:  No calf tenderness to palpation. Post op edema distal RLE. PSYCH: Mood WNL. Appropriately interactive. Affect WNL. Principal Diagnosis/plan:  The patient is a 69y.o. year old with ADL and Mobility deficits secondary to R ankle trimalleolar fracture dislocation    She will require close medical monitoring for the comorbidities listed below. She will benefit from intensive interdisciplinary therapies and rehab nursing care and is appropriate for inpatient rehabilitation. The post admission physician evaluation (CHRISTINA) is consistent with the pre-admission assessment. See above findings to reflect the elements required in the CHRISTINA. Patient's admitting condition is consistent with the findings of the preadmission assessment by the rehabilitation admissions coordinator. Diagnoses/plan:    R ankle trimalleolar fracture dislocation: s/p ORIF 12/6 by Dr. Hammad Weller. NWB RLE. PT/OT for gait, mobility, strengthening, endurance, ADLs, and self care. Has Norco prn, on Robaxin TID. Has Tylenol prn and vitamin D. 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 No indication for dialysis currently but she is at risk for needing HD. Will consult nephrology to follow. On calcitriol. DM II with neuropathy: carb controlled diet. On gabapentin for neuropathy. HTN/HLD: on atorvastatin, hydralazine  Anemia of chronic disease: Hb low but stable. On iron supplement. Will monitor. Nephrology following.  She reports 6-7 is baseline Hb for her and that she receives Retacrit [facility] twice monthly. Hyponatremia: Improved. Will monitor. Fibromyalgia  Obesity: BMI 43.6. Dietitian consulted  DIONY:  Depression: on Trazodone nightly  Insomnia: has Ambien nightly, reportedly takes 10 mg at home. Also on Requip at hs. Increased to her home dose of Ambien. Gout: on colchicine and allopurinol. Patient reports she doesn't take colchicine unless in gout flare - she is currently not requiring - colchicine discontinued. Bowel Management: Miralax daily, senokot prn, dulcolax prn. DVT Prophylaxis:  SCD's while in bed, RAVINDRA's during the day, and Eliquis  Internal medicine for medical management      Estimated Length of Stay:  2 weeks. Prognosis  fair    Goals    Home at Minimal Assist  Supervision at Discharge: Argentina Barajas MD     This note is created with the assistance of a speech recognition program.  While intending to generate a document that actually reflects the content of the visit, the document can still have some errors including those of syntax and sound a like substitutions which may escape proof reading. In such instances, actual meaning can be extrapolated by contextual diversion.

## 2022-12-13 NOTE — PROGRESS NOTES
[address]    Acute Rehabilitation Occupational Therapy Evaluation     Date: 22  Patient Name: Fernando Casas       Room: 1030/1601-07  MRN: [id number]  Account: [de-identified]   : 1945  (69 y.o.) Gender: female       Referring Practitioner: Oliver Noland MD  Diagnosis: Open fracture of right tibia and fibula, sequela  Additional Pertinent Hx: Fernando Casas is 69 y.o. female  Who was admitted to the hospital on 2022 for treatment of Open fracture of right tibia and fibula, sequela. Patient presented to the emergency room with right ankle injury and suffered a right ankle fracture after falling over a curb when she was trying to go to [employer] for [employer]. She has underlying history of A. fib on Eliquis, CHF, diabetes mellitus with CKD3, hypertension, osteoporosis, fibromyalgia, morbid obesity BMI 43, sleep apnea, depression. Patient underwent I&D and ORIF on 2022. Patient also developed acute kidney injury with hyperkalemia and metabolic acidosis. Nephrology was consulted and patient given [place]. Patient was given 1 unit PRBC transfusion on 2022 for low hemoglobin of 6.4. Treatment Diagnosis: Impaired self-care status    Past Medical History:  has a past medical history of Abnormal stress test, Anemia, Arthritis, Depression, Diverticulosis, DM (diabetes mellitus) ([address] Erythropenia, Fibromyalgia, HTN (hypertension), Hyperlipidemia, Kidney stone, Morbid obesity due to excess calories ([address] DIONY on CPAP, Osteopenia, Seasonal allergies, and Sleep apnea. Past Surgical History:   has a past surgical history that includes Colonoscopy (2003); Colonoscopy (2007); Tubal ligation; Arm Surgery (2011); Total knee arthroplasty (Bilateral); Cardiac catheterization ([id number] dr Shavonne Ng);  Cardiac catheterization (2016); ORIF ankle fracture (Right, 2022); and Ankle fracture surgery (Right, 12/6/2022). Restrictions  Restrictions/Precautions: Weight Bearing  Required Braces or Orthoses?: No (Sling for comfort)  Implants present? : Metal implants (IM Nailing)         Position Activity Restriction  Other position/activity restrictions: Per chart: Right open trimalleolar fracture ankle fracture dislocation s/p I&D and ORIF, DOS 12/6/2022      Vitals  Vitals  O2 Device: None (Room air)     Subjective  Subjective: \"I don't know why I'm so tired\"    Pain: Pt denies    Social/Functional History  Social/Functional History  Lives With: Spouse  Type of Home: House  Home Layout: One level, Laundry in basement  Home Access: Stairs to enter with rails  Entrance Stairs - Number of Steps: 3 from back door.   Entrance Stairs - Rails: Left  Bathroom Shower/Tub: Walk-in shower, Doors, Shower chair without back  Bathroom Toilet: Handicap height  Bathroom Equipment: Grab bars in shower, Shower chair, Hand-held shower  Home Equipment: imbookin ([place]), [employer], Walker, rolling, Wheelchair-manual (Cane at all times, W/c belongs to )  Has the patient had two or more falls in the past year or any fall with injury in the past year?: Yes (Pt reports falling in October before this most recent fall)  Receives Help From: Family  ADL Assistance: Independent  Homemaking Assistance: Needs assistance  Laundry:  (Daughter does laundry in the basement.)  Vacuuming:  (Spouse/daughter)  Shopping:  (Pt does shopping)  Homemaking Responsibilities: Yes  Meal Prep Responsibility: No (Eats out)  Laundry Responsibility: No (Daughter does laundry)  Cleaning Responsibility: No (Daughter occasionally assists)  Ambulation Assistance: Independent (With cane, used 2 wheel walker at night)  Transfer Assistance: Independent  Active : Yes  Mode of Transportation: SUV  Occupation: Retired  Type of Occupation: Pharmacy tech  IADL Comments: Pt reports sleeping in regular flat bed  Additional Comments: Pt reports her daughter assists as able,  is home most of the time unless he is fishing. Daughter works  [email or web address] Friday at [email or web address] (medical records). Son works full time but he is on medical leave at this time. Objective  Vision  Vision: Impaired  Vision Exceptions: Wears glasses at all times       Hearing  Hearing: Within functional limits    Perception  Overall Perceptual Status: WFL    Sensation  Overall Sensation Status: Impaired (Decreased sensation B feet.)    Observation/Palpation  Observation: Splint/ace wrap to RLE    Cognition  Overall Orientation Status: Within Functional Limits  Orientation Level: Oriented X4    Cognition  Overall Cognitive Status: WFL    Activities of Daily Living     Feeding: Independent  Feeding Skilled Clinical Factors: Per pt report    Grooming: Setup  Grooming Skilled Clinical Factors: Setup for oral care/grooming of hair    UE Bathing: Stand by assistance  UE Bathing Skilled Clinical Factors: Seated EOB, pt able to cleanse UB with wash cloths with SBA for safety    LE Bathing: Maximum assistance, Dependent/Total  LE Bathing Skilled Clinical Factors: Seated EOB, pt able to cleanse upper legs, A to cleanse LLE/foot and TA for aleah/buttocks care Max A x2 in maddy steakira    UE Dressing: Minimal assistance  UE Dressing Skilled Clinical Factors: Seated EOB, pt able to don [place] shirt with A to pull down    LE Dressing: Dependent/Total  LE Dressing Skilled Clinical Factors: TA to thread brief and pants, TA to mange up over hips with Max A in maddy stedy    Toileting: Dependent/Total  Toileting Skilled Clinical Factors: TA to complete toileting hygiene and manage brief/pants up over hips    Additional Comments: Pt completes UB and LB ADLs while seated EOB d/t decreased strength, activity tolerance, endurance, and NWB to RLE. Pt tolerated well, frequent rest breaks taken as needed throughout session. Pt Max A/dependent for all LB ADLs d/t NWB RLE and decreased strength.     UE Function     LUE AROM (degrees)  LUE AROM : WFL        Tone RUE  RUE Tone: Normotonic    LUE Strength  Gross LUE Strength: WFL  L Hand General: 4/5  LUE Strength Comment: Grossly 4/5    Left Hand Strength -  (lbs)  Handle Setting 2: 35.3# (34,49,89) (Norm: 32-52#)    RUE AROM (degrees)  RUE AROM : WFL     Right Hand AROM (degrees)  Right Hand AROM: WFL  Tone LUE  LUE Tone: Normotonic    RUE Strength  Gross RUE Strength: WFL  R Hand General: 4/5  RUE Strength Comment: Grossly 4/5    Right Hand Strength -  (lbs)  Handle Setting 2: 41.3# (19,16,08) (Norm: 32-54#)    Fine Motor Skills/Coordination  Hand Dominance  Hand Dominance: Right     Fine Motor Skills  Left 9-Hole Peg Test: Impaired  Left 9 Hole Peg Test Time (secs): 42.31 (Norm: 21-30 sec)  Right 9-Hole Peg Test: Impaired  Right 9 Hole Peg Test Time (secs): 41.84 (Norm: 21-30 sec)       Mobility  Bed mobility  Supine to Sit: Minimal assistance (To bring RLE out of bed)  Sit to Supine: Unable to assess (Pt retired to reclining chair at end of session)  Scooting: Minimal assistance  Bed Mobility Comments: HOB elevated, use of hand rails, managment of RLE  Time: ~30 seconds x5    Balance  Balance  Sitting Balance: Stand by assistance  Standing Balance: Maximum assistance  Standing Balance  Time: ~30 seconds x5  Activity: Functional transfers  Comment: Max A x2 using maddy stedy    Transfers  Transfers  Sit to stand: Dependent/Total (Max A x2 in maddy stedy)  Stand to sit: Dependent/Total (Max A x2 in maddy stedy)  Transfer Comments: Pt very fearful, however completes 5 sit to stand transfers utilizing maddy stedy.   Toilet Transfers  Toilet - Technique: Ambulating  Equipment Used: Raised toilet seat with rails  Toilet Transfer: Dependent/Total (Max A x2 from maddy stedy)  Toilet Transfers Comments: Max A x2 with maddy stedy, VC to maintain NWB RLE    Functional mobility  Functional Mobility  Functional - Mobility Device: Other (Use of maddy stedy to transfer from bed to toilet)  Activity: To/from bathroom  Assist Level: Dependent/Total  Functional Mobility Comments: Max A x2 in [facility]    Assessment  Activity Tolerance  Activity Tolerance: Patient limited by fatigue, Patient limited by pain  Assessment  Performance deficits / Impairments: Decreased ADL status, Decreased functional mobility , Decreased ROM, Decreased strength, Decreased endurance, Decreased balance, Decreased high-level IADLs  Treatment Diagnosis: Impaired self-care status  Prognosis: Good  Decision Making: Medium Complexity  Discharge Recommendations: Continue to assess pending progress    Patient Education  Education  Education Given To: Patient  Education Provided: Role of Therapy, Plan of Care, Precautions, Safety, ADL Function, Transfer Training  Education Method: Demonstration, Verbal  Barriers to Learning: None  Education Outcome: Verbalized understanding, Continued education needed    OT Equipment Recommendations  Other: TBD    Safety Devices  Type of Devices: Gait belt, Call light within reach (Pt left on BSC -maxi move transfer system, PCA to assist pt.)  Restraints  Restraints Initially in Place: No    Goals     Short Term Goals  Time Frame for Short Term Goals: By one week  Short Term Goal 1: Pt will perform UB ADLs including grooming/oral care with SBA and good safety  Short Term Goal 2: Pt will perform LB ADLs including toileting/toilet transfers with Min A, good safety, and use of AE/DME/Modified techniques as needed  Short Term Goal 3: Pt will be educated on NWB status to RLE with good carryover during functional transfers/tasks  Short Term Goal 4: Pt will tolerate standing for 5+ minutes, Min A, with 1-2 UE support and no LOB during functional task while maintaining NWB RLE  Short Term Goal 5: Pt will be educated on and explore use of AE/DME/Modified techniques to improve safety and independence with ADL tasks  Short Term Goal 6: Pt will actively participate in 30+ minutes of therapeutic exercise/functional activity to promote safety and independence with self-care and mobility  Short Term Goal 7: Pt will perform functional transfers/mobility with Min A, good safety, and use of least restrictive device while maintaining NWB RLE    Long Term Goals  Time Frame for Long Term Goals : By discharge  Long Term Goal 1: Pt will perform UB ADLs including grooming/oral care with Mod I and good safety  Long Term Goal 2: Pt will perform LB ADLs, including toileting/toilet transfers, with SBA, good safety, and use of AE/DME/Modified techniques as needed  Long Term Goal 3: Pt will tolerate standing 10+ minutes, SBA, with 1-2 UE and no LOB during self-care/functional activity while maintaining NWB RLE  Long Term Goal 4: Pt will verbalize/demonstrate good understanding of home safety/fall prevention/energy conservation strategies to improve safety and independence with self-care tasks  Long Term Goal 5: Pt will safely perform light housekeeping/meal preparation with supervision, good safety, and use of least restrictive device to improve independence with ADLs/IADLs  Long Term Goal 6: Pt will perform functional transfers/mobility with SBA, good safety, and use of least restrictive device while maintaining NWB RLE  Long Term Goal 7: Pt will demonstrate improved fine motor coordination during self-care tasks AEB 10 second improvement on 9 hole peg test    Plan  Occupational Therapy Plan  Times Per Week: 5-7  Times Per Day: Twice a day  Current Treatment Recommendations: Strengthening, ROM, Balance training, Functional mobility training, Endurance training, Pain management, Patient/Caregiver education & training, Positioning, Safety education & training, Equipment evaluation, education, & procurement, Self-Care / ADL, Home management training, Coordination training    OT scores     Eating  Assistance Needed: Independent  CARE Score: 6  Discharge Goal: Independent  Oral Hygiene  Assistance Needed: Setup or clean-up assistance  CARE Score: 5  Discharge Goal: [address] [place] [address] needed: Dependent  CARE Score: 1  Discharge Goal: Supervision or touching assistance  Shower/Bathe Self  Assistance Needed: Substantial/maximal assistance  CARE Score: 2  Discharge Goal: Supervision or touching assistance  Upper Body Dressing  Assistance Needed: Partial/moderate assistance  CARE Score: 3  Discharge Goal: Independent  Lower Body Dressing  Assistance Needed: Dependent  CARE Score: 1  Discharge Goal: Supervision or touching assistance  Putting On/Taking Off Footwear  Assistance Needed: Dependent  CARE Score: 1  Discharge Goal: Supervision or touching assistance  Toilet Transfer  Assistance needed: Dependent  CARE Score: 1  Discharge Goal: Supervision or touching assistance     12/13/22 0815 12/13/22 1339   OT Individual Minutes   Time In 0815 1339   Time Out 0935 1350   Minutes 80 11   Time Code Minutes    Timed Code Treatment Minutes 60 Minutes 11 Minutes       Electronically signed by BEVERLY Alaniz on 12/13/22 at 3:38 PM EST

## 2022-12-13 NOTE — CONSULTS
Comprehensive Nutrition Assessment    Type and Reason for Visit:  Initial, Consult (Evaluate and Treat)    Nutrition Recommendations/Plan:   Continue current diet. Continue oral nutrition supplements. Malnutrition Assessment:  Malnutrition Status:  No malnutrition (Based on availabe data) (12/13/22 1428)    Context:  Acute Illness     Findings of the 6 clinical characteristics of malnutrition:  Energy Intake:  Mild decrease in energy intake (Comment)  Weight Loss:  Unable to assess     Body Fat Loss:      Unable to assess  Muscle Mass Loss:  Unable to assess    Fluid Accumulation:  Mild (not related to nutritional status) Extremities   Strength:   Not measured    Nutrition Assessment:    Pt states she did not care for the food during acute hospitalization and appetite and intake were decreased. States she has been drinking Nepro, although she would prefer chocolate flavor. When this writer explained why chocolate was not recommended with a renal diet and not available with Nepro supplements, pt voiced dissatisfaction with the renal diet restrictions. Documentation indicates that pt reported a good appetite 12/12 and consumed approximately 90% of lunch. Approximately 75% of lunch consumed today on observation; pt was still eating at time of visit. Await nephrologist visit today for updated evaluation and medication review. Nutrition Related Findings:    Hx: DM, CKD, HTN. BUN: 77, Cr: 4.29, K: 4.5 (12/13), Phosphorus: 5.1 (12/7). Other labs and meds reviewed. Edema: +1 LUE; unable to assess RLE. Wound Type: Multiple, Open Wounds, Surgical Incision       Current Nutrition Intake & Therapies:    Average Meal Intake: 51-75%, % (Estimated; varies at times)  Average Supplements Intake: 51-75%, %  ADULT DIET; Regular; Low Sodium (2 gm); Low Potassium (Less than 3000 mg/day);  Low Phosphorus (Less than 1000 mg)  ADULT ORAL NUTRITION SUPPLEMENT; Lunch, Dinner; Renal Oral Supplement    Anthropometric Measures:  Height: 5' 5\" (165.1 cm)  Ideal Body Weight (IBW): 125 lbs (57 kg)       Current Body Weight: 261 lb 14.5 oz (118.8 kg), 209.5 % IBW. Current BMI (kg/m2): 43.6  Usual Body Weight: 254 lb 10.1 oz (115.5 kg) (115.5kg 4/6 Bedscale, 119kg per pt as usual wt)  % Weight Change (Calculated): 2.9                         Estimated Daily Nutrient Needs:  Energy Requirements Based On: Formula  Weight Used for Energy Requirements: Admission  Energy (kcal/day): 9055-4213 based on [place] with 1.1-1.2 factor  Weight Used for Protein Requirements: Ideal  Protein (g/day): 85 based on 1.5 gm per kg (may vary with renal function, healing needs)      Nutrition Diagnosis:   Inadequate oral intake related to acute injury/trauma (decreased appetite) as evidenced by poor intake prior to admission    Nutrition Interventions:   Food and/or Nutrient Delivery: Continue Current Diet, Continue Oral Nutrition Supplement  Nutrition Education/Counseling: No recommendation at this time  Coordination of Nutrition Care: Continue to monitor while inpatient       Goals:     Goals: Meet at least 75% of estimated needs       Nutrition Monitoring and Evaluation:   Behavioral-Environmental Outcomes: None Identified  Food/Nutrient Intake Outcomes: Food and Nutrient Intake, Supplement Intake  Physical Signs/Symptoms Outcomes: Biochemical Data, GI Status, Fluid Status or Edema, Weight, Skin    Discharge Planning:     Too soon to determine     Airam Laurent RD, LD  Contact: [phone]

## 2022-12-13 NOTE — PROGRESS NOTES
12/13/22 1135   Encounter Summary   Encounter Overview/Reason   Encounter   Service Provided For: Patient   Referral/Consult From: Rounding   Last Encounter  12/13/22   Complexity of Encounter Low   Begin Time 0945   End Time  1000   Total Time Calculated 15 min   Rituals, Rites and Sacraments   Type Anointing  (Fr Peralta 12/13/22)

## 2022-12-13 NOTE — CONSULTS
[facility] Internal [facility]  Jamie Santos MD; Sharonda Cordero MD; Jairo Tom MD; MD Fernando Morgan MD; MD MONY Scott [facility]   [facility]    Date:   12/13/2022  Patient name:  Shawn Heart  Date of admission:  12/12/2022  5:48 PM  MRN:   [id number]  Account:  [de-identified]  YOB: 1945  PCP:    Scotty Casey MD  Room:   [address] [place] [address]  Code Status:    Full Code    Chief Complaint:     No chief complaint on file. History Obtained From:     Patient and electronic medical record    History of Present Illness:     Shawn Heart is a 69 y.o. Non- / non  female who presents with No chief complaint on file. and is admitted to the hospital for the management of Open fracture of right tibia and fibula, sequela. Past medical history significant for A. Fib on Eliquis and amiodarone, JOSE ARMANDO on CKD, Type 2 Diabetes, HTN,HLD, Anemia, Fibromyalgia, Obesity, and DIONY. Sustained an open trimalleolar fracture, ankle fracture dislocation s/p ORIF on 12/6, right talus open treatment. Nonweight bearing right lower extremity. Developed JOSE ARMANDO on CKD, per Nephrology patient is high risk of needing dialysis, consult to Nephrology sent when patient admitted to Acute Rehab. Seen at examined at bedside, did not sleep well last night due to being uncomfortable due to the blankets. Otherwise no concerns, not in acute distress     Principal Problem:    Open fracture of right tibia and fibula, sequela  Resolved Problems:    * No resolved hospital problems.  *                  Past Medical History:     Past Medical History:   Diagnosis Date    Abnormal stress test     Anemia     Arthritis     Depression     Diverticulosis     DM (diabetes mellitus) ([facility] [address]     Erythropenia     Fibromyalgia     HTN (hypertension)     Hyperlipidemia     Kidney stone     Morbid obesity due to excess calories ([facility] [address]     DIONY on CPAP     Osteopenia 03/03/14    Seasonal allergies     Sleep apnea     uses bipap prn        Past Surgical History:     Past Surgical History:   Procedure Laterality Date    ANKLE FRACTURE SURGERY Right 12/6/2022    RIGHT ANKLE OPEN REDUCTION INTERNAL FIXATION performed by Lyubov Vela DO at [facility]  01/01/2011    right arm broken    CARDIAC CATHETERIZATION  [id number] dr Rubia Patton  05/16/2016    COLONOSCOPY  01/01/2003    COLONOSCOPY  01/01/2007    ORIF ANKLE FRACTURE Right 12/06/2022    RIGHT ANKLE OPEN REDUCTION INTERNAL FIXATION    TOTAL KNEE ARTHROPLASTY Bilateral     left may 2013 and right july 2013    TUBAL LIGATION          Medications Prior to Admission:     Prior to Admission medications    Medication Sig Start Date End Date Taking? Authorizing Provider   HYDROcodone-acetaminophen (NORCO) 5-325 MG per tablet Take 1 tablet by mouth every 6 hours as needed for Pain for up to 3 days. 12/12/22 12/15/22  Russell Reynaga MD   zolpidem (AMBIEN) 5 MG tablet Take 1 tablet by mouth nightly as needed for Sleep for up to 5 days. 12/12/22 12/17/22  Russell Reynaga MD   methocarbamol (ROBAXIN) 750 MG tablet Take 1 tablet by mouth in the morning and 1 tablet at noon and 1 tablet in the evening. Do all this for 10 days. 12/12/22 12/22/22  Russell Reynaga MD   metoprolol tartrate (LOPRESSOR) 25 MG tablet Take 1 tablet by mouth 2 times daily 12/12/22   Russell Reynaga MD   senna (SENOKOT) 8.6 MG tablet Take 1 tablet by mouth daily as needed (Constipation) 12/12/22 1/11/23  Russell Reynaga MD   rOPINIRole (REQUIP) 0.25 MG tablet Take 1 tablet by mouth nightly 11/14/22   Eliza Davidson MD   gabapentin (NEURONTIN) 600 MG tablet Take 1 tablet by mouth daily for 90 days.  11/14/22 2/12/23  Eliza Davidson MD   traZODone (DESYREL) 50 MG tablet  10/20/22   Mandi Rodas MD   colchicine (COLCRYS) 0.6 MG tablet  9/27/22   Domenico Fox DPM   Cyanocobalamin (B-12) 1000 MCG LOZG DISSOLVE ONE tablet UNDER TONGUE ONCE DAILY 9/6/22   Margoth Grissom MD   blood glucose monitor kit and supplies use check blood sugar as directed 11/9/22   Eliza Horn MD   blood glucose monitor strips Check blood sugars daily as directed. 11/9/22   Eliza Horn MD   Lancets MISC 1 each by Does not apply route daily 11/9/22   Eliza Horn MD   Alcohol Swabs 70 % PADS 1 each by Does not apply route daily 11/9/22   Eliza Horn MD   atorvastatin (LIPITOR) 40 MG tablet Take 1 tablet by mouth daily 11/7/22   Eliza Hron MD   pantoprazole (PROTONIX) 40 MG tablet TAKE 1 TABLET DAILY 10/19/22   Eliza Horn MD   ELIQUIS 5 MG TABS tablet TAKE 1 TABLET TWICE A DAY 10/11/22   Eliza Horn MD   cyclobenzaprine (FLEXERIL) 5 MG tablet TAKE 1 TABLET BY MOUTH NIGHTLY AS NEEDED FOR MUSCLE SPASMS 10/3/22   Eliza Horn MD   allopurinol (ZYLOPRIM) 100 MG tablet TAKE 1 TABLET DAILY 9/21/22   BARBIE Barrera CNP   zolpidem (AMBIEN) 5 MG tablet Take 5 mg by mouth nightly as needed for Sleep.     Historical Provider, MD   hydrALAZINE (APRESOLINE) 25 MG tablet Take 4 tablets by mouth 3 times daily 7/12/22   Zhou Sharma MD   amiodarone (CORDARONE) 200 MG tablet Take 1 tablet by mouth daily 7/6/22   BARBIE Mcclendon NP   calcitRIOL (ROCALTROL) 0.5 MCG capsule TAKE 1 CAPSULE BY MOUTH DAILY 6/14/22   Zhou Sharma MD   ferrous sulfate (FE TABS 325) 325 (65 Fe) MG EC tablet Take 1 tablet by mouth daily (with breakfast) 5/10/22   Eliza Horn MD   azelastine (ASTELIN) 0.1 % nasal spray 1 spray by Nasal route 2 times daily Use in each nostril as directed 10/13/21   [place], DO   Cholecalciferol (VITAMIN D3) 25 MCG (1000 UT) TABS Take 2 tablets by mouth daily 1/9/20   Renate Liu APRN - CNP   Magnesium Oxide 400 MG CAPS Take by mouth 2 times daily Takes once daily at [facility]    Historical Provider, MD        Allergies:     Adhesive tape, Cephalexin, Erythromycin, Ketorolac tromethamine, Pcn [penicillins], Percocet [oxycodone-acetaminophen], Sulfa antibiotics, and Ultracet [tramadol-acetaminophen]    Social History:     Tobacco:    reports that she quit smoking about 62 years ago. Her smoking use included cigarettes. She has a 0.25 pack-year smoking history. She has never used smokeless tobacco.  Alcohol:      reports no history of alcohol use. Drug Use:  reports no history of drug use. Family History:     Family History   Problem Relation Age of Onset    Diabetes Mother     Heart Disease Mother     Osteoporosis Mother     Kidney Disease Father     Prostate Cancer Brother        Review of Systems:     Positive and Negative as described in HPI. ROS                                                                  . Physical Exam:     Physical Exam   Vitals:    Vitals:    22 1805 22 1930 22 0517 22 0910   BP: (!) 145/53 137/69 (!) 145/70    Pulse: 66 72 66    Resp: 20 18 16    Temp: 98.8 °F (37.1 °C) 98.5 °F (36.9 °C) 98.6 °F (37 °C)    TempSrc:  Oral     SpO2: 94%  94%    Height: 5' 5\" (1.651 m)   5' 5\" (1.651 m)                    Body mass index is 43.6 kg/m². Temp (24hrs), Av.6 °F (37 °C), Min:98.3 °F (36.8 °C), Max:98.8 °F (37.1 °C)    Recent Labs     22  1128 22  1609 22  1052   POCGLU 187* 140* 213* 153*             General Appearance:   No acute distress , obese                 Skin:                             No rash or erythema  HEENT ;                                                                       No icterus, no pallor . No ptosis. No gross asymmetry  Or abnormality face         Neck:                            No mass , no thyroid enlargement                                           Pulmonary/Chest:                                               Symmetric                                          Clear to auscultation bilaterally . No wheezes,                                          No rales or rhonchi . No abnormality on percussion                                                        Cardiovascular:            Normal rate, regular rhythm,                                          No murmur or  Gallop . Abdomen:                       Soft, non-tender                                           Normal bowels sounds,                                             Extremities:                    No  Edema .                                            Musculo-skeletal / Neurological ;;                  Neurological ;                 No focal motor deficit ,                 No focal sensory deficit ,    Musculo-skeletal ;                  No  gait abnormality                  No significant joint abnormality,                                                         Psych:                     See psych notes                                                                 Investigations:      URINE ANALYSIS: No results found for: LABURIN     CBC:  Lab Results   Component Value Date/Time    WBC 11.0 12/13/2022 06:47 AM    HGB 7.6 12/13/2022 06:47 AM     12/13/2022 06:47 AM     03/02/2012 11:15 AM             BMP:    Lab Results   Component Value Date/Time     12/13/2022 06:47 AM    K 4.5 12/13/2022 06:47 AM    CL 97 12/13/2022 06:47 AM    CO2 28 12/13/2022 06:47 AM    BUN 77 12/13/2022 06:47 AM    CREATININE 4.29 12/13/2022 06:47 AM    GLUCOSE 149 12/13/2022 06:47 AM    GLUCOSE 113 03/02/2012 11:15 AM      LIVER PROFILE:  Lab Results   Component Value Date/Time    ALT <5 12/13/2022 06:47 AM    AST 10 12/13/2022 06:47 AM    PROT 5.6 12/13/2022 06:47 AM    BILITOT 0.3 12/13/2022 06:47 AM    BILIDIR 0.1 12/13/2022 06:47 AM    LABALBU 2.9 12/13/2022 06:47 AM    LABALBU 4.2 03/02/2012 11:15 AM             @BRIEFLABT(TSH)@      Laboratory Testing:  Recent Results (from the past 24 hour(s))   POC Glucose Fingerstick    Collection Time: 12/12/22 10:52 AM   Result Value Ref Range    POC Glucose 153 (H) 65 - 105 mg/dL   Basic Metabolic Panel w/ Reflex to MG    Collection Time: 12/13/22  6:47 AM   Result Value Ref Range    Glucose 149 (H) 70 - 99 mg/dL    BUN 77 (H) 8 - 23 mg/dL    Creatinine 4.29 (H) 0.50 - 0.90 mg/dL    Est, Glom Filt Rate 10 (L) >60 mL/min/1.73m2    Calcium 9.5 8.6 - 10.4 mg/dL    Sodium 136 135 - 144 mmol/L    Potassium 4.5 3.7 - 5.3 mmol/L    Chloride 97 (L) 98 - 107 mmol/L    CO2 28 20 - 31 mmol/L    Anion Gap 11 9 - 17 mmol/L   Hepatic function panel    Collection Time: 12/13/22  6:47 AM   Result Value Ref Range    Albumin 2.9 (L) 3.5 - 5.2 g/dL    Alkaline Phosphatase 49 35 - 104 U/L    ALT <5 (L) 5 - 33 U/L    AST 10 <32 U/L    Total Bilirubin 0.3 0.3 - 1.2 mg/dL    Bilirubin, Direct 0.1 <0.3 mg/dL    Bilirubin, Indirect 0.2 0.0 - 1.0 mg/dL    Total Protein 5.6 (L) 6.4 - 8.3 g/dL   CBC auto differential    Collection Time: 12/13/22  6:47 AM   Result Value Ref Range    WBC 11.0 3.5 - 11.0 k/uL    RBC 2.53 (L) 4.0 - 5.2 m/uL    Hemoglobin 7.6 (L) 12.0 - 16.0 g/dL    Hematocrit 23.1 (L) 36 - 46 %    MCV 91.3 80 - 100 fL    MCH 30.1 26 - 34 pg    MCHC 33.0 31 - 37 g/dL    RDW 16.5 (H) 11.5 - 14.9 %    Platelets 251 579 - 428 k/uL    MPV 7.8 6.0 - 12.0 fL    Seg Neutrophils 77 (H) 36 - 66 %    Lymphocytes 11 (L) 24 - 44 %    Monocytes 10 (H) 1 - 7 %    Eosinophils % 2 0 - 4 %    Basophils 0 0 - 2 %    Segs Absolute 8.47 1.3 - 9.1 k/uL    Absolute Lymph # 1.21 1.0 - 4.8 k/uL    Absolute Mono # 1.10 0.1 - 1.3 k/uL    Absolute Eos # 0.22 0.0 - 0.4 k/uL    Basophils Absolute 0.00 0.0 - 0.2 k/uL    Morphology ANISOCYTOSIS PRESENT        Imaging/Diagnostics:  XR ANKLE RIGHT (MIN 3 VIEWS)    Result Date: 12/6/2022  Anatomic alignment of comminuted ankle fracture status post ORIF.        Assessment :      Hospital Problems             Last Modified POA    * (Principal) Open fracture of right tibia and fibula, sequela 12/12/2022 Yes       Plan:       Medications: Allergies: Allergies   Allergen Reactions    Adhesive Tape     Cephalexin Other (See Comments)     Tongue cracks and peels    Erythromycin Other (See Comments)     Epigastric pain and bloating    Ketorolac Tromethamine Other (See Comments)     Severe stomach cramps    Pcn [Penicillins]      Unknown reaction    Percocet [Oxycodone-Acetaminophen] Itching and Other (See Comments)     Esophagus hurt    Sulfa Antibiotics     Ultracet [Tramadol-Acetaminophen] Itching       Current Meds:   Scheduled Meds:    atorvastatin  40 mg Oral Nightly    methocarbamol  750 mg Oral 3 times per day    allopurinol  100 mg Oral Daily    amiodarone  200 mg Oral Daily    apixaban  5 mg Oral BID    calcitRIOL  0.25 mcg Oral Daily    colchicine  0.6 mg Oral Daily    ferrous sulfate  325 mg Oral BID WC    gabapentin  600 mg Oral Daily    hydrALAZINE  100 mg Oral TID    magnesium oxide  400 mg Oral BID    metoprolol tartrate  25 mg Oral BID    pantoprazole  40 mg Oral QAM AC    rOPINIRole  0.25 mg Oral Nightly    traZODone  100 mg Oral Nightly    Vitamin D  2,000 Units Oral Daily    zolpidem  5 mg Oral Nightly    polyethylene glycol  17 g Oral Daily     Continuous Infusions:   PRN Meds: acetaminophen, HYDROcodone-acetaminophen, bisacodyl       Patient admitted [facility] Problems             Last Modified POA    * (Principal) Open fracture of right tibia and fibula, sequela 12/12/2022 Yes                      12/13/22    Open trimalleolar fracture, s/p ORIF on 12/6  JOSE ARMANDO on CKD, Cr 4.29 today, nephro following, patient high risk for needing HD  Anemia  Hx of A Fib on Eliquis and amio, DM2, HTN,HLD, DIONY, Fibromyalgia   Nephrology following for any urgent HD needs, appreciate recommendations, renal diet, continue to monitor Cr  A Fib.  Continue Amio and Eliquis  Continue lipitor, hydralazine, and lopressor  Continue iron supplementation, Hgb stable at 7.6             Consultations:   [facility] TO SOCIAL WORK  IP CONSULT TO INTERNAL MEDICINE  IP CONSULT TO 30 Jordi Hurtado MD  12/13/2022    [address] Phone [phone]   Fax: [phone]  Answering Service: [phone]    12/13/2022  10:04 AM    Attestation and add on       I have discussed the care of Yoseph Vaughan , including pertinent history and exam findings,      12/13/22    with the resident. I have seen and examined the patient and the key elements of all parts of the encounter have been performed by me . I agree with the assessment, plan and orders as documented by the resident. Juan Juan MD      [address] Phone [phone]   Fax: [phone]  Answering Service: [phone]            Copy sent to Dr. Aubrie Weathers MD    Please note that this chart was generated using voice recognition Dragon dictation software. Although every effort was made to ensure the accuracy of this automated transcription, some errors in transcription may have occurred.

## 2022-12-13 NOTE — DISCHARGE SUMMARY
[facility]  Office: [phone]  Gabriel Segura Blood DO, Shakira Valverde MD, Mack Dancer MD, Michell Jennings MD, Bud Lo MD, Jaye Cordoba MD, Gustavo Beckwith MD, Flor Hernandez MD, Opal Wolf MD, Elias Dempsey MD, Rosaura Castellano DO, Nataliia Torres MD, Laurie Garcia MD, Dereje Flowers DO, Nathan Garcia MD,  Desire Prescott MD, Ebony Wang MD, Nakia Goode CNP, [facility] CNP, Unknown Pump CNP, Elder Wolf CNS, Emogene Rebel CNP, Luz Mcintosh CNP, Alan Cheatham CNP, Lazara Moran CNP, Pito Steiner CNP, Tai Rivera PA-C, Mary Jane Aiken CNP, Aiden Galvan CNP, Carlos Roach CNP, Ridge Hickey CNP, Gaviota Patel CNP, Sonal[facility] LooneyWVUMed[facility], [address] [place] [address]      Discharge Summary     Patient ID: João Huizar  :  1945   MRN: [id number]     ACCOUNT:  [de-identified]   Patient Location : [address] [place], [place] [address]  Patient's PCP: Elba Christianson MD  Admit Date: 2022   Discharge Date: 2022     Length of Stay: 7  Code Status:  Full Code  Admitting Physician: Lindsey Suresh MD  Discharge Physician: Michell Jennings MD     Active Discharge Diagnosis :     Primary Problem  Open fracture of right tibia and fibula, sequela      Hospital Problems  Active Hospital Problems    Diagnosis Date Noted    JOSE ARMANDO (acute kidney injury) ([facility] [address] [N17.9] 2022     Priority: Medium    Fall [W19. XXXA] 2022     Priority: Medium    Type III open trimalleolar fracture of right ankle [S82.851C] 2022     Priority: Medium    Hyperkalemia [E87.5] 2022     Priority: Medium    Hyponatremia [E87.1] 2022     Priority: Medium    Metabolic acidosis [Z74.87] 2022     Priority: Medium    Open fracture of right tibia and fibula, sequela [S82.201S, S82.401S] 2022     Priority: Medium    Acute kidney injury superimposed on chronic kidney disease ([facility] [address] [N17.9, N18.9] 2022    [facility] [N18.4] 05/01/2014     Class: Chronic       Admission Condition:  fair     Discharged Condition: stable    Hospital Stay:     Hospital Course: Rony Lazcano is a 69 y.o. female who was admitted for the management of   Open fracture of right tibia and fibula, sequela , presented to ER with Ankle Injury (Right ankle fracture )    Patient presented to the emergency room with right ankle injury and suffered a right ankle fracture after falling over a curb when she was trying to go to [employer] for [employer]. She has underlying history of A. fib on Eliquis, CHF, diabetes mellitus with CKD3, hypertension, osteoporosis, fibromyalgia, morbid obesity BMI 43, sleep apnea, depression. Patient underwent I&D and ORIF on 12/6/2022. Patient also developed acute kidney injury with hyperkalemia and metabolic acidosis. Nephrology was consulted and patient given Benna Deeds. Patient was given 1 unit PRBC transfusion on 12/11/2022 for low hemoglobin of 6.4. Patient is non weight bearing R leg and was discharged to acute Rehab at [facility]. Significant therapeutic interventions:   Right ankle fracture secondary to fall with underlying osteoporosis  - orthopedic surgery following. S/p  ORIF 12/6/22. Acute superimposed on chronic kidney disease stage IV  -Baseline creatinine seems to be ~ 3 to 3.2. Nephrology following. No indication for dialysis. Essential hypertension -well-controlled. Paroxysmal atrial fibrillation - resume  Eliquis,-continue amiodarone, Lopressor.   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 -PRBC transfusion       Significant Diagnostic Studies:   Labs / Micro:/Radiology  Recent Labs     12/13/22  0647 12/11/22  1900 12/10/22  0754 12/10/22  0637 12/08/22  0926   WBC 11.0  --   --  11.3 14.4*   HGB 7.6* 7.8* 6.6* 6.4* 7.2*   HCT 23.1* 23.8* 21.2* 21.7* 24.7*   MCV 91.3  --   --  100.0 102.9     --   --  270 260 Labs Renal Latest Ref Rng & Units 12/13/2022 12/12/2022 12/11/2022 12/10/2022 12/9/2022   BUN 8 - 23 mg/dL 77(H) 75(H) 80(H) 86(H) 82(H)   Cr 0.50 - 0.90 mg/dL 4.29(H) 3.99(H) 4.13(H) 4.07(H) 4.34(H)   K 3.7 - 5.3 mmol/L 4.5 4.4 4.5 4.5 4.9   Na 135 - 144 mmol/L 136 130(L) 134(L) 134(L) 133(L)     Lab Results   Component Value Date    ALT <5 (L) 12/13/2022    AST 10 12/13/2022    ALKPHOS 49 12/13/2022    BILITOT 0.3 12/13/2022     Lab Results   Component Value Date    TSH 5.06 (H) 12/06/2022     Lab Results   Component Value Date    HEPCAB NONREACTIVE 01/11/2021     Lab Results   Component Value Date/Time    COLORU Yellow 04/01/2022 05:03 AM    NITRU NEGATIVE 04/01/2022 05:03 AM    GLUCOSEU NEGATIVE 04/01/2022 05:03 AM    KETUA NEGATIVE 04/01/2022 05:03 AM    UROBILINOGEN Normal 04/01/2022 05:03 AM    BILIRUBINUR NEGATIVE 04/01/2022 05:03 AM    BILIRUBINUR neg 04/18/2017 10:21 AM     Lab Results   Component Value Date    LABA1C 5.8 12/06/2022     Lab Results   Component Value Date     12/06/2022     Lab Results   Component Value Date    INR 1.1 12/05/2022    INR 1.1 04/01/2022    INR 1.0 12/27/2018    PROTIME 11.3 12/05/2022    PROTIME 11.5 04/01/2022    PROTIME 10.8 12/27/2018       XR ANKLE RIGHT (MIN 3 VIEWS)    Result Date: 12/6/2022  Anatomic alignment of comminuted ankle fracture status post ORIF. Consultations:    Consults:     Final Specialist Recommendations/Findings:   IP CONSULT TO TRAUMA SURGERY  IP CONSULT TO ORTHOPEDIC SURGERY  IP CONSULT TO CARDIOLOGY  IP CONSULT TO TRAUMA CENTER  IP CONSULT TO NEPHROLOGY  IP CONSULT TO IV TEAM  IP CONSULT TO PHYSICAL MEDICINE REHAB      The patient was seen and examined on day of discharge and this discharge summary is in conjunction with any daily progress note from day of discharge.     Discharge plan:     Disposition: Acute Rehab     Physician Follow Up:     Shannan Morris MD  [address] [facility]  [phone]    Go on 12/21/2022  Post-op check at 1:15 pm    [facility]   [facility] [phone]  Schedule an appointment as soon as possible for a visit  Post hospital follow up with [facility] or your primary cardiologist       Requiring Further Evaluation/Follow Up POST HOSPITALIZATION/Incidental Findings: follow up with nephrology. Diet: renal diet    Activity: non weight bearing R leg     Instructions to Patient: medication as advised . Discharge Medications:      Medication List        START taking these medications      ferrous sulfate 325 (65 Fe) MG EC tablet  Commonly known as: FE TABS 325  Take 1 tablet by mouth daily (with breakfast)     methocarbamol 750 MG tablet  Commonly known as: ROBAXIN  Take 1 tablet by mouth in the morning and 1 tablet at noon and 1 tablet in the evening. Do all this for 10 days. metoprolol tartrate 25 MG tablet  Commonly known as: LOPRESSOR  Take 1 tablet by mouth 2 times daily     senna 8.6 MG tablet  Commonly known as: SENOKOT  Take 1 tablet by mouth daily as needed (Constipation)            CONTINUE taking these medications      Alcohol Swabs 70 % Pads  1 each by Does not apply route daily     allopurinol 100 MG tablet  Commonly known as: ZYLOPRIM  TAKE 1 TABLET DAILY     amiodarone 200 MG tablet  Commonly known as: CORDARONE  Take 1 tablet by mouth daily     atorvastatin 40 MG tablet  Commonly known as: LIPITOR  Take 1 tablet by mouth daily     azelastine 0.1 % nasal spray  Commonly known as: ASTELIN  1 spray by Nasal route 2 times daily Use in each nostril as directed     B-12 1000 MCG Lozg     blood glucose monitor kit and supplies  use check blood sugar as directed     blood glucose test strips  Check blood sugars daily as directed.      calcitRIOL 0.5 MCG capsule  Commonly known as: ROCALTROL  TAKE 1 CAPSULE BY MOUTH tablet  senna 8.6 MG tablet  zolpidem 5 MG tablet         Time Spent on discharge is  32 mins in patient examination, evaluation, counseling as well as medication reconciliation, prescriptions for required medications, discharge plan and follow up. Electronically signed by   Dilma Truong MD  12/13/2022        Thank you Dr. Yung Jackson MD for the opportunity to be involved in this patient's care.

## 2022-12-13 NOTE — PLAN OF CARE
Problem: Discharge Planning  Goal: Discharge to home or other facility with appropriate resources  12/13/2022 0032 by Jaci Mccord LPN  Outcome: Progressing  Flowsheets (Taken 12/12/2022 1930)  Discharge to home or other facility with appropriate resources:   Identify barriers to discharge with patient and caregiver   Arrange for needed discharge resources and transportation as appropriate   Identify discharge learning needs (meds, wound care, etc)  12/12/2022 1838 by Bridget Mistry RN  Outcome: Progressing     Problem: Safety - Adult  Goal: Free from fall injury  12/13/2022 0032 by Jaci Mccord LPN  Outcome: Progressing  12/12/2022 1838 by Bridget Mistry RN  Outcome: Progressing  Flowsheets (Taken 12/12/2022 1838)  Free From Fall Injury: Instruct family/caregiver on patient safety  Note: Patient remains free of falls and injuries throughout shift. Bed remains in the lowest position, wheels locked, call light and bedside table are within reach. Problem: ABCDS Injury Assessment  Goal: Absence of physical injury  12/13/2022 0032 by Jaci Mccord LPN  Outcome: Progressing  12/12/2022 1838 by Bridget Mistry RN  Outcome: Progressing     Problem: Skin/Tissue Integrity  Goal: Absence of new skin breakdown  Description: 1. Monitor for areas of redness and/or skin breakdown  2. Assess vascular access sites hourly  3. Every 4-6 hours minimum:  Change oxygen saturation probe site  4. Every 4-6 hours:  If on nasal continuous positive airway pressure, respiratory therapy assess nares and determine need for appliance change or resting period.   12/13/2022 0032 by Jaci Mccord LPN  Outcome: Progressing  12/12/2022 1838 by Bridget Mistry RN  Outcome: Progressing     Problem: Pain  Goal: Verbalizes/displays adequate comfort level or baseline comfort level  12/13/2022 0032 by Jaci Mccord LPN  Outcome: Progressing  12/12/2022 1838 by Bridget Mistry RN  Outcome: Progressing  12/12/2022 1838 by Bridget Mistry RN  Flowsheets (Taken 12/12/2022 1303)  Verbalizes/displays adequate comfort level or baseline comfort level:   Encourage patient to monitor pain and request assistance   Assess pain using appropriate pain scale   Administer analgesics based on type and severity of pain and evaluate response   Implement non-pharmacological measures as appropriate and evaluate response   Notify Licensed Independent Practitioner if interventions unsuccessful or patient reports new pain   Consider cultural and social influences on pain and pain management  Note: Patient's pain is well controlled with current regimen. See MAR.

## 2022-12-13 NOTE — CONSULTS
Department of Internal Medicine  Nephrology Sedrick Mas MD   Consult Note    Reason for consultation: Management of acute kidney injury and chronic kidney disease stage IIIb. Consulting physician: Denise Hernandez MD.    History of present illness: This is a 69 y.o. female with a significant past medical history of Lipidemia, nephrolithiasis, fibromyalgia, type 2 diabetes mellitus, osteoarthritis and Chronic kidney disease stage IIIb [baseline serum creatinine 2.5 mg/dL secondary to diabetic glomerulosclerosis and follows up with Dr. Gary Kennedy [facility] [address] [place] [address], who presented to the hospital at [facility]. [facility] on 12/6/2022 after tripping on [place] whilst [place] get out from [employer]. Vital signs were stable at presentation but her renal function had worsened from baseline with elevated BUN/creatinine 60/3.87 mg/dL. X-rays revealed trimalleolar fracture with diffuse soft tissue swelling of the right ankle. She underwent open reduction and internal fixation on 12/6/2022. Hemoglobin dropped to 6.6 g/dL on 12/10/2022 requiring PRBC transfusion. Serum creatinine peaked at 4.34 mg/dL. Patient did not require dialysis so far. She was transferred to acute rehab unit at [facility] yesterday [12/12/2022]. Pain control is adequate she has a cast on her left lower extremity. She does not have shortness of breath. She is nonoliguric and does not have indwelling Galdamez catheter. Laboratory studies today remarkable for BUN/creatinine 71/4.29  mg/dL.     Adhesive tape, Cephalexin, Erythromycin, Ketorolac tromethamine, Pcn [penicillins], Percocet [oxycodone-acetaminophen], Sulfa antibiotics, and Ultracet [tramadol-acetaminophen]    Past Medical History:   Diagnosis Date    Abnormal stress test     Anemia     Arthritis     Depression     Diverticulosis     DM (diabetes mellitus) (HCC)     Erythropenia     Fibromyalgia     HTN (hypertension)     Hyperlipidemia Kidney stone     Morbid obesity due to excess calories (HCC)     DIONY on CPAP     Osteopenia 15    Seasonal allergies     Sleep apnea     uses bipap prn     Scheduled Meds:   atorvastatin  40 mg Oral Nightly    methocarbamol  750 mg Oral 3 times per day    allopurinol  100 mg Oral Daily    amiodarone  200 mg Oral Daily    apixaban  5 mg Oral BID    calcitRIOL  0.25 mcg Oral Daily    colchicine  0.6 mg Oral Daily    ferrous sulfate  325 mg Oral BID WC    gabapentin  600 mg Oral Daily    hydrALAZINE  100 mg Oral TID    magnesium oxide  400 mg Oral BID    metoprolol tartrate  25 mg Oral BID    pantoprazole  40 mg Oral QAM AC    rOPINIRole  0.25 mg Oral Nightly    traZODone  100 mg Oral Nightly    Vitamin D  2,000 Units Oral Daily    zolpidem  5 mg Oral Nightly    polyethylene glycol  17 g Oral Daily     Continuous Infusions:  PRN Meds:.acetaminophen, HYDROcodone-acetaminophen, bisacodyl    Family History   Problem Relation Age of Onset    Diabetes Mother     Heart Disease Mother     Osteoporosis Mother     Kidney Disease Father     Prostate Cancer Brother      Social History     Socioeconomic History    Marital status:      Spouse name: None    Number of children: None    Years of education: None    Highest education level: None   Tobacco Use    Smoking status: Former     Packs/day: 0.25     Years: 1.00     Pack years: 0.25     Types: Cigarettes     Quit date: 1960     Years since quittin.9    Smokeless tobacco: Never    Tobacco comments:     quit    Vaping Use    Vaping Use: Never used   Substance and Sexual Activity    Alcohol use: No    Drug use: No    Sexual activity: Never     Social Determinants of Health     Financial Resource Strain: Low Risk     Difficulty of Paying Living Expenses: Not hard at all   Food Insecurity: No Food Insecurity    Worried About Running Out of Food in the Last Year: Never true    Ran Out of Food in the Last Year: Never true   Transportation Needs: No Transportation Needs    Lack of Transportation (Medical): No    Lack of Transportation (Non-Medical): No   Physical Activity: Insufficiently Active    Days of Exercise per Week: 2 days    Minutes of Exercise per Session: 20 min   Stress: Stress Concern Present    Feeling of Stress : To some extent   Social Connections: Moderately Isolated    Frequency of Communication with Friends and Family: Three times a week    Frequency of Social Gatherings with Friends and Family: Twice a week    Attends [employer] Services: Never    Active Member of Clubs or Organizations: No    Attends Club or Organization Meetings: Never    Marital Status:    Housing Stability: Low Risk     Unable to Pay for Housing in the Last Year: No    Number of [place] in the Last Year: 1    Unstable Housing in the Last Year: No     Review of systems: CNS - no headache or dizziness; Cardiac - no chest pain; Respiratory - no shortness of breath; Gastrointestinal - Nausea, vomiting or diarrhea; Musculoskeletal - general body aches; Skin/Integument - no rashes. Physical Exam:    VITALS:  BP (!) 145/70   Pulse 66   Temp 98.6 °F (37 °C)   Resp 16   Ht 5' 5\" (1.651 m)   LMP  (LMP Unknown)   SpO2 94%   BMI 43.60 kg/m²   TEMPERATURE:  Current - Temp: 98.6 °F (37 °C);  Max - Temp  Av.6 °F (37 °C)  Min: 98.5 °F (36.9 °C)  Max: 98.8 °F (37.1 °C)  RESPIRATIONS RANGE: Resp  Av  Min: 16  Max: 20  PULSE RANGE: Pulse  Av  Min: 66  Max: 72  BLOOD PRESSURE RANGE:  Systolic (98IKK), IBZ:357 , Min:137 , JPM:362  ; Diastolic (15HPO), RTU:36, Min:53, Max:70   PULSE OXIMETRY RANGE: SpO2  Av %  Min: 94 %  Max: 94 %  24HR INTAKE/OUTPUT:    Intake/Output Summary (Last 24 hours) at 2022 1724  Last data filed at 2022 9680  Gross per 24 hour   Intake --   Output 1200 ml   Net -1200 ml     Constitutional: alert, appears stated age, and cooperative    Skin: Skin color, texture, turgor normal. No rashes or lesions    Head: Normocephalic, without obvious abnormality, atraumatic     Cardiovascular/Edema: irregularly irregular rhythm    Respiratory: Lungs: clear to auscultation bilaterally    Abdomen: soft, non-tender; bowel sounds normal; no masses,  no organomegaly    Back: symmetric, no curvature. ROM normal. No CVA tenderness. Extremities: edema Right leg cast in place    Neuro:  Grossly normal    CBC:   Recent Labs     12/11/22  1900 12/13/22  0647   WBC  --  11.0   HGB 7.8* 7.6*   PLT  --  295     BMP:    Recent Labs     12/11/22  0431 12/12/22  0602 12/13/22  0647   * 130* 136   K 4.5 4.4 4.5   CL 93* 91* 97*   CO2 27 28 28   BUN 80* 75* 77*   CREATININE 4.13* 3.99* 4.29*   GLUCOSE 140* 135* 149*     Lab Results   Component Value Date/Time    NITRU NEGATIVE 04/01/2022 05:03 AM    COLORU Yellow 04/01/2022 05:03 AM    PHUR 6.5 04/01/2022 05:03 AM    WBCUA 0 TO 2 04/01/2022 05:03 AM    RBCUA 0 TO 2 04/01/2022 05:03 AM    CLARITYU clear 04/18/2017 10:21 AM    SPECGRAV 1.010 04/01/2022 05:03 AM    LEUKOCYTESUR NEGATIVE 04/01/2022 05:03 AM    UROBILINOGEN Normal 04/01/2022 05:03 AM    BILIRUBINUR NEGATIVE 04/01/2022 05:03 AM    BILIRUBINUR neg 04/18/2017 10:21 AM    BLOODU neg 04/18/2017 10:21 AM    GLUCOSEU NEGATIVE 04/01/2022 05:03 AM    KETUA NEGATIVE 04/01/2022 05:03 AM     Urine Protein:     Lab Results   Component Value Date/Time    TPU 24 04/02/2022 04:51 PM    TPU 24 04/02/2022 04:51 PM     Urine Creatinine:     Lab Results   Component Value Date/Time    LABCREA 55.1 12/06/2022 12:15 PM     IMPRESSION/RECOMMENDATIONS:      1. Acute kidney injury on chronic kidney disease stage IIIb - worsening renal function in this patient may be related to ischemic acute tubular necrosis but obstructive uropathy will need to be ruled out. She is not overtly uremic but GFR is 10 mL/min and I have advised that she may need to start dialysis soon. Plan: Bladder scan. Renal ultrasound  Random urine protein/creatinine ratio.   Hydration with IV fluid 0.9 normal saline at 50 mill per hour. Strict urine output documentation. Avoid nephrotoxic agents including nonsteroidal anti-inflammatory drugs, vancomycin, aminoglycosides and intravenous iodinated contrast agents. Basic metabolic profile daily. 2.  Normocytic anemia of chronic kidney disease. We will check iron studies. 3.  S/p trimalleolar right ankle fracture - recovery in progress. 4.  Systemic hypertension - blood pressure control is adequate. Prognosis is guarded.     Thank you very much for requesting confidence of this consultation    Saurabh Johnson MD FACP  Attending Nephrologist  12/13/2022 9:45 AM

## 2022-12-14 PROBLEM — N18.5 STAGE 5 CHRONIC KIDNEY DISEASE NOT ON CHRONIC DIALYSIS (HCC): Status: ACTIVE | Noted: 2022-12-14

## 2022-12-14 PROBLEM — D89.2 PARAPROTEINEMIA: Status: ACTIVE | Noted: 2022-12-14

## 2022-12-14 LAB
ABSOLUTE EOS #: 0.29 K/UL (ref 0–0.44)
ABSOLUTE IMMATURE GRANULOCYTE: 0.2 K/UL (ref 0–0.3)
ABSOLUTE LYMPH #: 1.26 K/UL (ref 1.1–3.7)
ABSOLUTE MONO #: 1.16 K/UL (ref 0.1–1.2)
ABSOLUTE RETIC #: 0.06 M/UL (ref 0.03–0.08)
ANION GAP SERPL CALCULATED.3IONS-SCNC: 9 MMOL/L (ref 9–17)
BASOPHILS # BLD: 0 % (ref 0–2)
BASOPHILS ABSOLUTE: 0.05 K/UL (ref 0–0.2)
BUN BLDV-MCNC: 77 MG/DL (ref 8–23)
CALCIUM SERPL-MCNC: 9.5 MG/DL (ref 8.6–10.4)
CHLORIDE BLD-SCNC: 97 MMOL/L (ref 98–107)
CO2: 29 MMOL/L (ref 20–31)
CREAT SERPL-MCNC: 4.5 MG/DL (ref 0.5–0.9)
CREATININE URINE: 53.8 MG/DL (ref 28–217)
EOSINOPHILS RELATIVE PERCENT: 2 % (ref 1–4)
FERRITIN: 332 NG/ML (ref 13–150)
FREE KAPPA/LAMBDA RATIO: 13.36 (ref 0.26–1.65)
GFR SERPL CREATININE-BSD FRML MDRD: 10 ML/MIN/1.73M2
GLUCOSE BLD-MCNC: 119 MG/DL (ref 70–99)
HCT VFR BLD CALC: 26.1 % (ref 36.3–47.1)
HEMOGLOBIN: 7.7 G/DL (ref 11.9–15.1)
IMMATURE GRANULOCYTES: 1 %
IMMATURE RETIC FRACT: 13.9 % (ref 2.7–18.3)
IRON SATURATION: 14 % (ref 20–55)
IRON: 28 UG/DL (ref 37–145)
KAPPA FREE LIGHT CHAINS QNT: 41.94 MG/DL (ref 0.37–1.94)
LACTATE DEHYDROGENASE: 243 U/L (ref 135–214)
LAMBDA FREE LIGHT CHAINS QNT: 3.14 MG/DL (ref 0.57–2.63)
LYMPHOCYTES # BLD: 8 % (ref 24–43)
MCH RBC QN AUTO: 29.7 PG (ref 25.2–33.5)
MCHC RBC AUTO-ENTMCNC: 29.5 G/DL (ref 28.4–34.8)
MCV RBC AUTO: 100.8 FL (ref 82.6–102.9)
MICROALBUMIN/CREAT 24H UR: 47 MG/L
MICROALBUMIN/CREAT UR-RTO: 87 MCG/MG CREAT
MONOCYTES # BLD: 7 % (ref 3–12)
NRBC AUTOMATED: 0 PER 100 WBC
PDW BLD-RTO: 15.9 % (ref 11.8–14.4)
PLATELET # BLD: 355 K/UL (ref 138–453)
PMV BLD AUTO: 10.7 FL (ref 8.1–13.5)
POTASSIUM SERPL-SCNC: 4.9 MMOL/L (ref 3.7–5.3)
RBC # BLD: 2.59 M/UL (ref 3.95–5.11)
RBC # BLD: ABNORMAL 10*6/UL
RETIC %: 2.3 % (ref 0.5–1.9)
RETIC HEMOGLOBIN: 33.6 PG (ref 28.2–35.7)
SEG NEUTROPHILS: 82 % (ref 36–65)
SEGMENTED NEUTROPHILS ABSOLUTE COUNT: 13.25 K/UL (ref 1.5–8.1)
SODIUM BLD-SCNC: 135 MMOL/L (ref 135–144)
TOTAL CK: 44 U/L (ref 26–192)
TOTAL IRON BINDING CAPACITY: 207 UG/DL (ref 250–450)
UNSATURATED IRON BINDING CAPACITY: 179 UG/DL (ref 112–347)
WBC # BLD: 16.2 K/UL (ref 3.5–11.3)

## 2022-12-14 PROCEDURE — 97110 THERAPEUTIC EXERCISES: CPT

## 2022-12-14 PROCEDURE — 1180000000 HC REHAB R&B

## 2022-12-14 PROCEDURE — 86334 IMMUNOFIX E-PHORESIS SERUM: CPT

## 2022-12-14 PROCEDURE — 83521 IG LIGHT CHAINS FREE EACH: CPT

## 2022-12-14 PROCEDURE — 6360000002 HC RX W HCPCS: Performed by: INTERNAL MEDICINE

## 2022-12-14 PROCEDURE — 99223 1ST HOSP IP/OBS HIGH 75: CPT | Performed by: INTERNAL MEDICINE

## 2022-12-14 PROCEDURE — 83540 ASSAY OF IRON: CPT

## 2022-12-14 PROCEDURE — 97530 THERAPEUTIC ACTIVITIES: CPT

## 2022-12-14 PROCEDURE — 85025 COMPLETE CBC W/AUTO DIFF WBC: CPT

## 2022-12-14 PROCEDURE — 99232 SBSQ HOSP IP/OBS MODERATE 35: CPT | Performed by: PHYSICAL MEDICINE & REHABILITATION

## 2022-12-14 PROCEDURE — 2580000003 HC RX 258: Performed by: INTERNAL MEDICINE

## 2022-12-14 PROCEDURE — 82728 ASSAY OF FERRITIN: CPT

## 2022-12-14 PROCEDURE — 6370000000 HC RX 637 (ALT 250 FOR IP): Performed by: PHYSICAL MEDICINE & REHABILITATION

## 2022-12-14 PROCEDURE — 83615 LACTATE (LD) (LDH) ENZYME: CPT

## 2022-12-14 PROCEDURE — 97535 SELF CARE MNGMENT TRAINING: CPT

## 2022-12-14 PROCEDURE — 85045 AUTOMATED RETICULOCYTE COUNT: CPT

## 2022-12-14 PROCEDURE — 99232 SBSQ HOSP IP/OBS MODERATE 35: CPT | Performed by: INTERNAL MEDICINE

## 2022-12-14 PROCEDURE — 36415 COLL VENOUS BLD VENIPUNCTURE: CPT

## 2022-12-14 PROCEDURE — 80048 BASIC METABOLIC PNL TOTAL CA: CPT

## 2022-12-14 PROCEDURE — 83550 IRON BINDING TEST: CPT

## 2022-12-14 PROCEDURE — 82550 ASSAY OF CK (CPK): CPT

## 2022-12-14 PROCEDURE — 6370000000 HC RX 637 (ALT 250 FOR IP): Performed by: FAMILY MEDICINE

## 2022-12-14 RX ADMIN — AMIODARONE HYDROCHLORIDE 200 MG: 200 TABLET ORAL at 08:54

## 2022-12-14 RX ADMIN — METHOCARBAMOL 750 MG: 750 TABLET ORAL at 06:04

## 2022-12-14 RX ADMIN — SODIUM CHLORIDE: 9 INJECTION, SOLUTION INTRAVENOUS at 17:36

## 2022-12-14 RX ADMIN — MAGNESIUM OXIDE TAB 400 MG (241.3 MG ELEMENTAL MG) 400 MG: 400 (241.3 MG) TAB at 22:11

## 2022-12-14 RX ADMIN — HYDROCODONE BITARTRATE AND ACETAMINOPHEN 1 TABLET: 5; 325 TABLET ORAL at 08:53

## 2022-12-14 RX ADMIN — ALLOPURINOL 100 MG: 100 TABLET ORAL at 08:54

## 2022-12-14 RX ADMIN — MAGNESIUM OXIDE TAB 400 MG (241.3 MG ELEMENTAL MG) 400 MG: 400 (241.3 MG) TAB at 08:53

## 2022-12-14 RX ADMIN — HYDROCODONE BITARTRATE AND ACETAMINOPHEN 1 TABLET: 5; 325 TABLET ORAL at 12:59

## 2022-12-14 RX ADMIN — HYDRALAZINE HYDROCHLORIDE 100 MG: 50 TABLET, FILM COATED ORAL at 22:10

## 2022-12-14 RX ADMIN — APIXABAN 5 MG: 5 TABLET, FILM COATED ORAL at 22:11

## 2022-12-14 RX ADMIN — HYDROCODONE BITARTRATE AND ACETAMINOPHEN 1 TABLET: 5; 325 TABLET ORAL at 22:11

## 2022-12-14 RX ADMIN — METHOCARBAMOL 750 MG: 750 TABLET ORAL at 15:34

## 2022-12-14 RX ADMIN — ATORVASTATIN CALCIUM 40 MG: 40 TABLET, FILM COATED ORAL at 22:11

## 2022-12-14 RX ADMIN — HYDRALAZINE HYDROCHLORIDE 100 MG: 50 TABLET, FILM COATED ORAL at 15:33

## 2022-12-14 RX ADMIN — PANTOPRAZOLE SODIUM 40 MG: 40 TABLET, DELAYED RELEASE ORAL at 06:04

## 2022-12-14 RX ADMIN — CALCITRIOL 0.25 MCG: 0.25 CAPSULE ORAL at 08:53

## 2022-12-14 RX ADMIN — APIXABAN 5 MG: 5 TABLET, FILM COATED ORAL at 08:53

## 2022-12-14 RX ADMIN — METOPROLOL TARTRATE 25 MG: 25 TABLET, FILM COATED ORAL at 08:53

## 2022-12-14 RX ADMIN — HYDRALAZINE HYDROCHLORIDE 100 MG: 50 TABLET, FILM COATED ORAL at 08:55

## 2022-12-14 RX ADMIN — IRON SUCROSE 100 MG: 20 INJECTION, SOLUTION INTRAVENOUS at 17:37

## 2022-12-14 RX ADMIN — METOPROLOL TARTRATE 25 MG: 25 TABLET, FILM COATED ORAL at 22:11

## 2022-12-14 RX ADMIN — ROPINIROLE HYDROCHLORIDE 0.25 MG: 0.25 TABLET, FILM COATED ORAL at 22:14

## 2022-12-14 RX ADMIN — Medication 2000 UNITS: at 08:53

## 2022-12-14 RX ADMIN — ZOLPIDEM TARTRATE 10 MG: 5 TABLET ORAL at 22:11

## 2022-12-14 RX ADMIN — GABAPENTIN 600 MG: 600 TABLET, FILM COATED ORAL at 08:53

## 2022-12-14 RX ADMIN — METHOCARBAMOL 750 MG: 750 TABLET ORAL at 22:10

## 2022-12-14 ASSESSMENT — PAIN DESCRIPTION - DESCRIPTORS
DESCRIPTORS: DULL;ACHING
DESCRIPTORS: ACHING;SHARP;SHOOTING
DESCRIPTORS: ACHING;DISCOMFORT

## 2022-12-14 ASSESSMENT — PAIN SCALES - GENERAL
PAINLEVEL_OUTOF10: 7
PAINLEVEL_OUTOF10: 10
PAINLEVEL_OUTOF10: 10
PAINLEVEL_OUTOF10: 8
PAINLEVEL_OUTOF10: 10
PAINLEVEL_OUTOF10: 0

## 2022-12-14 ASSESSMENT — PAIN DESCRIPTION - LOCATION
LOCATION: LEG

## 2022-12-14 ASSESSMENT — PAIN DESCRIPTION - ORIENTATION
ORIENTATION: RIGHT

## 2022-12-14 NOTE — PROGRESS NOTES
[address] Internal Medicine  Madina Martel MD; Elza Berger MD; Guero Washington MD; MD Frantz Pryor MD; MD MONY [facility] Internal [facility]   [address]    Date:   12/14/2022  Patient name:  Chloe Reynolds  Date of admission:  12/12/2022  5:48 PM  MRN:   [id number]  Account:  [de-identified]  YOB: 1945  PCP:    Mary Javier MD  Room:   [address] [place] [address]  Code Status:    Full Code    Chief Complaint:     No chief complaint on file. History Obtained From:     Patient and electronic medical record    History of Present Illness:     Chloe Reynolds is a 69 y.o. Non- / non  female who presents with No chief complaint on file. and is admitted to the hospital for the management of Open fracture of right tibia and fibula, sequela. Past medical history significant for A. Fib on Eliquis and amiodarone, JOSE ARMANDO on CKD, Type 2 Diabetes, HTN,HLD, Anemia, Fibromyalgia, Obesity, and DIONY. Sustained an open trimalleolar fracture, ankle fracture dislocation s/p ORIF on 12/6, right talus open treatment. Nonweight bearing right lower extremity. Developed JOSE ARMANDO on CKD, per Nephrology patient is high risk of needing dialysis, consult to Nephrology sent when patient admitted to Acute Rehab. Seen at examined at bedside, did not sleep well last night due to being uncomfortable due to the blankets. Otherwise no concerns, not in acute distress     Principal Problem:    Open fracture of right tibia and fibula, sequela  Resolved Problems:    * No resolved hospital problems.  *                  Past Medical History:     Past Medical History:   Diagnosis Date    Abnormal stress test     Anemia     Arthritis     Depression     Diverticulosis     DM (diabetes mellitus) ([facility] [address]     Erythropenia     Fibromyalgia     HTN (hypertension)     Hyperlipidemia     Kidney stone     Morbid obesity due to excess calories ([facility] [address]     DIONY on CPAP     Osteopenia 03/03/14    Seasonal allergies     Sleep apnea     uses bipap prn        Past Surgical History:     Past Surgical History:   Procedure Laterality Date    ANKLE FRACTURE SURGERY Right 12/6/2022    RIGHT ANKLE OPEN REDUCTION INTERNAL FIXATION performed by Andressa Montague DO at [facility]  01/01/2011    right arm broken    CARDIAC CATHETERIZATION  [id number] Cherelle Paredes  05/16/2016    COLONOSCOPY  01/01/2003    COLONOSCOPY  01/01/2007    ORIF ANKLE FRACTURE Right 12/06/2022    RIGHT ANKLE OPEN REDUCTION INTERNAL FIXATION    TOTAL KNEE ARTHROPLASTY Bilateral     left may 2013 and right july 2013    TUBAL LIGATION          Medications Prior to Admission:     Prior to Admission medications    Medication Sig Start Date End Date Taking? Authorizing Provider   HYDROcodone-acetaminophen (NORCO) 5-325 MG per tablet Take 1 tablet by mouth every 6 hours as needed for Pain for up to 3 days. 12/12/22 12/15/22  Yvone Phalen, MD   zolpidem (AMBIEN) 5 MG tablet Take 1 tablet by mouth nightly as needed for Sleep for up to 5 days. 12/12/22 12/17/22  Yvone Phalen, MD   methocarbamol (ROBAXIN) 750 MG tablet Take 1 tablet by mouth in the morning and 1 tablet at noon and 1 tablet in the evening. Do all this for 10 days. 12/12/22 12/22/22  Yvone Phalen, MD   metoprolol tartrate (LOPRESSOR) 25 MG tablet Take 1 tablet by mouth 2 times daily 12/12/22   Yvone Phalen, MD   senna (SENOKOT) 8.6 MG tablet Take 1 tablet by mouth daily as needed (Constipation) 12/12/22 1/11/23  Yvone Phalen, MD   rOPINIRole (REQUIP) 0.25 MG tablet Take 1 tablet by mouth nightly 11/14/22   Eliza Craft MD   gabapentin (NEURONTIN) 600 MG tablet Take 1 tablet by mouth daily for 90 days.  11/14/22 2/12/23  Eliza Craft MD   traZODone (DESYREL) 50 MG tablet  10/20/22   Sho Hughes MD   colchicine (COLCRYS) 0.6 MG tablet  9/27/22   Shanaeeric Farmer DPM   Cyanocobalamin (B-12) 1000 MCG LOZG DISSOLVE ONE tablet UNDER TONGUE ONCE DAILY 9/6/22   Mireille Sánchez MD   blood glucose monitor kit and supplies use check blood sugar as directed 11/9/22   Eliza Craft MD   blood glucose monitor strips Check blood sugars daily as directed. 11/9/22   Eliza Craft MD   Lancets MISC 1 each by Does not apply route daily 11/9/22   Eliza Craft MD   Alcohol Swabs 70 % PADS 1 each by Does not apply route daily 11/9/22   Eliza Craft MD   atorvastatin (LIPITOR) 40 MG tablet Take 1 tablet by mouth daily 11/7/22   Eliza Craft MD   pantoprazole (PROTONIX) 40 MG tablet TAKE 1 TABLET DAILY 10/19/22   Eliza Craft MD   ELIQUIS 5 MG TABS tablet TAKE 1 TABLET TWICE A DAY 10/11/22   Eliza Craft MD   cyclobenzaprine (FLEXERIL) 5 MG tablet TAKE 1 TABLET BY MOUTH NIGHTLY AS NEEDED FOR MUSCLE SPASMS 10/3/22   Eliza Craft MD   allopurinol (ZYLOPRIM) 100 MG tablet TAKE 1 TABLET DAILY 9/21/22   BARBIE Hubbard CNP   zolpidem (AMBIEN) 5 MG tablet Take 5 mg by mouth nightly as needed for Sleep.     Historical Provider, MD   hydrALAZINE (APRESOLINE) 25 MG tablet Take 4 tablets by mouth 3 times daily 7/12/22   Dorian Franco MD   amiodarone (CORDARONE) 200 MG tablet Take 1 tablet by mouth daily 7/6/22   BARBIE Fenton NP   calcitRIOL (ROCALTROL) 0.5 MCG capsule TAKE 1 CAPSULE BY MOUTH DAILY 6/14/22   Dorian Franco MD   ferrous sulfate (FE TABS 325) 325 (65 Fe) MG EC tablet Take 1 tablet by mouth daily (with breakfast) 5/10/22   Eliza Craft MD   azelastine (ASTELIN) 0.1 % nasal spray 1 spray by Nasal route 2 times daily Use in each nostril as directed 10/13/21   Jovan Santiago DO   Cholecalciferol (VITAMIN D3) 25 MCG (1000 UT) TABS Take 2 tablets by mouth daily 1/9/20   BARBIE Hubbard - CNP   Magnesium Oxide 400 MG CAPS Take by mouth 2 times daily Takes once daily at [facility]    Historical Provider, MD        Allergies:     Adhesive tape, Cephalexin, Erythromycin, Ketorolac tromethamine, Pcn [penicillins], Percocet [oxycodone-acetaminophen], Sulfa antibiotics, and Ultracet [tramadol-acetaminophen]    Social History:     Tobacco:    reports that she quit smoking about 62 years ago. Her smoking use included cigarettes. She has a 0.25 pack-year smoking history. She has never used smokeless tobacco.  Alcohol:      reports no history of alcohol use. Drug Use:  reports no history of drug use. Family History:     Family History   Problem Relation Age of Onset    Diabetes Mother     Heart Disease Mother     Osteoporosis Mother     Kidney Disease Father     Prostate Cancer Brother        Review of Systems:     Positive and Negative as described in HPI. ROS                                                                  . Physical Exam:     Physical Exam   Vitals:    Vitals:    22 0910 22 1844 22 0654 22 1259   BP:  (!) 143/65 (!) 158/66    Pulse:  62 60    Resp:  16 16 18   Temp:  98.4 °F (36.9 °C) 98.4 °F (36.9 °C)    TempSrc:       SpO2:  99% 96%    Height: 5' 5\" (1.651 m)                       Body mass index is 43.6 kg/m². Temp (24hrs), Av.4 °F (36.9 °C), Min:98.4 °F (36.9 °C), Max:98.4 °F (36.9 °C)    Recent Labs     22  1609 22  1052   POCGLU 140* 213* 153*             General Appearance:   No acute distress , obese                 Skin:                             No rash or erythema  HEENT ;                                                                       No icterus, no pallor . No ptosis. No gross asymmetry  Or abnormality face         Neck:                            No mass , no thyroid enlargement                                           Pulmonary/Chest:                                               Symmetric                                          Clear to auscultation bilaterally .                                          No wheezes, No rales or rhonchi . No abnormality on percussion                                                        Cardiovascular:            Normal rate, regular rhythm,                                          No murmur or  Gallop . Abdomen:                       Soft, non-tender                                           Normal bowels sounds,                                             Extremities:                    No  Edema .                                            Musculo-skeletal / Neurological ;;                  Neurological ;                 No focal motor deficit ,                 No focal sensory deficit ,    Musculo-skeletal ;                  No  gait abnormality                  No significant joint abnormality,                                                         Psych:                     See psych notes                                                                 Investigations:      URINE ANALYSIS: No results found for: LABURIN     CBC:  Lab Results   Component Value Date/Time    WBC 11.0 12/13/2022 06:47 AM    HGB 7.6 12/13/2022 06:47 AM     12/13/2022 06:47 AM     03/02/2012 11:15 AM             BMP:    Lab Results   Component Value Date/Time     12/14/2022 06:15 AM    K 4.9 12/14/2022 06:15 AM    CL 97 12/14/2022 06:15 AM    CO2 29 12/14/2022 06:15 AM    BUN 77 12/14/2022 06:15 AM    CREATININE 4.50 12/14/2022 06:15 AM    GLUCOSE 119 12/14/2022 06:15 AM    GLUCOSE 113 03/02/2012 11:15 AM      LIVER PROFILE:  Lab Results   Component Value Date/Time    ALT <5 12/13/2022 06:47 AM    AST 10 12/13/2022 06:47 AM    PROT 5.6 12/13/2022 06:47 AM    BILITOT 0.3 12/13/2022 06:47 AM    BILIDIR 0.1 12/13/2022 06:47 AM    LABALBU 2.9 12/13/2022 06:47 AM    LABALBU 4.2 03/02/2012 11:15 AM             @BRIEFLABT(TSH)@      Laboratory Testing:  Recent Results (from the past 24 hour(s))   CHLORIDE, URINE, RANDOM Collection Time: 12/13/22 11:30 PM   Result Value Ref Range    Chloride, Ur <20 mmol/L   SODIUM, URINE, RANDOM    Collection Time: 12/13/22 11:30 PM   Result Value Ref Range    Sodium,Ur 47 mmol/L   Protein / Creatinine Ratio, Urine    Collection Time: 12/13/22 11:30 PM   Result Value Ref Range    Total Protein, Urine 24 mg/dL    Creatinine, Ur 54.9 28.0 - 217.0 mg/dL    Urine Total Protein Creatinine Ratio 0.44 (H) 0.00 - 0.20   EOSINOPHILS, URINE    Collection Time: 12/13/22 11:30 PM   Result Value Ref Range    Eosinophil, Ur None    MICROALBUMIN, UR    Collection Time: 12/13/22 11:30 PM   Result Value Ref Range    Microalb, Ur 47 (H) <21 mg/L    Creatinine, Ur 53.8 28.0 - 217.0 mg/dL    Microalb/Crt.  Ratio 87 (H) <25 mcg/mg creat   CK    Collection Time: 12/14/22  6:15 AM   Result Value Ref Range    Total CK 44 26 - 192 U/L   Ferritin    Collection Time: 12/14/22  6:15 AM   Result Value Ref Range    Ferritin 332 (H) 13 - 150 ng/mL   Iron and TIBC    Collection Time: 12/14/22  6:15 AM   Result Value Ref Range    Iron 28 (L) 37 - 145 ug/dL    TIBC 207 (L) 250 - 450 ug/dL    Iron Saturation 14 (L) 20 - 55 %    UIBC 179 112 - 347 ug/dL   Kappa/Lambda Quantitative Free Light Chains, Serum    Collection Time: 12/14/22  6:15 AM   Result Value Ref Range    Kappa Free Light Chains QNT 41.94 (H) 0.37 - 1.94 mg/dL    Lambda Free Light Chains QNT 3.14 (H) 0.57 - 2.63 mg/dL    Free Kappa/Lambda Ratio 13.36 (H) 0.26 - 9.58   Basic Metabolic Panel w/ Reflex to MG    Collection Time: 12/14/22  6:15 AM   Result Value Ref Range    Glucose 119 (H) 70 - 99 mg/dL    BUN 77 (H) 8 - 23 mg/dL    Creatinine 4.50 (H) 0.50 - 0.90 mg/dL    Est, Glom Filt Rate 10 (L) >60 mL/min/1.73m2    Calcium 9.5 8.6 - 10.4 mg/dL    Sodium 135 135 - 144 mmol/L    Potassium 4.9 3.7 - 5.3 mmol/L    Chloride 97 (L) 98 - 107 mmol/L    CO2 29 20 - 31 mmol/L    Anion Gap 9 9 - 17 mmol/L       Imaging/Diagnostics:  XR ANKLE RIGHT (MIN 3 VIEWS)    Result Date: 12/6/2022  Anatomic alignment of comminuted ankle fracture status post ORIF. Assessment :      Hospital Problems             Last Modified POA    * (Principal) Open fracture of right tibia and fibula, sequela 12/12/2022 Yes       Plan:       Medications: Allergies: Allergies   Allergen Reactions    Adhesive Tape     Cephalexin Other (See Comments)     Tongue cracks and peels    Erythromycin Other (See Comments)     Epigastric pain and bloating    Ketorolac Tromethamine Other (See Comments)     Severe stomach cramps    Pcn [Penicillins]      Unknown reaction    Percocet [Oxycodone-Acetaminophen] Itching and Other (See Comments)     Esophagus hurt    Sulfa Antibiotics     Ultracet [Tramadol-Acetaminophen] Itching       Current Meds:   Scheduled Meds:    atorvastatin  40 mg Oral Nightly    methocarbamol  750 mg Oral 3 times per day    zolpidem  10 mg Oral Nightly    allopurinol  100 mg Oral Daily    amiodarone  200 mg Oral Daily    apixaban  5 mg Oral BID    calcitRIOL  0.25 mcg Oral Daily    gabapentin  600 mg Oral Daily    hydrALAZINE  100 mg Oral TID    magnesium oxide  400 mg Oral BID    metoprolol tartrate  25 mg Oral BID    pantoprazole  40 mg Oral QAM AC    rOPINIRole  0.25 mg Oral Nightly    Vitamin D  2,000 Units Oral Daily    polyethylene glycol  17 g Oral Daily     Continuous Infusions:    sodium chloride 50 mL/hr at 12/13/22 2150     PRN Meds: acetaminophen, HYDROcodone-acetaminophen, bisacodyl       Patient admitted [place] Problems             Last Modified POA    * (Principal) Open fracture of right tibia and fibula, sequela 12/12/2022 Yes                      12/13/22    Open trimalleolar fracture, s/p ORIF on 12/6  JOSE ARMANDO on CKD, Cr 4.29 today, nephro following, patient high risk for needing HD  Anemia  Hx of A Fib on Eliquis and amio, DM2, HTN,HLD, DIONY, Fibromyalgia   Nephrology following for any urgent HD needs, appreciate recommendations, renal diet, continue to monitor Cr  A Fib. Continue Amio and Eliquis  Continue lipitor, hydralazine, and lopressor  Continue iron supplementation, Hgb stable at 7.6           12/14/22    Nephrology following for CKD IIIb, Cr increased to 4.5, producing urine  Vitally stable, no complaints, eating and drinking well   IV NS at 50 mL/hr, BMP daily, iron studies, avoid nephrotoxic agents, strict I/Os appreciate nephrology recommendations, renal diet  Continue Amio and Eliquis  Continue Lipitor, Hydralazine and lopressor            Consultations:   IP CONSULT TO DIETITIAN  IP CONSULT TO SOCIAL WORK  IP CONSULT TO INTERNAL MEDICINE  IP CONSULT TO Peter Mojica MD  12/14/2022    [address] Phone [phone]   Fax: [phone]  Answering Service: [phone]    12/14/2022  1:21 PM    Attestation and add on       I have discussed the care of Barbie Sofia , including pertinent history and exam findings,      12/14/22    with the resident. I have seen and examined the patient and the key elements of all parts of the encounter have been performed by me . I agree with the assessment, plan and orders as documented by the resident. Buzz Larry MD      [address] Phone [phone]   Fax: [phone]  Answering Service: [phone]                Copy sent to Dr. Tanika Bustamante MD    Please note that this chart was generated using voice recognition Dragon dictation software. Although every effort was made to ensure the accuracy of this automated transcription, some errors in transcription may have occurred.

## 2022-12-14 NOTE — PATIENT CARE CONFERENCE
[facility] Acute Inpatient Rehabilitation  TEAM CONFERENCE NOTE  Date: 12/15/22  Patient Name: Brian Jin       Room: [facility]/7604-40  MRN: [id number]       : 1945  (69 y.o.)     Gender: female   Referring Practitioner: Dr Charles Yost   Open fracture of right tibia and fibula, sequela [S82.201S, S82.401S]  Diagnosis: Open fracture of right tibia and fibula, sequela     NURSING    Bladder Continence  Always Continent  Bowel Continence Always Continent    Date of Last BM: 22    Bladder/Bowel Program Interventions: Both Bowel & Bladder Program In Place     Wounds/Incisions/Ulcers: Incision tibial distal right posterior- splint  and ACE wrap    Pain Control: Patient's pain currently controlled and pain regimen effective as ordered    Pain Medication Regimen Usage Pattern: MAR reviewed and pain medications are being used at the following frequency (Specify Medication, # Doses Administered on average per day, identified patterns of use - for example: time of day, prior to activity/therapy)  Hydrocodone-acetaminophen 5-325mg 1 tablet every 4 hours PRN given prior to therapy and throughout the day. Robaxin 750 mg tablet every 8 hours scheduled. Gabapentin 600mg tablet daily. Fall Risk:  Falling star program initiated    Medication Education Program: Patient able to manage medications and being educated by nursing    Discharge Preparation Patient/Responsible Party Education In Progress:   No Educational Needs Identified    Nursing specific communication for TEAM: No additional information identified requiring communication at this time    PHYSICAL THERAPY    Bed Mobility:    Supine to Sit: Moderate assistance (pt assist right and left LE with UEs)  Sit to Supine: 2 Person assistance; Moderate assistance  Scooting: Minimal assistance (scooting at EOB forward)  Bed Mobility Comments:  (head of bed elevated left handrail)        Transfers:  Sit to Stand: Maximum Assistance;2 Person Assistance (EOB elevated right knee extended to maintain and monitor NWB, right UE at RW left at bed , VCs sequencing , pt voices fear of falling)  Stand to Sit: Moderate Assistance;2 Person Assistance  Bed to Chair: Dependent/Total (maxi move)    WB Status: NWB R LE                PT Assessment:  Pt dependent for transfers. Pt unable to maintain NWB R LEduring sit<>stnad with maddy stedy or walker. Once pt in standing psotion, will briefly hold her R LE off pedro floor for NWB  precautions. Pt presents with B UE weakness as well as Left LE weakness and NWB R LE limiting her activity. Pt would benefit from continued PT following discharge to address functional deficits. Goals  Time Frame for Short Term Goals: 1 week  Short Term Goal 1: Bed mobility min A without use of bed rail  Short Term Goal 2: Sit<> // bars or rolling walker at mod A, maintaining NWB R LE  Short Term Goal 3: Improve staning tolerance to 1 minutes, NWB R LE with B UE support. Short Term Goal 4: Lateral scoot transfers, mod A X 1, maintaining NWB R LE. Short Term Goal 5: W/C mobility distance fo 50 ft , SBA/CGA level surface. OCCUPATIONAL THERAPY  SELF CARE     Feeding: Independent  Feeding Skilled Clinical Factors: Per pt report           Grooming/Oral Hygiene  Assistance Level: Set-up  Skilled Clinical Factors: Pt completes oral care, washing face and brushing hair while sitting in w/c at sink. Requests to complete bathing and dressing tasks every other day.         UE Bathing: Stand by assistance  UE Bathing Skilled Clinical Factors: Seated EOB, pt able to cleanse UB with wash cloths with SBA for safety      LE Bathing: Dependent/Total  LE Bathing Skilled Clinical Factors: Seated EOB, pt able to cleanse upper legs, A to cleanse LLE/foot and TA for aleah/buttocks care Max A x2 in maddy stedy       UE Dressing: Minimal assistance  UE Dressing Skilled Clinical Factors: Seated EOB, pt able to don [place] shirt with A to pull down        Lower Extremity Dressing  Assistance Level: Dependent  Skilled Clinical Factors: TA for donning pants while supine       Footwear: Dependent       Toileting: Dependent/Total  Toileting Skilled Clinical Factors: TA to complete toileting hygiene and manage brief/pants up over hips       Toilet Transfer: Dependent    Short Term Goals  Time Frame for Short Term Goals: By one week  Short Term Goal 1: Pt will perform UB ADLs including grooming/oral care with SBA and good safety  Short Term Goal 2: Pt will perform LB ADLs including toileting/toilet transfers with Min A, good safety, and use of AE/DME/Modified techniques as needed  Short Term Goal 3: Pt will be educated on NWB status to RLE with good carryover during functional transfers/tasks  Short Term Goal 4: Pt will tolerate standing for 5+ minutes, Min A, with 1-2 UE support and no LOB during functional task while maintaining NWB RLE  Short Term Goal 5: Pt will be educated on and explore use of AE/DME/Modified techniques to improve safety and independence with ADL tasks  Short Term Goal 6: Pt will actively participate in 30+ minutes of therapeutic exercise/functional activity to promote safety and independence with self-care and mobility  Short Term Goal 7: Pt will perform functional transfers/mobility with Min A, good safety, and use of least restrictive device while maintaining NWB RLE     SPEECH THERAPY    NUTRITION    / Body mass index is 43.6 kg/m². Diet Rx: 2 gm Na, Low Potassium, Low Phosphorus. Nepro x 2 daily. Appetite and intake is decreased with estimated average of 75% intake of meals and supplements, although intake varies. Pt dislikes some of foods provided and also does not like the restricted diet. K: 4.9 (12/14), Phosphorus: 5.1 (12/7). Will continue to monitor. Please see nutrition note for details.     CASE MANAGEMENT ASSESSMENT    Discharge Disposition: home with spouse/ [facility]  Family Support: spouse    PCP Established: [x] Yes [] No (If No: Specify Status of Designation)    Services Being Followed In Preparation For Discharge: (Check All That Apply)  [x] [address])  [] Outpatient Therapy(Specify Location)  [] Dialysis   [] Hemodialysis (Specify Location/Days/Chair Times)   [] Peritoneal Dialysis  [] IV Infusion Services  [] Enteral Nutrition Services  [] Oxygen  [] Stoma/Ostomy  [] Catheter  [] Lovenox    Patient Mood Interview PHQ-2 to 9 Score: 18 Dr Dereje Boykin aware    Pre-Admission Status:  Lives With: Spouse  Type of Home: House  Home Layout: One level, Laundry in basement  Home Access: Stairs to enter with rails  Entrance Stairs - Number of Steps: 3 from back door. Entrance Stairs - Rails: Left  Bathroom Shower/Tub: Walk-in shower, Doors, Shower chair without back  Bathroom Toilet: Handicap height  Bathroom Equipment: Grab bars in shower, Shower chair, Hand-held shower  Home Equipment: Deandre Mock, [address], Walker, rolling, Wheelchair-manual (Cane at all times, W/c belongs to )  Has the patient had two or more falls in the past year or any fall with injury in the past year?: Yes (Pt reports falling in October before this most recent fall)  Receives Help From: Family  ADL Assistance: Independent  Homemaking Assistance: Needs assistance  Laundry:  (Daughter does laundry in the basement.)  Vacuuming:  (Spouse/daughter)  Shopping:  (Pt does shopping)  Homemaking Responsibilities: Yes  Meal Prep Responsibility: No (Eats out)  Laundry Responsibility: No (Daughter does laundry)  Cleaning Responsibility: No (Daughter occasionally assists)  Ambulation Assistance: Independent (With cane, used 2 wheel walker at night)  Transfer Assistance: Independent  Active : Yes  Mode of Transportation: SUV  Occupation: Retired  Type of Occupation: Pharmacy tech  IADL Comments: Pt reports sleeping in regular flat bed  Additional Comments: Pt reports her daughter assists as able,  is home most of the time unless he is fishing.  Daughter works  [employer] at [employer] (medical records). Son works full time but he is on medical leave at this time. Family Education: Need to make contact with family to initiate education    Percentage Risk for Readmission: Moderate 19% - 27%   Readmission Risk              Risk of Unplanned Readmission:  23       %    Critical Items: None       Problem / Barrier Intervention / Plan  Results   Impaired functional mobility related to WB restriction Strengthening, ROM, functional mobility training, at wheelchair level. Steps at entrance of home Recommend Ramp    Altered ability to care for self Remediation and training in modified care strategies to increase safety and independence with self care tasks                          Total Self Care Score    Total Mobility Score  Admission Score:  19      Admission Score:  18  Goal:  34/42         Goal:  38/90    THERAPY MINUTE COMPLIANCE  Patient is participating and compliant with meeting therapy minutes as outlined in plan of care: Yes `    Discharge Plan   Estimated Discharge Date: 12/27/2022  Home evaluation needed?  Requires Re-evaluation  Overnight or Day Pass: No  Factors facilitating achievement of predicted outcomes: Family support, Motivated, Cooperative, Pleasant, Good insight into deficits, and Has homemaker services  Barriers to the achievement of predicted outcomes: Pain, Limited safety awareness, Limited insight into deficits, Decreased endurance, Lower extremity weakness, Medical complications, Stairs at home, Wound Care, and Medication managment    Functional Goals at discharge:  Predicted Outcome: Home with familyPATIENT'S LEVEL OF ASSISTANCE: Moderate Assistance and Maximal Assistance  Discharge therapy goals:  PT: Long Term Goals  Time Frame for Long Term Goals : By DC  Long Term Goal 1: Mod-I bed mobility  Long Term Goal 2: Stand pivot Transfers at min/mod A maintain NWB R LE  Long Term Goal 3: W/C mobility distance fo 50 to 150 ft SBA level surface  Long Term Goal 4: Pt able to take 3 to 5 steps in // bars NWB R LE, min A x 2  Long Term Goal 5: Family training for safe transfers from varied surfaces. OT:Long Term Goals  Time Frame for Long Term Goals : By discharge  Long Term Goal 1: Pt will perform UB ADLs including grooming/oral care with Mod I and good safety  Long Term Goal 2: Pt will perform LB ADLs, including toileting/toilet transfers, with SBA, good safety, and use of AE/DME/Modified techniques as needed  Long Term Goal 3: Pt will tolerate standing 10+ minutes, SBA, with 1-2 UE and no LOB during self-care/functional activity while maintaining NWB RLE  Long Term Goal 4: Pt will verbalize/demonstrate good understanding of home safety/fall prevention/energy conservation strategies to improve safety and independence with self-care tasks  Long Term Goal 5: Pt will safely perform light housekeeping/meal preparation with supervision, good safety, and use of least restrictive device to improve independence with ADLs/IADLs  Long Term Goal 6: Pt will perform functional transfers/mobility with SBA, good safety, and use of least restrictive device while maintaining NWB RLE  Long Term Goal 7: Pt will demonstrate improved fine motor coordination during self-care tasks AEB 10 second improvement on 9 hole peg test  ST:     Participating Team Members:  /:   Tyler Santamaria RN   Occupational Therapist:  Rahel Robison OT   Physical Therapist: Robert Miner PT  Speech Therapist:  N/A  Nurse: Muna Pollack RN    Dietary/Nutrition: Dudley Padron RD, LD  Pastoral Care: Chaplain Carline Brenner  CMG: Alex Browne RN    I approve the established interdisciplinary plan of care as documented within the medical record of [address] [place] [address].     Eli Young MD

## 2022-12-14 NOTE — PROGRESS NOTES
12/14/22 1332   Encounter Summary   Encounter Overview/Reason  Volunteer Encounter   Service Provided For: Patient   Referral/Consult From: Rounding   Last Encounter  12/14/22  (V)   Complexity of Encounter Low   Spiritual/Emotional needs   Type Spiritual Support   Rituals, Rites and [address]

## 2022-12-14 NOTE — PROGRESS NOTES
Physical Medicine & Rehabilitation  Progress Note      Subjective:      69year-old female with R ankle fracture/dislocation after fall. Patient is well, but has had some minor complaints of sore throat. She reports that her pain is responding to adjusted medications. Sleep improved on her home dose of Ambien. No new issues with appetite, bowel, or bladder. ROS:  Denies fevers, chills, sweats. No chest pain, palpitations, lightheadedness. Denies coughing, wheezing or shortness of breath. Denies abdominal pain, nausea, diarrhea or constipation. No new areas of joint pain. Denies new areas of numbness or weakness. Denies new anxiety or depression issues. No new skin problems. Rehabilitation:   Progressing in therapies. PT:    Bed mobility  Supine to Sit: Moderate assistance (pt assist right and left LE with UEs)  Sit to Supine: 2 Person assistance, Moderate assistance  Scooting: Minimal assistance (scooting at EOB forward)  Bed Mobility Comments:  (head of bed elevated left handrail)         Transfers  Sit to Stand: Maximum Assistance, 2 Person Assistance (EOB elevated right knee extended to maintain and monitor NWB, right UE at RW left at bed , VCs sequencing , pt voices fear of falling)  Stand to Sit: Moderate Assistance, 2 Person Assistance  Bed to Chair: Dependent/Total  Comment: reassurance pre and during sit <> stand EOB to RW         Ambulation  WB Status: NWB R LE       OT:             SPEECH:      Objective:  BP (!) 158/66   Pulse 60   Temp 98.4 °F (36.9 °C)   Resp 16   Ht 5' 5\" (1.651 m)   LMP  (LMP Unknown)   SpO2 96%   BMI 43.60 kg/m²       GEN: Well developed, well nourished, in NAD  HEENT:  NCAT. PERRL. EOMI. Mucous membranes pink and moist.   PULM:  Clear to ausculation. No rales or rhonchi. Respirations WNL and unlabored. CV:  bradycardic rate regular rhythm. No murmurs or gallops. GI:  Abdomen soft. Nontender. Non-distended. BS + and equal.    NEUROLOGICAL: A&O x3. capsule 0.25 mcg, 0.25 mcg, Oral, Daily  gabapentin (NEURONTIN) tablet 600 mg, 600 mg, Oral, Daily  hydrALAZINE (APRESOLINE) tablet 100 mg, 100 mg, Oral, TID  HYDROcodone-acetaminophen (NORCO) 5-325 MG per tablet 1 tablet, 1 tablet, Oral, Q4H PRN  magnesium oxide (MAG-OX) tablet 400 mg, 400 mg, Oral, BID  metoprolol tartrate (LOPRESSOR) tablet 25 mg, 25 mg, Oral, BID  pantoprazole (PROTONIX) tablet 40 mg, 40 mg, Oral, QAM AC  rOPINIRole (REQUIP) tablet 0.25 mg, 0.25 mg, Oral, Nightly  Vitamin D (CHOLECALCIFEROL) tablet 2,000 Units, 2,000 Units, Oral, Daily  bisacodyl (DULCOLAX) suppository 10 mg, 10 mg, Rectal, Daily PRN  polyethylene glycol (GLYCOLAX) packet 17 g, 17 g, Oral, Daily      Impression/Plan:   Impaired ADLs, gait, and mobility due to:    R ankle trimalleolar fracture dislocation: s/p ORIF 12/6 by Dr. Celsa Gong. NWB RLE. PT/OT for gait, mobility, strengthening, endurance, ADLs, and self care. Has Norco prn, on Robaxin TID. Has Tylenol prn and vitamin D. 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 No indication for dialysis currently but she is at risk for needing HD. Nephrology following - IV fluid hydration. On calcitriol. DM II with neuropathy: carb controlled diet. On gabapentin for neuropathy. HTN/HLD: on atorvastatin, hydralazine  Anemia of chronic disease: Hb low but stable. On iron supplement. Will monitor. Nephrology following and planning IV iron. She reports 6-7 is baseline Hb for her and that she receives [facility]ac[facility] monthly - on hold for now per nephrology. Elevated kappa light chain immunoglobulin: Hematology following. Concern for bone marrow disease vs renal failure. Hyponatremia: Improved. Will monitor. Fibromyalgia  Obesity: BMI 43.6. Dietitian consulted  DIONY:  Depression: on Trazodone nightly  Moderately severe protein calorie malnutrition: Albumin 2.9.  Nephrology adding Nepro supplement daily  Insomnia: has Ambien nightly, reportedly takes 10 mg at home. Also on Requip at hs. Increased to her home dose of Ambien. Gout: on colchicine and allopurinol. Patient reports she doesn't take colchicine unless in gout flare - she is currently not requiring - colchicine discontinued. Bowel Management: Miralax daily, senokot prn, dulcolax prn. DVT Prophylaxis:  SCD's while in bed, RAVINDRA's during the day, and Eliquis  Internal medicine for medical management      Electronically signed by Fatoumata Enriquez MD on 12/14/2022 at 9:44 AM      This note is created with the assistance of a speech recognition program.  While intending to generate a document that actually reflects the content of the visit, the document can still have some errors including those of syntax and sound a like substitutions which may escape proof reading. In such instances, actual meaning can be extrapolated by contextual diversion.

## 2022-12-14 NOTE — CONSULTS
_                         Today's Date: 12/14/2022  Patient Name: Vanda Morales  Date of admission: 12/12/2022  5:48 PM  Patient's age: 69 y.o., 1945  Admission Dx: Open fracture of right tibia and fibula, sequela [S82.201S, S80.5S]      Requesting Physician: Ofelia Franklin MD    CHIEF COMPLAINT: Status post fall. Fracture of the right tibia and fibula. Chronic renal failure. Consult for elevated kappa light chain immunoglobulin. History Obtained From:  patient, electronic medical record    HISTORY OF PRESENT ILLNESS:      The patient is a 69 y.o.  female who is admitted to the hospital for further management of open fracture of the right tibia and fibula. Patient had a fall and she had right ankle fracture. She was found to have open fracture of the right tibia and fibula. Patient had surgery and she is admitted to the inpatient rehab for further care. Patient's labs showed evidence of JOSE ARMANDO on top of CKD. She was seen by nephrology. Patient was deemed to be high risk for dialysis. Further work-up showed elevated kappa light chain immunoglobulin at 41.9 with lambda light chain immunoglobulin 3.14. We were consulted to evaluate the patient for possible underlying multiple myeloma. Patient also had history of anemia of chronic renal insufficiency. Past Medical History:   has a past medical history of Abnormal stress test, Anemia, Arthritis, Depression, Diverticulosis, DM (diabetes mellitus) ([address] Erythropenia, Fibromyalgia, HTN (hypertension), Hyperlipidemia, Kidney stone, Morbid obesity due to excess calories ([address] DIONY on CPAP, Osteopenia, Seasonal allergies, and Sleep apnea. Past Surgical History:   has a past surgical history that includes Colonoscopy (01/01/2003); Colonoscopy (01/01/2007); Tubal ligation; Arm Surgery (01/01/2011); Total knee arthroplasty (Bilateral);  Cardiac catheterization ([id number] dr Kinjal Larose); Cardiac catheterization (05/16/2016); ORIF ankle fracture (Right, 12/06/2022); and Ankle fracture surgery (Right, 12/6/2022). Family History: family history includes Diabetes in her mother; Heart Disease in her mother; Kidney Disease in her father; Osteoporosis in her mother; Prostate Cancer in her brother. Social History:   reports that she quit smoking about 62 years ago. Her smoking use included cigarettes. She has a 0.25 pack-year smoking history. She has never used smokeless tobacco. She reports that she does not drink alcohol and does not use drugs. Medications:    Prior to Admission medications    Medication Sig Start Date End Date Taking? Authorizing Provider   HYDROcodone-acetaminophen (NORCO) 5-325 MG per tablet Take 1 tablet by mouth every 6 hours as needed for Pain for up to 3 days. 12/12/22 12/15/22  Meredith Hernandez MD   zolpidem (AMBIEN) 5 MG tablet Take 1 tablet by mouth nightly as needed for Sleep for up to 5 days. 12/12/22 12/17/22  Meredith Hernandez MD   methocarbamol (ROBAXIN) 750 MG tablet Take 1 tablet by mouth in the morning and 1 tablet at noon and 1 tablet in the evening. Do all this for 10 days. 12/12/22 12/22/22  Meredith Hernandez MD   metoprolol tartrate (LOPRESSOR) 25 MG tablet Take 1 tablet by mouth 2 times daily 12/12/22   Meredith Hernandez MD   senna (SENOKOT) 8.6 MG tablet Take 1 tablet by mouth daily as needed (Constipation) 12/12/22 1/11/23  Meredith Hernandez MD   rOPINIRole (REQUIP) 0.25 MG tablet Take 1 tablet by mouth nightly 11/14/22   Eliza Benitez MD   gabapentin (NEURONTIN) 600 MG tablet Take 1 tablet by mouth daily for 90 days.  11/14/22 2/12/23  Eliza Benitez MD   traZODone (DESYREL) 50 MG tablet  10/20/22   Micheal Pradhan MD   colchicine (COLCRYS) 0.6 MG tablet  9/27/22   Ladarius Cottrell DPM   Cyanocobalamin (B-12) 1000 MCG LOZG DISSOLVE ONE tablet UNDER TONGUE ONCE DAILY 9/6/22   Rosie Miller MD   blood glucose monitor kit and supplies use check blood sugar as directed 11/9/22   Eliza Culver MD   blood glucose monitor strips Check blood sugars daily as directed. 11/9/22   Eliza Culver MD   Lancets MISC 1 each by Does not apply route daily 11/9/22   Eliza Culver MD   Alcohol Swabs 70 % PADS 1 each by Does not apply route daily 11/9/22   Eliza Culver MD   atorvastatin (LIPITOR) 40 MG tablet Take 1 tablet by mouth daily 11/7/22   Eliza Culver MD   pantoprazole (PROTONIX) 40 MG tablet TAKE 1 TABLET DAILY 10/19/22   Eliza Culver MD   ELIQUIS 5 MG TABS tablet TAKE 1 TABLET TWICE A DAY 10/11/22   Eliza Culver MD   cyclobenzaprine (FLEXERIL) 5 MG tablet TAKE 1 TABLET BY MOUTH NIGHTLY AS NEEDED FOR MUSCLE SPASMS 10/3/22   Eliza Culver MD   allopurinol (ZYLOPRIM) 100 MG tablet TAKE 1 TABLET DAILY 9/21/22   BARBIE Macedo CNP   zolpidem (AMBIEN) 5 MG tablet Take 5 mg by mouth nightly as needed for Sleep.     Historical Provider, MD   hydrALAZINE (APRESOLINE) 25 MG tablet Take 4 tablets by mouth 3 times daily 7/12/22   Juan Guy MD   amiodarone (CORDARONE) 200 MG tablet Take 1 tablet by mouth daily 7/6/22   BARBIE Eubanks - NP   calcitRIOL (ROCALTROL) 0.5 MCG capsule TAKE 1 CAPSULE BY MOUTH DAILY 6/14/22   Juan Guy MD   ferrous sulfate (FE TABS 325) 325 (65 Fe) MG EC tablet Take 1 tablet by mouth daily (with breakfast) 5/10/22   Eliza Culver MD   azelastine (ASTELIN) 0.1 % nasal spray 1 spray by Nasal route 2 times daily Use in each nostril as directed 10/13/21   Bree Asher DO   Cholecalciferol (VITAMIN D3) 25 MCG (1000 UT) TABS Take 2 tablets by mouth daily 1/9/20   BARBIE Macedo CNP   Magnesium Oxide 400 MG CAPS Take by mouth 2 times daily Takes once daily at [facility]    Historical Provider, MD     Current Facility-Administered Medications   Medication Dose Route Frequency Provider Last Rate Last Admin    iron sucrose (VENOFER) 100 mg in sodium chloride 0.9 % 100 mL IVPB  100 mg IntraVENous Q24H Conor Blair  mL/hr at 12/14/22 1737 100 mg at 12/14/22 1737    Benzocaine-Menthol (CEPACOL) 1 lozenge  1 lozenge Oral Q2H PRN Daniel Redding MD        atorvastatin (LIPITOR) tablet 40 mg  40 mg Oral Nightly Daniel Redding MD   40 mg at 12/13/22 2126    methocarbamol (ROBAXIN) tablet 750 mg  750 mg Oral 3 times per day Daniel Redding MD   750 mg at 12/14/22 1534    acetaminophen (TYLENOL) tablet 650 mg  650 mg Oral Q6H PRN Daniel Redding MD        zolpidem (AMBIEN) tablet 10 mg  10 mg Oral Nightly Daniel Redding MD   10 mg at 12/13/22 2125    0.9 % sodium chloride infusion   IntraVENous Continuous Conor Blair MD 50 mL/hr at 12/14/22 1736 New Bag at 12/14/22 1736    allopurinol (ZYLOPRIM) tablet 100 mg  100 mg Oral Daily Barrington Bowling MD   100 mg at 12/14/22 6019    amiodarone (CORDARONE) tablet 200 mg  200 mg Oral Daily Barrington Bowling MD   200 mg at 12/14/22 0854    apixaban (ELIQUIS) tablet 5 mg  5 mg Oral BID Barrington Bowling MD   5 mg at 12/14/22 6217    calcitRIOL (ROCALTROL) capsule 0.25 mcg  0.25 mcg Oral Daily Barrington Bowling MD   0.25 mcg at 12/14/22 0853    gabapentin (NEURONTIN) tablet 600 mg  600 mg Oral Daily Barrington Bowling MD   600 mg at 12/14/22 0853    hydrALAZINE (APRESOLINE) tablet 100 mg  100 mg Oral TID Barrington Bowling MD   100 mg at 12/14/22 1533    HYDROcodone-acetaminophen (NORCO) 5-325 MG per tablet 1 tablet  1 tablet Oral Q4H PRN Barrington Bowling MD   1 tablet at 12/14/22 1259    magnesium oxide (MAG-OX) tablet 400 mg  400 mg Oral BID Barrington Bowling MD   400 mg at 12/14/22 0853    metoprolol tartrate (LOPRESSOR) tablet 25 mg  25 mg Oral BID Barrington Bowling MD   25 mg at 12/14/22 0853    pantoprazole (PROTONIX) tablet 40 mg  40 mg Oral QAM AC Barrington Bowling MD   40 mg at 12/14/22 0604    rOPINIRole (REQUIP) tablet 0.25 mg  0.25 mg Oral Nightly Barrington Bowling MD   0.25 mg at 12/13/22 2127    Vitamin D (CHOLECALCIFEROL) tablet 2,000 Units  2,000 Units Oral Daily Mitchell Tabares MD   2,000 Units at 12/14/22 0853    bisacodyl (DULCOLAX) suppository 10 mg  10 mg Rectal Daily PRN Martha Bond MD        polyethylene glycol (GLYCOLAX) packet 17 g  17 g Oral Daily Martha Bond MD   17 g at 12/13/22 0818       Allergies:  Adhesive tape, Cephalexin, Erythromycin, Ketorolac tromethamine, Pcn [penicillins], Percocet [oxycodone-acetaminophen], Sulfa antibiotics, and Ultracet [tramadol-acetaminophen]    REVIEW OF SYSTEMS:      General: Positive for weakness and fatigue. No unanticipated weight loss or decreased appetite. No fever or chills. Eyes: No blurred vision, eye pain or double vision. Ears: No hearing problems or drainage. No tinnitus. Throat: No sore throat, problems with swallowing or dysphagia. Respiratory: No cough, sputum or hemoptysis. Positive for exertional shortness of breath. No pleuritic chest pain. Cardiovascular: No chest pain, orthopnea or PND. No lower extremity edema. No palpitation. Gastrointestinal: No problems with swallowing. No abdominal pain or bloating. No nausea or vomiting. No diarrhea or constipation. No GI bleeding. Genitourinary: No dysuria, hematuria, frequency or urgency. Musculoskeletal: As above. Dermatologic: No skin rashes or pruritus. No skin lesions or discolorations. Psychiatric: No depression, anxiety, or stress or signs of schizophrenia. No change in mood or affect. Hematologic: No history of bleeding tendency. No bruises or ecchymosis. No history of clotting problems. Infectious disease: No fever, chills or frequent infections. Endocrine: No polydipsia or polyuria. No temperature intolerance. Neurologic: No headaches or dizziness. No weakness or numbness of the extremities. No changes in balance, coordination,  memory, mentation, behavior. Allergic/Immunologic: No nasal congestion or hives. No repeated infections.        PHYSICAL EXAM:      BP (!) 128/54   Pulse 60   Temp 98.4 °F (36.9 °C) Resp 18   Ht 5' 5\" (1.651 m)   LMP  (LMP Unknown)   SpO2 96%   BMI 43.60 kg/m²    Temp (24hrs), Av.4 °F (36.9 °C), Min:98.4 °F (36.9 °C), Max:98.4 °F (36.9 °C)      General appearance - not in pain or distress. Patient is morbidly obese. Mental status - alert and oriented  Eyes - pupils equal and reactive, extraocular eye movements intact  Ears - bilateral TM's and external ear canals normal  Nose - normal and patent, no erythema, discharge or polyps  Mouth - mucous membranes moist, pharynx normal without lesions  Neck - supple, no significant adenopathy  Lymphatics - no palpable lymphadenopathy, no hepatosplenomegaly  Chest - clear to auscultation, no wheezes, rales or rhonchi, symmetric air entry  Heart - normal rate, regular rhythm, normal S1, S2, no murmurs, rubs, clicks or gallops  Abdomen - soft, nontender, nondistended, no masses or organomegaly  Neurological - alert, oriented, normal speech, no focal findings or movement disorder noted  Musculoskeletal -status post right ankle fracture status post open reduction internal fixation. Extremities - peripheral pulses normal, no pedal edema, no clubbing or cyanosis  Skin - normal coloration and turgor, no rashes, no suspicious skin lesions noted           DATA:      Labs:       CBC:   Recent Labs     22  1900 22  0647   WBC  --  11.0   HGB 7.8* 7.6*   HCT 23.8* 23.1*   PLT  --  295     BMP:   Recent Labs     22  0647 22  0615    135   K 4.5 4.9   CO2 28 29   BUN 77* 77*   CREATININE 4.29* 4.50*   LABGLOM 10* 10*   GLUCOSE 149* 119*     PT/INR: No results for input(s): PROTIME, INR in the last 72 hours. APTT:No results for input(s): APTT in the last 72 hours. LIVER PROFILE:  Recent Labs     22  0647   AST 10   ALT <5*   LABALBU 2.9*     XR ANKLE RIGHT (MIN 3 VIEWS)  Narrative: EXAMINATION:  THREE XRAY VIEWS OF THE RIGHT ANKLE    2022 8:07 pm    COMPARISON:  2022.     HISTORY:  ORDERING SYSTEM PROVIDED HISTORY: post op, PACU please  TECHNOLOGIST PROVIDED HISTORY:  post op, PACU please    FINDINGS:  Interval ORIF of comminuted ankle fracture with lateral surgical plate and  screws spanning the distal fibula and posterior and medial plates spanning  the distal tibia with multiple fixation screws. Alignment is grossly  anatomic. The mortise is maintained. Evaluation of the soft tissues is  limited by overlying cast material.  Posterior plaster cast.  Impression: Anatomic alignment of comminuted ankle fracture status post ORIF. FLUORO FOR SURGICAL PROCEDURES  Radiology exam is complete. No Radiologist dictation. Please follow up   with ordering provider. CT ANKLE RIGHT WO CONTRAST  Narrative: EXAMINATION:  CT OF THE RIGHT ANKLE WITHOUT CONTRAST, 12/5/2022 9:19 pm    TECHNIQUE:  CT of the right ankle was performed without the administration of intravenous  contrast.  Multiplanar reformatted images are provided for review. Automated  exposure control, iterative reconstruction, and/or weight based adjustment of  the mA/kV was utilized to reduce the radiation dose to as low as reasonably  achievable. COMPARISON:  Radiographs of right ankle at 2021 hours on 12/05/2022. HISTORY  ORDERING SYSTEM PROVIDED HISTORY:  Pre-op  TECHNOLOGIST PROVIDED HISTORY:  Pre-op    FINDINGS:  Bones: The study shows coronally oriented mildly oblique fracture in the posterior  portion of distal end of right tibia with a prominent gap of 8.6 mm, extended  to the distal articular surface of tibia with posterior displacement of  posterior malleolus of the tibia. The fracture is mildly comminuted fracture  at the upper aspect. Evidence of comminuted transverse fracture through the base of the medial  malleolus, which is displaced mildly laterally. Evidence of a few small intra-articular fracture fragments in the anterior  portion of the tibiotalar joint and talofibular joint.     The study shows grossly comminuted oblique, displaced fractures of the distal  shaft. Right fibula with posterolateral displacement of distal fragment. This fracture extends to about 1.5 cm superior to the base of the lateral  malleolus. The lower extent  of the fracture is inferomedially,and  the  upper extent of the fracture superolaterally. Small fractures or old accessory ossicles at the inferomedial aspect of the  lateral malleolus. There is no significant gap at the distal tibiofibular syndesmosis. On the sagittal images, there is posterior subluxation of the talus bone due  to displaced fracture of posterior portion of distal end of the tibia. There is no definable fracture within the talus bone. There is no definable fracture in the right calcaneus bone. No definable fracture in the tarsal navicular bone or in the cuboid bone. In  the visualized 3 cuneiform bones there is no definable fracture. In the visualized portions of base of the metatarsal bones, there is no  definable fracture. Evidence of mild to moderate osteoarthritic change in the proximal and distal  intertarsal joint. No abnormality of the subtalar joint. Evidence of moderately prominent posterior and inferior calcaneal spurs. The calcaneal tendon is grossly intact. The tendons at the lateral and  medial aspect of the ankle joint are grossly intact. The tendons at the  anterior aspect of ankle joint are grossly intact. Soft Tissue: Mild soft tissue swelling with soft tissue edema surrounding the  right ankle joint and distal right leg. Impression: Prominent oblique fracture in coronal orientation with large gap at the  fracture involving the posterior portion of distal end of right tibia with  distal intra-articular extension of the fracture. Comminuted transverse  fracture through the base of the medial malleolus of the tibia. Grossly comminuted oblique fracture in the distal shaft of right fibula.     Moderate posterior subluxation of the talus bone due to distal posterior  tibial fracture. Some small intra-articular fracture fragments in the anterior portion of the  tibiotalar and talofibular joint. The distal tibiofibular syndesmosis is grossly intact. No evidence of fracture in the right calcaneus bone, talus bone and distal  tarsal bones. Subtalar joint is intact. Evidence of soft tissue emphysema  in the lateral portion of subtalar joint. The calcaneal tendon appears to be grossly intact. The long tendons at the  medial, lateral and anterior aspect of right ankle joint are grossly intact. CT ABDOMEN PELVIS WO CONTRAST Additional Contrast? None  Narrative: EXAMINATION:  CT OF THE ABDOMEN AND PELVIS WITHOUT CONTRAST 12/5/2022 9:18 pm    TECHNIQUE:  CT of the abdomen and pelvis was performed without the administration of  intravenous contrast. Multiplanar reformatted images are provided for review. Automated exposure control, iterative reconstruction, and/or weight based  adjustment of the mA/kV was utilized to reduce the radiation dose to as low  as reasonably achievable. Additional 3-D images of the pelvis including hip joints have been obtained  with surface rendering of bones and displayed with rotating frames. COMPARISON:  CT scan of the abdomen and pelvis without contrast on 02/27/2017. HISTORY:  ORDERING SYSTEM PROVIDED HISTORY: possible pubic rami fx  TECHNOLOGIST PROVIDED HISTORY:  possible pubic rami fx    FINDINGS:  Lower Chest: At the bases of the lungs, there is no acute process. No  pleural effusion. No hiatal hernia. No pneumoperitoneum. Organs: No demonstrable abnormality in the liver, gallbladder, spleen and  bilateral adrenal glands. In the pancreas there is no evidence of mass or  acute process. In the bilateral kidneys there is no evidence of stone or hydronephrosis.   At  the lower anterior aspect of the right kidney there is exophytic small mass  mildly hypodense, 1.2 cm in size, most likely to be a cyst.  In the mid  lateral cortex of the left kidney there is a small hypodense lesion measuring  1.1 cm probably a cyst but a new finding. The bladder is moderately distended without stone or focal abnormality. Bilateral ureters are of normal size, without evidence of stone or  obstruction. GI/Bowel: No demonstrable abnormality in the stomach. Small to moderate amount of gas scattered in some loops of small bowel  without any obvious fluid level. Distal loops of small bowel are not  distended or dilated. Normal appearing terminal ileum. There is normal appendix posteroinferior to cecum. Small to moderate amount of stool and small amount of gas are scattered in  the right colon and the transverse colon. In the descending colon small to  moderate amount of stool and gas are scattered. In the sigmoid colon and  rectum a small amount of stool and gas are scattered. Evidence of moderate  diverticulosis coli of the sigmoid colon and descending colon, without  evidence of diverticulitis. No evidence of colitis. Pelvis: No evidence of abnormal fluid collection or inflammatory process in  the pelvis. Normal appearing uterus. No adnexal mass on either side. Peritoneum/Retroperitoneum: Abdominal aorta is of normal size with moderate  calcified plaques. No evidence of periaortic or mesenteric pathologic  lymphadenopathy. No evidence of ascites. Bones/Soft Tissues: Evidence of moderate multilevel degenerative disc disease  in the lumbar spine and lumbosacral junction. Mild-to-moderate multilevel  facet osteoarthritis in the medial lower lumbar spine and lumbosacral  junction. There is no evidence of acute fracture or compression in the  lumbar spine or in the visualized sacrum and coccyx. No evidence of fracture or healing fracture or osseous malalignment in the  pelvic bones and joints including bilateral hip joints.     There is no evidence of fracture or healing fracture in the superior pubic  rami and inferior ischiopubic rami of both sides or in bilateral pubic bones. No evidence of fracture in bilateral acetabula. Impression: No evidence of fracture or osseous malalignment in the lumbar spine, pelvic  bones and joints including bilateral hip joints. No evidence of fracture in  the superior pubic rami and inferior ischiopubic rami of both sides or in  bilateral pubic bones. No fracture in bilateral acetabula or ischial bones. Evidence of mild to moderate multilevel degenerative disc disease and facet  osteoarthritis in the lumbar spine and lumbosacral junction. Nonspecific small amount of gas scattered in multiple loops of small bowel  without significant fluid levels. No distension or dilation of distal small  bowel. Normal appendix visualized. Small to moderate amount of stool and gas scattered in the colon. Evidence  of moderate diverticulosis coli of descending colon and sigmoid colon,  without evidence of diverticulitis. No evidence of renal or ureteric calculus or obstructive uropathy. No diagnostic finding in the liver, gallbladder, spleen, pancreas and adrenal  glands. CT PELVIS WO CONTRAST Additional Contrast? None  Narrative: EXAMINATION:  CT OF THE PELVIS WITHOUT CONTRAST 12/5/2022 9:18 pm    TECHNIQUE:  CT of the pelvis was performed without the administration of intravenous  contrast.  Multiplanar reformatted images are provided for review. Adjustment of mA and/or kV according to patient size was utilized. Automated  exposure control, iterative reconstruction, and/or weight based adjustment of  the mA/kV was utilized to reduce the radiation dose to as low as reasonably  achievable. The study includes 3D images of pelvis and hip joints with surface rendering  of bones and displayed with rotating frames display. COMPARISON:  CT scan of abdomen and pelvis on 2/27/2017.   CT scan of abdomen and pelvis on  12/5/2022. HISTORY:  ORDERING SYSTEM PROVIDED HISTORY: pre-op  TECHNOLOGIST PROVIDED HISTORY: Pre-op    FINDINGS:  The bladder is moderate to markedly distended without stone or focal  abnormality. There is no evidence of abnormal fluid collection or inflammatory process in  the pelvis. Normal uterus. No evidence of adnexal mass in the pelvis. Small to moderate amount of stool and gas scattered in sigmoid colon and  rectum. Mild-to-moderate diverticulosis coli of sigmoid colon, without evidence of  diverticulitis. Normal appendix posteroinferior to cecum. Small amount of nonspecific gas scattered in some loops of small bowel. No evidence of pelvic abnormal fluid collection or hemorrhage. No evidence of fracture in sacrum or coccyx. Bilateral SI joints are intact. No evidence of fracture in bilateral hip bones including bilateral  acetabulum, bilateral femoral heads, bilateral femoral necks and bilateral  visualized femoral proximal shafts. No evidence of fracture involving bilateral superior pubic rami, bilateral  inferior ischiopubic rami, bilateral pubic bones, or bilateral ischial  tuberosity. No evidence of inguinal or femoral hernia. In the visualized soft tissues of pelvic walls and proximal thighs, there is  no definable inflammatory change or hematoma. Impression: No evidence of fracture in pelvic bones or bilateral hip joints. No evidence  of fracture in superior pubic rami or inferior ischiopubic rami of both  sides. No fracture in bilateral acetabulum or in visualized bilateral  proximal femurs. Within the pelvic cavity, there is no evidence of hemorrhage or abnormal  fluid collection or acute process. Mild to moderate sigmoid diverticulosis,  without evidence of diverticulitis.             IMPRESSION:    Primary Problem  Open fracture of right tibia and fibula, sequela    Active Hospital Problems    Diagnosis Date Noted    Open fracture of right tibia and fibula, sequela [S82.201S, S82.401S] 12/05/2022     Priority: Medium   Open fracture of right tibia and fibula. Status post open reduction internal fixation. JOSE ARMANDO on top of CKD. Anemia of chronic renal insufficiency  Elevated kappa light chain immunoglobulin    RECOMMENDATIONS:  Records and labs and images were reviewed and discussed with the patient and family at bedside. Patient is recovering from right ankle fracture. Undergoing inpatient rehab. I reviewed patient's records and labs and explained to the patient details and in layman language. Patient is having JOSE ARMANDO on top of CKD. She is high risk for dialysis. Further work-up showed elevated kappa light chain immunoglobulins. I explained to the patient the concern about possible underlying bone marrow disease although renal failure can be the cause for this elevated kappa light chain immunoglobulin. However further work-up will be done to rule out possible myeloma. Patient would benefit from inpatient treatment for anemia of chronic renal failure. Patient's questions were answered to the best of her satisfaction and she verbalized full understanding and agreement. Thank you for allowing us to participate in the care of this pleasant patient. Discussed with patient and family. Sabrina Kessler MD, MD                            [address] [place] [address] [facility]                            This note is created with the assistance of a speech recognition program.  While intending to generate a document that actually reflects the content of the visit, the document can still have some errors including those of syntax and sound a like substitutions which may escape proof reading. It such instances, actual meaning can be extrapolated by contextual diversion.

## 2022-12-14 NOTE — CARE COORDINATION
Gwendolyn Denton, RN   Registered Nurse   Acute Rehab   Care Coordination      Signed   Date of Service:  2022  1:19 PM          Related encounter: ED to Hosp-Admission (Discharged) from 2022 in [facility]  PRE-ADMISSION ASSESSMENT     Patient Name: Lele Baker        MRN:   [id number]    : 1945  (69 y.o.)  Gender: female   Ethnicity: Not , /a or Dive[place] Origin  Race: White     Admitted from:  [facility]      Type of Admission:  New Admission      Date of Onset / Admission to the [address] [place] [address]:  2022     Inpatient Rehabilitation Admitting Diagnosis:  Ankle Fracture     Did patient have surgery/procedures? Yes, As Listed Below   2022: 1. Open reduction internal fixation right trimalleolar ankle fracture  with posterior malleolus fixation. 2.  Open treatment without fixation, right talus. 3.  Irrigation and excisional debridement of skin down to and including  the bone of right open ankle fracture. 4.  Complex wound closure right ankle measuring 10 cm.  5.  Stress examination under anesthesia right ankle with independent  interpretation of imaging.         Physicians:   Yves Carrasco DO   Physician   Cardiology      Murray Rooney MD    Physician   Gris Rosenberg MD   Physician   General Surgery      Sophia Ordoñez MD   Physician   Michael Horvath MD   Physician   Nephrology      Rose Marie Lou MD   Physician   Nephrology      Avinash Denny MD   Physician   Nephrology      Virgil Campbell DO   Physician   Orthopedic Surgery      Cary Sequeira MD   Physician Trauma      Risk for Clinical Complications:   Moderate      Co-morbidities:    Open trimalleolar fracture, ankle fracture dislocation status post ORIF in 12/6, right talus open treatment-nonweightbearing right lower extremity  A. fib-Eliquis amiodarone  CKD/JOSE ARMANDO hyperkalemia-Per nephrology high risk of needing dialysis-creatinine 3.99-will need continued nephrology follow-up  Type2 diabetes with neuropathy  Hypertension/hyperlipidemia Lipitor, hydralazine, Toprol  Anemia hemoglobin down to down to 6.4 now 7.8-iron-status post transfusion  Fibromyalgia  BMI 43.60  Sleep apnea  Pain-Norco, Neurontin, Robaxin,  Paroxysmal A. fib-Eliquis resumed  Hyponatremia sodium 130     Financial Information  Primary insurance: Medicare      Secondary Insurance: Commercial Insurance:BCBS OUT OF STATE       Precautions:   []Cardiac Precautions: No Cardiac Precautions  []Total hip precautions: No Total Hip Precautions  []Weight Bearing status: Yes: NWB R LE   [x]Safety Precautions/Concerns:  Fall Risk, General Precautions  [x]Visually impaired: Yes: Wears glasses at all times             []Hard of Hearing: Within Functional Limits        Communication Aids, Devices or Interpretation Services Required: None     Isolation Precautions:  None: Standard Precautions                  Physiatrist: Dr. Mart Torres       Patients Occupation: Retired     Reviewed Lab and Diagnostic reports from Current Admission: Yes     Patients Prior Functional  Level: Prior Function  Receives Help From: Family  ADL Assistance: Independent  Homemaking Assistance: Needs assistance  Ambulation Assistance: Independent  Transfer Assistance: Independent     History of current illness, per PM&R Consult:  Ms. Yoseph Vaughan is a 69 y.o. female who was admitted to [facility] on 12/5/2022 with Ankle Injury (Right ankle fracture )     52-year-old female with history of A. fib on Eliquis, bilateral knee arthroplasty, CHF, CKD, type 2 diabetes, and hypertension as well as chronic numbness of her feet status post fall over a curb found to have displaced right trimalleolar ankle fracture with large posterior malleolus and comminuted lateral malleolus fracture. -. right open trimalleolar ankle fracture dislocation     Neurology-JOSE ARMANDO on CKD stage III secondary diabetic nephrosclerosis-low potassium low phosphorus diet, continue bicarb and Lokelma high risk for needing dialysis this admission     Orthopedics-right open trimalleolar fracture ankle fracture dislocation status post I&D and ORIF on 12/6, right talus open treatment-continue splint right lower extremity, nonweightbearing, on heparin for DVT prophylaxis follow-up with Dr. Masha Giron     Prognosis: Fair     Current functional status for upper extremity ADLs:  UE Bathing: Stand by assistance, Setup  UE Dressing: Stand by assistance     Current functional status for lower extremity ADLs:  LE Bathing: Moderate assistance, Increased time to complete, Setup, Verbal cueing  LE Dressing: Maximum assistance, Increased time to complete, Verbal cueing     Current functional status for bed, chair, wheelchair transfers:  Transfers  Sit to Stand: Moderate Assistance, 2 Person Assistance  Stand to Sit: Moderate Assistance, 2 Person Assistance  Comment: Completed with use of SS along with VC's for sequencing/ hand placement and weight bearing restrictions with good return. Current functional status for toilet transfers:     Current functional status for locomotion:  Ambulation  WB Status: NWB R LE  Ambulation  Surface: Level tile  Device: Rolling Walker  Assistance:  Moderate assistance, 2 Person assistance  Distance: sit to stand with mod assist x 2     Current functional status for comprehension: [facility]     Current functional status for expression: WFL     Current functional status for social interaction: WFL     Current functional status for problem solving: WFL     Current functional status for memory: [facility]     Current Deficits R/T Impairment: Impaired Functional Mobility and Decreased ADLs     Required Therapy Services:  Physical Therapy: Yes  Occupational Therapy: Yes  Speech Therapy: Not Indicated At This Time       Additional Services:    [x] /Case Management    [] Recreational Therapy, as appropriate    [x] Nutrition Consult, as appropriate  [x] Dietary Needs/Preferences: Specialized Dietary Needs/Preferences indicated as follows: ADULT DIET; Regular; Low Sodium (2 gm); Low Potassium (Less than 3000 mg/day); Low Phosphorus (Less than 1000 mg)  [] Dialysis  [x] Cultural/Nondenominational Needs/Preferences: Cultural/Nondenominational Needs/Preferences indicated as follows: Gnosticist  [] Special Equipment Needs  [] Special Medication Needs  [] Other information relevant to patient's care needs:     Preferred Language: English     Does the patient need or want an  to communicate with a doctor or health care staff? No     Rehab Justification:  Needs 3 hrs therapy per day or 15 hours per week:  Yes  Identified Rehab Nursing needs: Yes  Intense Interdisciplinary need:  Yes  Need for 24 hr physician supervision:  Yes  Measurable improved quality of life:  Yes  Willingness to participate:  Yes  Medical Necessity:  Yes  Patient able to tolerate care proposed:  Yes     Expected Discharge Destination/Functional Level:  Home with assist  Expected length of time to achieve that level of improvement: 1-2 weeks  Expected Post Discharge Treatments: Home with possible Home Care     Acute Inpatient Rehabilitation Disclosure Statement will be provided to patient upon admission to ARU with patient's verbalization of understanding. I have reviewed and concur with the findings and results of the pre-admission screening assessment completed by the Inpatient Rehabilitation Admissions Coordinator.                Cosigned by: Noemi Duncan MD at 12/12/2022  1:56 PM

## 2022-12-14 NOTE — PLAN OF CARE
Problem: Pain  Goal: Verbalizes/displays adequate comfort level or baseline comfort level  Outcome: Progressing   Patient medicated as ordered. C/O right leg pain. Patient repositioned for comfort. Education on non pharmacological comfort measures as well.

## 2022-12-14 NOTE — PROGRESS NOTES
Physical Therapy  Facility/Department: [facility] ACUTE REHAB  Rehabilitation Physical Therapy Treatment Note   NAME: Marley Romero  : 1945 (89 y.o.)  MRN: [id number]  CODE STATUS: Full Code    Date of Service: 22        Additional Pertinent Hx: Ms. Marley Romero is a 69 y.o. female who was admitted to [facility] on 2022 with Ankle Injury. 60-year-old female with history of A. fib on Eliquis, bilateral knee arthroplasty, CHF, CKD, type 2 diabetes, and hypertension as well as chronic numbness of her feet status post fall over a curb found to have displaced right trimalleolar ankle fracture with large posterior malleolus and comminuted lateral malleolus fracture. Pt S/P  status post I&D and ORIF on 22 by Dr Cinthia Montes, right talus open treatment-continue splint right lower extremity, nonweightbearing,  Family / Caregiver Present: No  Referring Practitioner: Dr Geovanna Garcia  Diagnosis: Right open trimalleolar fracture ankle fracture dislocation s/p I&D and ORIF,  Right talus fracture. General Comment  Comments: upon approach AM pt in bed , wet clothing and undergarment , assist to dry clean clothing with request to use bed side cammode , dependat transfer Maxi move.  PM assist right leg rest fabrication to support LE    Restrictions:  Restrictions/Precautions: Weight Bearing  Lower Extremity Weight Bearing Restrictions  Right Lower Extremity Weight Bearing: Non Weight Bearing  Position Activity Restriction  Other position/activity restrictions: Per chart: Right open trimalleolar fracture ankle fracture dislocation s/p I&D and ORIF, DOS 2022     SUBJECTIVE  Pain: Pt reports pain 5/10 R ankle lateral AM , PM seated 8/10 seated in WC right LE elevated         OBJECTIVE  Vision  Vision: Impaired  Vision Exceptions: Wears glasses at all times    Hearing  Hearing: Within functional limits    Cognition  Overall Cognitive Status: WFL    Sensation  Overall Sensation Status: Impaired (Decreased sensation B feet.)    Functional Mobility  Transfers  Bed to Chair: Dependent/Total (maxi move)  Balance  Posture: Good  Sitting - Static: Good  Sitting - Dynamic: Fair  Standing - Static: Poor  Standing - Dynamic: Poor;-  Comments: Assessed with rolling walker and maddy hernandez    Environmental Mobility  Ambulation  WB Status: NWB R LE    PT Exercises  A/AROM Exercises: seated bilateral LE hips/ knees x10    Activity Tolerance  Activity Tolerance: Patient limited by fatigue;Patient limited by endurance; Other (comment) (assist  to maintain NWB R LE, and anxiety)    Assessment  Performance Deficits/Impairments: Decreased functional mobility ; Decreased ROM; Decreased strength;Decreased safe awareness;Decreased endurance;Decreased balance; Increased pain  Treatment Diagnosis: Impaired function. Therapy Prognosis: Good  Decision Making: Medium Complexity  Discharge Recommendations: Patient would benefit from continued therapy after discharge  PT Equipment Recommendations  Equipment Needed: No         GOALS  Patient Goals   Patient Goals : Get stronger  Short Term Goals  Time Frame for Short Term Goals: 1 week  Short Term Goal 1: Bed mobility min A without use of bed rail  Short Term Goal 2: Sit<> // bars or rolling walker at mod A, maintaining NWB R LE  Short Term Goal 3: Improve staning tolerance to 1 minutes, NWB R LE with B UE support. Short Term Goal 4: Lateral scoot transfers, mod A X 1, maintaining NWB R LE. Short Term Goal 5: W/C mobility distance fo 50 ft , SBA/CGA level surface. Long Term Goals  Time Frame for Long Term Goals : By DC  Long Term Goal 1: Mod-I bed mobility  Long Term Goal 2: Stand pivot Transfers at min/mod A maintain NWB R LE  Long Term Goal 3: W/C mobility distance fo 50 to 150 ft SBA level surface  Long Term Goal 4: Pt able to take 3 to 5 steps in // bars NWB R LE, min A x 2  Long Term Goal 5: Family training for safe transfers from varied surfaces.     PLAN OF CARE  Frequency: 1-2 treatment sessions per day, 5-7 days per week  Physcial Therapy Plan  General Plan:  minutes of therapy at least 5 out of 7 days a week (5-7 daysx wk)  Specific Instructions for Next Treatment: Continue maxi move for transfers, progress with sit<.stnad in // bars or rolling walker in therapy. Current Treatment Recommendations: Strengthening; Functional mobility training;Transfer training; Endurance training;Stair training;Gait training; Safety education & training;Balance training;ROM;Wheelchair mobility training;Patient/Caregiver education & training;Home exercise program;Equipment evaluation, education, & procurement; Therapeutic activities  Additional Comments: Discussed with pt, need of ramp at entrance at this time. Safety Devices  Type of Devices: Gait belt;Call light within reach; Left in bed;Bed alarm in place (rails x 3)  Restraints  Restraints Initially in Place: No     12/14/22 1241 12/14/22 1601   PT Individual Minutes   Time In 1021 1258   Time Out 1114 1355   Minutes 48 1000 [facility], 12/14/22 at 4:01 PM

## 2022-12-14 NOTE — PLAN OF CARE
Individualized Plan of Care  [facility]  [facility]    Rehabilitation physician: Dr Ez Amado Date: 12/12/2022     Rehabilitation Diagnosis: Open fracture of right tibia and fibula, sequela [S82.201S, S82.401S]     Rehabilitation impairments: self care, mobility, motor dysfunction, pain management, and safety    Factors facilitating achievement of predicted outcomes: Family support, Motivated, Cooperative, Pleasant, and Has homemaker services  Barriers to the achievement of predicted outcomes: Pain, Decreased endurance, Lower extremity weakness, Medical complications, Wound Care, and Medication managment    Patient Goals: Improve independence with mobility, Improvement of mobility at a wheelchair level, Increase overall strength and endurance, Increase balance, Increase independence with activities of daily living, Increase safety awareness, Integrate appropriate pain management plan, Assure adequate nutritional option for discharge, and Provide appropriate patient and family education      NURSING:  Nursing goals for Annabella Ganser while on the rehabilitation unit will include:  Adequate number of bowel movements, Urinate with no urinary retention >300ml in bladder, Maintain O2 SATs at an acceptable level during stay, Effective pain management while on the rehabilitation unit, Establish adequate pain control plan for discharge, Absence of skin breakdown while on the rehabilitation unit, Improved skin integrity via assessments including wound measurements, Avoidance of any hospital acquired infections, No signs/symptoms of infection at the wound site, Freedom from injury during hospitalization, and Complete education with patient/family with understanding demonstrated regarding disease process and resultant impairment     In order to achieve these goals, nursing interventions may include bowel/bladder training, education for medical assistive devices, medication education, O2 saturation management, energy conservation, stress management techniques, fall prevention, alarms protocol, seating and positioning, skin/wound care, pressure relief instruction, dressing changes, infection protection, DVT prophylaxis, assistance with safe transfers , and/or assistance with bathroom activities and hygiene. PHYSICAL THERAPY:  Goals:        Short Term Goals  Time Frame for Short Term Goals: 1 week  Short Term Goal 1: Bed mobility min A without use of bed rail  Short Term Goal 2: Sit<> // bars or rolling walker at mod A, maintaining NWB R LE  Short Term Goal 3: Improve staning tolerance to 1 minutes, NWB R LE with B UE support. Short Term Goal 4: Lateral scoot transfers, mod A X 1, maintaining NWB R LE. Short Term Goal 5: W/C mobility distance fo 50 ft , SBA/CGA level surface. Long Term Goals  Time Frame for Long Term Goals : By DC  Long Term Goal 1: Mod-I bed mobility  Long Term Goal 2: Stand pivot Transfers at min/mod A maintain NWB R LE  Long Term Goal 3: W/C mobility distance fo 50 to 150 ft SBA level surface  Long Term Goal 4: Pt able to take 3 to 5 steps in // bars NWB R LE, min A x 2  Long Term Goal 5: Family training for safe transfers from varied surfaces. Plan of Care: Pt to be seen by physical therapy services 1 Hour 30 Minutes per day at least 5 out of 7 days per week     Anticipated interventions may include therapeutic exercises, gait training, neuromuscular re-ed, transfer training, community reintegration, bed mobility, w/c mobility and training.       OCCUPATIONAL THERAPY:  Goals:             Short Term Goals  Time Frame for Short Term Goals: By one week  Short Term Goal 1: Pt will perform UB ADLs including grooming/oral care with SBA and good safety  Short Term Goal 2: Pt will perform LB ADLs including toileting/toilet transfers with Min A, good safety, and use of AE/DME/Modified techniques as needed  Short Term Goal 3: Pt will be educated on NWB status to RLE with good carryover during functional transfers/tasks  Short Term Goal 4: Pt will tolerate standing for 5+ minutes, Min A, with 1-2 UE support and no LOB during functional task while maintaining NWB RLE  Short Term Goal 5: Pt will be educated on and explore use of AE/DME/Modified techniques to improve safety and independence with ADL tasks  Short Term Goal 6: Pt will actively participate in 30+ minutes of therapeutic exercise/functional activity to promote safety and independence with self-care and mobility  Short Term Goal 7: Pt will perform functional transfers/mobility with Min A, good safety, and use of least restrictive device while maintaining NWB RLE  Long Term Goals  Time Frame for Long Term Goals : By discharge  Long Term Goal 1: Pt will perform UB ADLs including grooming/oral care with Mod I and good safety  Long Term Goal 2: Pt will perform LB ADLs, including toileting/toilet transfers, with SBA, good safety, and use of AE/DME/Modified techniques as needed  Long Term Goal 3: Pt will tolerate standing 10+ minutes, SBA, with 1-2 UE and no LOB during self-care/functional activity while maintaining NWB RLE  Long Term Goal 4: Pt will verbalize/demonstrate good understanding of home safety/fall prevention/energy conservation strategies to improve safety and independence with self-care tasks  Long Term Goal 5: Pt will safely perform light housekeeping/meal preparation with supervision, good safety, and use of least restrictive device to improve independence with ADLs/IADLs  Long Term Goal 6: Pt will perform functional transfers/mobility with SBA, good safety, and use of least restrictive device while maintaining NWB RLE  Long Term Goal 7: Pt will demonstrate improved fine motor coordination during self-care tasks AEB 10 second improvement on 9 hole peg test    Plan of Care: Patient to be seen by occupational therapy services 1 Hour 30 Minutes per day at least 5 out of 7 days per week     Anticipated interventions may include ADL and IADL retraining, strengthening, safety education and training, patient/caregiver education and training, equipment evaluation/ training/procurement, neuromuscular reeducation, wheelchair mobility training. SPEECH THERAPY:   Goals:                      Plan of Care:     CASE MANAGEMENT:  Goals:   Assist patient/family with discharge planning, patient/family counseling,  and coordination with insurance during the inpatient rehabilitation stay. Other members of the multidisciplinary rehabilitation team that will be involved in the patient's plan of care include recreational therapy, dietary, respiratory therapy, and neuropsychology. Medical issues being managed closely and that require 24 hour availability of a physician:  Weight bearing precautions, Wound care, Pain management, Infection protection, DVT prophylaxis, Fall precautions, Fluid/Electrolyte balance, Nutritional status, Respiratory needs, Anemia, and History of heart disease                                           Physician anticipated functional outcomes: Improved independence with functional measures   Estimated length of stay for this admission 2 weeks  Medical Prognosis: Fair  Anticipated disposition: Home. The potential to achieve the above medical and rehabilitative goals is fair. This plan of care has been developed with the assistance and input of the multidisciplinary rehabilitation team.  The plan was reviewed with the patient on 12/14/2022. The patient has had the opportunity to provide input to the therapy team.    I have reviewed this Individualized Plan of Care and agree with its contents. Above documentation has been expanded, modified, adjusted to reflect the findings of my evaluations and goals for the patient.     Physician:  Electronically signed by Ras Lennon MD on 12/14/22 at 9:46 AM EST

## 2022-12-14 NOTE — PROGRESS NOTES
Nephrology Progress Note    Subjective/   69y.o. year old female who we are seeing in consultation for Acute kidney injury on CKD stage 4      Interval history:  Patient has  decreased intake. Pt  is eating 3/4  of her meals. She denies nausea , constipation, abdominal pain -serum creatinine is trending up to 4.50 mg/dl. Post void bladder scan was not significant. She had about 800 cc of urine today. 27.6 g/dL    History of present illness: This is a 69 y.o. female with a significant past medical history of Lipidemia, nephrolithiasis, fibromyalgia, type 2 diabetes mellitus, osteoarthritis and Chronic kidney disease stage IIIb [baseline serum creatinine 2.5 mg/dL secondary to diabetic glomerulosclerosis and follows up with Dr. Kalpesh Agiurre of [facility] [address] [place] [address], who presented to the hospital at [facility] on 12/6/2022 after [place]pping [place] whilst [place] get out from [employer]. Vital signs were stable at presentation but her renal function had worsened from baseline with elevated BUN/creatinine 60/3.87 mg/dL. X-rays revealed trimalleolar fracture with diffuse soft tissue swelling of the right ankle. She underwent open reduction and internal fixation on 12/6/2022. Hemoglobin dropped to 6.6 g/dL on 12/10/2022 requiring PRBC transfusion. Serum creatinine peaked at 4.34 mg/dL. Patient did not require dialysis so far. She was transferred to acute rehab unit at [facility] yesterday [12/12/2022]. Pain control is adequate she has a cast on her left lower extremity. She does not have shortness of breath. She is nonoliguric and does not have indwelling Galdamez catheter. Laboratory studies today remarkable for BUN/creatinine 71/4.29  mg/dL.     Objective/     Vitals:    12/13/22 1844 12/14/22 0654 12/14/22 1259 12/14/22 1533   BP: (!) 143/65 (!) 158/66  (!) 128/54   Pulse: 62 60     Resp: 16 16 18    Temp: 98.4 °F (36.9 °C) 98.4 °F (36.9 °C)     TempSrc: SpO2: 99% 96%     Height:         24HR INTAKE/OUTPUT:    Intake/Output Summary (Last 24 hours) at 12/14/2022 1639  Last data filed at 12/14/2022 1603  Gross per 24 hour   Intake --   Output 800 ml   Net -800 ml     No data found. Constitutional:  Alert, awake, no apparent distress  Cardiovascular:  S1, S2 without m/r/g  Respiratory:  CTA B without w/r/r  Abdomen: +bs, soft, nt  Ext: 2+ LE edema    Data/  Recent Labs     12/11/22  1900 12/13/22  0647   WBC  --  11.0   HGB 7.8* 7.6*   HCT 23.8* 23.1*   MCV  --  91.3   PLT  --  295     Recent Labs     12/12/22  0602 12/13/22  0647 12/14/22  0615   * 136 135   K 4.4 4.5 4.9   CL 91* 97* 97*   CO2 28 28 29   GLUCOSE 135* 149* 119*   BUN 75* 77* 77*   CREATININE 3.99* 4.29* 4.50*   LABGLOM 11* 10* 10*         Assessment/   1. Acute kidney injury on chronic kidney disease stage IIIb - worsening renal function in this patient may be related to ischemic acute tubular necrosis but obstructive uropathy will need to be ruled out. She is not overtly uremic but GFR is 10 mL/min and I have advised that she may need to start dialysis soon. Kappa lambda chain ratio was elevated at 13.36. [could be Secondary to renal disease] Awaiting serum immunofixation    Plan:   Random urine protein/creatinine ratio. Continue hydration with IV fluid 0.9 normal saline at 50 mls per hour. Strict urine output documentation. Avoid nephrotoxic agents including nonsteroidal anti-inflammatory drugs, vancomycin, aminoglycosides and intravenous iodinated contrast agents. Basic metabolic profile daily. Renal diet    2. Normocytic anemia of chronic kidney disease-Iron studies show TSAT of 14%-Continue IV iron 100 mg daily x 10 doses. Once iron stores are optimized and TSAT is greater than 20%, will resume Retacrit twice monthly     3. S/p trimalleolar right ankle fracture - recovery in progress. 4.  Systemic hypertension - blood pressure control is adequate.      5.Moderately severe protein energy malnutrition-As reflected by albumin of 2.9 g-Add Nepro supplement daily           Plan/   1-Consider hematology consult if serum immunofixation is significantly positive for monoclonal chains  2. Patient at risk for Initiating hemodialysis if worsening kidney function with uremic clinical symptoms or signs on this admission. Prognosis is guarded.       Giovanni Owusu MD

## 2022-12-14 NOTE — CARE COORDINATION
ONGOING ARU DISCHARGE PLAN:    Patient is alert and oriented x4. Spoke with patient regarding discharge plan and patient confirms plan Home with [facility]    Will continue to follow for additional discharge needs.     Electronically signed by Sanjiv Fox RN on 12/14/2022 at 9:21 AM

## 2022-12-14 NOTE — PROGRESS NOTES
Second attempt to page Dr. Tori Pat to address patient's Sub q retactrit injections she takes but not ordered per Dr. Teri Robb. Will wait for response.

## 2022-12-14 NOTE — PROGRESS NOTES
[address]    Acute Rehabilitation Occupational Therapy Daily Treatment Note    Date: 22  Patient Name: Ricki Espinoza       Room: 5435/0273-71  MRN: [id number]  Account: [de-identified]   : 1945  (69 y.o.) Gender: female       Referring Practitioner: Humera Wagner MD  Diagnosis: Open fracture of right tibia and fibula, sequela  Additional Pertinent Hx: Ricki Espinoza is 69 y.o. female  Who was admitted to the hospital on 2022 for treatment of Open fracture of right tibia and fibula, sequela. Patient presented to the emergency room with right ankle injury and suffered a right ankle fracture after falling over a curb when she was trying to go to [employer] for [employer]. She has underlying history of A. fib on Eliquis, CHF, diabetes mellitus with CKD3, hypertension, osteoporosis, fibromyalgia, morbid obesity BMI 43, sleep apnea, depression. Patient underwent I&D and ORIF on 2022. Patient also developed acute kidney injury with hyperkalemia and metabolic acidosis. Nephrology was consulted and patient given Anthony Falling. Patient was given 1 unit PRBC transfusion on 2022 for low hemoglobin of 6.4. Treatment Diagnosis: Impaired self-care status    Past Medical History:  has a past medical history of Abnormal stress test, Anemia, Arthritis, Depression, Diverticulosis, DM (diabetes mellitus) ([address] Erythropenia, Fibromyalgia, HTN (hypertension), Hyperlipidemia, Kidney stone, Morbid obesity due to excess calories ([address] DIONY on CPAP, Osteopenia, Seasonal allergies, and Sleep apnea. Past Surgical History:   has a past surgical history that includes Colonoscopy (2003); Colonoscopy (2007); Tubal ligation; Arm Surgery (2011); Total knee arthroplasty (Bilateral); Cardiac catheterization ([id number] dr Obed Ashby);  Cardiac catheterization (2016); ORIF ankle fracture (Right, 2022); and Ankle fracture surgery (Right, 12/6/2022). Restrictions  Restrictions/Precautions  Restrictions/Precautions: Weight Bearing  Required Braces or Orthoses?: No (Sling for comfort)  Implants present? : Metal implants (IM Nailing)     Position Activity Restriction  Other position/activity restrictions: Per chart: Right open trimalleolar fracture ankle fracture dislocation s/p I&D and ORIF, DOS 12/6/2022  Lower Extremity Weight Bearing Restrictions  Right Lower Extremity Weight Bearing: Non Weight Bearing     Subjective  Subjective  Subjective: \"It's tolerable\" Pt states RE pain in RLE. Pain Assessment  Pain Assessment: 0-10  Pain Level: 10 (with)    Objective  Cognition  Overall Orientation Status: Within Functional Limits       Cognition  Overall Cognitive Status: WFL    Activities of Daily Living  Grooming/Oral Hygiene  Assistance Level: Set-up  Skilled Clinical Factors: Pt completes oral care, washing face and brushing hair while sitting in w/c at sink. Requests to complete bathing and dressing tasks every other day. Lower Extremity Dressing  Assistance Level: Dependent  Skilled Clinical Factors: TA for donning pants while supine    Mobility  Roll Left  Assistance Level: Minimal assistance  Skilled Clinical Factors: good use of bed rails  Roll Right  Assistance Level: Minimal assistance  Skilled Clinical Factors: good use of bed rails    Transfers  Device:  (Maxi Move utilized for bed<>w/c transfers this date.)       OT Exercises  Exercise Treatment: PM: LUJAN facilitated pt in BUE exercises for increased strength and activity tolerance to support safety and participation with ADLs. Pt completes with 2# free weight X15-20 reps each. Requires RB between each exercise due to fatigue this afternoon. Assessment  Assessment  Activity Tolerance: Patient limited by fatigue;Patient limited by endurance; Other (comment) (assist  to maintain NWB R LE, and anxiety)  Discharge Recommendations: Continue to assess pending progress    Patient Education  Education  Education Given To: Patient  Education Provided: Role of Therapy;Plan of Care;ADL Function;Transfer Training  Education Provided Comments: Thorough education on OT POC and importance of daily participation in functional tasks. Also d/c planning and DME for toileting tasks at home. Education Method: Verbal  Barriers to Learning: None  Education Outcome: Verbalized understanding;Continued education needed    OT Equipment Recommendations  Other: Pt reports she has a walk in shower with a seat, grab bar in the shower and a raised toilet seat.     Goals     Short Term Goals  Time Frame for Short Term Goals: By one week  Short Term Goal 1: Pt will perform UB ADLs including grooming/oral care with SBA and good safety  Short Term Goal 2: Pt will perform LB ADLs including toileting/toilet transfers with Min A, good safety, and use of AE/DME/Modified techniques as needed  Short Term Goal 3: Pt will be educated on NWB status to RLE with good carryover during functional transfers/tasks  Short Term Goal 4: Pt will tolerate standing for 5+ minutes, Min A, with 1-2 UE support and no LOB during functional task while maintaining NWB RLE  Short Term Goal 5: Pt will be educated on and explore use of AE/DME/Modified techniques to improve safety and independence with ADL tasks  Short Term Goal 6: Pt will actively participate in 30+ minutes of therapeutic exercise/functional activity to promote safety and independence with self-care and mobility  Short Term Goal 7: Pt will perform functional transfers/mobility with Min A, good safety, and use of least restrictive device while maintaining NWB RLE    Long Term Goals  Time Frame for Long Term Goals : By discharge  Long Term Goal 1: Pt will perform UB ADLs including grooming/oral care with Mod I and good safety  Long Term Goal 2: Pt will perform LB ADLs, including toileting/toilet transfers, with SBA, good safety, and use of AE/DME/Modified techniques as needed  Long Term Goal 3: Pt will tolerate standing 10+ minutes, SBA, with 1-2 UE and no LOB during self-care/functional activity while maintaining NWB RLE  Long Term Goal 4: Pt will verbalize/demonstrate good understanding of home safety/fall prevention/energy conservation strategies to improve safety and independence with self-care tasks  Long Term Goal 5: Pt will safely perform light housekeeping/meal preparation with supervision, good safety, and use of least restrictive device to improve independence with ADLs/IADLs  Long Term Goal 6: Pt will perform functional transfers/mobility with SBA, good safety, and use of least restrictive device while maintaining NWB RLE  Long Term Goal 7: Pt will demonstrate improved fine motor coordination during self-care tasks AEB 10 second improvement on 9 hole peg test    Plan  Occupational Therapy Plan  Times Per Week: 5-7  Times Per Day: Twice a day  Current Treatment Recommendations: Strengthening, ROM, Balance training, Functional mobility training, Endurance training, Pain management, Patient/Caregiver education & training, Positioning, Safety education & training, Equipment evaluation, education, & procurement, Self-Care / ADL, Home management training, Coordination training         12/14/22 1423 12/14/22 1515   OT Individual Minutes   Time In [id number]   [facility] [id number]   [id number]       Electronically signed by TRISHA Gutierrez on 12/14/22 at 3:18 PM EST

## 2022-12-15 LAB
ABSOLUTE EOS #: 0.28 K/UL (ref 0–0.4)
ABSOLUTE LYMPH #: 1.13 K/UL (ref 1–4.8)
ABSOLUTE MONO #: 1.27 K/UL (ref 0.1–1.3)
ANION GAP SERPL CALCULATED.3IONS-SCNC: 8 MMOL/L (ref 9–17)
BASOPHILS # BLD: 1 % (ref 0–2)
BASOPHILS ABSOLUTE: 0.14 K/UL (ref 0–0.2)
BETA-2 MICROGLOBULIN: 10.6 MG/L (ref 0.6–2.4)
BUN BLDV-MCNC: 76 MG/DL (ref 8–23)
CALCIUM SERPL-MCNC: 9.2 MG/DL (ref 8.6–10.4)
CHLORIDE BLD-SCNC: 98 MMOL/L (ref 98–107)
CO2: 26 MMOL/L (ref 20–31)
CREAT SERPL-MCNC: 4.56 MG/DL (ref 0.5–0.9)
DATE, STOOL #1: NORMAL
EOSINOPHILS RELATIVE PERCENT: 2 % (ref 0–4)
GFR SERPL CREATININE-BSD FRML MDRD: 9 ML/MIN/1.73M2
GLUCOSE BLD-MCNC: 135 MG/DL (ref 70–99)
HCT VFR BLD CALC: 21.2 % (ref 36–46)
HEMOCCULT SP1 STL QL: NEGATIVE
HEMOGLOBIN: 6.5 G/DL (ref 12–16)
IGA: 170 MG/DL (ref 70–400)
IGG: 416 MG/DL (ref 700–1600)
IGM: 41 MG/DL (ref 40–230)
LYMPHOCYTES # BLD: 8 % (ref 24–44)
MCH RBC QN AUTO: 28.8 PG (ref 26–34)
MCHC RBC AUTO-ENTMCNC: 30.9 G/DL (ref 31–37)
MCV RBC AUTO: 93 FL (ref 80–100)
MONOCYTES # BLD: 9 % (ref 1–7)
MORPHOLOGY: NORMAL
PDW BLD-RTO: 16.4 % (ref 11.5–14.9)
PLATELET # BLD: 281 K/UL (ref 150–450)
PMV BLD AUTO: 8 FL (ref 6–12)
POTASSIUM SERPL-SCNC: 4.8 MMOL/L (ref 3.7–5.3)
RBC # BLD: 2.27 M/UL (ref 4–5.2)
SEDIMENTATION RATE, ERYTHROCYTE: 25 MM/HR (ref 0–30)
SEG NEUTROPHILS: 80 % (ref 36–66)
SEGMENTED NEUTROPHILS ABSOLUTE COUNT: 11.28 K/UL (ref 1.3–9.1)
SODIUM BLD-SCNC: 132 MMOL/L (ref 135–144)
SPECIMEN DESCRIPTION: NORMAL
SURGICAL PATHOLOGY REPORT: NORMAL
TIME, STOOL #1: NORMAL
WBC # BLD: 14.1 K/UL (ref 3.5–11)

## 2022-12-15 PROCEDURE — 1180000000 HC REHAB R&B

## 2022-12-15 PROCEDURE — 2580000003 HC RX 258: Performed by: INTERNAL MEDICINE

## 2022-12-15 PROCEDURE — 99232 SBSQ HOSP IP/OBS MODERATE 35: CPT | Performed by: INTERNAL MEDICINE

## 2022-12-15 PROCEDURE — 99232 SBSQ HOSP IP/OBS MODERATE 35: CPT | Performed by: PHYSICAL MEDICINE & REHABILITATION

## 2022-12-15 PROCEDURE — 84166 PROTEIN E-PHORESIS/URINE/CSF: CPT

## 2022-12-15 PROCEDURE — 97530 THERAPEUTIC ACTIVITIES: CPT

## 2022-12-15 PROCEDURE — 82232 ASSAY OF BETA-2 PROTEIN: CPT

## 2022-12-15 PROCEDURE — 86335 IMMUNFIX E-PHORSIS/URINE/CSF: CPT

## 2022-12-15 PROCEDURE — 82272 OCCULT BLD FECES 1-3 TESTS: CPT

## 2022-12-15 PROCEDURE — 82784 ASSAY IGA/IGD/IGG/IGM EACH: CPT

## 2022-12-15 PROCEDURE — 80048 BASIC METABOLIC PNL TOTAL CA: CPT

## 2022-12-15 PROCEDURE — 85025 COMPLETE CBC W/AUTO DIFF WBC: CPT

## 2022-12-15 PROCEDURE — 97110 THERAPEUTIC EXERCISES: CPT

## 2022-12-15 PROCEDURE — 6370000000 HC RX 637 (ALT 250 FOR IP): Performed by: FAMILY MEDICINE

## 2022-12-15 PROCEDURE — 97535 SELF CARE MNGMENT TRAINING: CPT

## 2022-12-15 PROCEDURE — 6370000000 HC RX 637 (ALT 250 FOR IP): Performed by: PHYSICAL MEDICINE & REHABILITATION

## 2022-12-15 PROCEDURE — 6360000002 HC RX W HCPCS: Performed by: INTERNAL MEDICINE

## 2022-12-15 PROCEDURE — 85652 RBC SED RATE AUTOMATED: CPT

## 2022-12-15 PROCEDURE — 36415 COLL VENOUS BLD VENIPUNCTURE: CPT

## 2022-12-15 PROCEDURE — 84156 ASSAY OF PROTEIN URINE: CPT

## 2022-12-15 RX ADMIN — IRON SUCROSE 100 MG: 20 INJECTION, SOLUTION INTRAVENOUS at 15:44

## 2022-12-15 RX ADMIN — Medication 2000 UNITS: at 08:56

## 2022-12-15 RX ADMIN — METHOCARBAMOL 750 MG: 750 TABLET ORAL at 20:19

## 2022-12-15 RX ADMIN — APIXABAN 5 MG: 5 TABLET, FILM COATED ORAL at 20:16

## 2022-12-15 RX ADMIN — METHOCARBAMOL 750 MG: 750 TABLET ORAL at 05:25

## 2022-12-15 RX ADMIN — APIXABAN 5 MG: 5 TABLET, FILM COATED ORAL at 08:56

## 2022-12-15 RX ADMIN — CALCITRIOL 0.25 MCG: 0.25 CAPSULE ORAL at 09:01

## 2022-12-15 RX ADMIN — HYDROCODONE BITARTRATE AND ACETAMINOPHEN 1 TABLET: 5; 325 TABLET ORAL at 13:37

## 2022-12-15 RX ADMIN — METOPROLOL TARTRATE 25 MG: 25 TABLET, FILM COATED ORAL at 08:56

## 2022-12-15 RX ADMIN — ZOLPIDEM TARTRATE 10 MG: 5 TABLET ORAL at 20:06

## 2022-12-15 RX ADMIN — METHOCARBAMOL 750 MG: 750 TABLET ORAL at 13:38

## 2022-12-15 RX ADMIN — GABAPENTIN 600 MG: 600 TABLET, FILM COATED ORAL at 08:56

## 2022-12-15 RX ADMIN — METOPROLOL TARTRATE 25 MG: 25 TABLET, FILM COATED ORAL at 20:06

## 2022-12-15 RX ADMIN — HYDROCODONE BITARTRATE AND ACETAMINOPHEN 1 TABLET: 5; 325 TABLET ORAL at 20:05

## 2022-12-15 RX ADMIN — HYDROCODONE BITARTRATE AND ACETAMINOPHEN 1 TABLET: 5; 325 TABLET ORAL at 09:05

## 2022-12-15 RX ADMIN — HYDRALAZINE HYDROCHLORIDE 100 MG: 50 TABLET, FILM COATED ORAL at 20:06

## 2022-12-15 RX ADMIN — SODIUM CHLORIDE: 9 INJECTION, SOLUTION INTRAVENOUS at 23:00

## 2022-12-15 RX ADMIN — MAGNESIUM OXIDE TAB 400 MG (241.3 MG ELEMENTAL MG) 400 MG: 400 (241.3 MG) TAB at 08:56

## 2022-12-15 RX ADMIN — POLYETHYLENE GLYCOL 3350 17 G: 17 POWDER, FOR SOLUTION ORAL at 08:58

## 2022-12-15 RX ADMIN — ROPINIROLE HYDROCHLORIDE 0.25 MG: 0.25 TABLET, FILM COATED ORAL at 20:16

## 2022-12-15 RX ADMIN — AMIODARONE HYDROCHLORIDE 200 MG: 200 TABLET ORAL at 08:56

## 2022-12-15 RX ADMIN — HYDRALAZINE HYDROCHLORIDE 100 MG: 50 TABLET, FILM COATED ORAL at 08:56

## 2022-12-15 RX ADMIN — HYDRALAZINE HYDROCHLORIDE 100 MG: 50 TABLET, FILM COATED ORAL at 13:37

## 2022-12-15 RX ADMIN — ATORVASTATIN CALCIUM 40 MG: 40 TABLET, FILM COATED ORAL at 20:06

## 2022-12-15 RX ADMIN — MAGNESIUM OXIDE TAB 400 MG (241.3 MG ELEMENTAL MG) 400 MG: 400 (241.3 MG) TAB at 20:06

## 2022-12-15 RX ADMIN — PANTOPRAZOLE SODIUM 40 MG: 40 TABLET, DELAYED RELEASE ORAL at 05:25

## 2022-12-15 RX ADMIN — ALLOPURINOL 100 MG: 100 TABLET ORAL at 08:56

## 2022-12-15 ASSESSMENT — PAIN SCALES - GENERAL
PAINLEVEL_OUTOF10: 4
PAINLEVEL_OUTOF10: 10
PAINLEVEL_OUTOF10: 3
PAINLEVEL_OUTOF10: 4
PAINLEVEL_OUTOF10: 10

## 2022-12-15 ASSESSMENT — PAIN DESCRIPTION - DESCRIPTORS
DESCRIPTORS: SPASM
DESCRIPTORS: SPASM
DESCRIPTORS: STABBING

## 2022-12-15 ASSESSMENT — PAIN DESCRIPTION - LOCATION
LOCATION: ANKLE
LOCATION: ANKLE;LEG
LOCATION: ANKLE

## 2022-12-15 ASSESSMENT — PAIN DESCRIPTION - ORIENTATION
ORIENTATION: RIGHT
ORIENTATION: RIGHT

## 2022-12-15 NOTE — PROGRESS NOTES
[address]   Acute Rehabilitation Occupational Therapy Daily Treatment Note    Date: 12/15/22  Patient Name: Gurjit Waggoner       Room: 0658/9723-53  MRN: [id number]  Account: [de-identified]   : 1945  (69 y.o.) Gender: female       Referring Practitioner: Vishal Strauss MD  Diagnosis: Open fracture of right tibia and fibula, sequela  Additional Pertinent Hx: Gurjit Waggoner is 69 y.o. female  Who was admitted to the hospital on 2022 for treatment of Open fracture of right tibia and fibula, sequela. Patient presented to the emergency room with right ankle injury and suffered a right ankle fracture after falling over a curb when she was trying to go to [employer] for [employer]. She has underlying history of A. fib on Eliquis, CHF, diabetes mellitus with CKD3, hypertension, osteoporosis, fibromyalgia, morbid obesity BMI 43, sleep apnea, depression. Patient underwent I&D and ORIF on 2022. Patient also developed acute kidney injury with hyperkalemia and metabolic acidosis. Nephrology was consulted and patient given [place] July. Patient was given 1 unit PRBC transfusion on 2022 for low hemoglobin of 6.4. Treatment Diagnosis: Impaired self-care status    Past Medical History:  has a past medical history of Abnormal stress test, Anemia, Arthritis, Depression, Diverticulosis, DM (diabetes mellitus) ([address] Erythropenia, Fibromyalgia, HTN (hypertension), Hyperlipidemia, Kidney stone, Morbid obesity due to excess calories ([address] DIONY on CPAP, Osteopenia, Seasonal allergies, and Sleep apnea. Past Surgical History:   has a past surgical history that includes Colonoscopy (2003); Colonoscopy (2007); Tubal ligation; Arm Surgery (2011); Total knee arthroplasty (Bilateral); Cardiac catheterization ([id number] dr Faisal Adames);  Cardiac catheterization (2016); ORIF ankle fracture (Right, 2022); and Ankle fracture surgery (Right, 12/6/2022). Restrictions  Restrictions/Precautions  Restrictions/Precautions: Weight Bearing  Required Braces or Orthoses?: No  Implants present? : Metal implants (IM Nailing)     Position Activity Restriction  Other position/activity restrictions: Per chart: Right open trimalleolar fracture ankle fracture dislocation s/p I&D and ORIF, DOS 12/6/2022  Lower Extremity Weight Bearing Restrictions  Right Lower Extremity Weight Bearing: Non Weight Bearing    Subjective  Subjective  Subjective: \"I'm doing okay\". Pt is pleasant and agreeable for therapy. Pain Assessment  Pain Assessment: 0-10  Pain Level: 10 (at end of session after bed mobility)  Pain Location: Ankle;Leg    Objective  Cognition  Overall Orientation Status: Within Functional Limits       Cognition  Overall Cognitive Status: WFL    Activities of Daily Living  Lower Extremity Dressing  Assistance Level: Moderate assistance  Skilled Clinical Factors: LUJAN provided ptin instruction and demo on use of reacher for threading BLEs while sitting EOB. Pt able to thread with min A. Attempts weight shifting on EOB to pull over hips, unsuccessfull due to increased pain. Next pt lays back to supine for rolling with pt able to slightly A with pants over hips while on side. Putting On/Taking Off Footwear  Assistance Level: Dependent  Skilled Clinical Factors: TA for donning L sock    Toileting  Assistance Level: Dependent  Skilled Clinical Factors: TA for brief change in bed    Mobility  Roll Left  Assistance Level:  Moderate assistance  Skilled Clinical Factors: good use of bed rails  Roll Right  Assistance Level: Minimal assistance  Skilled Clinical Factors: good use of bed rails  Sit to Supine  Assistance Level: Maximum assistance  Skilled Clinical Factors: A with BLEs  Supine to Sit  Assistance Level: Minimal assistance  Skilled Clinical Factors: VC for sequencing, increased time to complete, heavy use of bed rails  Scooting  Assistance Level: Maximum assistance  Skilled Clinical Factors: Max A to scoot up on bed while supine, CGA for scooting hips to EOB    Transfers  Device: Lift equipment Rajinder utilized for bed<>w/c transfers this date)      OT Exercises  Exercise Treatment: PM: LUJAN facilitated pt in BUE exercises for increased strength and activity tolerance to support safety and participation with ADLs. Pt completes with 2# free weight X15-20 reps each. Requires RB between each exercise due to fatigue this afternoon. Additional Activities  Additional Activities Comment: Per RN Micheal Singh wrapped Pt's L foot for decreased edema. Pt reports no c/o of pain or discomfort. Assessment  Assessment  Activity Tolerance: Patient limited by fatigue (fatigue in PM)  Discharge Recommendations: Continue to assess pending progress    Patient Education  Education  Education Given To: Patient  Education Provided: Role of Therapy;Plan of Care;ADL Function; Energy Conservation  Education Method: Verbal  Barriers to Learning: None  Education Outcome: Verbalized understanding;Continued education needed    Goals     Short Term Goals  Time Frame for Short Term Goals: By one week  Short Term Goal 1: Pt will perform UB ADLs including grooming/oral care with SBA and good safety  Short Term Goal 2: Pt will perform LB ADLs including toileting/toilet transfers with Min A, good safety, and use of AE/DME/Modified techniques as needed  Short Term Goal 3: Pt will be educated on NWB status to RLE with good carryover during functional transfers/tasks  Short Term Goal 4: Pt will tolerate standing for 5+ minutes, Min A, with 1-2 UE support and no LOB during functional task while maintaining NWB RLE  Short Term Goal 5: Pt will be educated on and explore use of AE/DME/Modified techniques to improve safety and independence with ADL tasks  Short Term Goal 6: Pt will actively participate in 30+ minutes of therapeutic exercise/functional activity to promote safety and independence with self-care and mobility  Short Term Goal 7: Pt will perform functional transfers/mobility with Min A, good safety, and use of least restrictive device while maintaining NWB RLE    Long Term Goals  Time Frame for Long Term Goals : By discharge  Long Term Goal 1: Pt will perform UB ADLs including grooming/oral care with Mod I and good safety  Long Term Goal 2: Pt will perform LB ADLs, including toileting/toilet transfers, with SBA, good safety, and use of AE/DME/Modified techniques as needed  Long Term Goal 3: Pt will tolerate standing 10+ minutes, SBA, with 1-2 UE and no LOB during self-care/functional activity while maintaining NWB RLE  Long Term Goal 4: Pt will verbalize/demonstrate good understanding of home safety/fall prevention/energy conservation strategies to improve safety and independence with self-care tasks  Long Term Goal 5: Pt will safely perform light housekeeping/meal preparation with supervision, good safety, and use of least restrictive device to improve independence with ADLs/IADLs  Long Term Goal 6: Pt will perform functional transfers/mobility with SBA, good safety, and use of least restrictive device while maintaining NWB RLE  Long Term Goal 7: Pt will demonstrate improved fine motor coordination during self-care tasks AEB 10 second improvement on 9 hole peg test    Plan  Occupational Therapy Plan  Times Per Week: 5-7  Times Per Day: Twice a day  Current Treatment Recommendations: Strengthening, ROM, Balance training, Functional mobility training, Endurance training, Pain management, Patient/Caregiver education & training, Positioning, Safety education & training, Equipment evaluation, education, & procurement, Self-Care / ADL, Home management training, Coordination training        Electronically signed by [employer], LUJAN/L on 12/15/22 at 3:38 PM EST

## 2022-12-15 NOTE — PLAN OF CARE
Problem: Discharge Planning  Goal: Discharge to home or other facility with appropriate resources  12/15/2022 1535 by Osman Reyes RN  Outcome: Progressing  12/15/2022 0501 by Ron Spencer RN  Outcome: Progressing

## 2022-12-15 NOTE — PROGRESS NOTES
Physical Therapy  Facility/Department: [facility] ACUTE REHAB  Rehabilitation Physical Therapy Daily Treatment Note    NAME: Erika Sims  : 1945 (89 y.o.)  MRN: [id number]  CODE STATUS: Full Code    Date of Service: 12/15/22          Chart Reviewed: Yes  Patient assessed for rehabilitation services?: Yes  Additional Pertinent Hx: Ms. Erika Sims is a 69 y.o. female who was admitted to [facility] on 2022 with Ankle Injury. 81-year-old female with history of A. fib on Eliquis, bilateral knee arthroplasty, CHF, CKD, type 2 diabetes, and hypertension as well as chronic numbness of her feet status post fall over a curb found to have displaced right trimalleolar ankle fracture with large posterior malleolus and comminuted lateral malleolus fracture. Pt S/P  status post I&D and ORIF on 22 by Dr Eduar Atkins, right talus open treatment-continue splint right lower extremity, nonweightbearing,  Family / Caregiver Present: No  Referring Practitioner: Dr Nayely Fregoso  Referral Date : 22  Diagnosis: Right open trimalleolar fracture ankle fracture dislocation s/p I&D and ORIF,  Right talus fracture. Restrictions:  Restrictions/Precautions: Weight Bearing  Lower Extremity Weight Bearing Restrictions  Right Lower Extremity Weight Bearing: Non Weight Bearing  Position Activity Restriction  Other position/activity restrictions: Per chart: Right open trimalleolar fracture ankle fracture dislocation s/p I&D and ORIF, DOS 2022     SUBJECTIVE  Subjective: Pt reports increase pain this date. Rating 8/10.   Pain: Pt reports pain 8/10 R ankle lateral        OBJECTIVE  Vision  Vision: Impaired  Vision Exceptions: Wears glasses at all times    Hearing  Hearing: Within functional limits    Cognition  Overall Cognitive Status: WFL       Sensation  Overall Sensation Status: Impaired (Decreased sensation B feet.)    Functional Mobility  Bed mobility  Rolling to Left: Minimal assistance  Rolling to Right: Minimal assistance  Scooting: Maximal assistance;2 Person assistance (scooting towards HOB. [place] position utilized.)  Transfers  Sit to Stand: Maximum Assistance;2 Person Assistance; Moderate Assistance (EOB elevated right knee extended to maintain and monitor NWB, right UE at RW left at mat , VCs sequencing , pt voices fear of falling)  Stand to Sit: Moderate Assistance;2 Person Assistance  Bed to Chair: Dependent/Total (maxi move)  Comment: Transfers completed with RW at [facility] at elevated level. Pt requires cues for hand placement and NWBing of RLE with poor carryover. Pt only tolerates ~15sec stands at this time. w/c<>Mat table completed with [place]. Balance  Posture: Good  Sitting - Static: Good  Sitting - Dynamic: Fair  Standing - Static: Poor  Standing - Dynamic: Poor;-  Comments: Assessed with rolling walker    Environmental Mobility  Ambulation  WB Status: NWB R LE    PT Exercises  A/AROM Exercises: seated bilateral LE hips/ knees x10 with 1.5# on LLE and OTB for light resistance. Supine BLE ex's AAROM with RLE and AROM on LLE. Rolling x2 to each side. Pt requires use of bed rails for UE support. Dynamic Sitting Balance Exercises: Sitting UE push ups with blocks at EOM. Pt unable to clear bed, however give good efforts. Static Standing Balance Exercises: stand at RW from elevated EOM max/Mod assist x 2, 10-15 second bouts x 4 (maximum VCs for hand placement at RW , avoid right elbow weight bearing , and increase trunk extension, fair follow thru. Mirror utilized for visual feedback.)    ASSESSMENT  Vitals  O2 Device: None (Room air)    Activity Tolerance  Activity Tolerance: Patient limited by fatigue;Patient limited by endurance; Other (comment) (assist  to maintain NWB R LE, and anxiety)    Assessment  Assessment: Pt dependent for transfers. Pt unable to maintain NWB R LEduring sit<>stand. Once pt in standing position, will briefly hold her R LE off the floor for NWB  precautions.  Pt presents with VERITO UE weakness as well as Left LE weakness and NWB R LE limiting her activity. Pt would benefit from continued PT following discharge to address functional deficits. Performance Deficits/Impairments: Decreased functional mobility ; Decreased ROM; Decreased strength;Decreased safe awareness;Decreased endurance;Decreased balance; Increased pain  Treatment Diagnosis: Impaired function. Therapy Prognosis: Good  Decision Making: Medium Complexity  Discharge Recommendations: Patient would benefit from continued therapy after discharge  PT Equipment Recommendations  Equipment Needed: No    CLINICAL IMPRESSION   Pt dependent for transfers. Pt unable to maintain NWB R LEduring sit<>stand. Once pt in standing position, will briefly hold her R LE off the floor for NWB  precautions. Pt presents with B UE weakness as well as Left LE weakness and NWB R LE limiting her activity. Pt would benefit from continued PT following discharge to address functional deficits. GOALS  Patient Goals   Patient Goals : Get stronger  Short Term Goals  Time Frame for Short Term Goals: 1 week  Short Term Goal 1: Bed mobility min A without use of bed rail  Short Term Goal 2: Sit<> // bars or rolling walker at mod A, maintaining NWB R LE  Short Term Goal 3: Improve staning tolerance to 1 minutes, NWB R LE with B UE support. Short Term Goal 4: Lateral scoot transfers, mod A X 1, maintaining NWB R LE. Short Term Goal 5: W/C mobility distance fo 50 ft , SBA/CGA level surface. Long Term Goals  Time Frame for Long Term Goals : By DC  Long Term Goal 1: Mod-I bed mobility  Long Term Goal 2: Stand pivot Transfers at min/mod A maintain NWB R LE  Long Term Goal 3: W/C mobility distance fo 50 to 150 ft SBA level surface  Long Term Goal 4: Pt able to take 3 to 5 steps in // bars NWB R LE, min A x 2  Long Term Goal 5: Family training for safe transfers from varied surfaces.     PLAN OF CARE  Physcial Therapy Plan  General Plan:  minutes of therapy at least 5 out of 7 days a week (5-7 daysx wk)  Specific Instructions for Next Treatment: Continue maxi move for transfers, progress with sit<.stnad in // bars or rolling walker in therapy. Current Treatment Recommendations: Strengthening; Functional mobility training;Transfer training; Endurance training;Stair training;Gait training; Safety education & training;Balance training;ROM;Wheelchair mobility training;Patient/Caregiver education & training;Home exercise program;Equipment evaluation, education, & procurement; Therapeutic activities  Additional Comments: Discussed with pt, need of ramp at entrance at this time. Safety Devices  Type of Devices: Gait belt;Call light within reach; Left in bed;Bed alarm in place (rails x 3)  Restraints  Restraints Initially in Place: No    EDUCATION  Education  Education Given To: Patient  Education Provided: Transfer Training  Education Provided Comments: NWVERITO RLE for functional mobility, recommendation of ramp at entrance of home.   Education Method: Demonstration;Verbal  Education Outcome: Continued education needed    Therapy Time     12/15/22 0802 12/15/22 1305   PT Individual Minutes   Time In [id number]   Time Out [id number]   Minutes [address] [place], [place], 12/15/22 at 4:00 PM

## 2022-12-15 NOTE — CARE COORDINATION
ONGOING ARU DISCHARGE PLAN:    Patient is alert and oriented x4. Spoke with patient regarding discharge plan and patient confirms plan is to go home with spouse. Noted low hgb today of 6.5. ( patient on Eliquis) Patient nurse has contacted Dr Santiago/ will update Dr. Kelly Fernández    DME: No DME ordered today    Will continue to follow for additional discharge needs.     Electronically signed by Arlin Burgess RN on 12/15/2022 at 11:47 AM

## 2022-12-15 NOTE — PROGRESS NOTES
Physical Medicine & Rehabilitation  Progress Note      Subjective:      69year-old female with R ankle fracture/dislocation after fall. Patient is noting no symptoms from her anemia. No signs of active bleeding. R ankle pain is controlled with medications. Sleep controlled on home dose of Ambien. No new issues with appetite, bowel, or bladder. ROS:  Denies fevers, chills, sweats. No chest pain, palpitations, lightheadedness. Denies coughing, wheezing or shortness of breath. Denies abdominal pain, nausea, diarrhea or constipation. No new areas of joint pain. Denies new areas of numbness or weakness. Stable unchanged paresthesia R toes. Denies new anxiety or depression issues. No new skin problems. Rehabilitation:   Progressing in therapies. PT:    Bed mobility  Supine to Sit: Moderate assistance (pt assist right and left LE with UEs)  Sit to Supine: 2 Person assistance, Moderate assistance  Scooting: Minimal assistance (scooting at EOB forward)  Bed Mobility Comments:  (head of bed elevated left handrail)         Transfers  Sit to Stand: Maximum Assistance, 2 Person Assistance (EOB elevated right knee extended to maintain and monitor NWB, right UE at RW left at bed , VCs sequencing , pt voices fear of falling)  Stand to Sit: Moderate Assistance, 2 Person Assistance  Bed to Chair: Dependent/Total (maxi move)  Comment: reassurance pre and during sit <> stand EOB to RW         Ambulation  WB Status: NWB R LE  Ambulation  Surface: Level tile  Device: Rolling Walker  Assistance: Moderate assistance, 2 Person assistance  Distance: sit to stand with mod assist x 2       OT:  Grooming/Oral Hygiene  Assistance Level: Set-up  Skilled Clinical Factors: Pt completes oral care, washing face and brushing hair while sitting in w/c at sink. Requests to complete bathing and dressing tasks every other day.   Lower Extremity Dressing  Assistance Level: Dependent  Skilled Clinical Factors: TA for donning pants while supine          SPEECH:      Objective:  BP (!) 140/60   Pulse 63   Temp 98.6 °F (37 °C)   Resp 16   Ht 5' 5\" (1.651 m)   LMP  (LMP Unknown)   SpO2 95%   BMI 43.60 kg/m²       GEN: Well developed, well nourished, in NAD  HEENT:  NCAT. PERRL. EOMI. Mucous membranes pink and moist.   PULM:  Clear to ausculation. No rales or rhonchi. Respirations WNL and unlabored. CV:  bradycardic rate regular rhythm. No murmurs or gallops. GI:  Abdomen soft. Nontender. Non-distended. BS + and equal.    NEUROLOGICAL: A&O x3. Sensation intact to light touch. .   MSK:  Functional ROM BUEs, and LLE. Impaired AROM RLE. . Motor testing 4+/5 key muscles BUEs, and LLE. 2/5 R hip flexion and knee extension. R ankle not tested. Celestia Kurt SKIN: Warm dry and intact. Good turgor. R ankle in posterior splint with ACE wrap  EXTREMITIES:  No calf tenderness to palpation. Post op edema distal RLE. PSYCH: Mood WNL. Appropriately interactive. Affect WNL. Diagnostics:     CBC:   Recent Labs     12/13/22  0647 12/14/22  1504 12/15/22  0649   WBC 11.0 16.2* 14.1*   RBC 2.53* 2.59* 2.27*   HGB 7.6* 7.7* 6.5*   HCT 23.1* 26.1* 21.2*   MCV 91.3 100.8 93.0   RDW 16.5* 15.9* 16.4*    355 281     BMP:   Recent Labs     12/13/22  0647 12/14/22  0615 12/15/22  0649    135 132*   K 4.5 4.9 4.8   CL 97* 97* 98   CO2 28 29 26   BUN 77* 77* 76*   CREATININE 4.29* 4.50* 4.56*   GLUCOSE 149* 119* 135*     BNP: No results for input(s): BNP in the last 72 hours. PT/INR: No results for input(s): PROTIME, INR in the last 72 hours. APTT: No results for input(s): APTT in the last 72 hours. CARDIAC ENZYMES: No results for input(s): CKMB, CKMBINDEX, TROPONINT in the last 72 hours.     Invalid input(s): CKTOTAL;3 troponins   FASTING LIPID PANEL:  Lab Results   Component Value Date    CHOL 178 01/11/2021    HDL 37 (L) 01/11/2021    TRIG 281 (H) 01/11/2021     LIVER PROFILE:   Recent Labs     12/13/22  0647   AST 10   ALT <5*   BILIDIR 0.1   BILITOT 0.3 ALKPHOS 49        Current Medications:   Current Facility-Administered Medications: iron sucrose (VENOFER) 100 mg in sodium chloride 0.9 % 100 mL IVPB, 100 mg, IntraVENous, Q24H  Benzocaine-Menthol (CEPACOL) 1 lozenge, 1 lozenge, Oral, Q2H PRN  atorvastatin (LIPITOR) tablet 40 mg, 40 mg, Oral, Nightly  methocarbamol (ROBAXIN) tablet 750 mg, 750 mg, Oral, 3 times per day  acetaminophen (TYLENOL) tablet 650 mg, 650 mg, Oral, Q6H PRN  zolpidem (AMBIEN) tablet 10 mg, 10 mg, Oral, Nightly  0.9 % sodium chloride infusion, , IntraVENous, Continuous  allopurinol (ZYLOPRIM) tablet 100 mg, 100 mg, Oral, Daily  amiodarone (CORDARONE) tablet 200 mg, 200 mg, Oral, Daily  apixaban (ELIQUIS) tablet 5 mg, 5 mg, Oral, BID  calcitRIOL (ROCALTROL) capsule 0.25 mcg, 0.25 mcg, Oral, Daily  gabapentin (NEURONTIN) tablet 600 mg, 600 mg, Oral, Daily  hydrALAZINE (APRESOLINE) tablet 100 mg, 100 mg, Oral, TID  HYDROcodone-acetaminophen (NORCO) 5-325 MG per tablet 1 tablet, 1 tablet, Oral, Q4H PRN  magnesium oxide (MAG-OX) tablet 400 mg, 400 mg, Oral, BID  metoprolol tartrate (LOPRESSOR) tablet 25 mg, 25 mg, Oral, BID  pantoprazole (PROTONIX) tablet 40 mg, 40 mg, Oral, QAM AC  rOPINIRole (REQUIP) tablet 0.25 mg, 0.25 mg, Oral, Nightly  Vitamin D (CHOLECALCIFEROL) tablet 2,000 Units, 2,000 Units, Oral, Daily  bisacodyl (DULCOLAX) suppository 10 mg, 10 mg, Rectal, Daily PRN  polyethylene glycol (GLYCOLAX) packet 17 g, 17 g, Oral, Daily      Impression/Plan:   Impaired ADLs, gait, and mobility due to:    R ankle trimalleolar fracture dislocation: s/p ORIF 12/6 by Dr. Yeni Neves. NWB RLE. PT/OT for gait, mobility, strengthening, endurance, ADLs, and self care. Has Norco prn, on Robaxin TID. Has Tylenol prn and vitamin D. 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 No indication for dialysis currently but she is at risk for needing HD. Nephrology following. On calcitriol. DM II with neuropathy: carb controlled diet. On gabapentin for neuropathy. HTN/HLD: on atorvastatin, hydralazine  Anemia of chronic disease: Hb low but stable. Nephrology following and treating with IV iron x10 days. She reports 6-7 is baseline Hb for her and that she receives Retacrit at [facility] twice monthly - on hold for now per nephrology. Hematology following. Elevated kappa light chain immunoglobulin: Hematology following. Concern for bone marrow disease vs renal failure - workup in progress. Sana Muñoz Hyponatremia: Improved. Will monitor. Fibromyalgia  Obesity: BMI 43.6. Dietitian consulted  DIONY:  Depression: on Trazodone nightly  Moderately severe protein calorie malnutrition: Albumin 2.9. Nephrology added Nepro supplement daily. Dietitian following  Insomnia: has Ambien nightly, reportedly takes 10 mg at home. Also on Requip at hs. Increased to her home dose of Ambien. Gout: on allopurinol. Colchicine discontinued - no acute gout symptoms  Bowel Management: Miralax daily, senokot prn, dulcolax prn. DVT Prophylaxis:  SCD's while in bed, RAVINDRA's during the day, and Eliquis  Internal medicine for medical management      Electronically signed by Jose Valdes MD on 12/15/2022 at 9:45 AM      This note is created with the assistance of a speech recognition program.  While intending to generate a document that actually reflects the content of the visit, the document can still have some errors including those of syntax and sound a like substitutions which may escape proof reading. In such instances, actual meaning can be extrapolated by contextual diversion.

## 2022-12-15 NOTE — PROGRESS NOTES
Writer visit with patient while taking a rest break in [place]T. Patient shared with writer what happen and became very tearful. Patient realized it could have been worse and very thankful that she did not hit her head hard. Patient has good support from her family. Spiritual care team will remain available as needed. 12/15/22 1015   Encounter Summary   Encounter Overview/Reason  Spiritual/Emotional Needs   Service Provided For: Patient   Referral/Consult From: [facility]   Support System Spouse; Children   Last Encounter  12/15/22   Complexity of Encounter Low   Begin Time 1015   End Time  1030   Total Time Calculated 15 min   Spiritual/Emotional needs   Type Spiritual Support   Assessment/Intervention/Outcome   Assessment Coping; Hopeful;Tearful   Intervention Active listening;Discussed illness injury and its impact; Explored/Affirmed feelings, thoughts, concerns;Nurtured Hope;Prayer (assurance of)/[place];Read/Provided Scripture;Sustaining Presence/Ministry of presence   Outcome Comfort;Engaged in conversation;Expressed feelings, needs, and concerns;Expressed Gratitude;Receptive

## 2022-12-15 NOTE — PROGRESS NOTES
12/15/22 1514   Encounter Summary   Encounter Overview/Reason  Volunteer Encounter   Service Provided For: Patient   Referral/Consult From: Rounding   Last Encounter  12/15/22  (V)   Complexity of Encounter Low   Spiritual/Emotional needs   Type Spiritual Support   Rituals, Rites and [address]

## 2022-12-15 NOTE — PROGRESS NOTES
mellitus) ([facility] [address]     Erythropenia     Fibromyalgia     HTN (hypertension)     Hyperlipidemia     Kidney stone     Morbid obesity due to excess calories ([facility] [address]     DIONY on CPAP     Osteopenia 03/03/14    Seasonal allergies     Sleep apnea     uses bipap prn        Past Surgical History:     Past Surgical History:   Procedure Laterality Date    ANKLE FRACTURE SURGERY Right 12/6/2022    RIGHT ANKLE OPEN REDUCTION INTERNAL FIXATION performed by Elsi Gray DO at [facility]  01/01/2011    right arm broken    CARDIAC CATHETERIZATION  [id number] dr Rachelle Quintana  05/16/2016    COLONOSCOPY  01/01/2003    COLONOSCOPY  01/01/2007    ORIF ANKLE FRACTURE Right 12/06/2022    RIGHT ANKLE OPEN REDUCTION INTERNAL FIXATION    TOTAL KNEE ARTHROPLASTY Bilateral     left may 2013 and right july 2013    TUBAL LIGATION          Medications Prior to Admission:     Prior to Admission medications    Medication Sig Start Date End Date Taking? Authorizing Provider   HYDROcodone-acetaminophen (NORCO) 5-325 MG per tablet Take 1 tablet by mouth every 6 hours as needed for Pain for up to 3 days. 12/12/22 12/15/22  Johnnie Claude, MD   zolpidem (AMBIEN) 5 MG tablet Take 1 tablet by mouth nightly as needed for Sleep for up to 5 days. 12/12/22 12/17/22  Johnnie Claude, MD   methocarbamol (ROBAXIN) 750 MG tablet Take 1 tablet by mouth in the morning and 1 tablet at noon and 1 tablet in the evening. Do all this for 10 days. 12/12/22 12/22/22  Johnnie Claude, MD   metoprolol tartrate (LOPRESSOR) 25 MG tablet Take 1 tablet by mouth 2 times daily 12/12/22   Johnnie Claude, MD   senna (SENOKOT) 8.6 MG tablet Take 1 tablet by mouth daily as needed (Constipation) 12/12/22 1/11/23  Johnnie Claude, MD   rOPINIRole (REQUIP) 0.25 MG tablet Take 1 tablet by mouth nightly 11/14/22   Eliza Bey MD   gabapentin (NEURONTIN) 600 MG tablet Take 1 tablet by mouth daily for 90 days.  11/14/22 2/12/23  Lokesh Pizano MD traZODone (DESYREL) 50 MG tablet  10/20/22   Griselda Diaz MD   colchicine (COLCRYS) 0.6 MG tablet  9/27/22   Vickie Lara DPM   Cyanocobalamin (B-12) 1000 MCG LOZG DISSOLVE ONE tablet UNDER TONGUE ONCE DAILY 9/6/22   Abbie Dupree MD   blood glucose monitor kit and supplies use check blood sugar as directed 11/9/22   Eliza Kenney MD   blood glucose monitor strips Check blood sugars daily as directed. 11/9/22   Eliza Kenney MD   Lancets MISC 1 each by Does not apply route daily 11/9/22   Eliza Kenney MD   Alcohol Swabs 70 % PADS 1 each by Does not apply route daily 11/9/22   Eliza Kenney MD   atorvastatin (LIPITOR) 40 MG tablet Take 1 tablet by mouth daily 11/7/22   Eliza Kenney MD   pantoprazole (PROTONIX) 40 MG tablet TAKE 1 TABLET DAILY 10/19/22   Eliza Kenney MD   ELIQUIS 5 MG TABS tablet TAKE 1 TABLET TWICE A DAY 10/11/22   Eliza Kenney MD   cyclobenzaprine (FLEXERIL) 5 MG tablet TAKE 1 TABLET BY MOUTH NIGHTLY AS NEEDED FOR MUSCLE SPASMS 10/3/22   Eliza Kenney MD   allopurinol (ZYLOPRIM) 100 MG tablet TAKE 1 TABLET DAILY 9/21/22   BARBIE Souza CNP   zolpidem (AMBIEN) 5 MG tablet Take 5 mg by mouth nightly as needed for Sleep.     Historical Provider, MD   hydrALAZINE (APRESOLINE) 25 MG tablet Take 4 tablets by mouth 3 times daily 7/12/22   Wilian Shabazz MD   amiodarone (CORDARONE) 200 MG tablet Take 1 tablet by mouth daily 7/6/22   KaciBARBIE Jackson NP   calcitRIOL (ROCALTROL) 0.5 MCG capsule TAKE 1 CAPSULE BY MOUTH DAILY 6/14/22   Wilian Shabazz MD   ferrous sulfate (FE TABS 325) 325 (65 Fe) MG EC tablet Take 1 tablet by mouth daily (with breakfast) 5/10/22   Eliza Kenney MD   azelastine (ASTELIN) 0.1 % nasal spray 1 spray by Nasal route 2 times daily Use in each nostril as directed 10/13/21   Lauro Neves,    Cholecalciferol (VITAMIN D3) 25 MCG (1000 UT) TABS Take 2 tablets by mouth daily 1/9/20   BARBIE Souza to auscultation bilaterally . No wheezes,                                          No rales or rhonchi . No abnormality on percussion                                                        Cardiovascular:            Normal rate, regular rhythm,                                          No murmur or  Gallop . Abdomen:                       Soft, non-tender                                           Normal bowels sounds,                                             Extremities:                    No  Edema .                                            Musculo-skeletal / Neurological ;;                  Neurological ;                 No focal motor deficit ,                 No focal sensory deficit ,    Musculo-skeletal ;                  No  gait abnormality                  No significant joint abnormality,                                                         Psych:                     See psych notes                                                                 Investigations:      URINE ANALYSIS: No results found for: LABURIN     CBC:  Lab Results   Component Value Date/Time    WBC 14.1 12/15/2022 06:49 AM    HGB 6.5 12/15/2022 06:49 AM     12/15/2022 06:49 AM     03/02/2012 11:15 AM             BMP:    Lab Results   Component Value Date/Time     12/15/2022 06:49 AM    K 4.8 12/15/2022 06:49 AM    CL 98 12/15/2022 06:49 AM    CO2 26 12/15/2022 06:49 AM    BUN 76 12/15/2022 06:49 AM    CREATININE 4.56 12/15/2022 06:49 AM    GLUCOSE 135 12/15/2022 06:49 AM    GLUCOSE 113 03/02/2012 11:15 AM      LIVER PROFILE:  Lab Results   Component Value Date/Time    ALT <5 12/13/2022 06:47 AM    AST 10 12/13/2022 06:47 AM    PROT 5.6 12/13/2022 06:47 AM    BILITOT 0.3 12/13/2022 06:47 AM    BILIDIR 0.1 12/13/2022 06:47 AM    LABALBU 2.9 12/13/2022 06:47 AM    LABALBU 4.2 03/02/2012 11:15 AM @BRIEFLABT(TSH)@      Laboratory Testing:  Recent Results (from the past 24 hour(s))   Lactate Dehydrogenase    Collection Time: 12/14/22  3:04 PM   Result Value Ref Range     (H) 135 - 214 U/L   CBC with Auto Differential    Collection Time: 12/14/22  3:04 PM   Result Value Ref Range    WBC 16.2 (H) 3.5 - 11.3 k/uL    RBC 2.59 (L) 3.95 - 5.11 m/uL    Hemoglobin 7.7 (L) 11.9 - 15.1 g/dL    Hematocrit 26.1 (L) 36.3 - 47.1 %    .8 82.6 - 102.9 fL    MCH 29.7 25.2 - 33.5 pg    MCHC 29.5 28.4 - 34.8 g/dL    RDW 15.9 (H) 11.8 - 14.4 %    Platelets 204 121 - 224 k/uL    MPV 10.7 8.1 - 13.5 fL    NRBC Automated 0.0 0.0 per 100 WBC    RBC Morphology ANISOCYTOSIS PRESENT     Seg Neutrophils 82 (H) 36 - 65 %    Lymphocytes 8 (L) 24 - 43 %    Monocytes 7 3 - 12 %    Eosinophils % 2 1 - 4 %    Basophils 0 0 - 2 %    Immature Granulocytes 1 (H) 0 %    Segs Absolute 13.25 (H) 1.50 - 8.10 k/uL    Absolute Lymph # 1.26 1.10 - 3.70 k/uL    Absolute Mono # 1.16 0.10 - 1.20 k/uL    Absolute Eos # 0.29 0.00 - 0.44 k/uL    Basophils Absolute 0.05 0.00 - 0.20 k/uL    Absolute Immature Granulocyte 0.20 0.00 - 0.30 k/uL   Reticulocytes    Collection Time: 12/14/22  3:04 PM   Result Value Ref Range    Retic % 2.3 (H) 0.5 - 1.9 %    Absolute Retic # 0.060 0.030 - 0.080 M/uL    Immature Retic Fract 13.900 2.7 - 18.3 %    Retic Hemoglobin 33.6 28.2 - 35.7 pg   BLOOD OCCULT STOOL #1    Collection Time: 12/15/22 12:55 AM   Result Value Ref Range    Specimen Description . FECES     Occult Blood, Stool #1 NEGATIVE     Date, Stool #1 [phone]     Time, Stool #1 Unknown    Basic Metabolic Panel w/ Reflex to MG    Collection Time: 12/15/22  6:49 AM   Result Value Ref Range    Glucose 135 (H) 70 - 99 mg/dL    BUN 76 (H) 8 - 23 mg/dL    Creatinine 4.56 (H) 0.50 - 0.90 mg/dL    Est, Glom Filt Rate 9 (L) >60 mL/min/1.73m2    Calcium 9.2 8.6 - 10.4 mg/dL    Sodium 132 (L) 135 - 144 mmol/L    Potassium 4.8 3.7 - 5.3 mmol/L    Chloride 98 98 - 107 mmol/L    CO2 26 20 - 31 mmol/L    Anion Gap 8 (L) 9 - 17 mmol/L   CBC auto differential    Collection Time: 12/15/22  6:49 AM   Result Value Ref Range    WBC 14.1 (H) 3.5 - 11.0 k/uL    RBC 2.27 (L) 4.0 - 5.2 m/uL    Hemoglobin 6.5 (LL) 12.0 - 16.0 g/dL    Hematocrit 21.2 (L) 36 - 46 %    MCV 93.0 80 - 100 fL    MCH 28.8 26 - 34 pg    MCHC 30.9 (L) 31 - 37 g/dL    RDW 16.4 (H) 11.5 - 14.9 %    Platelets 446 703 - 064 k/uL    MPV 8.0 6.0 - 12.0 fL    Seg Neutrophils PENDING %    Lymphocytes PENDING %    Monocytes PENDING %    Eosinophils % PENDING %    Basophils PENDING %    Segs Absolute PENDING k/uL    Absolute Lymph # PENDING k/uL    Absolute Mono # PENDING k/uL    Absolute Eos # PENDING k/uL    Basophils Absolute PENDING 0.0 - 0.2 k/uL   Sedimentation Rate    Collection Time: 12/15/22  6:49 AM   Result Value Ref Range    Sed Rate 25 0 - 30 mm/Hr       Imaging/Diagnostics:  XR ANKLE RIGHT (MIN 3 VIEWS)    Result Date: 12/6/2022  Anatomic alignment of comminuted ankle fracture status post ORIF. Assessment :      Hospital Problems             Last Modified POA    * (Principal) Open fracture of right tibia and fibula, sequela 12/12/2022 Yes    Paraproteinemia 12/14/2022 Yes    Stage 5 chronic kidney disease not on chronic dialysis ([facility] [address] 12/14/2022 Yes    Anemia of chronic renal failure, stage 5 ([facility] [address] 12/14/2022 Yes       Plan:       Medications: Allergies:     Allergies   Allergen Reactions    Adhesive Tape     Cephalexin Other (See Comments)     Tongue cracks and peels    Erythromycin Other (See Comments)     Epigastric pain and bloating    Ketorolac Tromethamine Other (See Comments)     Severe stomach cramps    Pcn [Penicillins]      Unknown reaction    Percocet [Oxycodone-Acetaminophen] Itching and Other (See Comments)     Esophagus hurt    Sulfa Antibiotics     Ultracet [Tramadol-Acetaminophen] Itching       Current Meds:   Scheduled Meds:    iron sucrose  100 mg IntraVENous Q24H atorvastatin  40 mg Oral Nightly    methocarbamol  750 mg Oral 3 times per day    zolpidem  10 mg Oral Nightly    allopurinol  100 mg Oral Daily    amiodarone  200 mg Oral Daily    apixaban  5 mg Oral BID    calcitRIOL  0.25 mcg Oral Daily    gabapentin  600 mg Oral Daily    hydrALAZINE  100 mg Oral TID    magnesium oxide  400 mg Oral BID    metoprolol tartrate  25 mg Oral BID    pantoprazole  40 mg Oral QAM AC    rOPINIRole  0.25 mg Oral Nightly    Vitamin D  2,000 Units Oral Daily    polyethylene glycol  17 g Oral Daily     Continuous Infusions:    sodium chloride 50 mL/hr at 12/14/22 1736     PRN Meds: Benzocaine-Menthol, acetaminophen, HYDROcodone-acetaminophen, bisacodyl       Patient admitted [place] Problems             Last Modified POA    * (Principal) Open fracture of right tibia and fibula, sequela 12/12/2022 Yes    Paraproteinemia 12/14/2022 Yes    Stage 5 chronic kidney disease not on chronic dialysis ([facility] [address] 12/14/2022 Yes    Anemia of chronic renal failure, stage 5 ([facility] [address] 12/14/2022 Yes                      12/13/22    Open trimalleolar fracture, s/p ORIF on 12/6  JOSE ARMANDO on CKD, Cr 4.29 today, nephro following, patient high risk for needing HD  Anemia  Hx of A Fib on Eliquis and amio, DM2, HTN,HLD, DIONY, Fibromyalgia   Nephrology following for any urgent HD needs, appreciate recommendations, renal diet, continue to monitor Cr  A Fib.  Continue Amio and Eliquis  Continue lipitor, hydralazine, and lopressor  Continue iron supplementation, Hgb stable at 7.6           12/14/22    Nephrology following for CKD IIIb, Cr increased to 4.5, producing urine  Vitally stable, no complaints, eating and drinking well   IV NS at 50 mL/hr, BMP daily, iron studies, avoid nephrotoxic agents, strict I/Os appreciate nephrology recommendations, renal diet  Continue Amio and Eliquis  Continue Lipitor, Hydralazine and lopressor            12/15/22    Hem/Onc consulted for elevated kappa light chain immunoglobins, will rule out myeloma  Anemia of chronic renal failure, Hgb 6.5 this morning, Receives Retacrit at [facility] twice monthly on hold per Nephrology recommendation, Hem/Onc notified of Hemoglobin, receiving IV iron 100 mg daily for 10 doses  JOSE ARMANDO on CKD, nephrology following for possible future HD  Cont. Amio, Eliquis, Lipitor, Hydralazine and Lopressor            Consultations:   Angeline Lara CONSULT TO SOCIAL WORK  IP CONSULT TO INTERNAL MEDICINE  IP CONSULT TO NEPHROLOGY  IP CONSULT TO Nahomy Pruett MD  12/15/2022    [address] Phone [phone]   Fax: [phone]  Answering Service: [phone] and add on       I have discussed the care of Jani Pyle , including pertinent history and exam findings,      12/15/22    with the resident. I have seen and examined the patient and the key elements of all parts of the encounter have been performed by me . I agree with the assessment, plan and orders as documented by the resident. Shahrzad Degroot MD      [address] Phone [phone]   Fax: [phone]  Answering Service: [phone]            12/15/2022  9:31 AM          Copy sent to Dr. Héctor Emmanuel MD    Please note that this chart was generated using voice recognition Dragon dictation software. Although every effort was made to ensure the accuracy of this automated transcription, some errors in transcription may have occurred.

## 2022-12-16 LAB
ANION GAP SERPL CALCULATED.3IONS-SCNC: 11 MMOL/L (ref 9–17)
BUN BLDV-MCNC: 71 MG/DL (ref 8–23)
CALCIUM SERPL-MCNC: 9.6 MG/DL (ref 8.6–10.4)
CHLORIDE BLD-SCNC: 99 MMOL/L (ref 98–107)
CO2: 25 MMOL/L (ref 20–31)
CREAT SERPL-MCNC: 4.53 MG/DL (ref 0.5–0.9)
GFR SERPL CREATININE-BSD FRML MDRD: 10 ML/MIN/1.73M2
GLUCOSE BLD-MCNC: 129 MG/DL (ref 70–99)
HEPATITIS B CORE IGM ANTIBODY: NONREACTIVE
HEPATITIS B SURFACE ANTIGEN: NONREACTIVE
HEPATITIS C ANTIBODY: NONREACTIVE
INR BLD: 1.3
PATHOLOGIST REVIEW: NORMAL
PATHOLOGIST: NORMAL
POTASSIUM SERPL-SCNC: 5 MMOL/L (ref 3.7–5.3)
PROTHROMBIN TIME: 16.5 SEC (ref 11.8–14.6)
SERUM IFX INTERP: NORMAL
SODIUM BLD-SCNC: 135 MMOL/L (ref 135–144)

## 2022-12-16 PROCEDURE — 99232 SBSQ HOSP IP/OBS MODERATE 35: CPT | Performed by: PHYSICAL MEDICINE & REHABILITATION

## 2022-12-16 PROCEDURE — 80048 BASIC METABOLIC PNL TOTAL CA: CPT

## 2022-12-16 PROCEDURE — 97535 SELF CARE MNGMENT TRAINING: CPT

## 2022-12-16 PROCEDURE — 6360000002 HC RX W HCPCS: Performed by: INTERNAL MEDICINE

## 2022-12-16 PROCEDURE — 6370000000 HC RX 637 (ALT 250 FOR IP): Performed by: FAMILY MEDICINE

## 2022-12-16 PROCEDURE — 97530 THERAPEUTIC ACTIVITIES: CPT

## 2022-12-16 PROCEDURE — 99232 SBSQ HOSP IP/OBS MODERATE 35: CPT | Performed by: INTERNAL MEDICINE

## 2022-12-16 PROCEDURE — 86705 HEP B CORE ANTIBODY IGM: CPT

## 2022-12-16 PROCEDURE — 1180000000 HC REHAB R&B

## 2022-12-16 PROCEDURE — 97542 WHEELCHAIR MNGMENT TRAINING: CPT

## 2022-12-16 PROCEDURE — 2580000003 HC RX 258: Performed by: INTERNAL MEDICINE

## 2022-12-16 PROCEDURE — 97110 THERAPEUTIC EXERCISES: CPT

## 2022-12-16 PROCEDURE — 86803 HEPATITIS C AB TEST: CPT

## 2022-12-16 PROCEDURE — 87340 HEPATITIS B SURFACE AG IA: CPT

## 2022-12-16 PROCEDURE — 36415 COLL VENOUS BLD VENIPUNCTURE: CPT

## 2022-12-16 PROCEDURE — 85610 PROTHROMBIN TIME: CPT

## 2022-12-16 PROCEDURE — 6370000000 HC RX 637 (ALT 250 FOR IP): Performed by: PHYSICAL MEDICINE & REHABILITATION

## 2022-12-16 RX ADMIN — CALCITRIOL 0.25 MCG: 0.25 CAPSULE ORAL at 08:44

## 2022-12-16 RX ADMIN — METHOCARBAMOL 750 MG: 750 TABLET ORAL at 14:11

## 2022-12-16 RX ADMIN — IRON SUCROSE 100 MG: 20 INJECTION, SOLUTION INTRAVENOUS at 15:36

## 2022-12-16 RX ADMIN — PANTOPRAZOLE SODIUM 40 MG: 40 TABLET, DELAYED RELEASE ORAL at 05:32

## 2022-12-16 RX ADMIN — Medication 2000 UNITS: at 08:42

## 2022-12-16 RX ADMIN — AMIODARONE HYDROCHLORIDE 200 MG: 200 TABLET ORAL at 08:42

## 2022-12-16 RX ADMIN — ATORVASTATIN CALCIUM 40 MG: 40 TABLET, FILM COATED ORAL at 23:14

## 2022-12-16 RX ADMIN — ZOLPIDEM TARTRATE 10 MG: 5 TABLET ORAL at 23:14

## 2022-12-16 RX ADMIN — MAGNESIUM OXIDE TAB 400 MG (241.3 MG ELEMENTAL MG) 400 MG: 400 (241.3 MG) TAB at 08:42

## 2022-12-16 RX ADMIN — ALLOPURINOL 100 MG: 100 TABLET ORAL at 08:42

## 2022-12-16 RX ADMIN — METOPROLOL TARTRATE 25 MG: 25 TABLET, FILM COATED ORAL at 23:15

## 2022-12-16 RX ADMIN — MAGNESIUM OXIDE TAB 400 MG (241.3 MG ELEMENTAL MG) 400 MG: 400 (241.3 MG) TAB at 23:14

## 2022-12-16 RX ADMIN — APIXABAN 5 MG: 5 TABLET, FILM COATED ORAL at 08:42

## 2022-12-16 RX ADMIN — HYDRALAZINE HYDROCHLORIDE 100 MG: 50 TABLET, FILM COATED ORAL at 14:51

## 2022-12-16 RX ADMIN — METHOCARBAMOL 750 MG: 750 TABLET ORAL at 23:13

## 2022-12-16 RX ADMIN — GABAPENTIN 600 MG: 600 TABLET, FILM COATED ORAL at 08:41

## 2022-12-16 RX ADMIN — HYDRALAZINE HYDROCHLORIDE 100 MG: 50 TABLET, FILM COATED ORAL at 23:13

## 2022-12-16 RX ADMIN — HYDROCODONE BITARTRATE AND ACETAMINOPHEN 1 TABLET: 5; 325 TABLET ORAL at 14:11

## 2022-12-16 RX ADMIN — HYDROCODONE BITARTRATE AND ACETAMINOPHEN 1 TABLET: 5; 325 TABLET ORAL at 23:14

## 2022-12-16 RX ADMIN — POLYETHYLENE GLYCOL 3350 17 G: 17 POWDER, FOR SOLUTION ORAL at 08:43

## 2022-12-16 RX ADMIN — ROPINIROLE HYDROCHLORIDE 0.25 MG: 0.25 TABLET, FILM COATED ORAL at 23:15

## 2022-12-16 RX ADMIN — HYDROCODONE BITARTRATE AND ACETAMINOPHEN 1 TABLET: 5; 325 TABLET ORAL at 08:43

## 2022-12-16 RX ADMIN — METHOCARBAMOL 750 MG: 750 TABLET ORAL at 05:31

## 2022-12-16 RX ADMIN — METOPROLOL TARTRATE 25 MG: 25 TABLET, FILM COATED ORAL at 09:45

## 2022-12-16 RX ADMIN — HYDRALAZINE HYDROCHLORIDE 100 MG: 50 TABLET, FILM COATED ORAL at 08:41

## 2022-12-16 ASSESSMENT — PAIN DESCRIPTION - LOCATION: LOCATION: ANKLE

## 2022-12-16 ASSESSMENT — PAIN SCALES - GENERAL
PAINLEVEL_OUTOF10: 3
PAINLEVEL_OUTOF10: 7
PAINLEVEL_OUTOF10: 3

## 2022-12-16 ASSESSMENT — PAIN DESCRIPTION - DESCRIPTORS: DESCRIPTORS: SHARP

## 2022-12-16 ASSESSMENT — PAIN DESCRIPTION - ORIENTATION: ORIENTATION: RIGHT

## 2022-12-16 NOTE — CARE COORDINATION
ONGOING ARU DISCHARGE PLAN:    Patient is alert and oriented x4. Spoke with patient regarding discharge plan and patient confirms plan is to go home if possible with Spouse and [place]. Will have a tunnel Cath placed on Monday 12/19/22 to start dialysis. Patient has Ortho Appointment on Wednesday 12/21/22 with Dr. Kurt Garcia. Life start to  12:15. Depending which days she has dialysis, the Dr Kurt Garcia appointment might need to be resheculed    DME: None ordered today    Will continue to follow for additional discharge needs.     Electronically signed by Jessika Baker RN on 12/16/2022 at 2:48 PM

## 2022-12-16 NOTE — PROGRESS NOTES
[facility]: SHAWNA VELOZ   Acute Rehabilitation Occupational Therapy Daily Treatment Note    Date: 22  Patient Name: Lele Baker       Room: 4985/2878-36  MRN: [id number]  Account: [de-identified]   : 1945  (69 y.o.) Gender: female       Referring Practitioner: Rene Blancas MD  Diagnosis: Open fracture of right tibia and fibula, sequela  Additional Pertinent Hx: Lele Baker is 69 y.o. female  Who was admitted to the hospital on 2022 for treatment of Open fracture of right tibia and fibula, sequela. Patient presented to the emergency room with right ankle injury and suffered a right ankle fracture after falling over a curb when she was trying to go to [employer] for [employer]. She has underlying history of A. fib on Eliquis, CHF, diabetes mellitus with CKD3, hypertension, osteoporosis, fibromyalgia, morbid obesity BMI 43, sleep apnea, depression. Patient underwent I&D and ORIF on 2022. Patient also developed acute kidney injury with hyperkalemia and metabolic acidosis. Nephrology was consulted and patient given [place] [facility]. Patient was given 1 unit PRBC transfusion on 2022 for low hemoglobin of 6.4. Treatment Diagnosis: Impaired self-care status    Past Medical History:  has a past medical history of Abnormal stress test, Anemia, Arthritis, Depression, Diverticulosis, DM (diabetes mellitus) ([address] Erythropenia, Fibromyalgia, HTN (hypertension), Hyperlipidemia, Kidney stone, Morbid obesity due to excess calories ([address] DIONY on CPAP, Osteopenia, Seasonal allergies, and Sleep apnea. Past Surgical History:   has a past surgical history that includes Colonoscopy (2003); Colonoscopy (2007); Tubal ligation; Arm Surgery (2011); Total knee arthroplasty (Bilateral); Cardiac catheterization ([id number] dr Stephanie Amado);  Cardiac catheterization (2016); ORIF ankle fracture (Right, 2022); and Ankle fracture surgery (Right, 12/6/2022). Restrictions  Restrictions/Precautions  Restrictions/Precautions: Weight Bearing  Required Braces or Orthoses?: No  Implants present? : Metal implants     Position Activity Restriction  Other position/activity restrictions: Per chart: Right open trimalleolar fracture ankle fracture dislocation s/p I&D and ORIF, DOS 12/6/2022  Lower Extremity Weight Bearing Restrictions  Right Lower Extremity Weight Bearing: Non Weight Bearing       Vitals        Subjective  Subjective  Subjective: Pt very emotional this date and requires emotional support due to current medical status  Pain Assessment  Pain Assessment: 0-10  Pain Level: 3    Objective  Cognition  Overall Orientation Status: Within Functional Limits  Orientation Level: Oriented X4    Cognition  Overall Cognitive Status: WFL    Activities of Daily Living       Grooming/Oral Hygiene  Assistance Level: Set-up  Skilled Clinical Factors: Pt completes oral care, washing face and brushing hair while sitting in w/c at sink. Pt refuses any bathing or dressing this date stating \" To much is going on today\" Pt anxious about current medical status              Upper Extremity Dressing  Assistance Level: Minimal assistance (pt seated to don/doff [place] shirt)              Transfers  Device: Lift equipment  Sit to Stand  Assistance Level: Dependent        OT Exercises  Exercise Treatment: PM: LUJAN facilitated pt in BUE exercises for increased strength and activity tolerance to support safety and participation with ADLs. Pt completes with 2# free weight X15-20 reps each. Requires RB between each exercise due to fatigue this afternoon. Dynamic Sitting Balance Exercises: Pt seated in wc to complete balloon tossing activity with BUE.  Pt completed acitvity to increase strength and endurance for ADL and IADL activite s         Additional Activities  Additional Activities Comment: Pt refused any bathing or dressing this date besided oral care and UB dressing. Assessment  Assessment  Activity Tolerance: Patient limited by fatigue  Discharge Recommendations: Continue to assess pending progress    Patient Education  Education  Education Given To: Patient  Education Provided: Role of Therapy;Plan of Care;ADL Function; Energy Conservation  Education Provided Comments: Thorough education on OT POC and importance of daily participation in functional tasks. Also d/c planning and DME for toileting tasks at home.   Education Method: Verbal  Barriers to Learning: None  Education Outcome: Verbalized understanding;Continued education needed       Goals     Short Term Goals  Time Frame for Short Term Goals: By one week  Short Term Goal 1: Pt will perform UB ADLs including grooming/oral care with SBA and good safety  Short Term Goal 2: Pt will perform LB ADLs including toileting/toilet transfers with Min A, good safety, and use of AE/DME/Modified techniques as needed  Short Term Goal 3: Pt will be educated on NWB status to RLE with good carryover during functional transfers/tasks  Short Term Goal 4: Pt will tolerate standing for 5+ minutes, Min A, with 1-2 UE support and no LOB during functional task while maintaining NWB RLE  Short Term Goal 5: Pt will be educated on and explore use of AE/DME/Modified techniques to improve safety and independence with ADL tasks  Additional Goals?: Yes  Short Term Goal 6: Pt will actively participate in 30+ minutes of therapeutic exercise/functional activity to promote safety and independence with self-care and mobility  Short Term Goal 7: Pt will perform functional transfers/mobility with Min A, good safety, and use of least restrictive device while maintaining NWB RLE    Long Term Goals  Time Frame for Long Term Goals : By discharge  Long Term Goal 1: Pt will perform UB ADLs including grooming/oral care with Mod I and good safety  Long Term Goal 2: Pt will perform LB ADLs, including toileting/toilet transfers, with SBA, good safety, and use of AE/DME/Modified techniques as needed  Long Term Goal 3: Pt will tolerate standing 10+ minutes, SBA, with 1-2 UE and no LOB during self-care/functional activity while maintaining NWB RLE  Long Term Goal 4: Pt will verbalize/demonstrate good understanding of home safety/fall prevention/energy conservation strategies to improve safety and independence with self-care tasks  Long Term Goal 5: Pt will safely perform light housekeeping/meal preparation with supervision, good safety, and use of least restrictive device to improve independence with ADLs/IADLs  Long Term Goal 6: Pt will perform functional transfers/mobility with SBA, good safety, and use of least restrictive device while maintaining NWB RLE  Long Term Goal 7: Pt will demonstrate improved fine motor coordination during self-care tasks AEB 10 second improvement on 9 hole peg test    Plan  Occupational Therapy Plan  Times Per Week: 5-7  Times Per Day: Twice a day  Current Treatment Recommendations: Strengthening, ROM, Balance training, Functional mobility training, Endurance training, Pain management, Patient/Caregiver education & training, Positioning, Safety education & training, Equipment evaluation, education, & procurement, Self-Care / ADL, Home management training, Coordination training      OT Individual Minutes      12/16/22 1031 12/16/22 1408   OT Individual Minutes   Time In 1010 1408   Time Out 7730 [id number]   [id number]            Electronically signed by DUNCAN Vallejo on 12/16/22 at 3:51 PM EST

## 2022-12-16 NOTE — PROGRESS NOTES
Nephrology Progress Note    Reason for consultation: Management of acute kidney injury and chronic kidney disease stage IV. Consulting physician: Jacquse Hodges MD.    Interval history: Patient was seen and examined today and she complains of general weakness and fatigue. She does not have shortness of breath, nausea or vomiting and has been receiving IV fluid at 50 mill per hour. Although nonoliguric, GFR continues to worsen. History of present illness: This is a 69 y.o. female with a significant past medical history of Lipidemia, nephrolithiasis, fibromyalgia, type 2 diabetes mellitus, osteoarthritis and Chronic kidney disease stage IIIb [baseline serum creatinine 2.5 mg/dL secondary to diabetic glomerulosclerosis and follows up with Dr. Serenity Buchanan of [facility] [address] [place] [address], who presented to the hospital at [facility]. [facility] on 12/6/2022 after [place]pping on [place] whilst [place] get out from [employer]. Vital signs were stable at presentation but her renal function had worsened from baseline with elevated BUN/creatinine 60/3.87 mg/dL. X-rays revealed trimalleolar fracture with diffuse soft tissue swelling of the right ankle. She underwent open reduction and internal fixation on 12/6/2022. Hemoglobin dropped to 6.6 g/dL on 12/10/2022 requiring PRBC transfusion. Serum creatinine peaked at 4.34 mg/dL. Patient did not require dialysis so far. She was transferred to acute rehab unit at [facility] yesterday [12/12/2022]. Pain control is adequate she has a cast on her left lower extremity. She does not have shortness of breath. She is nonoliguric and does not have indwelling Galdamez catheter. Laboratory studies today remarkable for BUN/creatinine 71/4.29  mg/dL.     Objective/     Vitals:    12/15/22 1130 12/15/22 1826 12/15/22 2005 12/16/22 0654   BP:  136/60 126/64 (!) 144/64   Pulse:  51 60 56   Resp:  18  16   Temp:  98.8 °F (37.1 °C)  97.7 °F (36.5 °C) TempSrc:       SpO2:  96%  96%   Weight: 282 lb (127.9 kg)      Height: 5' 5\" (1.651 m)        24HR INTAKE/OUTPUT:    Intake/Output Summary (Last 24 hours) at 12/16/2022 0902  Last data filed at 12/16/2022 0656  Gross per 24 hour   Intake --   Output 1700 ml   Net -1700 ml       Patient Vitals for the past 96 hrs (Last 3 readings):   Weight   12/15/22 1130 282 lb (127.9 kg)     Constitutional:  Alert, awake, no apparent distress  Cardiovascular:  S1, S2 without pericardial rub or gallop. Respiratory:  Diminished breath sounds at lung bases. Abdomen: Full but soft with normal bowel sounds. Ext: 2+ LE edema    Data/  Recent Labs     12/14/22  1504 12/15/22  0649   WBC 16.2* 14.1*   HGB 7.7* 6.5*   HCT 26.1* 21.2*   .8 93.0    281       Recent Labs     12/14/22  0615 12/15/22  0649 12/16/22  0632    132* 135   K 4.9 4.8 5.0   CL 97* 98 99   CO2 29 26 25   GLUCOSE 119* 135* 129*   BUN 77* 76* 71*   CREATININE 4.50* 4.56* 4.53*   LABGLOM 10* 9* 10*       Assessment/Plan:   1. Acute kidney injury on chronic kidney disease stage IIIb - Differentials include progression of underlying chronic kidney disease, myeloma kidney and ischemic acute tubular necrosis. She is at increased risk for uremic complications and has agreed to start hemodialysis after explaining indications as well as potential risks and benefits. Plan: Consult IR to place tunneled hemodialysis catheter today. Discontinue IV fluid. Hepatitis B and C serologies. Acute hemodialysis today for 2 hours. Avoid nephrotoxic agents including nonsteroidal anti-inflammatory drugs, vancomycin, aminoglycosides and intravenous iodinated contrast agents. Basic metabolic profile daily. Renal diet    2. Normocytic anemia of chronic kidney disease - Iron studies are consistent with iron deficiency anemia patient is currently receiving loading dose of IV iron sucrose.   She had been on Retacrit twice a month under the care of hematology oncologist Dr. Cait blanco for the past 2 years. She has a prior history of bone marrow biopsy suggestive of myelodysplasia. Urine immunofixation showed monoclonal bands in April 2022. Elevated kappa/lambda ratio of 13 unlikely to be due to chronic kidney disease. Hem/Onc evaluation appreciated and will follow their recommendations. Will resume erythropoiesis stimulating agents once iron stores are repleted. 3.  S/p trimalleolar right ankle fracture - recovery in progress. 4.  Systemic hypertension - blood pressure control is adequate. 5.Moderately severe protein energy malnutrition-As reflected by albumin of 2.9 g-Add Nepro supplement daily    Prognosis is guarded.     Chacho King MD

## 2022-12-16 NOTE — PLAN OF CARE
Problem: Discharge Planning  Goal: Discharge to home or other facility with appropriate resources  Outcome: Progressing  Flowsheets (Taken 12/16/2022 1135)  Discharge to home or other facility with appropriate resources:   Identify barriers to discharge with patient and caregiver   Arrange for needed discharge resources and transportation as appropriate   Identify discharge learning needs (meds, wound care, etc)   Refer to discharge planning if patient needs post-hospital services based on physician order or complex needs related to functional status, cognitive ability or social support system     Problem: Safety - Adult  Goal: Free from fall injury  Outcome: Progressing     Problem: ABCDS Injury Assessment  Goal: Absence of physical injury  Outcome: Progressing  Flowsheets (Taken 12/16/2022 1712)  Absence of Physical Injury: Implement safety measures based on patient assessment     Problem: Skin/Tissue Integrity  Goal: Absence of new skin breakdown  Description: 1. Monitor for areas of redness and/or skin breakdown  2. Assess vascular access sites hourly  3. Every 4-6 hours minimum:  Change oxygen saturation probe site  4. Every 4-6 hours:  If on nasal continuous positive airway pressure, respiratory therapy assess nares and determine need for appliance change or resting period.   Outcome: Progressing     Problem: Nutrition Deficit:  Goal: Optimize nutritional status  Outcome: Progressing

## 2022-12-16 NOTE — PROGRESS NOTES
Dr. Agnieszka Emanuel called writer back and clarified note stating to discontinue IV fluids. Per Telephone order with readback, he states to discontinue IV fluids.

## 2022-12-16 NOTE — PROGRESS NOTES
12/16/22 1706   Encounter Summary   Encounter Overview/Reason  Spiritual/Emotional Needs   Service Provided For: Patient   Referral/Consult From: Volunteer   Last Encounter  12/16/22  (V)   Complexity of Encounter Low   Spiritual/Emotional needs   Type Spiritual Support   Rituals, Rites and Sacraments   Type Judaism Communion   Assessment/Intervention/Outcome   Intervention Prayer (assurance of)/[place]

## 2022-12-16 NOTE — PROGRESS NOTES
Physical Therapy  Facility/Department: [facility] ACUTE REHAB  Rehabilitation Physical Therapy Daily Treatment Note    NAME: Sebastián Gonzalez  : 1945 (89 y.o.)  MRN: [id number]  CODE STATUS: Full Code    Date of Service: 22          Chart Reviewed: Yes  Patient assessed for rehabilitation services?: Yes  Additional Pertinent Hx: Ms. Sebastián Gonzalez is a 69 y.o. female who was admitted to [facility] on 2022 with Ankle Injury. 28-year-old female with history of A. fib on Eliquis, bilateral knee arthroplasty, CHF, CKD, type 2 diabetes, and hypertension as well as chronic numbness of her feet status post fall over a curb found to have displaced right trimalleolar ankle fracture with large posterior malleolus and comminuted lateral malleolus fracture. Pt S/P  status post I&D and ORIF on 22 by Dr Evelyn Pichardo, right talus open treatment-continue splint right lower extremity, nonweightbearing,  Family / Caregiver Present: No  Referring Practitioner: Dr Jaylin Torres  Referral Date : 22  Diagnosis: Right open trimalleolar fracture ankle fracture dislocation s/p I&D and ORIF,  Right talus fracture. Restrictions:  Restrictions/Precautions: Weight Bearing  Lower Extremity Weight Bearing Restrictions  Right Lower Extremity Weight Bearing: Non Weight Bearing  Position Activity Restriction  Other position/activity restrictions: Per chart: Right open trimalleolar fracture ankle fracture dislocation s/p I&D and ORIF, DOS 2022     SUBJECTIVE  Subjective: Pt is sitting up in her w/c upon arrival. Agreeable to PT. Pt continues to report frustration/anxiety with her current medical status. PM: Pt is sitting up in her w/c upon arrival, initially refuses PT, however agreeable with edu. Pt reports \"I don't know, I'm just very very depressed\". Pt expresses concerns of being able to d/c back home.  Edu pt on proper progression/benefits of pariticipating in therapy to work towards her home goal.  Pain: Pt reports pain 8/10 R ankle lateral. PT request for pain meds during pm tx. ARLEN Solorzano informed. OBJECTIVE  Vision  Vision: Impaired  Vision Exceptions: Wears glasses at all times    Hearing  Hearing: Within functional limits    Cognition  Overall Cognitive Status: WFL    Sensation  Overall Sensation Status: Impaired (Decreased sensation B feet.)    Functional Mobility  Bed mobility  Rolling to Left: Minimal assistance  Rolling to Right: Minimal assistance  Sit to Supine: 2 Person assistance; Moderate assistance  Scooting: Maximal assistance;2 Person assistance (scooting towards SUSANNA.)  Bed Mobility Comments: PT mat, wedge, 2 pillows. Transfers  Sit to Stand: 2 Person Assistance; Moderate Assistance (right knee extended to maintain and monitor NWB, right UE at RW left at mat , VCs sequencing , pt voices fear of falling)  Stand to Sit: Moderate Assistance;2 Person Assistance  Bed to Chair: Dependent/Total (maxi move)  Comment: Transfers completed with RW in // bars. Pt requires cues for hand placement and NWBing of RLE with poor+ carryover. Pt only tolerates 2x~15-20sec and 1x1min stands at this time. Pt attempted x2 stands initially with Mod A x2, however unable to maintain NWB with RLE. During pt's third stand, pt requires Mod A x2 with a third person assisting with RLE to maintain [address] status. W/c<>Mat table completed with [place]. Balance  Posture: Good  Sitting - Static: Good  Sitting - Dynamic: Fair  Standing - Static: Poor  Standing - Dynamic: Poor;-  Comments: standing balance assessed in // bars. Environmental Mobility  Ambulation  WB Status: NWB R LE  Wheelchair Activities  Propulsion: Yes  Propulsion 1  Propulsion: Manual  Level: Level Tile  Method: RUE;LUE  Level of Assistance: Minimal assistance  Description/ Details: Very slow movements to propel herself forward. Multiple short rest breaks to complete. Pt requires Min A to maintain a straight path and maneuver turns.   Distance: [de-identified]'    PT Exercises  A/AROM Exercises: seated bilateral LE hips/ knees x10 with 1.5# on LLE and OTB for light resistance. Supine BLE ex's AAROM with RLE and AROM on LLE. Rolling x2 to each side. Pt reports increase fatigue post ex's both AM/PM. Pt requires frequent rest breaks to complete all tasks. Dynamic Sitting Balance Exercises: Sitting UE push ups with blocks at EOM. Pt unable to clear bed, however give good efforts. Pt requires Mod/Max A x1 to assist with lateral scooting towards SUSANNA. Second person assisting with RLE to maintain NWBing status. Increase time to complete d/t weakness and fatigue. Pt requires multiple rest breaks. (Mirror utilized for visual feedback.)  Static Standing Balance Exercises: static stand 2x~15-20 sec with Mod A x2. Pt is unable to maintain NWBing with RLE. Third stand with a third person A to maintain [address] precuations. Pt able to stand x1min . ASSESSMENT  Vitals  O2 Device: None (Room air)    Activity Tolerance  Activity Tolerance: Patient limited by fatigue (fatigue in PM)    Assessment  Assessment: Pt dependent for transfers. Pt unable to maintain NWB R LEduring sit<>stand. Once pt in standing position, will briefly hold her R LE off the floor for NWB  precautions. Pt presents with B UE weakness as well as Left LE weakness and NWB R LE limiting her activity. Pt would benefit from continued PT following discharge to address functional deficits. Performance Deficits/Impairments: Decreased functional mobility ; Decreased ROM; Decreased strength;Decreased safe awareness;Decreased endurance;Decreased balance; Increased pain  Treatment Diagnosis: Impaired function. Therapy Prognosis: Good  Decision Making: Medium Complexity  Discharge Recommendations: Patient would benefit from continued therapy after discharge  PT Equipment Recommendations  Equipment Needed: No    CLINICAL IMPRESSION   Pt dependent for transfers. Pt unable to maintain NWB R LEduring sit<>stand.  Once pt in standing position, will briefly hold her R LE off the floor for NWB  precautions. Pt presents with B UE weakness as well as Left LE weakness and NWB R LE limiting her activity. Pt would benefit from continued PT following discharge to address functional deficits. GOALS  Patient Goals   Patient Goals : Get stronger  Short Term Goals  Time Frame for Short Term Goals: 1 week  Short Term Goal 1: Bed mobility min A without use of bed rail  Short Term Goal 2: Sit<> // bars or rolling walker at mod A, maintaining NWB R LE  Short Term Goal 3: Improve staning tolerance to 1 minutes, NWB R LE with B UE support. Short Term Goal 4: Lateral scoot transfers, mod A X 1, maintaining NWB R LE. Short Term Goal 5: W/C mobility distance fo 50 ft , SBA/CGA level surface. Long Term Goals  Time Frame for Long Term Goals : By DC  Long Term Goal 1: Mod-I bed mobility  Long Term Goal 2: Stand pivot Transfers at min/mod A maintain NWB R LE  Long Term Goal 3: W/C mobility distance fo 50 to 150 ft SBA level surface  Long Term Goal 4: Pt able to take 3 to 5 steps in // bars NWB R LE, min A x 2  Long Term Goal 5: Family training for safe transfers from varied surfaces. PLAN OF CARE  Physcial Therapy Plan  General Plan:  minutes of therapy at least 5 out of 7 days a week (5-7 daysx wk)  Specific Instructions for Next Treatment: Continue maxi move for transfers, progress with sit<.stnad in // bars or rolling walker in therapy. Current Treatment Recommendations: Strengthening; Functional mobility training;Transfer training; Endurance training;Stair training;Gait training; Safety education & training;Balance training;ROM;Wheelchair mobility training;Patient/Caregiver education & training;Home exercise program;Equipment evaluation, education, & procurement; Therapeutic activities  Additional Comments: Discussed with pt, need of ramp at entrance at this time. Safety Devices  Type of Devices: Gait belt;Call light within reach; Left in bed;Bed alarm in place (rails x 3)  Restraints  Restraints Initially in Place: No    EDUCATION  Education  Education Given To: Patient  Education Provided: Transfer Training  Education Provided Comments: NWB RLE for functional mobility, recommendation of ramp at entrance of home.   Education Method: Demonstration;Verbal  Education Outcome: Continued education needed    Therapy Time     12/16/22 0751 12/16/22 1330   PT Individual Minutes   Time In 0908 1330   Time Out [phone]   Minutes [address], [place], 12/16/22 at 4:15 PM

## 2022-12-16 NOTE — PROGRESS NOTES
[address] [facility] Internal [facility]  Dangelo Chamberlain MD; Eric Ibrahim MD; Sherri Rodriguez MD; MD Jonathan Garcia MD; MD MONY [facility]Ell[facility]   [facility]    Date:   12/16/2022  Patient name:  Erika Sims  Date of admission:  12/12/2022  5:48 PM  MRN:   [id number]  Account:  [de-identified]  YOB: 1945  PCP:    Adelita Geller MD  Room:   [address] [place] [address]  Code Status:    Full Code    Chief Complaint:     No chief complaint on file. Open fracture of tibia and fibula    History Obtained From:     Patient and electronic medical record    History of Present Illness:     Erika Sims is a 69 y.o. Non- / non  female who presents with No chief complaint on file. and is admitted to the hospital for the management of Open fracture of right tibia and fibula, sequela. Past medical history significant for A. Fib on Eliquis and amiodarone, JOSE ARMANDO on CKD, Type 2 Diabetes, HTN,HLD, Anemia, Fibromyalgia, Obesity, and DIONY. Sustained an open trimalleolar fracture, ankle fracture dislocation s/p ORIF on 12/6, right talus open treatment. Nonweight bearing right lower extremity. Developed JOSE ARMANDO on CKD, per Nephrology patient is high risk of needing dialysis, consult to Nephrology sent when patient admitted to Acute Rehab. Seen at examined at bedside, did not sleep well last night due to being uncomfortable due to the blankets. Otherwise no concerns, not in acute distress     Principal Problem:    Open fracture of right tibia and fibula, sequela  Active Problems:    Paraproteinemia    Stage 5 chronic kidney disease not on chronic dialysis (HCC)    Anemia of chronic renal failure, stage 5 (HCC)  Resolved Problems:    * No resolved hospital problems.  *                  Past Medical History:     Past Medical History:   Diagnosis Date    Abnormal stress test     Anemia     Arthritis     Depression Diverticulosis     DM (diabetes mellitus) ([facility] [address]     Erythropenia     Fibromyalgia     HTN (hypertension)     Hyperlipidemia     Kidney stone     Morbid obesity due to excess calories (HCC)     DIONY on CPAP     Osteopenia 03/03/14    Seasonal allergies     Sleep apnea     uses bipap prn        Past Surgical History:     Past Surgical History:   Procedure Laterality Date    ANKLE FRACTURE SURGERY Right 12/6/2022    RIGHT ANKLE OPEN REDUCTION INTERNAL FIXATION performed by Amanda Allen DO at [facility]  01/01/2011    right arm broken    CARDIAC CATHETERIZATION  [id number] dr Radha Panchal  05/16/2016    COLONOSCOPY  01/01/2003    COLONOSCOPY  01/01/2007    ORIF ANKLE FRACTURE Right 12/06/2022    RIGHT ANKLE OPEN REDUCTION INTERNAL FIXATION    TOTAL KNEE ARTHROPLASTY Bilateral     left may 2013 and right july 2013    TUBAL LIGATION          Medications Prior to Admission:     Prior to Admission medications    Medication Sig Start Date End Date Taking? Authorizing Provider   zolpidem (AMBIEN) 5 MG tablet Take 1 tablet by mouth nightly as needed for Sleep for up to 5 days. 12/12/22 12/17/22  Bradley Grossman MD   methocarbamol (ROBAXIN) 750 MG tablet Take 1 tablet by mouth in the morning and 1 tablet at noon and 1 tablet in the evening. Do all this for 10 days. 12/12/22 12/22/22  Bradley Grossman MD   metoprolol tartrate (LOPRESSOR) 25 MG tablet Take 1 tablet by mouth 2 times daily 12/12/22   Bradley Grossman MD   senna (SENOKOT) 8.6 MG tablet Take 1 tablet by mouth daily as needed (Constipation) 12/12/22 1/11/23  Bradley Grossman MD   rOPINIRole (REQUIP) 0.25 MG tablet Take 1 tablet by mouth nightly 11/14/22   Eliza Rodriguez MD   gabapentin (NEURONTIN) 600 MG tablet Take 1 tablet by mouth daily for 90 days.  11/14/22 2/12/23  Eliza Rodriguez MD   traZODone (DESYREL) 50 MG tablet  10/20/22   Philippe Humphries MD   colchicine (COLCRYS) 0.6 MG tablet  9/27/22   Malgorzata Mane DPM Cyanocobalamin (B-12) 1000 MCG LOZG DISSOLVE ONE tablet UNDER TONGUE ONCE DAILY 9/6/22   Howard Joiner MD   blood glucose monitor kit and supplies use check blood sugar as directed 11/9/22   Eliza Tam MD   blood glucose monitor strips Check blood sugars daily as directed. 11/9/22   Eliza Tam MD   Lancets MISC 1 each by Does not apply route daily 11/9/22   Eliza Tam MD   Alcohol Swabs 70 % PADS 1 each by Does not apply route daily 11/9/22   Eliza Tam MD   atorvastatin (LIPITOR) 40 MG tablet Take 1 tablet by mouth daily 11/7/22   Eliza Tam MD   pantoprazole (PROTONIX) 40 MG tablet TAKE 1 TABLET DAILY 10/19/22   Eliza Tam MD   ELIQUIS 5 MG TABS tablet TAKE 1 TABLET TWICE A DAY 10/11/22   Eliza Tam MD   cyclobenzaprine (FLEXERIL) 5 MG tablet TAKE 1 TABLET BY MOUTH NIGHTLY AS NEEDED FOR MUSCLE SPASMS 10/3/22   Eliza Tam MD   allopurinol (ZYLOPRIM) 100 MG tablet TAKE 1 TABLET DAILY 9/21/22   BARBIE Garcia - CNP   zolpidem (AMBIEN) 5 MG tablet Take 5 mg by mouth nightly as needed for Sleep.     Historical Provider, MD   hydrALAZINE (APRESOLINE) 25 MG tablet Take 4 tablets by mouth 3 times daily 7/12/22   Annette Tran MD   amiodarone (CORDARONE) 200 MG tablet Take 1 tablet by mouth daily 7/6/22   BARBIE Leslie NP   calcitRIOL (ROCALTROL) 0.5 MCG capsule TAKE 1 CAPSULE BY MOUTH DAILY 6/14/22   Annette Tran MD   ferrous sulfate (FE TABS 325) 325 (65 Fe) MG EC tablet Take 1 tablet by mouth daily (with breakfast) 5/10/22   Eliza Tam MD   azelastine (ASTELIN) 0.1 % nasal spray 1 spray by Nasal route 2 times daily Use in each nostril as directed 10/13/21   Jackie Orozco DO   Cholecalciferol (VITAMIN D3) 25 MCG (1000 UT) TABS Take 2 tablets by mouth daily 1/9/20   BARBIE Garcia - CNP   Magnesium Oxide 400 MG CAPS Take by mouth 2 times daily Takes once daily at [facility]    Historical Provider, MD        Allergies: Adhesive tape, Cephalexin, Erythromycin, Ketorolac tromethamine, Pcn [penicillins], Percocet [oxycodone-acetaminophen], Sulfa antibiotics, and Ultracet [tramadol-acetaminophen]    Social History:     Tobacco:    reports that she quit smoking about 62 years ago. Her smoking use included cigarettes. She has a 0.25 pack-year smoking history. She has never used smokeless tobacco.  Alcohol:      reports no history of alcohol use. Drug Use:  reports no history of drug use. Family History:     Family History   Problem Relation Age of Onset    Diabetes Mother     Heart Disease Mother     Osteoporosis Mother     Kidney Disease Father     Prostate Cancer Brother        Review of Systems:     Positive and Negative as described in HPI. ROS                                                                  . Physical Exam:     Physical Exam   Vitals:    Vitals:    12/15/22 1130 12/15/22 1826 12/15/22 2005 22 0654   BP:  136/60 126/64 (!) 144/64   Pulse:  51 60 56   Resp:  18  16   Temp:  98.8 °F (37.1 °C)  97.7 °F (36.5 °C)   TempSrc:       SpO2:  96%  96%   Weight: 282 lb (127.9 kg)      Height: 5' 5\" (1.651 m)                       Body mass index is 46.93 kg/m². Temp (24hrs), Av.3 °F (36.8 °C), Min:97.7 °F (36.5 °C), Max:98.8 °F (37.1 °C)    No results for input(s): POCGLU in the last 72 hours. General Appearance:   No acute distress , obese                 Skin:                             No rash or erythema  HEENT ;                                                                       No icterus, no pallor . No ptosis. No gross asymmetry  Or abnormality face         Neck:                            No mass , no thyroid enlargement                                           Pulmonary/Chest:                                               Symmetric                                          Clear to auscultation bilaterally .                                          No wheezes, No rales or rhonchi . No abnormality on percussion                                                        Cardiovascular:            Normal rate, regular rhythm,                                          No murmur or  Gallop . Abdomen:                       Soft, non-tender                                           Normal bowels sounds,                                             Extremities:                    No  Edema .                                            Musculo-skeletal / Neurological ;;                  Neurological ;                 No focal motor deficit ,                 No focal sensory deficit ,    Musculo-skeletal ;                  No  gait abnormality                  No significant joint abnormality,                                                         Psych:                     See psych notes                                                                 Investigations:      URINE ANALYSIS: No results found for: LABURIN     CBC:  Lab Results   Component Value Date/Time    WBC 14.1 12/15/2022 06:49 AM    HGB 6.5 12/15/2022 06:49 AM     12/15/2022 06:49 AM     03/02/2012 11:15 AM             BMP:    Lab Results   Component Value Date/Time     12/16/2022 06:32 AM    K 5.0 12/16/2022 06:32 AM    CL 99 12/16/2022 06:32 AM    CO2 25 12/16/2022 06:32 AM    BUN 71 12/16/2022 06:32 AM    CREATININE 4.53 12/16/2022 06:32 AM    GLUCOSE 129 12/16/2022 06:32 AM    GLUCOSE 113 03/02/2012 11:15 AM      LIVER PROFILE:  Lab Results   Component Value Date/Time    ALT <5 12/13/2022 06:47 AM    AST 10 12/13/2022 06:47 AM    PROT 5.6 12/13/2022 06:47 AM    BILITOT 0.3 12/13/2022 06:47 AM    BILIDIR 0.1 12/13/2022 06:47 AM    LABALBU 2.9 12/13/2022 06:47 AM    LABALBU 4.2 03/02/2012 11:15 AM             @BRIEFLABT(TSH)@      Laboratory Testing:  Recent Results (from the past 24 hour(s)) IMMUNOFIXATION URINE RANDOM PROFILE    Collection Time: 12/15/22  8:25 PM   Result Value Ref Range    Urine IFX Specimen . CLEAN CATCH URINE     Volume 50 mL    Urine Total Protein 24 mg/dL    Urine IFX Interp PENDING    Basic Metabolic Panel w/ Reflex to MG    Collection Time: 12/16/22  6:32 AM   Result Value Ref Range    Glucose 129 (H) 70 - 99 mg/dL    BUN 71 (H) 8 - 23 mg/dL    Creatinine 4.53 (H) 0.50 - 0.90 mg/dL    Est, Glom Filt Rate 10 (L) >60 mL/min/1.73m2    Calcium 9.6 8.6 - 10.4 mg/dL    Sodium 135 135 - 144 mmol/L    Potassium 5.0 3.7 - 5.3 mmol/L    Chloride 99 98 - 107 mmol/L    CO2 25 20 - 31 mmol/L    Anion Gap 11 9 - 17 mmol/L       Imaging/Diagnostics:  XR ANKLE RIGHT (MIN 3 VIEWS)    Result Date: 12/6/2022  Anatomic alignment of comminuted ankle fracture status post ORIF. Assessment :      Hospital Problems             Last Modified POA    * (Principal) Open fracture of right tibia and fibula, sequela 12/12/2022 Yes    Paraproteinemia 12/14/2022 Yes    Stage 5 chronic kidney disease not on chronic dialysis ([facility] [address] 12/14/2022 Yes    Anemia of chronic renal failure, stage 5 ([facility] [address] 12/14/2022 Yes       Plan:       Medications: Allergies:     Allergies   Allergen Reactions    Adhesive Tape     Cephalexin Other (See Comments)     Tongue cracks and peels    Erythromycin Other (See Comments)     Epigastric pain and bloating    Ketorolac Tromethamine Other (See Comments)     Severe stomach cramps    Pcn [Penicillins]      Unknown reaction    Percocet [Oxycodone-Acetaminophen] Itching and Other (See Comments)     Esophagus hurt    Sulfa Antibiotics     Ultracet [Tramadol-Acetaminophen] Itching       Current Meds:   Scheduled Meds:    iron sucrose  100 mg IntraVENous Q24H    atorvastatin  40 mg Oral Nightly    methocarbamol  750 mg Oral 3 times per day    zolpidem  10 mg Oral Nightly    allopurinol  100 mg Oral Daily    amiodarone  200 mg Oral Daily    apixaban  5 mg Oral BID    calcitRIOL  0.25 mcg Oral Daily    gabapentin  600 mg Oral Daily    hydrALAZINE  100 mg Oral TID    magnesium oxide  400 mg Oral BID    metoprolol tartrate  25 mg Oral BID    pantoprazole  40 mg Oral QAM AC    rOPINIRole  0.25 mg Oral Nightly    Vitamin D  2,000 Units Oral Daily    polyethylene glycol  17 g Oral Daily     Continuous Infusions:    sodium chloride 50 mL/hr at 12/15/22 2300     PRN Meds: Benzocaine-Menthol, acetaminophen, HYDROcodone-acetaminophen, bisacodyl       Patient admitted [place] Problems             Last Modified POA    * (Principal) Open fracture of right tibia and fibula, sequela 12/12/2022 Yes    Paraproteinemia 12/14/2022 Yes    Stage 5 chronic kidney disease not on chronic dialysis ([facility] [address] 12/14/2022 Yes    Anemia of chronic renal failure, stage 5 ([facility] [address] 12/14/2022 Yes                      12/13/22    Open trimalleolar fracture, s/p ORIF on 12/6  JOSE ARMANDO on CKD, Cr 4.29 today, nephro following, patient high risk for needing HD  Anemia  Hx of A Fib on Eliquis and amio, DM2, HTN,HLD, DIONY, Fibromyalgia   Nephrology following for any urgent HD needs, appreciate recommendations, renal diet, continue to monitor Cr  A Fib.  Continue Amio and Eliquis  Continue lipitor, hydralazine, and lopressor  Continue iron supplementation, Hgb stable at 7.6           12/14/22    Nephrology following for CKD IIIb, Cr increased to 4.5, producing urine  Vitally stable, no complaints, eating and drinking well   IV NS at 50 mL/hr, BMP daily, iron studies, avoid nephrotoxic agents, strict I/Os appreciate nephrology recommendations, renal diet  Continue Amio and Eliquis  Continue Lipitor, Hydralazine and lopressor            12/15/22    Hem/Onc consulted for elevated kappa light chain immunoglobins, will rule out myeloma  Anemia of chronic renal failure, Hgb 6.5 this morning, Receives [facility]acrit at [facility] twice monthly on hold per Nephrology recommendation, Hem/Onc notified of Hemoglobin, receiving IV iron 100 mg daily for 10 doses  JOSE ARMANDO on CKD, nephrology following for possible future HD  Cont. Amio, Eliquis, Lipitor, Hydralazine and Lopressor              12/16/22    Hem/Onc on board for elevated Kappa light chain, rule out myeloma  Anemia of chronic renal failure, appears baseline around 6-7, patient receiving IV iron supplementation, restart Retacrit once iron supplementation completed. Nephrology plans for HD today. IR consulted for tunnel catheter placement, followed by HD session, DC IV fluids, appreciate recommendations               Consultations:   Bonnie Reason CONSULT TO INTERNAL MEDICINE  IP CONSULT TO NEPHROLOGY  IP CONSULT TO Opal Lopez MD  12/16/2022 12/16/2022  8:40 AM          Copy sent to Dr. Tamra Maldonado MD    Please note that this chart was generated using voice recognition Dragon dictation software. Although every effort was made to ensure the accuracy of this automated transcription, some errors in transcription may have occurred.

## 2022-12-16 NOTE — PLAN OF CARE
Problem: Discharge Planning  Goal: Discharge to home or other facility with appropriate resources  12/16/2022 0246 by Jana Martins RN  Outcome: Progressing     Problem: Safety - Adult  Goal: Free from fall injury  12/16/2022 0246 by Jana Martins RN  Outcome: Progressing     Problem: ABCDS Injury Assessment  Goal: Absence of physical injury  12/16/2022 0246 by Jana Martins RN  Outcome: Progressing  Flowsheets (Taken 12/16/2022 0235)  Absence of Physical Injury: Implement safety measures based on patient assessment     Problem: Skin/Tissue Integrity  Goal: Absence of new skin breakdown  Description: 1. Monitor for areas of redness and/or skin breakdown  2. Assess vascular access sites hourly  3. Every 4-6 hours minimum:  Change oxygen saturation probe site  4. Every 4-6 hours:  If on nasal continuous positive airway pressure, respiratory therapy assess nares and determine need for appliance change or resting period.   12/16/2022 0246 by Jana Martins RN  Outcome: Progressing     Problem: Pain  Goal: Verbalizes/displays adequate comfort level or baseline comfort level  12/16/2022 0246 by Jana Martins RN  Outcome: Progressing     Problem: Chronic Conditions and Co-morbidities  Goal: Patient's chronic conditions and co-morbidity symptoms are monitored and maintained or improved  12/16/2022 0246 by Jana Martins RN  Outcome: Progressing     Problem: Nutrition Deficit:  Goal: Optimize nutritional status  12/16/2022 0246 by Jana Martins RN  Outcome: Progressing

## 2022-12-16 NOTE — PROGRESS NOTES
Physical Medicine & Rehabilitation  Progress Note      Subjective:      69year-old female with R ankle fracture/dislocation after fall. Patient is feeling down today and anxious regarding news that she will be starting hemodialysis. R ankle pain is controlled. No new issues with sleep, appetite, bowel, or bladder. ROS:  Denies fevers, chills, sweats. No chest pain, palpitations, lightheadedness. Denies coughing, wheezing or shortness of breath. Denies abdominal pain, nausea, diarrhea or constipation. No new areas of joint pain. Denies new areas of numbness or weakness. Stable unchanged paresthesia R toes. Denies new anxiety or depression issues. No new skin problems. Rehabilitation:   Progressing in therapies. PT:    Bed mobility  Rolling to Left: Minimal assistance  Rolling to Right: Minimal assistance  Supine to Sit: Moderate assistance, 2 Person assistance (pt assist right and left LE with UEs)  Sit to Supine: 2 Person assistance, Moderate assistance  Scooting: Maximal assistance, 2 Person assistance (scooting towards [facility]  Bed Mobility Comments: PT mat, wedge, 2 pillows. Transfers  Sit to Stand: Maximum Assistance, 2 Person Assistance, Moderate Assistance (EOB elevated right knee extended to maintain and monitor NWB, right UE at RW left at mat , VCs sequencing , pt voices fear of falling)  Stand to Sit: Moderate Assistance, 2 Person Assistance  Bed to Chair: Dependent/Total (maxi move)  Comment: Transfers completed with RW at [facility] at elevated level. Pt requires cues for hand placement and NWBing of RLE with poor carryover. Pt only tolerates ~15sec stands at this time. w/c<>Mat table completed with [employer]. Ambulation  WB Status: NWB R LE       OT:  Grooming/Oral Hygiene  Assistance Level: Set-up  Skilled Clinical Factors: Pt completes oral care, washing face and brushing hair while sitting in w/c at sink.  Pt refuses any bathing or dressing this date stating \" To much is going on today\" Pt anxious about current medical status  Lower Extremity Dressing  Assistance Level: Moderate assistance  Skilled Clinical Factors: LUJAN provided ptin instruction and demo on use of reacher for threading BLEs while sitting EOB. Pt able to thread with min A. Attempts weight shifting on EOB to pull over hips, unsuccessfull due to increased pain. Next pt lays back to supine for rolling with pt able to slightly A with pants over hips while on side. Putting On/Taking Off Footwear  Assistance Level: Dependent  Skilled Clinical Factors: TA for donning L sock  Toileting  Assistance Level: Dependent  Skilled Clinical Factors: TA for brief change in bed          SPEECH:      Objective:  BP (!) 144/64   Pulse 56   Temp 97.7 °F (36.5 °C)   Resp 16   Ht 5' 5\" (1.651 m)   Wt 282 lb (127.9 kg)   LMP  (LMP Unknown)   SpO2 96%   BMI 46.93 kg/m²       GEN: Well developed, well nourished, in NAD  HEENT:  NCAT. PERRL. EOMI. Mucous membranes pink and moist.   PULM:  Clear to ausculation. No rales or rhonchi. Respirations WNL and unlabored. CV:  bradycardic rate regular rhythm. No murmurs or gallops. GI:  Abdomen soft. Nontender. Non-distended. BS + and equal.    NEUROLOGICAL: A&O x3. Sensation intact to light touch. .   MSK:  Functional ROM BUEs, and LLE. Impaired AROM RLE. . Motor testing 4+/5 key muscles BUEs, and LLE. 2/5 R hip flexion and knee extension. R ankle not tested. Loreatha Press SKIN: Warm dry and intact. Good turgor. R ankle in posterior splint with ACE wrap  EXTREMITIES:  No calf tenderness to palpation. Post op edema distal RLE. PSYCH: Mood WNL. Appropriately interactive. Affect WNL.      Diagnostics:     CBC:   Recent Labs     12/14/22  1504 12/15/22  0649   WBC 16.2* 14.1*   RBC 2.59* 2.27*   HGB 7.7* 6.5*   HCT 26.1* 21.2*   .8 93.0   RDW 15.9* 16.4*    281     BMP:   Recent Labs     12/14/22  0615 12/15/22  0649 12/16/22  0632    132* 135   K 4.9 4.8 5.0   CL 97* 98 99   CO2 29 26 25 BUN 77* 76* 71*   CREATININE 4.50* 4.56* 4.53*   GLUCOSE 119* 135* 129*     BNP: No results for input(s): BNP in the last 72 hours. PT/INR:   Recent Labs     12/16/22  0845   PROTIME 16.5*   INR 1.3     APTT: No results for input(s): APTT in the last 72 hours. CARDIAC ENZYMES: No results for input(s): CKMB, CKMBINDEX, TROPONINT in the last 72 hours. Invalid input(s): CKTOTAL;3 troponins   FASTING LIPID PANEL:  Lab Results   Component Value Date    CHOL 178 01/11/2021    HDL 37 (L) 01/11/2021    TRIG 281 (H) 01/11/2021     LIVER PROFILE:   No results for input(s): AST, ALT, ALB, BILIDIR, BILITOT, ALKPHOS in the last 72 hours.        Current Medications:   Current Facility-Administered Medications: iron sucrose (VENOFER) 100 mg in sodium chloride 0.9 % 100 mL IVPB, 100 mg, IntraVENous, Q24H  Benzocaine-Menthol (CEPACOL) 1 lozenge, 1 lozenge, Oral, Q2H PRN  atorvastatin (LIPITOR) tablet 40 mg, 40 mg, Oral, Nightly  methocarbamol (ROBAXIN) tablet 750 mg, 750 mg, Oral, 3 times per day  acetaminophen (TYLENOL) tablet 650 mg, 650 mg, Oral, Q6H PRN  zolpidem (AMBIEN) tablet 10 mg, 10 mg, Oral, Nightly  0.9 % sodium chloride infusion, , IntraVENous, Continuous  allopurinol (ZYLOPRIM) tablet 100 mg, 100 mg, Oral, Daily  amiodarone (CORDARONE) tablet 200 mg, 200 mg, Oral, Daily  [Held by provider] apixaban (ELIQUIS) tablet 5 mg, 5 mg, Oral, BID  calcitRIOL (ROCALTROL) capsule 0.25 mcg, 0.25 mcg, Oral, Daily  gabapentin (NEURONTIN) tablet 600 mg, 600 mg, Oral, Daily  hydrALAZINE (APRESOLINE) tablet 100 mg, 100 mg, Oral, TID  HYDROcodone-acetaminophen (NORCO) 5-325 MG per tablet 1 tablet, 1 tablet, Oral, Q4H PRN  magnesium oxide (MAG-OX) tablet 400 mg, 400 mg, Oral, BID  metoprolol tartrate (LOPRESSOR) tablet 25 mg, 25 mg, Oral, BID  pantoprazole (PROTONIX) tablet 40 mg, 40 mg, Oral, QAM AC  rOPINIRole (REQUIP) tablet 0.25 mg, 0.25 mg, Oral, Nightly  Vitamin D (CHOLECALCIFEROL) tablet 2,000 Units, 2,000 Units, Oral, Daily  bisacodyl (DULCOLAX) suppository 10 mg, 10 mg, Rectal, Daily PRN  polyethylene glycol (GLYCOLAX) packet 17 g, 17 g, Oral, Daily      Impression/Plan:   Impaired ADLs, gait, and mobility due to:    R ankle trimalleolar fracture dislocation: s/p ORIF 12/6 by Dr. Yuliya Hoskins. NWB RLE. PT/OT for gait, mobility, strengthening, endurance, ADLs, and self care. Has Norco prn, on Robaxin TID. Has Tylenol prn and vitamin D. 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 Nephrology following and planning to initiate hemodialysis next week. On calcitriol. DM II with neuropathy: carb controlled diet. On gabapentin for neuropathy. HTN/HLD: on atorvastatin, hydralazine  Anemia of chronic disease: Hb low but stable. Nephrology following and treating with IV iron x10 days. She reports 6-7 is baseline Hb for her and that she receives Retacrit at [facility] twice monthly - on hold for now per nephrology. Hematology following. Elevated kappa light chain immunoglobulin: Hematology following. Concern for bone marrow disease vs renal failure - workup in progress. [facility] Hyponatremia: Improved. Will monitor. Fibromyalgia  Obesity: BMI 43.6. Dietitian consulted  DIONY:  Depression: on Trazodone nightly  Moderately severe protein calorie malnutrition: Nephrology added Nepro supplement daily. Dietitian following  Insomnia: has Ambien nightly, reportedly takes 10 mg at home. Also on Requip at . Increased to her home dose of Ambien. Gout: on allopurinol. Colchicine discontinued - no acute gout symptoms  Bowel Management: Miralax daily, senokot prn, dulcolax prn.   DVT Prophylaxis:  SCD's while in bed, RAVINDRA's during the day, and Eliquis  Internal medicine for medical management      Electronically signed by Laura Greer MD on 12/16/2022 at 10:37 AM      This note is created with the assistance of a speech recognition program.  While intending to

## 2022-12-16 NOTE — PROGRESS NOTES
Writer called Dr. Tori Pat in regards to continuous IV fluid order. Reached answering service.  Awaiting call back

## 2022-12-16 NOTE — PROGRESS NOTES
Spoke to Dr. Barrera Place regarding tunnel cath waiting until Monday, December 19th due to her taking Eliquis today. He is ok with waiting. ARU nurse, Hanny notified to hold Eliquis.

## 2022-12-17 LAB
ABSOLUTE EOS #: 0.2 K/UL (ref 0–0.4)
ABSOLUTE LYMPH #: 1.1 K/UL (ref 1–4.8)
ABSOLUTE MONO #: 1 K/UL (ref 0.1–1.3)
ANION GAP SERPL CALCULATED.3IONS-SCNC: 10 MMOL/L (ref 9–17)
BASOPHILS # BLD: 1 % (ref 0–2)
BASOPHILS ABSOLUTE: 0.1 K/UL (ref 0–0.2)
BUN BLDV-MCNC: 72 MG/DL (ref 8–23)
CALCIUM SERPL-MCNC: 9.8 MG/DL (ref 8.6–10.4)
CHLORIDE BLD-SCNC: 101 MMOL/L (ref 98–107)
CO2: 25 MMOL/L (ref 20–31)
CREAT SERPL-MCNC: 4.4 MG/DL (ref 0.5–0.9)
EOSINOPHILS RELATIVE PERCENT: 2 % (ref 0–4)
FREE KAPPA/LAMBDA RATIO: 8.97 (ref 0.26–1.65)
GFR SERPL CREATININE-BSD FRML MDRD: 10 ML/MIN/1.73M2
GLUCOSE BLD-MCNC: 133 MG/DL (ref 70–99)
HCT VFR BLD CALC: 22.7 % (ref 36–46)
HEMOGLOBIN: 7.3 G/DL (ref 12–16)
KAPPA FREE LIGHT CHAINS QNT: 37.87 MG/DL (ref 0.37–1.94)
LAMBDA FREE LIGHT CHAINS QNT: 4.22 MG/DL (ref 0.57–2.63)
LYMPHOCYTES # BLD: 11 % (ref 24–44)
MCH RBC QN AUTO: 29.8 PG (ref 26–34)
MCHC RBC AUTO-ENTMCNC: 32.3 G/DL (ref 31–37)
MCV RBC AUTO: 92.3 FL (ref 80–100)
MONOCYTES # BLD: 9 % (ref 1–7)
PDW BLD-RTO: 17 % (ref 11.5–14.9)
PLATELET # BLD: 295 K/UL (ref 150–450)
PMV BLD AUTO: 7.5 FL (ref 6–12)
POTASSIUM SERPL-SCNC: 5.2 MMOL/L (ref 3.7–5.3)
RBC # BLD: 2.46 M/UL (ref 4–5.2)
SEG NEUTROPHILS: 77 % (ref 36–66)
SEGMENTED NEUTROPHILS ABSOLUTE COUNT: 8.2 K/UL (ref 1.3–9.1)
SODIUM BLD-SCNC: 136 MMOL/L (ref 135–144)
WBC # BLD: 10.7 K/UL (ref 3.5–11)

## 2022-12-17 PROCEDURE — 97110 THERAPEUTIC EXERCISES: CPT

## 2022-12-17 PROCEDURE — 36415 COLL VENOUS BLD VENIPUNCTURE: CPT

## 2022-12-17 PROCEDURE — 99232 SBSQ HOSP IP/OBS MODERATE 35: CPT | Performed by: PHYSICAL MEDICINE & REHABILITATION

## 2022-12-17 PROCEDURE — 6360000002 HC RX W HCPCS: Performed by: INTERNAL MEDICINE

## 2022-12-17 PROCEDURE — 6370000000 HC RX 637 (ALT 250 FOR IP): Performed by: PHYSICAL MEDICINE & REHABILITATION

## 2022-12-17 PROCEDURE — 85025 COMPLETE CBC W/AUTO DIFF WBC: CPT

## 2022-12-17 PROCEDURE — 80048 BASIC METABOLIC PNL TOTAL CA: CPT

## 2022-12-17 PROCEDURE — 6370000000 HC RX 637 (ALT 250 FOR IP): Performed by: FAMILY MEDICINE

## 2022-12-17 PROCEDURE — 1180000000 HC REHAB R&B

## 2022-12-17 PROCEDURE — 97542 WHEELCHAIR MNGMENT TRAINING: CPT

## 2022-12-17 PROCEDURE — 97535 SELF CARE MNGMENT TRAINING: CPT

## 2022-12-17 PROCEDURE — 97530 THERAPEUTIC ACTIVITIES: CPT

## 2022-12-17 PROCEDURE — 2580000003 HC RX 258: Performed by: INTERNAL MEDICINE

## 2022-12-17 PROCEDURE — 83521 IG LIGHT CHAINS FREE EACH: CPT

## 2022-12-17 RX ADMIN — HYDRALAZINE HYDROCHLORIDE 100 MG: 50 TABLET, FILM COATED ORAL at 07:57

## 2022-12-17 RX ADMIN — ATORVASTATIN CALCIUM 40 MG: 40 TABLET, FILM COATED ORAL at 21:00

## 2022-12-17 RX ADMIN — METHOCARBAMOL 750 MG: 750 TABLET ORAL at 06:18

## 2022-12-17 RX ADMIN — HYDROCODONE BITARTRATE AND ACETAMINOPHEN 1 TABLET: 5; 325 TABLET ORAL at 08:02

## 2022-12-17 RX ADMIN — HYDRALAZINE HYDROCHLORIDE 100 MG: 50 TABLET, FILM COATED ORAL at 15:00

## 2022-12-17 RX ADMIN — ZOLPIDEM TARTRATE 10 MG: 5 TABLET ORAL at 21:00

## 2022-12-17 RX ADMIN — CALCITRIOL 0.25 MCG: 0.25 CAPSULE ORAL at 11:51

## 2022-12-17 RX ADMIN — PANTOPRAZOLE SODIUM 40 MG: 40 TABLET, DELAYED RELEASE ORAL at 06:18

## 2022-12-17 RX ADMIN — AMIODARONE HYDROCHLORIDE 200 MG: 200 TABLET ORAL at 11:53

## 2022-12-17 RX ADMIN — ALLOPURINOL 100 MG: 100 TABLET ORAL at 07:58

## 2022-12-17 RX ADMIN — POLYETHYLENE GLYCOL 3350 17 G: 17 POWDER, FOR SOLUTION ORAL at 07:58

## 2022-12-17 RX ADMIN — HYDRALAZINE HYDROCHLORIDE 100 MG: 50 TABLET, FILM COATED ORAL at 20:59

## 2022-12-17 RX ADMIN — METHOCARBAMOL 750 MG: 750 TABLET ORAL at 15:00

## 2022-12-17 RX ADMIN — HYDROCODONE BITARTRATE AND ACETAMINOPHEN 1 TABLET: 5; 325 TABLET ORAL at 21:30

## 2022-12-17 RX ADMIN — HYDROCODONE BITARTRATE AND ACETAMINOPHEN 1 TABLET: 5; 325 TABLET ORAL at 17:13

## 2022-12-17 RX ADMIN — HYDROCODONE BITARTRATE AND ACETAMINOPHEN 1 TABLET: 5; 325 TABLET ORAL at 11:50

## 2022-12-17 RX ADMIN — GABAPENTIN 600 MG: 600 TABLET, FILM COATED ORAL at 07:57

## 2022-12-17 RX ADMIN — METOPROLOL TARTRATE 25 MG: 25 TABLET, FILM COATED ORAL at 07:57

## 2022-12-17 RX ADMIN — METHOCARBAMOL 750 MG: 750 TABLET ORAL at 20:59

## 2022-12-17 RX ADMIN — MAGNESIUM OXIDE TAB 400 MG (241.3 MG ELEMENTAL MG) 400 MG: 400 (241.3 MG) TAB at 21:00

## 2022-12-17 RX ADMIN — METOPROLOL TARTRATE 25 MG: 25 TABLET, FILM COATED ORAL at 20:59

## 2022-12-17 RX ADMIN — MAGNESIUM OXIDE TAB 400 MG (241.3 MG ELEMENTAL MG) 400 MG: 400 (241.3 MG) TAB at 07:58

## 2022-12-17 RX ADMIN — Medication 2000 UNITS: at 07:58

## 2022-12-17 RX ADMIN — ROPINIROLE HYDROCHLORIDE 0.25 MG: 0.25 TABLET, FILM COATED ORAL at 21:07

## 2022-12-17 RX ADMIN — IRON SUCROSE 100 MG: 20 INJECTION, SOLUTION INTRAVENOUS at 15:06

## 2022-12-17 ASSESSMENT — PAIN SCALES - GENERAL
PAINLEVEL_OUTOF10: 5
PAINLEVEL_OUTOF10: 8
PAINLEVEL_OUTOF10: 7
PAINLEVEL_OUTOF10: 0
PAINLEVEL_OUTOF10: 9
PAINLEVEL_OUTOF10: 5
PAINLEVEL_OUTOF10: 4
PAINLEVEL_OUTOF10: 7
PAINLEVEL_OUTOF10: 8

## 2022-12-17 ASSESSMENT — PAIN DESCRIPTION - ORIENTATION
ORIENTATION: RIGHT
ORIENTATION: INNER

## 2022-12-17 ASSESSMENT — PAIN DESCRIPTION - LOCATION
LOCATION: LEG
LOCATION: ANKLE
LOCATION: LEG

## 2022-12-17 ASSESSMENT — PAIN - FUNCTIONAL ASSESSMENT: PAIN_FUNCTIONAL_ASSESSMENT: ACTIVITIES ARE NOT PREVENTED

## 2022-12-17 ASSESSMENT — PAIN DESCRIPTION - DESCRIPTORS
DESCRIPTORS: ACHING;DULL
DESCRIPTORS: ACHING;DULL

## 2022-12-17 NOTE — PLAN OF CARE
Problem: Discharge Planning  Goal: Discharge to home or other facility with appropriate resources  12/17/2022 0324 by Bob Burger RN  Outcome: Progressing     Problem: Safety - Adult  Goal: Free from fall injury  12/17/2022 0324 by Bob Burger RN  Outcome: Progressing     Problem: Pain  Goal: Verbalizes/displays adequate comfort level or baseline comfort level  12/17/2022 0324 by Bob Burger RN  Outcome: Progressing   Patient is calm and cooperative this evening accepting of medications as prescribed and able to express self and needs appropriately. Splints and dressings remain in place. Fall precautions in place, monitor for skin breakdown continued.

## 2022-12-17 NOTE — PROGRESS NOTES
Physical Therapy  Facility/Department: [facility]  Rehabilitation Physical Therapy Daily Treatment Note    NAME: Annabella Ganser  : 1945 (68 y.o.)  MRN: [id number]  CODE STATUS: Full Code    Date of Service: 22        Chart Reviewed: Yes  Patient assessed for rehabilitation services?: Yes  Additional Pertinent Hx: Ms. Annabella Ganser is a 69 y.o. female who was admitted to [facility] on 2022 with Ankle Injury. 60-year-old female with history of A. fib on Eliquis, bilateral knee arthroplasty, CHF, CKD, type 2 diabetes, and hypertension as well as chronic numbness of her feet status post fall over a curb found to have displaced right trimalleolar ankle fracture with large posterior malleolus and comminuted lateral malleolus fracture. Pt S/P  status post I&D and ORIF on 22 by Dr Albert Lamar, right talus open treatment-continue splint right lower extremity, nonweightbearing,  Family / Caregiver Present: No  Referring Practitioner: Dr Nori Singh  Referral Date : 22  Diagnosis: Right open trimalleolar fracture ankle fracture dislocation s/p I&D and ORIF,  Right talus fracture. Restrictions:  Restrictions/Precautions: Weight Bearing  Lower Extremity Weight Bearing Restrictions  Right Lower Extremity Weight Bearing: Non Weight Bearing  Position Activity Restriction  Other position/activity restrictions: Per chart: Right open trimalleolar fracture ankle fracture dislocation s/p I&D and ORIF, DOS 2022     SUBJECTIVE  Subjective: Pt is sitting up in her w/c brushing her hair upon arrival. Agreeable to PT.   Pain: Pt reports pain 8/10 R ankle lateral.         OBJECTIVE  Vision  Vision: Impaired  Vision Exceptions: Wears glasses at all times    Hearing  Hearing: Within functional limits    Cognition  Overall Cognitive Status: WFL       Sensation  Overall Sensation Status: Impaired (Decreased sensation B feet.)    Functional Mobility  Bed mobility  Rolling to Left: Minimal assistance  Rolling to Right: Minimal assistance  Supine to Sit: Moderate assistance (pt assist right and left LE with UEs)  Sit to Supine: 2 Person assistance; Moderate assistance  Scooting: Maximal assistance;2 Person assistance (scooting towards SUSANNA.)  Bed Mobility Comments: PT mat, wedge, 2 pillows. Transfers  Sit to Stand: 2 Person Assistance; Moderate Assistance (right knee extended to maintain and monitor NWB, right UE at RW left at mat , VCs sequencing)  Stand to Sit: Moderate Assistance;2 Person Assistance  Bed to Chair: Dependent/Total (maxi move)  Comment: Transfers completed with RW at [facility]. Pt requires cues for hand placement and NWBing of RLE with poor+ carryover. Pt is able to push with x1UE from mat and the opposite on RW during transfers. W/c<>Mat table completed with [employer]. Balance  Posture: Good  Sitting - Static: Good  Sitting - Dynamic: Fair  Standing - Static: Poor  Standing - Dynamic: Poor;-  Comments: standing balance assessed in // bars. Environmental Mobility  Ambulation  WB Status: NWB R LE  Wheelchair Activities  Propulsion: Yes  Propulsion 1  Propulsion: Manual  Level: Level Tile  Method: RUE;LUE  Level of Assistance: Minimal assistance  Description/ Details: Very slow movements to propel herself forward. Multiple short rest breaks to complete. Pt requires Min A to maintain a straight path and maneuver turns. Distance: 48'    PT Exercises  A/AROM Exercises: seated bilateral LE hips/ knees x15 with 1.5# on LLE and LGTB for light resistance. Supine BLE ex's AAROM with RLE and AROM on LLE. Rolling x2 to each side. Dynamic Sitting Balance Exercises: Sitting UE w/c push ups. Completed to increase UE strength and edu pt on technique for repositioning/skin protection. Reps: 2x5 each. Static Standing Balance Exercises: static stand with Mod A x2. Pt is unable to maintain NWBing with RLE. Third stand with a third person A to maintain [address] precuations. Pt's standing tolerance averages ~15-30sec.  Standing attempts x4 total.  Exercise Equipment: UBE 3x3 min F/R. Pt requires frequent rest breaks to complete. ASSESSMENT  Vitals  O2 Device: None (Room air)    Activity Tolerance  Activity Tolerance: Patient limited by fatigue    Assessment  Assessment: Pt dependent for transfers. Pt unable to maintain NWB R LEduring sit<>stand. Once pt in standing position, will briefly hold her R LE off the floor for NWB  precautions. Pt presents with B UE weakness as well as Left LE weakness and NWB R LE limiting her activity. Pt would benefit from continued PT following discharge to address functional deficits. Performance Deficits/Impairments: Decreased functional mobility ; Decreased ROM; Decreased strength;Decreased safe awareness;Decreased endurance;Decreased balance; Increased pain  Treatment Diagnosis: Impaired function. Therapy Prognosis: Good  Decision Making: Medium Complexity  Discharge Recommendations: Patient would benefit from continued therapy after discharge  PT Equipment Recommendations  Equipment Needed: No    CLINICAL IMPRESSION   Pt dependent for transfers. Pt unable to maintain NWB R LEduring sit<>stand. Once pt in standing position, will briefly hold her R LE off the floor for NWB  precautions. Pt presents with B UE weakness as well as Left LE weakness and NWB R LE limiting her activity. Pt would benefit from continued PT following discharge to address functional deficits. GOALS  Patient Goals   Patient Goals : Get stronger  Short Term Goals  Time Frame for Short Term Goals: 1 week  Short Term Goal 1: Bed mobility min A without use of bed rail  Short Term Goal 2: Sit<> // bars or rolling walker at mod A, maintaining NWB R LE  Short Term Goal 3: Improve staning tolerance to 1 minutes, NWB R LE with B UE support. Short Term Goal 4: Lateral scoot transfers, mod A X 1, maintaining NWB R LE. Short Term Goal 5: W/C mobility distance fo 50 ft , SBA/CGA level surface.   Long Term Goals  Time Frame for Long Term Goals : By DC  Long Term Goal 1: Mod-I bed mobility  Long Term Goal 2: Stand pivot Transfers at min/mod A maintain NWB R LE  Long Term Goal 3: W/C mobility distance fo 50 to 150 ft SBA level surface  Long Term Goal 4: Pt able to take 3 to 5 steps in // bars NWB R LE, min A x 2  Long Term Goal 5: Family training for safe transfers from varied surfaces. PLAN OF CARE  Physcial Therapy Plan  General Plan:  minutes of therapy at least 5 out of 7 days a week (5-7 daysx wk)  Specific Instructions for Next Treatment: Continue maxi move for transfers, progress with sit<.stnad in // bars or rolling walker in therapy. Current Treatment Recommendations: Strengthening; Functional mobility training;Transfer training; Endurance training;Stair training;Gait training; Safety education & training;Balance training;ROM;Wheelchair mobility training;Patient/Caregiver education & training;Home exercise program;Equipment evaluation, education, & procurement; Therapeutic activities  Additional Comments: Discussed with pt, need of ramp at entrance at this time. Safety Devices  Type of Devices: Gait belt;Call light within reach; Left in bed;Bed alarm in place (rails x 3)  Restraints  Restraints Initially in Place: No    EDUCATION  Education  Education Given To: Patient  Education Provided: Transfer Training  Education Provided Comments: NWB RLE for functional mobility, recommendation of ramp at entrance of home. Edu on slide board to complete lateral transfers from bed<> w/c to help increase independence with functional mobility and provide safe transfer techniques.   Education Method: Demonstration;Verbal  Education Outcome: Continued education needed    Therapy Time     12/17/22 0748 12/17/22 1305   PT Individual Minutes   Time In [id number]   Time Out [phone]   Minutes [address] [place], 12/17/22 at 3:55 PM

## 2022-12-17 NOTE — PROGRESS NOTES
_                         Today's Date: 12/16/2022  Patient Name: Aden Steven  Date of admission: 12/12/2022  5:48 PM  Patient's age: 69 y.o., 1945  Admission Dx: Open fracture of right tibia and fibula, sequela [S82.201S, S80.5S]      Requesting Physician: Rene Saxena MD    CHIEF COMPLAINT: Status post fall. Fracture of the right tibia and fibula. Chronic renal failure. Consult for elevated kappa light chain immunoglobulin. SUBJECTIVE:  Patient was seen and examined. The main problem she has now is the increasing weakness and fatigue. Patient received transfusion and she is currently getting iron infusion for severe anemia. No active bleeding. Continues to have severe chronic renal insufficiency. Nephrology discussing the option of dialysis. No fever. No active bleeding. BRIEF CASE HISTORY:    The patient is a 69 y.o.  female who is admitted to the hospital for further management of open fracture of the right tibia and fibula. Patient had a fall and she had right ankle fracture. She was found to have open fracture of the right tibia and fibula. Patient had surgery and she is admitted to the inpatient rehab for further care. Patient's labs showed evidence of JOSE ARMANDO on top of CKD. She was seen by nephrology. Patient was deemed to be high risk for dialysis. Further work-up showed elevated kappa light chain immunoglobulin at 41.9 with lambda light chain immunoglobulin 3.14. We were consulted to evaluate the patient for possible underlying multiple myeloma. Patient also had history of anemia of chronic renal insufficiency.     Past Medical History:   has a past medical history of Abnormal stress test, Anemia, Arthritis, Depression, Diverticulosis, DM (diabetes mellitus) ([address] Erythropenia, Fibromyalgia, HTN (hypertension), Hyperlipidemia, Kidney stone, Morbid obesity due to excess calories ([address] DIONY on CPAP, Osteopenia, Seasonal allergies, and Sleep apnea. Past Surgical History:   has a past surgical history that includes Colonoscopy (01/01/2003); Colonoscopy (01/01/2007); Tubal ligation; Arm Surgery (01/01/2011); Total knee arthroplasty (Bilateral); Cardiac catheterization ([id number] dr bOed Ashby); Cardiac catheterization (05/16/2016); ORIF ankle fracture (Right, 12/06/2022); and Ankle fracture surgery (Right, 12/6/2022). Family History: family history includes Diabetes in her mother; Heart Disease in her mother; Kidney Disease in her father; Osteoporosis in her mother; Prostate Cancer in her brother. Social History:   reports that she quit smoking about 62 years ago. Her smoking use included cigarettes. She has a 0.25 pack-year smoking history. She has never used smokeless tobacco. She reports that she does not drink alcohol and does not use drugs. Medications:    Prior to Admission medications    Medication Sig Start Date End Date Taking? Authorizing Provider   zolpidem (AMBIEN) 5 MG tablet Take 1 tablet by mouth nightly as needed for Sleep for up to 5 days. 12/12/22 12/17/22  Marcos Poon MD   methocarbamol (ROBAXIN) 750 MG tablet Take 1 tablet by mouth in the morning and 1 tablet at noon and 1 tablet in the evening. Do all this for 10 days. 12/12/22 12/22/22  Marcos Poon MD   metoprolol tartrate (LOPRESSOR) 25 MG tablet Take 1 tablet by mouth 2 times daily 12/12/22   Marcos Poon MD   senna (SENOKOT) 8.6 MG tablet Take 1 tablet by mouth daily as needed (Constipation) 12/12/22 1/11/23  Marcos Poon MD   rOPINIRole (REQUIP) 0.25 MG tablet Take 1 tablet by mouth nightly 11/14/22   Eliza Whitlock MD   gabapentin (NEURONTIN) 600 MG tablet Take 1 tablet by mouth daily for 90 days.  11/14/22 2/12/23  Eliza Whitlock MD   traZODone (DESYREL) 50 MG tablet  10/20/22   Navdeep Zamora MD   colchicine (COLCRYS) 0.6 MG tablet  9/27/22   Alexandre Ridgel, DPM   Cyanocobalamin (B-12) 1000 MCG LOZG DISSOLVE ONE tablet UNDER TONGUE ONCE DAILY 9/6/22   Jonny Mann MD   blood glucose monitor kit and supplies use check blood sugar as directed 11/9/22   Eliza Sparrow MD   blood glucose monitor strips Check blood sugars daily as directed. 11/9/22   Eliza Sparrow MD   Lancets MISC 1 each by Does not apply route daily 11/9/22   Eliza Sparrow MD   Alcohol Swabs 70 % PADS 1 each by Does not apply route daily 11/9/22   Eliza Sparrow MD   atorvastatin (LIPITOR) 40 MG tablet Take 1 tablet by mouth daily 11/7/22   Eliza Sparrow MD   pantoprazole (PROTONIX) 40 MG tablet TAKE 1 TABLET DAILY 10/19/22   Eliza Sparrow MD   ELIQUIS 5 MG TABS tablet TAKE 1 TABLET TWICE A DAY 10/11/22   Eliza Sparrow MD   cyclobenzaprine (FLEXERIL) 5 MG tablet TAKE 1 TABLET BY MOUTH NIGHTLY AS NEEDED FOR MUSCLE SPASMS 10/3/22   Eliza Sparrow MD   allopurinol (ZYLOPRIM) 100 MG tablet TAKE 1 TABLET DAILY 9/21/22   BARBIE Cordova CNP   zolpidem (AMBIEN) 5 MG tablet Take 5 mg by mouth nightly as needed for Sleep.     Historical Provider, MD   hydrALAZINE (APRESOLINE) 25 MG tablet Take 4 tablets by mouth 3 times daily 7/12/22   Miguelina Healy MD   amiodarone (CORDARONE) 200 MG tablet Take 1 tablet by mouth daily 7/6/22   BARBIE Villa NP   calcitRIOL (ROCALTROL) 0.5 MCG capsule TAKE 1 CAPSULE BY MOUTH DAILY 6/14/22   Miguelina Healy MD   ferrous sulfate (FE TABS 325) 325 (65 Fe) MG EC tablet Take 1 tablet by mouth daily (with breakfast) 5/10/22   Eliza Sparrow MD   azelastine (ASTELIN) 0.1 % nasal spray 1 spray by Nasal route 2 times daily Use in each nostril as directed 10/13/21   Emily Cardona DO   Cholecalciferol (VITAMIN D3) 25 MCG (1000 UT) TABS Take 2 tablets by mouth daily 1/9/20   BARBIE Cordova CNP   Magnesium Oxide 400 MG CAPS Take by mouth 2 times daily Takes once daily at [facility]    Historical Provider, MD     Current Facility-Administered Medications   Medication Dose Route Frequency Provider Last Rate Last Admin    iron sucrose (VENOFER) 100 mg in sodium chloride 0.9 % 100 mL IVPB  100 mg IntraVENous Q24H [place] Sant[place] MD   Stopped at 12/16/22 1814    Benzocaine-Menthol (CEPACOL) 1 lozenge  1 lozenge Oral Q2H PRN Karlo Scott MD        atorvastatin (LIPITOR) tablet 40 mg  40 mg Oral Nightly Karlo Scott MD   40 mg at 12/15/22 2006    methocarbamol (ROBAXIN) tablet 750 mg  750 mg Oral 3 times per day Karlo Scott MD   750 mg at 12/16/22 1411    acetaminophen (TYLENOL) tablet 650 mg  650 mg Oral Q6H PRN Karlo Scott MD        zolpidem (AMBIEN) tablet 10 mg  10 mg Oral Nightly Karlo Scott MD   10 mg at 12/15/22 2006    allopurinol (ZYLOPRIM) tablet 100 mg  100 mg Oral Daily Yvone Phalen, MD   100 mg at 12/16/22 8746    amiodarone (CORDARONE) tablet 200 mg  200 mg Oral Daily Yvone Phalen, MD   200 mg at 12/16/22 8855    [Held by provider] apixaban (ELIQUIS) tablet 5 mg  5 mg Oral BID Yvone Phalen, MD   5 mg at 12/16/22 7297    calcitRIOL (ROCALTROL) capsule 0.25 mcg  0.25 mcg Oral Daily Yvone Phalen, MD   0.25 mcg at 12/16/22 0844    gabapentin (NEURONTIN) tablet 600 mg  600 mg Oral Daily Yvone Phalen, MD   600 mg at 12/16/22 0841    hydrALAZINE (APRESOLINE) tablet 100 mg  100 mg Oral TID Yvone Phalen, MD   100 mg at 12/16/22 1451    HYDROcodone-acetaminophen (NORCO) 5-325 MG per tablet 1 tablet  1 tablet Oral Q4H PRN Yvone Phalen, MD   1 tablet at 12/16/22 1411    magnesium oxide (MAG-OX) tablet 400 mg  400 mg Oral BID Yvone Phalen, MD   400 mg at 12/16/22 0842    metoprolol tartrate (LOPRESSOR) tablet 25 mg  25 mg Oral BID Yvone Phalen, MD   25 mg at 12/16/22 0945    pantoprazole (PROTONIX) tablet 40 mg  40 mg Oral QAM AC Yvone Phalen, MD   40 mg at 12/16/22 0532    rOPINIRole (REQUIP) tablet 0.25 mg  0.25 mg Oral Nightly Yvone Phalen, MD   0.25 mg at 12/15/22 2016    Vitamin D (CHOLECALCIFEROL) tablet 2,000 Units  2,000 Units Oral Daily Yvone Phalen, MD   2,000 Units at 12/16/22 0842    bisacodyl (DULCOLAX) suppository 10 mg  10 mg Rectal Daily PRN Jessie Banda MD        polyethylene glycol (GLYCOLAX) packet 17 g  17 g Oral Daily Jessie Banda MD   17 g at 12/16/22 7419       Allergies:  Adhesive tape, Cephalexin, Erythromycin, Ketorolac tromethamine, Pcn [penicillins], Percocet [oxycodone-acetaminophen], Sulfa antibiotics, and Ultracet [tramadol-acetaminophen]    REVIEW OF SYSTEMS:      General: Positive for weakness and fatigue. No unanticipated weight loss or decreased appetite. No fever or chills. Eyes: No blurred vision, eye pain or double vision. Ears: No hearing problems or drainage. No tinnitus. Throat: No sore throat, problems with swallowing or dysphagia. Respiratory: No cough, sputum or hemoptysis. Positive for exertional shortness of breath. No pleuritic chest pain. Cardiovascular: No chest pain, orthopnea or PND. No lower extremity edema. No palpitation. Gastrointestinal: No problems with swallowing. No abdominal pain or bloating. No nausea or vomiting. No diarrhea or constipation. No GI bleeding. Genitourinary: No dysuria, hematuria, frequency or urgency. Musculoskeletal: As above. Dermatologic: No skin rashes or pruritus. No skin lesions or discolorations. Psychiatric: No depression, anxiety, or stress or signs of schizophrenia. No change in mood or affect. Hematologic: No history of bleeding tendency. No bruises or ecchymosis. No history of clotting problems. Infectious disease: No fever, chills or frequent infections. Endocrine: No polydipsia or polyuria. No temperature intolerance. Neurologic: No headaches or dizziness. No weakness or numbness of the extremities. No changes in balance, coordination,  memory, mentation, behavior. Allergic/Immunologic: No nasal congestion or hives. No repeated infections.        PHYSICAL EXAM:      BP (!) 142/64   Pulse 61   Temp 98.2 °F (36.8 °C)   Resp 18   Ht 5' 5\" (1.651 m)   Wt 282 lb (127.9 kg)   LMP  (LMP Unknown)   SpO2 98%   BMI 46.93 kg/m²    Temp (24hrs), Av °F (36.7 °C), Min:97.7 °F (36.5 °C), Max:98.2 °F (36.8 °C)      General appearance - not in pain or distress. Patient is morbidly obese. Mental status - alert and oriented  Eyes - pupils equal and reactive, extraocular eye movements intact  Ears - bilateral TM's and external ear canals normal  Nose - normal and patent, no erythema, discharge or polyps  Mouth - mucous membranes moist, pharynx normal without lesions  Neck - supple, no significant adenopathy  Lymphatics - no palpable lymphadenopathy, no hepatosplenomegaly  Chest - clear to auscultation, no wheezes, rales or rhonchi, symmetric air entry  Heart - normal rate, regular rhythm, normal S1, S2, no murmurs, rubs, clicks or gallops  Abdomen - soft, nontender, nondistended, no masses or organomegaly  Neurological - alert, oriented, normal speech, no focal findings or movement disorder noted  Musculoskeletal -status post right ankle fracture status post open reduction internal fixation. Extremities - peripheral pulses normal, no pedal edema, no clubbing or cyanosis  Skin - normal coloration and turgor, no rashes, no suspicious skin lesions noted           DATA:      Labs:       CBC:   Recent Labs     22  1504 12/15/22  0649   WBC 16.2* 14.1*   HGB 7.7* 6.5*   HCT 26.1* 21.2*    281     BMP:   Recent Labs     12/15/22  0649 22  0632   * 135   K 4.8 5.0   CO2 26 25   BUN 76* 71*   CREATININE 4.56* 4.53*   LABGLOM 9* 10*   GLUCOSE 135* 129*     PT/INR:   Recent Labs     22  0845   PROTIME 16.5*   INR 1.3     APTT:No results for input(s): APTT in the last 72 hours. LIVER PROFILE:  No results for input(s): AST, ALT, LABALBU in the last 72 hours. XR ANKLE RIGHT (MIN 3 VIEWS)  Narrative: EXAMINATION:  THREE XRAY VIEWS OF THE RIGHT ANKLE    2022 8:07 pm    COMPARISON:  2022.     HISTORY:  ORDERING SYSTEM PROVIDED HISTORY: post op, PACU please  TECHNOLOGIST PROVIDED HISTORY:  post op, PACU please    FINDINGS:  Interval ORIF of comminuted ankle fracture with lateral surgical plate and  screws spanning the distal fibula and posterior and medial plates spanning  the distal tibia with multiple fixation screws. Alignment is grossly  anatomic. The mortise is maintained. Evaluation of the soft tissues is  limited by overlying cast material.  Posterior plaster cast.  Impression: Anatomic alignment of comminuted ankle fracture status post ORIF. FLUORO FOR SURGICAL PROCEDURES  Radiology exam is complete. No Radiologist dictation. Please follow up   with ordering provider. CT ANKLE RIGHT WO CONTRAST  Narrative: EXAMINATION:  CT OF THE RIGHT ANKLE WITHOUT CONTRAST, 12/5/2022 9:19 pm    TECHNIQUE:  CT of the right ankle was performed without the administration of intravenous  contrast.  Multiplanar reformatted images are provided for review. Automated  exposure control, iterative reconstruction, and/or weight based adjustment of  the mA/kV was utilized to reduce the radiation dose to as low as reasonably  achievable. COMPARISON:  Radiographs of right ankle at 2021 hours on 12/05/2022. HISTORY  ORDERING SYSTEM PROVIDED HISTORY:  Pre-op  TECHNOLOGIST PROVIDED HISTORY:  Pre-op    FINDINGS:  Bones: The study shows coronally oriented mildly oblique fracture in the posterior  portion of distal end of right tibia with a prominent gap of 8.6 mm, extended  to the distal articular surface of tibia with posterior displacement of  posterior malleolus of the tibia. The fracture is mildly comminuted fracture  at the upper aspect. Evidence of comminuted transverse fracture through the base of the medial  malleolus, which is displaced mildly laterally. Evidence of a few small intra-articular fracture fragments in the anterior  portion of the tibiotalar joint and talofibular joint.     The study shows grossly comminuted oblique, displaced fractures of the distal  shaft. Right fibula with posterolateral displacement of distal fragment. This fracture extends to about 1.5 cm superior to the base of the lateral  malleolus. The lower extent  of the fracture is inferomedially,and  the  upper extent of the fracture superolaterally. Small fractures or old accessory ossicles at the inferomedial aspect of the  lateral malleolus. There is no significant gap at the distal tibiofibular syndesmosis. On the sagittal images, there is posterior subluxation of the talus bone due  to displaced fracture of posterior portion of distal end of the tibia. There is no definable fracture within the talus bone. There is no definable fracture in the right calcaneus bone. No definable fracture in the tarsal navicular bone or in the cuboid bone. In  the visualized 3 cuneiform bones there is no definable fracture. In the visualized portions of base of the metatarsal bones, there is no  definable fracture. Evidence of mild to moderate osteoarthritic change in the proximal and distal  intertarsal joint. No abnormality of the subtalar joint. Evidence of moderately prominent posterior and inferior calcaneal spurs. The calcaneal tendon is grossly intact. The tendons at the lateral and  medial aspect of the ankle joint are grossly intact. The tendons at the  anterior aspect of ankle joint are grossly intact. Soft Tissue: Mild soft tissue swelling with soft tissue edema surrounding the  right ankle joint and distal right leg. Impression: Prominent oblique fracture in coronal orientation with large gap at the  fracture involving the posterior portion of distal end of right tibia with  distal intra-articular extension of the fracture. Comminuted transverse  fracture through the base of the medial malleolus of the tibia. Grossly comminuted oblique fracture in the distal shaft of right fibula.     Moderate posterior subluxation of the talus bone due to distal posterior  tibial fracture. Some small intra-articular fracture fragments in the anterior portion of the  tibiotalar and talofibular joint. The distal tibiofibular syndesmosis is grossly intact. No evidence of fracture in the right calcaneus bone, talus bone and distal  tarsal bones. Subtalar joint is intact. Evidence of soft tissue emphysema  in the lateral portion of subtalar joint. The calcaneal tendon appears to be grossly intact. The long tendons at the  medial, lateral and anterior aspect of right ankle joint are grossly intact. CT ABDOMEN PELVIS WO CONTRAST Additional Contrast? None  Narrative: EXAMINATION:  CT OF THE ABDOMEN AND PELVIS WITHOUT CONTRAST 12/5/2022 9:18 pm    TECHNIQUE:  CT of the abdomen and pelvis was performed without the administration of  intravenous contrast. Multiplanar reformatted images are provided for review. Automated exposure control, iterative reconstruction, and/or weight based  adjustment of the mA/kV was utilized to reduce the radiation dose to as low  as reasonably achievable. Additional 3-D images of the pelvis including hip joints have been obtained  with surface rendering of bones and displayed with rotating frames. COMPARISON:  CT scan of the abdomen and pelvis without contrast on 02/27/2017. HISTORY:  ORDERING SYSTEM PROVIDED HISTORY: possible pubic rami fx  TECHNOLOGIST PROVIDED HISTORY:  possible pubic rami fx    FINDINGS:  Lower Chest: At the bases of the lungs, there is no acute process. No  pleural effusion. No hiatal hernia. No pneumoperitoneum. Organs: No demonstrable abnormality in the liver, gallbladder, spleen and  bilateral adrenal glands. In the pancreas there is no evidence of mass or  acute process. In the bilateral kidneys there is no evidence of stone or hydronephrosis.   At  the lower anterior aspect of the right kidney there is exophytic small mass  mildly hypodense, 1.2 cm in size, most likely to be a cyst.  In the mid  lateral cortex of the left kidney there is a small hypodense lesion measuring  1.1 cm probably a cyst but a new finding. The bladder is moderately distended without stone or focal abnormality. Bilateral ureters are of normal size, without evidence of stone or  obstruction. GI/Bowel: No demonstrable abnormality in the stomach. Small to moderate amount of gas scattered in some loops of small bowel  without any obvious fluid level. Distal loops of small bowel are not  distended or dilated. Normal appearing terminal ileum. There is normal appendix posteroinferior to cecum. Small to moderate amount of stool and small amount of gas are scattered in  the right colon and the transverse colon. In the descending colon small to  moderate amount of stool and gas are scattered. In the sigmoid colon and  rectum a small amount of stool and gas are scattered. Evidence of moderate  diverticulosis coli of the sigmoid colon and descending colon, without  evidence of diverticulitis. No evidence of colitis. Pelvis: No evidence of abnormal fluid collection or inflammatory process in  the pelvis. Normal appearing uterus. No adnexal mass on either side. Peritoneum/Retroperitoneum: Abdominal aorta is of normal size with moderate  calcified plaques. No evidence of periaortic or mesenteric pathologic  lymphadenopathy. No evidence of ascites. Bones/Soft Tissues: Evidence of moderate multilevel degenerative disc disease  in the lumbar spine and lumbosacral junction. Mild-to-moderate multilevel  facet osteoarthritis in the medial lower lumbar spine and lumbosacral  junction. There is no evidence of acute fracture or compression in the  lumbar spine or in the visualized sacrum and coccyx. No evidence of fracture or healing fracture or osseous malalignment in the  pelvic bones and joints including bilateral hip joints.     There is no evidence of fracture or healing fracture in the superior pubic  rami and inferior ischiopubic rami of both sides or in bilateral pubic bones. No evidence of fracture in bilateral acetabula. Impression: No evidence of fracture or osseous malalignment in the lumbar spine, pelvic  bones and joints including bilateral hip joints. No evidence of fracture in  the superior pubic rami and inferior ischiopubic rami of both sides or in  bilateral pubic bones. No fracture in bilateral acetabula or ischial bones. Evidence of mild to moderate multilevel degenerative disc disease and facet  osteoarthritis in the lumbar spine and lumbosacral junction. Nonspecific small amount of gas scattered in multiple loops of small bowel  without significant fluid levels. No distension or dilation of distal small  bowel. Normal appendix visualized. Small to moderate amount of stool and gas scattered in the colon. Evidence  of moderate diverticulosis coli of descending colon and sigmoid colon,  without evidence of diverticulitis. No evidence of renal or ureteric calculus or obstructive uropathy. No diagnostic finding in the liver, gallbladder, spleen, pancreas and adrenal  glands. CT PELVIS WO CONTRAST Additional Contrast? None  Narrative: EXAMINATION:  CT OF THE PELVIS WITHOUT CONTRAST 12/5/2022 9:18 pm    TECHNIQUE:  CT of the pelvis was performed without the administration of intravenous  contrast.  Multiplanar reformatted images are provided for review. Adjustment of mA and/or kV according to patient size was utilized. Automated  exposure control, iterative reconstruction, and/or weight based adjustment of  the mA/kV was utilized to reduce the radiation dose to as low as reasonably  achievable. The study includes 3D images of pelvis and hip joints with surface rendering  of bones and displayed with rotating frames display. COMPARISON:  CT scan of abdomen and pelvis on 2/27/2017.   CT scan of abdomen and pelvis on  12/5/2022. HISTORY:  ORDERING SYSTEM PROVIDED HISTORY: pre-op  TECHNOLOGIST PROVIDED HISTORY: Pre-op    FINDINGS:  The bladder is moderate to markedly distended without stone or focal  abnormality. There is no evidence of abnormal fluid collection or inflammatory process in  the pelvis. Normal uterus. No evidence of adnexal mass in the pelvis. Small to moderate amount of stool and gas scattered in sigmoid colon and  rectum. Mild-to-moderate diverticulosis coli of sigmoid colon, without evidence of  diverticulitis. Normal appendix posteroinferior to cecum. Small amount of nonspecific gas scattered in some loops of small bowel. No evidence of pelvic abnormal fluid collection or hemorrhage. No evidence of fracture in sacrum or coccyx. Bilateral SI joints are intact. No evidence of fracture in bilateral hip bones including bilateral  acetabulum, bilateral femoral heads, bilateral femoral necks and bilateral  visualized femoral proximal shafts. No evidence of fracture involving bilateral superior pubic rami, bilateral  inferior ischiopubic rami, bilateral pubic bones, or bilateral ischial  tuberosity. No evidence of inguinal or femoral hernia. In the visualized soft tissues of pelvic walls and proximal thighs, there is  no definable inflammatory change or hematoma. Impression: No evidence of fracture in pelvic bones or bilateral hip joints. No evidence  of fracture in superior pubic rami or inferior ischiopubic rami of both  sides. No fracture in bilateral acetabulum or in visualized bilateral  proximal femurs. Within the pelvic cavity, there is no evidence of hemorrhage or abnormal  fluid collection or acute process. Mild to moderate sigmoid diverticulosis,  without evidence of diverticulitis.             IMPRESSION:    Primary Problem  Open fracture of right tibia and fibula, sequela    Active Hospital Problems    Diagnosis Date Noted    Paraproteinemia [D89.2] 12/14/2022     Priority: Medium    Stage 5 chronic kidney disease not on chronic dialysis [facility]) [N18.5] 12/14/2022     Priority: Medium    Open fracture of right tibia and fibula, sequela [S82.201S, S82.401S] 12/05/2022     Priority: Medium    Anemia of chronic renal failure, stage 5 ([address] [N18.5, D63.1]    Open fracture of right tibia and fibula. Status post open reduction internal fixation. JOSE ARMANDO on top of CKD. Anemia of chronic renal insufficiency  Elevated kappa light chain immunoglobulin    RECOMMENDATIONS:  Records and labs and images were reviewed and discussed with the patient and family at bedside. Patient is recovering from right ankle fracture. Undergoing inpatient rehab. I reviewed patient's records and labs and explained to the patient details and in layman language. Patient is having JOSE ARMANDO on top of CKD. She is high risk for dialysis. Dialysis option was discussed with the nephrologist.  Further work-up showed elevated kappa light chain immunoglobulins. Further work-up for paraproteinemia was reviewed and discussed. No evidence of monoclonal gammopathy. However, beta-2 microglobulin is elevated. This is nonspecific. I still believe that elevated kappa light chain immunoglobulin is related to renal insufficiency. I would like to repeat kappa light chain immunoglobulin level tomorrow. We will also check her CBC and consider transfusion to keep hemoglobin above 7. Patient would benefit from Procrit treatment for anemia of chronic renal failure after correction of iron deficiency. Patient's questions were answered to the best of her satisfaction and she verbalized full understanding and agreement. Discussed with patient and family.     Cayla Gustafson MD, MD [facility]                            This note is created with the assistance of a speech recognition program.  While intending to generate a document that actually reflects the content of the visit, the document can still have some errors including those of syntax and sound a like substitutions which may escape proof reading. It such instances, actual meaning can be extrapolated by contextual diversion.

## 2022-12-17 NOTE — PROGRESS NOTES
Physical Medicine & Rehabilitation  Progress Note    12/17/2022 11:32 AM     CC: Ambulatory and ADL dysfunction due to right ankle fracture/dislocation    Subjective:   No complaints. Pain controlled. States she is not able to maintain nonweightbearing. States home is not wheelchair accessible. A bit anxious about starting dialysis. ROS:  Denies fevers, chills, sweats. No chest pain, palpitations, lightheadedness. Denies coughing, wheezing or shortness of breath. Denies abdominal pain, nausea, diarrhea or constipation. No new areas of joint pain. Denies new areas of numbness or weakness. Denies new anxiety or depression issues. No new skin problems. Rehabilitation:   PT:  Restrictions/Precautions: Weight Bearing  Implants present? : Metal implants  Other position/activity restrictions: Per chart: Right open trimalleolar fracture ankle fracture dislocation s/p I&D and ORIF, DOS 12/6/2022  Right Lower Extremity Weight Bearing: Non Weight Bearing   Transfers  Sit to Stand: 2 Person Assistance, Moderate Assistance (right knee extended to maintain and monitor NWB, right UE at RW left at mat , VCs sequencing , pt voices fear of falling)  Stand to Sit: Moderate Assistance, 2 Person Assistance  Bed to Chair: Dependent/Total (maxi move)  Comment: Transfers completed with RW in // bars. Pt requires cues for hand placement and NWBing of RLE with poor+ carryover. W/c<>Mat table completed with [facility].   WB Status: NWB R LE      OT:  ADL  Feeding: Independent  Feeding Skilled Clinical Factors: Per pt report  Grooming: Setup  Grooming Skilled Clinical Factors: Setup for oral care/grooming of hair  UE Bathing: Stand by assistance  UE Bathing Skilled Clinical Factors: Seated EOB, pt able to cleanse UB with wash cloths with SBA for safety  LE Bathing: Maximum assistance, Dependent/Total  LE Bathing Skilled Clinical Factors: Seated EOB, pt able to cleanse upper legs, A to cleanse LLE/foot and TA for aleah/buttocks care Max A x2 in maddy stedy  UE Dressing: Minimal assistance  UE Dressing Skilled Clinical Factors: Seated EOB, pt able to don [place] shirt with A to pull down  LE Dressing: Dependent/Total  LE Dressing Skilled Clinical Factors: TA to thread brief and pants, TA to mange up over hips with Max A in maddy stedy  Toileting: Dependent/Total  Toileting Skilled Clinical Factors: TA to complete toileting hygiene and manage brief/pants up over hips  Additional Comments: Pt completes UB and LB ADLs while seated EOB d/t decreased strength, activity tolerance, endurance, and NWB to RLE. Pt tolerated well, frequent rest breaks taken as needed throughout session. Pt Max A/dependent for all LB ADLs d/t NWB RLE and decreased strength. Balance  Sitting Balance: Stand by assistance  Standing Balance: Maximum assistance   Standing Balance  Time: ~30 seconds x5  Activity: Functional transfers  Comment: Max A x2 using maddy GOOD  Functional Mobility  Functional - Mobility Device: Other (Use of maddy sabinady to transfer from bed to toilet)  Activity: To/from bathroom  Assist Level: Dependent/Total  Functional Mobility Comments: Max A x2 in maddy hernandez     Bed mobility  Rolling to Left: Minimal assistance  Rolling to Right: Minimal assistance  Supine to Sit: Moderate assistance (pt assist right and left LE with UEs)  Sit to Supine: 2 Person assistance, Moderate assistance  Scooting: Maximal assistance, 2 Person assistance (scooting towards [facility].)  Bed Mobility Comments: PT mat, wedge, 2 pillows. Transfers  Sit to stand: Dependent/Total (Max A x2 in maddysonia hernandez)  Stand to sit: Dependent/Total (Max A x2 in [address] [place] [address])  Transfer Comments: Pt very fearful, however completes 5 sit to stand transfers utilizing maddy stedy.    Toilet Transfers  Toilet - Technique: Ambulating  Equipment Used: Raised toilet seat with rails  Toilet Transfer: Dependent/Total (Max A x2 from maddy sabinady)  Toilet Transfers Comments: Max A x2 with maddy sabinady, VC to maintain NWB RLE               ST:        Objective:  BP (!) 146/66   Pulse 59   Temp 98.2 °F (36.8 °C)   Resp 15   Ht 5' 5\" (1.651 m)   Wt 282 lb (127.9 kg)   LMP  (LMP Unknown)   SpO2 99%   BMI 46.93 kg/m²  I Body mass index is 46.93 kg/m². I   Wt Readings from Last 1 Encounters:   12/15/22 282 lb (127.9 kg)      Temp (24hrs), Av.2 °F (36.8 °C), Min:98.2 °F (36.8 °C), Max:98.2 °F (36.8 °C)         GEN: well developed, well nourished, no acute distress  HEENT: Normocephalic atraumatic, EOMI, mucous membranes pink and moist  CV: RRR, no murmurs, rubs or gallops  PULM: CTAB, no rales or rhonchi. Respirations WNL and unlabored  ABD: soft, NT, ND, +BS and equal  NEURO: A&O x3. Sensation intact to light touch. MSK: Lower extremity with cast, neurovascular intact, plus/5 upper extremities and left lower extremity, 3/5 proximal right lower extremity, right ankle with/splint Ace wrap sling intact  EXTREMITIES: No calf tenderness to palpation bilaterally. SKIN: warm dry and intact with good turgor  PSYCH: appropriately interactive. Affect WNL.   Good spirits        Medications   Scheduled Meds:   iron sucrose  100 mg IntraVENous Q24H    atorvastatin  40 mg Oral Nightly    methocarbamol  750 mg Oral 3 times per day    zolpidem  10 mg Oral Nightly    allopurinol  100 mg Oral Daily    amiodarone  200 mg Oral Daily    [Held by provider] apixaban  5 mg Oral BID    calcitRIOL  0.25 mcg Oral Daily    gabapentin  600 mg Oral Daily    hydrALAZINE  100 mg Oral TID    magnesium oxide  400 mg Oral BID    metoprolol tartrate  25 mg Oral BID    pantoprazole  40 mg Oral QAM AC    rOPINIRole  0.25 mg Oral Nightly    Vitamin D  2,000 Units Oral Daily    polyethylene glycol  17 g Oral Daily     Continuous Infusions:  PRN Meds:.Benzocaine-Menthol, acetaminophen, HYDROcodone-acetaminophen, bisacodyl     Diagnostics:     CBC:   Recent Labs     22  1504 12/15/22  0649 22  0631   WBC 16.2* 14.1* 10.7   RBC 2.59* 2.27* 2.46* HGB 7.7* 6.5* 7.3*   HCT 26.1* 21.2* 22.7*   .8 93.0 92.3   RDW 15.9* 16.4* 17.0*    281 295     BMP:   Recent Labs     12/15/22  0649 12/16/22  0632 12/17/22  0631   * 135 136   K 4.8 5.0 5.2   CL 98 99 101   CO2 26 25 25   BUN 76* 71* 72*   CREATININE 4.56* 4.53* 4.40*     BNP: No results for input(s): BNP in the last 72 hours. PT/INR:   Recent Labs     12/16/22  0845   PROTIME 16.5*   INR 1.3     APTT: No results for input(s): APTT in the last 72 hours. CARDIAC ENZYMES: No results for input(s): CKMB, CKMBINDEX, TROPONINT in the last 72 hours. Invalid input(s): CKTOTAL;3  FASTING LIPID PANEL:  Lab Results   Component Value Date    CHOL 178 01/11/2021    HDL 37 (L) 01/11/2021    TRIG 281 (H) 01/11/2021     LIVER PROFILE: No results for input(s): AST, ALT, ALB, BILIDIR, BILITOT, ALKPHOS in the last 72 hours. I/O (24Hr): No intake or output data in the 24 hours ending 12/17/22 1132    Glu last 24 hour  No results for input(s): POCGLU in the last 72 hours. No results for input(s): CLARITYU, COLORU, PHUR, SPECGRAV, PROTEINU, RBCUA, BLOODU, BACTERIA, NITRU, WBCUA, LEUKOCYTESUR, YEAST, GLUCOSEU, BILIRUBINUR in the last 72 hours. Impression/Plan:   Impaired ADLs, gait, and mobility due to:     R ankle trimalleolar fracture dislocation: s/p ORIF 12/6 by Dr. Erik Romero. NWB RLE. PT/OT for gait, mobility, strengthening, endurance, ADLs, and self care. Patient unable maintain nonweightbearing, home not wheelchair accessible per pt -  will need to start looking at skilled nurse facilities-however will need clearance when medically ready by nephrology  Pain-has [place] prn, on Robaxin TID. Has Tylenol prn and vitamin D. 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 Nephrology following and planning to initiate hemodialysis next week On calcitriol. DM II with neuropathy: carb controlled diet.  On gabapentin for neuropathy. HTN/HLD: on atorvastatin, hydralazine  Anemia of chronic disease: Hb low but stable. Nephrology following and treating with IV iron x10 days. She reports 6-7 is baseline Hb for her and that she receives Retacrit at [facility] twice monthly - on hold for now per nephrology. Hematology following. Elevated kappa light chain immunoglobulin: Hematology following. Concern for bone marrow disease vs renal failure - workup in progress. Frances Ruffing Possible Resume Procrit after completion of IV iron hematology plan to repeat chain and CBC use , transfuse for hemoglobin less than 7  Hyponatremia: Improved. Will monitor. Fibromyalgia  Obesity: BMI 43.6. Dietitian consulted  DIONY:  Depression: on Trazodone nightly  Moderately severe protein calorie malnutrition: Nephrology added Nepro supplement daily. Dietitian following  Insomnia: has Ambien nightly, reportedly takes 10 mg at home. Also on Requip at hs. Increased to her home dose of Ambien. Gout: on allopurinol. Colchicine discontinued - no acute gout symptoms  Bowel Management: Miralax daily, senokot prn, dulcolax prn. DVT Prophylaxis:  SCD's while in bed, RAVINDRA's during the day, and Eliquis  Internal medicine for medical management - nephrology and hematology follow      Paolo King. Everton Barfield MD       This note is created with the assistance of a speech recognition program.  While intending to generate a document that actually reflects the content of the visit, the document can still have some errors including those of syntax and sound a like substitutions which may escape proof reading.   In such instances, actual meaning can be extrapolated by contextual diversion

## 2022-12-17 NOTE — PROGRESS NOTES
12/6/2022). Restrictions  Restrictions/Precautions  Restrictions/Precautions: Weight Bearing  Required Braces or Orthoses?: No  Implants present? : Metal implants     Position Activity Restriction  Other position/activity restrictions: Per chart: Right open trimalleolar fracture ankle fracture dislocation s/p I&D and ORIF, DOS 12/6/2022  Lower Extremity Weight Bearing Restrictions  Right Lower Extremity Weight Bearing: Non Weight Bearing    Subjective  Subjective  Subjective: \"Not too bad\" Pt states when asked how she's feeling today. Pain Assessment  Pain Assessment: 0-10  Pain Level: 5  Pain Location: Ankle  Pain Orientation: Right    Objective  Cognition  Overall Orientation Status: Within Functional Limits       Cognition  Overall Cognitive Status: WFL    Activities of Daily Living  Grooming/Oral Hygiene  Assistance Level: Set-up  Skilled Clinical Factors: Pt completes oral care, washing face and brushing hair while sitting in w/c at sink. Upper Extremity Dressing  Assistance Level: Stand by assist  Skilled Clinical Factors: SBA for unhooking bra only    Lower Extremity Dressing  Assistance Level: Maximum assistance  Skilled Clinical Factors: [place]zone/donning of pants completed supine while rolling in bed. Pt able to slightly assist with pulling pants up in front. Toileting  Assistance Level: Dependent  Skilled Clinical Factors: TA for brief change in bed    Light Housekeeping  Light Housekeeping Level: Wheelchair  Light Housekeeping Level of Assistance: Supervision  Light Housekeeping: Pt facilitated in endurance task to retreive clothes from dryer and fold. Pt requires increased time for task completion due to increased overall fatigue. Mobility  Roll Left  Assistance Level:  Moderate assistance  Skilled Clinical Factors: good use of bed rails  Roll Right  Assistance Level: Minimal assistance  Skilled Clinical Factors: good use of bed rails    OT Exercises  Exercise Treatment: PM: LUJAN facilitated pt in BUE exercises for increased strength and activity tolerance to support safety and participation with ADLs. Pt completes with 2# free weight, 15 reps X2 sets each. Requires RB between each exercise due to fatigue.     Assessment  Assessment  Activity Tolerance: Patient limited by fatigue;Patient limited by endurance  Discharge Recommendations: Continue to assess pending progress    Patient Education  Education  Education Given To: Patient  Education Provided: Role of Therapy;Plan of Care;Safety;ADL Function;Cognition  Education Provided Comments: Thorough education on OT POC and importance of daily participation in functional tasks and functional transfers  Education Method: Verbal  Barriers to Learning: None  Education Outcome: Verbalized understanding;Continued education needed    Goals     Short Term Goals  Time Frame for Short Term Goals: By one week  Short Term Goal 1: Pt will perform UB ADLs including grooming/oral care with SBA and good safety  Short Term Goal 2: Pt will perform LB ADLs including toileting/toilet transfers with Min A, good safety, and use of AE/DME/Modified techniques as needed  Short Term Goal 3: Pt will be educated on NWB status to RLE with good carryover during functional transfers/tasks  Short Term Goal 4: Pt will tolerate standing for 5+ minutes, Min A, with 1-2 UE support and no LOB during functional task while maintaining NWB RLE  Short Term Goal 5: Pt will be educated on and explore use of AE/DME/Modified techniques to improve safety and independence with ADL tasks  Additional Goals?: Yes  Short Term Goal 6: Pt will actively participate in 30+ minutes of therapeutic exercise/functional activity to promote safety and independence with self-care and mobility  Short Term Goal 7: Pt will perform functional transfers/mobility with Min A, good safety, and use of least restrictive device while maintaining NWB RLE    Long Term Goals  Time Frame for Long Term Goals : By discharge  Long Term Goal 1: Pt will perform UB ADLs including grooming/oral care with Mod I and good safety  Long Term Goal 2: Pt will perform LB ADLs, including toileting/toilet transfers, with SBA, good safety, and use of AE/DME/Modified techniques as needed  Long Term Goal 3: Pt will tolerate standing 10+ minutes, SBA, with 1-2 UE and no LOB during self-care/functional activity while maintaining NWB RLE  Long Term Goal 4: Pt will verbalize/demonstrate good understanding of home safety/fall prevention/energy conservation strategies to improve safety and independence with self-care tasks  Long Term Goal 5: Pt will safely perform light housekeeping/meal preparation with supervision, good safety, and use of least restrictive device to improve independence with ADLs/IADLs  Long Term Goal 6: Pt will perform functional transfers/mobility with SBA, good safety, and use of least restrictive device while maintaining NWB RLE  Long Term Goal 7: Pt will demonstrate improved fine motor coordination during self-care tasks AEB 10 second improvement on 9 hole peg test    Plan  Occupational Therapy Plan  Times Per Week: 5-7  Times Per Day: Twice a day  Current Treatment Recommendations: Strengthening, ROM, Balance training, Functional mobility training, Endurance training, Pain management, Patient/Caregiver education & training, Positioning, Safety education & training, Equipment evaluation, education, & procurement, Self-Care / ADL, Home management training, Coordination training       12/17/22 0801 12/17/22 1426   OT Individual Minutes   Time In [address]   Time Out [id number]   [id number]         Electronically signed by TRISHA Estarda on 12/17/22 at 2:43 PM EST

## 2022-12-18 LAB
ANION GAP SERPL CALCULATED.3IONS-SCNC: 11 MMOL/L (ref 9–17)
BUN BLDV-MCNC: 73 MG/DL (ref 8–23)
CALCIUM SERPL-MCNC: 10 MG/DL (ref 8.6–10.4)
CHLORIDE BLD-SCNC: 100 MMOL/L (ref 98–107)
CO2: 25 MMOL/L (ref 20–31)
CREAT SERPL-MCNC: 4.78 MG/DL (ref 0.5–0.9)
GFR SERPL CREATININE-BSD FRML MDRD: 9 ML/MIN/1.73M2
GLUCOSE BLD-MCNC: 132 MG/DL (ref 70–99)
POTASSIUM SERPL-SCNC: 5.5 MMOL/L (ref 3.7–5.3)
SODIUM BLD-SCNC: 136 MMOL/L (ref 135–144)

## 2022-12-18 PROCEDURE — 6370000000 HC RX 637 (ALT 250 FOR IP): Performed by: PHYSICAL MEDICINE & REHABILITATION

## 2022-12-18 PROCEDURE — 97110 THERAPEUTIC EXERCISES: CPT

## 2022-12-18 PROCEDURE — 99232 SBSQ HOSP IP/OBS MODERATE 35: CPT | Performed by: PHYSICAL MEDICINE & REHABILITATION

## 2022-12-18 PROCEDURE — 6370000000 HC RX 637 (ALT 250 FOR IP): Performed by: INTERNAL MEDICINE

## 2022-12-18 PROCEDURE — 80048 BASIC METABOLIC PNL TOTAL CA: CPT

## 2022-12-18 PROCEDURE — 99233 SBSQ HOSP IP/OBS HIGH 50: CPT | Performed by: INTERNAL MEDICINE

## 2022-12-18 PROCEDURE — 97535 SELF CARE MNGMENT TRAINING: CPT

## 2022-12-18 PROCEDURE — 97542 WHEELCHAIR MNGMENT TRAINING: CPT

## 2022-12-18 PROCEDURE — 1180000000 HC REHAB R&B

## 2022-12-18 PROCEDURE — 99232 SBSQ HOSP IP/OBS MODERATE 35: CPT | Performed by: INTERNAL MEDICINE

## 2022-12-18 PROCEDURE — 97530 THERAPEUTIC ACTIVITIES: CPT

## 2022-12-18 PROCEDURE — 36415 COLL VENOUS BLD VENIPUNCTURE: CPT

## 2022-12-18 PROCEDURE — 2580000003 HC RX 258: Performed by: INTERNAL MEDICINE

## 2022-12-18 PROCEDURE — 6370000000 HC RX 637 (ALT 250 FOR IP): Performed by: FAMILY MEDICINE

## 2022-12-18 PROCEDURE — 6360000002 HC RX W HCPCS: Performed by: INTERNAL MEDICINE

## 2022-12-18 RX ORDER — METHOCARBAMOL 500 MG/1
500 TABLET, FILM COATED ORAL EVERY 8 HOURS SCHEDULED
Status: DISCONTINUED | OUTPATIENT
Start: 2022-12-18 | End: 2022-12-24 | Stop reason: HOSPADM

## 2022-12-18 RX ORDER — SODIUM POLYSTYRENE SULFONATE 4.1 MEQ/G
30 POWDER, FOR SUSPENSION ORAL; RECTAL ONCE
Status: DISCONTINUED | OUTPATIENT
Start: 2022-12-18 | End: 2022-12-18 | Stop reason: CLARIF

## 2022-12-18 RX ORDER — SODIUM POLYSTYRENE SULFONATE 15 G/60ML
30 SUSPENSION ORAL; RECTAL ONCE
Status: COMPLETED | OUTPATIENT
Start: 2022-12-18 | End: 2022-12-18

## 2022-12-18 RX ORDER — GABAPENTIN 300 MG/1
300 CAPSULE ORAL DAILY
Status: DISCONTINUED | OUTPATIENT
Start: 2022-12-19 | End: 2022-12-24 | Stop reason: HOSPADM

## 2022-12-18 RX ADMIN — HYDROCODONE BITARTRATE AND ACETAMINOPHEN 1 TABLET: 5; 325 TABLET ORAL at 20:58

## 2022-12-18 RX ADMIN — SODIUM POLYSTYRENE SULFONATE 30 G: 15 SUSPENSION ORAL; RECTAL at 12:39

## 2022-12-18 RX ADMIN — METOPROLOL TARTRATE 25 MG: 25 TABLET, FILM COATED ORAL at 21:19

## 2022-12-18 RX ADMIN — ALLOPURINOL 100 MG: 100 TABLET ORAL at 09:15

## 2022-12-18 RX ADMIN — HYDROCODONE BITARTRATE AND ACETAMINOPHEN 1 TABLET: 5; 325 TABLET ORAL at 09:14

## 2022-12-18 RX ADMIN — PANTOPRAZOLE SODIUM 40 MG: 40 TABLET, DELAYED RELEASE ORAL at 05:53

## 2022-12-18 RX ADMIN — GABAPENTIN 600 MG: 600 TABLET, FILM COATED ORAL at 09:15

## 2022-12-18 RX ADMIN — HYDROCODONE BITARTRATE AND ACETAMINOPHEN 1 TABLET: 5; 325 TABLET ORAL at 13:58

## 2022-12-18 RX ADMIN — ROPINIROLE HYDROCHLORIDE 0.25 MG: 0.25 TABLET, FILM COATED ORAL at 20:59

## 2022-12-18 RX ADMIN — METHOCARBAMOL 750 MG: 750 TABLET ORAL at 05:53

## 2022-12-18 RX ADMIN — IRON SUCROSE 100 MG: 20 INJECTION, SOLUTION INTRAVENOUS at 14:13

## 2022-12-18 RX ADMIN — AMIODARONE HYDROCHLORIDE 200 MG: 200 TABLET ORAL at 09:15

## 2022-12-18 RX ADMIN — MAGNESIUM OXIDE TAB 400 MG (241.3 MG ELEMENTAL MG) 400 MG: 400 (241.3 MG) TAB at 09:15

## 2022-12-18 RX ADMIN — METHOCARBAMOL 500 MG: 500 TABLET ORAL at 12:40

## 2022-12-18 RX ADMIN — CALCITRIOL 0.25 MCG: 0.25 CAPSULE ORAL at 09:18

## 2022-12-18 RX ADMIN — ZOLPIDEM TARTRATE 10 MG: 5 TABLET ORAL at 20:58

## 2022-12-18 RX ADMIN — HYDRALAZINE HYDROCHLORIDE 100 MG: 50 TABLET, FILM COATED ORAL at 12:40

## 2022-12-18 RX ADMIN — MAGNESIUM OXIDE TAB 400 MG (241.3 MG ELEMENTAL MG) 400 MG: 400 (241.3 MG) TAB at 20:58

## 2022-12-18 RX ADMIN — Medication 2000 UNITS: at 09:15

## 2022-12-18 RX ADMIN — METOPROLOL TARTRATE 25 MG: 25 TABLET, FILM COATED ORAL at 09:15

## 2022-12-18 RX ADMIN — ATORVASTATIN CALCIUM 40 MG: 40 TABLET, FILM COATED ORAL at 20:58

## 2022-12-18 RX ADMIN — HYDRALAZINE HYDROCHLORIDE 100 MG: 50 TABLET, FILM COATED ORAL at 20:58

## 2022-12-18 RX ADMIN — POLYETHYLENE GLYCOL 3350 17 G: 17 POWDER, FOR SOLUTION ORAL at 09:16

## 2022-12-18 RX ADMIN — METHOCARBAMOL 500 MG: 500 TABLET ORAL at 20:58

## 2022-12-18 RX ADMIN — HYDRALAZINE HYDROCHLORIDE 100 MG: 50 TABLET, FILM COATED ORAL at 09:15

## 2022-12-18 ASSESSMENT — PAIN DESCRIPTION - DESCRIPTORS
DESCRIPTORS: DULL;ACHING
DESCRIPTORS: ACHING;SHARP;DULL

## 2022-12-18 ASSESSMENT — PAIN SCALES - GENERAL
PAINLEVEL_OUTOF10: 4
PAINLEVEL_OUTOF10: 5
PAINLEVEL_OUTOF10: 7
PAINLEVEL_OUTOF10: 7
PAINLEVEL_OUTOF10: 3

## 2022-12-18 ASSESSMENT — PAIN DESCRIPTION - LOCATION
LOCATION: LEG
LOCATION: LEG
LOCATION: ANKLE;LEG

## 2022-12-18 ASSESSMENT — PAIN DESCRIPTION - ORIENTATION
ORIENTATION: RIGHT;LEFT
ORIENTATION: RIGHT
ORIENTATION: RIGHT

## 2022-12-18 NOTE — PROGRESS NOTES
12/18/22 1355   Encounter Summary   Encounter Overview/Reason  Volunteer Encounter   Service Provided For: Patient   Referral/Consult From: Rounding   Last Encounter  12/18/22   Spiritual/Emotional needs   Type Spiritual Support   Rituals, Rites and [address]

## 2022-12-18 NOTE — PROGRESS NOTES
Physical Medicine & Rehabilitation  Progress Note    12/18/2022 11:19 AM     CC: Ambulatory and ADL dysfunction due to right ankle fracture/dislocation    Subjective:   No complaints. Pain controlled. States she is not able to maintain nonweightbearing. States home is not wheelchair accessible. A bit anxious about starting dialysis. No change today    ROS:  Denies fevers, chills, sweats. No chest pain, palpitations, lightheadedness. Denies coughing, wheezing or shortness of breath. Denies abdominal pain, nausea, diarrhea or constipation. No new areas of joint pain. Denies new areas of numbness or weakness. Denies new anxiety or depression issues. No new skin problems. Rehabilitation:   PT:  Restrictions/Precautions: Weight Bearing  Implants present? : Metal implants  Other position/activity restrictions: Per chart: Right open trimalleolar fracture ankle fracture dislocation s/p I&D and ORIF, DOS 12/6/2022  Right Lower Extremity Weight Bearing: Non Weight Bearing   Transfers  Sit to Stand: 2 Person Assistance, Moderate Assistance (right knee extended to maintain and monitor NWB, right UE at RW left at mat , VCs sequencing)  Stand to Sit: Moderate Assistance, 2 Person Assistance  Bed to Chair: Dependent/Total (maxi move)  Comment: Transfers completed with RW at [facility]. Pt requires cues for hand placement and NWBing of RLE with poor+ carryover. Pt is able to push with x1UE from mat and the opposite on RW during transfers. W/c<>Mat table completed with [employer]. Pt is agreeable to attempt slide board transfer. She requires Max A x2 for safety. Edu provided on proper technique to complete.   WB Status: NWB R LE      OT:  ADL  Feeding: Independent  Feeding Skilled Clinical Factors: Per pt report  Grooming: Setup  Grooming Skilled Clinical Factors: Setup for oral care/grooming of hair  UE Bathing: Stand by assistance  UE Bathing Skilled Clinical Factors: Seated EOB, pt able to cleanse UB with wash cloths with SBA for safety  LE Bathing: Maximum assistance, Dependent/Total  LE Bathing Skilled Clinical Factors: Seated EOB, pt able to cleanse upper legs, A to cleanse LLE/foot and TA for aleah/buttocks care Max A x2 in maddy stedy  UE Dressing: Minimal assistance  UE Dressing Skilled Clinical Factors: Seated EOB, pt able to don [place] shirt with A to pull down  LE Dressing: Dependent/Total  LE Dressing Skilled Clinical Factors: TA to thread brief and pants, TA to mange up over hips with Max A in maddy stedy  Toileting: Dependent/Total  Toileting Skilled Clinical Factors: TA to complete toileting hygiene and manage brief/pants up over hips  Additional Comments: Pt completes UB and LB ADLs while seated EOB d/t decreased strength, activity tolerance, endurance, and NWB to RLE. Pt tolerated well, frequent rest breaks taken as needed throughout session. Pt Max A/dependent for all LB ADLs d/t NWB RLE and decreased strength. Balance  Sitting Balance: Stand by assistance  Standing Balance: Maximum assistance   Standing Balance  Time: ~30 seconds x5  Activity: Functional transfers  Comment: Max A x2 using [employer]  Functional Mobility  Functional - Mobility Device: Other (Use of maddy [employer] to transfer from bed to toilet)  Activity: To/from bathroom  Assist Level: Dependent/Total  Functional Mobility Comments: Max A x2 in maddy stedy     Bed mobility  Rolling to Left: Minimal assistance  Rolling to Right: Minimal assistance  Supine to Sit: Moderate assistance (pt assist right and left LE with UEs)  Sit to Supine: 2 Person assistance, Moderate assistance  Scooting: Maximal assistance, 2 Person assistance (scooting towards [facility].)  Bed Mobility Comments: PT mat, wedge, 2 pillows. Transfers  Sit to stand: Dependent/Total (Max A x2 in maddy stedy)  Stand to sit: Dependent/Total (Max A x2 in [address] [place] [address] stedy)  Transfer Comments: Pt very fearful, however completes 5 sit to stand transfers utilizing maddy stedy.    Toilet Transfers  Toilet - Technique: Ambulating  Equipment Used: Raised toilet seat with rails  Toilet Transfer: Dependent/Total (Max A x2 from maddy hernandez)  Toilet Transfers Comments: Max A x2 with maddy david, VC to maintain NWB RLE               ST:        Objective:  BP (!) 130/48   Pulse 62   Temp 98.2 °F (36.8 °C) (Oral)   Resp 18   Ht 5' 5\" (1.651 m)   Wt 282 lb (127.9 kg)   LMP  (LMP Unknown)   SpO2 98%   BMI 46.93 kg/m²  I Body mass index is 46.93 kg/m². I   Wt Readings from Last 1 Encounters:   12/15/22 282 lb (127.9 kg)      Temp (24hrs), Av °F (36.7 °C), Min:97.7 °F (36.5 °C), Max:98.2 °F (36.8 °C)         GEN: well developed, well nourished, no acute distress  HEENT: Normocephalic atraumatic, EOMI, mucous membranes pink and moist  CV: RRR, no murmurs, rubs or gallops  PULM: CTAB, no rales or rhonchi. Respirations WNL and unlabored  ABD: soft, NT, ND, +BS and equal  NEURO: A&O x3. Sensation intact to light touch. MSK: Right lower extremity with cast, neurovascular intact, plus/5 upper extremities and left lower extremity, 3/5 proximal right lower extremity, right ankle with/splint Ace wrap sling intact neurovascularly intact  EXTREMITIES: No calf tenderness to palpation left  lower extremity  SKIN: warm dry and intact with good turgor  PSYCH: appropriately interactive. Affect WNL.   Good spirits        Medications   Scheduled Meds:   iron sucrose  100 mg IntraVENous Q24H    atorvastatin  40 mg Oral Nightly    methocarbamol  750 mg Oral 3 times per day    zolpidem  10 mg Oral Nightly    allopurinol  100 mg Oral Daily    amiodarone  200 mg Oral Daily    [Held by provider] apixaban  5 mg Oral BID    calcitRIOL  0.25 mcg Oral Daily    gabapentin  600 mg Oral Daily    hydrALAZINE  100 mg Oral TID    magnesium oxide  400 mg Oral BID    metoprolol tartrate  25 mg Oral BID    pantoprazole  40 mg Oral QAM AC    rOPINIRole  0.25 mg Oral Nightly    Vitamin D  2,000 Units Oral Daily    polyethylene glycol  17 g Oral Daily Continuous Infusions:  PRN Meds:.Benzocaine-Menthol, acetaminophen, HYDROcodone-acetaminophen, bisacodyl     Diagnostics:     CBC:   Recent Labs     12/17/22  0631   WBC 10.7   RBC 2.46*   HGB 7.3*   HCT 22.7*   MCV 92.3   RDW 17.0*          BMP:   Recent Labs     12/16/22  0632 12/17/22  0631 12/18/22  0624    136 136   K 5.0 5.2 5.5*   CL 99 101 100   CO2 25 25 25   BUN 71* 72* 73*   CREATININE 4.53* 4.40* 4.78*       BNP: No results for input(s): BNP in the last 72 hours. PT/INR:   Recent Labs     12/16/22  0845   PROTIME 16.5*   INR 1.3       APTT: No results for input(s): APTT in the last 72 hours. CARDIAC ENZYMES: No results for input(s): CKMB, CKMBINDEX, TROPONINT in the last 72 hours. Invalid input(s): CKTOTAL;3  FASTING LIPID PANEL:  Lab Results   Component Value Date    CHOL 178 01/11/2021    HDL 37 (L) 01/11/2021    TRIG 281 (H) 01/11/2021     LIVER PROFILE: No results for input(s): AST, ALT, ALB, BILIDIR, BILITOT, ALKPHOS in the last 72 hours. I/O (24Hr): Intake/Output Summary (Last 24 hours) at 12/18/2022 1119  Last data filed at 12/17/2022 1400  Gross per 24 hour   Intake 200 ml   Output --   Net 200 ml       Glu last 24 hour  No results for input(s): POCGLU in the last 72 hours. No results for input(s): CLARITYU, COLORU, PHUR, SPECGRAV, PROTEINU, RBCUA, BLOODU, BACTERIA, NITRU, WBCUA, LEUKOCYTESUR, YEAST, GLUCOSEU, BILIRUBINUR in the last 72 hours. Impression/Plan:   Impaired ADLs, gait, and mobility due to:     R ankle trimalleolar fracture dislocation: s/p ORIF 12/6 by Dr. Gisel Love. NWB RLE. PT/OT for gait, mobility, strengthening, endurance, ADLs, and self care. Patient unable maintain nonweightbearing, home not wheelchair accessible per pt -  will need to start looking at skilled nurse facilities-however will need clearance when medically ready by nephrology starting dialysis  Pain-has Norco prn, on Robaxin TID. Has Tylenol prn and vitamin D.   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 Nephrology following and planning to initiate hemodialysis this week plan for dialysis catheter tomorrow on calcitriol. BUN/creatinine 73/4.78 potassium 5.5- notify nephrology  DM II with neuropathy: carb controlled diet. On gabapentin for neuropathy. Decreased to 300 mg daily due to renal function  HTN/HLD: on atorvastatin, hydralazine  Anemia of chronic disease: Hb low but stable. Nephrology following and treating with IV iron x10 days. She reports 6-7 is baseline Hb for her and that she receives [facility]rit [facility] twice monthly - on hold for now per nephrology. Hematology following last seen 12/16  Elevated kappa light chain immunoglobulin: Hematology following. Concern for bone marrow disease vs renal failure - workup in progress. Ai Arreola Possible Resume Procrit after completion of IV iron hematology plan to repeat chain and CBC  , transfuse for hemoglobin less than 7  Hyponatremia: Improved. Will monitor. Sodium 136  Fibromyalgia  Obesity: BMI 43.6. Dietitian consulted  DIONY:  Depression: on Trazodone nightly  Moderately severe protein calorie malnutrition: Nephrology added Nepro supplement daily. Dietitian following  Insomnia: has Ambien nightly, reportedly takes 10 mg at home. Also on Requip at hs. Increased to her home dose of Ambien. Gout: on allopurinol. Colchicine discontinued - no acute gout symptoms  Bowel Management: Miralax daily, senokot prn, dulcolax prn. DVT Prophylaxis:  SCD's while in bed, RAVINDRA's during the day, and Eliquis  Internal medicine for medical management - nephrology and hematology follow      [place]. Uvaldo Tse MD       This note is created with the assistance of a speech recognition program.  While intending to generate a document that actually reflects the content of the visit, the document can still have some errors including those of syntax and sound a like substitutions which may escape proof reading.   In such instances, actual meaning can be extrapolated by contextual diversion

## 2022-12-18 NOTE — PROGRESS NOTES
[address]   Acute Rehabilitation Occupational Therapy Daily Treatment Note    Date: 22  Patient Name: Tyler Mackay       Room: 8675/3814-97  MRN: [id number]  Account: [de-identified]   : 1945  (69 y.o.) Gender: female       Referring Practitioner: Olvin Romero MD  Diagnosis: Open fracture of right tibia and fibula, sequela  Additional Pertinent Hx: Tyler Mackay is 69 y.o. female  Who was admitted to the hospital on 2022 for treatment of Open fracture of right tibia and fibula, sequela. Patient presented to the emergency room with right ankle injury and suffered a right ankle fracture after falling over a curb when she was trying to go to [employer] for [employer]. She has underlying history of A. fib on Eliquis, CHF, diabetes mellitus with CKD3, hypertension, osteoporosis, fibromyalgia, morbid obesity BMI 43, sleep apnea, depression. Patient underwent I&D and ORIF on 2022. Patient also developed acute kidney injury with hyperkalemia and metabolic acidosis. Nephrology was consulted and patient given DANIA ADE[facility]A [facility]. Patient was given 1 unit PRBC transfusion on 2022 for low hemoglobin of 6.4. Treatment Diagnosis: Impaired self-care status    Past Medical History:  has a past medical history of Abnormal stress test, Anemia, Arthritis, Depression, Diverticulosis, DM (diabetes mellitus) ([address] Erythropenia, Fibromyalgia, HTN (hypertension), Hyperlipidemia, Kidney stone, Morbid obesity due to excess calories ([address] DIONY on CPAP, Osteopenia, Seasonal allergies, and Sleep apnea. Past Surgical History:   has a past surgical history that includes Colonoscopy (2003); Colonoscopy (2007); Tubal ligation; Arm Surgery (2011); Total knee arthroplasty (Bilateral); Cardiac catheterization ([id number] dr Femi Chandra);  Cardiac catheterization (2016); ORIF ankle fracture (Right, 2022); and Ankle fracture surgery (Right, 12/6/2022). Restrictions  Restrictions/Precautions  Restrictions/Precautions: Weight Bearing  Required Braces or Orthoses?: No  Implants present? : Metal implants     Position Activity Restriction  Other position/activity restrictions: Per chart: Right open trimalleolar fracture ankle fracture dislocation s/p I&D and ORIF, DOS 12/6/2022  Lower Extremity Weight Bearing Restrictions  Right Lower Extremity Weight Bearing: Non Weight Bearing     Subjective  Subjective  Subjective: \"I had a heck of a night\" Pt reports having difficulty sleeping last night. Pain Assessment  Pain Assessment: 0-10  Pain Level: 5  Pain Location: Ankle;Leg  Pain Orientation: Right    Objective  Cognition  Overall Orientation Status: Within Functional Limits       Cognition  Overall Cognitive Status: WFL    Activities of Daily Living  Feeding  Assistance Level: Set-up  Skilled Clinical Factors: Pt finishing up morning meal upon writer arrival. Per report, pt requires A opening ketchup and mustard packets only. Able to cut food and manage utensils without difficulty. Grooming/Oral Hygiene  Assistance Level: Set-up  Skilled Clinical Factors: Pt completes oral care, washing face and brushing hair while sitting EOB. Pt tolerates sitting EOB for UB ADLs for 30 minutes. Upper Extremity Bathing  Assistance Level: Set-up  Skilled Clinical Factors: Pt completes while sitting EOB. Lower Extremity Bathing  Assistance Level: Moderate assistance  Skilled Clinical Factors: Pt able to wash B thighs and lower legs (down to wraps on feet) while sittting EOB. Requires A for washing aleah area/buttocks while supine in bed. Upper Extremity Dressing  Assistance Level: Minimal assistance  Skilled Clinical Factors: Min A for bra mgmt. Lower Extremity Dressing  Assistance Level: Moderate assistance  Skilled Clinical Factors: Pt able to thread BLEs with CGA using reacher. Attempts weight shifting on EOB to pull over hips, unsuccessfull.  Next pt lays back to supine for rolling with pt able to slightly A with pants over hips while on side. Toileting  Assistance Level: Dependent  Skilled Clinical Factors: PM: Pt utilizes bedpan for voiding. TA for all brief care and hygiene. Mobility  Sit to Supine  Assistance Level: Moderate assistance  Skilled Clinical Factors: A with BLEs  Supine to Sit  Assistance Level: Stand by assist  Skilled Clinical Factors: HOB elevated, heavy use of bed rails, increased time. Scooting  Assistance Level: Stand by assist  Skilled Clinical Factors: hips to EOB, good use of bed rail    Transfers  Device: Lift equipment (Maxi Move utilized for bed<>w/c transfers this date.)    Assessment  Assessment  Activity Tolerance: Patient limited by fatigue;Patient limited by endurance  Discharge Recommendations: Continue to assess pending progress    Patient Education  Education  Education Given To: Patient  Education Provided: Role of Therapy;Plan of Care;Safety;ADL Function;Cognition  Education Method: Verbal  Barriers to Learning: None  Education Outcome: Verbalized understanding;Continued education needed    OT Equipment Recommendations  Equipment Needed: Yes  Mobility Devices: ADL Assistive Devices  ADL Assistive Devices: Reacher  Other: Pt reports she has a walk in shower with a seat, grab bar in the shower and a raised toilet seat.     Goals     Short Term Goals  Time Frame for Short Term Goals: By one week  Short Term Goal 1: Pt will perform UB ADLs including grooming/oral care with SBA and good safety  Short Term Goal 2: Pt will perform LB ADLs including toileting/toilet transfers with Min A, good safety, and use of AE/DME/Modified techniques as needed  Short Term Goal 3: Pt will be educated on NWB status to RLE with good carryover during functional transfers/tasks  Short Term Goal 4: Pt will tolerate standing for 5+ minutes, Min A, with 1-2 UE support and no LOB during functional task while maintaining NWB RLE  Short Term Goal 5: Pt will be educated on and explore use of AE/DME/Modified techniques to improve safety and independence with ADL tasks  Additional Goals?: Yes  Short Term Goal 6: Pt will actively participate in 30+ minutes of therapeutic exercise/functional activity to promote safety and independence with self-care and mobility  Short Term Goal 7: Pt will perform functional transfers/mobility with Min A, good safety, and use of least restrictive device while maintaining NWB RLE    Long Term Goals  Time Frame for Long Term Goals : By discharge  Long Term Goal 1: Pt will perform UB ADLs including grooming/oral care with Mod I and good safety  Long Term Goal 2: Pt will perform LB ADLs, including toileting/toilet transfers, with SBA, good safety, and use of AE/DME/Modified techniques as needed  Long Term Goal 3: Pt will tolerate standing 10+ minutes, SBA, with 1-2 UE and no LOB during self-care/functional activity while maintaining NWB RLE  Long Term Goal 4: Pt will verbalize/demonstrate good understanding of home safety/fall prevention/energy conservation strategies to improve safety and independence with self-care tasks  Long Term Goal 5: Pt will safely perform light housekeeping/meal preparation with supervision, good safety, and use of least restrictive device to improve independence with ADLs/IADLs  Long Term Goal 6: Pt will perform functional transfers/mobility with SBA, good safety, and use of least restrictive device while maintaining NWB RLE  Long Term Goal 7: Pt will demonstrate improved fine motor coordination during self-care tasks AEB 10 second improvement on 9 hole peg test    Plan  Occupational Therapy Plan  Times Per Week: 5-7  Times Per Day: Twice a day  Current Treatment Recommendations: Strengthening, ROM, Balance training, Functional mobility training, Endurance training, Pain management, Patient/Caregiver education & training, Positioning, Safety education & training, Equipment evaluation, education, & procurement, Self-Care / ADL, Home management training, Coordination training         12/18/22 0800 12/18/22 1602   OT Individual Minutes   Time In 0800 1335   Time Out [id number]   Minutes 79 34         Electronically signed by TRISHA Dominguez on 12/18/22 at 4:03 PM EST

## 2022-12-18 NOTE — PROGRESS NOTES
[facility] Internal Medicine  Joan Bland MD; Avi Lesch, MD; Marlene Xiong MD; Marcy Duverney, MD Theodosia Like, MD; MD MONY [facility]   [address]    Date:   12/18/2022  Patient name:  Brian Jin  Date of admission:  12/12/2022  5:48 PM  MRN:   [id number]  Account:  [de-identified]  YOB: 1945  PCP:    Bettie Meraz MD  Room:   [address] [place] [address]  Code Status:    Full Code    Chief Complaint:     No chief complaint on file. Open fracture of tibia and fibula    History Obtained From:     Patient and electronic medical record    History of Present Illness:     Brian Jin is a 69 y.o. Non- / non  female who presents with No chief complaint on file. and is admitted to the hospital for the management of Open fracture of right tibia and fibula, sequela. Past medical history significant for A. Fib on Eliquis and amiodarone, JOSE ARMANDO on CKD, Type 2 Diabetes, HTN,HLD, Anemia, Fibromyalgia, Obesity, and DIONY. Sustained an open trimalleolar fracture, ankle fracture dislocation s/p ORIF on 12/6, right talus open treatment. Nonweight bearing right lower extremity. Developed JOSE ARMANDO on CKD, per Nephrology patient is high risk of needing dialysis, consult to Nephrology sent when patient admitted to Acute Rehab. Seen at examined at bedside, did not sleep well last night due to being uncomfortable due to the blankets. Otherwise no concerns, not in acute distress     Principal Problem:    Open fracture of right tibia and fibula, sequela  Active Problems:    Paraproteinemia    Stage 5 chronic kidney disease not on chronic dialysis (HCC)    Anemia of chronic renal failure, stage 5 (HCC)  Resolved Problems:    * No resolved hospital problems.  *                  Past Medical History:     Past Medical History:   Diagnosis Date    Abnormal stress test     Anemia     Arthritis     Depression Diverticulosis     DM (diabetes mellitus) ([facility] [address]     Erythropenia     Fibromyalgia     HTN (hypertension)     Hyperlipidemia     Kidney stone     Morbid obesity due to excess calories (HCC)     DIONY on CPAP     Osteopenia 03/03/14    Seasonal allergies     Sleep apnea     uses bipap prn        Past Surgical History:     Past Surgical History:   Procedure Laterality Date    ANKLE FRACTURE SURGERY Right 12/6/2022    RIGHT ANKLE OPEN REDUCTION INTERNAL FIXATION performed by Darshan Langford DO at [facility]  01/01/2011    right arm broken    CARDIAC CATHETERIZATION  [id number] dr Dana Thibodeaux  05/16/2016    COLONOSCOPY  01/01/2003    COLONOSCOPY  01/01/2007    ORIF ANKLE FRACTURE Right 12/06/2022    RIGHT ANKLE OPEN REDUCTION INTERNAL FIXATION    TOTAL KNEE ARTHROPLASTY Bilateral     left may 2013 and right july 2013    TUBAL LIGATION          Medications Prior to Admission:     Prior to Admission medications    Medication Sig Start Date End Date Taking? Authorizing Provider   methocarbamol (ROBAXIN) 750 MG tablet Take 1 tablet by mouth in the morning and 1 tablet at noon and 1 tablet in the evening. Do all this for 10 days. 12/12/22 12/22/22  Rah Rebolledo MD   metoprolol tartrate (LOPRESSOR) 25 MG tablet Take 1 tablet by mouth 2 times daily 12/12/22   Rah Rebolledo MD   senna (SENOKOT) 8.6 MG tablet Take 1 tablet by mouth daily as needed (Constipation) 12/12/22 1/11/23  Rah Rebolledo MD   rOPINIRole (REQUIP) 0.25 MG tablet Take 1 tablet by mouth nightly 11/14/22   Eliza Valdivia MD   gabapentin (NEURONTIN) 600 MG tablet Take 1 tablet by mouth daily for 90 days.  11/14/22 2/12/23  Eliza Valdivia MD   traZODone (DESYREL) 50 MG tablet  10/20/22   Radha Fregoso MD   colchicine (COLCRYS) 0.6 MG tablet  9/27/22   Felisha Fang DPM   Cyanocobalamin (B-12) 1000 MCG LOZG DISSOLVE ONE tablet UNDER TONGUE ONCE DAILY 9/6/22   Mandi Sellers MD   blood glucose monitor kit and supplies use check blood sugar as directed 11/9/22   Eliza Pedraza MD   blood glucose monitor strips Check blood sugars daily as directed. 11/9/22   Eliza Pedraza MD   Lancets MISC 1 each by Does not apply route daily 11/9/22   Eliza Pedraza MD   Alcohol Swabs 70 % PADS 1 each by Does not apply route daily 11/9/22   Eliza Pedraza MD   atorvastatin (LIPITOR) 40 MG tablet Take 1 tablet by mouth daily 11/7/22   Eliza Pedraza MD   pantoprazole (PROTONIX) 40 MG tablet TAKE 1 TABLET DAILY 10/19/22   Eliza Pedraza MD   ELIQUIS 5 MG TABS tablet TAKE 1 TABLET TWICE A DAY 10/11/22   Eliza Pedraza MD   cyclobenzaprine (FLEXERIL) 5 MG tablet TAKE 1 TABLET BY MOUTH NIGHTLY AS NEEDED FOR MUSCLE SPASMS 10/3/22   Eliza Pedraza MD   allopurinol (ZYLOPRIM) 100 MG tablet TAKE 1 TABLET DAILY 9/21/22   BARBIE Mccoy CNP   zolpidem (AMBIEN) 5 MG tablet Take 5 mg by mouth nightly as needed for Sleep.     Historical Provider, MD   hydrALAZINE (APRESOLINE) 25 MG tablet Take 4 tablets by mouth 3 times daily 7/12/22   Artemio Sears MD   amiodarone (CORDARONE) 200 MG tablet Take 1 tablet by mouth daily 7/6/22   BARBIE Gooden - VASHTI   calcitRIOL (ROCALTROL) 0.5 MCG capsule TAKE 1 CAPSULE BY MOUTH DAILY 6/14/22   Artemio Sears MD   ferrous sulfate (FE TABS 325) 325 (65 Fe) MG EC tablet Take 1 tablet by mouth daily (with breakfast) 5/10/22   Eliza Pedraza MD   azelastine (ASTELIN) 0.1 % nasal spray 1 spray by Nasal route 2 times daily Use in each nostril as directed 10/13/21   Inocencia Tarango DO   Cholecalciferol (VITAMIN D3) 25 MCG (1000 UT) TABS Take 2 tablets by mouth daily 1/9/20   BARBIE Mccoy CNP   Magnesium Oxide 400 MG CAPS Take by mouth 2 times daily Takes once daily at [facility]    Historical Provider, MD        Allergies:     Adhesive tape, Cephalexin, Erythromycin, Ketorolac tromethamine, Pcn [penicillins], Percocet [oxycodone-acetaminophen], Sulfa antibiotics, and Ultracet [tramadol-acetaminophen]    Social History:     Tobacco:    reports that she quit smoking about 62 years ago. Her smoking use included cigarettes. She has a 0.25 pack-year smoking history. She has never used smokeless tobacco.  Alcohol:      reports no history of alcohol use. Drug Use:  reports no history of drug use. Family History:     Family History   Problem Relation Age of Onset    Diabetes Mother     Heart Disease Mother     Osteoporosis Mother     Kidney Disease Father     Prostate Cancer Brother        Review of Systems:     Positive and Negative as described in HPI. ROS     No cp  No nv  No diarrhea  No dyspnea                                                          . Physical Exam:     Physical Exam   Vitals:    Vitals:    22 1743 22 0532   BP:  (!) 141/50 (!) 141/50 (!) 130/48   Pulse:  62 62 62   Resp: 18   18   Temp:  97.7 °F (36.5 °C)  98.2 °F (36.8 °C)   TempSrc:  Oral  Oral   SpO2:  97%  98%   Weight:       Height:                        Body mass index is 46.93 kg/m². Temp (24hrs), Av °F (36.7 °C), Min:97.7 °F (36.5 °C), Max:98.2 °F (36.8 °C)    No results for input(s): POCGLU in the last 72 hours. General Appearance:   No acute distress , obese                 Skin:                             No rash or erythema  HEENT ;                                                                       No icterus, no pallor . No ptosis. No gross asymmetry  Or abnormality face         Neck:                            No mass , no thyroid enlargement                                           Pulmonary/Chest:                                               Symmetric                                          Clear to auscultation bilaterally . No wheezes,                                          No rales or rhonchi .                                            No abnormality on percussion Cardiovascular:            Normal rate, regular rhythm,                                          No murmur or  Gallop . Abdomen:                       Soft, non-tender                                           Normal bowels sounds,                                             Extremities:                    No  Edema .                                            Musculo-skeletal / Neurological ;;                  Neurological ;                 No focal motor deficit ,                 No focal sensory deficit ,    Musculo-skeletal ;                  No  gait abnormality                  No significant joint abnormality,                                                         Psych:                     See psych notes                                                                 Investigations:      URINE ANALYSIS: No results found for: LABURIN     CBC:  Lab Results   Component Value Date/Time    WBC 10.7 12/17/2022 06:31 AM    HGB 7.3 12/17/2022 06:31 AM     12/17/2022 06:31 AM     03/02/2012 11:15 AM             BMP:    Lab Results   Component Value Date/Time     12/18/2022 06:24 AM    K 5.5 12/18/2022 06:24 AM     12/18/2022 06:24 AM    CO2 25 12/18/2022 06:24 AM    BUN 73 12/18/2022 06:24 AM    CREATININE 4.78 12/18/2022 06:24 AM    GLUCOSE 132 12/18/2022 06:24 AM    GLUCOSE 113 03/02/2012 11:15 AM      LIVER PROFILE:  Lab Results   Component Value Date/Time    ALT <5 12/13/2022 06:47 AM    AST 10 12/13/2022 06:47 AM    PROT 5.6 12/13/2022 06:47 AM    BILITOT 0.3 12/13/2022 06:47 AM    BILIDIR 0.1 12/13/2022 06:47 AM    LABALBU 2.9 12/13/2022 06:47 AM    LABALBU 4.2 03/02/2012 11:15 AM             @BRIEFLABT(TSH)@      Laboratory Testing:  Recent Results (from the past 24 hour(s))   Basic Metabolic Panel w/ Reflex to MG    Collection Time: 12/18/22  6:24 AM   Result Value Ref Range    Glucose 132 (H) 70 - 99 mg/dL    BUN 73 (H) 8 - 23 mg/dL    Creatinine 4.78 (H) 0.50 - 0.90 mg/dL    Est, Glom Filt Rate 9 (L) >60 mL/min/1.73m2    Calcium 10.0 8.6 - 10.4 mg/dL    Sodium 136 135 - 144 mmol/L    Potassium 5.5 (H) 3.7 - 5.3 mmol/L    Chloride 100 98 - 107 mmol/L    CO2 25 20 - 31 mmol/L    Anion Gap 11 9 - 17 mmol/L       Imaging/Diagnostics:  XR ANKLE RIGHT (MIN 3 VIEWS)    Result Date: 12/6/2022  Anatomic alignment of comminuted ankle fracture status post ORIF. Assessment :      Hospital Problems             Last Modified POA    * (Principal) Open fracture of right tibia and fibula, sequela 12/12/2022 Yes    Paraproteinemia 12/14/2022 Yes    Stage 5 chronic kidney disease not on chronic dialysis ([facility] [address] 12/14/2022 Yes    Anemia of chronic renal failure, stage 5 ([facility] [address] 12/14/2022 Yes     Plan:       Medications: Allergies:     Allergies   Allergen Reactions    Adhesive Tape     Cephalexin Other (See Comments)     Tongue cracks and peels    Erythromycin Other (See Comments)     Epigastric pain and bloating    Ketorolac Tromethamine Other (See Comments)     Severe stomach cramps    Pcn [Penicillins]      Unknown reaction    Percocet [Oxycodone-Acetaminophen] Itching and Other (See Comments)     Esophagus hurt    Sulfa Antibiotics     Ultracet [Tramadol-Acetaminophen] Itching       Current Meds:   Scheduled Meds:    methocarbamol  500 mg Oral 3 times per day    [START ON 12/19/2022] gabapentin  300 mg Oral Daily    iron sucrose  100 mg IntraVENous Q24H    atorvastatin  40 mg Oral Nightly    zolpidem  10 mg Oral Nightly    allopurinol  100 mg Oral Daily    amiodarone  200 mg Oral Daily    [Held by provider] apixaban  5 mg Oral BID    calcitRIOL  0.25 mcg Oral Daily    hydrALAZINE  100 mg Oral TID    magnesium oxide  400 mg Oral BID    metoprolol tartrate  25 mg Oral BID    pantoprazole  40 mg Oral QAM AC    rOPINIRole  0.25 mg Oral Nightly    Vitamin D  2,000 Units Oral Daily    polyethylene glycol  17 g Oral Daily Continuous Infusions:       PRN Meds: Benzocaine-Menthol, acetaminophen, HYDROcodone-acetaminophen, bisacodyl       Patient admitted [place] Problems             Last Modified POA    * (Principal) Open fracture of right tibia and fibula, sequela 12/12/2022 Yes    Paraproteinemia 12/14/2022 Yes    Stage 5 chronic kidney disease not on chronic dialysis ([address] 12/14/2022 Yes    Anemia of chronic renal failure, stage 5 ([address] 12/14/2022 Yes                      12/13/22    Open trimalleolar fracture, s/p ORIF on 12/6  JOSE ARMANDO on CKD, Cr 4.29 today, nephro following, patient high risk for needing HD  Anemia  Hx of A Fib on Eliquis and amio, DM2, HTN,HLD, DIONY, Fibromyalgia   Nephrology following for any urgent HD needs, appreciate recommendations, renal diet, continue to monitor Cr  A Fib. Continue Amio and Eliquis  Continue lipitor, hydralazine, and lopressor  Continue iron supplementation, Hgb stable at 7.6                 Dec 18  Hem/Onc on board for elevated Kappa light chain, rule out myeloma  Anemia of chronic renal failure, appears baseline around 6-7, patient receiving IV iron supplementation, restart Retacrit once iron supplementation completed. Nephrology plans for HD today. IR consulted for tunnel catheter placement, followed by HD session, DC IV fluids, appreciate recommendations   Hyperkalemia Kayexalate dose today  Potassium 5.5                Consultations:   IP CONSULT TO DIETITIAN  IP CONSULT TO SOCIAL WORK  IP CONSULT TO INTERNAL MEDICINE  IP CONSULT TO NEPHROLOGY  IP CONSULT TO HEM/ONC  IP CONSULT TO Leia Verma MD  12/18/2022 12/18/2022  1:08 PM          Copy sent to Dr. Prabha Sarah MD    Please note that this chart was generated using voice recognition Dragon dictation software. Although every effort was made to ensure the accuracy of this automated transcription, some errors in transcription may have occurred.

## 2022-12-18 NOTE — FLOWSHEET NOTE
12/18/22 1127   Treatment Team Notification   Reason for Communication Critical results   Team Member Name Dr. Domenica Asher Provider   Method of Communication Page   Response Waiting for response   Notification Time 1128   K 5.5

## 2022-12-18 NOTE — PROGRESS NOTES
_                         Today's Date: 12/18/2022  Patient Name: Dasha Leyva  Date of admission: 12/12/2022  5:48 PM  Patient's age: 69 y.o., 1945  Admission Dx: Open fracture of right tibia and fibula, sequela [S82.201S, S80.5S]      Requesting Physician: Maris Ruiz MD    CHIEF COMPLAINT: Status post fall. Fracture of the right tibia and fibula. Chronic renal failure. Consult for elevated kappa light chain immunoglobulin. SUBJECTIVE:  Patient was seen and examined. The main problem she has now is the increasing weakness and fatigue. Patient received transfusion and she is currently getting iron infusion for severe anemia. No active bleeding. Continues to have severe chronic renal insufficiency. Nephrology discussing the option of dialysis. No fever. No active bleeding. BRIEF CASE HISTORY:    The patient is a 69 y.o.  female who is admitted to the hospital for further management of open fracture of the right tibia and fibula. Patient had a fall and she had right ankle fracture. She was found to have open fracture of the right tibia and fibula. Patient had surgery and she is admitted to the inpatient rehab for further care. Patient's labs showed evidence of JOSE ARMANDO on top of CKD. She was seen by nephrology. Patient was deemed to be high risk for dialysis. Further work-up showed elevated kappa light chain immunoglobulin at 41.9 with lambda light chain immunoglobulin 3.14. We were consulted to evaluate the patient for possible underlying multiple myeloma. Patient also had history of anemia of chronic renal insufficiency.     Past Medical History:   has a past medical history of Abnormal stress test, Anemia, Arthritis, Depression, Diverticulosis, DM (diabetes mellitus) ([address] Erythropenia, Fibromyalgia, HTN (hypertension), Hyperlipidemia, Kidney stone, Morbid obesity due to excess calories ([address] DIONY on CPAP, Osteopenia, Seasonal allergies, and Sleep apnea. Past Surgical History:   has a past surgical history that includes Colonoscopy (01/01/2003); Colonoscopy (01/01/2007); Tubal ligation; Arm Surgery (01/01/2011); Total knee arthroplasty (Bilateral); Cardiac catheterization ([id number] dr Lis Padgett); Cardiac catheterization (05/16/2016); ORIF ankle fracture (Right, 12/06/2022); and Ankle fracture surgery (Right, 12/6/2022). Family History: family history includes Diabetes in her mother; Heart Disease in her mother; Kidney Disease in her father; Osteoporosis in her mother; Prostate Cancer in her brother. Social History:   reports that she quit smoking about 62 years ago. Her smoking use included cigarettes. She has a 0.25 pack-year smoking history. She has never used smokeless tobacco. She reports that she does not drink alcohol and does not use drugs. Medications:    Prior to Admission medications    Medication Sig Start Date End Date Taking? Authorizing Provider   methocarbamol (ROBAXIN) 750 MG tablet Take 1 tablet by mouth in the morning and 1 tablet at noon and 1 tablet in the evening. Do all this for 10 days. 12/12/22 12/22/22  Mitchell Tabares MD   metoprolol tartrate (LOPRESSOR) 25 MG tablet Take 1 tablet by mouth 2 times daily 12/12/22   Mitchell Tabares MD   senna (SENOKOT) 8.6 MG tablet Take 1 tablet by mouth daily as needed (Constipation) 12/12/22 1/11/23  Mitchlel Tabares MD   rOPINIRole (REQUIP) 0.25 MG tablet Take 1 tablet by mouth nightly 11/14/22   Eliza Odom MD   gabapentin (NEURONTIN) 600 MG tablet Take 1 tablet by mouth daily for 90 days.  11/14/22 2/12/23  Eliza Odom MD   traZODone (DESYREL) 50 MG tablet  10/20/22   Kiana Rodriguez MD   colchicine (COLCRYS) 0.6 MG tablet  9/27/22   Marcy Pelletier DPM   Cyanocobalamin (B-12) 1000 MCG LOZG DISSOLVE ONE tablet UNDER TONGUE ONCE DAILY 9/6/22   Dean Santiago MD   blood glucose monitor kit and supplies use check blood sugar as directed 11/9/22 Doug Lo MD   blood glucose monitor strips Check blood sugars daily as directed. 11/9/22   Eliza Cheatham MD   Lancets MISC 1 each by Does not apply route daily 11/9/22   Eliza Cheatham MD   Alcohol Swabs 70 % PADS 1 each by Does not apply route daily 11/9/22   Eliza Cheatham MD   atorvastatin (LIPITOR) 40 MG tablet Take 1 tablet by mouth daily 11/7/22   Eliza Cheatham MD   pantoprazole (PROTONIX) 40 MG tablet TAKE 1 TABLET DAILY 10/19/22   Eliza Cheatham MD   ELIQUIS 5 MG TABS tablet TAKE 1 TABLET TWICE A DAY 10/11/22   Eliza Cheatham MD   cyclobenzaprine (FLEXERIL) 5 MG tablet TAKE 1 TABLET BY MOUTH NIGHTLY AS NEEDED FOR MUSCLE SPASMS 10/3/22   Eliza Cheatham MD   allopurinol (ZYLOPRIM) 100 MG tablet TAKE 1 TABLET DAILY 9/21/22   BARBIE Ragsdale CNP   zolpidem (AMBIEN) 5 MG tablet Take 5 mg by mouth nightly as needed for Sleep.     Historical Provider, MD   hydrALAZINE (APRESOLINE) 25 MG tablet Take 4 tablets by mouth 3 times daily 7/12/22   Diana Riggins MD   amiodarone (CORDARONE) 200 MG tablet Take 1 tablet by mouth daily 7/6/22   BARBIE Martinez NP   calcitRIOL (ROCALTROL) 0.5 MCG capsule TAKE 1 CAPSULE BY MOUTH DAILY 6/14/22   Diana Riggins MD   ferrous sulfate (FE TABS 325) 325 (65 Fe) MG EC tablet Take 1 tablet by mouth daily (with breakfast) 5/10/22   Eliza Cheatham MD   azelastine (ASTELIN) 0.1 % nasal spray 1 spray by Nasal route 2 times daily Use in each nostril as directed 10/13/21   Jamee Hobbs DO   Cholecalciferol (VITAMIN D3) 25 MCG (1000 UT) TABS Take 2 tablets by mouth daily 1/9/20   BARBIE Ragsdale CNP   Magnesium Oxide 400 MG CAPS Take by mouth 2 times daily Takes once daily at [facility]    Historical Provider, MD     Current Facility-Administered Medications   Medication Dose Route Frequency Provider Last Rate Last Admin    methocarbamol (ROBAXIN) tablet 500 mg  500 mg Oral 3 times per day Claudine Humphrey MD   500 mg at 12/18/22 1240    [START ON 12/19/2022] gabapentin (NEURONTIN) capsule 300 mg  300 mg Oral Daily Gary Odom MD        iron sucrose (VENOFER) 100 mg in sodium chloride 0.9 % 100 mL IVPB  100 mg IntraVENous Q24H Gurpreet Sheehan MD   Stopped at 12/18/22 1515    Benzocaine-Menthol (CEPACOL) 1 lozenge  1 lozenge Oral Q2H PRN Howie Peralta MD        atorvastatin (LIPITOR) tablet 40 mg  40 mg Oral Nightly Howie Peralta MD   40 mg at 12/17/22 2100    acetaminophen (TYLENOL) tablet 650 mg  650 mg Oral Q6H PRN Howie Peralta MD        zolpidem (AMBIEN) tablet 10 mg  10 mg Oral Nightly Howie Peralta MD   10 mg at 12/17/22 2100    allopurinol (ZYLOPRIM) tablet 100 mg  100 mg Oral Daily Eva Torres MD   100 mg at 12/18/22 0915    amiodarone (CORDARONE) tablet 200 mg  200 mg Oral Daily Eva Torres MD   200 mg at 12/18/22 0915    [Held by provider] apixaban (ELIQUIS) tablet 5 mg  5 mg Oral BID Eva Torres MD   5 mg at 12/16/22 6072    calcitRIOL (ROCALTROL) capsule 0.25 mcg  0.25 mcg Oral Daily Eva Torres MD   0.25 mcg at 12/18/22 3356    hydrALAZINE (APRESOLINE) tablet 100 mg  100 mg Oral TID Eva Torres MD   100 mg at 12/18/22 1240    HYDROcodone-acetaminophen (NORCO) 5-325 MG per tablet 1 tablet  1 tablet Oral Q4H PRN Eva Torres MD   1 tablet at 12/18/22 1358    magnesium oxide (MAG-OX) tablet 400 mg  400 mg Oral BID Eva Torres MD   400 mg at 12/18/22 0915    metoprolol tartrate (LOPRESSOR) tablet 25 mg  25 mg Oral BID Eva Torres MD   25 mg at 12/18/22 0915    pantoprazole (PROTONIX) tablet 40 mg  40 mg Oral QAM AC Eva Torres MD   40 mg at 12/18/22 0553    rOPINIRole (REQUIP) tablet 0.25 mg  0.25 mg Oral Nightly Eva Torres MD   0.25 mg at 12/17/22 2107    Vitamin D (CHOLECALCIFEROL) tablet 2,000 Units  2,000 Units Oral Daily Eva Torres MD   2,000 Units at 12/18/22 0924    bisacodyl (DULCOLAX) suppository 10 mg  10 mg Rectal Daily PRN Howie Peralta MD        polyethylene glycol (GLYCOLAX) packet 17 g  17 g Oral Daily Olvin Romero MD   17 g at 22 [id number]       Allergies:  Adhesive tape, Cephalexin, Erythromycin, Ketorolac tromethamine, Pcn [penicillins], Percocet [oxycodone-acetaminophen], Sulfa antibiotics, and Ultracet [tramadol-acetaminophen]    REVIEW OF SYSTEMS:      General: Positive for weakness and fatigue. No unanticipated weight loss or decreased appetite. No fever or chills. Eyes: No blurred vision, eye pain or double vision. Ears: No hearing problems or drainage. No tinnitus. Throat: No sore throat, problems with swallowing or dysphagia. Respiratory: No cough, sputum or hemoptysis. Positive for exertional shortness of breath. No pleuritic chest pain. Cardiovascular: No chest pain, orthopnea or PND. No lower extremity edema. No palpitation. Gastrointestinal: No problems with swallowing. No abdominal pain or bloating. No nausea or vomiting. No diarrhea or constipation. No GI bleeding. Genitourinary: No dysuria, hematuria, frequency or urgency. Musculoskeletal: As above. Dermatologic: No skin rashes or pruritus. No skin lesions or discolorations. Psychiatric: No depression, anxiety, or stress or signs of schizophrenia. No change in mood or affect. Hematologic: No history of bleeding tendency. No bruises or ecchymosis. No history of clotting problems. Infectious disease: No fever, chills or frequent infections. Endocrine: No polydipsia or polyuria. No temperature intolerance. Neurologic: No headaches or dizziness. No weakness or numbness of the extremities. No changes in balance, coordination,  memory, mentation, behavior. Allergic/Immunologic: No nasal congestion or hives. No repeated infections.        PHYSICAL EXAM:      BP (!) 154/66   Pulse 64   Temp 98.4 °F (36.9 °C) (Oral)   Resp 16   Ht 5' 5\" (1.651 m)   Wt 282 lb (127.9 kg)   LMP  (LMP Unknown)   SpO2 97%   BMI 46.93 kg/m²    Temp (24hrs), Av.1 °F (36.7 °C), Min:97.7 °F (36.5 °C), Max:98.4 °F (36.9 °C)      General appearance - not in pain or distress. Patient is morbidly obese. Mental status - alert and oriented  Eyes - pupils equal and reactive, extraocular eye movements intact  Ears - bilateral TM's and external ear canals normal  Nose - normal and patent, no erythema, discharge or polyps  Mouth - mucous membranes moist, pharynx normal without lesions  Neck - supple, no significant adenopathy  Lymphatics - no palpable lymphadenopathy, no hepatosplenomegaly  Chest - clear to auscultation, no wheezes, rales or rhonchi, symmetric air entry  Heart - normal rate, regular rhythm, normal S1, S2, no murmurs, rubs, clicks or gallops  Abdomen - soft, nontender, nondistended, no masses or organomegaly  Neurological - alert, oriented, normal speech, no focal findings or movement disorder noted  Musculoskeletal -status post right ankle fracture status post open reduction internal fixation. Extremities - peripheral pulses normal, no pedal edema, no clubbing or cyanosis  Skin - normal coloration and turgor, no rashes, no suspicious skin lesions noted           DATA:      Labs:       CBC:   Recent Labs     12/17/22  0631   WBC 10.7   HGB 7.3*   HCT 22.7*        BMP:   Recent Labs     12/17/22  0631 12/18/22  0624    136   K 5.2 5.5*   CO2 25 25   BUN 72* 73*   CREATININE 4.40* 4.78*   LABGLOM 10* 9*   GLUCOSE 133* 132*     PT/INR:   Recent Labs     12/16/22  0845   PROTIME 16.5*   INR 1.3     APTT:No results for input(s): APTT in the last 72 hours. LIVER PROFILE:  No results for input(s): AST, ALT, LABALBU in the last 72 hours. XR ANKLE RIGHT (MIN 3 VIEWS)  Narrative: EXAMINATION:  THREE XRAY VIEWS OF THE RIGHT ANKLE    12/6/2022 8:07 pm    COMPARISON:  12/05/2022.     HISTORY:  ORDERING SYSTEM PROVIDED HISTORY: post op, PACU please  TECHNOLOGIST PROVIDED HISTORY:  post op, PACU please    FINDINGS:  Interval ORIF of comminuted ankle fracture with lateral surgical plate and  screws spanning the distal fibula and posterior and medial plates spanning  the distal tibia with multiple fixation screws. Alignment is grossly  anatomic. The mortise is maintained. Evaluation of the soft tissues is  limited by overlying cast material.  Posterior plaster cast.  Impression: Anatomic alignment of comminuted ankle fracture status post ORIF. FLUORO FOR SURGICAL PROCEDURES  Radiology exam is complete. No Radiologist dictation. Please follow up   with ordering provider. CT ANKLE RIGHT WO CONTRAST  Narrative: EXAMINATION:  CT OF THE RIGHT ANKLE WITHOUT CONTRAST, 12/5/2022 9:19 pm    TECHNIQUE:  CT of the right ankle was performed without the administration of intravenous  contrast.  Multiplanar reformatted images are provided for review. Automated  exposure control, iterative reconstruction, and/or weight based adjustment of  the mA/kV was utilized to reduce the radiation dose to as low as reasonably  achievable. COMPARISON:  Radiographs of right ankle at 2021 hours on 12/05/2022. HISTORY  ORDERING SYSTEM PROVIDED HISTORY:  Pre-op  TECHNOLOGIST PROVIDED HISTORY:  Pre-op    FINDINGS:  Bones: The study shows coronally oriented mildly oblique fracture in the posterior  portion of distal end of right tibia with a prominent gap of 8.6 mm, extended  to the distal articular surface of tibia with posterior displacement of  posterior malleolus of the tibia. The fracture is mildly comminuted fracture  at the upper aspect. Evidence of comminuted transverse fracture through the base of the medial  malleolus, which is displaced mildly laterally. Evidence of a few small intra-articular fracture fragments in the anterior  portion of the tibiotalar joint and talofibular joint. The study shows grossly comminuted oblique, displaced fractures of the distal  shaft. Right fibula with posterolateral displacement of distal fragment.   This fracture extends to about 1.5 cm superior to the base of the lateral  malleolus. The lower extent  of the fracture is inferomedially,and  the  upper extent of the fracture superolaterally. Small fractures or old accessory ossicles at the inferomedial aspect of the  lateral malleolus. There is no significant gap at the distal tibiofibular syndesmosis. On the sagittal images, there is posterior subluxation of the talus bone due  to displaced fracture of posterior portion of distal end of the tibia. There is no definable fracture within the talus bone. There is no definable fracture in the right calcaneus bone. No definable fracture in the tarsal navicular bone or in the cuboid bone. In  the visualized 3 cuneiform bones there is no definable fracture. In the visualized portions of base of the metatarsal bones, there is no  definable fracture. Evidence of mild to moderate osteoarthritic change in the proximal and distal  intertarsal joint. No abnormality of the subtalar joint. Evidence of moderately prominent posterior and inferior calcaneal spurs. The calcaneal tendon is grossly intact. The tendons at the lateral and  medial aspect of the ankle joint are grossly intact. The tendons at the  anterior aspect of ankle joint are grossly intact. Soft Tissue: Mild soft tissue swelling with soft tissue edema surrounding the  right ankle joint and distal right leg. Impression: Prominent oblique fracture in coronal orientation with large gap at the  fracture involving the posterior portion of distal end of right tibia with  distal intra-articular extension of the fracture. Comminuted transverse  fracture through the base of the medial malleolus of the tibia. Grossly comminuted oblique fracture in the distal shaft of right fibula. Moderate posterior subluxation of the talus bone due to distal posterior  tibial fracture.     Some small intra-articular fracture fragments in the anterior portion of the  tibiotalar and talofibular joint.    The distal tibiofibular syndesmosis is grossly intact. No evidence of fracture in the right calcaneus bone, talus bone and distal  tarsal bones. Subtalar joint is intact. Evidence of soft tissue emphysema  in the lateral portion of subtalar joint. The calcaneal tendon appears to be grossly intact. The long tendons at the  medial, lateral and anterior aspect of right ankle joint are grossly intact. CT ABDOMEN PELVIS WO CONTRAST Additional Contrast? None  Narrative: EXAMINATION:  CT OF THE ABDOMEN AND PELVIS WITHOUT CONTRAST 12/5/2022 9:18 pm    TECHNIQUE:  CT of the abdomen and pelvis was performed without the administration of  intravenous contrast. Multiplanar reformatted images are provided for review. Automated exposure control, iterative reconstruction, and/or weight based  adjustment of the mA/kV was utilized to reduce the radiation dose to as low  as reasonably achievable. Additional 3-D images of the pelvis including hip joints have been obtained  with surface rendering of bones and displayed with rotating frames. COMPARISON:  CT scan of the abdomen and pelvis without contrast on 02/27/2017. HISTORY:  ORDERING SYSTEM PROVIDED HISTORY: possible pubic rami fx  TECHNOLOGIST PROVIDED HISTORY:  possible pubic rami fx    FINDINGS:  Lower Chest: At the bases of the lungs, there is no acute process. No  pleural effusion. No hiatal hernia. No pneumoperitoneum. Organs: No demonstrable abnormality in the liver, gallbladder, spleen and  bilateral adrenal glands. In the pancreas there is no evidence of mass or  acute process. In the bilateral kidneys there is no evidence of stone or hydronephrosis. At  the lower anterior aspect of the right kidney there is exophytic small mass  mildly hypodense, 1.2 cm in size, most likely to be a cyst.  In the mid  lateral cortex of the left kidney there is a small hypodense lesion measuring  1.1 cm probably a cyst but a new finding.     The bladder is moderately distended without stone or focal abnormality. Bilateral ureters are of normal size, without evidence of stone or  obstruction. GI/Bowel: No demonstrable abnormality in the stomach. Small to moderate amount of gas scattered in some loops of small bowel  without any obvious fluid level. Distal loops of small bowel are not  distended or dilated. Normal appearing terminal ileum. There is normal appendix posteroinferior to cecum. Small to moderate amount of stool and small amount of gas are scattered in  the right colon and the transverse colon. In the descending colon small to  moderate amount of stool and gas are scattered. In the sigmoid colon and  rectum a small amount of stool and gas are scattered. Evidence of moderate  diverticulosis coli of the sigmoid colon and descending colon, without  evidence of diverticulitis. No evidence of colitis. Pelvis: No evidence of abnormal fluid collection or inflammatory process in  the pelvis. Normal appearing uterus. No adnexal mass on either side. Peritoneum/Retroperitoneum: Abdominal aorta is of normal size with moderate  calcified plaques. No evidence of periaortic or mesenteric pathologic  lymphadenopathy. No evidence of ascites. Bones/Soft Tissues: Evidence of moderate multilevel degenerative disc disease  in the lumbar spine and lumbosacral junction. Mild-to-moderate multilevel  facet osteoarthritis in the medial lower lumbar spine and lumbosacral  junction. There is no evidence of acute fracture or compression in the  lumbar spine or in the visualized sacrum and coccyx. No evidence of fracture or healing fracture or osseous malalignment in the  pelvic bones and joints including bilateral hip joints. There is no evidence of fracture or healing fracture in the superior pubic  rami and inferior ischiopubic rami of both sides or in bilateral pubic bones.     No evidence of fracture in bilateral acetabula. Impression: No evidence of fracture or osseous malalignment in the lumbar spine, pelvic  bones and joints including bilateral hip joints. No evidence of fracture in  the superior pubic rami and inferior ischiopubic rami of both sides or in  bilateral pubic bones. No fracture in bilateral acetabula or ischial bones. Evidence of mild to moderate multilevel degenerative disc disease and facet  osteoarthritis in the lumbar spine and lumbosacral junction. Nonspecific small amount of gas scattered in multiple loops of small bowel  without significant fluid levels. No distension or dilation of distal small  bowel. Normal appendix visualized. Small to moderate amount of stool and gas scattered in the colon. Evidence  of moderate diverticulosis coli of descending colon and sigmoid colon,  without evidence of diverticulitis. No evidence of renal or ureteric calculus or obstructive uropathy. No diagnostic finding in the liver, gallbladder, spleen, pancreas and adrenal  glands. CT PELVIS WO CONTRAST Additional Contrast? None  Narrative: EXAMINATION:  CT OF THE PELVIS WITHOUT CONTRAST 12/5/2022 9:18 pm    TECHNIQUE:  CT of the pelvis was performed without the administration of intravenous  contrast.  Multiplanar reformatted images are provided for review. Adjustment of mA and/or kV according to patient size was utilized. Automated  exposure control, iterative reconstruction, and/or weight based adjustment of  the mA/kV was utilized to reduce the radiation dose to as low as reasonably  achievable. The study includes 3D images of pelvis and hip joints with surface rendering  of bones and displayed with rotating frames display. COMPARISON:  CT scan of abdomen and pelvis on 2/27/2017. CT scan of abdomen and pelvis on  12/5/2022.     HISTORY:  ORDERING SYSTEM PROVIDED HISTORY: pre-op  TECHNOLOGIST PROVIDED HISTORY: Pre-op    FINDINGS:  The bladder is moderate to markedly distended without stone or focal  abnormality. There is no evidence of abnormal fluid collection or inflammatory process in  the pelvis. Normal uterus. No evidence of adnexal mass in the pelvis. Small to moderate amount of stool and gas scattered in sigmoid colon and  rectum. Mild-to-moderate diverticulosis coli of sigmoid colon, without evidence of  diverticulitis. Normal appendix posteroinferior to cecum. Small amount of nonspecific gas scattered in some loops of small bowel. No evidence of pelvic abnormal fluid collection or hemorrhage. No evidence of fracture in sacrum or coccyx. Bilateral SI joints are intact. No evidence of fracture in bilateral hip bones including bilateral  acetabulum, bilateral femoral heads, bilateral femoral necks and bilateral  visualized femoral proximal shafts. No evidence of fracture involving bilateral superior pubic rami, bilateral  inferior ischiopubic rami, bilateral pubic bones, or bilateral ischial  tuberosity. No evidence of inguinal or femoral hernia. In the visualized soft tissues of pelvic walls and proximal thighs, there is  no definable inflammatory change or hematoma. Impression: No evidence of fracture in pelvic bones or bilateral hip joints. No evidence  of fracture in superior pubic rami or inferior ischiopubic rami of both  sides. No fracture in bilateral acetabulum or in visualized bilateral  proximal femurs. Within the pelvic cavity, there is no evidence of hemorrhage or abnormal  fluid collection or acute process. Mild to moderate sigmoid diverticulosis,  without evidence of diverticulitis.             IMPRESSION:    Primary Problem  Open fracture of right tibia and fibula, sequela    Active Hospital Problems    Diagnosis Date Noted    Paraproteinemia [D89.2] 12/14/2022     Priority: Medium    Stage 5 chronic kidney disease not on chronic dialysis ([facility] [address] [N18.5] 12/14/2022     Priority: Medium    Open fracture of right tibia and fibula, sequela [S82.201S, S82.401S] 12/05/2022     Priority: Medium    Anemia of chronic renal failure, stage 5 (HCC) [N18.5, D63.1]    Open fracture of right tibia and fibula. Status post open reduction internal fixation. JOSE ARMANDO on top of CKD. Anemia of chronic renal insufficiency  Elevated kappa light chain immunoglobulin    RECOMMENDATIONS:  Records and labs and images were reviewed and discussed with the patient and family at bedside. Patient is recovering from right ankle fracture. Undergoing inpatient rehab. I reviewed patient's records and labs and explained to the patient details and in layman language. Patient is having JOSE ARMANDO on top of CKD. She is high risk for dialysis. Dialysis option was discussed with the nephrologist.  Further work-up showed elevated kappa light chain immunoglobulins. Further work-up for paraproteinemia was reviewed and discussed. No evidence of monoclonal gammopathy. However, beta-2 microglobulin is elevated. This is nonspecific. I still believe that elevated kappa light chain immunoglobulin is related to renal insufficiency. The repeated kappa light chain immunoglobulin level is lower. 37.  We will continue to check CBC and consider transfusion to keep hemoglobin above 7. Patient would benefit from Procrit treatment for anemia of chronic renal failure after correction of iron deficiency. Patient's questions were answered to the best of her satisfaction and she verbalized full understanding and agreement. Discussed with patient and family.     Amber Valdez MD, MD                            [address] [place] [address] [facility]                            This note is created with the assistance of a speech recognition program.  While intending to generate a document that actually reflects the content of the visit, the document can still have some errors including those of syntax and sound a like substitutions which may escape proof reading. It such instances, actual meaning can be extrapolated by contextual diversion.

## 2022-12-18 NOTE — PROGRESS NOTES
Physical Therapy  Facility/Department: [facility]  Rehabilitation Physical Therapy Daily Treatment Note    NAME: Vanda Morales  : 1945 (16 y.o.)  MRN: [id number]  CODE STATUS: Full Code    Date of Service: 22    Chart Reviewed: Yes  Patient assessed for rehabilitation services?: Yes  Additional Pertinent Hx: Ms. Vanda Morales is a 69 y.o. female who was admitted to [facility] on 2022 with Ankle Injury. 60-year-old female with history of A. fib on Eliquis, bilateral knee arthroplasty, CHF, CKD, type 2 diabetes, and hypertension as well as chronic numbness of her feet status post fall over a curb found to have displaced right trimalleolar ankle fracture with large posterior malleolus and comminuted lateral malleolus fracture. Pt S/P  status [facility] right talus open treatment-continue splint right lower extremity, nonweightbearing,  Family / Caregiver Present: No  Referring Practitioner: Dr La Soto  Referral Date : 22  Diagnosis: Right open trimalleolar fracture ankle fracture dislocation s/p I&D and ORIF,  Right talus fracture. Restrictions:  Restrictions/Precautions: Weight Bearing  Lower Extremity Weight Bearing Restrictions  Right Lower Extremity Weight Bearing: Non Weight Bearing     SUBJECTIVE  Subjective: Pt is sitting up in her w/c upon arrival. Agreeable to PT this date with Min encouragement. Pt reports decrease sleep last night. Per pt. her w/c at home will not fit through her doorways. Pain: Pt reports pain 8/10 R ankle lateral. RN informed and able to give pain meds during AM tx.          OBJECTIVE  Vision  Vision: Impaired  Vision Exceptions: Wears glasses at all times    Hearing  Hearing: Within functional limits    Cognition  Overall Cognitive Status: WFL    Sensation  Overall Sensation Status: Impaired (Decreased sensation B feet.)    Functional Mobility  Bed mobility  Rolling to Left: Minimal assistance  Rolling to Right: Minimal assistance  Supine to Sit: Moderate assistance  Sit to Supine: 2 Person assistance; Moderate assistance  Scooting: Maximal assistance;2 Person assistance (scooting towards SUSANNA.)  Bed Mobility Comments: PT mat, wedge, 2 pillows. Transfers  Bed to Chair: Maximum assistance; Moderate assistance;2 Person Assistance  Lateral Transfers: Maximum Assistance;2 Person Assistance; Moderate Assistance  Comment: Pt refuses to stand this date d/t increase pain. Pt is agreeable to attempt slide board transfer. She requires Mod A to complete lateral scoot and Max A x1 to maintain NWB through RLE. Edu provided on proper technique to complete. Balance  Posture: Good  Sitting - Static: Good  Sitting - Dynamic: Fair    Environmental Mobility  Ambulation  WB Status: NWB R LE  Wheelchair Activities  Propulsion: Yes  Propulsion 1  Propulsion: Manual  Level: Level Tile  Method: RUE;LUE  Level of Assistance: Minimal assistance  Description/ Details: Very slow movements to propel herself forward. Multiple short rest breaks to complete. Pt requires Min A to maintain a straight path and maneuver turns. Distance: 48'    PT Exercises  A/AROM Exercises: seated bilateral LE hips/ knees x15 with 2# on LLE and LGTB for light resistance. Supine BLE ex's AAROM with RLE and AROM on LLE. Rolling x2 to each side. (Increase time required to complete all tasks with extended rest breaks this date.)  Dynamic Sitting Balance Exercises: Sitting UE w/c push ups. Completed to increase UE strength and edu pt on technique for repositioning/skin protection. Reps: 2x5 each. Slide board transfers x2 in AM/PM. Lateral scooting at [facility] with x2 A.    ASSESSMENT  Vitals  O2 Device: None (Room air)    Activity Tolerance  Activity Tolerance: Patient limited by fatigue;Patient limited by endurance    Assessment  Assessment: Slide board transfers completed this date with x2 person A for safety and maintaining NWBing with RLE.  Pt presents with B UE weakness as well as Left LE weakness and NWB R LE limiting her activity. Pt would benefit from continued PT following discharge to address functional deficits. Performance Deficits/Impairments: Decreased functional mobility ; Decreased ROM; Decreased strength;Decreased safe awareness;Decreased endurance;Decreased balance; Increased pain  Treatment Diagnosis: Impaired function. Therapy Prognosis: Good  Decision Making: Medium Complexity  Discharge Recommendations: Patient would benefit from continued therapy after discharge  PT Equipment Recommendations  Equipment Needed: No    CLINICAL IMPRESSION   Slide board transfers completed this date with x2 person A for safety and maintaining NWBing with RLE. Pt presents with B UE weakness as well as Left LE weakness and NWB R LE limiting her activity. Pt would benefit from continued PT following discharge to address functional deficits. GOALS  Patient Goals   Patient Goals : Get stronger  Short Term Goals  Time Frame for Short Term Goals: 1 week  Short Term Goal 1: Bed mobility min A without use of bed rail  Short Term Goal 2: Sit<> // bars or rolling walker at mod A, maintaining NWB R LE  Short Term Goal 3: Improve staning tolerance to 1 minutes, NWB R LE with B UE support. Short Term Goal 4: Lateral scoot transfers, mod A X 1, maintaining NWB R LE. Short Term Goal 5: W/C mobility distance fo 50 ft , SBA/CGA level surface. Long Term Goals  Time Frame for Long Term Goals : By DC  Long Term Goal 1: Mod-I bed mobility  Long Term Goal 2: Stand pivot Transfers at min/mod A maintain NWB R LE  Long Term Goal 3: W/C mobility distance fo 50 to 150 ft SBA level surface  Long Term Goal 4: Pt able to take 3 to 5 steps in // bars NWB R LE, min A x 2  Long Term Goal 5: Family training for safe transfers from varied surfaces.     PLAN OF CARE  Physcial Therapy Plan  General Plan:  minutes of therapy at least 5 out of 7 days a week (5-7 daysx wk)  Specific Instructions for Next Treatment: Continue maxi move for transfers, progress with sit<.stnad in // bars or rolling walker in therapy. Current Treatment Recommendations: Strengthening; Functional mobility training;Transfer training; Endurance training;Stair training;Gait training; Safety education & training;Balance training;ROM;Wheelchair mobility training;Patient/Caregiver education & training;Home exercise program;Equipment evaluation, education, & procurement; Therapeutic activities  Additional Comments: Discussed with pt, need of ramp at entrance at this time. Safety Devices  Type of Devices: Gait belt;Call light within reach; Left in bed;Bed alarm in place (rails x 3)  Restraints  Restraints Initially in Place: No    EDUCATION  Education  Education Given To: Patient  Education Provided: Transfer Training  Education Provided Comments: NWB RLE for functional mobility, recommendation of ramp at entrance of home. Edu on slide board to complete lateral transfers from bed<> w/c to help increase independence with functional mobility and provide safe transfer techniques.   Education Method: Demonstration;Verbal  Education Outcome: Continued education needed      Therapy Time     12/18/22 0755 12/18/22 1301   PT Individual Minutes   Time In [phone]   Time Out [phone]   Minutes [phone] [facility]. [address] [place] [address], 12/18/22 at 3:54 PM

## 2022-12-18 NOTE — PROGRESS NOTES
12/18/22 1346   Encounter Summary   Encounter Overview/Reason   Encounter   Service Provided For: Patient   Referral/Consult From: [place]   Last Encounter  12/18/22   Spiritual/Emotional needs   Type Spiritual Support   Rituals, Rites and Sacraments   Type Anointing

## 2022-12-19 ENCOUNTER — APPOINTMENT (OUTPATIENT)
Dept: INTERVENTIONAL RADIOLOGY/VASCULAR | Age: 77
End: 2022-12-19
Attending: PHYSICAL MEDICINE & REHABILITATION
Payer: MEDICARE

## 2022-12-19 LAB
ABSOLUTE EOS #: 0 K/UL (ref 0–0.4)
ABSOLUTE LYMPH #: 1.3 K/UL (ref 1–4.8)
ABSOLUTE MONO #: 0.7 K/UL (ref 0.1–1.3)
ANION GAP SERPL CALCULATED.3IONS-SCNC: 10 MMOL/L (ref 9–17)
BASOPHILS # BLD: 0 % (ref 0–2)
BASOPHILS ABSOLUTE: 0 K/UL (ref 0–0.2)
BUN BLDV-MCNC: 69 MG/DL (ref 8–23)
CALCIUM SERPL-MCNC: 9.8 MG/DL (ref 8.6–10.4)
CHLORIDE BLD-SCNC: 101 MMOL/L (ref 98–107)
CO2: 27 MMOL/L (ref 20–31)
CREAT SERPL-MCNC: 4.58 MG/DL (ref 0.5–0.9)
EOSINOPHILS RELATIVE PERCENT: 0 % (ref 0–4)
GFR SERPL CREATININE-BSD FRML MDRD: 9 ML/MIN/1.73M2
GLUCOSE BLD-MCNC: 120 MG/DL (ref 70–99)
HCT VFR BLD CALC: 19.9 % (ref 36–46)
HEMOGLOBIN: 6.6 G/DL (ref 12–16)
LYMPHOCYTES # BLD: 13 % (ref 24–44)
MCH RBC QN AUTO: 30.3 PG (ref 26–34)
MCHC RBC AUTO-ENTMCNC: 33 G/DL (ref 31–37)
MCV RBC AUTO: 92 FL (ref 80–100)
MONOCYTES # BLD: 7 % (ref 1–7)
MORPHOLOGY: ABNORMAL
PDW BLD-RTO: 16.7 % (ref 11.5–14.9)
PHOSPHORUS: 5.5 MG/DL (ref 2.6–4.5)
PLATELET # BLD: 265 K/UL (ref 150–450)
PMV BLD AUTO: 7.7 FL (ref 6–12)
POTASSIUM SERPL-SCNC: 4.6 MMOL/L (ref 3.7–5.3)
RBC # BLD: 2.16 M/UL (ref 4–5.2)
SEG NEUTROPHILS: 80 % (ref 36–66)
SEGMENTED NEUTROPHILS ABSOLUTE COUNT: 8 K/UL (ref 1.3–9.1)
SODIUM BLD-SCNC: 138 MMOL/L (ref 135–144)
WBC # BLD: 10 K/UL (ref 3.5–11)

## 2022-12-19 PROCEDURE — 84100 ASSAY OF PHOSPHORUS: CPT

## 2022-12-19 PROCEDURE — 97530 THERAPEUTIC ACTIVITIES: CPT

## 2022-12-19 PROCEDURE — 2709999900 IR TUNNELED CVC PLACE WO SQ PORT/PUMP > 5 YEARS

## 2022-12-19 PROCEDURE — 2500000003 HC RX 250 WO HCPCS: Performed by: INTERNAL MEDICINE

## 2022-12-19 PROCEDURE — 6360000002 HC RX W HCPCS: Performed by: RADIOLOGY

## 2022-12-19 PROCEDURE — 2580000003 HC RX 258: Performed by: RADIOLOGY

## 2022-12-19 PROCEDURE — 36415 COLL VENOUS BLD VENIPUNCTURE: CPT

## 2022-12-19 PROCEDURE — 99232 SBSQ HOSP IP/OBS MODERATE 35: CPT | Performed by: PHYSICAL MEDICINE & REHABILITATION

## 2022-12-19 PROCEDURE — 6370000000 HC RX 637 (ALT 250 FOR IP): Performed by: FAMILY MEDICINE

## 2022-12-19 PROCEDURE — 6360000002 HC RX W HCPCS: Performed by: INTERNAL MEDICINE

## 2022-12-19 PROCEDURE — 90935 HEMODIALYSIS ONE EVALUATION: CPT

## 2022-12-19 PROCEDURE — 97535 SELF CARE MNGMENT TRAINING: CPT

## 2022-12-19 PROCEDURE — 80048 BASIC METABOLIC PNL TOTAL CA: CPT

## 2022-12-19 PROCEDURE — 97110 THERAPEUTIC EXERCISES: CPT

## 2022-12-19 PROCEDURE — 2580000003 HC RX 258: Performed by: INTERNAL MEDICINE

## 2022-12-19 PROCEDURE — 5A1D70Z PERFORMANCE OF URINARY FILTRATION, INTERMITTENT, LESS THAN 6 HOURS PER DAY: ICD-10-PCS | Performed by: RADIOLOGY

## 2022-12-19 PROCEDURE — 99231 SBSQ HOSP IP/OBS SF/LOW 25: CPT | Performed by: INTERNAL MEDICINE

## 2022-12-19 PROCEDURE — 77001 FLUOROGUIDE FOR VEIN DEVICE: CPT

## 2022-12-19 PROCEDURE — 02HV33Z INSERTION OF INFUSION DEVICE INTO SUPERIOR VENA CAVA, PERCUTANEOUS APPROACH: ICD-10-PCS | Performed by: PHYSICAL MEDICINE & REHABILITATION

## 2022-12-19 PROCEDURE — 36558 INSERT TUNNELED CV CATH: CPT

## 2022-12-19 PROCEDURE — 6370000000 HC RX 637 (ALT 250 FOR IP): Performed by: PHYSICAL MEDICINE & REHABILITATION

## 2022-12-19 PROCEDURE — 76937 US GUIDE VASCULAR ACCESS: CPT

## 2022-12-19 PROCEDURE — 85025 COMPLETE CBC W/AUTO DIFF WBC: CPT

## 2022-12-19 PROCEDURE — 1180000000 HC REHAB R&B

## 2022-12-19 PROCEDURE — 0JH63XZ INSERTION OF TUNNELED VASCULAR ACCESS DEVICE INTO CHEST SUBCUTANEOUS TISSUE AND FASCIA, PERCUTANEOUS APPROACH: ICD-10-PCS | Performed by: RADIOLOGY

## 2022-12-19 RX ORDER — HEPARIN SODIUM 1000 [USP'U]/ML
INJECTION, SOLUTION INTRAVENOUS; SUBCUTANEOUS
Status: COMPLETED | OUTPATIENT
Start: 2022-12-19 | End: 2022-12-19

## 2022-12-19 RX ADMIN — HEPARIN SODIUM 1600 UNITS: 1000 INJECTION INTRAVENOUS; SUBCUTANEOUS at 12:53

## 2022-12-19 RX ADMIN — PANTOPRAZOLE SODIUM 40 MG: 40 TABLET, DELAYED RELEASE ORAL at 06:04

## 2022-12-19 RX ADMIN — ZOLPIDEM TARTRATE 10 MG: 5 TABLET ORAL at 21:15

## 2022-12-19 RX ADMIN — ATORVASTATIN CALCIUM 40 MG: 40 TABLET, FILM COATED ORAL at 21:15

## 2022-12-19 RX ADMIN — GABAPENTIN 300 MG: 300 CAPSULE ORAL at 08:02

## 2022-12-19 RX ADMIN — AMIODARONE HYDROCHLORIDE 200 MG: 200 TABLET ORAL at 08:02

## 2022-12-19 RX ADMIN — HYDROCODONE BITARTRATE AND ACETAMINOPHEN 1 TABLET: 5; 325 TABLET ORAL at 08:19

## 2022-12-19 RX ADMIN — IRON SUCROSE 100 MG: 20 INJECTION, SOLUTION INTRAVENOUS at 14:58

## 2022-12-19 RX ADMIN — METHOCARBAMOL 500 MG: 500 TABLET ORAL at 21:15

## 2022-12-19 RX ADMIN — HYDRALAZINE HYDROCHLORIDE 100 MG: 50 TABLET, FILM COATED ORAL at 13:46

## 2022-12-19 RX ADMIN — ALLOPURINOL 100 MG: 100 TABLET ORAL at 08:02

## 2022-12-19 RX ADMIN — Medication 1.6 ML: at 16:13

## 2022-12-19 RX ADMIN — METOPROLOL TARTRATE 25 MG: 25 TABLET, FILM COATED ORAL at 08:02

## 2022-12-19 RX ADMIN — HYDROCODONE BITARTRATE AND ACETAMINOPHEN 1 TABLET: 5; 325 TABLET ORAL at 18:14

## 2022-12-19 RX ADMIN — MAGNESIUM OXIDE TAB 400 MG (241.3 MG ELEMENTAL MG) 400 MG: 400 (241.3 MG) TAB at 21:15

## 2022-12-19 RX ADMIN — HYDROCODONE BITARTRATE AND ACETAMINOPHEN 1 TABLET: 5; 325 TABLET ORAL at 21:15

## 2022-12-19 RX ADMIN — HYDRALAZINE HYDROCHLORIDE 100 MG: 50 TABLET, FILM COATED ORAL at 08:03

## 2022-12-19 RX ADMIN — ROPINIROLE HYDROCHLORIDE 0.25 MG: 0.25 TABLET, FILM COATED ORAL at 21:17

## 2022-12-19 RX ADMIN — MAGNESIUM OXIDE TAB 400 MG (241.3 MG ELEMENTAL MG) 400 MG: 400 (241.3 MG) TAB at 08:02

## 2022-12-19 RX ADMIN — VANCOMYCIN HYDROCHLORIDE 1000 MG: 1 INJECTION, POWDER, LYOPHILIZED, FOR SOLUTION INTRAVENOUS at 12:29

## 2022-12-19 RX ADMIN — METHOCARBAMOL 500 MG: 500 TABLET ORAL at 06:03

## 2022-12-19 RX ADMIN — METHOCARBAMOL 500 MG: 500 TABLET ORAL at 13:46

## 2022-12-19 RX ADMIN — HYDROCODONE BITARTRATE AND ACETAMINOPHEN 1 TABLET: 5; 325 TABLET ORAL at 04:11

## 2022-12-19 RX ADMIN — CALCITRIOL 0.25 MCG: 0.25 CAPSULE ORAL at 08:04

## 2022-12-19 RX ADMIN — Medication 2000 UNITS: at 08:02

## 2022-12-19 ASSESSMENT — PAIN DESCRIPTION - LOCATION
LOCATION: ANKLE
LOCATION: HIP;ANKLE
LOCATION: LEG
LOCATION: ANKLE
LOCATION: LEG
LOCATION: NECK
LOCATION: ANKLE;LEG

## 2022-12-19 ASSESSMENT — PAIN DESCRIPTION - ORIENTATION
ORIENTATION: RIGHT
ORIENTATION: DISTAL
ORIENTATION: RIGHT

## 2022-12-19 ASSESSMENT — PAIN DESCRIPTION - DESCRIPTORS
DESCRIPTORS: SHARP
DESCRIPTORS: ACHING
DESCRIPTORS: DULL;ACHING
DESCRIPTORS: SHARP
DESCRIPTORS: DULL;ACHING

## 2022-12-19 ASSESSMENT — PAIN SCALES - GENERAL
PAINLEVEL_OUTOF10: 5
PAINLEVEL_OUTOF10: 2
PAINLEVEL_OUTOF10: 0
PAINLEVEL_OUTOF10: 1
PAINLEVEL_OUTOF10: 10
PAINLEVEL_OUTOF10: 2
PAINLEVEL_OUTOF10: 5
PAINLEVEL_OUTOF10: 10

## 2022-12-19 NOTE — PROGRESS NOTES
Comprehensive Nutrition Assessment    Type and Reason for Visit:  Reassess    Nutrition Recommendations/Plan:   Continue current diet. Continue oral nutrition supplement. Malnutrition Assessment:  Malnutrition Status:  No malnutrition (Based on availabe data) (12/13/22 1428)    Context:  Acute Illness     Findings of the 6 clinical characteristics of malnutrition:  Energy Intake:  Mild decrease in energy intake (Comment)  Weight Loss:  Unable to assess     Body Fat Loss:  Unable to assess     Muscle Mass Loss:  Unable to assess    Fluid Accumulation:  Mild (not related to nutritional status) Extremities   Strength:  Not Performed    Nutrition Assessment:    Pt gone to IR for insertion of tunneled dialysis catheter at time of attempted visit. Plans for dialysis later today. No lunch consumed yet. Nurse states pt ate breakfast and overall appears to be eating well. Nutrition Related Findings:    K: 4.6, Phosphorus: 5.5 (12/19). A1C: 5.8 (12/6). Other labs and meds reviewed. Edema: +3 pitting LLE, unable to assess RLE. Wound Type: Multiple, Open Wounds, Surgical Incision       Current Nutrition Intake & Therapies:    Average Meal Intake: 51-75%, %  Average Supplements Intake: 51-75%, % (estimated)  ADULT DIET; Regular; Low Sodium (2 gm); Low Potassium (Less than 3000 mg/day); Low Phosphorus (Less than 1000 mg)  ADULT ORAL NUTRITION SUPPLEMENT; Lunch, Dinner; Renal Oral Supplement    Anthropometric Measures:  Height: 5' 5\" (165.1 cm)  Ideal Body Weight (IBW): 125 lbs (57 kg)    Admission Body Weight: 281 lb 15.5 oz (127.9 kg) (12/15 - not specified)  Current Body Weight: 281 lb 15.5 oz (127.9 kg) (12/15), 225.6 % IBW.  Weight Source: Not Specified  Current BMI (kg/m2): 46.9  Usual Body Weight: 254 lb 10.1 oz (115.5 kg) (115.5kg 4/6 Bedscale, 119kg per pt as usual wt)  % Weight Change (Calculated): 2.9                         Estimated Daily Nutrient Needs:  Energy Requirements Based On: Formula  Weight Used for Energy Requirements: Admission  Energy (kcal/day): 3721-1532 based on [place]-St. Jeor with 1.1-1.2 factor  Weight Used for Protein Requirements: Ideal  Protein (g/day): 85 based on 1.5 gm per kg (may vary with renal function, healing needs)      Nutrition Diagnosis:   Inadequate oral intake related to acute injury/trauma (decreased appetite) as evidenced by poor intake prior to admission    Nutrition Interventions:   Food and/or Nutrient Delivery: Continue Current Diet, Continue Oral Nutrition Supplement  Nutrition Education/Counseling: No recommendation at this time  Coordination of Nutrition Care: Continue to monitor while inpatient       Goals:  Previous Goal Met: Progressing toward Goal(s)  Goals: Meet at least 75% of estimated needs       Nutrition Monitoring and Evaluation:   Behavioral-Environmental Outcomes: None Identified  Food/Nutrient Intake Outcomes: Food and Nutrient Intake, Supplement Intake  Physical Signs/Symptoms Outcomes: Biochemical Data, GI Status, Fluid Status or Edema, Weight, Skin    Discharge Planning:    Continue Oral Nutrition Supplement, Continue current diet     Airam Laurent RD, LD  Contact: [phone]

## 2022-12-19 NOTE — PROGRESS NOTES
HEMODIALYSIS POST TREATMENT NOTE    Treatment time ordered: 2 Hours    Actual treatment time: 2 Hours    UltraFiltration Goal: 1000 ml  UltraFiltration Removed: 5000 ml      Pre Treatment weight: 124.5 kg  Post Treatment weight: 124 kg  Estimated Dry Weight: N/A    Access used:     Central Venous Catheter:          Tunneled or Non-tunneled: Tunneled            Site: Right chest wall          Access Flow: Good      Internal Access:       AV Fistula or AV Graft:          Site:        Access Flow:        Sign and symptoms of infection:        If YES:     Medications or blood products given: None    Chronic outpatient schedule: TBD, JOSE ARMANDO treatment # 1    Chronic outpatient unit: N/A at present    Summary of response to treatment: Patient tolerated Hemodialysis treatment # 1 well. 0.5 L removed as BP was trending down goal was decreased. Patient denies any discomfort post blood return. Tolerated IVPB iron well. Patient satisfied with first treatment outcome. Newly placed access tolerated well. Explain if orders NOT met, was physician notified:      Bridget Dominique faxed to patient unit/ placed in patient chart: Yes    Post assessment completed: Yes    Report given to: Lalo Mackay RN      * Intra-treatment documented Safety Checks include the followin) Access and face visible at all times. 2) All connections and blood lines are secure with no kinks. 3) NVL alarm engaged. 4) Hemosafe device applied (if applicable). 5) No collapse of Arterial or Venous blood chambers. 6) All blood lines / pump segments in the air detectors.

## 2022-12-19 NOTE — BRIEF OP NOTE
Brief Postoperative Note    Christie Paniagua  YOB: 1945  [id number]    Pre-operative Diagnosis: Renal failure    Post-operative Diagnosis: Same    Procedure: U/S and Fluoro guided placement of a 14.5 Fr x 19 cm tip to cuff right IJ approach tunneled dialysis catheter. Catheter flushed and aspirated well, was locked with heparin, and is ready for use.       Anesthesia: Local    Surgeons/Assistants: Rachel Helton MD     Estimated Blood Loss: minimal    Complications: none immediate    Specimens: were not obtained    Electronically signed by Rachel Helton MD on 12/19/2022 at 12:56 PM

## 2022-12-19 NOTE — PROGRESS NOTES
Nephrology Progress Note    Reason for consultation: Management of acute kidney injury and chronic kidney disease stage IV. Consulting physician: Esha Yi MD.    Interval history: Patient was seen and examined today and she is slightly depressed mood due to the fact that she would need to go to the [facility] after discharge from acute rehab. She still has generalized weakness and hemoglobin is down to 6.6 g/dL. She had been scheduled for tunneled hemodialysis catheter placement on 12/16/2022 but procedure was postponed due to the fact that she was on Eliquis. History of present illness: This is a 69 y.o. female with a significant past medical history of Lipidemia, nephrolithiasis, fibromyalgia, type 2 diabetes mellitus, osteoarthritis and Chronic kidney disease stage IIIb [baseline serum creatinine 2.5 mg/dL secondary to diabetic glomerulosclerosis and follows up with Dr. Sue Braxton of [facility] [address] [place] [address], who presented to the hospital to [facility] on 12/6/2022 after [place]pping [place] whilst [place] get out from [employer]. Vital signs were stable at presentation but her renal function had worsened from baseline with elevated BUN/creatinine 60/3.87 mg/dL. X-rays revealed trimalleolar fracture with diffuse soft tissue swelling of the right ankle. She underwent open reduction and internal fixation on 12/6/2022. Hemoglobin dropped to 6.6 g/dL on 12/10/2022 requiring PRBC transfusion. Serum creatinine peaked at 4.34 mg/dL. Patient did not require dialysis so far. She was transferred to acute rehab unit at [facility] yesterday [12/12/2022]. Pain control is adequate she has a cast on her left lower extremity. She does not have shortness of breath. She is nonoliguric and does not have indwelling Galdamez catheter. Laboratory studies today remarkable for BUN/creatinine 71/4.29  mg/dL.     Objective/     Vitals:    12/18/22 1716 12/18/22 2100 12/19/22 9888 12/19/22 1020   BP: (!) 154/66 (!) 137/54 (!) 116/52    Pulse: 64 63 62    Resp: 16 16 14    Temp: 98.4 °F (36.9 °C) 98.4 °F (36.9 °C) 98.6 °F (37 °C)    TempSrc: Oral Oral Oral    SpO2: 97% 97% 98%    Weight:       Height:    5' 5\" (1.651 m)     24HR INTAKE/OUTPUT:    Intake/Output Summary (Last 24 hours) at 12/19/2022 1025  Last data filed at 12/18/2022 1402  Gross per 24 hour   Intake --   Output 600 ml   Net -600 ml       Patient Vitals for the past 96 hrs (Last 3 readings):   Weight   12/15/22 1130 282 lb (127.9 kg)       Constitutional:  Alert, awake, no apparent distress  Cardiovascular:  S1, S2 without pericardial rub or gallop. Respiratory:  Diminished breath sounds at lung bases. Abdomen: Full but soft with normal bowel sounds. Ext: 2+ LE edema    Data/  Recent Labs     12/17/22  0631 12/19/22  0627   WBC 10.7 10.0   HGB 7.3* 6.6*   HCT 22.7* 19.9*   MCV 92.3 92.0    265       Recent Labs     12/17/22  0631 12/18/22  0624 12/19/22  0627    136 138   K 5.2 5.5* 4.6    100 101   CO2 25 25 27   GLUCOSE 133* 132* 120*   PHOS  --   --  5.5*   BUN 72* 73* 69*   CREATININE 4.40* 4.78* 4.58*   LABGLOM 10* 9* 9*       Assessment/Plan:   1. Acute kidney injury on chronic kidney disease stage IIIb - Differentials include progression of underlying chronic kidney disease, myeloma kidney and ischemic acute tubular necrosis. She is at increased risk for uremic complications and has agreed to start hemodialysis after explaining indications as well as potential risks and benefits. Plan: IR to place tunneled hemodialysis catheter today. Acute hemodialysis today for 2 hours. Avoid nephrotoxic agents including nonsteroidal anti-inflammatory drugs, vancomycin, aminoglycosides and intravenous iodinated contrast agents. Basic metabolic profile daily. Renal diet    2.   Normocytic anemia of chronic kidney disease - Iron studies are consistent with iron deficiency anemia patient is currently receiving loading dose of IV iron sucrose. Patient will benefit from transfusion of 1 unit of packed red cells. Will resume erythropoiesis stimulating agents once iron stores are repleted. 3.  S/p trimalleolar right ankle fracture - recovery in progress. 4.  Systemic hypertension - blood pressure control is adequate. 5.  Elevated kappa/lambda ratio may be contributory to worsening anemia. Serum immunofixation was negative for monoclonal immunoglobulins on 12/14/2022. We will follow hematology/oncology recommendations. Prognosis is guarded.     Umair Luna MD

## 2022-12-19 NOTE — PROGRESS NOTES
[facility]  Rodrigo Jara MD; Jose Flowers MD; Sirena Ch MD; MD Kassandra Galeana MD; MD MONY Caldwell [facility] Internal Medicine   [address]    Date:   12/19/2022  Patient name:  Geo Metz  Date of admission:  12/12/2022  5:48 PM  MRN:   [id number]  Account:  [de-identified]  YOB: 1945  PCP:    Albaro Lockhart MD  Room:   [address] [place] [address]  Code Status:    Full Code    Chief Complaint:     No chief complaint on file. Open fracture of tibia and fibula    History Obtained From:     Patient and electronic medical record    History of Present Illness:     Geo Metz is a 69 y.o. Non- / non  female who presents with No chief complaint on file. and is admitted to the hospital for the management of Open fracture of right tibia and fibula, sequela. Past medical history significant for A. Fib on Eliquis and amiodarone, JOSE ARMANDO on CKD, Type 2 Diabetes, HTN,HLD, Anemia, Fibromyalgia, Obesity, and DIONY. Sustained an open trimalleolar fracture, ankle fracture dislocation s/p ORIF on 12/6, right talus open treatment. Nonweight bearing right lower extremity. Developed JOSE ARMANDO on CKD, per Nephrology patient is high risk of needing dialysis, consult to Nephrology sent when patient admitted to Acute Rehab. Seen at examined at bedside, did not sleep well last night due to being uncomfortable due to the blankets. Otherwise no concerns, not in acute distress     Principal Problem:    Open fracture of right tibia and fibula, sequela  Active Problems:    Paraproteinemia    Stage 5 chronic kidney disease not on chronic dialysis (HCC)    Anemia of chronic renal failure, stage 5 (HCC)  Resolved Problems:    * No resolved hospital problems.  *                  Past Medical History:     Past Medical History:   Diagnosis Date    Abnormal stress test     Anemia     Arthritis     Depression Diverticulosis     DM (diabetes mellitus) ([facility] [address]     Erythropenia     Fibromyalgia     HTN (hypertension)     Hyperlipidemia     Kidney stone     Morbid obesity due to excess calories (HCC)     DIONY on CPAP     Osteopenia 03/03/14    Seasonal allergies     Sleep apnea     uses bipap prn        Past Surgical History:     Past Surgical History:   Procedure Laterality Date    ANKLE FRACTURE SURGERY Right 12/6/2022    RIGHT ANKLE OPEN REDUCTION INTERNAL FIXATION performed by Addie Fernández DO at [facility]  01/01/2011    right arm broken    CARDIAC CATHETERIZATION  [id number] dr Danilo Hammond  05/16/2016    COLONOSCOPY  01/01/2003    COLONOSCOPY  01/01/2007    ORIF ANKLE FRACTURE Right 12/06/2022    RIGHT ANKLE OPEN REDUCTION INTERNAL FIXATION    TOTAL KNEE ARTHROPLASTY Bilateral     left may 2013 and right july 2013    TUBAL LIGATION          Medications Prior to Admission:     Prior to Admission medications    Medication Sig Start Date End Date Taking? Authorizing Provider   methocarbamol (ROBAXIN) 750 MG tablet Take 1 tablet by mouth in the morning and 1 tablet at noon and 1 tablet in the evening. Do all this for 10 days. 12/12/22 12/22/22  Lisa Thorne MD   metoprolol tartrate (LOPRESSOR) 25 MG tablet Take 1 tablet by mouth 2 times daily 12/12/22   Lisa Thorne MD   senna (SENOKOT) 8.6 MG tablet Take 1 tablet by mouth daily as needed (Constipation) 12/12/22 1/11/23  Lisa Thorne MD   rOPINIRole (REQUIP) 0.25 MG tablet Take 1 tablet by mouth nightly 11/14/22   Eliza Cheatham MD   gabapentin (NEURONTIN) 600 MG tablet Take 1 tablet by mouth daily for 90 days.  11/14/22 2/12/23  Eliza Cheatham MD   traZODone (DESYREL) 50 MG tablet  10/20/22   Shon Gomes MD   colchicine (COLCRYS) 0.6 MG tablet  9/27/22   Magda Chand DPM   Cyanocobalamin (B-12) 1000 MCG LOZG DISSOLVE ONE tablet UNDER TONGUE ONCE DAILY 9/6/22   Rita Aldrich MD   blood glucose monitor kit and supplies use check blood sugar as directed 11/9/22   Eliza Vasquez MD   blood glucose monitor strips Check blood sugars daily as directed. 11/9/22   Eliza Vasquez MD   Lancets MISC 1 each by Does not apply route daily 11/9/22   Eliza Vasquez MD   Alcohol Swabs 70 % PADS 1 each by Does not apply route daily 11/9/22   Eliza Vasquez MD   atorvastatin (LIPITOR) 40 MG tablet Take 1 tablet by mouth daily 11/7/22   Eliza Vasquez MD   pantoprazole (PROTONIX) 40 MG tablet TAKE 1 TABLET DAILY 10/19/22   Eliza Vasquez MD   ELIQUIS 5 MG TABS tablet TAKE 1 TABLET TWICE A DAY 10/11/22   Eliza Vasquez MD   cyclobenzaprine (FLEXERIL) 5 MG tablet TAKE 1 TABLET BY MOUTH NIGHTLY AS NEEDED FOR MUSCLE SPASMS 10/3/22   Eliza Vasquez MD   allopurinol (ZYLOPRIM) 100 MG tablet TAKE 1 TABLET DAILY 9/21/22   Rutherford Aschoff, APRN - CNP   zolpidem (AMBIEN) 5 MG tablet Take 5 mg by mouth nightly as needed for Sleep.     Historical Provider, MD   hydrALAZINE (APRESOLINE) 25 MG tablet Take 4 tablets by mouth 3 times daily 7/12/22   Kamilla Melendez MD   amiodarone (CORDARONE) 200 MG tablet Take 1 tablet by mouth daily 7/6/22   BARBIE Mcguire NP   calcitRIOL (ROCALTROL) 0.5 MCG capsule TAKE 1 CAPSULE BY MOUTH DAILY 6/14/22   Kamilla Melendez MD   ferrous sulfate (FE TABS 325) 325 (65 Fe) MG EC tablet Take 1 tablet by mouth daily (with breakfast) 5/10/22   Eliza Vasquez MD   azelastine (ASTELIN) 0.1 % nasal spray 1 spray by Nasal route 2 times daily Use in each nostril as directed 10/13/21   Sari Dorantes DO   Cholecalciferol (VITAMIN D3) 25 MCG (1000 UT) TABS Take 2 tablets by mouth daily 1/9/20   [place] Aschoff, APRN - CNP   Magnesium Oxide 400 MG CAPS Take by mouth 2 times daily Takes once daily at [facility]    Historical Provider, MD        Allergies:     Adhesive tape, Cephalexin, Erythromycin, Ketorolac tromethamine, Pcn [penicillins], Percocet [oxycodone-acetaminophen], Sulfa antibiotics, and Ultracet [tramadol-acetaminophen]    Social History:     Tobacco:    reports that she quit smoking about 62 years ago. Her smoking use included cigarettes. She has a 0.25 pack-year smoking history. She has never used smokeless tobacco.  Alcohol:      reports no history of alcohol use. Drug Use:  reports no history of drug use. Family History:     Family History   Problem Relation Age of Onset    Diabetes Mother     Heart Disease Mother     Osteoporosis Mother     Kidney Disease Father     Prostate Cancer Brother        Review of Systems:     Positive and Negative as described in HPI. ROS     No cp  No nv  No diarrhea  No dyspnea                                                          . Physical Exam:     Physical Exam   Vitals:    Vitals:    22 1243 22 1249 22 1253 22 1334   BP: (!) 140/58 128/69 134/63 (!) 148/64   Pulse: 60 61 61 60   Resp:    Temp:    97.7 °F (36.5 °C)   TempSrc:    Oral   SpO2: 97% 96% 97% 98%   Weight:       Height:                        Body mass index is 46.93 kg/m². Temp (24hrs), Av.3 °F (36.8 °C), Min:97.7 °F (36.5 °C), Max:98.6 °F (37 °C)    No results for input(s): POCGLU in the last 72 hours. General Appearance:   No acute distress , obese                 Skin:                             No rash or erythema  HEENT ;                                                                       No icterus, no pallor . No ptosis. No gross asymmetry  Or abnormality face         Neck:                            No mass , no thyroid enlargement                                           Pulmonary/Chest:                                               Symmetric                                          Clear to auscultation bilaterally . No wheezes,                                          No rales or rhonchi .                                            No abnormality on percussion Cardiovascular:            Normal rate, regular rhythm,                                          No murmur or  Gallop . Abdomen:                       Soft, non-tender                                           Normal bowels sounds,                                             Extremities:                    No  Edema .                                            Musculo-skeletal / Neurological ;;                  Neurological ;                 No focal motor deficit ,                 No focal sensory deficit ,    Musculo-skeletal ;                  No  gait abnormality                  No significant joint abnormality,                                                         Psych:                     See psych notes                                                                 Investigations:      URINE ANALYSIS: No results found for: LABURIN     CBC:  Lab Results   Component Value Date/Time    WBC 10.0 12/19/2022 06:27 AM    HGB 6.6 12/19/2022 06:27 AM     12/19/2022 06:27 AM     03/02/2012 11:15 AM             BMP:    Lab Results   Component Value Date/Time     12/19/2022 06:27 AM    K 4.6 12/19/2022 06:27 AM     12/19/2022 06:27 AM    CO2 27 12/19/2022 06:27 AM    BUN 69 12/19/2022 06:27 AM    CREATININE 4.58 12/19/2022 06:27 AM    GLUCOSE 120 12/19/2022 06:27 AM    GLUCOSE 113 03/02/2012 11:15 AM      LIVER PROFILE:  Lab Results   Component Value Date/Time    ALT <5 12/13/2022 06:47 AM    AST 10 12/13/2022 06:47 AM    PROT 5.6 12/13/2022 06:47 AM    BILITOT 0.3 12/13/2022 06:47 AM    BILIDIR 0.1 12/13/2022 06:47 AM    LABALBU 2.9 12/13/2022 06:47 AM    LABALBU 4.2 03/02/2012 11:15 AM             @BRIEFLABT(TSH)@      Laboratory Testing:  Recent Results (from the past 24 hour(s))   Basic Metabolic Panel w/ Reflex to MG    Collection Time: 12/19/22  6:27 AM   Result Value Ref Range    Glucose 120 (H) 70 - 99 mg/dL BUN 69 (H) 8 - 23 mg/dL    Creatinine 4.58 (H) 0.50 - 0.90 mg/dL    Est, Glom Filt Rate 9 (L) >60 mL/min/1.73m2    Calcium 9.8 8.6 - 10.4 mg/dL    Sodium 138 135 - 144 mmol/L    Potassium 4.6 3.7 - 5.3 mmol/L    Chloride 101 98 - 107 mmol/L    CO2 27 20 - 31 mmol/L    Anion Gap 10 9 - 17 mmol/L   CBC auto differential    Collection Time: 12/19/22  6:27 AM   Result Value Ref Range    WBC 10.0 3.5 - 11.0 k/uL    RBC 2.16 (L) 4.0 - 5.2 m/uL    Hemoglobin 6.6 (LL) 12.0 - 16.0 g/dL    Hematocrit 19.9 (L) 36 - 46 %    MCV 92.0 80 - 100 fL    MCH 30.3 26 - 34 pg    MCHC 33.0 31 - 37 g/dL    RDW 16.7 (H) 11.5 - 14.9 %    Platelets 551 728 - 262 k/uL    MPV 7.7 6.0 - 12.0 fL    Seg Neutrophils 80 (H) 36 - 66 %    Lymphocytes 13 (L) 24 - 44 %    Monocytes 7 1 - 7 %    Eosinophils % 0 0 - 4 %    Basophils 0 0 - 2 %    Segs Absolute 8.00 1.3 - 9.1 k/uL    Absolute Lymph # 1.30 1.0 - 4.8 k/uL    Absolute Mono # 0.70 0.1 - 1.3 k/uL    Absolute Eos # 0.00 0.0 - 0.4 k/uL    Basophils Absolute 0.00 0.0 - 0.2 k/uL    Morphology ANISOCYTOSIS PRESENT    Phosphorus    Collection Time: 12/19/22  6:27 AM   Result Value Ref Range    Phosphorus 5.5 (H) 2.6 - 4.5 mg/dL       Imaging/Diagnostics:  XR ANKLE RIGHT (MIN 3 VIEWS)    Result Date: 12/6/2022  Anatomic alignment of comminuted ankle fracture status post ORIF. Assessment :      Hospital Problems             Last Modified POA    * (Principal) Open fracture of right tibia and fibula, sequela 12/12/2022 Yes    Paraproteinemia 12/14/2022 Yes    Stage 5 chronic kidney disease not on chronic dialysis ([facility] [address] 12/14/2022 Yes    Anemia of chronic renal failure, stage 5 ([facility] [address] 12/14/2022 Yes     Plan:       Medications: Allergies:     Allergies   Allergen Reactions    Adhesive Tape     Cephalexin Other (See Comments)     Tongue cracks and peels    Erythromycin Other (See Comments)     Epigastric pain and bloating    Ketorolac Tromethamine Other (See Comments)     Severe stomach cramps Pcn [Penicillins]      Unknown reaction    Percocet [Oxycodone-Acetaminophen] Itching and Other (See Comments)     Esophagus hurt    Sulfa Antibiotics     Ultracet [Tramadol-Acetaminophen] Itching       Current Meds:   Scheduled Meds:    methocarbamol  500 mg Oral 3 times per day    gabapentin  300 mg Oral Daily    iron sucrose  100 mg IntraVENous Q24H    atorvastatin  40 mg Oral Nightly    zolpidem  10 mg Oral Nightly    allopurinol  100 mg Oral Daily    amiodarone  200 mg Oral Daily    [Held by provider] apixaban  5 mg Oral BID    calcitRIOL  0.25 mcg Oral Daily    hydrALAZINE  100 mg Oral TID    magnesium oxide  400 mg Oral BID    metoprolol tartrate  25 mg Oral BID    pantoprazole  40 mg Oral QAM AC    rOPINIRole  0.25 mg Oral Nightly    Vitamin D  2,000 Units Oral Daily    polyethylene glycol  17 g Oral Daily     Continuous Infusions:       PRN Meds: Benzocaine-Menthol, acetaminophen, HYDROcodone-acetaminophen, bisacodyl       Patient admitted [facility] Problems             Last Modified POA    * (Principal) Open fracture of right tibia and fibula, sequela 12/12/2022 Yes    Paraproteinemia 12/14/2022 Yes    Stage 5 chronic kidney disease not on chronic dialysis ([address] 12/14/2022 Yes    Anemia of chronic renal failure, stage 5 ([address] 12/14/2022 Yes                      12/13/22    Open trimalleolar fracture, s/p ORIF on 12/6  JOSE ARMANDO on CKD, Cr 4.29 today, nephro following, patient high risk for needing HD  Anemia  Hx of A Fib on Eliquis and amio, DM2, HTN,HLD, DIONY, Fibromyalgia   Nephrology following for any urgent HD needs, appreciate recommendations, renal diet, continue to monitor Cr  A Fib.  Continue Amio and Eliquis  Continue lipitor, hydralazine, and lopressor  Continue iron supplementation, Hgb stable at 7.6                 Dec 18  Hem/Onc on board for elevated Kappa light chain, rule out myeloma  Anemia of chronic renal failure, appears baseline around 6-7, patient receiving IV iron supplementation, restart Retacrit once iron supplementation completed. Nephrology plans for HD today. IR consulted for tunnel catheter placement, followed by HD session, DC IV fluids, appreciate recommendations   Hyperkalemia Kayexalate dose today  Potassium 5.5                12/19/22    Hemoglobin 12.0 - 16.0 g/dL 6.6 Low Panic   7.3 Low   6.5 Low Panic   7.7 Low  R       DIALYSIS TODAY . Consultations:   IP CONSULT TO DIETITIAN  IP CONSULT TO SOCIAL WORK  IP CONSULT TO INTERNAL MEDICINE  IP CONSULT TO NEPHROLOGY  IP CONSULT TO Anh Carey TO Garfield Segura MD  12/19/2022 12/19/2022  2:12 PM          Copy sent to Dr. Italo Campbell MD    Please note that this chart was generated using voice recognition Dragon dictation software. Although every effort was made to ensure the accuracy of this automated transcription, some errors in transcription may have occurred.

## 2022-12-19 NOTE — PLAN OF CARE
Problem: Safety - Adult  Goal: Free from fall injury  Outcome: Progressing  Flowsheets (Taken 12/19/2022 1723)  Free From Fall Injury: Instruct family/caregiver on patient safety  Note: Patient remains free of falls and injuries throughout shift. Bed remains in the lowest position, wheels locked, call light and bedside table are within reach. Problem: ABCDS Injury Assessment  Goal: Absence of physical injury  Outcome: Progressing  Flowsheets (Taken 12/19/2022 1723)  Absence of Physical Injury: Implement safety measures based on patient assessment     Problem: Skin/Tissue Integrity  Goal: Absence of new skin breakdown  Description: 1. Monitor for areas of redness and/or skin breakdown  2. Assess vascular access sites hourly  3. Every 4-6 hours minimum:  Change oxygen saturation probe site  4. Every 4-6 hours:  If on nasal continuous positive airway pressure, respiratory therapy assess nares and determine need for appliance change or resting period. Outcome: Progressing     Problem: Pain  Goal: Verbalizes/displays adequate comfort level or baseline comfort level  Outcome: Progressing  Flowsheets (Taken 12/19/2022 1723)  Verbalizes/displays adequate comfort level or baseline comfort level:   Encourage patient to monitor pain and request assistance   Assess pain using appropriate pain scale   Administer analgesics based on type and severity of pain and evaluate response   Implement non-pharmacological measures as appropriate and evaluate response   Consider cultural and social influences on pain and pain management   Notify Licensed Independent Practitioner if interventions unsuccessful or patient reports new pain  Note: Patient's pain is well controlled with current regimen. See MAR.       Problem: Chronic Conditions and Co-morbidities  Goal: Patient's chronic conditions and co-morbidity symptoms are monitored and maintained or improved  Outcome: Progressing     Problem: Nutrition Deficit:  Goal: Optimize nutritional status  Outcome: Progressing

## 2022-12-19 NOTE — PATIENT CARE CONFERENCE
[facility] Acute [facility]patient [facility]  TEAM CONFERENCE NOTE  Date: 22  Patient Name: Christie Paniagua       Room: 9129/5014-07  MRN: [id number]       : 1945  (69 y.o.)     Gender: female   Referring Practitioner: Dr Pretty Richardson   Open fracture of right tibia and fibula, sequela [S82.201S, S82.401S]  Diagnosis: Open fracture of right tibia and fibula, sequela     NURSING    Bladder Continence  Always Continent  Bowel Continence Always Continent    Date of Last BM: 22    Bladder/Bowel Program Interventions: Both Bowel & Bladder Program In Place     Wounds/Incisions/Ulcers:  Incision tibial distal right- splint and ACE wrap in place. Pain Control: Patient's pain currently controlled and pain regimen effective as ordered    Pain Medication Regimen Usage Pattern: MAR reviewed and pain medications are being used at the following frequency (Specify Medication, # Doses Administered on average per day, identified patterns of use - for example: time of day, prior to activity/therapy)  Norco 5-325 mg 1 tablet every 4 hours PRN given prior therapy. Robaxin 500 mg tablet every 8 hours scheduled. Gabapentin 300 mg capsule daily. Fall Risk:  Falling star program initiated    Medication Education Program: Patient able to manage medications and being educated by nursing    Discharge Preparation Patient/Responsible Party Education In Progress:   Dialysis    Nursing specific communication for TEAM: No additional information identified requiring communication at this time    PHYSICAL THERAPY    Bed Mobility:        Rolling to Left: Minimal assistance  Rolling to Right: Minimal assistance  Supine to Sit: Contact guard assistance (with heavy use of bed rails)  Scooting: Stand by assistance (scooting towards EOB with heavy use of bed rails.)  Bed Mobility Comments: Bed mobility completed in hospital bed. Pt requires heavy use of bed rails to complete all tasks this date.  Multiple rolls completed to change brief and don pants this date. Transfers:  Sit to Stand: 2 Person Assistance; Moderate Assistance;Maximum Assistance (right knee extended to maintain and monitor NWB, right UE at RW left at mat , VCs sequencing)  Stand to Sit: Moderate Assistance;2 Person Assistance  Bed to Chair: Maximum assistance; Moderate assistance;2 Person Assistance  Lateral Transfers: Maximum Assistance;2 Person Assistance; Moderate Assistance  WB Status: NWB R LE    Comment: Pt is agreeable to slide board transfer from bed to w/c. She requires Mod A to complete lateral scoot and Min A x1 to maintain NWB through RLE. Edu provided on proper technique to complete. STS completed in // bars this date with Mod/Max A x2 and a third person to assist with NWBing of RLE. Attempted STS in // bars with knee scooter and x3 A, however pt is limited by weakness to get her RLE onto it. Edu provided on proper technique. PT Assessment:  Slide board transfers completed this date with x2 person A for safety and maintaining NWBing with RLE. Pt presents with B UE weakness as well as Left LE weakness and NWB R LE limiting her activity. Pt would benefit from continued PT following discharge to address functional deficits. Goals  Time Frame for Short Term Goals: 1 week  Short Term Goal 1: Bed mobility min A without use of bed rail  Short Term Goal 2: Sit<> // bars or rolling walker at mod A, maintaining NWB R LE  Short Term Goal 3: Improve staning tolerance to 1 minutes, NWB R LE with B UE support. Short Term Goal 4: Lateral scoot transfers, mod A X 1, maintaining NWB R LE. Short Term Goal 5: W/C mobility distance fo 50 ft , SBA/CGA level surface. OCCUPATIONAL THERAPY  SELF CARE          Feeding  Assistance Level: Set-up  Skilled Clinical Factors: Pt finishing up morning meal upon writer arrival. Per report, pt requires A opening ketchup and mustard packets only.  Able to cut food and manage utensils without difficulty. Grooming/Oral Hygiene  Assistance Level: Set-up  Skilled Clinical Factors: Completes oral and hair care while sitting w/c level at the sink. Upper Extremity Bathing  Assistance Level: Set-up  Skilled Clinical Factors: Pt completes while sitting EOB. Lower Extremity Bathing  Assistance Level: Moderate assistance  Skilled Clinical Factors: Pt able to wash B thighs and lower legs (down to wraps on feet) while sittting EOB. Requires A for washing aleah area/buttocks while supine in bed. Upper Extremity Dressing  Assistance Level: Set-up  Skilled Clinical Factors: Pt able to doff/chris [place] shirt while sitting EOB         Lower Extremity Dressing  Assistance Level: Moderate assistance  Skilled Clinical Factors: TA for threading BLEs while pt supine in bed. Pt demos with improved BLE strength to lift for threading. Improved rolling with pt able to assist pulling up in front and back. Putting On/Taking Off Footwear  Assistance Level: Dependent  Skilled Clinical Factors: TA for donning L sock       Toileting  Assistance Level: Dependent  Skilled Clinical Factors: PM: Pt utilizes bedpan for voiding. TA for all brief care and hygiene.        Toilet Transfer: Dependent/Total (Max A x2 from [address] [place] [address]) Max A x2 with maddy hernandez VC to maintain NWB RLE  Has not been completed since eval due to pt unable to maintain NWB status    Short Term Goals  Time Frame for Short Term Goals: By one week  Short Term Goal 1: Pt will perform UB ADLs including grooming/oral care with SBA and good safety  Short Term Goal 2: Pt will perform LB ADLs including toileting/toilet transfers with Min A, good safety, and use of AE/DME/Modified techniques as needed  Short Term Goal 3: Pt will be educated on NWB status to RLE with good carryover during functional transfers/tasks  Short Term Goal 4: Pt will tolerate standing for 5+ minutes, Min A, with 1-2 UE support and no LOB during functional task while maintaining NWB RLE  Short Term Goal 5: Pt will be educated on and explore use of AE/DME/Modified techniques to improve safety and independence with ADL tasks  Additional Goals?: Yes  Short Term Goal 6: Pt will actively participate in 30+ minutes of therapeutic exercise/functional activity to promote safety and independence with self-care and mobility  Short Term Goal 7: Pt will perform functional transfers/mobility with Min A, good safety, and use of least restrictive device while maintaining NWB RLE     SPEECH THERAPY      NUTRITION  Weight: 282 lb (127.9 kg) / Body mass index is 46.93 kg/m². Diet Rx: 2 gm Na, Low Potassium, Low Phosphorus. Nepro twice daily. Overall PO intake appears fairly adequate with % of meals and supplements consumed. Please see nutrition note for details. CASE MANAGEMENT ASSESSMENT    Discharge Disposition: Patient is considering a SNF/ One which takes dialysis patients  Family Support:     PCP Established: [x] Yes [] No (If No: Specify Status of Designation)    Services Being Followed In Preparation For Discharge: (Check All That Apply)  [x] [address] if patient goes home it will be with [facility]  [] Outpatient Therapy(Specify Location)  [] Dialysis   [x] Hemodialysis To be determined   [] Peritoneal Dialysis  [] IV Infusion Services  [] Enteral Nutrition Services  [] Oxygen  [] Stoma/Ostomy  [] Catheter  [] Lovenox  [x] Eliquis      Patient Mood Interview PHQ-2 to 9 Score: 18 ( discusses w  on admission)    Pre-Admission Status:  Lives With: Spouse  Type of Home: House  Home Layout: One level, Laundry in basement  Home Access: Stairs to enter with rails  Entrance Stairs - Number of Steps: 3 from back door.   Entrance Stairs - Rails: Left  Bathroom Shower/Tub: Walk-in shower, Doors, Shower chair without back  Bathroom Toilet: Handicap height  Bathroom Equipment: Grab bars in shower, Shower chair, Hand-held shower  Home Equipment: Cane, [address], Walker, rolling, [address] ([place] at all times, W/c belongs to )  Has the patient had two or more falls in the past year or any fall with injury in the past year?: Yes (Pt reports falling in October before this most recent fall)  Receives Help From: Family  ADL Assistance: Independent  Homemaking Assistance: Needs assistance  Laundry:  (Daughter does laundry in the basement.)  Vacuuming:  (Spouse/daughter)  Shopping:  (Pt does shopping)  Homemaking Responsibilities: Yes  Meal Prep Responsibility: No (Eats out)  Laundry Responsibility: No (Daughter does laundry)  Cleaning Responsibility: No (Daughter occasionally assists)  Ambulation Assistance: Independent (With cane, used 2 wheel walker at night)  Transfer Assistance: Independent  Active : Yes  Mode of Transportation: [employer]  Occupation: Retired  Type of Occupation: Pharmacy tech  IADL Comments: Pt reports sleeping in regular flat bed  Additional Comments: Pt reports her daughter assists as able,  is home most of the time unless he is fishing. Daughter works  [employer] Friday at [employer] (medical records). Son works full time but he is on medical leave at this time. Family Education: Family Education not required    Percentage Risk for Readmission: Moderate 19% - 27%   Readmission Risk              Risk of Unplanned Readmission:  27       %    Critical Items: None   Problem / Barrier Intervention / Plan  Results   Impaired functional mobility related to WB restriction Strengthening, ROM, functional mobility training, at wheelchair level.       Steps at entrance of home Recommend Ramp     Altered ability to care for self Remediation and training in modified care strategies to increase safety and independence with self care tasks                                         Total Self Care Score    Total Mobility Score  Admission Score:  19      Admission Score:  18  Goal:  34/42         Goal:  [address]  Patient is participating and compliant with meeting therapy minutes as outlined in plan of care: Yes `    Discharge Plan   Estimated Discharge Date: Bed available snf  Home evaluation needed? No  Overnight or Day Pass: No  Factors facilitating achievement of predicted outcomes: Motivated, Cooperative, and Good insight into deficits  Barriers to the achievement of predicted outcomes: Pain, Limited safety awareness, Anxiety, Unrealistic expectations, Decreased endurance, Lower extremity weakness, Long standing deficits, Skin Care, and Medication managment    Functional Goals at discharge:  Predicted Outcome: Skilled Nursing FacilityPATIENT'S LEVEL OF ASSISTANCE: Total Assistance  Discharge therapy goals:  PT: Long Term Goals  Time Frame for Long Term Goals : By DC  Long Term Goal 1: Mod-I bed mobility  Long Term Goal 2: Stand pivot Transfers at min/mod A maintain NWB R LE  Long Term Goal 3: W/C mobility distance fo 50 to 150 ft SBA level surface  Long Term Goal 4: Pt able to take 3 to 5 steps in // bars NWB R LE, min A x 2  Long Term Goal 5: Family training for safe transfers from varied surfaces.   OT:Long Term Goals  Time Frame for Long Term Goals : By discharge  Long Term Goal 1: Pt will perform UB ADLs including grooming/oral care with Mod I and good safety  Long Term Goal 2: Pt will perform LB ADLs, including toileting/toilet transfers, with SBA, good safety, and use of AE/DME/Modified techniques as needed  Long Term Goal 3: Pt will tolerate standing 10+ minutes, SBA, with 1-2 UE and no LOB during self-care/functional activity while maintaining NWB RLE  Long Term Goal 4: Pt will verbalize/demonstrate good understanding of home safety/fall prevention/energy conservation strategies to improve safety and independence with self-care tasks  Long Term Goal 5: Pt will safely perform light housekeeping/meal preparation with supervision, good safety, and use of least restrictive device to improve independence with ADLs/IADLs  Long Term Goal 6: Pt will perform functional transfers/mobility with SBA, good safety, and use of least restrictive device while maintaining NWB RLE  Long Term Goal 7: Pt will demonstrate improved fine motor coordination during self-care tasks AEB 10 second improvement on 9 hole peg test  ST:     Participating Team Members:  /:  Maryann Helm RN  Occupational Therapist:  Sharda Denis OT   Physical Therapist: Maribel Galeana PT  Speech Therapist:  N/A  Nurse: Suzanna Christie RN    Dietary/Nutrition: Larissa Lara RD, LD  Pastoral Care: Bijal Sorensen, Chaplain Galina Devries  CMG: Gregorio Cadet RN    I approve the established interdisciplinary plan of care as documented within the medical record of Marley Romero. Logan Morales.  Renate Nelson MD

## 2022-12-19 NOTE — PROGRESS NOTES
Physical Therapy  Facility/Department: [facility] ACUTE REHAB  Rehabilitation Physical Therapy Daily Treatment Note    NAME: Lele Baker  : 1945 (15 y.o.)  MRN: [id number]  CODE STATUS: Full Code    Date of Service: 22      Chart Reviewed: Yes  Patient assessed for rehabilitation services?: Yes  Additional Pertinent Hx: Ms. Lele Baker is a 69 y.o. female who was admitted to [facility] on 2022 with Ankle Injury. 71-year-old female with history of A. fib on Eliquis, bilateral knee arthroplasty, CHF, CKD, type 2 diabetes, and hypertension as well as chronic numbness of her feet status post fall over a curb found to have displaced right trimalleolar ankle fracture with large posterior malleolus and comminuted lateral malleolus fracture. Pt S/P  status post I&D and ORIF on 22 by Dr Ramon Wilson, right talus open treatment-continue splint right lower extremity, nonweightbearing,  Family / Caregiver Present: No  Referring Practitioner: Dr Francesco Amador  Referral Date : 22  Diagnosis: Right open trimalleolar fracture ankle fracture dislocation s/p I&D and ORIF,  Right talus fracture. Restrictions:  Restrictions/Precautions: Weight Bearing  Lower Extremity Weight Bearing Restrictions  Right Lower Extremity Weight Bearing: Non Weight Bearing  Position Activity Restriction  Other position/activity restrictions: Per chart: Right open trimalleolar fracture ankle fracture dislocation s/p I&D and ORIF, DOS 2022     SUBJECTIVE  Subjective: Pt is in bed upon arrival. Agreeable to PT. Pt reports \"it harder to move my leg when its wrapped\". Pt is referring to having the ACE wrap on LLE. Writer removed ACE to rewrap it for skin protection, however pt requested to keep it off at this time. Pt edu on the benefits of using it for edema control with good understanding. PM: Per RN, pt is ok for tx. PM tx ended early d/t pt getting ready to leave for dialysis.   Pain: Pt reports pain 3/10 R ankle lateral. OBJECTIVE  Vision  Vision: Impaired  Vision Exceptions: Wears glasses at all times    Hearing  Hearing: Within functional limits    Cognition  Overall Cognitive Status: WFL    ROM     Sensation  Overall Sensation Status: Impaired (Decreased sensation B feet.)    Functional Mobility  Bed mobility  Rolling to Left: Minimal assistance  Rolling to Right: Minimal assistance  Supine to Sit: Contact guard assistance (with heavy use of bed rails)  Scooting: Stand by assistance (scooting towards EOB with heavy use of bed rails.)  Bed Mobility Comments: Bed mobility completed in hospital bed. Pt requires heavy use of bed rails to complete all tasks this date. Multiple rolls completed to change brief and don pants this date. Transfers  Sit to Stand: 2 Person Assistance; Moderate Assistance;Maximum Assistance (right knee extended to maintain and monitor NWB, right UE at RW left at mat , VCs sequencing)  Stand to Sit: Moderate Assistance;2 Person Assistance  Bed to Chair: Maximum assistance; Moderate assistance;2 Person Assistance  Lateral Transfers: Maximum Assistance;2 Person Assistance; Moderate Assistance  Comment: Pt is agreeable to slide board transfer from bed to w/c. She requires Mod A to complete lateral scoot and Min A x1 to maintain NWB through RLE. Edu provided on proper technique to complete. STS completed in // bars this date with Mod/Max A x2 and a third person to assist with NWBing of RLE. Attempted STS in // bars with knee scooter and x3 A, however pt is limited by weakness to get her RLE onto it. Edu provided on proper technique. Balance  Posture: Good  Sitting - Static: Good  Sitting - Dynamic: Fair  Standing - Static: Poor  Standing - Dynamic: Poor;-  Comments: standing balance assessed in // bars. Environmental Mobility  Ambulation  WB Status: NWB R LE    PT Exercises  Exercise Treatment: Supine to long seated in bed with heavy use of bed rails.  Edu on benefits of continued ther ex as able to increase strength and endurance for functional mobility. A/AROM Exercises: Supine bilateral LE hips/ knees x15 A/AROM  Pressure Relief Exercises: Multiple rolls completed in bed with heavy use of bed rails. Writer assisted with brief change and donning her pants while in bed. Dynamic Sitting Balance Exercises: Sitting UE w/c push ups. Completed to increase UE strength and edu pt on technique for repositioning/skin protection. Reps: x5 each. Slide board transfers x1 in AM. Lateral scooting at EOB with x1 A. Static Standing Balance Exercises: static stand with Mod/Max A x2. Pt is unable to maintain NWBing with RLE. Third stand with a third person A to maintain [address] precuations. Pt's standing tolerance averages ~10--20sec. Standing attempts x4 total.    ASSESSMENT  Vitals  O2 Device: None (Room air)    Activity Tolerance  Activity Tolerance: Patient limited by fatigue;Patient limited by endurance    Assessment  Assessment: Slide board transfers completed this date with x2 person A for safety and maintaining NWBing with RLE. Pt presents with B UE weakness as well as Left LE weakness and NWB R LE limiting her activity. Pt would benefit from continued PT following discharge to address functional deficits. Performance Deficits/Impairments: Decreased functional mobility ; Decreased ROM; Decreased strength;Decreased safe awareness;Decreased endurance;Decreased balance; Increased pain  Treatment Diagnosis: Impaired function. Therapy Prognosis: Good  Decision Making: Medium Complexity  Discharge Recommendations: Patient would benefit from continued therapy after discharge  PT Equipment Recommendations  Equipment Needed: No    CLINICAL IMPRESSION   Slide board transfers completed this date with x2 person A for safety and maintaining NWBing with RLE. Pt presents with B UE weakness as well as Left LE weakness and NWB R LE limiting her activity.   Pt would benefit from continued PT following discharge to address functional deficits. GOALS  Patient Goals   Patient Goals : Get stronger  Short Term Goals  Time Frame for Short Term Goals: 1 week  Short Term Goal 1: Bed mobility min A without use of bed rail  Short Term Goal 2: Sit<> // bars or rolling walker at mod A, maintaining NWB R LE  Short Term Goal 3: Improve staning tolerance to 1 minutes, NWB R LE with B UE support. Short Term Goal 4: Lateral scoot transfers, mod A X 1, maintaining NWB R LE. Short Term Goal 5: W/C mobility distance fo 50 ft , SBA/CGA level surface. Long Term Goals  Time Frame for Long Term Goals : By DC  Long Term Goal 1: Mod-I bed mobility  Long Term Goal 2: Stand pivot Transfers at min/mod A maintain NWB R LE  Long Term Goal 3: W/C mobility distance fo 50 to 150 ft SBA level surface  Long Term Goal 4: Pt able to take 3 to 5 steps in // bars NWB R LE, min A x 2  Long Term Goal 5: Family training for safe transfers from varied surfaces. PLAN OF CARE  Physcial Therapy Plan  General Plan:  minutes of therapy at least 5 out of 7 days a week (5-7 daysx wk)  Specific Instructions for Next Treatment: Continue maxi move for transfers, progress with sit<.stnad in // bars or rolling walker in therapy. Current Treatment Recommendations: Strengthening; Functional mobility training;Transfer training; Endurance training;Stair training;Gait training; Safety education & training;Balance training;ROM;Wheelchair mobility training;Patient/Caregiver education & training;Home exercise program;Equipment evaluation, education, & procurement; Therapeutic activities  Additional Comments: Discussed with pt, need of ramp at entrance at this time. Safety Devices  Type of Devices: Gait belt;Call light within reach; Left in bed;Bed alarm in place (rails x 3)  Restraints  Restraints Initially in Place: No    EDUCATION  Education  Education Given To: Patient  Education Provided: Transfer Training  Education Provided Comments: JERROD SHEETS for functional mobility, recommendation of ramp at entrance of home. Edu on slide board to complete lateral transfers from bed<> w/c to help increase independence with functional mobility and provide safe transfer techniques.   Education Method: Demonstration;Verbal  Education Outcome: Continued education needed    Therapy Time     12/19/22 0753 12/19/22 1538   PT Individual Minutes   Time In 0901 1336   Time Out 3731 0137   Minutes 58 15        Variance: 13  Reason:  (dialysis)    Bettina Ordoñezt, RICHELLE, 12/19/22 at 3:39 PM

## 2022-12-19 NOTE — CARE COORDINATION
ONGOING ARU DISCHARGE PLAN:    Patient is alert and oriented x4. Spoke with patient regarding discharge plan and patient confirms plan is to go home if possible with Spouse and [place]. Patient to start dialysis. Patient has Ortho Appointment on Wednesday 12/21/22 with Dr. Sofi Burrows. Life start to  12:15. Depending which days she has dialysis, Dr Alexus Mike will speak with Nephrology to see if they can work around the appointment. Patient made aware of appointment. She will also speak with her family and update them. Patient informed this writer she is thinking she might want to go to a SNF that does dialysis, she is not sure how much care she will be able to get from her  at home. She is thinking about [address]. Informed patient we would discuss this in TEAMS tomorrow. DME: None ordered today     Will continue to follow for additional discharge needs.     Electronically signed by Saul Helm RN on 12/19/2022 at 12:10 PM

## 2022-12-19 NOTE — FLOWSHEET NOTE
TREATMENT LOCATION:   [x] [address]   [facility]: [phone]  F: [phone] [] [address]: [phone]  F: [phone]        Lymphedema Services - Treatment Note of the Lower Extremity    Date:  2022  Patient: Tsering Jimenes  : 1945             MRN: [id number]  Referring Physician: Diane Riedel, MD       Phone: [phone]  Fax: [phone]  Insurance: [facility] [facility] (FW:[id number])/ Medicare (ID: [id number]) -  PT/OT/ST, BMN, no co-pay  Medical Diagnosis: R60.9 - peripheral edema  Rehab Codes: I89.0,    Onset Date: 3/15/22  Visit# / total visits:  scheduled; Progress note for Medicare patient due at visit 10   ( Certification Dates: 3/28/2022 - 22)     Allergies:    [x]? Adhesive tape       [x]? Medications    Contraindications/Precautions:   [x]? Age 60+   [x]? Advanced Renal Disease - medical clearance received. Subjective: Pt reports that her garments came in the mail. Pain: [x] YES    [] NO     Location: BLE     Pain Rating: ( 0-10 scale) : 10/10  Comments:     Plan for next session:  Set up 30 day f/u visit, review garment fit and train    Objective:   [x] Measurement [x] Bandaging [] MLD [] Skin care   [x] Education [] Self-bandaging [] Self-MLD [] Wound Care   [] Exercise [] Caregiver training [] Kinesiotaping [] Other:    [] Nutrition [x] Garment fitting/training [] Vasopneumatic Pump           Circumferential Measurements   Measurements taken from nail base of D2 digit.   Measurements (cm) Right Left   Dorsum  11  22.5 22.7   Inframalleolar      17 31.7 33.2   Supramalleolar    20 23.0 24.1   Lower Calf 24 23.1 26.0   Mid Calf 30 32.1 35.2   Upper Calf 37 42.5 43.5   Knee 43 48.7 50.7   10 cm above knee       Thigh-widest       Hip-widest       Current total: 22    Initial Total 3/28/2022 : We will see him back with a repeat PSA in 6 months.   223.6    227.9    224.0    227.9    227.7    240.8    263.9 235.4    239.8    239.7    234.6    240.2    258.2    271.0                  Assessment:  [x] Progressing toward goals. Pt arrives with bandages donned from previous visit. Bandages are removed and reduction is noted via measurements and observation. Pt has new garments with her. Writer provides fit and train, extender straps necessary for top velcro strap. Pt demo understanding of donning technique and wearing schedule which is reviewed (daytime every day, nighttime as able). Edu provided on lymphedema risk reduction techniques utilizing handout below. Lymphedema Risk Reduction Techniques    People who have had surgery or trauma to the lymphatic system should be aware of activities that put too much pressure on the affected arm or leg. Protective measures to avoid injury, swelling, and infection include:    Maintaining Proper Hygiene   Clean the skin of the affected legs daily and apply lotion.  Take proper care of the toenails and avoid cutting cuticles too short.  Clean all cuts with soap and water, and then apply antibacterial ointment and a sterile dressing.  Use unscented lotion daily to keep the skin moisturized.  Notify your doctor immediately of any signs of infection, such as redness, pain, heat, increased swelling or fever. Staying Fit   Complete lymphedema exercises regularly to improve drainage as instructed by therapist.  Anjali Childs Gradually increase intensity of activity/task.  Maintain a healthy body weight. Eat a well-balanced, low-sodium diet.  Keep the Leg elevated if you are resting   Do light exercises or stretching. Taking Precaution with Everyday Activities    Wear compression when traveling by airplane.  Avoid sunburns and other burns to the affected area.  Use an electric shaver when shaving legs   No new tattoos in the affected area.    Avoid extreme heat situations such as hot tub or sauna.   Compression garments for lymphedema need to fit correctly. An ill-fitting compression garment may make lymphedema worse. [] No change. [] Other:  [x] Patient would continue to benefit from skilled occupational therapy services in order to reduce swelling and focus on goals below. Therapy Goals:   STG - To be addressed within 5 visits     Pt will demonstrate compliance of maintaining lymphedema precautions to reduce the risks of infection and further exacerbations. Met 5/23/22     Pt will demonstrate independence with decongestive exercise program in order to expedite fluid rerouting. Met 5/23/22     Caregiver will demo good understanding of bandaging technique and theory in order to continue progression between visits. Met 5/17        LTG To be adressed within 10 visits      Pt will demonstrate competence with SELF MLD (Manual lymphatic drainage) in order to reroute lymphatic pathways for decreased swelling. progressing 5/23/22     Pt/Caregiver will demonstrate independence with donning/doffing and wearing schedule for compression garments/ devices to maintain decreased size upon discharge. Progressing 5/25/22      Pt will demonstrate compliance with skin care routine for improved overall skin integrity and decreased risk of wounds and infection. Ongoing 5/25/22     Pt to compliant with CDT in order to reduce edema in the BLE by 15+ cm per limb. Progressing 5/25/22       Pt. Education:  [x] Yes  [] No  [x] Reviewed Prior HEP/Ed  Method of Education: [x] Verbal  [x] Demo  [x] Written  Comprehension of Education:  [x] Verbalizes understanding. [x] Demonstrates understanding. [x] Needs review. [] No understanding  Knowledge of home program:  [x] Good [] Fair [] Poor [x] With assist from family/caregiver        Plan:   [x] Continue current frequency toward long and short term goals.           Treatment Charges Minutes   Units Re-evaluation (39371):$   64.01/$49.52                                           Manual Therapy (58925):             $26.92 / $21.34     Therapeutic activities (07395):   $37.46/ $26.79     Therapeutic Exercise (00328):   $29.21/$ 22.84     Self care/home mgmt (06483):    $32.39 / $24.43 40 3   Vasopneumatic Device (09790):   $9.21     Total Treatment Time    40 3       Medicare Tracking - $2150 cap Totals   Today 81.25   Previous  714.80   Grand Total 796.05          Time In:  7022 Time Out: 2938      Electronically signed by Cher Crump OT on 5/25/2022 at 3:37 PM

## 2022-12-19 NOTE — PROGRESS NOTES
Physical Medicine & Rehabilitation  Progress Note    12/19/2022 11:14 AM     CC: Ambulatory and ADL dysfunction due to right ankle fracture/dislocation    Subjective:   No complaints. Pain controlled. States she is not able to maintain nonweightbearing. States home is not wheelchair accessible. Plan to start dialysis    ROS:  Denies fevers, chills, sweats. No chest pain, palpitations, lightheadedness. Denies coughing, wheezing or shortness of breath. Denies abdominal pain, nausea, diarrhea or constipation. No new areas of joint pain. Denies new areas of numbness or weakness. Denies new anxiety or depression issues. No new skin problems. Rehabilitation:   PT:  Restrictions/Precautions: Weight Bearing  Implants present? : Metal implants  Other position/activity restrictions: Per chart: Right open trimalleolar fracture ankle fracture dislocation s/p I&D and ORIF, DOS 12/6/2022  Right Lower Extremity Weight Bearing: Non Weight Bearing   Transfers  Sit to Stand: 2 Person Assistance, Moderate Assistance, Maximum Assistance (right knee extended to maintain and monitor NWB, right UE at RW left at mat , VCs sequencing)  Stand to Sit: Moderate Assistance, 2 Person Assistance  Bed to Chair: Maximum assistance, Moderate assistance, 2 Person Assistance  Lateral Transfers: Maximum Assistance, 2 Person Assistance, Moderate Assistance  Comment: Pt is agreeable to slide board transfer from bed to w/c. She requires Mod A to complete lateral scoot and Min A x1 to maintain NWB through RLE. Edu provided on proper technique to complete. STS completed in // bars this date with Mod/Max A x2 and a third person to assist with NWBing of RLE. Attempted STS in // bars with knee scooter and x3 A, however pt is limited by weakness to get her RLE onto it. Edu provided on proper technique.   WB Status: NWB R LE      OT:  ADL  Feeding: Independent  Feeding Skilled Clinical Factors: Per pt report  Grooming: Setup  Grooming Skilled Clinical Factors: Setup for oral care/grooming of hair  UE Bathing: Stand by assistance  UE Bathing Skilled Clinical Factors: Seated EOB, pt able to cleanse UB with wash cloths with SBA for safety  LE Bathing: Maximum assistance, Dependent/Total  LE Bathing Skilled Clinical Factors: Seated EOB, pt able to cleanse upper legs, A to cleanse LLE/foot and TA for aleah/buttocks care Max A x2 in maddy stedy  UE Dressing: Minimal assistance  UE Dressing Skilled Clinical Factors: Seated EOB, pt able to don OH shirt with A to pull down  LE Dressing: Dependent/Total  LE Dressing Skilled Clinical Factors: TA to thread brief and pants, TA to mange up over hips with Max A in maddy stedy  Toileting: Dependent/Total  Toileting Skilled Clinical Factors: TA to complete toileting hygiene and manage brief/pants up over hips  Additional Comments: Pt completes UB and LB ADLs while seated EOB d/t decreased strength, activity tolerance, endurance, and NWB to RLE. Pt tolerated well, frequent rest breaks taken as needed throughout session. Pt Max A/dependent for all LB ADLs d/t NWB RLE and decreased strength. Balance  Sitting Balance: Stand by assistance  Standing Balance: Maximum assistance   Standing Balance  Time: ~30 seconds x5  Activity: Functional transfers  Comment: Max A x2 using maddy CirclePublish  Functional Mobility  Functional - Mobility Device: Other (Use of maddy MatsSoftdy to transfer from bed to toilet)  Activity: To/from bathroom  Assist Level: Dependent/Total  Functional Mobility Comments: Max A x2 in maddy stedy     Bed mobility  Rolling to Left: Minimal assistance  Rolling to Right: Minimal assistance  Supine to Sit: Contact guard assistance (with heavy use of bed rails)  Sit to Supine: 2 Person assistance, Moderate assistance  Scooting: Stand by assistance (scooting towards EOB with heavy use of bed rails.)  Bed Mobility Comments: Bed mobility completed in hospital bed.  Pt requires heavy use of bed rails to complete all tasks this date. Multiple rolls completed to change brief and don pants this date. Transfers  Sit to stand: Dependent/Total (Max A x2 in maddy hernandez)  Stand to sit: Dependent/Total (Max A x2 in [address] [place] [address] stedy)  Transfer Comments: Pt very fearful, however completes 5 sit to stand transfers utilizing maddy muhammaddy. Toilet Transfers  Toilet - Technique: Ambulating  Equipment Used: Raised toilet seat with rails  Toilet Transfer: Dependent/Total (Max A x2 from maddy hernandez)  Toilet Transfers Comments: Max A x2 with maddy stedy, VC to maintain NWB RLE               ST:        Objective:  BP (!) 116/52   Pulse 62   Temp 98.6 °F (37 °C) (Oral)   Resp 14   Ht 5' 5\" (1.651 m)   Wt 282 lb (127.9 kg)   LMP  (LMP Unknown)   SpO2 98%   BMI 46.93 kg/m²  I Body mass index is 46.93 kg/m². I   Wt Readings from Last 1 Encounters:   12/15/22 282 lb (127.9 kg)      Temp (24hrs), Av.5 °F (36.9 °C), Min:98.4 °F (36.9 °C), Max:98.6 °F (37 °C)         GEN: well developed, well nourished, no acute distress  HEENT: Normocephalic atraumatic, EOMI, mucous membranes pink and moist  CV: RRR, no murmurs, rubs or gallops  PULM: CTAB, no rales or rhonchi. Respirations WNL and unlabored  ABD: soft, NT, ND, +BS and equal  NEURO: A&O x3. Sensation intact to light touch. MSK: Right lower extremity with cast, neurovascular intact, plus/5 upper extremities and left lower extremity, 3/5 proximal right lower extremity, right ankle with/splint Ace wrap sling intact neurovascularly intact  EXTREMITIES: No calf tenderness to palpation left  lower extremity  SKIN: warm dry and intact with good turgor  PSYCH: appropriately interactive. Affect WNL.   Good spirits        Medications   Scheduled Meds:   vancomycin  1,000 mg IntraVENous Once    methocarbamol  500 mg Oral 3 times per day    gabapentin  300 mg Oral Daily    iron sucrose  100 mg IntraVENous Q24H    atorvastatin  40 mg Oral Nightly    zolpidem  10 mg Oral Nightly    allopurinol  100 mg Oral Daily amiodarone  200 mg Oral Daily    [Held by provider] apixaban  5 mg Oral BID    calcitRIOL  0.25 mcg Oral Daily    hydrALAZINE  100 mg Oral TID    magnesium oxide  400 mg Oral BID    metoprolol tartrate  25 mg Oral BID    pantoprazole  40 mg Oral QAM AC    rOPINIRole  0.25 mg Oral Nightly    Vitamin D  2,000 Units Oral Daily    polyethylene glycol  17 g Oral Daily     Continuous Infusions:  PRN Meds:.Benzocaine-Menthol, acetaminophen, HYDROcodone-acetaminophen, bisacodyl     Diagnostics:     CBC:   Recent Labs     12/17/22  0631 12/19/22  0627   WBC 10.7 10.0   RBC 2.46* 2.16*   HGB 7.3* 6.6*   HCT 22.7* 19.9*   MCV 92.3 92.0   RDW 17.0* 16.7*    265       BMP:   Recent Labs     12/17/22  0631 12/18/22  0624 12/19/22 0627    136 138   K 5.2 5.5* 4.6    100 101   CO2 25 25 27   PHOS  --   --  5.5*   BUN 72* 73* 69*   CREATININE 4.40* 4.78* 4.58*       BNP: No results for input(s): BNP in the last 72 hours. PT/INR:   No results for input(s): PROTIME, INR in the last 72 hours. APTT: No results for input(s): APTT in the last 72 hours. CARDIAC ENZYMES: No results for input(s): CKMB, CKMBINDEX, TROPONINT in the last 72 hours. Invalid input(s): CKTOTAL;3  FASTING LIPID PANEL:  Lab Results   Component Value Date    CHOL 178 01/11/2021    HDL 37 (L) 01/11/2021    TRIG 281 (H) 01/11/2021     LIVER PROFILE: No results for input(s): AST, ALT, ALB, BILIDIR, BILITOT, ALKPHOS in the last 72 hours. I/O (24Hr): Intake/Output Summary (Last 24 hours) at 12/19/2022 1114  Last data filed at 12/18/2022 1402  Gross per 24 hour   Intake --   Output 600 ml   Net -600 ml         Glu last 24 hour  No results for input(s): POCGLU in the last 72 hours. No results for input(s): CLARITYU, COLORU, PHUR, SPECGRAV, PROTEINU, RBCUA, BLOODU, BACTERIA, NITRU, WBCUA, LEUKOCYTESUR, YEAST, GLUCOSEU, BILIRUBINUR in the last 72 hours.     Impression/Plan:   Impaired ADLs, gait, and mobility due to:     R ankle trimalleolar fracture dislocation: s/p ORIF 12/6 by Dr. Erik Romero. NWB RLE. PT/OT for gait, mobility, strengthening, endurance, ADLs, and self care. Patient unable maintain nonweightbearing, home not wheelchair accessible per pt -  will need to start looking at skilled nurse facilities-however will need clearance when medically ready by nephrology starting dialysis  Pain-has Norco prn, on Robaxin TID. Has Tylenol prn and vitamin D. 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 Nephrology following and planning catheter to be placed today and followed by hemodialysis, on calcitriol. K Serum 4.6 today  DM II with neuropathy: carb controlled diet. On gabapentin for neuropathy. Decreased to 300 mg daily due to renal function  HTN/HLD: on atorvastatin, hydralazine  Anemia of chronic disease: Hb 6 6. Nephrology following and treating with IV iron x10 days. She reports 6-7 is baseline Hb for her and that she receives Retacrit at [facility] twice monthly - on hold for now per nephrology sure IV iron completed. Nephrology recommending transfusion due to hemoglobin 6.6, so is hematology to maintain greater than 7 notify internal medicine of hemoglobin 6.6-Dr. Mary Ann Landon notified-will address on rounds today. disussed with patient-she is agreeable to transfusion-defer to internal medicine notified and will evaluate today  Elevated kappa light chain immunoglobulin: Hematology following. Concern for bone marrow disease vs renal failure - workup in progress. . Rec Resume Procrit after completion of IV iron h , transfuse for hemoglobin less than 7  Hyponatremia: Improved. Will monitor. Sodium 136  Fibromyalgia  Obesity: BMI 43.6.  Dietitian consulted  DIONY:  Depression: on Trazodone nightly  Moderately severe protein calorie malnutrition: Nephrology added Nepro supplement daily. Dietitian following  Insomnia: has Ambien nightly, reportedly takes 10 mg at home. Also on Requip at hs. Increased to her home dose of Ambien. Gout: on allopurinol. Colchicine discontinued - no acute gout symptoms  Bowel Management: Miralax daily, senokot prn, dulcolax prn. DVT Prophylaxis:  SCD's while in bed, RAVINDRA's during the day, and Eliquis  Internal medicine for medical management - nephrology and hematology follow      Traci Maldonado. Lyndsey Hill MD       This note is created with the assistance of a speech recognition program.  While intending to generate a document that actually reflects the content of the visit, the document can still have some errors including those of syntax and sound a like substitutions which may escape proof reading.   In such instances, actual meaning can be extrapolated by contextual diversion

## 2022-12-19 NOTE — PROGRESS NOTES
HEMODIALYSIS PRE-TREATMENT NOTE    Patient Identifiers prior to treatment: Name  MRN    Isolation Required: No                      Isolation Type: N/A       (please document if patient is being managed as a PUI/COVID-19 patient)        Hepatitis status:                           Date Drawn                             Result  Hepatitis B Surface Antigen 22     NEG                     Hepatitis B Surface Antibody 22 NEG        Hepatitis B Core Antibody 22 NEG          How was Hepatitis Status verified: Epic Chart     Was a copy of the labs you documented provided to facility for the patient's chart: JOSE ARMANDO    Hemodialysis orders verified: Yes by Anastasiya Mancilla    Access Within normal limits ( I.e. s/s of infection,...): WNL     Pre-Assessment completed: Anastasiya Mancilla    Pre-dialysis report received from: Cherelle                      Time: 4035

## 2022-12-19 NOTE — PROGRESS NOTES
[facility]: SHAWNA VELOZ   Acute Rehabilitation Occupational Therapy Daily Treatment Note    Date: 22  Patient Name: Rony Lazcano       Room: 3849/6525-07  MRN: [id number]  Account: [de-identified]   : 1945  (69 y.o.) Gender: female       Referring Practitioner: Karlo Scott MD  Diagnosis: Open fracture of right tibia and fibula, sequela  Additional Pertinent Hx: Rony Lazcano is 69 y.o. female  Who was admitted to the hospital on 2022 for treatment of Open fracture of right tibia and fibula, sequela. Patient presented to the emergency room with right ankle injury and suffered a right ankle fracture after falling over a curb when she was trying to go to [employer] for [employer]. She has underlying history of A. fib on Eliquis, CHF, diabetes mellitus with CKD3, hypertension, osteoporosis, fibromyalgia, morbid obesity BMI 43, sleep apnea, depression. Patient underwent I&D and ORIF on 2022. Patient also developed acute kidney injury with hyperkalemia and metabolic acidosis. Nephrology was consulted and patient given Benna Deeds. Patient was given 1 unit PRBC transfusion on 2022 for low hemoglobin of 6.4. Treatment Diagnosis: Impaired self-care status    Past Medical History:  has a past medical history of Abnormal stress test, Anemia, Arthritis, Depression, Diverticulosis, DM (diabetes mellitus) ([address] Erythropenia, Fibromyalgia, HTN (hypertension), Hyperlipidemia, Kidney stone, Morbid obesity due to excess calories ([address] DIONY on CPAP, Osteopenia, Seasonal allergies, and Sleep apnea. Past Surgical History:   has a past surgical history that includes Colonoscopy (2003); Colonoscopy (2007); Tubal ligation; Arm Surgery (2011); Total knee arthroplasty (Bilateral); Cardiac catheterization ([id number] dr Connor Beatty); Cardiac catheterization (2016); ORIF ankle fracture (Right, 2022);  Ankle fracture surgery (Right, 2022); and IR TUNNELED CVC PLACE WO SQ PORT/PUMP > 5 YEARS (12/19/2022). Restrictions  Restrictions/Precautions  Restrictions/Precautions: Weight Bearing  Required Braces or Orthoses?: No  Implants present? : Metal implants     Position Activity Restriction  Other position/activity restrictions: Per chart: Right open trimalleolar fracture ankle fracture dislocation s/p I&D and ORIF, DOS 12/6/2022  Lower Extremity Weight Bearing Restrictions  Right Lower Extremity Weight Bearing: Non Weight Bearing       Subjective  Subjective  Subjective: \"Not too bad\" Pt is pleasant and cooperative for therapy. RN Maddi moore's tx with morning labs. Pain Assessment  Pain Assessment: 0-10  Pain Level: 2  Pain Location: Ankle    Objective  Cognition  Overall Orientation Status: Within Functional Limits       Cognition  Overall Cognitive Status: WFL    Activities of Daily Living  Grooming/Oral Hygiene  Assistance Level: Set-up  Skilled Clinical Factors: Completes oral and hair care while sitting w/c level at the sink. Upper Extremity Dressing  Assistance Level: Set-up  Skilled Clinical Factors: Pt able to doff/chris [place] shirt while sitting w/c level at the sink    Mobility     12/19/22 1536   Functional Mobility   Device Wheelchair   Activity To/From bathroom   Assistance Level Maximum assistance   Skilled Clinical Factors max A in small areas       OT Exercises  Dynamic Sitting Balance Exercises: AM: Pt instruction in seated bean bag toss and retreival with reacher for facilitation of increased activity tolerance to support participation in ADLs. Pt requires frequent rest breaks due to overall fatigue. Good effort noted.     Assessment  Assessment  Activity Tolerance: Patient limited by endurance  Discharge Recommendations: Continue to assess pending progress    Patient Education  Education  Education Given To: Patient  Education Provided: Role of Therapy;Plan of Care;Safety;ADL Function;Cognition  Education Method: Verbal;Demonstration  Barriers to Learning: None  Education Outcome: Verbalized understanding;Continued education needed    Goals     Short Term Goals  Time Frame for Short Term Goals: By one week  Short Term Goal 1: Pt will perform UB ADLs including grooming/oral care with SBA and good safety  Short Term Goal 2: Pt will perform LB ADLs including toileting/toilet transfers with Min A, good safety, and use of AE/DME/Modified techniques as needed  Short Term Goal 3: Pt will be educated on NWB status to RLE with good carryover during functional transfers/tasks  Short Term Goal 4: Pt will tolerate standing for 5+ minutes, Min A, with 1-2 UE support and no LOB during functional task while maintaining NWB RLE  Short Term Goal 5: Pt will be educated on and explore use of AE/DME/Modified techniques to improve safety and independence with ADL tasks  Additional Goals?: Yes  Short Term Goal 6: Pt will actively participate in 30+ minutes of therapeutic exercise/functional activity to promote safety and independence with self-care and mobility  Short Term Goal 7: Pt will perform functional transfers/mobility with Min A, good safety, and use of least restrictive device while maintaining NWB RLE    Long Term Goals  Time Frame for Long Term Goals : By discharge  Long Term Goal 1: Pt will perform UB ADLs including grooming/oral care with Mod I and good safety  Long Term Goal 2: Pt will perform LB ADLs, including toileting/toilet transfers, with SBA, good safety, and use of AE/DME/Modified techniques as needed  Long Term Goal 3: Pt will tolerate standing 10+ minutes, SBA, with 1-2 UE and no LOB during self-care/functional activity while maintaining NWB RLE  Long Term Goal 4: Pt will verbalize/demonstrate good understanding of home safety/fall prevention/energy conservation strategies to improve safety and independence with self-care tasks  Long Term Goal 5: Pt will safely perform light housekeeping/meal preparation with supervision, good safety, and use of least restrictive device to improve independence with ADLs/IADLs  Long Term Goal 6: Pt will perform functional transfers/mobility with SBA, good safety, and use of least restrictive device while maintaining NWB RLE  Long Term Goal 7: Pt will demonstrate improved fine motor coordination during self-care tasks AEB 10 second improvement on 9 hole peg test    Plan  Occupational Therapy Plan  Times Per Week: 5-7  Times Per Day: Twice a day  Current Treatment Recommendations: Strengthening, ROM, Balance training, Functional mobility training, Endurance training, Pain management, Patient/Caregiver education & training, Positioning, Safety education & training, Equipment evaluation, education, & procurement, Self-Care / ADL, Home management training, Coordination training       12/19/22 1003 12/19/22 1536   OT Individual Minutes   Time In 1003  --    Time Out 1101  --    Minutes 62  --    Minute Variance   Variance  --  32   Reason  --    (dialysis)       Electronically signed by TRISHA Gutierrez on 12/19/22 at 3:37 PM EST

## 2022-12-20 DIAGNOSIS — R52 PAIN: Primary | ICD-10-CM

## 2022-12-20 LAB
ANION GAP SERPL CALCULATED.3IONS-SCNC: 7 MMOL/L (ref 9–17)
BUN BLDV-MCNC: 40 MG/DL (ref 8–23)
CALCIUM SERPL-MCNC: 9 MG/DL (ref 8.6–10.4)
CHLORIDE BLD-SCNC: 101 MMOL/L (ref 98–107)
CO2: 28 MMOL/L (ref 20–31)
CREAT SERPL-MCNC: 3.51 MG/DL (ref 0.5–0.9)
GFR SERPL CREATININE-BSD FRML MDRD: 13 ML/MIN/1.73M2
GLUCOSE BLD-MCNC: 106 MG/DL (ref 70–99)
HCT VFR BLD CALC: 20.1 % (ref 36–46)
HEMOGLOBIN: 6.5 G/DL (ref 12–16)
POTASSIUM SERPL-SCNC: 4.6 MMOL/L (ref 3.7–5.3)
SODIUM BLD-SCNC: 136 MMOL/L (ref 135–144)

## 2022-12-20 PROCEDURE — 2580000003 HC RX 258: Performed by: INTERNAL MEDICINE

## 2022-12-20 PROCEDURE — 2500000003 HC RX 250 WO HCPCS: Performed by: INTERNAL MEDICINE

## 2022-12-20 PROCEDURE — 97530 THERAPEUTIC ACTIVITIES: CPT

## 2022-12-20 PROCEDURE — 90935 HEMODIALYSIS ONE EVALUATION: CPT

## 2022-12-20 PROCEDURE — 85014 HEMATOCRIT: CPT

## 2022-12-20 PROCEDURE — 99232 SBSQ HOSP IP/OBS MODERATE 35: CPT | Performed by: INTERNAL MEDICINE

## 2022-12-20 PROCEDURE — 1180000000 HC REHAB R&B

## 2022-12-20 PROCEDURE — 86901 BLOOD TYPING SEROLOGIC RH(D): CPT

## 2022-12-20 PROCEDURE — 97535 SELF CARE MNGMENT TRAINING: CPT

## 2022-12-20 PROCEDURE — 99232 SBSQ HOSP IP/OBS MODERATE 35: CPT | Performed by: PHYSICAL MEDICINE & REHABILITATION

## 2022-12-20 PROCEDURE — 97110 THERAPEUTIC EXERCISES: CPT

## 2022-12-20 PROCEDURE — 86850 RBC ANTIBODY SCREEN: CPT

## 2022-12-20 PROCEDURE — 86920 COMPATIBILITY TEST SPIN: CPT

## 2022-12-20 PROCEDURE — 80048 BASIC METABOLIC PNL TOTAL CA: CPT

## 2022-12-20 PROCEDURE — 86900 BLOOD TYPING SEROLOGIC ABO: CPT

## 2022-12-20 PROCEDURE — 85018 HEMOGLOBIN: CPT

## 2022-12-20 PROCEDURE — 6370000000 HC RX 637 (ALT 250 FOR IP): Performed by: PHYSICAL MEDICINE & REHABILITATION

## 2022-12-20 PROCEDURE — 6370000000 HC RX 637 (ALT 250 FOR IP): Performed by: FAMILY MEDICINE

## 2022-12-20 PROCEDURE — 36415 COLL VENOUS BLD VENIPUNCTURE: CPT

## 2022-12-20 RX ORDER — SODIUM CHLORIDE 9 MG/ML
INJECTION, SOLUTION INTRAVENOUS PRN
Status: DISCONTINUED | OUTPATIENT
Start: 2022-12-20 | End: 2022-12-20 | Stop reason: SDUPTHER

## 2022-12-20 RX ORDER — SODIUM CHLORIDE 9 MG/ML
INJECTION, SOLUTION INTRAVENOUS PRN
Status: COMPLETED | OUTPATIENT
Start: 2022-12-20 | End: 2022-12-21

## 2022-12-20 RX ADMIN — HYDROCODONE BITARTRATE AND ACETAMINOPHEN 1 TABLET: 5; 325 TABLET ORAL at 19:06

## 2022-12-20 RX ADMIN — MAGNESIUM OXIDE TAB 400 MG (241.3 MG ELEMENTAL MG) 400 MG: 400 (241.3 MG) TAB at 07:16

## 2022-12-20 RX ADMIN — ALLOPURINOL 100 MG: 100 TABLET ORAL at 07:16

## 2022-12-20 RX ADMIN — HYDRALAZINE HYDROCHLORIDE 100 MG: 50 TABLET, FILM COATED ORAL at 21:04

## 2022-12-20 RX ADMIN — AMIODARONE HYDROCHLORIDE 200 MG: 200 TABLET ORAL at 07:16

## 2022-12-20 RX ADMIN — HYDROCODONE BITARTRATE AND ACETAMINOPHEN 1 TABLET: 5; 325 TABLET ORAL at 10:24

## 2022-12-20 RX ADMIN — Medication 1.6 ML: at 18:31

## 2022-12-20 RX ADMIN — ATORVASTATIN CALCIUM 40 MG: 40 TABLET, FILM COATED ORAL at 21:04

## 2022-12-20 RX ADMIN — CALCITRIOL 0.25 MCG: 0.25 CAPSULE ORAL at 07:15

## 2022-12-20 RX ADMIN — METOPROLOL TARTRATE 25 MG: 25 TABLET, FILM COATED ORAL at 21:05

## 2022-12-20 RX ADMIN — SODIUM CHLORIDE: 9 INJECTION, SOLUTION INTRAVENOUS at 21:45

## 2022-12-20 RX ADMIN — HYDRALAZINE HYDROCHLORIDE 100 MG: 50 TABLET, FILM COATED ORAL at 07:16

## 2022-12-20 RX ADMIN — ZOLPIDEM TARTRATE 10 MG: 5 TABLET ORAL at 21:04

## 2022-12-20 RX ADMIN — METOPROLOL TARTRATE 25 MG: 25 TABLET, FILM COATED ORAL at 07:16

## 2022-12-20 RX ADMIN — METHOCARBAMOL 500 MG: 500 TABLET ORAL at 06:31

## 2022-12-20 RX ADMIN — ROPINIROLE HYDROCHLORIDE 0.25 MG: 0.25 TABLET, FILM COATED ORAL at 21:09

## 2022-12-20 RX ADMIN — MAGNESIUM OXIDE TAB 400 MG (241.3 MG ELEMENTAL MG) 400 MG: 400 (241.3 MG) TAB at 21:04

## 2022-12-20 RX ADMIN — HYDROCODONE BITARTRATE AND ACETAMINOPHEN 1 TABLET: 5; 325 TABLET ORAL at 02:50

## 2022-12-20 RX ADMIN — PANTOPRAZOLE SODIUM 40 MG: 40 TABLET, DELAYED RELEASE ORAL at 06:31

## 2022-12-20 RX ADMIN — METHOCARBAMOL 500 MG: 500 TABLET ORAL at 22:56

## 2022-12-20 RX ADMIN — GABAPENTIN 300 MG: 300 CAPSULE ORAL at 07:16

## 2022-12-20 RX ADMIN — Medication 2000 UNITS: at 07:16

## 2022-12-20 ASSESSMENT — PAIN DESCRIPTION - LOCATION
LOCATION: ANKLE
LOCATION: FOOT
LOCATION: OTHER (COMMENT)
LOCATION: ANKLE
LOCATION: ANKLE

## 2022-12-20 ASSESSMENT — PAIN DESCRIPTION - DESCRIPTORS
DESCRIPTORS: ACHING
DESCRIPTORS: OTHER (COMMENT)
DESCRIPTORS: ACHING;SHARP;SORE
DESCRIPTORS: STABBING

## 2022-12-20 ASSESSMENT — PAIN DESCRIPTION - ORIENTATION
ORIENTATION: RIGHT
ORIENTATION: RIGHT
ORIENTATION: OTHER (COMMENT)
ORIENTATION: RIGHT

## 2022-12-20 ASSESSMENT — PAIN SCALES - GENERAL
PAINLEVEL_OUTOF10: 6
PAINLEVEL_OUTOF10: 5
PAINLEVEL_OUTOF10: 2
PAINLEVEL_OUTOF10: 0
PAINLEVEL_OUTOF10: 10
PAINLEVEL_OUTOF10: 10

## 2022-12-20 NOTE — PROGRESS NOTES
Writer spoke with dialysis manager/technician Lv Navarro stating it is okay for patient to have dialysis tomorrow after patient returns from Ortho appointment.

## 2022-12-20 NOTE — PLAN OF CARE
Problem: Discharge Planning  Goal: Discharge to home or other facility with appropriate resources  12/20/2022 0117 by Hari Villatoro LPN  Outcome: Progressing     Problem: Safety - Adult  Goal: Free from fall injury  12/20/2022 0117 by Hari Villatoro LPN  Outcome: Progressing     Problem: ABCDS Injury Assessment  Goal: Absence of physical injury  12/20/2022 0117 by Hari Villatoro LPN  Outcome: Progressing     Problem: Skin/Tissue Integrity  Goal: Absence of new skin breakdown  Description: 1. Monitor for areas of redness and/or skin breakdown  2. Assess vascular access sites hourly  3. Every 4-6 hours minimum:  Change oxygen saturation probe site  4. Every 4-6 hours:  If on nasal continuous positive airway pressure, respiratory therapy assess nares and determine need for appliance change or resting period.   12/20/2022 0117 by Hari Villatoro LPN  Outcome: Progressing     Problem: Pain  Goal: Verbalizes/displays adequate comfort level or baseline comfort level  12/20/2022 0117 by Hari Villatoro LPN  Outcome: Progressing     Problem: Chronic Conditions and Co-morbidities  Goal: Patient's chronic conditions and co-morbidity symptoms are monitored and maintained or improved  12/20/2022 0117 by Hari Villatoro LPN  Outcome: Progressing     Problem: Nutrition Deficit:  Goal: Optimize nutritional status  12/20/2022 0117 by Hari Villatoro LPN  Outcome: Progressing

## 2022-12-20 NOTE — PROGRESS NOTES
HEMODIALYSIS PRE-TREATMENT NOTE    Patient Identifiers prior to treatment: Name//MRN    Isolation Required: No                      Isolation Type: N/A       (please document if patient is being managed as a PUI/COVID-19 patient)        Hepatitis status:                           Date Drawn                             Result  Hepatitis B Surface Antigen 2022     NEG                     Hepatitis B Surface Antibody 2022 NEG        Hepatitis B Core Antibody N/A N/A          How was Hepatitis Status verified: Epic chart     Was a copy of the labs you documented provided to facility for the patient's chart: Yes    Hemodialysis orders verified: Yes    Access Within normal limits ( I.e. s/s of infection,...): WNL     Pre-Assessment completed: Yes by Michael Rodrigues RN    Pre-dialysis report received from: [employer] [address]                      Time: 14:10

## 2022-12-20 NOTE — PROGRESS NOTES
Writer called Rehab floor at 13:40 and informed them that patient can be brought to Dialysis unit for scheduled HD treatment as soon as available.

## 2022-12-20 NOTE — PROGRESS NOTES
Physical Therapy  Facility/Department: [facility] ACUTE REHAB  Rehabilitation Physical Therapy Treatment Note   NAME: Margarita Rangel  : 1945 (42 y.o.)  MRN: [id number]  CODE STATUS: Full Code    Date of Service: 22      Additional Pertinent Hx: Ms. Margarita Rangel is a 69 y.o. female who was admitted to [facility] on 2022 with Ankle Injury. 81-year-old female with history of A. fib on Eliquis, bilateral knee arthroplasty, CHF, CKD, type 2 diabetes, and hypertension as well as chronic numbness of her feet status post fall over a curb found to have displaced right trimalleolar ankle fracture with large posterior malleolus and comminuted lateral malleolus fracture. Pt S/P  status post I&D and ORIF on 22 by Dr Mannie Hart, right talus open treatment-continue splint right lower extremity, nonweightbearing,  Family / Caregiver Present: No  Referring Practitioner: Dr Dereje Boykin  Referral Date : 22  Diagnosis: Right open trimalleolar fracture ankle fracture dislocation s/p I&D and ORIF,  Right talus fracture.   General Comment  Comments: no complaints right groin pain during treatment    Restrictions:  Restrictions/Precautions: Weight Bearing  Lower Extremity Weight Bearing Restrictions  Right Lower Extremity Weight Bearing: Non Weight Bearing  Position Activity Restriction  Other position/activity restrictions: Per chart: Right open trimalleolar fracture ankle fracture dislocation s/p I&D and ORIF, DOS 2022     SUBJECTIVE  Subjective: pt reports right groin pain post maximove last PM 10/10 limiting sleep several hrs current 0/10  Pain: Pt reports pain 3/10 R ankle lateral.      OBJECTIVE  Vision  Vision: Impaired  Vision Exceptions: Wears glasses at all times    Hearing  Hearing: Within functional limits    Cognition  Overall Cognitive Status: WFL       Sensation  Overall Sensation Status: Impaired (Decreased sensation B feet.)    Functional Mobility  Bed mobility  Bed Mobility Comments: upon approach pt seated EOB in prepration for slideboard transfer , PM pt in bed in prepration for transport directly after therapy for diaylsis  Transfers  Sit to Stand: 2 Person Assistance; Moderate Assistance;Maximum Assistance (WC to parallel bars UE assist at parallel bars , PTA foot under right LE to monitor weight bearing)  Stand to Sit: Moderate Assistance;2 Person Assistance (VCs to extend right LE to maintain NWB , fair follow thru)  Lateral Transfers: Maximum Assistance;2 Person Assistance; Moderate Assistance (bed to [facility] to left , third assist to maintain right LE NWB min assist , dependant set up , 75 % VCs sequencing /technique)  Balance  Posture: Good  Sitting - Static: Good  Sitting - Dynamic: Fair  Standing - Static: Poor  Standing - Dynamic: Poor;-  Comments: standing balance assessed in // bars. Environmental Mobility  Ambulation  WB Status: NWB R LE    PT Exercises  Exercise Treatment: seated bilateral LE right AROM except hip flexion AAROM, left 2# knee extension , hip flexion AAROM > 8 x lt green bilateral x 15  A/AROM Exercises: supine bilateral LE x 15 left ankle lt green, hip? knee AROM , bilateral SLR AAROM  Static Standing Balance Exercises: UE support at parallal bars CGA x 2 , PTA monitor right LE NWB pt maintains with hip/knee flexion 30 and 40 second bouts limited by fatigue    Activity Tolerance  Activity Tolerance: Patient limited by fatigue;Patient limited by endurance    Assessment  Treatment Diagnosis: Impaired function.   Therapy Prognosis: Good  Decision Making: Medium Complexity  Discharge Recommendations: Patient would benefit from continued therapy after discharge  PT Equipment Recommendations  Equipment Needed: No      GOALS  Patient Goals   Patient Goals : Get stronger  Short Term Goals  Time Frame for Short Term Goals: 1 week  Short Term Goal 1: Bed mobility min A without use of bed rail  Short Term Goal 2: Sit<> // bars or rolling walker at mod A, maintaining NWB R LE  Short Term Goal 3: Improve staning tolerance to 1 minutes, NWB R LE with B UE support. Short Term Goal 4: Lateral scoot transfers, mod A X 1, maintaining NWB R LE. Short Term Goal 5: W/C mobility distance fo 50 ft , SBA/CGA level surface. Long Term Goals  Time Frame for Long Term Goals : By DC  Long Term Goal 1: Mod-I bed mobility  Long Term Goal 2: Stand pivot Transfers at min/mod A maintain NWB R LE  Long Term Goal 3: W/C mobility distance fo 50 to 150 ft SBA level surface  Long Term Goal 4: Pt able to take 3 to 5 steps in // bars NWB R LE, min A x 2  Long Term Goal 5: Family training for safe transfers from varied surfaces. PLAN OF CARE  Frequency: 1-2 treatment sessions per day, 5-7 days per week  Physcial Therapy Plan  General Plan:  minutes of therapy at least 5 out of 7 days a week (5-7 daysx wk)  Current Treatment Recommendations: Strengthening; Functional mobility training;Transfer training; Endurance training;Stair training;Gait training; Safety education & training;Balance training;ROM;Wheelchair mobility training;Patient/Caregiver education & training;Home exercise program;Equipment evaluation, education, & procurement; Therapeutic activities  Additional Comments: Discussed with pt, need of ramp at entrance at this time. Safety Devices  Type of Devices: Gait belt;Call light within reach; Left in bed;Bed alarm in place; Left in chair    EDUCATION  Education  Education Given To: Patient  Education Provided: Transfer Training  Education Provided Comments:  (slide board technique)  Education Method: Demonstration;Verbal  Education Outcome: Continued education needed     12/20/22 1201 12/20/22 1436   PT Individual Minutes   Time In 0901 1301   Time Out 5630 [id number]   Minutes 61 25 [facility], 12/20/22 at 2:36 PM

## 2022-12-20 NOTE — PROGRESS NOTES
12/20/22 1824   Encounter Summary   Encounter Overview/Reason  Attempted Encounter   Service Provided For: Patient not available  (patient in dialysis)

## 2022-12-20 NOTE — PROGRESS NOTES
Nephrology Progress Note    Reason for consultation: Management of acute kidney injury and chronic kidney disease stage IV. Consulting physician: Moises Valdez MD.    Interval history: Sunil Almodovar had IJ tunneled hemodialysis catheter placed yesterday and received first session of acute hemodialysis on 12/19/2022. She feels better today but hemoglobin has dropped to 6.5 g/dL. History of present illness: This is a 69 y.o. female with a significant past medical history of Lipidemia, nephrolithiasis, fibromyalgia, type 2 diabetes mellitus, osteoarthritis and Chronic kidney disease stage IIIb [baseline serum creatinine 2.5 mg/dL secondary to diabetic glomerulosclerosis and follows up with Dr. Benigno Josue of [facility] [address] [place] [address], who presented to the hospital to [facility] on 12/6/2022 after [place] get out from [employer]. Vital signs were stable at presentation but her renal function had worsened from baseline with elevated BUN/creatinine 60/3.87 mg/dL. X-rays revealed trimalleolar fracture with diffuse soft tissue swelling of the right ankle. She underwent open reduction and internal fixation on 12/6/2022. Hemoglobin dropped to 6.6 g/dL on 12/10/2022 requiring PRBC transfusion. Serum creatinine peaked at 4.34 mg/dL. Patient did not require dialysis so far. She was transferred to acute rehab unit at [facility] yesterday [12/12/2022]. Pain control is adequate she has a cast on her left lower extremity. She does not have shortness of breath. She is nonoliguric and does not have indwelling Galdamez catheter. Laboratory studies today remarkable for BUN/creatinine 71/4.29  mg/dL.     Objective/     Vitals:    12/19/22 1613 12/19/22 1846 12/19/22 2118 12/20/22 0709   BP: (!) 127/53 (!) 122/50 (!) 122/50 (!) 130/56   Pulse: 78 67 67 66   Resp: 16 16  16   Temp: 97.7 °F (36.5 °C) 98.2 °F (36.8 °C)  99.3 °F (37.4 °C)   TempSrc:       SpO2:  95%  98% Weight: 273 lb 5.9 oz (124 kg)      Height:         24HR INTAKE/OUTPUT:    Intake/Output Summary (Last 24 hours) at 12/20/2022 1035  Last data filed at 12/19/2022 1613  Gross per 24 hour   Intake 500 ml   Output 1000 ml   Net -500 ml       Patient Vitals for the past 96 hrs (Last 3 readings):   Weight   12/19/22 1613 273 lb 5.9 oz (124 kg)   12/19/22 1359 274 lb 7.6 oz (124.5 kg)       Constitutional:  Alert, awake, no apparent distress  Cardiovascular:  S1, S2 without pericardial rub or gallop. Respiratory:  Diminished breath sounds at lung bases. Abdomen: Full but soft with normal bowel sounds. Ext: 2+ LE edema    Data/  Recent Labs     12/19/22  0627 12/20/22  0607   WBC 10.0  --    HGB 6.6* 6.5*   HCT 19.9* 20.1*   MCV 92.0  --      --        Recent Labs     12/18/22  0624 12/19/22  0627 12/20/22  0607    138 136   K 5.5* 4.6 4.6    101 101   CO2 25 27 28   GLUCOSE 132* 120* 106*   PHOS  --  5.5*  --    BUN 73* 69* 40*   CREATININE 4.78* 4.58* 3.51*   LABGLOM 9* 9* 13*       Assessment/Plan:   1. Acute kidney injury on chronic kidney disease stage IIIb - Differentials include progression of underlying chronic kidney disease, myeloma kidney and ischemic acute tubular necrosis. Plan: We will schedule for second session of acute hemodialysis today. Continue to monitor urine output closely. Basic metabolic profile daily. Renal diet    2. Normocytic anemia of chronic kidney disease - Iron studies are consistent with iron deficiency anemia patient is currently receiving loading dose of IV iron sucrose. Patient will benefit from transfusion of 1 unit of packed red cells. Start Retacrit 10,000 units subcutaneously 3 times a week. 3.  S/p trimalleolar right ankle fracture - recovery in progress. 4.  Systemic hypertension - blood pressure control is adequate. 5.  Elevated kappa/lambda ratio may be contributory to worsening anemia.   Serum immunofixation was negative for monoclonal immunoglobulins on 12/14/2022. However had free monoclonal light chains on urine immunofixation performed 4/2/2022. We will follow hematology/oncology recommendations. Prognosis is guarded.     Rodney Santamaria MD  Nephrologist

## 2022-12-20 NOTE — PROGRESS NOTES
[address]   Acute Rehabilitation Occupational Therapy Daily Treatment Note    Date: 22  Patient Name: Rony Lazcano       Room: 8356/5896-88  MRN: [id number]  Account: [de-identified]   : 1945  (69 y.o.) Gender: female       Referring Practitioner: Karlo Scott MD  Diagnosis: Open fracture of right tibia and fibula, sequela  Additional Pertinent Hx: Rony Lazcano is 69 y.o. female  Who was admitted to the hospital on 2022 for treatment of Open fracture of right tibia and fibula, sequela. Patient presented to the emergency room with right ankle injury and suffered a right ankle fracture after falling over a curb when she was trying to go to [employer] for [employer]. She has underlying history of A. fib on Eliquis, CHF, diabetes mellitus with CKD3, hypertension, osteoporosis, fibromyalgia, morbid obesity BMI 43, sleep apnea, depression. Patient underwent I&D and ORIF on 2022. Patient also developed acute kidney injury with hyperkalemia and metabolic acidosis. Nephrology was consulted and patient given Benna Deeds. Patient was given 1 unit PRBC transfusion on 2022 for low hemoglobin of 6.4. Treatment Diagnosis: Impaired self-care status    Past Medical History:  has a past medical history of Abnormal stress test, Anemia, Arthritis, Depression, Diverticulosis, DM (diabetes mellitus) ([address] Erythropenia, Fibromyalgia, HTN (hypertension), Hyperlipidemia, Kidney stone, Morbid obesity due to excess calories ([address] DIONY on CPAP, Osteopenia, Seasonal allergies, and Sleep apnea. Past Surgical History:   has a past surgical history that includes Colonoscopy (2003); Colonoscopy (2007); Tubal ligation; Arm Surgery (2011); Total knee arthroplasty (Bilateral); Cardiac catheterization ([id number] dr Connor Beatty); Cardiac catheterization (2016); ORIF ankle fracture (Right, 2022);  Ankle fracture surgery (Right, 2022); and IR TUNNELED CVC PLACE WO SQ PORT/PUMP > 5 YEARS (12/19/2022). Restrictions  Restrictions/Precautions  Restrictions/Precautions: Weight Bearing  Required Braces or Orthoses?: No  Implants present? : Metal implants     Position Activity Restriction  Other position/activity restrictions: Per chart: Right open trimalleolar fracture ankle fracture dislocation s/p I&D and ORIF, DOS 12/6/2022  Lower Extremity Weight Bearing Restrictions  Right Lower Extremity Weight Bearing: Non Weight Bearing       Subjective  Subjective  Subjective: \"I have to tell you, I'm feeling depressed today\". RN Robert Mast notified. Pain Assessment  Pain Assessment: 0-10  Pain Level: 2  Pain Location: Ankle    Objective  Cognition  Overall Orientation Status: Within Functional Limits       Cognition  Overall Cognitive Status: WFL    Activities of Daily Living  Upper Extremity Bathing  Assistance Level: Set-up  Skilled Clinical Factors: Pt completes while sitting EOB. Upper Extremity Dressing  Assistance Level: Set-up  Skilled Clinical Factors: Pt able to doff/chris [place] shirt while sitting EOB    Lower Extremity Dressing  Assistance Level: Moderate assistance  Skilled Clinical Factors: TA for threading BLEs while pt supine in bed. Pt demos with improved BLE strength to lift for threading. Improved rolling with pt able to assist pulling up in front and back. Toileting  Assistance Level: Dependent  Skilled Clinical Factors: PM: Pt utilizes bedpan for voiding. TA for all brief care and hygiene. Mobility  Roll Left  Assistance Level: Stand by assist  Skilled Clinical Factors: good use of bed rails  Roll Right  Assistance Level: Stand by assist  Skilled Clinical Factors: good use of bed rails     Supine to Sit  Assistance Level: Stand by assist  Skilled Clinical Factors: HOB elevated, heavy use of bed rails, increased time.   Scooting  Assistance Level: Stand by assist  Skilled Clinical Factors: hips to EOB, good use of bed rail      OT Exercises  Exercise Treatment: PM: LUJAN facilitated pt in BUE exercises for increased strength and activity tolerance to support safety and participation with ADLs. Pt completes AROM X15 reps each. Requires RB between each exercise due to fatigue. Additional Activities  Additional Activities Comment: Per RN Karo, Ace wrapped Pt's L foot for decreased edema. Pt reports no c/o of pain or discomfort.     Assessment  Assessment  Activity Tolerance: Patient tolerated treatment well;Patient limited by fatigue  Discharge Recommendations: Continue to assess pending progress    Patient Education  Education  Education Given To: Patient  Education Provided: Role of Therapy;Plan of Care;Safety;ADL Function;Cognition  Education Method: Verbal;Demonstration  Barriers to Learning: None  Education Outcome: Verbalized understanding;Continued education needed    Goals     Short Term Goals  Time Frame for Short Term Goals: By one week  Short Term Goal 1: Pt will perform UB ADLs including grooming/oral care with SBA and good safety  Short Term Goal 2: Pt will perform LB ADLs including toileting/toilet transfers with Min A, good safety, and use of AE/DME/Modified techniques as needed  Short Term Goal 3: Pt will be educated on NWB status to RLE with good carryover during functional transfers/tasks  Short Term Goal 4: Pt will tolerate standing for 5+ minutes, Min A, with 1-2 UE support and no LOB during functional task while maintaining NWB RLE  Short Term Goal 5: Pt will be educated on and explore use of AE/DME/Modified techniques to improve safety and independence with ADL tasks  Additional Goals?: Yes  Short Term Goal 6: Pt will actively participate in 30+ minutes of therapeutic exercise/functional activity to promote safety and independence with self-care and mobility  Short Term Goal 7: Pt will perform functional transfers/mobility with Min A, good safety, and use of least restrictive device while maintaining NWB RLE    Long Term Goals  Time Frame for Long Term Goals : By discharge  Long Term Goal 1: Pt will perform UB ADLs including grooming/oral care with Mod I and good safety  Long Term Goal 2: Pt will perform LB ADLs, including toileting/toilet transfers, with SBA, good safety, and use of AE/DME/Modified techniques as needed  Long Term Goal 3: Pt will tolerate standing 10+ minutes, SBA, with 1-2 UE and no LOB during self-care/functional activity while maintaining NWB RLE  Long Term Goal 4: Pt will verbalize/demonstrate good understanding of home safety/fall prevention/energy conservation strategies to improve safety and independence with self-care tasks  Long Term Goal 5: Pt will safely perform light housekeeping/meal preparation with supervision, good safety, and use of least restrictive device to improve independence with ADLs/IADLs  Additional Goals?: Yes  Long Term Goal 6: Pt will perform functional transfers/mobility with SBA, good safety, and use of least restrictive device while maintaining NWB RLE  Long Term Goal 7: Pt will demonstrate improved fine motor coordination during self-care tasks AEB 10 second improvement on 9 hole peg test    Plan  Occupational Therapy Plan  Times Per Week: 5-7  Times Per Day: Twice a day  Current Treatment Recommendations: Strengthening, ROM, Balance training, Functional mobility training, Endurance training, Pain management, Patient/Caregiver education & training, Positioning, Safety education & training, Equipment evaluation, education, & procurement, Self-Care / ADL, Home management training, Coordination training         12/20/22 0806 12/20/22 1532   OT Individual Minutes   Time In [phone]   Time Out [phone]   [facility] [address]         Electronically signed by TRISHA Go on 12/20/22 at 3:32 PM EST

## 2022-12-20 NOTE — PLAN OF CARE
Problem: Discharge Planning  Goal: Discharge to home or other facility with appropriate resources  Outcome: Not Progressing     Problem: Safety - Adult  Goal: Free from fall injury  Outcome: Not Progressing     Problem: ABCDS Injury Assessment  Goal: Absence of physical injury  Outcome: Not Progressing  Flowsheets (Taken 12/20/2022 0807)  Absence of Physical Injury: Implement safety measures based on patient assessment     Problem: Skin/Tissue Integrity  Goal: Absence of new skin breakdown  Description: 1. Monitor for areas of redness and/or skin breakdown  2. Assess vascular access sites hourly  3. Every 4-6 hours minimum:  Change oxygen saturation probe site  4. Every 4-6 hours:  If on nasal continuous positive airway pressure, respiratory therapy assess nares and determine need for appliance change or resting period. Outcome: Not Progressing     Problem: Pain  Goal: Verbalizes/displays adequate comfort level or baseline comfort level  Outcome: Not Progressing     Problem: Chronic Conditions and Co-morbidities  Goal: Patient's chronic conditions and co-morbidity symptoms are monitored and maintained or improved  Outcome: Not Progressing     Problem: Nutrition Deficit:  Goal: Optimize nutritional status  Outcome: Not Progressing     Problem: Discharge Planning  Goal: Discharge to home or other facility with appropriate resources  Outcome: Not Progressing     Problem: Safety - Adult  Goal: Free from fall injury  Outcome: Not Progressing     Problem: ABCDS Injury Assessment  Goal: Absence of physical injury  Outcome: Not Progressing  Flowsheets (Taken 12/20/2022 0807)  Absence of Physical Injury: Implement safety measures based on patient assessment     Problem: Skin/Tissue Integrity  Goal: Absence of new skin breakdown  Description: 1. Monitor for areas of redness and/or skin breakdown  2. Assess vascular access sites hourly  3. Every 4-6 hours minimum:  Change oxygen saturation probe site  4.   Every 4-6 hours:  If on nasal continuous positive airway pressure, respiratory therapy assess nares and determine need for appliance change or resting period.   Outcome: Not Progressing     Problem: Pain  Goal: Verbalizes/displays adequate comfort level or baseline comfort level  Outcome: Not Progressing     Problem: Chronic Conditions and Co-morbidities  Goal: Patient's chronic conditions and co-morbidity symptoms are monitored and maintained or improved  Outcome: Not Progressing     Problem: Nutrition Deficit:  Goal: Optimize nutritional status  Outcome: Not Progressing

## 2022-12-20 NOTE — PROGRESS NOTES
Physical Medicine & Rehabilitation  Progress Note    12/20/2022 12:59 PM     CC: Ambulatory and ADL dysfunction due to right ankle fracture/dislocation    Subjective:   No complaints. Pain controlled. States she is not able to maintain nonweightbearing. States home is not wheelchair accessible. ROS:  Denies fevers, chills, sweats. No chest pain, palpitations, lightheadedness. Denies coughing, wheezing or shortness of breath. Denies abdominal pain, nausea, diarrhea or constipation. No new areas of joint pain. Denies new areas of numbness or weakness. Denies new anxiety or depression issues. No new skin problems. Rehabilitation:   PT:  Restrictions/Precautions: Weight Bearing  Implants present? : Metal implants  Other position/activity restrictions: Per chart: Right open trimalleolar fracture ankle fracture dislocation s/p I&D and ORIF, DOS 12/6/2022  Right Lower Extremity Weight Bearing: Non Weight Bearing   Transfers  Sit to Stand: 2 Person Assistance, Moderate Assistance, Maximum Assistance (WC to parallel bars UE assist at parallel bars , PTA foot under right LE to monitor weight bearing)  Stand to Sit: Moderate Assistance, 2 Person Assistance (VCs to extend right LE to maintain NWB , fair follow thru)  Bed to Chair: Maximum assistance, Moderate assistance, 2 Person Assistance  Lateral Transfers: Maximum Assistance, 2 Person Assistance, Moderate Assistance (bed to [facility] to left , third assist to maintain right LE NWB min assist , dependant set up , 75 % VCs sequencing /technique)  Comment: Pt is agreeable to slide board transfer from bed to w/c. She requires Mod A to complete lateral scoot and Min A x1 to maintain NWB through RLE. Edu provided on proper technique to complete. STS completed in // bars this date with Mod/Max A x2 and a third person to assist with NWBing of RLE. Attempted STS in // bars with knee scooter and x3 A, however pt is limited by weakness to get her RLE onto it.  Edu provided on proper technique. WB Status: NWB R LE      OT:  ADL  Feeding: Independent  Feeding Skilled Clinical Factors: Per pt report  Grooming: Setup  Grooming Skilled Clinical Factors: Setup for oral care/grooming of hair  UE Bathing: Stand by assistance  UE Bathing Skilled Clinical Factors: Seated EOB, pt able to cleanse UB with wash cloths with SBA for safety  LE Bathing: Maximum assistance, Dependent/Total  LE Bathing Skilled Clinical Factors: Seated EOB, pt able to cleanse upper legs, A to cleanse LLE/foot and TA for aleah/buttocks care Max A x2 in maddy stedy  UE Dressing: Minimal assistance  UE Dressing Skilled Clinical Factors: Seated EOB, pt able to don [place] shirt with A to pull down  LE Dressing: Dependent/Total  LE Dressing Skilled Clinical Factors: TA to thread brief and pants, TA to mange up over hips with Max A in maddy stedy  Toileting: Dependent/Total  Toileting Skilled Clinical Factors: TA to complete toileting hygiene and manage brief/pants up over hips  Additional Comments: Pt completes UB and LB ADLs while seated EOB d/t decreased strength, activity tolerance, endurance, and NWB to RLE. Pt tolerated well, frequent rest breaks taken as needed throughout session. Pt Max A/dependent for all LB ADLs d/t NWB RLE and decreased strength.          Balance  Sitting Balance: Stand by assistance  Standing Balance: Maximum assistance   Standing Balance  Time: ~30 seconds x5  Activity: Functional transfers  Comment: Max A x2 using maddy Tagbranddy  Functional Mobility  Functional - Mobility Device: Other (Use of maddy stedy to transfer from bed to toilet)  Activity: To/from bathroom  Assist Level: Dependent/Total  Functional Mobility Comments: Max A x2 in maddy stedy     Bed mobility  Rolling to Left: Minimal assistance  Rolling to Right: Minimal assistance  Supine to Sit: Contact guard assistance (with heavy use of bed rails)  Sit to Supine: 2 Person assistance, Moderate assistance  Scooting: Stand by assistance (scooting towards EOB with heavy use of bed rails.)  Bed Mobility Comments: Bed mobility completed in hospital bed. Pt requires heavy use of bed rails to complete all tasks this date. Multiple rolls completed to change brief and don pants this date. Transfers  Sit to stand: Dependent/Total (Max A x2 in maddy stedy)  Stand to sit: Dependent/Total (Max A x2 in [address] stedy)  Transfer Comments: Pt very fearful, however completes 5 sit to stand transfers utilizing maddy stedy. Toilet Transfers  Toilet - Technique: Ambulating  Equipment Used: Raised toilet seat with rails  Toilet Transfer: Dependent/Total (Max A x2 from maddy stedy)  Toilet Transfers Comments: Max A x2 with maddy stedy, VC to maintain NWB RLE               ST:        Objective:  BP (!) 130/56   Pulse 66   Temp 99.3 °F (37.4 °C)   Resp 16   Ht 5' 5\" (1.651 m)   Wt 273 lb 5.9 oz (124 kg)   LMP  (LMP Unknown)   SpO2 98%   BMI 45.49 kg/m²  I Body mass index is 45.49 kg/m². I   Wt Readings from Last 1 Encounters:   22 273 lb 5.9 oz (124 kg)      Temp (24hrs), Av.1 °F (36.7 °C), Min:97.7 °F (36.5 °C), Max:99.3 °F (37.4 °C)         GEN: well developed, well nourished, no acute distress  HEENT: Normocephalic atraumatic, EOMI, mucous membranes pink and moist  CV: RRR, no murmurs, rubs or gallops  PULM: CTAB, no rales or rhonchi. Respirations WNL and unlabored  ABD: soft, NT, ND, +BS and equal  NEURO: A&O x3. Sensation intact to light touch. MSK: Right lower extremity with cast, neurovascular intact, plus/5 upper extremities and left lower extremity, 3/5 proximal right lower extremity, right ankle with/splint Ace wrap sling intact neurovascularly intact  EXTREMITIES: No calf tenderness to palpation left  lower extremity  SKIN: warm dry and intact with good turgor  PSYCH: appropriately interactive. Affect WNL.   Good spirits        Medications   Scheduled Meds:   methocarbamol  500 mg Oral 3 times per day    gabapentin  300 mg Oral Daily    iron sucrose  100 mg IntraVENous Q24H    atorvastatin  40 mg Oral Nightly    zolpidem  10 mg Oral Nightly    allopurinol  100 mg Oral Daily    amiodarone  200 mg Oral Daily    [Held by provider] apixaban  5 mg Oral BID    calcitRIOL  0.25 mcg Oral Daily    hydrALAZINE  100 mg Oral TID    magnesium oxide  400 mg Oral BID    metoprolol tartrate  25 mg Oral BID    pantoprazole  40 mg Oral QAM AC    rOPINIRole  0.25 mg Oral Nightly    Vitamin D  2,000 Units Oral Daily    polyethylene glycol  17 g Oral Daily     Continuous Infusions:  PRN Meds:.anticoagulant sodium citrate, Benzocaine-Menthol, acetaminophen, HYDROcodone-acetaminophen, bisacodyl     Diagnostics:     CBC:   Recent Labs     12/19/22  0627 12/20/22  0607   WBC 10.0  --    RBC 2.16*  --    HGB 6.6* 6.5*   HCT 19.9* 20.1*   MCV 92.0  --    RDW 16.7*  --      --        BMP:   Recent Labs     12/18/22  0624 12/19/22  0627 12/20/22  0607    138 136   K 5.5* 4.6 4.6    101 101   CO2 25 27 28   PHOS  --  5.5*  --    BUN 73* 69* 40*   CREATININE 4.78* 4.58* 3.51*       BNP: No results for input(s): BNP in the last 72 hours. PT/INR:   No results for input(s): PROTIME, INR in the last 72 hours. APTT: No results for input(s): APTT in the last 72 hours. CARDIAC ENZYMES: No results for input(s): CKMB, CKMBINDEX, TROPONINT in the last 72 hours. Invalid input(s): CKTOTAL;3  FASTING LIPID PANEL:  Lab Results   Component Value Date    CHOL 178 01/11/2021    HDL 37 (L) 01/11/2021    TRIG 281 (H) 01/11/2021     LIVER PROFILE: No results for input(s): AST, ALT, ALB, BILIDIR, BILITOT, ALKPHOS in the last 72 hours. I/O (24Hr): Intake/Output Summary (Last 24 hours) at 12/20/2022 1259  Last data filed at 12/19/2022 1613  Gross per 24 hour   Intake 500 ml   Output 1000 ml   Net -500 ml         Glu last 24 hour  No results for input(s): POCGLU in the last 72 hours.     No results for input(s): CLARITYU, COLORU, SUZANNE, [address], [address] [facility], [address] [place] [address], BLOODU, BACTERIA, NITRU, Rani Giordano, ZACH, Halie Hartmann in the last 72 hours. Impression/Plan:   Impaired ADLs, gait, and mobility due to:     R ankle trimalleolar fracture dislocation: s/p ORIF 12/6 by Dr. Travis Lucas. NWB RLE. PT/OT for gait, mobility, strengthening, endurance, ADLs, and self care. Patient unable maintain nonweightbearing, home not wheelchair accessible per pt -  will need to start looking at skilled nurse facilities-however will need clearance when medically ready by nephrology starting dialysis appointment with Ortho tomorrow, nursing to see if dialysis can be pushed back from 2 :30-3 as appointment at 1:00  Pain-has Norco prn, on Robaxin TID. Has Tylenol prn and vitamin D. 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 Nephrology following on  hemodialysis, on calcitriol. K Serum 4.6 today  DM II with neuropathy: carb controlled diet. On gabapentin for neuropathy. Decreased to 300 mg daily due to renal function  HTN/HLD: on atorvastatin, hydralazine  Anemia of chronic disease: Hb 6 5. Nephrology following and treating with IV iron x10 days. She reports 6-7 is baseline Hb for her and that she [facility]rit [facility] twice monthly -riticrit 3 times a week restarted by nephrology ,IV iron until 12/23  Nephrology recommending transfusion   hematology to maintain greater than 7 notify -IM to address, no transfusion as baseline 6-7? Conor Suttonussed with patient-she is agreeable to transfusion of needed -defer to internal medicine   Elevated kappa light chain immunoglobulin: Hematology following. Concern for bone marrow disease vs renal failure - workup in progress. . Rec Resume Procrit after completion of IV iron h , transfuse for hemoglobin less than 7  Hyponatremia: Improved. Will monitor. Sodium 136  Fibromyalgia  Obesity: BMI 43.6. Dietitian consulted  DIONY:  Depression: on Trazodone nightly  Moderately severe protein calorie malnutrition: Nephrology added Nepro supplement daily. Dietitian following  Insomnia: has Ambien nightly, reportedly takes 10 mg at home. Also on Requip at hs. Increased to her home dose of Ambien. Gout: on allopurinol. Colchicine discontinued - no acute gout symptoms  Bowel Management: Miralax daily, senokot prn, dulcolax prn. DVT Prophylaxis:  SCD's while in bed, RAVINDRA's during the day, and Eliquis  Internal medicine for medical management - nephrology and hematology follow      Artemus Gitelman. Susan Ferguson MD       This note is created with the assistance of a speech recognition program.  While intending to generate a document that actually reflects the content of the visit, the document can still have some errors including those of syntax and sound a like substitutions which may escape proof reading.   In such instances, actual meaning can be extrapolated by contextual diversion

## 2022-12-20 NOTE — PROGRESS NOTES
HEMODIALYSIS POST TREATMENT NOTE    Treatment time ordered: 180 minutes    Actual treatment time: 180 minutes    UltraFiltration Goal: 2500 ml  UltraFiltration Removed: 2000 ml Net fluid removal      Pre Treatment weight: 124.6 KG  Post Treatment weight: 122.5 Kg  Estimated Dry Weight:     Access used:     Central Venous Catheter:          Tunneled            Site: Rt. Chest          Access Flow: good      Internal Access:       AV Fistula or AV Graft: N/A         Site: N/A       Access Flow: N/A       Sign and symptoms of infection: No       If YES: N/A    Medications or blood products given: N0    Chronic outpatient schedule:  arranging    Chronic outpatient unit:  arranging    Summary of response to treatment: Tolerated well     Explain if orders NOT met, was physician notified:N/A      ACES flowsheet faxed to patient unit/ placed in patient chart: N/A, Epic    Post assessment completed: Yes    Report given to: Candace Field RN      * Intra-treatment documented Safety Checks include the followin) Access and face visible at all times. 2) All connections and blood lines are secure with no kinks. 3) NVL alarm engaged. 4) Hemosafe device applied (if applicable). 5) No collapse of Arterial or Venous blood chambers. 6) All blood lines / pump segments in the air detectors.

## 2022-12-20 NOTE — PROGRESS NOTES
12/20/22 1337   Encounter Summary   Encounter Overview/Reason  Volunteer Encounter   Service Provided For: Patient   Referral/Consult From: [place]   Last Encounter  12/20/22  (V)   Complexity of Encounter Low   Begin Time 1030   End Time  1045   Total Time Calculated 15 min   Rituals, Rites and Sacraments   Type [employer]   Assessment/Intervention/Outcome   Intervention Prayer (assurance of)/[place]

## 2022-12-21 ENCOUNTER — OFFICE VISIT (OUTPATIENT)
Dept: ORTHOPEDIC SURGERY | Age: 77
End: 2022-12-21

## 2022-12-21 VITALS — BODY MASS INDEX: 44.65 KG/M2 | WEIGHT: 268 LBS | HEIGHT: 65 IN

## 2022-12-21 DIAGNOSIS — S82.851F TYPE III OPEN TRIMALLEOLAR FRACTURE OF RIGHT ANKLE WITH ROUTINE HEALING, SUBSEQUENT ENCOUNTER: Primary | ICD-10-CM

## 2022-12-21 LAB
ANION GAP SERPL CALCULATED.3IONS-SCNC: 9 MMOL/L (ref 9–17)
BUN BLDV-MCNC: 21 MG/DL (ref 8–23)
CALCIUM SERPL-MCNC: 9.2 MG/DL (ref 8.6–10.4)
CHLORIDE BLD-SCNC: 98 MMOL/L (ref 98–107)
CO2: 28 MMOL/L (ref 20–31)
CREAT SERPL-MCNC: 2.43 MG/DL (ref 0.5–0.9)
GFR SERPL CREATININE-BSD FRML MDRD: 20 ML/MIN/1.73M2
GLUCOSE BLD-MCNC: 147 MG/DL (ref 70–99)
HCT VFR BLD CALC: 23.2 % (ref 36–46)
HEMOGLOBIN: 7.6 G/DL (ref 12–16)
POTASSIUM SERPL-SCNC: 4 MMOL/L (ref 3.7–5.3)
SODIUM BLD-SCNC: 135 MMOL/L (ref 135–144)

## 2022-12-21 PROCEDURE — 6370000000 HC RX 637 (ALT 250 FOR IP): Performed by: FAMILY MEDICINE

## 2022-12-21 PROCEDURE — 97110 THERAPEUTIC EXERCISES: CPT

## 2022-12-21 PROCEDURE — 97535 SELF CARE MNGMENT TRAINING: CPT

## 2022-12-21 PROCEDURE — 6360000002 HC RX W HCPCS: Performed by: INTERNAL MEDICINE

## 2022-12-21 PROCEDURE — 6370000000 HC RX 637 (ALT 250 FOR IP): Performed by: PHYSICAL MEDICINE & REHABILITATION

## 2022-12-21 PROCEDURE — 1180000000 HC REHAB R&B

## 2022-12-21 PROCEDURE — 99024 POSTOP FOLLOW-UP VISIT: CPT | Performed by: STUDENT IN AN ORGANIZED HEALTH CARE EDUCATION/TRAINING PROGRAM

## 2022-12-21 PROCEDURE — 99232 SBSQ HOSP IP/OBS MODERATE 35: CPT | Performed by: INTERNAL MEDICINE

## 2022-12-21 PROCEDURE — 85018 HEMOGLOBIN: CPT

## 2022-12-21 PROCEDURE — 99232 SBSQ HOSP IP/OBS MODERATE 35: CPT | Performed by: PHYSICAL MEDICINE & REHABILITATION

## 2022-12-21 PROCEDURE — 97530 THERAPEUTIC ACTIVITIES: CPT

## 2022-12-21 PROCEDURE — 36415 COLL VENOUS BLD VENIPUNCTURE: CPT

## 2022-12-21 PROCEDURE — 86900 BLOOD TYPING SEROLOGIC ABO: CPT

## 2022-12-21 PROCEDURE — P9016 RBC LEUKOCYTES REDUCED: HCPCS

## 2022-12-21 PROCEDURE — 85014 HEMATOCRIT: CPT

## 2022-12-21 PROCEDURE — 36430 TRANSFUSION BLD/BLD COMPNT: CPT

## 2022-12-21 PROCEDURE — 2580000003 HC RX 258: Performed by: INTERNAL MEDICINE

## 2022-12-21 PROCEDURE — 80048 BASIC METABOLIC PNL TOTAL CA: CPT

## 2022-12-21 PROCEDURE — 6370000000 HC RX 637 (ALT 250 FOR IP)

## 2022-12-21 RX ORDER — CLINDAMYCIN HYDROCHLORIDE 150 MG/1
300 CAPSULE ORAL EVERY 8 HOURS SCHEDULED
Status: DISCONTINUED | OUTPATIENT
Start: 2022-12-21 | End: 2022-12-24 | Stop reason: HOSPADM

## 2022-12-21 RX ORDER — CLINDAMYCIN HYDROCHLORIDE 300 MG/1
300 CAPSULE ORAL 3 TIMES DAILY
Qty: 30 CAPSULE | Refills: 0 | Status: SHIPPED | OUTPATIENT
Start: 2022-12-21 | End: 2022-12-23 | Stop reason: HOSPADM

## 2022-12-21 RX ADMIN — MAGNESIUM OXIDE TAB 400 MG (241.3 MG ELEMENTAL MG) 400 MG: 400 (241.3 MG) TAB at 20:09

## 2022-12-21 RX ADMIN — HYDRALAZINE HYDROCHLORIDE 100 MG: 50 TABLET, FILM COATED ORAL at 13:09

## 2022-12-21 RX ADMIN — CALCITRIOL 0.25 MCG: 0.25 CAPSULE ORAL at 09:01

## 2022-12-21 RX ADMIN — HYDROCODONE BITARTRATE AND ACETAMINOPHEN 1 TABLET: 5; 325 TABLET ORAL at 13:09

## 2022-12-21 RX ADMIN — HYDROCODONE BITARTRATE AND ACETAMINOPHEN 1 TABLET: 5; 325 TABLET ORAL at 18:03

## 2022-12-21 RX ADMIN — IRON SUCROSE 100 MG: 20 INJECTION, SOLUTION INTRAVENOUS at 15:55

## 2022-12-21 RX ADMIN — ZOLPIDEM TARTRATE 10 MG: 5 TABLET ORAL at 20:09

## 2022-12-21 RX ADMIN — ROPINIROLE HYDROCHLORIDE 0.25 MG: 0.25 TABLET, FILM COATED ORAL at 20:08

## 2022-12-21 RX ADMIN — METOPROLOL TARTRATE 25 MG: 25 TABLET, FILM COATED ORAL at 09:02

## 2022-12-21 RX ADMIN — APIXABAN 5 MG: 5 TABLET, FILM COATED ORAL at 22:25

## 2022-12-21 RX ADMIN — HYDROCODONE BITARTRATE AND ACETAMINOPHEN 1 TABLET: 5; 325 TABLET ORAL at 22:25

## 2022-12-21 RX ADMIN — HYDROCODONE BITARTRATE AND ACETAMINOPHEN 1 TABLET: 5; 325 TABLET ORAL at 01:23

## 2022-12-21 RX ADMIN — HYDRALAZINE HYDROCHLORIDE 100 MG: 50 TABLET, FILM COATED ORAL at 20:08

## 2022-12-21 RX ADMIN — CLINDAMYCIN HYDROCHLORIDE 300 MG: 150 CAPSULE ORAL at 23:47

## 2022-12-21 RX ADMIN — POLYETHYLENE GLYCOL 3350 17 G: 17 POWDER, FOR SOLUTION ORAL at 09:02

## 2022-12-21 RX ADMIN — HYDROCODONE BITARTRATE AND ACETAMINOPHEN 1 TABLET: 5; 325 TABLET ORAL at 05:28

## 2022-12-21 RX ADMIN — ATORVASTATIN CALCIUM 40 MG: 40 TABLET, FILM COATED ORAL at 20:09

## 2022-12-21 RX ADMIN — PANTOPRAZOLE SODIUM 40 MG: 40 TABLET, DELAYED RELEASE ORAL at 05:28

## 2022-12-21 RX ADMIN — METHOCARBAMOL 500 MG: 500 TABLET ORAL at 22:25

## 2022-12-21 RX ADMIN — AMIODARONE HYDROCHLORIDE 200 MG: 200 TABLET ORAL at 09:01

## 2022-12-21 RX ADMIN — GABAPENTIN 300 MG: 300 CAPSULE ORAL at 09:02

## 2022-12-21 RX ADMIN — MAGNESIUM OXIDE TAB 400 MG (241.3 MG ELEMENTAL MG) 400 MG: 400 (241.3 MG) TAB at 09:02

## 2022-12-21 RX ADMIN — METHOCARBAMOL 500 MG: 500 TABLET ORAL at 13:09

## 2022-12-21 RX ADMIN — HYDRALAZINE HYDROCHLORIDE 100 MG: 50 TABLET, FILM COATED ORAL at 09:02

## 2022-12-21 RX ADMIN — METHOCARBAMOL 500 MG: 500 TABLET ORAL at 05:28

## 2022-12-21 RX ADMIN — Medication 2000 UNITS: at 09:02

## 2022-12-21 RX ADMIN — METOPROLOL TARTRATE 25 MG: 25 TABLET, FILM COATED ORAL at 20:09

## 2022-12-21 RX ADMIN — ALLOPURINOL 100 MG: 100 TABLET ORAL at 09:02

## 2022-12-21 ASSESSMENT — PAIN DESCRIPTION - LOCATION
LOCATION: ANKLE
LOCATION: FOOT;BACK
LOCATION: HEAD;OTHER (COMMENT)
LOCATION: ANKLE
LOCATION: LEG;ANKLE

## 2022-12-21 ASSESSMENT — PAIN DESCRIPTION - DESCRIPTORS
DESCRIPTORS: ACHING;STABBING
DESCRIPTORS: ACHING
DESCRIPTORS: DULL

## 2022-12-21 ASSESSMENT — PAIN SCALES - GENERAL
PAINLEVEL_OUTOF10: 4
PAINLEVEL_OUTOF10: 7
PAINLEVEL_OUTOF10: 7
PAINLEVEL_OUTOF10: 4
PAINLEVEL_OUTOF10: 4
PAINLEVEL_OUTOF10: 6

## 2022-12-21 ASSESSMENT — PAIN DESCRIPTION - ORIENTATION
ORIENTATION: RIGHT
ORIENTATION: RIGHT

## 2022-12-21 NOTE — PROGRESS NOTES
Physical Medicine & Rehabilitation  Progress Note    12/21/2022 1:50 PM     CC: Ambulatory and ADL dysfunction due to right ankle fracture/dislocation    Subjective:   No complaints. Pain controlled. States she is not able to maintain nonweightbearing. ROS:  Denies fevers, chills, sweats. No chest pain, palpitations, lightheadedness. Denies coughing, wheezing or shortness of breath. Denies abdominal pain, nausea, diarrhea or constipation. No new areas of joint pain. Denies new areas of numbness or weakness. Denies new anxiety or depression issues. No new skin problems. Rehabilitation:   PT:  Restrictions/Precautions: Weight Bearing  Implants present? : Metal implants  Other position/activity restrictions: Per chart: Right open trimalleolar fracture ankle fracture dislocation s/p I&D and ORIF, DOS 12/6/2022  Right Lower Extremity Weight Bearing: Non Weight Bearing   Transfers  Sit to Stand: 2 Person Assistance, Moderate Assistance (WC to parallel barsleft  UE assist at arm rest , right at parallel bars , PTA foot under right LE to monitor weight bearing)  Stand to Sit: Moderate Assistance (VCs to extend right LE to maintain NWB)  Bed to Chair: Maximum assistance, Moderate assistance, 2 Person Assistance  Lateral Transfers: Maximum Assistance, 2 Person Assistance, Moderate Assistance (bed to [facility] to left ,VCs to maintain right LE NWB  , dependant set up , 50 % VCs sequencing /technique)  Comment: Pt is agreeable to slide board transfer from bed to w/c. She requires Mod A to complete lateral scoot and Min A x1 to maintain NWB through RLE. Edu provided on proper technique to complete. STS completed in // bars this date with Mod/Max A x2 and a third person to assist with NWBing of RLE. Attempted STS in // bars with knee scooter and x3 A, however pt is limited by weakness to get her RLE onto it. Edu provided on proper technique.   WB Status: NWB R LE      OT:  ADL  Feeding: Independent  Feeding Skilled Clinical Factors: Per pt report  Grooming: Setup  Grooming Skilled Clinical Factors: Setup for oral care/grooming of hair  UE Bathing: Stand by assistance  UE Bathing Skilled Clinical Factors: Seated EOB, pt able to cleanse UB with wash cloths with SBA for safety  LE Bathing: Maximum assistance, Dependent/Total  LE Bathing Skilled Clinical Factors: Seated EOB, pt able to cleanse upper legs, A to cleanse LLE/foot and TA for aleah/buttocks care Max A x2 in maddy stedy  UE Dressing: Minimal assistance  UE Dressing Skilled Clinical Factors: Seated EOB, pt able to don [place] shirt with A to pull down  LE Dressing: Dependent/Total  LE Dressing Skilled Clinical Factors: TA to thread brief and pants, TA to mange up over hips with Max A in maddy stedy  Toileting: Dependent/Total  Toileting Skilled Clinical Factors: TA to complete toileting hygiene and manage brief/pants up over hips  Additional Comments: Pt completes UB and LB ADLs while seated EOB d/t decreased strength, activity tolerance, endurance, and NWB to RLE. Pt tolerated well, frequent rest breaks taken as needed throughout session. Pt Max A/dependent for all LB ADLs d/t NWB RLE and decreased strength.          Balance  Sitting Balance: Stand by assistance  Standing Balance: Maximum assistance   Standing Balance  Time: ~30 seconds x5  Activity: Functional transfers  Comment: Max A x2 using maddy Garlik  Functional Mobility  Functional - Mobility Device: Other (Use of maddy Garlik to transfer from bed to toilet)  Activity: To/from bathroom  Assist Level: Dependent/Total  Functional Mobility Comments: Max A x2 in maddy Panonody     Bed mobility  Rolling to Left: Stand by assistance (assist of bed rail)  Rolling to Right: Stand by assistance (assist of bed rail)  Supine to Sit: Minimal assistance (right LE)  Sit to Supine: 2 Person assistance, Moderate assistance  Scooting: Stand by assistance (EOB)  Bed Mobility Comments: head of bed elevated right hand rail  Transfers  Sit to stand: Dependent/Total (Max A x2 in [address] [facility])  Stand to sit: Dependent/Total (Max A x2 in [address] [facility])  Transfer Comments: Pt very fearful, however completes 5 sit to stand transfers utilizing maddy stedy. Toilet Transfers  Toilet - Technique: Ambulating  Equipment Used: Raised toilet seat with rails  Toilet Transfer: Dependent/Total (Max A x2 from maddy stedy)  Toilet Transfers Comments: Max A x2 with maddy stedy, VC to maintain NWB RLE               ST:        Objective:  /65   Pulse 78   Temp 98.6 °F (37 °C) (Oral)   Resp 16   Ht 5' 5\" (1.651 m)   Wt 270 lb 1 oz (122.5 kg)   LMP  (LMP Unknown)   SpO2 96%   BMI 44.94 kg/m²  I Body mass index is 44.94 kg/m². I   Wt Readings from Last 1 Encounters:   22 270 lb 1 oz (122.5 kg)      Temp (24hrs), Av.8 °F (37.1 °C), Min:98.3 °F (36.8 °C), Max:99.1 °F (37.3 °C)         GEN: well developed, well nourished, no acute distress  HEENT: Normocephalic atraumatic, EOMI, mucous membranes pink and moist  CV: RRR, no murmurs, rubs or gallops  PULM: CTAB, no rales or rhonchi. Respirations WNL and unlabored  ABD: soft, NT, ND, +BS and equal  NEURO: A&O x3. Sensation intact to light touch. MSK: Right lower extremity with cast, neurovascular intact, plus/5 upper extremities and left lower extremity, 3/5 proximal right lower extremity, right ankle with/splint Ace wrap sling intact neurovascularly intact  EXTREMITIES: No calf tenderness to palpation left  lower extremity  SKIN: warm dry and intact with good turgor  PSYCH: appropriately interactive. Affect WNL.   Good spirits        Medications   Scheduled Meds:   methocarbamol  500 mg Oral 3 times per day    gabapentin  300 mg Oral Daily    iron sucrose  100 mg IntraVENous Q24H    atorvastatin  40 mg Oral Nightly    zolpidem  10 mg Oral Nightly    allopurinol  100 mg Oral Daily    amiodarone  200 mg Oral Daily    [Held by provider] apixaban  5 mg Oral BID    calcitRIOL  0.25 mcg Oral Daily    hydrALAZINE  100 mg Oral TID    magnesium oxide  400 mg Oral BID    metoprolol tartrate  25 mg Oral BID    pantoprazole  40 mg Oral QAM AC    rOPINIRole  0.25 mg Oral Nightly    Vitamin D  2,000 Units Oral Daily    polyethylene glycol  17 g Oral Daily     Continuous Infusions:  PRN Meds:.anticoagulant sodium citrate, anticoagulant sodium citrate, Benzocaine-Menthol, acetaminophen, HYDROcodone-acetaminophen, bisacodyl     Diagnostics:     CBC:   Recent Labs     12/19/22  0627 12/20/22  0607 12/21/22  0652   WBC 10.0  --   --    RBC 2.16*  --   --    HGB 6.6* 6.5* 7.6*   HCT 19.9* 20.1* 23.2*   MCV 92.0  --   --    RDW 16.7*  --   --      --   --        BMP:   Recent Labs     12/19/22  0627 12/20/22  0607 12/21/22  0652    136 135   K 4.6 4.6 4.0    101 98   CO2 27 28 28   PHOS 5.5*  --   --    BUN 69* 40* 21   CREATININE 4.58* 3.51* 2.43*       BNP: No results for input(s): BNP in the last 72 hours. PT/INR:   No results for input(s): PROTIME, INR in the last 72 hours. APTT: No results for input(s): APTT in the last 72 hours. CARDIAC ENZYMES: No results for input(s): CKMB, CKMBINDEX, TROPONINT in the last 72 hours. Invalid input(s): CKTOTAL;3  FASTING LIPID PANEL:  Lab Results   Component Value Date    CHOL 178 01/11/2021    HDL 37 (L) 01/11/2021    TRIG 281 (H) 01/11/2021     LIVER PROFILE: No results for input(s): AST, ALT, ALB, BILIDIR, BILITOT, ALKPHOS in the last 72 hours. I/O (24Hr): Intake/Output Summary (Last 24 hours) at 12/21/2022 1350  Last data filed at 12/21/2022 0910  Gross per 24 hour   Intake 1121 ml   Output 2750 ml   Net -1629 ml         Glu last 24 hour  No results for input(s): POCGLU in the last 72 hours. No results for input(s): CLARITYU, COLORU, PHUR, SPECGRAV, PROTEINU, RBCUA, BLOODU, BACTERIA, NITRU, WBCUA, LEUKOCYTESUR, YEAST, GLUCOSEU, BILIRUBINUR in the last 72 hours.     Impression/Plan:   Impaired ADLs, gait, and mobility due to:     R ankle trimalleolar fracture dislocation: s/p ORIF 12/6 by Dr. Ridge Steiner. NWB RLE. PT/OT for gait, mobility, strengthening, endurance, ADLs, and self care. Patient unable maintain nonweightbearing, home not wheelchair accessible per pt -  will need to start looking at skilled nurse facilities-however will need clearance when medically ready by nephrology Ortho appointment today   Pain-has Norco prn, on Robaxin TID. Has Tylenol prn and vitamin D. 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 Nephrology following on  hemodialysis, on calcitriol. K Serum 4.6 today  DM II with neuropathy: carb controlled diet. On gabapentin for neuropathy. Decreased to 300 mg daily due to renal function  HTN/HLD: on atorvastatin, hydralazine  Anemia of chronic disease: Hb 7.6 after  transfusion nephrology following and treating with IV iron x10 days. She reports 6-7 is baseline Hb for her and that she receives [facility]acrit at [facility] twice monthly -riticrit 3 times a week start after IV iron -IV iron until 12/23   Elevated kappa light chain immunoglobulin: Hematology following. Concern for bone marrow disease vs renal failure - workup in progress. . Rec Resume Procrit after completion of IV iron h , transfuse for hemoglobin less than 7  Hyponatremia: Improved. Will monitor. Sodium 136  Fibromyalgia  Obesity: BMI 43.6. Dietitian consulted  DIONY:  Depression: on Trazodone nightly  Moderately severe protein calorie malnutrition: Nephrology added Nepro supplement daily. Dietitian following  Insomnia: has Ambien nightly, reportedly takes 10 mg at home.  Also on Requip at hs. Increased to her home dose of Ambien. Gout: on allopurinol. Colchicine discontinued - no acute gout symptoms  Bowel Management: Miralax daily, senokot prn, dulcolax prn. DVT Prophylaxis:  SCD's while in bed, RAVINDRA's during the day, and Eliquis  Internal medicine for medical management - nephrology and hematology follow  SNF when okay with internal medicine nephrology-nephrology cleared patient-as long as patient has outpatient dialysis [id number] with 1. PCP 2. Nephrology 3. Ortho 4. Hematology,  monitor CBC/BMP continue hemodialysis noted need hepatitis B surface antibody for skilled facility. Juli Welch. Yolanda Steiner MD       This note is created with the assistance of a speech recognition program.  While intending to generate a document that actually reflects the content of the visit, the document can still have some errors including those of syntax and sound a like substitutions which may escape proof reading.   In such instances, actual meaning can be extrapolated by contextual diversion

## 2022-12-21 NOTE — PROGRESS NOTES
Talked with Bessie and  at Dr. Caryle Lake office. They will wait for patient due to transportation issues. EMTs here to get patient. Placed on stretcher. Pt took cell phone, clothing, two pillows with her to office visit. Paperwork given to EMTs from [facility].

## 2022-12-21 NOTE — PROGRESS NOTES
[address] [facility]  [address] [facility] [address]  Dept: [phone]  Dept Fax: [phone]         Orth[facility]edic [facility]uma Clinic Follow Up        Subjective:   Date of Surgery: 12/6/2022 type IIIa open right trimalleolar ankle fracture status post ORIF     Barbie Sofia is a 69y.o. year old female who presents to the clinic today for routine 2-week followup regarding her type IIIa open right trimalleolar ankle fracture status post ORIF. Patient is accompanied by son and  at today's visit. Patient is still at acute rehab and progressing quite well. Patient denies numbness, tingling, or weakness. Patient is able to maintain nonweightbearing to right lower extremity and is here today for wound evaluation with splint removal.  Patient notes minimal pain about the ankle but does note some redness and pain proximal to the surgical incisions. Patient has a history of bilateral lower extremity edema with compressive Ace noted on noninjured left side. Patient has no new orthopedic complaints at this time. Review of Systems  Gen: no fever, chills, malaise  CV: no chest pain or palpitations  Resp: no cough or shortness of breath  GI: no nausea, vomiting, diarrhea, or constipation  Neuro: no numbness, tingling, or weakness  Msk: Right ankle pain  10 remaining systems reviewed and negative     Objective : There were no vitals filed for this visit. Body mass index is 44.6 kg/m². General: No acute distress, resting comfortably in the clinic  Neuro: alert. oriented  Eyes: Extra-ocular muscles intact  Pulm: Respirations unlabored and regular. Skin: warm, well perfused  Psych:   Patient has good fund of knowledge and displays understanging of exam, diagnosis, and plan. MSK:    RLE: Skin with surgical incisions held with sutures no signs of dehiscence or drainage drainage.   There is erythema appreciated proximal to the medial traumatic laceration that is tender to palpation. Patient is nontender to palpation about the ankle. Patient is able to flex and extend the ankle with no discomfort. Compartments soft. 2+ DP pulse. TA/EHL/FHL/GS motor intact. Deep and Superficial Peroneal/Saphenous/Sural/Plantar SILT. Radiology:  History: type IIIa open right trimalleolar ankle fracture status post ORIF, 12/6/2022     Comparison: 12/6/2022     Findings: 3 views of the right ankle (AP/mortise/lateral) showing trimalleolar ankle fracture status post ORIF with retained hardware in relative anatomic alignment without signs of malreduction or hardware failure when compared to previous films. There is minimal osseous integration appreciate this time, though view was obscured due to splint material.     Impression: Stable right trimalleolar ankle fracture status post ORIF     Assessment:   69y.o. year old female with type IIIa open right trimalleolar ankle fracture status post ORIF, 12/6/2022  Plan:   Patient seen and evaluated in clinic today with splint removed. Will maintain sutures to right ankle for 1 more week as there is concern that transition to cam boot may cause some wound dehiscence with range of motion exercises. Patient was transition to cam boot. Patient's ankle was dressed with Adaptic, ABD, and Ace wrap to be changed daily with reapplication of cam boot to prevent surgical site irritation. Patient was prescribed 10 days of clindamycin 3 times daily and provided with printed prescription. Patient is to maintain nonweightbearing to right lower extremity. Patient may come out of cam walking boot for range of motion exercises right ankle and as well as hygiene purposes. New dressing can be applied to right ankle after leg is patted dry. Patient understood and agreed with the plan with all questions being answered to the patient's satisfaction at the time of visit.     Orders Placed This Encounter   Medications    DISCONTD: clindamycin (CLEOCIN) 300 MG capsule     Sig: Take 1 capsule by mouth 3 times daily for 10 days     Dispense:  30 capsule     Refill:  0            No orders of the defined types were placed in this encounter.     Electronically signed by Aylin De Leon DO on 12/23/2022 at 4:21 PM

## 2022-12-21 NOTE — PROGRESS NOTES
Physical Therapy  Facility/Department: [facility] ACUTE REHAB  Rehabilitation Physical Therapy Treatment Note   NAME: Rony Lazcano  : 1945 (57 y.o.)  MRN: [id number]  CODE STATUS: Full Code    Date of Service: 22      Additional Pertinent Hx: Ms. Rony Lazcano is a 69 y.o. female who was admitted to [facility] on 2022 with Ankle Injury. 52-year-old female with history of A. fib on Eliquis, bilateral knee arthroplasty, CHF, CKD, type 2 diabetes, and hypertension as well as chronic numbness of her feet status post fall over a curb found to have displaced right trimalleolar ankle fracture with large posterior malleolus and comminuted lateral malleolus fracture. Pt S/P  status post I&D and ORIF on 22 by Dr Gisel Love, right talus open treatment-continue splint right lower extremity, nonweightbearing,  Family / Caregiver Present: No  Referring Practitioner: Dr Beverly Tiwari  Referral Date : 22  Diagnosis: Right open trimalleolar fracture ankle fracture dislocation s/p I&D and ORIF,  Right talus fracture.   General Comment  Comments: ortho appointment this afternoon    Restrictions:  Restrictions/Precautions: Weight Bearing  Lower Extremity Weight Bearing Restrictions  Right Lower Extremity Weight Bearing: Non Weight Bearing  Position Activity Restriction  Other position/activity restrictions: Per chart: Right open trimalleolar fracture ankle fracture dislocation s/p I&D and ORIF, DOS 2022     SUBJECTIVE  Pain: head ache 3/10      OBJECTIVE  Vision  Vision: Impaired  Vision Exceptions: Wears glasses at all times    Hearing  Hearing: Within functional limits    Cognition  Overall Cognitive Status: WFL       Sensation  Overall Sensation Status: Impaired (Decreased sensation B feet.)    Functional Mobility  Bed mobility  Rolling to Left: Stand by assistance (assist of bed rail)  Rolling to Right: Stand by assistance (assist of bed rail)  Supine to Sit: Minimal assistance (right LE)  Scooting: Stand by assistance (EOB)  Bed Mobility Comments: head of bed elevated right hand rail  Transfers  Sit to Stand: 2 Person Assistance; Moderate Assistance (WC to parallel barsleft  UE assist at arm rest , right at parallel bars , PTA foot under right LE to monitor weight bearing)  Stand to Sit: Moderate Assistance (VCs to extend right LE to maintain NWB)  Lateral Transfers: Maximum Assistance;2 Person Assistance; Moderate Assistance (bed to [facility] to left ,VCs to maintain right LE NWB  , dependant set up , 50 % VCs sequencing /technique)  Balance  Posture: Good  Sitting - Static: Good  Sitting - Dynamic: Fair  Standing - Static: Poor;+  Standing - Dynamic: Poor;-  Comments: standing balance assessed in // bars. Environmental Mobility  Ambulation  WB Status: NWB R LE    PT Exercises  Exercise Treatment: seated bilateral LE right AROM , left 2.5 # knee extension , hip flexion AROM lt green bilateral x 15  Static Standing Balance Exercises: UE support at parallal bars CGA x 2 , PTA monitor right LE NWB pt maintains with hip/knee flexion 30 and 40 second bouts limited by fatigue  Exercise Equipment: UBE fwd/reto 5 min    Activity Tolerance  Activity Tolerance: Patient limited by fatigue;Patient limited by endurance    Assessment  Treatment Diagnosis: Impaired function. Therapy Prognosis: Good  Decision Making: Medium Complexity  Discharge Recommendations: Patient would benefit from continued therapy after discharge  PT Equipment Recommendations  Equipment Needed: No    GOALS  Patient Goals   Patient Goals : Get stronger  Short Term Goals  Time Frame for Short Term Goals: 1 week  Short Term Goal 1: Bed mobility min A without use of bed rail  Short Term Goal 2: Sit<> // bars or rolling walker at mod A, maintaining NWB R LE  Short Term Goal 3: Improve staning tolerance to 1 minutes, NWB R LE with B UE support. Short Term Goal 4: Lateral scoot transfers, mod A X 1, maintaining NWB R LE.   Short Term Goal 5: W/C mobility distance fo 50 ft , SBA/CGA level surface. Long Term Goals  Time Frame for Long Term Goals : By DC  Long Term Goal 1: Mod-I bed mobility  Long Term Goal 2: Stand pivot Transfers at min/mod A maintain NWB R LE  Long Term Goal 3: W/C mobility distance fo 50 to 150 ft SBA level surface  Long Term Goal 4: Pt able to take 3 to 5 steps in // bars NWB R LE, min A x 2  Long Term Goal 5: Family training for safe transfers from varied surfaces. PLAN OF CARE  Frequency: 1-2 treatment sessions per day, 5-7 days per week  Physcial Therapy Plan  General Plan:  minutes of therapy at least 5 out of 7 days a week (5-7 daysx wk)  Current Treatment Recommendations: Strengthening; Functional mobility training;Transfer training; Endurance training;Stair training;Gait training; Safety education & training;Balance training;ROM;Wheelchair mobility training;Patient/Caregiver education & training;Home exercise program;Equipment evaluation, education, & procurement; Therapeutic activities  Additional Comments: Discussed with pt, need of ramp at entrance at this time. Safety Devices  Type of Devices: Gait belt;Call light within reach; Left in chair    EDUCATION  Education  Education Given To: Patient  Education Provided: Transfer Training  Education Provided Comments:  (slide board technique)  Education Method: Demonstration;Verbal  Education Outcome: Continued education needed     12/21/22 1236   PT Individual Minutes   Time In 4548   Time Out 1000   Minutes P[address]. [address], 12/21/22 at 12:38 PM

## 2022-12-21 NOTE — PROGRESS NOTES
[address] [facility]  Lois Colon MD; Wilfred Huerta MD; Lindsay Villanueva MD; MD Gwen Shukla MD; Felicia Hawkins MD    [facility]   [address]    Date:   12/20/2022  Patient name:  Marley Romero  Date of admission:  12/12/2022  5:48 PM  MRN:   [id number]  Account:  [de-identified]  YOB: 1945  PCP:    Nakul Simpson MD  Room:   [address] [place] [address]  Code Status:    Full Code    Chief Complaint:     No chief complaint on file. Open fracture of tibia and fibula    History Obtained From:     Patient and electronic medical record    History of Present Illness:     Marley Romero is a 69 y.o. Non- / non  female who presents with No chief complaint on file. and is admitted to the hospital for the management of Open fracture of right tibia and fibula, sequela. Past medical history significant for A. Fib on Eliquis and amiodarone, JOSE ARMANDO on CKD, Type 2 Diabetes, HTN,HLD, Anemia, Fibromyalgia, Obesity, and DIONY. Sustained an open trimalleolar fracture, ankle fracture dislocation s/p ORIF on 12/6, right talus open treatment. Nonweight bearing right lower extremity. Developed JOSE ARMANDO on CKD, per Nephrology patient is high risk of needing dialysis, consult to Nephrology sent when patient admitted to Acute Rehab. Seen at examined at bedside, did not sleep well last night due to being uncomfortable due to the blankets. Otherwise no concerns, not in acute distress     Principal Problem:    Open fracture of right tibia and fibula, sequela  Active Problems:    Paraproteinemia    Stage 5 chronic kidney disease not on chronic dialysis (HCC)    Anemia of chronic renal failure, stage 5 (HCC)  Resolved Problems:    * No resolved hospital problems.  *                  Past Medical History:     Past Medical History:   Diagnosis Date    Abnormal stress test     Anemia     Arthritis     Depression Diverticulosis     DM (diabetes mellitus) ([facility] [address]     Erythropenia     Fibromyalgia     HTN (hypertension)     Hyperlipidemia     Kidney stone     Morbid obesity due to excess calories (HCC)     DIONY on CPAP     Osteopenia 03/03/14    Seasonal allergies     Sleep apnea     uses bipap prn        Past Surgical History:     Past Surgical History:   Procedure Laterality Date    ANKLE FRACTURE SURGERY Right 12/6/2022    RIGHT ANKLE OPEN REDUCTION INTERNAL FIXATION performed by Reynold Durand DO at [facility]  01/01/2011    right arm broken    CARDIAC CATHETERIZATION  [id number] dr Henri Lopes  05/16/2016    COLONOSCOPY  01/01/2003    COLONOSCOPY  01/01/2007    IR TUNNELED CATHETER PLACEMENT GREATER THAN 5 YEARS  12/19/2022    IR TUNNELED CATHETER PLACEMENT GREATER THAN 5 YEARS 12/19/2022 [facility] SPECIAL PROCEDURES    ORIF ANKLE FRACTURE Right 12/06/2022    RIGHT ANKLE OPEN REDUCTION INTERNAL FIXATION    TOTAL KNEE ARTHROPLASTY Bilateral     left may 2013 and right july 2013    TUBAL LIGATION          Medications Prior to Admission:     Prior to Admission medications    Medication Sig Start Date End Date Taking? Authorizing Provider   methocarbamol (ROBAXIN) 750 MG tablet Take 1 tablet by mouth in the morning and 1 tablet at noon and 1 tablet in the evening. Do all this for 10 days. 12/12/22 12/22/22  Papo Spring MD   metoprolol tartrate (LOPRESSOR) 25 MG tablet Take 1 tablet by mouth 2 times daily 12/12/22   Papo Spring MD   senna (SENOKOT) 8.6 MG tablet Take 1 tablet by mouth daily as needed (Constipation) 12/12/22 1/11/23  Papo Spring MD   rOPINIRole (REQUIP) 0.25 MG tablet Take 1 tablet by mouth nightly 11/14/22   Eliza Horn MD   gabapentin (NEURONTIN) 600 MG tablet Take 1 tablet by mouth daily for 90 days.  11/14/22 2/12/23  Eliza Horn MD   traZODone (DESYREL) 50 MG tablet  10/20/22   Jesus Cleveland MD   colchicine (COLCRYS) 0.6 MG tablet  9/27/22 Vickie Lara DPM   Cyanocobalamin (B-12) 1000 MCG LOZG DISSOLVE ONE tablet UNDER TONGUE ONCE DAILY 9/6/22   Abbie Dupree MD   blood glucose monitor kit and supplies use check blood sugar as directed 11/9/22   Eliza Kenney MD   blood glucose monitor strips Check blood sugars daily as directed. 11/9/22   Eliza Kenney MD   Lancets MISC 1 each by Does not apply route daily 11/9/22   Eliza Kenney MD   Alcohol Swabs 70 % PADS 1 each by Does not apply route daily 11/9/22   Eliza Kenney MD   atorvastatin (LIPITOR) 40 MG tablet Take 1 tablet by mouth daily 11/7/22   Eliza Kenney MD   pantoprazole (PROTONIX) 40 MG tablet TAKE 1 TABLET DAILY 10/19/22   Eliza Kenney MD   ELIQUIS 5 MG TABS tablet TAKE 1 TABLET TWICE A DAY 10/11/22   Eliza Kenney MD   cyclobenzaprine (FLEXERIL) 5 MG tablet TAKE 1 TABLET BY MOUTH NIGHTLY AS NEEDED FOR MUSCLE SPASMS 10/3/22   Eliza Kenney MD   allopurinol (ZYLOPRIM) 100 MG tablet TAKE 1 TABLET DAILY 9/21/22   BARBIE Souza CNP   zolpidem (AMBIEN) 5 MG tablet Take 5 mg by mouth nightly as needed for Sleep.     Historical Provider, MD   hydrALAZINE (APRESOLINE) 25 MG tablet Take 4 tablets by mouth 3 times daily 7/12/22   Wilian Shabazz MD   amiodarone (CORDARONE) 200 MG tablet Take 1 tablet by mouth daily 7/6/22   RuthtonBARBIE Jackson NP   calcitRIOL (ROCALTROL) 0.5 MCG capsule TAKE 1 CAPSULE BY MOUTH DAILY 6/14/22   Wilian Shabazz MD   ferrous sulfate (FE TABS 325) 325 (65 Fe) MG EC tablet Take 1 tablet by mouth daily (with breakfast) 5/10/22   Eliza Kenney MD   azelastine (ASTELIN) 0.1 % nasal spray 1 spray by Nasal route 2 times daily Use in each nostril as directed 10/13/21   Lauro Neves DO   Cholecalciferol (VITAMIN D3) 25 MCG (1000 UT) TABS Take 2 tablets by mouth daily 1/9/20   Paulett Petit, APRN - CNP   Magnesium Oxide 400 MG CAPS Take by mouth 2 times daily Takes once daily at [facility]    Historical Provider, MD Allergies:     Adhesive tape, Cephalexin, Erythromycin, Ketorolac tromethamine, Pcn [penicillins], Percocet [oxycodone-acetaminophen], Sulfa antibiotics, and Ultracet [tramadol-acetaminophen]    Social History:     Tobacco:    reports that she quit smoking about 62 years ago. Her smoking use included cigarettes. She has a 0.25 pack-year smoking history. She has never used smokeless tobacco.  Alcohol:      reports no history of alcohol use. Drug Use:  reports no history of drug use. Family History:     Family History   Problem Relation Age of Onset    Diabetes Mother     Heart Disease Mother     Osteoporosis Mother     Kidney Disease Father     Prostate Cancer Brother        Review of Systems:     Positive and Negative as described in HPI. ROS     No cp  No nv  No diarrhea  No dyspnea                                                          . Physical Exam:     Physical Exam   Vitals:    Vitals:    22 1700 22 1735 22 1909 22 2104   BP: (!) 165/50 (!) 173/84 (!) 139/52 (!) 132/47   Pulse: 68 89 72 68   Resp:  16    Temp:  98.6 °F (37 °C) 98.6 °F (37 °C)    TempSrc:       SpO2:   96%    Weight:  270 lb 1 oz (122.5 kg)     Height:                        Body mass index is 44.94 kg/m². Temp (24hrs), Av.7 °F (37.1 °C), Min:98.3 °F (36.8 °C), Max:99.3 °F (37.4 °C)    No results for input(s): POCGLU in the last 72 hours. General Appearance:   No acute distress , obese                 Skin:                             No rash or erythema  HEENT ;                                                                       No icterus, no pallor . No ptosis. No gross asymmetry  Or abnormality face         Neck:                            No mass , no thyroid enlargement                                           Pulmonary/Chest:                                               Symmetric                                          Clear to auscultation bilaterally . No wheezes,                                          No rales or rhonchi . No abnormality on percussion                                                        Cardiovascular:            Normal rate, regular rhythm,                                          No murmur or  Gallop . Abdomen:                       Soft, non-tender                                           Normal bowels sounds,                                             Extremities:                    No  Edema .                                            Musculo-skeletal / Neurological ;;                  Neurological ;                 No focal motor deficit ,                 No focal sensory deficit ,    Musculo-skeletal ;                  No  gait abnormality                  No significant joint abnormality,                                                         Psych:                     See psych notes                                                                 Investigations:      URINE ANALYSIS: No results found for: LABURIN     CBC:  Lab Results   Component Value Date/Time    WBC 10.0 12/19/2022 06:27 AM    HGB 6.5 12/20/2022 06:07 AM     12/19/2022 06:27 AM     03/02/2012 11:15 AM             BMP:    Lab Results   Component Value Date/Time     12/20/2022 06:07 AM    K 4.6 12/20/2022 06:07 AM     12/20/2022 06:07 AM    CO2 28 12/20/2022 06:07 AM    BUN 40 12/20/2022 06:07 AM    CREATININE 3.51 12/20/2022 06:07 AM    GLUCOSE 106 12/20/2022 06:07 AM    GLUCOSE 113 03/02/2012 11:15 AM      LIVER PROFILE:  Lab Results   Component Value Date/Time    ALT <5 12/13/2022 06:47 AM    AST 10 12/13/2022 06:47 AM    PROT 5.6 12/13/2022 06:47 AM    BILITOT 0.3 12/13/2022 06:47 AM    BILIDIR 0.1 12/13/2022 06:47 AM    LABALBU 2.9 12/13/2022 06:47 AM    LABALBU 4.2 03/02/2012 11:15 AM             @BRIEFLABT(TSH)@      Laboratory Testing:  Recent Results (from the past 24 hour(s))   Basic Metabolic Panel w/ Reflex to MG    Collection Time: 12/20/22  6:07 AM   Result Value Ref Range    Glucose 106 (H) 70 - 99 mg/dL    BUN 40 (H) 8 - 23 mg/dL    Creatinine 3.51 (H) 0.50 - 0.90 mg/dL    Est, Glom Filt Rate 13 (L) >60 mL/min/1.73m2    Calcium 9.0 8.6 - 10.4 mg/dL    Sodium 136 135 - 144 mmol/L    Potassium 4.6 3.7 - 5.3 mmol/L    Chloride 101 98 - 107 mmol/L    CO2 28 20 - 31 mmol/L    Anion Gap 7 (L) 9 - 17 mmol/L   Hemoglobin and Hematocrit    Collection Time: 12/20/22  6:07 AM   Result Value Ref Range    Hemoglobin 6.5 (LL) 12.0 - 16.0 g/dL    Hematocrit 20.1 (L) 36 - 46 %       Imaging/Diagnostics:  XR ANKLE RIGHT (MIN 3 VIEWS)    Result Date: 12/6/2022  Anatomic alignment of comminuted ankle fracture status post ORIF. Assessment :      Hospital Problems             Last Modified POA    * (Principal) Open fracture of right tibia and fibula, sequela 12/12/2022 Yes    Paraproteinemia 12/14/2022 Yes    Stage 5 chronic kidney disease not on chronic dialysis ([facility] [address] 12/14/2022 Yes    Anemia of chronic renal failure, stage 5 ([facility] [address] 12/14/2022 Yes     Plan:       Medications: Allergies:     Allergies   Allergen Reactions    Adhesive Tape     Cephalexin Other (See Comments)     Tongue cracks and peels    Erythromycin Other (See Comments)     Epigastric pain and bloating    Ketorolac Tromethamine Other (See Comments)     Severe stomach cramps    Pcn [Penicillins]      Unknown reaction    Percocet [Oxycodone-Acetaminophen] Itching and Other (See Comments)     Esophagus hurt    Sulfa Antibiotics     Ultracet [Tramadol-Acetaminophen] Itching       Current Meds:   Scheduled Meds:    methocarbamol  500 mg Oral 3 times per day    gabapentin  300 mg Oral Daily    iron sucrose  100 mg IntraVENous Q24H    atorvastatin  40 mg Oral Nightly    zolpidem  10 mg Oral Nightly    allopurinol  100 mg Oral Daily    amiodarone  200 mg Oral Daily    [Held by provider] apixaban  5 mg Oral BID    calcitRIOL  0.25 mcg Oral Daily    hydrALAZINE  100 mg Oral TID    magnesium oxide  400 mg Oral BID    metoprolol tartrate  25 mg Oral BID    pantoprazole  40 mg Oral QAM AC    rOPINIRole  0.25 mg Oral Nightly    Vitamin D  2,000 Units Oral Daily    polyethylene glycol  17 g Oral Daily     Continuous Infusions:    sodium chloride       PRN Meds: anticoagulant sodium citrate, sodium chloride, anticoagulant sodium citrate, Benzocaine-Menthol, acetaminophen, HYDROcodone-acetaminophen, bisacodyl       Patient admitted [place] Problems             Last Modified POA    * (Principal) Open fracture of right tibia and fibula, sequela 12/12/2022 Yes    Paraproteinemia 12/14/2022 Yes    Stage 5 chronic kidney disease not on chronic dialysis ([facility] [address] 12/14/2022 Yes    Anemia of chronic renal failure, stage 5 ([facility] [address] 12/14/2022 Yes                      12/13/22    Open trimalleolar fracture, s/p ORIF on 12/6  JOSE ARMANDO on CKD, Cr 4.29 today, nephro following, patient high risk for needing HD  Anemia  Hx of A Fib on Eliquis and amio, DM2, HTN,HLD, DIONY, Fibromyalgia   Nephrology following for any urgent HD needs, appreciate recommendations, renal diet, continue to monitor Cr  A Fib. Continue Amio and Eliquis  Continue lipitor, hydralazine, and lopressor  Continue iron supplementation, Hgb stable at 7.6                 Dec 18  Hem/Onc on board for elevated Kappa light chain, rule out myeloma  Anemia of chronic renal failure, appears baseline around 6-7, patient receiving IV iron supplementation, restart Retacrit once iron supplementation completed. Nephrology plans for HD today.  IR consulted for tunnel catheter placement, followed by HD session, DC IV fluids, appreciate recommendations   Hyperkalemia Kayexalate dose today  Potassium 5.5                12/20  HB is low , No Obvious Sign of Bleeding   1 unit of Packed RBC   Getting HD   Will consider Restarting AC, after HB results of [place] BP - Controlled      Hemoglobin 12.0 - 16.0 g/dL 6.6 Low Panic   7.3 Low   6.5 Low Panic   7.7 Low  R       DIALYSIS TODAY . Consultations:   IP CONSULT TO DIETITIAN  IP CONSULT TO SOCIAL WORK  IP CONSULT TO INTERNAL MEDICINE  IP CONSULT TO NEPHROLOGY  IP CONSULT TO Ami Hurst  IP CONSULT TO [place] Marcy Duverney, MD  12/20/2022 12/20/2022  10:28 PM          Copy sent to Dr. Bettie Meraz MD    Please note that this chart was generated using voice recognition Dragon dictation software. Although every effort was made to ensure the accuracy of this automated transcription, some errors in transcription may have occurred.

## 2022-12-21 NOTE — PROGRESS NOTES
Dialysis is new for the pt and she is fearful about it being long term and then the problems with her foot. She is scared and unsure of the future. She is appreciative of the support being given. Writer provided listening presence, emotional support, and prayer. Pt is on communion list as well.    12/21/22 1127   Encounter Summary   Encounter Overview/Reason  Spiritual/Emotional Needs   Service Provided For: Patient   Referral/Consult From: Nurse   Support System Spouse; Children;Family members   Last Encounter  12/21/22   Complexity of Encounter High   Spiritual/Emotional needs   Type Spiritual Support;Emotional Distress   Assessment/Intervention/Outcome   Assessment Anxious; Fearful   Intervention Active listening;Discussed illness injury and its impact; Discussed meaning/purpose;Explored/Affirmed feelings, thoughts, concerns;Prayer (assurance of)/[place];Sustaining Presence/Ministry of presence; Explored Coping Skills/Resources   Outcome Comfort;Engaged in conversation;Expressed feelings, needs, and concerns;Expressed Gratitude;Receptive

## 2022-12-21 NOTE — PROGRESS NOTES
Blood transfusing as ordered. No reactions noted. Pt observed for first 15 mins. Vitals obtained. Pt stated that she was \"panicked all of a sudden\". Writer stopped infusion at 00:38. Pt was afraid \"readings were wrong\" or \"blood was wrong\". Obtained vitals which remained unchanged from baseline. Pt stated that it was \"nerves\" to other staff in room and told writer to \"do what you have to do\". Writer reassured Pt. Re-educated Pt on procedure for obtaining vitals and remaining with Pt for first 15 mins. Pt verbalized understanding. Blood restarted at 00:41. Pt resting with eyes closed at this time.

## 2022-12-21 NOTE — PROGRESS NOTES
Nephrology Progress Note    Reason for consultation: Management of acute kidney injury and chronic kidney disease stage IV. Consulting physician: Keyur Wynn MD.    Interval history: Ole Robles had IJ tunneled hemodialysis catheter placed yesterday and received first session of acute hemodialysis on 12/19/2022. She feels better today but hemoglobin has dropped to 6.5 g/dL. History of present illness: This is a 69 y.o. female with a significant past medical history of Lipidemia, nephrolithiasis, fibromyalgia, type 2 diabetes mellitus, osteoarthritis and Chronic kidney disease stage IIIb [baseline serum creatinine 2.5 mg/dL secondary to diabetic glomerulosclerosis and follows up with Dr. Skinny Ratliff [facility] [address] [place] [address], who presented to the hospital to [facility] on 12/6/2022 after tripping on [place] whilst trying to get out from [employer]. Vital signs were stable at presentation but her renal function had worsened from baseline with elevated BUN/creatinine 60/3.87 mg/dL. X-rays revealed trimalleolar fracture with diffuse soft tissue swelling of the right ankle. She underwent open reduction and internal fixation on 12/6/2022. Hemoglobin dropped to 6.6 g/dL on 12/10/2022 requiring PRBC transfusion. Serum creatinine peaked at 4.34 mg/dL. Patient did not require dialysis so far. She was transferred to acute rehab unit at [facility] yesterday [12/12/2022]. Pain control is adequate she has a cast on her left lower extremity. She does not have shortness of breath. She is nonoliguric and does not have indwelling Galdamez catheter. Laboratory studies today remarkable for BUN/creatinine 71/4.29  mg/dL.     Objective/     Vitals:    12/21/22 0045 12/21/22 0050 12/21/22 0113 12/21/22 0702   BP: (!) 126/52 (!) 133/57 132/69 118/65   Pulse: 71 70 69 78   Resp: 16 16 17 16   Temp: 98.9 °F (37.2 °C) 98.6 °F (37 °C) 99 °F (37.2 °C) 98.6 °F (37 °C)   TempSrc: Oral Oral Oral Oral   SpO2: 97% 98% 99% 96%   Weight:       Height:         24HR INTAKE/OUTPUT:    Intake/Output Summary (Last 24 hours) at 12/21/2022 1206  Last data filed at 12/21/2022 0910  Gross per 24 hour   Intake 1121 ml   Output 2750 ml   Net -1629 ml     Patient Vitals for the past 96 hrs (Last 3 readings):   Weight   12/20/22 1735 270 lb 1 oz (122.5 kg)   12/20/22 1419 274 lb 11.1 oz (124.6 kg)   12/19/22 1613 273 lb 5.9 oz (124 kg)     Constitutional:  Alert, awake, no apparent distress  Cardiovascular:  S1, S2 without pericardial rub or gallop. Respiratory:  Diminished breath sounds at lung bases. Abdomen: Full but soft with normal bowel sounds. Ext: 2+ LE edema    Data/  Recent Labs     12/19/22  0627 12/20/22  0607 12/21/22  0652   WBC 10.0  --   --    HGB 6.6* 6.5* 7.6*   HCT 19.9* 20.1* 23.2*   MCV 92.0  --   --      --   --      Recent Labs     12/19/22  0627 12/20/22  0607 12/21/22  0652    136 135   K 4.6 4.6 4.0    101 98   CO2 27 28 28   GLUCOSE 120* 106* 147*   PHOS 5.5*  --   --    BUN 69* 40* 21   CREATININE 4.58* 3.51* 2.43*   LABGLOM 9* 13* 20*     Assessment/Plan:   1. Acute kidney injury on chronic kidney disease stage 4- Differentials include progression of underlying chronic kidney disease, myeloma kidney and ischemic acute tubular necrosis. Baseline serum creatinine averaging 3.8 mg/dL in October 2022, serum creatinine peaked to 4.5 mg/dL  Tunnel catheter was placed  Patient started on dialysis 12/19/2022      2. Normocytic anemia of chronic kidney disease - Iron studies are consistent with iron deficiency anemia patient is currently receiving loading dose of IV iron sucrose. Patient will benefit from transfusion of 1 unit of packed red cells. Start Retacrit 10,000 units subcutaneously 3 times a week. 3.  S/p trimalleolar right ankle fracture - recovery in progress. 4.  Systemic hypertension - blood pressure control is adequate.      5.  Elevated kappa/lambda ratio may be contributory to worsening anemia. Serum immunofixation was negative for monoclonal immunoglobulins on 12/14/2022. However had free monoclonal light chains on urine immunofixation performed 4/2/2022. We will follow hematology/oncology recommendations. Plan  Patient had 2 back-to-back dialysis sessions on 12/19 and 12/20  Patient has an appointment today with orthopedic doctor at [facility] this afternoon. We will plan for third dialysis session tomorrow. Patient most likely looks like will need long-term dialysis    Discharge planning in progress for SNF  No objection from nephrology standpoint as long as she has outpatient dialysis set up. Prognosis is guarded.     Gladis Osullivan MD  Nephrologist

## 2022-12-21 NOTE — PROGRESS NOTES
Pt noted to be tearful at times throughout shift. Pt stated that she was \"never getting out of here\". Writer offered reassurance and listening. Staff into sit at times. Rest for Pt fair.

## 2022-12-21 NOTE — PROGRESS NOTES
[address] [facility]  [address] [facility] [address]  Dept: [phone]  Dept Fax: [phone]        Orthopaedic Trauma Clinic Follow Up      Subjective:   Date of Surgery: 12/6/2022 type IIIa open right trimalleolar ankle fracture status post ORIF    Sebastián Gonzalez is a 69y.o. year old female who presents to the clinic today for routine 2-week followup regarding her type IIIa open right trimalleolar ankle fracture status post ORIF. Patient is accompanied by son and  at today's visit. Patient is still at acute rehab and progressing quite well. Patient denies numbness, tingling, or weakness. Patient is able to maintain nonweightbearing to right lower extremity and is here today for wound evaluation with splint removal.  Patient notes minimal pain about the ankle but does note some redness and pain proximal to the surgical incisions. Patient has a history of bilateral lower extremity edema with compressive Ace noted on noninjured left side. Patient has no new orthopedic complaints at this time. Review of Systems  Gen: no fever, chills, malaise  CV: no chest pain or palpitations  Resp: no cough or shortness of breath  GI: no nausea, vomiting, diarrhea, or constipation  Neuro: no numbness, tingling, or weakness  Msk: Right ankle pain  10 remaining systems reviewed and negative    Objective : There were no vitals filed for this visit. Body mass index is 44.6 kg/m². General: No acute distress, resting comfortably in the clinic  Neuro: alert. oriented  Eyes: Extra-ocular muscles intact  Pulm: Respirations unlabored and regular. Skin: warm, well perfused  Psych:   Patient has good fund of knowledge and displays understanging of exam, diagnosis, and plan. MSK:    RLE: Skin with surgical incisions held with sutures no signs of dehiscence or drainage drainage.   There is erythema appreciated proximal to the medial traumatic laceration that is tender to palpation. Patient is nontender to palpation about the ankle. Patient is able to flex and extend the ankle with no discomfort. Compartments soft. 2+ DP pulse. TA/EHL/FHL/GS motor intact. Deep and Superficial Peroneal/Saphenous/Sural/Plantar SILT. Radiology:  History: type IIIa open right trimalleolar ankle fracture status post ORIF, 12/6/2022    Comparison: 12/6/2022    Findings: 3 views of the right ankle (AP/mortise/lateral) showing trimalleolar ankle fracture status post ORIF with retained hardware in relative anatomic alignment without signs of malreduction or hardware failure when compared to previous films. There is minimal osseous integration appreciate this time, though view was obscured due to splint material.    Impression: Stable right trimalleolar ankle fracture status post ORIF     Assessment:   69y.o. year old female with type IIIa open right trimalleolar ankle fracture status post ORIF, 12/6/2022  Plan:      Patient seen and evaluated in clinic today with splint removed. Based on clinical evaluation sutures returning intact and patient is to follow-up in 1 week's time for repeat evaluation with potential suture removal then. Patient was transition to cam boot. Patient's ankle was dressed with Adaptic, ABD, and Ace wrap to be changed daily with reapplication of cam boot to prevent surgical site irritation. Patient was prescribed 10 days of clindamycin 3 times daily and provided with printed prescription. Patient is to maintain nonweightbearing to right lower extremity. Patient understood and agreed with the plan with all questions being answered to the patient's satisfaction at the time of visit. Follow up:Return in about 1 week (around 12/28/2022).     Orders Placed This Encounter   Medications    clindamycin (CLEOCIN) 300 MG capsule     Sig: Take 1 capsule by mouth 3 times daily for 10 days     Dispense:  30 capsule     Refill:  0          No orders of the defined types were placed in this encounter.         Electronically signed by Renee Nation DO on 12/21/2022 at 4:21 PM

## 2022-12-21 NOTE — PROGRESS NOTES
_                         Today's Date: 12/21/2022  Patient Name: Walter Singh  Date of admission: 12/12/2022  5:48 PM  Patient's age: 69 y.o., 1945  Admission Dx: Open fracture of right tibia and fibula, sequela [S82.201S, S80.5S]      Requesting Physician: Martha Bond MD    CHIEF COMPLAINT: Status post fall. Fracture of the right tibia and fibula. Chronic renal failure. Consult for elevated kappa light chain immunoglobulin. SUBJECTIVE:  Patient was seen and examined. Patient seen and examined  Labs and vitals reviewed  Patient seen by orthopedic team in clinic today  Complains of being very tired  Patient received 1 unit of packed RBC yesterday. Repeat CBC is 7.6 today. BRIEF CASE HISTORY:    The patient is a 69 y.o.  female who is admitted to the hospital for further management of open fracture of the right tibia and fibula. Patient had a fall and she had right ankle fracture. She was found to have open fracture of the right tibia and fibula. Patient had surgery and she is admitted to the inpatient rehab for further care. Patient's labs showed evidence of JOSE ARMANDO on top of CKD. She was seen by nephrology. Patient was deemed to be high risk for dialysis. Further work-up showed elevated kappa light chain immunoglobulin at 41.9 with lambda light chain immunoglobulin 3.14. We were consulted to evaluate the patient for possible underlying multiple myeloma. Patient also had history of anemia of chronic renal insufficiency. Past Medical History:   has a past medical history of Abnormal stress test, Anemia, Arthritis, Depression, Diverticulosis, DM (diabetes mellitus) ([address] Erythropenia, Fibromyalgia, HTN (hypertension), Hyperlipidemia, Kidney stone, Morbid obesity due to excess calories ([address] DIONY on CPAP, Osteopenia, Seasonal allergies, and Sleep apnea.     Past Surgical History:   has a past surgical history that includes Colonoscopy (01/01/2003); Colonoscopy (01/01/2007); Tubal ligation; Arm Surgery (01/01/2011); Total knee arthroplasty (Bilateral); Cardiac catheterization ([id number] dr Aleida Linares); Cardiac catheterization (05/16/2016); ORIF ankle fracture (Right, 12/06/2022); Ankle fracture surgery (Right, 12/6/2022); and IR TUNNELED CVC PLACE WO SQ PORT/PUMP > 5 YEARS (12/19/2022). Family History: family history includes Diabetes in her mother; Heart Disease in her mother; Kidney Disease in her father; Osteoporosis in her mother; Prostate Cancer in her brother. Social History:   reports that she quit smoking about 62 years ago. Her smoking use included cigarettes. She has a 0.25 pack-year smoking history. She has never used smokeless tobacco. She reports that she does not drink alcohol and does not use drugs. Medications:    Prior to Admission medications    Medication Sig Start Date End Date Taking? Authorizing Provider   methocarbamol (ROBAXIN) 750 MG tablet Take 1 tablet by mouth in the morning and 1 tablet at noon and 1 tablet in the evening. Do all this for 10 days. 12/12/22 12/22/22  Lisa Thorne MD   metoprolol tartrate (LOPRESSOR) 25 MG tablet Take 1 tablet by mouth 2 times daily 12/12/22   Lisa Thorne MD   senna (SENOKOT) 8.6 MG tablet Take 1 tablet by mouth daily as needed (Constipation) 12/12/22 1/11/23  Lisa Thorne MD   rOPINIRole (REQUIP) 0.25 MG tablet Take 1 tablet by mouth nightly 11/14/22   Eliza Cheatham MD   gabapentin (NEURONTIN) 600 MG tablet Take 1 tablet by mouth daily for 90 days.  11/14/22 2/12/23  Eliza Cheatham MD   traZODone (DESYREL) 50 MG tablet  10/20/22   Shon Gomes MD   colchicine (COLCRYS) 0.6 MG tablet  9/27/22   Magda Chand DPM   Cyanocobalamin (B-12) 1000 MCG LOZG DISSOLVE ONE tablet UNDER TONGUE ONCE DAILY 9/6/22   Rita Aldrich MD   blood glucose monitor kit and supplies use check blood sugar as directed 11/9/22   Doug Lo MD   blood glucose monitor strips Check blood sugars daily as directed. 11/9/22   Eliza Dawn MD   Lancets MISC 1 each by Does not apply route daily 11/9/22   Eliza Dawn MD   Alcohol Swabs 70 % PADS 1 each by Does not apply route daily 11/9/22   Eliza Dawn MD   atorvastatin (LIPITOR) 40 MG tablet Take 1 tablet by mouth daily 11/7/22   Eliza Dawn MD   pantoprazole (PROTONIX) 40 MG tablet TAKE 1 TABLET DAILY 10/19/22   Eliza Dawn MD   ELIQUIS 5 MG TABS tablet TAKE 1 TABLET TWICE A DAY 10/11/22   Eliza Dawn MD   cyclobenzaprine (FLEXERIL) 5 MG tablet TAKE 1 TABLET BY MOUTH NIGHTLY AS NEEDED FOR MUSCLE SPASMS 10/3/22   Eliza Dawn MD   allopurinol (ZYLOPRIM) 100 MG tablet TAKE 1 TABLET DAILY 9/21/22   BARBIE Boyd CNP   zolpidem (AMBIEN) 5 MG tablet Take 5 mg by mouth nightly as needed for Sleep.     Historical Provider, MD   hydrALAZINE (APRESOLINE) 25 MG tablet Take 4 tablets by mouth 3 times daily 7/12/22   Leanne Motley MD   amiodarone (CORDARONE) 200 MG tablet Take 1 tablet by mouth daily 7/6/22   BARBIE Fraser - NP   calcitRIOL (ROCALTROL) 0.5 MCG capsule TAKE 1 CAPSULE BY MOUTH DAILY 6/14/22   Leanne Motley MD   ferrous sulfate (FE TABS 325) 325 (65 Fe) MG EC tablet Take 1 tablet by mouth daily (with breakfast) 5/10/22   Eliza Dawn MD   azelastine (ASTELIN) 0.1 % nasal spray 1 spray by Nasal route 2 times daily Use in each nostril as directed 10/13/21   Flora Arenas DO   Cholecalciferol (VITAMIN D3) 25 MCG (1000 UT) TABS Take 2 tablets by mouth daily 1/9/20   BARBIE Boyd - CNP   Magnesium Oxide 400 MG CAPS Take by mouth 2 times daily Takes once daily at [facility]    Historical Provider, MD     Current Facility-Administered Medications   Medication Dose Route Frequency Provider Last Rate Last Admin    anticoagulant sodium citrate 4 % injection 1.6 mL  1.6 mL IntraCATHeter PRN Sergo Para, MD   1.6 mL at 12/20/22 7140 anticoagulant sodium citrate 4 % injection 1.6 mL  1.6 mL IntraCATHeter PRN Nora Acuña MD   1.6 mL at 12/20/22 1831    methocarbamol (ROBAXIN) tablet 500 mg  500 mg Oral 3 times per day Maico Garcia MD   500 mg at 12/21/22 0528    gabapentin (NEURONTIN) capsule 300 mg  300 mg Oral Daily Maico Garcia MD   300 mg at 12/21/22 0902    iron sucrose (VENOFER) 100 mg in sodium chloride 0.9 % 100 mL IVPB  100 mg IntraVENous Q24H Nora Acuña MD   Stopped at 12/19/22 1558    Benzocaine-Menthol (CEPACOL) 1 lozenge  1 lozenge Oral Q2H PRN Irene Ashford MD        atorvastatin (LIPITOR) tablet 40 mg  40 mg Oral Nightly Irene Ashford MD   40 mg at 12/20/22 2104    acetaminophen (TYLENOL) tablet 650 mg  650 mg Oral Q6H PRN Irene Ashford MD        zolpidem (AMBIEN) tablet 10 mg  10 mg Oral Nightly Irene Ashford MD   10 mg at 12/20/22 2104    allopurinol (ZYLOPRIM) tablet 100 mg  100 mg Oral Daily J Carlos Kat MD   100 mg at 12/21/22 7209    amiodarone (CORDARONE) tablet 200 mg  200 mg Oral Daily J Carlos Kat MD   200 mg at 12/21/22 0901    [Held by provider] apixaban (ELIQUIS) tablet 5 mg  5 mg Oral BID J Carlos Kat MD   5 mg at 12/16/22 7322    calcitRIOL (ROCALTROL) capsule 0.25 mcg  0.25 mcg Oral Daily J Carlos Kat MD   0.25 mcg at 12/21/22 0901    hydrALAZINE (APRESOLINE) tablet 100 mg  100 mg Oral TID J Carlos Kat MD   100 mg at 12/21/22 0902    HYDROcodone-acetaminophen (Jonetta Floss) 5-325 MG per tablet 1 tablet  1 tablet Oral Q4H PRN J Carlos Kat MD   1 tablet at 12/21/22 0528    magnesium oxide (MAG-OX) tablet 400 mg  400 mg Oral BID J Carlos Kat MD   400 mg at 12/21/22 0902    metoprolol tartrate (LOPRESSOR) tablet 25 mg  25 mg Oral BID J Carlos Kat MD   25 mg at 12/21/22 0902    pantoprazole (PROTONIX) tablet 40 mg  40 mg Oral QAM AC J Carlos Kat MD   40 mg at 12/21/22 0528    rOPINIRole (REQUIP) tablet 0.25 mg  0.25 mg Oral Nightly J Carlos Kat MD   0.25 mg at 12/20/22 2109    Vitamin D (CHOLECALCIFEROL) tablet 2,000 Units  2,000 Units Oral Daily Narciso Toledo MD   2,000 Units at 12/21/22 0902    bisacodyl (DULCOLAX) suppository 10 mg  10 mg Rectal Daily PRN Rene Saxena MD        polyethylene glycol (GLYCOLAX) packet 17 g  17 g Oral Daily Rene Saxena MD   17 g at 12/21/22 9906       Allergies:  Adhesive tape, Cephalexin, Erythromycin, Ketorolac tromethamine, Pcn [penicillins], Percocet [oxycodone-acetaminophen], Sulfa antibiotics, and Ultracet [tramadol-acetaminophen]    REVIEW OF SYSTEMS:      General: Positive for weakness and fatigue. No unanticipated weight loss or decreased appetite. No fever or chills. Eyes: No blurred vision, eye pain or double vision. Ears: No hearing problems or drainage. No tinnitus. Throat: No sore throat, problems with swallowing or dysphagia. Respiratory: No cough, sputum or hemoptysis. Positive for exertional shortness of breath. No pleuritic chest pain. Cardiovascular: No chest pain, orthopnea or PND. No lower extremity edema. No palpitation. Gastrointestinal: No problems with swallowing. No abdominal pain or bloating. No nausea or vomiting. No diarrhea or constipation. No GI bleeding. Genitourinary: No dysuria, hematuria, frequency or urgency. Musculoskeletal: As above. Dermatologic: No skin rashes or pruritus. No skin lesions or discolorations. Psychiatric: No depression, anxiety, or stress or signs of schizophrenia. No change in mood or affect. Hematologic: No history of bleeding tendency. No bruises or ecchymosis. No history of clotting problems. Infectious disease: No fever, chills or frequent infections. Endocrine: No polydipsia or polyuria. No temperature intolerance. Neurologic: No headaches or dizziness. No weakness or numbness of the extremities. No changes in balance, coordination,  memory, mentation, behavior. Allergic/Immunologic: No nasal congestion or hives. No repeated infections. PHYSICAL EXAM:      /65   Pulse 78   Temp 98.6 °F (37 °C) (Oral)   Resp 16   Ht 5' 5\" (1.651 m)   Wt 270 lb 1 oz (122.5 kg)   LMP  (LMP Unknown)   SpO2 96%   BMI 44.94 kg/m²    Temp (24hrs), Av.8 °F (37.1 °C), Min:98.3 °F (36.8 °C), Max:99.1 °F (37.3 °C)      General appearance - not in pain or distress. Patient is morbidly obese. Mental status - alert and oriented  Eyes - pupils equal and reactive, extraocular eye movements intact  Ears - bilateral TM's and external ear canals normal  Nose - normal and patent, no erythema, discharge or polyps  Mouth - mucous membranes moist, pharynx normal without lesions  Neck - supple, no significant adenopathy  Lymphatics - no palpable lymphadenopathy, no hepatosplenomegaly  Chest - clear to auscultation, no wheezes, rales or rhonchi, symmetric air entry  Heart - normal rate, regular rhythm, normal S1, S2, no murmurs, rubs, clicks or gallops  Abdomen - soft, nontender, nondistended, no masses or organomegaly  Neurological - alert, oriented, normal speech, no focal findings or movement disorder noted  Musculoskeletal -status post right ankle fracture status post open reduction internal fixation. Extremities - peripheral pulses normal, no pedal edema, no clubbing or cyanosis  Skin - normal coloration and turgor, no rashes, no suspicious skin lesions noted           DATA:      Labs:       CBC:   Recent Labs     22  0627 22  0607 22  0652   WBC 10.0  --   --    HGB 6.6* 6.5* 7.6*   HCT 19.9* 20.1* 23.2*     --   --      BMP:   Recent Labs     22  0607 22  0652    135   K 4.6 4.0   CO2 28 28   BUN 40* 21   CREATININE 3.51* 2.43*   LABGLOM 13* 20*   GLUCOSE 106* 147*     PT/INR:   No results for input(s): PROTIME, INR in the last 72 hours. APTT:No results for input(s): APTT in the last 72 hours. LIVER PROFILE:  No results for input(s): AST, ALT, LABALBU in the last 72 hours.     IR TUNNELED CVC PLACE WO SQ PORT/PUMP > 5 YEARS  Narrative: PROCEDURE:  ULTRASOUND GUIDED VASCULAR ACCESS. FLUOROSCOPY GUIDED PLACEMENT OF A TUNNELED CATHETER.    12/19/2022. HISTORY:  ORDERING SYSTEM PROVIDED HISTORY: Renal failure, need for chronic dialysis. TECHNOLOGIST PROVIDED HISTORY:  Need for chronic dialysis  How many lumens are being requested?->2  What side should this line be placed? ->Either  What site is the preferred site? ->Internal Jugular    SEDATION:  Local anesthesia. FLUOROSCOPY DOSE AND TYPE OR TIME AND EXPOSURES:  Fluoro time: 2 minutes 6 seconds; dap: 441 cGy per cm2; dose: 31 mGy    TECHNIQUE/PROCEDURE DETAILS:  Informed consent was obtained after explanation of the procedure including  risks, benefits, and alternatives. All aspects of maximum sterile barrier  technique were used including washing hands with alcohol-based hand rub, skin  preparation, cap, mask, sterile gown, sterile gloves, and sterile full body  drape. Local anesthesia was achieved with lidocaine. A micropuncture needle  was used to access the right internal jugular vein using ultrasound guidance. An ultrasound image demonstrating patency of the vein with needle tip located  within it was obtained and stored in PACS. (A sterile probe cover and gel  were utilized.)  After placement of a microwire and micropuncture sheath, the  inner dilator and microwire were exchanged for a 0.035 inch Amplatz wire. A  subcutaneous tunnel was created from the infraclavicular region to the  venotomy site, and a tunneled 14.5 [place] by 19 cm tip to cuff Palindrome  catheter was pulled through the subcutaneous tunnel to the venotomy site. The venotomy site was then serially dilated, and a peel-away sheath was  placed. The tip of the catheter which had previously been then tunneled was  inserted through the peel-away sheath under fluoroscopic guidance to the  right atrium. The catheter flushed and aspirated easily. The catheter was  sutured to the skin.   The catheter was locked with heparinized saline. The  venotomy site was closed with Dermabond. The patient tolerated the procedure  well. COMPLICATIONS:  None immediately apparent. Impression: Successful ultrasound and fluoroscopy guided right internal jugular 14.5  [place] by 19 cm tip to cuff dual-lumen dialysis catheter placement. Catheter  is ready for use. Component Ref Range & Units 4/2/22 1651 4/2/22 1651 Urine IFX Specimen . Random Urine Urine Total Protein mg/dL 24 24 Urine IFX Interp FREE MONOCLONAL LIGHT CHAINS ARE PRESENT, IDENTIFIED AS Comment: KAPPA (6.2 MG/DL). IMPRESSION:    Primary Problem  Open fracture of right tibia and fibula, sequela    Active Hospital Problems    Diagnosis Date Noted    Paraproteinemia [D89.2] 12/14/2022     Priority: Medium    Stage 5 chronic kidney disease not on chronic dialysis [facility]) [N18.5] 12/14/2022     Priority: Medium    Open fracture of right tibia and fibula, sequela [S82.201S, S82.401S] 12/05/2022     Priority: Medium    Anemia of chronic renal failure, stage 5 ([address] [N18.5, D63.1]    Open fracture of right tibia and fibula. Status post open reduction internal fixation. JOSE ARMANDO on top of CKD. Anemia of chronic renal insufficiency  Elevated kappa light chain immunoglobulin    RECOMMENDATIONS:  Records and labs and images were reviewed and discussed with the patient and family at bedside. Patient is recovering from right ankle fracture. Undergoing inpatient rehab. Urine immunofixation studies pending. Patient was noted to have free monoclonal light chains back in April 2022. 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 Patient would benefit from Procrit treatment for anemia of chronic renal failure after correction of iron deficiency.   Patient's questions were answered to the best of her satisfaction and she verbalized full understanding and agreement. Discussed with patient and family. Deanna Blake MD, MD                                                   This note is created with the assistance of a speech recognition program.  While intending to generate a document that actually reflects the content of the visit, the document can still have some errors including those of syntax and sound a like substitutions which may escape proof reading. It such instances, actual meaning can be extrapolated by contextual diversion.

## 2022-12-21 NOTE — PROGRESS NOTES
Updated [facility] again that they are not here. To get with rig to find out whats going on. [facility] stated they were on site 15 minutes ago.

## 2022-12-21 NOTE — PLAN OF CARE
Problem: Discharge Planning  Goal: Discharge to home or other facility with appropriate resources  12/21/2022 1447 by Maurice Finney RN  Outcome: Progressing  Flowsheets (Taken 12/21/2022 0910)  Discharge to home or other facility with appropriate resources: Identify barriers to discharge with patient and caregiver   Pt to discharge to facility once appropriate. Problem: Safety - Adult  Goal: Free from fall injury  12/21/2022 1447 by Maurice Finney RN  Outcome: Progressing  Flowsheets (Taken 12/21/2022 0909)  Free From Fall Injury: Instruct family/caregiver on patient safety   No injury noted this shift. Problem: ABCDS Injury Assessment  Goal: Absence of physical injury  12/21/2022 1447 by Maurice Finney RN  Outcome: Progressing  Flowsheets (Taken 12/21/2022 0909)  Absence of Physical Injury: Implement safety measures based on patient assessment   No injury noted this shift. Problem: Skin/Tissue Integrity  Goal: Absence of new skin breakdown  Description: 1. Monitor for areas of redness and/or skin breakdown  2. Assess vascular access sites hourly  3. Every 4-6 hours minimum:  Change oxygen saturation probe site  4. Every 4-6 hours:  If on nasal continuous positive airway pressure, respiratory therapy assess nares and determine need for appliance change or resting period. 12/21/2022 1447 by Maurice Finney RN  Outcome: Progressing  12/21/2022 0358 by El Maldonado RN  Outcome: Progressing   NO new skin breakdown noted this shift. Problem: Pain  Goal: Verbalizes/displays adequate comfort level or baseline comfort level  12/21/2022 1447 by Maurice Finney RN  Outcome: Progressing  Flowsheets (Taken 12/21/2022 0702)  Verbalizes/displays adequate comfort level or baseline comfort level: Encourage patient to monitor pain and request assistance   Monitoring. Patient pain as high as 7 this shift. Prn oxycodone effective for pain control.    Problem: Chronic Conditions and Co-morbidities  Goal: Patient's chronic conditions and co-morbidity symptoms are monitored and maintained or improved  12/21/2022 1447 by Paola Washington RN  Outcome: Progressing  Flowsheets (Taken 12/21/2022 0910)  Care Plan - Patient's Chronic Conditions and Co-Morbidity Symptoms are Monitored and Maintained or Improved: Monitor and assess patient's chronic conditions and comorbid symptoms for stability, deterioration, or improvement   Monitoring. Problem: Nutrition Deficit:  Goal: Optimize nutritional status  12/21/2022 1447 by Paola Washington RN  Outcome: Progressing   Pt tolerating po intake this shift.

## 2022-12-21 NOTE — PROGRESS NOTES
[address]    Acute Rehabilitation Occupational Therapy Daily Treatment Note    Date: 22  Patient Name: Sebastián Gonzalez       Room: 2972/1488-76  MRN: [id number]  Account: [de-identified]   : 1945  (69 y.o.) Gender: female       Referring Practitioner: Chelo Ribera MD  Diagnosis: Open fracture of right tibia and fibula, sequela  Additional Pertinent Hx: Sebastián Gonzalez is 69 y.o. female  Who was admitted to the hospital on 2022 for treatment of Open fracture of right tibia and fibula, sequela. Patient presented to the emergency room with right ankle injury and suffered a right ankle fracture after falling over a curb when she was trying to go to [place] for [employer]. She has underlying history of A. fib on Eliquis, CHF, diabetes mellitus with CKD3, hypertension, osteoporosis, fibromyalgia, morbid obesity BMI 43, sleep apnea, depression. Patient underwent I&D and ORIF on 2022. Patient also developed acute kidney injury with hyperkalemia and metabolic acidosis. Nephrology was consulted and patient given Francois Most. Patient was given 1 unit PRBC transfusion on 2022 for low hemoglobin of 6.4. Treatment Diagnosis: Impaired self-care status    Past Medical History:  has a past medical history of Abnormal stress test, Anemia, Arthritis, Depression, Diverticulosis, DM (diabetes mellitus) ([address] Erythropenia, Fibromyalgia, HTN (hypertension), Hyperlipidemia, Kidney stone, Morbid obesity due to excess calories ([address] DIONY on CPAP, Osteopenia, Seasonal allergies, and Sleep apnea. Past Surgical History:   has a past surgical history that includes Colonoscopy (2003); Colonoscopy (2007); Tubal ligation; Arm Surgery (2011); Total knee arthroplasty (Bilateral); Cardiac catheterization ([id number] dr Douglas Chatman); Cardiac catheterization (2016); ORIF ankle fracture (Right, 2022);  Ankle fracture surgery (Right, 2022); and IR TUNNELED CVC PLACE WO SQ PORT/PUMP > 5 YEARS (12/19/2022). Restrictions  Restrictions/Precautions  Restrictions/Precautions: Weight Bearing  Required Braces or Orthoses?: No  Implants present? : Metal implants     Position Activity Restriction  Other position/activity restrictions: Per chart: Right open trimalleolar fracture ankle fracture dislocation s/p I&D and ORIF, DOS 12/6/2022  Lower Extremity Weight Bearing Restrictions  Right Lower Extremity Weight Bearing: Non Weight Bearing               Subjective  Subjective  Subjective: \"Because of the leg, because of dialysis. \"  pt stating reasons for her concern that she will never live at home again. Pain: none noted       Objective  Cognition          Cognition  Overall Cognitive Status: WFL    Activities of Daily Living  Feeding  Assistance Level: Independent    Grooming/Oral Hygiene  Assistance Level: Independent  Skilled Clinical Factors: Completes oral and hair care while sitting w/c level at the sink. Upper Extremity Bathing  Assistance Level: Set-up  Skilled Clinical Factors: while sitting w/c level at the sink. Lower Extremity Bathing  Assistance Level: Maximum assistance  Skilled Clinical Factors: TA to wash bottom, aleah post toileting    Upper Extremity Dressing  Assistance Level: Set-up  Skilled Clinical Factors: Pt able to doff/chris [place] shirt while sitting w/c         Putting On/Taking Off Footwear  Assistance Level: Dependent  Skilled Clinical Factors: ace wrap BLE, sock to L    Toileting  Assistance Level: Dependent  Skilled Clinical Factors: TA maxi move lift to bed for pants manipulation pre and post toileting. TA for all brief care and hygiene.     Toilet Transfers  Technique:  (maxi move lift)  Equipment: Beside commode  Assistance Level: Dependent  Skilled Clinical Factors: maxi move lift                        Light Housekeeping  Light Housekeeping Level: Wheelchair  Light Housekeeping Level of Assistance: Supervision  Light Housekeeping: from w/c pt straightening belongings on recliner chair involving moving pillows to bed, then back to chair. Mobility        Roll Left  Assistance Level: Minimal assistance  Skilled Clinical Factors: good use of bed rails  Roll Right  Assistance Level: Minimal assistance  Skilled Clinical Factors: good use of bed rails                      Bed To/From Chair  Assistance Level: Dependent  Skilled Clinical Factors: maxi move lift                   Functional Mobility  Device: Wheelchair  Activity: To/From bathroom  Assistance Level: Minimal assistance  Skilled Clinical Factors: to turn 2 corners and propel 10 feet            OT Exercises  Dynamic Sitting Balance Exercises: from w/c pt straightening belongings on recliner chair involving moving pillows to bed, then back to chair. pt leaning forward to reach items. Assessment  Assessment  Activity Tolerance: Patient limited by fatigue;Patient limited by endurance; Patient tolerated treatment well    Patient Education  Education  Education Given To: Patient  Education Provided:  Mobility Training  Education Provided Comments: making tight turns in w/c technique  Education Method: Verbal;Demonstration  Barriers to Learning: None  Education Outcome: Verbalized understanding;Continued education needed;Demonstrated understanding                 Goals     Short Term Goals  Time Frame for Short Term Goals: By one week  Short Term Goal 1: Pt will perform UB ADLs including grooming/oral care with SBA and good safety  Short Term Goal 2: Pt will perform LB ADLs including toileting/toilet transfers with Min A, good safety, and use of AE/DME/Modified techniques as needed  Short Term Goal 3: Pt will be educated on NWB status to RLE with good carryover during functional transfers/tasks  Short Term Goal 4: Pt will tolerate standing for 5+ minutes, Min A, with 1-2 UE support and no LOB during functional task while maintaining NWB RLE  Short Term Goal 5: Pt will be educated on and explore use of AE/DME/Modified techniques to improve safety and independence with ADL tasks  Additional Goals?: Yes  Short Term Goal 6: Pt will actively participate in 30+ minutes of therapeutic exercise/functional activity to promote safety and independence with self-care and mobility  Short Term Goal 7: Pt will perform functional transfers/mobility with Min A, good safety, and use of least restrictive device while maintaining NWB RLE    Long Term Goals  Time Frame for Long Term Goals : By discharge  Long Term Goal 1: Pt will perform UB ADLs including grooming/oral care with Mod I and good safety  Long Term Goal 2: Pt will perform LB ADLs, including toileting/toilet transfers, with SBA, good safety, and use of AE/DME/Modified techniques as needed  Long Term Goal 3: Pt will tolerate standing 10+ minutes, SBA, with 1-2 UE and no LOB during self-care/functional activity while maintaining NWB RLE  Long Term Goal 4: Pt will verbalize/demonstrate good understanding of home safety/fall prevention/energy conservation strategies to improve safety and independence with self-care tasks  Long Term Goal 5: Pt will safely perform light housekeeping/meal preparation with supervision, good safety, and use of least restrictive device to improve independence with ADLs/IADLs  Additional Goals?: Yes  Long Term Goal 6: Pt will perform functional transfers/mobility with SBA, good safety, and use of least restrictive device while maintaining NWB RLE  Long Term Goal 7: Pt will demonstrate improved fine motor coordination during self-care tasks AEB 10 second improvement on 9 hole peg test    Plan  Occupational Therapy Plan  Times Per Week: 5-7  Times Per Day: Twice a day  Current Treatment Recommendations: Strengthening, ROM, Balance training, Functional mobility training, Endurance training, Pain management, Patient/Caregiver education & training, Positioning, Safety education & training, Equipment evaluation, education, & procurement, Self-Care / ADL, Home management training, Coordination training      OT Individual Minutes  OT Individual Minutes  Time In: 1000  Time Out: 6462  Minutes: 92            Electronically signed by Mehrdad Tesfaye OT on 12/21/22 at 1:23 PM EST

## 2022-12-21 NOTE — PLAN OF CARE
Problem: Discharge Planning  Goal: Discharge to home or other facility with appropriate resources  12/21/2022 0358 by Emily Suresh RN  Outcome: Progressing     Problem: Safety - Adult  Goal: Free from fall injury  12/21/2022 0358 by Emily Suresh RN  Outcome: Progressing     Problem: ABCDS Injury Assessment  Goal: Absence of physical injury  12/21/2022 0358 by Emily Suresh RN  Outcome: Progressing     Problem: Skin/Tissue Integrity  Goal: Absence of new skin breakdown  Description: 1. Monitor for areas of redness and/or skin breakdown  2. Assess vascular access sites hourly  3. Every 4-6 hours minimum:  Change oxygen saturation probe site  4. Every 4-6 hours:  If on nasal continuous positive airway pressure, respiratory therapy assess nares and determine need for appliance change or resting period.   12/21/2022 0358 by Emily Suresh RN  Outcome: Progressing     Problem: Pain  Goal: Verbalizes/displays adequate comfort level or baseline comfort level  12/21/2022 0358 by Emily Suresh RN  Outcome: Progressing     Problem: Chronic Conditions and Co-morbidities  Goal: Patient's chronic conditions and co-morbidity symptoms are monitored and maintained or improved  12/21/2022 0358 by Emily Suresh RN  Outcome: Progressing     Problem: Nutrition Deficit:  Goal: Optimize nutritional status  12/21/2022 0358 by Emily Suresh RN  Outcome: Progressing     Problem: Discharge Planning  Goal: Discharge to home or other facility with appropriate resources  12/21/2022 0358 by Emily Suresh RN  Outcome: Progressing  12/20/2022 1600 by Wu Bobby LPN  Outcome: Not Progressing     Problem: Safety - Adult  Goal: Free from fall injury  12/21/2022 0358 by Emily Suresh RN  Outcome: Progressing  12/20/2022 1600 by Wu Bobby LPN  Outcome: Not Progressing     Problem: ABCDS Injury Assessment  Goal: Absence of physical injury  12/21/2022 0358 by Emily Suresh RN  Outcome: Progressing  12/20/2022 1600 by Huel Habermann, LPN  Outcome: Not Progressing  Flowsheets (Taken 12/20/2022 0807)  Absence of Physical Injury: Implement safety measures based on patient assessment     Problem: Skin/Tissue Integrity  Goal: Absence of new skin breakdown  Description: 1. Monitor for areas of redness and/or skin breakdown  2. Assess vascular access sites hourly  3. Every 4-6 hours minimum:  Change oxygen saturation probe site  4. Every 4-6 hours:  If on nasal continuous positive airway pressure, respiratory therapy assess nares and determine need for appliance change or resting period.   12/21/2022 0358 by Indy Tyler RN  Outcome: Progressing  12/20/2022 1600 by Huel Habermann, LPN  Outcome: Not Progressing     Problem: Pain  Goal: Verbalizes/displays adequate comfort level or baseline comfort level  12/21/2022 0358 by Indy Tyler RN  Outcome: Progressing  12/20/2022 1600 by Huel Habermann, LPN  Outcome: Not Progressing     Problem: Chronic Conditions and Co-morbidities  Goal: Patient's chronic conditions and co-morbidity symptoms are monitored and maintained or improved  12/21/2022 0358 by Indy Tyler RN  Outcome: Progressing  12/20/2022 1600 by Huel Habermann, LPN  Outcome: Not Progressing     Problem: Nutrition Deficit:  Goal: Optimize nutritional status  12/21/2022 0358 by Indy Tyler RN  Outcome: Progressing  12/20/2022 1600 by Huel Habermann, LPN  Outcome: Not Progressing

## 2022-12-21 NOTE — PROGRESS NOTES
_                         Today's Date: 12/20/2022  Patient Name: João Huizar  Date of admission: 12/12/2022  5:48 PM  Patient's age: 69 y.o., 1945  Admission Dx: Open fracture of right tibia and fibula, sequela [S82.201S, S80.5S]      Requesting Physician: João Andino MD    CHIEF COMPLAINT: Status post fall. Fracture of the right tibia and fibula. Chronic renal failure. Consult for elevated kappa light chain immunoglobulin. SUBJECTIVE:  Patient was seen and examined. The main problem she has now is the increasing weakness and fatigue. Patient received transfusion and she is currently getting iron infusion for severe anemia. No active bleeding. Continues to have severe chronic renal insufficiency. Nephrology discussing the option of dialysis. No fever. No active bleeding. Hemoglobin 6.5. Will receive blood transfusion today. BRIEF CASE HISTORY:    The patient is a 69 y.o.  female who is admitted to the hospital for further management of open fracture of the right tibia and fibula. Patient had a fall and she had right ankle fracture. She was found to have open fracture of the right tibia and fibula. Patient had surgery and she is admitted to the inpatient rehab for further care. Patient's labs showed evidence of JOSE ARMANDO on top of CKD. She was seen by nephrology. Patient was deemed to be high risk for dialysis. Further work-up showed elevated kappa light chain immunoglobulin at 41.9 with lambda light chain immunoglobulin 3.14. We were consulted to evaluate the patient for possible underlying multiple myeloma. Patient also had history of anemia of chronic renal insufficiency.     Past Medical History:   has a past medical history of Abnormal stress test, Anemia, Arthritis, Depression, Diverticulosis, DM (diabetes mellitus) ([address] Erythropenia, Fibromyalgia, HTN (hypertension), Hyperlipidemia, Kidney stone, Morbid obesity due to excess calories ([facility] [address] DIONY on CPAP, Osteopenia, Seasonal allergies, and Sleep apnea. Past Surgical History:   has a past surgical history that includes Colonoscopy (01/01/2003); Colonoscopy (01/01/2007); Tubal ligation; Arm Surgery (01/01/2011); Total knee arthroplasty (Bilateral); Cardiac catheterization ([id number] dr Moris Moncada); Cardiac catheterization (05/16/2016); ORIF ankle fracture (Right, 12/06/2022); Ankle fracture surgery (Right, 12/6/2022); and IR TUNNELED CVC PLACE WO SQ PORT/PUMP > 5 YEARS (12/19/2022). Family History: family history includes Diabetes in her mother; Heart Disease in her mother; Kidney Disease in her father; Osteoporosis in her mother; Prostate Cancer in her brother. Social History:   reports that she quit smoking about 62 years ago. Her smoking use included cigarettes. She has a 0.25 pack-year smoking history. She has never used smokeless tobacco. She reports that she does not drink alcohol and does not use drugs. Medications:    Prior to Admission medications    Medication Sig Start Date End Date Taking? Authorizing Provider   methocarbamol (ROBAXIN) 750 MG tablet Take 1 tablet by mouth in the morning and 1 tablet at noon and 1 tablet in the evening. Do all this for 10 days. 12/12/22 12/22/22  Meredith Hernandez MD   metoprolol tartrate (LOPRESSOR) 25 MG tablet Take 1 tablet by mouth 2 times daily 12/12/22   Meredith Hernandez MD   senna (SENOKOT) 8.6 MG tablet Take 1 tablet by mouth daily as needed (Constipation) 12/12/22 1/11/23  Meredith Hernandez MD   rOPINIRole (REQUIP) 0.25 MG tablet Take 1 tablet by mouth nightly 11/14/22   Eliza Benitez MD   gabapentin (NEURONTIN) 600 MG tablet Take 1 tablet by mouth daily for 90 days.  11/14/22 2/12/23  Eliza Benitez MD   traZODone (DESYREL) 50 MG tablet  10/20/22   Micheal Pradhan MD   colchicine (COLCRYS) 0.6 MG tablet  9/27/22   Ladarius Cottrell DPM   Cyanocobalamin (B-12) 1000 MCG LOZG DISSOLVE ONE tablet UNDER TONGUE ONCE DAILY 9/6/22   Kelley Connor MD   blood glucose monitor kit and supplies use check blood sugar as directed 11/9/22   Eliza Dawn MD   blood glucose monitor strips Check blood sugars daily as directed. 11/9/22   Eliza Dawn MD   Lancets MISC 1 each by Does not apply route daily 11/9/22   Eliza Dawn MD   Alcohol Swabs 70 % PADS 1 each by Does not apply route daily 11/9/22   Eliza Dawn MD   atorvastatin (LIPITOR) 40 MG tablet Take 1 tablet by mouth daily 11/7/22   Eliza Dawn MD   pantoprazole (PROTONIX) 40 MG tablet TAKE 1 TABLET DAILY 10/19/22   Eliza Dawn MD   ELIQUIS 5 MG TABS tablet TAKE 1 TABLET TWICE A DAY 10/11/22   Eliza Dawn MD   cyclobenzaprine (FLEXERIL) 5 MG tablet TAKE 1 TABLET BY MOUTH NIGHTLY AS NEEDED FOR MUSCLE SPASMS 10/3/22   Eliza Dawn MD   allopurinol (ZYLOPRIM) 100 MG tablet TAKE 1 TABLET DAILY 9/21/22   BARBIE Boyd CNP   zolpidem (AMBIEN) 5 MG tablet Take 5 mg by mouth nightly as needed for Sleep.     Historical Provider, MD   hydrALAZINE (APRESOLINE) 25 MG tablet Take 4 tablets by mouth 3 times daily 7/12/22   Leanne Motley MD   amiodarone (CORDARONE) 200 MG tablet Take 1 tablet by mouth daily 7/6/22   BARBIE Fraser NP   calcitRIOL (ROCALTROL) 0.5 MCG capsule TAKE 1 CAPSULE BY MOUTH DAILY 6/14/22   Leanne Motley MD   ferrous sulfate (FE TABS 325) 325 (65 Fe) MG EC tablet Take 1 tablet by mouth daily (with breakfast) 5/10/22   Eliza Dawn MD   azelastine (ASTELIN) 0.1 % nasal spray 1 spray by Nasal route 2 times daily Use in each nostril as directed 10/13/21   Flora Arenas DO   Cholecalciferol (VITAMIN D3) 25 MCG (1000 UT) TABS Take 2 tablets by mouth daily 1/9/20   BARBIE Boyd CNP   Magnesium Oxide 400 MG CAPS Take by mouth 2 times daily Takes once daily at [facility]    Historical Provider, MD     Current Facility-Administered Medications   Medication Dose Route Frequency Provider Last Rate Last Admin    anticoagulant sodium citrate 4 % injection 1.6 mL  1.6 mL IntraCATHeter PRN Alissa Hall MD   1.6 mL at 12/20/22 1831    anticoagulant sodium citrate 4 % injection 1.6 mL  1.6 mL IntraCATHeter PRN Alissa Hall MD   1.6 mL at 12/20/22 1831    methocarbamol (ROBAXIN) tablet 500 mg  500 mg Oral 3 times per day Jeannette Call MD   500 mg at 12/20/22 0631    gabapentin (NEURONTIN) capsule 300 mg  300 mg Oral Daily Jeannette Call MD   300 mg at 12/20/22 0716    iron sucrose (VENOFER) 100 mg in sodium chloride 0.9 % 100 mL IVPB  100 mg IntraVENous Q24H Alissa Hall MD   Stopped at 12/19/22 1558    Benzocaine-Menthol (CEPACOL) 1 lozenge  1 lozenge Oral Q2H PRN Travon Barksdale MD        atorvastatin (LIPITOR) tablet 40 mg  40 mg Oral Nightly Travon Barksdale MD   40 mg at 12/19/22 2115    acetaminophen (TYLENOL) tablet 650 mg  650 mg Oral Q6H PRN Travon Barksdale MD        zolpidem (AMBIEN) tablet 10 mg  10 mg Oral Nightly Travon Barksdale MD   10 mg at 12/19/22 2115    allopurinol (ZYLOPRIM) tablet 100 mg  100 mg Oral Daily Carolyn Serrato MD   100 mg at 12/20/22 2849    amiodarone (CORDARONE) tablet 200 mg  200 mg Oral Daily Carolyn Serrato MD   200 mg at 12/20/22 8293    [Held by provider] apixaban (ELIQUIS) tablet 5 mg  5 mg Oral BID Carolyn Serrato MD   5 mg at 12/16/22 1525    calcitRIOL (ROCALTROL) capsule 0.25 mcg  0.25 mcg Oral Daily Carolyn Serrato MD   0.25 mcg at 12/20/22 0715    hydrALAZINE (APRESOLINE) tablet 100 mg  100 mg Oral TID Carolyn Serrato MD   100 mg at 12/20/22 0716    HYDROcodone-acetaminophen (NORCO) 5-325 MG per tablet 1 tablet  1 tablet Oral Q4H PRN Carolyn Serrato MD   1 tablet at 12/20/22 1906    magnesium oxide (MAG-OX) tablet 400 mg  400 mg Oral BID Carolyn Serrato MD   400 mg at 12/20/22 0716    metoprolol tartrate (LOPRESSOR) tablet 25 mg  25 mg Oral BID Carolyn Serrato MD   25 mg at 12/20/22 0716    pantoprazole (PROTONIX) tablet 40 mg  40 mg Oral [facility] Anthony Jenkins MD   40 mg at 12/20/22 0631    rOPINIRole (REQUIP) tablet 0.25 mg  0.25 mg Oral Nightly Anthony Jenkins MD   0.25 mg at 12/19/22 2117    Vitamin D (CHOLECALCIFEROL) tablet 2,000 Units  2,000 Units Oral Daily Anthony Jenkins MD   2,000 Units at 12/20/22 0716    bisacodyl (DULCOLAX) suppository 10 mg  10 mg Rectal Daily PRN Gail Martinez MD        polyethylene glycol (GLYCOLAX) packet 17 g  17 g Oral Daily Gail Martinez MD   17 g at 12/18/22 2166       Allergies:  Adhesive tape, Cephalexin, Erythromycin, Ketorolac tromethamine, Pcn [penicillins], Percocet [oxycodone-acetaminophen], Sulfa antibiotics, and Ultracet [tramadol-acetaminophen]    REVIEW OF SYSTEMS:      General: Positive for weakness and fatigue. No unanticipated weight loss or decreased appetite. No fever or chills. Eyes: No blurred vision, eye pain or double vision. Ears: No hearing problems or drainage. No tinnitus. Throat: No sore throat, problems with swallowing or dysphagia. Respiratory: No cough, sputum or hemoptysis. Positive for exertional shortness of breath. No pleuritic chest pain. Cardiovascular: No chest pain, orthopnea or PND. No lower extremity edema. No palpitation. Gastrointestinal: No problems with swallowing. No abdominal pain or bloating. No nausea or vomiting. No diarrhea or constipation. No GI bleeding. Genitourinary: No dysuria, hematuria, frequency or urgency. Musculoskeletal: As above. Dermatologic: No skin rashes or pruritus. No skin lesions or discolorations. Psychiatric: No depression, anxiety, or stress or signs of schizophrenia. No change in mood or affect. Hematologic: No history of bleeding tendency. No bruises or ecchymosis. No history of clotting problems. Infectious disease: No fever, chills or frequent infections. Endocrine: No polydipsia or polyuria. No temperature intolerance. Neurologic: No headaches or dizziness. No weakness or numbness of the extremities.  No changes in balance, coordination,  memory, mentation, behavior. Allergic/Immunologic: No nasal congestion or hives. No repeated infections. PHYSICAL EXAM:      BP (!) 139/52   Pulse 72   Temp 98.6 °F (37 °C)   Resp 16   Ht 5' 5\" (1.651 m)   Wt 270 lb 1 oz (122.5 kg)   LMP  (LMP Unknown)   SpO2 96%   BMI 44.94 kg/m²    Temp (24hrs), Av.7 °F (37.1 °C), Min:98.3 °F (36.8 °C), Max:99.3 °F (37.4 °C)      General appearance - not in pain or distress. Patient is morbidly obese. Mental status - alert and oriented  Eyes - pupils equal and reactive, extraocular eye movements intact  Ears - bilateral TM's and external ear canals normal  Nose - normal and patent, no erythema, discharge or polyps  Mouth - mucous membranes moist, pharynx normal without lesions  Neck - supple, no significant adenopathy  Lymphatics - no palpable lymphadenopathy, no hepatosplenomegaly  Chest - clear to auscultation, no wheezes, rales or rhonchi, symmetric air entry  Heart - normal rate, regular rhythm, normal S1, S2, no murmurs, rubs, clicks or gallops  Abdomen - soft, nontender, nondistended, no masses or organomegaly  Neurological - alert, oriented, normal speech, no focal findings or movement disorder noted  Musculoskeletal -status post right ankle fracture status post open reduction internal fixation. Extremities - peripheral pulses normal, no pedal edema, no clubbing or cyanosis  Skin - normal coloration and turgor, no rashes, no suspicious skin lesions noted           DATA:      Labs:       CBC:   Recent Labs     22  06   WBC 10.0  --    HGB 6.6* 6.5*   HCT 19.9* 20.1*     --      BMP:   Recent Labs     22  0622  06    136   K 4.6 4.6   CO2 27 28   BUN 69* 40*   CREATININE 4.58* 3.51*   LABGLOM 9* 13*   GLUCOSE 120* 106*     PT/INR:   No results for input(s): PROTIME, INR in the last 72 hours. APTT:No results for input(s): APTT in the last 72 hours.   LIVER PROFILE:  No results for input(s): AST, ALT, LABALBU in the last 72 hours. IR TUNNELED CVC PLACE WO SQ PORT/PUMP > 5 YEARS  Narrative: PROCEDURE:  ULTRASOUND GUIDED VASCULAR ACCESS. FLUOROSCOPY GUIDED PLACEMENT OF A TUNNELED CATHETER.    12/19/2022. HISTORY:  ORDERING SYSTEM PROVIDED HISTORY: Renal failure, need for chronic dialysis. TECHNOLOGIST PROVIDED HISTORY:  Need for chronic dialysis  How many lumens are being requested?->2  What side should this line be placed? ->Either  What site is the preferred site? ->Internal Jugular    SEDATION:  Local anesthesia. FLUOROSCOPY DOSE AND TYPE OR TIME AND EXPOSURES:  Fluoro time: 2 minutes 6 seconds; dap: 441 cGy per cm2; dose: 31 mGy    TECHNIQUE/PROCEDURE DETAILS:  Informed consent was obtained after explanation of the procedure including  risks, benefits, and alternatives. All aspects of maximum sterile barrier  technique were used including washing hands with alcohol-based hand rub, skin  preparation, cap, mask, sterile gown, sterile gloves, and sterile full body  drape. Local anesthesia was achieved with lidocaine. A micropuncture needle  was used to access the right internal jugular vein using ultrasound guidance. An ultrasound image demonstrating patency of the vein with needle tip located  within it was obtained and stored in PACS. (A sterile probe cover and gel  were utilized.)  After placement of a microwire and micropuncture sheath, the  inner dilator and microwire were exchanged for a 0.035 inch Amplatz wire. A  subcutaneous tunnel was created from the infraclavicular region to the  venotomy site, and a tunneled 14.5 [place] by 19 cm tip to cuff Palindrome  catheter was pulled through the subcutaneous tunnel to the venotomy site. The venotomy site was then serially dilated, and a peel-away sheath was  placed.   The tip of the catheter which had previously been then tunneled was  inserted through the peel-away sheath under fluoroscopic guidance to the  right atrium. The catheter flushed and aspirated easily. The catheter was  sutured to the skin. The catheter was locked with heparinized saline. The  venotomy site was closed with Dermabond. The patient tolerated the procedure  well. COMPLICATIONS:  None immediately apparent. Impression: Successful ultrasound and fluoroscopy guided right internal jugular 14.5  [place] by 19 cm tip to cuff dual-lumen dialysis catheter placement. Catheter  is ready for use. IMPRESSION:    Primary Problem  Open fracture of right tibia and fibula, sequela    Active Hospital Problems    Diagnosis Date Noted    Paraproteinemia [D89.2] 12/14/2022     Priority: Medium    Stage 5 chronic kidney disease not on chronic dialysis [facility]) [N18.5] 12/14/2022     Priority: Medium    Open fracture of right tibia and fibula, sequela [S82.201S, S82.401S] 12/05/2022     Priority: Medium    Anemia of chronic renal failure, stage 5 ([address] [N18.5, D63.1]    Open fracture of right tibia and fibula. Status post open reduction internal fixation. JOSE ARMANDO on top of CKD. Anemia of chronic renal insufficiency  Elevated kappa light chain immunoglobulin    RECOMMENDATIONS:  Records and labs and images were reviewed and discussed with the patient and family at bedside. Patient is recovering from right ankle fracture. Undergoing inpatient rehab. I reviewed patient's records and labs and explained to the patient details and in layman language. Patient is having JOSE ARMANDO on top of CKD. She is high risk for dialysis. Dialysis option was discussed with the nephrologist.  Further work-up showed elevated kappa light chain immunoglobulins. Further work-up for paraproteinemia was reviewed and discussed. No evidence of monoclonal gammopathy. However, beta-2 microglobulin is elevated. This is nonspecific. I still believe that elevated kappa light chain immunoglobulin is related to renal insufficiency.   The repeated kappa light chain immunoglobulin level is lower. 37.  We will continue to check CBC and consider transfusion to keep hemoglobin above 7. We will transfuse 1 unit of blood today. Patient would benefit from Procrit treatment for anemia of chronic renal failure after correction of iron deficiency. Patient's questions were answered to the best of her satisfaction and she verbalized full understanding and agreement. Discussed with patient and family. Rd Mcnulty MD, MD                            [address] [place] [address] [facility]                            This note is created with the assistance of a speech recognition program.  While intending to generate a document that actually reflects the content of the visit, the document can still have some errors including those of syntax and sound a like substitutions which may escape proof reading. It such instances, actual meaning can be extrapolated by contextual diversion.

## 2022-12-21 NOTE — PROGRESS NOTES
Two orders for 1 unit of RBC noted in MAR. Writer contacted Dr. Rena Jensen to clarify orders. Only 1 unit of RBC at this time. D/C Dr. Rena Jensen orders. Dr. Fabiola Jones order in place.

## 2022-12-22 LAB
ABSOLUTE EOS #: 0.21 K/UL (ref 0–0.4)
ABSOLUTE LYMPH #: 1.13 K/UL (ref 1–4.8)
ABSOLUTE MONO #: 0.93 K/UL (ref 0.1–1.3)
ANION GAP SERPL CALCULATED.3IONS-SCNC: 9 MMOL/L (ref 9–17)
BASOPHILS # BLD: 0 % (ref 0–2)
BASOPHILS ABSOLUTE: 0 K/UL (ref 0–0.2)
BUN BLDV-MCNC: 26 MG/DL (ref 8–23)
CALCIUM SERPL-MCNC: 9.8 MG/DL (ref 8.6–10.4)
CHLORIDE BLD-SCNC: 100 MMOL/L (ref 98–107)
CO2: 28 MMOL/L (ref 20–31)
CREAT SERPL-MCNC: 3.02 MG/DL (ref 0.5–0.9)
EOSINOPHILS RELATIVE PERCENT: 2 % (ref 0–4)
GFR SERPL CREATININE-BSD FRML MDRD: 15 ML/MIN/1.73M2
GLUCOSE BLD-MCNC: 107 MG/DL (ref 70–99)
HBV SURFACE AB TITR SER: <3.5 MIU/ML
HCT VFR BLD CALC: 24.1 % (ref 36–46)
HEMOGLOBIN: 7.7 G/DL (ref 12–16)
LYMPHOCYTES # BLD: 11 % (ref 24–44)
MCH RBC QN AUTO: 29.5 PG (ref 26–34)
MCHC RBC AUTO-ENTMCNC: 32.1 G/DL (ref 31–37)
MCV RBC AUTO: 91.8 FL (ref 80–100)
MONOCYTES # BLD: 9 % (ref 1–7)
MORPHOLOGY: ABNORMAL
MORPHOLOGY: ABNORMAL
PDW BLD-RTO: 16.6 % (ref 11.5–14.9)
PLATELET # BLD: 242 K/UL (ref 150–450)
PMV BLD AUTO: 7.3 FL (ref 6–12)
POTASSIUM SERPL-SCNC: 4.3 MMOL/L (ref 3.7–5.3)
RBC # BLD: 2.62 M/UL (ref 4–5.2)
SEG NEUTROPHILS: 78 % (ref 36–66)
SEGMENTED NEUTROPHILS ABSOLUTE COUNT: 8.03 K/UL (ref 1.3–9.1)
SODIUM BLD-SCNC: 137 MMOL/L (ref 135–144)
WBC # BLD: 10.3 K/UL (ref 3.5–11)

## 2022-12-22 PROCEDURE — 2580000003 HC RX 258: Performed by: INTERNAL MEDICINE

## 2022-12-22 PROCEDURE — 90935 HEMODIALYSIS ONE EVALUATION: CPT

## 2022-12-22 PROCEDURE — 6370000000 HC RX 637 (ALT 250 FOR IP)

## 2022-12-22 PROCEDURE — 97110 THERAPEUTIC EXERCISES: CPT

## 2022-12-22 PROCEDURE — 6370000000 HC RX 637 (ALT 250 FOR IP): Performed by: PHYSICAL MEDICINE & REHABILITATION

## 2022-12-22 PROCEDURE — 6370000000 HC RX 637 (ALT 250 FOR IP): Performed by: FAMILY MEDICINE

## 2022-12-22 PROCEDURE — 99232 SBSQ HOSP IP/OBS MODERATE 35: CPT | Performed by: PHYSICAL MEDICINE & REHABILITATION

## 2022-12-22 PROCEDURE — 97530 THERAPEUTIC ACTIVITIES: CPT

## 2022-12-22 PROCEDURE — 1180000000 HC REHAB R&B

## 2022-12-22 PROCEDURE — 6360000002 HC RX W HCPCS: Performed by: INTERNAL MEDICINE

## 2022-12-22 PROCEDURE — 97535 SELF CARE MNGMENT TRAINING: CPT

## 2022-12-22 PROCEDURE — 99232 SBSQ HOSP IP/OBS MODERATE 35: CPT | Performed by: INTERNAL MEDICINE

## 2022-12-22 PROCEDURE — 36415 COLL VENOUS BLD VENIPUNCTURE: CPT

## 2022-12-22 PROCEDURE — 85025 COMPLETE CBC W/AUTO DIFF WBC: CPT

## 2022-12-22 PROCEDURE — 80048 BASIC METABOLIC PNL TOTAL CA: CPT

## 2022-12-22 PROCEDURE — 86317 IMMUNOASSAY INFECTIOUS AGENT: CPT

## 2022-12-22 RX ORDER — CALCIUM CARBONATE 200(500)MG
500 TABLET,CHEWABLE ORAL DAILY PRN
Status: DISCONTINUED | OUTPATIENT
Start: 2022-12-22 | End: 2022-12-24 | Stop reason: HOSPADM

## 2022-12-22 RX ADMIN — ZOLPIDEM TARTRATE 10 MG: 5 TABLET ORAL at 21:53

## 2022-12-22 RX ADMIN — Medication 2000 UNITS: at 08:27

## 2022-12-22 RX ADMIN — MAGNESIUM OXIDE TAB 400 MG (241.3 MG ELEMENTAL MG) 400 MG: 400 (241.3 MG) TAB at 21:53

## 2022-12-22 RX ADMIN — HYDRALAZINE HYDROCHLORIDE 100 MG: 50 TABLET, FILM COATED ORAL at 08:27

## 2022-12-22 RX ADMIN — HYDROCODONE BITARTRATE AND ACETAMINOPHEN 1 TABLET: 5; 325 TABLET ORAL at 21:55

## 2022-12-22 RX ADMIN — CLINDAMYCIN HYDROCHLORIDE 300 MG: 150 CAPSULE ORAL at 08:27

## 2022-12-22 RX ADMIN — GABAPENTIN 300 MG: 300 CAPSULE ORAL at 08:27

## 2022-12-22 RX ADMIN — APIXABAN 5 MG: 5 TABLET, FILM COATED ORAL at 21:53

## 2022-12-22 RX ADMIN — METHOCARBAMOL 500 MG: 500 TABLET ORAL at 21:53

## 2022-12-22 RX ADMIN — METHOCARBAMOL 500 MG: 500 TABLET ORAL at 13:40

## 2022-12-22 RX ADMIN — METHOCARBAMOL 500 MG: 500 TABLET ORAL at 05:24

## 2022-12-22 RX ADMIN — MAGNESIUM OXIDE TAB 400 MG (241.3 MG ELEMENTAL MG) 400 MG: 400 (241.3 MG) TAB at 08:26

## 2022-12-22 RX ADMIN — CLINDAMYCIN HYDROCHLORIDE 300 MG: 150 CAPSULE ORAL at 13:40

## 2022-12-22 RX ADMIN — IRON SUCROSE 100 MG: 20 INJECTION, SOLUTION INTRAVENOUS at 16:06

## 2022-12-22 RX ADMIN — CLINDAMYCIN HYDROCHLORIDE 300 MG: 150 CAPSULE ORAL at 21:55

## 2022-12-22 RX ADMIN — APIXABAN 5 MG: 5 TABLET, FILM COATED ORAL at 08:27

## 2022-12-22 RX ADMIN — METOPROLOL TARTRATE 25 MG: 25 TABLET, FILM COATED ORAL at 08:27

## 2022-12-22 RX ADMIN — METOPROLOL TARTRATE 25 MG: 25 TABLET, FILM COATED ORAL at 21:54

## 2022-12-22 RX ADMIN — HYDROCODONE BITARTRATE AND ACETAMINOPHEN 1 TABLET: 5; 325 TABLET ORAL at 09:12

## 2022-12-22 RX ADMIN — HYDRALAZINE HYDROCHLORIDE 100 MG: 50 TABLET, FILM COATED ORAL at 13:40

## 2022-12-22 RX ADMIN — HYDRALAZINE HYDROCHLORIDE 100 MG: 50 TABLET, FILM COATED ORAL at 21:53

## 2022-12-22 RX ADMIN — AMIODARONE HYDROCHLORIDE 200 MG: 200 TABLET ORAL at 08:27

## 2022-12-22 RX ADMIN — HYDROCODONE BITARTRATE AND ACETAMINOPHEN 1 TABLET: 5; 325 TABLET ORAL at 13:40

## 2022-12-22 RX ADMIN — ALLOPURINOL 100 MG: 100 TABLET ORAL at 08:27

## 2022-12-22 RX ADMIN — PANTOPRAZOLE SODIUM 40 MG: 40 TABLET, DELAYED RELEASE ORAL at 05:24

## 2022-12-22 RX ADMIN — ATORVASTATIN CALCIUM 40 MG: 40 TABLET, FILM COATED ORAL at 21:53

## 2022-12-22 RX ADMIN — CALCITRIOL 0.25 MCG: 0.25 CAPSULE ORAL at 08:31

## 2022-12-22 RX ADMIN — ROPINIROLE HYDROCHLORIDE 0.25 MG: 0.25 TABLET, FILM COATED ORAL at 22:08

## 2022-12-22 ASSESSMENT — PAIN DESCRIPTION - DESCRIPTORS
DESCRIPTORS: ACHING;THROBBING;SORE
DESCRIPTORS: ACHING;THROBBING
DESCRIPTORS: ACHING

## 2022-12-22 ASSESSMENT — PAIN DESCRIPTION - ORIENTATION
ORIENTATION: RIGHT

## 2022-12-22 ASSESSMENT — PAIN DESCRIPTION - LOCATION
LOCATION: ANKLE;FOOT
LOCATION: LEG
LOCATION: LEG;ANKLE

## 2022-12-22 ASSESSMENT — PAIN SCALES - GENERAL
PAINLEVEL_OUTOF10: 8
PAINLEVEL_OUTOF10: 10
PAINLEVEL_OUTOF10: 6

## 2022-12-22 NOTE — PROGRESS NOTES
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 Patient presented to the emergency room with right ankle injury and suffered a right ankle fracture after falling over a curb when she was trying to go to [employer] for [employer]. She has underlying history of A. fib on Eliquis, CHF, diabetes mellitus with CKD3, hypertension, osteoporosis, fibromyalgia, morbid obesity BMI 43, sleep apnea, depression. Patient underwent I&D and ORIF on 2022. Patient also developed acute kidney injury with hyperkalemia and metabolic acidosis. Nephrology was consulted and patient given [facility]. Patient was given 1 unit PRBC transfusion on 2022 for low hemoglobin of 6.4. Treatment Diagnosis: Impaired self-care status    Past Medical History:  has a past medical history of Abnormal stress test, Anemia, Arthritis, Depression, Diverticulosis, DM (diabetes mellitus) ([address] Erythropenia, Fibromyalgia, HTN (hypertension), Hyperlipidemia, Kidney stone, Morbid obesity due to excess calories ([address] DIONY on CPAP, Osteopenia, Seasonal allergies, and Sleep apnea. Past Surgical History:   has a past surgical history that includes Colonoscopy (2003); Colonoscopy (2007); Tubal ligation; Arm Surgery (2011); Total knee arthroplasty (Bilateral); Cardiac catheterization ([id number] dr Jessica Quispe); Cardiac catheterization (2016); ORIF ankle fracture (Right, 2022);  Ankle fracture surgery (Right, 2022); and IR TUNNELED CVC PLACE WO SQ PORT/PUMP > 5 YEARS (12/19/2022). Restrictions  Restrictions/Precautions  Restrictions/Precautions: Weight Bearing  Required Braces or Orthoses?: Yes (R cam boot)  Implants present? : Metal implants     Position Activity Restriction  Other position/activity restrictions: Per chart: Right open trimalleolar fracture ankle fracture dislocation s/p I&D and ORIF, DOS 12/6/2022. ortho appt,  Dr. Sofi Burrows on 12-21 now has cam boot RLE remains NWB. Lower Extremity Weight Bearing Restrictions  Right Lower Extremity Weight Bearing: Non Weight Bearing               Subjective  Subjective  Subjective: \"Do you think this will fit? \"  pt working on LE dressing sitting EOB, extra pants were trialed and found to fit over her cam boot. Pain: none noted, ace wrap was donned onto L foot and calf       Objective  Cognition          Cognition  Overall Cognitive Status: WFL    Activities of Daily Living  Feeding  Assistance Level: Independent    Grooming/Oral Hygiene  Assistance Level: Independent; Set-up  Skilled Clinical Factors: completed prior to OT    Upper Extremity Bathing  Skilled Clinical Factors: declined    Lower Extremity Bathing  Assistance Level: Maximum assistance  Skilled Clinical Factors: TA to wash bottom, aleah post toileting- from maxi lift then also from bed. Lower Extremity Dressing  Assistance Level: Moderate assistance  Skilled Clinical Factors: sitting EOB to don pants over feet with reachers with verbal cues on L and mod A on R over cam boot. rolling in bed to doff, then don pants over hips (mod-max x 1) , bathe and change briefs    Putting On/Taking Off Footwear  Assistance Level: Dependent  Skilled Clinical Factors: ace wrap LLE, sock to L, RLE in cam boot. pt able to doff L sock with reachers sitting EOB post ed (ace wraps were not on yet)    Toileting  Assistance Level: Dependent  Skilled Clinical Factors: TA maxi move lift to bed for pants manipulation pre and post toileting.    TA for all brief care and hygiene. mod- max with pants on and off over hips. Toilet Transfers  Equipment: Beside commode  Assistance Level: Dependent  Skilled Clinical Factors: maxi move lift                                                 Mobility                   OT Exercises  Resistive Exercises: theraband 15 reps x 2 horizontal abd, 10 reps x 2 each UE shld flex and shld abd. Dynamic Sitting Balance Exercises: from EOB seated lateral lean to place kleenex box under hip, then lean again to remove completed to R and to L indep with good balance. (practice for slide board use)        sit to partial stand to scoot to hospitals sitting EOB-maintain NWB RLE- using BUE on bed rails and having assist to hold RLE off the ground. Assessment  Assessment  Activity Tolerance: Patient limited by fatigue;Patient limited by endurance    Patient Education  Education  Education Given To: Patient  Education Provided:  Mobility Training;ADL Function;Equipment  Education Provided Comments: sitting EOB to don pants over feet with reachers, sit to partial stand to scoot to [facility] sitting EOB-maintain NWB RLE  Education Method: Verbal;Demonstration  Barriers to Learning: None  Education Outcome: Verbalized understanding;Continued education needed;Demonstrated understanding                 Goals     Short Term Goals  Time Frame for Short Term Goals: By one week  Short Term Goal 1: Pt will perform UB ADLs including grooming/oral care with SBA and good safety  Short Term Goal 2: Pt will perform LB ADLs including toileting/toilet transfers with Min A, good safety, and use of AE/DME/Modified techniques as needed  Short Term Goal 3: Pt will be educated on NWB status to RLE with good carryover during functional transfers/tasks  Short Term Goal 4: Pt will tolerate standing for 5+ minutes, Min A, with 1-2 UE support and no LOB during functional task while maintaining NWB RLE  Short Term Goal 5: Pt will be educated on and explore use of AE/DME/Modified techniques to improve safety and independence with ADL tasks  Additional Goals?: Yes  Short Term Goal 6: Pt will actively participate in 30+ minutes of therapeutic exercise/functional activity to promote safety and independence with self-care and mobility  Short Term Goal 7: Pt will perform functional transfers/mobility with Min A, good safety, and use of least restrictive device while maintaining NWB RLE    Long Term Goals  Time Frame for Long Term Goals : By discharge  Long Term Goal 1: Pt will perform UB ADLs including grooming/oral care with Mod I and good safety  Long Term Goal 2: Pt will perform LB ADLs, including toileting/toilet transfers, with SBA, good safety, and use of AE/DME/Modified techniques as needed  Long Term Goal 3: Pt will tolerate standing 10+ minutes, SBA, with 1-2 UE and no LOB during self-care/functional activity while maintaining NWB RLE  Long Term Goal 4: Pt will verbalize/demonstrate good understanding of home safety/fall prevention/energy conservation strategies to improve safety and independence with self-care tasks  Long Term Goal 5: Pt will safely perform light housekeeping/meal preparation with supervision, good safety, and use of least restrictive device to improve independence with ADLs/IADLs  Additional Goals?: Yes  Long Term Goal 6: Pt will perform functional transfers/mobility with SBA, good safety, and use of least restrictive device while maintaining NWB RLE  Long Term Goal 7: Pt will demonstrate improved fine motor coordination during self-care tasks AEB 10 second improvement on 9 hole peg test    Plan  Occupational Therapy Plan  Times Per Week: 5-7  Times Per Day: Twice a day  Current Treatment Recommendations: Strengthening, ROM, Balance training, Functional mobility training, Endurance training, Pain management, Patient/Caregiver education & training, Positioning, Safety education & training, Equipment evaluation, education, & procurement, Self-Care / ADL, Home management training, Coordination training      OT Individual Minutes  OT Individual Minutes  Time In: 1292  Time Out: 9024  Minutes: 97            Electronically signed by Felice Sebastian OT on 12/22/22 at 1:58 PM EST

## 2022-12-22 NOTE — PROGRESS NOTES
HEMODIALYSIS POST TREATMENT NOTE    Treatment time ordered: 3.0hrs    Actual treatment time: 3.0hrs    UltraFiltration Goal: 3.0kgs  UltraFiltration Removed: 3.0kgs      Pre Treatment weight: 122.5kgs  Post Treatment weight: 120.0Kgs  Estimated Dry Weight: JOSE ARMANDO    Access used:     Central Venous Catheter:          Tunneled or Non-tunneled: Tunneled           Site: Right Chest          Access Flow: Good      Internal Access:       AV Fistula or AV Graft: N/A         Site: N/A       Access Flow: N/A       Sign and symptoms of infection: No       If YES: N/A    Medications or blood products given: See MAR    Chronic outpatient schedule: JOSE ARMANDO    Chronic outpatient unit: JOSE ARMANDO    Summary of response to treatment: Patient tolerated treatment good. 2.5Kgs of fluid removed without difficulty. VSS. Cvc clamped and capped. Explain if orders NOT met, was physician notified:N/A      ACES flowsheet faxed to patient unit/ placed in patient chart: Yes    Post assessment completed: Agata Nelson    Report given to: Sanford Song documented Safety Checks include the followin) Access and face visible at all times. 2) All connections and blood lines are secure with no kinks. 3) NVL alarm engaged. 4) Hemosafe device applied (if applicable). 5) No collapse of Arterial or Venous blood chambers. 6) All blood lines / pump segments in the air detectors.
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
## 2022-12-22 NOTE — PROGRESS NOTES
LULU BARRIENT[facility] Internal Medicine  Twanna Phalen, MD; Shaggy Paredes MD; Robert Barragan MD; MD Thao Jorge MD; Balbir Dykes MD    [facility]   [facility]    Date:   12/22/2022  Patient name:  Yimi Perez  Date of admission:  12/12/2022  5:48 PM  MRN:   [id number]  Account:  [de-identified]  YOB: 1945  PCP:    Jyoti Victor MD  Room:   [address] [place] [address]  Code Status:    Full Code    Chief Complaint:     No chief complaint on file. Open fracture of tibia and fibula    History Obtained From:     Patient and electronic medical record    History of Present Illness:     Yimi Perez is a 69 y.o. Non- / non  female who presents with No chief complaint on file. and is admitted to the hospital for the management of Open fracture of right tibia and fibula, sequela. Past medical history significant for A. Fib on Eliquis and amiodarone, JOSE ARMANDO on CKD, Type 2 Diabetes, HTN,HLD, Anemia, Fibromyalgia, Obesity, and DIONY. Sustained an open trimalleolar fracture, ankle fracture dislocation s/p ORIF on 12/6, right talus open treatment. Nonweight bearing right lower extremity. Developed JOSE ARMANDO on CKD, per Nephrology patient is high risk of needing dialysis, consult to Nephrology sent when patient admitted to Acute Rehab. Seen at examined at bedside, did not sleep well last night due to being uncomfortable due to the blankets. Otherwise no concerns, not in acute distress     Principal Problem:    Open fracture of right tibia and fibula, sequela  Active Problems:    Paraproteinemia    Stage 5 chronic kidney disease not on chronic dialysis (HCC)    Anemia of chronic renal failure, stage 5 (HCC)  Resolved Problems:    * No resolved hospital problems.  *                  Past Medical History:     Past Medical History:   Diagnosis Date    Abnormal stress test     Anemia     Arthritis     Depression Diverticulosis     DM (diabetes mellitus) ([facility] [address]     Erythropenia     Fibromyalgia     HTN (hypertension)     Hyperlipidemia     Kidney stone     Morbid obesity due to excess calories (HCC)     DIONY on CPAP     Osteopenia 03/03/14    Seasonal allergies     Sleep apnea     uses bipap prn        Past Surgical History:     Past Surgical History:   Procedure Laterality Date    ANKLE FRACTURE SURGERY Right 12/6/2022    RIGHT ANKLE OPEN REDUCTION INTERNAL FIXATION performed by Marnie Hernandez DO at [facility]  01/01/2011    right arm broken    CARDIAC CATHETERIZATION  [id number] dr Pavel Renteria  05/16/2016    COLONOSCOPY  01/01/2003    COLONOSCOPY  01/01/2007    IR TUNNELED CATHETER PLACEMENT GREATER THAN 5 YEARS  12/19/2022    IR TUNNELED CATHETER PLACEMENT GREATER THAN 5 YEARS 12/19/2022 [facility] PROCED[facility]    ORIF ANKLE FRACTURE Right 12/06/2022    RIGHT ANKLE OPEN REDUCTION INTERNAL FIXATION    TOTAL KNEE ARTHROPLASTY Bilateral     left may 2013 and right july 2013    TUBAL LIGATION          Medications Prior to Admission:     Prior to Admission medications    Medication Sig Start Date End Date Taking? Authorizing Provider   clindamycin (CLEOCIN) 300 MG capsule Take 1 capsule by mouth 3 times daily for 10 days 12/21/22 12/31/22  Angela Gotti DO   methocarbamol (ROBAXIN) 750 MG tablet Take 1 tablet by mouth in the morning and 1 tablet at noon and 1 tablet in the evening. Do all this for 10 days. 12/12/22 12/22/22  J Carlos Kat MD   metoprolol tartrate (LOPRESSOR) 25 MG tablet Take 1 tablet by mouth 2 times daily 12/12/22   J Carlos Kat MD   senna (SENOKOT) 8.6 MG tablet Take 1 tablet by mouth daily as needed (Constipation) 12/12/22 1/11/23  J Carlos Kat MD   rOPINIRole (REQUIP) 0.25 MG tablet Take 1 tablet by mouth nightly 11/14/22   Eliza Adan MD   gabapentin (NEURONTIN) 600 MG tablet Take 1 tablet by mouth daily for 90 days.  11/14/22 2/12/23  Eliza Adan MD   traZODone (DESYREL) 50 MG tablet  10/20/22   Tresa Modi MD   colchicine (COLCRYS) 0.6 MG tablet  9/27/22   So Diaz DPM   Cyanocobalamin (B-12) 1000 MCG LOZG DISSOLVE ONE tablet UNDER TONGUE ONCE DAILY 9/6/22   Mary Vang MD   blood glucose monitor kit and supplies use check blood sugar as directed 11/9/22   Eliza Jordan MD   blood glucose monitor strips Check blood sugars daily as directed. 11/9/22   Eliza Jordan MD   Lancets MISC 1 each by Does not apply route daily 11/9/22   Eliza Jordan MD   Alcohol Swabs 70 % PADS 1 each by Does not apply route daily 11/9/22   Eliza Jordan MD   atorvastatin (LIPITOR) 40 MG tablet Take 1 tablet by mouth daily 11/7/22   Eliza Jordan MD   pantoprazole (PROTONIX) 40 MG tablet TAKE 1 TABLET DAILY 10/19/22   Eliza Jordan MD   ELIQUIS 5 MG TABS tablet TAKE 1 TABLET TWICE A DAY 10/11/22   Eliza Jordan MD   cyclobenzaprine (FLEXERIL) 5 MG tablet TAKE 1 TABLET BY MOUTH NIGHTLY AS NEEDED FOR MUSCLE SPASMS 10/3/22   Eliza Jordan MD   allopurinol (ZYLOPRIM) 100 MG tablet TAKE 1 TABLET DAILY 9/21/22   BARBIE Mar - CNP   zolpidem (AMBIEN) 5 MG tablet Take 5 mg by mouth nightly as needed for Sleep.     Historical Provider, MD   hydrALAZINE (APRESOLINE) 25 MG tablet Take 4 tablets by mouth 3 times daily 7/12/22   Rhina Lindsey MD   amiodarone (CORDARONE) 200 MG tablet Take 1 tablet by mouth daily 7/6/22   BARBIE Burrell NP   calcitRIOL (ROCALTROL) 0.5 MCG capsule TAKE 1 CAPSULE BY MOUTH DAILY 6/14/22   Rhina Lindsey MD   ferrous sulfate (FE TABS 325) 325 (65 Fe) MG EC tablet Take 1 tablet by mouth daily (with breakfast) 5/10/22   Eliza Jordan MD   azelastine (ASTELIN) 0.1 % nasal spray 1 spray by Nasal route 2 times daily Use in each nostril as directed 10/13/21   Prateek Armijo,    Cholecalciferol (VITAMIN D3) 25 MCG (1000 UT) TABS Take 2 tablets by mouth daily 1/9/20   Rafael West, APRN - CNP   Magnesium Oxide 400 MG CAPS Take by mouth 2 times daily Takes once daily at [facility]    Historical Provider, MD        Allergies:     Adhesive tape, Cephalexin, Erythromycin, Ketorolac tromethamine, Pcn [penicillins], Percocet [oxycodone-acetaminophen], Sulfa antibiotics, and Ultracet [tramadol-acetaminophen]    Social History:     Tobacco:    reports that she quit smoking about 62 years ago. Her smoking use included cigarettes. She has a 0.25 pack-year smoking history. She has never used smokeless tobacco.  Alcohol:      reports no history of alcohol use. Drug Use:  reports no history of drug use. Family History:     Family History   Problem Relation Age of Onset    Diabetes Mother     Heart Disease Mother     Osteoporosis Mother     Kidney Disease Father     Prostate Cancer Brother        Review of Systems:     Positive and Negative as described in HPI. ROS     No cp  No nv  No diarrhea  No dyspnea                                                          . Physical Exam:     Physical Exam   Vitals:    Vitals:    22 1403 22 1430 22 1500 22 1530   BP: (!) 154/69 (!) 159/70 (!) 156/75 (!) 156/77   Pulse: 64 62 62 65   Resp:       Temp:       TempSrc:       SpO2:       Weight:       Height:                        Body mass index is 44.94 kg/m². Temp (24hrs), Av.6 °F (37 °C), Min:98.6 °F (37 °C), Max:98.6 °F (37 °C)    No results for input(s): POCGLU in the last 72 hours. General Appearance:   No acute distress , obese                 Skin:                             No rash or erythema  HEENT ;                                                                       No icterus, no pallor . No ptosis.     No gross asymmetry  Or abnormality face         Neck:                            No mass , no thyroid enlargement                                           Pulmonary/Chest:                                               Symmetric Clear to auscultation bilaterally . No wheezes,                                          No rales or rhonchi . No abnormality on percussion                                                        Cardiovascular:            Normal rate, regular rhythm,                                          No murmur or  Gallop . Abdomen:                       Soft, non-tender                                           Normal bowels sounds,                                             Extremities:                    No  Edema . Boot in Right Leg                                            Musculo-skeletal / Neurological ;;                  Neurological ;                 No focal motor deficit ,                 No focal sensory deficit ,    Musculo-skeletal ;                  No  gait abnormality                  No significant joint abnormality,                                                         Psych:                     See psych notes                                                                 Investigations:      URINE ANALYSIS: No results found for: LABURIN     CBC:  Lab Results   Component Value Date/Time    WBC 10.3 12/22/2022 06:28 AM    HGB 7.7 12/22/2022 06:28 AM     12/22/2022 06:28 AM     03/02/2012 11:15 AM             BMP:    Lab Results   Component Value Date/Time     12/22/2022 06:28 AM    K 4.3 12/22/2022 06:28 AM     12/22/2022 06:28 AM    CO2 28 12/22/2022 06:28 AM    BUN 26 12/22/2022 06:28 AM    CREATININE 3.02 12/22/2022 06:28 AM    GLUCOSE 107 12/22/2022 06:28 AM    GLUCOSE 113 03/02/2012 11:15 AM      LIVER PROFILE:  Lab Results   Component Value Date/Time    ALT <5 12/13/2022 06:47 AM    AST 10 12/13/2022 06:47 AM    PROT 5.6 12/13/2022 06:47 AM    BILITOT 0.3 12/13/2022 06:47 AM    BILIDIR 0.1 12/13/2022 06:47 AM    LABALBU 2.9 12/13/2022 06:47 AM RANDALLBU 4.2 03/02/2012 11:15 AM             @BRIEFLABT(TSH)@      Laboratory Testing:  Recent Results (from the past 24 hour(s))   Hepatitis B Surface Antibody    Collection Time: 12/22/22  6:28 AM   Result Value Ref Range    Hep B S Ab <3.50 <10 mIU/mL   Basic Metabolic Panel w/ Reflex to MG    Collection Time: 12/22/22  6:28 AM   Result Value Ref Range    Glucose 107 (H) 70 - 99 mg/dL    BUN 26 (H) 8 - 23 mg/dL    Creatinine 3.02 (H) 0.50 - 0.90 mg/dL    Est, Glom Filt Rate 15 (L) >60 mL/min/1.73m2    Calcium 9.8 8.6 - 10.4 mg/dL    Sodium 137 135 - 144 mmol/L    Potassium 4.3 3.7 - 5.3 mmol/L    Chloride 100 98 - 107 mmol/L    CO2 28 20 - 31 mmol/L    Anion Gap 9 9 - 17 mmol/L   CBC auto differential    Collection Time: 12/22/22  6:28 AM   Result Value Ref Range    WBC 10.3 3.5 - 11.0 k/uL    RBC 2.62 (L) 4.0 - 5.2 m/uL    Hemoglobin 7.7 (L) 12.0 - 16.0 g/dL    Hematocrit 24.1 (L) 36 - 46 %    MCV 91.8 80 - 100 fL    MCH 29.5 26 - 34 pg    MCHC 32.1 31 - 37 g/dL    RDW 16.6 (H) 11.5 - 14.9 %    Platelets 440 253 - 881 k/uL    MPV 7.3 6.0 - 12.0 fL    Seg Neutrophils 78 (H) 36 - 66 %    Lymphocytes 11 (L) 24 - 44 %    Monocytes 9 (H) 1 - 7 %    Eosinophils % 2 0 - 4 %    Basophils 0 0 - 2 %    Segs Absolute 8.03 1.3 - 9.1 k/uL    Absolute Lymph # 1.13 1.0 - 4.8 k/uL    Absolute Mono # 0.93 0.1 - 1.3 k/uL    Absolute Eos # 0.21 0.0 - 0.4 k/uL    Basophils Absolute 0.00 0.0 - 0.2 k/uL    Morphology ANISOCYTOSIS PRESENT     Morphology BASOPHILIC STIPPLING PRESENT        Imaging/Diagnostics:  XR ANKLE RIGHT (MIN 3 VIEWS)    Result Date: 12/6/2022  Anatomic alignment of comminuted ankle fracture status post ORIF.        Assessment :      Hospital Problems             Last Modified POA    * (Principal) Open fracture of right tibia and fibula, sequela 12/12/2022 Yes    Paraproteinemia 12/14/2022 Yes    Stage 5 chronic kidney disease not on chronic dialysis ([facility] [address] 12/14/2022 Yes    Anemia of chronic renal failure, stage 5 ([facility] 12/14/2022 Yes     Plan:       Medications: Allergies:     Allergies   Allergen Reactions    Adhesive Tape     Cephalexin Other (See Comments)     Tongue cracks and peels    Erythromycin Other (See Comments)     Epigastric pain and bloating    Ketorolac Tromethamine Other (See Comments)     Severe stomach cramps    Pcn [Penicillins]      Unknown reaction    Percocet [Oxycodone-Acetaminophen] Itching and Other (See Comments)     Esophagus hurt    Sulfa Antibiotics     Ultracet [Tramadol-Acetaminophen] Itching       Current Meds:   Scheduled Meds:    clindamycin  300 mg Oral 3 times per day    methocarbamol  500 mg Oral 3 times per day    gabapentin  300 mg Oral Daily    iron sucrose  100 mg IntraVENous Q24H    atorvastatin  40 mg Oral Nightly    zolpidem  10 mg Oral Nightly    allopurinol  100 mg Oral Daily    amiodarone  200 mg Oral Daily    apixaban  5 mg Oral BID    calcitRIOL  0.25 mcg Oral Daily    hydrALAZINE  100 mg Oral TID    magnesium oxide  400 mg Oral BID    metoprolol tartrate  25 mg Oral BID    pantoprazole  40 mg Oral QAM AC    rOPINIRole  0.25 mg Oral Nightly    Vitamin D  2,000 Units Oral Daily    polyethylene glycol  17 g Oral Daily     Continuous Infusions:       PRN Meds: calcium carbonate, anticoagulant sodium citrate, anticoagulant sodium citrate, Benzocaine-Menthol, acetaminophen, HYDROcodone-acetaminophen, bisacodyl       Patient admitted [facility] Problems             Last Modified POA    * (Principal) Open fracture of right tibia and fibula, sequela 12/12/2022 Yes    Paraproteinemia 12/14/2022 Yes    Stage 5 chronic kidney disease not on chronic dialysis ([facility] 12/14/2022 Yes    Anemia of chronic renal failure, stage 5 ([facility] 12/14/2022 Yes                      12/13/22    Open trimalleolar fracture, s/p ORIF on 12/6  JOSE ARMANDO on CKD, Cr 4.29 today, nephro following, patient high risk for needing HD  Anemia  Hx of A Fib on Eliquis and amio, DM2, HTN,HLD, DIONY, Fibromyalgia Nephrology following for any urgent HD needs, appreciate recommendations, renal diet, continue to monitor Cr  A Fib. Continue Amio and Eliquis  Continue lipitor, hydralazine, and lopressor  Continue iron supplementation, Hgb stable at 7.6                 Dec 18  Hem/Onc on board for elevated Kappa light chain, rule out myeloma  Anemia of chronic renal failure, appears baseline around 6-7, patient receiving IV iron supplementation, restart Retacrit once iron supplementation completed. Nephrology plans for HD today. IR consulted for tunnel catheter placement, followed by HD session, DC IV fluids, appreciate recommendations   Hyperkalemia Kayexalate dose today  Potassium 5.5                12/22  HB is stable, No Obvious Sign of Bleeding   1 unit of Packed RBC given yesterday   Getting HD    Restarting AC, since HB is stable   BP - Controlled  Applied Boot in Right Leg   Started on Clindamycin by Ortho   Patient, feeling depressed, no suicidal ideation  Requesting Psyche Consult           Consultations:   IP CONSULT TO DIETITIAN  IP CONSULT TO SOCIAL WORK  IP CONSULT TO INTERNAL MEDICINE  IP CONSULT TO NEPHROLOGY  IP CONSULT TO HEM/ONC  IP CONSULT TO Ning Pacheco MD  12/22/2022 12/22/2022  3:50 PM          Copy sent to Dr. Albaro Lockhrat MD    Please note that this chart was generated using voice recognition Dragon dictation software. Although every effort was made to ensure the accuracy of this automated transcription, some errors in transcription may have occurred.

## 2022-12-22 NOTE — PROGRESS NOTES
HEMODIALYSIS PRE-TREATMENT NOTE    Patient Identifiers prior to treatment: Name  MRN    Isolation Required: No                      Isolation Type: N/A       (please document if patient is being managed as a PUI/COVID-19 patient)        Hepatitis status:                           Date Drawn                             Result  Hepatitis B Surface Antigen 22     NEG                     Hepatitis B Surface Antibody 22 NEG     3.50   Hepatitis B Core Antibody 22 NEG          How was Hepatitis Status verified: Epic Chart     Was a copy of the labs you documented provided to facility for the patient's chart: Yes    Hemodialysis orders verified: Yes by Zain Motta    Access Within normal limits ( I.e. s/s of infection,...): WNL     Pre-Assessment completed: Zain Motta    Pre-dialysis report received from: Ludwig Lee                      Time: 1300

## 2022-12-22 NOTE — PROGRESS NOTES
12/22/22 1717   Encounter Summary   Encounter Overview/Reason  Volunteer Encounter   Service Provided For: Patient   Referral/Consult From: Rounding   Last Encounter  12/22/22  (V)   Complexity of Encounter Low   Spiritual/Emotional needs   Type Spiritual Support   Rituals, Rites and [address]

## 2022-12-22 NOTE — PROGRESS NOTES
LULU BARRIENTOS [facility] Internal Medicine  Lois Colon MD; Wilfred Huerta MD; Lindsay Villanueva MD; MD Gwen Shukla MD; Felicia Hawkins MD    [facility]   [facility]    Date:   12/21/2022  Patient name:  Marley Romero  Date of admission:  12/12/2022  5:48 PM  MRN:   [id number]  Account:  [de-identified]  YOB: 1945  PCP:    Nakul Simpson MD  Room:   [address] [place] [address]  Code Status:    Full Code    Chief Complaint:     No chief complaint on file. Open fracture of tibia and fibula    History Obtained From:     Patient and electronic medical record    History of Present Illness:     Marley Romero is a 69 y.o. Non- / non  female who presents with No chief complaint on file. and is admitted to the hospital for the management of Open fracture of right tibia and fibula, sequela. Past medical history significant for A. Fib on Eliquis and amiodarone, JOSE ARMANDO on CKD, Type 2 Diabetes, HTN,HLD, Anemia, Fibromyalgia, Obesity, and DIONY. Sustained an open trimalleolar fracture, ankle fracture dislocation s/p ORIF on 12/6, right talus open treatment. Nonweight bearing right lower extremity. Developed JOSE ARMANDO on CKD, per Nephrology patient is high risk of needing dialysis, consult to Nephrology sent when patient admitted to Acute Rehab. Seen at examined at bedside, did not sleep well last night due to being uncomfortable due to the blankets. Otherwise no concerns, not in acute distress     Principal Problem:    Open fracture of right tibia and fibula, sequela  Active Problems:    Paraproteinemia    Stage 5 chronic kidney disease not on chronic dialysis (HCC)    Anemia of chronic renal failure, stage 5 (HCC)  Resolved Problems:    * No resolved hospital problems.  *                  Past Medical History:     Past Medical History:   Diagnosis Date    Abnormal stress test     Anemia     Arthritis     Depression Diverticulosis     DM (diabetes mellitus) ([facility] [address]     Erythropenia     Fibromyalgia     HTN (hypertension)     Hyperlipidemia     Kidney stone     Morbid obesity due to excess calories (HCC)     DIONY on CPAP     Osteopenia 03/03/14    Seasonal allergies     Sleep apnea     uses bipap prn        Past Surgical History:     Past Surgical History:   Procedure Laterality Date    ANKLE FRACTURE SURGERY Right 12/6/2022    RIGHT ANKLE OPEN REDUCTION INTERNAL FIXATION performed by Hollie Shaw DO at [facility]  01/01/2011    right arm broken    CARDIAC CATHETERIZATION  [id number] dr Fuentes Baxter  05/16/2016    COLONOSCOPY  01/01/2003    COLONOSCOPY  01/01/2007    IR TUNNELED CATHETER PLACEMENT GREATER THAN 5 YEARS  12/19/2022    IR TUNNELED CATHETER PLACEMENT GREATER THAN 5 YEARS 12/19/2022 [facility] SPECIAL PROCEDURES    ORIF ANKLE FRACTURE Right 12/06/2022    RIGHT ANKLE OPEN REDUCTION INTERNAL FIXATION    TOTAL KNEE ARTHROPLASTY Bilateral     left may 2013 and right july 2013    TUBAL LIGATION          Medications Prior to Admission:     Prior to Admission medications    Medication Sig Start Date End Date Taking? Authorizing Provider   clindamycin (CLEOCIN) 300 MG capsule Take 1 capsule by mouth 3 times daily for 10 days 12/21/22 12/31/22  Wilber Conley DO   methocarbamol (ROBAXIN) 750 MG tablet Take 1 tablet by mouth in the morning and 1 tablet at noon and 1 tablet in the evening. Do all this for 10 days. 12/12/22 12/22/22  Rosalind Plaza MD   metoprolol tartrate (LOPRESSOR) 25 MG tablet Take 1 tablet by mouth 2 times daily 12/12/22   Rosalind Plaza MD   senna (SENOKOT) 8.6 MG tablet Take 1 tablet by mouth daily as needed (Constipation) 12/12/22 1/11/23  Rosalind Plaza MD   rOPINIRole (REQUIP) 0.25 MG tablet Take 1 tablet by mouth nightly 11/14/22   Eliza Hatch MD   gabapentin (NEURONTIN) 600 MG tablet Take 1 tablet by mouth daily for 90 days.  11/14/22 2/12/23  Eliza Hatch MD   traZODone (DESYREL) 50 MG tablet  10/20/22   Elizabeth Bonner MD   colchicine (COLCRYS) 0.6 MG tablet  9/27/22   Nolon Solid, DPM   Cyanocobalamin (B-12) 1000 MCG LOZG DISSOLVE ONE tablet UNDER TONGUE ONCE DAILY 9/6/22   Jonny Mann MD   blood glucose monitor kit and supplies use check blood sugar as directed 11/9/22   Eliza Sparrow MD   blood glucose monitor strips Check blood sugars daily as directed. 11/9/22   Eliza Sparrow MD   Lancets MISC 1 each by Does not apply route daily 11/9/22   Eliza Sparrow MD   Alcohol Swabs 70 % PADS 1 each by Does not apply route daily 11/9/22   Eliza Sparrow MD   atorvastatin (LIPITOR) 40 MG tablet Take 1 tablet by mouth daily 11/7/22   Eliza Sparrow MD   pantoprazole (PROTONIX) 40 MG tablet TAKE 1 TABLET DAILY 10/19/22   Eliza Sparrow MD   ELIQUIS 5 MG TABS tablet TAKE 1 TABLET TWICE A DAY 10/11/22   Eliza Sparrow MD   cyclobenzaprine (FLEXERIL) 5 MG tablet TAKE 1 TABLET BY MOUTH NIGHTLY AS NEEDED FOR MUSCLE SPASMS 10/3/22   Eliza Sparrow MD   allopurinol (ZYLOPRIM) 100 MG tablet TAKE 1 TABLET DAILY 9/21/22   BARBIE Cordova CNP   zolpidem (AMBIEN) 5 MG tablet Take 5 mg by mouth nightly as needed for Sleep.     Historical Provider, MD   hydrALAZINE (APRESOLINE) 25 MG tablet Take 4 tablets by mouth 3 times daily 7/12/22   Miguelina Healy MD   amiodarone (CORDARONE) 200 MG tablet Take 1 tablet by mouth daily 7/6/22   BARBIE Villa - NP   calcitRIOL (ROCALTROL) 0.5 MCG capsule TAKE 1 CAPSULE BY MOUTH DAILY 6/14/22   Miguelina Healy MD   ferrous sulfate (FE TABS 325) 325 (65 Fe) MG EC tablet Take 1 tablet by mouth daily (with breakfast) 5/10/22   Eliza Sparrow MD   azelastine (ASTELIN) 0.1 % nasal spray 1 spray by Nasal route 2 times daily Use in each nostril as directed 10/13/21   Emily Cardona DO   Cholecalciferol (VITAMIN D3) 25 MCG (1000 UT) TABS Take 2 tablets by mouth daily 1/9/20   Farida Gallagher, APRN - CNP   Magnesium Oxide 400 MG CAPS Take by mouth 2 times daily Takes once daily at [facility]    Historical Provider, MD        Allergies:     Adhesive tape, Cephalexin, Erythromycin, Ketorolac tromethamine, Pcn [penicillins], Percocet [oxycodone-acetaminophen], Sulfa antibiotics, and Ultracet [tramadol-acetaminophen]    Social History:     Tobacco:    reports that she quit smoking about 62 years ago. Her smoking use included cigarettes. She has a 0.25 pack-year smoking history. She has never used smokeless tobacco.  Alcohol:      reports no history of alcohol use. Drug Use:  reports no history of drug use. Family History:     Family History   Problem Relation Age of Onset    Diabetes Mother     Heart Disease Mother     Osteoporosis Mother     Kidney Disease Father     Prostate Cancer Brother        Review of Systems:     Positive and Negative as described in HPI. ROS     No cp  No nv  No diarrhea  No dyspnea                                                          . Physical Exam:     Physical Exam   Vitals:    Vitals:    22 0113 22 0702 22 1848 22   BP: 132/69 118/65 (!) 140/53 (!) 137/59   Pulse: 69 78 64 63   Resp: 17 16 18    Temp: 99 °F (37.2 °C) 98.6 °F (37 °C) 98.6 °F (37 °C)    TempSrc: Oral Oral Oral    SpO2: 99% 96% 98%    Weight:       Height:                        Body mass index is 44.94 kg/m². Temp (24hrs), Av.9 °F (37.2 °C), Min:98.6 °F (37 °C), Max:99.1 °F (37.3 °C)    No results for input(s): POCGLU in the last 72 hours. General Appearance:   No acute distress , obese                 Skin:                             No rash or erythema  HEENT ;                                                                       No icterus, no pallor . No ptosis.     No gross asymmetry  Or abnormality face         Neck:                            No mass , no thyroid enlargement                                           Pulmonary/Chest: Symmetric                                          Clear to auscultation bilaterally . No wheezes,                                          No rales or rhonchi . No abnormality on percussion                                                        Cardiovascular:            Normal rate, regular rhythm,                                          No murmur or  Gallop . Abdomen:                       Soft, non-tender                                           Normal bowels sounds,                                             Extremities:                    No  Edema . Boot in Right Leg                                            Musculo-skeletal / Neurological ;;                  Neurological ;                 No focal motor deficit ,                 No focal sensory deficit ,    Musculo-skeletal ;                  No  gait abnormality                  No significant joint abnormality,                                                         Psych:                     See psych notes                                                                 Investigations:      URINE ANALYSIS: No results found for: LABURIN     CBC:  Lab Results   Component Value Date/Time    WBC 10.0 12/19/2022 06:27 AM    HGB 7.6 12/21/2022 06:52 AM     12/19/2022 06:27 AM     03/02/2012 11:15 AM             BMP:    Lab Results   Component Value Date/Time     12/21/2022 06:52 AM    K 4.0 12/21/2022 06:52 AM    CL 98 12/21/2022 06:52 AM    CO2 28 12/21/2022 06:52 AM    BUN 21 12/21/2022 06:52 AM    CREATININE 2.43 12/21/2022 06:52 AM    GLUCOSE 147 12/21/2022 06:52 AM    GLUCOSE 113 03/02/2012 11:15 AM      LIVER PROFILE:  Lab Results   Component Value Date/Time    ALT <5 12/13/2022 06:47 AM    AST 10 12/13/2022 06:47 AM    PROT 5.6 12/13/2022 06:47 AM    BILITOT 0.3 12/13/2022 06:47 AM    BILIDIR 0.1 12/13/2022 06:47 AM    LABALBU 2.9 12/13/2022 06:47 AM    LABALBU 4.2 03/02/2012 11:15 AM             @BRIEFLABT(TSH)@      Laboratory Testing:  Recent Results (from the past 24 hour(s))   TYPE AND SCREEN    Collection Time: 12/20/22 10:11 PM   Result Value Ref Range    Expiration Date 12/23/2022,2359     Arm Band Number [id number]     ABO/Rh A POSITIVE     Antibody Screen NEGATIVE     Unit Number [id number]     Product Code Leukocyte Reduced Red Cell     Unit Divison 00     Dispense Status ISSUED     Unit Issue Date/Time [id number]     Product Code Blood Bank [id number]     Blood Bank Unit Type and Rh A POS     Blood Bank ISBT Product Blood Type 6200     Blood Bank Blood Product Expiration Date 438230259823     Transfusion Status OK TO TRANSFUSE     Crossmatch Result COMPATIBLE    Basic Metabolic Panel w/ Reflex to MG    Collection Time: 12/21/22  6:52 AM   Result Value Ref Range    Glucose 147 (H) 70 - 99 mg/dL    BUN 21 8 - 23 mg/dL    Creatinine 2.43 (H) 0.50 - 0.90 mg/dL    Est, Glom Filt Rate 20 (L) >60 mL/min/1.73m2    Calcium 9.2 8.6 - 10.4 mg/dL    Sodium 135 135 - 144 mmol/L    Potassium 4.0 3.7 - 5.3 mmol/L    Chloride 98 98 - 107 mmol/L    CO2 28 20 - 31 mmol/L    Anion Gap 9 9 - 17 mmol/L   Hemoglobin and Hematocrit    Collection Time: 12/21/22  6:52 AM   Result Value Ref Range    Hemoglobin 7.6 (L) 12.0 - 16.0 g/dL    Hematocrit 23.2 (L) 36 - 46 %       Imaging/Diagnostics:  XR ANKLE RIGHT (MIN 3 VIEWS)    Result Date: 12/6/2022  Anatomic alignment of comminuted ankle fracture status post ORIF. Assessment :      Hospital Problems             Last Modified POA    * (Principal) Open fracture of right tibia and fibula, sequela 12/12/2022 Yes    Paraproteinemia 12/14/2022 Yes    Stage 5 chronic kidney disease not on chronic dialysis ([facility] [address] 12/14/2022 Yes    Anemia of chronic renal failure, stage 5 ([facility] [address] 12/14/2022 Yes     Plan:       Medications: Allergies:     Allergies   Allergen Reactions Adhesive Tape     Cephalexin Other (See Comments)     Tongue cracks and peels    Erythromycin Other (See Comments)     Epigastric pain and bloating    Ketorolac Tromethamine Other (See Comments)     Severe stomach cramps    Pcn [Penicillins]      Unknown reaction    Percocet [Oxycodone-Acetaminophen] Itching and Other (See Comments)     Esophagus hurt    Sulfa Antibiotics     Ultracet [Tramadol-Acetaminophen] Itching       Current Meds:   Scheduled Meds:    clindamycin  300 mg Oral 3 times per day    methocarbamol  500 mg Oral 3 times per day    gabapentin  300 mg Oral Daily    iron sucrose  100 mg IntraVENous Q24H    atorvastatin  40 mg Oral Nightly    zolpidem  10 mg Oral Nightly    allopurinol  100 mg Oral Daily    amiodarone  200 mg Oral Daily    [Held by provider] apixaban  5 mg Oral BID    calcitRIOL  0.25 mcg Oral Daily    hydrALAZINE  100 mg Oral TID    magnesium oxide  400 mg Oral BID    metoprolol tartrate  25 mg Oral BID    pantoprazole  40 mg Oral QAM AC    rOPINIRole  0.25 mg Oral Nightly    Vitamin D  2,000 Units Oral Daily    polyethylene glycol  17 g Oral Daily     Continuous Infusions:       PRN Meds: anticoagulant sodium citrate, anticoagulant sodium citrate, Benzocaine-Menthol, acetaminophen, HYDROcodone-acetaminophen, bisacodyl       Patient admitted [facility] Problems             Last Modified POA    * (Principal) Open fracture of right tibia and fibula, sequela 12/12/2022 Yes    Paraproteinemia 12/14/2022 Yes    Stage 5 chronic kidney disease not on chronic dialysis ([facility] [address] 12/14/2022 Yes    Anemia of chronic renal failure, stage 5 ([facility] [address] 12/14/2022 Yes                      12/13/22    Open trimalleolar fracture, s/p ORIF on 12/6  JOSE ARMANDO on CKD, Cr 4.29 today, nephro following, patient high risk for needing HD  Anemia  Hx of A Fib on Eliquis and amio, DM2, HTN,HLD, DIONY, Fibromyalgia   Nephrology following for any urgent HD needs, appreciate recommendations, renal diet, continue to monitor Cr  A Fib. Continue Amio and Eliquis  Continue lipitor, hydralazine, and lopressor  Continue iron supplementation, Hgb stable at 7.6                 Dec 18  Hem/Onc on board for elevated Kappa light chain, rule out myeloma  Anemia of chronic renal failure, appears baseline around 6-7, patient receiving IV iron supplementation, restart Retacrit once iron supplementation completed. Nephrology plans for HD today. IR consulted for tunnel catheter placement, followed by HD session, DC IV fluids, appreciate recommendations   Hyperkalemia Kayexalate dose today  Potassium 5.5                12/21  HB is low , No Obvious Sign of Bleeding   1 unit of Packed RBC given yesterday   Getting HD    Restarting AC, since HB is stable   BP - Controlled  Applied Boot in Right Leg   Started on Clindamycin by Ortho     Hemoglobin 12.0 - 16.0 g/dL 6.6 Low Panic   7.3 Low   6.5 Low Panic   7.7 Low  R       DIALYSIS TODAY . Consultations:   IP CONSULT TO DIETITIAN  IP CONSULT TO SOCIAL WORK  IP CONSULT TO INTERNAL MEDICINE  IP CONSULT TO NEPHROLOGY  IP CONSULT TO [facility]  IP CONSULT TO [place] Jamey Fothergill, MD  12/21/2022 12/21/2022  8:19 PM          Copy sent to Dr. Monie Benitez MD    Please note that this chart was generated using voice recognition Dragon dictation software. Although every effort was made to ensure the accuracy of this automated transcription, some errors in transcription may have occurred.

## 2022-12-22 NOTE — CARE COORDINATION
ONGOING ARU DISCHARGE PLAN:    Patient is alert and oriented x4 and is very anxious this morning. Was accepted at [facility] in [place]. However, today, the patient has changed her mind. States when she went to see Dr. Murphy Fails, someone recommended [facility] in \[facility]". [facility] in \[place]\"" is able to accept this patient. Per Dr. Jaja Galindo, patient should be ready to discharge early next week. Not sure when dialysis will be done as she is having a treatment today and one tomorrow. Spoke with Pamela in the admissions office, they will be able to use a asa chair lift and send her to dialysis.  Need to confirm chair time for this patient w/Davita all required, labs, xray's and clinical sent to [facility]        Electronically signed by Valentino Valle RN on 12/22/2022 at 8:31 AM

## 2022-12-22 NOTE — FLOWSHEET NOTE
12/22/22 1706   Vital Signs   BP (!) 158/60   Heart Rate 63   Resp 16   Weight 264 lb 8.8 oz (120 kg)   Weight Method Bed scale   Percent Weight Change -2.04   Post-Hemodialysis Assessment   Post-Treatment Procedures Blood returned;Catheter capped, clamped with Citrate x 2 ports   Machine Disinfection Process Acid/Vinegar Clean;Heat Disinfect; Exterior Machine Disinfection   Rinseback Volume (ml) 250 ml   Blood Volume Processed (Liters) 59.3 l/min   Dialyzer Clearance Lightly streaked   Duration of Treatment (minutes) 3 minutes   Heparin Amount Administered During Treatment (mL) 0 mL   Hemodialysis Intake (ml) 500 ml   Hemodialysis Output (ml) 3000 ml   NET Removed (ml) 2500   Tolerated Treatment Good   Patient Response to Treatment Patient tolerated treatment good. 2.5Kgs of fluid removed without difficulty. CVC clamped and capped. Edema Generalized   Edema Generalized +1   Time Off 1703   Patient Disposition Return to room   completed 3 hr HD TX and removed 2.5 Liters of fluid. pt tolerated HD TX well. CVC dressing changed and is clean, dry and intact. report given to primary nurse, Lester Padgett LPN.

## 2022-12-22 NOTE — PROGRESS NOTES
_                         Today's Date: 12/22/2022  Patient Name: Brian Jin  Date of admission: 12/12/2022  5:48 PM  Patient's age: 69 y.o., 1945  Admission Dx: Open fracture of right tibia and fibula, sequela [S82.201S, S80.5S]      Requesting Physician: Alea Jin MD    CHIEF COMPLAINT: Status post fall. Fracture of the right tibia and fibula. Chronic renal failure. Consult for elevated kappa light chain immunoglobulin. SUBJECTIVE:  Patient was seen and examined. Patient seen and examined  Labs and vitals reviewed  Patient complains of being tired. Hemoglobin 7.7 today  No new complaints    BRIEF CASE HISTORY:    The patient is a 69 y.o.  female who is admitted to the hospital for further management of open fracture of the right tibia and fibula. Patient had a fall and she had right ankle fracture. She was found to have open fracture of the right tibia and fibula. Patient had surgery and she is admitted to the inpatient rehab for further care. Patient's labs showed evidence of JOSE ARMANDO on top of CKD. She was seen by nephrology. Patient was deemed to be high risk for dialysis. Further work-up showed elevated kappa light chain immunoglobulin at 41.9 with lambda light chain immunoglobulin 3.14. We were consulted to evaluate the patient for possible underlying multiple myeloma. Patient also had history of anemia of chronic renal insufficiency. Past Medical History:   has a past medical history of Abnormal stress test, Anemia, Arthritis, Depression, Diverticulosis, DM (diabetes mellitus) ([address] Erythropenia, Fibromyalgia, HTN (hypertension), Hyperlipidemia, Kidney stone, Morbid obesity due to excess calories ([address] DIONY on CPAP, Osteopenia, Seasonal allergies, and Sleep apnea. Past Surgical History:   has a past surgical history that includes Colonoscopy (01/01/2003); Colonoscopy (01/01/2007);  Tubal ligation; Arm Surgery (01/01/2011); Total knee arthroplasty (Bilateral); Cardiac catheterization ([id number] dr Femi Chandra); Cardiac catheterization (05/16/2016); ORIF ankle fracture (Right, 12/06/2022); Ankle fracture surgery (Right, 12/6/2022); and IR TUNNELED CVC PLACE WO SQ PORT/PUMP > 5 YEARS (12/19/2022). Family History: family history includes Diabetes in her mother; Heart Disease in her mother; Kidney Disease in her father; Osteoporosis in her mother; Prostate Cancer in her brother. Social History:   reports that she quit smoking about 62 years ago. Her smoking use included cigarettes. She has a 0.25 pack-year smoking history. She has never used smokeless tobacco. She reports that she does not drink alcohol and does not use drugs. Medications:    Prior to Admission medications    Medication Sig Start Date End Date Taking? Authorizing Provider   clindamycin (CLEOCIN) 300 MG capsule Take 1 capsule by mouth 3 times daily for 10 days 12/21/22 12/31/22  Sofy Sofia DO   methocarbamol (ROBAXIN) 750 MG tablet Take 1 tablet by mouth in the morning and 1 tablet at noon and 1 tablet in the evening. Do all this for 10 days. 12/12/22 12/22/22  aJsvir Bowden MD   metoprolol tartrate (LOPRESSOR) 25 MG tablet Take 1 tablet by mouth 2 times daily 12/12/22   Jasvir Bowden MD   senna (SENOKOT) 8.6 MG tablet Take 1 tablet by mouth daily as needed (Constipation) 12/12/22 1/11/23  Jasvir Bowden MD   rOPINIRole (REQUIP) 0.25 MG tablet Take 1 tablet by mouth nightly 11/14/22   Eliza Smith MD   gabapentin (NEURONTIN) 600 MG tablet Take 1 tablet by mouth daily for 90 days.  11/14/22 2/12/23  Eliza Smith MD   traZODone (DESYREL) 50 MG tablet  10/20/22   Lina Topete MD   colchicine (COLCRYS) 0.6 MG tablet  9/27/22   Renate Yin DPM   Cyanocobalamin (B-12) 1000 MCG LOZG DISSOLVE ONE tablet UNDER TONGUE ONCE DAILY 9/6/22   Yudith Cm MD   blood glucose monitor kit and supplies use check blood sugar as directed 11/9/22 Tra Estrella MD   blood glucose monitor strips Check blood sugars daily as directed. 11/9/22   Eliza Duenas MD   Lancets MISC 1 each by Does not apply route daily 11/9/22   Eliza Duenas MD   Alcohol Swabs 70 % PADS 1 each by Does not apply route daily 11/9/22   Eliza Duenas MD   atorvastatin (LIPITOR) 40 MG tablet Take 1 tablet by mouth daily 11/7/22   Eliza Duenas MD   pantoprazole (PROTONIX) 40 MG tablet TAKE 1 TABLET DAILY 10/19/22   Eliza Duenas MD   ELIQUIS 5 MG TABS tablet TAKE 1 TABLET TWICE A DAY 10/11/22   Eliza Duenas MD   cyclobenzaprine (FLEXERIL) 5 MG tablet TAKE 1 TABLET BY MOUTH NIGHTLY AS NEEDED FOR MUSCLE SPASMS 10/3/22   Eliza Duenas MD   allopurinol (ZYLOPRIM) 100 MG tablet TAKE 1 TABLET DAILY 9/21/22   BARBIE Ruiz CNP   zolpidem (AMBIEN) 5 MG tablet Take 5 mg by mouth nightly as needed for Sleep.     Historical Provider, MD   hydrALAZINE (APRESOLINE) 25 MG tablet Take 4 tablets by mouth 3 times daily 7/12/22   Fernando Yusuf MD   amiodarone (CORDARONE) 200 MG tablet Take 1 tablet by mouth daily 7/6/22   BARBIE Boucher NP   calcitRIOL (ROCALTROL) 0.5 MCG capsule TAKE 1 CAPSULE BY MOUTH DAILY 6/14/22   Fernando Yusuf MD   ferrous sulfate (FE TABS 325) 325 (65 Fe) MG EC tablet Take 1 tablet by mouth daily (with breakfast) 5/10/22   Eliza Duenas MD   azelastine (ASTELIN) 0.1 % nasal spray 1 spray by Nasal route 2 times daily Use in each nostril as directed 10/13/21   Camacho Galeas DO   Cholecalciferol (VITAMIN D3) 25 MCG (1000 UT) TABS Take 2 tablets by mouth daily 1/9/20   BARBIE Ruiz CNP   Magnesium Oxide 400 MG CAPS Take by mouth 2 times daily Takes once daily at [facility]    Historical Provider, MD     Current Facility-Administered Medications   Medication Dose Route Frequency Provider Last Rate Last Admin    clindamycin (CLEOCIN) capsule 300 mg  300 mg Oral 3 times per day Shaheen Bray DO   300 mg at 12/22/22 9239    anticoagulant sodium citrate 4 % injection 1.6 mL  1.6 mL IntraCATHeter PRN Santhosh Levi MD   1.6 mL at 12/20/22 1831    anticoagulant sodium citrate 4 % injection 1.6 mL  1.6 mL IntraCATHeter PRN Santhosh Levi MD   1.6 mL at 12/20/22 1831    methocarbamol (ROBAXIN) tablet 500 mg  500 mg Oral 3 times per day Anthony Talamantes MD   500 mg at 12/22/22 0524    gabapentin (NEURONTIN) capsule 300 mg  300 mg Oral Daily Anthony Talamantes MD   300 mg at 12/22/22 0827    iron sucrose (VENOFER) 100 mg in sodium chloride 0.9 % 100 mL IVPB  100 mg IntraVENous Q24H Santhosh Levi MD   Stopped at 12/21/22 1705    Benzocaine-Menthol (CEPACOL) 1 lozenge  1 lozenge Oral Q2H PRN Scott Rosenbaum MD        atorvastatin (LIPITOR) tablet 40 mg  40 mg Oral Nightly Scott Rosenbaum MD   40 mg at 12/21/22 2009    acetaminophen (TYLENOL) tablet 650 mg  650 mg Oral Q6H PRN Scott Rosenbaum MD        zolpidem (AMBIEN) tablet 10 mg  10 mg Oral Nightly Scott Rosenbaum MD   10 mg at 12/21/22 2009    allopurinol (ZYLOPRIM) tablet 100 mg  100 mg Oral Daily Frank Yi MD   100 mg at 12/22/22 0827    amiodarone (CORDARONE) tablet 200 mg  200 mg Oral Daily Frank Yi MD   200 mg at 12/22/22 0827    apixaban (ELIQUIS) tablet 5 mg  5 mg Oral BID Frank Yi MD   5 mg at 12/22/22 9830    calcitRIOL (ROCALTROL) capsule 0.25 mcg  0.25 mcg Oral Daily Frank Yi MD   0.25 mcg at 12/22/22 0831    hydrALAZINE (APRESOLINE) tablet 100 mg  100 mg Oral TID Frank Yi MD   100 mg at 12/22/22 0827    HYDROcodone-acetaminophen ([address]) 5-325 MG per tablet 1 tablet  1 tablet Oral Q4H PRN Frank Yi MD   1 tablet at 12/22/22 0912    magnesium oxide (MAG-OX) tablet 400 mg  400 mg Oral BID Frank Yi MD   400 mg at 12/22/22 0826    metoprolol tartrate (LOPRESSOR) tablet 25 mg  25 mg Oral BID Frank Yi MD   25 mg at 12/22/22 0827    pantoprazole (PROTONIX) tablet 40 mg  40 mg Oral QAM AC Frank Yi MD   40 mg at 12/22/22 0524    rOPINIRole (REQUIP) tablet 0.25 mg  0.25 mg Oral Nightly Rah Rebolledo MD   0.25 mg at 12/21/22 2008    Vitamin D (CHOLECALCIFEROL) tablet 2,000 Units  2,000 Units Oral Daily Rah Rebolledo MD   2,000 Units at 12/22/22 0827    bisacodyl (DULCOLAX) suppository 10 mg  10 mg Rectal Daily PRN Rene Blancas MD        polyethylene glycol (GLYCOLAX) packet 17 g  17 g Oral Daily Rene Blancas MD   17 g at 12/21/22 8754       Allergies:  Adhesive tape, Cephalexin, Erythromycin, Ketorolac tromethamine, Pcn [penicillins], Percocet [oxycodone-acetaminophen], Sulfa antibiotics, and Ultracet [tramadol-acetaminophen]    REVIEW OF SYSTEMS:      General: Positive for weakness and fatigue. No unanticipated weight loss or decreased appetite. No fever or chills. Eyes: No blurred vision, eye pain or double vision. Ears: No hearing problems or drainage. No tinnitus. Throat: No sore throat, problems with swallowing or dysphagia. Respiratory: No cough, sputum or hemoptysis. Positive for exertional shortness of breath. No pleuritic chest pain. Cardiovascular: No chest pain, orthopnea or PND. No lower extremity edema. No palpitation. Gastrointestinal: No problems with swallowing. No abdominal pain or bloating. No nausea or vomiting. No diarrhea or constipation. No GI bleeding. Genitourinary: No dysuria, hematuria, frequency or urgency. Musculoskeletal: As above. Dermatologic: No skin rashes or pruritus. No skin lesions or discolorations. Psychiatric: No depression, anxiety, or stress or signs of schizophrenia. No change in mood or affect. Hematologic: No history of bleeding tendency. No bruises or ecchymosis. No history of clotting problems. Infectious disease: No fever, chills or frequent infections. Endocrine: No polydipsia or polyuria. No temperature intolerance. Neurologic: No headaches or dizziness. No weakness or numbness of the extremities.  No changes in balance, coordination, memory, mentation, behavior. Allergic/Immunologic: No nasal congestion or hives. No repeated infections. PHYSICAL EXAM:      BP (!) 152/62   Pulse 64   Temp 98.6 °F (37 °C)   Resp 16   Ht 5' 5\" (1.651 m)   Wt 270 lb 1 oz (122.5 kg)   LMP  (LMP Unknown)   SpO2 98%   BMI 44.94 kg/m²    Temp (24hrs), Av.6 °F (37 °C), Min:98.6 °F (37 °C), Max:98.6 °F (37 °C)      General appearance - not in pain or distress. Patient is morbidly obese. Mental status - alert and oriented  Eyes - pupils equal and reactive, extraocular eye movements intact  Ears - bilateral TM's and external ear canals normal  Nose - normal and patent, no erythema, discharge or polyps  Mouth - mucous membranes moist, pharynx normal without lesions  Neck - supple, no significant adenopathy  Lymphatics - no palpable lymphadenopathy, no hepatosplenomegaly  Chest - clear to auscultation, no wheezes, rales or rhonchi, symmetric air entry  Heart - normal rate, regular rhythm, normal S1, S2, no murmurs, rubs, clicks or gallops  Abdomen - soft, nontender, nondistended, no masses or organomegaly  Neurological - alert, oriented, normal speech, no focal findings or movement disorder noted  Musculoskeletal -status post right ankle fracture status post open reduction internal fixation. Extremities - peripheral pulses normal, no pedal edema, no clubbing or cyanosis  Skin - normal coloration and turgor, no rashes, no suspicious skin lesions noted           DATA:      Labs:       CBC:   Recent Labs     22  0652 22  0628   WBC  --  10.3   HGB 7.6* 7.7*   HCT 23.2* 24.1*   PLT  --  242     BMP:   Recent Labs     22  0652 22  0628    137   K 4.0 4.3   CO2 28 28   BUN 21 26*   CREATININE 2.43* 3.02*   LABGLOM 20* 15*   GLUCOSE 147* 107*     PT/INR:   No results for input(s): PROTIME, INR in the last 72 hours. APTT:No results for input(s): APTT in the last 72 hours.   LIVER PROFILE:  No results for input(s): AST, ALT, LABALBU in the last 72 hours. XR ANKLE RIGHT (MIN 3 VIEWS)  History: type IIIa open right trimalleolar ankle fracture status post   ORIF, 12/6/2022    Comparison: 12/6/2022    Findings: 3 views of the right ankle (AP/mortise/lateral) showing   trimalleolar ankle fracture status post ORIF with retained hardware in   relative anatomic alignment without signs of malreduction or hardware   failure when compared to previous films. There is minimal osseous   integration appreciate this time, though view was obscured due to splint   material.    Impression: Stable right trimalleolar ankle fracture status post ORIF      Component Ref Range & Units 4/2/22 1651 4/2/22 1651 Urine IFX Specimen . Random Urine Urine Total Protein mg/dL 24 24 Urine IFX Interp FREE MONOCLONAL LIGHT CHAINS ARE PRESENT, IDENTIFIED AS Comment: KAPPA (6.2 MG/DL). 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 Status post open reduction internal fixation. JOSE ARMANDO on top of CKD. Anemia of chronic renal insufficiency  Elevated kappa light chain immunoglobulin    RECOMMENDATIONS:  Records and labs and images were reviewed and discussed with the patient and family at bedside. Patient is recovering from right ankle fracture. Undergoing inpatient rehab. Follow-up on urine immunofixation studies. Patient was noted to have free monoclonal light chains back in April 2022. 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   Patient states she had it many years ago for evaluation of anemia but was not diagnostic. She is willing to undergo it again if needed  We will continue to check CBC and consider transfusion to keep hemoglobin above 7. Patient would benefit from Procrit treatment for anemia of chronic renal failure after correction of iron deficiency. Patient's questions were answered to the best of her satisfaction and she verbalized full understanding and agreement. Discussed with patient and family. Emily Hinkle MD, MD                                                   This note is created with the assistance of a speech recognition program.  While intending to generate a document that actually reflects the content of the visit, the document can still have some errors including those of syntax and sound a like substitutions which may escape proof reading. It such instances, actual meaning can be extrapolated by contextual diversion.

## 2022-12-22 NOTE — PROGRESS NOTES
Physical Medicine & Rehabilitation  Progress Note    12/22/2022 1:14 PM     CC: Ambulatory and ADL dysfunction due to right ankle fracture/dislocation    Subjective:   No complaints. Pain controlled. States she is not able to maintain nonweightbearing. Seen by Dr. Micheline Hayden yesterday-cast changed to boot-patient notes continue nonweightbearing    ROS:  Denies fevers, chills, sweats. No chest pain, palpitations, lightheadedness. Denies coughing, wheezing or shortness of breath. Denies abdominal pain, nausea, diarrhea or constipation. No new areas of joint pain. Denies new areas of numbness or weakness. Denies new anxiety or depression issues. No new skin problems. Rehabilitation:   PT:  Restrictions/Precautions: Weight Bearing  Implants present? : Metal implants  Other position/activity restrictions: Per chart: Right open trimalleolar fracture ankle fracture dislocation s/p I&D and ORIF, DOS 12/6/2022  Right Lower Extremity Weight Bearing: Non Weight Bearing   Transfers  Sit to Stand: 2 Person Assistance, Moderate Assistance, Minimal Assistance (WC to parallel barsleft  UE assist at arm rest , right at parallel bars , PTA foot under right LE to monitor weight bearing, 1 assist maximum)  Stand to Sit: Contact guard assistance (maintains NWB right LE with knee extended , no VCs)  Bed to Chair: Maximum assistance, Moderate assistance, 2 Person Assistance  Lateral Transfers: 2 Person Assistance, Moderate Assistance, Minimal Assistance (bed to R Ignacia Bangura 23 to left ,VCs to maintain right LE NWB  , dependant set up , 25 % VCs sequencing /technique)  Comment: Pt is agreeable to slide board transfer from bed to w/c. She requires Mod A to complete lateral scoot and Min A x1 to maintain NWB through RLE. Edu provided on proper technique to complete. STS completed in // bars this date with Mod/Max A x2 and a third person to assist with NWBing of RLE.  Attempted STS in // bars with knee scooter and x3 A, however pt is limited by weakness to get her RLE onto it. Edu provided on proper technique. WB Status: NWB R LE      OT:  ADL  Feeding: Independent  Feeding Skilled Clinical Factors: Per pt report  Grooming: Setup  Grooming Skilled Clinical Factors: Setup for oral care/grooming of hair  UE Bathing: Stand by assistance  UE Bathing Skilled Clinical Factors: Seated EOB, pt able to cleanse UB with wash cloths with SBA for safety  LE Bathing: Maximum assistance, Dependent/Total  LE Bathing Skilled Clinical Factors: Seated EOB, pt able to cleanse upper legs, A to cleanse LLE/foot and TA for aleah/buttocks care Max A x2 in maddy stedy  UE Dressing: Minimal assistance  UE Dressing Skilled Clinical Factors: Seated EOB, pt able to don OH shirt with A to pull down  LE Dressing: Dependent/Total  LE Dressing Skilled Clinical Factors: TA to thread brief and pants, TA to mange up over hips with Max A in maddy stedy  Toileting: Dependent/Total  Toileting Skilled Clinical Factors: TA to complete toileting hygiene and manage brief/pants up over hips  Additional Comments: Pt completes UB and LB ADLs while seated EOB d/t decreased strength, activity tolerance, endurance, and NWB to RLE. Pt tolerated well, frequent rest breaks taken as needed throughout session. Pt Max A/dependent for all LB ADLs d/t NWB RLE and decreased strength.          Balance  Sitting Balance: Stand by assistance  Standing Balance: Maximum assistance   Standing Balance  Time: ~30 seconds x5  Activity: Functional transfers  Comment: Max A x2 using maddy AdYapper  Functional Mobility  Functional - Mobility Device: Other (Use of maddy BlaBlaCardy to transfer from bed to toilet)  Activity: To/from bathroom  Assist Level: Dependent/Total  Functional Mobility Comments: Max A x2 in maddy stedy     Bed mobility  Rolling to Left: Stand by assistance (assist of bed rail)  Rolling to Right: Stand by assistance (assist of bed rail)  Supine to Sit: Stand by assistance  Sit to Supine: 2 Person assistance, Moderate assistance  Scooting: Stand by assistance (EOB)  Bed Mobility Comments: head of bed elevated right hand rail  Transfers  Sit to stand: Dependent/Total (Max A x2 in maddy stedy)  Stand to sit: Dependent/Total (Max A x2 in maddy stedy)  Transfer Comments: Pt very fearful, however completes 5 sit to stand transfers utilizing maddy stedy. Toilet Transfers  Toilet - Technique: Ambulating  Equipment Used: Raised toilet seat with rails  Toilet Transfer: Dependent/Total (Max A x2 from maddy stedy)  Toilet Transfers Comments: Max A x2 with maddy stedy, VC to maintain NWB RLE               ST:        Objective:  BP (!) 152/62   Pulse 64   Temp 98.6 °F (37 °C)   Resp 16   Ht 5' 5\" (1.651 m)   Wt 270 lb 1 oz (122.5 kg)   LMP  (LMP Unknown)   SpO2 98%   BMI 44.94 kg/m²  I Body mass index is 44.94 kg/m². I   Wt Readings from Last 1 Encounters:   22 270 lb 1 oz (122.5 kg)      Temp (24hrs), Av.6 °F (37 °C), Min:98.6 °F (37 °C), Max:98.6 °F (37 °C)         GEN: well developed, well nourished, no acute distress  HEENT: Normocephalic atraumatic, EOMI, mucous membranes pink and moist  CV: RRR, no murmurs, rubs or gallops  PULM: CTAB, no rales or rhonchi. Respirations WNL and unlabored  ABD: soft, NT, ND, +BS and equal  NEURO: A&O x3. Sensation intact to light touch. MSK: Right lower extremity with cast, neurovascular intact, plus/5 upper extremities and left lower extremity, 3/5 proximal right lower extremity, right ankle with/splint Ace wrap sling intact neurovascularly intact  EXTREMITIES: No calf tenderness to palpation left  lower extremity boot right lower extremity  SKIN: warm dry and intact with good turgor  PSYCH: appropriately interactive. Affect WNL.   Good spirits        Medications   Scheduled Meds:   clindamycin  300 mg Oral 3 times per day    methocarbamol  500 mg Oral 3 times per day    gabapentin  300 mg Oral Daily    iron sucrose  100 mg IntraVENous Q24H    atorvastatin  40 mg Oral Nightly    zolpidem 10 mg Oral Nightly    allopurinol  100 mg Oral Daily    amiodarone  200 mg Oral Daily    apixaban  5 mg Oral BID    calcitRIOL  0.25 mcg Oral Daily    hydrALAZINE  100 mg Oral TID    magnesium oxide  400 mg Oral BID    metoprolol tartrate  25 mg Oral BID    pantoprazole  40 mg Oral QAM AC    rOPINIRole  0.25 mg Oral Nightly    Vitamin D  2,000 Units Oral Daily    polyethylene glycol  17 g Oral Daily     Continuous Infusions:  PRN Meds:.calcium carbonate, anticoagulant sodium citrate, anticoagulant sodium citrate, Benzocaine-Menthol, acetaminophen, HYDROcodone-acetaminophen, bisacodyl     Diagnostics:     CBC:   Recent Labs     12/20/22  0607 12/21/22  0652 12/22/22  0628   WBC  --   --  10.3   RBC  --   --  2.62*   HGB 6.5* 7.6* 7.7*   HCT 20.1* 23.2* 24.1*   MCV  --   --  91.8   RDW  --   --  16.6*   PLT  --   --  242       BMP:   Recent Labs     12/20/22  0607 12/21/22  0652 12/22/22  0628    135 137   K 4.6 4.0 4.3    98 100   CO2 28 28 28   BUN 40* 21 26*   CREATININE 3.51* 2.43* 3.02*       BNP: No results for input(s): BNP in the last 72 hours. PT/INR:   No results for input(s): PROTIME, INR in the last 72 hours. APTT: No results for input(s): APTT in the last 72 hours. CARDIAC ENZYMES: No results for input(s): CKMB, CKMBINDEX, TROPONINT in the last 72 hours. Invalid input(s): CKTOTAL;3  FASTING LIPID PANEL:  Lab Results   Component Value Date    CHOL 178 01/11/2021    HDL 37 (L) 01/11/2021    TRIG 281 (H) 01/11/2021     LIVER PROFILE: No results for input(s): AST, ALT, ALB, BILIDIR, BILITOT, ALKPHOS in the last 72 hours. I/O (24Hr): Intake/Output Summary (Last 24 hours) at 12/22/2022 1314  Last data filed at 12/22/2022 0610  Gross per 24 hour   Intake 350 ml   Output 2150 ml   Net -1800 ml         Glu last 24 hour  No results for input(s): POCGLU in the last 72 hours.     No results for input(s): [facility], [address], [address] [facility], [facility] [address], [address] [facility], [address] [place] [address] [facility] [address] BACTERIA, NITRU, [address], LEUKOCYTESUR, YEAST, Elfida Avelino in the last 72 hours. Hep B S Ab <3.50       Impression/Plan:   Impaired ADLs, gait, and mobility due to:     R ankle trimalleolar fracture dislocation: s/p ORIF 12/6 by Dr. Carlos Lyon. NWB RLE. PT/OT for gait, mobility, strengthening, endurance, ADLs, and self care. Patient unable maintain nonweightbearing, home not wheelchair accessible per pt -SNF bed available ,noted start of clindamycin by internal medicine due to possible cellulitis  Pain-has Norco prn, on Robaxin TID. Has Tylenol prn and vitamin D. 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 Nephrology following on  hemodialysis, on calcitriol. K Serum 4.3 today  DM II with neuropathy: carb controlled diet. On gabapentin for neuropathy. Decreased to 300 mg daily due to renal function  HTN/HLD: on atorvastatin, hydralazine  Anemia of chronic disease: Hb 7.6 after  transfusion nephrology following and treating with IV iron x10 days. She reports 6-7 is baseline Hb for her and that she receives [facility]acrit [facility] twice monthly -riticrit 3 times a week start after IV iron -IV iron until 12/23   Elevated kappa light chain immunoglobulin: Hematology following. Concern for bone marrow disease vs renal failure - workup in progress. . Rec Resume Procrit after completion of IV iron h , transfuse for hemoglobin less than 7  Hyponatremia: Improved. Will monitor. Sodium 137  Fibromyalgia  Obesity: BMI 43.6. Dietitian consulted  DIONY:  Depression: on Trazodone nightly  Moderately severe protein calorie malnutrition: Nephrology added Nepro supplement daily.  Dietitian following  Insomnia: has Ambien nightly, reportedly takes 10 mg at home. Also on Requip at hs. Increased to her home dose of Ambien. Gout: on allopurinol. Colchicine discontinued - no acute gout symptoms  Bowel Management: Miralax daily, senokot prn, dulcolax prn. DVT Prophylaxis:  SCD's while in bed, RAVINDRA's during the day, and Eliquis  Internal medicine for medical management - nephrology and hematology follow  SNF when okay with internal medicine nephrology-nephrology cleared patient-as long as patient has outpatient dialysis patient changed mind and location-awaiting acceptance, patient had dialysis today, questionable tomorrow-follow-up with 1. PCP 2. Nephrology 3. Ortho - Dr Justin Bauer 4. Hematology,  monitor CBC/BMP continue hemodialysis noted need hepatitis B surface antibody for skilled facility-results as above      Ezio Francois MD       This note is created with the assistance of a speech recognition program.  While intending to generate a document that actually reflects the content of the visit, the document can still have some errors including those of syntax and sound a like substitutions which may escape proof reading.   In such instances, actual meaning can be extrapolated by contextual diversion

## 2022-12-22 NOTE — PLAN OF CARE
Problem: Discharge Planning  Goal: Discharge to home or other facility with appropriate resources  12/22/2022 0414 by Steve Lorenzo RN  Outcome: Progressing     Problem: Safety - Adult  Goal: Free from fall injury  12/22/2022 0414 by Steve Lorenzo RN  Outcome: Progressing     Problem: ABCDS Injury Assessment  Goal: Absence of physical injury  12/22/2022 0414 by Steve Lorenzo RN  Outcome: Progressing     Problem: Skin/Tissue Integrity  Goal: Absence of new skin breakdown  Description: 1. Monitor for areas of redness and/or skin breakdown  2. Assess vascular access sites hourly  3. Every 4-6 hours minimum:  Change oxygen saturation probe site  4. Every 4-6 hours:  If on nasal continuous positive airway pressure, respiratory therapy assess nares and determine need for appliance change or resting period.   12/22/2022 0414 by Steve Lorenzo RN  Outcome: Progressing     Problem: Pain  Goal: Verbalizes/displays adequate comfort level or baseline comfort level  12/22/2022 0414 by Steve Lorenzo RN  Outcome: Progressing     Problem: Chronic Conditions and Co-morbidities  Goal: Patient's chronic conditions and co-morbidity symptoms are monitored and maintained or improved  12/22/2022 0414 by Steve Lorenzo RN  Outcome: Progressing     Problem: Nutrition Deficit:  Goal: Optimize nutritional status  12/22/2022 0414 by Steve Lorenzo RN  Outcome: Progressing

## 2022-12-22 NOTE — PROGRESS NOTES
Nephrology Progress Note    Reason for consultation: Management of acute kidney injury and chronic kidney disease stage IV. Consulting physician: Rose El MD.    Interval history: Patient seen and examined   S/p IJ tunneled hemodialysis catheter  and received first session of acute hemodialysis on 12/19/2022. She feels better today  Patient is scheduled for dialysis today. History of present illness: This is a 69 y.o. female with a significant past medical history of Lipidemia, nephrolithiasis, fibromyalgia, type 2 diabetes mellitus, osteoarthritis and Chronic kidney disease stage IIIb [baseline serum creatinine 2.5 mg/dL secondary to diabetic glomerulosclerosis and follows up with Dr. Mamta Demarco of [facility] [address] [place] [address], who presented to the hospital to [facility]. [facility] on 12/6/2022 after [place] get out from [employer]. Vital signs were stable at presentation but her renal function had worsened from baseline with elevated BUN/creatinine 60/3.87 mg/dL. X-rays revealed trimalleolar fracture with diffuse soft tissue swelling of the right ankle. She underwent open reduction and internal fixation on 12/6/2022. Hemoglobin dropped to 6.6 g/dL on 12/10/2022 requiring PRBC transfusion. Serum creatinine peaked at 4.34 mg/dL. Patient did not require dialysis so far. She was transferred to acute rehab unit at [facility] yesterday [12/12/2022]. Pain control is adequate she has a cast on her left lower extremity. She does not have shortness of breath. She is nonoliguric and does not have indwelling Galdamez catheter. Laboratory studies today remarkable for BUN/creatinine 71/4.29  mg/dL.     Objective/     Vitals:    12/22/22 1357 12/22/22 1403 12/22/22 1430 12/22/22 1500   BP: (!) 159/70 (!) 154/69 (!) 159/70 (!) 156/75   Pulse: 65 64 62 62   Resp: 16      Temp: 98.6 °F (37 °C)      TempSrc:       SpO2:       Weight: 270 lb 1 oz (122.5 kg) Height:         24HR INTAKE/OUTPUT:    Intake/Output Summary (Last 24 hours) at 12/22/2022 1534  Last data filed at 12/22/2022 0610  Gross per 24 hour   Intake 350 ml   Output 2150 ml   Net -1800 ml     Patient Vitals for the past 96 hrs (Last 3 readings):   Weight   12/22/22 1357 270 lb 1 oz (122.5 kg)   12/20/22 1735 270 lb 1 oz (122.5 kg)   12/20/22 1419 274 lb 11.1 oz (124.6 kg)     Constitutional:  Alert, awake, no apparent distress  Cardiovascular:  S1, S2 without pericardial rub or gallop. Respiratory:  Diminished breath sounds at lung bases. Abdomen: Full but soft with normal bowel sounds. Ext: 2+ LE edema    Data/  Recent Labs     12/20/22  0607 12/21/22  0652 12/22/22  0628   WBC  --   --  10.3   HGB 6.5* 7.6* 7.7*   HCT 20.1* 23.2* 24.1*   MCV  --   --  91.8   PLT  --   --  242     Recent Labs     12/20/22  0607 12/21/22  0652 12/22/22  0628    135 137   K 4.6 4.0 4.3    98 100   CO2 28 28 28   GLUCOSE 106* 147* 107*   BUN 40* 21 26*   CREATININE 3.51* 2.43* 3.02*   LABGLOM 13* 20* 15*     Assessment/Plan:   1. Acute kidney injury on chronic kidney disease stage 4- Differentials include progression of underlying chronic kidney disease, myeloma kidney and ischemic acute tubular necrosis. Baseline serum creatinine averaging 3.8 mg/dL in October 2022, serum creatinine peaked to 4.5 mg/dL  Tunnel catheter was placed  Patient started on dialysis 12/19/2022  Scr trended up from 2.4 mg/dl to 3.5 mg/dl. With out dialysis. HD today. 2.  Normocytic anemia of chronic kidney disease - Iron studies are consistent with iron deficiency anemia patient is currently receiving loading dose of IV iron sucrose. Patient will benefit from transfusion of 1 unit of packed red cells. Start Retacrit 10,000 units subcutaneously 3 times a week. 3.  S/p trimalleolar right ankle fracture - recovery in progress. 4.  Systemic hypertension - blood pressure control is adequate.      5.  Elevated kappa/lambda ratio may be contributory to worsening anemia. Serum immunofixation was negative for monoclonal immunoglobulins on 12/14/2022. However had free monoclonal light chains on urine immunofixation performed 4/2/2022. We will follow hematology/oncology recommendations. Plan  HD today  Patient most likely  will need long-term dialysis. We will adjust dialysis days according to OP dialysis schedule. Please inform us once we have OP dialysis set up confirmed. Discharge planning in progress for SNF  No objection from nephrology standpoint as long as she has outpatient dialysis set up. Prognosis is guarded.     Dimple Phoenix MD  Nephrologist

## 2022-12-22 NOTE — PLAN OF CARE
Problem: Discharge Planning  Goal: Discharge to home or other facility with appropriate resources  12/22/2022 1556 by Elías Nichols LPN  Outcome: Progressing  Flowsheets (Taken 12/22/2022 1024)  Discharge to home or other facility with appropriate resources: Identify barriers to discharge with patient and caregiver     Problem: Safety - Adult  Goal: Free from fall injury  12/22/2022 1556 by Elías Nichols LPN  Outcome: Progressing     Problem: ABCDS Injury Assessment  Goal: Absence of physical injury  12/22/2022 1556 by Elías Nichols LPN  Outcome: Progressing  Flowsheets (Taken 12/22/2022 1018)  Absence of Physical Injury: Implement safety measures based on patient assessment     Problem: Skin/Tissue Integrity  Goal: Absence of new skin breakdown  Description: 1. Monitor for areas of redness and/or skin breakdown  2. Assess vascular access sites hourly  3. Every 4-6 hours minimum:  Change oxygen saturation probe site  4. Every 4-6 hours:  If on nasal continuous positive airway pressure, respiratory therapy assess nares and determine need for appliance change or resting period.   12/22/2022 1556 by Elías Nichols LPN  Outcome: Progressing     Problem: Pain  Goal: Verbalizes/displays adequate comfort level or baseline comfort level  12/22/2022 1556 by Elías Nichols LPN  Outcome: Progressing     Problem: Chronic Conditions and Co-morbidities  Goal: Patient's chronic conditions and co-morbidity symptoms are monitored and maintained or improved  12/22/2022 1556 by Elías Nichols LPN  Outcome: Progressing  Flowsheets (Taken 12/22/2022 1024)  Care Plan - Patient's Chronic Conditions and Co-Morbidity Symptoms are Monitored and Maintained or Improved: Monitor and assess patient's chronic conditions and comorbid symptoms for stability, deterioration, or improvement     Problem: Nutrition Deficit:  Goal: Optimize nutritional status  12/22/2022 1556 by Elías Nichols LPN  Outcome: Progressing

## 2022-12-23 PROBLEM — F33.1 MODERATE RECURRENT MAJOR DEPRESSION (HCC): Status: ACTIVE | Noted: 2022-12-23

## 2022-12-23 LAB
ALT SERPL-CCNC: 7 U/L (ref 5–33)
ANION GAP SERPL CALCULATED.3IONS-SCNC: 8 MMOL/L (ref 9–17)
AST SERPL-CCNC: 14 U/L
BUN BLDV-MCNC: 21 MG/DL (ref 8–23)
CALCIUM SERPL-MCNC: 9.6 MG/DL (ref 8.6–10.4)
CHLORIDE BLD-SCNC: 99 MMOL/L (ref 98–107)
CO2: 29 MMOL/L (ref 20–31)
CREAT SERPL-MCNC: 2.55 MG/DL (ref 0.5–0.9)
GFR SERPL CREATININE-BSD FRML MDRD: 19 ML/MIN/1.73M2
GLUCOSE BLD-MCNC: 126 MG/DL (ref 70–99)
POTASSIUM SERPL-SCNC: 4.5 MMOL/L (ref 3.7–5.3)
SODIUM BLD-SCNC: 136 MMOL/L (ref 135–144)

## 2022-12-23 PROCEDURE — 80048 BASIC METABOLIC PNL TOTAL CA: CPT

## 2022-12-23 PROCEDURE — 84450 TRANSFERASE (AST) (SGOT): CPT

## 2022-12-23 PROCEDURE — 2580000003 HC RX 258: Performed by: INTERNAL MEDICINE

## 2022-12-23 PROCEDURE — 97530 THERAPEUTIC ACTIVITIES: CPT

## 2022-12-23 PROCEDURE — 97110 THERAPEUTIC EXERCISES: CPT

## 2022-12-23 PROCEDURE — 6370000000 HC RX 637 (ALT 250 FOR IP): Performed by: PHYSICAL MEDICINE & REHABILITATION

## 2022-12-23 PROCEDURE — 99232 SBSQ HOSP IP/OBS MODERATE 35: CPT | Performed by: PHYSICAL MEDICINE & REHABILITATION

## 2022-12-23 PROCEDURE — 1180000000 HC REHAB R&B

## 2022-12-23 PROCEDURE — 99232 SBSQ HOSP IP/OBS MODERATE 35: CPT | Performed by: INTERNAL MEDICINE

## 2022-12-23 PROCEDURE — 6370000000 HC RX 637 (ALT 250 FOR IP)

## 2022-12-23 PROCEDURE — 6360000002 HC RX W HCPCS: Performed by: INTERNAL MEDICINE

## 2022-12-23 PROCEDURE — 6370000000 HC RX 637 (ALT 250 FOR IP): Performed by: FAMILY MEDICINE

## 2022-12-23 PROCEDURE — 36415 COLL VENOUS BLD VENIPUNCTURE: CPT

## 2022-12-23 PROCEDURE — 84460 ALANINE AMINO (ALT) (SGPT): CPT

## 2022-12-23 RX ORDER — HYDROCODONE BITARTRATE AND ACETAMINOPHEN 5; 325 MG/1; MG/1
1 TABLET ORAL EVERY 6 HOURS PRN
Qty: 28 TABLET | Refills: 0 | Status: SHIPPED | DISCHARGE
Start: 2022-12-23 | End: 2022-12-24 | Stop reason: SDUPTHER

## 2022-12-23 RX ORDER — PANTOPRAZOLE SODIUM 40 MG/1
40 TABLET, DELAYED RELEASE ORAL
Qty: 30 TABLET | Refills: 3 | DISCHARGE
Start: 2022-12-24

## 2022-12-23 RX ORDER — GABAPENTIN 300 MG/1
300 CAPSULE ORAL DAILY
Qty: 30 CAPSULE | Refills: 0 | DISCHARGE
Start: 2022-12-24 | End: 2023-01-23

## 2022-12-23 RX ORDER — POLYETHYLENE GLYCOL 3350 17 G/17G
17 POWDER, FOR SOLUTION ORAL DAILY
Qty: 527 G | Refills: 1 | DISCHARGE
Start: 2022-12-24 | End: 2023-01-23

## 2022-12-23 RX ORDER — ALLOPURINOL 100 MG/1
100 TABLET ORAL DAILY
Qty: 30 TABLET | Refills: 3 | DISCHARGE
Start: 2022-12-24

## 2022-12-23 RX ORDER — CALCIUM CARBONATE 200(500)MG
500 TABLET,CHEWABLE ORAL DAILY PRN
DISCHARGE
Start: 2022-12-23 | End: 2023-01-22

## 2022-12-23 RX ORDER — LANOLIN ALCOHOL/MO/W.PET/CERES
400 CREAM (GRAM) TOPICAL 2 TIMES DAILY
Qty: 30 TABLET | DISCHARGE
Start: 2022-12-23

## 2022-12-23 RX ORDER — BISACODYL 10 MG
10 SUPPOSITORY, RECTAL RECTAL DAILY PRN
DISCHARGE
Start: 2022-12-23 | End: 2023-01-22

## 2022-12-23 RX ORDER — CALCITRIOL 0.25 UG/1
0.25 CAPSULE, LIQUID FILLED ORAL DAILY
Qty: 30 CAPSULE | Refills: 3 | DISCHARGE
Start: 2022-12-24

## 2022-12-23 RX ORDER — CLINDAMYCIN HYDROCHLORIDE 300 MG/1
300 CAPSULE ORAL EVERY 8 HOURS SCHEDULED
Qty: 25 CAPSULE | Refills: 0 | DISCHARGE
Start: 2022-12-23 | End: 2022-12-31

## 2022-12-23 RX ORDER — VENLAFAXINE HYDROCHLORIDE 37.5 MG/1
37.5 CAPSULE, EXTENDED RELEASE ORAL
Qty: 30 CAPSULE | Refills: 0 | DISCHARGE
Start: 2022-12-24

## 2022-12-23 RX ORDER — ZOLPIDEM TARTRATE 5 MG/1
5 TABLET ORAL NIGHTLY PRN
Status: DISCONTINUED | OUTPATIENT
Start: 2022-12-23 | End: 2022-12-24 | Stop reason: HOSPADM

## 2022-12-23 RX ORDER — METHOCARBAMOL 500 MG/1
500 TABLET, FILM COATED ORAL 3 TIMES DAILY PRN
Qty: 30 TABLET | Refills: 0 | DISCHARGE
Start: 2022-12-23 | End: 2023-01-02

## 2022-12-23 RX ORDER — VENLAFAXINE HYDROCHLORIDE 37.5 MG/1
37.5 CAPSULE, EXTENDED RELEASE ORAL
Status: DISCONTINUED | OUTPATIENT
Start: 2022-12-24 | End: 2022-12-24 | Stop reason: HOSPADM

## 2022-12-23 RX ORDER — ATORVASTATIN CALCIUM 40 MG/1
40 TABLET, FILM COATED ORAL NIGHTLY
Qty: 30 TABLET | Refills: 3 | DISCHARGE
Start: 2022-12-23

## 2022-12-23 RX ADMIN — HYDRALAZINE HYDROCHLORIDE 100 MG: 50 TABLET, FILM COATED ORAL at 08:23

## 2022-12-23 RX ADMIN — ATORVASTATIN CALCIUM 40 MG: 40 TABLET, FILM COATED ORAL at 22:18

## 2022-12-23 RX ADMIN — ZOLPIDEM TARTRATE 5 MG: 5 TABLET ORAL at 22:18

## 2022-12-23 RX ADMIN — CALCITRIOL 0.25 MCG: 0.25 CAPSULE ORAL at 08:26

## 2022-12-23 RX ADMIN — METHOCARBAMOL 500 MG: 500 TABLET ORAL at 13:47

## 2022-12-23 RX ADMIN — METOPROLOL TARTRATE 25 MG: 25 TABLET, FILM COATED ORAL at 08:22

## 2022-12-23 RX ADMIN — AMIODARONE HYDROCHLORIDE 200 MG: 200 TABLET ORAL at 08:22

## 2022-12-23 RX ADMIN — PANTOPRAZOLE SODIUM 40 MG: 40 TABLET, DELAYED RELEASE ORAL at 05:42

## 2022-12-23 RX ADMIN — APIXABAN 5 MG: 5 TABLET, FILM COATED ORAL at 22:18

## 2022-12-23 RX ADMIN — ROPINIROLE HYDROCHLORIDE 0.25 MG: 0.25 TABLET, FILM COATED ORAL at 22:18

## 2022-12-23 RX ADMIN — HYDRALAZINE HYDROCHLORIDE 100 MG: 50 TABLET, FILM COATED ORAL at 22:33

## 2022-12-23 RX ADMIN — POLYETHYLENE GLYCOL 3350 17 G: 17 POWDER, FOR SOLUTION ORAL at 08:22

## 2022-12-23 RX ADMIN — MAGNESIUM OXIDE TAB 400 MG (241.3 MG ELEMENTAL MG) 400 MG: 400 (241.3 MG) TAB at 22:18

## 2022-12-23 RX ADMIN — Medication 2000 UNITS: at 08:22

## 2022-12-23 RX ADMIN — HYDRALAZINE HYDROCHLORIDE 100 MG: 50 TABLET, FILM COATED ORAL at 13:47

## 2022-12-23 RX ADMIN — GABAPENTIN 300 MG: 300 CAPSULE ORAL at 08:23

## 2022-12-23 RX ADMIN — MAGNESIUM OXIDE TAB 400 MG (241.3 MG ELEMENTAL MG) 400 MG: 400 (241.3 MG) TAB at 08:22

## 2022-12-23 RX ADMIN — CLINDAMYCIN HYDROCHLORIDE 300 MG: 150 CAPSULE ORAL at 05:46

## 2022-12-23 RX ADMIN — IRON SUCROSE 100 MG: 20 INJECTION, SOLUTION INTRAVENOUS at 15:46

## 2022-12-23 RX ADMIN — CLINDAMYCIN HYDROCHLORIDE 300 MG: 150 CAPSULE ORAL at 22:18

## 2022-12-23 RX ADMIN — METHOCARBAMOL 500 MG: 500 TABLET ORAL at 05:42

## 2022-12-23 RX ADMIN — ALLOPURINOL 100 MG: 100 TABLET ORAL at 08:23

## 2022-12-23 RX ADMIN — CLINDAMYCIN HYDROCHLORIDE 300 MG: 150 CAPSULE ORAL at 14:00

## 2022-12-23 RX ADMIN — APIXABAN 5 MG: 5 TABLET, FILM COATED ORAL at 08:22

## 2022-12-23 RX ADMIN — METHOCARBAMOL 500 MG: 500 TABLET ORAL at 22:18

## 2022-12-23 RX ADMIN — HYDROCODONE BITARTRATE AND ACETAMINOPHEN 1 TABLET: 5; 325 TABLET ORAL at 13:47

## 2022-12-23 ASSESSMENT — PAIN DESCRIPTION - ORIENTATION
ORIENTATION: RIGHT

## 2022-12-23 ASSESSMENT — PAIN DESCRIPTION - DESCRIPTORS
DESCRIPTORS: GNAWING
DESCRIPTORS: DULL;NAGGING
DESCRIPTORS: ITCHING;NAGGING
DESCRIPTORS: GNAWING
DESCRIPTORS: ITCHING;NAGGING

## 2022-12-23 ASSESSMENT — PAIN SCALES - GENERAL
PAINLEVEL_OUTOF10: 3
PAINLEVEL_OUTOF10: 2
PAINLEVEL_OUTOF10: 4
PAINLEVEL_OUTOF10: 2
PAINLEVEL_OUTOF10: 4

## 2022-12-23 ASSESSMENT — PAIN DESCRIPTION - LOCATION
LOCATION: ANKLE
LOCATION: LEG
LOCATION: LEG
LOCATION: ANKLE
LOCATION: ANKLE

## 2022-12-23 ASSESSMENT — PAIN DESCRIPTION - PAIN TYPE
TYPE: SURGICAL PAIN
TYPE: SURGICAL PAIN

## 2022-12-23 ASSESSMENT — PAIN - FUNCTIONAL ASSESSMENT
PAIN_FUNCTIONAL_ASSESSMENT: ACTIVITIES ARE NOT PREVENTED

## 2022-12-23 NOTE — PROGRESS NOTES
[address] [facility]    Acute Rehabilitation Occupational Therapy Daily Treatment Note    Date: 22  Patient Name: Chloe Reynolds       Room: 8340/3399-16  MRN: [id number]  Account: [de-identified]   : 1945  (69 y.o.) Gender: female       Referring Practitioner: Howie Peralta MD  Diagnosis: Open fracture of right tibia and fibula, sequela  Additional Pertinent Hx: Chloe Reynolds is 69 y.o. female  Who was admitted to the hospital on 2022 for treatment of Open fracture of right tibia and fibula, sequela. Patient presented to the emergency room with right ankle injury and suffered a right ankle fracture after falling over a curb when she was trying to go to [place] for [employer]. She has underlying history of A. fib on Eliquis, CHF, diabetes mellitus with CKD3, hypertension, osteoporosis, fibromyalgia, morbid obesity BMI 43, sleep apnea, depression. Patient underwent I&D and ORIF on 2022. Patient also developed acute kidney injury with hyperkalemia and metabolic acidosis. Nephrology was consulted and patient given Lynder Cranker. Patient was given 1 unit PRBC transfusion on 2022 for low hemoglobin of 6.4. Treatment Diagnosis: Impaired self-care status    Past Medical History:  has a past medical history of Abnormal stress test, Anemia, Arthritis, Depression, Diverticulosis, DM (diabetes mellitus) ([address] Erythropenia, Fibromyalgia, HTN (hypertension), Hyperlipidemia, Kidney stone, Morbid obesity due to excess calories ([address] DIONY on CPAP, Osteopenia, Seasonal allergies, and Sleep apnea. Past Surgical History:   has a past surgical history that includes Colonoscopy (2003); Colonoscopy (2007); Tubal ligation; Arm Surgery (2011); Total knee arthroplasty (Bilateral); Cardiac catheterization ([id number] dr Alpa Boss); Cardiac catheterization (2016); ORIF ankle fracture (Right, 2022);  Ankle fracture surgery (Right, 2022); and IR TUNNELED CVC PLACE WO SQ PORT/PUMP > 5 YEARS (12/19/2022). Restrictions  Restrictions/Precautions  Restrictions/Precautions: Weight Bearing  Required Braces or Orthoses?: Yes (R cam boot)  Implants present? : Metal implants     Position Activity Restriction  Other position/activity restrictions: Per chart: Right open trimalleolar fracture ankle fracture dislocation s/p I&D and ORIF, DOS 12/6/2022. ortho appt,  Dr. Murphy Fails on 12-21 now has cam boot RLE remains NWB. Lower Extremity Weight Bearing Restrictions  Right Lower Extremity Weight Bearing: Non Weight Bearing      Subjective  Subjective  Subjective: \"Thats what gets me, No one tells me what's going on\". Pt states RE her medical and nephrology issues and tests. Emotional support, education and encouragement provided. Pain Assessment  Pain Assessment: None - Denies Pain    Objective  Cognition  Overall Orientation Status: Within Functional Limits       Cognition  Overall Cognitive Status: WFL    Activities of Daily Living  Pt declines all ADLs this date. Tub/Shower Transfers  Skilled Clinical Factors:  (PM: LUJAN and RICHELLE moss facilitated pt in squat pivot transfers from w/c>rolling shower chair>w/c for increased I with ADLs. Pt requires max A X2, max cues for setup, safety and sequencing. Pt stalls on 1st transfer requiring max encouragement 2* anxiety.)    Mobility  OT Exercises  Exercise Treatment: AM: Pt engages in activity tolerance task to sort and fold clothes from w/c level while donning 1# wrist weights donned for ~10 minutes of 20 minute task. Pt tolerates well with intermittent rest breaks. Resistive Exercises: AM: LUJAN facilitated pt in BUE exercises for increased strength and activity tolerance to support safety and participation with ADLs. Pt completes with 2# free weight X15 reps each. Requires RB between each exercise due to fatigue. Assessment  Assessment  Activity Tolerance: Patient limited by fatigue;Patient limited by endurance; Other (comment) (anxiety)  Discharge Recommendations: Continue to assess pending progress    Patient Education  Education  Education Given To: Patient  Education Provided: Role of Therapy;Plan of Care;Safety;Transfer Training;Energy Conservation  Education Method: Verbal;Demonstration  Barriers to Learning: None  Education Outcome: Verbalized understanding;Continued education needed;Demonstrated understanding    Goals     Short Term Goals  Time Frame for Short Term Goals: By one week  Short Term Goal 1: Pt will perform UB ADLs including grooming/oral care with SBA and good safety  Short Term Goal 2: Pt will perform LB ADLs including toileting/toilet transfers with Min A, good safety, and use of AE/DME/Modified techniques as needed  Short Term Goal 3: Pt will be educated on NWB status to RLE with good carryover during functional transfers/tasks  Short Term Goal 4: Pt will tolerate standing for 5+ minutes, Min A, with 1-2 UE support and no LOB during functional task while maintaining NWB RLE  Short Term Goal 5: Pt will be educated on and explore use of AE/DME/Modified techniques to improve safety and independence with ADL tasks  Additional Goals?: Yes  Short Term Goal 6: Pt will actively participate in 30+ minutes of therapeutic exercise/functional activity to promote safety and independence with self-care and mobility  Short Term Goal 7: Pt will perform functional transfers/mobility with Min A, good safety, and use of least restrictive device while maintaining NWB RLE    Long Term Goals  Time Frame for Long Term Goals : By discharge  Long Term Goal 1: Pt will perform UB ADLs including grooming/oral care with Mod I and good safety  Long Term Goal 2: Pt will perform LB ADLs, including toileting/toilet transfers, with SBA, good safety, and use of AE/DME/Modified techniques as needed  Long Term Goal 3: Pt will tolerate standing 10+ minutes, SBA, with 1-2 UE and no LOB during self-care/functional activity while maintaining NWB RLE  Long Term Goal 4: Pt will verbalize/demonstrate good understanding of home safety/fall prevention/energy conservation strategies to improve safety and independence with self-care tasks  Long Term Goal 5: Pt will safely perform light housekeeping/meal preparation with supervision, good safety, and use of least restrictive device to improve independence with ADLs/IADLs  Additional Goals?: Yes  Long Term Goal 6: Pt will perform functional transfers/mobility with SBA, good safety, and use of least restrictive device while maintaining NWB RLE  Long Term Goal 7: Pt will demonstrate improved fine motor coordination during self-care tasks AEB 10 second improvement on 9 hole peg test    Plan  Occupational Therapy Plan  Times Per Week: 5-7  Times Per Day: Twice a day  Current Treatment Recommendations: Strengthening, ROM, Balance training, Functional mobility training, Endurance training, Pain management, Patient/Caregiver education & training, Positioning, Safety education & training, Equipment evaluation, education, & procurement, Self-Care / ADL, Home management training, Coordination training         12/23/22 1100 12/23/22 1543   OT Individual Minutes   Time In 1101  --    Time Out 1201  --    Minutes 60  --    OT Co-Treatment Minutes   Time In  --    (1301)   Time Out  --    (1331)    LUJAN taking 20 minutes of co-tx time, 80 minutes total, 5 charges.     Electronically signed by TRISHA Diaz on 12/23/22 at 3:45 PM EST

## 2022-12-23 NOTE — CONSULTS
Department of Psychiatry   Psychiatric Assessment      Thank you very much for allowing us to participate in the care of this patient. Reason for Consult: Depression  HISTORY OF PRESENT ILLNESS:          The patient is a 69 y.o. female with significant history of anemia, arthritis, diabetes mellitus, neutropenia, fibromyalgia, hypertension, hyperlipidemia, chronic kidney disease stage IIIb on dialysis, who is admitted medically after she fell on broke her ankle. Patient reports that she is struggled with depression throughout her life. She states that since this accident she has been feeling more down and depressed with prominent anhedonia. She reports low energy, low motivation, decreased appetite, poor sleep, feelings of hopelessness and guilt. She is denying suicidal ideation intent or plan. Denies any psychotic symptoms. Reports first depressive episode in the 0811S, on tricyclic's at that time. Numerous antidepressants since then but inconsistent with medications and she does not really recall which ones worked      PSYCHIATRIC HISTORY:      Outpatient psychiatric provider: States she is only been managed by her primary care doctor  Suicide attempts: Denies  Inpatient psychiatric admissions: Denies    Past psychiatric medications includes:     Zoloft, Effexor, tricyclic's in the 38X    Adverse reactions from psychotropic medications:    Denies      Lifetime Psychiatric Review of Systems completed       Obsessions and Compulsions: Denies       Lila or Hypomania: Denies     Hallucinations: Denies     Panic Attacks:  Denies     Delusions:  Denies     Phobias:  Denies     Trauma: Denies    Prior to Admission medications    Medication Sig Start Date End Date Taking? Authorizing Provider   HYDROcodone-acetaminophen (NORCO) 5-325 MG per tablet Take 1 tablet by mouth every 6 hours as needed for Pain for up to 7 days.  Max Daily Amount: 4 tablets 12/23/22 12/30/22 Yes Gary Odom MD   calcium carbonate (TUMS) 500 MG chewable tablet Take 1 tablet by mouth daily as needed for Heartburn 12/23/22 1/22/23 Yes Shana Layton MD   magnesium oxide (MAG-OX) 400 (240 Mg) MG tablet Take 1 tablet by mouth 2 times daily 12/23/22  Yes Shana Layton MD   apixaban Lylashalini Boyle) 5 MG TABS tablet Take 1 tablet by mouth 2 times daily 12/23/22  Yes Shana Layton MD   gabapentin (NEURONTIN) 300 MG capsule Take 1 capsule by mouth daily for 30 days.  12/24/22 1/23/23 Yes Shana Layton MD   atorvastatin (LIPITOR) 40 MG tablet Take 1 tablet by mouth nightly 12/23/22  Yes Shana Layton MD   allopurinol (ZYLOPRIM) 100 MG tablet Take 1 tablet by mouth daily 12/24/22  Yes Shana Layton MD   bisacodyl (DULCOLAX) 10 MG suppository Place 1 suppository rectally daily as needed for Constipation 12/23/22 1/22/23 Yes Shana Layton MD   polyethylene glycol (GLYCOLAX) 17 g packet Take 17 g by mouth daily 12/24/22 1/23/23 Yes Shana Layton MD   clindamycin (CLEOCIN) 300 MG capsule Take 1 capsule by mouth every 8 hours for 8 days 12/23/22 12/31/22 Yes Shana Layton MD   calcitRIOL (ROCALTROL) 0.25 MCG capsule Take 1 capsule by mouth daily 12/24/22  Yes Shana Layton MD   Benzocaine-Menthol (CEPACOL) 6-10 MG LOZG lozenge Take 1 lozenge by mouth every 2 hours as needed for Sore Throat 12/23/22  Yes Shana Layton MD   methocarbamol (ROBAXIN) 500 MG tablet Take 1 tablet by mouth 3 times daily as needed (spasm) 12/23/22 1/2/23 Yes Shana Layton MD   pantoprazole (PROTONIX) 40 MG tablet Take 1 tablet by mouth every morning (before breakfast) 12/24/22  Yes Shana Layton MD   venlafaxine (EFFEXOR XR) 37.5 MG extended release capsule Take 1 capsule by mouth daily (with breakfast) 12/24/22  Yes Alvin Patricia MD   metoprolol tartrate (LOPRESSOR) 25 MG tablet Take 1 tablet by mouth 2 times daily 12/12/22   Jasvir Bowden MD   senna (SENOKOT) 8.6 MG tablet Take 1 tablet by mouth daily as needed (Constipation) 12/12/22 1/11/23  Jasvir Bowden MD rOPINIRole (REQUIP) 0.25 MG tablet Take 1 tablet by mouth nightly 11/14/22   Eliza Garces MD   blood glucose monitor kit and supplies use check blood sugar as directed 11/9/22   Eliza Garces MD   Lancets MISC 1 each by Does not apply route daily 11/9/22   Eliza Garces MD   Alcohol Swabs 70 % PADS 1 each by Does not apply route daily 11/9/22   Eliza Garces MD   zolpidem (AMBIEN) 5 MG tablet Take 5 mg by mouth nightly as needed for Sleep.     Historical Provider, MD   hydrALAZINE (APRESOLINE) 25 MG tablet Take 4 tablets by mouth 3 times daily 7/12/22   Arnold Tafoya MD   amiodarone (CORDARONE) 200 MG tablet Take 1 tablet by mouth daily 7/6/22   BARBIE Nj NP   ferrous sulfate (FE TABS 325) 325 (65 Fe) MG EC tablet Take 1 tablet by mouth daily (with breakfast) 5/10/22   Eliza Garces MD   Cholecalciferol (VITAMIN D3) 25 MCG (1000 UT) TABS Take 2 tablets by mouth daily 1/9/20   BARBIE Del Rosario CNP        Medications:    Current Facility-Administered Medications: [START ON 12/24/2022] venlafaxine (EFFEXOR XR) extended release capsule 37.5 mg, 37.5 mg, Oral, Daily with breakfast  calcium carbonate (TUMS) chewable tablet 500 mg, 500 mg, Oral, Daily PRN  clindamycin (CLEOCIN) capsule 300 mg, 300 mg, Oral, 3 times per day  anticoagulant sodium citrate 4 % injection 1.6 mL, 1.6 mL, IntraCATHeter, PRN  anticoagulant sodium citrate 4 % injection 1.6 mL, 1.6 mL, IntraCATHeter, PRN  methocarbamol (ROBAXIN) tablet 500 mg, 500 mg, Oral, 3 times per day  gabapentin (NEURONTIN) capsule 300 mg, 300 mg, Oral, Daily  iron sucrose (VENOFER) 100 mg in sodium chloride 0.9 % 100 mL IVPB, 100 mg, IntraVENous, Q24H  Benzocaine-Menthol (CEPACOL) 1 lozenge, 1 lozenge, Oral, Q2H PRN  atorvastatin (LIPITOR) tablet 40 mg, 40 mg, Oral, Nightly  acetaminophen (TYLENOL) tablet 650 mg, 650 mg, Oral, Q6H PRN  zolpidem (AMBIEN) tablet 10 mg, 10 mg, Oral, Nightly  allopurinol (ZYLOPRIM) tablet 100 mg, Cephalexin, Erythromycin, Ketorolac tromethamine, Pcn [penicillins], Percocet [oxycodone-acetaminophen], Sulfa antibiotics, and Ultracet [tramadol-acetaminophen]      Social History:      RESIDENCE: Lives at home with her  though they are partially estranged  : Is     OCCUPATION: Retired  Worked numerous jobs growing up    [place] 50: Denies        Family Medical and Psychiatric History:     Noncontributory        Problem Relation Age of Onset    Diabetes Mother     Heart Disease Mother     Osteoporosis Mother     Kidney Disease Father     Prostate Cancer Brother          Physical  BP (!) 147/60   Pulse 65   Temp 99.3 °F (37.4 °C) (Oral)   Resp 18   Ht 5' 5\" (1.651 m)   Wt 264 lb 8.8 oz (120 kg)   LMP  (LMP Unknown)   SpO2 96%   BMI 44.02 kg/m²         Mental Status Examination:  Level of consciousness:  Within normal limits  Appearance: hospital attire, lying in bed, fair grooming  Behavior/Motor:  no abnormalities noted  Attitude toward examiner:  cooperative, attentive and good eye contact  Speech:  Spontaneous, normal rate and volume  Mood: Depressed  Affect: mood congruent  Thought processes:  Linear, goal directed, coherent  Thought content: Denies suicidal ideations   Denies homicidal ideations    Denies hallucinations   Denies delusions  Cognition:  Oriented to self, situation, location, date  Concentration clinically adequate  Memory age appropriate  Insight & Judgment:  fair    DSM-5 DIAGNOSIS:      Recurrent moderate major depression      PLAN:      Start Effexor 37.5 mg extended release daily and titrate to effect over time  Should be linked with follow-up at her nursing home as it looks like she is scheduled to be discharged tomorrow  Does not meet criteria for inpatient psychiatric care    Thank you very much for allowing us to participate in the care of this patient.     Electronically signed by Siria Groves MD on 12/23/22 at 2:51 PM EST

## 2022-12-23 NOTE — PROGRESS NOTES
UCHealth Broomfield Hospital/State: [facility]  Today's Date: 12/23/2022  Patient Name: Tyler Mackay  Date of admission: 12/12/2022  5:48 PM  Patient's age: 69 y.o., 1945  Admission Dx: Open fracture of right tibia and fibula, sequela [S82.201S, S80.5S]      Requesting Physician: Olvin Romero MD    CHIEF COMPLAINT: Status post fall. Fracture of the right tibia and fibula. Chronic renal failure. Consult for elevated kappa light chain immunoglobulin. SUBJECTIVE:  Patient was seen and examined. Patient seen and examined  No new complaint interval event  Patient resting comfortably  Hoping to be discharged soon      BRIEF CASE HISTORY:    The patient is a 69 y.o.  female who is admitted to the hospital for further management of open fracture of the right tibia and fibula. Patient had a fall and she had right ankle fracture. She was found to have open fracture of the right tibia and fibula. Patient had surgery and she is admitted to the inpatient rehab for further care. Patient's labs showed evidence of JOSE ARMANDO on top of CKD. She was seen by nephrology. Patient was deemed to be high risk for dialysis. Further work-up showed elevated kappa light chain immunoglobulin at 41.9 with lambda light chain immunoglobulin 3.14. We were consulted to evaluate the patient for possible underlying multiple myeloma. Patient also had history of anemia of chronic renal insufficiency. Past Medical History:   has a past medical history of Abnormal stress test, Anemia, Arthritis, Depression, Diverticulosis, DM (diabetes mellitus) ([address] Erythropenia, Fibromyalgia, HTN (hypertension), Hyperlipidemia, Kidney stone, Morbid obesity due to excess calories ([address] DIONY on CPAP, Osteopenia, Seasonal allergies, and Sleep apnea. Past Surgical History:   has a past surgical history that includes Colonoscopy (01/01/2003); Colonoscopy (01/01/2007);  Tubal ligation; Arm Surgery "  12/24/22 1/23/23 Yes Ha Mehta MD   clindamycin (CLEOCIN) 300 MG capsule Take 1 capsule by mouth every 8 hours for 8 days 12/23/22 12/31/22 Yes Ha Mehta MD   calcitRIOL (ROCALTROL) 0.25 MCG capsule Take 1 capsule by mouth daily 12/24/22  Yes Ha Mehta MD   Benzocaine-Menthol (CEPACOL) 6-10 MG LOZG lozenge Take 1 lozenge by mouth every 2 hours as needed for Sore Throat 12/23/22  Yes Ha Mehta MD   methocarbamol (ROBAXIN) 500 MG tablet Take 1 tablet by mouth 3 times daily as needed (spasm) 12/23/22 1/2/23 Yes Ha Mehta MD   pantoprazole (PROTONIX) 40 MG tablet Take 1 tablet by mouth every morning (before breakfast) 12/24/22  Yes Ha Mehta MD   metoprolol tartrate (LOPRESSOR) 25 MG tablet Take 1 tablet by mouth 2 times daily 12/12/22   Yvone Phalen, MD   senna (SENOKOT) 8.6 MG tablet Take 1 tablet by mouth daily as needed (Constipation) 12/12/22 1/11/23  Yvone Phalen, MD   rOPINIRole (REQUIP) 0.25 MG tablet Take 1 tablet by mouth nightly 11/14/22   Eliza Craft MD   blood glucose monitor kit and supplies use check blood sugar as directed 11/9/22   Eliza Craft MD   Lancets MISC 1 each by Does not apply route daily 11/9/22   Eliza Craft MD   Alcohol Swabs 70 % PADS 1 each by Does not apply route daily 11/9/22   Eliza Craft MD   zolpidem (AMBIEN) 5 MG tablet Take 5 mg by mouth nightly as needed for Sleep.     Historical Provider, MD   hydrALAZINE (APRESOLINE) 25 MG tablet Take 4 tablets by mouth 3 times daily 7/12/22   Dorian Franco MD   amiodarone (CORDARONE) 200 MG tablet Take 1 tablet by mouth daily 7/6/22   BARBIE Fenton - NP   ferrous sulfate (FE TABS 325) 325 (65 Fe) MG EC tablet Take 1 tablet by mouth daily (with breakfast) 5/10/22   Eliza Craft MD   Cholecalciferol (VITAMIN D3) 25 MCG (1000 UT) TABS Take 2 tablets by mouth daily 1/9/20   BARBIE Hubbard - CNP     Current Facility-Administered Medications   Medication Dose Route Frequency Provider Last Rate Last Admin    calcium carbonate (TUMS) chewable tablet 500 mg  500 mg Oral Daily PRN Lesli Sol MD        clindamycin (CLEOCIN) capsule 300 mg  300 mg Oral 3 times per day Zhou PEYTON Menendez DO   300 mg at 12/23/22 1400    anticoagulant sodium citrate 4 % injection 1.6 mL  1.6 mL IntraCATHeter PRN Sedrick Mas MD   1.6 mL at 12/20/22 1831    anticoagulant sodium citrate 4 % injection 1.6 mL  1.6 mL IntraCATHeter PRN Sedrick Mas MD   1.6 mL at 12/20/22 1831    methocarbamol (ROBAXIN) tablet 500 mg  500 mg Oral 3 times per day Lesli Sol MD   500 mg at 12/23/22 1347    gabapentin (NEURONTIN) capsule 300 mg  300 mg Oral Daily Lesli Sol MD   300 mg at 12/23/22 9895    iron sucrose (VENOFER) 100 mg in sodium chloride 0.9 % 100 mL IVPB  100 mg IntraVENous Q24H Sedrick Mas MD   Stopped at 12/22/22 1751    Benzocaine-Menthol (CEPACOL) 1 lozenge  1 lozenge Oral Q2H PRN Gail Martinez MD        atorvastatin (LIPITOR) tablet 40 mg  40 mg Oral Nightly Gail Martinez MD   40 mg at 12/22/22 2153    acetaminophen (TYLENOL) tablet 650 mg  650 mg Oral Q6H PRN Gail Martinez MD        zolpidem (AMBIEN) tablet 10 mg  10 mg Oral Nightly Gail Martinez MD   10 mg at 12/22/22 2153    allopurinol (ZYLOPRIM) tablet 100 mg  100 mg Oral Daily Anthony Jenkins MD   100 mg at 12/23/22 8784    amiodarone (CORDARONE) tablet 200 mg  200 mg Oral Daily Anthony Jenkins MD   200 mg at 12/23/22 0584    apixaban (ELIQUIS) tablet 5 mg  5 mg Oral BID Anthony Jenkins MD   5 mg at 12/23/22 0747    calcitRIOL (ROCALTROL) capsule 0.25 mcg  0.25 mcg Oral Daily Anthony Jenkins MD   0.25 mcg at 12/23/22 0107    hydrALAZINE (APRESOLINE) tablet 100 mg  100 mg Oral TID Anthony Jenkins MD   100 mg at 12/23/22 1347    HYDROcodone-acetaminophen (NORCO) 5-325 MG per tablet 1 tablet  1 tablet Oral Q4H PRN Anthony Jenkins MD   1 tablet at 12/23/22 1347    magnesium oxide (MAG-OX) tablet 400 mg  400 mg Oral BID Cheryl Vela MD   400 mg at 12/23/22 2332    metoprolol tartrate (LOPRESSOR) tablet 25 mg  25 mg Oral BID Cheryl Vela MD   25 mg at 12/23/22 3469    pantoprazole (PROTONIX) tablet 40 mg  40 mg Oral QAM AC Cheryl Vela MD   40 mg at 12/23/22 0542    rOPINIRole (REQUIP) tablet 0.25 mg  0.25 mg Oral Nightly Cheryl Vela MD   0.25 mg at 12/22/22 2208    Vitamin D (CHOLECALCIFEROL) tablet 2,000 Units  2,000 Units Oral Daily Cheryl Vela MD   2,000 Units at 12/23/22 9148    bisacodyl (DULCOLAX) suppository 10 mg  10 mg Rectal Daily PRN Jyoti Schuster MD        polyethylene glycol (GLYCOLAX) packet 17 g  17 g Oral Daily Jyoti Schuster MD   17 g at 12/23/22 9980       Allergies:  Adhesive tape, Cephalexin, Erythromycin, Ketorolac tromethamine, Pcn [penicillins], Percocet [oxycodone-acetaminophen], Sulfa antibiotics, and Ultracet [tramadol-acetaminophen]    REVIEW OF SYSTEMS:      General: Positive for weakness and fatigue. No unanticipated weight loss or decreased appetite. No fever or chills. Eyes: No blurred vision, eye pain or double vision. Ears: No hearing problems or drainage. No tinnitus. Throat: No sore throat, problems with swallowing or dysphagia. Respiratory: No cough, sputum or hemoptysis. Positive for exertional shortness of breath. No pleuritic chest pain. Cardiovascular: No chest pain, orthopnea or PND. No lower extremity edema. No palpitation. Gastrointestinal: No problems with swallowing. No abdominal pain or bloating. No nausea or vomiting. No diarrhea or constipation. No GI bleeding. Genitourinary: No dysuria, hematuria, frequency or urgency. Musculoskeletal: As above. Dermatologic: No skin rashes or pruritus. No skin lesions or discolorations. Psychiatric: No depression, anxiety, or stress or signs of schizophrenia. No change in mood or affect. Hematologic: No history of bleeding tendency. No bruises or ecchymosis.  No history of clotting problems. Infectious disease: No fever, chills or frequent infections. Endocrine: No polydipsia or polyuria. No temperature intolerance. Neurologic: No headaches or dizziness. No weakness or numbness of the extremities. No changes in balance, coordination,  memory, mentation, behavior. Allergic/Immunologic: No nasal congestion or hives. No repeated infections. PHYSICAL EXAM:      BP (!) 147/60   Pulse 65   Temp 99.3 °F (37.4 °C) (Oral)   Resp 18   Ht 5' 5\" (1.651 m)   Wt 264 lb 8.8 oz (120 kg)   LMP  (LMP Unknown)   SpO2 96%   BMI 44.02 kg/m²    Temp (24hrs), Av.9 °F (37.2 °C), Min:98.1 °F (36.7 °C), Max:99.3 °F (37.4 °C)      General appearance - not in pain or distress. Patient is morbidly obese. Mental status - alert and oriented  Eyes - pupils equal and reactive, extraocular eye movements intact  Ears - bilateral TM's and external ear canals normal  Nose - normal and patent, no erythema, discharge or polyps  Mouth - mucous membranes moist, pharynx normal without lesions  Neck - supple, no significant adenopathy  Lymphatics - no palpable lymphadenopathy, no hepatosplenomegaly  Chest - clear to auscultation, no wheezes, rales or rhonchi, symmetric air entry  Heart - normal rate, regular rhythm, normal S1, S2, no murmurs, rubs, clicks or gallops  Abdomen - soft, nontender, nondistended, no masses or organomegaly  Neurological - alert, oriented, normal speech, no focal findings or movement disorder noted  Musculoskeletal -status post right ankle fracture status post open reduction internal fixation.   Extremities - peripheral pulses normal, no pedal edema, no clubbing or cyanosis  Skin - normal coloration and turgor, no rashes, no suspicious skin lesions noted           DATA:      Labs:       CBC:   Recent Labs     22  0652 22  06   WBC  --  10.3   HGB 7.6* 7.7*   HCT 23.2* 24.1*   PLT  --  242     BMP:   Recent Labs     22  0628 22  0605    136   K 4.3 4.5 CO2 28 29   BUN 26* 21   CREATININE 3.02* 2.55*   LABGLOM 15* 19*   GLUCOSE 107* 126*     PT/INR:   No results for input(s): PROTIME, INR in the last 72 hours. APTT:No results for input(s): APTT in the last 72 hours. LIVER PROFILE:  Recent Labs     12/23/22  1311   AST 14   ALT 7       XR ANKLE RIGHT (MIN 3 VIEWS)  History: type IIIa open right trimalleolar ankle fracture status post   ORIF, 12/6/2022    Comparison: 12/6/2022    Findings: 3 views of the right ankle (AP/mortise/lateral) showing   trimalleolar ankle fracture status post ORIF with retained hardware in   relative anatomic alignment without signs of malreduction or hardware   failure when compared to previous films. There is minimal osseous   integration appreciate this time, though view was obscured due to splint   material.    Impression: Stable right trimalleolar ankle fracture status post ORIF      Component Ref Range & Units 4/2/22 1651 4/2/22 1651 Urine IFX Specimen . Random Urine Urine Total Protein mg/dL 24 24 Urine IFX Interp FREE MONOCLONAL LIGHT CHAINS ARE PRESENT, IDENTIFIED AS Comment: KAPPA (6.2 MG/DL). 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 Status post open reduction internal fixation. JOSE ARMANDO on top of CKD. Anemia of chronic renal insufficiency  Elevated kappa light chain immunoglobulin    RECOMMENDATIONS:  Records and labs and images were reviewed and discussed with the patient and family at bedside. Patient is recovering from right ankle fracture.   Undergoing

## 2022-12-23 NOTE — PROGRESS NOTES
Physical Therapy  Facility/Department: [facility] ACUTE REHAB    NAME: Chloe Reynolds  : 1945 (69 y.o.)  MRN: [id number]  CODE STATUS: Full Code    Date of Service: 22      Past Medical History:   Diagnosis Date    Abnormal stress test     Anemia     Arthritis     Depression     Diverticulosis     DM (diabetes mellitus) ([facility] [address]     Erythropenia     Fibromyalgia     HTN (hypertension)     Hyperlipidemia     Kidney stone     Morbid obesity due to excess calories (HCC)     DIONY on CPAP     Osteopenia 14    Seasonal allergies     Sleep apnea     uses bipap prn     Past Surgical History:   Procedure Laterality Date    ANKLE FRACTURE SURGERY Right 2022    RIGHT ANKLE OPEN REDUCTION INTERNAL FIXATION performed by Rolf Valdez DO at [facility]  2011    right arm broken    CARDIAC CATHETERIZATION  [id number] dr Krysten Harris  2016    COLONOSCOPY  2003    COLONOSCOPY  2007    IR TUNNELED CATHETER PLACEMENT GREATER THAN 5 YEARS  2022    IR TUNNELED CATHETER PLACEMENT GREATER THAN 5 YEARS 2022 [facility] PROCED[facility]    ORIF ANKLE FRACTURE Right 2022    RIGHT ANKLE OPEN REDUCTION INTERNAL FIXATION    TOTAL KNEE ARTHROPLASTY Bilateral     left may 2013 and right 2013    TUBAL LIGATION         Additional Pertinent Hx: Ms. Chloe Reynolds is a 69 y.o. female who was admitted to [facility] on 2022 with Ankle Injury. 81-year-old female with history of A. fib on Eliquis, bilateral knee arthroplasty, CHF, CKD, type 2 diabetes, and hypertension as well as chronic numbness of her feet status post fall over a curb found to have displaced right trimalleolar ankle fracture with large posterior malleolus and comminuted lateral malleolus fracture.  Pt S/P  status post I&D and ORIF on 22 by Dr Kathia Medel, right talus open treatment-continue splint right lower extremity, nonweightbearing,  Family / Caregiver Present: No  Referring Practitioner: Dr Teri Robb  Referral Date : 12/12/22  Diagnosis: Right open trimalleolar fracture ankle fracture dislocation s/p I&D and ORIF,  Right talus fracture. General Comment  Comments: CoTx with Devora Morrell in PM working on squat pivot transfers. Hoping to progress transfers to work on getting a shower. Restrictions:  Restrictions/Precautions: Weight Bearing  Lower Extremity Weight Bearing Restrictions  Right Lower Extremity Weight Bearing: Non Weight Bearing  Position Activity Restriction  Other position/activity restrictions: Per chart: Right open trimalleolar fracture ankle fracture dislocation s/p I&D and ORIF, DOS 12/6/2022     SUBJECTIVE  Subjective: Patient reporting anxiety with transfers. Pain: pt denies    Prior Level of Function:  Social/Functional History  Lives With: Spouse  Type of Home: House  Home Layout: One level, Laundry in basement  Home Access: Stairs to enter with rails  Entrance Stairs - Number of Steps: 3 from back door.   Entrance Stairs - Rails: Left  Bathroom Shower/Tub: Walk-in shower, Doors, Shower chair without back  Bathroom Toilet: Handicap height  Bathroom Equipment: Grab bars in shower, Shower chair, Hand-held shower  Home Equipment: [employer], [address], Walker, rolling, Wheelchair-manual (Cane at all times, W/c belongs to )  Receives Help From: Family  ADL Assistance: [address]: Needs assistance  Laundry:  (Daughter does laundry in the basement.)  Vacuuming:  (Spouse/daughter)  Shopping:  (Pt does shopping)  Homemaking Responsibilities: Yes  Meal Prep Responsibility: No (Eats out)  Laundry Responsibility: No (Daughter does laundry)  Cleaning Responsibility: No (Daughter occasionally assists)  Ambulation Assistance: Independent (With cane, used 2 wheel walker at night)  Transfer Assistance: Independent  Active : Yes  Mode of Transportation: SUV  Occupation: Retired  Type of Occupation: Pharmacy tech  IADL Comments: Pt reports sleeping in regular flat bed  Additional Comments: Pt reports her daughter assists as able,  is home most of the time unless he is fishing. Daughter works  [employer] at [email or web address] (medical records). Son works full time but he is on medical leave at this time. OBJECTIVE  Vision  Vision: Impaired  Vision Exceptions: Wears glasses at all times    Hearing  Hearing: Within functional limits    Cognition  Overall Cognitive Status: WFL    Sensation  Overall Sensation Status: Impaired (Decreased sensation B feet.)    Functional Mobility  Bed mobility  Rolling to Right: Stand by assistance (assist of bed rail)  Supine to Sit: Stand by assistance  Scooting: Stand by assistance (EOB)  Bed Mobility Comments: head of bed elevated right hand rail  Transfers  Sit to Stand: 2 Person Assistance; Moderate Assistance;Minimal Assistance (WC to parallel barsleft  UE assist at arm rest , right at parallel bars , PTA foot under right LE to monitor weight bearing, 1 assist maximum)  Stand to Sit: Contact guard assistance (maintains NWB right LE with knee extended , no VCs)  Bed to Chair: Maximum assistance;2 Person Assistance  Squat Pivot Transfers: Maximum Assistance;2 Person Assistance  Lateral Transfers: 2 Person Assistance; Moderate Assistance;Minimal Assistance (bed to [address] to left ,VCs to maintain right LE NWB  , dependant set up , 25 % VCs sequencing /technique)  Balance  Posture: Good  Sitting - Static: Good  Sitting - Dynamic: Fair  Standing - Static: Poor;+  Standing - Dynamic: Poor;-  Comments: standing balance assessed in // bars. Environmental Mobility  Ambulation  WB Status: NWB R LE  Wheelchair Activities  Propulsion: Yes  Propulsion 1  Propulsion: Manual  Level: Level Tile  Method: RUE;LUE  Level of Assistance: Minimal assistance  Description/ Details: Very slow movements to propel herself forward. Multiple short rest breaks to complete. Pt requires Min A to maintain a straight path and maneuver turns.   Distance: 48'    PT Exercises  Exercise Treatment: seated bilateral LE right AROM , left 2.5 # knee extension , hip flexion AAROM lt green bilateral x 15  A/AROM Exercises: supine bilateral LE x 15 left ankle lt green, hip? knee AROM , bilateral SLR AAROM  Functional Mobility Circuit Training: Educated and completed squat pivot transfers to and from bariatric shower chair, WC, and hospital bed. Transfers x 4-4 reps   Max x 2 for safety. Dynamic Sitting Balance Exercises: seated EOB lateral trunk flexion left <> right donning pants stand by assist  Static Standing Balance Exercises: UE support at parallal bars CGA x 1, PTA monitor right LE NWB pt maintains with knee extension > hip/knee flexion 1.15 and 1.30 minute bouts  limited by fatigue and or right UE ache abolished upon sitting  Exercise Equipment: UBE fwd/reto 5 min         Activity Tolerance  Activity Tolerance: Patient limited by fatigue;Patient limited by endurance    Assessment  Treatment Diagnosis: Impaired function. Therapy Prognosis: Good  Decision Making: Medium Complexity  Discharge Recommendations: Patient would benefit from continued therapy after discharge  PT Equipment Recommendations  Equipment Needed: No        GOALS  Patient Goals   Patient Goals : Get stronger  Short Term Goals  Time Frame for Short Term Goals: 1 week  Short Term Goal 1: Bed mobility min A without use of bed rail  Short Term Goal 2: Sit<> // bars or rolling walker at mod A, maintaining NWB R LE  Short Term Goal 3: Improve staning tolerance to 1 minutes, NWB R LE with B UE support. Short Term Goal 4: Lateral scoot transfers, mod A X 1, maintaining NWB R LE. Short Term Goal 5: W/C mobility distance fo 50 ft , SBA/CGA level surface.   Long Term Goals  Time Frame for Long Term Goals : By DC  Long Term Goal 1: Mod-I bed mobility  Long Term Goal 2: Stand pivot Transfers at min/mod A maintain NWB R LE  Long Term Goal 3: W/C mobility distance fo 50 to 150 ft SBA level surface  Long Term Goal 4: Pt able to take 3 to 5 steps in // bars NWB R LE, min A x 2  Long Term Goal 5: Family training for safe transfers from varied surfaces. PLAN OF CARE  Physcial Therapy Plan  General Plan:  minutes of therapy at least 5 out of 7 days a week (5-7 daysx wk)  Current Treatment Recommendations: Strengthening; Functional mobility training;Transfer training; Endurance training;Stair training;Gait training; Safety education & training;Balance training;ROM;Wheelchair mobility training;Patient/Caregiver education & training;Home exercise program;Equipment evaluation, education, & procurement; Therapeutic activities  Additional Comments: Discussed with pt, need of ramp at entrance at this time. Safety Devices  Type of Devices: Gait belt;Call light within reach; Left in chair    EDUCATION  Education  Education Given To: Patient  Education Provided: Transfer Training  Education Provided Comments:  (slide board technique)  Education Method: Demonstration;Verbal  Education Outcome: Continued education needed    Therapy Time     12/23/22 0915 12/23/22 1621   PT Individual Minutes   Time In 0915 1331   Time Out 8800 0954   Minutes 60 10   PT Co-Treatment Minutes   Time In  --  1301   Time Out  --  1331   Minutes  --  30          Variance: 10  Reason: Procedure (Dialysis in PM)    Ora Cormier PTA, 12/23/22 at 4:37 PM

## 2022-12-23 NOTE — CARE COORDINATION
ONGOING ARU DISCHARGE PLAN:    Patient is alert and oriented x4. Spoke with patient at length this morning regarding discharge plan and patient confirms plan is to go to [facility] in [facility] pod Br. Discussed with patient  that [facility] is willing to accept the patient. We need to determine when she will get her next Dialysis treatment and discharge plans can be made from that standpoint. Will have more information once nephrology sees the patient. At that time we can confirm chair time and dates with Óscar at the [facility] location and set up transportation to transfer the patient to [facility]. I did discuss w patient she will not be able to discharge on the day is has dialysis. So, once we have a more definitive plan in place with her Dialysis days, I will let her know. Called an spoke to Kylah at [facility] in [place]. They cannot hold a bed for this patient for Tuesday 27h. [facility] agreed to accept patient tomorrow. Spoke with Raffy at [address] admissions who is helping me coordinate time for dialysis at [facility] facility. She is able to accommodate the patient with a start time for dialysis on Tues 12/27/2022  chair time 11:45am.     Spoke with Dr. Malcom Ram who reached out to [facility] who was agreeable to start dialysis on Tuesday 12/27/22. Patient will start dialysis on Tuesday 12/27/22 @ 1145 am at [facility] In [place]. Confirmed Chair time with Raffy in admissions office and  [facility], spoke with Kade Weber. Called and spoke with Kylah in admissions. Report can be called to [address] will  patient at 3 pm by stretcher. Fax confirmation received. Patient was informed. Patient requested not to have room 329. Lindsey assured me patient will not go to room 329    HENS/ 7000 completed    TREVOR will need completion as well as mandatory ARU DISCHARGE smart phrase in progress note.  Updated today's nurse Sebastien Manuel        Electronically signed by Madeline Freeman Nga Wallace on 12/23/2022 at 8:25 AM

## 2022-12-23 NOTE — DISCHARGE SUMMARY
Physical Medicine & Rehabilitation  Discharge Summary     Patient Identification:  Walter Singh  : 1945  Admit date: 2022  Discharge date:  2022  Primary care provider: Prabha Sarah MD     Problem List:  Right ankle trimalleolar fracture dislocation status post ORIF-nonweightbearing  Possible cellulitis  Atrial fibrillation  JOSE ARMANDO on CKD, new to dialysis secondary to diabetic nephrosclerosis  Diabetes type 2  Neuropathy  Hypertension  Hyperlipidemia  Anemia of chronic disease  Elevated kappa light chain immunoglobulin  Hyponatremia  Fibromyalgia  Obesity  Depression  Insomnia  Gout    Patient Active Problem List   Diagnosis    Dyslipidemia    DIONY treated with BiPAP    Fibromyalgia    CRI (chronic renal insufficiency)    OA (osteoarthritis)    DM (diabetes mellitus) ([address]    Kidney stone    Depression    Seasonal allergies    Diverticulosis    Anemia of chronic renal failure, stage 5 (HCC)    Normocytic normochromic anemia    Mitral regurgitation - Mild to moderate    Stage 4 chronic kidney disease (HCC)    Gout    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 disease ([address]    Essential hypertension    Osteopenia    Morbid obesity due to excess calories ([address]    Anxiety    Febrile illness    Acute serous otitis media of left ear    Secondary hyperparathyroidism ([address]    Acute kidney injury superimposed on chronic kidney disease ([address]    New onset a-fib ([address] with Rapid ventricular response    New onset of congestive heart failure (HCC)    Lymphedema    GERD (gastroesophageal reflux disease)    Restless leg syndrome    Acute diastolic congestive heart failure (HCC)    Chronic bilateral low back pain without sciatica    Acute bilateral low back pain without sciatica    Generalized muscle weakness    Bradycardia    Open fracture of right tibia and fibula, sequela    Type III open trimalleolar fracture of right ankle    Hyperkalemia    Hyponatremia    Metabolic acidosis    Fall    JOSE ARMANDO (acute kidney injury) [facility]    Paraproteinemia    Stage 5 chronic kidney disease not on chronic dialysis (HCC)    Moderate recurrent major depression ([facility]       Admission History:  Singh Agustin  is a 69 y.o. right-handed female admitted to the [address] [place] [address] unit on 12/12/2022. She was originally admitted to [facility] on 12/5/22 for injuries sustained in mechanical fall over a curb. Diagnostic studies confirmed R trimalleolar ankle fracture with dislocation. Dr. Carmela Ariza performed  ORIF R trimalleolar fracture and open treatment of talus fracture with I&D of open fracture. She is NWB RLE. Nephrology followed for JOSE ARMANDO on CKD. She was managed medically due to high risk for need of hemodialysis. Inpatient Rehabilitation Course: Singh Agustin was admitted to inpatient rehabilitation on 12/12/2022. Rehab course:  Continue with inpatient rehab, unable to maintain nonweightbearing, home not wheelchair accessible as result patient plan for skilled nursing facility. Pain medications adjusted. Follow-up with orthopedics-noted has some cellulitis started on neomycin to complete course and has follow-up again 12/28. Continue amiodarone and Eliquis for atrial fibrillation. Has anemia of chronic disease-required transfusion x1. Improved. Also treated with IV iron and completed course. Chronic kidney disease now end-stage renal disease-started hemodialysis. To be continued at skilled nursing facility. Patient to continue Retacrit 3 times a week with dialysis per nephrology note. She has elevated kappa light chain immunoglobulin-hematology evaluated-plan for outpatient bone marrow biopsy. Hyponatremia improved. Depression-seen by psychiatry started on Effexor. Continued on Ambien for insomnia.     Discharge status:  stable    Discharge Dispostiion:   [address]      The patient participated in an aggressive multidisciplinary inpatient rehabilitation program involving 3 hours per day, 5 days per week of rehabilitation. Patient benefited from inpatient rehab and was discharged in good and stable condition. Patient evaluated today:  GEN: Well developed, well nourished, no acute distress  HEENT: NCAT. EOM grossly intact. [place] grossly intact. Mucous membranes pink and moist.  RESP: Normal breath sounds with no wheezing, rales, or rhonchi. Respirations WNL and unlabored. CV: Regular rate and rhythm. No murmurs, rubs, or gallops. ABD: Soft, non-distended, BS+ and equal.  NEURO: Alert. Speech fluent. Sensation to light touch intact. MSK:  Muscle tone and bulk are normal bilaterally. Strength 4+/5 in bilateral upper limbs. Strength 4+/5 with left ankle dorsiflexion and plantarflexion. Has at least antigravity strength with right ankle dorsiflexion and plantarflexion. LIMBS: Edema present in bilateral lower limbs. SKIN: Warm and dry with good turgor. PSYCH: Mood WNL. Affect WNL. Appropriately interactive.       Consults:   IM, Hematology, Psychiatry, Nephrology      Significant Diagnostics:   CBC:   Lab Results   Component Value Date/Time    WBC 10.3 12/22/2022 06:28 AM    RBC 2.62 12/22/2022 06:28 AM    RBC 3.32 03/02/2012 11:15 AM    HGB 7.7 12/22/2022 06:28 AM    HCT 24.1 12/22/2022 06:28 AM    MCV 91.8 12/22/2022 06:28 AM    MCH 29.5 12/22/2022 06:28 AM    MCHC 32.1 12/22/2022 06:28 AM    RDW 16.6 12/22/2022 06:28 AM     12/22/2022 06:28 AM     03/02/2012 11:15 AM    MPV 7.3 12/22/2022 06:28 AM     CMP:    Lab Results   Component Value Date/Time     12/24/2022 12:40 PM    K 4.6 12/24/2022 12:40 PM     12/24/2022 12:40 PM    CO2 23 12/24/2022 12:40 PM    BUN 31 12/24/2022 12:40 PM    CREATININE 3.65 12/24/2022 12:40 PM    GFRAA 18 06/09/2022 11:35 AM    LABGLOM 12 12/24/2022 12:40 PM    GLUCOSE 149 12/24/2022 12:40 PM    GLUCOSE 113 03/02/2012 11:15 AM    PROT 5.6 12/13/2022 06:47 AM    LABALBU 2.9 12/13/2022 06:47 AM    LABALBU 4.2 03/02/2012 11:15 AM    CALCIUM 10.1 12/24/2022 12:40 PM    BILITOT 0.3 12/13/2022 06:47 AM    ALKPHOS 49 12/13/2022 06:47 AM    AST 14 12/23/2022 01:11 PM    ALT 7 12/23/2022 01:11 PM       Discharge Functional Status:      Physical Therapy:  Restrictions/Precautions: Weight Bearing  Implants present? : Metal implants  Other position/activity restrictions: Per chart: Right open trimalleolar fracture ankle fracture dislocation s/p I&D and ORIF, DOS 12/6/2022. ortho appt,  Dr. Jemma Levine on 12-21 now has cam boot RLE remains NWB. Right Lower Extremity Weight Bearing: Non Weight Bearing  Required Braces or Orthoses  Right Lower Extremity Brace: Boot     Bed mobility  Rolling to Left: Stand by assistance (assist of bed rail)  Rolling to Right: Stand by assistance (assist of bed rail)  Supine to Sit: Stand by assistance  Sit to Supine: 2 Person assistance, Moderate assistance  Scooting: Stand by assistance (EOB)  Bed Mobility Comments: head of bed elevated right hand rail     Transfers  Sit to Stand: 2 Person Assistance, Moderate Assistance, Minimal Assistance (WC to parallel barsleft  UE assist at arm rest , right at parallel bars , PTA foot under right LE to monitor weight bearing, 1 assist maximum)  Stand to Sit: Contact guard assistance (maintains NWB right LE with knee extended , no VCs)  Bed to Chair: Maximum assistance, 2 Person Assistance  Squat Pivot Transfers: Maximum Assistance, 2 Person Assistance  Lateral Transfers: 2 Person Assistance, Moderate Assistance, Contact guard assistance  Comment: lateral transfer from bed to [facility] with slide board toward left side     WB Status: NWB R LE    Occupational Therapy: Feeding  Assistance Level: Independent  Skilled Clinical Factors: Pt finishing up morning meal upon writer arrival. Per report, pt requires A opening ketchup and mustard packets only. Able to cut food and manage utensils without difficulty. Grooming/Oral Hygiene  Assistance Level:  Independent  Skilled Clinical Factors: Seated at sink. Upper Extremity Bathing  Assistance Level: Set-up  Skilled Clinical Factors: Seated on shower chair. Lower Extremity Bathing  Assistance Level: Moderate assistance  Skilled Clinical Factors: Seated on shower chair. Requires assist for feet and buttocks. Upper Extremity Dressing  Assistance Level: Set-up  Skilled Clinical Factors: Able to chris bra and overhead shirt without difficulty. Assistance Level: Moderate assistance  Skilled Clinical Factors: Seated on shower chair. Requires assist for feet and buttocks. Assistance Level: Set-up  Skilled Clinical Factors: Able to chris bra and overhead shirt without difficulty. Lower Extremity Dressing  Assistance Level: Moderate assistance  Skilled Clinical Factors: Able to assist with clothing management over hips while supine. Putting On/Taking Off Footwear  Assistance Level: Dependent  Skilled Clinical Factors: ace wrap LLE, sock to L, RLE in cam boot. pt able to doff L sock with reachers sitting EOB post ed (ace wraps were not on yet)  Toileting  Assistance Level: Dependent  Skilled Clinical Factors: TA for brief care and hygiene at this time. Able to assist with clothing management. OT Equipment Recommendations  Equipment Needed: Yes  Mobility Devices: ADL Assistive Devices  ADL Assistive Devices: Reacher  Other: Pt reports she has a walk in shower with a seat, grab bar in the shower and a raised toilet seat. Equipment Recommendation(s):        Speech Therapy:         Patient Instructions:    follow-up with 1. PCP 2. Nephrology 3. Ortho - Dr Irving Arguello 12/28  1 pm 4.   Hematology,  monitor CBC/BMP Q Monday and Thursday begin 12/26, continue hemodialysis, will need outpatient bone marrow biopsy, Retacrit 10,000 units SQ 3 times a week with dialysis     Medications, precautions and follow up reviewed with patient and family    Follow-up visits: See after visit summary from hospitalization      Discharge Medications:     Medication List START taking these medications      Benzocaine-Menthol 6-10 MG Lozg lozenge  Commonly known as: CEPACOL  Take 1 lozenge by mouth every 2 hours as needed for Sore Throat     bisacodyl 10 MG suppository  Commonly known as: DULCOLAX  Place 1 suppository rectally daily as needed for Constipation     calcium carbonate 500 MG chewable tablet  Commonly known as: TUMS  Take 1 tablet by mouth daily as needed for Heartburn     clindamycin 300 MG capsule  Commonly known as: CLEOCIN  Take 1 capsule by mouth every 8 hours for 8 days     gabapentin 300 MG capsule  Commonly known as: NEURONTIN  Take 1 capsule by mouth daily for 30 days. Replaces: gabapentin 600 MG tablet     magnesium oxide 400 (240 Mg) MG tablet  Commonly known as: MAG-OX  Take 1 tablet by mouth 2 times daily  Replaces: Magnesium Oxide 400 MG Caps     polyethylene glycol 17 g packet  Commonly known as: GLYCOLAX  Take 17 g by mouth daily     venlafaxine 37.5 MG extended release capsule  Commonly known as: EFFEXOR XR  Take 1 capsule by mouth daily (with breakfast)            CHANGE how you take these medications      allopurinol 100 MG tablet  Commonly known as: ZYLOPRIM  Take 1 tablet by mouth daily  What changed: See the new instructions. apixaban 5 MG Tabs tablet  Commonly known as: Eliquis  Take 1 tablet by mouth 2 times daily  What changed: See the new instructions.      atorvastatin 40 MG tablet  Commonly known as: LIPITOR  Take 1 tablet by mouth nightly  What changed: when to take this     calcitRIOL 0.25 MCG capsule  Commonly known as: ROCALTROL  Take 1 capsule by mouth daily  What changed:   medication strength  how much to take  how to take this  when to take this  additional instructions     methocarbamol 500 MG tablet  Commonly known as: ROBAXIN  Take 1 tablet by mouth 3 times daily as needed (spasm)  What changed:   medication strength  how much to take  when to take this  reasons to take this     pantoprazole 40 MG tablet  Commonly known as: PROTONIX  Take 1 tablet by mouth every morning (before breakfast)  What changed:   how much to take  how to take this  when to take this  additional instructions     zolpidem 5 MG tablet  Commonly known as: AMBIEN  What changed: Another medication with the same name was removed. Continue taking this medication, and follow the directions you see here. CONTINUE taking these medications      Alcohol Swabs 70 % Pads  1 each by Does not apply route daily     amiodarone 200 MG tablet  Commonly known as: CORDARONE  Take 1 tablet by mouth daily     blood glucose monitor kit and supplies  use check blood sugar as directed     ferrous sulfate 325 (65 Fe) MG EC tablet  Commonly known as: FE TABS 325  Take 1 tablet by mouth daily (with breakfast)     hydrALAZINE 25 MG tablet  Commonly known as: APRESOLINE  Take 4 tablets by mouth 3 times daily     HYDROcodone-acetaminophen 5-325 MG per tablet  Commonly known as: NORCO  Take 1 tablet by mouth every 6 hours as needed for Pain for up to 3 days.  Max Daily Amount: 4 tablets     Lancets Misc  1 each by Does not apply route daily     metoprolol tartrate 25 MG tablet  Commonly known as: LOPRESSOR  Take 1 tablet by mouth 2 times daily     rOPINIRole 0.25 MG tablet  Commonly known as: REQUIP  Take 1 tablet by mouth nightly     senna 8.6 MG tablet  Commonly known as: SENOKOT  Take 1 tablet by mouth daily as needed (Constipation)     vitamin D3 25 MCG (1000 UT) Tabs tablet  Commonly known as: CHOLECALCIFEROL  Take 2 tablets by mouth daily            STOP taking these medications      azelastine 0.1 % nasal spray  Commonly known as: ASTELIN     B-12 1000 MCG Lozg     blood glucose test strips     colchicine 0.6 MG tablet  Commonly known as: COLCRYS     cyclobenzaprine 5 MG tablet  Commonly known as: FLEXERIL     gabapentin 600 MG tablet  Commonly known as: NEURONTIN  Replaced by: gabapentin 300 MG capsule     Magnesium Oxide 400 MG Caps  Replaced by: magnesium oxide 400 (240 Mg) MG tablet     traZODone 50 MG tablet  Commonly known as: DESYREL               Where to Get Your Medications        You can get these medications from any pharmacy    Bring a paper prescription for each of these medications  HYDROcodone-acetaminophen 5-325 MG per tablet       Information about where to get these medications is not yet available    Ask your nurse or doctor about these medications  allopurinol 100 MG tablet  apixaban 5 MG Tabs tablet  atorvastatin 40 MG tablet  Benzocaine-Menthol 6-10 MG Lozg lozenge  bisacodyl 10 MG suppository  calcitRIOL 0.25 MCG capsule  calcium carbonate 500 MG chewable tablet  clindamycin 300 MG capsule  gabapentin 300 MG capsule  magnesium oxide 400 (240 Mg) MG tablet  methocarbamol 500 MG tablet  pantoprazole 40 MG tablet  polyethylene glycol 17 g packet  venlafaxine 37.5 MG extended release capsule            Satya Bray MD

## 2022-12-23 NOTE — PLAN OF CARE
Problem: Discharge Planning  Goal: Discharge to home or other facility with appropriate resources  12/22/2022 2253 by Maria E Ferreira RN  Outcome: Progressing  12/22/2022 1556 by Dina Michaud LPN  Outcome: Progressing  Flowsheets (Taken 12/22/2022 1024)  Discharge to home or other facility with appropriate resources: Identify barriers to discharge with patient and caregiver     Problem: Safety - Adult  Goal: Free from fall injury  12/22/2022 2253 by Maria E Ferreira RN  Outcome: Progressing  12/22/2022 1556 by Dina Michaud LPN  Outcome: Progressing     Problem: ABCDS Injury Assessment  Goal: Absence of physical injury  12/22/2022 2253 by Maria E Ferreira RN  Outcome: Progressing  12/22/2022 1556 by Dina Michaud LPN  Outcome: Progressing  Flowsheets (Taken 12/22/2022 1018)  Absence of Physical Injury: Implement safety measures based on patient assessment     Problem: Skin/Tissue Integrity  Goal: Absence of new skin breakdown  Description: 1. Monitor for areas of redness and/or skin breakdown  2. Assess vascular access sites hourly  3. Every 4-6 hours minimum:  Change oxygen saturation probe site  4. Every 4-6 hours:  If on nasal continuous positive airway pressure, respiratory therapy assess nares and determine need for appliance change or resting period.   12/22/2022 2253 by Maria E Ferreira RN  Outcome: Progressing  12/22/2022 1556 by Dina Michaud LPN  Outcome: Progressing     Problem: Pain  Goal: Verbalizes/displays adequate comfort level or baseline comfort level  12/22/2022 2253 by Maria E Ferreira RN  Outcome: Progressing  12/22/2022 1556 by Dina Michaud LPN  Outcome: Progressing     Problem: Chronic Conditions and Co-morbidities  Goal: Patient's chronic conditions and co-morbidity symptoms are monitored and maintained or improved  12/22/2022 2253 by Maria E Ferreira RN  Outcome: Progressing  12/22/2022 1556 by Dina Michaud LPN  Outcome: Progressing  Flowsheets (Taken 12/22/2022 1024)  Care Plan - Patient's Chronic Conditions and Co-Morbidity Symptoms are Monitored and Maintained or Improved: Monitor and assess patient's chronic conditions and comorbid symptoms for stability, deterioration, or improvement     Problem: Nutrition Deficit:  Goal: Optimize nutritional status  12/22/2022 2253 by Ruby Reyes RN  Outcome: Progressing  12/22/2022 1556 by Chidi Robert LPN  Outcome: Progressing

## 2022-12-23 NOTE — PROGRESS NOTES
Physical Medicine & Rehabilitation  Progress Note    12/23/2022 3:10 PM     CC: Ambulatory and ADL dysfunction due to right ankle fracture/dislocation    Subjective:   No complaints. Pain controlled. States she is not able to maintain nonweightbearing. Seen by Dr. Caitie Serrano     ROS:  Denies fevers, chills, sweats. No chest pain, palpitations, lightheadedness. Denies coughing, wheezing or shortness of breath. Denies abdominal pain, nausea, diarrhea or constipation. No new areas of joint pain. Denies new areas of numbness or weakness. Denies new anxiety or depression issues. No new skin problems. Rehabilitation:   PT:  Restrictions/Precautions: Weight Bearing  Implants present? : Metal implants  Other position/activity restrictions: Per chart: Right open trimalleolar fracture ankle fracture dislocation s/p I&D and ORIF, DOS 12/6/2022. ortho appt,  Dr. Caitie Serrano on 12-21 now has cam boot RLE remains NWB. Right Lower Extremity Weight Bearing: Non Weight Bearing   Transfers  Sit to Stand: 2 Person Assistance, Moderate Assistance, Minimal Assistance (WC to parallel barsleft  UE assist at arm rest , right at parallel bars , PTA foot under right LE to monitor weight bearing, 1 assist maximum)  Stand to Sit: Contact guard assistance (maintains NWB right LE with knee extended , no VCs)  Bed to Chair: Maximum assistance, Moderate assistance, 2 Person Assistance  Lateral Transfers: 2 Person Assistance, Moderate Assistance, Minimal Assistance (bed to [facility] to left ,VCs to maintain right LE NWB  , dependant set up , 25 % VCs sequencing /technique)  Comment: Pt is agreeable to slide board transfer from bed to w/c. She requires Mod A to complete lateral scoot and Min A x1 to maintain NWB through RLE. Edu provided on proper technique to complete. STS completed in // bars this date with Mod/Max A x2 and a third person to assist with NWBing of RLE.  Attempted STS in // bars with knee scooter and x3 A, however pt is limited by weakness to get her RLE onto it. Edu provided on proper technique. WB Status: NWB R LE      OT:  ADL  Feeding: Independent  Feeding Skilled Clinical Factors: Per pt report  Grooming: Setup  Grooming Skilled Clinical Factors: Setup for oral care/grooming of hair  UE Bathing: Stand by assistance  UE Bathing Skilled Clinical Factors: Seated EOB, pt able to cleanse UB with wash cloths with SBA for safety  LE Bathing: Maximum assistance, Dependent/Total  LE Bathing Skilled Clinical Factors: Seated EOB, pt able to cleanse upper legs, A to cleanse LLE/foot and TA for aleah/buttocks care Max A x2 in maddy stedy  UE Dressing: Minimal assistance  UE Dressing Skilled Clinical Factors: Seated EOB, pt able to don OH shirt with A to pull down  LE Dressing: Dependent/Total  LE Dressing Skilled Clinical Factors: TA to thread brief and pants, TA to mange up over hips with Max A in maddy stedy  Toileting: Dependent/Total  Toileting Skilled Clinical Factors: TA to complete toileting hygiene and manage brief/pants up over hips  Additional Comments: Pt completes UB and LB ADLs while seated EOB d/t decreased strength, activity tolerance, endurance, and NWB to RLE. Pt tolerated well, frequent rest breaks taken as needed throughout session. Pt Max A/dependent for all LB ADLs d/t NWB RLE and decreased strength.          Balance  Sitting Balance: Stand by assistance  Standing Balance: Maximum assistance   Standing Balance  Time: ~30 seconds x5  Activity: Functional transfers  Comment: Max A x2 using maddy Assignment Editor  Functional Mobility  Functional - Mobility Device: Other (Use of maddy Qwbcgdy to transfer from bed to toilet)  Activity: To/from bathroom  Assist Level: Dependent/Total  Functional Mobility Comments: Max A x2 in maddy stedy     Bed mobility  Rolling to Left: Stand by assistance (assist of bed rail)  Rolling to Right: Stand by assistance (assist of bed rail)  Supine to Sit: Stand by assistance  Sit to Supine: 2 Person assistance, Moderate assistance  Scooting: Stand by assistance (EOB)  Bed Mobility Comments: head of bed elevated right hand rail  Transfers  Sit to stand: Dependent/Total (Max A x2 in maddy stedy)  Stand to sit: Dependent/Total (Max A x2 in maddy stedy)  Transfer Comments: Pt very fearful, however completes 5 sit to stand transfers utilizing maddy stedy. Toilet Transfers  Toilet - Technique: Ambulating  Equipment Used: Raised toilet seat with rails  Toilet Transfer: Dependent/Total (Max A x2 from maddy stedy)  Toilet Transfers Comments: Max A x2 with maddy stedy, VC to maintain NWB RLE               ST:        Objective:  BP (!) 147/60   Pulse 65   Temp 99.3 °F (37.4 °C) (Oral)   Resp 18   Ht 5' 5\" (1.651 m)   Wt 264 lb 8.8 oz (120 kg)   LMP  (LMP Unknown)   SpO2 96%   BMI 44.02 kg/m²  I Body mass index is 44.02 kg/m². I   Wt Readings from Last 1 Encounters:   22 264 lb 8.8 oz (120 kg)      Temp (24hrs), Av.9 °F (37.2 °C), Min:98.1 °F (36.7 °C), Max:99.3 °F (37.4 °C)         GEN: well developed, well nourished, no acute distress  HEENT: Normocephalic atraumatic, EOMI, mucous membranes pink and moist  CV: RRR, no murmurs, rubs or gallops  PULM: CTAB, no rales or rhonchi. Respirations WNL and unlabored  ABD: soft, NT, ND, +BS and equal  NEURO: A&O x3. Sensation intact to light touch. MSK: Right lower extremity with cast, neurovascular intact, plus/5 upper extremities and left lower extremity, 3/5 proximal right lower extremity, right ankle with/splint Ace wrap sling intact neurovascularly intact  EXTREMITIES: No calf tenderness to palpation left  lower extremity boot right lower extremity  SKIN: warm dry and intact with good turgor mild erythema left anterior leg-appears slightly improved from the picture showed by patient  PSYCH: appropriately interactive. Affect WNL.   Good spirits        Medications   Scheduled Meds:   [START ON 2022] venlafaxine  37.5 mg Oral Daily with breakfast    clindamycin  300 mg Oral 3 times per day    methocarbamol  500 mg Oral 3 times per day    gabapentin  300 mg Oral Daily    iron sucrose  100 mg IntraVENous Q24H    atorvastatin  40 mg Oral Nightly    allopurinol  100 mg Oral Daily    amiodarone  200 mg Oral Daily    apixaban  5 mg Oral BID    calcitRIOL  0.25 mcg Oral Daily    hydrALAZINE  100 mg Oral TID    magnesium oxide  400 mg Oral BID    metoprolol tartrate  25 mg Oral BID    pantoprazole  40 mg Oral QAM AC    rOPINIRole  0.25 mg Oral Nightly    Vitamin D  2,000 Units Oral Daily    polyethylene glycol  17 g Oral Daily     Continuous Infusions:  PRN Meds:.zolpidem, calcium carbonate, anticoagulant sodium citrate, anticoagulant sodium citrate, Benzocaine-Menthol, acetaminophen, HYDROcodone-acetaminophen, bisacodyl     Diagnostics:     CBC:   Recent Labs     12/21/22 0652 12/22/22 0628   WBC  --  10.3   RBC  --  2.62*   HGB 7.6* 7.7*   HCT 23.2* 24.1*   MCV  --  91.8   RDW  --  16.6*   PLT  --  242     BMP:   Recent Labs     12/21/22  0652 12/22/22  0628 12/23/22  0605    137 136   K 4.0 4.3 4.5   CL 98 100 99   CO2 28 28 29   BUN 21 26* 21   CREATININE 2.43* 3.02* 2.55*     BNP: No results for input(s): BNP in the last 72 hours. PT/INR:   No results for input(s): PROTIME, INR in the last 72 hours. APTT: No results for input(s): APTT in the last 72 hours. CARDIAC ENZYMES: No results for input(s): CKMB, CKMBINDEX, TROPONINT in the last 72 hours. Invalid input(s): CKTOTAL;3  FASTING LIPID PANEL:  Lab Results   Component Value Date    CHOL 178 01/11/2021    HDL 37 (L) 01/11/2021    TRIG 281 (H) 01/11/2021     LIVER PROFILE:   Recent Labs     12/23/22  1311   AST 14   ALT 7        I/O (24Hr): Intake/Output Summary (Last 24 hours) at 12/23/2022 1510  Last data filed at 12/23/2022 0540  Gross per 24 hour   Intake 500 ml   Output 3650 ml   Net -3150 ml       Glu last 24 hour  No results for input(s): POCGLU in the last 72 hours.     No results for input(s): Vejc Sin, start after IV iron (until 12/23 -complete today)-nephrology decide on Retacrit -3 times a week with dialysis  Elevated kappa light chain immunoglobulin: Hematology following. Concern for bone marrow disease vs renal failure - workup in progress. . Rec Resume Procrit after completion of IV iron  , transfuse for hemoglobin less than 7 and for outpatient bone marrow biopsy  Hyponatremia: Improved. Will monitor. Sodium 137  Fibromyalgia  Obesity: BMI 43.6. Dietitian consulted  DIONY:  Depression: on Trazodone nightly homicidal ideation, initiailly did not want psychiatry consult-spiritual care saw pt-now would like psychiatry consult-continues to feel depressed-Dr Ilana Nicole on unit-notified of consult and possible discharge tomorrow -notes he will see patient today noted addition of Effexor  Moderately severe protein calorie malnutrition: Nephrology added Nepro supplement daily. Dietitian following  Insomnia: has Ambien nightly, reportedly takes 10 mg at home. Also on Requip at hs. doing well- will try decreasing to 5 mg  Gout: on allopurinol. Colchicine discontinued - no acute gout symptoms  Bowel Management: Miralax daily, senokot prn, dulcolax prn. DVT Prophylaxis:  SCD's while in bed, RAVINDRA's during the day, and Eliquis  Internal medicine for medical management - nephrology and hematology follow  SNF when okay with internal medicine nephrology-nephrology cleared patient-as long as patient has outpatient dialysis --follow-up with 1. PCP 2. Nephrology 3. Ortho - Dr Lesli Don 12/28  1 pm 4. Hematology,  monitor CBC/BMP, continue hemodialysis noted need hepatitis B surface antibody for skilled facility-results as above ,will need outpatient bone marrow biopsy , Retacrit 10,000 units SQ 3 times a week with dialysis discussion with nephrology-okay for discharge to SNF tomorrow, okay for dialysis start on Tuesday- verified with skilled nurse only for dialysis as well. Bettina Krabbe. Anushka Cid MD       This note is created with the assistance of a speech recognition program.  While intending to generate a document that actually reflects the content of the visit, the document can still have some errors including those of syntax and sound a like substitutions which may escape proof reading.   In such instances, actual meaning can be extrapolated by contextual diversion

## 2022-12-23 NOTE — CARE COORDINATION
[facility] CASE MANAGEMENT DISCHARGE NOTE    Patient discharged today to: [facility] per: [facility]/ Confirmed  at [address]: NA    OP Therapy: NA    DME: NA    Current reconciled medication list electronically sent:to the subsequent provider: Keri Gregorio MD    Confirmation Received: yes     ACUTE [id number] [facility]    Case Management Assessment: IRF DESHAWN 4.0   If score is above 15, Notify PM&R Physician    Patient Health Questionnaire-9 (PHQ-2 to 9)   Over the last 2 weeks, how often have you been bothered by any of the following problems? 1. Little Interest or pleasure in doing things? Never or 1 Day - Score 0    2. Feeling down, depressed or hopeless? 2-6 Days (Several Days) - Score 1    3. Trouble falling or staying asleep, or sleeping too much?   2-6 Days (Several Days) - Score 1    4. Feeling tired or having little energy? 2-6 Days (Several Days) - Score 1    5. Poor apettite or overeating? 2-6 Days (Several Days) - Score 1    6. Feeling bad about yourself-or that you are a failure or have let yourself or your family down? Never or 1 Day - Score 0    7. Trouble concentrating on things, such as reading the newspaper or watching television? Never or 1 Day - Score 0    8. Moving or speaking so slowly that other people could have noticed? Or the opposite-being so fidgety or restless that you have been moving around a lot more than usual?   2-6 Days (Several Days) - Score 1    9. Thoughts that you would be better off dead or of hurting yourself in some way? Never or 1 Day - Score 0    Total Score: 5    If you checked off any problems, how difficult have these problems made it for you to do your work, take care of things at home, or get along with other people?    [] Not difficult at all     [x] Somewhat Difficult     [] Very Difficult     [] Extremely Difficult           Social Isolation     How often do you feel lonely or isolated from those around you? [] Never  [] Rarely  [x] Sometimes  [] Often  [] Always  [] Patient Unable to Respond       Access To Transportation     2. In the past six months to a year, has lack of transportation kept you from medical appointments or from getting your medications?  No    3. In the past six months to a year, has lack of transportation kept you from non-medical meetings, appointments, work, or from getting things that you need?  No

## 2022-12-23 NOTE — PROGRESS NOTES
ACUTE INPATIENT [address]    Patient Name: Ameena Wells  MRN: [id number]     Patient discharged in stable condition as per order of attending physician. AVS provided by nurse at time of discharge, which includes all necessary medical information pertaining to the patients current course of illness, treatment, medications, post-discharge goals of care, and treatment preferences. Provision of Current Reconciled Medication List to Patient at Discharge   Indicate the route(s) of transmission of the current reconciled medication list to the patient/family/caregiver. Electronic Health Record (e.g. electronic access to patient portal)     Availability of \"My Chart\" offered to patient as a tool for updated health record. Steps for activation discussed with patient as mentioned on AVS.      Patient/responsible party verbalize understanding of discharge plan and are in agreement with goal/plan/treatment preferences. Belongings including Glasses sent with patient/responsible party. Home medications sent home with patient/responsible party N/A    Car Transfer   Level of assistance required for patient transfer into vehicle:   Not attempted due to medical condition or safety concerns     High-Risk Drug Classes: Use and Indication   Check if the patient is taking any medications by pharmacological classification If yes, check if there is an indication noted for all meds in the drug class   Antipsychotic No If yes: indication noted? [] If no indication noted, follow up with provider for order clarification   Anticoagulant Yes If yes: indication noted? []    Antibiotic Yes If yes: indication noted? []    Opioid Yes If yes: indication noted? []    Antiplatelet Yes If yes: indication noted? []    Hypoglycemic (Including Insulin) No If yes: indication noted?   []        Pain Assessment   Over the past 5 days, how much of the time has pain made it hard for you to sleep at night? Rarely or not at all   Over the past 5 days, how often have you limited your participation in rehabilitation therapy sessions due to pain? Rarely or not at all   Over the past 5 days, how often have you limited your day-to-day activities (excluding rehabilitation therapy session)? Occasionally     Special Treatments, Procedures, and Programs   Check all of the following treatments, procedures, and programs that apply on admission.     Cancer Treatments   Chemotherapy No   If Yes, Check All That Apply   []IV Chemotherapy    []Oral Chemotherapy    [] Chemotherapy    Radiation No   Respiratory Therapies   Oxygen Therapy No  If Yes, Check All That Apply   []Continuous   []Intermittent    []High-Concentration    Suctioning No  If Yes, Check All That Apply   []Scheduled   []As Needed   Tracheostomy Care No   Invasive Mechanical Ventilator   (Ventilator or Respirator) No  If Yes, Check All That Apply   [] Non-invasive Mechanical Ventilator   []BiPAP   []CPAP   Other   IV Medications No  If Yes, Check All That Apply   [] IV Vasoactive Medications   []IV Antibiotics   []IV Anticoagulation   [] IV Medications   Transfusions Yes   Dialysis yes  If Yes, Check All That Apply   [x]Hemodialysis   [] Dialysis   IV Access Yes  If Yes, Check All That Apply   []Peripheral   []Midline   [x] (PICC, tunneled, port)

## 2022-12-23 NOTE — PROGRESS NOTES
Nephrology Progress Note    Reason for consultation: Management of acute kidney injury and chronic kidney disease stage IV. Consulting physician: Verona Roman MD.    Interval history: Patient was seen and examined today and she is comfortable at rest.  She was recently started on acute intermittent hemodialysis for progressive chronic kidney disease stage IV after placement of tunneled hemodialysis catheter on 12/19/2022. Last hemodialysis treatment was yesterday. History of present illness: This is a 69 y.o. female with a significant past medical history of Lipidemia, nephrolithiasis, fibromyalgia, type 2 diabetes mellitus, osteoarthritis and Chronic kidney disease stage IIIb [baseline serum creatinine 2.5 mg/dL secondary to diabetic glomerulosclerosis and follows up with Dr. Hina Ivy of [facility] [address] [place] [address], who presented to the hospital to [facility] on 12/6/2022 after [place]pping [place] whilst [place] get out from [employer]. Vital signs were stable at presentation but her renal function had worsened from baseline with elevated BUN/creatinine 60/3.87 mg/dL. X-rays revealed trimalleolar fracture with diffuse soft tissue swelling of the right ankle. She underwent open reduction and internal fixation on 12/6/2022. Hemoglobin dropped to 6.6 g/dL on 12/10/2022 requiring PRBC transfusion. Serum creatinine peaked at 4.34 mg/dL. Patient did not require dialysis so far. She was transferred to acute rehab unit at [facility] yesterday [12/12/2022]. Pain control is adequate she has a cast on her left lower extremity. She does not have shortness of breath. She is nonoliguric and does not have indwelling Galdamez catheter. Laboratory studies today remarkable for BUN/creatinine 71/4.29  mg/dL.     Objective/     Vitals:    12/22/22 1812 12/22/22 2154 12/23/22 0540 12/23/22 0930   BP: (!) 127/58 130/61 127/60 127/60   Pulse: 66 65 65 65   Resp: 19   18   Temp: 99 °F (37.2 °C)  99.3 °F (37.4 °C) 99.3 °F (37.4 °C)   TempSrc: Oral   Oral   SpO2: 98% 98% 96% 96%   Weight:       Height:         24HR INTAKE/OUTPUT:    Intake/Output Summary (Last 24 hours) at 12/23/2022 0944  Last data filed at 12/23/2022 0540  Gross per 24 hour   Intake 500 ml   Output 3650 ml   Net -3150 ml       Patient Vitals for the past 96 hrs (Last 3 readings):   Weight   12/22/22 1706 264 lb 8.8 oz (120 kg)   12/22/22 1357 270 lb 1 oz (122.5 kg)   12/20/22 1735 270 lb 1 oz (122.5 kg)       Constitutional:  Alert, awake, no apparent distress  Cardiovascular:  S1, S2 without pericardial rub or gallop. Respiratory:  Diminished breath sounds at lung bases. Abdomen: Full but soft with normal bowel sounds. Extremities: 1+ bilateral pitting pedal edema. Data/  Recent Labs     12/21/22  0652 12/22/22  0628   WBC  --  10.3   HGB 7.6* 7.7*   HCT 23.2* 24.1*   MCV  --  91.8   PLT  --  242       Recent Labs     12/21/22  0652 12/22/22  0628 12/23/22  0605    137 136   K 4.0 4.3 4.5   CL 98 100 99   CO2 28 28 29   GLUCOSE 147* 107* 126*   BUN 21 26* 21   CREATININE 2.43* 3.02* 2.55*   LABGLOM 20* 15* 19*       Assessment/Plan:   1. Acute kidney injury on chronic kidney disease stage 4- Differentials include progression of underlying chronic kidney disease, myeloma kidney and ischemic acute tubular necrosis. Baseline serum creatinine averaging 3.8 mg/dL in October 2022, serum creatinine peaked to 4.5 mg/dL  There are no indications for acute hemodialysis today per patient with need hemodialysis 3 times a week starting next week. 2.  Normocytic anemia of chronic kidney disease - Iron studies are consistent with iron deficiency anemia patient is currently receiving loading dose of IV iron sucrose [Pain #10/10]. Continue Retacrit 10,000 units subcutaneously 3 times a week. 3.  S/p trimalleolar right ankle fracture - recovery in progress.      4.  Systemic hypertension - blood pressure control is adequate. 5.  Elevated kappa/lambda ratio may be contributory to worsening anemia. Serum immunofixation was negative for monoclonal immunoglobulins on 12/14/2022. However had free monoclonal light chains on urine immunofixation performed 4/2/2022. We will follow hematology/oncology recommendations. Ok to discharge patient to [facility] [place] [facility] [place] today from renal standpoint. She can start outpatient hemodialysis at [facility] dialysis unit Monday or Tuesday next week.     Rodney Santamaria MD  Attending nephrologist

## 2022-12-23 NOTE — PROGRESS NOTES
[address] [facility] Internal Medicine  Joesph Madera MD; Shailesh Casas MD; Angelica Miranda MD; MD Richelle Arevalo MD; Augustine Brittle, MD    [facility]   [facility]    Date:   12/23/2022  Patient name:  Sebastián Gonzalez  Date of admission:  12/12/2022  5:48 PM  MRN:   [id number]  Account:  [de-identified]  YOB: 1945  PCP:    Tamra Maldonado MD  Room:   [address] [place] [address]  Code Status:    Full Code    Chief Complaint:     No chief complaint on file. Open fracture of tibia and fibula    History Obtained From:     Patient and electronic medical record    History of Present Illness:     Sebastián Gonzalez is a 69 y.o. Non- / non  female who presents with No chief complaint on file. and is admitted to the hospital for the management of Open fracture of right tibia and fibula, sequela. Past medical history significant for A. Fib on Eliquis and amiodarone, JOSE ARMANDO on CKD, Type 2 Diabetes, HTN,HLD, Anemia, Fibromyalgia, Obesity, and DIONY. Sustained an open trimalleolar fracture, ankle fracture dislocation s/p ORIF on 12/6, right talus open treatment. Nonweight bearing right lower extremity. Developed JOSE ARMANDO on CKD, per Nephrology patient is high risk of needing dialysis, consult to Nephrology sent when patient admitted to Acute Rehab. Seen at examined at bedside, did not sleep well last night due to being uncomfortable due to the blankets. Otherwise no concerns, not in acute distress     Principal Problem:    Open fracture of right tibia and fibula, sequela  Active Problems:    Paraproteinemia    Stage 5 chronic kidney disease not on chronic dialysis (HCC)    Moderate recurrent major depression (HCC)    Anemia of chronic renal failure, stage 5 (HCC)  Resolved Problems:    * No resolved hospital problems.  *                  Past Medical History:     Past Medical History:   Diagnosis Date    Abnormal stress test Anemia     Arthritis     Depression     Diverticulosis     DM (diabetes mellitus) ([facility] [address]     Erythropenia     Fibromyalgia     HTN (hypertension)     Hyperlipidemia     Kidney stone     Morbid obesity due to excess calories (HCC)     DIONY on CPAP     Osteopenia 03/03/14    Seasonal allergies     Sleep apnea     uses bipap prn        Past Surgical History:     Past Surgical History:   Procedure Laterality Date    ANKLE FRACTURE SURGERY Right 12/6/2022    RIGHT ANKLE OPEN REDUCTION INTERNAL FIXATION performed by Hollie Shaw DO at [facility]  01/01/2011    right arm broken    CARDIAC CATHETERIZATION  [id number] dr Fuentes Baxter  05/16/2016    COLONOSCOPY  01/01/2003    COLONOSCOPY  01/01/2007    IR TUNNELED CATHETER PLACEMENT GREATER THAN 5 YEARS  12/19/2022    IR TUNNELED CATHETER PLACEMENT GREATER THAN 5 YEARS 12/19/2022 [facility] SPECIAL PROCED[facility]    ORIF ANKLE FRACTURE Right 12/06/2022    RIGHT ANKLE OPEN REDUCTION INTERNAL FIXATION    TOTAL KNEE ARTHROPLASTY Bilateral     left may 2013 and right july 2013    TUBAL LIGATION          Medications Prior to Admission:     Prior to Admission medications    Medication Sig Start Date End Date Taking? Authorizing Provider   HYDROcodone-acetaminophen (NORCO) 5-325 MG per tablet Take 1 tablet by mouth every 6 hours as needed for Pain for up to 7 days. Max Daily Amount: 4 tablets 12/23/22 12/30/22 Yes Natalio Fox MD   calcium carbonate (TUMS) 500 MG chewable tablet Take 1 tablet by mouth daily as needed for Heartburn 12/23/22 1/22/23 Yes Natalio Fox MD   magnesium oxide (MAG-OX) 400 (240 Mg) MG tablet Take 1 tablet by mouth 2 times daily 12/23/22  Yes Natalio Fox MD   apixaban Cori Mario) 5 MG TABS tablet Take 1 tablet by mouth 2 times daily 12/23/22  Yes Natalio Fox MD   gabapentin (NEURONTIN) 300 MG capsule Take 1 capsule by mouth daily for 30 days.  12/24/22 1/23/23 Yes Natalio Fox MD   atorvastatin (LIPITOR) 40 MG tablet Take 1 tablet by mouth nightly 12/23/22  Yes Eli Crawley MD   allopurinol (ZYLOPRIM) 100 MG tablet Take 1 tablet by mouth daily 12/24/22  Yes Eli Crawley MD   bisacodyl (DULCOLAX) 10 MG suppository Place 1 suppository rectally daily as needed for Constipation 12/23/22 1/22/23 Yes Eli Crawley MD   polyethylene glycol (GLYCOLAX) 17 g packet Take 17 g by mouth daily 12/24/22 1/23/23 Yes Eli Crawley MD   clindamycin (CLEOCIN) 300 MG capsule Take 1 capsule by mouth every 8 hours for 8 days 12/23/22 12/31/22 Yes Eli Crawley MD   calcitRIOL (ROCALTROL) 0.25 MCG capsule Take 1 capsule by mouth daily 12/24/22  Yes Eli Crawley MD   Benzocaine-Menthol (CEPACOL) 6-10 MG LOZG lozenge Take 1 lozenge by mouth every 2 hours as needed for Sore Throat 12/23/22  Yes Eli Crawley MD   methocarbamol (ROBAXIN) 500 MG tablet Take 1 tablet by mouth 3 times daily as needed (spasm) 12/23/22 1/2/23 Yes Eli Crawley MD   pantoprazole (PROTONIX) 40 MG tablet Take 1 tablet by mouth every morning (before breakfast) 12/24/22  Yes Eli Crawley MD   venlafaxine (EFFEXOR XR) 37.5 MG extended release capsule Take 1 capsule by mouth daily (with breakfast) 12/24/22  Yes Lindy Saha MD   metoprolol tartrate (LOPRESSOR) 25 MG tablet Take 1 tablet by mouth 2 times daily 12/12/22   Mario Arevalo MD   senna (SENOKOT) 8.6 MG tablet Take 1 tablet by mouth daily as needed (Constipation) 12/12/22 1/11/23  Mario Arevalo MD   rOPINIRole (REQUIP) 0.25 MG tablet Take 1 tablet by mouth nightly 11/14/22   Eliza Babb MD   blood glucose monitor kit and supplies use check blood sugar as directed 11/9/22   Eliza Babb MD   Lancets MISC 1 each by Does not apply route daily 11/9/22   Eliza Babb MD   Alcohol Swabs 70 % PADS 1 each by Does not apply route daily 11/9/22   Eliza Babb MD   zolpidem (AMBIEN) 5 MG tablet Take 5 mg by mouth nightly as needed for Sleep.     Historical Provider, MD   hydrALAZINE (APRESOLINE) 25 MG tablet Take 4 tablets by mouth 3 times daily 22   Staci Currie MD   amiodarone (CORDARONE) 200 MG tablet Take 1 tablet by mouth daily 22   BARBIE Garcia NP   ferrous sulfate (FE TABS 325) 325 (65 Fe) MG EC tablet Take 1 tablet by mouth daily (with breakfast) 5/10/22   Eliza Rodriguez MD   Cholecalciferol (VITAMIN D3) 25 MCG (1000 UT) TABS Take 2 tablets by mouth daily 20   BARBIE Hardin - CNP        Allergies:     Adhesive tape, Cephalexin, Erythromycin, Ketorolac tromethamine, Pcn [penicillins], Percocet [oxycodone-acetaminophen], Sulfa antibiotics, and Ultracet [tramadol-acetaminophen]    Social History:     Tobacco:    reports that she quit smoking about 62 years ago. Her smoking use included cigarettes. She has a 0.25 pack-year smoking history. She has never used smokeless tobacco.  Alcohol:      reports no history of alcohol use. Drug Use:  reports no history of drug use. Family History:     Family History   Problem Relation Age of Onset    Diabetes Mother     Heart Disease Mother     Osteoporosis Mother     Kidney Disease Father     Prostate Cancer Brother        Review of Systems:     Positive and Negative as described in HPI. ROS     No cp  No nv  No diarrhea  No dyspnea                                                          . Physical Exam:     Physical Exam   Vitals:    Vitals:    22 0540 22 0930 22 0936 22 1347   BP: 127/60 127/60  (!) 147/60   Pulse: 65 65 65    Resp:  18     Temp: 99.3 °F (37.4 °C) 99.3 °F (37.4 °C)     TempSrc:  Oral     SpO2: 96% 96%     Weight:       Height:                        Body mass index is 44.02 kg/m². Temp (24hrs), Av.9 °F (37.2 °C), Min:98.1 °F (36.7 °C), Max:99.3 °F (37.4 °C)    No results for input(s): POCGLU in the last 72 hours.             General Appearance:   No acute distress , obese                 Skin:                             No rash or erythema  HEENT ; No icterus, no pallor . No ptosis. No gross asymmetry  Or abnormality face         Neck:                            No mass , no thyroid enlargement                                           Pulmonary/Chest:                                               Symmetric                                          Clear to auscultation bilaterally . No wheezes,                                          No rales or rhonchi . No abnormality on percussion                                                        Cardiovascular:            Normal rate, regular rhythm,                                          No murmur or  Gallop . Abdomen:                       Soft, non-tender                                           Normal bowels sounds,                                             Extremities:                    No  Edema . Boot in Right Leg                                            Musculo-skeletal / Neurological ;;                  Neurological ;                 No focal motor deficit ,                 No focal sensory deficit ,    Musculo-skeletal ;                  No  gait abnormality                  No significant joint abnormality,                                                         Psych:                     See psych notes                                                                 Investigations:      URINE ANALYSIS: No results found for: LABURIN     CBC:  Lab Results   Component Value Date/Time    WBC 10.3 12/22/2022 06:28 AM    HGB 7.7 12/22/2022 06:28 AM     12/22/2022 06:28 AM     03/02/2012 11:15 AM             BMP:    Lab Results   Component Value Date/Time     12/23/2022 06:05 AM    K 4.5 12/23/2022 06:05 AM    CL 99 12/23/2022 06:05 AM    CO2 29 12/23/2022 06:05 AM    BUN 21 12/23/2022 06:05 AM    CREATININE 2.55 12/23/2022 06:05 AM    GLUCOSE 126 12/23/2022 06:05 AM    GLUCOSE 113 03/02/2012 11:15 AM      LIVER PROFILE:  Lab Results   Component Value Date/Time    ALT 7 12/23/2022 01:11 PM    AST 14 12/23/2022 01:11 PM    PROT 5.6 12/13/2022 06:47 AM    BILITOT 0.3 12/13/2022 06:47 AM    BILIDIR 0.1 12/13/2022 06:47 AM    LABALBU 2.9 12/13/2022 06:47 AM    LABALBU 4.2 03/02/2012 11:15 AM             @BRIEFLABT(TSH)@      Laboratory Testing:  Recent Results (from the past 24 hour(s))   Basic Metabolic Panel w/ Reflex to MG    Collection Time: 12/23/22  6:05 AM   Result Value Ref Range    Glucose 126 (H) 70 - 99 mg/dL    BUN 21 8 - 23 mg/dL    Creatinine 2.55 (H) 0.50 - 0.90 mg/dL    Est, Glom Filt Rate 19 (L) >60 mL/min/1.73m2    Calcium 9.6 8.6 - 10.4 mg/dL    Sodium 136 135 - 144 mmol/L    Potassium 4.5 3.7 - 5.3 mmol/L    Chloride 99 98 - 107 mmol/L    CO2 29 20 - 31 mmol/L    Anion Gap 8 (L) 9 - 17 mmol/L   ALT    Collection Time: 12/23/22  1:11 PM   Result Value Ref Range    ALT 7 5 - 33 U/L   AST    Collection Time: 12/23/22  1:11 PM   Result Value Ref Range    AST 14 <32 U/L       Imaging/Diagnostics:  XR ANKLE RIGHT (MIN 3 VIEWS)    Result Date: 12/6/2022  Anatomic alignment of comminuted ankle fracture status post ORIF. Assessment :      Hospital Problems             Last Modified POA    * (Principal) Open fracture of right tibia and fibula, sequela 12/12/2022 Yes    Paraproteinemia 12/14/2022 Yes    Stage 5 chronic kidney disease not on chronic dialysis ([facility] [address] 12/14/2022 Yes    Moderate recurrent major depression ([facility] [address] 12/23/2022 Yes    Anemia of chronic renal failure, stage 5 ([address] 12/14/2022 Yes     Plan:       Medications: Allergies:     Allergies   Allergen Reactions    Adhesive Tape     Cephalexin Other (See Comments)     Tongue cracks and peels    Erythromycin Other (See Comments)     Epigastric pain and bloating    Ketorolac Tromethamine Other (See Comments)     Severe stomach cramps    Pcn [Penicillins]      Unknown reaction    Percocet [Oxycodone-Acetaminophen] Itching and Other (See Comments)     Esophagus hurt    Sulfa Antibiotics     Ultracet [Tramadol-Acetaminophen] Itching       Current Meds:   Scheduled Meds:    [START ON 12/24/2022] venlafaxine  37.5 mg Oral Daily with breakfast    clindamycin  300 mg Oral 3 times per day    methocarbamol  500 mg Oral 3 times per day    gabapentin  300 mg Oral Daily    iron sucrose  100 mg IntraVENous Q24H    atorvastatin  40 mg Oral Nightly    allopurinol  100 mg Oral Daily    amiodarone  200 mg Oral Daily    apixaban  5 mg Oral BID    calcitRIOL  0.25 mcg Oral Daily    hydrALAZINE  100 mg Oral TID    magnesium oxide  400 mg Oral BID    metoprolol tartrate  25 mg Oral BID    pantoprazole  40 mg Oral QAM AC    rOPINIRole  0.25 mg Oral Nightly    Vitamin D  2,000 Units Oral Daily    polyethylene glycol  17 g Oral Daily     Continuous Infusions:       PRN Meds: zolpidem, calcium carbonate, anticoagulant sodium citrate, anticoagulant sodium citrate, Benzocaine-Menthol, acetaminophen, HYDROcodone-acetaminophen, bisacodyl       Patient admitted [place] Problems             Last Modified POA    * (Principal) Open fracture of right tibia and fibula, sequela 12/12/2022 Yes    Paraproteinemia 12/14/2022 Yes    Stage 5 chronic kidney disease not on chronic dialysis ([facility] [address] 12/14/2022 Yes    Moderate recurrent major depression ([facility] [address] 12/23/2022 Yes    Anemia of chronic renal failure, stage 5 ([facility] [address] 12/14/2022 Yes                      12/13/22    Open trimalleolar fracture, s/p ORIF on 12/6  JOSE ARMANDO on CKD, Cr 4.29 today, nephro following, patient high risk for needing HD  Anemia  Hx of A Fib on Eliquis and amio, DM2, HTN,HLD, DIONY, Fibromyalgia   Nephrology following for any urgent HD needs, appreciate recommendations, renal diet, continue to monitor Cr  A Fib.  Continue Amio and Eliquis  Continue lipitor, hydralazine, and lopressor  Continue iron supplementation, Hgb stable at 7.6                 Dec 18  Hem/Onc on board for elevated Kappa light chain, rule out myeloma  Anemia of chronic renal failure, appears baseline around 6-7, patient receiving IV iron supplementation, restart Retacrit once iron supplementation completed. Nephrology plans for HD today. IR consulted for tunnel catheter placement, followed by HD session, DC IV fluids, appreciate recommendations   Hyperkalemia Kayexalate dose today  Potassium 5.5                12/23  HB is stable, No Obvious Sign of Bleeding   1 unit of Packed RBC given yesterday   Getting HD    Restarting AC, since HB is stable   BP - Controlled  Applied Boot in Right Leg   Started on Clindamycin by Ortho   Patient, feeling depressed, no suicidal ideation  Requesting Psyche Consult  Started on Effexor         Consultations:   IP CONSULT TO DIETITIAN  IP CONSULT TO SOCIAL WORK  IP CONSULT TO [address]  IP CONSULT TO [address]  IP CONSULT TO [facility]        Ruben Roach MD  12/23/2022 12/23/2022  4:31 PM          Copy sent to Dr. Aubrie Weathers MD    Please note that this chart was generated using voice recognition Dragon dictation software. Although every effort was made to ensure the accuracy of this automated transcription, some errors in transcription may have occurred.

## 2022-12-24 VITALS
BODY MASS INDEX: 44.08 KG/M2 | TEMPERATURE: 98.1 F | WEIGHT: 264.55 LBS | RESPIRATION RATE: 16 BRPM | DIASTOLIC BLOOD PRESSURE: 73 MMHG | HEIGHT: 65 IN | HEART RATE: 61 BPM | OXYGEN SATURATION: 97 % | SYSTOLIC BLOOD PRESSURE: 115 MMHG

## 2022-12-24 LAB
ABO/RH: NORMAL
ANION GAP SERPL CALCULATED.3IONS-SCNC: 14 MMOL/L (ref 9–17)
ANTIBODY SCREEN: NEGATIVE
ARM BAND NUMBER: NORMAL
BLD PROD TYP BPU: NORMAL
BLOOD BANK BLOOD PRODUCT EXPIRATION DATE: NORMAL
BLOOD BANK ISBT PRODUCT BLOOD TYPE: 6200
BLOOD BANK PRODUCT CODE: NORMAL
BLOOD BANK UNIT TYPE AND RH: NORMAL
BPU ID: NORMAL
BUN BLDV-MCNC: 31 MG/DL (ref 8–23)
CALCIUM SERPL-MCNC: 10.1 MG/DL (ref 8.6–10.4)
CHLORIDE BLD-SCNC: 101 MMOL/L (ref 98–107)
CO2: 23 MMOL/L (ref 20–31)
CREAT SERPL-MCNC: 3.65 MG/DL (ref 0.5–0.9)
CROSSMATCH RESULT: NORMAL
DISPENSE STATUS BLOOD BANK: NORMAL
EXPIRATION DATE: NORMAL
GFR SERPL CREATININE-BSD FRML MDRD: 12 ML/MIN/1.73M2
GLUCOSE BLD-MCNC: 149 MG/DL (ref 70–99)
P E INTERPRETATION, U: NORMAL
PATHOLOGIST: NORMAL
POTASSIUM SERPL-SCNC: 4.6 MMOL/L (ref 3.7–5.3)
SODIUM BLD-SCNC: 138 MMOL/L (ref 135–144)
SPECIMEN TYPE: NORMAL
TRANSFUSION STATUS: NORMAL
UNIT DIVISION: 0
UNIT ISSUE DATE/TIME: NORMAL
URINE IFX INTERP: NORMAL
URINE IFX SPECIMEN: NORMAL
URINE TOTAL PROTEIN: 24 MG/DL
URINE TOTAL PROTEIN: 24 MG/DL
VOLUME: 50 ML

## 2022-12-24 PROCEDURE — 97530 THERAPEUTIC ACTIVITIES: CPT

## 2022-12-24 PROCEDURE — 99232 SBSQ HOSP IP/OBS MODERATE 35: CPT | Performed by: INTERNAL MEDICINE

## 2022-12-24 PROCEDURE — 97535 SELF CARE MNGMENT TRAINING: CPT

## 2022-12-24 PROCEDURE — 6370000000 HC RX 637 (ALT 250 FOR IP): Performed by: PHYSICAL MEDICINE & REHABILITATION

## 2022-12-24 PROCEDURE — 80048 BASIC METABOLIC PNL TOTAL CA: CPT

## 2022-12-24 PROCEDURE — 99238 HOSP IP/OBS DSCHRG MGMT 30/<: CPT | Performed by: STUDENT IN AN ORGANIZED HEALTH CARE EDUCATION/TRAINING PROGRAM

## 2022-12-24 PROCEDURE — 36415 COLL VENOUS BLD VENIPUNCTURE: CPT

## 2022-12-24 PROCEDURE — 97110 THERAPEUTIC EXERCISES: CPT

## 2022-12-24 PROCEDURE — 6370000000 HC RX 637 (ALT 250 FOR IP): Performed by: PSYCHIATRY & NEUROLOGY

## 2022-12-24 PROCEDURE — 6370000000 HC RX 637 (ALT 250 FOR IP)

## 2022-12-24 PROCEDURE — 6370000000 HC RX 637 (ALT 250 FOR IP): Performed by: FAMILY MEDICINE

## 2022-12-24 RX ORDER — HYDROCODONE BITARTRATE AND ACETAMINOPHEN 5; 325 MG/1; MG/1
1 TABLET ORAL EVERY 6 HOURS PRN
Qty: 12 TABLET | Refills: 0 | Status: SHIPPED | OUTPATIENT
Start: 2022-12-24 | End: 2022-12-27

## 2022-12-24 RX ADMIN — ALLOPURINOL 100 MG: 100 TABLET ORAL at 07:48

## 2022-12-24 RX ADMIN — HYDRALAZINE HYDROCHLORIDE 100 MG: 50 TABLET, FILM COATED ORAL at 14:20

## 2022-12-24 RX ADMIN — Medication 2000 UNITS: at 07:48

## 2022-12-24 RX ADMIN — VENLAFAXINE HYDROCHLORIDE 37.5 MG: 37.5 CAPSULE, EXTENDED RELEASE ORAL at 07:49

## 2022-12-24 RX ADMIN — HYDRALAZINE HYDROCHLORIDE 100 MG: 50 TABLET, FILM COATED ORAL at 07:47

## 2022-12-24 RX ADMIN — GABAPENTIN 300 MG: 300 CAPSULE ORAL at 07:47

## 2022-12-24 RX ADMIN — POLYETHYLENE GLYCOL 3350 17 G: 17 POWDER, FOR SOLUTION ORAL at 07:48

## 2022-12-24 RX ADMIN — APIXABAN 5 MG: 5 TABLET, FILM COATED ORAL at 07:47

## 2022-12-24 RX ADMIN — PANTOPRAZOLE SODIUM 40 MG: 40 TABLET, DELAYED RELEASE ORAL at 06:14

## 2022-12-24 RX ADMIN — HYDROCODONE BITARTRATE AND ACETAMINOPHEN 1 TABLET: 5; 325 TABLET ORAL at 02:22

## 2022-12-24 RX ADMIN — MAGNESIUM OXIDE TAB 400 MG (241.3 MG ELEMENTAL MG) 400 MG: 400 (241.3 MG) TAB at 07:47

## 2022-12-24 RX ADMIN — AMIODARONE HYDROCHLORIDE 200 MG: 200 TABLET ORAL at 07:47

## 2022-12-24 RX ADMIN — METHOCARBAMOL 500 MG: 500 TABLET ORAL at 06:14

## 2022-12-24 RX ADMIN — CLINDAMYCIN HYDROCHLORIDE 300 MG: 150 CAPSULE ORAL at 06:16

## 2022-12-24 RX ADMIN — CLINDAMYCIN HYDROCHLORIDE 300 MG: 150 CAPSULE ORAL at 14:21

## 2022-12-24 RX ADMIN — CALCITRIOL 0.25 MCG: 0.25 CAPSULE ORAL at 07:48

## 2022-12-24 RX ADMIN — METHOCARBAMOL 500 MG: 500 TABLET ORAL at 14:21

## 2022-12-24 RX ADMIN — METOPROLOL TARTRATE 25 MG: 25 TABLET, FILM COATED ORAL at 07:48

## 2022-12-24 ASSESSMENT — PAIN DESCRIPTION - ORIENTATION
ORIENTATION: RIGHT
ORIENTATION: RIGHT

## 2022-12-24 ASSESSMENT — PAIN SCALES - GENERAL
PAINLEVEL_OUTOF10: 10
PAINLEVEL_OUTOF10: 3

## 2022-12-24 ASSESSMENT — PAIN DESCRIPTION - LOCATION
LOCATION: FOOT
LOCATION: FOOT

## 2022-12-24 ASSESSMENT — PAIN DESCRIPTION - DESCRIPTORS
DESCRIPTORS: SHARP
DESCRIPTORS: NAGGING

## 2022-12-24 NOTE — PROGRESS NOTES
PLACE WO SQ PORT/PUMP > 5 YEARS (12/19/2022). Restrictions  Restrictions/Precautions  Restrictions/Precautions: Weight Bearing  Required Braces or Orthoses?: Yes (R cam boot)  Implants present? : Metal implants  Required Braces or Orthoses  Right Lower Extremity Brace: Boot  Position Activity Restriction  Other position/activity restrictions: Per chart: Right open trimalleolar fracture ankle fracture dislocation s/p I&D and ORIF, DOS 12/6/2022. ortho appt,  Dr. Liset Cameron on 12-21 now has cam boot RLE remains NWB. Lower Extremity Weight Bearing Restrictions  Right Lower Extremity Weight Bearing: Non Weight Bearing     Vitals      Subjective  Subjective  Subjective: AM: Pt. in bed upon arrival. Had just completed toileting on bed side commode and was transferred back into bed. Requesting to take a shower- \"I haven't had a shower since I've been here. We just practiced the transfer onto the new shower chair yesterday. \"  Pain: No c/o pain. Objective  Cognition  Overall Orientation Status: Within Functional Limits  Orientation Level: Oriented X4    Cognition  Overall Cognitive Status: WFL    Activities of Daily Living  Feeding  Assistance Level: Independent    Grooming/Oral Hygiene  Assistance Level: Independent after Setup  Skilled Clinical Factors: Completed while long sitting in bed this date. Upper Extremity Bathing  Assistance Level: Set-up  Skilled Clinical Factors: Seated on shower chair. Lower Extremity Bathing  Assistance Level: Moderate assistance  Skilled Clinical Factors: Seated on shower chair. Requires assist for feet and buttocks. Upper Extremity Dressing  Assistance Level: Set-up  Skilled Clinical Factors: Able to chris bra and overhead shirt without difficulty. Lower Extremity Dressing  Assistance Level: Moderate assistance  Skilled Clinical Factors: Able to assist with clothing management over hips while supine.     Putting On/Taking Off Footwear  Assistance Level: Dependent    Toileting  Assistance Level: Dependent  Skilled Clinical Factors: TA for brief care and hygiene at this time. Able to assist with clothing management. Toilet Transfers  Assistance Level: Dependent  Skilled Clinical Factors: MaxA x2 for transfers on/off bed side commode. Nursing staff use maxi lift for transfers. Tub/Shower Transfers  Type: Shower  Transfer From: Deonna Michael To: Shower chair with back  Assistance Level: Maximum assistance; Requires x 2 assistance  Skilled Clinical Factors: EOB <> rolling shower chair. Mobility     Roll Left  Assistance Level: Minimal assistance  Skilled Clinical Factors: good use of bed rails  Roll Right  Assistance Level: Minimal assistance  Skilled Clinical Factors: good use of bed rails  Sit to Supine  Assistance Level: Moderate assistance  Skilled Clinical Factors: A with BLEs  Supine to Sit  Assistance Level: Stand by assist  Skilled Clinical Factors: HOB elevated, heavy use of bed rails, increased time. Scooting  Assistance Level: Stand by assist  Skilled Clinical Factors: hips to EOB, good use of bed rail     Stand Pivot  Assistance Level: Maximum assistance; Requires x 2 assistance  Skilled Clinical Factors: stand/squat pivot from EOB <> shower chair. Assessment  Assessment  Activity Tolerance: Patient limited by endurance  Discharge Recommendations:  (Pt. discharging to skilled nursing facility this afternoon.)    Patient Education  Education  Education Given To: Patient  Education Provided: Role of Therapy;Plan of Care;Safety;Transfer Training;Energy Conservation  Education Method: Verbal;Demonstration  Barriers to Learning: None  Education Outcome: Verbalized understanding;Continued education needed;Demonstrated understanding    OT Equipment Recommendations  ADL Assistive Devices: Reacher  Other: Pt reports she has a walk in shower with a seat, grab bar in the shower and a raised toilet seat.     Safety Devices  Safety Devices in place: Yes  Type of devices:  All fall risk precautions in place     Goals     Short Term Goals  Time Frame for Short Term Goals: By one week  Short Term Goal 1: Pt will perform UB ADLs including grooming/oral care with SBA and good safety  Short Term Goal 2: Pt will perform LB ADLs including toileting/toilet transfers with Min A, good safety, and use of AE/DME/Modified techniques as needed  Short Term Goal 3: Pt will be educated on NWB status to RLE with good carryover during functional transfers/tasks  Short Term Goal 4: Pt will tolerate standing for 5+ minutes, Min A, with 1-2 UE support and no LOB during functional task while maintaining NWB RLE  Short Term Goal 5: Pt will be educated on and explore use of AE/DME/Modified techniques to improve safety and independence with ADL tasks  Additional Goals?: Yes  Short Term Goal 6: Pt will actively participate in 30+ minutes of therapeutic exercise/functional activity to promote safety and independence with self-care and mobility  Short Term Goal 7: Pt will perform functional transfers/mobility with Min A, good safety, and use of least restrictive device while maintaining NWB RLE    Long Term Goals  Time Frame for Long Term Goals : By discharge  Long Term Goal 1: Pt will perform UB ADLs including grooming/oral care with Mod I and good safety  Long Term Goal 2: Pt will perform LB ADLs, including toileting/toilet transfers, with SBA, good safety, and use of AE/DME/Modified techniques as needed  Long Term Goal 3: Pt will tolerate standing 10+ minutes, SBA, with 1-2 UE and no LOB during self-care/functional activity while maintaining NWB RLE  Long Term Goal 4: Pt will verbalize/demonstrate good understanding of home safety/fall prevention/energy conservation strategies to improve safety and independence with self-care tasks  Long Term Goal 5: Pt will safely perform light housekeeping/meal preparation with supervision, good safety, and use of least restrictive device to improve independence with ADLs/IADLs  Additional Goals?: Yes  Long Term Goal 6: Pt will perform functional transfers/mobility with SBA, good safety, and use of least restrictive device while maintaining NWB RLE  Long Term Goal 7: Pt will demonstrate improved fine motor coordination during self-care tasks AEB 10 second improvement on 9 hole peg test    Plan  Occupational Therapy Plan  Times Per Week: 5-7  Times Per Day: Twice a day  Current Treatment Recommendations: Strengthening, ROM, Balance training, Functional mobility training, Endurance training, Pain management, Patient/Caregiver education & training, Positioning, Safety education & training, Equipment evaluation, education, & procurement, Self-Care / ADL, Home management training, Coordination training         12/24/22 1029   OT Individual Minutes   Time In 1100   Time Out 9572   Minutes 75       Electronically signed by TRISHA Smith on 12/24/22 at 3:54 PM EST

## 2022-12-24 NOTE — PLAN OF CARE
Problem: Discharge Planning  Goal: Discharge to home or other facility with appropriate resources  Outcome: Progressing     Problem: Safety - Adult  Goal: Free from fall injury  Outcome: Progressing     Problem: ABCDS Injury Assessment  Goal: Absence of physical injury  Outcome: Progressing  Flowsheets (Taken 12/24/2022 0051)  Absence of Physical Injury: Implement safety measures based on patient assessment     Problem: Skin/Tissue Integrity  Goal: Absence of new skin breakdown  Description: 1. Monitor for areas of redness and/or skin breakdown  2. Assess vascular access sites hourly  3. Every 4-6 hours minimum:  Change oxygen saturation probe site  4. Every 4-6 hours:  If on nasal continuous positive airway pressure, respiratory therapy assess nares and determine need for appliance change or resting period.   Outcome: Progressing     Problem: Pain  Goal: Verbalizes/displays adequate comfort level or baseline comfort level  Outcome: Progressing     Problem: Chronic Conditions and Co-morbidities  Goal: Patient's chronic conditions and co-morbidity symptoms are monitored and maintained or improved  Outcome: Progressing     Problem: Nutrition Deficit:  Goal: Optimize nutritional status  Outcome: Progressing Pt called to say she is going back to work this afternoon and requests a letter for her time off, 8/29-8/31.  Please call [phone] when complete.

## 2022-12-24 NOTE — PROGRESS NOTES
Physical Therapy  Facility/Department: [facility] ACUTE REHAB  Rehabilitation Physical Therapy   NAME: Fernando Casas  : 1945 (69 y.o.)  MRN: [id number]  CODE STATUS: Full Code    Date of Service: 22      Past Medical History:   Diagnosis Date    Abnormal stress test     Anemia     Arthritis     Depression     Diverticulosis     DM (diabetes mellitus) ([facility] [address]     Erythropenia     Fibromyalgia     HTN (hypertension)     Hyperlipidemia     Kidney stone     Morbid obesity due to excess calories (HCC)     DIONY on CPAP     Osteopenia 14    Seasonal allergies     Sleep apnea     uses bipap prn     Past Surgical History:   Procedure Laterality Date    ANKLE FRACTURE SURGERY Right 2022    RIGHT ANKLE OPEN REDU[facility]   2011    right arm broken    CARDIAC CATHETERIZATION  [id number] dr Gabriella Huang  2016    COLONOSCOPY  2003    COLONOSCOPY  2007    IR TUNNELED CATHETER PLACEMENT GREATER THAN 5 YEARS  2022    IR TUNNELED CATHETER PLACEMENT GREATER THAN 5 YEARS 2022 [facility] SPECIAL PROCEDURES    ORIF ANKLE FRACTURE Right 2022    RIGHT ANKLE OPEN REDUCTION INTERNAL FIXATION    TOTAL KNEE ARTHROPLASTY Bilateral     left may 2013 and right 2013    TUBAL LIGATION         Family / Caregiver Present: No    Restrictions:  Restrictions/Precautions: Weight Bearing  Lower Extremity Weight Bearing Restrictions  Right Lower Extremity Weight Bearing: Non Weight Bearing  Position Activity Restriction  Other position/activity restrictions: Per chart: Right open trimalleolar fracture ankle fracture dislocation s/p I&D and ORIF, DOS 2022. ortho appt,  Dr. Kayleigh Barkley on  now has cam boot RLE remains NWB.      SUBJECTIVE  Subjective: pt eager to get out of bed  Pain: pt denies pain at this time     OBJECTIVE          Functional Mobility  Bed mobility  Rolling to Right: Stand by assistance (assist of bed rail)  Supine to Sit: Stand by assistance  Scooting: Stand by assistance (EOB)  Bed Mobility Comments: head of bed elevated right hand rail  Transfers  Lateral Transfers: 2 Person Assistance; Moderate Assistance;Contact guard assistance  Comment: lateral transfer from bed to [facility] with slide board toward left side    Environmental Mobility  Ambulation  WB Status: NWB R LE  Wheelchair Activities  Propulsion: Yes  Propulsion 1  Propulsion: Manual  Level: Level Tile  Method: RUE;LUE  Level of Assistance: Minimal assistance  Description/ Details: Very slow movements to propel herself forward. Multiple short rest breaks to complete. Pt requires Min A to maintain a straight path and maneuver turns. Distance: 61'    PT Exercises  Exercise Treatment: seated bilateral LE right AROM , left 2.5 # knee extension , hip flexion AAROM lt green bilateral x 15  A/AROM Exercises: supine bilateral LE x 15 left ankle lt green, hip? knee AROM , bilateral SLR AAROM  Functional Mobility Circuit Training: WC push-ups 5 x3  Static Standing Balance Exercises: UE support at parallal bars CGA x 1, PTA monitor right LE NWB pt maintains with knee extension - stands 30sec x 3  - limited by fatigue  Exercise Equipment: UBE fwd/reto 5 min    ASSESSMENT       Activity Tolerance  Activity Tolerance: Patient limited by endurance    Assessment  Assessment: pt would benefit from continued skilled therapy to further progress her functional  Discharge Recommendations: Therapy recommended at discharge; Patient would benefit from continued therapy after discharge  PT Equipment Recommendations  Equipment Needed: No    CLINICAL IMPRESSION   pt would benefit from continued skilled therapy to further progress her functional    PLAN OF CARE  Frequency: 1-2 treatment sessions per day, 5-7 days per week  Physcial Therapy Plan  Additional Comments: pt being discharged this PM  Safety Devices  Type of Devices: Gait belt;Call light within reach; Left in chair        Therapy Time   Individual Concurrent Group Co-treatment   Time In 0910         Time Out 0952         Minutes [address] [place], [place], 12/24/22 at 11:45 AM

## 2022-12-24 NOTE — PROGRESS NOTES
Called report to [place]. Spoke with [employer]. All questions were answered at this time.

## 2022-12-24 NOTE — PLAN OF CARE
Problem: Discharge Planning  Goal: Discharge to home or other facility with appropriate resources  12/24/2022 0942 by Ana Duron  Outcome: Adequate for Discharge     Problem: Safety - Adult  Goal: Free from fall injury  12/24/2022 0942 by Ana Duron  Outcome: Adequate for Discharge     Problem: ABCDS Injury Assessment  Goal: Absence of physical injury  12/24/2022 0942 by Ana Duron  Outcome: Adequate for Discharge     Problem: Skin/Tissue Integrity  Goal: Absence of new skin breakdown  Description: 1. Monitor for areas of redness and/or skin breakdown  2. Assess vascular access sites hourly  3. Every 4-6 hours minimum:  Change oxygen saturation probe site  4. Every 4-6 hours:  If on nasal continuous positive airway pressure, respiratory therapy assess nares and determine need for appliance change or resting period.   12/24/2022 0942 by Ana Duron  Outcome: Adequate for Discharge     Problem: Pain  Goal: Verbalizes/displays adequate comfort level or baseline comfort level  12/24/2022 0942 by Ana Duron  Outcome: Adequate for Discharge     Problem: Chronic Conditions and Co-morbidities  Goal: Patient's chronic conditions and co-morbidity symptoms are monitored and maintained or improved  12/24/2022 0942 by Ana Duron  Outcome: Adequate for Discharge     Problem: Nutrition Deficit:  Goal: Optimize nutritional status  12/24/2022 0942 by Ana Duron  Outcome: Adequate for Discharge

## 2022-12-24 NOTE — PROGRESS NOTES
ACUTE INPATIENT [address]    Patient Name: Erika Sims  MRN: [id number]     Patient discharged in stable condition as per order of attending physician. AVS provided by nurse at time of discharge, which includes all necessary medical information pertaining to the patients current course of illness, treatment, medications, post-discharge goals of care, and treatment preferences. Provision of Current Reconciled Medication List to Patient at Discharge   Indicate the route(s) of transmission of the current reconciled medication list to the patient/family/caregiver. Paper Based (e.g. fax, copies, print outs)     Availability of \"My Chart\" offered to patient as a tool for updated health record. Steps for activation discussed with patient as mentioned on AVS.      Patient/responsible party verbalize understanding of discharge plan and are in agreement with goal/plan/treatment preferences. Belongings including Glasses, cpap,clothes sent with patient/responsible party. Home medications sent home with patient/responsible party N/A    Car Transfer   Level of assistance required for patient transfer into vehicle:   Not attempted due to medical condition or safety concerns     High-Risk Drug Classes: Use and Indication   Check if the patient is taking any medications by pharmacological classification If yes, check if there is an indication noted for all meds in the drug class   Antipsychotic No If yes: indication noted? [] If no indication noted, follow up with provider for order clarification   Anticoagulant No If yes: indication noted? []    Antibiotic No If yes: indication noted? []    Opioid yes If yes: indication noted? []    Antiplatelet No If yes: indication noted? []    Hypoglycemic (Including Insulin) No If yes: indication noted? []        Pain Assessment   Over the past 5 days, how much of the time has pain made it hard for you to sleep at night? Occasionally   Over the past 5 days, how often have you limited your participation in rehabilitation therapy sessions due to pain? Occasionally   Over the past 5 days, how often have you limited your day-to-day activities (excluding rehabilitation therapy session)? Occasionally     Special Treatments, Procedures, and Programs   Check all of the following treatments, procedures, and programs that apply on admission.     Cancer Treatments   Chemotherapy No   If Yes, Check All That Apply   []IV Chemotherapy    []Oral Chemotherapy    [] Chemotherapy    Radiation No   Respiratory Therapies   Oxygen Therapy No  If Yes, Check All That Apply   []Continuous   []Intermittent    []High-Concentration    Suctioning No  If Yes, Check All That Apply   []Scheduled   []As Needed   Tracheostomy Care No   Invasive Mechanical Ventilator   (Ventilator or Respirator) No  If Yes, Check All That Apply   [] Non-invasive Mechanical Ventilator   []BiPAP   []CPAP   Other   IV Medications No  If Yes, Check All That Apply   [] IV Vasoactive Medications   []IV Antibiotics   []IV Anticoagulation   [] IV Medications   Transfusions No   Dialysis yes  If Yes, Check All That Apply   [x]Hemodialysis   [] Dialysis   IV Access No  If Yes, Check All That Apply   []Peripheral   []Midline   [] (PICC, tunneled, port)

## 2022-12-25 NOTE — PROGRESS NOTES
_                         Today's Date: 12/24/2022  Patient Name: Tyler Mackay  Date of admission: 12/12/2022  5:48 PM  Patient's age: 69 y.o., 1945  Admission Dx: Open fracture of right tibia and fibula, sequela [S82.201S, S80.5S]      Requesting Physician: Olvin Romero MD    CHIEF COMPLAINT: Status post fall. Fracture of the right tibia and fibula. Chronic renal failure. Consult for elevated kappa light chain immunoglobulin. SUBJECTIVE:  Patient was seen and examined. Patient seen and examined  No new complaint interval event  Patient resting comfortably  Hoping to be discharged soon      BRIEF CASE HISTORY:    The patient is a 69 y.o.  female who is admitted to the hospital for further management of open fracture of the right tibia and fibula. Patient had a fall and she had right ankle fracture. She was found to have open fracture of the right tibia and fibula. Patient had surgery and she is admitted to the inpatient rehab for further care. Patient's labs showed evidence of JOSE ARMANDO on top of CKD. She was seen by nephrology. Patient was deemed to be high risk for dialysis. Further work-up showed elevated kappa light chain immunoglobulin at 41.9 with lambda light chain immunoglobulin 3.14. We were consulted to evaluate the patient for possible underlying multiple myeloma. Patient also had history of anemia of chronic renal insufficiency. Past Medical History:   has a past medical history of Abnormal stress test, Anemia, Arthritis, Depression, Diverticulosis, DM (diabetes mellitus) ([address] Erythropenia, Fibromyalgia, HTN (hypertension), Hyperlipidemia, Kidney stone, Morbid obesity due to excess calories ([address] DIONY on CPAP, Osteopenia, Seasonal allergies, and Sleep apnea. Past Surgical History:   has a past surgical history that includes Colonoscopy (01/01/2003); Colonoscopy (01/01/2007);  Tubal ligation; Arm Surgery (01/01/2011); Total knee arthroplasty (Bilateral); Cardiac catheterization ([id number] dr Lashae Howell); Cardiac catheterization (05/16/2016); ORIF ankle fracture (Right, 12/06/2022); Ankle fracture surgery (Right, 12/6/2022); and IR TUNNELED CVC PLACE WO SQ PORT/PUMP > 5 YEARS (12/19/2022). Family History: family history includes Diabetes in her mother; Heart Disease in her mother; Kidney Disease in her father; Osteoporosis in her mother; Prostate Cancer in her brother. Social History:   reports that she quit smoking about 63 years ago. Her smoking use included cigarettes. She has a 0.25 pack-year smoking history. She has never used smokeless tobacco. She reports that she does not drink alcohol and does not use drugs. Medications:    Prior to Admission medications    Medication Sig Start Date End Date Taking? Authorizing Provider   HYDROcodone-acetaminophen (NORCO) 5-325 MG per tablet Take 1 tablet by mouth every 6 hours as needed for Pain for up to 3 days. Max Daily Amount: 4 tablets 12/24/22 12/27/22 Yes Satya Bray MD   calcium carbonate (TUMS) 500 MG chewable tablet Take 1 tablet by mouth daily as needed for Heartburn 12/23/22 1/22/23 Yes Henrry Higgins MD   magnesium oxide (MAG-OX) 400 (240 Mg) MG tablet Take 1 tablet by mouth 2 times daily 12/23/22  Yes Henrry Higgins MD   apixaban [facility]) 5 MG TABS tablet Take 1 tablet by mouth 2 times daily 12/23/22  Yes Henrry Higgins MD   gabapentin (NEURONTIN) 300 MG capsule Take 1 capsule by mouth daily for 30 days.  12/24/22 1/23/23 Yes Henrry Higgins MD   atorvastatin (LIPITOR) 40 MG tablet Take 1 tablet by mouth nightly 12/23/22  Yes Hnerry Higgins MD   allopurinol (ZYLOPRIM) 100 MG tablet Take 1 tablet by mouth daily 12/24/22  Yes Henrry Higgins MD   bisacodyl (DULCOLAX) 10 MG suppository Place 1 suppository rectally daily as needed for Constipation 12/23/22 1/22/23 Yes Henrry Higgins MD   polyethylene glycol (GLYCOLAX) 17 g packet Take 17 g by mouth daily 12/24/22 1/23/23 Yes Milton Denney MD   clindamycin (CLEOCIN) 300 MG capsule Take 1 capsule by mouth every 8 hours for 8 days 12/23/22 12/31/22 Yes Milton Denney MD   calcitRIOL (ROCALTROL) 0.25 MCG capsule Take 1 capsule by mouth daily 12/24/22  Yes Milton Denney MD   Benzocaine-Menthol (CEPACOL) 6-10 MG LOZG lozenge Take 1 lozenge by mouth every 2 hours as needed for Sore Throat 12/23/22  Yes Milton Denney MD   methocarbamol (ROBAXIN) 500 MG tablet Take 1 tablet by mouth 3 times daily as needed (spasm) 12/23/22 1/2/23 Yes Milton Denney MD   pantoprazole (PROTONIX) 40 MG tablet Take 1 tablet by mouth every morning (before breakfast) 12/24/22  Yes Milton Denney MD   venlafaxine (EFFEXOR XR) 37.5 MG extended release capsule Take 1 capsule by mouth daily (with breakfast) 12/24/22  Yes Sharron Milligan MD   metoprolol tartrate (LOPRESSOR) 25 MG tablet Take 1 tablet by mouth 2 times daily 12/12/22   Emily Littlejohn MD   senna (SENOKOT) 8.6 MG tablet Take 1 tablet by mouth daily as needed (Constipation) 12/12/22 1/11/23  Emily Littlejohn MD   rOPINIRole (REQUIP) 0.25 MG tablet Take 1 tablet by mouth nightly 11/14/22   Eliza Vasquez MD   blood glucose monitor kit and supplies use check blood sugar as directed 11/9/22   Eliza Vasquez MD   Lancets MISC 1 each by Does not apply route daily 11/9/22   Eliza Vasquez MD   Alcohol Swabs 70 % PADS 1 each by Does not apply route daily 11/9/22   Eliza Vasquez MD   zolpidem (AMBIEN) 5 MG tablet Take 5 mg by mouth nightly as needed for Sleep.     Historical Provider, MD   hydrALAZINE (APRESOLINE) 25 MG tablet Take 4 tablets by mouth 3 times daily 7/12/22   Kamilla Melendez MD   amiodarone (CORDARONE) 200 MG tablet Take 1 tablet by mouth daily 7/6/22   BARBIE Mcguire NP   ferrous sulfate (FE TABS 325) 325 (65 Fe) MG EC tablet Take 1 tablet by mouth daily (with breakfast) 5/10/22   Eliza Vasquez MD   Cholecalciferol (VITAMIN D3) 25 MCG (1000 UT) TABS Take 2 tablets by mouth daily 1/9/20   Zulema Bustamante, APRN - CNP     No current facility-administered medications for this encounter. Current Outpatient Medications   Medication Sig Dispense Refill    HYDROcodone-acetaminophen (NORCO) 5-325 MG per tablet Take 1 tablet by mouth every 6 hours as needed for Pain for up to 3 days. Max Daily Amount: 4 tablets 12 tablet 0    calcium carbonate (TUMS) 500 MG chewable tablet Take 1 tablet by mouth daily as needed for Heartburn      magnesium oxide (MAG-OX) 400 (240 Mg) MG tablet Take 1 tablet by mouth 2 times daily 30 tablet     apixaban (ELIQUIS) 5 MG TABS tablet Take 1 tablet by mouth 2 times daily 60 tablet     gabapentin (NEURONTIN) 300 MG capsule Take 1 capsule by mouth daily for 30 days.  30 capsule 0    atorvastatin (LIPITOR) 40 MG tablet Take 1 tablet by mouth nightly 30 tablet 3    allopurinol (ZYLOPRIM) 100 MG tablet Take 1 tablet by mouth daily 30 tablet 3    bisacodyl (DULCOLAX) 10 MG suppository Place 1 suppository rectally daily as needed for Constipation      polyethylene glycol (GLYCOLAX) 17 g packet Take 17 g by mouth daily 527 g 1    clindamycin (CLEOCIN) 300 MG capsule Take 1 capsule by mouth every 8 hours for 8 days 25 capsule 0    calcitRIOL (ROCALTROL) 0.25 MCG capsule Take 1 capsule by mouth daily 30 capsule 3    Benzocaine-Menthol (CEPACOL) 6-10 MG LOZG lozenge Take 1 lozenge by mouth every 2 hours as needed for Sore Throat  0    methocarbamol (ROBAXIN) 500 MG tablet Take 1 tablet by mouth 3 times daily as needed (spasm) 30 tablet 0    pantoprazole (PROTONIX) 40 MG tablet Take 1 tablet by mouth every morning (before breakfast) 30 tablet 3    venlafaxine (EFFEXOR XR) 37.5 MG extended release capsule Take 1 capsule by mouth daily (with breakfast) 30 capsule 0    metoprolol tartrate (LOPRESSOR) 25 MG tablet Take 1 tablet by mouth 2 times daily 60 tablet 3    senna (SENOKOT) 8.6 MG tablet Take 1 tablet by mouth daily as needed (Constipation) 30 tablet 0    rOPINIRole (REQUIP) 0.25 MG tablet Take 1 tablet by mouth nightly 90 tablet 1    blood glucose monitor kit and supplies use check blood sugar as directed 1 kit 0    Lancets MISC 1 each by Does not apply route daily 100 each 3    Alcohol Swabs 70 % PADS 1 each by Does not apply route daily 100 each 3    zolpidem (AMBIEN) 5 MG tablet Take 5 mg by mouth nightly as needed for Sleep.      hydrALAZINE (APRESOLINE) 25 MG tablet Take 4 tablets by mouth 3 times daily 90 tablet 5    amiodarone (CORDARONE) 200 MG tablet Take 1 tablet by mouth daily 90 tablet 1    ferrous sulfate (FE TABS 325) 325 (65 Fe) MG EC tablet Take 1 tablet by mouth daily (with breakfast) 90 tablet 0    Cholecalciferol (VITAMIN D3) 25 MCG (1000 UT) TABS Take 2 tablets by mouth daily 180 tablet 1       Allergies:  Adhesive tape, Cephalexin, Erythromycin, Ketorolac tromethamine, Pcn [penicillins], Percocet [oxycodone-acetaminophen], Sulfa antibiotics, and Ultracet [tramadol-acetaminophen]    REVIEW OF SYSTEMS:      General: Positive for weakness and fatigue. No unanticipated weight loss or decreased appetite. No fever or chills. Eyes: No blurred vision, eye pain or double vision. Ears: No hearing problems or drainage. No tinnitus. Throat: No sore throat, problems with swallowing or dysphagia. Respiratory: No cough, sputum or hemoptysis. Positive for exertional shortness of breath. No pleuritic chest pain. Cardiovascular: No chest pain, orthopnea or PND. No lower extremity edema. No palpitation. Gastrointestinal: No problems with swallowing. No abdominal pain or bloating. No nausea or vomiting. No diarrhea or constipation. No GI bleeding. Genitourinary: No dysuria, hematuria, frequency or urgency. Musculoskeletal: As above. Dermatologic: No skin rashes or pruritus. No skin lesions or discolorations. Psychiatric: No depression, anxiety, or stress or signs of schizophrenia. No change in mood or affect.     Hematologic: No history of bleeding tendency. No bruises or ecchymosis. No history of clotting problems. Infectious disease: No fever, chills or frequent infections. Endocrine: No polydipsia or polyuria. No temperature intolerance. Neurologic: No headaches or dizziness. No weakness or numbness of the extremities. No changes in balance, coordination,  memory, mentation, behavior. Allergic/Immunologic: No nasal congestion or hives. No repeated infections. PHYSICAL EXAM:      /73   Pulse 61   Temp 98.1 °F (36.7 °C)   Resp 16   Ht 5' 5\" (1.651 m)   Wt 264 lb 8.8 oz (120 kg)   LMP  (LMP Unknown)   SpO2 97%   BMI 44.02 kg/m²    Temp (24hrs), Av.1 °F (36.7 °C), Min:98.1 °F (36.7 °C), Max:98.1 °F (36.7 °C)      General appearance - not in pain or distress. Patient is morbidly obese. Mental status - alert and oriented  Eyes - pupils equal and reactive, extraocular eye movements intact  Ears - bilateral TM's and external ear canals normal  Nose - normal and patent, no erythema, discharge or polyps  Mouth - mucous membranes moist, pharynx normal without lesions  Neck - supple, no significant adenopathy  Lymphatics - no palpable lymphadenopathy, no hepatosplenomegaly  Chest - clear to auscultation, no wheezes, rales or rhonchi, symmetric air entry  Heart - normal rate, regular rhythm, normal S1, S2, no murmurs, rubs, clicks or gallops  Abdomen - soft, nontender, nondistended, no masses or organomegaly  Neurological - alert, oriented, normal speech, no focal findings or movement disorder noted  Musculoskeletal -status post right ankle fracture status post open reduction internal fixation.   Extremities - peripheral pulses normal, no pedal edema, no clubbing or cyanosis  Skin - normal coloration and turgor, no rashes, no suspicious skin lesions noted           DATA:      Labs:       CBC:   Recent Labs     22  0628   WBC 10.3   HGB 7.7*   HCT 24.1*          BMP:   Recent Labs     22  0605 12/24/22  1240    138   K 4.5 4.6   CO2 29 23   BUN 21 31*   CREATININE 2.55* 3.65*   LABGLOM 19* 12*   GLUCOSE 126* 149*       PT/INR:   No results for input(s): PROTIME, INR in the last 72 hours. APTT:No results for input(s): APTT in the last 72 hours. LIVER PROFILE:  Recent Labs     12/23/22  1311   AST 14   ALT 7         XR ANKLE RIGHT (MIN 3 VIEWS)  History: type IIIa open right trimalleolar ankle fracture status post   ORIF, 12/6/2022    Comparison: 12/6/2022    Findings: 3 views of the right ankle (AP/mortise/lateral) showing   trimalleolar ankle fracture status post ORIF with retained hardware in   relative anatomic alignment without signs of malreduction or hardware   failure when compared to previous films. There is minimal osseous   integration appreciate this time, though view was obscured due to splint   material.    Impression: Stable right trimalleolar ankle fracture status post ORIF      Component Ref Range & Units 4/2/22 1651 4/2/22 1651 Urine IFX Specimen . Random Urine Urine Total Protein mg/dL 24 24 Urine IFX Interp FREE MONOCLONAL LIGHT CHAINS ARE PRESENT, IDENTIFIED AS Comment: KAPPA (6.2 MG/DL). 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 Status post open reduction internal fixation. JOSE ARMANDO on top of CKD.   Anemia of chronic renal insufficiency  Elevated kappa light chain immunoglobulin    RECOMMENDATIONS:  Records and labs and images were reviewed and discussed with the patient and family at bedside. Patient is recovering from right ankle fracture. Undergoing inpatient rehab. Follow-up on urine protein electrophoresis studies. Patient was noted to have free monoclonal light chains back in April 2022. 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 Discussed outpatient bone marrow biopsy. MAE therapy with hemodialysis  Patient's questions were answered to the best of her satisfaction and she verbalized full understanding and agreement. Discussed with patient and family. Josias Perdue MD, MD                                                   This note is created with the assistance of a speech recognition program.  While intending to generate a document that actually reflects the content of the visit, the document can still have some errors including those of syntax and sound a like substitutions which may escape proof reading. It such instances, actual meaning can be extrapolated by contextual diversion.

## 2022-12-28 ENCOUNTER — OFFICE VISIT (OUTPATIENT)
Dept: ORTHOPEDIC SURGERY | Age: 77
End: 2022-12-28

## 2022-12-28 VITALS — BODY MASS INDEX: 43.99 KG/M2 | HEIGHT: 65 IN | WEIGHT: 264 LBS

## 2022-12-28 DIAGNOSIS — S82.851F TYPE III OPEN TRIMALLEOLAR FRACTURE OF RIGHT ANKLE WITH ROUTINE HEALING, SUBSEQUENT ENCOUNTER: Primary | ICD-10-CM

## 2022-12-28 PROCEDURE — 99024 POSTOP FOLLOW-UP VISIT: CPT | Performed by: STUDENT IN AN ORGANIZED HEALTH CARE EDUCATION/TRAINING PROGRAM

## 2022-12-28 NOTE — PROGRESS NOTES
[facility]  [id number] [facility]  Dept Phone: [phone]  Dept Fax: [phone]      Orthopaedic Trauma Clinic Follow Up      Subjective:   Date of Surgery: 12/6/2022    Vanda Morales is a 69y.o. year old female who presents to the clinic today for follow up status post open reduction internal fixation right open trimalleolar ankle fracture with irrigation debridement. Patient is here for a wound check. At last encounter there is some erythema and patient was started on clindamycin. Patient states she has a few more days left of her clindamycin. She has been compliant with its use. Denies any new injuries or falls. Denies any numbness or tingling. Overall patient is doing well. Review of Systems  Gen: no fever, chills, malaise  CV: no chest pain or palpitations  Resp: no cough or shortness of breath  GI: no nausea, vomiting, diarrhea, or constipation  Neuro: no seizures, vertigo, or headache  Msk: Right ankle soreness  10 remaining systems reviewed and negative    Objective : There were no vitals filed for this visit. Body mass index is 43.93 kg/m². General: No acute distress, resting comfortably in the clinic  Neuro: alert. oriented  Eyes: Extra-ocular muscles intact  Pulm: Respirations unlabored and regular. Skin: warm, well perfused  Psych:   Patient has good fund of knowledge and displays understanding of exam, diagnosis, and plan. MSK: Right lower extremity: Sutures in place. Well approximated. Mild eschar noted to medial incision. Erythema improved. Mild ecchymosis around incision sites. EHL/FHL/TA/GSC motor intact. Patient's past normal sensation light touch L3-S1 distribution. Foot warm well perfused. Patient is able to dorsiflex and plantarflex ankle without any significant pain or discomfort       Assessment:   69y.o. year old female status post open reduction internal fixation right open trimalleolar ankle fracture.     Plan: Lengthy discussion had with patient about current clinical state. Sutures removed. Steri-Strips applied. Nonweightbearing right lower extremity. Continue to work on active range of motion right ankle. Ice for pain and swelling. Discussed with patient that she can remove boot while resting in bed but if up and ambulate needs to have boot on. Reinforce medial wound with ABD and Ace wrap to provide extra padding to prevent boot from rubbing on incision site. Paper filled out to return to facility with my restrictions. Picture taken and placed in media tab. We will plan seeing patient back in 1 month or sooner if any acute issues arise. All questions answered. Patient is amenable to this plan. Electronically signed by Kaela Jackson DO on 12/28/2022 at 1:14 PM    This note is created with the assistance of a speech recognition program.  While intending to generate a document that actually reflects the content of the visit, the document can still have some errors including those of syntax and sound a like substitutions which may escape proof reading.   In such instances, actual meaning can be extrapolated by contextual diversion

## 2023-01-03 ENCOUNTER — CLINICAL DOCUMENTATION ONLY (OUTPATIENT)
Facility: CLINIC | Age: 78
End: 2023-01-03

## 2023-01-03 ENCOUNTER — TELEPHONE (OUTPATIENT)
Dept: FAMILY MEDICINE CLINIC | Age: 78
End: 2023-01-03

## 2023-01-03 NOTE — TELEPHONE ENCOUNTER
[place] SANTOYO [facility] Clinical Support Pool  Patient was d/c: 12/24/2022. TCM attempted 2 outreaches and was unable to contact patient. Please note that patient will need scheduled with PCP for follow up visit. Thank you,   Catarina for patient to call the office.
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
## 2023-01-25 ENCOUNTER — OFFICE VISIT (OUTPATIENT)
Dept: ORTHOPEDIC SURGERY | Age: 78
End: 2023-01-25

## 2023-01-25 VITALS — HEIGHT: 65 IN | BODY MASS INDEX: 43.65 KG/M2 | WEIGHT: 262 LBS

## 2023-01-25 DIAGNOSIS — Z98.890 HISTORY OF OPEN REDUCTION AND INTERNAL FIXATION (ORIF) PROCEDURE: Primary | ICD-10-CM

## 2023-01-25 PROCEDURE — 99024 POSTOP FOLLOW-UP VISIT: CPT | Performed by: STUDENT IN AN ORGANIZED HEALTH CARE EDUCATION/TRAINING PROGRAM

## 2023-01-25 NOTE — PROGRESS NOTES
[facility]  [id number] [facility]  Dept Phone: [phone]  Dept Fax: [phone]      Orthopaedic Trauma Clinic Follow Up      Subjective:   Date of Surgery: 12/6/2022    Darrell Gregory is a 69y.o. year old female who presents to the clinic today for follow up status post open reduction internal fixation right open trimalleolar ankle fracture with irrigation debridement. At her first follow-up appointment patient had some erythema around the wound, and was started on clindamycin. Patient overall doing well. Pain controlled. Patient currently at a skilled nursing facility. Patient is somewhat displeased with her care at the facility. Patient states they do not remove her boot to work on range of motion and only have her work on motion in the boot. Patient has been compliant with her weightbearing status. The facility has been padding her ankle wounds. Denies any numbness or tingling. No other complaints at this time. Patient accompanied with her family. Review of Systems  Gen: no fever, chills, malaise  CV: no chest pain or palpitations  Resp: no cough or shortness of breath  GI: no nausea, vomiting, diarrhea, or constipation  Neuro: no seizures, vertigo, or headache  Msk: Per HPI  10 remaining systems reviewed and negative    Objective : There were no vitals filed for this visit. Body mass index is 43.6 kg/m². General: No acute distress, resting comfortably in the clinic  Neuro: alert. oriented  Eyes: Extra-ocular muscles intact  Pulm: Respirations unlabored and regular. Skin: warm, well perfused  Psych:   Patient has good fund of knowledge and displays understanding of exam, diagnosis, and plan. MSK: Right lower extremity: Lateral skin incision with mature scar. Medial incision with mild eschar and slight serosanguineous drainage. No wound dehiscence. No erythema or signs of infection. EHL/FHL/TA/GSC motor intact.   Patient sweats normal sensation light touch throughout right foot. Compartment soft compressible. Nontender to palpation. Radiology:  Imaging studies from today were independently reviewed and read as listed below. Any relevant images obtained prior to today's visit were also independently interpreted. History: 67-year-old female status post open reduction internal fixation trimalleolar ankle fracture    Comparison: 12/21/2022    Findings: 3 views of the right ankle (AP/lateral/mortise) in a skeletally mature patient showing redemonstration orthopedic hardware in the form of plate and screws to right ankle. Interval callus formation and bony consolidation appreciated compared to prior films specifically about the medial malleolus and posterior malleolus. Joint line well-maintained. Fibula fracture still visualized without any significant callus formation noted. No new acute osseous or maladies. No signs of hardware failure or loosening. Impression: Stable hardware right ankle. Assessment:   69y.o. year old female status post irrigation excisional debridement with open reduction internal fixation right open trimalleolar ankle fracture  Plan:   Lengthy discussion had with patient about current clinical state. Continue nonweightbearing status for 2 more weeks. At that time patient may begin weight-bear as tolerated with walking boot on. We will plan on seeing patient back in 6 weeks from today. Specific instructions provided facility and written down, picture also taken in media tab of specific instructions. Patient is to have medial wound padded and walking boot so it does not rub against boot. Patient is to come out of boot to work on ankle range of motion. Patient no longer has to sleep in the walking boot. Ice for pain and swelling. All questions answered. Patient is amenable to this plan.         Electronically signed by Gita Milian DO on 1/25/2023 at 2:59 PM    This note is created with the assistance of a speech recognition program.  While intending to generate a document that actually reflects the content of the visit, the document can still have some errors including those of syntax and sound a like substitutions which may escape proof reading.   In such instances, actual meaning can be extrapolated by contextual diversion

## 2023-03-01 ENCOUNTER — TELEPHONE (OUTPATIENT)
Dept: ORTHOPEDIC SURGERY | Age: 78
End: 2023-03-01

## 2023-03-01 ENCOUNTER — CLINICAL DOCUMENTATION ONLY (OUTPATIENT)
Facility: CLINIC | Age: 78
End: 2023-03-01

## 2023-03-01 NOTE — TELEPHONE ENCOUNTER
Juan Jose Cortez called in inquiring if we will follow for home care orders, provided verbal orders for all orthopedic related issues. Juan Jose Cortez expressed understanding.

## 2023-03-02 ENCOUNTER — CARE COORDINATION (OUTPATIENT)
Dept: CASE MANAGEMENT | Age: 78
End: 2023-03-02

## 2023-03-02 DIAGNOSIS — S82.201S: Primary | ICD-10-CM

## 2023-03-02 DIAGNOSIS — S82.401S: Primary | ICD-10-CM

## 2023-03-02 PROCEDURE — 1111F DSCHRG MED/CURRENT MED MERGE: CPT | Performed by: INTERNAL MEDICINE

## 2023-03-02 NOTE — TELEPHONE ENCOUNTER
She should check with nephrology about that since they do labs at dialysis. I'm not sure that the [facility] providers will do refills once she is no longer at their facility.
no indicators present

## 2023-03-02 NOTE — CARE COORDINATION
[facility] Discharge Call    3/2/2023    Patient: José Miguel Lincoln Patient : 1945   MRN: [id number]  Reason for Admission: R tibia, fibula, ankle fx with ORIF  Discharge Date: 22 RARS: Readmission Risk Score: 24         Discharge Facility: [facility]    Acute Care Course:    to  [facility] s/p fall and found to have  R tibia, fibula, ankle fx with ORIF and JOSE ARMANDO   [facility] for Rehab   to 3/1 [facility] for 67 days with d/c to home with [facility] with [facility] 3/3/23    HFU made:  Educated to make PCP appt. Educated to ask PCP if would be agreeable to a vv as pt still very tired and needing HD visits  Per Nephro notes from 1/10 BARBIE Morley will visit pt at HD.   3/8 Has ortho f/u with Dr Jojo Mann Hx:   CHF, afib, cellulitis, CKD5, anemia, HD (new), DMII with neuropathy, insomnia, DIONY with BiPAP, OA, fibromyalgia,     DME: walker and she is wearing a boot    Conversation:   Spoke with Louis after 2 IDs. Pt states she is sitting in chair and is very tired. She will have HD today at 12:15. Her daughter picked up her meds yesterday but no Remeron, ambien, hydralazine or robaxin. She states she has a phone call into the NP at [facility] for the refills. Educated that PCP will do refills and that she needs an appt with PCP. She states she needs to wait to make that appt as she has too much and needs to recover. Educated to ask and see if PCP agreeable to a vv. She states her son has an Iphone and would possibly help her. She states she is walking with her walker and she is using her boot. She states that she takes the boot off at night. She states no loose rugs or tripping hazards. Pt encouraged to watch wts and b/p and monitor edema. She is sleeping well but has no appetite. Educated to supplement with Nepro.  She stated that Nepro was significantly worse than Boost. Educated that the Nepro is better for her kidneys and it would be better to use this. She stated she did not know this and would use Nepro. Pt declined a review with a dietician now but may in the future. States Zeeshan Hair is coming out tomorrow. Reviewed meds. Pt on Norco 5 and needed 1 last night. Educated to monitor constipation with pain meds. She states not taking Fe as it causes constipation. Educated that she needs to discuss this with the PCP. Anemia is tx with Fe and PCP can help with stool softeners for the constipation. Ines Reinoso encouraged her to ask nephrologist about a phosphorous binder. She is taking  Encouraged to review meds with Luke too. Follow up plan: Will hand over to Ines Reinoso who has already called pt. Care Transitions Post Acute Facility Transition      Do you have any ongoing symptoms?: No   Do you have all of your prescriptions and are they filled?: No (Comment: she manages herself)   Patient Reports: needs refill on hydralazine, Robaxin, remeron, ambien refill. Do you have any questions related to your medications?: No   Have you scheduled your follow up appointment?: No   Were you discharged with any Home Care or Post Acute Services or do you currently have any active services?: Yes   Post Acute Services: Home Health (Comment: Luke)         Do you have support at home?: Partner/Spouse/SO   Do you feel like you have everything you need to keep you well at home?: Yes   Patient DME: Adia Niya   Patient Home Equipment: BiPAP   Care Transitions Interventions         Future Appointments   Date Time Provider Mateo Weaver   3/8/2023  2:30 PM Brenda Terry, [facility]     Transitions of Care Initial Call    Was this an external facility discharge? Yes, 3/1/23  Discharge Facility: [address] to be reviewed by the provider   Additional needs identified to be addressed with provider: Yes  medications-pt requesting refills on hydralazine, robaxin, remeron, and ambien. Educated that she call your office for refills and an appt. Method of communication with provider : chart routing    Advance Care Planning:   Does patient have an Advance Directive: not on file. Care Transition Nurse contacted the patient by telephone to perform post hospital discharge assessment. Verified name and  with patient as identifiers. Provided introduction to self, and explanation of the CTN role. CTN reviewed discharge instructions, medical action plan and red flags with patient who verbalized understanding. Patient given an opportunity to ask questions and does not have any further questions or concerns at this time. Were discharge instructions available to patient? Yes. Reviewed appropriate site of care based on symptoms and resources available to patient including: PCP. The patient agrees to contact the PCP office for questions related to their healthcare. Medication reconciliation was performed with patient, who verbalizes understanding of administration of home medications. Advised obtaining a 90-day supply of all daily and as-needed medications. Was patient discharged with a pulse oximeter? no    CTN provided contact information. Will hand off to St[facility] ACM  based on severity of symptoms and risk factors.   Plan for next call:  hand over to SERAFIN Clarke, Jackie Transition Nurse  [phone]

## 2023-03-08 ENCOUNTER — OFFICE VISIT (OUTPATIENT)
Dept: ORTHOPEDIC SURGERY | Age: 78
End: 2023-03-08

## 2023-03-08 VITALS — WEIGHT: 262 LBS | BODY MASS INDEX: 43.65 KG/M2 | HEIGHT: 65 IN

## 2023-03-08 DIAGNOSIS — Z98.890 HISTORY OF OPEN REDUCTION AND INTERNAL FIXATION (ORIF) PROCEDURE: Primary | ICD-10-CM

## 2023-03-08 NOTE — PROGRESS NOTES
[facility]  [id number] [facility] [place][note 2]  Dept Phone: [phone]  Dept Fax: [phone]      Orthopaedic Trauma Clinic Follow Up      Subjective:   Date of Surgery: 12/06/2022    Lainey Johnson is a 69y.o. year old female who presents to the clinic today for routine 3 mo. follow up s/p open reduction internal fixation of right open trimalleolar ankle fracture with irrigation and debridement. Patient doing well overall, she wears walking boot while weight bearing, and walker with ambulation. Patient has been doing at home ankle ROM several times a day, and states she has noticed improvement. Pain well controlled. Patient has returned home from skilled nursing facility and has begun at home PT. She denies right ankle instability, paresthesia, numbness, recent falls or injuries. Review of Systems  Gen: no fever, chills, malaise  CV: no chest pain or palpitations  Resp: no cough or shortness of breath  GI: no nausea, vomiting, diarrhea, or constipation  Neuro: no seizures, vertigo, or headache  Msk: Per HPI  10 remaining systems reviewed and negative    Objective : There were no vitals filed for this visit. Body mass index is 43.6 kg/m². General: No acute distress, resting comfortably in the clinic  Neuro: alert. oriented  Eyes: Extra-ocular muscles intact  Pulm: Respirations unlabored and regular. Skin: warm, well perfused  Psych:   Patient has good fund of knowledge and displays understanding of exam, diagnosis, and plan. RLE:  Skin intact, medial and lateral incisions with mature scar, no signs of infection, drainage, or dehiscence. Non-tender to palpation, compartments soft, dorsalis pedis pulses 2+ bilat. Sensation intact throughout right foot. EHL/FHL/TA/GSC motor intact. Radiology:  Imaging studies from today were independently reviewed and read as listed below.  Any relevant images obtained prior to today's visit were also independently interpreted. History: trimalleolar ankle fracture s/p ORIF    Comparison: 12/21/2022    Findings: 3 views of the right ankle (AP/Lateral/Mortise) in a skeletally mature patient showing interval callus formation of lateral malleoli, fracture line still visible. Medial malleolus fracture with bony consolidation when compared to prior films. Orthopedic hardware intact without sign of loosening, acute fracture or dislocation. Impression: Stable hardware right ankle     Assessment:   69y.o. year old female with open reduction internal fixation of right trimalleolar fracture. DOS 12/21/2022  Plan:   -Lengthy discussion had with patient and patient's family about current clinical state. Imaging reviewed with patient.    -Patient may wean out of walking boot. Recommend continued use of assistive devices such as walker with ambulation.    -Ice as needed for pain and swelling. Tramadol prescription refilled as patient having generalized discomfort about ankle on occasion.    - continue working on ROM and strength with at home PT    - follow up in office in 3 mo. Orders Placed This Encounter   Procedures    XR ANKLE RIGHT (MIN 3 VIEWS)     Standing Status:   Future     Number of Occurrences:   1     Standing Expiration Date:   3/12/2023       Electronically signed by Lj Virgen DO on 3/9/2023 at 5:38 PM    This note is created with the assistance of a speech recognition program.  While intending to generate a document that actually reflects the content of the visit, the document can still have some errors including those of syntax and sound a like substitutions which may escape proof reading.   In such instances, actual meaning can be extrapolated by contextual diversion

## 2023-03-09 RX ORDER — TRAMADOL HYDROCHLORIDE 50 MG/1
50 TABLET ORAL 2 TIMES DAILY
Qty: 14 TABLET | Refills: 0 | Status: SHIPPED | OUTPATIENT
Start: 2023-03-09 | End: 2023-03-10 | Stop reason: SDUPTHER

## 2023-03-10 DIAGNOSIS — Z98.890 HISTORY OF OPEN REDUCTION AND INTERNAL FIXATION (ORIF) PROCEDURE: ICD-10-CM

## 2023-03-10 RX ORDER — TRAMADOL HYDROCHLORIDE 50 MG/1
50 TABLET ORAL 2 TIMES DAILY
Qty: 14 TABLET | Refills: 0 | Status: SHIPPED | OUTPATIENT
Start: 2023-03-10 | End: 2023-03-17

## 2023-03-10 NOTE — TELEPHONE ENCOUNTER
Date of Surgery: 12/06/2022    s/p open reduction internal fixation of right open trimalleolar ankle fracture with irrigation and debridement. Patient in for visit on 03/08/23 and tramadol was not sent to pharmacy. Medication pended.  Please advise

## 2023-03-15 ENCOUNTER — TELEPHONE (OUTPATIENT)
Dept: ORTHOPEDIC SURGERY | Age: 78
End: 2023-03-15

## 2023-03-20 ENCOUNTER — OFFICE VISIT (OUTPATIENT)
Dept: ORTHOPEDIC SURGERY | Age: 78
End: 2023-03-20
Payer: MEDICARE

## 2023-03-20 VITALS — HEIGHT: 65 IN | WEIGHT: 262.2 LBS | BODY MASS INDEX: 43.68 KG/M2

## 2023-03-20 DIAGNOSIS — S82.851F TYPE III OPEN TRIMALLEOLAR FRACTURE OF RIGHT ANKLE WITH ROUTINE HEALING, SUBSEQUENT ENCOUNTER: Primary | ICD-10-CM

## 2023-03-20 PROCEDURE — G8427 DOCREV CUR MEDS BY ELIG CLIN: HCPCS | Performed by: STUDENT IN AN ORGANIZED HEALTH CARE EDUCATION/TRAINING PROGRAM

## 2023-03-20 PROCEDURE — 1036F TOBACCO NON-USER: CPT | Performed by: STUDENT IN AN ORGANIZED HEALTH CARE EDUCATION/TRAINING PROGRAM

## 2023-03-20 PROCEDURE — G8400 PT W/DXA NO RESULTS DOC: HCPCS | Performed by: STUDENT IN AN ORGANIZED HEALTH CARE EDUCATION/TRAINING PROGRAM

## 2023-03-20 PROCEDURE — 1124F ACP DISCUSS-NO DSCNMKR DOCD: CPT | Performed by: STUDENT IN AN ORGANIZED HEALTH CARE EDUCATION/TRAINING PROGRAM

## 2023-03-20 PROCEDURE — G8484 FLU IMMUNIZE NO ADMIN: HCPCS | Performed by: STUDENT IN AN ORGANIZED HEALTH CARE EDUCATION/TRAINING PROGRAM

## 2023-03-20 PROCEDURE — 99213 OFFICE O/P EST LOW 20 MIN: CPT | Performed by: STUDENT IN AN ORGANIZED HEALTH CARE EDUCATION/TRAINING PROGRAM

## 2023-03-20 PROCEDURE — G8417 CALC BMI ABV UP PARAM F/U: HCPCS | Performed by: STUDENT IN AN ORGANIZED HEALTH CARE EDUCATION/TRAINING PROGRAM

## 2023-03-20 PROCEDURE — 1090F PRES/ABSN URINE INCON ASSESS: CPT | Performed by: STUDENT IN AN ORGANIZED HEALTH CARE EDUCATION/TRAINING PROGRAM

## 2023-03-20 RX ORDER — VENLAFAXINE 37.5 MG/1
TABLET ORAL
Qty: 30 TABLET | Refills: 0 | Status: SHIPPED | OUTPATIENT
Start: 2023-03-20

## 2023-03-20 RX ORDER — HYDROCODONE BITARTRATE AND ACETAMINOPHEN 5; 325 MG/1; MG/1
1 TABLET ORAL EVERY 6 HOURS PRN
Qty: 10 TABLET | Refills: 0 | Status: SHIPPED | OUTPATIENT
Start: 2023-03-20 | End: 2023-03-27

## 2023-03-20 NOTE — TELEPHONE ENCOUNTER
insufficiency)     OA (osteoarthritis)     DM (diabetes mellitus) ([address]     Kidney stone     Depression     Seasonal allergies     Diverticulosis     Anemia of chronic renal failure, stage 5 (HCC)     Normocytic normochromic anemia     Mitral regurgitation - Mild to moderate     Stage 4 chronic kidney disease (HCC)     Gout     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 disease ([address]     Essential hypertension     Osteopenia     Morbid obesity due to excess calories ([facility])     Anxiety     Febrile illness     Acute serous otitis media of left ear     Secondary hyperparathyroidism ([address]     Acute kidney injury superimposed on chronic kidney disease ([address]     New onset a-fib ([address] with Rapid ventricular response     New onset of congestive heart failure (HCC)     Lymphedema     GERD (gastroesophageal reflux disease)     Restless leg syndrome     Acute diastolic congestive heart failure ([facility])     Chronic bilateral low back pain without sciatica     Acute bilateral low back pain without sciatica     Generalized muscle weakness     Bradycardia     Open fracture of right tibia and fibula, sequela     Type III open trimalleolar fracture of right ankle     Hyperkalemia     Hyponatremia     Metabolic acidosis     JOSE ARMANDO (acute kidney injury) ([address]     Paraproteinemia     Stage 5 chronic kidney disease not on chronic dialysis ([address]     Moderate recurrent major depression ([address]

## 2023-03-20 NOTE — LETTER
March 20, 2023       Shikha Gibbons YOB: 1945   Yefri Randall [facility] Date of Visit:  3/20/2023       To Whom It May Concern:    Jeannette Lyons was seen in my clinic on 3/20/2023. Please see following physical therapy orders:    Please Eval/Treat for 2-3 visits per week for 6-8 weeks and develop home exercise program focusing on right ankle. Please include unrestricted active ROM, strengthening, gait training, balance/proprioception, scar massage and pain and edema reduction as well as any additional modalities you feel would be appropriate not specifically addressed above. Weight bearing status: Weight bearing as tolerated. Recommend wearing the boot while weightbearing for the next week then help the patient wean out of the boot as tolerated. If you have any questions or concerns, please don't hesitate to call.     Sincerely,        Charo Lopez, DO

## 2023-03-20 NOTE — PROGRESS NOTES
typically do not continue narcotics past 12 weeks post op. Patient verbalized understanding.     - Order for new walker provided per patient request.     - Plan to f/u at previously scheduled appt in 3 months    Follow up:Return in about 3 months (around 2023) for x-rays. Orders Placed This Encounter   Medications    HYDROcodone-acetaminophen (NORCO) 5-325 MG per tablet     Sig: Take 1 tablet by mouth every 6 hours as needed for Pain for up to 7 days. Intended supply: 7 days. Take lowest dose possible to manage pain Max Daily Amount: 4 tablets     Dispense:  10 tablet     Refill:  0     Reduce doses taken as pain becomes manageable    Misc. Devices MISC     Si each by Does not apply route once for 1 dose Rolling walker with swivel wheels     Dispense:  1 each     Refill:  0          No orders of the defined types were placed in this encounter. Electronically signed by Maurice Alexandra DO on 3/20/2023 at 5:36 PM    This note is created with the assistance of a speech recognition program.  While intending to generate a document that actually reflects the content of the visit, the document can still have some errors including those of syntax and sound a like substitutions which may escape proof reading.   In such instances, actual meaning can be extrapolated by contextual diversion

## 2023-05-02 RX ORDER — ALLOPURINOL 100 MG/1
100 TABLET ORAL DAILY
Qty: 30 TABLET | Refills: 3 | Status: SHIPPED | OUTPATIENT
Start: 2023-05-02

## 2023-05-02 NOTE — TELEPHONE ENCOUNTER
Last visit: 11/09/22  Last Med refill: unknown  Does patient have enough medication for 72 hours: No:     Next Visit Date:  Future Appointments   Date Time Provider Mateo Weaver   5/17/2023  1:00 PM Eliza Andersen MD [id number]   6/7/2023  1:30 PM Sinai Harkins DO [facility] [address] Maintenance   Topic Date Due    Shingles vaccine (1 of 2) Never done    Lipids  01/11/2022    COVID-19 Vaccine (5 - Booster for Pfizer series) 04/13/2022    Annual Wellness Visit (AWV)  07/02/2022    Depression Monitoring  06/08/2023    DTaP/Tdap/Td vaccine (2 - Td or Tdap) 12/05/2032    DEXA (modify frequency per FRAX score)  Completed    Flu vaccine  Completed    Pneumococcal 65+ years Vaccine  Completed    Hepatitis C screen  Completed    Hepatitis A vaccine  Aged Out    Hib vaccine  Aged Out    Meningococcal (ACWY) vaccine  Aged Out    Diabetic foot exam  Discontinued    A1C test (Diabetic or Prediabetic)  Discontinued    Diabetic retinal exam  Discontinued    Breast cancer screen  Discontinued    Colonoscopy  Discontinued       Hemoglobin A1C (%)   Date Value   12/06/2022 5.8   03/09/2022 5.5   11/16/2021 5.7             ( goal A1C is < 7)   Microalb/Crt.  Ratio (mcg/mg creat)   Date Value   12/13/2022 87 (H)     LDL Cholesterol (mg/dL)   Date Value   01/11/2021 85   03/03/2020 96       (goal LDL is <100)   AST (U/L)   Date Value   12/23/2022 14     ALT (U/L)   Date Value   12/23/2022 7     BUN (mg/dL)   Date Value   12/24/2022 31 (H)     BP Readings from Last 3 Encounters:   01/10/23 (!) 140/72   12/24/22 115/73   12/12/22 (!) 132/58          (goal 120/80)    All Future Testing planned in [facility]  Lab Frequency Next Occurrence   Lipid Panel Once 12/09/2022   Vitamin D 25 Hydroxy Once 01/15/2023   PTH, Intact Once 01/15/2023   CBC with Auto Differential Once 01/15/2023   Magnesium Once 09/46/1438   Basic Metabolic Panel Once 70/30/4798   Phosphorus Once 01/15/2023   Creatinine, Random Urine

## 2023-05-17 ENCOUNTER — HOSPITAL ENCOUNTER (OUTPATIENT)
Age: 78
Setting detail: SPECIMEN
Discharge: HOME | End: 2023-05-17

## 2023-05-17 ENCOUNTER — OFFICE VISIT (OUTPATIENT)
Dept: FAMILY MEDICINE CLINIC | Age: 78
End: 2023-05-17

## 2023-05-17 VITALS
SYSTOLIC BLOOD PRESSURE: 114 MMHG | HEART RATE: 60 BPM | TEMPERATURE: 97.3 F | OXYGEN SATURATION: 93 % | WEIGHT: 246 LBS | BODY MASS INDEX: 40.94 KG/M2 | DIASTOLIC BLOOD PRESSURE: 78 MMHG

## 2023-05-17 DIAGNOSIS — K21.9 GASTROESOPHAGEAL REFLUX DISEASE, UNSPECIFIED WHETHER ESOPHAGITIS PRESENT: ICD-10-CM

## 2023-05-17 DIAGNOSIS — F51.01 PRIMARY INSOMNIA: ICD-10-CM

## 2023-05-17 DIAGNOSIS — I48.91 NEW ONSET A-FIB (HCC): ICD-10-CM

## 2023-05-17 DIAGNOSIS — N18.6 TYPE 2 DIABETES MELLITUS WITH CHRONIC KIDNEY DISEASE ON CHRONIC DIALYSIS, WITHOUT LONG-TERM CURRENT USE OF INSULIN (HCC): Primary | ICD-10-CM

## 2023-05-17 DIAGNOSIS — Z99.2 ESRD (END STAGE RENAL DISEASE) ON DIALYSIS (HCC): ICD-10-CM

## 2023-05-17 DIAGNOSIS — F32.1 MODERATE MAJOR DEPRESSION (HCC): ICD-10-CM

## 2023-05-17 DIAGNOSIS — F33.1 MODERATE RECURRENT MAJOR DEPRESSION (HCC): ICD-10-CM

## 2023-05-17 DIAGNOSIS — M54.50 CHRONIC BILATERAL LOW BACK PAIN WITHOUT SCIATICA: ICD-10-CM

## 2023-05-17 DIAGNOSIS — R25.2 LEG CRAMPS: ICD-10-CM

## 2023-05-17 DIAGNOSIS — E11.42 DIABETIC POLYNEUROPATHY ASSOCIATED WITH TYPE 2 DIABETES MELLITUS (HCC): ICD-10-CM

## 2023-05-17 DIAGNOSIS — E11.69 DYSLIPIDEMIA DUE TO TYPE 2 DIABETES MELLITUS (HCC): ICD-10-CM

## 2023-05-17 DIAGNOSIS — Z99.2 TYPE 2 DIABETES MELLITUS WITH CHRONIC KIDNEY DISEASE ON CHRONIC DIALYSIS, WITHOUT LONG-TERM CURRENT USE OF INSULIN (HCC): ICD-10-CM

## 2023-05-17 DIAGNOSIS — Z99.2 TYPE 2 DIABETES MELLITUS WITH CHRONIC KIDNEY DISEASE ON CHRONIC DIALYSIS, WITHOUT LONG-TERM CURRENT USE OF INSULIN (HCC): Primary | ICD-10-CM

## 2023-05-17 DIAGNOSIS — E78.5 DYSLIPIDEMIA DUE TO TYPE 2 DIABETES MELLITUS (HCC): ICD-10-CM

## 2023-05-17 DIAGNOSIS — E11.22 TYPE 2 DIABETES MELLITUS WITH CHRONIC KIDNEY DISEASE ON CHRONIC DIALYSIS, WITHOUT LONG-TERM CURRENT USE OF INSULIN (HCC): ICD-10-CM

## 2023-05-17 DIAGNOSIS — I50.9 NEW ONSET OF CONGESTIVE HEART FAILURE (HCC): ICD-10-CM

## 2023-05-17 DIAGNOSIS — E11.22 TYPE 2 DIABETES MELLITUS WITH CHRONIC KIDNEY DISEASE ON CHRONIC DIALYSIS, WITHOUT LONG-TERM CURRENT USE OF INSULIN (HCC): Primary | ICD-10-CM

## 2023-05-17 DIAGNOSIS — E79.0 HYPERURICEMIA: ICD-10-CM

## 2023-05-17 DIAGNOSIS — N18.6 TYPE 2 DIABETES MELLITUS WITH CHRONIC KIDNEY DISEASE ON CHRONIC DIALYSIS, WITHOUT LONG-TERM CURRENT USE OF INSULIN (HCC): ICD-10-CM

## 2023-05-17 DIAGNOSIS — G89.29 CHRONIC BILATERAL LOW BACK PAIN WITHOUT SCIATICA: ICD-10-CM

## 2023-05-17 DIAGNOSIS — F41.1 GAD (GENERALIZED ANXIETY DISORDER): ICD-10-CM

## 2023-05-17 DIAGNOSIS — N25.81 SECONDARY HYPERPARATHYROIDISM (HCC): ICD-10-CM

## 2023-05-17 DIAGNOSIS — N18.6 ESRD (END STAGE RENAL DISEASE) ON DIALYSIS (HCC): ICD-10-CM

## 2023-05-17 DIAGNOSIS — Z91.81 AT HIGH RISK FOR FALLS: ICD-10-CM

## 2023-05-17 LAB
EST. AVERAGE GLUCOSE BLD GHB EST-MCNC: 114 MG/DL
HBA1C MFR BLD: 5.6 % (ref 4–6)

## 2023-05-17 RX ORDER — PANTOPRAZOLE SODIUM 40 MG/1
40 TABLET, DELAYED RELEASE ORAL
Qty: 90 TABLET | Refills: 1 | Status: SHIPPED | OUTPATIENT
Start: 2023-05-17

## 2023-05-17 RX ORDER — BUMETANIDE 2 MG/1
2 TABLET ORAL DAILY
COMMUNITY
Start: 2023-04-20

## 2023-05-17 RX ORDER — ALLOPURINOL 100 MG/1
100 TABLET ORAL DAILY
Qty: 90 TABLET | Refills: 1 | Status: SHIPPED | OUTPATIENT
Start: 2023-05-17

## 2023-05-17 RX ORDER — ATORVASTATIN CALCIUM 40 MG/1
40 TABLET, FILM COATED ORAL NIGHTLY
Qty: 90 TABLET | Refills: 1 | Status: SHIPPED | OUTPATIENT
Start: 2023-05-17

## 2023-05-17 RX ORDER — LANOLIN ALCOHOL/MO/W.PET/CERES
400 CREAM (GRAM) TOPICAL 2 TIMES DAILY
Qty: 180 TABLET | Refills: 1 | Status: SHIPPED | OUTPATIENT
Start: 2023-05-17

## 2023-05-17 RX ORDER — MIRTAZAPINE 7.5 MG/1
7.5 TABLET, FILM COATED ORAL NIGHTLY
Qty: 90 TABLET | Refills: 1 | Status: SHIPPED | OUTPATIENT
Start: 2023-05-17

## 2023-05-17 RX ORDER — VENLAFAXINE 37.5 MG/1
TABLET ORAL
Qty: 30 TABLET | Refills: 0 | Status: CANCELLED | OUTPATIENT
Start: 2023-05-17

## 2023-05-17 RX ORDER — GABAPENTIN 300 MG/1
300 CAPSULE ORAL DAILY
Qty: 90 CAPSULE | Refills: 0 | Status: SHIPPED | OUTPATIENT
Start: 2023-05-17 | End: 2023-08-15

## 2023-05-17 RX ORDER — CYCLOBENZAPRINE HCL 5 MG
5 TABLET ORAL NIGHTLY PRN
Qty: 30 TABLET | Refills: 1 | Status: SHIPPED | OUTPATIENT
Start: 2023-05-17

## 2023-05-17 RX ORDER — ALPRAZOLAM 0.5 MG/1
0.5 TABLET ORAL PRN
COMMUNITY
End: 2023-05-28 | Stop reason: SDUPTHER

## 2023-05-17 RX ORDER — VENLAFAXINE HYDROCHLORIDE 75 MG/1
75 CAPSULE, EXTENDED RELEASE ORAL DAILY
Qty: 30 CAPSULE | Refills: 3 | Status: SHIPPED | OUTPATIENT
Start: 2023-05-17

## 2023-05-17 SDOH — ECONOMIC STABILITY: INCOME INSECURITY: HOW HARD IS IT FOR YOU TO PAY FOR THE VERY BASICS LIKE FOOD, HOUSING, MEDICAL CARE, AND HEATING?: SOMEWHAT HARD

## 2023-05-17 SDOH — ECONOMIC STABILITY: FOOD INSECURITY: WITHIN THE PAST 12 MONTHS, YOU WORRIED THAT YOUR FOOD WOULD RUN OUT BEFORE YOU GOT MONEY TO BUY MORE.: NEVER TRUE

## 2023-05-17 SDOH — ECONOMIC STABILITY: FOOD INSECURITY: WITHIN THE PAST 12 MONTHS, THE FOOD YOU BOUGHT JUST DIDN'T LAST AND YOU DIDN'T HAVE MONEY TO GET MORE.: NEVER TRUE

## 2023-05-17 ASSESSMENT — ENCOUNTER SYMPTOMS
CHOKING: 0
CONSTIPATION: 0
BLOOD IN STOOL: 0
VOMITING: 0
ABDOMINAL PAIN: 0
COUGH: 0
DIARRHEA: 0
ANAL BLEEDING: 0
SHORTNESS OF BREATH: 0
NAUSEA: 0
WHEEZING: 0
CHEST TIGHTNESS: 0

## 2023-05-17 ASSESSMENT — VISUAL ACUITY: OU: 1

## 2023-05-17 ASSESSMENT — PATIENT HEALTH QUESTIONNAIRE - PHQ9
SUM OF ALL RESPONSES TO PHQ QUESTIONS 1-9: 6
4. FEELING TIRED OR HAVING LITTLE ENERGY: 0
SUM OF ALL RESPONSES TO PHQ QUESTIONS 1-9: 6
3. TROUBLE FALLING OR STAYING ASLEEP: 0
2. FEELING DOWN, DEPRESSED OR HOPELESS: 2
SUM OF ALL RESPONSES TO PHQ9 QUESTIONS 1 & 2: 4
6. FEELING BAD ABOUT YOURSELF - OR THAT YOU ARE A FAILURE OR HAVE LET YOURSELF OR YOUR FAMILY DOWN: 2
1. LITTLE INTEREST OR PLEASURE IN DOING THINGS: 2
10. IF YOU CHECKED OFF ANY PROBLEMS, HOW DIFFICULT HAVE THESE PROBLEMS MADE IT FOR YOU TO DO YOUR WORK, TAKE CARE OF THINGS AT HOME, OR GET ALONG WITH OTHER PEOPLE: 0
7. TROUBLE CONCENTRATING ON THINGS, SUCH AS READING THE NEWSPAPER OR WATCHING TELEVISION: 0
8. MOVING OR SPEAKING SO SLOWLY THAT OTHER PEOPLE COULD HAVE NOTICED. OR THE OPPOSITE, BEING SO FIGETY OR RESTLESS THAT YOU HAVE BEEN MOVING AROUND A LOT MORE THAN USUAL: 0
9. THOUGHTS THAT YOU WOULD BE BETTER OFF DEAD, OR OF HURTING YOURSELF: 0
5. POOR APPETITE OR OVEREATING: 0

## 2023-05-25 RX ORDER — ALPRAZOLAM 0.5 MG/1
TABLET ORAL
Qty: 30 TABLET | OUTPATIENT
Start: 2023-05-25

## 2023-05-28 RX ORDER — ZOLPIDEM TARTRATE 5 MG/1
5 TABLET ORAL NIGHTLY PRN
Qty: 30 TABLET | Refills: 0 | Status: SHIPPED | OUTPATIENT
Start: 2023-05-28 | End: 2023-06-27

## 2023-05-28 RX ORDER — ALPRAZOLAM 0.5 MG/1
0.5 TABLET ORAL PRN
Qty: 30 TABLET | Refills: 0 | Status: SHIPPED | OUTPATIENT
Start: 2023-05-28 | End: 2023-06-27

## 2023-06-07 ENCOUNTER — OFFICE VISIT (OUTPATIENT)
Dept: ORTHOPEDIC SURGERY | Age: 78
End: 2023-06-07

## 2023-06-07 VITALS — WEIGHT: 246 LBS | HEIGHT: 65 IN | BODY MASS INDEX: 40.98 KG/M2

## 2023-06-07 DIAGNOSIS — S82.851F TYPE III OPEN TRIMALLEOLAR FRACTURE OF RIGHT ANKLE WITH ROUTINE HEALING, SUBSEQUENT ENCOUNTER: Primary | ICD-10-CM

## 2023-06-07 NOTE — PROGRESS NOTES
provided to pain management if the Tylenol does not improve symptoms over time since patient is repeatedly asking for narcotic pain medication.     - At this time, patient may follow up on an as needed basis. Follow up:Return if symptoms worsen or fail to improve. No orders of the defined types were placed in this encounter. Orders Placed This Encounter   Procedures    XR ANKLE RIGHT (MIN 3 VIEWS)     Standing Status:   Future     Number of Occurrences:   1     Standing Expiration Date:   6/5/2024     Order Specific Question:   Reason for exam:     Answer:   monitor    [facility] Pain Management     Referral Priority:   Routine     Referral Type:   Eval and Treat     Referral Reason:   Specialty Services Required     Requested Specialty:   Pain Management     Number of Visits Requested:   1       Electronically signed by Tolu Zaragoza DO on 6/8/2023 at 8:17 AM    This note is created with the assistance of a speech recognition program.  While intending to generate a document that actually reflects the content of the visit, the document can still have some errors including those of syntax and sound a like substitutions which may escape proof reading.   In such instances, actual meaning can be extrapolated by contextual diversion

## 2023-06-19 DIAGNOSIS — I50.9 NEW ONSET OF CONGESTIVE HEART FAILURE (HCC): ICD-10-CM

## 2023-06-19 NOTE — TELEPHONE ENCOUNTER
Patient contacted office. She is requesting refill on:  -Amiodarone    [facility].         ALSO NEEDS SHORT SUPPLY SENT TO [facility]

## 2023-06-20 RX ORDER — AMIODARONE HYDROCHLORIDE 200 MG/1
200 TABLET ORAL DAILY
Qty: 30 TABLET | Refills: 0 | Status: SHIPPED | OUTPATIENT
Start: 2023-06-20

## 2023-06-20 RX ORDER — AMIODARONE HYDROCHLORIDE 200 MG/1
200 TABLET ORAL DAILY
Qty: 90 TABLET | Refills: 1 | Status: SHIPPED | OUTPATIENT
Start: 2023-06-20

## 2023-06-23 ENCOUNTER — OFFICE VISIT (OUTPATIENT)
Dept: FAMILY MEDICINE CLINIC | Age: 78
End: 2023-06-23

## 2023-06-23 VITALS
SYSTOLIC BLOOD PRESSURE: 112 MMHG | BODY MASS INDEX: 39.52 KG/M2 | HEIGHT: 65 IN | RESPIRATION RATE: 20 BRPM | DIASTOLIC BLOOD PRESSURE: 78 MMHG | OXYGEN SATURATION: 96 % | HEART RATE: 56 BPM | WEIGHT: 237.2 LBS

## 2023-06-23 DIAGNOSIS — F51.01 PRIMARY INSOMNIA: ICD-10-CM

## 2023-06-23 DIAGNOSIS — M54.50 CHRONIC BILATERAL LOW BACK PAIN WITHOUT SCIATICA: Primary | ICD-10-CM

## 2023-06-23 DIAGNOSIS — G89.29 CHRONIC BILATERAL LOW BACK PAIN WITHOUT SCIATICA: Primary | ICD-10-CM

## 2023-06-23 RX ORDER — HYDROCODONE BITARTRATE AND ACETAMINOPHEN 5; 325 MG/1; MG/1
1 TABLET ORAL EVERY 6 HOURS PRN
Qty: 12 TABLET | Refills: 0 | Status: SHIPPED | OUTPATIENT
Start: 2023-06-23 | End: 2023-06-30

## 2023-06-23 ASSESSMENT — ENCOUNTER SYMPTOMS: BACK PAIN: 1

## 2023-06-23 NOTE — PROGRESS NOTES
Yris Riley (:  1945) is a 69 y.o. female,Established patient, here for evaluation of the following chief complaint(s):  Back Pain (Woke up yesterday with a terrible back pain. States she gets it about once a year. No leg pain. /) and Medication Check ([facility]eril [facility] increased to 10mg daily and the Ambien was increased to 10mg nightly. Reviewing [facility] paperwork I do not see any increases. /)         ASSESSMENT/PLAN:  1. Chronic bilateral low back pain without sciatica  -     HYDROcodone-acetaminophen (NORCO) 5-325 MG per tablet; Take 1 tablet by mouth every 6 hours as needed for Pain for up to 7 days. Intended supply: 7 days. Take lowest dose possible to manage pain Max Daily Amount: 4 tablets, Disp-12 tablet, R-0Normal    Short Rx for norco 5-325. Discussed this will not be an on going Rx and is intended to treat acute pain. 2. Primary insomnia  Front office to request [facility] admission to review medlist regarding dose increase of ambien to 10mg  PDMP shows fill date of 2023 from RX written 2023 By dr. Ava Nolasco for QTY 30 zolpidem 10mg tablets. Fill date of 2023 from Rx written 2023 zolpidem tartrate 3mg tablet  30 QTY by Dr. Hari Emmanuel. Return for As scheduled 2023. Subjective   SUBJECTIVE/OBJECTIVE:  Presents for acute visit  Using cane to ambulate   Down 9lbs - had dialysis yesterday     Reports back pain that starts at the base of her skull all the way into her lumbar spine. Will get flare up of this once a year. Only way to help pain is short course of vicodin   Currently rates pain 7/10 - pain is constant, does get worse with walking/movement .   No new numbness/tingling, sudden loss of bowel/bladder function   No unilateral weakness to upper/lower extremities  Does have dull headache in her sinuses today     Reports she had increase of ambien to 10mg at [facility] admission in addition to being

## 2023-07-06 ENCOUNTER — TELEPHONE (OUTPATIENT)
Dept: FAMILY MEDICINE CLINIC | Age: 78
End: 2023-07-06

## 2023-07-06 NOTE — TELEPHONE ENCOUNTER
PDMP reviewed -   05/29/2023 05/28/2023   1  Zolpidem Tartrate 5 Mg Tablet 30.00  30  Ar Sub  [id number]   Deepika ([id number])  0  0.25 LME  Private Pay  [place]    05/21/2023 04/20/2023   1  Zolpidem Tartrate 10 Mg Tablet 30.00  30  Ba Bra  [id number]   Deepika ([id number])  1  0.50 LME  [place]  [place]      I have not prescribed the 10mg Ambien for her, she was getting that from Dr Nabil Escamilla. She also had two prescriptions for ambien filled in May 2023 - 30 pills each of 10mg and 5mg Ambien. This is in violation of her medication agreement with me - no further refills on controlled medications will be provided. She will need to get refills from Dr Nabil Escamilla. No records from [place] d/ recd yet.

## 2023-07-06 NOTE — TELEPHONE ENCOUNTER
Patient called stating the ambien 5mg is not helping. States she used to be on 10mg and would like to know if this can be changed. Explained to patient that our office has been sending in 5mg for over the last year. Patient states the ambien 5mg has never helped. Please advise.

## 2023-07-08 ENCOUNTER — APPOINTMENT (OUTPATIENT)
Dept: GENERAL RADIOLOGY | Age: 78
End: 2023-07-08
Payer: MEDICARE

## 2023-07-08 ENCOUNTER — APPOINTMENT (OUTPATIENT)
Dept: CT IMAGING | Age: 78
End: 2023-07-08
Payer: MEDICARE

## 2023-07-08 ENCOUNTER — HOSPITAL ENCOUNTER (EMERGENCY)
Age: 78
Discharge: HOME | End: 2023-07-08
Attending: EMERGENCY MEDICINE
Payer: MEDICARE

## 2023-07-08 VITALS
HEIGHT: 66 IN | HEART RATE: 60 BPM | WEIGHT: 249 LBS | DIASTOLIC BLOOD PRESSURE: 85 MMHG | OXYGEN SATURATION: 97 % | BODY MASS INDEX: 40.02 KG/M2 | SYSTOLIC BLOOD PRESSURE: 144 MMHG | TEMPERATURE: 97.5 F | RESPIRATION RATE: 16 BRPM

## 2023-07-08 DIAGNOSIS — M25.511 ACUTE PAIN OF RIGHT SHOULDER: ICD-10-CM

## 2023-07-08 DIAGNOSIS — M25.551 RIGHT HIP PAIN: Primary | ICD-10-CM
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
## 2023-07-08 PROCEDURE — 70450 CT HEAD/BRAIN W/O DYE: CPT

## 2023-07-08 PROCEDURE — 73030 X-RAY EXAM OF SHOULDER: CPT

## 2023-07-08 PROCEDURE — 73080 X-RAY EXAM OF ELBOW: CPT

## 2023-07-08 PROCEDURE — 73502 X-RAY EXAM HIP UNI 2-3 VIEWS: CPT

## 2023-07-08 PROCEDURE — 99284 EMERGENCY DEPT VISIT MOD MDM: CPT

## 2023-07-08 ASSESSMENT — PAIN SCALES - GENERAL: PAINLEVEL_OUTOF10: 4

## 2023-07-08 ASSESSMENT — PAIN - FUNCTIONAL ASSESSMENT: PAIN_FUNCTIONAL_ASSESSMENT: 0-10

## 2023-07-08 NOTE — ED PROVIDER NOTES
[facility]  eMERGENCY dEPARTMENT eNCOUnter      Pt Name: Cassia Murray  MRN: [id number]  [address] [facility] 1945  Date of evaluation: 7/8/2023  Provider: Tyler Araujo PA-C    CHIEF COMPLAINT       Chief Complaint   Patient presents with    Fall    Shoulder Injury     Right shoulder; fall earlier this morning; fell sideways into front door    Hip Pain     RIGHT     HISTORY OF PRESENT ILLNESS  (Location/Symptom, Timing/Onset, Context/Setting, Quality, Duration, Modifying Factors, Severity.)   Cassia Murray is a 69 y.o. female who presents to the emergency department. Patient states that she got up this morning approximately 5 AM to go to the bathroom and when she made it into the hallway she fell sideways landing into her front door. Patient did call EMS to help get her off the floor. She states that she was able to walk on her right hip after the injury. Patient went to dialysis after the fall and states she has had some intermittent right hip pain along with right elbow and shoulder pain. She states she did hit her head. Patient is on blood thinners. Patient denies any other injury. She states that she only came in because the pain was intermittent in nature and did not go away throughout the day. Nursing Notes were reviewed. ALLERGIES     Adhesive tape, Cephalexin, Erythromycin, Ketorolac tromethamine, Pcn [penicillins], Percocet [oxycodone-acetaminophen], Sulfa antibiotics, and Ultracet [tramadol-acetaminophen]    CURRENT MEDICATIONS       Current Discharge Medication List        CONTINUE these medications which have NOT CHANGED    Details   !! amiodarone (CORDARONE) 200 MG tablet Take 1 tablet by mouth daily  Qty: 90 tablet, Refills: 1    Associated Diagnoses: New onset of congestive heart failure ([address])      ! ! amiodarone (CORDARONE) 200 MG tablet Take 1 tablet by mouth daily  Qty: 30 tablet, Refills: 0    Associated Diagnoses: New onset of congestive heart failure ([address])
CARDIAC CATHETERIZATION  [id number] dr Andrew Cordova  05/16/2016    COLONOSCOPY  01/01/2003    COLONOSCOPY  01/01/2007    IR TUNNELED CATHETER PLACEMENT GREATER THAN 5 YEARS  12/19/2022    IR TUNNELED CATHETER PLACEMENT GREATER THAN 5 YEARS 12/19/2022 [facility] SPECIAL PROCEDURES    ORIF ANKLE FRACTURE Right 12/06/2022    RIGHT ANKLE OPEN REDUCTION INTERNAL FIXATION    TOTAL KNEE ARTHROPLASTY Bilateral     left may 2013 and right july 2013    TUBAL LIGATION       CURRENT MEDICATIONS       Previous Medications    ALCOHOL SWABS 70 % PADS    1 each by Does not apply route daily    ALLOPURINOL (ZYLOPRIM) 100 MG TABLET    Take 1 tablet by mouth daily    AMIODARONE (CORDARONE) 200 MG TABLET    Take 1 tablet by mouth daily    AMIODARONE (CORDARONE) 200 MG TABLET    Take 1 tablet by mouth daily    APIXABAN (ELIQUIS) 5 MG TABS TABLET    Take 1 tablet by mouth 2 times daily    ATORVASTATIN (LIPITOR) 40 MG TABLET    Take 1 tablet by mouth nightly    BLOOD GLUCOSE MONITOR KIT AND SUPPLIES    use check blood sugar as directed    BUMETANIDE (BUMEX) 2 MG TABLET    Take 1 tablet by mouth daily    CHOLECALCIFEROL (VITAMIN D3) 25 MCG (1000 UT) TABS    Take 2 tablets by mouth daily    CYCLOBENZAPRINE (FLEXERIL) 5 MG TABLET    Take 1 tablet by mouth nightly as needed for Muscle spasms    GABAPENTIN (NEURONTIN) 300 MG CAPSULE    Take 1 capsule by mouth daily for 90 days. HYDRALAZINE (APRESOLINE) 25 MG TABLET    Take 4 tablets by mouth 3 times daily    LANCETS MISC    1 each by Does not apply route daily    MAGNESIUM OXIDE (MAG-OX) 400 (240 MG) MG TABLET    Take 1 tablet by mouth 2 times daily    METHOCARBAMOL (ROBAXIN) 500 MG TABLET    Take 1 tablet by mouth 4 times daily    METOPROLOL TARTRATE (LOPRESSOR) 25 MG TABLET    Take 1 tablet by mouth 2 times daily    MIRTAZAPINE (REMERON) 7.5 MG TABLET    Take 1 tablet by mouth nightly    MISC.  DEVICES MISC    1 each by Does not apply route once for 1 dose Rolling walker

## 2023-07-10 ENCOUNTER — CLINICAL DOCUMENTATION ONLY (OUTPATIENT)
Facility: CLINIC | Age: 78
End: 2023-07-10

## 2023-07-17 ENCOUNTER — OFFICE VISIT (OUTPATIENT)
Dept: ORTHOPEDIC SURGERY | Age: 78
End: 2023-07-17

## 2023-07-17 VITALS — WEIGHT: 249 LBS | BODY MASS INDEX: 40.02 KG/M2 | HEIGHT: 66 IN

## 2023-07-17 DIAGNOSIS — M70.61 GREATER TROCHANTERIC BURSITIS OF RIGHT HIP: Primary | ICD-10-CM

## 2023-07-17 RX ORDER — METHYLPREDNISOLONE ACETATE 80 MG/ML
80 INJECTION, SUSPENSION INTRA-ARTICULAR; INTRALESIONAL; INTRAMUSCULAR; SOFT TISSUE ONCE
Status: SHIPPED | OUTPATIENT
Start: 2023-07-17

## 2023-07-17 RX ORDER — BUPIVACAINE HYDROCHLORIDE 2.5 MG/ML
2 INJECTION, SOLUTION INFILTRATION; PERINEURAL ONCE
Status: SHIPPED | OUTPATIENT
Start: 2023-07-17

## 2023-07-23 DIAGNOSIS — E11.42 DIABETIC POLYNEUROPATHY ASSOCIATED WITH TYPE 2 DIABETES MELLITUS (HCC): ICD-10-CM

## 2023-07-24 RX ORDER — GABAPENTIN 300 MG/1
CAPSULE ORAL
Qty: 90 CAPSULE | Refills: 3 | OUTPATIENT
Start: 2023-07-24

## 2023-07-24 NOTE — TELEPHONE ENCOUNTER
Last visit: 06/23/2023  Last Med refill: 05/17/2023  Does patient have enough medication for 72 hours: No:     Next Visit Date:  Future Appointments   Date Time Provider [address]   8/21/2023  4:30 PM STA MAMMO RM 1 STAZ MAMMO STA Radiolog   8/28/2023  2:15 PM Telly Hart MD [facility]   9/18/2023  2:15 PM Eliza Lewis MD [address] Maintenance   Topic Date Due    Shingles vaccine (1 of 2) Never done    Hepatitis B vaccine (1 of 3 - Risk Dialysis 4-dose series) Never done    Lipids  01/11/2022    COVID-19 Vaccine (5 - Booster for Pfizer series) 04/13/2022    Annual Wellness Visit (AWV)  07/02/2022    Flu vaccine (1) 08/01/2023    Depression Monitoring  05/17/2024    DTaP/Tdap/Td vaccine (2 - Td or Tdap) 12/05/2032    DEXA (modify frequency per FRAX score)  Completed    Pneumococcal 65+ years Vaccine  Completed    Hepatitis C screen  Completed    Hepatitis A vaccine  Aged Out    Hib vaccine  Aged Out    Meningococcal (ACWY) vaccine  Aged Out    Diabetic foot exam  Discontinued    A1C test (Diabetic or Prediabetic)  Discontinued    Diabetic retinal exam  Discontinued    Breast cancer screen  Discontinued    Colonoscopy  Discontinued       Hemoglobin A1C (%)   Date Value   05/17/2023 5.6   12/06/2022 5.8   03/09/2022 5.5             ( goal A1C is < 7)   No components found for: LABMICR  LDL Cholesterol (mg/dL)   Date Value   01/11/2021 85   03/03/2020 96       (goal LDL is <100)   AST (U/L)   Date Value   12/23/2022 14     ALT (U/L)   Date Value   12/23/2022 7     BUN (mg/dL)   Date Value   12/24/2022 31 (H)     BP Readings from Last 3 Encounters:   07/08/23 (!) 144/85   06/23/23 112/78   05/17/23 114/78          (goal 120/80)    All Future Testing planned in [facility]  Lab Frequency Next Occurrence   Lipid Panel Once 12/09/2022   Vitamin D 25 Hydroxy Once 01/15/2023   PTH, Intact Once 01/15/2023   CBC with Auto Differential Once 01/15/2023   Magnesium Once

## 2023-07-26 DIAGNOSIS — F41.1 GAD (GENERALIZED ANXIETY DISORDER): ICD-10-CM

## 2023-07-26 DIAGNOSIS — G25.81 RESTLESS LEG SYNDROME: ICD-10-CM

## 2023-07-26 DIAGNOSIS — F32.1 MODERATE MAJOR DEPRESSION (HCC): ICD-10-CM

## 2023-07-26 RX ORDER — ROPINIROLE 0.25 MG/1
0.25 TABLET, FILM COATED ORAL NIGHTLY
Qty: 90 TABLET | Refills: 1 | Status: SHIPPED | OUTPATIENT
Start: 2023-07-26

## 2023-07-26 RX ORDER — BUMETANIDE 2 MG/1
2 TABLET ORAL DAILY
Qty: 90 TABLET | Refills: 1 | Status: SHIPPED | OUTPATIENT
Start: 2023-07-26

## 2023-07-26 RX ORDER — VENLAFAXINE HYDROCHLORIDE 75 MG/1
75 CAPSULE, EXTENDED RELEASE ORAL DAILY
Qty: 90 CAPSULE | Refills: 1 | Status: SHIPPED | OUTPATIENT
Start: 2023-07-26

## 2023-07-26 NOTE — TELEPHONE ENCOUNTER
Last visit: 06/23/2  Last Med refill:   Does patient have enough medication for 72 hours: No:     NEEDS TO GO TO MAIL ORDER    Next Visit Date:  Future Appointments   Date Time Provider [address]   8/21/2023  4:30 PM STA MAMMO RM 1 STAZ MAMMO STA Radiolog   8/28/2023  2:15 PM Elisa Washington MD [facility]   9/18/2023  2:15 PM Eliza Muse MD [address] Maintenance   Topic Date Due    Shingles vaccine (1 of 2) Never done    Hepatitis B vaccine (1 of 3 - Risk Dialysis 4-dose series) Never done    Lipids  01/11/2022    COVID-19 Vaccine (5 - Booster for Pfizer series) 04/13/2022    Annual Wellness Visit (AWV)  07/02/2022    Flu vaccine (1) 08/01/2023    Depression Monitoring  05/17/2024    DTaP/Tdap/Td vaccine (2 - Td or Tdap) 12/05/2032    DEXA (modify frequency per FRAX score)  Completed    Pneumococcal 65+ years Vaccine  Completed    Hepatitis C screen  Completed    Hepatitis A vaccine  Aged Out    Hib vaccine  Aged Out    Meningococcal (ACWY) vaccine  Aged Out    Diabetic foot exam  Discontinued    A1C test (Diabetic or Prediabetic)  Discontinued    Diabetic retinal exam  Discontinued    Breast cancer screen  Discontinued    Colonoscopy  Discontinued       Hemoglobin A1C (%)   Date Value   05/17/2023 5.6   12/06/2022 5.8   03/09/2022 5.5             ( goal A1C is < 7)   No components found for: LABMICR  LDL Cholesterol (mg/dL)   Date Value   01/11/2021 85   03/03/2020 96       (goal LDL is <100)   AST (U/L)   Date Value   12/23/2022 14     ALT (U/L)   Date Value   12/23/2022 7     BUN (mg/dL)   Date Value   12/24/2022 31 (H)     BP Readings from Last 3 Encounters:   07/08/23 (!) 144/85   06/23/23 112/78   05/17/23 114/78          (goal 120/80)    All Future Testing planned in [facility]  Lab Frequency Next Occurrence   Lipid Panel Once 12/09/2022   Vitamin D 25 Hydroxy Once 01/15/2023   PTH, Intact Once 01/15/2023   CBC with Auto Differential Once 01/15/2023

## 2023-08-08 ENCOUNTER — TELEPHONE (OUTPATIENT)
Dept: ORTHOPEDIC SURGERY | Age: 78
End: 2023-08-08

## 2023-08-08 NOTE — TELEPHONE ENCOUNTER
Pt called in noting she is still experiencing a sharp pain with ambulation in her right leg. Noted the pain is so severe initially her leg nearly gives out.  Per her last office note she is to be seen in clinic if injection unhelpful appointment scheduled for 8/21/23 @ 2pm.

## 2023-08-14 DIAGNOSIS — F41.1 GAD (GENERALIZED ANXIETY DISORDER): ICD-10-CM

## 2023-08-14 RX ORDER — ALPRAZOLAM 0.5 MG/1
0.5 TABLET ORAL PRN
Qty: 30 TABLET | OUTPATIENT
Start: 2023-08-14 | End: 2023-09-13

## 2023-08-14 NOTE — TELEPHONE ENCOUNTER
Per telephone encounter from 7/6/23:    \"I have not prescribed the 10mg Ambien for her, she was getting that from Dr Leonel Jeff. She also had two prescriptions for ambien filled in May 2023 - 30 pills each of 10mg and 5mg Ambien. This is in violation of her medication agreement with me - no further refills on controlled medications will be provided. \"    Please refill or deny refill. Last sent on 5/28/23.

## 2023-08-15 NOTE — TELEPHONE ENCOUNTER
Patient called regarding this and was informed of previous message. Explained to her this was discussed with her in July also. Patient asked what she is supposed to do. Explained to her she can make an appointment to discuss other options. Patient states \"I'll take care of it. \"

## 2023-08-21 ENCOUNTER — OFFICE VISIT (OUTPATIENT)
Dept: ORTHOPEDIC SURGERY | Age: 78
End: 2023-08-21

## 2023-08-21 VITALS — WEIGHT: 249 LBS | BODY MASS INDEX: 40.02 KG/M2 | HEIGHT: 66 IN

## 2023-08-21 DIAGNOSIS — M70.61 GREATER TROCHANTERIC BURSITIS OF RIGHT HIP: Primary | ICD-10-CM

## 2023-08-22 DIAGNOSIS — K21.9 GASTROESOPHAGEAL REFLUX DISEASE, UNSPECIFIED WHETHER ESOPHAGITIS PRESENT: ICD-10-CM

## 2023-08-22 DIAGNOSIS — I48.91 NEW ONSET A-FIB (HCC): ICD-10-CM

## 2023-08-22 DIAGNOSIS — I50.9 NEW ONSET OF CONGESTIVE HEART FAILURE (HCC): ICD-10-CM

## 2023-08-22 DIAGNOSIS — E78.5 DYSLIPIDEMIA DUE TO TYPE 2 DIABETES MELLITUS (HCC): ICD-10-CM

## 2023-08-22 DIAGNOSIS — E79.0 HYPERURICEMIA: ICD-10-CM

## 2023-08-22 DIAGNOSIS — E11.42 DIABETIC POLYNEUROPATHY ASSOCIATED WITH TYPE 2 DIABETES MELLITUS (HCC): ICD-10-CM

## 2023-08-22 DIAGNOSIS — E11.69 DYSLIPIDEMIA DUE TO TYPE 2 DIABETES MELLITUS (HCC): ICD-10-CM

## 2023-08-22 RX ORDER — ALLOPURINOL 100 MG/1
100 TABLET ORAL DAILY
Qty: 90 TABLET | Refills: 3 | OUTPATIENT
Start: 2023-08-22

## 2023-08-22 RX ORDER — PANTOPRAZOLE SODIUM 40 MG/1
TABLET, DELAYED RELEASE ORAL
Qty: 90 TABLET | Refills: 3 | OUTPATIENT
Start: 2023-08-22

## 2023-08-22 RX ORDER — ATORVASTATIN CALCIUM 40 MG/1
TABLET, FILM COATED ORAL
Qty: 90 TABLET | Refills: 3 | OUTPATIENT
Start: 2023-08-22

## 2023-08-22 NOTE — TELEPHONE ENCOUNTER
Last visit: 6/23/23  Last Med refill: 5/17/23  Does patient have enough medication for 72 hours: No:     Next Visit Date:  Future Appointments   Date Time Provider [address]   9/13/2023  2:45 PM STA DIAG MAMMO RM 3 STAZ MAMMO STA Radiolog   9/18/2023  2:15 PM Eliza Saldana MD [facility]   10/2/2023 10:15 AM Bessy Hernandez MD [facility]       Health Maintenance   Topic Date Due    Shingles vaccine (1 of 2) Never done    Hepatitis B vaccine (1 of 3 - Risk Dialysis 4-dose series) Never done    Lipids  01/11/2022    COVID-19 Vaccine (5 - Booster for Pfizer series) 04/13/2022    Annual Wellness Visit (AWV)  07/02/2022    Flu vaccine (1) 08/01/2023    Depression Monitoring  05/17/2024    DTaP/Tdap/Td vaccine (2 - Td or Tdap) 12/05/2032    DEXA (modify frequency per FRAX score)  Completed    Pneumococcal 65+ years Vaccine  Completed    Hepatitis C screen  Completed    Hepatitis A vaccine  Aged Out    Hib vaccine  Aged Out    Meningococcal (ACWY) vaccine  Aged Out    Diabetic foot exam  Discontinued    A1C test (Diabetic or Prediabetic)  Discontinued    Diabetic retinal exam  Discontinued    Breast cancer screen  Discontinued    Colonoscopy  Discontinued       Hemoglobin A1C (%)   Date Value   05/17/2023 5.6   12/06/2022 5.8   03/09/2022 5.5             ( goal A1C is < 7)   No components found for: LABMICR  LDL Cholesterol (mg/dL)   Date Value   01/11/2021 85   03/03/2020 96       (goal LDL is <100)   AST (U/L)   Date Value   12/23/2022 14     ALT (U/L)   Date Value   12/23/2022 7     BUN (mg/dL)   Date Value   12/24/2022 31 (H)     BP Readings from Last 3 Encounters:   07/08/23 (!) 144/85   06/23/23 112/78   05/17/23 114/78          (goal 120/80)    All Future Testing planned in [facility]  Lab Frequency Next Occurrence   Lipid Panel Once 12/09/2022   Vitamin D 25 Hydroxy Once 01/15/2023   PTH, Intact Once 01/15/2023   CBC with Auto Differential Once 01/15/2023   Magnesium Once

## 2023-08-23 RX ORDER — GABAPENTIN 300 MG/1
CAPSULE ORAL
Qty: 90 CAPSULE | Refills: 0 | Status: SHIPPED | OUTPATIENT
Start: 2023-08-23 | End: 2023-11-21

## 2023-09-13 ENCOUNTER — HOSPITAL ENCOUNTER (OUTPATIENT)
Dept: MAMMOGRAPHY | Age: 78
Discharge: HOME | End: 2023-09-15
Payer: MEDICARE

## 2023-09-13 DIAGNOSIS — Z12.31 VISIT FOR SCREENING MAMMOGRAM: ICD-10-CM

## 2023-09-13 PROCEDURE — 77063 BREAST TOMOSYNTHESIS BI: CPT

## 2023-09-14 DIAGNOSIS — R92.8 ABNORMALITY OF RIGHT BREAST ON SCREENING MAMMOGRAM: Primary | ICD-10-CM

## 2023-09-23 ENCOUNTER — HOSPITAL ENCOUNTER (EMERGENCY)
Age: 78
Discharge: HOME | End: 2023-09-23
Attending: EMERGENCY MEDICINE
Payer: MEDICARE

## 2023-09-23 ENCOUNTER — APPOINTMENT (OUTPATIENT)
Dept: GENERAL RADIOLOGY | Age: 78
End: 2023-09-23
Payer: MEDICARE

## 2023-09-23 VITALS
TEMPERATURE: 97.6 F | BODY MASS INDEX: 40.65 KG/M2 | HEART RATE: 70 BPM | SYSTOLIC BLOOD PRESSURE: 127 MMHG | RESPIRATION RATE: 18 BRPM | HEIGHT: 65 IN | DIASTOLIC BLOOD PRESSURE: 85 MMHG | WEIGHT: 244 LBS | OXYGEN SATURATION: 98 %

## 2023-09-23 DIAGNOSIS — S93.401A SPRAIN OF RIGHT ANKLE, UNSPECIFIED LIGAMENT, INITIAL ENCOUNTER: ICD-10-CM

## 2023-09-23 DIAGNOSIS — M25.571 ACUTE RIGHT ANKLE PAIN: Primary | ICD-10-CM

## 2023-09-23 PROCEDURE — 6370000000 HC RX 637 (ALT 250 FOR IP): Performed by: EMERGENCY MEDICINE

## 2023-09-23 PROCEDURE — 99283 EMERGENCY DEPT VISIT LOW MDM: CPT

## 2023-09-23 PROCEDURE — 73610 X-RAY EXAM OF ANKLE: CPT

## 2023-09-23 RX ORDER — HYDROCODONE BITARTRATE AND ACETAMINOPHEN 5; 325 MG/1; MG/1
1 TABLET ORAL ONCE
Status: COMPLETED | OUTPATIENT
Start: 2023-09-23 | End: 2023-09-23

## 2023-09-23 RX ORDER — ACETAMINOPHEN 500 MG
500 TABLET ORAL 4 TIMES DAILY PRN
Qty: 30 TABLET | Refills: 0 | Status: SHIPPED | OUTPATIENT
Start: 2023-09-23

## 2023-09-23 RX ADMIN — HYDROCODONE BITARTRATE AND ACETAMINOPHEN 1 TABLET: 5; 325 TABLET ORAL at 15:10

## 2023-09-23 ASSESSMENT — ENCOUNTER SYMPTOMS
CHEST TIGHTNESS: 0
VOICE CHANGE: 0
ABDOMINAL PAIN: 0
VOMITING: 0
FACIAL SWELLING: 0
COLOR CHANGE: 0
NAUSEA: 0
EYE PAIN: 0
SHORTNESS OF BREATH: 0
PHOTOPHOBIA: 0
BACK PAIN: 0
TROUBLE SWALLOWING: 0

## 2023-09-23 NOTE — ED PROVIDER NOTES
EMERGENCY DEPARTMENT ENCOUNTER    Pt Name: Marie Peralta  MRN: [id number]  [address] 1945  Date of evaluation: 9/23/23  CHIEF COMPLAINT       Chief Complaint   Patient presents with    Foot Pain     Pt states she is having Rt ankle pain that started today at HD. HISTORY OF PRESENT ILLNESS   73-year-old female presenting to the ER complaining of right ankle pain. Patient states she believes she might have twisted it at some point this week. Patient does have an underlying history of an ankle fracture back in December 2022. Patient does not remember a specific event but states she thinks she did it sometime this week. Patient is able to put some pressure on it but does use a wheelchair as well as a boot and crutches at home. The history is provided by the patient. Ankle Problem  Location:  Ankle  Injury: yes    Mechanism of injury comment:  Twist  Ankle location:  R ankle  Pain details:     Quality:  Aching  Associated symptoms: no back pain and no fatigue            REVIEW OF SYSTEMS     Review of Systems   Constitutional:  Negative for activity change, appetite change and fatigue. HENT:  Negative for facial swelling, trouble swallowing and voice change. Eyes:  Negative for photophobia and pain. Respiratory:  Negative for chest tightness and shortness of breath. Cardiovascular:  Negative for chest pain and palpitations. Gastrointestinal:  Negative for abdominal pain, nausea and vomiting. Genitourinary:  Negative for dysuria and urgency. Musculoskeletal:  Positive for arthralgias (R ankle). Negative for back pain. Skin:  Negative for color change and rash. Neurological:  Negative for dizziness, syncope and headaches. Psychiatric/Behavioral:  Negative for behavioral problems and hallucinations.       PASTMEDICAL HISTORY     Past Medical History:   Diagnosis Date    Abnormal stress test     Anemia     Arthritis     Depression     Diverticulosis     DM (diabetes mellitus) ([address] [facility])

## 2023-09-26 ENCOUNTER — CLINICAL DOCUMENTATION ONLY (OUTPATIENT)
Facility: CLINIC | Age: 78
End: 2023-09-26

## 2023-10-11 ENCOUNTER — OFFICE VISIT (OUTPATIENT)
Dept: FAMILY MEDICINE CLINIC | Age: 78
End: 2023-10-11
Payer: MEDICARE

## 2023-10-11 VITALS
HEART RATE: 67 BPM | WEIGHT: 249 LBS | BODY MASS INDEX: 40.19 KG/M2 | DIASTOLIC BLOOD PRESSURE: 78 MMHG | OXYGEN SATURATION: 98 % | SYSTOLIC BLOOD PRESSURE: 128 MMHG

## 2023-10-11 DIAGNOSIS — K52.9 CHRONIC DIARRHEA: Primary | ICD-10-CM

## 2023-10-11 PROCEDURE — 3074F SYST BP LT 130 MM HG: CPT | Performed by: INTERNAL MEDICINE

## 2023-10-11 PROCEDURE — G8427 DOCREV CUR MEDS BY ELIG CLIN: HCPCS | Performed by: INTERNAL MEDICINE

## 2023-10-11 PROCEDURE — 3078F DIAST BP <80 MM HG: CPT | Performed by: INTERNAL MEDICINE

## 2023-10-11 PROCEDURE — G8417 CALC BMI ABV UP PARAM F/U: HCPCS | Performed by: INTERNAL MEDICINE

## 2023-10-11 PROCEDURE — 1124F ACP DISCUSS-NO DSCNMKR DOCD: CPT | Performed by: INTERNAL MEDICINE

## 2023-10-11 PROCEDURE — 1036F TOBACCO NON-USER: CPT | Performed by: INTERNAL MEDICINE

## 2023-10-11 PROCEDURE — G8400 PT W/DXA NO RESULTS DOC: HCPCS | Performed by: INTERNAL MEDICINE

## 2023-10-11 PROCEDURE — G8484 FLU IMMUNIZE NO ADMIN: HCPCS | Performed by: INTERNAL MEDICINE

## 2023-10-11 PROCEDURE — 99214 OFFICE O/P EST MOD 30 MIN: CPT | Performed by: INTERNAL MEDICINE

## 2023-10-11 PROCEDURE — 1090F PRES/ABSN URINE INCON ASSESS: CPT | Performed by: INTERNAL MEDICINE

## 2023-10-11 RX ORDER — ALPRAZOLAM 0.5 MG/1
TABLET ORAL
COMMUNITY
Start: 2023-08-17

## 2023-10-11 NOTE — PROGRESS NOTES
Pt is here due to having abdominal pain and diarrhea for past 6 months.  She states she can not do colonoscopy at this time due to dialysis

## 2023-10-11 NOTE — PROGRESS NOTES
[facility]  [facility] Uriel Hoffman [address]  [address] [facility] [address]  Dept: [phone]  Dept Fax: [phone]      Jalil Lyn is a 69 y.o. female who presents today for hermedical conditions/complaints as noted below. Jalil Lyn is c/o of Abdominal Pain and Diarrhea        Assessment/Plan:     1. Chronic diarrhea  -     External Referral To Gastroenterology  -     Fecal lactoferrin; Future  -     Pancreatic Elastase, Fecal; Future  -     Giardia / Cryptosporidum Antigens, DFA; Future  -     Clostridium Difficile Toxin/Antigen; Future  -     Gastrointestinal Panel, Molecular; Future          No follow-ups on file. HPI     Diarrhea - ongoing for about six months now, around April-May. Occurring 2-3 times a day. No blood in stool. Abdominal cramping right before she has the diarrhea. Not associated with any particular foods. This past weekend she came back home from a long drive and didn't make it in to the house before losing control of her bowels and had a stool accident.          BP Readings from Last 3 Encounters:   10/11/23 128/78   10/02/23 118/76   09/23/23 127/85              Past Medical History:   Diagnosis Date    Abnormal stress test     Anemia     Arthritis     Depression     Diverticulosis     DM (diabetes mellitus) ([address])     Erythropenia     Fibromyalgia     HTN (hypertension)     Hyperlipidemia     Kidney stone     Morbid obesity due to excess calories (HCC)     DIONY on CPAP     Osteopenia 03/03/14    Seasonal allergies     Sleep apnea     uses bipap prn      Past Surgical History:   Procedure Laterality Date    ANKLE FRACTURE SURGERY Right 12/6/2022    RIGHT ANKLE OPEN REDUCTION INTERNAL FIXATION performed by Payton Layton DO at [address]  01/01/2011    right arm broken    CARDIAC CATHETERIZATION  [id number] dr Snehal Oden  05/16/2016    COLONOSCOPY  01/01/2003    COLONOSCOPY  01/01/2007    IR TUNNELED CATHETER

## 2023-10-18 ENCOUNTER — HOSPITAL ENCOUNTER (OUTPATIENT)
Dept: MAMMOGRAPHY | Age: 78
Discharge: HOME | End: 2023-10-20
Payer: MEDICARE

## 2023-10-18 ENCOUNTER — HOSPITAL ENCOUNTER (OUTPATIENT)
Age: 78
Discharge: HOME | End: 2023-10-18
Payer: MEDICARE

## 2023-10-18 ENCOUNTER — HOSPITAL ENCOUNTER (OUTPATIENT)
Dept: ULTRASOUND IMAGING | Age: 78
Discharge: HOME | End: 2023-10-20
Payer: MEDICARE

## 2023-10-18 DIAGNOSIS — K52.9 CHRONIC DIARRHEA: ICD-10-CM

## 2023-10-18 DIAGNOSIS — R92.8 ABNORMALITY OF RIGHT BREAST ON SCREENING MAMMOGRAM: ICD-10-CM

## 2023-10-18 PROCEDURE — 87328 CRYPTOSPORIDIUM AG IA: CPT

## 2023-10-18 PROCEDURE — 87449 NOS EACH ORGANISM AG IA: CPT

## 2023-10-18 PROCEDURE — 87324 CLOSTRIDIUM AG IA: CPT

## 2023-10-18 PROCEDURE — 83630 LACTOFERRIN FECAL (QUAL): CPT

## 2023-10-18 PROCEDURE — 87506 IADNA-DNA/RNA PROBE TQ 6-11: CPT

## 2023-10-18 PROCEDURE — G0279 TOMOSYNTHESIS, MAMMO: HCPCS

## 2023-10-18 PROCEDURE — 87329 GIARDIA AG IA: CPT

## 2023-10-18 PROCEDURE — 83520 IMMUNOASSAY QUANT NOS NONAB: CPT

## 2023-10-19 ENCOUNTER — TELEPHONE (OUTPATIENT)
Dept: FAMILY MEDICINE CLINIC | Age: 78
End: 2023-10-19

## 2023-10-19 LAB
C DIFF GDH + TOXINS A+B STL QL IA.RAPID: NEGATIVE
C PARVUM AG STL QL IA: NEGATIVE
CAMPYLOBACTER DNA SPEC NAA+PROBE: NORMAL
ETEC ELTA+ESTB GENES STL QL NAA+PROBE: NORMAL
G LAMBLIA AG STL QL IA: NEGATIVE
LACTOFERRIN STL QL: ABNORMAL
P SHIGELLOIDES DNA STL QL NAA+PROBE: NORMAL
SALMONELLA DNA SPEC QL NAA+PROBE: NORMAL
SHIGA TOXIN STX GENE SPEC NAA+PROBE: NORMAL
SHIGELLA DNA SPEC QL NAA+PROBE: NORMAL
SOURCE: NORMAL
SPECIMEN DESCRIPTION: NORMAL
V CHOL+PARA RFBL+TRKH+TNAA STL QL NAA+PR: NORMAL
Y ENTERO RECN STL QL NAA+PROBE: NORMAL

## 2023-10-19 ASSESSMENT — ENCOUNTER SYMPTOMS
CONSTIPATION: 0
WHEEZING: 0
DIARRHEA: 0
CHEST TIGHTNESS: 0
ABDOMINAL PAIN: 1
SHORTNESS OF BREATH: 0
VOMITING: 0
BLOOD IN STOOL: 0
ANAL BLEEDING: 0
CHOKING: 0
NAUSEA: 0
COUGH: 0

## 2023-10-19 ASSESSMENT — VISUAL ACUITY: OU: 1

## 2023-10-19 NOTE — TELEPHONE ENCOUNTER
Received a call from [address] [facility] practitioner with Nephrology Dr. Andrea Jones office. She states that the patient was not aware of her medication contract and Dr. Niels Weller will no longer be filling the ambien and or xanax and would like to know if Dr. Efraín Kitchen is willing to restart medication contract with medication. I spoke with Dr. Efraín Kitchen and she stated \"no, it was discussed in detail at her 10/11/2023 appointment. \" Dr. Efraín Kitchen stated she can see psychiatry. Maykel Márquez NP states so what if psychiatry has a long wait there is no way she will fill, I stated no because she has not filled since May 2023. I explained to her that we can create a referral for her. The NP stated okay let me call her and see what she wants to do.

## 2023-10-22 LAB — FECAL PANCREATIC ELASTASE-1: 196 UG/G

## 2023-10-28 DIAGNOSIS — K21.9 GASTROESOPHAGEAL REFLUX DISEASE, UNSPECIFIED WHETHER ESOPHAGITIS PRESENT: ICD-10-CM

## 2023-10-28 DIAGNOSIS — E79.0 HYPERURICEMIA: ICD-10-CM

## 2023-10-28 DIAGNOSIS — E11.69 DYSLIPIDEMIA DUE TO TYPE 2 DIABETES MELLITUS (HCC): ICD-10-CM

## 2023-10-28 DIAGNOSIS — I48.91 NEW ONSET A-FIB (HCC): ICD-10-CM

## 2023-10-28 DIAGNOSIS — I50.9 NEW ONSET OF CONGESTIVE HEART FAILURE (HCC): ICD-10-CM

## 2023-10-28 DIAGNOSIS — E78.5 DYSLIPIDEMIA DUE TO TYPE 2 DIABETES MELLITUS (HCC): ICD-10-CM

## 2023-10-30 RX ORDER — ATORVASTATIN CALCIUM 40 MG/1
40 TABLET, FILM COATED ORAL
Qty: 90 TABLET | Refills: 3 | Status: SHIPPED | OUTPATIENT
Start: 2023-10-30

## 2023-10-30 RX ORDER — PANTOPRAZOLE SODIUM 40 MG/1
40 TABLET, DELAYED RELEASE ORAL
Qty: 90 TABLET | Refills: 3 | Status: SHIPPED | OUTPATIENT
Start: 2023-10-30

## 2023-10-30 RX ORDER — ALLOPURINOL 100 MG/1
100 TABLET ORAL DAILY
Qty: 90 TABLET | Refills: 3 | Status: SHIPPED | OUTPATIENT
Start: 2023-10-30

## 2023-10-30 NOTE — TELEPHONE ENCOUNTER
Jalilshaun Lyn is calling to request a refill on the following medication(s):    Medication Request:  Requested Prescriptions     Pending Prescriptions Disp Refills    metoprolol tartrate (LOPRESSOR) 25 MG tablet [Pharmacy Med Name: Metoprolol Tartrate 25 MG Oral Tablet] 180 tablet 3     Sig: TAKE 1 TABLET BY MOUTH TWICE  DAILY    atorvastatin (LIPITOR) 40 MG tablet [Pharmacy Med Name: Atorvastatin Calcium 40 MG Oral Tablet] 90 tablet 3     Sig: TAKE 1 TABLET BY MOUTH AT NIGHT    pantoprazole (PROTONIX) 40 MG tablet [Pharmacy Med Name: Pantoprazole Sodium 40 MG Oral Tablet Delayed Release] 90 tablet 3     Sig: TAKE 1 TABLET BY MOUTH IN THE  MORNING BEFORE BREAKFAST    allopurinol (ZYLOPRIM) 100 MG tablet [Pharmacy Med Name: Allopurinol 100 MG Oral Tablet] 90 tablet 3     Sig: TAKE 1 TABLET BY MOUTH DAILY       Last Visit Date (If Applicable):  96/93/6543    Next Visit Date:    12/6/2023

## 2023-10-30 NOTE — TELEPHONE ENCOUNTER
Patient stated its a 25mg tablet that she cuts in half, I spoke with the pharmacy and they said its 25mg 1/2 tablet twice a day

## 2023-10-30 NOTE — TELEPHONE ENCOUNTER
Patient stated she take 25mg, stated she cuts that 25mg pill in half so she can take one in the morning and the other half at night

## 2023-11-26 DIAGNOSIS — F32.1 MODERATE MAJOR DEPRESSION (HCC): ICD-10-CM

## 2023-11-26 DIAGNOSIS — G25.81 RESTLESS LEG SYNDROME: ICD-10-CM

## 2023-11-26 DIAGNOSIS — F41.1 GAD (GENERALIZED ANXIETY DISORDER): ICD-10-CM

## 2023-11-27 DIAGNOSIS — E55.9 VITAMIN D DEFICIENCY: Primary | ICD-10-CM

## 2023-11-27 DIAGNOSIS — K21.9 GASTROESOPHAGEAL REFLUX DISEASE, UNSPECIFIED WHETHER ESOPHAGITIS PRESENT: ICD-10-CM

## 2023-11-27 DIAGNOSIS — E79.0 HYPERURICEMIA: ICD-10-CM

## 2023-11-27 DIAGNOSIS — I50.9 NEW ONSET OF CONGESTIVE HEART FAILURE (HCC): ICD-10-CM

## 2023-11-27 DIAGNOSIS — G25.81 RESTLESS LEG SYNDROME: ICD-10-CM

## 2023-11-27 RX ORDER — VENLAFAXINE HYDROCHLORIDE 75 MG/1
75 CAPSULE, EXTENDED RELEASE ORAL DAILY
Qty: 90 CAPSULE | Refills: 3 | Status: SHIPPED | OUTPATIENT
Start: 2023-11-27

## 2023-11-27 RX ORDER — MELATONIN
2000 DAILY
Qty: 180 TABLET | Refills: 1 | Status: CANCELLED | OUTPATIENT
Start: 2023-11-27

## 2023-11-27 RX ORDER — BUMETANIDE 2 MG/1
2 TABLET ORAL DAILY
Qty: 90 TABLET | Refills: 3 | OUTPATIENT
Start: 2023-11-27

## 2023-11-27 RX ORDER — BUMETANIDE 2 MG/1
2 TABLET ORAL DAILY
Qty: 90 TABLET | Refills: 3 | Status: SHIPPED | OUTPATIENT
Start: 2023-11-27

## 2023-11-27 RX ORDER — ALLOPURINOL 100 MG/1
100 TABLET ORAL DAILY
Qty: 90 TABLET | Refills: 3 | OUTPATIENT
Start: 2023-11-27

## 2023-11-27 RX ORDER — ROPINIROLE 0.25 MG/1
0.25 TABLET, FILM COATED ORAL NIGHTLY
Qty: 90 TABLET | Refills: 3 | OUTPATIENT
Start: 2023-11-27

## 2023-11-27 RX ORDER — ROPINIROLE 0.25 MG/1
0.25 TABLET, FILM COATED ORAL NIGHTLY
Qty: 90 TABLET | Refills: 3 | Status: SHIPPED | OUTPATIENT
Start: 2023-11-27

## 2023-11-27 RX ORDER — PANTOPRAZOLE SODIUM 40 MG/1
40 TABLET, DELAYED RELEASE ORAL
Qty: 90 TABLET | Refills: 3 | OUTPATIENT
Start: 2023-11-27

## 2023-11-27 NOTE — TELEPHONE ENCOUNTER
Subjective   Patient ID: Keila is a 71 Diverticulosis     Anemia of chronic renal failure, stage 5 ([facility])     Normocytic normochromic anemia     Mitral regurgitation - Mild to moderate     Gout     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 disease ([address])     Essential hypertension     Osteopenia     Morbid obesity due to excess calories ([facility])     Anxiety     Febrile illness     Acute serous otitis media of left ear     Secondary hyperparathyroidism ([address])     Acute kidney injury superimposed on chronic kidney disease ([address])     New onset a-fib ([address]) with Rapid ventricular response     New onset of congestive heart failure ([facility])     Lymphedema     GERD (gastroesophageal reflux disease)     Restless leg syndrome     Acute diastolic congestive heart failure ([facility])     Chronic bilateral low back pain without sciatica     Acute bilateral low back pain without sciatica     Generalized muscle weakness     Bradycardia     Open fracture of right tibia and fibula, sequela     Type III open trimalleolar fracture of right ankle     Hyperkalemia     Hyponatremia     Metabolic acidosis     JOSE ARMANDO (acute kidney injury) ([address])     Paraproteinemia     Moderate recurrent major depression ([address]) dysuria.   Musculoskeletal: Negative.  Negative for joint pain and neck pain.   Neurological: Negative for headaches.       Objective   Physical Exam  Vitals reviewed.   Constitutional:       Appearance: Normal appearance.   HENT:      Head: Normocephalic and atraumatic.      Right Ear: Hearing, tympanic membrane, ear canal and external ear normal.      Left Ear: Hearing, tympanic membrane, ear canal and external ear normal.      Nose: Congestion present. No rhinorrhea.      Right Sinus: No maxillary sinus tenderness or frontal sinus tenderness.      Left Sinus: No maxillary sinus tenderness or frontal sinus tenderness.      Mouth/Throat:      Lips: Pink.      Mouth: Mucous membranes are moist.      Pharynx: Uvula midline. Oropharyngeal exudate and posterior oropharyngeal erythema present.      Tonsils: 0 on the right. 0 on the left.      Neck: Full passive range of motion without pain, normal range of motion and neck supple.   Eyes:      General: Lids are normal.      Extraocular Movements: Extraocular movements intact.      Conjunctiva/sclera: Conjunctivae normal.      Pupils: Pupils are equal, round, and reactive to light.   Neck:      Trachea: Trachea and phonation normal.   Cardiovascular:      Rate and Rhythm: Normal rate and regular rhythm.      Heart sounds: Normal heart sounds.   Pulmonary:      Effort: Pulmonary effort is normal.      Breath sounds: Normal breath sounds and air entry.      Comments: Frequent dry cough  Abdominal:      General: Bowel sounds are normal.      Palpations: Abdomen is soft.   Neurological:      Mental Status: She is alert.   Psychiatric:         Behavior: Behavior is cooperative.       There were no vitals taken for this visit.    Assessment    Acute URI  Wash hands frequently to prevent spread of germs and cough into sleeve/bend of elbow.  Do not share utensils of drinks.  No kissing until symptoms resolve.  Push fluids, should take minimum of 6-8 8 oz. cups of fluid per  day.  May mix ½ strength hydrogen peroxide with salt water to gargle to ease sore throat.  May use netti-pot or saline nasal rinse 2 times per day.  May use nasal saline spray 4-5 times per day.  Inhale steam from a pot of boiled water, warm wash cloths to face, warm fluids, warm showers, and/or eat chicken soup.  Use humidifier in room.  May use Mucinex -D over the counter per package instructions to relieve congestion and help thin/loosen mucus. Use in am.  May use Afrin nasal spray for 3-4 days only (may cause rebound congestion if used longer). Use at night.  May use Ibuprofen 200-600 mg every 6-8 hrs as needed for pain.  May use Acetaminophen 325-650mg every 4-6 hrs as needed for pain or fever.  In future, with first sign of cold symptoms, start zinc lozenges or rapid melts every 3 hours for 48 hours to try and reduce cold symptoms and duration.  Also, recommend to take one anytime you fly on airplanes.  Follow-up with primary care provider in one week if no improvement or sooner if worsening symptoms such as fevers, facial pain, tooth pain, or worsening congestion despite treatment.      Acute Pharyngitis  Wash hands frequently to prevent spread of germs.  Do not share utensils or drinks.   No kissing until symptoms resolve.  Push fluids, small amts more frequently throughout day, and minimum of 6-8 8 oz. cups/day.  May use throat lozenges and/or chloroseptic spray.  May mix ½ strength hydrogen peroxide with salt water to gargle to ease sore throat.  May use Ibuprofen 200-600 mg every 6-8 hrs as needed for pain.  May use Acetaminophen 325-650mg every 4-6 hrs as needed for pain or fever.  Strep throat culture will be sent and you will be notified of results in 2-3 days.    Follow up with primary care provider in one week if symptoms do not resolve or sooner if worsen such as increased pain despite pain relievers,  fevers above 101, or trouble swallowing.  Problem List Items Addressed This Visit    None  Visit  Diagnoses     Acute URI    -  Primary    Sore throat        Relevant Orders    POCT Rapid strep A          This is a 71 year old year-old female who presents with .    Instructions provided as documented in the AVS.    Thank you for visiting [facility].  Please follow up with your PCP DR Valdez.

## 2023-11-27 NOTE — TELEPHONE ENCOUNTER
Last visit: 10/11/2023  Last Med refill: 10/02/2023  Does patient have enough medication for 72 hours: No:     Next Visit Date:  Future Appointments   Date Time Provider [address]   12/6/2023  5:30 PM [facility]   4/8/2024 10:15 AM Elizabeth Weir MD [facility]       Health Maintenance   Topic Date Due    Shingles vaccine (1 of 2) Never done    Lipids  01/11/2022    Annual Wellness Visit (AWV)  07/02/2022    COVID-19 Vaccine (5 - 2023-24 season) 10/11/2027 (Originally 9/1/2023)    Depression Monitoring  05/17/2024    DTaP/Tdap/Td vaccine (2 - Td or Tdap) 12/05/2032    Hepatitis B vaccine  Completed    DEXA (modify frequency per FRAX score)  Completed    Flu vaccine  Completed    Pneumococcal 65+ years Vaccine  Completed    Hepatitis C screen  Completed    Hepatitis A vaccine  Aged Out    Hib vaccine  Aged Out    Meningococcal (ACWY) vaccine  Aged Out    Diabetic foot exam  Discontinued    A1C test (Diabetic or Prediabetic)  Discontinued    Diabetic retinal exam  Discontinued    Breast cancer screen  Discontinued    Colonoscopy  Discontinued       Hemoglobin A1C (%)   Date Value   05/17/2023 5.6   12/06/2022 5.8   03/09/2022 5.5             ( goal A1C is < 7)   No components found for: \"LABMICR\"  LDL Cholesterol (mg/dL)   Date Value   01/11/2021 85   03/03/2020 96       (goal LDL is <100)   AST (U/L)   Date Value   12/23/2022 14     ALT (U/L)   Date Value   12/23/2022 7     BUN (mg/dL)   Date Value   10/11/2023 40     BP Readings from Last 3 Encounters:   10/11/23 128/78   10/02/23 118/76   09/23/23 127/85          (goal 120/80)    All Future Testing planned in [facility]  Lab Frequency Next Occurrence               Patient Active Problem List:     Dyslipidemia     DIONY treated with BiPAP     Fibromyalgia     CRI (chronic renal insufficiency)     OA (osteoarthritis)     DM (diabetes mellitus) ([address])     Kidney stone     Depression     Seasonal allergies

## 2023-11-28 RX ORDER — AMIODARONE HYDROCHLORIDE 200 MG/1
200 TABLET ORAL DAILY
Qty: 90 TABLET | Refills: 1 | Status: SHIPPED | OUTPATIENT
Start: 2023-11-28

## 2023-11-28 RX ORDER — MELATONIN
2000 DAILY
Qty: 180 TABLET | Refills: 1 | Status: SHIPPED | OUTPATIENT
Start: 2023-11-28

## 2023-12-06 ENCOUNTER — OFFICE VISIT (OUTPATIENT)
Dept: FAMILY MEDICINE CLINIC | Age: 78
End: 2023-12-06
Payer: MEDICARE

## 2023-12-06 VITALS
OXYGEN SATURATION: 95 % | BODY MASS INDEX: 42.17 KG/M2 | WEIGHT: 247 LBS | DIASTOLIC BLOOD PRESSURE: 80 MMHG | HEART RATE: 70 BPM | HEIGHT: 64 IN | SYSTOLIC BLOOD PRESSURE: 134 MMHG

## 2023-12-06 DIAGNOSIS — Z99.2 ESRD (END STAGE RENAL DISEASE) ON DIALYSIS (HCC): ICD-10-CM

## 2023-12-06 DIAGNOSIS — F32.1 MODERATE MAJOR DEPRESSION (HCC): ICD-10-CM

## 2023-12-06 DIAGNOSIS — Z00.00 MEDICARE ANNUAL WELLNESS VISIT, SUBSEQUENT: ICD-10-CM

## 2023-12-06 DIAGNOSIS — N18.6 ESRD (END STAGE RENAL DISEASE) ON DIALYSIS (HCC): ICD-10-CM

## 2023-12-06 DIAGNOSIS — R25.2 LEG CRAMPS: ICD-10-CM

## 2023-12-06 DIAGNOSIS — F41.1 GAD (GENERALIZED ANXIETY DISORDER): ICD-10-CM

## 2023-12-06 DIAGNOSIS — E11.42 DIABETIC POLYNEUROPATHY ASSOCIATED WITH TYPE 2 DIABETES MELLITUS (HCC): ICD-10-CM

## 2023-12-06 DIAGNOSIS — Z13.6 SCREENING FOR CARDIOVASCULAR CONDITION: ICD-10-CM

## 2023-12-06 DIAGNOSIS — Z13.220 SCREENING FOR HYPERLIPIDEMIA: Primary | ICD-10-CM

## 2023-12-06 PROCEDURE — 1124F ACP DISCUSS-NO DSCNMKR DOCD: CPT | Performed by: INTERNAL MEDICINE

## 2023-12-06 PROCEDURE — G8484 FLU IMMUNIZE NO ADMIN: HCPCS | Performed by: INTERNAL MEDICINE

## 2023-12-06 PROCEDURE — G0439 PPPS, SUBSEQ VISIT: HCPCS | Performed by: INTERNAL MEDICINE

## 2023-12-06 PROCEDURE — 3074F SYST BP LT 130 MM HG: CPT | Performed by: INTERNAL MEDICINE

## 2023-12-06 PROCEDURE — G0446 INTENS BEHAVE THER CARDIO DX: HCPCS | Performed by: INTERNAL MEDICINE

## 2023-12-06 PROCEDURE — G0447 BEHAVIOR COUNSEL OBESITY 15M: HCPCS | Performed by: INTERNAL MEDICINE

## 2023-12-06 PROCEDURE — 3044F HG A1C LEVEL LT 7.0%: CPT | Performed by: INTERNAL MEDICINE

## 2023-12-06 PROCEDURE — 3078F DIAST BP <80 MM HG: CPT | Performed by: INTERNAL MEDICINE

## 2023-12-06 RX ORDER — MIRTAZAPINE 7.5 MG/1
7.5 TABLET, FILM COATED ORAL NIGHTLY
Qty: 90 TABLET | Refills: 1 | Status: SHIPPED | OUTPATIENT
Start: 2023-12-06

## 2023-12-06 RX ORDER — GABAPENTIN 300 MG/1
300 CAPSULE ORAL DAILY
Qty: 90 CAPSULE | Refills: 0 | Status: SHIPPED | OUTPATIENT
Start: 2023-12-06 | End: 2024-03-05

## 2023-12-06 RX ORDER — CYCLOBENZAPRINE HCL 5 MG
5 TABLET ORAL NIGHTLY PRN
Qty: 30 TABLET | Refills: 1 | Status: SHIPPED | OUTPATIENT
Start: 2023-12-06

## 2023-12-06 RX ORDER — LANOLIN ALCOHOL/MO/W.PET/CERES
400 CREAM (GRAM) TOPICAL 2 TIMES DAILY
Qty: 180 TABLET | Refills: 1 | Status: CANCELLED | OUTPATIENT
Start: 2023-12-06

## 2023-12-06 RX ORDER — VENLAFAXINE HYDROCHLORIDE 150 MG/1
150 CAPSULE, EXTENDED RELEASE ORAL DAILY
Qty: 90 CAPSULE | Refills: 1 | Status: SHIPPED | OUTPATIENT
Start: 2023-12-06

## 2023-12-06 ASSESSMENT — PATIENT HEALTH QUESTIONNAIRE - PHQ9
2. FEELING DOWN, DEPRESSED OR HOPELESS: 1
SUM OF ALL RESPONSES TO PHQ QUESTIONS 1-9: 7
1. LITTLE INTEREST OR PLEASURE IN DOING THINGS: 1
3. TROUBLE FALLING OR STAYING ASLEEP: 1
4. FEELING TIRED OR HAVING LITTLE ENERGY: 2
SUM OF ALL RESPONSES TO PHQ QUESTIONS 1-9: 7
SUM OF ALL RESPONSES TO PHQ QUESTIONS 1-9: 7
SUM OF ALL RESPONSES TO PHQ QUESTIONS 1-9: 2
5. POOR APPETITE OR OVEREATING: 1
SUM OF ALL RESPONSES TO PHQ QUESTIONS 1-9: 2
SUM OF ALL RESPONSES TO PHQ9 QUESTIONS 1 & 2: 2
SUM OF ALL RESPONSES TO PHQ QUESTIONS 1-9: 2
8. MOVING OR SPEAKING SO SLOWLY THAT OTHER PEOPLE COULD HAVE NOTICED. OR THE OPPOSITE, BEING SO FIGETY OR RESTLESS THAT YOU HAVE BEEN MOVING AROUND A LOT MORE THAN USUAL: 0
1. LITTLE INTEREST OR PLEASURE IN DOING THINGS: 1
9. THOUGHTS THAT YOU WOULD BE BETTER OFF DEAD, OR OF HURTING YOURSELF: 0
SUM OF ALL RESPONSES TO PHQ9 QUESTIONS 1 & 2: 2
6. FEELING BAD ABOUT YOURSELF - OR THAT YOU ARE A FAILURE OR HAVE LET YOURSELF OR YOUR FAMILY DOWN: 0
7. TROUBLE CONCENTRATING ON THINGS, SUCH AS READING THE NEWSPAPER OR WATCHING TELEVISION: 1
10. IF YOU CHECKED OFF ANY PROBLEMS, HOW DIFFICULT HAVE THESE PROBLEMS MADE IT FOR YOU TO DO YOUR WORK, TAKE CARE OF THINGS AT HOME, OR GET ALONG WITH OTHER PEOPLE: 1
2. FEELING DOWN, DEPRESSED OR HOPELESS: 1
SUM OF ALL RESPONSES TO PHQ QUESTIONS 1-9: 7
SUM OF ALL RESPONSES TO PHQ QUESTIONS 1-9: 2

## 2023-12-06 ASSESSMENT — LIFESTYLE VARIABLES
HOW OFTEN DO YOU HAVE A DRINK CONTAINING ALCOHOL: NEVER
HOW MANY STANDARD DRINKS CONTAINING ALCOHOL DO YOU HAVE ON A TYPICAL DAY: PATIENT DOES NOT DRINK

## 2023-12-06 ASSESSMENT — COLUMBIA-SUICIDE SEVERITY RATING SCALE - C-SSRS
3. HAVE YOU BEEN THINKING ABOUT HOW YOU MIGHT KILL YOURSELF?: NO
7. DID THIS OCCUR IN THE LAST THREE MONTHS: NO
5. HAVE YOU STARTED TO WORK OUT OR WORKED OUT THE DETAILS OF HOW TO KILL YOURSELF? DO YOU INTEND TO CARRY OUT THIS PLAN?: NO
4. HAVE YOU HAD THESE THOUGHTS AND HAD SOME INTENTION OF ACTING ON THEM?: NO

## 2023-12-06 NOTE — PROGRESS NOTES
Rolling walker with swivel wheels Yes Josey Botello APRN - CNP   methocarbamol (ROBAXIN) 500 MG tablet Take 1 tablet by mouth 4 times daily Yes Provider, MD Khris   blood glucose monitor kit and supplies use check blood sugar as directed Yes Lelo Gaspar MD   Lancets MISC 1 each by Does not apply route daily Yes Eliza Ramires MD   Alcohol Swabs 70 % PADS 1 each by Does not apply route daily Yes Eliza Ramires MD   hydrALAZINE (APRESOLINE) 25 MG tablet Take 4 tablets by mouth 3 times daily Yes Pamela Colon MD       [facility] (Including outside providers/suppliers regularly involved in providing care):   Patient Care Team:  Lelo Gaspar MD as PCP - General (Internal Medicine)  Lelo Gaspar MD as PCP - Empaneled Provider  Kassandra Dow MD as Consulting Physician (Pulmonology)  Christoph Jean MD as Surgeon (Orthopedic Surgery)  Tracie Silverman MD as Consulting Physician (Urology)  Vannessa Schuster MD as Consulting Physician (Cardiology)     Reviewed and updated this visit:  Tobacco  Allergies  Meds  Med Hx  Surg Hx  Soc Hx  Fam Hx

## 2024-01-06 ENCOUNTER — APPOINTMENT (OUTPATIENT)
Dept: GENERAL RADIOLOGY | Age: 79
DRG: 291 | End: 2024-01-06
Payer: MEDICARE

## 2024-01-06 ENCOUNTER — HOSPITAL ENCOUNTER (INPATIENT)
Age: 79
LOS: 1 days | Discharge: HOME | DRG: 291 | End: 2024-01-07
Attending: EMERGENCY MEDICINE | Admitting: STUDENT IN AN ORGANIZED HEALTH CARE EDUCATION/TRAINING PROGRAM
Payer: MEDICARE

## 2024-01-06 ENCOUNTER — APPOINTMENT (OUTPATIENT)
Dept: CT IMAGING | Age: 79
DRG: 291 | End: 2024-01-06
Payer: MEDICARE

## 2024-01-06 DIAGNOSIS — T82.9XXA DIALYSIS COMPLICATION, INITIAL ENCOUNTER: ICD-10-CM

## 2024-01-06 DIAGNOSIS — S32.009A LUMBAR TRANSVERSE PROCESS FRACTURE, CLOSED, INITIAL ENCOUNTER (HCC): Primary | ICD-10-CM

## 2024-01-06 PROBLEM — N18.6 ESRD NEEDING DIALYSIS (HCC): Status: ACTIVE | Noted: 2024-01-06

## 2024-01-06 PROBLEM — Z99.2 ESRD NEEDING DIALYSIS (HCC): Status: ACTIVE | Noted: 2024-01-06

## 2024-01-06 LAB
ANION GAP SERPL CALCULATED.3IONS-SCNC: 14 MMOL/L (ref 9–17)
BASOPHILS # BLD: 0.08 K/UL (ref 0–0.2)
BASOPHILS NFR BLD: 1 % (ref 0–2)
BUN SERPL-MCNC: 34 MG/DL (ref 8–23)
BUN/CREAT SERPL: 5 (ref 9–20)
CALCIUM SERPL-MCNC: 9.1 MG/DL (ref 8.6–10.4)
CHLORIDE SERPL-SCNC: 98 MMOL/L (ref 98–107)
CO2 SERPL-SCNC: 28 MMOL/L (ref 20–31)
CREAT SERPL-MCNC: 6.3 MG/DL (ref 0.5–0.9)
EOSINOPHIL # BLD: 0.36 K/UL (ref 0–0.44)
EOSINOPHILS RELATIVE PERCENT: 4 % (ref 1–4)
ERYTHROCYTE [DISTWIDTH] IN BLOOD BY AUTOMATED COUNT: 15.7 % (ref 11.8–14.4)
GFR SERPL CREATININE-BSD FRML MDRD: 6 ML/MIN/1.73M2
GLUCOSE SERPL-MCNC: 86 MG/DL (ref 70–99)
HCT VFR BLD AUTO: 37.6 % (ref 36.3–47.1)
HGB BLD-MCNC: 11.4 G/DL (ref 11.9–15.1)
IMM GRANULOCYTES # BLD AUTO: 0.04 K/UL (ref 0–0.3)
IMM GRANULOCYTES NFR BLD: 0 %
LYMPHOCYTES NFR BLD: 1.9 K/UL (ref 1.1–3.7)
LYMPHOCYTES RELATIVE PERCENT: 18 % (ref 24–43)
MCH RBC QN AUTO: 31.2 PG (ref 25.2–33.5)
MCHC RBC AUTO-ENTMCNC: 30.3 G/DL (ref 28.4–34.8)
MCV RBC AUTO: 103 FL (ref 82.6–102.9)
MONOCYTES NFR BLD: 0.95 K/UL (ref 0.1–1.2)
MONOCYTES NFR BLD: 9 % (ref 3–12)
NEUTROPHILS NFR BLD: 68 % (ref 36–65)
NEUTS SEG NFR BLD: 7.05 K/UL (ref 1.5–8.1)
NRBC BLD-RTO: 0 PER 100 WBC
PLATELET # BLD AUTO: 224 K/UL (ref 138–453)
PMV BLD AUTO: 9.1 FL (ref 8.1–13.5)
POTASSIUM SERPL-SCNC: 4.4 MMOL/L (ref 3.7–5.3)
RBC # BLD AUTO: 3.65 M/UL (ref 3.95–5.11)
RBC # BLD: ABNORMAL 10*6/UL
RBC # BLD: ABNORMAL 10*6/UL
SODIUM SERPL-SCNC: 140 MMOL/L (ref 135–144)
TROPONIN I SERPL HS-MCNC: 39 NG/L (ref 0–14)
TROPONIN I SERPL HS-MCNC: 43 NG/L (ref 0–14)
WBC OTHER # BLD: 10.4 K/UL (ref 3.5–11.3)

## 2024-01-06 PROCEDURE — 80048 BASIC METABOLIC PNL TOTAL CA: CPT

## 2024-01-06 PROCEDURE — 6370000000 HC RX 637 (ALT 250 FOR IP): Performed by: NURSE PRACTITIONER

## 2024-01-06 PROCEDURE — 93005 ELECTROCARDIOGRAM TRACING: CPT | Performed by: EMERGENCY MEDICINE

## 2024-01-06 PROCEDURE — 99222 1ST HOSP IP/OBS MODERATE 55: CPT | Performed by: NURSE PRACTITIONER

## 2024-01-06 PROCEDURE — 73562 X-RAY EXAM OF KNEE 3: CPT

## 2024-01-06 PROCEDURE — 84484 ASSAY OF TROPONIN QUANT: CPT

## 2024-01-06 PROCEDURE — 71045 X-RAY EXAM CHEST 1 VIEW: CPT

## 2024-01-06 PROCEDURE — 90935 HEMODIALYSIS ONE EVALUATION: CPT

## 2024-01-06 PROCEDURE — 1200000000 HC SEMI PRIVATE

## 2024-01-06 PROCEDURE — 74176 CT ABD & PELVIS W/O CONTRAST: CPT

## 2024-01-06 PROCEDURE — 71250 CT THORAX DX C-: CPT

## 2024-01-06 PROCEDURE — 5A1D70Z PERFORMANCE OF URINARY FILTRATION, INTERMITTENT, LESS THAN 6 HOURS PER DAY: ICD-10-PCS | Performed by: INTERNAL MEDICINE

## 2024-01-06 PROCEDURE — 99285 EMERGENCY DEPT VISIT HI MDM: CPT

## 2024-01-06 PROCEDURE — 85025 COMPLETE CBC W/AUTO DIFF WBC: CPT

## 2024-01-06 RX ORDER — POLYETHYLENE GLYCOL 3350 17 G/17G
17 POWDER, FOR SOLUTION ORAL DAILY PRN
Status: DISCONTINUED | OUTPATIENT
Start: 2024-01-06 | End: 2024-01-07 | Stop reason: HOSPADM

## 2024-01-06 RX ORDER — LANTHANUM CARBONATE 500 MG/1
500 TABLET, CHEWABLE ORAL
Status: DISCONTINUED | OUTPATIENT
Start: 2024-01-06 | End: 2024-01-07 | Stop reason: HOSPADM

## 2024-01-06 RX ORDER — SENNA AND DOCUSATE SODIUM 50; 8.6 MG/1; MG/1
1 TABLET, FILM COATED ORAL DAILY PRN
COMMUNITY

## 2024-01-06 RX ORDER — ONDANSETRON 2 MG/ML
4 INJECTION INTRAMUSCULAR; INTRAVENOUS EVERY 6 HOURS PRN
Status: DISCONTINUED | OUTPATIENT
Start: 2024-01-06 | End: 2024-01-07 | Stop reason: HOSPADM

## 2024-01-06 RX ORDER — ALPRAZOLAM 0.5 MG/1
0.5 TABLET ORAL DAILY PRN
Status: DISCONTINUED | OUTPATIENT
Start: 2024-01-06 | End: 2024-01-07 | Stop reason: HOSPADM

## 2024-01-06 RX ORDER — ZOLPIDEM TARTRATE 5 MG/1
5 TABLET ORAL NIGHTLY PRN
Status: DISCONTINUED | OUTPATIENT
Start: 2024-01-06 | End: 2024-01-07 | Stop reason: HOSPADM

## 2024-01-06 RX ORDER — ATORVASTATIN CALCIUM 40 MG/1
40 TABLET, FILM COATED ORAL NIGHTLY
Status: DISCONTINUED | OUTPATIENT
Start: 2024-01-06 | End: 2024-01-07 | Stop reason: HOSPADM

## 2024-01-06 RX ORDER — CALCITRIOL 0.25 UG/1
0.25 CAPSULE, LIQUID FILLED ORAL DAILY
Status: DISCONTINUED | OUTPATIENT
Start: 2024-01-06 | End: 2024-01-07 | Stop reason: HOSPADM

## 2024-01-06 RX ORDER — ROPINIROLE 0.25 MG/1
0.25 TABLET, FILM COATED ORAL NIGHTLY
Status: DISCONTINUED | OUTPATIENT
Start: 2024-01-06 | End: 2024-01-07 | Stop reason: HOSPADM

## 2024-01-06 RX ORDER — SODIUM CHLORIDE 9 MG/ML
INJECTION, SOLUTION INTRAVENOUS PRN
Status: DISCONTINUED | OUTPATIENT
Start: 2024-01-06 | End: 2024-01-07 | Stop reason: HOSPADM

## 2024-01-06 RX ORDER — HYDRALAZINE HYDROCHLORIDE 50 MG/1
100 TABLET, FILM COATED ORAL 3 TIMES DAILY
Status: DISCONTINUED | OUTPATIENT
Start: 2024-01-06 | End: 2024-01-06

## 2024-01-06 RX ORDER — CALCIUM CARBONATE 300MG(750)
400 TABLET,CHEWABLE ORAL DAILY
COMMUNITY

## 2024-01-06 RX ORDER — GABAPENTIN 300 MG/1
300 CAPSULE ORAL DAILY
Status: DISCONTINUED | OUTPATIENT
Start: 2024-01-06 | End: 2024-01-07 | Stop reason: HOSPADM

## 2024-01-06 RX ORDER — ACETAMINOPHEN 650 MG/1
650 SUPPOSITORY RECTAL EVERY 6 HOURS PRN
Status: DISCONTINUED | OUTPATIENT
Start: 2024-01-06 | End: 2024-01-07 | Stop reason: HOSPADM

## 2024-01-06 RX ORDER — SODIUM CHLORIDE 0.9 % (FLUSH) 0.9 %
10 SYRINGE (ML) INJECTION PRN
Status: DISCONTINUED | OUTPATIENT
Start: 2024-01-06 | End: 2024-01-07 | Stop reason: HOSPADM

## 2024-01-06 RX ORDER — ACETAMINOPHEN 325 MG/1
650 TABLET ORAL EVERY 6 HOURS PRN
Status: DISCONTINUED | OUTPATIENT
Start: 2024-01-06 | End: 2024-01-07 | Stop reason: HOSPADM

## 2024-01-06 RX ORDER — SODIUM CHLORIDE 0.9 % (FLUSH) 0.9 %
5-40 SYRINGE (ML) INJECTION EVERY 12 HOURS SCHEDULED
Status: DISCONTINUED | OUTPATIENT
Start: 2024-01-06 | End: 2024-01-07 | Stop reason: HOSPADM

## 2024-01-06 RX ORDER — PANTOPRAZOLE SODIUM 40 MG/1
40 TABLET, DELAYED RELEASE ORAL
Status: DISCONTINUED | OUTPATIENT
Start: 2024-01-07 | End: 2024-01-07 | Stop reason: HOSPADM

## 2024-01-06 RX ORDER — METHOCARBAMOL 500 MG/1
500 TABLET, FILM COATED ORAL
Status: DISCONTINUED | OUTPATIENT
Start: 2024-01-06 | End: 2024-01-07 | Stop reason: HOSPADM

## 2024-01-06 RX ORDER — AMIODARONE HYDROCHLORIDE 200 MG/1
200 TABLET ORAL DAILY
Status: DISCONTINUED | OUTPATIENT
Start: 2024-01-06 | End: 2024-01-07 | Stop reason: HOSPADM

## 2024-01-06 RX ORDER — MIRTAZAPINE 7.5 MG/1
7.5 TABLET, FILM COATED ORAL NIGHTLY
Status: DISCONTINUED | OUTPATIENT
Start: 2024-01-06 | End: 2024-01-07 | Stop reason: HOSPADM

## 2024-01-06 RX ORDER — CYCLOBENZAPRINE HCL 5 MG
5 TABLET ORAL NIGHTLY PRN
Status: DISCONTINUED | OUTPATIENT
Start: 2024-01-06 | End: 2024-01-07 | Stop reason: HOSPADM

## 2024-01-06 RX ORDER — ALLOPURINOL 100 MG/1
100 TABLET ORAL DAILY
Status: DISCONTINUED | OUTPATIENT
Start: 2024-01-06 | End: 2024-01-07 | Stop reason: HOSPADM

## 2024-01-06 RX ORDER — HYDROCODONE BITARTRATE AND ACETAMINOPHEN 5; 325 MG/1; MG/1
1 TABLET ORAL EVERY 6 HOURS PRN
Status: DISCONTINUED | OUTPATIENT
Start: 2024-01-06 | End: 2024-01-07 | Stop reason: HOSPADM

## 2024-01-06 RX ORDER — ONDANSETRON 4 MG/1
4 TABLET, ORALLY DISINTEGRATING ORAL EVERY 8 HOURS PRN
Status: DISCONTINUED | OUTPATIENT
Start: 2024-01-06 | End: 2024-01-07 | Stop reason: HOSPADM

## 2024-01-06 RX ORDER — BUMETANIDE 1 MG/1
2 TABLET ORAL DAILY
Status: DISCONTINUED | OUTPATIENT
Start: 2024-01-06 | End: 2024-01-07 | Stop reason: HOSPADM

## 2024-01-06 RX ORDER — VENLAFAXINE HYDROCHLORIDE 75 MG/1
150 CAPSULE, EXTENDED RELEASE ORAL DAILY
Status: DISCONTINUED | OUTPATIENT
Start: 2024-01-06 | End: 2024-01-07 | Stop reason: HOSPADM

## 2024-01-06 RX ADMIN — CALCITRIOL 0.25 MCG: 0.25 CAPSULE ORAL at 18:38

## 2024-01-06 RX ADMIN — GABAPENTIN 300 MG: 300 CAPSULE ORAL at 18:38

## 2024-01-06 RX ADMIN — HYDROCODONE BITARTRATE AND ACETAMINOPHEN 1 TABLET: 5; 325 TABLET ORAL at 16:16

## 2024-01-06 RX ADMIN — LANTHANUM CARBONATE 500 MG: 500 TABLET, CHEWABLE ORAL at 18:38

## 2024-01-06 ASSESSMENT — PAIN DESCRIPTION - DESCRIPTORS: DESCRIPTORS: DISCOMFORT;SHARP

## 2024-01-06 ASSESSMENT — ENCOUNTER SYMPTOMS
SHORTNESS OF BREATH: 0
DIARRHEA: 0
SINUS PAIN: 0
SINUS PRESSURE: 0
PHOTOPHOBIA: 0
BACK PAIN: 1
NAUSEA: 0
WHEEZING: 0
CONSTIPATION: 0
ABDOMINAL PAIN: 0
VOMITING: 0
COUGH: 0
CHEST TIGHTNESS: 0

## 2024-01-06 ASSESSMENT — PAIN DESCRIPTION - PAIN TYPE: TYPE: ACUTE PAIN

## 2024-01-06 ASSESSMENT — PAIN DESCRIPTION - LOCATION: LOCATION: CHEST

## 2024-01-06 ASSESSMENT — PAIN SCALES - GENERAL
PAINLEVEL_OUTOF10: 6
PAINLEVEL_OUTOF10: 6

## 2024-01-06 ASSESSMENT — PAIN DESCRIPTION - FREQUENCY: FREQUENCY: CONTINUOUS

## 2024-01-06 ASSESSMENT — PAIN DESCRIPTION - ORIENTATION: ORIENTATION: RIGHT;LEFT

## 2024-01-06 ASSESSMENT — PAIN DESCRIPTION - ONSET: ONSET: ON-GOING

## 2024-01-06 NOTE — ED NOTES
Patient reports she was sent from dialysis.  ALEXA Flores, Sat.  She reports falling at home x 3 yesterday.  Noted to have  multiple bruises in both arms.  Also noted to have bruises in the legs.  She stated she was assisted up at dialysis and had a rib pop in the center of her chest.  No acute dyspnea.  Does report feeling sleepy.

## 2024-01-06 NOTE — H&P
RIGHT (3 VIEWS)    Result Date: 1/6/2024  Right knee total arthroplasty without evidence for any complications.  No evidence of fractures.     XR KNEE LEFT (3 VIEWS)    Result Date: 1/6/2024  Left total knee arthroplasty. No evidence of fracture.       Assessment :      Hospital Problems             Last Modified POA    * (Principal) ESRD needing dialysis ([facility]) 1/6/2024 Yes    New onset of congestive heart failure ([facility]) 1/6/2024 Yes    Metabolic acidosis 1/6/2024 Yes    Moderate recurrent major depression ([facility]) 1/6/2024 Yes    Dyslipidemia 1/6/2024 Yes    DM (diabetes mellitus) ([facility]) 1/6/2024 Yes    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 disease ([facility]) 1/6/2024 Yes    Essential hypertension 1/6/2024 Yes    Morbid obesity due to excess calories ([facility]) 1/6/2024 Yes    Secondary hyperparathyroidism ([facility]) 1/6/2024 Yes    GERD (gastroesophageal reflux disease) 1/6/2024 Yes    Acute diastolic congestive heart failure ([facility]) 1/6/2024 Yes    Chronic bilateral low back pain without sciatica 1/6/2024 Yes    Fracture of transverse process of lumbar vertebra ([facility]) 1/6/2024 Yes       Plan:     Patient status inpatient in the  [facility]    ESRD needing dialysis  Consult nephrology and anticipate dialysis today  Discharge after dialysis  Status post fall with transverse fracture of lumbar vertebral body with chronic back pain  Supportive measures and pain control  Diabetes, type II  Continue home regiment  Acute on chronic diastolic heart failure with evidence of fluid volume overload  Nephrology consultation and anticipate dialysis today  Continue home regiment  Probable discharge after dialysis      Consultations:   IP CONSULT TO NEPHROLOGY  IP CONSULT TO HOSPITALIST  IP CONSULT TO NEPHROLOGY    Patient is admitted as inpatient status because of co-morbidities listed above, severity of signs and symptoms as outlined, requirement for current medical therapies and most importantly because of direct risk to patient if

## 2024-01-06 NOTE — ED NOTES
Dialysis RN for inpatient dialysis.  Please call [phone] Option 3 upon admission to the floor for bedside dialysis to be completed today.

## 2024-01-06 NOTE — ED PROVIDER NOTES
processes.      No evidence of an acute compression fracture in the lumbar spine.      Multilevel endplate and facet degenerative changes.         CT ABDOMEN PELVIS WO CONTRAST Additional Contrast? None   Final Result   1. Acute fractures of the right L1 and L2 transverse processes.   2. No acute traumatic abdominopelvic visceral injury.   3. Multiple cortical hypodensities of both kidneys, several of which are   indeterminate by CT. Renal ultrasound is recommended for further   characterization.         CT CHEST WO CONTRAST   Final Result   1. No evidence for acute rib fracture.   2. Acute right L1 and L2 transverse process fractures.   3. Lungs show no infiltrates.         XR KNEE RIGHT (3 VIEWS)   Final Result   Right knee total arthroplasty without evidence for any complications.  No   evidence of fractures.         XR KNEE LEFT (3 VIEWS)   Final Result   Left total knee arthroplasty. No evidence of fracture.         XR CHEST PORTABLE   Final Result   Lungs appear clear.  Cardiomegaly.             LABS: Lab orders shown below, the results are reviewed by myself, and all abnormals are listed below.  Labs Reviewed   CBC WITH AUTO DIFFERENTIAL - Abnormal; Notable for the following components:       Result Value    RBC 3.65 (*)     Hemoglobin 11.4 (*)     .0 (*)     RDW 15.7 (*)     Neutrophils % 68 (*)     Lymphocytes % 18 (*)     All other components within normal limits   BASIC METABOLIC PANEL - Abnormal; Notable for the following components:    BUN 34 (*)     Creatinine 6.3 (*)     Est, Glom Filt Rate 6 (*)     Bun/Cre Ratio 5 (*)     All other components within normal limits   TROPONIN - Abnormal; Notable for the following components:    Troponin, High Sensitivity 43 (*)     All other components within normal limits   TROPONIN - Abnormal; Notable for the following components:    Troponin, High Sensitivity 39 (*)     All other components within normal limits       Vitals Reviewed:    Vitals:    01/06/24

## 2024-01-06 NOTE — PLAN OF CARE
Problem: Pain  Goal: Verbalizes/displays adequate comfort level or baseline comfort level  Outcome: Progressing     Problem: Safety - Adult  Goal: Free from fall injury  Outcome: Progressing  Flowsheets (Taken 1/6/2024 7616)  Free From Fall Injury: Instruct family/caregiver on patient safety     Problem: ABCDS Injury Assessment  Goal: Absence of physical injury  Outcome: Progressing

## 2024-01-07 VITALS
WEIGHT: 240 LBS | HEART RATE: 62 BPM | SYSTOLIC BLOOD PRESSURE: 160 MMHG | TEMPERATURE: 98.6 F | RESPIRATION RATE: 16 BRPM | HEIGHT: 64 IN | BODY MASS INDEX: 40.97 KG/M2 | OXYGEN SATURATION: 95 % | DIASTOLIC BLOOD PRESSURE: 73 MMHG

## 2024-01-07 LAB
ANION GAP SERPL CALCULATED.3IONS-SCNC: 10 MMOL/L (ref 9–17)
BASOPHILS # BLD: 0.04 K/UL (ref 0–0.2)
BASOPHILS NFR BLD: 0 % (ref 0–2)
BUN SERPL-MCNC: 18 MG/DL (ref 8–23)
BUN/CREAT SERPL: 4 (ref 9–20)
CALCIUM SERPL-MCNC: 9 MG/DL (ref 8.6–10.4)
CHLORIDE SERPL-SCNC: 98 MMOL/L (ref 98–107)
CO2 SERPL-SCNC: 28 MMOL/L (ref 20–31)
CREAT SERPL-MCNC: 4.2 MG/DL (ref 0.5–0.9)
EOSINOPHIL # BLD: 0.28 K/UL (ref 0–0.44)
EOSINOPHILS RELATIVE PERCENT: 3 % (ref 1–4)
ERYTHROCYTE [DISTWIDTH] IN BLOOD BY AUTOMATED COUNT: 15.9 % (ref 11.8–14.4)
GFR SERPL CREATININE-BSD FRML MDRD: 10 ML/MIN/1.73M2
GLUCOSE SERPL-MCNC: 117 MG/DL (ref 70–99)
HCT VFR BLD AUTO: 35.6 % (ref 36.3–47.1)
HGB BLD-MCNC: 10.7 G/DL (ref 11.9–15.1)
IMM GRANULOCYTES # BLD AUTO: 0.03 K/UL (ref 0–0.3)
IMM GRANULOCYTES NFR BLD: 0 %
LYMPHOCYTES NFR BLD: 1.49 K/UL (ref 1.1–3.7)
LYMPHOCYTES RELATIVE PERCENT: 14 % (ref 24–43)
MCH RBC QN AUTO: 30.6 PG (ref 25.2–33.5)
MCHC RBC AUTO-ENTMCNC: 30.1 G/DL (ref 28.4–34.8)
MCV RBC AUTO: 101.7 FL (ref 82.6–102.9)
MONOCYTES NFR BLD: 0.78 K/UL (ref 0.1–1.2)
MONOCYTES NFR BLD: 7 % (ref 3–12)
NEUTROPHILS NFR BLD: 76 % (ref 36–65)
NEUTS SEG NFR BLD: 7.88 K/UL (ref 1.5–8.1)
NRBC BLD-RTO: 0 PER 100 WBC
PLATELET # BLD AUTO: 187 K/UL (ref 138–453)
PMV BLD AUTO: 9.4 FL (ref 8.1–13.5)
POTASSIUM SERPL-SCNC: 4.7 MMOL/L (ref 3.7–5.3)
RBC # BLD AUTO: 3.5 M/UL (ref 3.95–5.11)
RBC # BLD: ABNORMAL 10*6/UL
SODIUM SERPL-SCNC: 136 MMOL/L (ref 135–144)
WBC OTHER # BLD: 10.5 K/UL (ref 3.5–11.3)

## 2024-01-07 PROCEDURE — 85025 COMPLETE CBC W/AUTO DIFF WBC: CPT

## 2024-01-07 PROCEDURE — 2580000003 HC RX 258: Performed by: NURSE PRACTITIONER

## 2024-01-07 PROCEDURE — 6370000000 HC RX 637 (ALT 250 FOR IP): Performed by: NURSE PRACTITIONER

## 2024-01-07 PROCEDURE — 80048 BASIC METABOLIC PNL TOTAL CA: CPT

## 2024-01-07 PROCEDURE — 99239 HOSP IP/OBS DSCHRG MGMT >30: CPT | Performed by: NURSE PRACTITIONER

## 2024-01-07 PROCEDURE — 36415 COLL VENOUS BLD VENIPUNCTURE: CPT

## 2024-01-07 RX ORDER — HYDROCODONE BITARTRATE AND ACETAMINOPHEN 5; 325 MG/1; MG/1
1 TABLET ORAL EVERY 4 HOURS PRN
Qty: 12 TABLET | Refills: 0 | Status: SHIPPED | OUTPATIENT
Start: 2024-01-07 | End: 2024-01-10

## 2024-01-07 RX ADMIN — PANTOPRAZOLE SODIUM 40 MG: 40 TABLET, DELAYED RELEASE ORAL at 06:22

## 2024-01-07 RX ADMIN — Medication 12.5 MG: at 00:31

## 2024-01-07 RX ADMIN — METHOCARBAMOL TABLETS 500 MG: 500 TABLET, COATED ORAL at 00:31

## 2024-01-07 RX ADMIN — ZOLPIDEM TARTRATE 5 MG: 5 TABLET ORAL at 00:31

## 2024-01-07 RX ADMIN — HYDROCODONE BITARTRATE AND ACETAMINOPHEN 1 TABLET: 5; 325 TABLET ORAL at 02:11

## 2024-01-07 RX ADMIN — VENLAFAXINE HYDROCHLORIDE 150 MG: 75 CAPSULE, EXTENDED RELEASE ORAL at 08:47

## 2024-01-07 RX ADMIN — BUMETANIDE 2 MG: 1 TABLET ORAL at 08:47

## 2024-01-07 RX ADMIN — Medication 12.5 MG: at 08:47

## 2024-01-07 RX ADMIN — MIRTAZAPINE 7.5 MG: 7.5 TABLET, FILM COATED ORAL at 00:31

## 2024-01-07 RX ADMIN — GABAPENTIN 300 MG: 300 CAPSULE ORAL at 08:47

## 2024-01-07 RX ADMIN — ATORVASTATIN CALCIUM 40 MG: 40 TABLET, FILM COATED ORAL at 00:31

## 2024-01-07 RX ADMIN — LANTHANUM CARBONATE 500 MG: 500 TABLET, CHEWABLE ORAL at 08:46

## 2024-01-07 RX ADMIN — CALCITRIOL 0.25 MCG: 0.25 CAPSULE ORAL at 08:46

## 2024-01-07 RX ADMIN — APIXABAN 5 MG: 5 TABLET, FILM COATED ORAL at 08:47

## 2024-01-07 RX ADMIN — APIXABAN 5 MG: 5 TABLET, FILM COATED ORAL at 00:31

## 2024-01-07 RX ADMIN — ROPINIROLE HYDROCHLORIDE 0.25 MG: 0.25 TABLET, FILM COATED ORAL at 00:31

## 2024-01-07 RX ADMIN — SODIUM CHLORIDE, PRESERVATIVE FREE 10 ML: 5 INJECTION INTRAVENOUS at 08:54

## 2024-01-07 RX ADMIN — ALLOPURINOL 100 MG: 100 TABLET ORAL at 08:47

## 2024-01-07 RX ADMIN — AMIODARONE HYDROCHLORIDE 200 MG: 200 TABLET ORAL at 08:47

## 2024-01-07 RX ADMIN — HYDROCODONE BITARTRATE AND ACETAMINOPHEN 1 TABLET: 5; 325 TABLET ORAL at 08:52

## 2024-01-07 RX ADMIN — SODIUM CHLORIDE, PRESERVATIVE FREE 10 ML: 5 INJECTION INTRAVENOUS at 00:32

## 2024-01-07 ASSESSMENT — PAIN DESCRIPTION - ORIENTATION
ORIENTATION: LEFT;RIGHT
ORIENTATION: LOWER

## 2024-01-07 ASSESSMENT — PAIN SCALES - GENERAL
PAINLEVEL_OUTOF10: 2
PAINLEVEL_OUTOF10: 6
PAINLEVEL_OUTOF10: 0

## 2024-01-07 ASSESSMENT — PAIN DESCRIPTION - LOCATION
LOCATION: BACK
LOCATION: RIB CAGE

## 2024-01-07 NOTE — CONSULTS
Renal Consult Note    Patient :  Asya Sharp; 78 y.o. MRN# [id number]  Location:  2106/2106-01  Attending:  Chavo Warren MD  Admit Date:  1/6/2024   Hospital Day: 1    Reason for Consult:     Asked by Chavo Solano MD to see for JOSE ARMANDO/Elevated Creatinine.    History Obtained From:     patient, electronic medical record    HD Access:     previous  Left AV fistula    HD Unit:     [facility]    Nephrologist:     Dr. Martin    Dry Weight:     111.5 kg    Admission Weight:     112 kg    History of Present Illness:     Asya Sharp; 78 y.o. female with past medical history as mentioned below presented to the hospital with the chief complaint of 3 falls on Saturday.  Patient presented to ED for evaluation of falls as requested by Dr. Reed.    Nephrology was consulted for renal replacement therapy.  She normally receives hemodialysis on Tuesday Thursday Saturday at [facility] via AV fistula.  Her dry weight is 111.5 kg.  She was admitted with 112 kg  She received hemodialysis yesterday with 2.5 L fluid removal.  Treatment was tolerated well, dry weight was challenged.  Today she reports she is feeling well, anxious to be discharged.  She denies shortness of breath.  She reports getting up to use the restroom and feeling steady while having the nursing staff available.  Labs today show sodium 136 potassium 4.7, bicarb 28, calcium 9.0, hemoglobin 10.7    Past History/Allergies?Social History:     Past Medical History:   Diagnosis Date    Abnormal stress test     Anemia     Arthritis     Depression     Diverticulosis     DM (diabetes mellitus) ([facility])     Erythropenia     Fibromyalgia     HTN (hypertension)     Hyperlipidemia     Kidney stone     Morbid obesity due to excess calories ([facility])     DIONY on CPAP     Osteopenia 03/03/14    Seasonal allergies     Sleep apnea     uses bipap prn       Allergies   Allergen Reactions    Adhesive Tape     Cephalexin Other (See Comments)     Tongue cracks and peels

## 2024-01-07 NOTE — DISCHARGE SUMMARY
medications, discharge plan and follow up.    Electronically signed by   BARBIE Wood NP  1/7/2024  12:31 PM      Thank you Eliza Dinero MD for the opportunity to be involved in this patient's care.

## 2024-01-07 NOTE — DISCHARGE INSTR - COC
Continuity of Care Form    Patient Name: Asya Sharp   :  1945  MRN:  [id number]    Admit date:  2024  Discharge date:  ***    Code Status Order: Full Code   Advance Directives:     Admitting Physician:  Chavo Warren MD  PCP: Eliza Massey MD    Discharging Nurse: ***  Discharging Hospital Unit/Room#: 2106/2106-01  Discharging Unit Phone Number: ***    Emergency Contact:   Extended Emergency Contact Information  Primary Emergency Contact: Mac Sharp  Address: [address] [place] [address]  Home Phone: [phone]  Relation: Spouse  Secondary Emergency Contact: AronNatalia  [address] [place] [address] [facility]  Home Phone: [phone]  Work Phone: [phone]  Mobile Phone: [phone]  Relation: Child    Past Surgical History:  Past Surgical History:   Procedure Laterality Date    ANKLE FRACTURE SURGERY Right 2022    RIGHT ANKLE OPEN REDUCTION INTERNAL FIXATION performed by Obed Leblanc DO at [facility] OR    ARM SURGERY  2011    right arm broken    CARDIAC CATHETERIZATION  [id number] dr martinez    CARDIAC CATHETERIZATION  2016    COLONOSCOPY  2003    COLONOSCOPY  2007    IR TUNNELED CATHETER PLACEMENT GREATER THAN 5 YEARS  2022    IR TUNNELED CATHETER PLACEMENT GREATER THAN 5 YEARS 2022 [facility]    ORIF ANKLE FRACTURE Right 2022    RIGHT ANKLE OPEN REDUCTION INTERNAL FIXATION    TOTAL KNEE ARTHROPLASTY Bilateral     left may 2013 and right 2013    TUBAL LIGATION         Immunization History:   Immunization History   Administered Date(s) Administered    COVID-19, PFIZER PURPLE top, DILUTE for use, (age 12 y+), 30mcg/0.3mL 2021, 2021, 2021, 2022    Hep B, ENGERIX-B, (age 20y+), IM, 1mL 2023, 2023, 2023, 10/10/2023    Influenza 10/04/2012, 2013    Influenza Nasal 2011    Influenza Virus Vaccine 10/15/2014, 10/14/2015,

## 2024-01-07 NOTE — CARE COORDINATION
and/or patient representative Asya and her family were provided with a choice of provider and agrees with the discharge plan. Freedom of choice list with basic dialogue that supports the patient's individualized plan of care/goals and shares the quality data associated with the providers was provided to: Patient   Patient Representative Name:       The Patient and/or Patient Representative Agree with the Discharge Plan? Yes    Ashley Wilkins RN  Case Management Department  Ph: [phone]

## 2024-01-07 NOTE — PROGRESS NOTES
Discharge instructions and medications reviewed with the patient. No questions at this time. IV removed without complication. Patient discharged with all her belongings via wheelchair, taken down by ALICE Quesada.   
HEMODIALYSIS POST TREATMENT NOTE    Treatment time ordered: 3    Actual treatment time: 3    UltraFiltration Goal: 2500  UltraFiltration Removed: 2500      Pre Treatment weight: 112  Post Treatment weight: 109.5  Estimated Dry Weight: na    Access used:     Central Venous Catheter:          Tunneled or Non-tunneled: na           Site: na          Access Flow: na      Internal Access:       AV Fistula or AV Graft: avf         Site: shelby       Access Flow: good       Sign and symptoms of infection: na       If YES: na    Medications or blood products given: na    Chronic outpatient schedule: na    Chronic outpatient unit: na    Summary of response to treatment: pt tolerated tx well    Explain if orders NOT met, was physician notified:yvonne      ACES flowsheet faxed to patient unit/ placed in patient chart: yes    Post assessment completed: yes    Report given to: Sofía Sandra Intra-treatment documented Safety Checks include the followin) Access and face visible at all times.     2) All connections and blood lines are secure with no kinks.     3) NVL alarm engaged.     4) Hemosafe device applied (if applicable).     5) No collapse of Arterial or Venous blood chambers.     6) All blood lines / pump segments in the air detectors.   
Pt admitted to room 2106 in stable condition from ER. Pt oriented to room and call light system. Pt instructed to call out with questions or for need of assistance with ambulation. Bed alarm activated. Vital signs stable.     Floor RN called dialysis RN to inform of pt arrival to floor.   
Transitions of Care Pharmacy Service   Medication Review    The patient's list of current home medications has been reviewed and updated.     Source(s) of information: Patient, surescripts    Please note:   -I was unable to verify scripts for calcitriol (last filled in 2022) and Robaxin (not on surescripts). She states she takes the Robaxin every night and takes Flexeril PRN nightly if the Robaxin is not providing enough relief  -She [facility] 2 oral meds [facility]  but they are not open at this time so I cannot verify what these are. Leaving ivent open for [facility] to call [phone] on Monday morning and see what these meds are   -Lopressor was lowered to 12.5mg BID about 6 months ago but the script is still written for 25mg BID  -Lanthanum was increased to 1000mg TID on 12/21 but patient is still taking 500mg TID     PROVIDER ACTION REQUESTED  Medications that need to be addressed by a physician/nurse practitioner:    Medication Action Requested     hydralazine     Patient no longer takes, please d/c       Please feel free to call with any questions about this encounter. Thank you.    Lucina Christopher, [facility]  Transitions of Care Pharmacy Service  Phone:  [phone]  Fax: [phone]        Prior to Admission medications    Medication Sig Start Date End Date Taking? Authorizing Provider   Magnesium 400 MG TABS Take 400 mg by mouth daily   Yes Khris Meyers MD   sennosides-docusate sodium (SENOKOT-S) 8.6-50 MG tablet Take 1 tablet by mouth daily as needed for Constipation   Yes Khris Meyers MD   mirtazapine (REMERON) 7.5 MG tablet Take 1 tablet by mouth nightly 12/6/23   Eliza Massey MD   gabapentin (NEURONTIN) 300 MG capsule Take 1 capsule by mouth daily for 90 days. 12/6/23 3/5/24  Eliza Massey MD   cyclobenzaprine (FLEXERIL) 5 MG tablet Take 1 tablet by mouth nightly as needed for Muscle spasms 12/6/23   Eliza Massey MD   venlafaxine (EFFEXOR XR) 150 MG extended 
kg (240 lb)   LMP  (LMP Unknown)   SpO2 95%   BMI 41.20 kg/m²   Temp (24hrs), Av °F (36.7 °C), Min:97.7 °F (36.5 °C), Max:98.6 °F (37 °C)    No results for input(s): \"POCGLU\" in the last 72 hours.    I/O (24Hr):    Intake/Output Summary (Last 24 hours) at 2024 1218  Last data filed at 2024 2331  Gross per 24 hour   Intake 500 ml   Output 3000 ml   Net -2500 ml       Labs:  Hematology:  Recent Labs     24  1125 24  0550   WBC 10.4 10.5   RBC 3.65* 3.50*   HGB 11.4* 10.7*   HCT 37.6 35.6*   .0* 101.7   MCH 31.2 30.6   MCHC 30.3 30.1   RDW 15.7* 15.9*    187   MPV 9.1 9.4     Chemistry:  Recent Labs     24  1125 24  1233 24  0550     --  136   K 4.4  --  4.7   CL 98  --  98   CO2 28  --  28   GLUCOSE 86  --  117*   BUN 34*  --  18   CREATININE 6.3*  --  4.2*   ANIONGAP 14  --  10   LABGLOM 6*  --  10*   CALCIUM 9.1  --  9.0   TROPHS 43* 39*  --    No results for input(s): \"PROT\", \"LABALBU\", \"LABA1C\", \"I2YYASR\", \"V7FEXUR\", \"FT4\", \"TSH\", \"AST\", \"ALT\", \"LDH\", \"GGT\", \"ALKPHOS\", \"LABGGT\", \"BILITOT\", \"BILIDIR\", \"AMMONIA\", \"AMYLASE\", \"LIPASE\", \"LACTATE\", \"CHOL\", \"HDL\", \"LDLCHOLESTEROL\", \"CHOLHDLRATIO\", \"TRIG\", \"VLDL\", \"AEC21TN\", \"PHENYTOIN\", \"PHENYF\", \"URICACID\", \"POCGLU\" in the last 72 hours.  ABG:  Lab Results   Component Value Date/Time    FIO2 INFORMATION NOT PROVIDED 2022 06:40 PM     Lab Results   Component Value Date/Time    SPECIAL 5 ML RIGHT AC 2022 09:09 AM     Lab Results   Component Value Date/Time    CULTURE Unable to perform testing: No specimen received. 2022 10:46 PM       Radiology:  CT LUMBAR SPINE BONY RECONSTRUCTION    Result Date: 2024  Acute nondisplaced fractures of the right L1 and L2 transverse processes. No evidence of an acute compression fracture in the lumbar spine. Multilevel endplate and facet degenerative changes.     CT ABDOMEN PELVIS WO CONTRAST Additional Contrast? None    Result Date: 2024  1. Acute

## 2024-01-07 NOTE — PLAN OF CARE
Problem: Discharge Planning  Goal: Discharge to home or other facility with appropriate resources  Outcome: Progressing  Flowsheets (Taken 1/7/2024 0625)  Discharge to home or other facility with appropriate resources:   Arrange for needed discharge resources and transportation as appropriate   Identify barriers to discharge with patient and caregiver     Problem: Pain  Goal: Verbalizes/displays adequate comfort level or baseline comfort level  1/7/2024 0625 by Sofía Adhikari RN  Outcome: Progressing  Flowsheets (Taken 1/7/2024 0625)  Verbalizes/displays adequate comfort level or baseline comfort level:   Encourage patient to monitor pain and request assistance   Assess pain using appropriate pain scale   Administer analgesics based on type and severity of pain and evaluate response  1/6/2024 1752 by Smiley Bass RN  Outcome: Progressing     Problem: Safety - Adult  Goal: Free from fall injury  1/7/2024 0625 by Sofía Adhikari RN  Outcome: Progressing  Flowsheets (Taken 1/6/2024 1752 by Smiley Bass RN)  Free From Fall Injury: Instruct family/caregiver on patient safety  1/6/2024 1752 by Smiley Bass RN  Outcome: Progressing  Flowsheets (Taken 1/6/2024 1752)  Free From Fall Injury: Instruct family/caregiver on patient safety     Problem: ABCDS Injury Assessment  Goal: Absence of physical injury  1/7/2024 0625 by Sofía Adhikari RN  Outcome: Progressing  Flowsheets (Taken 1/7/2024 0625)  Absence of Physical Injury: Implement safety measures based on patient assessment  1/6/2024 1752 by Smiley Bass RN  Outcome: Progressing

## 2024-01-08 LAB
EKG ATRIAL RATE: 57 BPM
EKG P AXIS: 79 DEGREES
EKG P-R INTERVAL: 218 MS
EKG Q-T INTERVAL: 490 MS
EKG QRS DURATION: 104 MS
EKG QTC CALCULATION (BAZETT): 476 MS
EKG R AXIS: 18 DEGREES
EKG T AXIS: 39 DEGREES
EKG VENTRICULAR RATE: 57 BPM

## 2024-01-08 PROCEDURE — 93010 ELECTROCARDIOGRAM REPORT: CPT | Performed by: INTERNAL MEDICINE

## 2024-01-11 ENCOUNTER — APPOINTMENT (OUTPATIENT)
Dept: CT IMAGING | Age: 79
End: 2024-01-11
Payer: MEDICARE

## 2024-01-11 ENCOUNTER — HOSPITAL ENCOUNTER (EMERGENCY)
Age: 79
Discharge: HOME | End: 2024-01-11
Attending: EMERGENCY MEDICINE
Payer: MEDICARE

## 2024-01-11 ENCOUNTER — APPOINTMENT (OUTPATIENT)
Dept: GENERAL RADIOLOGY | Age: 79
End: 2024-01-11
Payer: MEDICARE

## 2024-01-11 VITALS
TEMPERATURE: 97.7 F | OXYGEN SATURATION: 95 % | DIASTOLIC BLOOD PRESSURE: 81 MMHG | HEART RATE: 75 BPM | SYSTOLIC BLOOD PRESSURE: 166 MMHG | RESPIRATION RATE: 13 BRPM

## 2024-01-11 DIAGNOSIS — R07.9 CHEST PAIN, UNSPECIFIED TYPE: Primary | ICD-10-CM

## 2024-01-11 LAB
ANION GAP SERPL CALCULATED.3IONS-SCNC: 17 MMOL/L (ref 9–17)
BASOPHILS # BLD: 0.06 K/UL (ref 0–0.2)
BASOPHILS NFR BLD: 1 % (ref 0–2)
BILIRUB UR QL STRIP: NEGATIVE
BUN SERPL-MCNC: 36 MG/DL (ref 8–23)
BUN/CREAT SERPL: 5 (ref 9–20)
CALCIUM SERPL-MCNC: 9.3 MG/DL (ref 8.6–10.4)
CHLORIDE SERPL-SCNC: 99 MMOL/L (ref 98–107)
CLARITY UR: CLEAR
CO2 SERPL-SCNC: 24 MMOL/L (ref 20–31)
COLOR UR: YELLOW
CREAT SERPL-MCNC: 7 MG/DL (ref 0.5–0.9)
EOSINOPHIL # BLD: 0.3 K/UL (ref 0–0.44)
EOSINOPHILS RELATIVE PERCENT: 3 % (ref 1–4)
EPI CELLS #/AREA URNS HPF: ABNORMAL /HPF (ref 0–5)
ERYTHROCYTE [DISTWIDTH] IN BLOOD BY AUTOMATED COUNT: 15.7 % (ref 11.8–14.4)
GFR SERPL CREATININE-BSD FRML MDRD: 6 ML/MIN/1.73M2
GLUCOSE SERPL-MCNC: 131 MG/DL (ref 70–99)
GLUCOSE UR STRIP-MCNC: NEGATIVE MG/DL
HCT VFR BLD AUTO: 38.5 % (ref 36.3–47.1)
HGB BLD-MCNC: 11.4 G/DL (ref 11.9–15.1)
HGB UR QL STRIP.AUTO: ABNORMAL
IMM GRANULOCYTES # BLD AUTO: 0.04 K/UL (ref 0–0.3)
IMM GRANULOCYTES NFR BLD: 1 %
INR PPP: 1.1
KETONES UR STRIP-MCNC: NEGATIVE MG/DL
LEUKOCYTE ESTERASE UR QL STRIP: NEGATIVE
LYMPHOCYTES NFR BLD: 1.64 K/UL (ref 1.1–3.7)
LYMPHOCYTES RELATIVE PERCENT: 19 % (ref 24–43)
MCH RBC QN AUTO: 30.5 PG (ref 25.2–33.5)
MCHC RBC AUTO-ENTMCNC: 29.6 G/DL (ref 28.4–34.8)
MCV RBC AUTO: 102.9 FL (ref 82.6–102.9)
MONOCYTES NFR BLD: 0.77 K/UL (ref 0.1–1.2)
MONOCYTES NFR BLD: 9 % (ref 3–12)
NEUTROPHILS NFR BLD: 67 % (ref 36–65)
NEUTS SEG NFR BLD: 5.95 K/UL (ref 1.5–8.1)
NITRITE UR QL STRIP: NEGATIVE
NRBC BLD-RTO: 0 PER 100 WBC
PARTIAL THROMBOPLASTIN TIME: 27.5 SEC (ref 23.9–33.8)
PH UR STRIP: 7.5 [PH] (ref 5–8)
PLATELET # BLD AUTO: 228 K/UL (ref 138–453)
PMV BLD AUTO: 9.3 FL (ref 8.1–13.5)
POTASSIUM SERPL-SCNC: 4.2 MMOL/L (ref 3.7–5.3)
PROT UR STRIP-MCNC: ABNORMAL MG/DL
PROTHROMBIN TIME: 14 SEC (ref 11.5–14.2)
RBC # BLD AUTO: 3.74 M/UL (ref 3.95–5.11)
RBC # BLD: ABNORMAL 10*6/UL
RBC #/AREA URNS HPF: ABNORMAL /HPF (ref 0–2)
SODIUM SERPL-SCNC: 140 MMOL/L (ref 135–144)
SP GR UR STRIP: 1.01 (ref 1–1.03)
TROPONIN I SERPL HS-MCNC: 31 NG/L (ref 0–14)
TROPONIN I SERPL HS-MCNC: 35 NG/L (ref 0–14)
UROBILINOGEN UR STRIP-ACNC: NORMAL EU/DL (ref 0–1)
WBC #/AREA URNS HPF: ABNORMAL /HPF (ref 0–5)
WBC OTHER # BLD: 8.8 K/UL (ref 3.5–11.3)

## 2024-01-11 PROCEDURE — 36415 COLL VENOUS BLD VENIPUNCTURE: CPT

## 2024-01-11 PROCEDURE — 99285 EMERGENCY DEPT VISIT HI MDM: CPT

## 2024-01-11 PROCEDURE — 6360000002 HC RX W HCPCS: Performed by: EMERGENCY MEDICINE

## 2024-01-11 PROCEDURE — 74176 CT ABD & PELVIS W/O CONTRAST: CPT

## 2024-01-11 PROCEDURE — 93005 ELECTROCARDIOGRAM TRACING: CPT | Performed by: EMERGENCY MEDICINE

## 2024-01-11 PROCEDURE — 72128 CT CHEST SPINE W/O DYE: CPT

## 2024-01-11 PROCEDURE — 96375 TX/PRO/DX INJ NEW DRUG ADDON: CPT

## 2024-01-11 PROCEDURE — 71250 CT THORAX DX C-: CPT

## 2024-01-11 PROCEDURE — 81001 URINALYSIS AUTO W/SCOPE: CPT

## 2024-01-11 PROCEDURE — 70450 CT HEAD/BRAIN W/O DYE: CPT

## 2024-01-11 PROCEDURE — 80048 BASIC METABOLIC PNL TOTAL CA: CPT

## 2024-01-11 PROCEDURE — 71045 X-RAY EXAM CHEST 1 VIEW: CPT

## 2024-01-11 PROCEDURE — 84484 ASSAY OF TROPONIN QUANT: CPT

## 2024-01-11 PROCEDURE — 96374 THER/PROPH/DIAG INJ IV PUSH: CPT

## 2024-01-11 PROCEDURE — 85730 THROMBOPLASTIN TIME PARTIAL: CPT

## 2024-01-11 PROCEDURE — 72125 CT NECK SPINE W/O DYE: CPT

## 2024-01-11 PROCEDURE — 85025 COMPLETE CBC W/AUTO DIFF WBC: CPT

## 2024-01-11 PROCEDURE — 85610 PROTHROMBIN TIME: CPT

## 2024-01-11 PROCEDURE — 72131 CT LUMBAR SPINE W/O DYE: CPT

## 2024-01-11 RX ORDER — ONDANSETRON 2 MG/ML
4 INJECTION INTRAMUSCULAR; INTRAVENOUS ONCE
Status: DISCONTINUED | OUTPATIENT
Start: 2024-01-11 | End: 2024-01-11 | Stop reason: HOSPADM

## 2024-01-11 RX ORDER — ONDANSETRON 2 MG/ML
4 INJECTION INTRAMUSCULAR; INTRAVENOUS ONCE
Status: COMPLETED | OUTPATIENT
Start: 2024-01-11 | End: 2024-01-11

## 2024-01-11 RX ORDER — FENTANYL CITRATE 0.05 MG/ML
25 INJECTION, SOLUTION INTRAMUSCULAR; INTRAVENOUS ONCE
Status: COMPLETED | OUTPATIENT
Start: 2024-01-11 | End: 2024-01-11

## 2024-01-11 RX ADMIN — FENTANYL CITRATE 25 MCG: 0.05 INJECTION, SOLUTION INTRAMUSCULAR; INTRAVENOUS at 11:05

## 2024-01-11 RX ADMIN — ONDANSETRON 4 MG: 2 INJECTION INTRAMUSCULAR; INTRAVENOUS at 11:05

## 2024-01-11 ASSESSMENT — ENCOUNTER SYMPTOMS
BACK PAIN: 0
CHEST TIGHTNESS: 0
ABDOMINAL DISTENTION: 0
EYE DISCHARGE: 0
FACIAL SWELLING: 0
ABDOMINAL PAIN: 0
SHORTNESS OF BREATH: 0
EYE PAIN: 0

## 2024-01-11 ASSESSMENT — PAIN SCALES - GENERAL
PAINLEVEL_OUTOF10: 9
PAINLEVEL_OUTOF10: 7

## 2024-01-11 ASSESSMENT — HEART SCORE: ECG: 0

## 2024-01-11 NOTE — ED PROVIDER NOTES
disease identified.             LABS: Lab orders shown below, the results are reviewed by myself, and all abnormals are listed below.  Labs Reviewed   BASIC METABOLIC PANEL - Abnormal; Notable for the following components:       Result Value    Glucose 131 (*)     BUN 36 (*)     Creatinine 7.0 (*)     Est, Glom Filt Rate 6 (*)     Bun/Cre Ratio 5 (*)     All other components within normal limits   CBC WITH AUTO DIFFERENTIAL - Abnormal; Notable for the following components:    RBC 3.74 (*)     Hemoglobin 11.4 (*)     RDW 15.7 (*)     Neutrophils % 67 (*)     Lymphocytes % 19 (*)     Immature Granulocytes 1 (*)     All other components within normal limits   TROPONIN - Abnormal; Notable for the following components:    Troponin, High Sensitivity 35 (*)     All other components within normal limits   URINALYSIS WITH MICROSCOPIC - Abnormal; Notable for the following components:    Urine Hgb TRACE (*)     Protein, UA 1+ (*)     All other components within normal limits   TROPONIN - Abnormal; Notable for the following components:    Troponin, High Sensitivity 31 (*)     All other components within normal limits   PROTIME-INR   APTT       Vitals Reviewed:    Vitals:    01/11/24 1101 01/11/24 1126 01/11/24 1327 01/11/24 1400   BP: (!) 184/70 (!) 187/91 (!) 165/67 (!) 166/81   Pulse: 66 67 74 75   Resp: 12 10 15 13   Temp:       TempSrc:       SpO2: 99% 95% 94% 95%     MEDICATIONS GIVEN TO PATIENT THIS ENCOUNTER:  Orders Placed This Encounter   Medications    ondansetron (ZOFRAN) injection 4 mg    fentaNYL (SUBLIMAZE) injection 25 mcg    ondansetron (ZOFRAN) injection 4 mg     DISCHARGE PRESCRIPTIONS:  New Prescriptions    No medications on file     PHYSICIAN CONSULTS ORDERED THIS ENCOUNTER:  None  FINAL IMPRESSION      1. Chest pain, unspecified type          DISPOSITION/PLAN   DISPOSITION Decision To Discharge 01/11/2024 03:17:52 PM      OUTPATIENT FOLLOW UP THE PATIENT:  Eliza Massey MD  [id number] [facility]

## 2024-01-11 NOTE — ED NOTES
Pt to er with c/o chest/sternal pain. Pt states she fell 3 times on Friday. Pt states she was seen here and admitted with lumbar fracture. Pt states she fell trying to let her cat in the door. Pt states she slipped and fell forward. Pt states she fell in the shower, she slipped off of her shower chair. Pt states she fell out of bed Friday night. Pt states she thought she was further from the edge than she was. Pt states he son picked her up form the floor in a bear hug. Pt states she and her son heard a crack. Pt states she felt it in her chest area. Pt states she was at dialysis today and was not able to move without severe pain. Pt states it hurts to take in a deep breath or cough. Pt has bruising noted to bilateral arms. Pt has bruising noted to lower back. Pt denies sob. Pt states she uses a walker at home to ambulate. Pt a&ox3. Skin warm and dry. Respirations even and non-labored.

## 2024-01-12 ENCOUNTER — CLINICAL DOCUMENTATION ONLY (OUTPATIENT)
Facility: CLINIC | Age: 79
End: 2024-01-12

## 2024-01-12 LAB
EKG ATRIAL RATE: 67 BPM
EKG P AXIS: 105 DEGREES
EKG P-R INTERVAL: 180 MS
EKG Q-T INTERVAL: 440 MS
EKG QRS DURATION: 98 MS
EKG QTC CALCULATION (BAZETT): 464 MS
EKG R AXIS: 2 DEGREES
EKG T AXIS: 48 DEGREES
EKG VENTRICULAR RATE: 67 BPM

## 2024-02-04 ENCOUNTER — PATIENT MESSAGE (OUTPATIENT)
Dept: FAMILY MEDICINE CLINIC | Age: 79
End: 2024-02-04

## 2024-02-05 RX ORDER — HYDROXYZINE HYDROCHLORIDE 25 MG/1
25 TABLET, FILM COATED ORAL EVERY 8 HOURS PRN
Qty: 30 TABLET | Refills: 0 | Status: SHIPPED | OUTPATIENT
Start: 2024-02-05 | End: 2024-02-07 | Stop reason: CLARIF

## 2024-02-05 NOTE — TELEPHONE ENCOUNTER
From: Asya Sharp  To: Dr. Eliza Massey  Sent: 2/4/2024 6:55 PM EST  Subject: Refill on Hydroxyzine 50    When I was a patient for my ankle last December until March and after , I was on this medication. People at dialysis told me not to take it anymore. I stopped taking it and ever since my blood pressure is all over the place during dialysis. It goes was up to l90 to 200 over 90 or 110. They constantly aske me if I feel okay or if I have a headache. I was wondering if Dr. Gonzalez could put be back on this medication so my blood pressure will not fluctuate aas much. I did not have this problem before they took me off this pill. They also changed my metoprolol to l/2 tablen twice a day. It was 25 mg. I was wondering if this would have anything to do with my fluctuating blood pressure. Thank you so much.

## 2024-02-07 ENCOUNTER — OFFICE VISIT (OUTPATIENT)
Dept: FAMILY MEDICINE CLINIC | Age: 79
End: 2024-02-07
Payer: MEDICARE

## 2024-02-07 VITALS
HEART RATE: 62 BPM | OXYGEN SATURATION: 95 % | WEIGHT: 244.4 LBS | SYSTOLIC BLOOD PRESSURE: 120 MMHG | DIASTOLIC BLOOD PRESSURE: 72 MMHG | BODY MASS INDEX: 41.95 KG/M2 | TEMPERATURE: 97.7 F

## 2024-02-07 DIAGNOSIS — S32.009D CLOSED FRACTURE OF TRANSVERSE PROCESS OF LUMBAR VERTEBRA WITH ROUTINE HEALING, SUBSEQUENT ENCOUNTER: ICD-10-CM

## 2024-02-07 DIAGNOSIS — I10 PRIMARY HYPERTENSION: ICD-10-CM

## 2024-02-07 DIAGNOSIS — R07.89 LEFT-SIDED CHEST WALL PAIN: Primary | ICD-10-CM

## 2024-02-07 PROCEDURE — 3078F DIAST BP <80 MM HG: CPT | Performed by: INTERNAL MEDICINE

## 2024-02-07 PROCEDURE — 99214 OFFICE O/P EST MOD 30 MIN: CPT | Performed by: INTERNAL MEDICINE

## 2024-02-07 PROCEDURE — G8400 PT W/DXA NO RESULTS DOC: HCPCS | Performed by: INTERNAL MEDICINE

## 2024-02-07 PROCEDURE — G8484 FLU IMMUNIZE NO ADMIN: HCPCS | Performed by: INTERNAL MEDICINE

## 2024-02-07 PROCEDURE — 1090F PRES/ABSN URINE INCON ASSESS: CPT | Performed by: INTERNAL MEDICINE

## 2024-02-07 PROCEDURE — G8427 DOCREV CUR MEDS BY ELIG CLIN: HCPCS | Performed by: INTERNAL MEDICINE

## 2024-02-07 PROCEDURE — 1123F ACP DISCUSS/DSCN MKR DOCD: CPT | Performed by: INTERNAL MEDICINE

## 2024-02-07 PROCEDURE — 3074F SYST BP LT 130 MM HG: CPT | Performed by: INTERNAL MEDICINE

## 2024-02-07 PROCEDURE — 1036F TOBACCO NON-USER: CPT | Performed by: INTERNAL MEDICINE

## 2024-02-07 PROCEDURE — G8417 CALC BMI ABV UP PARAM F/U: HCPCS | Performed by: INTERNAL MEDICINE

## 2024-02-07 RX ORDER — HYDRALAZINE HYDROCHLORIDE 25 MG/1
TABLET, FILM COATED ORAL
Qty: 90 TABLET | Refills: 1 | Status: SHIPPED | OUTPATIENT
Start: 2024-02-07

## 2024-02-07 RX ORDER — LIDOCAINE 50 MG/G
1 PATCH TOPICAL DAILY
Qty: 30 PATCH | Refills: 0 | Status: SHIPPED | OUTPATIENT
Start: 2024-02-07

## 2024-02-07 NOTE — PROGRESS NOTES
reviewed.   Constitutional:       General: She is not in acute distress.     Appearance: She is well-developed. She is not ill-appearing.   Eyes:      General: Lids are normal. Vision grossly intact.   Cardiovascular:      Rate and Rhythm: Normal rate and regular rhythm.      Heart sounds: Normal heart sounds, S1 normal and S2 normal. No murmur heard.     No friction rub. No gallop.   Pulmonary:      Effort: Pulmonary effort is normal. No respiratory distress.      Breath sounds: Normal breath sounds. No wheezing.   Chest:      Chest wall: Tenderness present.       Abdominal:      General: Bowel sounds are normal.      Palpations: Abdomen is soft. There is no mass.      Tenderness: There is no abdominal tenderness. There is no guarding.   Musculoskeletal:         General: Normal range of motion.   Skin:     General: Skin is warm and dry.      Capillary Refill: Capillary refill takes less than 2 seconds.   Neurological:      General: No focal deficit present.      Mental Status: She is alert and oriented to person, place, and time.           Data Review     ER/hospital visits reviewed in chart   Health Maintenance Due   Topic Date Due    Shingles vaccine (1 of 2) Never done    Lipids  01/11/2022           Patient given educational materials- see patient instructions.  Discussed use, benefit, and side effects of prescribedmedications.  All patient questions answered.  Pt voiced understanding. Reviewedhealth maintenance.  Instructed to continue current medications, diet and exercise.Patient agreed with treatment plan. Follow up as directed.     Electronically signedby JAJA LEYVA MD on 2/7/2024

## 2024-02-16 ENCOUNTER — HOSPITAL ENCOUNTER (OUTPATIENT)
Facility: CLINIC | Age: 79
End: 2024-02-16
Payer: MEDICARE

## 2024-02-16 ENCOUNTER — HOSPITAL ENCOUNTER (OUTPATIENT)
Dept: GENERAL RADIOLOGY | Facility: CLINIC | Age: 79
End: 2024-02-16
Payer: MEDICARE

## 2024-02-16 DIAGNOSIS — R07.89 LEFT-SIDED CHEST WALL PAIN: ICD-10-CM

## 2024-02-16 PROCEDURE — 71046 X-RAY EXAM CHEST 2 VIEWS: CPT

## 2024-02-17 DIAGNOSIS — E11.42 DIABETIC POLYNEUROPATHY ASSOCIATED WITH TYPE 2 DIABETES MELLITUS (HCC): ICD-10-CM

## 2024-02-17 DIAGNOSIS — E79.0 HYPERURICEMIA: ICD-10-CM

## 2024-02-19 ENCOUNTER — TELEPHONE (OUTPATIENT)
Dept: FAMILY MEDICINE CLINIC | Age: 79
End: 2024-02-19

## 2024-02-19 RX ORDER — GABAPENTIN 300 MG/1
300 CAPSULE ORAL DAILY
Qty: 90 CAPSULE | Refills: 0 | Status: SHIPPED | OUTPATIENT
Start: 2024-02-19 | End: 2024-05-19

## 2024-02-19 RX ORDER — ALLOPURINOL 100 MG/1
100 TABLET ORAL DAILY
Qty: 90 TABLET | Refills: 3 | Status: SHIPPED | OUTPATIENT
Start: 2024-02-19

## 2024-02-19 NOTE — TELEPHONE ENCOUNTER
Last visit: 02/07/2024  Last Med refill: 12/12/2023  Does patient have enough medication for 72 hours: No:     Next Visit Date:  Future Appointments   Date Time Provider Department Center   4/8/2024 10:15 AM Olinda Zhang MD [facility]   4/10/2024  2:30 PM Eliza Massey MD [facility]       Health Maintenance   Topic Date Due    Shingles vaccine (1 of 2) Never done    Lipids  01/11/2022    COVID-19 Vaccine (5 - 2023-24 season) 10/11/2027 (Originally 9/1/2023)    Hepatitis B vaccine (5 of 5 - Risk Dialysis Recombivax 3-dose series) 10/10/2024    Annual Wellness Visit (Medicare)  12/06/2024    Depression Monitoring  02/07/2025    DTaP/Tdap/Td vaccine (2 - Td or Tdap) 12/05/2032    DEXA (modify frequency per FRAX score)  Completed    Flu vaccine  Completed    Pneumococcal 65+ years Vaccine  Completed    Respiratory Syncytial Virus (RSV) Pregnant or age 60 yrs+  Completed    Hepatitis C screen  Completed    Hepatitis A vaccine  Aged Out    Hib vaccine  Aged Out    Polio vaccine  Aged Out    Meningococcal (ACWY) vaccine  Aged Out    Diabetic foot exam  Discontinued    A1C test (Diabetic or Prediabetic)  Discontinued    Diabetic retinal exam  Discontinued    Breast cancer screen  Discontinued    Colonoscopy  Discontinued       Hemoglobin A1C (%)   Date Value   05/17/2023 5.6   12/06/2022 5.8   03/09/2022 5.5             ( goal A1C is < 7)   No components found for: \"LABMICR\"  LDL Cholesterol (mg/dL)   Date Value   01/11/2021 85   03/03/2020 96       (goal LDL is <100)   AST (U/L)   Date Value   12/23/2022 14     ALT (U/L)   Date Value   12/23/2022 7     BUN (mg/dL)   Date Value   01/11/2024 36 (H)     BP Readings from Last 3 Encounters:   02/07/24 120/72   01/22/24 118/64   01/11/24 (!) 166/81          (goal 120/80)    All Future Testing planned in [facility]  Lab Frequency Next Occurrence   Lipid Panel Once 12/06/2023               Patient Active Problem List:     Dyslipidemia     DIONY

## 2024-02-19 NOTE — TELEPHONE ENCOUNTER
Asya was calling about her Chest Xray results if they were in yet. Had them done at the office on Friday 2/16.    Please call her back when they have been read.    Thank you.

## 2024-02-19 NOTE — TELEPHONE ENCOUNTER
Last visit: 02/07/2024  Last Med refill: 10/30/2023-90 day with 1 refill  Does patient have enough medication for 72 hours: No: requesting year supply.       Next Visit Date:  Future Appointments   Date Time Provider Department Center   4/8/2024 10:15 AM Olinda Zhang MD [facility]   4/10/2024  2:30 PM Eliza Massey MD [facility]       Health Maintenance   Topic Date Due    Shingles vaccine (1 of 2) Never done    Lipids  01/11/2022    COVID-19 Vaccine (5 - 2023-24 season) 10/11/2027 (Originally 9/1/2023)    Hepatitis B vaccine (5 of 5 - Risk Dialysis Recombivax 3-dose series) 10/10/2024    Annual Wellness Visit (Medicare)  12/06/2024    Depression Monitoring  02/07/2025    DTaP/Tdap/Td vaccine (2 - Td or Tdap) 12/05/2032    DEXA (modify frequency per FRAX score)  Completed    Flu vaccine  Completed    Pneumococcal 65+ years Vaccine  Completed    Respiratory Syncytial Virus (RSV) Pregnant or age 60 yrs+  Completed    Hepatitis C screen  Completed    Hepatitis A vaccine  Aged Out    Hib vaccine  Aged Out    Polio vaccine  Aged Out    Meningococcal (ACWY) vaccine  Aged Out    Diabetic foot exam  Discontinued    A1C test (Diabetic or Prediabetic)  Discontinued    Diabetic retinal exam  Discontinued    Breast cancer screen  Discontinued    Colonoscopy  Discontinued       Hemoglobin A1C (%)   Date Value   05/17/2023 5.6   12/06/2022 5.8   03/09/2022 5.5             ( goal A1C is < 7)   No components found for: \"LABMICR\"  LDL Cholesterol (mg/dL)   Date Value   01/11/2021 85   03/03/2020 96       (goal LDL is <100)   AST (U/L)   Date Value   12/23/2022 14     ALT (U/L)   Date Value   12/23/2022 7     BUN (mg/dL)   Date Value   01/11/2024 36 (H)     BP Readings from Last 3 Encounters:   02/07/24 120/72   01/22/24 118/64   01/11/24 (!) 166/81          (goal 120/80)    All Future Testing planned in [facility]  Lab Frequency Next Occurrence   Lipid Panel Once 12/06/2023               Patient

## 2024-03-06 ENCOUNTER — TELEPHONE (OUTPATIENT)
Dept: FAMILY MEDICINE CLINIC | Age: 79
End: 2024-03-06

## 2024-03-06 DIAGNOSIS — G47.33 OSA ON CPAP: Primary | ICD-10-CM

## 2024-03-06 NOTE — TELEPHONE ENCOUNTER
----- Message from Leesa Barlow sent at 3/6/2024 10:24 AM EST -----  Subject: Referral Request    Reason for referral request? Patient would like to get a referral to see a   Pulmonologist  Provider patient wants to be referred to(if known):     Provider Phone Number(if known):    Additional Information for Provider?   ---------------------------------------------------------------------------  --------------  CALL BACK INFO    [phone]; OK to leave message on voicemail  ---------------------------------------------------------------------------  --------------

## 2024-03-07 NOTE — TELEPHONE ENCOUNTER
Patient contacted office. States she would like to see new pulmonary for her sleep apnea.     She used to see Dr. English before covid, but hasn't been back and wants to go elsewhere. She stated she needs new supplies

## 2024-03-19 DIAGNOSIS — K21.9 GASTROESOPHAGEAL REFLUX DISEASE, UNSPECIFIED WHETHER ESOPHAGITIS PRESENT: ICD-10-CM

## 2024-03-20 RX ORDER — PANTOPRAZOLE SODIUM 40 MG/1
40 TABLET, DELAYED RELEASE ORAL
Qty: 90 TABLET | Refills: 3 | Status: SHIPPED | OUTPATIENT
Start: 2024-03-20

## 2024-03-28 ENCOUNTER — TELEPHONE (OUTPATIENT)
Dept: FAMILY MEDICINE CLINIC | Age: 79
End: 2024-03-28

## 2024-03-28 NOTE — TELEPHONE ENCOUNTER
Asyajoseph Gantas 1945 use to be a patient of Dr. Chou's her  Soham collins is your patient 02/07/1940.    Asya would like if you would see her as a new patient, her  isn't happy for the care her provider now is giving her.    Please advise.

## 2024-04-22 ENCOUNTER — HOSPITAL ENCOUNTER (OUTPATIENT)
Age: 79
Setting detail: SPECIMEN
Discharge: HOME | End: 2024-04-22

## 2024-04-22 ENCOUNTER — OFFICE VISIT (OUTPATIENT)
Dept: FAMILY MEDICINE CLINIC | Age: 79
End: 2024-04-22
Payer: MEDICARE

## 2024-04-22 VITALS
SYSTOLIC BLOOD PRESSURE: 138 MMHG | HEIGHT: 64 IN | OXYGEN SATURATION: 98 % | DIASTOLIC BLOOD PRESSURE: 72 MMHG | BODY MASS INDEX: 41.18 KG/M2 | WEIGHT: 241.2 LBS | TEMPERATURE: 96.9 F | HEART RATE: 57 BPM

## 2024-04-22 DIAGNOSIS — M79.7 FIBROMYALGIA: ICD-10-CM

## 2024-04-22 DIAGNOSIS — E78.5 DYSLIPIDEMIA: ICD-10-CM

## 2024-04-22 DIAGNOSIS — Z13.220 SCREENING FOR HYPERLIPIDEMIA: ICD-10-CM

## 2024-04-22 DIAGNOSIS — I10 PRIMARY HYPERTENSION: ICD-10-CM

## 2024-04-22 DIAGNOSIS — G25.81 RESTLESS LEG SYNDROME: ICD-10-CM

## 2024-04-22 DIAGNOSIS — R73.03 PRE-DIABETES: ICD-10-CM

## 2024-04-22 DIAGNOSIS — I48.91 ATRIAL FIBRILLATION, UNSPECIFIED TYPE (HCC): ICD-10-CM

## 2024-04-22 DIAGNOSIS — F33.1 MODERATE EPISODE OF RECURRENT MAJOR DEPRESSIVE DISORDER (HCC): ICD-10-CM

## 2024-04-22 DIAGNOSIS — Z91.89 AT RISK FOR POLYPHARMACY: ICD-10-CM

## 2024-04-22 DIAGNOSIS — N18.9 HISTORY OF ANEMIA DUE TO CKD: ICD-10-CM

## 2024-04-22 DIAGNOSIS — Z13.31 POSITIVE DEPRESSION SCREENING: ICD-10-CM

## 2024-04-22 DIAGNOSIS — N18.6 ESRD ON HEMODIALYSIS (HCC): ICD-10-CM

## 2024-04-22 DIAGNOSIS — Z86.2 HISTORY OF ANEMIA DUE TO CKD: ICD-10-CM

## 2024-04-22 DIAGNOSIS — Z99.2 ESRD ON HEMODIALYSIS (HCC): ICD-10-CM

## 2024-04-22 DIAGNOSIS — G47.33 OSA (OBSTRUCTIVE SLEEP APNEA): ICD-10-CM

## 2024-04-22 DIAGNOSIS — Z76.89 ENCOUNTER TO ESTABLISH CARE: Primary | ICD-10-CM

## 2024-04-22 PROBLEM — S82.851C: Status: RESOLVED | Noted: 2022-12-07 | Resolved: 2024-04-22

## 2024-04-22 PROBLEM — E87.5 HYPERKALEMIA: Status: RESOLVED | Noted: 2022-12-07 | Resolved: 2024-04-22

## 2024-04-22 PROBLEM — H65.02 ACUTE SEROUS OTITIS MEDIA OF LEFT EAR: Status: RESOLVED | Noted: 2018-12-27 | Resolved: 2024-04-22

## 2024-04-22 PROBLEM — I89.0 LYMPHEDEMA: Status: RESOLVED | Noted: 2022-04-01 | Resolved: 2024-04-22

## 2024-04-22 PROBLEM — M54.50 ACUTE BILATERAL LOW BACK PAIN WITHOUT SCIATICA: Status: RESOLVED | Noted: 2022-11-09 | Resolved: 2024-04-22

## 2024-04-22 PROBLEM — R50.9 FEBRILE ILLNESS: Status: RESOLVED | Noted: 2018-12-27 | Resolved: 2024-04-22

## 2024-04-22 PROBLEM — M62.81 GENERALIZED MUSCLE WEAKNESS: Status: RESOLVED | Noted: 2022-11-09 | Resolved: 2024-04-22

## 2024-04-22 PROBLEM — E87.1 HYPONATREMIA: Status: RESOLVED | Noted: 2022-12-07 | Resolved: 2024-04-22

## 2024-04-22 PROBLEM — N17.9 AKI (ACUTE KIDNEY INJURY) (HCC): Status: RESOLVED | Noted: 2022-12-12 | Resolved: 2024-04-22

## 2024-04-22 PROBLEM — I50.9 NEW ONSET OF CONGESTIVE HEART FAILURE (HCC): Status: RESOLVED | Noted: 2022-04-01 | Resolved: 2024-04-22

## 2024-04-22 LAB
ALBUMIN SERPL-MCNC: 4.2 G/DL (ref 3.5–5.2)
ALBUMIN/GLOB SERPL: 1 {RATIO} (ref 1–2.5)
ALP SERPL-CCNC: 93 U/L (ref 35–104)
ALT SERPL-CCNC: 10 U/L (ref 10–35)
ANION GAP SERPL CALCULATED.3IONS-SCNC: 15 MMOL/L (ref 9–16)
AST SERPL-CCNC: 18 U/L (ref 10–35)
BASOPHILS # BLD: 0.09 K/UL (ref 0–0.2)
BASOPHILS NFR BLD: 1 % (ref 0–2)
BILIRUB SERPL-MCNC: 0.4 MG/DL (ref 0–1.2)
BUN SERPL-MCNC: 39 MG/DL (ref 8–23)
CALCIUM SERPL-MCNC: 9.6 MG/DL (ref 8.6–10.4)
CHLORIDE SERPL-SCNC: 100 MMOL/L (ref 98–107)
CHOLEST SERPL-MCNC: 152 MG/DL (ref 0–199)
CHOLESTEROL/HDL RATIO: 4
CO2 SERPL-SCNC: 28 MMOL/L (ref 20–31)
CREAT SERPL-MCNC: 5.8 MG/DL (ref 0.5–0.9)
EOSINOPHIL # BLD: 0.3 K/UL (ref 0–0.44)
EOSINOPHILS RELATIVE PERCENT: 3 % (ref 1–4)
ERYTHROCYTE [DISTWIDTH] IN BLOOD BY AUTOMATED COUNT: 14.9 % (ref 11.8–14.4)
EST. AVERAGE GLUCOSE BLD GHB EST-MCNC: 103 MG/DL
GFR SERPL CREATININE-BSD FRML MDRD: 7 ML/MIN/1.73M2
GLUCOSE SERPL-MCNC: 122 MG/DL (ref 74–99)
HBA1C MFR BLD: 5.2 % (ref 4–6)
HCT VFR BLD AUTO: 37.6 % (ref 36.3–47.1)
HDLC SERPL-MCNC: 36 MG/DL
HGB BLD-MCNC: 11.1 G/DL (ref 11.9–15.1)
IMM GRANULOCYTES # BLD AUTO: 0.05 K/UL (ref 0–0.3)
IMM GRANULOCYTES NFR BLD: 1 %
LDLC SERPL CALC-MCNC: 65 MG/DL (ref 0–100)
LYMPHOCYTES NFR BLD: 2.29 K/UL (ref 1.1–3.7)
LYMPHOCYTES RELATIVE PERCENT: 22 % (ref 24–43)
MAGNESIUM SERPL-MCNC: 2.2 MG/DL (ref 1.6–2.4)
MCH RBC QN AUTO: 31.5 PG (ref 25.2–33.5)
MCHC RBC AUTO-ENTMCNC: 29.5 G/DL (ref 28.4–34.8)
MCV RBC AUTO: 106.8 FL (ref 82.6–102.9)
MONOCYTES NFR BLD: 0.99 K/UL (ref 0.1–1.2)
MONOCYTES NFR BLD: 9 % (ref 3–12)
NEUTROPHILS NFR BLD: 64 % (ref 36–65)
NEUTS SEG NFR BLD: 6.88 K/UL (ref 1.5–8.1)
NRBC BLD-RTO: 0 PER 100 WBC
PLATELET # BLD AUTO: 263 K/UL (ref 138–453)
PMV BLD AUTO: 9.9 FL (ref 8.1–13.5)
POTASSIUM SERPL-SCNC: 4.3 MMOL/L (ref 3.7–5.3)
PROT SERPL-MCNC: 7.2 G/DL (ref 6.6–8.7)
RBC # BLD AUTO: 3.52 M/UL (ref 3.95–5.11)
RBC # BLD: ABNORMAL 10*6/UL
RBC # BLD: ABNORMAL 10*6/UL
SODIUM SERPL-SCNC: 143 MMOL/L (ref 136–145)
TRIGL SERPL-MCNC: 256 MG/DL
TSH SERPL DL<=0.05 MIU/L-ACNC: 2.54 UIU/ML (ref 0.27–4.2)
VLDLC SERPL CALC-MCNC: 51 MG/DL
WBC OTHER # BLD: 10.6 K/UL (ref 3.5–11.3)

## 2024-04-22 PROCEDURE — G8400 PT W/DXA NO RESULTS DOC: HCPCS | Performed by: NURSE PRACTITIONER

## 2024-04-22 PROCEDURE — 99205 OFFICE O/P NEW HI 60 MIN: CPT | Performed by: NURSE PRACTITIONER

## 2024-04-22 PROCEDURE — 1123F ACP DISCUSS/DSCN MKR DOCD: CPT | Performed by: NURSE PRACTITIONER

## 2024-04-22 PROCEDURE — 1036F TOBACCO NON-USER: CPT | Performed by: NURSE PRACTITIONER

## 2024-04-22 PROCEDURE — 3075F SYST BP GE 130 - 139MM HG: CPT | Performed by: NURSE PRACTITIONER

## 2024-04-22 PROCEDURE — 1090F PRES/ABSN URINE INCON ASSESS: CPT | Performed by: NURSE PRACTITIONER

## 2024-04-22 PROCEDURE — G8427 DOCREV CUR MEDS BY ELIG CLIN: HCPCS | Performed by: NURSE PRACTITIONER

## 2024-04-22 PROCEDURE — G8417 CALC BMI ABV UP PARAM F/U: HCPCS | Performed by: NURSE PRACTITIONER

## 2024-04-22 PROCEDURE — 3078F DIAST BP <80 MM HG: CPT | Performed by: NURSE PRACTITIONER

## 2024-04-22 RX ORDER — AZELASTINE 1 MG/ML
1 SPRAY, METERED NASAL PRN
COMMUNITY

## 2024-04-22 RX ORDER — TIZANIDINE 2 MG/1
2 TABLET ORAL EVERY 8 HOURS PRN
Qty: 30 TABLET | Refills: 0 | Status: SHIPPED | OUTPATIENT
Start: 2024-04-22

## 2024-04-22 ASSESSMENT — ENCOUNTER SYMPTOMS
COLOR CHANGE: 0
DIARRHEA: 0
CHEST TIGHTNESS: 0
VOMITING: 0
ALLERGIC/IMMUNOLOGIC NEGATIVE: 1
NAUSEA: 0
GASTROINTESTINAL NEGATIVE: 1
STRIDOR: 0
RESPIRATORY NEGATIVE: 1
EYES NEGATIVE: 1
COUGH: 0

## 2024-04-22 ASSESSMENT — PATIENT HEALTH QUESTIONNAIRE - PHQ9
10. IF YOU CHECKED OFF ANY PROBLEMS, HOW DIFFICULT HAVE THESE PROBLEMS MADE IT FOR YOU TO DO YOUR WORK, TAKE CARE OF THINGS AT HOME, OR GET ALONG WITH OTHER PEOPLE: SOMEWHAT DIFFICULT
SUM OF ALL RESPONSES TO PHQ QUESTIONS 1-9: 16
2. FEELING DOWN, DEPRESSED OR HOPELESS: SEVERAL DAYS
4. FEELING TIRED OR HAVING LITTLE ENERGY: NEARLY EVERY DAY
6. FEELING BAD ABOUT YOURSELF - OR THAT YOU ARE A FAILURE OR HAVE LET YOURSELF OR YOUR FAMILY DOWN: NOT AT ALL
3. TROUBLE FALLING OR STAYING ASLEEP: NEARLY EVERY DAY
SUM OF ALL RESPONSES TO PHQ QUESTIONS 1-9: 16
SUM OF ALL RESPONSES TO PHQ QUESTIONS 1-9: 16
5. POOR APPETITE OR OVEREATING: NEARLY EVERY DAY
7. TROUBLE CONCENTRATING ON THINGS, SUCH AS READING THE NEWSPAPER OR WATCHING TELEVISION: NEARLY EVERY DAY
9. THOUGHTS THAT YOU WOULD BE BETTER OFF DEAD, OR OF HURTING YOURSELF: NOT AT ALL
8. MOVING OR SPEAKING SO SLOWLY THAT OTHER PEOPLE COULD HAVE NOTICED. OR THE OPPOSITE, BEING SO FIGETY OR RESTLESS THAT YOU HAVE BEEN MOVING AROUND A LOT MORE THAN USUAL: NOT AT ALL
SUM OF ALL RESPONSES TO PHQ9 QUESTIONS 1 & 2: 4
SUM OF ALL RESPONSES TO PHQ QUESTIONS 1-9: 16
1. LITTLE INTEREST OR PLEASURE IN DOING THINGS: NEARLY EVERY DAY

## 2024-04-22 NOTE — PROGRESS NOTES
[facility]  [address] [place] [address]  Dept: [phone]    Asya Sharp is a 79 y.o. female who presents today for her medical conditions/complaintsas noted below.      Asya Sharp is here today c/o Establish Care    Past Medical History:   Diagnosis Date    Anemia     Arthritis     Atrial fibrillation (HCC)     Depression     Diverticulosis     DM (diabetes mellitus) (HCC)     RESOLVED    Erythropenia     ESRD (end stage renal disease) on dialysis (HCC)     Fibromyalgia     HTN (hypertension)     Hyperlipidemia     Kidney stone     Morbid obesity due to excess calories (HCC)     Osteopenia 2014    Seasonal allergies     Sleep apnea     uses bipap prn      Past Surgical History:   Procedure Laterality Date    ANKLE FRACTURE SURGERY Right 2022    RIGHT ANKLE OPEN REDUCTION INTERNAL FIXATION performed by Obed Leblanc DO at [facility] OR    ARM SURGERY  2011    right arm broken    CARDIAC CATHETERIZATION  [id number] dr martinez    CARDIAC CATHETERIZATION  2016    COLONOSCOPY  2003    COLONOSCOPY  2007    IR TUNNELED CATHETER PLACEMENT GREATER THAN 5 YEARS  2022    IR TUNNELED CATHETER PLACEMENT GREATER THAN 5 YEARS 2022 [facility] PROCEDURES    ORIF ANKLE FRACTURE Right 2022    RIGHT ANKLE OPEN REDUCTION INTERNAL FIXATION    TOTAL KNEE ARTHROPLASTY Bilateral     left may 2013 and right 2013    TUBAL LIGATION         Family History   Problem Relation Age of Onset    Diabetes Mother     Heart Disease Mother     Osteoporosis Mother     Kidney Disease Father     Prostate Cancer Brother        Social History     Tobacco Use    Smoking status: Former     Current packs/day: 0.00     Average packs/day: 0.3 packs/day for 1 year (0.3 ttl pk-yrs)     Types: Cigarettes     Start date: 1959     Quit date: 1960     Years since quittin.3    Smokeless tobacco: Never    Tobacco comments:     quit    Substance

## 2024-04-23 ENCOUNTER — TELEPHONE (OUTPATIENT)
Dept: FAMILY MEDICINE CLINIC | Age: 79
End: 2024-04-23

## 2024-04-23 PROBLEM — R73.03 PRE-DIABETES: Status: RESOLVED | Noted: 2024-04-22 | Resolved: 2024-04-23

## 2024-04-23 NOTE — TELEPHONE ENCOUNTER
Eric call from lab re: critical creatinine - pt on dialysis TTS, she will go tomorrow   Feels well. Called and spoke to pt. Normal potassium  She has est care with new provider as of today, will forward labs to them

## 2024-04-25 DIAGNOSIS — F32.1 MODERATE MAJOR DEPRESSION (HCC): ICD-10-CM

## 2024-04-25 DIAGNOSIS — F41.1 GAD (GENERALIZED ANXIETY DISORDER): ICD-10-CM

## 2024-04-25 RX ORDER — VENLAFAXINE HYDROCHLORIDE 150 MG/1
150 CAPSULE, EXTENDED RELEASE ORAL DAILY
Qty: 90 CAPSULE | Refills: 3 | OUTPATIENT
Start: 2024-04-25

## 2024-05-12 DIAGNOSIS — F41.1 GAD (GENERALIZED ANXIETY DISORDER): ICD-10-CM

## 2024-05-12 DIAGNOSIS — F32.1 MODERATE MAJOR DEPRESSION (HCC): ICD-10-CM

## 2024-05-13 RX ORDER — VENLAFAXINE HYDROCHLORIDE 150 MG/1
150 CAPSULE, EXTENDED RELEASE ORAL DAILY
Qty: 90 CAPSULE | Refills: 3 | OUTPATIENT
Start: 2024-05-13

## 2024-05-27 DIAGNOSIS — E79.0 HYPERURICEMIA: ICD-10-CM

## 2024-05-27 DIAGNOSIS — E11.69 DYSLIPIDEMIA DUE TO TYPE 2 DIABETES MELLITUS (HCC): ICD-10-CM

## 2024-05-27 DIAGNOSIS — I50.9 NEW ONSET OF CONGESTIVE HEART FAILURE (HCC): ICD-10-CM

## 2024-05-27 DIAGNOSIS — E78.5 DYSLIPIDEMIA DUE TO TYPE 2 DIABETES MELLITUS (HCC): ICD-10-CM

## 2024-05-27 DIAGNOSIS — E11.42 DIABETIC POLYNEUROPATHY ASSOCIATED WITH TYPE 2 DIABETES MELLITUS (HCC): ICD-10-CM

## 2024-05-28 RX ORDER — AMIODARONE HYDROCHLORIDE 200 MG/1
200 TABLET ORAL DAILY
Qty: 90 TABLET | Refills: 1 | OUTPATIENT
Start: 2024-05-28

## 2024-05-28 RX ORDER — ATORVASTATIN CALCIUM 40 MG/1
40 TABLET, FILM COATED ORAL
Qty: 90 TABLET | Refills: 3 | OUTPATIENT
Start: 2024-05-28

## 2024-05-28 RX ORDER — GABAPENTIN 300 MG/1
300 CAPSULE ORAL DAILY
Qty: 90 CAPSULE | Refills: 0 | OUTPATIENT
Start: 2024-05-28 | End: 2024-08-26

## 2024-05-28 RX ORDER — ALLOPURINOL 100 MG/1
100 TABLET ORAL DAILY
Qty: 90 TABLET | Refills: 3 | OUTPATIENT
Start: 2024-05-28

## 2024-06-03 ENCOUNTER — OFFICE VISIT (OUTPATIENT)
Dept: FAMILY MEDICINE CLINIC | Age: 79
End: 2024-06-03
Payer: MEDICARE

## 2024-06-03 ENCOUNTER — HOSPITAL ENCOUNTER (OUTPATIENT)
Dept: CT IMAGING | Age: 79
Discharge: HOME | End: 2024-06-05
Payer: MEDICARE

## 2024-06-03 VITALS
SYSTOLIC BLOOD PRESSURE: 136 MMHG | WEIGHT: 244 LBS | TEMPERATURE: 97.9 F | DIASTOLIC BLOOD PRESSURE: 80 MMHG | OXYGEN SATURATION: 97 % | BODY MASS INDEX: 41.88 KG/M2 | HEART RATE: 60 BPM

## 2024-06-03 DIAGNOSIS — R68.84 JAW PAIN: ICD-10-CM

## 2024-06-03 DIAGNOSIS — G89.29 CHRONIC DENTAL PAIN: ICD-10-CM

## 2024-06-03 DIAGNOSIS — K11.20 PAROTIDITIS: Primary | ICD-10-CM

## 2024-06-03 DIAGNOSIS — M79.89 MASS OF SOFT TISSUE OF FACE: ICD-10-CM

## 2024-06-03 DIAGNOSIS — L84 CALLUS OF FOOT: ICD-10-CM

## 2024-06-03 DIAGNOSIS — K08.9 CHRONIC DENTAL PAIN: ICD-10-CM

## 2024-06-03 DIAGNOSIS — G47.33 OSA (OBSTRUCTIVE SLEEP APNEA): ICD-10-CM

## 2024-06-03 PROCEDURE — G8417 CALC BMI ABV UP PARAM F/U: HCPCS | Performed by: NURSE PRACTITIONER

## 2024-06-03 PROCEDURE — 99214 OFFICE O/P EST MOD 30 MIN: CPT | Performed by: NURSE PRACTITIONER

## 2024-06-03 PROCEDURE — 1123F ACP DISCUSS/DSCN MKR DOCD: CPT | Performed by: NURSE PRACTITIONER

## 2024-06-03 PROCEDURE — 1090F PRES/ABSN URINE INCON ASSESS: CPT | Performed by: NURSE PRACTITIONER

## 2024-06-03 PROCEDURE — 1036F TOBACCO NON-USER: CPT | Performed by: NURSE PRACTITIONER

## 2024-06-03 PROCEDURE — G8400 PT W/DXA NO RESULTS DOC: HCPCS | Performed by: NURSE PRACTITIONER

## 2024-06-03 PROCEDURE — 70490 CT SOFT TISSUE NECK W/O DYE: CPT

## 2024-06-03 PROCEDURE — 3075F SYST BP GE 130 - 139MM HG: CPT | Performed by: NURSE PRACTITIONER

## 2024-06-03 PROCEDURE — 3079F DIAST BP 80-89 MM HG: CPT | Performed by: NURSE PRACTITIONER

## 2024-06-03 PROCEDURE — G8427 DOCREV CUR MEDS BY ELIG CLIN: HCPCS | Performed by: NURSE PRACTITIONER

## 2024-06-03 RX ORDER — CLINDAMYCIN HYDROCHLORIDE 150 MG/1
450 CAPSULE ORAL 3 TIMES DAILY
Qty: 90 CAPSULE | Refills: 0 | Status: SHIPPED | OUTPATIENT
Start: 2024-06-03 | End: 2024-06-13

## 2024-06-03 ASSESSMENT — ENCOUNTER SYMPTOMS
COUGH: 0
DIARRHEA: 0
GASTROINTESTINAL NEGATIVE: 1
CHOKING: 0
SINUS PRESSURE: 1
VOMITING: 0
FACIAL SWELLING: 1
TROUBLE SWALLOWING: 0
SORE THROAT: 0
STRIDOR: 0
ALLERGIC/IMMUNOLOGIC NEGATIVE: 1
NAUSEA: 0
COLOR CHANGE: 0
EYES NEGATIVE: 1

## 2024-06-03 NOTE — PROGRESS NOTES
quittin.4    Smokeless tobacco: Never    Tobacco comments:     quit 's   Substance Use Topics    Alcohol use: No     Comment: rare      Current Outpatient Medications   Medication Sig Dispense Refill    metoprolol tartrate (LOPRESSOR) 25 MG tablet Take 0.5 tablets by mouth 2 times daily      azelastine (ASTELIN) 0.1 % nasal spray 1 spray by Nasal route as needed for Rhinitis Use in each nostril as directed      tiZANidine (ZANAFLEX) 2 MG tablet Take 1 tablet by mouth every 8 hours as needed (muscle spasms) 30 tablet 0    Respiratory Therapy Supplies MISC To use with CPAP machine MASK 1 each 11    pantoprazole (PROTONIX) 40 MG tablet Take 1 tablet by mouth every morning (before breakfast) 90 tablet 3    gabapentin (NEURONTIN) 300 MG capsule Take 1 capsule by mouth daily for 90 days. 90 capsule 0    allopurinol (ZYLOPRIM) 100 MG tablet Take 1 tablet by mouth daily 90 tablet 3    hydrALAZINE (APRESOLINE) 25 MG tablet Take three times daily starting night prior to dialysis. Hold after dialysis till following night. 90 tablet 1    Magnesium 400 MG TABS Take 400 mg by mouth daily      mirtazapine (REMERON) 7.5 MG tablet Take 1 tablet by mouth nightly 90 tablet 1    venlafaxine (EFFEXOR XR) 150 MG extended release capsule Take 1 capsule by mouth daily 90 capsule 1    apixaban (ELIQUIS) 5 MG TABS tablet Take 1 tablet by mouth 2 times daily 180 tablet 3    amiodarone (CORDARONE) 200 MG tablet Take 1 tablet by mouth daily 90 tablet 1    rOPINIRole (REQUIP) 0.25 MG tablet TAKE 1 TABLET BY MOUTH EVERY  NIGHT 90 tablet 3    bumetanide (BUMEX) 2 MG tablet TAKE 1 TABLET BY MOUTH DAILY 90 tablet 3    atorvastatin (LIPITOR) 40 MG tablet TAKE 1 TABLET BY MOUTH AT NIGHT 90 tablet 3    calcitRIOL (ROCALTROL) 0.25 MCG capsule Take 1 capsule by mouth daily      acetaminophen (TYLENOL) 500 MG tablet Take 1 tablet by mouth 4 times daily as needed for Pain 30 tablet 0    Misc. Devices MISC 1 each by Does not apply route once for 1

## 2024-06-20 DIAGNOSIS — K21.9 GASTROESOPHAGEAL REFLUX DISEASE, UNSPECIFIED WHETHER ESOPHAGITIS PRESENT: ICD-10-CM

## 2024-06-20 DIAGNOSIS — E78.5 DYSLIPIDEMIA DUE TO TYPE 2 DIABETES MELLITUS (HCC): ICD-10-CM

## 2024-06-20 DIAGNOSIS — E11.42 DIABETIC POLYNEUROPATHY ASSOCIATED WITH TYPE 2 DIABETES MELLITUS (HCC): ICD-10-CM

## 2024-06-20 DIAGNOSIS — E11.69 DYSLIPIDEMIA DUE TO TYPE 2 DIABETES MELLITUS (HCC): ICD-10-CM

## 2024-06-20 RX ORDER — ATORVASTATIN CALCIUM 40 MG/1
40 TABLET, FILM COATED ORAL
Qty: 90 TABLET | Refills: 3 | OUTPATIENT
Start: 2024-06-20

## 2024-06-20 RX ORDER — GABAPENTIN 300 MG/1
300 CAPSULE ORAL DAILY
Qty: 90 CAPSULE | Refills: 0 | OUTPATIENT
Start: 2024-06-20 | End: 2024-09-18

## 2024-06-20 RX ORDER — PANTOPRAZOLE SODIUM 40 MG/1
40 TABLET, DELAYED RELEASE ORAL
Qty: 90 TABLET | Refills: 3 | OUTPATIENT
Start: 2024-06-20

## 2024-06-29 DIAGNOSIS — E78.5 DYSLIPIDEMIA DUE TO TYPE 2 DIABETES MELLITUS (HCC): ICD-10-CM

## 2024-06-29 DIAGNOSIS — E11.42 DIABETIC POLYNEUROPATHY ASSOCIATED WITH TYPE 2 DIABETES MELLITUS (HCC): ICD-10-CM

## 2024-06-29 DIAGNOSIS — E11.69 DYSLIPIDEMIA DUE TO TYPE 2 DIABETES MELLITUS (HCC): ICD-10-CM

## 2024-06-29 DIAGNOSIS — K21.9 GASTROESOPHAGEAL REFLUX DISEASE, UNSPECIFIED WHETHER ESOPHAGITIS PRESENT: ICD-10-CM

## 2024-07-01 RX ORDER — PANTOPRAZOLE SODIUM 40 MG/1
40 TABLET, DELAYED RELEASE ORAL
Qty: 90 TABLET | Refills: 3 | OUTPATIENT
Start: 2024-07-01

## 2024-07-01 RX ORDER — ATORVASTATIN CALCIUM 40 MG/1
40 TABLET, FILM COATED ORAL
Qty: 90 TABLET | Refills: 3 | OUTPATIENT
Start: 2024-07-01

## 2024-07-01 RX ORDER — GABAPENTIN 300 MG/1
300 CAPSULE ORAL DAILY
Qty: 90 CAPSULE | Refills: 0 | OUTPATIENT
Start: 2024-07-01 | End: 2024-09-29

## 2024-07-02 DIAGNOSIS — M79.7 FIBROMYALGIA: ICD-10-CM

## 2024-07-02 DIAGNOSIS — E78.5 DYSLIPIDEMIA DUE TO TYPE 2 DIABETES MELLITUS (HCC): ICD-10-CM

## 2024-07-02 DIAGNOSIS — E11.69 DYSLIPIDEMIA DUE TO TYPE 2 DIABETES MELLITUS (HCC): ICD-10-CM

## 2024-07-02 RX ORDER — TIZANIDINE 2 MG/1
2 TABLET ORAL DAILY PRN
Qty: 30 TABLET | Refills: 0 | Status: SHIPPED | OUTPATIENT
Start: 2024-07-02

## 2024-07-02 RX ORDER — ATORVASTATIN CALCIUM 40 MG/1
TABLET, FILM COATED ORAL
Qty: 90 TABLET | Refills: 0 | OUTPATIENT
Start: 2024-07-02

## 2024-07-02 NOTE — TELEPHONE ENCOUNTER
Asya Sharp is calling to request a refill on the following medication(s):    Medication Request:  Requested Prescriptions     Pending Prescriptions Disp Refills    tiZANidine (ZANAFLEX) 2 MG tablet [Pharmacy Med Name: TIZANIDINE HCL 2 MG  2MG TABS] 30 tablet 0     Sig: TAKE 1 TABLET BY MOUTH EVERY 8 HOURS AS NEEDED (MUSCLE SPASMS)       Last Visit Date (If Applicable):  6/3/2024    Next Visit Date:    8/23/2024

## 2024-07-03 NOTE — TELEPHONE ENCOUNTER
Asya Sharp is calling to request a refill on the following medication(s):    Medication Request:  Requested Prescriptions     Pending Prescriptions Disp Refills    metoprolol tartrate (LOPRESSOR) 25 MG tablet [Pharmacy Med Name: METOPROLOL TART TAB] 60 tablet 0     Si.5 tab twice daily       Last Visit Date (If Applicable):  6/3/2024    Next Visit Date:    2024

## 2024-07-04 ENCOUNTER — APPOINTMENT (OUTPATIENT)
Dept: CT IMAGING | Age: 79
End: 2024-07-04
Payer: MEDICARE

## 2024-07-04 ENCOUNTER — HOSPITAL ENCOUNTER (EMERGENCY)
Age: 79
Discharge: HOME | End: 2024-07-04
Attending: EMERGENCY MEDICINE
Payer: MEDICARE

## 2024-07-04 ENCOUNTER — APPOINTMENT (OUTPATIENT)
Dept: GENERAL RADIOLOGY | Age: 79
End: 2024-07-04
Payer: MEDICARE

## 2024-07-04 VITALS
TEMPERATURE: 98.7 F | WEIGHT: 244 LBS | DIASTOLIC BLOOD PRESSURE: 54 MMHG | BODY MASS INDEX: 41.88 KG/M2 | RESPIRATION RATE: 20 BRPM | HEART RATE: 65 BPM | SYSTOLIC BLOOD PRESSURE: 148 MMHG | OXYGEN SATURATION: 97 %

## 2024-07-04 DIAGNOSIS — M25.551 RIGHT HIP PAIN: Primary | ICD-10-CM

## 2024-07-04 PROCEDURE — 73700 CT LOWER EXTREMITY W/O DYE: CPT

## 2024-07-04 PROCEDURE — 99284 EMERGENCY DEPT VISIT MOD MDM: CPT

## 2024-07-04 PROCEDURE — 6370000000 HC RX 637 (ALT 250 FOR IP)

## 2024-07-04 PROCEDURE — 73502 X-RAY EXAM HIP UNI 2-3 VIEWS: CPT

## 2024-07-04 RX ORDER — HYDROCODONE BITARTRATE AND ACETAMINOPHEN 5; 325 MG/1; MG/1
1 TABLET ORAL EVERY 6 HOURS PRN
Status: DISCONTINUED | OUTPATIENT
Start: 2024-07-04 | End: 2024-07-04 | Stop reason: HOSPADM

## 2024-07-04 RX ORDER — HYDROXYZINE HYDROCHLORIDE 10 MG/1
10 TABLET, FILM COATED ORAL 3 TIMES DAILY PRN
Status: DISCONTINUED | OUTPATIENT
Start: 2024-07-04 | End: 2024-07-04 | Stop reason: HOSPADM

## 2024-07-04 RX ADMIN — HYDROXYZINE HYDROCHLORIDE 10 MG: 10 TABLET ORAL at 15:40

## 2024-07-04 RX ADMIN — HYDROCODONE BITARTRATE AND ACETAMINOPHEN 1 TABLET: 5; 325 TABLET ORAL at 13:57

## 2024-07-04 ASSESSMENT — ENCOUNTER SYMPTOMS
DIARRHEA: 0
SHORTNESS OF BREATH: 0
ABDOMINAL PAIN: 0
VOMITING: 0
BACK PAIN: 0
COUGH: 0
NAUSEA: 0

## 2024-07-04 ASSESSMENT — PAIN DESCRIPTION - LOCATION: LOCATION: HIP

## 2024-07-04 ASSESSMENT — PAIN DESCRIPTION - DESCRIPTORS: DESCRIPTORS: DISCOMFORT

## 2024-07-04 ASSESSMENT — PAIN DESCRIPTION - ORIENTATION: ORIENTATION: RIGHT

## 2024-07-04 ASSESSMENT — PAIN DESCRIPTION - PAIN TYPE: TYPE: ACUTE PAIN

## 2024-07-04 ASSESSMENT — PAIN SCALES - GENERAL: PAINLEVEL_OUTOF10: 4

## 2024-07-04 ASSESSMENT — PAIN - FUNCTIONAL ASSESSMENT: PAIN_FUNCTIONAL_ASSESSMENT: 0-10

## 2024-07-04 NOTE — DISCHARGE INSTR - COC
Isolation            No Isolation          Patient Infection Status       None to display                     Nurse Assessment:  Last Vital Signs: BP (!) 148/54   Pulse 65   Temp 98.7 °F (37.1 °C) (Oral)   Resp 20   Wt 110.7 kg (244 lb)   LMP  (LMP Unknown)   SpO2 97%   BMI 41.88 kg/m²     Last documented pain score (0-10 scale): Pain Level: 4  Last Weight:   Wt Readings from Last 1 Encounters:   07/04/24 110.7 kg (244 lb)     Mental Status:  {IP PT MENTAL STATUS:20030}    IV Access:  { TREVOR IV ACCESS:054454684}    Nursing Mobility/ADLs:  Walking   {CHP DME ADLs:433015487}  Transfer  {CHP DME ADLs:751464290}  Bathing  {CHP DME ADLs:737752393}  Dressing  {CHP DME ADLs:235898412}  Toileting  {CHP DME ADLs:425474181}  Feeding  {CHP DME ADLs:244803629}  Med Admin  {CHP DME ADLs:163087520}  Med Delivery   { TREVOR MED Delivery:752546233}    Wound Care Documentation and Therapy:  Wound 12/06/22 Tibial Distal;Right;Posterior RIGHT LATERAL ANKLE OPEN WOUND, APPROXIMATED WITH NYLON SUTURE (Active)   Number of days: 576       Incision 12/06/22 Tibial Distal;Right;Posterior (Active)   Number of days: 576        Elimination:  Continence:   Bowel: {YES / NO:19727}  Bladder: {YES / NO:19727}  Urinary Catheter: {Urinary Catheter:328759836}   Colostomy/Ileostomy/Ileal Conduit: {YES / NO:19727}       Date of Last BM: ***  No intake or output data in the 24 hours ending 07/04/24 1806  No intake/output data recorded.    Safety Concerns:     { TREVOR Safety Concerns:035596437}    Impairments/Disabilities:      { TREVOR Impairments/Disabilities:274856176}    Nutrition Therapy:  Current Nutrition Therapy:   { TREVOR Diet List:542020327}    Routes of Feeding: {CHP DME Other Feedings:047162092}  Liquids: {Slp liquid thickness:50655}  Daily Fluid Restriction: {CHP DME Yes amt example:764555561}  Last Modified Barium Swallow with Video (Video Swallowing Test): {Done Not Done Date:304088012}    Treatments at the Time of Hospital  Discharge:   Respiratory Treatments: ***  Oxygen Therapy:  {Therapy; copd oxygen:70647}  Ventilator:    {[facility] Vent List:755608298}    Rehab Therapies: {THERAPEUTIC INTERVENTION:7518177774}  Weight Bearing Status/Restrictions: {[facility] Weight Bearin}  Other Medical Equipment (for information only, NOT a DME order):  {EQUIPMENT:159134447}  Other Treatments: ***    Patient's personal belongings (please select all that are sent with patient):  {[facility] DME Belongings:876611585}    RN SIGNATURE:  {Esignature:563515920}    CASE MANAGEMENT/SOCIAL WORK SECTION    Inpatient Status Date: ***    Readmission Risk Assessment Score:  Readmission Risk              Risk of Unplanned Readmission:  0           Discharging to Facility/ Agency   Name:   Address:  Phone:  Fax:    Dialysis Facility (if applicable)   Name:  Address:  Dialysis Schedule:  Phone:  Fax:    / signature: {Esignature:241789683}    PHYSICIAN SECTION    Prognosis: {Prognosis:5509034897}    Condition at Discharge: { Patient Condition:253598337}    Rehab Potential (if transferring to Rehab): {Prognosis:1655198083}    Recommended Labs or Other Treatments After Discharge: ***    Physician Certification: I certify the above information and transfer of Asya Sharp  is necessary for the continuing treatment of the diagnosis listed and that she requires {Admit to Appropriate Level of Care:59852} for {GREATER/LESS:049236735} 30 days.     Update Admission H&P: {CHP DME Changes in HandP:546844610}    PHYSICIAN SIGNATURE:  {Esignature:151225768}

## 2024-07-04 NOTE — ED NOTES
Pt to ED with c/o R hip pain. Pt states Monday she roller her R ankle, lost her footing, and felt a pop in her R hip. Pt reports increased pain/inability to bear weight on R hip.

## 2024-07-04 NOTE — DISCHARGE INSTRUCTIONS
Call today or tomorrow to follow up with Kristin Stone APRN - CNP  in 7 days.    Use an ice pack or bag filled with ice and apply to the injured area 3 - 4 times a day for 15 - 20 minutes each time.  If the injury is older than 3 days, then use a heating pad to help relax the muscles in your back.    Use Tylenol (unless prescribed medications that have Tylenol in it) for pain.  You can take over the counter tablets (3 regular strength tablets every 6 hours).    Darin' Flexion Exercises     1. Pelvic tilt. Lie on your back with knees bent, feet flat on floor. Flatten the small of your back against the floor, without pushing down with the legs. Hold for 5 to 10 seconds.     2. Single Knee to chest. Lie on your back with knees bent and feet flat on the floor. Slowly pull your right knee toward your shoulder and hold 5 to 10 seconds. Lower the knee and repeat with the other knee.     3. Double knee to chest. Begin as in the previous exercise. After pulling right knee to chest, pull left knee to chest and hold both knees for 5 to 10 seconds. Slowly lower one leg at a time.     4. Partial sit-up. Do the pelvic tilt (exercise 1) and, while holding this position, slowly curl your head and shoulders off the floor. Hold briefly. Return slowly to the starting position.     5. Hamstring stretch. Start in long sitting with toes directed toward the ceiling and knees fully extended. Slowly lower the trunk forward over the legs, keeping knees extended, arms outstretched over the legs, and eyes focus ahead.     6. Hip Flexor stretch. Place one foot in front of the other with the left (front) knee flexed and the right (back) knee held rigidly straight. Flex forward through the trunk until the left knee contacts the axillary fold (arm pit region). Repeat with right leg forward and left leg back.     7. Squat. Stand with both feet parallel, about shoulder's width apart. Attempting to maintain the trunk as perpendicular as

## 2024-07-04 NOTE — ED PROVIDER NOTES
FACULTY SIGN-OUT  ADDENDUM       Patient: Asya Sharp   MRN: [id number]  PCP:  Kristin Stone, BARBIE - CNP  Note Started: 7/4/24, 4:12 PM EDT  Attestation  I was available and discussed any additional care issues that arose and coordinated the management plans with the resident(s) caring for the patient during my duty period. Any areas of disagreement with resident's documentation of care or procedures are noted on the chart. I was personally present for the key portions of any/all procedures during my duty period. I have documented in the chart those procedures where I was not present during the key portions.   The patient's initial evaluation and plan have been discussed with the prior provider who initially evaluated the patient.      Pertinent Comments:  The patient is a 79 y.o. female taken in signout with fall with a twisting motion on her hip with questionable x-ray is walking.    We are awaiting CT of the hip and reevaluation after    ED COURSE      The patient was given the following medications:  Orders Placed This Encounter   Medications    HYDROcodone-acetaminophen (NORCO) 5-325 MG per tablet 1 tablet    hydrOXYzine HCl (ATARAX) tablet 10 mg       RECENT VITALS:   BP: (!) 148/54  Pulse: 65  Respirations: 20  Temp: 98.7 °F (37.1 °C) SpO2: 97 %    (Please note that portions of this note were completed with a voice recognition program.  Efforts were made to edit the dictations but occasionally words are mis-transcribed.)    Silverio Jamil MD [facility]  Attending Emergency Medicine Physician       Silverio Jamil MD  07/04/24 1169

## 2024-07-04 NOTE — ED PROVIDER NOTES
[facility]     Emergency Department     Faculty Attestation    I performed a history and physical examination of the patient and discussed management with the resident. I reviewed the resident’s note and agree with the documented findings including all diagnostic interpretations and plan of care. Any areas of disagreement are noted on the chart. I was personally present for the key portions of any procedures. I have documented in the chart those procedures where I was not present during the key portions. I have reviewed the emergency nurses triage note. I agree with the chief complaint, past medical history, past surgical history, allergies, medications, social and family history as documented unless otherwise noted below. Documentation of the HPI, Physical Exam and Medical Decision Making performed by emiliano is based on my personal performance of the HPI, PE and MDM. For Physician Assistant/ Nurse Practitioner cases/documentation I have personally evaluated this patient and have completed at least one if not all key elements of the E/M (history, physical exam, and MDM). Additional findings are as noted.    Primary Care Physician: Kristin Stone, APRN - CNP    Note Started: 4:59 PM EDT     VITAL SIGNS:   weight is 110.7 kg (244 lb). Her oral temperature is 98.7 °F (37.1 °C). Her blood pressure is 148/54 (abnormal) and her pulse is 65. Her respiration is 20 and oxygen saturation is 97%.      Medical Decision Making  Fall 2 days ago twisting on her ankle.  Denies any ankle pain but reports that immediately after this had right hip pain.  Did not strike her head did not lose consciousness no numbness no weakness.  On examination no tenderness to palpation of the ankle and the knee but does have tenderness palpation of the right hip and significant discomfort with range of motion.  Will obtain x-rays.  Symptomatic treatment.    Amount and/or Complexity of Data

## 2024-07-04 NOTE — ED PROVIDER NOTES
[facility] ED  Emergency Department Encounter  Emergency Medicine Resident     Pt Name:Asya Sharp  MRN: [id number]  Birthdate 1945  Date of evaluation: 7/4/24  PCP:  Kristin Stone APRN - CNP  Note Started: 6:00 PM EDT      CHIEF COMPLAINT       Chief Complaint   Patient presents with    Hip Pain       HISTORY OF PRESENT ILLNESS  (Location/Symptom, Timing/Onset, Context/Setting, Quality, Duration, Modifying Factors, Severity.)      Asya Sharp is a 79 y.o. female who presents with right hip pain since Monday. Patient states she rolled her ankle and felt a hip pop. She denies any current ankle pain but notes pain to the hip continues. She uses no assistive devices to ambulate. She reports pain worsens with weight bearing. She has attempted Tylenol which has been ineffective. She receives dialysis TTS and presented after dialysis today. She does make small amounts of urine.     PAST MEDICAL / SURGICAL / SOCIAL / FAMILY HISTORY      has a past medical history of Anemia, Arthritis, Atrial fibrillation (HCC), Depression, Diverticulosis, DM (diabetes mellitus) (HCC), Erythropenia, ESRD (end stage renal disease) on dialysis (HCC), Fibromyalgia, HTN (hypertension), Hyperlipidemia, Kidney stone, Morbid obesity due to excess calories (HCC), Osteopenia, Seasonal allergies, and Sleep apnea.       has a past surgical history that includes Colonoscopy (01/01/2003); Colonoscopy (01/01/2007); Tubal ligation; Arm Surgery (01/01/2011); Total knee arthroplasty (Bilateral); Cardiac catheterization ([id number] dr martinez); Cardiac catheterization (05/16/2016); ORIF ankle fracture (Right, 12/06/2022); Ankle fracture surgery (Right, 12/6/2022); and IR TUNNELED CVC PLACE WO SQ PORT/PUMP > 5 YEARS (12/19/2022).      Social History     Socioeconomic History    Marital status:      Spouse name: Not on file    Number of children: Not on file    Years of education: Not on file    Highest education    venlafaxine (EFFEXOR XR) 150 MG extended release capsule Take 1 capsule by mouth daily 12/6/23   Eliza Massey MD   apixaban (ELIQUIS) 5 MG TABS tablet Take 1 tablet by mouth 2 times daily 11/28/23   Virginia Brady MD   rOPINIRole (REQUIP) 0.25 MG tablet TAKE 1 TABLET BY MOUTH EVERY  NIGHT 11/27/23   Eliza Massey MD   bumetanide (BUMEX) 2 MG tablet TAKE 1 TABLET BY MOUTH DAILY 11/27/23   Eliza Massey MD   atorvastatin (LIPITOR) 40 MG tablet TAKE 1 TABLET BY MOUTH AT NIGHT 10/30/23   Huong Burnham APRN - NP   calcitRIOL (ROCALTROL) 0.25 MCG capsule Take 1 capsule by mouth daily    ProviderKhris MD   acetaminophen (TYLENOL) 500 MG tablet Take 1 tablet by mouth 4 times daily as needed for Pain 9/23/23   Denver Mares, DO   Misc. Devices MISC 1 each by Does not apply route once for 1 dose Rolling walker with swivel wheels 3/20/23 12/6/23  Josey Botello, BARBIE - CNP         REVIEW OF SYSTEMS       Review of Systems   Constitutional:  Negative for chills and fever.   Respiratory:  Negative for cough and shortness of breath.    Cardiovascular:  Negative for chest pain and leg swelling.   Gastrointestinal:  Negative for abdominal pain, diarrhea, nausea and vomiting.   Genitourinary:  Negative for difficulty urinating, dysuria and frequency.   Musculoskeletal:  Positive for arthralgias and gait problem. Negative for back pain and neck pain.   Skin:  Negative for rash and wound.   Neurological:  Negative for dizziness, syncope, numbness and headaches.   All other systems reviewed and are negative.      PHYSICAL EXAM      INITIAL VITALS:   BP (!) 148/54   Pulse 65   Temp 98.7 °F (37.1 °C) (Oral)   Resp 20   Wt 110.7 kg (244 lb)   LMP  (LMP Unknown)   SpO2 97%   BMI 41.88 kg/m²     Physical Exam  Vitals and nursing note reviewed.   Constitutional:       General: She is not in acute distress.     Appearance: She is well-developed. She is not diaphoretic.   HENT:      Head: Normocephalic and

## 2024-07-05 RX ORDER — METOPROLOL TARTRATE 25 MG/1
TABLET, FILM COATED ORAL
Qty: 60 TABLET | Refills: 0 | OUTPATIENT
Start: 2024-07-05

## 2024-07-12 ENCOUNTER — OFFICE VISIT (OUTPATIENT)
Dept: FAMILY MEDICINE CLINIC | Age: 79
End: 2024-07-12

## 2024-07-12 VITALS
OXYGEN SATURATION: 98 % | SYSTOLIC BLOOD PRESSURE: 136 MMHG | DIASTOLIC BLOOD PRESSURE: 80 MMHG | HEART RATE: 95 BPM | TEMPERATURE: 98 F | BODY MASS INDEX: 40.97 KG/M2 | WEIGHT: 240 LBS | HEIGHT: 64 IN

## 2024-07-12 DIAGNOSIS — M16.11 PRIMARY OSTEOARTHRITIS OF RIGHT HIP: ICD-10-CM

## 2024-07-12 DIAGNOSIS — M25.551 RIGHT HIP PAIN: Primary | ICD-10-CM

## 2024-07-12 DIAGNOSIS — Z09 HOSPITAL DISCHARGE FOLLOW-UP: ICD-10-CM

## 2024-07-12 DIAGNOSIS — R22.2 SUBCUTANEOUS MASS OF ABDOMINAL WALL: ICD-10-CM

## 2024-07-12 SDOH — ECONOMIC STABILITY: FOOD INSECURITY: WITHIN THE PAST 12 MONTHS, YOU WORRIED THAT YOUR FOOD WOULD RUN OUT BEFORE YOU GOT MONEY TO BUY MORE.: NEVER TRUE

## 2024-07-12 SDOH — ECONOMIC STABILITY: FOOD INSECURITY: WITHIN THE PAST 12 MONTHS, THE FOOD YOU BOUGHT JUST DIDN'T LAST AND YOU DIDN'T HAVE MONEY TO GET MORE.: NEVER TRUE

## 2024-07-12 SDOH — ECONOMIC STABILITY: INCOME INSECURITY: HOW HARD IS IT FOR YOU TO PAY FOR THE VERY BASICS LIKE FOOD, HOUSING, MEDICAL CARE, AND HEATING?: NOT HARD AT ALL

## 2024-07-12 ASSESSMENT — ENCOUNTER SYMPTOMS
RESPIRATORY NEGATIVE: 1
DIARRHEA: 0
ALLERGIC/IMMUNOLOGIC NEGATIVE: 1
NAUSEA: 0
COUGH: 0
CHEST TIGHTNESS: 0
SHORTNESS OF BREATH: 0
EYES NEGATIVE: 1
GASTROINTESTINAL NEGATIVE: 1
VOMITING: 0
COLOR CHANGE: 0

## 2024-07-12 NOTE — PROGRESS NOTES
[facility]  [address] [place] [address]  Dept: [phone]    Asya Sharp is a 79 y.o. female who presents today for her medical conditions/complaintsas noted below.      Asya Sharp is here today c/o ED Follow-up ([facility])    Past Medical History:   Diagnosis Date    Anemia     Arthritis     Atrial fibrillation (HCC)     Depression     Diverticulosis     DM (diabetes mellitus) (HCC)     RESOLVED    Erythropenia     ESRD (end stage renal disease) on dialysis (HCC)     Fibromyalgia     HTN (hypertension)     Hyperlipidemia     Kidney stone     Morbid obesity due to excess calories (HCC)     Osteopenia 2014    Seasonal allergies     Sleep apnea     uses bipap prn      Past Surgical History:   Procedure Laterality Date    ANKLE FRACTURE SURGERY Right 2022    RIGHT ANKLE OPEN REDUCTION INTERNAL FIXATION performed by Obed Leblanc DO at [facility] OR    ARM SURGERY  2011    right arm broken    CARDIAC CATHETERIZATION  [id number] dr martinez    CARDIAC CATHETERIZATION  2016    COLONOSCOPY  2003    COLONOSCOPY  2007    IR TUNNELED CATHETER PLACEMENT GREATER THAN 5 YEARS  2022    IR TUNNELED CATHETER PLACEMENT GREATER THAN 5 YEARS 2022 [facility]    ORIF ANKLE FRACTURE Right 2022    RIGHT ANKLE OPEN REDUCTION INTERNAL FIXATION    TOTAL KNEE ARTHROPLASTY Bilateral     left may 2013 and right 2013    TUBAL LIGATION         Family History   Problem Relation Age of Onset    Diabetes Mother     Heart Disease Mother     Osteoporosis Mother     Kidney Disease Father     Prostate Cancer Brother        Social History     Tobacco Use    Smoking status: Former     Current packs/day: 0.00     Average packs/day: 0.3 packs/day for 1 year (0.3 ttl pk-yrs)     Types: Cigarettes     Start date: 1959     Quit date: 1960     Years since quittin.5    Smokeless tobacco: Never    Tobacco comments:     quit

## 2024-08-05 ENCOUNTER — OFFICE VISIT (OUTPATIENT)
Dept: ORTHOPEDIC SURGERY | Age: 79
End: 2024-08-05
Payer: MEDICARE

## 2024-08-05 VITALS — BODY MASS INDEX: 40.97 KG/M2 | WEIGHT: 240 LBS | HEIGHT: 64 IN

## 2024-08-05 DIAGNOSIS — M70.61 GREATER TROCHANTERIC BURSITIS OF RIGHT HIP: Primary | ICD-10-CM

## 2024-08-05 PROCEDURE — 1090F PRES/ABSN URINE INCON ASSESS: CPT | Performed by: STUDENT IN AN ORGANIZED HEALTH CARE EDUCATION/TRAINING PROGRAM

## 2024-08-05 PROCEDURE — G8400 PT W/DXA NO RESULTS DOC: HCPCS | Performed by: STUDENT IN AN ORGANIZED HEALTH CARE EDUCATION/TRAINING PROGRAM

## 2024-08-05 PROCEDURE — 1036F TOBACCO NON-USER: CPT | Performed by: STUDENT IN AN ORGANIZED HEALTH CARE EDUCATION/TRAINING PROGRAM

## 2024-08-05 PROCEDURE — 1123F ACP DISCUSS/DSCN MKR DOCD: CPT | Performed by: STUDENT IN AN ORGANIZED HEALTH CARE EDUCATION/TRAINING PROGRAM

## 2024-08-05 PROCEDURE — 99213 OFFICE O/P EST LOW 20 MIN: CPT | Performed by: STUDENT IN AN ORGANIZED HEALTH CARE EDUCATION/TRAINING PROGRAM

## 2024-08-05 PROCEDURE — G8417 CALC BMI ABV UP PARAM F/U: HCPCS | Performed by: STUDENT IN AN ORGANIZED HEALTH CARE EDUCATION/TRAINING PROGRAM

## 2024-08-05 PROCEDURE — 20610 DRAIN/INJ JOINT/BURSA W/O US: CPT | Performed by: STUDENT IN AN ORGANIZED HEALTH CARE EDUCATION/TRAINING PROGRAM

## 2024-08-05 PROCEDURE — G8427 DOCREV CUR MEDS BY ELIG CLIN: HCPCS | Performed by: STUDENT IN AN ORGANIZED HEALTH CARE EDUCATION/TRAINING PROGRAM

## 2024-08-05 RX ORDER — BUPIVACAINE HYDROCHLORIDE 2.5 MG/ML
2 INJECTION, SOLUTION INFILTRATION; PERINEURAL ONCE
Status: COMPLETED | OUTPATIENT
Start: 2024-08-05 | End: 2024-08-05

## 2024-08-05 RX ORDER — METHYLPREDNISOLONE ACETATE 80 MG/ML
80 INJECTION, SUSPENSION INTRA-ARTICULAR; INTRALESIONAL; INTRAMUSCULAR; SOFT TISSUE ONCE
Status: COMPLETED | OUTPATIENT
Start: 2024-08-05 | End: 2024-08-05

## 2024-08-05 RX ADMIN — BUPIVACAINE HYDROCHLORIDE 2 ML: 2.5 INJECTION, SOLUTION INFILTRATION; PERINEURAL at 12:04

## 2024-08-05 RX ADMIN — METHYLPREDNISOLONE ACETATE 80 MG: 80 INJECTION, SUSPENSION INTRA-ARTICULAR; INTRALESIONAL; INTRAMUSCULAR; SOFT TISSUE at 12:05

## 2024-08-05 NOTE — PROGRESS NOTES
[facility]  [address]  [place] [address]  Dept Phone: [phone]  Dept Fax: [phone]      [facility]th[facility] Clinic Follow Up      Subjective:   Asya Sharp is a 79 y.o. year old female who presents to the clinic today for evaluation of right hip pain.  Patient is status post open duction trial fixation right trimalleolar ankle fracture which occurred on 12/26/22.  Patient was last made clinic on 8/21/2023.  At that time, patient was having some greater trochanteric bursitis for which she had received a right greater trochanteric bursa injection at her previous appointment on 7/17/2023.  Patient states that her pain is returned.  She states that started on 4 July.  No injuries or falls.  Denies any numbness or tingling.  Patient points directly over greater trochanter where the source of all her pain is    Review of Systems  Gen: no fever, chills, malaise  CV: no chest pain or palpitations  Resp: no cough or shortness of breath  GI: no nausea, vomiting, diarrhea, or constipation  Neuro: no seizures, vertigo, or headache  Msk: Per HPI  10 remaining systems reviewed and negative    Objective :   There were no vitals filed for this visit.Body mass index is 41.2 kg/m².  General: No acute distress, resting comfortably in the clinic  Neuro: alert. oriented  Eyes: Extra-ocular muscles intact  Pulm: Respirations unlabored and regular.  Skin: warm, well perfused  Psych:   Patient has good fund of knowledge and displays understanding of exam, diagnosis, and plan.  Right Lower Extremity: mature scar noted to extremity from prior intervention. Compartments soft/compressible. Extremity warm/well perfused. EHL/FHL/TA/GSC motor intact. Patient expresses normal sensation L3-S1 distribution.  Extreme tender to palpation directly over greater trochanter.  Logroll reproduces pain directly over greater trochanter.    Radiology:  Imaging studies from today were independently reviewed and

## 2024-08-15 ENCOUNTER — HOSPITAL ENCOUNTER (EMERGENCY)
Age: 79
Discharge: LEFT BEFORE DISCHARGE | End: 2024-08-15
Attending: EMERGENCY MEDICINE
Payer: MEDICARE

## 2024-08-15 VITALS
DIASTOLIC BLOOD PRESSURE: 75 MMHG | TEMPERATURE: 98.5 F | SYSTOLIC BLOOD PRESSURE: 172 MMHG | HEART RATE: 61 BPM | BODY MASS INDEX: 41.2 KG/M2 | OXYGEN SATURATION: 97 % | WEIGHT: 240 LBS | RESPIRATION RATE: 14 BRPM

## 2024-08-15 DIAGNOSIS — S39.012A STRAIN OF LUMBAR REGION, INITIAL ENCOUNTER: Primary | ICD-10-CM

## 2024-08-15 PROCEDURE — 99283 EMERGENCY DEPT VISIT LOW MDM: CPT

## 2024-08-15 PROCEDURE — 6370000000 HC RX 637 (ALT 250 FOR IP)

## 2024-08-15 RX ORDER — ACETAMINOPHEN 500 MG
1000 TABLET ORAL ONCE
Status: COMPLETED | OUTPATIENT
Start: 2024-08-15 | End: 2024-08-15

## 2024-08-15 RX ORDER — LIDOCAINE 4 G/G
1 PATCH TOPICAL DAILY
Status: DISCONTINUED | OUTPATIENT
Start: 2024-08-15 | End: 2024-08-16 | Stop reason: HOSPADM

## 2024-08-15 RX ADMIN — ACETAMINOPHEN 1000 MG: 500 TABLET ORAL at 20:10

## 2024-08-15 ASSESSMENT — ENCOUNTER SYMPTOMS: BACK PAIN: 1

## 2024-08-15 ASSESSMENT — PAIN DESCRIPTION - LOCATION: LOCATION: BACK

## 2024-08-15 ASSESSMENT — PAIN - FUNCTIONAL ASSESSMENT: PAIN_FUNCTIONAL_ASSESSMENT: PREVENTS OR INTERFERES SOME ACTIVE ACTIVITIES AND ADLS

## 2024-08-15 ASSESSMENT — PAIN DESCRIPTION - DESCRIPTORS: DESCRIPTORS: ACHING;SHARP

## 2024-08-15 ASSESSMENT — PAIN SCALES - GENERAL: PAINLEVEL_OUTOF10: 8

## 2024-08-15 ASSESSMENT — PAIN DESCRIPTION - ORIENTATION: ORIENTATION: RIGHT;LOWER

## 2024-08-15 NOTE — ED NOTES
Pt arrives via triage, pt c/o of lower back pain that radiates down her leg.   Pt states the pain started on Saturday and has been since then, pt denies any injury.   Pt states she is a dialysis patient and received dialysis today with full treatment.   Pt makes small amount of urine.   Pt states the leg swelling is baseline for her.   Pt is A+Ox4, call light in reach.

## 2024-08-16 DIAGNOSIS — M79.7 FIBROMYALGIA: ICD-10-CM

## 2024-08-16 RX ORDER — TIZANIDINE 2 MG/1
2 TABLET ORAL DAILY PRN
Qty: 30 TABLET | Refills: 0 | Status: SHIPPED | OUTPATIENT
Start: 2024-08-16

## 2024-08-16 NOTE — ED PROVIDER NOTES
This patient presented to the emergency department with complaints of low back pain radiating down the right lower extremity for the past 4 to 5 days.  No apparent trauma or inciting activity was reported.  Patient does have a history of end-stage renal disease for which she undergoes hemodialysis.  She apparently underwent hemodialysis today without difficulty.  Patient is not currently in her exam room and I believe she may have eloped from the emergency department without my evaluation or involvement.     Rey Rey MD  08/15/24 2556    
Denver Mares, DO   Misc. Devices MISC 1 each by Does not apply route once for 1 dose Rolling walker with swivel wheels 3/20/23 12/6/23  Josey Botello APRN - CNP         REVIEW OF SYSTEMS       Review of Systems   Musculoskeletal:  Positive for back pain.       PHYSICAL EXAM      INITIAL VITALS:   BP (!) 172/75   Pulse 61   Temp 98.5 °F (36.9 °C) (Oral)   Resp 14   Wt 108.9 kg (240 lb)   LMP  (LMP Unknown)   SpO2 97%   BMI 41.20 kg/m²     Physical Exam  Vitals and nursing note reviewed.   Constitutional:       Appearance: She is not ill-appearing or diaphoretic.   HENT:      Head: Normocephalic and atraumatic.   Eyes:      Extraocular Movements: Extraocular movements intact.      Conjunctiva/sclera: Conjunctivae normal.      Pupils: Pupils are equal, round, and reactive to light.   Cardiovascular:      Rate and Rhythm: Normal rate and regular rhythm.      Pulses: Normal pulses.      Heart sounds: Normal heart sounds. No murmur heard.     No friction rub. No gallop.   Pulmonary:      Effort: Pulmonary effort is normal.      Breath sounds: Normal breath sounds. No wheezing, rhonchi or rales.   Abdominal:      General: Bowel sounds are normal.      Palpations: Abdomen is soft.      Tenderness: There is no abdominal tenderness. There is no guarding or rebound.   Musculoskeletal:         General: No swelling, deformity or signs of injury. Normal range of motion.      Cervical back: Normal range of motion and neck supple. No tenderness.      Lumbar back: Tenderness present. Negative right straight leg raise test and negative left straight leg raise test.   Skin:     General: Skin is warm and dry.   Neurological:      General: No focal deficit present.      Mental Status: She is alert and oriented to person, place, and time.      Cranial Nerves: No cranial nerve deficit.      Sensory: No sensory deficit.      Motor: No weakness.   Psychiatric:         Mood and Affect: Mood normal.         Behavior: Behavior normal.

## 2024-08-17 DIAGNOSIS — E11.42 DIABETIC POLYNEUROPATHY ASSOCIATED WITH TYPE 2 DIABETES MELLITUS (HCC): ICD-10-CM

## 2024-08-19 RX ORDER — GABAPENTIN 300 MG/1
300 CAPSULE ORAL DAILY
Qty: 90 CAPSULE | Refills: 3 | OUTPATIENT
Start: 2024-08-19

## 2024-08-23 ENCOUNTER — OFFICE VISIT (OUTPATIENT)
Dept: FAMILY MEDICINE CLINIC | Age: 79
End: 2024-08-23
Payer: MEDICARE

## 2024-08-23 VITALS
BODY MASS INDEX: 40.63 KG/M2 | HEIGHT: 64 IN | WEIGHT: 238 LBS | TEMPERATURE: 97.9 F | HEART RATE: 48 BPM | OXYGEN SATURATION: 97 % | DIASTOLIC BLOOD PRESSURE: 74 MMHG | SYSTOLIC BLOOD PRESSURE: 122 MMHG

## 2024-08-23 DIAGNOSIS — G47.33 OSA (OBSTRUCTIVE SLEEP APNEA): ICD-10-CM

## 2024-08-23 DIAGNOSIS — F33.41 RECURRENT MAJOR DEPRESSIVE DISORDER, IN PARTIAL REMISSION (HCC): ICD-10-CM

## 2024-08-23 DIAGNOSIS — M15.9 PRIMARY OSTEOARTHRITIS INVOLVING MULTIPLE JOINTS: ICD-10-CM

## 2024-08-23 DIAGNOSIS — Z99.2 ESRD ON HEMODIALYSIS (HCC): Primary | ICD-10-CM

## 2024-08-23 DIAGNOSIS — I10 PRIMARY HYPERTENSION: ICD-10-CM

## 2024-08-23 DIAGNOSIS — M54.50 ACUTE MIDLINE LOW BACK PAIN WITHOUT SCIATICA: ICD-10-CM

## 2024-08-23 DIAGNOSIS — N18.6 ESRD ON HEMODIALYSIS (HCC): Primary | ICD-10-CM

## 2024-08-23 DIAGNOSIS — G47.00 INSOMNIA, UNSPECIFIED TYPE: ICD-10-CM

## 2024-08-23 DIAGNOSIS — R00.1 BRADYCARDIA: ICD-10-CM

## 2024-08-23 DIAGNOSIS — I48.91 ATRIAL FIBRILLATION, UNSPECIFIED TYPE (HCC): ICD-10-CM

## 2024-08-23 PROBLEM — S82.201S: Status: RESOLVED | Noted: 2022-12-05 | Resolved: 2024-08-23

## 2024-08-23 PROBLEM — N18.9 ACUTE KIDNEY INJURY SUPERIMPOSED ON CHRONIC KIDNEY DISEASE (HCC): Status: RESOLVED | Noted: 2022-04-01 | Resolved: 2024-08-23

## 2024-08-23 PROBLEM — S82.401S: Status: RESOLVED | Noted: 2022-12-05 | Resolved: 2024-08-23

## 2024-08-23 PROBLEM — E87.20 METABOLIC ACIDOSIS: Status: RESOLVED | Noted: 2022-12-07 | Resolved: 2024-08-23

## 2024-08-23 PROBLEM — N17.9 ACUTE KIDNEY INJURY SUPERIMPOSED ON CHRONIC KIDNEY DISEASE (HCC): Status: RESOLVED | Noted: 2022-04-01 | Resolved: 2024-08-23

## 2024-08-23 PROCEDURE — 99214 OFFICE O/P EST MOD 30 MIN: CPT | Performed by: NURSE PRACTITIONER

## 2024-08-23 PROCEDURE — G8400 PT W/DXA NO RESULTS DOC: HCPCS | Performed by: NURSE PRACTITIONER

## 2024-08-23 PROCEDURE — G8417 CALC BMI ABV UP PARAM F/U: HCPCS | Performed by: NURSE PRACTITIONER

## 2024-08-23 PROCEDURE — G8427 DOCREV CUR MEDS BY ELIG CLIN: HCPCS | Performed by: NURSE PRACTITIONER

## 2024-08-23 PROCEDURE — 1090F PRES/ABSN URINE INCON ASSESS: CPT | Performed by: NURSE PRACTITIONER

## 2024-08-23 PROCEDURE — 3074F SYST BP LT 130 MM HG: CPT | Performed by: NURSE PRACTITIONER

## 2024-08-23 PROCEDURE — 1123F ACP DISCUSS/DSCN MKR DOCD: CPT | Performed by: NURSE PRACTITIONER

## 2024-08-23 PROCEDURE — 3078F DIAST BP <80 MM HG: CPT | Performed by: NURSE PRACTITIONER

## 2024-08-23 PROCEDURE — 1036F TOBACCO NON-USER: CPT | Performed by: NURSE PRACTITIONER

## 2024-08-23 RX ORDER — ZOLPIDEM TARTRATE 5 MG/1
5 TABLET ORAL NIGHTLY PRN
COMMUNITY
Start: 2024-08-23

## 2024-08-23 ASSESSMENT — ENCOUNTER SYMPTOMS
ALLERGIC/IMMUNOLOGIC NEGATIVE: 1
DIARRHEA: 0
EYES NEGATIVE: 1
VOMITING: 0
COLOR CHANGE: 0
NAUSEA: 0
GASTROINTESTINAL NEGATIVE: 1
CHEST TIGHTNESS: 0
BACK PAIN: 1
SHORTNESS OF BREATH: 0
COUGH: 0
RESPIRATORY NEGATIVE: 1

## 2024-08-23 ASSESSMENT — PATIENT HEALTH QUESTIONNAIRE - PHQ9
SUM OF ALL RESPONSES TO PHQ9 QUESTIONS 1 & 2: 2
SUM OF ALL RESPONSES TO PHQ QUESTIONS 1-9: 9
7. TROUBLE CONCENTRATING ON THINGS, SUCH AS READING THE NEWSPAPER OR WATCHING TELEVISION: NOT AT ALL
SUM OF ALL RESPONSES TO PHQ QUESTIONS 1-9: 9
10. IF YOU CHECKED OFF ANY PROBLEMS, HOW DIFFICULT HAVE THESE PROBLEMS MADE IT FOR YOU TO DO YOUR WORK, TAKE CARE OF THINGS AT HOME, OR GET ALONG WITH OTHER PEOPLE: VERY DIFFICULT
2. FEELING DOWN, DEPRESSED OR HOPELESS: SEVERAL DAYS
5. POOR APPETITE OR OVEREATING: NEARLY EVERY DAY
SUM OF ALL RESPONSES TO PHQ QUESTIONS 1-9: 9
9. THOUGHTS THAT YOU WOULD BE BETTER OFF DEAD, OR OF HURTING YOURSELF: NOT AT ALL
8. MOVING OR SPEAKING SO SLOWLY THAT OTHER PEOPLE COULD HAVE NOTICED. OR THE OPPOSITE, BEING SO FIGETY OR RESTLESS THAT YOU HAVE BEEN MOVING AROUND A LOT MORE THAN USUAL: NOT AT ALL
6. FEELING BAD ABOUT YOURSELF - OR THAT YOU ARE A FAILURE OR HAVE LET YOURSELF OR YOUR FAMILY DOWN: NOT AT ALL
4. FEELING TIRED OR HAVING LITTLE ENERGY: NEARLY EVERY DAY
SUM OF ALL RESPONSES TO PHQ QUESTIONS 1-9: 9
3. TROUBLE FALLING OR STAYING ASLEEP: SEVERAL DAYS
1. LITTLE INTEREST OR PLEASURE IN DOING THINGS: SEVERAL DAYS

## 2024-08-23 NOTE — PATIENT INSTRUCTIONS
Gloria Valdivia MD  Psychiatry (Adult)    Make an Appointment: [phone]  Board Certification: American Board of Psychiatry and Neurology for Adult Psychiatry  Special Interests:  Dr. Valdivia has a special interest in personalized medicine in the treatment of mood and anxiety disorders, adult ADHD, women’s health issues, forensic psychiatry, pharmacogenomics, the integration of psychotherapy with medication management, integrated care with family practice, and advocacy and education through lectures and public speaking engagements.  Details

## 2024-08-23 NOTE — PROGRESS NOTES
[facility]  [address] [place] [address]  Dept: [phone]    Asya Sharp is a 79 y.o. female who presents today for her medical conditions/complaints as noted below.      Asya Sharp is here today c/o ED Follow-up and Back Pain    Past Medical History:   Diagnosis Date    Anemia     Arthritis     Atrial fibrillation (HCC)     Depression     Diverticulosis     DM (diabetes mellitus) (HCC)     RESOLVED    Erythropenia     ESRD (end stage renal disease) on dialysis (HCC)     Fibromyalgia     HTN (hypertension)     Hyperlipidemia     Kidney stone     Morbid obesity due to excess calories (HCC)     Osteopenia 2014    Seasonal allergies     Sleep apnea     uses bipap prn      Past Surgical History:   Procedure Laterality Date    ANKLE FRACTURE SURGERY Right 2022    RIGHT ANKLE OPEN REDUCTION INTERNAL FIXATION performed by Obed Leblanc DO at [facility] OR    ARM SURGERY  2011    right arm broken    CARDIAC CATHETERIZATION  [id number] dr martinez    CARDIAC CATHETERIZATION  2016    COLONOSCOPY  2003    COLONOSCOPY  2007    IR TUNNELED CATHETER PLACEMENT GREATER THAN 5 YEARS  2022    IR TUNNELED CATHETER PLACEMENT GREATER THAN 5 YEARS 2022 [facility]    ORIF ANKLE FRACTURE Right 2022    RIGHT ANKLE OPEN REDUCTION INTERNAL FIXATION    TOTAL KNEE ARTHROPLASTY Bilateral     left may 2013 and right 2013    TUBAL LIGATION         Family History   Problem Relation Age of Onset    Diabetes Mother     Heart Disease Mother     Osteoporosis Mother     Kidney Disease Father     Prostate Cancer Brother        Social History     Tobacco Use    Smoking status: Former     Current packs/day: 0.00     Average packs/day: 0.3 packs/day for 1 year (0.3 ttl pk-yrs)     Types: Cigarettes     Start date: 1959     Quit date: 1960     Years since quittin.6    Smokeless tobacco: Never    Tobacco comments:     quit

## 2024-08-26 DIAGNOSIS — F41.1 GAD (GENERALIZED ANXIETY DISORDER): ICD-10-CM

## 2024-08-27 RX ORDER — MIRTAZAPINE 7.5 MG/1
7.5 TABLET, FILM COATED ORAL NIGHTLY
Qty: 90 TABLET | Refills: 3 | OUTPATIENT
Start: 2024-08-27

## 2024-09-08 DIAGNOSIS — E78.5 DYSLIPIDEMIA DUE TO TYPE 2 DIABETES MELLITUS (HCC): ICD-10-CM

## 2024-09-08 DIAGNOSIS — E11.69 DYSLIPIDEMIA DUE TO TYPE 2 DIABETES MELLITUS (HCC): ICD-10-CM

## 2024-09-08 DIAGNOSIS — F32.1 MODERATE MAJOR DEPRESSION (HCC): ICD-10-CM

## 2024-09-08 DIAGNOSIS — F41.1 GAD (GENERALIZED ANXIETY DISORDER): ICD-10-CM

## 2024-09-08 DIAGNOSIS — E11.42 DIABETIC POLYNEUROPATHY ASSOCIATED WITH TYPE 2 DIABETES MELLITUS (HCC): ICD-10-CM

## 2024-09-09 RX ORDER — ATORVASTATIN CALCIUM 40 MG/1
40 TABLET, FILM COATED ORAL
Qty: 90 TABLET | Refills: 3 | OUTPATIENT
Start: 2024-09-09

## 2024-09-09 RX ORDER — VENLAFAXINE HYDROCHLORIDE 150 MG/1
150 CAPSULE, EXTENDED RELEASE ORAL DAILY
Qty: 90 CAPSULE | Refills: 1 | OUTPATIENT
Start: 2024-09-09

## 2024-09-09 RX ORDER — GABAPENTIN 300 MG/1
300 CAPSULE ORAL DAILY
Qty: 90 CAPSULE | Refills: 0 | OUTPATIENT
Start: 2024-09-09 | End: 2024-12-08

## 2024-09-09 RX ORDER — MIRTAZAPINE 7.5 MG/1
7.5 TABLET, FILM COATED ORAL NIGHTLY
Qty: 90 TABLET | Refills: 1 | OUTPATIENT
Start: 2024-09-09

## 2024-09-25 DIAGNOSIS — E11.69 DYSLIPIDEMIA DUE TO TYPE 2 DIABETES MELLITUS (HCC): ICD-10-CM

## 2024-09-25 DIAGNOSIS — E78.5 DYSLIPIDEMIA DUE TO TYPE 2 DIABETES MELLITUS (HCC): ICD-10-CM

## 2024-09-25 DIAGNOSIS — E11.42 DIABETIC POLYNEUROPATHY ASSOCIATED WITH TYPE 2 DIABETES MELLITUS (HCC): ICD-10-CM

## 2024-09-25 DIAGNOSIS — F41.1 GAD (GENERALIZED ANXIETY DISORDER): ICD-10-CM

## 2024-09-25 DIAGNOSIS — F32.1 MODERATE MAJOR DEPRESSION (HCC): ICD-10-CM

## 2024-09-25 RX ORDER — VENLAFAXINE HYDROCHLORIDE 150 MG/1
150 CAPSULE, EXTENDED RELEASE ORAL DAILY
Qty: 90 CAPSULE | Refills: 1 | OUTPATIENT
Start: 2024-09-25

## 2024-09-25 RX ORDER — GABAPENTIN 300 MG/1
300 CAPSULE ORAL DAILY
Qty: 90 CAPSULE | Refills: 0 | OUTPATIENT
Start: 2024-09-25 | End: 2024-12-24

## 2024-09-25 RX ORDER — ATORVASTATIN CALCIUM 40 MG/1
40 TABLET, FILM COATED ORAL
Qty: 90 TABLET | Refills: 3 | OUTPATIENT
Start: 2024-09-25

## 2024-10-01 DIAGNOSIS — F32.1 MODERATE MAJOR DEPRESSION (HCC): ICD-10-CM

## 2024-10-01 DIAGNOSIS — E11.69 DYSLIPIDEMIA DUE TO TYPE 2 DIABETES MELLITUS (HCC): ICD-10-CM

## 2024-10-01 DIAGNOSIS — E78.5 DYSLIPIDEMIA DUE TO TYPE 2 DIABETES MELLITUS (HCC): ICD-10-CM

## 2024-10-01 DIAGNOSIS — E11.42 DIABETIC POLYNEUROPATHY ASSOCIATED WITH TYPE 2 DIABETES MELLITUS (HCC): ICD-10-CM

## 2024-10-01 DIAGNOSIS — G47.00 INSOMNIA, UNSPECIFIED TYPE: ICD-10-CM

## 2024-10-01 DIAGNOSIS — F41.1 GAD (GENERALIZED ANXIETY DISORDER): ICD-10-CM

## 2024-10-01 RX ORDER — VENLAFAXINE HYDROCHLORIDE 150 MG/1
150 CAPSULE, EXTENDED RELEASE ORAL DAILY
Qty: 90 CAPSULE | Refills: 1 | OUTPATIENT
Start: 2024-10-01

## 2024-10-01 RX ORDER — ATORVASTATIN CALCIUM 40 MG/1
40 TABLET, FILM COATED ORAL DAILY
Qty: 90 TABLET | Refills: 3 | Status: SHIPPED | OUTPATIENT
Start: 2024-10-01

## 2024-10-01 RX ORDER — AZELASTINE 1 MG/ML
1 SPRAY, METERED NASAL PRN
Qty: 30 ML | Refills: 1 | Status: SHIPPED | OUTPATIENT
Start: 2024-10-01

## 2024-10-01 RX ORDER — GABAPENTIN 300 MG/1
300 CAPSULE ORAL DAILY
Qty: 90 CAPSULE | Refills: 0 | OUTPATIENT
Start: 2024-10-01 | End: 2024-12-30

## 2024-10-01 NOTE — TELEPHONE ENCOUNTER
Effexor should be coming from Psychiatry , she will need to call them for all refills of mood/depression medications. She cannot safely take gabapentin & Ambien, both are high risk medications kedar in light of her CKD/dialysis & age . I highly discouraged Ambien use the last time I saw patient but cannot control what Psychiatry is doing & saw this was filled 1 month ago. If she wants to continue gabapentin she cannot be on both as her risk for sedation and fall is extremely high, however she would need to verify with Nephrology if the gabapentin dosing is appropriate. Please notify w/ response.

## 2024-10-01 NOTE — TELEPHONE ENCOUNTER
Asya Sharp is calling to request a refill on the following medication(s):    Last Visit Date (If Applicable):  2024    Next Visit Date:    2024    Medication Request:  Requested Prescriptions     Pending Prescriptions Disp Refills    gabapentin (NEURONTIN) 300 MG capsule 90 capsule 0     Sig: Take 1 capsule by mouth daily for 90 days.    atorvastatin (LIPITOR) 40 MG tablet 90 tablet 1     Sig: Take 1 tablet by mouth daily    azelastine (ASTELIN) 0.1 % nasal spray 30 mL 1     Si spray by Nasal route as needed for Rhinitis Use in each nostril as directed    venlafaxine (EFFEXOR XR) 150 MG extended release capsule 90 capsule 1     Sig: Take 1 capsule by mouth daily

## 2024-10-04 ENCOUNTER — HOSPITAL ENCOUNTER (EMERGENCY)
Age: 79
Discharge: HOME | End: 2024-10-04
Attending: STUDENT IN AN ORGANIZED HEALTH CARE EDUCATION/TRAINING PROGRAM
Payer: MEDICARE

## 2024-10-04 ENCOUNTER — APPOINTMENT (OUTPATIENT)
Dept: GENERAL RADIOLOGY | Age: 79
End: 2024-10-04
Payer: MEDICARE

## 2024-10-04 VITALS
TEMPERATURE: 98.4 F | RESPIRATION RATE: 18 BRPM | OXYGEN SATURATION: 94 % | DIASTOLIC BLOOD PRESSURE: 63 MMHG | SYSTOLIC BLOOD PRESSURE: 131 MMHG | HEART RATE: 59 BPM

## 2024-10-04 DIAGNOSIS — S92.401A CLOSED FRACTURE OF PHALANX OF RIGHT GREAT TOE, PHYSEAL INVOLVEMENT UNSPECIFIED, UNSPECIFIED PHALANX, INITIAL ENCOUNTER: Primary | ICD-10-CM

## 2024-10-04 LAB
ANION GAP SERPL CALCULATED.3IONS-SCNC: 13 MMOL/L (ref 9–16)
BASOPHILS # BLD: 0.05 K/UL (ref 0–0.2)
BASOPHILS NFR BLD: 1 % (ref 0–2)
BUN SERPL-MCNC: 36 MG/DL (ref 8–23)
CALCIUM SERPL-MCNC: 9.9 MG/DL (ref 8.6–10.4)
CHLORIDE SERPL-SCNC: 102 MMOL/L (ref 98–107)
CO2 SERPL-SCNC: 24 MMOL/L (ref 20–31)
CREAT SERPL-MCNC: 6.1 MG/DL (ref 0.5–0.9)
EOSINOPHIL # BLD: 0.23 K/UL (ref 0–0.44)
EOSINOPHILS RELATIVE PERCENT: 3 % (ref 1–4)
ERYTHROCYTE [DISTWIDTH] IN BLOOD BY AUTOMATED COUNT: 15.9 % (ref 11.8–14.4)
GFR, ESTIMATED: 7 ML/MIN/1.73M2
GLUCOSE SERPL-MCNC: 142 MG/DL (ref 74–99)
HCT VFR BLD AUTO: 35.5 % (ref 36.3–47.1)
HGB BLD-MCNC: 10.4 G/DL (ref 11.9–15.1)
IMM GRANULOCYTES # BLD AUTO: 0.06 K/UL (ref 0–0.3)
IMM GRANULOCYTES NFR BLD: 1 %
LYMPHOCYTES NFR BLD: 1.51 K/UL (ref 1.1–3.7)
LYMPHOCYTES RELATIVE PERCENT: 17 % (ref 24–43)
MCH RBC QN AUTO: 31.7 PG (ref 25.2–33.5)
MCHC RBC AUTO-ENTMCNC: 29.3 G/DL (ref 28.4–34.8)
MCV RBC AUTO: 108.2 FL (ref 82.6–102.9)
MONOCYTES NFR BLD: 0.65 K/UL (ref 0.1–1.2)
MONOCYTES NFR BLD: 7 % (ref 3–12)
NEUTROPHILS NFR BLD: 71 % (ref 36–65)
NEUTS SEG NFR BLD: 6.42 K/UL (ref 1.5–8.1)
NRBC BLD-RTO: 0.3 PER 100 WBC
PLATELET # BLD AUTO: 269 K/UL (ref 138–453)
PMV BLD AUTO: 9.3 FL (ref 8.1–13.5)
POTASSIUM SERPL-SCNC: 4.9 MMOL/L (ref 3.7–5.3)
RBC # BLD AUTO: 3.28 M/UL (ref 3.95–5.11)
RBC # BLD: ABNORMAL 10*6/UL
RBC # BLD: ABNORMAL 10*6/UL
SODIUM SERPL-SCNC: 139 MMOL/L (ref 136–145)
WBC OTHER # BLD: 8.9 K/UL (ref 3.5–11.3)

## 2024-10-04 PROCEDURE — 85025 COMPLETE CBC W/AUTO DIFF WBC: CPT

## 2024-10-04 PROCEDURE — 80048 BASIC METABOLIC PNL TOTAL CA: CPT

## 2024-10-04 PROCEDURE — 73630 X-RAY EXAM OF FOOT: CPT

## 2024-10-04 PROCEDURE — 99284 EMERGENCY DEPT VISIT MOD MDM: CPT

## 2024-10-04 PROCEDURE — 6370000000 HC RX 637 (ALT 250 FOR IP)

## 2024-10-04 RX ORDER — HYDROCODONE BITARTRATE AND ACETAMINOPHEN 5; 325 MG/1; MG/1
1 TABLET ORAL ONCE
Status: COMPLETED | OUTPATIENT
Start: 2024-10-04 | End: 2024-10-04

## 2024-10-04 RX ORDER — HYDROCODONE BITARTRATE AND ACETAMINOPHEN 5; 325 MG/1; MG/1
1 TABLET ORAL EVERY 8 HOURS PRN
Qty: 9 TABLET | Refills: 0 | Status: SHIPPED | OUTPATIENT
Start: 2024-10-04 | End: 2024-10-07

## 2024-10-04 RX ADMIN — HYDROCODONE BITARTRATE AND ACETAMINOPHEN 1 TABLET: 5; 325 TABLET ORAL at 19:27

## 2024-10-04 ASSESSMENT — PAIN DESCRIPTION - DESCRIPTORS: DESCRIPTORS: ACHING

## 2024-10-04 ASSESSMENT — PAIN SCALES - GENERAL: PAINLEVEL_OUTOF10: 8

## 2024-10-04 ASSESSMENT — PAIN DESCRIPTION - ORIENTATION: ORIENTATION: RIGHT

## 2024-10-04 ASSESSMENT — PAIN DESCRIPTION - LOCATION: LOCATION: FOOT

## 2024-10-04 ASSESSMENT — PAIN - FUNCTIONAL ASSESSMENT: PAIN_FUNCTIONAL_ASSESSMENT: ACTIVITIES ARE NOT PREVENTED

## 2024-10-04 NOTE — ED TRIAGE NOTES
Pt to ED for R foot pain/injury. Pt states that she tripped on a cord and fell and hit her foot. Pt denies LOC or hitting her head. Pt states she is also on dialysis and did not go yesterday because she did not want to. Pt states sometimes she does not go.

## 2024-10-04 NOTE — DISCHARGE INSTRUCTIONS
You were seen in the emergency room for right foot pain you are noted to have multiple fractures in your toes.  You are being discharged with a postop shoe to be worn especially when ambulating.  You are to follow-up with podiatry within the next week for reassessment evaluation.  You are being discharged with a few doses of Vicodin to help with your pain.  Please ensure you are not taking additional Tylenol while on Vicodin.  Please ensure that you are not driving or operating heavy material this is a narcotic and can make you drowsy.  Please return to the emergency room if you have any worsening symptoms or numbness in your feet or any other falls or trauma.  You also missed dialysis on Thursday.  Your blood work showed no significant abnormalities and your potassium electrolytes were within normal limits at this time.  Please ensure that you make your dialysis appointment tomorrow.

## 2024-10-04 NOTE — ED PROVIDER NOTES
[facility] ED  Emergency Department Encounter  Emergency Medicine Resident     Pt Name:Asya Sharp  MRN: [id number]  Birthdate 1945  Date of evaluation: 10/4/24  PCP:  Kristin Stone APRN - CNP  Note Started: 5:40 PM EDT      CHIEF COMPLAINT       Chief Complaint   Patient presents with    Foot Injury     R foot       HISTORY OF PRESENT ILLNESS  (Location/Symptom, Timing/Onset, Context/Setting, Quality, Duration, Modifying Factors, Severity.)      Asya Sharp is a 79 y.o. female who presents with right foot swelling and pain but started after patient tripped over a telephone cord approximately last Wednesday.  Did not hit her head, no loss of consciousness.  Patient is on Eliquis for A-fib.  Pain initially improving but worsened today.  Decreasing swelling and bruising however patient came in for evaluation given worsening pain.  Patient is a dialysis and that she did not get her dialysis yesterday that she did not feel like going.  Denies any chest pain, shortness of breath, abdominal pain, nausea or vomiting.    PAST MEDICAL / SURGICAL / SOCIAL / FAMILY HISTORY      has a past medical history of Anemia, Arthritis, Atrial fibrillation (HCC), Depression, Diverticulosis, DM (diabetes mellitus) (HCC), Erythropenia, ESRD (end stage renal disease) on dialysis (HCC), Fibromyalgia, HTN (hypertension), Hyperlipidemia, Kidney stone, Morbid obesity due to excess calories, Osteopenia, Seasonal allergies, and Sleep apnea.     has a past surgical history that includes Colonoscopy (01/01/2003); Colonoscopy (01/01/2007); Tubal ligation; Arm Surgery (01/01/2011); Total knee arthroplasty (Bilateral); Cardiac catheterization ([id number] dr martinez); Cardiac catheterization (05/16/2016); ORIF ankle fracture (Right, 12/06/2022); Ankle fracture surgery (Right, 12/6/2022); and IR TUNNELED CVC PLACE WO SQ PORT/PUMP > 5 YEARS (12/19/2022).    Social History     Socioeconomic History    Marital status:

## 2024-10-04 NOTE — ED PROVIDER NOTES
FACULTY SIGN-OUT  ADDENDUM       Patient: Asya Sharp   MRN: [id number]  PCP:  Kristin Stone, BARBIE - CNP  Note Started: 10/4/24, 6:36 PM EDT  Attestation  I was available and discussed any additional care issues that arose and coordinated the management plans with the resident(s) caring for the patient during my duty period. Any areas of disagreement with resident's documentation of care or procedures are noted on the chart. I was personally present for the key portions of any/all procedures during my duty period. I have documented in the chart those procedures where I was not present during the key portions.   The patient's initial evaluation and plan have been discussed with the prior provider who initially evaluated the patient.      Pertinent Comments:  The patient is a 79 y.o. female taken in signout with injury to her foot 1 week ago now with continued forefoot pain but neurovascular intact and is ambulating on it.    We are awaiting x-rays and labs secondary to having missed dialysis but in no distress.    Is scheduled for dialysis tomorrow    ED COURSE      The patient was given the following medications:  No orders of the defined types were placed in this encounter.      RECENT VITALS:   BP: 131/63  Pulse: 59  Respirations: 18  Temp: 98.4 °F (36.9 °C) SpO2: 94 %    (Please note that portions of this note were completed with a voice recognition program.  Efforts were made to edit the dictations but occasionally words are mis-transcribed.)    Silverio Jamil MD FAAEM [facility]  Attending Emergency Medicine Physician       Silverio Jamil MD  10/04/24 1516

## 2024-10-05 ASSESSMENT — ENCOUNTER SYMPTOMS
SHORTNESS OF BREATH: 0
COLOR CHANGE: 1
COUGH: 0
NAUSEA: 0
VOMITING: 0
BACK PAIN: 0

## 2024-10-05 NOTE — ED PROVIDER NOTES
[facility] ED  Emergency Department  Faculty Attestation       I performed a history and physical examination of the patient and discussed management with the resident. I reviewed the resident’s note and agree with the documented findings including all diagnostic interpretations and plan of care. Any areas of disagreement are noted on the chart. I was personally present for the key portions of any procedures. I have documented in the chart those procedures where I was not present during the key portions. I have reviewed the emergency nurses triage note. I agree with the chief complaint, past medical history, past surgical history, allergies, medications, social and family history as documented unless otherwise noted below. Documentation of the HPI, Physical Exam and Medical Decision Making performed by dominicibhomer is based on my personal performance of the HPI, PE and MDM. For Physician Assistant/ Nurse Practitioner cases/documentation I have personally evaluated this patient and have completed at least one if not all key elements of the E/M (history, physical exam, and MDM). Additional findings are as noted.    Pertinent Comments     Primary Care Physician: Kristin Stone, APRN - CNP    History: This is a 79 y.o. female who presents to the Emergency Department with complaint of    Chief Complaint   Patient presents with    Foot Injury     R foot         Physical:    ED Triage Vitals   BP Systolic BP Percentile Diastolic BP Percentile Temp Temp Source Pulse Respirations SpO2   10/04/24 1744 -- -- 10/04/24 1742 10/04/24 1742 10/04/24 1742 10/04/24 1742 10/04/24 1742   131/63   98.4 °F (36.9 °C) Oral 59 18 94 %      Height Weight         -- --                        RADIOLOGY:  XR FOOT RIGHT (MIN 3 VIEWS)    Result Date: 10/4/2024  EXAMINATION: THREE XRAY VIEWS OF THE RIGHT FOOT 10/4/2024 3:13 pm COMPARISON: None. HISTORY: ORDERING SYSTEM PROVIDED HISTORY: fall, mid foot and all toes swelling and  pain. fall last week worsening pain TECHNOLOGIST PROVIDED HISTORY: fall, mid foot and all toes swelling and pain. fall last week worsening pain FINDINGS: Bones: Bones are severely osteopenic.  There is an acute fracture involving the 3rd and 5th proximal phalanges.  Chronic appearing fracture deformities of the 2nd and 4th metacarpal necks.  Calcaneal enthesopathy. Joints: Degenerative changes of the talonavicular articulation. Soft Tissues: Unremarkable.     Osteopenia limited exam. Acute fractures involving the 3rd and 5th proximal phalanges. Chronic fracture deformities of the 2nd and 4th metacarpal necks.      MDM/Plan:   Right foot injury 1 week prior  On eliquis  Still having bruising and pain  Pain in forefoot on right  No ankle tenderness, normal ROM  No achilles tenderness or defect  DP and PT pulses intact  Plan is for xray, pain control    Missed dialysis yesterday, will check labs  No dyspnea, chest pain. VSS    EKG Interpretation    Interpreted by me  none    Procedures:  none    ED Course as of 10/05/24 1602   Fri Oct 04, 2024   1849 Patient reviewed with podiatry who recommends a postop boot and a follow-up with podiatry in 1 week. [NS]   1915 Sodium 139, potassium 4.9, carbon dioxide 24 anion gap 13.  Creatinine 6.1 was consistent with patient having missed her dialysis on Thursday.  WBC 8.9 and hemoglobin 10.4.  Platelets 269.  Patient does not appear fluid overloaded.  No shortness of breath.  No need for dialysis urgently at this time.  Patient does have a dialysis appointment tomorrow at 10 AM.  Discussed with patient that she needs to ensure she makes this appointment. [NS]   1917 Patient reassessed.  Endorsing significant pain still.  Last Tylenol dose was at 10 AM.  Patient has tolerated Vicodin in the past.  Will put in for a dose of Vicodin a few for discharge.  Patient given strict return precautions.  Will plan for discharge at this time.  Patient voiced understanding that she needs to go

## 2024-10-15 RX ORDER — BUMETANIDE 2 MG/1
2 TABLET ORAL DAILY
Qty: 90 TABLET | Refills: 3 | OUTPATIENT
Start: 2024-10-15

## 2024-10-23 ENCOUNTER — APPOINTMENT (OUTPATIENT)
Dept: CT IMAGING | Age: 79
End: 2024-10-23
Payer: MEDICARE

## 2024-10-23 ENCOUNTER — HOSPITAL ENCOUNTER (EMERGENCY)
Age: 79
Discharge: HOME | End: 2024-10-23
Attending: STUDENT IN AN ORGANIZED HEALTH CARE EDUCATION/TRAINING PROGRAM
Payer: MEDICARE

## 2024-10-23 VITALS
DIASTOLIC BLOOD PRESSURE: 69 MMHG | BODY MASS INDEX: 37.67 KG/M2 | RESPIRATION RATE: 18 BRPM | HEART RATE: 63 BPM | OXYGEN SATURATION: 98 % | HEIGHT: 67 IN | TEMPERATURE: 98.3 F | WEIGHT: 240 LBS | SYSTOLIC BLOOD PRESSURE: 165 MMHG

## 2024-10-23 DIAGNOSIS — S39.012A STRAIN OF LUMBAR REGION, INITIAL ENCOUNTER: Primary | ICD-10-CM

## 2024-10-23 DIAGNOSIS — N18.6 ESRD (END STAGE RENAL DISEASE) (HCC): ICD-10-CM

## 2024-10-23 LAB
ALBUMIN SERPL-MCNC: 3.7 G/DL (ref 3.5–5.2)
ALP SERPL-CCNC: 71 U/L (ref 35–104)
ALT SERPL-CCNC: 10 U/L (ref 5–33)
ANION GAP SERPL CALCULATED.3IONS-SCNC: 16 MMOL/L (ref 9–17)
AST SERPL-CCNC: 12 U/L
BASOPHILS # BLD: 0.05 K/UL (ref 0–0.2)
BASOPHILS NFR BLD: 1 % (ref 0–2)
BILIRUB SERPL-MCNC: 0.3 MG/DL (ref 0.3–1.2)
BUN SERPL-MCNC: 37 MG/DL (ref 8–23)
BUN/CREAT SERPL: 5 (ref 9–20)
CALCIUM SERPL-MCNC: 9.5 MG/DL (ref 8.6–10.4)
CHLORIDE SERPL-SCNC: 106 MMOL/L (ref 98–107)
CO2 SERPL-SCNC: 19 MMOL/L (ref 20–31)
CREAT SERPL-MCNC: 8.1 MG/DL (ref 0.5–0.9)
EOSINOPHIL # BLD: 0.23 K/UL (ref 0–0.44)
EOSINOPHILS RELATIVE PERCENT: 3 % (ref 1–4)
ERYTHROCYTE [DISTWIDTH] IN BLOOD BY AUTOMATED COUNT: 15.9 % (ref 11.8–14.4)
GFR, ESTIMATED: 5 ML/MIN/1.73M2
GLUCOSE SERPL-MCNC: 154 MG/DL (ref 70–99)
HCT VFR BLD AUTO: 32.9 % (ref 36.3–47.1)
HGB BLD-MCNC: 9.9 G/DL (ref 11.9–15.1)
IMM GRANULOCYTES # BLD AUTO: 0.04 K/UL (ref 0–0.3)
IMM GRANULOCYTES NFR BLD: 1 %
LYMPHOCYTES NFR BLD: 1.32 K/UL (ref 1.1–3.7)
LYMPHOCYTES RELATIVE PERCENT: 17 % (ref 24–43)
MCH RBC QN AUTO: 32.1 PG (ref 25.2–33.5)
MCHC RBC AUTO-ENTMCNC: 30.1 G/DL (ref 28.4–34.8)
MCV RBC AUTO: 106.8 FL (ref 82.6–102.9)
MONOCYTES NFR BLD: 0.71 K/UL (ref 0.1–1.2)
MONOCYTES NFR BLD: 9 % (ref 3–12)
NEUTROPHILS NFR BLD: 69 % (ref 36–65)
NEUTS SEG NFR BLD: 5.25 K/UL (ref 1.5–8.1)
NRBC BLD-RTO: 0 PER 100 WBC
PLATELET # BLD AUTO: 247 K/UL (ref 138–453)
PMV BLD AUTO: 9.3 FL (ref 8.1–13.5)
POTASSIUM SERPL-SCNC: 5 MMOL/L (ref 3.7–5.3)
PROT SERPL-MCNC: 7 G/DL (ref 6.4–8.3)
RBC # BLD AUTO: 3.08 M/UL (ref 3.95–5.11)
RBC # BLD: ABNORMAL 10*6/UL
RBC # BLD: ABNORMAL 10*6/UL
SODIUM SERPL-SCNC: 141 MMOL/L (ref 135–144)
WBC OTHER # BLD: 7.6 K/UL (ref 3.5–11.3)

## 2024-10-23 PROCEDURE — 6370000000 HC RX 637 (ALT 250 FOR IP): Performed by: STUDENT IN AN ORGANIZED HEALTH CARE EDUCATION/TRAINING PROGRAM

## 2024-10-23 PROCEDURE — 96374 THER/PROPH/DIAG INJ IV PUSH: CPT

## 2024-10-23 PROCEDURE — 80053 COMPREHEN METABOLIC PANEL: CPT

## 2024-10-23 PROCEDURE — 6360000002 HC RX W HCPCS: Performed by: STUDENT IN AN ORGANIZED HEALTH CARE EDUCATION/TRAINING PROGRAM

## 2024-10-23 PROCEDURE — 74176 CT ABD & PELVIS W/O CONTRAST: CPT

## 2024-10-23 PROCEDURE — 96375 TX/PRO/DX INJ NEW DRUG ADDON: CPT

## 2024-10-23 PROCEDURE — 70450 CT HEAD/BRAIN W/O DYE: CPT

## 2024-10-23 PROCEDURE — 99284 EMERGENCY DEPT VISIT MOD MDM: CPT

## 2024-10-23 PROCEDURE — 85025 COMPLETE CBC W/AUTO DIFF WBC: CPT

## 2024-10-23 RX ORDER — HYDROCODONE BITARTRATE AND ACETAMINOPHEN 5; 325 MG/1; MG/1
1 TABLET ORAL ONCE
Status: COMPLETED | OUTPATIENT
Start: 2024-10-23 | End: 2024-10-23

## 2024-10-23 RX ORDER — HYDROCODONE BITARTRATE AND ACETAMINOPHEN 5; 325 MG/1; MG/1
1 TABLET ORAL EVERY 4 HOURS PRN
Qty: 12 TABLET | Refills: 0 | Status: SHIPPED | OUTPATIENT
Start: 2024-10-23 | End: 2024-10-26

## 2024-10-23 RX ORDER — FENTANYL CITRATE 0.05 MG/ML
25 INJECTION, SOLUTION INTRAMUSCULAR; INTRAVENOUS ONCE
Status: COMPLETED | OUTPATIENT
Start: 2024-10-23 | End: 2024-10-23

## 2024-10-23 RX ORDER — MORPHINE SULFATE 2 MG/ML
2 INJECTION, SOLUTION INTRAMUSCULAR; INTRAVENOUS ONCE
Status: DISCONTINUED | OUTPATIENT
Start: 2024-10-23 | End: 2024-10-23

## 2024-10-23 RX ORDER — ONDANSETRON 2 MG/ML
4 INJECTION INTRAMUSCULAR; INTRAVENOUS ONCE
Status: COMPLETED | OUTPATIENT
Start: 2024-10-23 | End: 2024-10-23

## 2024-10-23 RX ADMIN — HYDROCODONE BITARTRATE AND ACETAMINOPHEN 1 TABLET: 5; 325 TABLET ORAL at 23:00

## 2024-10-23 RX ADMIN — FENTANYL CITRATE 25 MCG: 0.05 INJECTION, SOLUTION INTRAMUSCULAR; INTRAVENOUS at 20:20

## 2024-10-23 RX ADMIN — ONDANSETRON 4 MG: 2 INJECTION, SOLUTION INTRAMUSCULAR; INTRAVENOUS at 20:19

## 2024-10-23 ASSESSMENT — PAIN SCALES - GENERAL
PAINLEVEL_OUTOF10: 4
PAINLEVEL_OUTOF10: 10
PAINLEVEL_OUTOF10: 5
PAINLEVEL_OUTOF10: 10

## 2024-10-23 ASSESSMENT — PAIN DESCRIPTION - LOCATION
LOCATION: BACK

## 2024-10-23 ASSESSMENT — PAIN DESCRIPTION - ORIENTATION
ORIENTATION: LOWER;RIGHT
ORIENTATION: RIGHT;LOWER

## 2024-10-23 ASSESSMENT — PAIN - FUNCTIONAL ASSESSMENT: PAIN_FUNCTIONAL_ASSESSMENT: 0-10

## 2024-10-24 NOTE — DISCHARGE INSTRUCTIONS
Call today or tomorrow to follow up with Kristin Stone APRN - CNP  in 7 days.    Return to the Emergency Department for inability to move legs, worsening of pain, tingling / loss of sensation, any other care or concern.

## 2024-10-24 NOTE — ED PROVIDER NOTES
listed below.  Labs Reviewed   CBC WITH AUTO DIFFERENTIAL - Abnormal; Notable for the following components:       Result Value    RBC 3.08 (*)     Hemoglobin 9.9 (*)     Hematocrit 32.9 (*)     .8 (*)     RDW 15.9 (*)     Neutrophils % 69 (*)     Lymphocytes % 17 (*)     Immature Granulocytes % 1 (*)     All other components within normal limits   COMPREHENSIVE METABOLIC PANEL - Abnormal; Notable for the following components:    CO2 19 (*)     Glucose 154 (*)     BUN 37 (*)     Creatinine 8.1 (*)     Est, Glom Filt Rate 5 (*)     BUN/Creatinine Ratio 5 (*)     All other components within normal limits       Vitals Reviewed:    Vitals:    10/23/24 1914 10/23/24 1916   BP:  (!) 165/69   Pulse:  63   Resp:  18   Temp:  98.3 °F (36.8 °C)   SpO2:  98%   Weight: 108.9 kg (240 lb)    Height: 1.689 m (5' 6.5\")      MEDICATIONS GIVEN TO PATIENT THIS ENCOUNTER:  Orders Placed This Encounter   Medications    DISCONTD: morphine (PF) injection 2 mg    ondansetron (ZOFRAN) injection 4 mg    fentaNYL (SUBLIMAZE) injection 25 mcg    HYDROcodone-acetaminophen (NORCO) 5-325 MG per tablet 1 tablet    HYDROcodone-acetaminophen (NORCO) 5-325 MG per tablet     Sig: Take 1 tablet by mouth every 4 hours as needed for Pain for up to 3 days. Intended supply: 3 days. Take lowest dose possible to manage pain Max Daily Amount: 6 tablets     Dispense:  12 tablet     Refill:  0     DISCHARGE PRESCRIPTIONS:  Discharge Medication List as of 10/23/2024 10:56 PM        START taking these medications    Details   HYDROcodone-acetaminophen (NORCO) 5-325 MG per tablet Take 1 tablet by mouth every 4 hours as needed for Pain for up to 3 days. Intended supply: 3 days. Take lowest dose possible to manage pain Max Daily Amount: 6 tablets, Disp-12 tablet, R-0Print           PHYSICIAN CONSULTS ORDERED THIS ENCOUNTER:  None    ED Course Notes From Epic Narrator:         CRITICAL CARE:   0    PROCEDURES:  none    FINAL IMPRESSION      1. Strain of lumbar

## 2024-10-25 DIAGNOSIS — G25.81 RESTLESS LEG SYNDROME: ICD-10-CM

## 2024-10-28 RX ORDER — ROPINIROLE 0.25 MG/1
0.25 TABLET, FILM COATED ORAL NIGHTLY
Qty: 90 TABLET | Refills: 3 | OUTPATIENT
Start: 2024-10-28

## 2024-11-03 DIAGNOSIS — F32.1 MODERATE MAJOR DEPRESSION (HCC): ICD-10-CM

## 2024-11-03 DIAGNOSIS — F41.1 GAD (GENERALIZED ANXIETY DISORDER): ICD-10-CM

## 2024-11-03 DIAGNOSIS — M79.7 FIBROMYALGIA: ICD-10-CM

## 2024-11-03 DIAGNOSIS — G25.81 RESTLESS LEG SYNDROME: ICD-10-CM

## 2024-11-04 RX ORDER — MIRTAZAPINE 7.5 MG/1
7.5 TABLET, FILM COATED ORAL NIGHTLY
Qty: 90 TABLET | Refills: 1 | OUTPATIENT
Start: 2024-11-04

## 2024-11-04 RX ORDER — TIZANIDINE 2 MG/1
2 TABLET ORAL DAILY PRN
Qty: 30 TABLET | Refills: 0 | Status: SHIPPED | OUTPATIENT
Start: 2024-11-04

## 2024-11-04 RX ORDER — ROPINIROLE 0.25 MG/1
0.25 TABLET, FILM COATED ORAL NIGHTLY
Qty: 90 TABLET | Refills: 3 | OUTPATIENT
Start: 2024-11-04

## 2024-11-04 RX ORDER — VENLAFAXINE HYDROCHLORIDE 150 MG/1
150 CAPSULE, EXTENDED RELEASE ORAL DAILY
Qty: 90 CAPSULE | Refills: 1 | OUTPATIENT
Start: 2024-11-04

## 2024-11-04 RX ORDER — AZELASTINE 1 MG/ML
1 SPRAY, METERED NASAL PRN
Qty: 1 EACH | Refills: 11 | Status: SHIPPED | OUTPATIENT
Start: 2024-11-04

## 2024-11-04 NOTE — TELEPHONE ENCOUNTER
Asya Sharp is calling to request a refill on the following medication(s):    Medication Request:  Requested Prescriptions     Pending Prescriptions Disp Refills    tiZANidine (ZANAFLEX) 2 MG tablet 30 tablet 0     Sig: Take 1 tablet by mouth daily as needed (muscle spasms)    azelastine (ASTELIN) 0.1 % nasal spray 30 mL 1     Si spray by Nasal route as needed for Rhinitis Use in each nostril as directed       Last Visit Date (If Applicable):  2024    Next Visit Date:    2024

## 2024-11-11 DIAGNOSIS — M79.7 FIBROMYALGIA: ICD-10-CM

## 2024-11-11 DIAGNOSIS — Z99.2 ESRD ON DIALYSIS (HCC): ICD-10-CM

## 2024-11-11 DIAGNOSIS — N18.6 ESRD ON DIALYSIS (HCC): ICD-10-CM

## 2024-11-11 DIAGNOSIS — M15.0 PRIMARY OSTEOARTHRITIS INVOLVING MULTIPLE JOINTS: ICD-10-CM

## 2024-11-11 DIAGNOSIS — F41.1 GAD (GENERALIZED ANXIETY DISORDER): ICD-10-CM

## 2024-11-11 DIAGNOSIS — G89.29 OTHER CHRONIC PAIN: Primary | ICD-10-CM

## 2024-11-11 RX ORDER — TIZANIDINE 2 MG/1
2 TABLET ORAL DAILY PRN
Qty: 30 TABLET | Refills: 0 | OUTPATIENT
Start: 2024-11-11

## 2024-11-11 RX ORDER — MIRTAZAPINE 7.5 MG/1
7.5 TABLET, FILM COATED ORAL NIGHTLY
Qty: 90 TABLET | Refills: 1 | OUTPATIENT
Start: 2024-11-11

## 2024-11-11 NOTE — TELEPHONE ENCOUNTER
Patient is calling back to say she would like the pain management referral to go to Dr Gutierrez at [facility]. States you and her spoke about PM a while ago.

## 2024-11-14 ENCOUNTER — OFFICE VISIT (OUTPATIENT)
Dept: FAMILY MEDICINE CLINIC | Age: 79
End: 2024-11-14

## 2024-11-14 VITALS
BODY MASS INDEX: 36.8 KG/M2 | SYSTOLIC BLOOD PRESSURE: 138 MMHG | RESPIRATION RATE: 16 BRPM | TEMPERATURE: 98 F | HEIGHT: 66 IN | OXYGEN SATURATION: 97 % | DIASTOLIC BLOOD PRESSURE: 82 MMHG | HEART RATE: 63 BPM | WEIGHT: 229 LBS

## 2024-11-14 DIAGNOSIS — G89.29 CHRONIC LOW BACK PAIN, UNSPECIFIED BACK PAIN LATERALITY, UNSPECIFIED WHETHER SCIATICA PRESENT: Primary | ICD-10-CM

## 2024-11-14 DIAGNOSIS — M54.50 CHRONIC LOW BACK PAIN, UNSPECIFIED BACK PAIN LATERALITY, UNSPECIFIED WHETHER SCIATICA PRESENT: Primary | ICD-10-CM

## 2024-11-14 DIAGNOSIS — Z99.2 ESRD ON HEMODIALYSIS (HCC): ICD-10-CM

## 2024-11-14 DIAGNOSIS — N18.6 ESRD ON HEMODIALYSIS (HCC): ICD-10-CM

## 2024-11-14 DIAGNOSIS — M51.360 DEGENERATION OF INTERVERTEBRAL DISC OF LUMBAR REGION WITH DISCOGENIC BACK PAIN: ICD-10-CM

## 2024-11-14 DIAGNOSIS — M48.061 SPINAL STENOSIS OF LUMBAR REGION, UNSPECIFIED WHETHER NEUROGENIC CLAUDICATION PRESENT: ICD-10-CM

## 2024-11-14 RX ORDER — ACETAMINOPHEN AND CODEINE PHOSPHATE 300; 30 MG/1; MG/1
1 TABLET ORAL EVERY 8 HOURS PRN
Qty: 40 TABLET | Refills: 0 | Status: SHIPPED | OUTPATIENT
Start: 2024-11-14 | End: 2024-11-14 | Stop reason: SDUPTHER

## 2024-11-14 RX ORDER — ACETAMINOPHEN AND CODEINE PHOSPHATE 300; 30 MG/1; MG/1
1 TABLET ORAL EVERY 8 HOURS PRN
Qty: 40 TABLET | Refills: 0 | Status: SHIPPED | OUTPATIENT
Start: 2024-11-14 | End: 2024-11-28

## 2024-11-14 ASSESSMENT — ENCOUNTER SYMPTOMS
RESPIRATORY NEGATIVE: 1
ALLERGIC/IMMUNOLOGIC NEGATIVE: 1
BACK PAIN: 1
GASTROINTESTINAL NEGATIVE: 1
EYES NEGATIVE: 1

## 2024-11-14 NOTE — PROGRESS NOTES
not ill-appearing, toxic-appearing or diaphoretic.   HENT:      Head: Normocephalic and atraumatic.      Right Ear: Tympanic membrane, ear canal and external ear normal.      Left Ear: Tympanic membrane, ear canal and external ear normal.      Nose: Nose normal. No congestion or rhinorrhea.      Mouth/Throat:      Mouth: Mucous membranes are moist.      Pharynx: Oropharynx is clear. No oropharyngeal exudate or posterior oropharyngeal erythema.   Eyes:      General: No scleral icterus.     Extraocular Movements: Extraocular movements intact.      Conjunctiva/sclera: Conjunctivae normal.      Pupils: Pupils are equal, round, and reactive to light.   Neck:      Vascular: No carotid bruit.   Cardiovascular:      Rate and Rhythm: Normal rate and regular rhythm.      Pulses: Normal pulses.      Heart sounds: Normal heart sounds. No murmur heard.  Pulmonary:      Effort: Pulmonary effort is normal.      Breath sounds: Normal breath sounds. No wheezing, rhonchi or rales.   Abdominal:      General: Bowel sounds are normal. There is no distension.      Palpations: Abdomen is soft.      Tenderness: There is no abdominal tenderness. There is no guarding or rebound.   Musculoskeletal:         General: Tenderness present. Normal range of motion.      Cervical back: Normal range of motion and neck supple. No tenderness.      Lumbar back: Spasms and tenderness present.   Lymphadenopathy:      Cervical: No cervical adenopathy.   Skin:     General: Skin is warm and dry.      Capillary Refill: Capillary refill takes less than 2 seconds.      Findings: No rash.   Neurological:      General: No focal deficit present.      Mental Status: She is alert and oriented to person, place, and time.      Cranial Nerves: No cranial nerve deficit.      Motor: No weakness.      Coordination: Coordination normal.      Gait: Gait normal.   Psychiatric:         Mood and Affect: Mood normal.         Behavior: Behavior normal.       ASSESSMENT/PLAN:

## 2024-11-19 ENCOUNTER — HOSPITAL ENCOUNTER (OUTPATIENT)
Age: 79
Setting detail: OBSERVATION
Discharge: HOME | End: 2024-11-20
Attending: EMERGENCY MEDICINE | Admitting: INTERNAL MEDICINE
Payer: MEDICARE

## 2024-11-19 DIAGNOSIS — I50.9 NEW ONSET OF CONGESTIVE HEART FAILURE (HCC): ICD-10-CM

## 2024-11-19 DIAGNOSIS — R00.1 BRADYCARDIA: Primary | ICD-10-CM

## 2024-11-19 LAB
ANION GAP SERPL CALCULATED.3IONS-SCNC: 11 MMOL/L (ref 9–16)
BASOPHILS # BLD: 0.07 K/UL (ref 0–0.2)
BASOPHILS NFR BLD: 1 % (ref 0–2)
BUN SERPL-MCNC: 31 MG/DL (ref 8–23)
CALCIUM SERPL-MCNC: 9.3 MG/DL (ref 8.8–10.2)
CHLORIDE SERPL-SCNC: 103 MMOL/L (ref 98–107)
CO2 SERPL-SCNC: 26 MMOL/L (ref 20–31)
CREAT SERPL-MCNC: 7 MG/DL (ref 0.5–0.9)
EOSINOPHIL # BLD: 0.25 K/UL (ref 0–0.44)
EOSINOPHILS RELATIVE PERCENT: 2 % (ref 1–4)
ERYTHROCYTE [DISTWIDTH] IN BLOOD BY AUTOMATED COUNT: 15.6 % (ref 11.8–14.4)
GFR, ESTIMATED: 6 ML/MIN/1.73M2
GLUCOSE SERPL-MCNC: 123 MG/DL (ref 82–115)
HCT VFR BLD AUTO: 31.6 % (ref 36.3–47.1)
HGB BLD-MCNC: 9.5 G/DL (ref 11.9–15.1)
IMM GRANULOCYTES # BLD AUTO: 0.17 K/UL (ref 0–0.3)
IMM GRANULOCYTES NFR BLD: 2 %
LACTATE BLDV-SCNC: 1.7 MMOL/L (ref 0.5–2.2)
LYMPHOCYTES NFR BLD: 1.28 K/UL (ref 1.1–3.7)
LYMPHOCYTES RELATIVE PERCENT: 11 % (ref 24–43)
MAGNESIUM SERPL-MCNC: 2.2 MG/DL (ref 1.6–2.4)
MCH RBC QN AUTO: 31.3 PG (ref 25.2–33.5)
MCHC RBC AUTO-ENTMCNC: 30.1 G/DL (ref 28.4–34.8)
MCV RBC AUTO: 103.9 FL (ref 82.6–102.9)
MONOCYTES NFR BLD: 0.91 K/UL (ref 0.1–1.2)
MONOCYTES NFR BLD: 8 % (ref 3–12)
NEUTROPHILS NFR BLD: 76 % (ref 36–65)
NEUTS SEG NFR BLD: 8.57 K/UL (ref 1.5–8.1)
NRBC BLD-RTO: 0.4 PER 100 WBC
PLATELET # BLD AUTO: 257 K/UL (ref 138–453)
PMV BLD AUTO: 9.1 FL (ref 8.1–13.5)
POTASSIUM SERPL-SCNC: 3.8 MMOL/L (ref 3.7–5.3)
RBC # BLD AUTO: 3.04 M/UL (ref 3.95–5.11)
RBC # BLD: ABNORMAL 10*6/UL
RBC # BLD: ABNORMAL 10*6/UL
SODIUM SERPL-SCNC: 140 MMOL/L (ref 136–145)
WBC OTHER # BLD: 11.3 K/UL (ref 3.5–11.3)

## 2024-11-19 PROCEDURE — G0378 HOSPITAL OBSERVATION PER HR: HCPCS

## 2024-11-19 PROCEDURE — 90935 HEMODIALYSIS ONE EVALUATION: CPT

## 2024-11-19 PROCEDURE — 80048 BASIC METABOLIC PNL TOTAL CA: CPT

## 2024-11-19 PROCEDURE — 99222 1ST HOSP IP/OBS MODERATE 55: CPT | Performed by: NURSE PRACTITIONER

## 2024-11-19 PROCEDURE — 99285 EMERGENCY DEPT VISIT HI MDM: CPT

## 2024-11-19 PROCEDURE — 93005 ELECTROCARDIOGRAM TRACING: CPT | Performed by: EMERGENCY MEDICINE

## 2024-11-19 PROCEDURE — 83605 ASSAY OF LACTIC ACID: CPT

## 2024-11-19 PROCEDURE — 83735 ASSAY OF MAGNESIUM: CPT

## 2024-11-19 PROCEDURE — 86705 HEP B CORE ANTIBODY IGM: CPT

## 2024-11-19 PROCEDURE — 86317 IMMUNOASSAY INFECTIOUS AGENT: CPT

## 2024-11-19 PROCEDURE — 36415 COLL VENOUS BLD VENIPUNCTURE: CPT

## 2024-11-19 PROCEDURE — 85025 COMPLETE CBC W/AUTO DIFF WBC: CPT

## 2024-11-19 PROCEDURE — 87340 HEPATITIS B SURFACE AG IA: CPT

## 2024-11-19 PROCEDURE — 6370000000 HC RX 637 (ALT 250 FOR IP): Performed by: NURSE PRACTITIONER

## 2024-11-19 RX ORDER — ROPINIROLE 0.25 MG/1
0.25 TABLET, FILM COATED ORAL NIGHTLY
Status: DISCONTINUED | OUTPATIENT
Start: 2024-11-19 | End: 2024-11-20 | Stop reason: HOSPADM

## 2024-11-19 RX ORDER — ONDANSETRON 2 MG/ML
4 INJECTION INTRAMUSCULAR; INTRAVENOUS EVERY 6 HOURS PRN
Status: DISCONTINUED | OUTPATIENT
Start: 2024-11-19 | End: 2024-11-20 | Stop reason: HOSPADM

## 2024-11-19 RX ORDER — ACETAMINOPHEN 325 MG/1
650 TABLET ORAL EVERY 6 HOURS PRN
Status: DISCONTINUED | OUTPATIENT
Start: 2024-11-19 | End: 2024-11-20 | Stop reason: HOSPADM

## 2024-11-19 RX ORDER — PANTOPRAZOLE SODIUM 40 MG/1
40 TABLET, DELAYED RELEASE ORAL
Status: DISCONTINUED | OUTPATIENT
Start: 2024-11-20 | End: 2024-11-20 | Stop reason: HOSPADM

## 2024-11-19 RX ORDER — HYDROXYZINE HYDROCHLORIDE 10 MG/1
10 TABLET, FILM COATED ORAL 3 TIMES DAILY PRN
Status: DISCONTINUED | OUTPATIENT
Start: 2024-11-19 | End: 2024-11-20 | Stop reason: HOSPADM

## 2024-11-19 RX ORDER — ONDANSETRON 4 MG/1
4 TABLET, ORALLY DISINTEGRATING ORAL EVERY 8 HOURS PRN
Status: DISCONTINUED | OUTPATIENT
Start: 2024-11-19 | End: 2024-11-20 | Stop reason: HOSPADM

## 2024-11-19 RX ORDER — SODIUM CHLORIDE 0.9 % (FLUSH) 0.9 %
10 SYRINGE (ML) INJECTION PRN
Status: DISCONTINUED | OUTPATIENT
Start: 2024-11-19 | End: 2024-11-20 | Stop reason: HOSPADM

## 2024-11-19 RX ORDER — BUMETANIDE 1 MG/1
2 TABLET ORAL DAILY
Status: DISCONTINUED | OUTPATIENT
Start: 2024-11-19 | End: 2024-11-20 | Stop reason: HOSPADM

## 2024-11-19 RX ORDER — ALLOPURINOL 100 MG/1
100 TABLET ORAL DAILY
Status: DISCONTINUED | OUTPATIENT
Start: 2024-11-19 | End: 2024-11-20 | Stop reason: HOSPADM

## 2024-11-19 RX ORDER — VENLAFAXINE HYDROCHLORIDE 75 MG/1
150 CAPSULE, EXTENDED RELEASE ORAL DAILY
Status: DISCONTINUED | OUTPATIENT
Start: 2024-11-19 | End: 2024-11-20 | Stop reason: HOSPADM

## 2024-11-19 RX ORDER — SODIUM CHLORIDE 0.9 % (FLUSH) 0.9 %
5-40 SYRINGE (ML) INJECTION EVERY 12 HOURS SCHEDULED
Status: DISCONTINUED | OUTPATIENT
Start: 2024-11-19 | End: 2024-11-20 | Stop reason: HOSPADM

## 2024-11-19 RX ORDER — ZOLPIDEM TARTRATE 5 MG/1
5 TABLET ORAL NIGHTLY PRN
Status: DISCONTINUED | OUTPATIENT
Start: 2024-11-19 | End: 2024-11-20 | Stop reason: HOSPADM

## 2024-11-19 RX ORDER — AMIODARONE HYDROCHLORIDE 200 MG/1
200 TABLET ORAL DAILY
Status: DISCONTINUED | OUTPATIENT
Start: 2024-11-19 | End: 2024-11-20

## 2024-11-19 RX ORDER — ATORVASTATIN CALCIUM 40 MG/1
40 TABLET, FILM COATED ORAL DAILY
Status: DISCONTINUED | OUTPATIENT
Start: 2024-11-19 | End: 2024-11-20 | Stop reason: HOSPADM

## 2024-11-19 RX ORDER — SODIUM CHLORIDE 9 MG/ML
INJECTION, SOLUTION INTRAVENOUS PRN
Status: DISCONTINUED | OUTPATIENT
Start: 2024-11-19 | End: 2024-11-20 | Stop reason: HOSPADM

## 2024-11-19 RX ORDER — POLYETHYLENE GLYCOL 3350 17 G/17G
17 POWDER, FOR SOLUTION ORAL DAILY PRN
Status: DISCONTINUED | OUTPATIENT
Start: 2024-11-19 | End: 2024-11-20 | Stop reason: HOSPADM

## 2024-11-19 RX ORDER — ACETAMINOPHEN 650 MG/1
650 SUPPOSITORY RECTAL EVERY 6 HOURS PRN
Status: DISCONTINUED | OUTPATIENT
Start: 2024-11-19 | End: 2024-11-20 | Stop reason: HOSPADM

## 2024-11-19 RX ADMIN — ATORVASTATIN CALCIUM 40 MG: 40 TABLET, FILM COATED ORAL at 16:09

## 2024-11-19 ASSESSMENT — PAIN - FUNCTIONAL ASSESSMENT: PAIN_FUNCTIONAL_ASSESSMENT: NONE - DENIES PAIN

## 2024-11-19 ASSESSMENT — ENCOUNTER SYMPTOMS
RESPIRATORY NEGATIVE: 1
EYES NEGATIVE: 1
GASTROINTESTINAL NEGATIVE: 1

## 2024-11-19 NOTE — ED NOTES
The following labs labeled with pt sticker and sent to Lab:     [] Lavender     [] Blue   [] Green/yellow  [x] Drk Green/[place]   [] Pink  [] Red  [] Yellow     [] Blood Cultures     [] COVID-19 swab    [] Rapid   [] Viral Swab  [] Wound Swab    [] Urine Sample  [] Pelvic Cultures

## 2024-11-19 NOTE — ED PROVIDER NOTES
DO at [facility] OR    ARM SURGERY  2011    right arm broken    CARDIAC CATHETERIZATION  [id number] dr martinez    CARDIAC CATHETERIZATION  2016    COLONOSCOPY  2003    COLONOSCOPY  2007    IR TUNNELED CATHETER PLACEMENT GREATER THAN 5 YEARS  2022    IR TUNNELED CATHETER PLACEMENT GREATER THAN 5 YEARS 2022 [facility] SPECIAL PROCEDURES    ORIF ANKLE FRACTURE Right 2022    RIGHT ANKLE OPEN REDUCTION INTERNAL FIXATION    TOTAL KNEE ARTHROPLASTY Bilateral     left may 2013 and right 2013    TUBAL LIGATION         FAMILY HISTORY           Problem Relation Age of Onset    Diabetes Mother     Heart Disease Mother     Osteoporosis Mother     Kidney Disease Father     Prostate Cancer Brother      Family Status   Relation Name Status    Mother      Father      Brother  Alive   No partnership data on file        SOCIAL HISTORY      reports that she quit smoking about 64 years ago. Her smoking use included cigarettes. She started smoking about 65 years ago. She has a 0.3 pack-year smoking history. She has never used smokeless tobacco. She reports that she does not drink alcohol and does not use drugs.    PHYSICAL EXAM       ED Triage Vitals [24 1133]   BP Systolic BP Percentile Diastolic BP Percentile Temp Temp Source Pulse Respirations SpO2   (!) 104/59 -- -- 97.9 °F (36.6 °C) Oral (!) 42 18 96 %      Height Weight - Scale         -- 103.9 kg (229 lb)             Physical Exam  Vitals and nursing note reviewed.   Constitutional:       General: She is not in acute distress.     Appearance: She is well-developed.   HENT:      Head: Normocephalic and atraumatic.      Mouth/Throat:      Mouth: Mucous membranes are moist.   Eyes:      Extraocular Movements: Extraocular movements intact.   Cardiovascular:      Rate and Rhythm: Normal rate and regular rhythm.      Heart sounds: Normal heart sounds.   Pulmonary:      Effort: Pulmonary effort is normal. No respiratory distress.

## 2024-11-19 NOTE — ED NOTES
The following labs labeled with pt sticker and sent to Lab:     [x] Lavender     [x] Blue   [x] Green/yellow  [] Drk Green/Grey   [] Pink  [] Red  [] Yellow     [] Blood Cultures     [] COVID-19 swab    [] Rapid   [] Viral Swab  [] Wound Swab    [] Urine Sample  [] Pelvic Cultures

## 2024-11-20 VITALS
RESPIRATION RATE: 18 BRPM | HEART RATE: 66 BPM | TEMPERATURE: 97.9 F | WEIGHT: 218.2 LBS | HEIGHT: 66 IN | OXYGEN SATURATION: 96 % | SYSTOLIC BLOOD PRESSURE: 137 MMHG | BODY MASS INDEX: 35.07 KG/M2 | DIASTOLIC BLOOD PRESSURE: 61 MMHG

## 2024-11-20 LAB
ANION GAP SERPL CALCULATED.3IONS-SCNC: 12 MMOL/L (ref 9–16)
BUN SERPL-MCNC: 12 MG/DL (ref 8–23)
CALCIUM SERPL-MCNC: 9.4 MG/DL (ref 8.8–10.2)
CHLORIDE SERPL-SCNC: 100 MMOL/L (ref 98–107)
CO2 SERPL-SCNC: 26 MMOL/L (ref 20–31)
CREAT SERPL-MCNC: 3.8 MG/DL (ref 0.5–0.9)
GFR, ESTIMATED: 12 ML/MIN/1.73M2
GLUCOSE BLD-MCNC: 114 MG/DL (ref 65–105)
GLUCOSE SERPL-MCNC: 100 MG/DL (ref 82–115)
HBV CORE IGM SERPL QL IA: NONREACTIVE
HBV SURFACE AB SERPL IA-ACNC: <3.5 MIU/ML
HBV SURFACE AG SERPL QL IA: NONREACTIVE
INR PPP: 1.5
POTASSIUM SERPL-SCNC: 3.6 MMOL/L (ref 3.7–5.3)
PROTHROMBIN TIME: 18.5 SEC (ref 11.5–14.2)
SODIUM SERPL-SCNC: 138 MMOL/L (ref 136–145)

## 2024-11-20 PROCEDURE — 97116 GAIT TRAINING THERAPY: CPT

## 2024-11-20 PROCEDURE — 6370000000 HC RX 637 (ALT 250 FOR IP): Performed by: NURSE PRACTITIONER

## 2024-11-20 PROCEDURE — 97535 SELF CARE MNGMENT TRAINING: CPT

## 2024-11-20 PROCEDURE — 80048 BASIC METABOLIC PNL TOTAL CA: CPT

## 2024-11-20 PROCEDURE — 2580000003 HC RX 258: Performed by: NURSE PRACTITIONER

## 2024-11-20 PROCEDURE — 97167 OT EVAL HIGH COMPLEX 60 MIN: CPT

## 2024-11-20 PROCEDURE — 97530 THERAPEUTIC ACTIVITIES: CPT

## 2024-11-20 PROCEDURE — 36415 COLL VENOUS BLD VENIPUNCTURE: CPT

## 2024-11-20 PROCEDURE — 82947 ASSAY GLUCOSE BLOOD QUANT: CPT

## 2024-11-20 PROCEDURE — 97162 PT EVAL MOD COMPLEX 30 MIN: CPT

## 2024-11-20 PROCEDURE — 6370000000 HC RX 637 (ALT 250 FOR IP): Performed by: INTERNAL MEDICINE

## 2024-11-20 PROCEDURE — G0378 HOSPITAL OBSERVATION PER HR: HCPCS

## 2024-11-20 PROCEDURE — 97110 THERAPEUTIC EXERCISES: CPT

## 2024-11-20 PROCEDURE — 6360000002 HC RX W HCPCS

## 2024-11-20 PROCEDURE — 85610 PROTHROMBIN TIME: CPT

## 2024-11-20 PROCEDURE — 99232 SBSQ HOSP IP/OBS MODERATE 35: CPT | Performed by: INTERNAL MEDICINE

## 2024-11-20 PROCEDURE — 96374 THER/PROPH/DIAG INJ IV PUSH: CPT

## 2024-11-20 RX ORDER — AMIODARONE HYDROCHLORIDE 200 MG/1
100 TABLET ORAL DAILY
Status: DISCONTINUED | OUTPATIENT
Start: 2024-11-21 | End: 2024-11-20 | Stop reason: HOSPADM

## 2024-11-20 RX ORDER — HYDROXYZINE HYDROCHLORIDE 10 MG/1
10 TABLET, FILM COATED ORAL 3 TIMES DAILY PRN
Qty: 30 TABLET | Refills: 1 | Status: SHIPPED | OUTPATIENT
Start: 2024-11-20 | End: 2024-12-10

## 2024-11-20 RX ORDER — TIZANIDINE 2 MG/1
2 TABLET ORAL DAILY PRN
Status: DISCONTINUED | OUTPATIENT
Start: 2024-11-20 | End: 2024-11-20 | Stop reason: HOSPADM

## 2024-11-20 RX ORDER — AMIODARONE HYDROCHLORIDE 200 MG/1
100 TABLET ORAL DAILY
Qty: 90 TABLET | Refills: 1 | Status: SHIPPED | OUTPATIENT
Start: 2024-11-20

## 2024-11-20 RX ORDER — CARVEDILOL 3.12 MG/1
3.12 TABLET ORAL 2 TIMES DAILY WITH MEALS
Status: DISCONTINUED | OUTPATIENT
Start: 2024-11-20 | End: 2024-11-20 | Stop reason: HOSPADM

## 2024-11-20 RX ORDER — CARVEDILOL 3.12 MG/1
3.12 TABLET ORAL 2 TIMES DAILY WITH MEALS
Qty: 60 TABLET | Refills: 3 | Status: SHIPPED | OUTPATIENT
Start: 2024-11-20

## 2024-11-20 RX ORDER — MIRTAZAPINE 7.5 MG/1
7.5 TABLET, FILM COATED ORAL NIGHTLY
Status: DISCONTINUED | OUTPATIENT
Start: 2024-11-20 | End: 2024-11-20 | Stop reason: HOSPADM

## 2024-11-20 RX ORDER — LANOLIN ALCOHOL/MO/W.PET/CERES
400 CREAM (GRAM) TOPICAL DAILY
Status: DISCONTINUED | OUTPATIENT
Start: 2024-11-20 | End: 2024-11-20 | Stop reason: HOSPADM

## 2024-11-20 RX ORDER — HYDRALAZINE HYDROCHLORIDE 20 MG/ML
10 INJECTION INTRAMUSCULAR; INTRAVENOUS EVERY 6 HOURS PRN
Status: DISCONTINUED | OUTPATIENT
Start: 2024-11-20 | End: 2024-11-20 | Stop reason: HOSPADM

## 2024-11-20 RX ADMIN — HYDRALAZINE HYDROCHLORIDE 10 MG: 20 INJECTION INTRAMUSCULAR; INTRAVENOUS at 01:38

## 2024-11-20 RX ADMIN — ROPINIROLE HYDROCHLORIDE 0.25 MG: 0.25 TABLET, FILM COATED ORAL at 00:04

## 2024-11-20 RX ADMIN — AMIODARONE HYDROCHLORIDE 200 MG: 200 TABLET ORAL at 08:53

## 2024-11-20 RX ADMIN — SODIUM CHLORIDE, PRESERVATIVE FREE 10 ML: 5 INJECTION INTRAVENOUS at 00:04

## 2024-11-20 RX ADMIN — ZOLPIDEM TARTRATE 5 MG: 5 TABLET ORAL at 00:04

## 2024-11-20 RX ADMIN — HYDROXYZINE HYDROCHLORIDE 10 MG: 10 TABLET ORAL at 00:47

## 2024-11-20 RX ADMIN — APIXABAN 5 MG: 5 TABLET, FILM COATED ORAL at 08:53

## 2024-11-20 RX ADMIN — ACETAMINOPHEN 650 MG: 325 TABLET ORAL at 06:52

## 2024-11-20 RX ADMIN — SODIUM CHLORIDE, PRESERVATIVE FREE 10 ML: 5 INJECTION INTRAVENOUS at 08:54

## 2024-11-20 RX ADMIN — Medication 12.5 MG: at 08:58

## 2024-11-20 RX ADMIN — ALLOPURINOL 100 MG: 100 TABLET ORAL at 08:53

## 2024-11-20 RX ADMIN — VENLAFAXINE HYDROCHLORIDE 150 MG: 75 CAPSULE, EXTENDED RELEASE ORAL at 08:53

## 2024-11-20 RX ADMIN — BUMETANIDE 2 MG: 1 TABLET ORAL at 08:53

## 2024-11-20 RX ADMIN — ATORVASTATIN CALCIUM 40 MG: 40 TABLET, FILM COATED ORAL at 08:53

## 2024-11-20 RX ADMIN — PANTOPRAZOLE SODIUM 40 MG: 40 TABLET, DELAYED RELEASE ORAL at 06:52

## 2024-11-20 RX ADMIN — APIXABAN 5 MG: 5 TABLET, FILM COATED ORAL at 00:04

## 2024-11-20 ASSESSMENT — PAIN DESCRIPTION - DESCRIPTORS: DESCRIPTORS: SORE

## 2024-11-20 ASSESSMENT — PAIN DESCRIPTION - ORIENTATION: ORIENTATION: RIGHT;LEFT

## 2024-11-20 ASSESSMENT — PAIN DESCRIPTION - LOCATION: LOCATION: HIP

## 2024-11-20 ASSESSMENT — PAIN - FUNCTIONAL ASSESSMENT: PAIN_FUNCTIONAL_ASSESSMENT: PREVENTS OR INTERFERES SOME ACTIVE ACTIVITIES AND ADLS

## 2024-11-20 ASSESSMENT — PAIN SCALES - GENERAL: PAINLEVEL_OUTOF10: 7

## 2024-11-20 NOTE — DISCHARGE SUMMARY
[facility]  Office: [phone]  Davonte Capps DO, Tavares Block DO, Brent Tamayo DO, Wilder Peralta DO, Dilan Villanueva MD, Joanna Blanco MD, Eric Hammer MD, Saniya Kwon MD,  Carrillo Hendricks MD, Sivakumar Chapin MD, Roney Barajas MD,  Jeronimo Castillo DO, Jesus Roberts MD, Carlos Coley MD, Rey Capps DO, Teresa Braxton MD,  Charles Howell DO, Rebecca Sousa MD, Carly Byrd MD, Maria C Vazquez MD, Susi Baird MD,  Joel Tenorio MD, Ruma Monet MD, Lisseth East MD, Beto Mazariegos MD, Davion Pratt MD, Chavo Warren MD, Teja Philip DO, Chavez Mehta MD, Shirley Waterhouse, CNP,  Rupal Benson CNP, Teja Pedersen CNP,  Brandy Martino, MABLE, Radha Kohli, CNP, Lashonda Lambert, CNP, Chanel Christianson, CNP, Danielle Adame, CNP, Sylvia Mcknight PA-C, Mia Mera PA-C, Sugey Tarango, CHICHO, Angelica Webster, CHICHO,  Gemma Horne, CNP, aLzara Grove, CNP,  Zonia Moran, CNP, Cherie Camacho, CNP         [facility]   IN-PATIENT SERVICE   [facility]    Discharge Summary     Patient ID: Asya Sharp  :  1945   MRN: [id number]     ACCOUNT:  [id number]   Patient's PCP: Kristin Stone APRN - CNP  Admit Date: 2024   Discharge Date: 2024     Length of Stay: 0  Code Status:  Full Code  Admitting Physician: Brent Tamayo DO  Discharge Physician: Brent Tamayo DO     Active Discharge Diagnoses:     Hospital Problem Lists:  Principal Problem:    Bradycardia  Active Problems:    Primary hypertension    GERD (gastroesophageal reflux disease)    ESRD on hemodialysis (HCC)  Resolved Problems:    * No resolved hospital problems. *      Admission Condition:  fair     Discharged Condition: stable    Hospital Stay:     Hospital Course:  Asya Sharp is a 79 y.o. female who was admitted for the management of  Bradycardia , presented to ER with Bradycardia (Pt sent from Dialysis for bradycardia and hypotension. EMS reports systolic 87 
56

## 2024-11-20 NOTE — PLAN OF CARE
Patient admitted for hypotension and bradycardia.  Pt has dialysis [facility].  Pt dialyzed at the start of this shift. 2.9 L removed.  Pt hypertensive with -170's during dialysis.   BP still elevated after dialysis, NP notified. PRN hydralazine ordered. See MAR.  Pt sinus jaspreet to normal sinus on tele. HR 50-60's.  Up with 1 assist, walker.  Pt denies pain.  Safety maintained - bed locked, in lowest position, side rails up x2, alarm activated. Call light placed within reach.    Problem: Chronic Conditions and Co-morbidities  Goal: Patient's chronic conditions and co-morbidity symptoms are monitored and maintained or improved  Outcome: Progressing     Problem: Discharge Planning  Goal: Discharge to home or other facility with appropriate resources  Outcome: Progressing     Problem: Safety - Adult  Goal: Free from fall injury  Outcome: Progressing     Problem: ABCDS Injury Assessment  Goal: Absence of physical injury  Outcome: Progressing     Problem: Cardiovascular - Adult  Goal: Maintains optimal cardiac output and hemodynamic stability  Outcome: Progressing

## 2024-11-20 NOTE — H&P
Tenderness: There is no abdominal tenderness.      Hernia: No hernia is present.      Comments: Hypoactive bowel sound   Musculoskeletal:         General: Normal range of motion.      Cervical back: Normal range of motion and neck supple.      Right lower leg: No edema.      Left lower leg: No edema.   Skin:     General: Skin is warm and dry.      Coloration: Skin is not jaundiced.      Findings: Bruising present. No erythema, lesion or rash.      Comments: Bruising to left upper extremity   Neurological:      General: No focal deficit present.      Mental Status: She is alert and oriented to person, place, and time.   Psychiatric:         Mood and Affect: Mood normal.         Behavior: Behavior normal.         Thought Content: Thought content normal.         Judgment: Judgment normal.         Investigations:      Laboratory Testing:  Recent Results (from the past 24 hour(s))   EKG 12 Lead    Collection Time: 11/19/24 11:27 AM   Result Value Ref Range    Ventricular Rate 42 BPM    Atrial Rate 42 BPM    P-R Interval 216 ms    QRS Duration 94 ms    Q-T Interval 400 ms    QTc Calculation (Bazett) 334 ms    P Axis 96 degrees    R Axis 27 degrees    T Axis 31 degrees   CBC with Auto Differential    Collection Time: 11/19/24 11:35 AM   Result Value Ref Range    WBC 11.3 3.5 - 11.3 k/uL    RBC 3.04 (L) 3.95 - 5.11 m/uL    Hemoglobin 9.5 (L) 11.9 - 15.1 g/dL    Hematocrit 31.6 (L) 36.3 - 47.1 %    .9 (H) 82.6 - 102.9 fL    MCH 31.3 25.2 - 33.5 pg    MCHC 30.1 28.4 - 34.8 g/dL    RDW 15.6 (H) 11.8 - 14.4 %    Platelets 257 138 - 453 k/uL    MPV 9.1 8.1 - 13.5 fL    NRBC Automated 0.4 (H) 0.0 per 100 WBC    Neutrophils % 76 (H) 36 - 65 %    Lymphocytes % 11 (L) 24 - 43 %    Monocytes % 8 3 - 12 %    Eosinophils % 2 1 - 4 %    Basophils % 1 0 - 2 %    Immature Granulocytes % 2 (H) 0 %    Neutrophils Absolute 8.57 (H) 1.50 - 8.10 k/uL    Lymphocytes Absolute 1.28 1.10 - 3.70 k/uL    Monocytes Absolute 0.91 0.10 - 1.20

## 2024-11-20 NOTE — PROGRESS NOTES
HEMODIALYSIS PRE-TREATMENT NOTE    Patient Identifiers prior to treatment: name  mrn    Isolation Required: no                      Isolation Type: Standard precautions        (please document if patient is being managed as a PUI/COVID-19 patient)        Hepatitis status:                           Date Drawn                             Result  Hepatitis B Surface Antigen 2024     Results pending                     Hepatitis B Surface Antibody 24 Results pending         Hepatitis B Core Antibody 24 Results pending          How was Hepatitis Status verified: fax requested from [facility] not received so labs drawn      Was a copy of the labs you documented provided to facility for the patient's chart: fax requested from [facility] not received so labs drawn     Hemodialysis orders verified: yes    Access Within normal limits ( I.e. s/s of infection,...): yes      Pre-Assessment completed: yes     Pre-dialysis report received from: Lisbeth Lacey RN                       Time: 5580     
CLINICAL PHARMACY NOTE: MEDS TO BEDS    Total # of Prescriptions Filled: 2   The following medications were delivered to the patient:  Carvedilol 3.125mg  Hydroxyzine 10mg    Additional Documentation:    Dr eliased kunal   
HEMODIALYSIS POST TREATMENT NOTE    Treatment time ordered: 3 houts    Actual treatment time: 3 hours     UltraFiltration Goal: 2000  UltraFiltration Removed: 2900      Pre Treatment weight: 101.7 kg   Post Treatment weight: 98.0 kg  Estimated Dry Weight: 100 kg     Access used:      Internal Access: AVF R. Upper arm        Access Flow: good      Sign and symptoms of infection: no       If YES: n/a    Medications or blood products given: none    Regular outpatient schedule: TTS     Summary of response to treatment: Tolerated treatment well UF goal reached 2400 ml of fluid removed.  Blood pressure elevated during treatment primary RN notified of blood pressures.        Explain if orders NOT met, was physician notified:n/a      ACES flowsheet faxed to patient unit/ placed in patient chart: yes    Post assessment completed: yes    Report given to: Lisbeth Lacey RN       * Intra-treatment documented Safety Checks include the followin) Access and face visible at all times.     2) All connections and blood lines are secure with no kinks.     3) NVL alarm engaged.     4) Hemosafe device applied (if applicable).     5) No collapse of Arterial or Venous blood chambers.     6) All blood lines / pump segments in the air detectors.    
On call nephrologist paged and perfect service message sent requesting dialysis orders.    
Physical Therapy  Facility/Department: [facility] CARE  Physical Therapy Initial Assessment    Name: Asya Sharp  : 1945  MRN: [id number]  Date of Service: 2024    Discharge Recommendations:  Patient would benefit from continued therapy after discharge, Patient able to tolerate 3 hours of therapy per day, Therapy recommended at discharge, 24 hour supervision or assist    Pt presented to ED on 24 after she was found to be bradycardic at dialysis.  She denied any symptoms.  No chest pain, shortness of breath, lightheadedness, dizziness, nausea.  She continues to feel well.  She states they did not do very much of her dialysis.  Her last dialysis was Saturday.       Pt admitted for further medical management of bradycardia    RN reports patient is medically stable for therapy treatment this date.    Chart reviewed prior to treatment and patient is agreeable for therapy.      Patient Diagnosis(es): The encounter diagnosis was Bradycardia.  Past Medical History:  has a past medical history of Anemia, Arthritis, Atrial fibrillation (HCC), Depression, Diverticulosis, DM (diabetes mellitus) (HCC), Erythropenia, ESRD (end stage renal disease) on dialysis (HCC), Fibromyalgia, HTN (hypertension), Hyperlipidemia, Kidney stone, Morbid obesity due to excess calories, Osteopenia, Seasonal allergies, and Sleep apnea.  Past Surgical History:  has a past surgical history that includes Colonoscopy (2003); Colonoscopy (2007); Tubal ligation; Arm Surgery (2011); Total knee arthroplasty (Bilateral); Cardiac catheterization ([id number] dr martinez); Cardiac catheterization (2016); ORIF ankle fracture (Right, 2022); Ankle fracture surgery (Right, 2022); and IR TUNNELED CVC PLACE WO SQ PORT/PUMP > 5 YEARS (2022).    Assessment  Body Structures, Functions, Activity Limitations Requiring Skilled Therapeutic Intervention: Decreased functional mobility ;Decreased ADL 
Pt discharged to home, left via private vehicle with . Belonging gathered and taken with pt, IV removed, discharge instruction given, pt verbalizes understanding. All questions and concerns addressed. Safety maintained.    
3.8*   MG 2.2  --    ANIONGAP 11 12   LABGLOM 6* 12*   CALCIUM 9.3 9.4     Recent Labs     11/20/24  0757   POCGLU 114*     ABG:  Lab Results   Component Value Date/Time    FIO2 INFORMATION NOT PROVIDED 12/05/2022 06:40 PM     Lab Results   Component Value Date/Time    SPECIAL 5 ML RIGHT AC 04/02/2022 09:09 AM     Lab Results   Component Value Date/Time    CULTURE Unable to perform testing: No specimen received. 04/02/2022 10:46 PM       Radiology:  No results found.    Physical Examination:        General appearance:  alert, cooperative and no distress  Mental Status:  oriented to person, place and time and normal affect  Lungs:  clear to auscultation bilaterally, normal effort  Heart:  regular rate and rhythm, no murmur  Abdomen:  soft, nontender, nondistended, normal bowel sounds, no masses, hepatomegaly, splenomegaly  Extremities:  no edema, redness, tenderness in the calves  Skin:  no gross lesions, rashes, induration    Assessment:        Hospital Problems             Last Modified POA    * (Principal) Bradycardia 11/19/2024 Yes    Primary hypertension 11/19/2024 Yes    GERD (gastroesophageal reflux disease) 11/19/2024 Yes    ESRD on hemodialysis (HCC) 11/19/2024 Yes       Plan:        Stop metoprolol, start carvedilol  Dialysis per nephrology  Antihypertensives as ordered  Activity as tolerated, PT and OT  Home today    Brent Tamayo DO  11/20/2024  8:27 AM   
Orientation Status: Within Functional Limits  Orientation Level: Oriented X4  Perception  Overall Perceptual Status: WFL               Education Given To: Patient  Education Provided: Role of Therapy;Plan of Care;Transfer Training;Fall Prevention Strategies;Mobility Training;Energy Conservation  Education Provided Comments: pressure relief, safety in function, call light use, edema mgt tech, benefits of being up OOB/recommendations for continued therapy, pursed lip breathing, proper bed mob tech, postural control  Education Method: Demonstration;Verbal  Barriers to Learning: Cognition  Education Outcome: Verbalized understanding;Continued education needed                                                    AM-PAC - ADL   14           Goals  Short Term Goals  Time Frame for Short Term Goals: by discharge, pt to demo  Short Term Goal 1: bed mob tasks with use of rail as needed to SUP.  Short Term Goal 2: increase BUE strength by a 1/2 grade to assist with ADL's/functional tasks and be I with BUE HEP with use of handouts.  Short Term Goal 3: UB ADL to set up and LB ADL to min assist with use of AD/AE.  Short Term Goal 4: ADL transfers and functional mob with AD to SUP level.  Short Term Goal 5: toileting tasks with use of grab bar/AD to SUP.  Long Term Goals  Long Term Goal 1: Pt to stand with SUP and AD octaviano > 6 mins as able to reduce falls in function.  Long Term Goal 2: Pt/caregivers to be I with pressure relief, edema mgt tech, EC/WS and fall prevention tech, DME/AE recommendations with use of handouts.  Patient Goals   Patient goals : Pt states she wants to get home today!      Therapy Time   Individual Concurrent Group Co-treatment   Time In 0739 (plus 10 min chart review/nursing communication)         Time Out 0843         Minutes 64=74 mins total          Timed Code Treatment Minutes: 59 Minutes       Lucina Luis, OT

## 2024-11-20 NOTE — CONSULTS
[facility]    Consult                        Today's Date: 11/20/2024  Patient Name: Asya Sharp  Date of admission: 11/19/2024 11:24 AM  Patient's age: 79 y.o., 1945  Admission Dx: Bradycardia [R00.1]    Reason for Consult:  Cardiac evaluation    Requesting Physician: Brent Tamayo DO    CHIEF COMPLAINT:  bradycardia     History Obtained From:  patient, electronic medical record    HISTORY OF PRESENT ILLNESS:       PT admitted for bradycardia and hypotensive at HD.   PT seen and examined.  Pt HR and BP now stable. Pt states that she needs to go home.  She takes all of her medications as scheduled.     Past Medical History:   has a past medical history of Anemia, Arthritis, Atrial fibrillation (HCC), Depression, Diverticulosis, DM (diabetes mellitus) (HCC), Erythropenia, ESRD (end stage renal disease) on dialysis (HCC), Fibromyalgia, HTN (hypertension), Hyperlipidemia, Kidney stone, Morbid obesity due to excess calories, Osteopenia, Seasonal allergies, and Sleep apnea.    Past Surgical History:   has a past surgical history that includes Colonoscopy (01/01/2003); Colonoscopy (01/01/2007); Tubal ligation; Arm Surgery (01/01/2011); Total knee arthroplasty (Bilateral); Cardiac catheterization ([id number] dr martinez); Cardiac catheterization (05/16/2016); ORIF ankle fracture (Right, 12/06/2022); Ankle fracture surgery (Right, 12/6/2022); and IR TUNNELED CVC PLACE WO SQ PORT/PUMP > 5 YEARS (12/19/2022).     Home Medications:    Prior to Admission medications    Medication Sig Start Date End Date Taking? Authorizing Provider   carvedilol (COREG) 3.125 MG tablet Take 1 tablet by mouth 2 times daily (with meals) 11/20/24  Yes Brent Tamayo DO   hydrOXYzine HCl (ATARAX) 10 MG tablet Take 1 tablet by mouth 3 times daily as needed for Itching 11/20/24 12/10/24 Yes Brent Tamayo DO   acetaminophen-codeine (TYLENOL #3) 300-30 MG per tablet Take 1 tablet by mouth every 8 hours as needed for 
Frequency of Social Gatherings with Friends and Family: Twice a week     Attends Yazdanism Services: Never     Active Member of Clubs or Organizations: No     Attends Club or Organization Meetings: Never     Marital Status:    Intimate Partner Violence: Unknown (2024)    [facility]    [place] [facility]     Fear of Current or Ex-Partner: Not on file     Emotionally Abused: Not on file     Physically Abused: Not on file     Sexually Abused: Not on file     Physically or Sexually Abused: Not on file   Housing Stability: Low Risk  (2024)    Housing Stability Vital Sign     Unable to Pay for Housing in the Last Year: No     Number of Times Moved in the Last Year: 0     Homeless in the Last Year: No       Family History:   Family History   Problem Relation Age of Onset    Diabetes Mother     Heart Disease Mother     Osteoporosis Mother     Kidney Disease Father     Prostate Cancer Brother        Review of Systems:    Constitutional: No fever, no chills, no lethargy, no weakness.  HEENT:  No headache, otalgia, itchy eyes, nasal discharge or sore throat.  Cardiac:  No chest pain, dyspnea, orthopnea or PND.  Chest:              No cough, phlegm or wheezing.  Abdomen:  No abdominal pain, nausea or vomiting.  Neuro:  No focal weakness, abnormal movements orseizure like activity.  Skin:   No rashes, no itching.  :   No hematuria, no pyuria, no dysuria, no flank pain.  Extremities:  No swelling or joint pains.  ROS was otherwise negative except as mentioned in the [place].     Objective:  Constitutional:    CURRENT TEMPERATURE:  Temp: 98.4 °F (36.9 °C)  MAXIMUM TEMPERATURE OVER 24HRS:  Temp (24hrs), Av.1 °F (36.7 °C), Min:97.8 °F (36.6 °C), Max:98.4 °F (36.9 °C)    CURRENT RESPIRATORY RATE:  Respirations: 18  CURRENT PULSE:  Pulse: 65  CURRENT BLOOD PRESSURE:  BP: (!) 167/69  24HR BLOOD PRESSURE RANGE:  Systolic (24hrs), Av , Min:104 , Max:180

## 2024-11-20 NOTE — PLAN OF CARE
PT has been resting comfortably in her bed for most of he morning, but did get up to chair for breakfast.  Amiodarone and Metoprolol given this morning- HR 59.  Plan for discharge.  All questions and concerns addressed.  Bed is locked and in the lowest position with the call light in reach. Safety maintained.     Problem: Chronic Conditions and Co-morbidities  Goal: Patient's chronic conditions and co-morbidity symptoms are monitored and maintained or improved  11/20/2024 1054 by Julia Man RN  Outcome: Progressing  Flowsheets (Taken 11/20/2024 1028)  Care Plan - Patient's Chronic Conditions and Co-Morbidity Symptoms are Monitored and Maintained or Improved: Monitor and assess patient's chronic conditions and comorbid symptoms for stability, deterioration, or improvement     Problem: Discharge Planning  Goal: Discharge to home or other facility with appropriate resources  11/20/2024 1054 by Julia Man RN  Outcome: Progressing  Flowsheets (Taken 11/20/2024 1028)  Discharge to home or other facility with appropriate resources: Identify barriers to discharge with patient and caregiver     Problem: Safety - Adult  Goal: Free from fall injury  11/20/2024 1054 by Julia Man RN  Outcome: Progressing

## 2024-11-20 NOTE — CARE COORDINATION
Case Management Assessment  Initial Evaluation    Date/Time of Evaluation: 11/20/2024 11:22 AM  Assessment Completed by: Gudelia Norris    If patient is discharged prior to next notation, then this note serves as note for discharge by case management.    Patient Name: Asya Sharp                   YOB: 1945  Diagnosis: Bradycardia [R00.1]                   Date / Time: 11/19/2024 11:24 AM    Patient Admission Status: Observation   Readmission Risk (Low < 19, Mod (19-27), High > 27): Readmission Risk Score: 16.9    Current PCP: Kristin Stone APRN - CNP  PCP verified by CM? (P) Yes    Chart Reviewed: Yes      History Provided by: (P) Patient  Patient Orientation: (P) Alert and Oriented    Patient Cognition: (P) Alert    Hospitalization in the last 30 days (Readmission):  No    If yes, Readmission Assessment in  Navigator will be completed.    Advance Directives:      Code Status: Full Code   Patient's Primary Decision Maker is: (P) Legal Next of Kin    Primary Decision Maker: Mac Sharp - Spouse - [phone]    Secondary Decision Maker: Natalia Sharp - Child - [phone]    Discharge Planning:    Patient lives with: (P) Spouse/Significant Other Type of Home: (P) House  Primary Care Giver: (P) Self  Patient Support Systems include: (P) Spouse/Significant Other, Children   Current Financial resources: (P) Medicare  Current community resources: (P) Other (Comment) (Dialysis, [facility] on [address] at 10 am)  Current services prior to admission: (P) Durable Medical Equipment            Current DME: (P) Walker, Cane, Crutches, Other (Comment) (HD, [facility], grab bars, raised toilet)            Type of Home Care services:  (P) None    ADLS  Prior functional level: (P) Assistance with the following:, Shopping, Housework, Cooking  Current functional level: (P) Assistance with the following:, Shopping, Housework, Cooking    PT AM-PAC: 19 /24  OT AM-PAC: 14 /24    Family can provide assistance at DC:

## 2024-11-21 LAB
EKG ATRIAL RATE: 42 BPM
EKG P AXIS: 96 DEGREES
EKG P-R INTERVAL: 216 MS
EKG Q-T INTERVAL: 400 MS
EKG QRS DURATION: 94 MS
EKG QTC CALCULATION (BAZETT): 334 MS
EKG R AXIS: 27 DEGREES
EKG T AXIS: 31 DEGREES
EKG VENTRICULAR RATE: 42 BPM

## 2024-12-02 ENCOUNTER — HOSPITAL ENCOUNTER (OUTPATIENT)
Dept: MAMMOGRAPHY | Age: 79
Discharge: HOME | End: 2024-12-04
Payer: MEDICARE

## 2024-12-02 VITALS — BODY MASS INDEX: 35.36 KG/M2 | WEIGHT: 220 LBS | HEIGHT: 66 IN

## 2024-12-02 DIAGNOSIS — F41.1 GAD (GENERALIZED ANXIETY DISORDER): ICD-10-CM

## 2024-12-02 DIAGNOSIS — M79.7 FIBROMYALGIA: ICD-10-CM

## 2024-12-02 DIAGNOSIS — Z12.31 VISIT FOR SCREENING MAMMOGRAM: ICD-10-CM

## 2024-12-02 PROCEDURE — 77063 BREAST TOMOSYNTHESIS BI: CPT

## 2024-12-02 RX ORDER — MIRTAZAPINE 7.5 MG/1
7.5 TABLET, FILM COATED ORAL NIGHTLY
Qty: 90 TABLET | Refills: 1 | OUTPATIENT
Start: 2024-12-02

## 2024-12-02 RX ORDER — TIZANIDINE 2 MG/1
2 TABLET ORAL DAILY PRN
Qty: 30 TABLET | Refills: 0 | Status: SHIPPED | OUTPATIENT
Start: 2024-12-02

## 2024-12-02 NOTE — TELEPHONE ENCOUNTER
She's seeing Psych per prev notes, Remeron / all mood medications need to come from them. I am not comfortable managing the meds she was on by previous PCP for mental health [place] in light of her being on dialysis for safety purposes this all needs to be done by Psych/ Nephro

## 2024-12-02 NOTE — TELEPHONE ENCOUNTER
The patient states that she is no longer seeing the previous physician that prescribed the Remeron.     Asya Sharp is calling to request a refill on the following medication(s):    Medication Request:  Requested Prescriptions     Pending Prescriptions Disp Refills    tiZANidine (ZANAFLEX) 2 MG tablet 30 tablet 0     Sig: Take 1 tablet by mouth daily as needed (muscle spasms)    mirtazapine (REMERON) 7.5 MG tablet 90 tablet 1     Sig: Take 1 tablet by mouth nightly       Last Visit Date (If Applicable):  8/23/2024    Next Visit Date:    12/9/2024

## 2024-12-05 NOTE — TELEPHONE ENCOUNTER
Left a voicemail for the patient advising that Kristin did approve one of her medications, but would not be prescribing the other and she can call back so we can further explain.

## 2024-12-09 ENCOUNTER — OFFICE VISIT (OUTPATIENT)
Dept: FAMILY MEDICINE CLINIC | Age: 79
End: 2024-12-09
Payer: MEDICARE

## 2024-12-09 VITALS
SYSTOLIC BLOOD PRESSURE: 105 MMHG | HEART RATE: 60 BPM | HEIGHT: 66 IN | BODY MASS INDEX: 35.36 KG/M2 | WEIGHT: 220 LBS | DIASTOLIC BLOOD PRESSURE: 49 MMHG | OXYGEN SATURATION: 96 % | TEMPERATURE: 97.3 F

## 2024-12-09 DIAGNOSIS — Z99.2 ESRD ON HEMODIALYSIS (HCC): ICD-10-CM

## 2024-12-09 DIAGNOSIS — G47.33 OSA (OBSTRUCTIVE SLEEP APNEA): ICD-10-CM

## 2024-12-09 DIAGNOSIS — N18.6 ESRD ON HEMODIALYSIS (HCC): ICD-10-CM

## 2024-12-09 DIAGNOSIS — Z09 HOSPITAL DISCHARGE FOLLOW-UP: ICD-10-CM

## 2024-12-09 DIAGNOSIS — D63.1 ANEMIA OF CHRONIC RENAL FAILURE, STAGE 5 (HCC): ICD-10-CM

## 2024-12-09 DIAGNOSIS — I48.91 ATRIAL FIBRILLATION, UNSPECIFIED TYPE (HCC): ICD-10-CM

## 2024-12-09 DIAGNOSIS — Z91.81 AT MODERATE RISK FOR FALL: ICD-10-CM

## 2024-12-09 DIAGNOSIS — I10 PRIMARY HYPERTENSION: ICD-10-CM

## 2024-12-09 DIAGNOSIS — G47.00 INSOMNIA, UNSPECIFIED TYPE: ICD-10-CM

## 2024-12-09 DIAGNOSIS — Z91.89 AT RISK FOR POLYPHARMACY: ICD-10-CM

## 2024-12-09 DIAGNOSIS — R00.1 BRADYCARDIA: Primary | ICD-10-CM

## 2024-12-09 DIAGNOSIS — I95.2 HYPOTENSION DUE TO DRUGS: ICD-10-CM

## 2024-12-09 DIAGNOSIS — N18.5 ANEMIA OF CHRONIC RENAL FAILURE, STAGE 5 (HCC): ICD-10-CM

## 2024-12-09 PROCEDURE — G8427 DOCREV CUR MEDS BY ELIG CLIN: HCPCS | Performed by: NURSE PRACTITIONER

## 2024-12-09 PROCEDURE — 3078F DIAST BP <80 MM HG: CPT | Performed by: NURSE PRACTITIONER

## 2024-12-09 PROCEDURE — G8400 PT W/DXA NO RESULTS DOC: HCPCS | Performed by: NURSE PRACTITIONER

## 2024-12-09 PROCEDURE — 1159F MED LIST DOCD IN RCRD: CPT | Performed by: NURSE PRACTITIONER

## 2024-12-09 PROCEDURE — 1160F RVW MEDS BY RX/DR IN RCRD: CPT | Performed by: NURSE PRACTITIONER

## 2024-12-09 PROCEDURE — 1123F ACP DISCUSS/DSCN MKR DOCD: CPT | Performed by: NURSE PRACTITIONER

## 2024-12-09 PROCEDURE — G8484 FLU IMMUNIZE NO ADMIN: HCPCS | Performed by: NURSE PRACTITIONER

## 2024-12-09 PROCEDURE — 3074F SYST BP LT 130 MM HG: CPT | Performed by: NURSE PRACTITIONER

## 2024-12-09 PROCEDURE — 99215 OFFICE O/P EST HI 40 MIN: CPT | Performed by: NURSE PRACTITIONER

## 2024-12-09 PROCEDURE — 1111F DSCHRG MED/CURRENT MED MERGE: CPT | Performed by: NURSE PRACTITIONER

## 2024-12-09 PROCEDURE — 1036F TOBACCO NON-USER: CPT | Performed by: NURSE PRACTITIONER

## 2024-12-09 PROCEDURE — 1090F PRES/ABSN URINE INCON ASSESS: CPT | Performed by: NURSE PRACTITIONER

## 2024-12-09 PROCEDURE — G8417 CALC BMI ABV UP PARAM F/U: HCPCS | Performed by: NURSE PRACTITIONER

## 2024-12-09 ASSESSMENT — ENCOUNTER SYMPTOMS
SHORTNESS OF BREATH: 0
COUGH: 0
GASTROINTESTINAL NEGATIVE: 1
BACK PAIN: 1
VOMITING: 0
DIARRHEA: 0
NAUSEA: 0
RESPIRATORY NEGATIVE: 1
CHEST TIGHTNESS: 0
COLOR CHANGE: 0
EYES NEGATIVE: 1
ALLERGIC/IMMUNOLOGIC NEGATIVE: 1

## 2024-12-09 ASSESSMENT — PATIENT HEALTH QUESTIONNAIRE - PHQ9: DEPRESSION UNABLE TO ASSESS: FUNCTIONAL CAPACITY MOTIVATION LIMITS ACCURACY

## 2024-12-09 NOTE — PROGRESS NOTES
Post-Discharge Transitional Care  Follow Up      Asya LA NENA Sharp   YOB: 1945    Date of Office Visit:  12/9/2024  Date of Hospital Admission: 11/19/24  Date of Hospital Discharge: 11/20/24  Risk of hospital readmission (high >=14%. Medium >=10%) :Readmission Risk Score: 16.9      Care management risk score Rising risk (score 2-5) and Complex Care (Scores >=6): No Risk Score On File     Non face to face  following discharge, date last encounter closed (first attempt may have been earlier): *No documented post hospital discharge outreach found in the last 14 days    Call initiated 2 business days of discharge: Yes    ASSESSMENT/PLAN:   Bradycardia  Improved   Risks of bradycardia discussed   Rate control per Cardiology , encouraged apt as scheduled   Atrial fibrillation, unspecified type ([facility])  On rate control & anti coagulation   Following with Cardiology   Hypotension due to drugs  BP borderline low  Defer medication management to specialist  Encouraged f/u with Cardio / Nephro   May need adjustment of meds pending BP log   ESRD on hemodialysis ([facility])  Following with Nephrology   Primary hypertension  Anemia of chronic renal failure, stage 5 ([facility])  Hospital discharge follow-up  -     KS DISCHARGE MEDS RECONCILED W/ CURRENT OUTPATIENT MED LIST    Records reviewed in depth   DIONY (obstructive sleep apnea)  Continue with CPAP   Highly discouraged Ambien use , risks discussed   Insomnia, unspecified type  Highly discouraged Ambien use, risks discussed   Encouraged f/u with [facility] to discuss safest best medication options in light of age and medical co-morbidities   At moderate risk for fall  At risk for polypharmacy  I discussed numerous concerns with current medications in light of her age & medical co-morbidities  I discouraged Ambien use   I discouraged opioid use   She is high fall risk   Encouraged continued following with Nephrology       Medical Decision Making: high complexity  Return in about

## 2025-01-09 ENCOUNTER — OFFICE VISIT (OUTPATIENT)
Dept: FAMILY MEDICINE CLINIC | Age: 80
End: 2025-01-09

## 2025-01-09 VITALS
HEART RATE: 66 BPM | WEIGHT: 221 LBS | TEMPERATURE: 97 F | HEIGHT: 70 IN | BODY MASS INDEX: 31.64 KG/M2 | SYSTOLIC BLOOD PRESSURE: 172 MMHG | RESPIRATION RATE: 18 BRPM | DIASTOLIC BLOOD PRESSURE: 82 MMHG | OXYGEN SATURATION: 98 %

## 2025-01-09 DIAGNOSIS — R68.83 CHILLS (WITHOUT FEVER): ICD-10-CM

## 2025-01-09 DIAGNOSIS — R09.89 CHEST CONGESTION: ICD-10-CM

## 2025-01-09 DIAGNOSIS — D63.1 ANEMIA OF CHRONIC RENAL FAILURE, STAGE 5 (HCC): ICD-10-CM

## 2025-01-09 DIAGNOSIS — J10.1 INFLUENZA B: Primary | ICD-10-CM

## 2025-01-09 DIAGNOSIS — N18.6 ESRD ON HEMODIALYSIS (HCC): ICD-10-CM

## 2025-01-09 DIAGNOSIS — Z99.2 ESRD ON HEMODIALYSIS (HCC): ICD-10-CM

## 2025-01-09 DIAGNOSIS — R05.1 ACUTE COUGH: ICD-10-CM

## 2025-01-09 DIAGNOSIS — N18.5 ANEMIA OF CHRONIC RENAL FAILURE, STAGE 5 (HCC): ICD-10-CM

## 2025-01-09 LAB
INFLUENZA A ANTIBODY: NEGATIVE
INFLUENZA B ANTIBODY: NORMAL
Lab: NORMAL
QC PASS/FAIL: NORMAL
SARS-COV-2 RDRP RESP QL NAA+PROBE: NEGATIVE

## 2025-01-09 RX ORDER — SEVELAMER CARBONATE 800 MG/1
2 TABLET, FILM COATED ORAL
COMMUNITY
Start: 2024-07-17

## 2025-01-09 RX ORDER — BENZONATATE 200 MG/1
200 CAPSULE ORAL 3 TIMES DAILY PRN
Qty: 30 CAPSULE | Refills: 0 | Status: SHIPPED | OUTPATIENT
Start: 2025-01-09 | End: 2025-01-19

## 2025-01-09 RX ORDER — BUSPIRONE HYDROCHLORIDE 5 MG/1
TABLET ORAL
COMMUNITY
Start: 2024-11-04

## 2025-01-09 RX ORDER — OSELTAMIVIR PHOSPHATE 75 MG/1
75 CAPSULE ORAL 2 TIMES DAILY
Qty: 10 CAPSULE | Refills: 0 | Status: SHIPPED | OUTPATIENT
Start: 2025-01-09 | End: 2025-01-14

## 2025-01-09 RX ORDER — HYDROXYZINE HYDROCHLORIDE 25 MG/1
1 TABLET, FILM COATED ORAL
COMMUNITY
Start: 2024-02-05

## 2025-01-09 ASSESSMENT — ENCOUNTER SYMPTOMS
RESPIRATORY NEGATIVE: 1
EYES NEGATIVE: 1
ALLERGIC/IMMUNOLOGIC NEGATIVE: 1
GASTROINTESTINAL NEGATIVE: 1

## 2025-01-09 ASSESSMENT — PATIENT HEALTH QUESTIONNAIRE - PHQ9
SUM OF ALL RESPONSES TO PHQ QUESTIONS 1-9: 0
2. FEELING DOWN, DEPRESSED OR HOPELESS: NOT AT ALL
SUM OF ALL RESPONSES TO PHQ9 QUESTIONS 1 & 2: 0
7. TROUBLE CONCENTRATING ON THINGS, SUCH AS READING THE NEWSPAPER OR WATCHING TELEVISION: NOT AT ALL
5. POOR APPETITE OR OVEREATING: NOT AT ALL
SUM OF ALL RESPONSES TO PHQ QUESTIONS 1-9: 0
10. IF YOU CHECKED OFF ANY PROBLEMS, HOW DIFFICULT HAVE THESE PROBLEMS MADE IT FOR YOU TO DO YOUR WORK, TAKE CARE OF THINGS AT HOME, OR GET ALONG WITH OTHER PEOPLE: NOT DIFFICULT AT ALL
4. FEELING TIRED OR HAVING LITTLE ENERGY: NOT AT ALL
8. MOVING OR SPEAKING SO SLOWLY THAT OTHER PEOPLE COULD HAVE NOTICED. OR THE OPPOSITE, BEING SO FIGETY OR RESTLESS THAT YOU HAVE BEEN MOVING AROUND A LOT MORE THAN USUAL: NOT AT ALL
1. LITTLE INTEREST OR PLEASURE IN DOING THINGS: NOT AT ALL
6. FEELING BAD ABOUT YOURSELF - OR THAT YOU ARE A FAILURE OR HAVE LET YOURSELF OR YOUR FAMILY DOWN: NOT AT ALL
3. TROUBLE FALLING OR STAYING ASLEEP: NOT AT ALL
9. THOUGHTS THAT YOU WOULD BE BETTER OFF DEAD, OR OF HURTING YOURSELF: NOT AT ALL
SUM OF ALL RESPONSES TO PHQ QUESTIONS 1-9: 0
SUM OF ALL RESPONSES TO PHQ QUESTIONS 1-9: 0

## 2025-01-09 NOTE — PROGRESS NOTES
Palpations: Abdomen is soft.      Tenderness: There is no abdominal tenderness. There is no guarding or rebound.   Musculoskeletal:         General: Normal range of motion.      Cervical back: Normal range of motion and neck supple. No tenderness.   Lymphadenopathy:      Cervical: No cervical adenopathy.   Skin:     General: Skin is warm and dry.      Capillary Refill: Capillary refill takes less than 2 seconds.      Findings: No rash.   Neurological:      General: No focal deficit present.      Mental Status: She is alert and oriented to person, place, and time.      Cranial Nerves: No cranial nerve deficit.      Motor: No weakness.      Coordination: Coordination normal.      Gait: Gait abnormal (using a wheelchair for ambulation today).   Psychiatric:         Mood and Affect: Mood normal.         Behavior: Behavior normal.       ASSESSMENT/PLAN:     1. Influenza B  -     oseltamivir (TAMIFLU) 75 MG capsule; Take 1 capsule by mouth 2 times daily for 5 days, Disp-10 capsule, R-0Normal  2. Acute cough  -     benzonatate (TESSALON) 200 MG capsule; Take 1 capsule by mouth 3 times daily as needed for Cough, Disp-30 capsule, R-0Normal  3. Chest congestion  -     benzonatate (TESSALON) 200 MG capsule; Take 1 capsule by mouth 3 times daily as needed for Cough, Disp-30 capsule, R-0Normal  4. Chills (without fever)  -     POCT Influenza A/B  -     POCT COVID-19 Rapid, NAAT  5. ESRD on hemodialysis (HCC)  6. Anemia of chronic renal failure, stage 5 ([facility])  7. BMI 31.0-31.9,adult       Return in about 5 months (around 6/9/2025) for scheduled appointment with VASHTI Stone.  AVS provided.    Electronically signed by Hernan Solares DO on 1/9/2025 at 3:17 PM

## 2025-01-16 ENCOUNTER — TELEPHONE (OUTPATIENT)
Dept: FAMILY MEDICINE CLINIC | Age: 80
End: 2025-01-16

## 2025-01-16 NOTE — TELEPHONE ENCOUNTER
Patient was seen on 1/9/25 by Dr Solares and dx with Flu. She states she is not feeling any better. She has vomiting after eating food/taking her pills and has constipation/diarrhea and lower back pain. Reports still having mild cough and congestion at this time. She is at dialysis so you may need to leave a message.

## 2025-02-07 PROBLEM — I50.9 NEW ONSET OF CONGESTIVE HEART FAILURE (HCC): Status: ACTIVE | Noted: 2025-02-07

## 2025-02-13 DIAGNOSIS — K21.9 GASTROESOPHAGEAL REFLUX DISEASE, UNSPECIFIED WHETHER ESOPHAGITIS PRESENT: ICD-10-CM

## 2025-02-13 RX ORDER — PANTOPRAZOLE SODIUM 40 MG/1
40 TABLET, DELAYED RELEASE ORAL
Qty: 90 TABLET | Refills: 3 | Status: CANCELLED | OUTPATIENT
Start: 2025-02-13

## 2025-02-25 ENCOUNTER — HOSPITAL ENCOUNTER (EMERGENCY)
Age: 80
Discharge: HOME | End: 2025-02-25
Attending: EMERGENCY MEDICINE
Payer: MEDICARE

## 2025-02-25 ENCOUNTER — HOSPITAL ENCOUNTER (OUTPATIENT)
Dept: DIALYSIS | Age: 80
Setting detail: DIALYSIS SERIES
Discharge: HOME | End: 2025-02-25
Payer: MEDICARE

## 2025-02-25 VITALS
BODY MASS INDEX: 30.67 KG/M2 | SYSTOLIC BLOOD PRESSURE: 180 MMHG | WEIGHT: 190.04 LBS | RESPIRATION RATE: 15 BRPM | HEART RATE: 68 BPM | TEMPERATURE: 98 F | DIASTOLIC BLOOD PRESSURE: 91 MMHG

## 2025-02-25 VITALS
TEMPERATURE: 98.9 F | RESPIRATION RATE: 14 BRPM | DIASTOLIC BLOOD PRESSURE: 86 MMHG | OXYGEN SATURATION: 100 % | SYSTOLIC BLOOD PRESSURE: 170 MMHG | HEART RATE: 64 BPM

## 2025-02-25 DIAGNOSIS — R11.11 VOMITING WITHOUT NAUSEA, UNSPECIFIED VOMITING TYPE: Primary | ICD-10-CM

## 2025-02-25 LAB
ALBUMIN SERPL-MCNC: 3.7 G/DL (ref 3.5–5.2)
ALBUMIN/GLOB SERPL: 1.2 {RATIO} (ref 1–2.5)
ALP SERPL-CCNC: 87 U/L (ref 35–104)
ALT SERPL-CCNC: 9 U/L (ref 10–35)
ANION GAP SERPL CALCULATED.3IONS-SCNC: 19 MMOL/L (ref 9–16)
AST SERPL-CCNC: 16 U/L (ref 10–35)
BASOPHILS # BLD: 0.06 K/UL (ref 0–0.2)
BASOPHILS NFR BLD: 1 % (ref 0–2)
BILIRUB SERPL-MCNC: 0.5 MG/DL (ref 0–1.2)
BNP SERPL-MCNC: ABNORMAL PG/ML (ref 0–450)
BUN SERPL-MCNC: 40 MG/DL (ref 8–23)
CALCIUM SERPL-MCNC: 10 MG/DL (ref 8.6–10.4)
CHLORIDE SERPL-SCNC: 97 MMOL/L (ref 98–107)
CO2 SERPL-SCNC: 23 MMOL/L (ref 20–31)
CREAT SERPL-MCNC: 9.6 MG/DL (ref 0.6–0.9)
EOSINOPHIL # BLD: 0.14 K/UL (ref 0–0.44)
EOSINOPHILS RELATIVE PERCENT: 1 % (ref 1–4)
ERYTHROCYTE [DISTWIDTH] IN BLOOD BY AUTOMATED COUNT: 15.4 % (ref 11.8–14.4)
GFR, ESTIMATED: 4 ML/MIN/1.73M2
GLUCOSE SERPL-MCNC: 124 MG/DL (ref 74–99)
HBV SURFACE AG SERPL QL IA: NONREACTIVE
HCT VFR BLD AUTO: 35.7 % (ref 36.3–47.1)
HGB BLD-MCNC: 11.2 G/DL (ref 11.9–15.1)
IMM GRANULOCYTES # BLD AUTO: 0.06 K/UL (ref 0–0.3)
IMM GRANULOCYTES NFR BLD: 1 %
LYMPHOCYTES NFR BLD: 2.62 K/UL (ref 1.1–3.7)
LYMPHOCYTES RELATIVE PERCENT: 24 % (ref 24–43)
MCH RBC QN AUTO: 28.8 PG (ref 25.2–33.5)
MCHC RBC AUTO-ENTMCNC: 31.4 G/DL (ref 28.4–34.8)
MCV RBC AUTO: 91.8 FL (ref 82.6–102.9)
MONOCYTES NFR BLD: 0.96 K/UL (ref 0.1–1.2)
MONOCYTES NFR BLD: 9 % (ref 3–12)
NEUTROPHILS NFR BLD: 64 % (ref 36–65)
NEUTS SEG NFR BLD: 7.19 K/UL (ref 1.5–8.1)
NRBC BLD-RTO: 0.2 PER 100 WBC
PLATELET # BLD AUTO: 302 K/UL (ref 138–453)
PMV BLD AUTO: 9.1 FL (ref 8.1–13.5)
POTASSIUM SERPL-SCNC: 3.2 MMOL/L (ref 3.7–5.3)
PROT SERPL-MCNC: 6.7 G/DL (ref 6.6–8.7)
RBC # BLD AUTO: 3.89 M/UL (ref 3.95–5.11)
RBC # BLD: ABNORMAL 10*6/UL
SODIUM SERPL-SCNC: 139 MMOL/L (ref 136–145)
TROPONIN I SERPL HS-MCNC: 63 NG/L (ref 0–14)
TROPONIN I SERPL HS-MCNC: 68 NG/L (ref 0–14)
WBC OTHER # BLD: 11 K/UL (ref 3.5–11.3)

## 2025-02-25 PROCEDURE — 85025 COMPLETE CBC W/AUTO DIFF WBC: CPT

## 2025-02-25 PROCEDURE — 87340 HEPATITIS B SURFACE AG IA: CPT

## 2025-02-25 PROCEDURE — 99284 EMERGENCY DEPT VISIT MOD MDM: CPT

## 2025-02-25 PROCEDURE — 96374 THER/PROPH/DIAG INJ IV PUSH: CPT

## 2025-02-25 PROCEDURE — 80053 COMPREHEN METABOLIC PANEL: CPT

## 2025-02-25 PROCEDURE — 84484 ASSAY OF TROPONIN QUANT: CPT

## 2025-02-25 PROCEDURE — 93005 ELECTROCARDIOGRAM TRACING: CPT

## 2025-02-25 PROCEDURE — 6360000002 HC RX W HCPCS

## 2025-02-25 PROCEDURE — 83880 ASSAY OF NATRIURETIC PEPTIDE: CPT

## 2025-02-25 PROCEDURE — 90935 HEMODIALYSIS ONE EVALUATION: CPT

## 2025-02-25 RX ORDER — ONDANSETRON 2 MG/ML
4 INJECTION INTRAMUSCULAR; INTRAVENOUS ONCE
Status: COMPLETED | OUTPATIENT
Start: 2025-02-25 | End: 2025-02-25

## 2025-02-25 RX ORDER — ONDANSETRON 4 MG/1
4 TABLET, ORALLY DISINTEGRATING ORAL 3 TIMES DAILY PRN
Qty: 21 TABLET | Refills: 0 | Status: SHIPPED | OUTPATIENT
Start: 2025-02-25

## 2025-02-25 RX ORDER — 0.9 % SODIUM CHLORIDE 0.9 %
250 INTRAVENOUS SOLUTION INTRAVENOUS PRN
Status: DISCONTINUED | OUTPATIENT
Start: 2025-02-25 | End: 2025-02-26 | Stop reason: HOSPADM

## 2025-02-25 RX ORDER — 0.9 % SODIUM CHLORIDE 0.9 %
150 INTRAVENOUS SOLUTION INTRAVENOUS PRN
Status: DISCONTINUED | OUTPATIENT
Start: 2025-02-25 | End: 2025-02-26 | Stop reason: HOSPADM

## 2025-02-25 RX ADMIN — ONDANSETRON 4 MG: 2 INJECTION INTRAMUSCULAR; INTRAVENOUS at 10:41

## 2025-02-25 ASSESSMENT — ENCOUNTER SYMPTOMS
ABDOMINAL PAIN: 0
DIARRHEA: 1
SHORTNESS OF BREATH: 0
CHEST TIGHTNESS: 0

## 2025-02-25 NOTE — ED PROVIDER NOTES
[facility] EMERGENCY DEPARTMENT  Emergency Department Encounter  Emergency Medicine Resident     Pt Name:Asya Sharp  MRN: [id number]  Birthdate 1945  Date of evaluation: 2/25/25  PCP:  Kristin Stone APRN - CNP  Note Started: 10:36 AM EST      CHIEF COMPLAINT       Chief Complaint   Patient presents with    Vomiting       HISTORY OF PRESENT ILLNESS  (Location/Symptom, Timing/Onset, Context/Setting, Quality, Duration, Modifying Factors, Severity.)      Asya Sharp is a 80 y.o. female with a history of ESRD on TTS hemodialysis schedule, A-fib, CHF, hypertension, dyslipidemia, bradycardia who presents with vomiting for last 3 weeks.  According to the patient for the last 3 weeks she complains of vomiting.  Patient states that she is unable to tolerate liquid or solid foods.  She denies any abdominal pain, fevers, chills, dizziness, headaches, chest pain, shortness of breath.  Due to this vomiting she is unable to tolerate her medications.  Per the patient she visited her gastroenterologist 1 week ago and was prescribed sucralfate and famotidine however she has been unable to tolerate the medication.  Patient also mentions that due to this vomiting she has missed several of her hemodialysis appointments, the last session that she attended was 1 week ago.    PAST MEDICAL / SURGICAL / SOCIAL / FAMILY HISTORY      has a past medical history of Anemia, Arthritis, Atrial fibrillation (HCC), Depression, Diverticulosis, DM (diabetes mellitus) (HCC), Erythropenia, ESRD (end stage renal disease) on dialysis (HCC), Fibromyalgia, HTN (hypertension), Hyperlipidemia, Kidney stone, Morbid obesity due to excess calories, Osteopenia, Seasonal allergies, and Sleep apnea.       has a past surgical history that includes Colonoscopy (01/01/2003); Colonoscopy (01/01/2007); Tubal ligation; Arm Surgery (01/01/2011); Total knee arthroplasty (Bilateral); Cardiac catheterization ([id number] dr martinez); Cardiac

## 2025-02-25 NOTE — PROGRESS NOTES
Dialysis Time Out  To be done by RN and tech or 2 RNs  Staff Names Jessica MARLEY RN --Verónica RODRÍGUEZ RN    [x]  Identity of the patient using 2 patient identifiers  [x]  Consent for treatment  [x]  Equipment-proper machine and dialyzer  [x]  B-Hep B status Hep B Ag negative 2/25/25  [x]  Orders- to include bath, blood flow, dialyzer, time and fluid removal  [x]  Access-Correct site and in working order  [x]  Time for patient to ask questions.

## 2025-02-25 NOTE — ED NOTES
Dialysis called, nurse states pt has a chair time at 1400  Pt to get dressed and go sit in RW until it is her chair time  Okayed by Umair MCKINLEY  Will continue plan of care

## 2025-02-25 NOTE — ED PROVIDER NOTES
[facility] EMERGENCY DEPARTMENT     Emergency Department     Faculty Attestation    I performed a history and physical examination of the patient and discussed management with the resident. I reviewed the resident’s note and agree with the documented findings and plan of care. Any areas of disagreement are noted on the chart. I was personally present for the key portions of any procedures. I have documented in the chart those procedures where I was not present during the key portions. I have reviewed the emergency nurses triage note. I agree with the chief complaint, past medical history, past surgical history, allergies, medications, social and family history as documented unless otherwise noted below. For Physician Assistant/ Nurse Practitioner cases/documentation I have personally evaluated this patient and have completed at least one if not all key elements of the E/M (history, physical exam, and MDM). Additional findings are as noted.    10:39 AM EST    Patient presents with nausea and vomiting.  She says she has been vomiting everything she tries to eat and drink for the past 1 month.  She denies any abdominal pain.  She denies any changes in bowel movements.  Patient does have history of end-stage renal disease and is on hemodialysis.  However, patient has not been to dialysis since last Tuesday.  She missed Thursday and Saturdays dialysis treatments because of the vomiting and says that she was on her way to dialysis this morning when she started throwing up which is why she came here instead.  Patient says she did see a GI doctor 1 to 2 weeks ago but they only gave her Pepcid and 1 other medication and says that she felt like they did not do anything for her.  She says she has not been taking the medications that the GI doctor prescribed for her obese because she says she cannot keep them down.  On exam, patient is resting comfortably in the bed.  She is not respiratory distress.  Lungs are clear

## 2025-02-25 NOTE — DISCHARGE INSTRUCTIONS
Go to dialysis.  Follow-up with your PCP in 3 days.  Take Zofran ODT 4 mg (1 tablet by mouth 3 times daily as needed for nausea and vomiting).  Please return to the ED if symptoms worsen such as increasing vomiting, dizziness, lightheadedness, abdominal pain, or any other concerning symptoms.

## 2025-02-25 NOTE — ED NOTES
Pt to ed via wc to room with complaints of emesis since feb 1  Pt states she has been vomiting every day since  Pt states she is a dialysis pt, T, TH, sat  Pt states she last had dialysis last Tuesday  Pt states she couldn't go due to vomiting  Pt denies taking any medications for symptoms  Pt states she produces little urine  Pt has no urinary complaints  Pt denies any chest pain/sob to writer  Pt placed on cont cardiac monitor, ekg completed, iv established, labs drawn, labeled and will send to lab via orders  Pt alert and oriented x4, talking in complete sentences, respirations even and unlabored. Pt acting age appropriate. White board updated, will continue to plan of care

## 2025-02-25 NOTE — PROGRESS NOTES
Dialysis Post Treatment Note  Vitals:    02/25/25 1758   BP: (!) 180/91   Pulse: 68   Resp: 15   Temp: 98 °F (36.7 °C)     Pre-Weight = 86.8kg  Post-weight = Weight - Scale: 86.2 kg (190 lb 0.6 oz)  Total Liters Processed = Blood Volume Processed (Liters): 70.75 L  Rinseback Volume (mL) = Rinseback Volume (ml): 20 ml  Net Removal (mL) =  780mL  Patient's dry weight=90.9kg  Type of access used=L AVF  Length of treatment=205 minutes  Date of last dressing change =02/25/25  Outpatient dialysis unit/days=[facility] T,TH,S  Outpatient nephrologist= Dr Reed    Pt tolerated HD TX good. No problems with access. System clotted with 12 minutes left, unable to return blood.

## 2025-02-28 LAB
EKG ATRIAL RATE: 63 BPM
EKG P-R INTERVAL: 176 MS
EKG Q-T INTERVAL: 490 MS
EKG QRS DURATION: 98 MS
EKG QTC CALCULATION (BAZETT): 501 MS
EKG R AXIS: -172 DEGREES
EKG T AXIS: 134 DEGREES
EKG VENTRICULAR RATE: 63 BPM

## 2025-02-28 PROCEDURE — 93010 ELECTROCARDIOGRAM REPORT: CPT | Performed by: INTERNAL MEDICINE

## 2025-03-06 ENCOUNTER — OFFICE VISIT (OUTPATIENT)
Dept: FAMILY MEDICINE CLINIC | Age: 80
End: 2025-03-06
Payer: MEDICARE

## 2025-03-06 VITALS
WEIGHT: 188.6 LBS | TEMPERATURE: 97.3 F | HEIGHT: 66 IN | SYSTOLIC BLOOD PRESSURE: 118 MMHG | OXYGEN SATURATION: 98 % | DIASTOLIC BLOOD PRESSURE: 72 MMHG | BODY MASS INDEX: 30.31 KG/M2 | HEART RATE: 73 BPM | RESPIRATION RATE: 12 BRPM

## 2025-03-06 DIAGNOSIS — M79.7 FIBROMYALGIA: ICD-10-CM

## 2025-03-06 DIAGNOSIS — N18.6 ESRD ON HEMODIALYSIS (HCC): ICD-10-CM

## 2025-03-06 DIAGNOSIS — Z09 FOLLOW-UP EXAMINATION: Primary | ICD-10-CM

## 2025-03-06 DIAGNOSIS — Z76.0 ENCOUNTER FOR MEDICATION REFILL: ICD-10-CM

## 2025-03-06 DIAGNOSIS — F32.1 MODERATE MAJOR DEPRESSION (HCC): ICD-10-CM

## 2025-03-06 DIAGNOSIS — Z71.89 ENCOUNTER FOR MEDICATION REVIEW AND COUNSELING: ICD-10-CM

## 2025-03-06 DIAGNOSIS — R11.11 VOMITING WITHOUT NAUSEA, UNSPECIFIED VOMITING TYPE: ICD-10-CM

## 2025-03-06 DIAGNOSIS — F41.1 GAD (GENERALIZED ANXIETY DISORDER): ICD-10-CM

## 2025-03-06 DIAGNOSIS — Z99.2 ESRD ON HEMODIALYSIS (HCC): ICD-10-CM

## 2025-03-06 PROCEDURE — 1159F MED LIST DOCD IN RCRD: CPT | Performed by: FAMILY MEDICINE

## 2025-03-06 PROCEDURE — 1123F ACP DISCUSS/DSCN MKR DOCD: CPT | Performed by: FAMILY MEDICINE

## 2025-03-06 PROCEDURE — 3074F SYST BP LT 130 MM HG: CPT | Performed by: FAMILY MEDICINE

## 2025-03-06 PROCEDURE — G8417 CALC BMI ABV UP PARAM F/U: HCPCS | Performed by: FAMILY MEDICINE

## 2025-03-06 PROCEDURE — G8400 PT W/DXA NO RESULTS DOC: HCPCS | Performed by: FAMILY MEDICINE

## 2025-03-06 PROCEDURE — 1160F RVW MEDS BY RX/DR IN RCRD: CPT | Performed by: FAMILY MEDICINE

## 2025-03-06 PROCEDURE — 99213 OFFICE O/P EST LOW 20 MIN: CPT | Performed by: FAMILY MEDICINE

## 2025-03-06 PROCEDURE — 1090F PRES/ABSN URINE INCON ASSESS: CPT | Performed by: FAMILY MEDICINE

## 2025-03-06 PROCEDURE — G8427 DOCREV CUR MEDS BY ELIG CLIN: HCPCS | Performed by: FAMILY MEDICINE

## 2025-03-06 PROCEDURE — 1036F TOBACCO NON-USER: CPT | Performed by: FAMILY MEDICINE

## 2025-03-06 PROCEDURE — 3078F DIAST BP <80 MM HG: CPT | Performed by: FAMILY MEDICINE

## 2025-03-06 RX ORDER — ONDANSETRON 8 MG/1
8 TABLET, FILM COATED ORAL EVERY 8 HOURS PRN
Qty: 30 TABLET | Refills: 0 | Status: SHIPPED | OUTPATIENT
Start: 2025-03-06

## 2025-03-06 RX ORDER — TIZANIDINE 2 MG/1
2 TABLET ORAL DAILY PRN
Qty: 30 TABLET | Refills: 0 | Status: SHIPPED | OUTPATIENT
Start: 2025-03-06

## 2025-03-06 RX ORDER — VENLAFAXINE HYDROCHLORIDE 150 MG/1
150 CAPSULE, EXTENDED RELEASE ORAL DAILY
Qty: 90 CAPSULE | Refills: 1 | Status: SHIPPED | OUTPATIENT
Start: 2025-03-06

## 2025-03-06 NOTE — PROGRESS NOTES
disorder)  -     venlafaxine (EFFEXOR XR) 150 MG extended release capsule; Take 1 capsule by mouth daily, Disp-90 capsule, R-1Normal  5. Moderate major depression (HCC)  -     venlafaxine (EFFEXOR XR) 150 MG extended release capsule; Take 1 capsule by mouth daily, Disp-90 capsule, R-1Normal  6. Fibromyalgia  -     tiZANidine (ZANAFLEX) 2 MG tablet; Take 1 tablet by mouth daily as needed (muscle spasms), Disp-30 tablet, R-0Normal  7. Encounter for medication refill  -     venlafaxine (EFFEXOR XR) 150 MG extended release capsule; Take 1 capsule by mouth daily, Disp-90 capsule, R-1Normal  -     tiZANidine (ZANAFLEX) 2 MG tablet; Take 1 tablet by mouth daily as needed (muscle spasms), Disp-30 tablet, R-0Normal  8. Encounter for medication review and counseling  Comments:  no changes in current treatment/therapy.  9. BMI 30.0-30.9,adult       Return in about 3 months (around 6/9/2025) for follow up with VASHTI Stone as scheduled.  AVS provided.    Electronically signed by Hernan Solares DO on 3/6/2025 at 2:13 PM

## 2025-03-08 ENCOUNTER — HOSPITAL ENCOUNTER (EMERGENCY)
Age: 80
Discharge: HOME | End: 2025-03-08
Attending: EMERGENCY MEDICINE
Payer: MEDICARE

## 2025-03-08 ENCOUNTER — APPOINTMENT (OUTPATIENT)
Dept: CT IMAGING | Age: 80
End: 2025-03-08
Payer: MEDICARE

## 2025-03-08 VITALS
OXYGEN SATURATION: 99 % | TEMPERATURE: 97.7 F | RESPIRATION RATE: 18 BRPM | HEART RATE: 57 BPM | SYSTOLIC BLOOD PRESSURE: 107 MMHG | DIASTOLIC BLOOD PRESSURE: 51 MMHG | BODY MASS INDEX: 30.83 KG/M2 | WEIGHT: 191 LBS

## 2025-03-08 DIAGNOSIS — R11.2 NAUSEA AND VOMITING, UNSPECIFIED VOMITING TYPE: Primary | ICD-10-CM

## 2025-03-08 LAB
ALBUMIN SERPL-MCNC: 3 G/DL (ref 3.5–5.2)
ALBUMIN/GLOB SERPL: 1.4 {RATIO} (ref 1–2.5)
ALP SERPL-CCNC: 70 U/L (ref 35–104)
ALT SERPL-CCNC: 7 U/L (ref 10–35)
ANION GAP SERPL CALCULATED.3IONS-SCNC: 12 MMOL/L (ref 9–16)
AST SERPL-CCNC: 13 U/L (ref 10–35)
BASOPHILS # BLD: 0.06 K/UL (ref 0–0.2)
BASOPHILS NFR BLD: 1 % (ref 0–2)
BILIRUB SERPL-MCNC: 0.5 MG/DL (ref 0–1.2)
BUN SERPL-MCNC: 21 MG/DL (ref 8–23)
CALCIUM SERPL-MCNC: 9 MG/DL (ref 8.8–10.2)
CHLORIDE SERPL-SCNC: 95 MMOL/L (ref 98–107)
CO2 SERPL-SCNC: 29 MMOL/L (ref 20–31)
CREAT SERPL-MCNC: 6.6 MG/DL (ref 0.5–0.9)
EOSINOPHIL # BLD: 0.1 K/UL (ref 0–0.44)
EOSINOPHILS RELATIVE PERCENT: 1 % (ref 1–4)
ERYTHROCYTE [DISTWIDTH] IN BLOOD BY AUTOMATED COUNT: 15.1 % (ref 11.8–14.4)
GFR, ESTIMATED: 6 ML/MIN/1.73M2
GLUCOSE SERPL-MCNC: 107 MG/DL (ref 82–115)
HCT VFR BLD AUTO: 27.5 % (ref 36.3–47.1)
HGB BLD-MCNC: 8.6 G/DL (ref 11.9–15.1)
IMM GRANULOCYTES # BLD AUTO: 0.03 K/UL (ref 0–0.3)
IMM GRANULOCYTES NFR BLD: 0 %
LIPASE SERPL-CCNC: 50 U/L (ref 13–60)
LYMPHOCYTES NFR BLD: 2.04 K/UL (ref 1.1–3.7)
LYMPHOCYTES RELATIVE PERCENT: 23 % (ref 24–43)
MCH RBC QN AUTO: 29.7 PG (ref 25.2–33.5)
MCHC RBC AUTO-ENTMCNC: 31.3 G/DL (ref 28.4–34.8)
MCV RBC AUTO: 94.8 FL (ref 82.6–102.9)
MONOCYTES NFR BLD: 0.82 K/UL (ref 0.1–1.2)
MONOCYTES NFR BLD: 9 % (ref 3–12)
NEUTROPHILS NFR BLD: 66 % (ref 36–65)
NEUTS SEG NFR BLD: 5.87 K/UL (ref 1.5–8.1)
NRBC BLD-RTO: 0 PER 100 WBC
PLATELET # BLD AUTO: 217 K/UL (ref 138–453)
PMV BLD AUTO: 9.3 FL (ref 8.1–13.5)
POTASSIUM SERPL-SCNC: 3.2 MMOL/L (ref 3.7–5.3)
PROT SERPL-MCNC: 5.1 G/DL (ref 6.6–8.7)
RBC # BLD AUTO: 2.9 M/UL (ref 3.95–5.11)
RBC # BLD: ABNORMAL 10*6/UL
SODIUM SERPL-SCNC: 137 MMOL/L (ref 136–145)
WBC OTHER # BLD: 8.9 K/UL (ref 3.5–11.3)

## 2025-03-08 PROCEDURE — 83690 ASSAY OF LIPASE: CPT

## 2025-03-08 PROCEDURE — 99284 EMERGENCY DEPT VISIT MOD MDM: CPT

## 2025-03-08 PROCEDURE — 74176 CT ABD & PELVIS W/O CONTRAST: CPT

## 2025-03-08 PROCEDURE — 80053 COMPREHEN METABOLIC PANEL: CPT

## 2025-03-08 PROCEDURE — 6370000000 HC RX 637 (ALT 250 FOR IP): Performed by: EMERGENCY MEDICINE

## 2025-03-08 PROCEDURE — 85025 COMPLETE CBC W/AUTO DIFF WBC: CPT

## 2025-03-08 RX ORDER — MIDODRINE HYDROCHLORIDE 5 MG/1
5 TABLET ORAL 3 TIMES DAILY
Qty: 20 TABLET | Refills: 3 | Status: SHIPPED | OUTPATIENT
Start: 2025-03-08

## 2025-03-08 RX ORDER — ONDANSETRON 4 MG/1
4 TABLET, ORALLY DISINTEGRATING ORAL 3 TIMES DAILY PRN
Qty: 21 TABLET | Refills: 0 | Status: SHIPPED | OUTPATIENT
Start: 2025-03-08

## 2025-03-08 RX ORDER — MIDODRINE HYDROCHLORIDE 5 MG/1
10 TABLET ORAL ONCE
Status: COMPLETED | OUTPATIENT
Start: 2025-03-08 | End: 2025-03-08

## 2025-03-08 RX ADMIN — MIDODRINE HYDROCHLORIDE 10 MG: 5 TABLET ORAL at 15:47

## 2025-03-08 RX ADMIN — TIZANIDINE 2 MG: 4 TABLET ORAL at 13:32

## 2025-03-08 ASSESSMENT — ENCOUNTER SYMPTOMS
ABDOMINAL PAIN: 1
FACIAL SWELLING: 0
EYE DISCHARGE: 0
EYE PAIN: 0
SHORTNESS OF BREATH: 0
CHEST TIGHTNESS: 0
VOMITING: 1
ABDOMINAL DISTENTION: 0
BACK PAIN: 0
NAUSEA: 1

## 2025-03-08 ASSESSMENT — PAIN - FUNCTIONAL ASSESSMENT: PAIN_FUNCTIONAL_ASSESSMENT: NONE - DENIES PAIN

## 2025-03-08 NOTE — ED PROVIDER NOTES
EMERGENCY DEPARTMENT ENCOUNTER    Pt Name: Asya Sharp  MRN: [id number]  Birthdate 1945  Date of evaluation: 3/8/25  CHIEF COMPLAINT       Chief Complaint   Patient presents with   • Vomiting   • Fatigue     Pt reports nausea/vomiting/weakness since beginning of Feb. States she went to dialysis ([facility] - T, Th, Sat) today and was sent here due to hypotension. States she has had testing recently due to n/v but unsure the cause.     HISTORY OF PRESENT ILLNESS   HPI   The patient is a 80-year-old female with a history of end-stage renal disease diabetes A-fib who presented to the emergency department secondary to nausea vomiting fatigue.  Patient that she has had nausea vomiting and weakness since February patient evaluated several emergency department with no clear etiology of her symptoms.  She goes to dialysis Tuesday Thursday Saturday, she states she was at dialysis today only our left however she was having nausea and vomiting and advised to come to the emergency department for evaluation treatment.  Patient and PCP are concerned is her gallbladder.  She has a prescription for an outpatient ultrasound of her gallbladder on Wednesday.  She thought that she can get the ultrasound today.  Patient denied diarrhea.  Patient states she does still make urine but notes that decreased urine output secondary to nausea and vomiting patient had diarrhea.  Patient complains of abdominal pain in the right upper quadrant.  Patient had chest pain, shortness of breath, fevers or chills      REVIEW OF SYSTEMS     Review of Systems   Constitutional:  Negative for chills, diaphoresis and fever.   HENT:  Negative for congestion, ear pain and facial swelling.    Eyes:  Negative for pain, discharge and visual disturbance.   Respiratory:  Negative for chest tightness and shortness of breath.    Cardiovascular:  Negative for chest pain and palpitations.   Gastrointestinal:  Positive for abdominal pain, nausea and vomiting.  date: 1959     Quit date: 1960     Years since quittin.2   • Smokeless tobacco: Never   • Tobacco comments:     quit    Vaping Use   • Vaping status: Never Used   Substance Use Topics   • Alcohol use: No     Comment: rare   • Drug use: No     PHYSICAL EXAM     INITIAL VITALS: BP (!) 107/51   Pulse 57   Temp 97.7 °F (36.5 °C) (Oral)   Resp 18   Wt 86.6 kg (191 lb)   LMP  (LMP Unknown)   SpO2 99%   BMI 30.83 kg/m²    Physical Exam  Vitals and nursing note reviewed.   Constitutional:       General: She is not in acute distress.     Appearance: She is well-developed. She is not diaphoretic.   HENT:      Head: Normocephalic and atraumatic.   Eyes:      Pupils: Pupils are equal, round, and reactive to light.   Cardiovascular:      Rate and Rhythm: Normal rate and regular rhythm.   Pulmonary:      Effort: Pulmonary effort is normal.      Breath sounds: Normal breath sounds.   Abdominal:      General: Bowel sounds are normal.      Palpations: Abdomen is soft.   Musculoskeletal:         General: Normal range of motion.      Cervical back: Normal range of motion and neck supple.   Skin:     General: Skin is warm.      Capillary Refill: Capillary refill takes less than 2 seconds.   Neurological:      Mental Status: She is alert and oriented to person, place, and time.       MEDICAL DECISION MAKING / ED COURSE:   Summary of Patient Presentation:    The patient is a 80-year-old female who presented to the emergency department secondary to abdominal pain with nausea and vomiting        1)  Number and Complexity of Problems Addressed at this Encounter  Problem List This Visit: Nausea vomiting abdominal pain    Differential Diagnosis: Pancreatitis diverticulitis gastroenteritis    Diagnoses Considered but Do Not Suspect:  NA    Pertinent Comorbid Conditions:  NA        2)  Data Reviewed  My EKG interpretation:  NA     Decision Rules/Scores utilized:  NA    HEART SCORE: NA*       NIH STROKE SCALE  NIH

## 2025-03-08 NOTE — ED NOTES
Pt presents to the ED today from Dialysis for low blood pressure. Pt states she has never had issues with her BP being low. No distress noted. Pt is alert and oriented x4.

## 2025-03-27 ENCOUNTER — HOSPITAL ENCOUNTER (OUTPATIENT)
Age: 80
End: 2025-03-28
Attending: EMERGENCY MEDICINE | Admitting: STUDENT IN AN ORGANIZED HEALTH CARE EDUCATION/TRAINING PROGRAM
Payer: MEDICARE

## 2025-03-27 ENCOUNTER — APPOINTMENT (OUTPATIENT)
Age: 80
End: 2025-03-27
Attending: STUDENT IN AN ORGANIZED HEALTH CARE EDUCATION/TRAINING PROGRAM
Payer: MEDICARE

## 2025-03-27 ENCOUNTER — APPOINTMENT (OUTPATIENT)
Dept: GENERAL RADIOLOGY | Age: 80
End: 2025-03-27
Payer: MEDICARE

## 2025-03-27 DIAGNOSIS — I24.9 ACUTE CORONARY SYNDROME (HCC): Primary | ICD-10-CM

## 2025-03-27 DIAGNOSIS — I21.11 ST ELEVATION MYOCARDIAL INFARCTION INVOLVING RIGHT CORONARY ARTERY (HCC): ICD-10-CM

## 2025-03-27 DIAGNOSIS — R07.9 CHEST PAIN, UNSPECIFIED TYPE: ICD-10-CM

## 2025-03-27 DIAGNOSIS — I21.3 ST ELEVATION MYOCARDIAL INFARCTION (STEMI), UNSPECIFIED ARTERY (HCC): ICD-10-CM

## 2025-03-27 DIAGNOSIS — I21.11 STEMI INVOLVING RIGHT CORONARY ARTERY (HCC): ICD-10-CM

## 2025-03-27 PROBLEM — K31.84 GASTROPARESIS: Status: ACTIVE | Noted: 2025-03-27

## 2025-03-27 LAB
ANION GAP SERPL CALCULATED.3IONS-SCNC: 14 MMOL/L (ref 9–16)
BASOPHILS # BLD: 0.04 K/UL (ref 0–0.2)
BASOPHILS NFR BLD: 0 % (ref 0–2)
BUN SERPL-MCNC: 13 MG/DL (ref 8–23)
CALCIUM SERPL-MCNC: 8.9 MG/DL (ref 8.8–10.2)
CHLORIDE SERPL-SCNC: 94 MMOL/L (ref 98–107)
CO2 SERPL-SCNC: 28 MMOL/L (ref 20–31)
CREAT SERPL-MCNC: 5.5 MG/DL (ref 0.5–0.9)
ECHO AO ROOT DIAM: 3.1 CM
ECHO AO ROOT INDEX: 1.58 CM/M2
ECHO AV PEAK GRADIENT: 5 MMHG
ECHO AV PEAK VELOCITY: 1.1 M/S
ECHO BSA: 2.01 M2
ECHO EST RA PRESSURE: 10 MMHG
ECHO LA AREA 2C: 20.9 CM2
ECHO LA AREA 4C: 22.6 CM2
ECHO LA DIAMETER INDEX: 2.04 CM/M2
ECHO LA DIAMETER: 4 CM
ECHO LA MAJOR AXIS: 6.1 CM
ECHO LA MINOR AXIS: 5.5 CM
ECHO LA TO AORTIC ROOT RATIO: 1.29
ECHO LA VOL BP: 69 ML (ref 22–52)
ECHO LA VOL MOD A2C: 66 ML (ref 22–52)
ECHO LA VOL MOD A4C: 66 ML (ref 22–52)
ECHO LA VOL/BSA BIPLANE: 35 ML/M2 (ref 16–34)
ECHO LA VOLUME INDEX MOD A2C: 34 ML/M2 (ref 16–34)
ECHO LA VOLUME INDEX MOD A4C: 34 ML/M2 (ref 16–34)
ECHO LV EDV A2C: 107 ML
ECHO LV EDV A4C: 127 ML
ECHO LV EDV INDEX A4C: 65 ML/M2
ECHO LV EDV NDEX A2C: 55 ML/M2
ECHO LV EF PHYSICIAN: 20 %
ECHO LV EJECTION FRACTION A2C: 21 %
ECHO LV EJECTION FRACTION A4C: 21 %
ECHO LV EJECTION FRACTION BIPLANE: 22 % (ref 55–100)
ECHO LV ESV A2C: 85 ML
ECHO LV ESV A4C: 100 ML
ECHO LV ESV INDEX A2C: 43 ML/M2
ECHO LV ESV INDEX A4C: 51 ML/M2
ECHO LV FRACTIONAL SHORTENING: 17 % (ref 28–44)
ECHO LV INTERNAL DIMENSION DIASTOLE INDEX: 2.7 CM/M2
ECHO LV INTERNAL DIMENSION DIASTOLIC: 5.3 CM (ref 3.9–5.3)
ECHO LV INTERNAL DIMENSION SYSTOLIC INDEX: 2.24 CM/M2
ECHO LV INTERNAL DIMENSION SYSTOLIC: 4.4 CM
ECHO LV IVSD: 1.1 CM (ref 0.6–0.9)
ECHO LV MASS 2D: 213.9 G (ref 67–162)
ECHO LV MASS INDEX 2D: 109.1 G/M2 (ref 43–95)
ECHO LV POSTERIOR WALL DIASTOLIC: 1 CM (ref 0.6–0.9)
ECHO LV RELATIVE WALL THICKNESS RATIO: 0.38
ECHO MV A VELOCITY: 1.25 M/S
ECHO MV E DECELERATION TIME (DT): 88 MS
ECHO MV E VELOCITY: 0.7 M/S
ECHO MV E/A RATIO: 0.56
ECHO RIGHT VENTRICULAR SYSTOLIC PRESSURE (RVSP): 48 MMHG
ECHO TV REGURGITANT MAX VELOCITY: 3.07 M/S
ECHO TV REGURGITANT PEAK GRADIENT: 38 MMHG
EOSINOPHIL # BLD: 0.15 K/UL (ref 0–0.44)
EOSINOPHILS RELATIVE PERCENT: 2 % (ref 1–4)
ERYTHROCYTE [DISTWIDTH] IN BLOOD BY AUTOMATED COUNT: 15.8 % (ref 11.8–14.4)
GFR, ESTIMATED: 7 ML/MIN/1.73M2
GLUCOSE BLD-MCNC: 95 MG/DL (ref 65–105)
GLUCOSE SERPL-MCNC: 96 MG/DL (ref 82–115)
HBV SURFACE AG SERPL QL IA: NONREACTIVE
HCT VFR BLD AUTO: 30.3 % (ref 36.3–47.1)
HGB BLD-MCNC: 9.6 G/DL (ref 11.9–15.1)
IMM GRANULOCYTES # BLD AUTO: 0.06 K/UL (ref 0–0.3)
IMM GRANULOCYTES NFR BLD: 1 %
INR PPP: 1.4
LYMPHOCYTES NFR BLD: 1.78 K/UL (ref 1.1–3.7)
LYMPHOCYTES RELATIVE PERCENT: 20 % (ref 24–43)
MAGNESIUM SERPL-MCNC: 1.7 MG/DL (ref 1.6–2.4)
MCH RBC QN AUTO: 30.2 PG (ref 25.2–33.5)
MCHC RBC AUTO-ENTMCNC: 31.7 G/DL (ref 28.4–34.8)
MCV RBC AUTO: 95.3 FL (ref 82.6–102.9)
MONOCYTES NFR BLD: 0.94 K/UL (ref 0.1–1.2)
MONOCYTES NFR BLD: 10 % (ref 3–12)
NEUTROPHILS NFR BLD: 67 % (ref 36–65)
NEUTS SEG NFR BLD: 6.14 K/UL (ref 1.5–8.1)
NRBC BLD-RTO: 0 PER 100 WBC
PARTIAL THROMBOPLASTIN TIME: 40.5 SEC (ref 23.9–33.8)
PLATELET # BLD AUTO: 269 K/UL (ref 138–453)
PMV BLD AUTO: 9.2 FL (ref 8.1–13.5)
POTASSIUM SERPL-SCNC: 3.1 MMOL/L (ref 3.7–5.3)
PROTHROMBIN TIME: 17.7 SEC (ref 11.5–14.2)
RBC # BLD AUTO: 3.18 M/UL (ref 3.95–5.11)
RBC # BLD: ABNORMAL 10*6/UL
SODIUM SERPL-SCNC: 136 MMOL/L (ref 136–145)
TROPONIN I SERPL HS-MCNC: 203 NG/L (ref 0–14)
WBC OTHER # BLD: 9.1 K/UL (ref 3.5–11.3)

## 2025-03-27 PROCEDURE — 2709999900 HC NON-CHARGEABLE SUPPLY: Performed by: STUDENT IN AN ORGANIZED HEALTH CARE EDUCATION/TRAINING PROGRAM

## 2025-03-27 PROCEDURE — C1769 GUIDE WIRE: HCPCS | Performed by: STUDENT IN AN ORGANIZED HEALTH CARE EDUCATION/TRAINING PROGRAM

## 2025-03-27 PROCEDURE — 85025 COMPLETE CBC W/AUTO DIFF WBC: CPT

## 2025-03-27 PROCEDURE — 99153 MOD SED SAME PHYS/QHP EA: CPT | Performed by: STUDENT IN AN ORGANIZED HEALTH CARE EDUCATION/TRAINING PROGRAM

## 2025-03-27 PROCEDURE — 93005 ELECTROCARDIOGRAM TRACING: CPT | Performed by: EMERGENCY MEDICINE

## 2025-03-27 PROCEDURE — 96374 THER/PROPH/DIAG INJ IV PUSH: CPT

## 2025-03-27 PROCEDURE — 85730 THROMBOPLASTIN TIME PARTIAL: CPT

## 2025-03-27 PROCEDURE — 99285 EMERGENCY DEPT VISIT HI MDM: CPT

## 2025-03-27 PROCEDURE — 99151 MOD SED SAME PHYS/QHP <5 YRS: CPT | Performed by: STUDENT IN AN ORGANIZED HEALTH CARE EDUCATION/TRAINING PROGRAM

## 2025-03-27 PROCEDURE — 93306 TTE W/DOPPLER COMPLETE: CPT

## 2025-03-27 PROCEDURE — 6360000002 HC RX W HCPCS: Performed by: EMERGENCY MEDICINE

## 2025-03-27 PROCEDURE — 93458 L HRT ARTERY/VENTRICLE ANGIO: CPT | Performed by: STUDENT IN AN ORGANIZED HEALTH CARE EDUCATION/TRAINING PROGRAM

## 2025-03-27 PROCEDURE — 90935 HEMODIALYSIS ONE EVALUATION: CPT

## 2025-03-27 PROCEDURE — 82947 ASSAY GLUCOSE BLOOD QUANT: CPT

## 2025-03-27 PROCEDURE — 80048 BASIC METABOLIC PNL TOTAL CA: CPT

## 2025-03-27 PROCEDURE — 96375 TX/PRO/DX INJ NEW DRUG ADDON: CPT

## 2025-03-27 PROCEDURE — 6370000000 HC RX 637 (ALT 250 FOR IP): Performed by: STUDENT IN AN ORGANIZED HEALTH CARE EDUCATION/TRAINING PROGRAM

## 2025-03-27 PROCEDURE — 92978 ENDOLUMINL IVUS OCT C 1ST: CPT | Performed by: STUDENT IN AN ORGANIZED HEALTH CARE EDUCATION/TRAINING PROGRAM

## 2025-03-27 PROCEDURE — 6360000002 HC RX W HCPCS: Performed by: STUDENT IN AN ORGANIZED HEALTH CARE EDUCATION/TRAINING PROGRAM

## 2025-03-27 PROCEDURE — C9600 PERC DRUG-EL COR STENT SING: HCPCS | Performed by: STUDENT IN AN ORGANIZED HEALTH CARE EDUCATION/TRAINING PROGRAM

## 2025-03-27 PROCEDURE — 92928 PRQ TCAT PLMT NTRAC ST 1 LES: CPT | Performed by: STUDENT IN AN ORGANIZED HEALTH CARE EDUCATION/TRAINING PROGRAM

## 2025-03-27 PROCEDURE — 2000000000 HC ICU R&B

## 2025-03-27 PROCEDURE — C1760 CLOSURE DEV, VASC: HCPCS | Performed by: STUDENT IN AN ORGANIZED HEALTH CARE EDUCATION/TRAINING PROGRAM

## 2025-03-27 PROCEDURE — 93306 TTE W/DOPPLER COMPLETE: CPT | Performed by: INTERNAL MEDICINE

## 2025-03-27 PROCEDURE — 99222 1ST HOSP IP/OBS MODERATE 55: CPT | Performed by: STUDENT IN AN ORGANIZED HEALTH CARE EDUCATION/TRAINING PROGRAM

## 2025-03-27 PROCEDURE — 36415 COLL VENOUS BLD VENIPUNCTURE: CPT

## 2025-03-27 PROCEDURE — C1725 CATH, TRANSLUMIN NON-LASER: HCPCS | Performed by: STUDENT IN AN ORGANIZED HEALTH CARE EDUCATION/TRAINING PROGRAM

## 2025-03-27 PROCEDURE — 6370000000 HC RX 637 (ALT 250 FOR IP): Performed by: INTERNAL MEDICINE

## 2025-03-27 PROCEDURE — 6370000000 HC RX 637 (ALT 250 FOR IP)

## 2025-03-27 PROCEDURE — 76937 US GUIDE VASCULAR ACCESS: CPT | Performed by: STUDENT IN AN ORGANIZED HEALTH CARE EDUCATION/TRAINING PROGRAM

## 2025-03-27 PROCEDURE — C1887 CATHETER, GUIDING: HCPCS | Performed by: STUDENT IN AN ORGANIZED HEALTH CARE EDUCATION/TRAINING PROGRAM

## 2025-03-27 PROCEDURE — 85610 PROTHROMBIN TIME: CPT

## 2025-03-27 PROCEDURE — C1753 CATH, INTRAVAS ULTRASOUND: HCPCS | Performed by: STUDENT IN AN ORGANIZED HEALTH CARE EDUCATION/TRAINING PROGRAM

## 2025-03-27 PROCEDURE — 99152 MOD SED SAME PHYS/QHP 5/>YRS: CPT | Performed by: STUDENT IN AN ORGANIZED HEALTH CARE EDUCATION/TRAINING PROGRAM

## 2025-03-27 PROCEDURE — 83735 ASSAY OF MAGNESIUM: CPT

## 2025-03-27 PROCEDURE — 6370000000 HC RX 637 (ALT 250 FOR IP): Performed by: EMERGENCY MEDICINE

## 2025-03-27 PROCEDURE — 6360000004 HC RX CONTRAST MEDICATION: Performed by: STUDENT IN AN ORGANIZED HEALTH CARE EDUCATION/TRAINING PROGRAM

## 2025-03-27 PROCEDURE — C1894 INTRO/SHEATH, NON-LASER: HCPCS | Performed by: STUDENT IN AN ORGANIZED HEALTH CARE EDUCATION/TRAINING PROGRAM

## 2025-03-27 PROCEDURE — 87340 HEPATITIS B SURFACE AG IA: CPT

## 2025-03-27 PROCEDURE — C1874 STENT, COATED/COV W/DEL SYS: HCPCS | Performed by: STUDENT IN AN ORGANIZED HEALTH CARE EDUCATION/TRAINING PROGRAM

## 2025-03-27 PROCEDURE — 6360000002 HC RX W HCPCS

## 2025-03-27 PROCEDURE — 84484 ASSAY OF TROPONIN QUANT: CPT

## 2025-03-27 PROCEDURE — 93005 ELECTROCARDIOGRAM TRACING: CPT | Performed by: STUDENT IN AN ORGANIZED HEALTH CARE EDUCATION/TRAINING PROGRAM

## 2025-03-27 PROCEDURE — 92920 PRQ TRLUML C ANGIOP 1ART&/BR: CPT | Performed by: STUDENT IN AN ORGANIZED HEALTH CARE EDUCATION/TRAINING PROGRAM

## 2025-03-27 PROCEDURE — 99221 1ST HOSP IP/OBS SF/LOW 40: CPT

## 2025-03-27 DEVICE — ANGIO-SEAL VIP VASCULAR CLOSURE DEVICE
Type: IMPLANTABLE DEVICE | Status: FUNCTIONAL
Brand: ANGIO-SEAL

## 2025-03-27 DEVICE — STENT CORONARY ONYX FRONTIER RX 4X30 MM ZOTAROLIMUS ELUT: Type: IMPLANTABLE DEVICE | Status: FUNCTIONAL

## 2025-03-27 RX ORDER — SODIUM CHLORIDE 0.9 % (FLUSH) 0.9 %
5-40 SYRINGE (ML) INJECTION PRN
Status: DISCONTINUED | OUTPATIENT
Start: 2025-03-27 | End: 2025-03-28 | Stop reason: HOSPADM

## 2025-03-27 RX ORDER — CALCITRIOL 0.25 UG/1
0.25 CAPSULE, LIQUID FILLED ORAL
Status: DISCONTINUED | OUTPATIENT
Start: 2025-03-27 | End: 2025-03-28 | Stop reason: HOSPADM

## 2025-03-27 RX ORDER — LANOLIN ALCOHOL/MO/W.PET/CERES
400 CREAM (GRAM) TOPICAL DAILY
Status: DISCONTINUED | OUTPATIENT
Start: 2025-03-27 | End: 2025-03-28 | Stop reason: HOSPADM

## 2025-03-27 RX ORDER — CLOPIDOGREL BISULFATE 75 MG/1
300 TABLET ORAL ONCE
Status: COMPLETED | OUTPATIENT
Start: 2025-03-27 | End: 2025-03-27

## 2025-03-27 RX ORDER — CINACALCET 30 MG/1
30 TABLET, FILM COATED ORAL
Status: DISCONTINUED | OUTPATIENT
Start: 2025-03-27 | End: 2025-03-28 | Stop reason: HOSPADM

## 2025-03-27 RX ORDER — TIZANIDINE 2 MG/1
2 TABLET ORAL DAILY PRN
Status: DISCONTINUED | OUTPATIENT
Start: 2025-03-27 | End: 2025-03-28 | Stop reason: HOSPADM

## 2025-03-27 RX ORDER — HEPARIN SODIUM 1000 [USP'U]/ML
INJECTION, SOLUTION INTRAVENOUS; SUBCUTANEOUS PRN
Status: DISCONTINUED | OUTPATIENT
Start: 2025-03-27 | End: 2025-03-27 | Stop reason: HOSPADM

## 2025-03-27 RX ORDER — SEVELAMER CARBONATE 800 MG/1
1600 TABLET, FILM COATED ORAL
Status: DISCONTINUED | OUTPATIENT
Start: 2025-03-27 | End: 2025-03-28 | Stop reason: HOSPADM

## 2025-03-27 RX ORDER — MIDAZOLAM HYDROCHLORIDE 1 MG/ML
INJECTION, SOLUTION INTRAMUSCULAR; INTRAVENOUS PRN
Status: DISCONTINUED | OUTPATIENT
Start: 2025-03-27 | End: 2025-03-27 | Stop reason: HOSPADM

## 2025-03-27 RX ORDER — DEXTROSE MONOHYDRATE 100 MG/ML
INJECTION, SOLUTION INTRAVENOUS CONTINUOUS PRN
Status: DISCONTINUED | OUTPATIENT
Start: 2025-03-27 | End: 2025-03-28 | Stop reason: HOSPADM

## 2025-03-27 RX ORDER — ASPIRIN 81 MG/1
81 TABLET, CHEWABLE ORAL DAILY
Status: DISCONTINUED | OUTPATIENT
Start: 2025-03-28 | End: 2025-03-28 | Stop reason: HOSPADM

## 2025-03-27 RX ORDER — HYDROXYZINE HYDROCHLORIDE 10 MG/1
25 TABLET, FILM COATED ORAL 3 TIMES DAILY PRN
Status: DISCONTINUED | OUTPATIENT
Start: 2025-03-27 | End: 2025-03-28 | Stop reason: HOSPADM

## 2025-03-27 RX ORDER — ONDANSETRON 4 MG/1
4 TABLET, ORALLY DISINTEGRATING ORAL 3 TIMES DAILY PRN
Status: DISCONTINUED | OUTPATIENT
Start: 2025-03-27 | End: 2025-03-28 | Stop reason: HOSPADM

## 2025-03-27 RX ORDER — CARVEDILOL 3.12 MG/1
3.12 TABLET ORAL 2 TIMES DAILY WITH MEALS
Status: DISCONTINUED | OUTPATIENT
Start: 2025-03-27 | End: 2025-03-28 | Stop reason: HOSPADM

## 2025-03-27 RX ORDER — MORPHINE SULFATE 4 MG/ML
INJECTION, SOLUTION INTRAMUSCULAR; INTRAVENOUS
Status: COMPLETED
Start: 2025-03-27 | End: 2025-03-27

## 2025-03-27 RX ORDER — VENLAFAXINE HYDROCHLORIDE 75 MG/1
150 CAPSULE, EXTENDED RELEASE ORAL DAILY
Status: DISCONTINUED | OUTPATIENT
Start: 2025-03-28 | End: 2025-03-28 | Stop reason: HOSPADM

## 2025-03-27 RX ORDER — METOCLOPRAMIDE 10 MG/1
10 TABLET ORAL
Status: DISCONTINUED | OUTPATIENT
Start: 2025-03-27 | End: 2025-03-27

## 2025-03-27 RX ORDER — CLOPIDOGREL BISULFATE 75 MG/1
TABLET ORAL PRN
Status: DISCONTINUED | OUTPATIENT
Start: 2025-03-27 | End: 2025-03-27 | Stop reason: HOSPADM

## 2025-03-27 RX ORDER — SODIUM CHLORIDE 9 MG/ML
INJECTION, SOLUTION INTRAVENOUS PRN
Status: DISCONTINUED | OUTPATIENT
Start: 2025-03-27 | End: 2025-03-28 | Stop reason: HOSPADM

## 2025-03-27 RX ORDER — ASPIRIN 81 MG/1
324 TABLET, CHEWABLE ORAL ONCE
Status: COMPLETED | OUTPATIENT
Start: 2025-03-27 | End: 2025-03-27

## 2025-03-27 RX ORDER — NITROGLYCERIN 20 MG/100ML
INJECTION INTRAVENOUS PRN
Status: DISCONTINUED | OUTPATIENT
Start: 2025-03-27 | End: 2025-03-27 | Stop reason: HOSPADM

## 2025-03-27 RX ORDER — CLOPIDOGREL BISULFATE 75 MG/1
75 TABLET ORAL DAILY
Status: DISCONTINUED | OUTPATIENT
Start: 2025-03-28 | End: 2025-03-28 | Stop reason: HOSPADM

## 2025-03-27 RX ORDER — ONDANSETRON 2 MG/ML
4 INJECTION INTRAMUSCULAR; INTRAVENOUS ONCE
Status: COMPLETED | OUTPATIENT
Start: 2025-03-27 | End: 2025-03-27

## 2025-03-27 RX ORDER — AMIODARONE HYDROCHLORIDE 200 MG/1
100 TABLET ORAL DAILY
Status: DISCONTINUED | OUTPATIENT
Start: 2025-03-27 | End: 2025-03-28 | Stop reason: HOSPADM

## 2025-03-27 RX ORDER — SODIUM CHLORIDE 0.9 % (FLUSH) 0.9 %
5-40 SYRINGE (ML) INJECTION EVERY 12 HOURS SCHEDULED
Status: DISCONTINUED | OUTPATIENT
Start: 2025-03-27 | End: 2025-03-28 | Stop reason: HOSPADM

## 2025-03-27 RX ORDER — METOCLOPRAMIDE 10 MG/1
5 TABLET ORAL
Status: DISCONTINUED | OUTPATIENT
Start: 2025-03-27 | End: 2025-03-28 | Stop reason: HOSPADM

## 2025-03-27 RX ORDER — INSULIN LISPRO 100 [IU]/ML
0-4 INJECTION, SOLUTION INTRAVENOUS; SUBCUTANEOUS
Status: DISCONTINUED | OUTPATIENT
Start: 2025-03-27 | End: 2025-03-28 | Stop reason: HOSPADM

## 2025-03-27 RX ORDER — HEPARIN SODIUM 1000 [USP'U]/ML
5000 INJECTION, SOLUTION INTRAVENOUS; SUBCUTANEOUS ONCE
Status: COMPLETED | OUTPATIENT
Start: 2025-03-27 | End: 2025-03-27

## 2025-03-27 RX ORDER — IOPAMIDOL 755 MG/ML
INJECTION, SOLUTION INTRAVASCULAR PRN
Status: DISCONTINUED | OUTPATIENT
Start: 2025-03-27 | End: 2025-03-27 | Stop reason: HOSPADM

## 2025-03-27 RX ORDER — HEPARIN SODIUM 5000 [USP'U]/ML
5000 INJECTION, SOLUTION INTRAVENOUS; SUBCUTANEOUS EVERY 8 HOURS SCHEDULED
Status: DISCONTINUED | OUTPATIENT
Start: 2025-03-27 | End: 2025-03-27

## 2025-03-27 RX ORDER — PANTOPRAZOLE SODIUM 40 MG/1
40 TABLET, DELAYED RELEASE ORAL
Status: DISCONTINUED | OUTPATIENT
Start: 2025-03-28 | End: 2025-03-28 | Stop reason: HOSPADM

## 2025-03-27 RX ORDER — ATORVASTATIN CALCIUM 40 MG/1
40 TABLET, FILM COATED ORAL DAILY
Status: DISCONTINUED | OUTPATIENT
Start: 2025-03-27 | End: 2025-03-28 | Stop reason: HOSPADM

## 2025-03-27 RX ORDER — MORPHINE SULFATE 4 MG/ML
4 INJECTION, SOLUTION INTRAMUSCULAR; INTRAVENOUS ONCE
Refills: 0 | Status: COMPLETED | OUTPATIENT
Start: 2025-03-27 | End: 2025-03-27

## 2025-03-27 RX ORDER — FENTANYL CITRATE 50 UG/ML
INJECTION, SOLUTION INTRAMUSCULAR; INTRAVENOUS PRN
Status: DISCONTINUED | OUTPATIENT
Start: 2025-03-27 | End: 2025-03-27 | Stop reason: HOSPADM

## 2025-03-27 RX ADMIN — SEVELAMER CARBONATE 1600 MG: 800 TABLET, FILM COATED ORAL at 14:52

## 2025-03-27 RX ADMIN — HEPARIN SODIUM 5000 UNITS: 1000 INJECTION INTRAVENOUS; SUBCUTANEOUS at 10:55

## 2025-03-27 RX ADMIN — ONDANSETRON 4 MG: 2 INJECTION, SOLUTION INTRAMUSCULAR; INTRAVENOUS at 10:59

## 2025-03-27 RX ADMIN — ATORVASTATIN CALCIUM 40 MG: 40 TABLET, FILM COATED ORAL at 14:52

## 2025-03-27 RX ADMIN — Medication 400 MG: at 14:52

## 2025-03-27 RX ADMIN — AMIODARONE HYDROCHLORIDE 100 MG: 200 TABLET ORAL at 14:56

## 2025-03-27 RX ADMIN — ASPIRIN 324 MG: 81 TABLET, CHEWABLE ORAL at 10:58

## 2025-03-27 RX ADMIN — CARVEDILOL 3.12 MG: 3.12 TABLET, FILM COATED ORAL at 21:35

## 2025-03-27 RX ADMIN — METOCLOPRAMIDE 5 MG: 10 TABLET ORAL at 21:35

## 2025-03-27 RX ADMIN — CINACALCET HYDROCHLORIDE 30 MG: 30 TABLET, FILM COATED ORAL at 14:52

## 2025-03-27 RX ADMIN — MORPHINE SULFATE 4 MG: 4 INJECTION, SOLUTION INTRAMUSCULAR; INTRAVENOUS at 10:58

## 2025-03-27 RX ADMIN — MORPHINE SULFATE 4 MG: 4 INJECTION INTRAVENOUS at 10:58

## 2025-03-27 RX ADMIN — CALCITRIOL 0.25 MCG: 0.25 CAPSULE ORAL at 14:52

## 2025-03-27 RX ADMIN — CLOPIDOGREL BISULFATE 300 MG: 75 TABLET, FILM COATED ORAL at 14:52

## 2025-03-27 RX ADMIN — SEVELAMER CARBONATE 1600 MG: 800 TABLET, FILM COATED ORAL at 21:34

## 2025-03-27 ASSESSMENT — ENCOUNTER SYMPTOMS
DIARRHEA: 0
WHEEZING: 0
CONSTIPATION: 0
VOMITING: 1
NAUSEA: 0
CHEST TIGHTNESS: 0
SHORTNESS OF BREATH: 0
FACIAL SWELLING: 0
BLOOD IN STOOL: 0
EYE PAIN: 0
BACK PAIN: 0
COUGH: 0
ABDOMINAL PAIN: 0
ABDOMINAL DISTENTION: 0
EYE DISCHARGE: 0

## 2025-03-27 ASSESSMENT — HEART SCORE: ECG: SIGNIFICANT ST-DEVIATION

## 2025-03-27 ASSESSMENT — PAIN - FUNCTIONAL ASSESSMENT: PAIN_FUNCTIONAL_ASSESSMENT: 0-10

## 2025-03-27 ASSESSMENT — PAIN SCALES - GENERAL
PAINLEVEL_OUTOF10: 3
PAINLEVEL_OUTOF10: 0

## 2025-03-27 NOTE — PROGRESS NOTES
Spiritual Health History and Assessment/Progress Note  [facility]    Loneliness/Social Isolation,  ,  ,      Name: Asya Sharp MRN: [id number]    Age: 80 y.o.     Sex: female   Language: English   Pentecostal: Anabaptist   <principal problem not specified>     Date: 3/27/2025            Total Time Calculated: (P) 16 min              Spiritual Assessment began in [facility]        Referral/Consult From: Nurse, Multi-disciplinary team   Encounter Overview/Reason: Loneliness/Social Isolation  Service Provided For: Patient    Patient engaged readily with  sharing her feelings of depression, giving up, a  who's way of treating patient as \"Mac's way or the highway. I told him if he kept going this way he would give me a heart attack and that's what happened today.\" Patient shared recent examples of how she is treated by him and now stressed has overwhelmed to the point she has difficulty managing activities of daily living. After 58 years together, patient stated, \"he won't talk to me at all.\"  provided compassionate listening, validation of patient's personhood and worth, acknowledgement and understanding of patient's feelings and concerns, and prayer. Patient expressed appreciation for  care.    Tiffanie, Belief, Meaning:   Patient identifies as spiritual  Family/Friends No family/friends present      Importance and Influence:  Patient has spiritual/personal beliefs that influence decisions regarding their health  Family/Friends No family/friends present    Community:  Patient feels disconnected from  and son  Family/Friends No family/friends present    Assessment and Plan of Care:     Patient Interventions include: Facilitated expression of thoughts and feelings, Explored spiritual coping/struggle/distress, and Facilitated life review and/ or legacy  Family/Friends Interventions include: No family/friends present    Patient Plan of Care: Spiritual Care available upon

## 2025-03-27 NOTE — H&P
[facility]    H/P                       Today's Date: 3/27/2025  Patient Name: Asya Sharp  Date of admission: 3/27/2025 10:41 AM  Patient's age: 80 y.o., 1945  Admission Dx: No admission diagnoses are documented for this encounter.    CHIEF COMPLAINT: Chest pain    History Obtained From:  patient    HISTORY OF PRESENT ILLNESS:      The patient is a 80 y.o. with history of end-stage renal disease on dialysis, atrial fibrillation on anticoagulation, essential hypertension, with labile blood pressure on midodrine for occasional hypotension.  Who presented to emergency department with chest pain, she was supposed to have his dialysis session but that was never initiated due to chest discomfort her troponin was elevated but flat at 68 and 61, EKG showed inferior changes consistent with a STEMI.    Past Medical History:   has a past medical history of Anemia, Arthritis, Atrial fibrillation (HCC), Depression, Diverticulosis, DM (diabetes mellitus) (HCC), Erythropenia, ESRD (end stage renal disease) on dialysis (HCC), Fibromyalgia, HTN (hypertension), Hyperlipidemia, Kidney stone, Morbid obesity due to excess calories, Osteopenia, Seasonal allergies, and Sleep apnea.    Past Surgical History:   has a past surgical history that includes Colonoscopy (01/01/2003); Colonoscopy (01/01/2007); Tubal ligation; Arm Surgery (01/01/2011); Total knee arthroplasty (Bilateral); Cardiac catheterization ([id number] dr martinez); Cardiac catheterization (05/16/2016); ORIF ankle fracture (Right, 12/06/2022); Ankle fracture surgery (Right, 12/6/2022); and IR TUNNELED CVC PLACE WO SQ PORT/PUMP > 5 YEARS (12/19/2022).     Home Medications:    Prior to Admission medications    Medication Sig Start Date End Date Taking? Authorizing Provider   midodrine (PROAMATINE) 5 MG tablet Take 1 tablet by mouth 3 times daily 3/8/25   Melanie Jarvis MD   ondansetron (ZOFRAN-ODT) 4 MG disintegrating tablet Take 1 tablet by mouth 3  DAILY 11/27/23   Eliza Massey MD   calcitRIOL (ROCALTROL) 0.25 MCG capsule Take 1 capsule by mouth daily    Provider, MD Khris   acetaminophen (TYLENOL) 500 MG tablet Take 1 tablet by mouth 4 times daily as needed for Pain 9/23/23   Denver Mares, DO   Misc. Devices MISC 1 each by Does not apply route once for 1 dose Rolling walker with swivel wheels 3/20/23 12/6/23  Josey Botello, APRN - CNP       Allergies:  Adhesive tape, Cephalexin, Erythromycin, Ketorolac tromethamine, Percocet [oxycodone-acetaminophen], Sulfa antibiotics, and Ultracet [tramadol-acetaminophen]    Social History:   reports that she quit smoking about 65 years ago. Her smoking use included cigarettes. She started smoking about 66 years ago. She has a 0.3 pack-year smoking history. She has never used smokeless tobacco. She reports that she does not drink alcohol and does not use drugs.     Family History: family history includes Diabetes in her mother; Heart Disease in her mother; Kidney Disease in her father; Osteoporosis in her mother; Prostate Cancer in her brother. No h/o sudden cardiac death.Yes for premature CAD    REVIEW OF SYSTEMS:    Constitutional: there has been no unanticipated weight loss. There's been No change in energy level, No change in activity level.     Eyes: No visual changes or diplopia. No scleral icterus.  ENT: No Headaches, hearing loss or vertigo. No mouth sores or sore throat.  Cardiovascular: see above  Respiratory: see above  Gastrointestinal: No abdominal pain, appetite loss, blood in stools.   Genitourinary: No dysuria, trouble voiding, or hematuria.  Musculoskeletal:  No gait disturbance, No weakness or joint complaints.  Integumentary: No rash or pruritis.  Neurological: No headache or diplopia. No tingling  Psychiatric: No anxiety, or depression.  Endocrine: No temperature intolerance.   Hematologic/Lymphatic: No abnormal bruising or bleeding, blood clots or swollen lymph nodes.  Allergic/Immunologic:

## 2025-03-27 NOTE — ED NOTES
Pt was at dialysis and having chest pain, dialysis called and had  pick her up to take her to ER. PT arrived at ER having midsternal chest pain, stabbing radiating to back. Pt is TRS dialysis did not miss Tuesday but did not have tx today d/t cp.   PT EKG completed.   Connected to monitor.   IV established x2   Labs drawn labeled and sent to lab.   Dr at bedside.   Family at bedside.     Pt was given medication, cardiology notified of abnormal EKG, and pt taken to cath lab with RN and cath lab team.

## 2025-03-27 NOTE — CONSULTS
Renal Consult Note    Patient :  Asya Sharp; 80 y.o. MRN# [id number]  Location:  2030/2030-01  Attending:  Ami pastor. providers found  Admit Date:  3/27/2025   Hospital Day: 0    Reason for Consult:     Asked by Dr Ami pastor. providers found to see for JOSE ARMANDO/Elevated Creatinine.    History Obtained From:     patient    HD Access:     previous  Left AV fistula    HD Unit:     [facility]    Nephrologist:     Derek    Dry Weight:     ?    Admission Weight:     86.6 Kgs    History of Present Illness:     Asya Sharp; 80 y.o. female with past medical history as mentioned below presented to the hospital with the chief complaint of crushing chest pain before starting dialysis this morning.  Known history of ESRD on hemodialysis Tuesday Thursday and Saturday at the [facility] unit using left upper extremity AV fistula under Dr. Martin.  She also has known history of type 2 diabetes, hypertension, morbid obesity.  Patient tells me that she has been struggling with intractable nausea and vomiting for the last few weeks.  Underwent EGD and dilatation of esophagus few days ago.  This morning when she presented for dialysis before she could get on the machine she developed crushing chest pain the worse she is ever experienced.  She came into the ER for evaluation.  Was found to have ST elevation in the inferior leads.  She was taken to the Cath Lab underwent cardiac catheterization and stent placement by Dr. Hess.  She was then transferred to the cardiac ICU and nephrology was consulted for renal placement therapy.  At the time evaluation she feels well.  Denies any shortness of breath orthopnea.  She tells me she is ready to go home.  Hemodynamically stable.  Labs today showed a sodium 136 potassium 3.1 chloride 94 bicarb 28 BUN 13 creatinine 5.5 magnesium 1.7 troponin 203 glucose 96 hemoglobin 9.6 white count 9.1 platelets 269  Home medicines included midodrine, Renvela, Coreg, Sensipar, calcitriol  Past History/Allergies?Social  Input and Output, Daily weigh and document in the chart.  3.  Fluid restriction 1.5 L a day  4.  Resume Sensipar 30 mg daily and calcitriol 0.25 daily.  5.  Start Renvela 2 tablets 3 times a day with meals  6.  Await further cardiology recommendations  7.  Will follow     Nutrition   Please ensure that patient is on a renal diet/TF.    Thank you for the consultation. Please do not hesitate to contact us for any further questions/concerns. We will continue to follow along with you.

## 2025-03-27 NOTE — PROGRESS NOTES
ADMISSION NOTE         Patient admitted to room: 2030     Time of admit: 1245     Admit from: ED      Reason for admission: STEMI      Where patient has been residing for the last 24 hrs: Home      Has the patient been admitted to any facility in the last 4 weeks, which one:  no      Family at bedside: Yes     Patient is currently: Patient arrived to room 2030 via bed from cath lab post PCI x1 to the RCA. Pt A&Ox4. Denies chest pain. Vitals taken. Plan of care and post PCI restrictions reviewed. Call light provided. Side rails up x2. Bed in reverse trendelenburg during groin site recovery. Pt verbalized understanding. Consults completed to [facility] and pt's OP nephrology group. Denies needs at this time.

## 2025-03-27 NOTE — CONSULTS
[facility]  Office: [phone]  Davonte Capps DO, Tavares Block DO, Brent Tamayo DO, Wilder Peralta DO, Dilan Villanueva MD, Joanna Blanco MD, Eric Hammer MD, Saniya Kwon MD,  Carrillo Hendricks MD, Sivakumar Chapin MD, Roney Barajas MD,  Jeronimo Castillo DO, Jesus Roberts MD, Carlos Coley MD, Rey Capps DO, Teresa Braxton MD,  Charles Howell DO, Carly Byrd MD, Maria C Vazquez MD, Susi Baird MD,  Joel Tenorio MD, Ruma Monet MD, Lisseth East MD, Beto Mazariegos MD, Davion Pratt MD, Chavo Warren MD, Teja Philip DO, Chavez Mehta MD, Jeronimo Leija MD, Mohsin Reza, MD, Shirley Waterhouse, CNP,  Rupal Benson, CNP, Teja Pedersen, CNP,  Brandy Martino, DNP, Radha Kohli, CNP, Lashonda Lambert, CNP, Chanel Christianson, CNP, Danielle Adame, CNP, Sylvia Mcknight PA-C, Sugey Tarango, CNP, Angelica Webster, CNP,  Gemma Horne, CNP, Serena Montez, CNP, Lazara Grove, CNP,  Iva Hilario, CNS, Maria Isabel Bernard, CNP, Zonia Moran CNP,   Cherie Camacho, CNP         [facility]   IN-PATIENT SERVICE   [facility]    CONSULTATION / HISTORY AND PHYSICAL EXAMINATION            Date:   3/27/2025  Patient name:  Asya Sharp  Date of admission:  3/27/2025 10:41 AM  MRN:   [id number]  Account:  [id number]  YOB: 1945  PCP:    Kristin Stone APRN - CNP  Room:   2030/2030-01  Code Status:    Full Code    Physician Requesting Consult: No att. providers found    Reason for Consult: Medical management    Chief Complaint:     Chief Complaint   Patient presents with    Chest Pain    Shortness of Breath     History Obtained From:     patient, electronic medical record    History of Present Illness:     This 80-year-old female was admitted for management of STEMI earlier today.  She has past medical history significant for ESRD on hemodialysis Tuesday Thursday and Saturday, type 2 diabetes, hypertension, hyperlipidemia, paroxysmal atrial fibrillation,  Affect: Mood normal.         Behavior: Behavior normal.         Investigations:      Laboratory Testing:  Recent Results (from the past 24 hours)   EKG 12 Lead    Collection Time: 03/27/25 10:47 AM   Result Value Ref Range    Ventricular Rate 75 BPM    Atrial Rate 75 BPM    P-R Interval 182 ms    QRS Duration 98 ms    Q-T Interval 450 ms    QTc Calculation (Bazett) 502 ms    P Axis 91 degrees    R Axis -12 degrees    T Axis 71 degrees   BMP    Collection Time: 03/27/25 10:56 AM   Result Value Ref Range    Sodium 136 136 - 145 mmol/L    Potassium 3.1 (L) 3.7 - 5.3 mmol/L    Chloride 94 (L) 98 - 107 mmol/L    CO2 28 20 - 31 mmol/L    Anion Gap 14 9 - 16 mmol/L    Glucose 96 82 - 115 mg/dL    BUN 13 8 - 23 mg/dL    Creatinine 5.5 (HH) 0.50 - 0.90 mg/dL    Est, Glom Filt Rate 7 (L) >60 mL/min/1.73m2    Calcium 8.9 8.8 - 10.2 mg/dL   CBC with Auto Differential    Collection Time: 03/27/25 10:56 AM   Result Value Ref Range    WBC 9.1 3.5 - 11.3 k/uL    RBC 3.18 (L) 3.95 - 5.11 m/uL    Hemoglobin 9.6 (L) 11.9 - 15.1 g/dL    Hematocrit 30.3 (L) 36.3 - 47.1 %    MCV 95.3 82.6 - 102.9 fL    MCH 30.2 25.2 - 33.5 pg    MCHC 31.7 28.4 - 34.8 g/dL    RDW 15.8 (H) 11.8 - 14.4 %    Platelets 269 138 - 453 k/uL    MPV 9.2 8.1 - 13.5 fL    NRBC Automated 0.0 0.0 per 100 WBC    Neutrophils % 67 (H) 36 - 65 %    Lymphocytes % 20 (L) 24 - 43 %    Monocytes % 10 3 - 12 %    Eosinophils % 2 1 - 4 %    Basophils % 0 0 - 2 %    Immature Granulocytes % 1 (H) 0 %    Neutrophils Absolute 6.14 1.50 - 8.10 k/uL    Lymphocytes Absolute 1.78 1.10 - 3.70 k/uL    Monocytes Absolute 0.94 0.10 - 1.20 k/uL    Eosinophils Absolute 0.15 0.00 - 0.44 k/uL    Basophils Absolute 0.04 0.00 - 0.20 k/uL    Immature Granulocytes Absolute 0.06 0.00 - 0.30 k/uL    RBC Morphology ANISOCYTOSIS PRESENT    Magnesium    Collection Time: 03/27/25 10:56 AM   Result Value Ref Range    Magnesium 1.7 1.6 - 2.4 mg/dL   Protime-INR    Collection Time: 03/27/25 10:56 AM  ml/m2    LA Size Index 2.04 cm/m2    LA/AO Root Ratio 1.29     Ao Root Index 1.58 cm/m2    RVSP 48 mmHg    EF Physician 20 %       Imaging/Diagonstics:  No results found.    Assessment :      Hospital Problems           Last Modified POA    * (Principal) ST elevation myocardial infarction (STEMI) ([facility]) 3/27/2025 Yes    Dyslipidemia 3/27/2025 Yes    DIONY (obstructive sleep apnea) 3/27/2025 Yes    Depression 3/27/2025 Yes    Anemia of chronic renal failure, stage 5 ([facility]) 3/27/2025 Yes    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 disease ([facility]) 3/27/2025 Yes    Primary hypertension 3/27/2025 Yes    Anxiety 3/27/2025 Yes    Secondary hyperparathyroidism 3/27/2025 Yes    Atrial fibrillation ([facility]) 3/27/2025 Yes    Restless leg syndrome 3/27/2025 Yes    ESRD on hemodialysis ([facility]) 3/27/2025 Yes    Gastroparesis 3/27/2025 Yes       Plan:     STEMI status post PCI  Continue aspirin, Plavix, statin.  Cardiology is primary, further management per their team  Gastroparesis  Initiate Reglan.  Consult to GI.  Appreciate their recommendations  End-stage renal disease on hemodialysis  Continue home Renvela.  Avoid nephrotoxic agents.  Nephrology on board for dialysis management.  Monitor daily labs  Type 2 diabetes  Insulin sliding scale, hypoglycemia protocol  Paroxysmal atrial fibrillation  DVT prophylaxis with heparin.  Resume home amiodarone, Coreg  Depression/anxiety  Resume home Effexor, Atarax  Hypertension  Resume home Coreg  Hyperlipidemia  Resume home statin  Hyperparathyroidism  Resume home Sensipar, calcitriol    Consultations:   IP CONSULT TO CARDIAC REHAB  IP CONSULT TO CARDIAC REHAB  IP CONSULT TO NEPHROLOGY  IP CONSULT TO INTERNAL MEDICINE  IP CONSULT TO DIETITIAN  IP CONSULT TO ELLIE Espino  3/27/2025  4:57 PM    Copy sent to Dr. Stone, Kristin BYRNE, APRN - CNP

## 2025-03-27 NOTE — ED PROVIDER NOTES
EMERGENCY DEPARTMENT ENCOUNTER    Pt Name: Asya Sharp  MRN: [id number]  Birthdate 1945  Date of evaluation: 3/27/25  CHIEF COMPLAINT       Chief Complaint   Patient presents with    Chest Pain    Shortness of Breath     HISTORY OF PRESENT ILLNESS   HPI  The patient is a 80-year-old female with a history of atrial fibrillation end-stage renal disease hypertension hyperlipidemia who presented to the emergency department from home secondary to left arm pain.  Patient stated that she was driving to dialysis complained of left arm pain, patient's  was called to pick her up and he drove her to the emergency department.  Patient stated this morning she has had pain in her left arm with radiation to her chest she felt somewhat short of breath.  No new trauma or injury to her left shoulder.  She rates the pain 3 out of 10 on the pain scale localized to her chest.  Patient had previous history of MI she does not have a cardiologist.  Has a history of end-stage renal disease for which she undergoes dialysis Tuesday Thursday Saturday she went on Tuesday has not missed any sessions.  Patient denies nausea, vomiting, fevers or chills      REVIEW OF SYSTEMS     Review of Systems   Constitutional:  Negative for chills, diaphoresis and fever.   HENT:  Negative for congestion, ear pain and facial swelling.    Eyes:  Negative for pain, discharge and visual disturbance.   Respiratory:  Negative for chest tightness and shortness of breath.    Cardiovascular:  Positive for chest pain. Negative for palpitations.   Gastrointestinal:  Negative for abdominal distention and abdominal pain.   Genitourinary:  Negative for difficulty urinating and flank pain.   Musculoskeletal:  Negative for back pain.        Left arm pain   Skin:  Negative for wound.   Neurological:  Negative for dizziness, light-headedness and headaches.     PASTMEDICAL HISTORY     Past Medical History:   Diagnosis Date    Anemia     Arthritis     Atrial  consulting clinician:  Dr. Betancur    MIPS:  NA    Social determinants of health impacting treatment or disposition:  none    Code Status Discussion:  full code    CRITICAL CARE:   CRITICAL CARE TIME     Due to the high probability of sudden and clinically significant deterioration in the patient's condition she required highest level of my preparedness to intervene urgently. I provided critical care time including documentation time, medication orders and management, reevaluation, vital sign assessment, ordering and reviewing of of lab tests ordering and reviewing of x-ray studies, and admission orders. Aggregate critical care time is  30 minutes including only time during which I was engaged in work directly related to her care and did not include time spent treating other patients simultaneously.        PROCEDURES:    Procedures         DATA FOR LAB AND RADIOLOGY TESTS ORDERED BELOW ARE REVIEWED BY THE ED CLINICIAN:    RADIOLOGY: All x-rays, CT, MRI, and formal ultrasound images (except ED bedside ultrasound) are read by the radiologist, see reports below, unless otherwise noted in MDM or here.  Reports below are reviewed by myself.  No orders to display       LABS: Lab orders shown below, the results are reviewed by myself, and all abnormals are listed below.  Labs Reviewed   BASIC METABOLIC PANEL - Abnormal; Notable for the following components:       Result Value    Potassium 3.1 (*)     Chloride 94 (*)     Creatinine 5.5 (*)     Est, Glom Filt Rate 7 (*)     All other components within normal limits   CBC WITH AUTO DIFFERENTIAL - Abnormal; Notable for the following components:    RBC 3.18 (*)     Hemoglobin 9.6 (*)     Hematocrit 30.3 (*)     RDW 15.8 (*)     Neutrophils % 67 (*)     Lymphocytes % 20 (*)     Immature Granulocytes % 1 (*)     All other components within normal limits   PROTIME-INR - Abnormal; Notable for the following components:    Protime 17.7 (*)     All other components within normal limits

## 2025-03-28 ENCOUNTER — ANESTHESIA (OUTPATIENT)
Dept: CARDIOVASCULAR ICU | Age: 80
End: 2025-03-28
Payer: MEDICARE

## 2025-03-28 ENCOUNTER — ANESTHESIA EVENT (OUTPATIENT)
Dept: CARDIOVASCULAR ICU | Age: 80
End: 2025-03-28
Payer: MEDICARE

## 2025-03-28 VITALS
DIASTOLIC BLOOD PRESSURE: 91 MMHG | HEART RATE: 104 BPM | TEMPERATURE: 97.1 F | WEIGHT: 185.2 LBS | BODY MASS INDEX: 29.77 KG/M2 | RESPIRATION RATE: 18 BRPM | HEIGHT: 66 IN | SYSTOLIC BLOOD PRESSURE: 142 MMHG | OXYGEN SATURATION: 98 %

## 2025-03-28 PROBLEM — I46.9 CARDIAC ARREST: Status: ACTIVE | Noted: 2025-03-28

## 2025-03-28 LAB
ANION GAP SERPL CALCULATED.3IONS-SCNC: 13 MMOL/L (ref 9–16)
BUN SERPL-MCNC: 6 MG/DL (ref 8–23)
CALCIUM SERPL-MCNC: 8.7 MG/DL (ref 8.8–10.2)
CHLORIDE SERPL-SCNC: 101 MMOL/L (ref 98–107)
CO2 SERPL-SCNC: 23 MMOL/L (ref 20–31)
CREAT SERPL-MCNC: 3.5 MG/DL (ref 0.5–0.9)
EKG ATRIAL RATE: 75 BPM
EKG ATRIAL RATE: 79 BPM
EKG P AXIS: 87 DEGREES
EKG P AXIS: 91 DEGREES
EKG P-R INTERVAL: 182 MS
EKG P-R INTERVAL: 184 MS
EKG Q-T INTERVAL: 334 MS
EKG Q-T INTERVAL: 450 MS
EKG QRS DURATION: 108 MS
EKG QRS DURATION: 98 MS
EKG QTC CALCULATION (BAZETT): 382 MS
EKG QTC CALCULATION (BAZETT): 502 MS
EKG R AXIS: -12 DEGREES
EKG R AXIS: 2 DEGREES
EKG T AXIS: 67 DEGREES
EKG T AXIS: 71 DEGREES
EKG VENTRICULAR RATE: 75 BPM
EKG VENTRICULAR RATE: 79 BPM
ERYTHROCYTE [DISTWIDTH] IN BLOOD BY AUTOMATED COUNT: 16 % (ref 11.8–14.4)
GFR, ESTIMATED: 13 ML/MIN/1.73M2
GLUCOSE BLD-MCNC: 105 MG/DL (ref 65–105)
GLUCOSE BLD-MCNC: 99 MG/DL (ref 65–105)
GLUCOSE SERPL-MCNC: 106 MG/DL (ref 82–115)
HCT VFR BLD AUTO: 27.9 % (ref 36.3–47.1)
HGB BLD-MCNC: 8.8 G/DL (ref 11.9–15.1)
MCH RBC QN AUTO: 30.4 PG (ref 25.2–33.5)
MCHC RBC AUTO-ENTMCNC: 31.5 G/DL (ref 28.4–34.8)
MCV RBC AUTO: 96.5 FL (ref 82.6–102.9)
NRBC BLD-RTO: 0 PER 100 WBC
PLATELET # BLD AUTO: 224 K/UL (ref 138–453)
PMV BLD AUTO: 10.2 FL (ref 8.1–13.5)
POTASSIUM SERPL-SCNC: 3.8 MMOL/L (ref 3.7–5.3)
RBC # BLD AUTO: 2.89 M/UL (ref 3.95–5.11)
SODIUM SERPL-SCNC: 137 MMOL/L (ref 136–145)
WBC OTHER # BLD: 10.5 K/UL (ref 3.5–11.3)

## 2025-03-28 PROCEDURE — 80048 BASIC METABOLIC PNL TOTAL CA: CPT

## 2025-03-28 PROCEDURE — 99291 CRITICAL CARE FIRST HOUR: CPT | Performed by: INTERNAL MEDICINE

## 2025-03-28 PROCEDURE — 2500000003 HC RX 250 WO HCPCS: Performed by: STUDENT IN AN ORGANIZED HEALTH CARE EDUCATION/TRAINING PROGRAM

## 2025-03-28 PROCEDURE — 31500 INSERT EMERGENCY AIRWAY: CPT

## 2025-03-28 PROCEDURE — 94761 N-INVAS EAR/PLS OXIMETRY MLT: CPT

## 2025-03-28 PROCEDURE — 92950 HEART/LUNG RESUSCITATION CPR: CPT

## 2025-03-28 PROCEDURE — 93010 ELECTROCARDIOGRAM REPORT: CPT | Performed by: INTERNAL MEDICINE

## 2025-03-28 PROCEDURE — 6360000002 HC RX W HCPCS: Performed by: INTERNAL MEDICINE

## 2025-03-28 PROCEDURE — NBSRV NON-BILLABLE SERVICE: Performed by: INTERNAL MEDICINE

## 2025-03-28 PROCEDURE — 6370000000 HC RX 637 (ALT 250 FOR IP): Performed by: NURSE PRACTITIONER

## 2025-03-28 PROCEDURE — 31500 INSERT EMERGENCY AIRWAY: CPT | Performed by: ANESTHESIOLOGY

## 2025-03-28 PROCEDURE — 6370000000 HC RX 637 (ALT 250 FOR IP): Performed by: INTERNAL MEDICINE

## 2025-03-28 PROCEDURE — 2500000003 HC RX 250 WO HCPCS: Performed by: INTERNAL MEDICINE

## 2025-03-28 PROCEDURE — 6370000000 HC RX 637 (ALT 250 FOR IP)

## 2025-03-28 PROCEDURE — 6370000000 HC RX 637 (ALT 250 FOR IP): Performed by: STUDENT IN AN ORGANIZED HEALTH CARE EDUCATION/TRAINING PROGRAM

## 2025-03-28 PROCEDURE — 85027 COMPLETE CBC AUTOMATED: CPT

## 2025-03-28 PROCEDURE — 36415 COLL VENOUS BLD VENIPUNCTURE: CPT

## 2025-03-28 PROCEDURE — 82947 ASSAY GLUCOSE BLOOD QUANT: CPT

## 2025-03-28 RX ORDER — INDOMETHACIN 25 MG/1
CAPSULE ORAL
Status: COMPLETED | OUTPATIENT
Start: 2025-03-28 | End: 2025-03-28

## 2025-03-28 RX ORDER — 0.9 % SODIUM CHLORIDE 0.9 %
500 INTRAVENOUS SOLUTION INTRAVENOUS ONCE
Status: DISCONTINUED | OUTPATIENT
Start: 2025-03-28 | End: 2025-03-28 | Stop reason: HOSPADM

## 2025-03-28 RX ORDER — ACETAMINOPHEN 325 MG/1
650 TABLET ORAL EVERY 4 HOURS PRN
Status: DISCONTINUED | OUTPATIENT
Start: 2025-03-28 | End: 2025-03-28 | Stop reason: HOSPADM

## 2025-03-28 RX ORDER — EPINEPHRINE IN SOD CHLOR,ISO 1 MG/10 ML
SYRINGE (ML) INTRAVENOUS
Status: COMPLETED | OUTPATIENT
Start: 2025-03-28 | End: 2025-03-28

## 2025-03-28 RX ORDER — AMIODARONE HYDROCHLORIDE 150 MG/3ML
INJECTION, SOLUTION INTRAVENOUS
Status: COMPLETED | OUTPATIENT
Start: 2025-03-28 | End: 2025-03-28

## 2025-03-28 RX ORDER — MAGNESIUM SULFATE HEPTAHYDRATE 500 MG/ML
INJECTION, SOLUTION INTRAMUSCULAR; INTRAVENOUS
Status: COMPLETED | OUTPATIENT
Start: 2025-03-28 | End: 2025-03-28

## 2025-03-28 RX ORDER — CALCIUM CHLORIDE 100 MG/ML
INJECTION INTRAVENOUS; INTRAVENTRICULAR
Status: COMPLETED | OUTPATIENT
Start: 2025-03-28 | End: 2025-03-28

## 2025-03-28 RX ADMIN — MAGNESIUM SULFATE HEPTAHYDRATE 2000 MG: 500 INJECTION, SOLUTION INTRAMUSCULAR; INTRAVENOUS at 09:50

## 2025-03-28 RX ADMIN — SODIUM CHLORIDE, PRESERVATIVE FREE 10 ML: 5 INJECTION INTRAVENOUS at 09:23

## 2025-03-28 RX ADMIN — SODIUM BICARBONATE 25 MEQ: 84 INJECTION, SOLUTION INTRAVENOUS at 09:57

## 2025-03-28 RX ADMIN — ONDANSETRON 4 MG: 4 TABLET, ORALLY DISINTEGRATING ORAL at 09:14

## 2025-03-28 RX ADMIN — EPINEPHRINE 1 MG: 0.1 INJECTION INTRACARDIAC; INTRAVENOUS at 09:48

## 2025-03-28 RX ADMIN — EPINEPHRINE 1 MG: 0.1 INJECTION INTRACARDIAC; INTRAVENOUS at 09:55

## 2025-03-28 RX ADMIN — ACETAMINOPHEN 650 MG: 325 TABLET, FILM COATED ORAL at 02:13

## 2025-03-28 RX ADMIN — EPINEPHRINE 1 MG: 0.1 INJECTION INTRACARDIAC; INTRAVENOUS at 09:59

## 2025-03-28 RX ADMIN — EPINEPHRINE 1 MG: 0.1 INJECTION INTRACARDIAC; INTRAVENOUS at 09:43

## 2025-03-28 RX ADMIN — EPINEPHRINE 1 MG: 0.1 INJECTION INTRACARDIAC; INTRAVENOUS at 09:51

## 2025-03-28 RX ADMIN — AMIODARONE HYDROCHLORIDE 300 MG: 50 INJECTION, SOLUTION INTRAVENOUS at 09:48

## 2025-03-28 RX ADMIN — SODIUM BICARBONATE 25 MEQ: 84 INJECTION, SOLUTION INTRAVENOUS at 09:46

## 2025-03-28 RX ADMIN — ONDANSETRON 4 MG: 4 TABLET, ORALLY DISINTEGRATING ORAL at 00:35

## 2025-03-28 RX ADMIN — EPINEPHRINE 1 MG: 0.1 INJECTION INTRACARDIAC; INTRAVENOUS at 09:37

## 2025-03-28 RX ADMIN — EPINEPHRINE 1 MG: 0.1 INJECTION INTRACARDIAC; INTRAVENOUS at 09:41

## 2025-03-28 RX ADMIN — SODIUM BICARBONATE 25 MEQ: 84 INJECTION, SOLUTION INTRAVENOUS at 09:39

## 2025-03-28 RX ADMIN — EPINEPHRINE 1 MG: 0.1 INJECTION INTRACARDIAC; INTRAVENOUS at 10:03

## 2025-03-28 RX ADMIN — CALCIUM CHLORIDE 500 MG: 100 INJECTION, SOLUTION INTRAVENOUS; INTRAVENTRICULAR at 09:46

## 2025-03-28 ASSESSMENT — PAIN SCALES - GENERAL: PAINLEVEL_OUTOF10: 4

## 2025-03-28 ASSESSMENT — PAIN DESCRIPTION - LOCATION: LOCATION: BACK

## 2025-03-28 ASSESSMENT — PAIN DESCRIPTION - FREQUENCY: FREQUENCY: INTERMITTENT

## 2025-03-28 ASSESSMENT — PAIN - FUNCTIONAL ASSESSMENT: PAIN_FUNCTIONAL_ASSESSMENT: ACTIVITIES ARE NOT PREVENTED

## 2025-03-28 ASSESSMENT — PAIN DESCRIPTION - ONSET: ONSET: ON-GOING

## 2025-03-28 ASSESSMENT — PAIN DESCRIPTION - PAIN TYPE: TYPE: CHRONIC PAIN

## 2025-03-28 ASSESSMENT — PAIN DESCRIPTION - ORIENTATION: ORIENTATION: MID

## 2025-03-28 NOTE — PROGRESS NOTES
End Of Shift Note  [facility]  Summary of shift: Patient slept most od the night. No c/o CP, SR no ectopy. BP stable. Took Zofran x1 dose for c/o nausea but had no emesis. Poor intake due to c/o ongoing nausea & vomiting since February Reglan started yesterday. R groin site CD & I. Kirstin Munroe RN      Vitals:    Vitals:    03/28/25 0400 03/28/25 0500 03/28/25 0600 03/28/25 0700   BP: 130/66      Pulse: 83 88 88 86   Resp: 17 20 20 19   Temp: 97.1 °F (36.2 °C)      TempSrc: Temporal      SpO2: 92% 96% 93%    Weight:   84 kg (185 lb 3.2 oz)    Height:            I&O:   Intake/Output Summary (Last 24 hours) at 3/28/2025 0826  Last data filed at 3/27/2025 2015  Gross per 24 hour   Intake 500 ml   Output 2510 ml   Net -2010 ml       Resp Status: RA    Ventilator Settings:     / / /     Critical Care IV infusions:   sodium chloride      dextrose          LDA:   Peripheral IV 03/27/25 Right Antecubital (Active)   Number of days: 0       Peripheral IV 03/27/25 Right Forearm (Active)   Number of days: 0       Hemodialysis Fistula/Graft Left (Active)   Number of days: 128       Wound 12/06/22 Tibial Distal;Right;Posterior RIGHT LATERAL ANKLE OPEN WOUND, APPROXIMATED WITH NYLON SUTURE (Active)   Number of days: 842       Incision 12/06/22 Tibial Distal;Right;Posterior (Active)   Number of days: 842

## 2025-03-28 NOTE — PROGRESS NOTES
During morning assessment patient had nausea/vomiting. Patient sitting edge of bed. Alert/oriented, only complaints of nausea. Denied chest pain or shortness of breath. Patient given zofran 0914. Intermed messaged in meantime for extra anti-nausea medication. Pt recent diagnosis of gastroparesis. Patient then needed to use bathroom. Walked to bathroom with PCT. Educated on call light use. RN and student nurse present in room while patient on toilet. Conversed with patient while in bathroom. Patient asked for more time. Educated to pull call light when ready, patient said \"I sure will\" Stepped out of room no more than two minutes when patient call light for bathroom alerted. PCT responded and called for help. This RN responded seconds after. Found patient to be on floor, gasping, weak pulse. Called out for rapid response. Sternal rubbed patient, patient made eye contact before pulse was then lost.    0935 Called out for code blue.   Compressions started.   Crash cart brought into room    See code blue documentation flowsheet

## 2025-03-28 NOTE — PROGRESS NOTES
SPIRITUAL CARE DEPARTMENT [facility]   Patient Death Note  DEATH                  Room # /2030-01   Name: Asya Sharp            Age: 80 y.o.  Gender: female          Latter-day: Rastafari      Place of Mandaeism: [facility]  Admit Date & Time: 3/27/2025 10:41 AM        Actual date of death: 2025   TOD: 1006       Referral:  Unit: CVICU  Nurse: Irma Daugherty    SITUATION AT DEATH:  (Relevant information to the situation that has led to death).     IS THIS A 'S CASE?  No    SPIRITUAL/EMOTIONAL INTERVENTION:  (Please note all relevant care assessments and interventions and duties performed. Also include important family names, numbers, and dynamics) Family arrived after death and grieving appropriately. Family members present were , daughter, and son.  provided comforting spiritual care.       DOCTOR SIGNING DEATH CERTIFICATE:  Name: Dr. Robin Whitley  Phone number: unknown    Copy of COMPLETED Release of Body Form Received?  Yes    Patient's belongings: family carried away     HOME:  Contacted Time 1056  Name: [facility]: [place]  Phone Number: [phone]    NEXT OF KIN:  Name: Mac Sharp  Relationship:   Street Address: [address] [place]: [place]  State: [place]  Zip code: [address]   Phone Number: [phone]    ANY FOLLOW-UP NEEDED?  No    IF SO, WHAT?  none    Electronically signed by Chaplain Brook, on 3/28/2025 at 11:05 AM.  Spiritual Care Department  [facility]  [phone]

## 2025-03-28 NOTE — PROGRESS NOTES
A referral to cardiac rehab has been sent with patient information. All pertinent patient information has been sent to selected cardiac rehab program. Patient informed about the cardiac rehab program, and that they will be contacted by the referred program. Kirstin Munroe RN

## 2025-03-28 NOTE — PROGRESS NOTES
HEMODIALYSIS POST TREATMENT NOTE    Treatment time ordered: 3 hrs     Actual treatment time: 3 hrs    UltraFiltration Goal: 2500 ml  UltraFiltration Removed: 2500 ml      Pre Treatment weight: 86.6kg  Post Treatment weight: 84.6kg  Estimated Dry Weight: 87.2 Kg    Access used:     Central Venous Catheter:          Tunneled or Non-tunneled: na           Site: na          Access Flow: na      Internal Access:       AV Fistula or AV Graft: AVF         Site: PAUL       Access Flow: 450, Good flow       Sign and symptoms of infection: na       If YES: na    Medications or blood products given: See MAR    Chronic outpatient schedule:     [place] [facility]    Summary of response to treatment: Pt tx d/c all blood returned, dialyzer lightly streaked, total of 2000 removed, both needles pulled, sites held 10 minutes each, without event. Pt remains in her room     Explain if orders NOT met, was physician notified:yvonne PENG flowsheet faxed to patient unit/ placed in patient chart: yes    Post assessment completed: yes    Report given to: Kirstin Munroe Rn      * Intra-treatment documented Safety Checks include the followin) Access and face visible at all times.     2) All connections and blood lines are secure with no kinks.     3) NVL alarm engaged.     4) Hemosafe device applied (if applicable).     5) No collapse of Arterial or Venous blood chambers.     6) All blood lines / pump segments in the air detectors.

## 2025-03-28 NOTE — ANESTHESIA PROCEDURE NOTES
Airway  Date/Time: 3/28/2025 9:45 AM  Urgency: emergent    Airway not difficult    General Information and Staff    Patient location during procedure: ICU  Anesthesiologist: Mehrdad Yee DO  Performed: anesthesiologist   Performed by: Mehrdad Yee DO  Authorized by: Mehrdad Yee DO      Consent for Airway (if performed for an anesthetic, see related documentation for consents)  Patient identity confirmed: per hospital policy  Consent: The procedure was performed in an emergent situation. Verbal consent not obtained. Written consent not obtained.  Risks and benefits: risks, benefits and alternatives were not discussed      Code status verified:yes  Indications and Patient Condition  Indications for airway management: cardio/pulmonary arrest  Preoxygenated: yes  Patient position: sniffing  Mask difficulty assessment: vent by bag mask    Final Airway Details  Final airway type: endotracheal airway      Successful airway: ETT  Cuffed: yes   Successful intubation technique: direct laryngoscopy  Endotracheal tube insertion site: oral  Blade: Soren  Blade size: #3  ETT size (mm): 7.5  Cormack-Lehane Classification: grade I - full view of glottis  Placement verified by: chest auscultation and capnometry   Measured from: lips  ETT to lips (cm): 22  Number of attempts at approach: 1  Ventilation between attempts: bag mask  Number of other approaches attempted: 0    Additional Comments  Called to [facility] for assist with intubation for code in progress. Upon arrival patient is on the floor in the bathroom CPR in progress with bag mask ventilation. Intubated with DL Grade I view MAC 3 without interruption of chest compressions. + color change CO2, + b/l breath sounds. Code ongoing post intubation.  no

## 2025-03-28 NOTE — CARE COORDINATION
Case Management Assessment  Initial Evaluation    Date/Time of Evaluation: 3/28/2025 9:18 AM  Assessment Completed by: JEREMY PENNINGTON RN    If patient is discharged prior to next notation, then this note serves as note for discharge by case management.    Patient Name: Asya Sharp                   YOB: 1945  Diagnosis: Acute coronary syndrome (HCC) [I24.9]  ST elevation myocardial infarction (STEMI), unspecified artery (HCC) [I21.3]  STEMI involving right coronary artery (HCC) [I21.11]                   Date / Time: 3/27/2025 10:41 AM    Patient Admission Status: Inpatient   Readmission Risk (Low < 19, Mod (19-27), High > 27): Readmission Risk Score: 19.5    Current PCP: Kristin Stone APRN - CNP  PCP verified by CM? Yes    Chart Reviewed: Yes      History Provided by: Patient  Patient Orientation: Alert and Oriented, Person, Place, Situation, Self    Patient Cognition: Alert    Hospitalization in the last 30 days (Readmission):  No    If yes, Readmission Assessment in  Navigator will be completed.    Advance Directives:      Code Status: Full Code   Patient's Primary Decision Maker is: Legal Next of Kin    Primary Decision Maker: Mac Sharp - Spouse - [phone]    Secondary Decision Maker: Natalia Sharp - Child - [phone]    Discharge Planning:    Patient lives with: Spouse/Significant Other Type of Home: House  Primary Care Giver: Self  Patient Support Systems include: Spouse/Significant Other, Children   Current Financial resources: None  Current community resources: None  Current services prior to admission: Durable Medical Equipment            Current DME: Shower Chair, Walker, Cane            Type of Home Care services:  OT, PT, Skilled Therapy    ADLS  Prior functional level: Assistance with the following:, Cooking, Housework, Shopping  Current functional level: Assistance with the following:, Bathing, Toileting, Cooking, Housework, Shopping, Mobility    PT AM-PAC:   /24  OT  individualized plan of care/goals and shares the quality data associated with the providers was provided to: Patient   Patient Representative Name:       The Patient and/or Patient Representative Agree with the Discharge Plan? Yes    JEREMY PENNINGTON RN  Case Management Department

## 2025-03-28 NOTE — DISCHARGE INSTRUCTIONS
Learning About Carbohydrate (Carb) Counting and Eating Out When You Have Diabetes  Why plan your meals?     Meal planning can be a key part of managing diabetes. Planning meals and snacks with the right balance of carbohydrate, protein, and fat can help you keep your blood sugar at the target level you set with your doctor.  You don't have to eat special foods. You can eat what your family eats, including sweets once in a while. But you do have to pay attention to how often you eat and how much you eat of certain foods.  You may want to work with a dietitian or a diabetes educator. They can give you tips and meal ideas and can answer your questions about meal planning. This health professional can also help you reach a healthy weight if that is one of your goals.  What should you know about eating carbs?  Managing the amount of carbohydrate (carbs) you eat is an important part of healthy meals when you have diabetes. Carbohydrate is found in many foods.  Learn which foods have carbs. And learn the amounts of carbs in different foods.  Bread, cereal, pasta, and rice have about 15 grams of carbs in a serving. A serving is 1 slice of bread (1 ounce), ½ cup of cooked cereal, or 1/3 cup of cooked pasta or rice.  Fruits have 15 grams of carbs in a serving. A serving is 1 small fresh fruit, such as an apple or orange; ½ of a banana; ½ cup of cooked or canned fruit; ½ cup of fruit juice; 1 cup of melon or raspberries; or 2 tablespoons of dried fruit.  Milk and no-sugar-added yogurt have 15 grams of carbs in a serving. A serving is 1 cup of milk or 3/4 cup (6 oz) of no-sugar-added yogurt.  Starchy vegetables have 15 grams of carbs in a serving. A serving is ½ cup of mashed potatoes or sweet potato; 1 cup winter squash; ½ of a small baked potato; ½ cup of cooked beans; or ½ cup cooked corn or green peas.  Learn how much carbs to eat each day and at each meal. A dietitian or certified diabetes educator can teach you how  if you take certain diabetes medicines.  Follow-up care is a key part of your treatment and safety. Be sure to make and go to all appointments, and call your doctor if you are having problems. It's also a good idea to know your test results and keep a list of the medicines you take.  Where can you learn more?  Go to https://www.[employer].net/patientEd and enter I147 to learn more about \"Learning About Carbohydrate (Carb) Counting and Eating Out When You Have Diabetes.\"  Current as of: October 7, 2024  Content Version: 14.4  © 4000-3558 [employer].   Care instructions adapted under license by [employer]. If you have questions about a medical condition or this instruction, always ask your healthcare professional. [employer] disclaims any warranty or liability for your use of this information.       Low Sodium Diet (2,000 Milligram): Care Instructions  Overview     Limiting sodium can be an important part of managing some health problems.  The most common source of sodium is salt. People get most of the salt in their diet from canned, prepared, and packaged foods. Fast food and restaurant meals also are very high in sodium. Your doctor will probably limit your sodium to less than 2,000 milligrams (mg) a day. This limit counts all the sodium in prepared and packaged foods and any salt you add to your food.  Follow-up care is a key part of your treatment and safety. Be sure to make and go to all appointments, and call your doctor if you are having problems. It's also a good idea to know your test results and keep a list of the medicines you take.  How can you care for yourself at home?  Read food labels  Read labels on cans and food packages. The labels tell you how much sodium is in each serving. Make sure that you look at the serving size. If you eat more than the serving size, you have eaten more sodium.  Food labels also tell you the Percent Daily Value for sodium. Choose products with low

## 2025-03-28 NOTE — PROGRESS NOTES
Patient states not sure if she will be able to eat as she has been vomiting on and off since February. Offered Zofran before eating to see if it would prevent vomiting, patient declines. HD RN in room, states removed 2 kg, 2.5 mL but that patient still has a significant amount of fluid on board. Kirstin Munroe RN

## 2025-03-28 NOTE — PROGRESS NOTES
[facility]  Office: [phone]  Davonte Capps DO, Tavares Block, DO, Brent Tamayo DO, Wilder Peralta DO, Dilan Villanueva MD, Joanna Blanco MD, Eric Hammer MD, Saniya Kwon MD,  Carrillo Hendricks MD, Sivakumar Chapin MD, Roney Barajas MD,  Jeronimo Castillo DO, Jesus Roberts MD, Carlos Coley MD, Rey aCpps DO, Teresa Braxton MD,  Charles Howell DO, Carly Byrd MD, Maria C Vazquez MD, Susi Baird MD,  Joel Tenorio MD, Ruma Monet MD, Lisseth East MD, Beto Mazariegos MD, Davion Pratt MD, Chavo Warren MD, Teja Philip DO, Chavez Mehta MD, Jeronimo Leija MD, Mohsin Reza, MD, Shirley Waterhouse, CNP,  Rupal Benson, CNP, Teja Pedersen, CNP,  Brandy Martino, DNP, Radha Kohli, CNP, Lashonda Lambert, CNP, Chanel Christianson, CNP, Danielle Adame CNP, ROYER ShortC, Sugey Tarango, CNP, Angelica Webster, CNP,  Gemma Horne, CNP, Serena Montez, CNP, Lazara Grove, CNP,  Iva Hilario, CNS, Maria Isabel Bernard, CNP, Zonia Moran CNP,   Cherie Camacho, CNP         [facility]   IN-PATIENT SERVICE   [facility]    Progress Note    3/28/2025    8:37 AM    Name:   Asya Sharp  MRN:     [id number]     Acct:      [id number]   Room:   2030/2030-01  IP Day:  1  Admit Date:  3/27/2025 10:41 AM    PCP:   Kristin Stone APRN - CNP  Code Status:  Full Code    Subjective:     C/C: Chest pain, SOB    Patient is s/p cardiac cath and PCI yesterday.  She had N/V this morning and had a cardiac arrest in the bathroom.  A CODE BLUE was called and ACLS protocol ensued.  Patient had CPR, Epi, Bicarb, Magnesium, Amiodarone given.  She was pulseless and had one rhythm strip of V. Tach and was shocked.  Anesthesia intubated her in the bathroom.  We did CPR x 26 minutes and never felt a pulse.  The Code Blue was stopped and her Time of Death was 10:06Am.  The  notified her  who is on his way to the hospital.  Cardiology notified.        Medications:  never used smokeless tobacco. She reports that she does not drink alcohol and does not use drugs.     Family History:   Family History   Problem Relation Age of Onset    Diabetes Mother     Heart Disease Mother     Osteoporosis Mother     Kidney Disease Father     Prostate Cancer Brother     Breast Cancer Neg Hx        Vitals:  /66   Pulse 86   Temp 97.1 °F (36.2 °C) (Temporal)   Resp 19   Ht 1.676 m (5' 6\")   Wt 84 kg (185 lb 3.2 oz)   LMP  (LMP Unknown)   SpO2 93%   BMI 29.89 kg/m²   Temp (24hrs), Av.4 °F (36.3 °C), Min:97.1 °F (36.2 °C), Max:98.2 °F (36.8 °C)    Recent Labs     25  0824   POCGLU 95 99       I/O (24Hr):    Intake/Output Summary (Last 24 hours) at 3/28/2025 0837  Last data filed at 3/27/2025 2015  Gross per 24 hour   Intake 500 ml   Output 2510 ml   Net - ml       Labs:  Hematology:  Recent Labs     25  10525  0408   WBC 9.1 10.5   RBC 3.18* 2.89*   HGB 9.6* 8.8*   HCT 30.3* 27.9*   MCV 95.3 96.5   MCH 30.2 30.4   MCHC 31.7 31.5   RDW 15.8* 16.0*    224   MPV 9.2 10.2   INR 1.4  --      Chemistry:  Recent Labs     25  10525  0611    137   K 3.1* 3.8   CL 94* 101   CO2 28 23   GLUCOSE 96 106   BUN 13 6*   CREATININE 5.5* 3.5*   MG 1.7  --    ANIONGAP 14 13   LABGLOM 7* 13*   CALCIUM 8.9 8.7*   TROPHS 203*  --      Recent Labs     25  0824   POCGLU 95 99     ABG:  Lab Results   Component Value Date/Time    FIO2 INFORMATION NOT PROVIDED 2022 06:40 PM     Lab Results   Component Value Date/Time    SPECIAL 5 ML RIGHT AC 2022 09:09 AM     Lab Results   Component Value Date/Time    CULTURE Unable to perform testing: No specimen received. 2022 10:46 PM       Radiology:  No results found.    Physical Examination:        General appearance:  Unresponsive, pale  Mental Status:   Unable to assess  Lungs:  clear to auscultation   Abdomen:  soft,   Extremities:  no edema, redness,  Skin:   pale    Assessment:        Hospital Problems           Last Modified POA    * (Principal) ST elevation myocardial infarction (STEMI) (Formerly [facility]) 3/27/2025 Yes    Dyslipidemia 3/27/2025 Yes    DIONY (obstructive sleep apnea) 3/27/2025 Yes    Depression 3/27/2025 Yes    Anemia of chronic renal failure, stage 5 ([facility]ly [facility]) 3/27/2025 Yes    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 disease ([facility]) 3/27/2025 Yes    Primary hypertension 3/27/2025 Yes    Anxiety 3/27/2025 Yes    Secondary hyperparathyroidism 3/27/2025 Yes    Atrial fibrillation ([facility]) 3/27/2025 Yes    Restless leg syndrome 3/27/2025 Yes    ESRD on hemodialysis ([facility]ly [facility]) 3/27/2025 Yes    Gastroparesis 3/27/2025 Yes    Acute coronary syndrome ([facility]) 3/27/2025 Yes       Plan:        Stemi s/p PCI- on ASA, Plavix, stain, Cardiology is primary  Cardiac arrest- post PCI, maybe the stent collapsed, unclear etiology.  CPR and ACLS x 26 minutes.    Labs reviewed, no hyperkalemia or acidosis  N/v- gastroparesis    Patient  at 10:06am.  > 65 min spent in Critical care time    Joanna Blanco MD  3/28/2025  8:37 AM

## 2025-03-28 NOTE — PLAN OF CARE
Problem: Discharge Planning  Goal: Discharge to home or other facility with appropriate resources  Outcome: Progressing  Flowsheets  Taken 3/27/2025 2030 by Kirstin Munroe RN  Discharge to home or other facility with appropriate resources:   Identify barriers to discharge with patient and caregiver   Arrange for needed discharge resources and transportation as appropriate   Identify discharge learning needs (meds, wound care, etc)   Refer to discharge planning if patient needs post-hospital services based on physician order or complex needs related to functional status, cognitive ability or social support system  Taken 3/27/2025 1245 by Kimberly Duong RN  Discharge to home or other facility with appropriate resources: Identify barriers to discharge with patient and caregiver     Problem: Pain  Goal: Verbalizes/displays adequate comfort level or baseline comfort level  Outcome: Progressing  Flowsheets (Taken 3/27/2025 2000)  Verbalizes/displays adequate comfort level or baseline comfort level:   Encourage patient to monitor pain and request assistance   Assess pain using appropriate pain scale   Administer analgesics based on type and severity of pain and evaluate response   Implement non-pharmacological measures as appropriate and evaluate response   Consider cultural and social influences on pain and pain management   Notify Licensed Independent Practitioner if interventions unsuccessful or patient reports new pain     Problem: Safety - Adult  Goal: Free from fall injury  Outcome: Progressing     Problem: ABCDS Injury Assessment  Goal: Absence of physical injury  Outcome: Progressing     Problem: Cardiovascular - Adult  Goal: Maintains optimal cardiac output and hemodynamic stability  Outcome: Progressing  Goal: Absence of cardiac dysrhythmias or at baseline  Outcome: Progressing     Problem: Metabolic/Fluid and Electrolytes - Adult  Goal: Electrolytes maintained within normal limits  Outcome:  Progressing  Goal: Hemodynamic stability and optimal renal function maintained  Outcome: Progressing  Goal: Glucose maintained within prescribed range  Outcome: Progressing

## 2025-03-28 NOTE — PLAN OF CARE
Writer at bedside as patient coded  vfib while in bathroom after vomiting , after 30 min of CPR , patient passed away at 1006

## (undated) DEVICE — GARMENT,MEDLINE,DVT,INT,CALF,MED, GEN2: Brand: MEDLINE

## (undated) DEVICE — GLOVE ORTHO 8   MSG9480

## (undated) DEVICE — Device

## (undated) DEVICE — BAND COMPR L24CM REG CLR PLAS HEMSTAT EXT HK AND LOOP RETEN

## (undated) DEVICE — 2.0MM DRILL LONG WITH AO QUICK CONNECT: Brand: EVOS

## (undated) DEVICE — OVERDRILL FOR 2.7MM SCREWS WITH AO                                    QUICK CONNECT: Brand: EVOS

## (undated) DEVICE — CATH BLLN ANGIO 4X15MM NC EUPHORIA RX

## (undated) DEVICE — PADDING,UNDERCAST,COTTON, 4"X4YD STERILE: Brand: MEDLINE

## (undated) DEVICE — SVMMC ORTH SPL DRP PK

## (undated) DEVICE — BANDAGE COBAN 4 IN COMPR W4INXL5YD FOAM COHESIVE QUIK STK SELF ADH SFT

## (undated) DEVICE — BNDG,ELSTC,MATRIX,STRL,4"X5YD,LF,HOOK&LP: Brand: MEDLINE

## (undated) DEVICE — THE STERILE LIGHT HANDLE COVER IS USED WITH STERIS SURGICAL LIGHTING AND VISUALIZATION SYSTEMS.

## (undated) DEVICE — EVOS SMALL 2.5MM DRILL W/AO QC SHORT: Brand: EVOS

## (undated) DEVICE — 2.0MM DRILL SHORT WITH AO QUICK CONNECT: Brand: EVOS

## (undated) DEVICE — CYSTO/BLADDER IRRIGATION SET, REGULATING CLAMP

## (undated) DEVICE — CATH BLLN ANGIO 3X20MM NC EUPHORIA RX

## (undated) DEVICE — DRAPE,BAR,HEAD,STERILE: Brand: MEDLINE

## (undated) DEVICE — ZIMMER® STERILE DISPOSABLE TOURNIQUET CUFF WITH PROTECTIVE SLEEVE AND PLC, DUAL PORT, SINGLE BLADDER, 34 IN. (86 CM)

## (undated) DEVICE — GOWN,SIRUS,NONRNF,SETINSLV,2XL,18/CS: Brand: MEDLINE

## (undated) DEVICE — SUTURE NONABSORBABLE MONOFILAMENT 3-0 PS-1 18 IN BLK ETHILON 1663H

## (undated) DEVICE — BANDAGE,GAUZE,BULKEE II,4.5"X4.1YD,STRL: Brand: MEDLINE

## (undated) DEVICE — C-ARMOR C-ARM EQUIPMENT COVERS CLEAR STERILE UNIVERSAL FIT 12 PER CASE: Brand: C-ARMOR

## (undated) DEVICE — SPLINT CAST W5XL30IN GRN STRENGTH PLSTR OF PARIS FAST SET

## (undated) DEVICE — DRAPE,U/ SHT,SPLIT,PLAS,STERIL: Brand: MEDLINE

## (undated) DEVICE — DRAPE,REIN 53X77,STERILE: Brand: MEDLINE

## (undated) DEVICE — SOLUTION IV IRRIG POUR BRL 0.9% SODIUM CHL 2F7124

## (undated) DEVICE — CATHETER ANGIO JL4 0.038 IN AD 6 FRX100 CM STD SUPER TORQUE

## (undated) DEVICE — BANDAGE COMPR W6INXL12FT SMOOTH FOR LIMB EXSANG ESMARCH

## (undated) DEVICE — CATHETER DIAG AD 6FR L100CM 0.038IN STD COR POLYUR JUDKINS

## (undated) DEVICE — GLOVE ORANGE PI 8   MSG9080

## (undated) DEVICE — APPLICATOR MEDICATED 26 CC SOLUTION HI LT ORNG CHLORAPREP

## (undated) DEVICE — CATHETER DIAG SM AD 6FR L100CM 0.038IN COR POLYUR JUDKINS L

## (undated) DEVICE — SUTURE VCRL SZ 2-0 L18IN ABSRB UD CT-1 L36MM 1/2 CIR J839D

## (undated) DEVICE — SHEET DRAPE FULL 70X100

## (undated) DEVICE — GLOVE ORANGE PI 7 1/2   MSG9075

## (undated) DEVICE — STENT CORONARY ONYX FRONTIER RX 4X30 MM ZOTAROLIMUS ELUT

## (undated) DEVICE — INTENDED FOR TISSUE SEPARATION, AND OTHER PROCEDURES THAT REQUIRE A SHARP SURGICAL BLADE TO PUNCTURE OR CUT.: Brand: BARD-PARKER ® CARBON RIB-BACK BLADES

## (undated) DEVICE — DRESSING,GAUZE,XEROFORM,CURAD,1"X8",ST: Brand: CURAD

## (undated) DEVICE — EVOS SMALL 2.0MM DRILL W/AO QC SHORT: Brand: EVOS

## (undated) DEVICE — INTRODUCER SHTH 6FR L11CM 0.038IN STD SIDEPRT EXTN 3 W

## (undated) DEVICE — MARKER,SKIN,WI/RULER AND LABELS: Brand: MEDLINE

## (undated) DEVICE — 1.25MM DRILL TIP WIRE 150MM: Brand: VLP FOOT

## (undated) DEVICE — SUTURE VCRL SZ 0 L18IN ABSRB UD L36MM CT-1 1/2 CIR J840D

## (undated) DEVICE — SUTURE MCRYL SZ 3-0 L27IN ABSRB UD L24MM PS-1 3/8 CIR PRIM Y936H

## (undated) DEVICE — CATHETER DIAG AD 6FR L100CM COR GRN HYDRPHLC NYL JR 4 W/O

## (undated) DEVICE — ANGIO-SEAL VIP VASCULAR CLOSURE DEVICE: Brand: ANGIO-SEAL

## (undated) DEVICE — EVOS 3.5MM X 24MM CORTEX SCREW SELF-TAPPING
Type: IMPLANTABLE DEVICE | Site: ANKLE | Status: NON-FUNCTIONAL
Brand: EVOS

## (undated) DEVICE — EVOS 3.5MM X 12MM CORTEX SCREW SELF-TAPPING
Type: IMPLANTABLE DEVICE | Site: ANKLE | Status: NON-FUNCTIONAL
Brand: EVOS

## (undated) DEVICE — ELECTRODE PT RET AD L9FT HI MOIST COND ADH HYDRGEL CORDED

## (undated) DEVICE — 1.1MM DRILL TIP WIRE 150MM: Brand: VLP FOOT

## (undated) DEVICE — APPLICATOR MEDICATED 10.5 CC SOLUTION HI LT ORNG CHLORAPREP

## (undated) DEVICE — YANKAUER,FLEXIBLE HANDLE,REGLR CAPACITY: Brand: MEDLINE INDUSTRIES, INC.

## (undated) DEVICE — GUIDEWIRE VASC J 3 MM 0.035 INX210 CM FIX COR INQWIRE

## (undated) DEVICE — CATHETER INTVASC ULTRASOUND PLAT EAGLE EYE PHARM DIGITAL

## (undated) DEVICE — SURGICAL SUCTION CONNECTING TUBE WITH MALE CONNECTOR AND SUCTION CLAMP, 2 FT. LONG (.6 M), 5 MM I.D.: Brand: CONMED

## (undated) DEVICE — GOWN,AURORA,NONRNF,XL,30/CS: Brand: MEDLINE

## (undated) DEVICE — SOLUTION IRRIG 3000ML 0.9% SOD CHL USP UROMATIC PLAS CONT